# Patient Record
Sex: FEMALE | Race: WHITE | NOT HISPANIC OR LATINO | Employment: OTHER | URBAN - METROPOLITAN AREA
[De-identification: names, ages, dates, MRNs, and addresses within clinical notes are randomized per-mention and may not be internally consistent; named-entity substitution may affect disease eponyms.]

---

## 2017-01-03 ENCOUNTER — HOSPITAL ENCOUNTER (EMERGENCY)
Facility: HOSPITAL | Age: 77
Discharge: HOME | End: 2017-01-03
Attending: EMERGENCY MEDICINE | Admitting: EMERGENCY MEDICINE

## 2017-01-03 ENCOUNTER — APPOINTMENT (OUTPATIENT)
Dept: GENERAL RADIOLOGY | Facility: HOSPITAL | Age: 77
End: 2017-01-03

## 2017-01-03 VITALS
WEIGHT: 185 LBS | SYSTOLIC BLOOD PRESSURE: 174 MMHG | HEIGHT: 63 IN | BODY MASS INDEX: 32.78 KG/M2 | TEMPERATURE: 98.3 F | RESPIRATION RATE: 16 BRPM | HEART RATE: 70 BPM | DIASTOLIC BLOOD PRESSURE: 89 MMHG | OXYGEN SATURATION: 98 %

## 2017-01-03 DIAGNOSIS — N28.9 RENAL INSUFFICIENCY: ICD-10-CM

## 2017-01-03 DIAGNOSIS — J06.9 UPPER RESPIRATORY TRACT INFECTION, UNSPECIFIED TYPE: Primary | ICD-10-CM

## 2017-01-03 DIAGNOSIS — J44.1 COPD EXACERBATION (HCC): ICD-10-CM

## 2017-01-03 LAB
ALBUMIN SERPL-MCNC: 4.1 G/DL (ref 3.5–5.2)
ALBUMIN/GLOB SERPL: 1.2 G/DL
ALP SERPL-CCNC: 101 U/L (ref 39–117)
ALT SERPL W P-5'-P-CCNC: 6 U/L (ref 1–33)
ANION GAP SERPL CALCULATED.3IONS-SCNC: 14.3 MMOL/L
AST SERPL-CCNC: 9 U/L (ref 1–32)
BASOPHILS # BLD AUTO: 0.04 10*3/MM3 (ref 0–0.2)
BASOPHILS NFR BLD AUTO: 0.5 % (ref 0–1.5)
BILIRUB SERPL-MCNC: 0.3 MG/DL (ref 0.1–1.2)
BUN BLD-MCNC: 26 MG/DL (ref 8–23)
BUN/CREAT SERPL: 20 (ref 7–25)
CALCIUM SPEC-SCNC: 10.7 MG/DL (ref 8.6–10.5)
CHLORIDE SERPL-SCNC: 101 MMOL/L (ref 98–107)
CO2 SERPL-SCNC: 26.7 MMOL/L (ref 22–29)
CREAT BLD-MCNC: 1.3 MG/DL (ref 0.57–1)
DEPRECATED RDW RBC AUTO: 47.1 FL (ref 37–54)
EOSINOPHIL # BLD AUTO: 0.1 10*3/MM3 (ref 0–0.7)
EOSINOPHIL NFR BLD AUTO: 1.2 % (ref 0.3–6.2)
ERYTHROCYTE [DISTWIDTH] IN BLOOD BY AUTOMATED COUNT: 13.9 % (ref 11.7–13)
GFR SERPL CREATININE-BSD FRML MDRD: 40 ML/MIN/1.73
GLOBULIN UR ELPH-MCNC: 3.4 GM/DL
GLUCOSE BLD-MCNC: 98 MG/DL (ref 65–99)
HCT VFR BLD AUTO: 47 % (ref 35.6–45.5)
HGB BLD-MCNC: 15 G/DL (ref 11.9–15.5)
HOLD SPECIMEN: NORMAL
IMM GRANULOCYTES # BLD: 0 10*3/MM3 (ref 0–0.03)
IMM GRANULOCYTES NFR BLD: 0 % (ref 0–0.5)
LYMPHOCYTES # BLD AUTO: 2.13 10*3/MM3 (ref 0.9–4.8)
LYMPHOCYTES NFR BLD AUTO: 24.7 % (ref 19.6–45.3)
MCH RBC QN AUTO: 29.6 PG (ref 26.9–32)
MCHC RBC AUTO-ENTMCNC: 31.9 G/DL (ref 32.4–36.3)
MCV RBC AUTO: 92.7 FL (ref 80.5–98.2)
MONOCYTES # BLD AUTO: 0.75 10*3/MM3 (ref 0.2–1.2)
MONOCYTES NFR BLD AUTO: 8.7 % (ref 5–12)
NEUTROPHILS # BLD AUTO: 5.6 10*3/MM3 (ref 1.9–8.1)
NEUTROPHILS NFR BLD AUTO: 64.9 % (ref 42.7–76)
NT-PROBNP SERPL-MCNC: 209.7 PG/ML (ref 0–1800)
PLATELET # BLD AUTO: 214 10*3/MM3 (ref 140–500)
PMV BLD AUTO: 10.7 FL (ref 6–12)
POTASSIUM BLD-SCNC: 3.8 MMOL/L (ref 3.5–5.2)
PROT SERPL-MCNC: 7.5 G/DL (ref 6–8.5)
RBC # BLD AUTO: 5.07 10*6/MM3 (ref 3.9–5.2)
SODIUM BLD-SCNC: 142 MMOL/L (ref 136–145)
TROPONIN T SERPL-MCNC: <0.01 NG/ML (ref 0–0.03)
WBC NRBC COR # BLD: 8.62 10*3/MM3 (ref 4.5–10.7)
WHOLE BLOOD HOLD SPECIMEN: NORMAL

## 2017-01-03 PROCEDURE — 87486 CHLMYD PNEUM DNA AMP PROBE: CPT | Performed by: PHYSICIAN ASSISTANT

## 2017-01-03 PROCEDURE — 87798 DETECT AGENT NOS DNA AMP: CPT | Performed by: PHYSICIAN ASSISTANT

## 2017-01-03 PROCEDURE — 93005 ELECTROCARDIOGRAM TRACING: CPT

## 2017-01-03 PROCEDURE — 83880 ASSAY OF NATRIURETIC PEPTIDE: CPT | Performed by: EMERGENCY MEDICINE

## 2017-01-03 PROCEDURE — 99284 EMERGENCY DEPT VISIT MOD MDM: CPT

## 2017-01-03 PROCEDURE — 80053 COMPREHEN METABOLIC PANEL: CPT | Performed by: EMERGENCY MEDICINE

## 2017-01-03 PROCEDURE — 84484 ASSAY OF TROPONIN QUANT: CPT | Performed by: EMERGENCY MEDICINE

## 2017-01-03 PROCEDURE — 85025 COMPLETE CBC W/AUTO DIFF WBC: CPT | Performed by: EMERGENCY MEDICINE

## 2017-01-03 PROCEDURE — 87581 M.PNEUMON DNA AMP PROBE: CPT | Performed by: PHYSICIAN ASSISTANT

## 2017-01-03 PROCEDURE — 71020 HC CHEST PA AND LATERAL: CPT

## 2017-01-03 PROCEDURE — 87633 RESP VIRUS 12-25 TARGETS: CPT | Performed by: PHYSICIAN ASSISTANT

## 2017-01-03 PROCEDURE — 93010 ELECTROCARDIOGRAM REPORT: CPT | Performed by: INTERNAL MEDICINE

## 2017-01-03 RX ORDER — LEVOFLOXACIN 500 MG/1
750 TABLET, FILM COATED ORAL ONCE
Qty: 7 TABLET | Refills: 0 | Status: SHIPPED | OUTPATIENT
Start: 2017-01-03 | End: 2017-01-03

## 2017-01-03 RX ORDER — SODIUM CHLORIDE 0.9 % (FLUSH) 0.9 %
10 SYRINGE (ML) INJECTION AS NEEDED
Status: DISCONTINUED | OUTPATIENT
Start: 2017-01-03 | End: 2017-01-04 | Stop reason: HOSPADM

## 2017-01-03 RX ORDER — IPRATROPIUM BROMIDE AND ALBUTEROL SULFATE 2.5; .5 MG/3ML; MG/3ML
3 SOLUTION RESPIRATORY (INHALATION) ONCE
Status: DISCONTINUED | OUTPATIENT
Start: 2017-01-03 | End: 2017-01-03

## 2017-01-03 RX ORDER — METHYLPREDNISOLONE 4 MG/1
TABLET ORAL
Qty: 21 TABLET | Refills: 0 | Status: SHIPPED | OUTPATIENT
Start: 2017-01-03 | End: 2017-04-11 | Stop reason: HOSPADM

## 2017-01-03 RX ORDER — LEVOFLOXACIN 500 MG/1
500 TABLET, FILM COATED ORAL DAILY
COMMUNITY
End: 2017-04-11 | Stop reason: HOSPADM

## 2017-01-04 LAB

## 2017-01-04 NOTE — ED PROVIDER NOTES
I supervised care provided by the midlevel provider.    We have discussed this patient's history, physical exam, and treatment plan.   I have reviewed the note and personally saw and examined the patient and agree with the plan of care.  See attached attending note.    Patient complains of cough and shortness of breath for the past several days.  She also complains of intermittent headache, dizziness, and joint pain.  Denies fever or chest pain.    On exam:  Heart is regular  Lungs are clear with slightly diminished breath sounds in the bases.  Abdomen is obese, nontender  No pedal edema.  No Tenderness.      Chest x-ray was unremarkable.  Creatinine is mildly elevated.  EKG           EKG time: 1859  Rhythm/Rate: Sinus rhythm rate of 71 with a PVC  P waves and TX: Normal P waves, normal JOSE DAVID  QRS, axis: Normal axis, normal QRS   ST and T waves: Non-specific changes     Interpreted Contemporaneously by me, independently viewed  Not changed compared to prior EKG done 12/14/12    Plan is to call Dr. Cadet.                          ;     Óscar Olvio MD  01/03/17 4659

## 2017-01-04 NOTE — ED PROVIDER NOTES
EMERGENCY DEPARTMENT ENCOUNTER    CHIEF COMPLAINT  Chief Complaint: Cough  History given by: Patient  History limited by:   Room Number: 02/02  PMD: Óscar Cadet MD      HPI:  Pt is a 76 y.o. female who presents complaining of cough that onset 4 days ago. She states the cough is productive with thick, green sputum Pt also reports some dyspnea and dizziness since that time along with headache, arthralgias.  She has a hx of COPD, Asthma. Pt spoke with her PCP's nurse today and states she was referred to the ED    Duration:  4 days ago  Onset: gradual  Timing: constant  Quality: productive cough w/ thick green sputum  Intensity/Severity: moderate  Progression: worsening  Associated Symptoms: headache, arthralgias.   Aggravating Factors: deep breathing  Alleviating Factors: none  Previous Episodes: hx of COPD, asthma  Treatment before arrival: seen by PCP.    PAST MEDICAL HISTORY  Active Ambulatory Problems     Diagnosis Date Noted   • Joint pain 07/25/2016   • Urinary retention self-caths ([place]) 07/25/2016   • Sinusitis 07/25/2016   • Conjunctivitis 07/25/2016   • Arm pain 07/25/2016   • Cervical disc displacement 07/25/2016   • Cervical pain (Servando=PM) 07/25/2016   • DDD (degenerative disc disease), cervical 07/25/2016   • Hyperlipidemia 07/25/2016   • Preventative health care 09/08/2016   • Medication management 09/08/2016   • Proctocele 06/29/2015   • Urge incontinence of urine 06/29/2015   • Chronic tension-type headache, intractable 11/11/2016   • Obesity (BMI 30.0-34.9) 11/11/2016   • H/o Lyme disease 11/11/2016   • Chronic kidney disease, stage 2, mildly decreased GFR 11/11/2016   • Hardening of the arteries of the brain 11/11/2016   • Allergic contact dermatitis 11/11/2016   • Chronic fatigue 11/11/2016   • Vertigo 11/11/2016   • Vitamin D deficiency 11/11/2016   • Moderate persistent asthmatic bronchitis without complication 11/11/2016   • Anterior cervical adenopathy due to infection 11/11/2016   •  Pleurisy 11/11/2016   • Knee pain, bilateral 11/11/2016   • Elevated PTH level 11/11/2016   • 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) 11/11/2016   • Gastroesophageal reflux disease with esophagitis 11/11/2016   • Psoriasis (Darryl Ochoa) 11/11/2016   • Postmenopausal 11/11/2016   • Recurrent UTI 11/11/2016   • CVA (cerebrovascular accident) subacute 11/11/2016     Resolved Ambulatory Problems     Diagnosis Date Noted   • No Resolved Ambulatory Problems     Past Medical History   Diagnosis Date   • Asthma    • Cancer    • COPD (chronic obstructive pulmonary disease)    • Emphysema lung    • Generalized headaches    • Hayfever    • Hypertension        PAST SURGICAL HISTORY  Past Surgical History   Procedure Laterality Date   • Hernia repair  01/01/1971   • Total abdominal hysterectomy  01/01/1973   • Sinus surgery  01/01/1984   • Knee surgery Left 01/01/1998   • Breast lumpectomy Left 04/01/2003   • Parathyroidectomy  05/08/2006   • Cataract extraction  2010   • Carpal tunnel release  2011       FAMILY HISTORY  Family History   Problem Relation Age of Onset   • Cancer Brother        SOCIAL HISTORY  Social History     Social History   • Marital status:      Spouse name: N/A   • Number of children: N/A   • Years of education: N/A     Occupational History   • Not on file.     Social History Main Topics   • Smoking status: Never Smoker   • Smokeless tobacco: Never Used   • Alcohol use Yes      Comment: rarely   • Drug use: Defer   • Sexual activity: Defer     Other Topics Concern   • Not on file     Social History Narrative       ALLERGIES  Sulfa antibiotics; Hydralazine; Anastrozole; Grass; Morphine and related; Norvasc  [amlodipine besylate]; Penicillins; Tree extract; and Zolpidem    REVIEW OF SYSTEMS  Review of Systems   Constitutional: Negative for fever.   HENT: Negative for sore throat.    Eyes: Negative.    Respiratory: Positive for cough and shortness of breath.    Cardiovascular:  Negative for chest pain.   Gastrointestinal: Negative for abdominal pain, diarrhea and vomiting.   Genitourinary: Negative for dysuria.   Musculoskeletal: Positive for arthralgias. Negative for neck pain.   Skin: Negative for rash.   Allergic/Immunologic: Negative.    Neurological: Positive for headaches. Negative for weakness and numbness.   Hematological: Negative.    Psychiatric/Behavioral: Negative.    All other systems reviewed and are negative.      PHYSICAL EXAM  ED Triage Vitals   Temp Heart Rate Resp BP SpO2   01/03/17 1646 01/03/17 1645 01/03/17 1645 01/03/17 1715 01/03/17 1645   97.5 °F (36.4 °C) 96 22 184/102 100 %      Temp src Heart Rate Source Patient Position BP Location FiO2 (%)   01/03/17 1646 -- 01/03/17 1715 01/03/17 1715 --   Tympanic  Sitting Right arm        Physical Exam   Constitutional: She is oriented to person, place, and time and well-developed, well-nourished, and in no distress. No distress.   HENT:   Head: Normocephalic and atraumatic.   Eyes: EOM are normal. Pupils are equal, round, and reactive to light.   Neck: Normal range of motion. Neck supple.   Cardiovascular: Normal rate, regular rhythm and normal heart sounds.    Pulmonary/Chest: Effort normal and breath sounds normal. No respiratory distress.   Abdominal: Soft. There is no tenderness. There is no rebound and no guarding.   Musculoskeletal: Normal range of motion. She exhibits no edema.   Neurological: She is alert and oriented to person, place, and time. She has normal sensation and normal strength.   Skin: Skin is warm and dry. No rash noted.   Psychiatric: Mood and affect normal.   Nursing note and vitals reviewed.      LAB RESULTS  Lab Results (last 24 hours)     Procedure Component Value Units Date/Time    CBC & Differential [[id number]] Collected:  01/03/17 1758    Specimen:  Blood Updated:  01/03/17 1822    Narrative:       The following orders were created for panel order CBC & Differential.  Procedure                                Abnormality         Status                     ---------                               -----------         ------                     CBC Auto Differential[[id number]]         Abnormal            Final result                 Please view results for these tests on the individual orders.    Comprehensive Metabolic Panel [[id number]]  (Abnormal) Collected:  01/03/17 1758    Specimen:  Blood Updated:  01/03/17 1841     Glucose 98 mg/dL      BUN 26 (H) mg/dL      Creatinine 1.30 (H) mg/dL      Sodium 142 mmol/L      Potassium 3.8 mmol/L      Chloride 101 mmol/L      CO2 26.7 mmol/L      Calcium 10.7 (H) mg/dL      Total Protein 7.5 g/dL      Albumin 4.10 g/dL      ALT (SGPT) 6 U/L      AST (SGOT) 9 U/L      Alkaline Phosphatase 101 U/L      Total Bilirubin 0.3 mg/dL      eGFR Non African Amer 40 (L) mL/min/1.73      Globulin 3.4 gm/dL      A/G Ratio 1.2 g/dL      BUN/Creatinine Ratio 20.0      Anion Gap 14.3 mmol/L     Narrative:       The MDRD GFR formula is only valid for adults with stable renal function between ages 18 and 70.    BNP [[id number]]  (Normal) Collected:  01/03/17 1758    Specimen:  Blood Updated:  01/03/17 1856     proBNP 209.7 pg/mL     Narrative:       Among patients with dyspnea, NT-proBNP is highly sensitive for the detection of acute congestive heart failure. In addition NT-proBNP of <300 pg/ml effectively rules out acute congestive heart failure with 99% negative predictive value.    Troponin [[id number]]  (Normal) Collected:  01/03/17 1758    Specimen:  Blood Updated:  01/03/17 1856     Troponin T <0.010 ng/mL     Narrative:       Troponin T Reference Ranges:  Less than 0.03 ng/mL:    Negative for AMI  0.03 to 0.09 ng/mL:      Indeterminant for AMI  Greater than 0.09 ng/mL: Positive for AMI    CBC Auto Differential [[id number]]  (Abnormal) Collected:  01/03/17 1758    Specimen:  Blood Updated:  01/03/17 1822     WBC 8.62 10*3/mm3      RBC 5.07 10*6/mm3      Hemoglobin 15.0 g/dL      Hematocrit  47.0 (H) %      MCV 92.7 fL      MCH 29.6 pg      MCHC 31.9 (L) g/dL      RDW 13.9 (H) %      RDW-SD 47.1 fl      MPV 10.7 fL      Platelets 214 10*3/mm3      Neutrophil % 64.9 %      Lymphocyte % 24.7 %      Monocyte % 8.7 %      Eosinophil % 1.2 %      Basophil % 0.5 %      Immature Grans % 0.0 %      Neutrophils, Absolute 5.60 10*3/mm3      Lymphocytes, Absolute 2.13 10*3/mm3      Monocytes, Absolute 0.75 10*3/mm3      Eosinophils, Absolute 0.10 10*3/mm3      Basophils, Absolute 0.04 10*3/mm3      Immature Grans, Absolute 0.00 10*3/mm3     Respiratory Panel, PCR [[id number]]  (Normal) Collected:  01/03/17 2127    Specimen:  Swab from [facility] Updated:  01/04/17 0013     ADENOVIRUS, PCR Not Detected      Coronavirus 229E Not Detected      Coronavirus HKU1 Not Detected      Coronavirus NL63 Not Detected      Coronavirus OC43 Not Detected      Human Metapneumovirus Not Detected      Human Rhinovirus/Enterovirus Not Detected      Influenza B PCR Not Detected      Parainfluenza Virus 1 Not Detected      Parainfluenza Virus 2 Not Detected      Parainfluenza Virus 3 Not Detected      Parainfluenza Virus 4 Not Detected      Bordetella pertussis pcr Not Detected      Influenza 2009 H1N1 by PCR Not Detected      Chlamydophila pneumoniae PCR Not Detected      Mycoplasma pneumo by PCR Not Detected      Influenza A PCR Not Detected      Influenza A H3 Not Detected      Influenza A H1 Not Detected      RSV, PCR Not Detected           I ordered the above labs and reviewed the results    RADIOLOGY  XR Chest 2 View   Preliminary Result       No active disease in the chest.               I ordered the above noted radiological studies. Interpreted by radiologist. Reviewed by me in PACS.       PROCEDURES  Procedures  See MD note for EKG interpretation    PROGRESS AND CONSULTS  ED Course   8:55 PM  Ordered blood work, EKG, CXR. Ordered Duo-neb  11:04 PM  Discussed pt with Dr. Lawson. Dr. Lawson wants to start pt on prednisone and  feels she is safe for discharge and to follow up office. Will give IVF  11:05 PM  Discussed pt with Dr. Salinas. Dr. Olivo has seen and evaluated pt and agrees with tx plan.  11:15 PM  Rechecked with pt. Informed pt of discussion with Dr. Lawson and plan to discharge with Prednisone and Levaquin and instructed to follow up in the next few days. Pt understands and agrees with plan. All concerns addressed.       MEDICAL DECISION MAKING      MDM  Number of Diagnoses or Management Options  COPD exacerbation:   Renal insufficiency:   Upper respiratory tract infection, unspecified type:      Amount and/or Complexity of Data Reviewed  Clinical lab tests: ordered and reviewed (Blood work)  Tests in the radiology section of CPT®: reviewed and ordered (CXR)  Tests in the medicine section of CPT®: ordered and reviewed (EKG)  Discuss the patient with other providers: yes (Dr. Lawson)  Independent visualization of images, tracings, or specimens: yes           DIAGNOSIS  Final diagnoses:   Upper respiratory tract infection, unspecified type   COPD exacerbation   Renal insufficiency       DISPOSITION  DISCHARGE    Patient discharged in stable condition.    Reviewed implications of results, diagnosis, meds, responsibility to follow up, warning signs and symptoms of possible worsening, potential complications and reasons to return to ER.    Patient/Family voiced understanding of above instructions.    Discussed plan for discharge, as there is no emergent indication for admission.  Pt/family is agreeable and understands need for follow up and repeat testing.  Pt is aware that discharge does not mean that nothing is wrong but it indicates no emergency is present that requires admission and they must continue care with follow-up as given below or physician of their choice.     FOLLOW-UP  Óscar Cadet MD  [address] [place] [address]  [phone]    Call in 1 day  For Primary Physician follow-up         Medication  List      New Prescriptions          MethylPREDNISolone 4 MG tablet   Commonly known as:  MEDROL (CRISTIAN)   Take as directed on package instructions.         ASK your doctor about these medications          levoFLOXacin 500 MG tablet   Commonly known as:  LEVAQUIN   Take 1.5 tablets by mouth 1 (One) Time for 1 dose.   Ask about: Should I take this medication?               Latest Documented Vital Signs:  As of 5:15 AM  BP- 174/89 HR- 70 Temp- 98.3 °F (36.8 °C) O2 sat- 98%    --  Documentation assistance provided by ema Tinajero for Nicola Luke PA-C.  Information recorded by the ema was done at my direction and has been verified and validated by me.       Fransisco Tinajero  01/03/17 2200       Fransisco Tinajero  01/03/17 2311       Fransisco Tinajero  01/03/17 0806       GASPER Ramirze III  01/04/17 0596

## 2017-01-06 ENCOUNTER — TELEPHONE (OUTPATIENT)
Dept: SOCIAL WORK | Facility: HOSPITAL | Age: 77
End: 2017-01-06

## 2017-01-06 NOTE — TELEPHONE ENCOUNTER
ED follow-up phone call. States she is still feeling about the same, is drinking plenty of fluids and taking antibx as directed. Has not yet gotten -script for steroids filled; states she plans to do so as soon as possible. Has upcoming priscila't w/ PCP. Reminded re RTER as necessary.

## 2017-01-09 RX ORDER — BUTALBITAL, ACETAMINOPHEN AND CAFFEINE 50; 325; 40 MG/1; MG/1; MG/1
2 TABLET ORAL EVERY 4 HOURS PRN
Qty: 240 TABLET | Refills: 0 | OUTPATIENT
Start: 2017-01-09 | End: 2017-03-27 | Stop reason: SDUPTHER

## 2017-01-11 ENCOUNTER — OFFICE VISIT (OUTPATIENT)
Dept: CARDIOLOGY | Facility: CLINIC | Age: 77
End: 2017-01-11

## 2017-01-11 VITALS
HEIGHT: 63 IN | HEART RATE: 74 BPM | WEIGHT: 185 LBS | DIASTOLIC BLOOD PRESSURE: 78 MMHG | BODY MASS INDEX: 32.78 KG/M2 | SYSTOLIC BLOOD PRESSURE: 142 MMHG

## 2017-01-11 DIAGNOSIS — N18.2 CHRONIC KIDNEY DISEASE, STAGE 2, MILDLY DECREASED GFR: ICD-10-CM

## 2017-01-11 DIAGNOSIS — N39.0 RECURRENT UTI: ICD-10-CM

## 2017-01-11 DIAGNOSIS — R79.89 ELEVATED PTHRP LEVEL: ICD-10-CM

## 2017-01-11 DIAGNOSIS — R33.9 URINARY RETENTION: ICD-10-CM

## 2017-01-11 DIAGNOSIS — K21.00 GASTROESOPHAGEAL REFLUX DISEASE WITH ESOPHAGITIS: ICD-10-CM

## 2017-01-11 DIAGNOSIS — R09.1 PLEURISY: ICD-10-CM

## 2017-01-11 DIAGNOSIS — R07.2 PRECORDIAL PAIN: ICD-10-CM

## 2017-01-11 DIAGNOSIS — J45.40 MODERATE PERSISTENT ASTHMATIC BRONCHITIS WITHOUT COMPLICATION: ICD-10-CM

## 2017-01-11 DIAGNOSIS — J01.00 ACUTE MAXILLARY SINUSITIS, RECURRENCE NOT SPECIFIED: ICD-10-CM

## 2017-01-11 DIAGNOSIS — E78.49 OTHER HYPERLIPIDEMIA: ICD-10-CM

## 2017-01-11 DIAGNOSIS — E66.9 OBESITY (BMI 30.0-34.9): ICD-10-CM

## 2017-01-11 DIAGNOSIS — R42 VERTIGO: ICD-10-CM

## 2017-01-11 DIAGNOSIS — R53.82 CHRONIC FATIGUE: ICD-10-CM

## 2017-01-11 DIAGNOSIS — I63.9 CEREBROVASCULAR ACCIDENT (CVA), UNSPECIFIED MECHANISM (HCC): Primary | ICD-10-CM

## 2017-01-11 PROCEDURE — 99204 OFFICE O/P NEW MOD 45 MIN: CPT | Performed by: INTERNAL MEDICINE

## 2017-01-11 NOTE — PROGRESS NOTES
[facility]  Cardiology Office Visit Note        Subjective:     Encounter Date:2017      Patient ID: Sasha Barrett   Age: 76 y.o.  Sex: female  :  1940  MRN: [id number]             Date of Office Visit: 2017  Encounter Provider: Umesh Greenberg MD  Place of Service: [facility]     .    Chief Complaint:  History of Present Illness    The following portions of the patient's history were reviewed and updated as appropriate: allergies, current medications, past family history, past medical history, past social history, past surgical history and problem list.    Review of Systems   Constitution: Negative for weight gain.   HENT: Positive for congestion. Negative for headaches, hearing loss and sore throat.    Eyes: Negative for blurred vision.   Cardiovascular: Positive for dyspnea on exertion. Negative for chest pain, claudication, cyanosis, irregular heartbeat, leg swelling, near-syncope, orthopnea, palpitations, paroxysmal nocturnal dyspnea and syncope.   Respiratory: Positive for shortness of breath. Negative for cough and snoring.    Endocrine: Negative for cold intolerance.   Hematologic/Lymphatic: Negative for adenopathy. Does not bruise/bleed easily.   Skin: Negative for rash.   Musculoskeletal: Positive for joint pain, joint swelling and myalgias. Negative for back pain.   Gastrointestinal: Negative for abdominal pain, change in bowel habit, constipation and diarrhea.   Genitourinary: Negative for dysuria, frequency, hematuria and hesitancy.   Neurological: Positive for dizziness and vertigo. Negative for disturbances in coordination, excessive daytime sleepiness, focal weakness and loss of balance.   Psychiatric/Behavioral: Negative for memory loss. The patient is not nervous/anxious.    Allergic/Immunologic: Negative for hives.   All other systems reviewed and are negative.      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  The main complaint she has R hypertension, difficult to control as well as dyspnea on exertion.  She states that her problems began around June 2016 when she first noted intermittent swelling of her legs up to her thighs.  No change in weight however.  She also noted dizziness with standing, with one fall.  She blames a lot of her symptoms on taking hydralazine from March through November 2016, a medicine that was prescribed by her physician in [place].  When she returned to [place], Dr. Bond, her nephrologist, took her off the hydralazine.  She still complains of aches in her joints and muscles however.    She was sensitive for rheumatoid arthritis was told the test is borderline positive.    Cardiac review systems: No PND.  She does awaken from sinus drainage sometimes.  No change in weight.  No palpitations or she gets dizzy with standing.  She's had one fall onto a couch.  No syncope.    Cardiac risk factors: Positive for hypertension and hyperlipidemia.  No diabetes.  No smoking but she did have secondhand smoke.  No family history of early CAD.    She did have a stress test over 15 years ago.    She recently had a MRI and MRA of her brain to evaluate new-onset headache.    She states that the doctor in [place] took her off expensive diuretic and put her on the hydralazine instead.          Past Medical History   Diagnosis Date   • Asthma    • Cancer    • COPD (chronic obstructive pulmonary disease)    • Emphysema lung    • Generalized headaches    • Hayfever    • Hypertension        Past Surgical History   Procedure Laterality Date   • Hernia repair  01/01/1971   • Total abdominal hysterectomy  01/01/1973   • Sinus surgery  01/01/1984   • Knee surgery Left 01/01/1998   • Breast lumpectomy Left 04/01/2003   • Parathyroidectomy  05/08/2006   • Cataract extraction   2010   • Carpal tunnel release  2011       Social History     Social History   • Marital status:      Spouse name: N/A   • Number of children: N/A   • Years of education: N/A     Occupational History   • Not on file.     Social History Main Topics   • Smoking status: Never Smoker   • Smokeless tobacco: Never Used   • Alcohol use Yes      Comment: rarely   • Drug use: Defer   • Sexual activity: Defer     Other Topics Concern   • Not on file     Social History Narrative       Family History   Problem Relation Age of Onset   • Cancer Brother            Scheduled Meds:  Current Outpatient Prescriptions on File Prior to Visit   Medication Sig Dispense Refill   • albuterol (PROAIR HFA) 108 (90 BASE) MCG/ACT inhaler ProAir  (90 Base) MCG/ACT Inhalation Aerosol Solution; Patient Sig: ProAir  (90 Base) MCG/ACT Inhalation Aerosol Solution ; 8; 0; 18-Jun-2014; Active     • aspirin 81 MG tablet Take  by mouth.     • budesonide (PULMICORT FLEXHALER) 180 MCG/ACT inhaler Pulmicort Flexhaler 180 MCG/ACT Inhalation Aerosol Powder Breath Activated; Patient Sig: Pulmicort Flexhaler 180 MCG/ACT Inhalation Aerosol Powder Breath Activated ; 0; 26-Jun-2014; Active     • butalbital-acetaminophen-caffeine (FIORICET, ESGIC) -40 MG per tablet Take 2 tablets by mouth Every 4 (Four) Hours As Needed for headaches. 240 tablet 0   • Cholecalciferol (VITAMIN D) 1000 UNITS tablet Take  by mouth.     • CloNIDine (CATAPRES) 0.1 MG tablet Take 1 tablet by mouth 2 (Two) Times a Day. 270 tablet 0   • diphenhydrAMINE (BENADRYL) 50 MG tablet Take 1 tablet by mouth every 4 (four) hours as needed for itching or allergies. 180 tablet 0   • HYDROcodone-acetaminophen (NORCO) 5-325 MG per tablet Take 1 tablet by mouth Every 6 (Six) Hours As Needed for moderate pain (4-6) or severe pain (7-10). 120 tablet 0   • hydrOXYzine (VISTARIL) 50 MG capsule Take 1 capsule by mouth every 6 (six) hours as needed for itching. 360 capsule 1   •  "levoFLOXacin (LEVAQUIN) 500 MG tablet Take 500 mg by mouth Daily.     • LORazepam (ATIVAN) 0.5 MG tablet Take 1 tablet by mouth Every 8 (Eight) Hours As Needed for anxiety. Take 1 tab 1 hoour before MRI and repeat x 1 at time of test 60 tablet 0   • meclizine (ANTIVERT) 25 MG tablet Take 1 tablet by mouth 3 (Three) Times a Day As Needed for dizziness. 30 tablet 0   • MethylPREDNISolone (MEDROL, CRISTIAN,) 4 MG tablet Take as directed on package instructions. 21 tablet 0   • montelukast (SINGULAIR) 10 MG tablet Take 1 tablet by mouth every night. 90 tablet 1   • NIFEdipine XL (PROCARDIA XL) 60 MG 24 hr tablet Take 1 tablet by mouth daily. No substitutes please   Needs NDC # [id number] 180 tablet 1   • omeprazole (PriLOSEC) 40 MG capsule Take 1 capsule by mouth daily. 90 capsule 1   • ondansetron ODT (ZOFRAN-ODT) 8 MG disintegrating tablet Take 1 tablet by mouth every 6 (six) hours as needed for nausea or vomiting. 360 tablet 1   • pentazocine-naloxone (TALWIN NX) 50-0.5 MG per tablet Take 2 tablets by mouth Every 4 (Four) Hours As Needed for mild pain (1-3) for up to 90 days. 1080 tablet 1   • polyethylene glycol (MIRALAX) powder Take one capful w/liquid daily 765 g 1   • promethazine-codeine (PHENERGAN with CODEINE) 6.25-10 MG/5ML syrup Take 5 mL by mouth 4 (Four) Times a Day As Needed for cough. 360 mL 0   • tiZANidine (ZANAFLEX) 4 MG tablet Take 1 tablet by mouth at night as needed for muscle spasms. 90 tablet 0   • tobramycin-dexamethasone (TOBRADEX) 0.3-0.1 % ophthalmic suspension Administer 1 drop to both eyes every 4 (four) hours while awake. 10 mL 0     No current facility-administered medications on file prior to visit.        Visit Vitals   • /78   • Pulse 74   • Ht 63\" (160 cm)   • Wt 185 lb (83.9 kg)   • BMI 32.77 kg/m2       Objective:     Physical Exam   Constitutional: She is oriented to person, place, and time. She appears well-developed and well-nourished. No distress.   HENT:   Head: Normocephalic " and atraumatic.   Right Ear: External ear normal.   Left Ear: External ear normal.   Mouth/Throat: Oropharynx is clear and moist. No oropharyngeal exudate.   Eyes: Conjunctivae and EOM are normal. Pupils are equal, round, and reactive to light. No scleral icterus.   Neck: Normal range of motion. Neck supple. No JVD present. No tracheal deviation present. No thyromegaly present.   Cardiovascular: Normal rate, regular rhythm, S1 normal, S2 normal, normal heart sounds and intact distal pulses.  PMI is not displaced.  Exam reveals no gallop, no distant heart sounds, no friction rub and no decreased pulses.    No murmur heard.  Pulmonary/Chest: Effort normal and breath sounds normal. No accessory muscle usage. No respiratory distress. She has no wheezes. She has no rales. She exhibits no tenderness.   Abdominal: Soft. Bowel sounds are normal. She exhibits no distension and no mass. There is no tenderness. There is no rebound and no guarding.   Musculoskeletal: Normal range of motion. She exhibits no edema, tenderness or deformity.   Lymphadenopathy:     She has no cervical adenopathy.   Neurological: She is alert and oriented to person, place, and time. She has normal reflexes. No cranial nerve deficit. Coordination normal.   Skin: Skin is dry. No rash noted. She is not diaphoretic. No erythema. No pallor.   Psychiatric: She has a normal mood and affect.             Lab Review:               Lab Review:         Lab Review     No results found for: CHOL  Lab Results   Component Value Date    HDL 62 11/11/2016    HDL 56 07/25/2016     Lab Results   Component Value Date     (H) 11/11/2016     (H) 07/25/2016     Lab Results   Component Value Date    TRIG 175 (H) 11/11/2016    TRIG 212 (H) 07/25/2016     No components found for: CHOLHDL  Lab Results   Component Value Date    GLUCOSE 98 01/03/2017    BUN 26 (H) 01/03/2017    CREATININE 1.30 (H) 01/03/2017    EGFRIFNONA 40 (L) 01/03/2017    EGFRIFAFRI 64  07/25/2016    BCR 20.0 01/03/2017    CO2 26.7 01/03/2017    CALCIUM 10.7 (H) 01/03/2017    PROTENTOTREF 6.9 07/25/2016    ALBUMIN 4.10 01/03/2017    LABIL2 1.2 01/03/2017    AST 9 01/03/2017    ALT 6 01/03/2017     Lab Results   Component Value Date    GLUCOSE 98 01/03/2017    CALCIUM 10.7 (H) 01/03/2017     01/03/2017    K 3.8 01/03/2017    CO2 26.7 01/03/2017     01/03/2017    BUN 26 (H) 01/03/2017    CREATININE 1.30 (H) 01/03/2017    EGFRIFAFRI 64 07/25/2016    EGFRIFNONA 40 (L) 01/03/2017    BCR 20.0 01/03/2017    ANIONGAP 14.3 01/03/2017     Lab Results   Component Value Date    WBC 8.62 01/03/2017    HGB 15.0 01/03/2017    HCT 47.0 (H) 01/03/2017    MCV 92.7 01/03/2017     01/03/2017     No results found for: DDIMER  Lab Results   Component Value Date    TSH 2.080 11/11/2016    O4IXSJP 6.9 11/11/2016     No results found for: CKTOTAL  No results found for: DIGOXIN  Lab Results   Component Value Date    TROPONINT <0.010 01/03/2017     No results found for: INR, PROTIME  Estimated Creatinine Clearance: 36.6 mL/min (by C-G formula based on Cr of 1.3).    Assessment:          Diagnosis Plan   1. Cerebrovascular accident (CVA), unspecified mechanism     2. Moderate persistent asthmatic bronchitis without complication     3. Acute maxillary sinusitis, recurrence not specified     4. Pleurisy  DIAN    CBC & Differential    CK-MB    CK    D-dimer, Quantitative    Magnesium   5. Gastroesophageal reflux disease with esophagitis     6. Obesity (BMI 30.0-34.9)     7. Chronic kidney disease, stage 2, mildly decreased GFR  DIAN    CBC & Differential    CK-MB    CK    D-dimer, Quantitative    Magnesium   8. Recurrent UTI     9. Urinary retention self-caths (Don)     10. Chronic fatigue  DIAN    CBC & Differential    CK-MB    CK    D-dimer, Quantitative    Magnesium   11. Other hyperlipidemia     12. Elevated PTH level     13. Vertigo     14. Precordial pain  Stress Test With Myocardial Perfusion One Day           Assessment and Plan:    Sasha was seen today for cerebrovascular accident.    Diagnoses and all orders for this visit:    Cerebrovascular accident (CVA), unspecified mechanism  -     Cancel: ECG 12 Lead    Moderate persistent asthmatic bronchitis without complication    Acute maxillary sinusitis, recurrence not specified    Pleurisy  -     DIAN  -     CBC & Differential  -     CK-MB  -     CK  -     D-dimer, Quantitative  -     Magnesium    Gastroesophageal reflux disease with esophagitis    Obesity (BMI 30.0-34.9)    Chronic kidney disease, stage 2, mildly decreased GFR  -     DIAN  -     CBC & Differential  -     CK-MB  -     CK  -     D-dimer, Quantitative  -     Magnesium    Recurrent UTI    Urinary retention self-caths (Don)    Chronic fatigue  -     DIAN  -     CBC & Differential  -     CK-MB  -     CK  -     D-dimer, Quantitative  -     Magnesium    Other hyperlipidemia    Elevated PTH level    Vertigo    Precordial pain  -     Stress Test With Myocardial Perfusion One Day      The patient is complaining of shortness of breath with exertion.  She is not in heart failure on exam.  She does have edema of her legs but is difficult to tell how much is water, to his tissue.  She apparently had a venous duplex which was okay.      Echocardiogram performed December 5, 2006 and read by Dr. Patiño showed LVEF normal with moderate LVH, diastolic dysfunction.  No mention of pulmonary hypertension or right ventricular systolic dysfunction.  I will review the echo myself to see if she needs a sleep study.  The right ventricle is enlarged or hypokinetic, she may have significant sleep apnea.    ProBNP was normal at 209 on January 3, 2017.    She did have elevation or creatinine of 1.3 on January 3, up from baseline of 0.9 she sees a nephrologist.    There are function tests and TSH were normal in November 2016.      I will also get an DIAN and other lab work to evaluate her clinical situation, particularly as  she was on hydralazine.  She can have these labs drawn when she sees her nephrologist soon.  I will schedule that appointment.    At this time I will not have her undergo a stress test.  She gets a tightness in her chest substernally when she has COPD exacerbation or pneumonia.  Her ECG from Dr. Cadet's office shows sinus rhythm with PVC and ST segment flattening generally.  I will want her to undergo a stress test to evaluate her chest pain and shortness of breath with exertion in the setting of an abnormal EKG.    Her blood pressure is fairly normal here.  However, has been elevated in the past.  I will let Dr. Bond handle her blood pressure medications if she does have renal sufficiency.    A total of 25 minutes was spent in the care of this patient, including at least 13 minutes face-to-face with the patient.    I not only counseled the patient today on the significant factors noted in the assessment and plan, but I also recommended that the patient reduce salt and saturated animal fat intake in diet, as well as to perform scheduled exercise on a regular basis.      Plan:                  01/11/2017  3:29 PM  MD Umesh Juarez MD  1/11/2017, 3:29 PM

## 2017-01-11 NOTE — PATIENT INSTRUCTIONS
Patient has been counseled on details discussed in the assessment and plan.  He is also been counseled on reducing salt and saturated animal fat in his diet, as well as to perform regular exercise.

## 2017-01-11 NOTE — MR AVS SNAPSHOT
[facility]  [phone]                    Sasha Barrett   1/11/2017 1:00 PM   Office Visit    Dept Phone:  [phone]   Encounter #:  [id number]    Provider:  Umesh Greenberg Jr., MD   Department:  [facility]                Your Full Care Plan              Your Updated Medication List          This list is accurate as of: 1/11/17  3:40 PM.  Always use your most recent med list.                aspirin 81 MG tablet       butalbital-acetaminophen-caffeine -40 MG per tablet   Commonly known as:  FIORICET, ESGIC   Take 2 tablets by mouth Every 4 (Four) Hours As Needed for headaches.       CloNIDine 0.1 MG tablet   Commonly known as:  CATAPRES   Take 1 tablet by mouth 2 (Two) Times a Day.       diphenhydrAMINE 50 MG tablet   Commonly known as:  BENADRYL   Take 1 tablet by mouth every 4 (four) hours as needed for itching or allergies.       HYDROcodone-acetaminophen 5-325 MG per tablet   Commonly known as:  NORCO   Take 1 tablet by mouth Every 6 (Six) Hours As Needed for moderate pain (4-6) or severe pain (7-10).       hydrOXYzine 50 MG capsule   Commonly known as:  VISTARIL   Take 1 capsule by mouth every 6 (six) hours as needed for itching.       levoFLOXacin 500 MG tablet   Commonly known as:  LEVAQUIN       LORazepam 0.5 MG tablet   Commonly known as:  ATIVAN   Take 1 tablet by mouth Every 8 (Eight) Hours As Needed for anxiety. Take 1 tab 1 hoour before MRI and repeat x 1 at time of test       meclizine 25 MG tablet   Commonly known as:  ANTIVERT   Take 1 tablet by mouth 3 (Three) Times a Day As Needed for dizziness.       MethylPREDNISolone 4 MG tablet   Commonly known as:  MEDROL (CRISTIAN)   Take as directed on package instructions.       montelukast 10 MG tablet   Commonly known as:  SINGULAIR   Take 1 tablet by mouth every night.       NIFEdipine XL 60 MG 24 hr tablet   Commonly known as:  PROCARDIA XL   Take 1 tablet  by mouth daily. No substitutes please  Needs NDC # [id number]       omeprazole 40 MG capsule   Commonly known as:  priLOSEC   Take 1 capsule by mouth daily.       ondansetron ODT 8 MG disintegrating tablet   Commonly known as:  ZOFRAN-ODT   Take 1 tablet by mouth every 6 (six) hours as needed for nausea or vomiting.       pentazocine-naloxone 50-0.5 MG per tablet   Commonly known as:  TALWIN NX   Take 2 tablets by mouth Every 4 (Four) Hours As Needed for mild pain (1-3) for up to 90 days.       polyethylene glycol powder   Commonly known as:  MIRALAX   Take one capful w/liquid daily       PROAIR  (90 BASE) MCG/ACT inhaler   Generic drug:  albuterol       promethazine-codeine 6.25-10 MG/5ML syrup   Commonly known as:  PHENERGAN with CODEINE   Take 5 mL by mouth 4 (Four) Times a Day As Needed for cough.       PULMICORT FLEXHALER 180 MCG/ACT inhaler   Generic drug:  budesonide       tiZANidine 4 MG tablet   Commonly known as:  ZANAFLEX   Take 1 tablet by mouth at night as needed for muscle spasms.       tobramycin-dexamethasone 0.3-0.1 % ophthalmic suspension   Commonly known as:  TOBRADEX   Administer 1 drop to both eyes every 4 (four) hours while awake.       Vitamin D 1000 UNITS tablet               We Performed the Following     DIAN     CBC & Differential     CK-MB     CK     D-dimer, Quantitative     Magnesium     Stress Test With Myocardial Perfusion One Day       You Were Diagnosed With        Codes Comments    Cerebrovascular accident (CVA), unspecified mechanism    -  Primary ICD-10-CM: I63.9  ICD-9-CM: 434.91     Moderate persistent asthmatic bronchitis without complication     ICD-10-CM: J45.40  ICD-9-CM: 493.90     Acute maxillary sinusitis, recurrence not specified     ICD-10-CM: J01.00  ICD-9-CM: 461.0     Pleurisy     ICD-10-CM: R09.1  ICD-9-CM: 511.0     Gastroesophageal reflux disease with esophagitis     ICD-10-CM: K21.0  ICD-9-CM: 530.11     Obesity (BMI 30.0-34.9)     ICD-10-CM:  E66.9  ICD-9-CM: 278.00     Chronic kidney disease, stage 2, mildly decreased GFR     ICD-10-CM: N18.2  ICD-9-CM: 585.2     Recurrent UTI     ICD-10-CM: N39.0  ICD-9-CM: 599.0     Urinary retention     ICD-10-CM: R33.9  ICD-9-CM: 788.20     Chronic fatigue     ICD-10-CM: R53.82  ICD-9-CM: 780.79     Other hyperlipidemia     ICD-10-CM: E78.4  ICD-9-CM: 272.4     Elevated PTHrP level     ICD-10-CM: E21.5  ICD-9-CM: 252.9     Vertigo     ICD-10-CM: R42  ICD-9-CM: 780.4     Precordial pain     ICD-10-CM: R07.2  ICD-9-CM: 786.51       Instructions    Patient has been counseled on details discussed in the assessment and plan.  He is also been counseled on reducing salt and saturated animal fat in his diet, as well as to perform regular exercise.       Patient Instructions History      Upcoming Appointments     Visit Type Date Time Department    NEW PATIENT 1/11/2017  1:00 PM [facility] FOLLOW UP 1/19/2017  3:10 PM [facility] Signup     Our records indicate that you have declined [facility] signup. If you would like to sign up for [employer], please email [email or web address] or call [phone] to obtain an activation code.             Other Info from Your Visit           Your Appointments     Jan 19, 2017  3:10 PM [facility] Follow Up with Óscar Cadet MD   [facility] (--)    [address] [facility] [address] [phone]              Allergies     Morphine  Anaphylaxis    Sulfa Antibiotics  Anaphylaxis, Hives    Or sulfa fillers in meds    Hydralazine Intolerance Myalgia    Patient reports leg cramps, joint pain and increased fatigue    Amlodipine      KIDNEYS SHUT DOWN    Anastrozole      Grass      Morphine And Related      Nifedipine      UNCONTROLLED BP    Nsaids      KIDNEY FAILURE    Norvasc  [Amlodipine Besylate]      Penicillins      Tree Extract      Zolpidem      HALLUCINATIONS     "  Reason for Visit     Cerebrovascular Accident           Vital Signs     Blood Pressure Pulse Height Weight Body Mass Index Smoking Status    142/78 74 63\" (160 cm) 185 lb (83.9 kg) 32.77 kg/m2 Never Smoker      Problems and Diagnoses Noted     Chronic fatigue    Chronic kidney disease, stage 2, mildly decreased GFR    Stroke    Elevated PTHrP level    Inflammation of the esophagus    High cholesterol or triglycerides    Moderate persistent asthmatic bronchitis without complication    Obesity    Inflammation of lining of lung    Recurrent UTI    Sinus infection    Retention of urine    Vertigo    Precordial chest pain            "

## 2017-01-13 RX ORDER — MECLIZINE HYDROCHLORIDE 25 MG/1
TABLET ORAL
Qty: 30 TABLET | Refills: 0 | Status: SHIPPED | OUTPATIENT
Start: 2017-01-13 | End: 2019-03-25

## 2017-01-19 ENCOUNTER — OFFICE VISIT (OUTPATIENT)
Dept: FAMILY MEDICINE CLINIC | Facility: CLINIC | Age: 77
End: 2017-01-19

## 2017-01-19 VITALS
BODY MASS INDEX: 33.13 KG/M2 | TEMPERATURE: 97.5 F | DIASTOLIC BLOOD PRESSURE: 78 MMHG | HEART RATE: 69 BPM | SYSTOLIC BLOOD PRESSURE: 140 MMHG | WEIGHT: 187 LBS | OXYGEN SATURATION: 98 % | HEIGHT: 63 IN

## 2017-01-19 DIAGNOSIS — N18.2 CHRONIC KIDNEY DISEASE, STAGE 2, MILDLY DECREASED GFR: ICD-10-CM

## 2017-01-19 DIAGNOSIS — G44.221 CHRONIC TENSION-TYPE HEADACHE, INTRACTABLE: ICD-10-CM

## 2017-01-19 DIAGNOSIS — E78.49 OTHER HYPERLIPIDEMIA: ICD-10-CM

## 2017-01-19 DIAGNOSIS — I63.9 CEREBROVASCULAR ACCIDENT (CVA), UNSPECIFIED MECHANISM (HCC): ICD-10-CM

## 2017-01-19 DIAGNOSIS — E55.9 VITAMIN D DEFICIENCY: ICD-10-CM

## 2017-01-19 DIAGNOSIS — R06.09 DYSPNEA ON EXERTION: Primary | ICD-10-CM

## 2017-01-19 DIAGNOSIS — R79.89 ELEVATED PTHRP LEVEL: ICD-10-CM

## 2017-01-19 DIAGNOSIS — R53.82 CHRONIC FATIGUE: ICD-10-CM

## 2017-01-19 DIAGNOSIS — J32.4 PANSINUSITIS, UNSPECIFIED CHRONICITY: ICD-10-CM

## 2017-01-19 PROCEDURE — 99214 OFFICE O/P EST MOD 30 MIN: CPT | Performed by: INTERNAL MEDICINE

## 2017-01-19 RX ORDER — PREDNISONE 10 MG/1
10 TABLET ORAL DAILY
Qty: 30 TABLET | Refills: 0 | Status: SHIPPED | OUTPATIENT
Start: 2017-01-19 | End: 2017-03-24 | Stop reason: SDUPTHER

## 2017-01-19 RX ORDER — CLONIDINE HYDROCHLORIDE 0.1 MG/1
0.1 TABLET ORAL 2 TIMES DAILY
Qty: 360 TABLET | Refills: 1 | Status: SHIPPED | OUTPATIENT
Start: 2017-01-19 | End: 2017-04-11 | Stop reason: HOSPADM

## 2017-01-19 RX ORDER — DOXYCYCLINE HYCLATE 100 MG/1
100 TABLET, DELAYED RELEASE ORAL 2 TIMES DAILY
Qty: 20 TABLET | Refills: 0 | Status: SHIPPED | OUTPATIENT
Start: 2017-01-19 | End: 2017-02-01 | Stop reason: SDUPTHER

## 2017-01-19 RX ORDER — NIFEDIPINE 60 MG/1
120 TABLET, EXTENDED RELEASE ORAL DAILY
Qty: 60 TABLET | Refills: 5 | Status: SHIPPED | OUTPATIENT
Start: 2017-01-19 | End: 2017-01-21 | Stop reason: SDUPTHER

## 2017-01-19 NOTE — PROGRESS NOTES
Sasha Barrett is a 77 y.o. female   Chief Complaint   Patient presents with   • Pain     6 mo f/u       MARY LOU Medina: Per House Bill #1 Requirements and [place] Board of Medical Licensure Regulations for prescribing of Schedule II and Schedule III with Hydrocodone, and other controlled medications for which the Board requires [facility] reporting and regulation, the following drug treatment plan was developed and reviewed with the patient on the date of this encounter:              Controlled medication(s) taken: Talwin, Lorazepam, norco, phenrgan with codeine elixir          Medical Indication (including pain relief and/or other physical and psychoosocial functional issue) for treatment:pain, anxiety, severe pain, and persistent cdough          Further Diagnostic tests, consultations, or treatments needed: Ongoing issues            Plans for review of plan, adjustment and waning dose and further [facility] evaluation include: MARY LOU ongoing                  Risk for medication abuse for this patient based on physician review is felt to be extremely low.    Subjective   History of Present Illness     Sasha Barrett is a 77 y.o.female who presents with ongoing issues with pain and discomfort.  SOB at times.  Every joint in arms and  Legs really hurt a lot.  Tried spironolactone without real success.  Measured urine output and it is really good.  Now feels worse again than in past.  Tried to get stress test this am and it was mis scheduled.  Frustrated with the issue overall.  No other acute changes currently.  Wrist are very swollen and hurt severely.          The following portions of the patient's history were reviewed and updated as appropriate: past medical history, surgeries, family history, allergies, current medications, past social history and problem list.    A comprehensive review of 14 systems was peformed  Review of Systems   Constitutional: Positive for fatigue. Negative for chills, fever and unexpected  "weight change.   HENT: Negative for ear pain, hearing loss, sinus pressure, sore throat and tinnitus.    Eyes: Negative.  Negative for pain, discharge and redness.   Respiratory: Negative.  Negative for cough, shortness of breath and wheezing.    Cardiovascular: Negative.  Negative for chest pain, palpitations and leg swelling.   Gastrointestinal: Negative.  Negative for abdominal pain, constipation, diarrhea and nausea.   Endocrine: Negative.  Negative for cold intolerance and heat intolerance.   Genitourinary: Negative.  Negative for difficulty urinating, flank pain and urgency.   Musculoskeletal: Positive for arthralgias and back pain. Negative for joint swelling and myalgias.   Skin: Negative.  Negative for rash and wound.   Allergic/Immunologic: Negative.  Negative for environmental allergies and food allergies.   Neurological: Positive for headaches. Negative for dizziness, seizures and numbness.   Hematological: Negative.  Negative for adenopathy. Does not bruise/bleed easily.   Psychiatric/Behavioral: Negative.  Negative for decreased concentration, dysphoric mood and sleep disturbance. The patient is not nervous/anxious.    All other systems reviewed and are negative.      I have reviewed the patient's medical history in detail and updated the computerized patient record.      Objective   Vitals:    01/19/17 1558   BP: 140/78   BP Location: Right arm   Patient Position: Sitting   Cuff Size: Large Adult   Pulse: 69   Temp: 97.5 °F (36.4 °C)   TempSrc: Oral   SpO2: 98%   Weight: 187 lb (84.8 kg)   Height: 63\" (160 cm)   PainSc:   6   PainLoc: Generalized         Physical Exam   Constitutional: She appears well-developed and well-nourished.   HENT:   Head: Normocephalic and atraumatic.   Right Ear: External ear normal.   Left Ear: External ear normal.   Nose: Nose normal.   Mouth/Throat: Oropharynx is clear and moist.   Eyes: Conjunctivae and EOM are normal. Pupils are equal, round, and reactive to light. "   Neck: Normal range of motion. Neck supple.   Cardiovascular: Normal rate, regular rhythm, normal heart sounds and intact distal pulses.    Pulmonary/Chest: Effort normal and breath sounds normal.   Abdominal: Soft. Bowel sounds are normal.   Musculoskeletal: Normal range of motion.   Neurological: She is alert. She has normal reflexes.   Skin: Skin is warm and dry.   Psychiatric: She has a normal mood and affect. Her behavior is normal. Judgment and thought content normal.       Procedures        Reviewed old notes from [facility]                          Assessment/Plan     Diagnoses and all orders for this visit:    Dyspnea on exertion  Comments:  Stress test planned  Diastyolic Dysfunction on Echo    Cerebrovascular accident (CVA), unspecified mechanism  Comments:  Needs follow up with Neurology  Return as need for further evaluation    Chronic kidney disease, stage 2, mildly decreased GFR  Comments:  Followed with alfred  No other acute changes at present time    Other hyperlipidemia  Comments:  Check labs  Follow up with cardiology asd planned    Vitamin D deficiency  Comments:  Vit D deficiency ongoing  Follow up as planned    Chronic fatigue  Comments:  Resting asd much as possible  Check labs    Chronic tension-type headache, intractable  Comments:  return if symptoms change or worsen    Elevated PTH level  Comments:  recheck levels.         Óscar Cadet MD  1/19/2017  4:00 PM

## 2017-01-19 NOTE — MR AVS SNAPSHOT
Sasha Barrett   1/19/2017 3:10 PM   Office Visit    Dept Phone:  [phone]   Encounter #:  [id number]    Provider:  Óscar Cadet MD   Department:  [facility]                Your Full Care Plan              Today's Medication Changes          These changes are accurate as of: 1/19/17  6:29 PM.  If you have any questions, ask your nurse or doctor.               New Medication(s)Ordered:     doxycycline 100 MG enteric coated tablet   Commonly known as:  DORYX   Take 1 tablet by mouth 2 (Two) Times a Day.       predniSONE 10 MG tablet   Commonly known as:  DELTASONE   Take 1 tablet by mouth Daily. As directed in patient's directions         Medication(s)that have changed:     CloNIDine 0.1 MG tablet   Commonly known as:  CATAPRES   Take 1 tablet by mouth 2 (Two) Times a Day. Schedule bid 1 and then [id number] take extra #1 if systolic BP > 160 and take 2 if systolic Bp>180   What changed:  additional instructions       NIFEdipine XL 60 MG 24 hr tablet   Commonly known as:  NIFEDICAL XL   Take 2 tablets by mouth Daily.   What changed:    - how much to take  - additional instructions            Where to Get Your Medications      These medications were sent to [facility] - [place], [place] - [address] [facility] [phone]  - [phone] [address] [address]     Phone:  [phone]     CloNIDine 0.1 MG tablet    doxycycline 100 MG enteric coated tablet    NIFEdipine XL 60 MG 24 hr tablet    predniSONE 10 MG tablet                  Your Updated Medication List          This list is accurate as of: 1/19/17  6:29 PM.  Always use your most recent med list.                aspirin 81 MG tablet       butalbital-acetaminophen-caffeine -40 MG per tablet   Commonly known as:  FIORICET, ESGIC   Take 2 tablets by mouth Every 4 (Four) Hours As Needed for headaches.       CloNIDine 0.1 MG tablet      Commonly known as:  CATAPRES   Take 1 tablet by mouth 2 (Two) Times a Day. Schedule bid 1 and then [id number] take extra #1 if systolic BP > 160 and take 2 if systolic Bp>180       diphenhydrAMINE 50 MG tablet   Commonly known as:  BENADRYL   Take 1 tablet by mouth every 4 (four) hours as needed for itching or allergies.       doxycycline 100 MG enteric coated tablet   Commonly known as:  DORYX   Take 1 tablet by mouth 2 (Two) Times a Day.       HYDROcodone-acetaminophen 5-325 MG per tablet   Commonly known as:  NORCO   Take 1 tablet by mouth Every 6 (Six) Hours As Needed for moderate pain (4-6) or severe pain (7-10).       hydrOXYzine 50 MG capsule   Commonly known as:  VISTARIL   Take 1 capsule by mouth every 6 (six) hours as needed for itching.       levoFLOXacin 500 MG tablet   Commonly known as:  LEVAQUIN       LORazepam 0.5 MG tablet   Commonly known as:  ATIVAN   Take 1 tablet by mouth Every 8 (Eight) Hours As Needed for anxiety. Take 1 tab 1 hoour before MRI and repeat x 1 at time of test       meclizine 25 MG tablet   Commonly known as:  ANTIVERT   TAKE ONE TABLET BY MOUTH THREE TIMES DAILY AS NEEDED FOR DIZZINESS       MethylPREDNISolone 4 MG tablet   Commonly known as:  MEDROL (CRISTIAN)   Take as directed on package instructions.       montelukast 10 MG tablet   Commonly known as:  SINGULAIR   Take 1 tablet by mouth every night.       NIFEdipine XL 60 MG 24 hr tablet   Commonly known as:  NIFEDICAL XL   Take 2 tablets by mouth Daily.       omeprazole 40 MG capsule   Commonly known as:  priLOSEC   Take 1 capsule by mouth daily.       ondansetron ODT 8 MG disintegrating tablet   Commonly known as:  ZOFRAN-ODT   Take 1 tablet by mouth every 6 (six) hours as needed for nausea or vomiting.       pentazocine-naloxone 50-0.5 MG per tablet   Commonly known as:  TALWIN NX   Take 2 tablets by mouth Every 4 (Four) Hours As Needed for mild pain (1-3) for up to 90 days.       polyethylene glycol powder   Commonly known as:   MIRALAX   Take one capful w/liquid daily       predniSONE 10 MG tablet   Commonly known as:  DELTASONE   Take 1 tablet by mouth Daily. As directed in patient's directions       PROAIR  (90 BASE) MCG/ACT inhaler   Generic drug:  albuterol       promethazine-codeine 6.25-10 MG/5ML syrup   Commonly known as:  PHENERGAN with CODEINE   Take 5 mL by mouth 4 (Four) Times a Day As Needed for cough.       PULMICORT FLEXHALER 180 MCG/ACT inhaler   Generic drug:  budesonide       tiZANidine 4 MG tablet   Commonly known as:  ZANAFLEX   Take 1 tablet by mouth at night as needed for muscle spasms.       tobramycin-dexamethasone 0.3-0.1 % ophthalmic suspension   Commonly known as:  TOBRADEX   Administer 1 drop to both eyes every 4 (four) hours while awake.       Vitamin D 1000 UNITS tablet               You Were Diagnosed With        Codes Comments    Dyspnea on exertion    -  Primary ICD-10-CM: R06.09  ICD-9-CM: 786.09 Stress test planned  Diastyolic Dysfunction on Echo    Cerebrovascular accident (CVA), unspecified mechanism     ICD-10-CM: I63.9  ICD-9-CM: 434.91 Needs follow up with Neurology  Return as need for further evaluation    Chronic kidney disease, stage 2, mildly decreased GFR     ICD-10-CM: N18.2  ICD-9-CM: 585.2 Followed with alfred  No other acute changes at present time    Other hyperlipidemia     ICD-10-CM: E78.4  ICD-9-CM: 272.4 Check labs  Follow up with cardiology asd planned    Vitamin D deficiency     ICD-10-CM: E55.9  ICD-9-CM: 268.9 Vit D deficiency ongoing  Follow up as planned    Chronic fatigue     ICD-10-CM: R53.82  ICD-9-CM: 780.79 Resting asd much as possible  Check labs    Chronic tension-type headache, intractable     ICD-10-CM: G44.221  ICD-9-CM: 339.12 return if symptoms change or worsen    Elevated PTHrP level     ICD-10-CM: E21.5  ICD-9-CM: 252.9 recheck levels.    Pansinusitis, unspecified chronicity     ICD-10-CM: J32.4  ICD-9-CM: 473.8     Cerebrovascular accident (CVA), unspecified  mechanism     ICD-10-CM: I63.9  ICD-9-CM: 434.91 Add baby ASA daily  Refer to neuro  Check MRI and MRA Brain  Echo 2 d with doppler      Instructions    Diagnoses and all orders for this visit:    Dyspnea on exertion  Comments:  Stress test planned  Diastyolic Dysfunction on Echo    Cerebrovascular accident (CVA), unspecified mechanism  Comments:  Needs follow up with Neurology  Return as need for further evaluation    Chronic kidney disease, stage 2, mildly decreased GFR  Comments:  Followed with alfred  No other acute changes at present time    Other hyperlipidemia  Comments:  Check labs  Follow up with cardiology asd planned    Vitamin D deficiency  Comments:  Vit D deficiency ongoing  Follow up as planned    Chronic fatigue  Comments:  Resting asd much as possible  Check labs    Chronic tension-type headache, intractable  Comments:  return if symptoms change or worsen    Elevated PTH level  Comments:  recheck levels.       Patient Instructions History      Upcoming Appointments     Visit Type Date Time Department    HOSPITAL FOLLOW UP 1/19/2017  3:10 PM [facility]     [facility] NM VT 2/3/2017  7:15 AM  [facility]     [facility] NM SCAN VT 2/3/2017  8:30 AM  [facility]     [facility] NM STRESS VT 2/3/2017  9:30 AM  [facility]     [facility] NM SCAN VT 2/3/2017 10:15 AM  [facility]    FOLLOW UP 3/30/2017  2:30 PM [facility]    FOLLOW UP 7/24/2017  2:10 PM [facility]      MyChart Signup     Our records indicate that you have declined [facility] sign[facility]. If you would like to sign up for [facility], please email [email or web address] or call [phone] to obtain an activation code.             Other Info from Your Visit           Your Appointments     Feb 03, 2017  7:15 AM EST   (Arrive by 6:45 AM EST)    [facility] NM INJ with [facility] ADMIN [facility] ([place])    [address] [place] [id number]  "  [phone]       To complete registration            Feb 03, 2017  8:30 AM EST    [facility]    [address] [place] [address]  [address] [place] [id number]   [phone]            Feb 03, 2017  9:30 AM EST    [facility] NM SCAN VT with [facility] STRESS LAB   [facility] ([place])    [address] [place] [address]  [address] [place] [id number]   [phone]            Feb 03, 2017 10:15 AM EST    [facility]    [address] [place] [id number]   [phone]            Mar 30, 2017  2:30 PM EDT   Follow Up with Óscar Cadet MD   [facility] (--)    [address] [facility] [address] [phone]           Arrive 15 minutes prior to appointment.              Allergies     Morphine  Anaphylaxis    Sulfa Antibiotics  Anaphylaxis, Hives    Or sulfa fillers in meds    Hydralazine Intolerance Myalgia    Patient reports leg cramps, joint pain and increased fatigue    Amlodipine      KIDNEYS SHUT DOWN    Anastrozole      Grass      Morphine And Related      Nifedipine      UNCONTROLLED BP    Nsaids      KIDNEY FAILURE    Norvasc  [Amlodipine Besylate]      Penicillins      Tree Extract      Zolpidem      HALLUCINATIONS      Reason for Visit     Pain 6 mo f/u      Vital Signs     Blood Pressure Pulse Temperature Height Weight Oxygen Saturation    140/78 (BP Location: Right arm, Patient Position: Sitting, Cuff Size: Large Adult) 69 97.5 °F (36.4 °C) (Oral) 63\" (160 cm) 187 lb (84.8 kg) 98%    Body Mass Index Smoking Status                33.13 kg/m2 Never Smoker          Problems and Diagnoses Noted     Chronic fatigue    Chronic kidney disease, stage 2, mildly decreased GFR    Chronic tension-type headache, intractable    Stroke    Breathlessness on " exertion    Elevated PTHrP level    High cholesterol or triglycerides    Sinus infection    Vitamin D deficiency

## 2017-01-19 NOTE — PATIENT INSTRUCTIONS
Diagnoses and all orders for this visit:    Dyspnea on exertion  Comments:  Stress test planned  Diastyolic Dysfunction on Echo    Cerebrovascular accident (CVA), unspecified mechanism  Comments:  Needs follow up with Neurology  Return as need for further evaluation    Chronic kidney disease, stage 2, mildly decreased GFR  Comments:  Followed with alfred  No other acute changes at present time    Other hyperlipidemia  Comments:  Check labs  Follow up with cardiology asd planned    Vitamin D deficiency  Comments:  Vit D deficiency ongoing  Follow up as planned    Chronic fatigue  Comments:  Resting asd much as possible  Check labs    Chronic tension-type headache, intractable  Comments:  return if symptoms change or worsen    Elevated PTH level  Comments:  recheck levels.

## 2017-01-21 RX ORDER — NIFEDIPINE 60 MG/1
TABLET, EXTENDED RELEASE ORAL
Qty: 60 TABLET | Refills: 0 | Status: SHIPPED | OUTPATIENT
Start: 2017-01-21 | End: 2017-02-17 | Stop reason: SDUPTHER

## 2017-01-24 ENCOUNTER — LAB (OUTPATIENT)
Dept: LAB | Facility: HOSPITAL | Age: 77
End: 2017-01-24

## 2017-01-24 DIAGNOSIS — R09.1 PLEURISY: Primary | ICD-10-CM

## 2017-01-24 LAB
BASOPHILS # BLD AUTO: 0.04 10*3/MM3 (ref 0–0.2)
BASOPHILS NFR BLD AUTO: 0.3 % (ref 0–1.5)
CK MB SERPL-CCNC: 2.06 NG/ML
CK SERPL-CCNC: 28 U/L (ref 20–180)
D DIMER PPP FEU-MCNC: <0.27 MCGFEU/ML (ref 0–0.49)
DEPRECATED RDW RBC AUTO: 49.4 FL (ref 37–54)
EOSINOPHIL # BLD AUTO: 0.1 10*3/MM3 (ref 0–0.7)
EOSINOPHIL NFR BLD AUTO: 0.7 % (ref 0.3–6.2)
ERYTHROCYTE [DISTWIDTH] IN BLOOD BY AUTOMATED COUNT: 14.5 % (ref 11.7–13)
HCT VFR BLD AUTO: 47.7 % (ref 35.6–45.5)
HGB BLD-MCNC: 15.1 G/DL (ref 11.9–15.5)
IMM GRANULOCYTES # BLD: 0.03 10*3/MM3 (ref 0–0.03)
IMM GRANULOCYTES NFR BLD: 0.2 % (ref 0–0.5)
LYMPHOCYTES # BLD AUTO: 5.2 10*3/MM3 (ref 0.9–4.8)
LYMPHOCYTES NFR BLD AUTO: 36.2 % (ref 19.6–45.3)
MAGNESIUM SERPL-MCNC: 2.3 MG/DL (ref 1.6–2.4)
MCH RBC QN AUTO: 29.4 PG (ref 26.9–32)
MCHC RBC AUTO-ENTMCNC: 31.7 G/DL (ref 32.4–36.3)
MCV RBC AUTO: 93 FL (ref 80.5–98.2)
MONOCYTES # BLD AUTO: 1.35 10*3/MM3 (ref 0.2–1.2)
MONOCYTES NFR BLD AUTO: 9.4 % (ref 5–12)
NEUTROPHILS # BLD AUTO: 7.63 10*3/MM3 (ref 1.9–8.1)
NEUTROPHILS NFR BLD AUTO: 53.2 % (ref 42.7–76)
PLATELET # BLD AUTO: 228 10*3/MM3 (ref 140–500)
PMV BLD AUTO: 10.9 FL (ref 6–12)
PTH-INTACT SERPL-MCNC: 95 PG/ML (ref 15–65)
RBC # BLD AUTO: 5.13 10*6/MM3 (ref 3.9–5.2)
WBC NRBC COR # BLD: 14.35 10*3/MM3 (ref 4.5–10.7)

## 2017-01-24 PROCEDURE — 85379 FIBRIN DEGRADATION QUANT: CPT | Performed by: INTERNAL MEDICINE

## 2017-01-24 PROCEDURE — 86038 ANTINUCLEAR ANTIBODIES: CPT | Performed by: INTERNAL MEDICINE

## 2017-01-24 PROCEDURE — 36415 COLL VENOUS BLD VENIPUNCTURE: CPT | Performed by: INTERNAL MEDICINE

## 2017-01-24 PROCEDURE — 85025 COMPLETE CBC W/AUTO DIFF WBC: CPT | Performed by: INTERNAL MEDICINE

## 2017-01-24 PROCEDURE — 82550 ASSAY OF CK (CPK): CPT | Performed by: INTERNAL MEDICINE

## 2017-01-24 PROCEDURE — 83970 ASSAY OF PARATHORMONE: CPT

## 2017-01-24 PROCEDURE — 83735 ASSAY OF MAGNESIUM: CPT | Performed by: INTERNAL MEDICINE

## 2017-01-24 PROCEDURE — 82553 CREATINE MB FRACTION: CPT | Performed by: INTERNAL MEDICINE

## 2017-01-25 LAB — ANA SER QL: NEGATIVE

## 2017-02-01 ENCOUNTER — TELEPHONE (OUTPATIENT)
Dept: FAMILY MEDICINE CLINIC | Facility: CLINIC | Age: 77
End: 2017-02-01

## 2017-02-01 DIAGNOSIS — J32.4 PANSINUSITIS, UNSPECIFIED CHRONICITY: ICD-10-CM

## 2017-02-01 RX ORDER — DOXYCYCLINE HYCLATE 100 MG/1
100 TABLET, DELAYED RELEASE ORAL 2 TIMES DAILY
Qty: 20 TABLET | Refills: 0 | Status: SHIPPED | OUTPATIENT
Start: 2017-02-01 | End: 2017-04-11 | Stop reason: HOSPADM

## 2017-02-01 NOTE — TELEPHONE ENCOUNTER
----- Message from Barb York sent at 2/1/2017  9:46 AM EST -----  Regarding: RX  Contact: [phone]  LDS: 1/19/17  NEXT APPT: 3/30/17    [facility]    ALLERGIES: MorphineAnaphylaxis, Sulfa AntibioticsAnaphylaxis, Hives, HydralazineMyalgia, Amlodipine, Anastrozole, Grass, Morphine And Related, Nifedipine, Nsaids, Norvasc [Amlodipine Besylate], Penicillins, Tree Extract, Zolpidem    PATIENT NEEDS ANOTHER ROUND OF DOXYCYCLINE FOR HER URI.     NOTE: PATIENT INFORMED TO GIVE REQUEST FOR RX 24 HRS  THANK YOU

## 2017-02-02 DIAGNOSIS — R06.09 DYSPNEA ON EXERTION: Primary | ICD-10-CM

## 2017-02-02 DIAGNOSIS — R07.2 PRECORDIAL PAIN: ICD-10-CM

## 2017-02-02 DIAGNOSIS — R53.82 CHRONIC FATIGUE: ICD-10-CM

## 2017-02-02 PROBLEM — R94.39 ABNORMAL STRESS ECG WITH TREADMILL: Status: ACTIVE | Noted: 2017-02-02

## 2017-02-02 PROBLEM — R94.31 ABNORMAL EKG: Status: ACTIVE | Noted: 2017-02-02

## 2017-02-03 ENCOUNTER — APPOINTMENT (OUTPATIENT)
Dept: CARDIOLOGY | Facility: HOSPITAL | Age: 77
End: 2017-02-03
Attending: INTERNAL MEDICINE

## 2017-02-09 ENCOUNTER — HOSPITAL ENCOUNTER (OUTPATIENT)
Dept: NUCLEAR MEDICINE | Facility: HOSPITAL | Age: 77
Discharge: HOME | End: 2017-02-09
Attending: INTERNAL MEDICINE

## 2017-02-09 PROCEDURE — 25010000002 REGADENOSON 0.4 MG/5ML SOLUTION: Performed by: INTERNAL MEDICINE

## 2017-02-09 PROCEDURE — 93018 CV STRESS TEST I&R ONLY: CPT | Performed by: INTERNAL MEDICINE

## 2017-02-09 PROCEDURE — 78452 HT MUSCLE IMAGE SPECT MULT: CPT

## 2017-02-09 PROCEDURE — 93017 CV STRESS TEST TRACING ONLY: CPT

## 2017-02-09 PROCEDURE — A9500 TC99M SESTAMIBI: HCPCS | Performed by: INTERNAL MEDICINE

## 2017-02-09 PROCEDURE — 78452 HT MUSCLE IMAGE SPECT MULT: CPT | Performed by: INTERNAL MEDICINE

## 2017-02-09 PROCEDURE — 0 TECHNETIUM SESTAMIBI: Performed by: INTERNAL MEDICINE

## 2017-02-09 PROCEDURE — 93016 CV STRESS TEST SUPVJ ONLY: CPT | Performed by: INTERNAL MEDICINE

## 2017-02-09 RX ADMIN — Medication 1 DOSE: at 08:20

## 2017-02-09 RX ADMIN — Medication 1 DOSE: at 11:15

## 2017-02-09 RX ADMIN — REGADENOSON 0.4 MG: 0.08 INJECTION, SOLUTION INTRAVENOUS at 11:15

## 2017-02-10 DIAGNOSIS — J32.4 PANSINUSITIS, UNSPECIFIED CHRONICITY: ICD-10-CM

## 2017-02-10 LAB
BH CV STRESS COMMENTS STAGE 1: NORMAL
BH CV STRESS DOSE REGADENOSON STAGE 1: 0.4
BH CV STRESS DURATION MIN STAGE 1: 0
BH CV STRESS DURATION SEC STAGE 1: 15
BH CV STRESS PROTOCOL 1: NORMAL
BH CV STRESS RECOVERY BP: NORMAL MMHG
BH CV STRESS RECOVERY HR: 83 BPM
BH CV STRESS STAGE 1: 1
LV EF NUC BP: 87 %
MAXIMAL PREDICTED HEART RATE: 141 BPM
PERCENT MAX PREDICTED HR: 63.83 %
STRESS BASELINE BP: NORMAL MMHG
STRESS BASELINE HR: 85 BPM
STRESS PERCENT HR: 75 %
STRESS POST PEAK BP: NORMAL MMHG
STRESS POST PEAK HR: 90 BPM
STRESS TARGET HR: 120 BPM

## 2017-02-10 RX ORDER — NIFEDIPINE 60 MG/1
TABLET, EXTENDED RELEASE ORAL
Qty: 60 TABLET | Refills: 2 | Status: SHIPPED | OUTPATIENT
Start: 2017-02-10 | End: 2017-06-03 | Stop reason: SDUPTHER

## 2017-02-11 ENCOUNTER — TELEPHONE (OUTPATIENT)
Dept: CARDIOLOGY | Facility: CLINIC | Age: 77
End: 2017-02-11

## 2017-02-11 NOTE — TELEPHONE ENCOUNTER
Cardiolite WNL.      Labs fine except mild increase in Creatinine 1.3 and increase in WBC.  F/u PCP

## 2017-02-13 ENCOUNTER — TELEPHONE (OUTPATIENT)
Dept: FAMILY MEDICINE CLINIC | Facility: CLINIC | Age: 77
End: 2017-02-13

## 2017-02-13 RX ORDER — DOXYCYCLINE HYCLATE 100 MG/1
100 TABLET, DELAYED RELEASE ORAL 2 TIMES DAILY
Qty: 20 TABLET | Refills: 0 | Status: SHIPPED | OUTPATIENT
Start: 2017-02-13 | End: 2017-04-11 | Stop reason: HOSPADM

## 2017-02-13 NOTE — TELEPHONE ENCOUNTER
----- Message from Barb York sent at 2/13/2017  6:35 PM EST -----  Regarding: RE: RX  Contact: [phone]  SHE SAID THAT SHE IS NOT ANY BETTER AND WOULD LIKE ANOTHER ROUND OF ANTIBIOTICS.  ----- Message -----     From: Ajit Sherman MA     Sent: 2/10/2017   5:04 PM       To: Barb York  Subject: RE: RX                                           Why does pt want refill for antibiotic  ----- Message -----     From: Barb York     Sent: 2/10/2017  11:47 AM       To: Ajit Sherman MA  Subject: RX                                               LDS: 1/25/17  NEXT APPT: 3/30/17    [facility]    PATIENT NEEDS doxycycline (DORYX) 100 MG enteric coated tablet REFILLED    THANK YOU      Allergic to morphine, sulfa, hydralazine, amlodipine, anastrizole, morphine, nifedipine, nsaids, norvasc, penicillins, zolpidem

## 2017-02-14 NOTE — TELEPHONE ENCOUNTER
Called and informed patient she voiced understanding, and seen the Kidney doctor who is taking care of this.

## 2017-02-16 DIAGNOSIS — J32.4 PANSINUSITIS, UNSPECIFIED CHRONICITY: ICD-10-CM

## 2017-02-16 RX ORDER — DOXYCYCLINE HYCLATE 100 MG/1
TABLET, DELAYED RELEASE ORAL
Qty: 20 TABLET | Refills: 0 | Status: SHIPPED | OUTPATIENT
Start: 2017-02-16 | End: 2017-03-24 | Stop reason: SDUPTHER

## 2017-02-17 RX ORDER — NIFEDIPINE 60 MG/1
60 TABLET, EXTENDED RELEASE ORAL DAILY
Qty: 90 TABLET | Refills: 0 | Status: SHIPPED | OUTPATIENT
Start: 2017-02-17 | End: 2017-04-11 | Stop reason: HOSPADM

## 2017-03-24 DIAGNOSIS — J32.4 PANSINUSITIS, UNSPECIFIED CHRONICITY: ICD-10-CM

## 2017-03-24 DIAGNOSIS — M25.50 ARTHRALGIA: ICD-10-CM

## 2017-03-24 RX ORDER — DOXYCYCLINE HYCLATE 100 MG/1
TABLET, DELAYED RELEASE ORAL
Qty: 20 TABLET | Refills: 0 | Status: SHIPPED | OUTPATIENT
Start: 2017-03-24 | End: 2017-04-11 | Stop reason: HOSPADM

## 2017-03-24 RX ORDER — TIZANIDINE 4 MG/1
TABLET ORAL
Qty: 90 TABLET | Refills: 0 | Status: ON HOLD | OUTPATIENT
Start: 2017-03-24 | End: 2018-08-23

## 2017-03-24 RX ORDER — OMEPRAZOLE 40 MG/1
CAPSULE, DELAYED RELEASE ORAL
Qty: 90 CAPSULE | Refills: 0 | Status: SHIPPED | OUTPATIENT
Start: 2017-03-24 | End: 2020-09-16

## 2017-03-24 RX ORDER — NIFEDIPINE 60 MG/1
TABLET, EXTENDED RELEASE ORAL
Qty: 60 TABLET | Refills: 0 | Status: SHIPPED | OUTPATIENT
Start: 2017-03-24 | End: 2017-04-11 | Stop reason: HOSPADM

## 2017-03-24 RX ORDER — PREDNISONE 10 MG/1
TABLET ORAL
Qty: 30 TABLET | Refills: 0 | Status: SHIPPED | OUTPATIENT
Start: 2017-03-24 | End: 2017-04-11 | Stop reason: HOSPADM

## 2017-03-27 RX ORDER — BUTALBITAL, ACETAMINOPHEN AND CAFFEINE 50; 325; 40 MG/1; MG/1; MG/1
2 TABLET ORAL EVERY 4 HOURS PRN
Qty: 240 TABLET | Refills: 0 | Status: SHIPPED | OUTPATIENT
Start: 2017-03-27

## 2017-03-30 DIAGNOSIS — E55.9 VITAMIN D DEFICIENCY: ICD-10-CM

## 2017-03-30 DIAGNOSIS — Z79.899 ENCOUNTER FOR LONG-TERM (CURRENT) USE OF MEDICATIONS: ICD-10-CM

## 2017-03-30 DIAGNOSIS — N18.2 CHRONIC KIDNEY DISEASE, STAGE 2, MILDLY DECREASED GFR: ICD-10-CM

## 2017-03-30 DIAGNOSIS — Z79.899 MEDICATION MANAGEMENT: ICD-10-CM

## 2017-03-30 DIAGNOSIS — E78.49 OTHER HYPERLIPIDEMIA: Primary | ICD-10-CM

## 2017-03-30 DIAGNOSIS — R53.82 CHRONIC FATIGUE: ICD-10-CM

## 2017-04-06 ENCOUNTER — APPOINTMENT (OUTPATIENT)
Dept: ULTRASOUND IMAGING | Facility: HOSPITAL | Age: 77
End: 2017-04-06
Attending: INTERNAL MEDICINE

## 2017-04-06 ENCOUNTER — APPOINTMENT (OUTPATIENT)
Dept: ULTRASOUND IMAGING | Facility: HOSPITAL | Age: 77
End: 2017-04-06

## 2017-04-06 ENCOUNTER — HOSPITAL ENCOUNTER (OUTPATIENT)
Facility: HOSPITAL | Age: 77
Setting detail: OBSERVATION
Discharge: HOME | End: 2017-04-11
Attending: EMERGENCY MEDICINE | Admitting: INTERNAL MEDICINE

## 2017-04-06 ENCOUNTER — APPOINTMENT (OUTPATIENT)
Dept: GENERAL RADIOLOGY | Facility: HOSPITAL | Age: 77
End: 2017-04-06

## 2017-04-06 DIAGNOSIS — M25.50 ARTHRALGIA, UNSPECIFIED JOINT: Primary | ICD-10-CM

## 2017-04-06 DIAGNOSIS — I10 ESSENTIAL HYPERTENSION: ICD-10-CM

## 2017-04-06 PROBLEM — R23.3 EASY BRUISABILITY: Status: ACTIVE | Noted: 2017-04-06

## 2017-04-06 LAB
ANION GAP SERPL CALCULATED.3IONS-SCNC: 13 MMOL/L
ANION GAP SERPL CALCULATED.3IONS-SCNC: 18.2 MMOL/L
B PERT DNA SPEC QL NAA+PROBE: NOT DETECTED
BACTERIA UR QL AUTO: ABNORMAL /HPF
BASOPHILS # BLD AUTO: 0.03 10*3/MM3 (ref 0–0.2)
BASOPHILS # BLD AUTO: 0.06 10*3/MM3 (ref 0–0.2)
BASOPHILS NFR BLD AUTO: 0.4 % (ref 0–1.5)
BASOPHILS NFR BLD AUTO: 1.1 % (ref 0–1.5)
BILIRUB UR QL STRIP: NEGATIVE
BUN BLD-MCNC: 13 MG/DL (ref 8–23)
BUN BLD-MCNC: 16 MG/DL (ref 8–23)
BUN/CREAT SERPL: 15.3 (ref 7–25)
BUN/CREAT SERPL: 18 (ref 7–25)
C PNEUM DNA NPH QL NAA+NON-PROBE: NOT DETECTED
CALCIUM SPEC-SCNC: 10.2 MG/DL (ref 8.6–10.5)
CALCIUM SPEC-SCNC: 9.5 MG/DL (ref 8.6–10.5)
CHLORIDE SERPL-SCNC: 102 MMOL/L (ref 98–107)
CHLORIDE SERPL-SCNC: 105 MMOL/L (ref 98–107)
CK MB SERPL-CCNC: 1.69 NG/ML
CK SERPL-CCNC: 44 U/L (ref 20–180)
CLARITY UR: ABNORMAL
CO2 SERPL-SCNC: 23.8 MMOL/L (ref 22–29)
CO2 SERPL-SCNC: 27 MMOL/L (ref 22–29)
COLOR UR: YELLOW
CREAT BLD-MCNC: 0.85 MG/DL (ref 0.57–1)
CREAT BLD-MCNC: 0.89 MG/DL (ref 0.57–1)
CREAT UR-MCNC: 41.7 MG/DL
CRP SERPL-MCNC: 0.13 MG/DL (ref 0–0.5)
DEPRECATED RDW RBC AUTO: 46.6 FL (ref 37–54)
DEPRECATED RDW RBC AUTO: 46.7 FL (ref 37–54)
EOSINOPHIL # BLD AUTO: 0.09 10*3/MM3 (ref 0–0.7)
EOSINOPHIL # BLD AUTO: 0.1 10*3/MM3 (ref 0–0.7)
EOSINOPHIL NFR BLD AUTO: 1.3 % (ref 0.3–6.2)
EOSINOPHIL NFR BLD AUTO: 1.8 % (ref 0.3–6.2)
ERYTHROCYTE [DISTWIDTH] IN BLOOD BY AUTOMATED COUNT: 14.1 % (ref 11.7–13)
ERYTHROCYTE [DISTWIDTH] IN BLOOD BY AUTOMATED COUNT: 14.2 % (ref 11.7–13)
ERYTHROCYTE [SEDIMENTATION RATE] IN BLOOD: 5 MM/HR (ref 0–30)
FLUAV H1 2009 PAND RNA NPH QL NAA+PROBE: NOT DETECTED
FLUAV H1 HA GENE NPH QL NAA+PROBE: NOT DETECTED
FLUAV H3 RNA NPH QL NAA+PROBE: NOT DETECTED
FLUAV SUBTYP SPEC NAA+PROBE: NOT DETECTED
FLUBV RNA ISLT QL NAA+PROBE: NOT DETECTED
GFR SERPL CREATININE-BSD FRML MDRD: 61 ML/MIN/1.73
GFR SERPL CREATININE-BSD FRML MDRD: 65 ML/MIN/1.73
GLUCOSE BLD-MCNC: 105 MG/DL (ref 65–99)
GLUCOSE BLD-MCNC: 97 MG/DL (ref 65–99)
GLUCOSE UR STRIP-MCNC: NEGATIVE MG/DL
HADV DNA SPEC NAA+PROBE: NOT DETECTED
HCOV 229E RNA SPEC QL NAA+PROBE: NOT DETECTED
HCOV HKU1 RNA SPEC QL NAA+PROBE: NOT DETECTED
HCOV NL63 RNA SPEC QL NAA+PROBE: NOT DETECTED
HCOV OC43 RNA SPEC QL NAA+PROBE: NOT DETECTED
HCT VFR BLD AUTO: 42.2 % (ref 35.6–45.5)
HCT VFR BLD AUTO: 48.4 % (ref 35.6–45.5)
HGB BLD-MCNC: 13.9 G/DL (ref 11.9–15.5)
HGB BLD-MCNC: 15.9 G/DL (ref 11.9–15.5)
HGB UR QL STRIP.AUTO: ABNORMAL
HMPV RNA NPH QL NAA+NON-PROBE: NOT DETECTED
HPIV1 RNA SPEC QL NAA+PROBE: NOT DETECTED
HPIV2 RNA SPEC QL NAA+PROBE: NOT DETECTED
HPIV3 RNA NPH QL NAA+PROBE: NOT DETECTED
HPIV4 P GENE NPH QL NAA+PROBE: NOT DETECTED
HYALINE CASTS UR QL AUTO: ABNORMAL /LPF
IMM GRANULOCYTES # BLD: 0 10*3/MM3 (ref 0–0.03)
IMM GRANULOCYTES # BLD: 0 10*3/MM3 (ref 0–0.03)
IMM GRANULOCYTES NFR BLD: 0 % (ref 0–0.5)
IMM GRANULOCYTES NFR BLD: 0 % (ref 0–0.5)
KETONES UR QL STRIP: NEGATIVE
LEUKOCYTE ESTERASE UR QL STRIP.AUTO: ABNORMAL
LYMPHOCYTES # BLD AUTO: 1.81 10*3/MM3 (ref 0.9–4.8)
LYMPHOCYTES # BLD AUTO: 2.54 10*3/MM3 (ref 0.9–4.8)
LYMPHOCYTES NFR BLD AUTO: 32.8 % (ref 19.6–45.3)
LYMPHOCYTES NFR BLD AUTO: 36.9 % (ref 19.6–45.3)
M PNEUMO IGG SER IA-ACNC: NOT DETECTED
MCH RBC QN AUTO: 29.7 PG (ref 26.9–32)
MCH RBC QN AUTO: 29.9 PG (ref 26.9–32)
MCHC RBC AUTO-ENTMCNC: 32.9 G/DL (ref 32.4–36.3)
MCHC RBC AUTO-ENTMCNC: 32.9 G/DL (ref 32.4–36.3)
MCV RBC AUTO: 90.3 FL (ref 80.5–98.2)
MCV RBC AUTO: 90.8 FL (ref 80.5–98.2)
MONOCYTES # BLD AUTO: 0.59 10*3/MM3 (ref 0.2–1.2)
MONOCYTES # BLD AUTO: 0.72 10*3/MM3 (ref 0.2–1.2)
MONOCYTES NFR BLD AUTO: 10.4 % (ref 5–12)
MONOCYTES NFR BLD AUTO: 10.7 % (ref 5–12)
NEUTROPHILS # BLD AUTO: 2.96 10*3/MM3 (ref 1.9–8.1)
NEUTROPHILS # BLD AUTO: 3.51 10*3/MM3 (ref 1.9–8.1)
NEUTROPHILS NFR BLD AUTO: 51 % (ref 42.7–76)
NEUTROPHILS NFR BLD AUTO: 53.6 % (ref 42.7–76)
NITRITE UR QL STRIP: POSITIVE
NT-PROBNP SERPL-MCNC: 350.8 PG/ML (ref 0–1800)
PH UR STRIP.AUTO: 7 [PH] (ref 5–8)
PLATELET # BLD AUTO: 187 10*3/MM3 (ref 140–500)
PLATELET # BLD AUTO: 226 10*3/MM3 (ref 140–500)
PMV BLD AUTO: 10.8 FL (ref 6–12)
PMV BLD AUTO: 11.1 FL (ref 6–12)
POTASSIUM BLD-SCNC: 3.3 MMOL/L (ref 3.5–5.2)
POTASSIUM BLD-SCNC: 3.6 MMOL/L (ref 3.5–5.2)
PROT UR QL STRIP: NEGATIVE
PROT UR-MCNC: 18 MG/DL
PROT/CREAT UR: 431.7 MG/G CREA
RBC # BLD AUTO: 4.65 10*6/MM3 (ref 3.9–5.2)
RBC # BLD AUTO: 5.36 10*6/MM3 (ref 3.9–5.2)
RBC # UR: ABNORMAL /HPF
REF LAB TEST METHOD: ABNORMAL
RHINOVIRUS RNA SPEC NAA+PROBE: NOT DETECTED
RSV RNA NPH QL NAA+NON-PROBE: NOT DETECTED
SODIUM BLD-SCNC: 144 MMOL/L (ref 136–145)
SODIUM BLD-SCNC: 145 MMOL/L (ref 136–145)
SP GR UR STRIP: 1.01 (ref 1–1.03)
SQUAMOUS #/AREA URNS HPF: ABNORMAL /HPF
T3FREE SERPL-MCNC: 3.79 PG/ML (ref 2–4.4)
T4 FREE SERPL-MCNC: 1.18 NG/DL (ref 0.93–1.7)
TROPONIN T SERPL-MCNC: <0.01 NG/ML (ref 0–0.03)
UROBILINOGEN UR QL STRIP: ABNORMAL
WBC NRBC COR # BLD: 5.52 10*3/MM3 (ref 4.5–10.7)
WBC NRBC COR # BLD: 6.89 10*3/MM3 (ref 4.5–10.7)
WBC UR QL AUTO: ABNORMAL /HPF

## 2017-04-06 PROCEDURE — 93005 ELECTROCARDIOGRAM TRACING: CPT | Performed by: NURSE PRACTITIONER

## 2017-04-06 PROCEDURE — 85025 COMPLETE CBC W/AUTO DIFF WBC: CPT | Performed by: INTERNAL MEDICINE

## 2017-04-06 PROCEDURE — 73560 X-RAY EXAM OF KNEE 1 OR 2: CPT

## 2017-04-06 PROCEDURE — 94799 UNLISTED PULMONARY SVC/PX: CPT

## 2017-04-06 PROCEDURE — 84481 FREE ASSAY (FT-3): CPT | Performed by: NURSE PRACTITIONER

## 2017-04-06 PROCEDURE — 93010 ELECTROCARDIOGRAM REPORT: CPT | Performed by: INTERNAL MEDICINE

## 2017-04-06 PROCEDURE — 82550 ASSAY OF CK (CPK): CPT | Performed by: NURSE PRACTITIONER

## 2017-04-06 PROCEDURE — 85652 RBC SED RATE AUTOMATED: CPT | Performed by: EMERGENCY MEDICINE

## 2017-04-06 PROCEDURE — 82570 ASSAY OF URINE CREATININE: CPT | Performed by: INTERNAL MEDICINE

## 2017-04-06 PROCEDURE — G0378 HOSPITAL OBSERVATION PER HR: HCPCS

## 2017-04-06 PROCEDURE — 85025 COMPLETE CBC W/AUTO DIFF WBC: CPT | Performed by: EMERGENCY MEDICINE

## 2017-04-06 PROCEDURE — 86431 RHEUMATOID FACTOR QUANT: CPT | Performed by: INTERNAL MEDICINE

## 2017-04-06 PROCEDURE — 84484 ASSAY OF TROPONIN QUANT: CPT | Performed by: NURSE PRACTITIONER

## 2017-04-06 PROCEDURE — 86200 CCP ANTIBODY: CPT | Performed by: INTERNAL MEDICINE

## 2017-04-06 PROCEDURE — 84244 ASSAY OF RENIN: CPT | Performed by: INTERNAL MEDICINE

## 2017-04-06 PROCEDURE — 25010000002 ONDANSETRON PER 1 MG: Performed by: EMERGENCY MEDICINE

## 2017-04-06 PROCEDURE — 81001 URINALYSIS AUTO W/SCOPE: CPT | Performed by: INTERNAL MEDICINE

## 2017-04-06 PROCEDURE — 82553 CREATINE MB FRACTION: CPT | Performed by: NURSE PRACTITIONER

## 2017-04-06 PROCEDURE — 99202 OFFICE O/P NEW SF 15 MIN: CPT | Performed by: ORTHOPAEDIC SURGERY

## 2017-04-06 PROCEDURE — 83880 ASSAY OF NATRIURETIC PEPTIDE: CPT | Performed by: NURSE PRACTITIONER

## 2017-04-06 PROCEDURE — 94640 AIRWAY INHALATION TREATMENT: CPT

## 2017-04-06 PROCEDURE — 87633 RESP VIRUS 12-25 TARGETS: CPT | Performed by: INTERNAL MEDICINE

## 2017-04-06 PROCEDURE — 84156 ASSAY OF PROTEIN URINE: CPT | Performed by: INTERNAL MEDICINE

## 2017-04-06 PROCEDURE — 96375 TX/PRO/DX INJ NEW DRUG ADDON: CPT

## 2017-04-06 PROCEDURE — 84439 ASSAY OF FREE THYROXINE: CPT | Performed by: NURSE PRACTITIONER

## 2017-04-06 PROCEDURE — 80048 BASIC METABOLIC PNL TOTAL CA: CPT | Performed by: EMERGENCY MEDICINE

## 2017-04-06 PROCEDURE — 87798 DETECT AGENT NOS DNA AMP: CPT | Performed by: INTERNAL MEDICINE

## 2017-04-06 PROCEDURE — 86140 C-REACTIVE PROTEIN: CPT | Performed by: INTERNAL MEDICINE

## 2017-04-06 PROCEDURE — 25010000002 MEPERIDINE 25 MG/0.5ML SOLUTION: Performed by: EMERGENCY MEDICINE

## 2017-04-06 PROCEDURE — 76857 US EXAM PELVIC LIMITED: CPT

## 2017-04-06 PROCEDURE — 96374 THER/PROPH/DIAG INJ IV PUSH: CPT

## 2017-04-06 PROCEDURE — 99214 OFFICE O/P EST MOD 30 MIN: CPT | Performed by: INTERNAL MEDICINE

## 2017-04-06 PROCEDURE — 87086 URINE CULTURE/COLONY COUNT: CPT | Performed by: INTERNAL MEDICINE

## 2017-04-06 PROCEDURE — 80048 BASIC METABOLIC PNL TOTAL CA: CPT | Performed by: INTERNAL MEDICINE

## 2017-04-06 PROCEDURE — 96372 THER/PROPH/DIAG INJ SC/IM: CPT

## 2017-04-06 PROCEDURE — 87486 CHLMYD PNEUM DNA AMP PROBE: CPT | Performed by: INTERNAL MEDICINE

## 2017-04-06 PROCEDURE — 99284 EMERGENCY DEPT VISIT MOD MDM: CPT

## 2017-04-06 PROCEDURE — 87581 M.PNEUMON DNA AMP PROBE: CPT | Performed by: INTERNAL MEDICINE

## 2017-04-06 PROCEDURE — 96376 TX/PRO/DX INJ SAME DRUG ADON: CPT

## 2017-04-06 PROCEDURE — 76830 TRANSVAGINAL US NON-OB: CPT

## 2017-04-06 PROCEDURE — 25010000002 ENOXAPARIN PER 10 MG: Performed by: INTERNAL MEDICINE

## 2017-04-06 PROCEDURE — 85652 RBC SED RATE AUTOMATED: CPT | Performed by: INTERNAL MEDICINE

## 2017-04-06 PROCEDURE — 87186 SC STD MICRODIL/AGAR DIL: CPT | Performed by: INTERNAL MEDICINE

## 2017-04-06 RX ORDER — TOBRAMYCIN AND DEXAMETHASONE 3; 1 MG/ML; MG/ML
1 SUSPENSION/ DROPS OPHTHALMIC
Status: DISCONTINUED | OUTPATIENT
Start: 2017-04-06 | End: 2017-04-06

## 2017-04-06 RX ORDER — MELATONIN
1000 DAILY
Status: DISCONTINUED | OUTPATIENT
Start: 2017-04-06 | End: 2017-04-06

## 2017-04-06 RX ORDER — NIFEDIPINE 60 MG/1
60 TABLET, EXTENDED RELEASE ORAL 2 TIMES DAILY
Status: DISCONTINUED | OUTPATIENT
Start: 2017-04-06 | End: 2017-04-11 | Stop reason: HOSPADM

## 2017-04-06 RX ORDER — ENALAPRILAT 2.5 MG/2ML
1.25 INJECTION INTRAVENOUS EVERY 6 HOURS PRN
Status: DISCONTINUED | OUTPATIENT
Start: 2017-04-06 | End: 2017-04-11 | Stop reason: HOSPADM

## 2017-04-06 RX ORDER — NIFEDIPINE 60 MG/1
60 TABLET, EXTENDED RELEASE ORAL
Status: DISCONTINUED | OUTPATIENT
Start: 2017-04-06 | End: 2017-04-06

## 2017-04-06 RX ORDER — CLONIDINE HYDROCHLORIDE 0.2 MG/1
0.2 TABLET ORAL 2 TIMES DAILY
Status: DISCONTINUED | OUTPATIENT
Start: 2017-04-06 | End: 2017-04-07 | Stop reason: DRUGHIGH

## 2017-04-06 RX ORDER — POTASSIUM CHLORIDE 750 MG/1
30 CAPSULE, EXTENDED RELEASE ORAL ONCE
Status: COMPLETED | OUTPATIENT
Start: 2017-04-06 | End: 2017-04-06

## 2017-04-06 RX ORDER — BUDESONIDE 0.5 MG/2ML
0.5 INHALANT ORAL
Status: DISCONTINUED | OUTPATIENT
Start: 2017-04-06 | End: 2017-04-11 | Stop reason: HOSPADM

## 2017-04-06 RX ORDER — LORAZEPAM 0.5 MG/1
0.5 TABLET ORAL EVERY 8 HOURS PRN
Status: DISCONTINUED | OUTPATIENT
Start: 2017-04-06 | End: 2017-04-11 | Stop reason: HOSPADM

## 2017-04-06 RX ORDER — HYDROCODONE BITARTRATE AND ACETAMINOPHEN 5; 325 MG/1; MG/1
1 TABLET ORAL EVERY 4 HOURS PRN
Status: DISCONTINUED | OUTPATIENT
Start: 2017-04-06 | End: 2017-04-06

## 2017-04-06 RX ORDER — NALOXONE HCL 0.4 MG/ML
0.4 VIAL (ML) INJECTION
Status: DISCONTINUED | OUTPATIENT
Start: 2017-04-06 | End: 2017-04-06

## 2017-04-06 RX ORDER — MECLIZINE HYDROCHLORIDE 25 MG/1
25 TABLET ORAL 3 TIMES DAILY PRN
Status: DISCONTINUED | OUTPATIENT
Start: 2017-04-06 | End: 2017-04-06

## 2017-04-06 RX ORDER — ASPIRIN 81 MG/1
81 TABLET ORAL ONCE
Status: COMPLETED | OUTPATIENT
Start: 2017-04-06 | End: 2017-04-06

## 2017-04-06 RX ORDER — CLONIDINE HYDROCHLORIDE 0.2 MG/1
0.2 TABLET ORAL 2 TIMES DAILY
Status: DISCONTINUED | OUTPATIENT
Start: 2017-04-06 | End: 2017-04-06

## 2017-04-06 RX ORDER — HYDROXYZINE PAMOATE 50 MG/1
50 CAPSULE ORAL EVERY 6 HOURS PRN
Status: DISCONTINUED | OUTPATIENT
Start: 2017-04-06 | End: 2017-04-11 | Stop reason: HOSPADM

## 2017-04-06 RX ORDER — DIPHENHYDRAMINE HCL 12.5MG/5ML
12.5 LIQUID (ML) ORAL EVERY 4 HOURS PRN
Status: DISCONTINUED | OUTPATIENT
Start: 2017-04-06 | End: 2017-04-11 | Stop reason: HOSPADM

## 2017-04-06 RX ORDER — TIZANIDINE 2 MG/1
2 TABLET ORAL 2 TIMES DAILY
Status: DISCONTINUED | OUTPATIENT
Start: 2017-04-06 | End: 2017-04-11 | Stop reason: HOSPADM

## 2017-04-06 RX ORDER — PROMETHAZINE HYDROCHLORIDE AND CODEINE PHOSPHATE 6.25; 1 MG/5ML; MG/5ML
5 SYRUP ORAL 4 TIMES DAILY PRN
Status: DISCONTINUED | OUTPATIENT
Start: 2017-04-06 | End: 2017-04-11 | Stop reason: HOSPADM

## 2017-04-06 RX ORDER — ETHACRYNIC ACID 25 MG/1
25 TABLET ORAL 2 TIMES DAILY
Status: DISCONTINUED | OUTPATIENT
Start: 2017-04-06 | End: 2017-04-06

## 2017-04-06 RX ORDER — ALBUTEROL SULFATE 90 UG/1
2 AEROSOL, METERED RESPIRATORY (INHALATION) EVERY 4 HOURS PRN
Status: DISCONTINUED | OUTPATIENT
Start: 2017-04-06 | End: 2017-04-06 | Stop reason: CLARIF

## 2017-04-06 RX ORDER — EPLERENONE 25 MG/1
25 TABLET, FILM COATED ORAL
Status: DISCONTINUED | OUTPATIENT
Start: 2017-04-06 | End: 2017-04-09

## 2017-04-06 RX ORDER — SODIUM CHLORIDE 0.9 % (FLUSH) 0.9 %
10 SYRINGE (ML) INJECTION AS NEEDED
Status: DISCONTINUED | OUTPATIENT
Start: 2017-04-06 | End: 2017-04-11 | Stop reason: HOSPADM

## 2017-04-06 RX ORDER — SODIUM CHLORIDE 0.9 % (FLUSH) 0.9 %
1-10 SYRINGE (ML) INJECTION AS NEEDED
Status: DISCONTINUED | OUTPATIENT
Start: 2017-04-06 | End: 2017-04-11 | Stop reason: HOSPADM

## 2017-04-06 RX ORDER — BUTALBITAL, ACETAMINOPHEN AND CAFFEINE 50; 325; 40 MG/1; MG/1; MG/1
2 TABLET ORAL EVERY 4 HOURS PRN
Status: DISCONTINUED | OUTPATIENT
Start: 2017-04-06 | End: 2017-04-11 | Stop reason: HOSPADM

## 2017-04-06 RX ORDER — NIFEDIPINE 60 MG/1
60 TABLET, EXTENDED RELEASE ORAL 2 TIMES DAILY
Status: DISCONTINUED | OUTPATIENT
Start: 2017-04-06 | End: 2017-04-06

## 2017-04-06 RX ORDER — MONTELUKAST SODIUM 10 MG/1
10 TABLET ORAL NIGHTLY
Status: DISCONTINUED | OUTPATIENT
Start: 2017-04-06 | End: 2017-04-11 | Stop reason: HOSPADM

## 2017-04-06 RX ORDER — ETHACRYNIC ACID 25 MG/1
25 TABLET ORAL 3 TIMES DAILY
COMMUNITY
End: 2022-05-29 | Stop reason: HOSPADM

## 2017-04-06 RX ORDER — PROMETHAZINE HYDROCHLORIDE 25 MG/1
12.5 TABLET ORAL EVERY 6 HOURS PRN
Status: DISCONTINUED | OUTPATIENT
Start: 2017-04-06 | End: 2017-04-11 | Stop reason: HOSPADM

## 2017-04-06 RX ORDER — DOCUSATE SODIUM 100 MG/1
100 CAPSULE, LIQUID FILLED ORAL 2 TIMES DAILY
Status: DISCONTINUED | OUTPATIENT
Start: 2017-04-06 | End: 2017-04-11 | Stop reason: HOSPADM

## 2017-04-06 RX ORDER — ONDANSETRON 8 MG/1
8 TABLET, ORALLY DISINTEGRATING ORAL EVERY 6 HOURS PRN
Status: DISCONTINUED | OUTPATIENT
Start: 2017-04-06 | End: 2017-04-11 | Stop reason: HOSPADM

## 2017-04-06 RX ORDER — ETHACRYNIC ACID 25 MG/1
25 TABLET ORAL 2 TIMES DAILY
Status: DISCONTINUED | OUTPATIENT
Start: 2017-04-06 | End: 2017-04-11 | Stop reason: HOSPADM

## 2017-04-06 RX ORDER — HYDROCODONE BITARTRATE AND ACETAMINOPHEN 5; 325 MG/1; MG/1
1 TABLET ORAL EVERY 6 HOURS PRN
Status: DISCONTINUED | OUTPATIENT
Start: 2017-04-06 | End: 2017-04-11 | Stop reason: HOSPADM

## 2017-04-06 RX ORDER — POLYETHYLENE GLYCOL 3350 17 G/17G
17 POWDER, FOR SOLUTION ORAL DAILY
Status: DISCONTINUED | OUTPATIENT
Start: 2017-04-06 | End: 2017-04-11 | Stop reason: HOSPADM

## 2017-04-06 RX ORDER — PENTAZOCINE HYDROCHLORIDE AND NALOXONE HYDROCHLORIDE 50; .5 MG/1; MG/1
2 TABLET ORAL EVERY 4 HOURS PRN
Status: DISCONTINUED | OUTPATIENT
Start: 2017-04-06 | End: 2017-04-11 | Stop reason: HOSPADM

## 2017-04-06 RX ORDER — ACETAMINOPHEN 325 MG/1
650 TABLET ORAL EVERY 4 HOURS PRN
Status: DISCONTINUED | OUTPATIENT
Start: 2017-04-06 | End: 2017-04-11 | Stop reason: HOSPADM

## 2017-04-06 RX ORDER — MORPHINE SULFATE 2 MG/ML
1 INJECTION, SOLUTION INTRAMUSCULAR; INTRAVENOUS EVERY 4 HOURS PRN
Status: DISCONTINUED | OUTPATIENT
Start: 2017-04-06 | End: 2017-04-06

## 2017-04-06 RX ORDER — ALBUTEROL SULFATE 2.5 MG/3ML
2.5 SOLUTION RESPIRATORY (INHALATION) EVERY 4 HOURS PRN
Status: DISCONTINUED | OUTPATIENT
Start: 2017-04-06 | End: 2017-04-11 | Stop reason: HOSPADM

## 2017-04-06 RX ORDER — SODIUM CHLORIDE 450 MG/100ML
75 INJECTION, SOLUTION INTRAVENOUS CONTINUOUS
Status: DISCONTINUED | OUTPATIENT
Start: 2017-04-06 | End: 2017-04-06

## 2017-04-06 RX ORDER — PANTOPRAZOLE SODIUM 40 MG/1
40 TABLET, DELAYED RELEASE ORAL
Status: DISCONTINUED | OUTPATIENT
Start: 2017-04-07 | End: 2017-04-11 | Stop reason: HOSPADM

## 2017-04-06 RX ORDER — BUTALBITAL, ACETAMINOPHEN AND CAFFEINE 50; 325; 40 MG/1; MG/1; MG/1
1 TABLET ORAL EVERY 4 HOURS PRN
Status: DISCONTINUED | OUTPATIENT
Start: 2017-04-06 | End: 2017-04-06

## 2017-04-06 RX ORDER — ENALAPRILAT 2.5 MG/2ML
1.25 INJECTION INTRAVENOUS ONCE
Status: COMPLETED | OUTPATIENT
Start: 2017-04-06 | End: 2017-04-06

## 2017-04-06 RX ORDER — ONDANSETRON 2 MG/ML
4 INJECTION INTRAMUSCULAR; INTRAVENOUS ONCE
Status: COMPLETED | OUTPATIENT
Start: 2017-04-06 | End: 2017-04-06

## 2017-04-06 RX ADMIN — TIZANIDINE 2 MG: 2 TABLET ORAL at 21:54

## 2017-04-06 RX ADMIN — MEPERIDINE HYDROCHLORIDE 25 MG: 25 INJECTION, SOLUTION INTRAMUSCULAR; INTRAVENOUS; SUBCUTANEOUS at 08:45

## 2017-04-06 RX ADMIN — EPLERENONE 25 MG: 25 TABLET, FILM COATED ORAL at 18:57

## 2017-04-06 RX ADMIN — CLONIDINE HYDROCHLORIDE 0.2 MG: 0.2 TABLET ORAL at 21:46

## 2017-04-06 RX ADMIN — ONDANSETRON 4 MG: 2 INJECTION INTRAMUSCULAR; INTRAVENOUS at 08:44

## 2017-04-06 RX ADMIN — ENALAPRILAT 1.25 MG: 1.25 INJECTION INTRAVENOUS at 19:51

## 2017-04-06 RX ADMIN — ENALAPRILAT 1.25 MG: 1.25 INJECTION INTRAVENOUS at 09:35

## 2017-04-06 RX ADMIN — NIFEDIPINE 60 MG: 60 TABLET, EXTENDED RELEASE ORAL at 21:46

## 2017-04-06 RX ADMIN — BUDESONIDE 0.5 MG: 0.5 INHALANT RESPIRATORY (INHALATION) at 20:26

## 2017-04-06 RX ADMIN — ENOXAPARIN SODIUM 30 MG: 30 INJECTION SUBCUTANEOUS at 16:39

## 2017-04-06 RX ADMIN — ALBUTEROL SULFATE 2.5 MG: 2.5 SOLUTION RESPIRATORY (INHALATION) at 20:26

## 2017-04-06 RX ADMIN — ETHACRYNIC ACID 25 MG: 25 TABLET ORAL at 21:46

## 2017-04-06 RX ADMIN — HYDROCODONE BITARTRATE AND ACETAMINOPHEN 1 TABLET: 5; 325 TABLET ORAL at 18:57

## 2017-04-06 RX ADMIN — POTASSIUM CHLORIDE 30 MEQ: 750 CAPSULE, EXTENDED RELEASE ORAL at 18:57

## 2017-04-06 RX ADMIN — MONTELUKAST 10 MG: 10 TABLET, FILM COATED ORAL at 21:54

## 2017-04-06 RX ADMIN — ASPIRIN 81 MG: 81 TABLET ORAL at 21:54

## 2017-04-06 RX ADMIN — BUTALBITAL, ACETAMINOPHEN, AND CAFFEINE 2 TABLET: 50; 325; 40 TABLET ORAL at 21:55
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
## 2017-04-06 NOTE — CONSULTS
Inpatient Consult to Obstetrics / Gynecology  Consult performed by: DEJA MARTIN  Consult ordered by: ÓSCAR CADET          Patient Care Team:  Óscar Cadet MD as PCP - General  Óscar Cadet MD as PCP - Family Medicine    Chief complaint: swelling and pain in groin, Left side.     Subjective      History of Present Illness-- Pt c/o Pain in left groin/hip and swelling of upper thigh bilaterally. Worsens with standing. Has no vaginal complaints. Pelvic u/s done in er and results pending. No vb, itching or irritation of the skin. Has been ongoing for several days. Has been evaluated in our office previously for pelvic organ prolapse.     Review of Systems   Genitourinary- present-- pelvic pain, swelling   Musculoskeletal- bilateral LE swelling ( upper leg)    Objective      Vital Signs  Temp:  [97.5 °F (36.4 °C)-98.3 °F (36.8 °C)] 98.3 °F (36.8 °C)  Heart Rate:  [68-91] 73  Resp:  [16-18] 16  BP: (167-211)/() 211/98    Physical Exam   - normal exam, no pelvic masses felt, uterus absent,  skin normal without erythema or swelling. Mildly tender to left hip/groin. No obvious inguinal hernia.   Lower extremity- no swelling seen on exam, no redness erythema/tenderness     Results Review:    I reviewed the patient's new clinical results.  GYN U/s report still Pending.         Assessment/Plan     Active Problems:    Malaise and fatigue progressive    Arthralgia    Accelerated hypertension    Acute joint pain    Easy bruisability      Assessment & Plan  I examined the patient and no abnormal findings on exam today. Will review GYN u/s when report available. Pending normal u/s results I think patient can be evaluated on an outpatient basis after discharge for her complaints.   I discussed the patients findings and my recommendations with patient.     GASPER Taylor  04/06/17  4:51 PM      I agree with the above assessment.  We are happy to see the patient as an outpatient in our office.    Charla  MD celeste

## 2017-04-06 NOTE — ED PROVIDER NOTES
EMERGENCY DEPARTMENT ENCOUNTER    CHIEF COMPLAINT  Chief Complaint: Arthralgias  History given by: Pt  History limited by: N/A  Room Number: 16/16  PMD: Óscar Cadet MD      HPI:  Pt is a 77 y.o. female who presents complaining of diffuse arthralgias over the past 8-10 months, which she believes is related to taking Hydralazine from March to November of 2016. She spoke to Dr. Cadet (PCP) this morning, who reportedly instructed the pt to come here. Her pain has been evaluated by Dr. Cadet in the past. She also c/o moderate headache, rib pain, and weight loss of 25 lbs since December. Pt denies recent fever, cough, vomiting, diarrhea, dysuria, and changes in chronic SOA. Pt denies a history of smoking and drinks alcohol several times per year.    Duration: 10 months  Onset: Gradual  Timing: Constant  Location: Diffuse joints  Radiation: None  Quality: Aching  Intensity/Severity: Moderate  Progression: Worsening  Associated Symptoms: Moderate headache, rib pain, and weight loss of 25 lbs over the past 4 months  Aggravating Factors: Nothing  Alleviating Factors: Nothing  Previous Episodes: None specified  Treatment before arrival: Been evaluated by her PCP    PAST MEDICAL HISTORY  Active Ambulatory Problems     Diagnosis Date Noted   • Joint pain 07/25/2016   • Urinary retention self-caths (Ochoa) 07/25/2016   • Sinusitis 07/25/2016   • Conjunctivitis 07/25/2016   • Arm pain 07/25/2016   • Cervical disc displacement 07/25/2016   • Cervical pain (Servando=PM) 07/25/2016   • DDD (degenerative disc disease), cervical 07/25/2016   • Hyperlipidemia 07/25/2016   • Preventative health care 09/08/2016   • Medication management 09/08/2016   • Proctocele 06/29/2015   • Urge incontinence of urine 06/29/2015   • Chronic tension-type headache, intractable 11/11/2016   • Obesity (BMI 30.0-34.9) 11/11/2016   • H/o Lyme disease 11/11/2016   • Chronic kidney disease, stage 2, mildly decreased GFR 11/11/2016   • Hardening of the  arteries of the brain 11/11/2016   • Allergic contact dermatitis 11/11/2016   • Chronic fatigue 11/11/2016   • Vertigo 11/11/2016   • Vitamin D deficiency 11/11/2016   • Moderate persistent asthmatic bronchitis without complication 11/11/2016   • Anterior cervical adenopathy due to infection 11/11/2016   • Pleurisy 11/11/2016   • Knee pain, bilateral 11/11/2016   • Elevated PTH level 11/11/2016   • 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Ochoa) 11/11/2016   • Gastroesophageal reflux disease with esophagitis 11/11/2016   • Psoriasis (Darryl Ochoa) 11/11/2016   • Postmenopausal 11/11/2016   • Recurrent UTI 11/11/2016   • CVA (cerebrovascular accident) subacute 11/11/2016   • Dyspnea on exertion 01/19/2017   • Precordial pain 02/02/2017   • Abnormal EKG 02/02/2017     Resolved Ambulatory Problems     Diagnosis Date Noted   • No Resolved Ambulatory Problems     Past Medical History:   Diagnosis Date   • Asthma    • Cancer    • COPD (chronic obstructive pulmonary disease)    • Emphysema lung    • Generalized headaches    • Hayfever    • Hypertension        PAST SURGICAL HISTORY  Past Surgical History:   Procedure Laterality Date   • BREAST LUMPECTOMY Left 04/01/2003   • CARPAL TUNNEL RELEASE  2011   • CATARACT EXTRACTION  2010   • HERNIA REPAIR  01/01/1971   • KNEE SURGERY Left 01/01/1998   • PARATHYROIDECTOMY  05/08/2006   • SINUS SURGERY  01/01/1984   • TOTAL ABDOMINAL HYSTERECTOMY  01/01/1973       FAMILY HISTORY  Family History   Problem Relation Age of Onset   • Cancer Brother        SOCIAL HISTORY  Social History     Social History   • Marital status:      Spouse name: N/A   • Number of children: N/A   • Years of education: N/A     Occupational History   • Not on file.     Social History Main Topics   • Smoking status: Never Smoker   • Smokeless tobacco: Never Used   • Alcohol use Yes      Comment: rarely   • Drug use: Defer   • Sexual activity: Defer     Other Topics Concern   • Not on file      Social History Narrative       ALLERGIES  Morphine; Sulfa antibiotics; Hydralazine; Amlodipine; Anastrozole; Grass; Morphine and related; Nifedipine; Nsaids; Norvasc  [amlodipine besylate]; Penicillins; Tree extract; and Zolpidem    REVIEW OF SYSTEMS  Review of Systems   Constitutional: Positive for unexpected weight change (loss of 25 lbs since January). Negative for fever.   Respiratory: Positive for shortness of breath (chronic, unchanged). Negative for cough.    Cardiovascular: Positive for chest pain (ribs).   Gastrointestinal: Negative.  Negative for diarrhea and vomiting.   Genitourinary: Negative.  Negative for dysuria.   Musculoskeletal: Positive for arthralgias (diffuse).   Neurological: Positive for headaches (moderate).   All other systems reviewed and are negative.      PHYSICAL EXAM  ED Triage Vitals   Temp Heart Rate Resp BP SpO2   04/06/17 0719 04/06/17 0719 04/06/17 0719 04/06/17 0821 04/06/17 0719   97.5 °F (36.4 °C) 76 18 171/83 98 %      Temp src Heart Rate Source Patient Position BP Location FiO2 (%)   04/06/17 0719 04/06/17 0719 -- -- --   Tympanic Monitor          Physical Exam   Constitutional: She is oriented to person, place, and time. No distress.   HENT:   Head: Normocephalic and atraumatic.   Mouth/Throat: Oropharynx is clear and moist.   Eyes: EOM are normal. Pupils are equal, round, and reactive to light.   Neck: Normal range of motion. Neck supple.   Cardiovascular: Normal rate, regular rhythm and normal heart sounds.    Pulmonary/Chest: Effort normal and breath sounds normal. No respiratory distress.   Abdominal: Soft. There is no tenderness. There is no rebound and no guarding.   Musculoskeletal: She exhibits no edema.   Pt has generalized joint pain in her legs.   Neurological: She is alert and oriented to person, place, and time. She has normal sensation and normal strength.   Skin: Skin is warm and dry. Ecchymosis (moderately large area on the L inner knee that appears  subacute) noted. No rash noted.   Psychiatric: Affect normal.   Nursing note and vitals reviewed.      LAB RESULTS  Lab Results (last 24 hours)     Procedure Component Value Units Date/Time    CBC & Differential [[id number]] Collected:  04/06/17 0827    Specimen:  Blood Updated:  04/06/17 0837    Narrative:       The following orders were created for panel order CBC & Differential.  Procedure                               Abnormality         Status                     ---------                               -----------         ------                     CBC Auto Differential[[id number]]         Abnormal            Final result                 Please view results for these tests on the individual orders.    Basic Metabolic Panel [[id number]]  (Abnormal) Collected:  04/06/17 0827    Specimen:  Blood Updated:  04/06/17 0855     Glucose 105 (H) mg/dL      BUN 16 mg/dL      Creatinine 0.89 mg/dL      Sodium 145 mmol/L      Potassium 3.6 mmol/L      Chloride 105 mmol/L      CO2 27.0 mmol/L      Calcium 9.5 mg/dL      eGFR Non African Amer 61 mL/min/1.73      BUN/Creatinine Ratio 18.0     Anion Gap 13.0 mmol/L     Narrative:       The MDRD GFR formula is only valid for adults with stable renal function between ages 18 and 70.    Sedimentation Rate [[id number]]  (Normal) Collected:  04/06/17 0827    Specimen:  Blood Updated:  04/06/17 0904     Sed Rate 5 mm/hr     CBC Auto Differential [[id number]]  (Abnormal) Collected:  04/06/17 0827    Specimen:  Blood Updated:  04/06/17 0837     WBC 5.52 10*3/mm3      RBC 4.65 10*6/mm3      Hemoglobin 13.9 g/dL      Hematocrit 42.2 %      MCV 90.8 fL      MCH 29.9 pg      MCHC 32.9 g/dL      RDW 14.1 (H) %      RDW-SD 46.6 fl      MPV 10.8 fL      Platelets 187 10*3/mm3      Neutrophil % 53.6 %      Lymphocyte % 32.8 %      Monocyte % 10.7 %      Eosinophil % 1.8 %      Basophil % 1.1 %      Immature Grans % 0.0 %      Neutrophils, Absolute 2.96 10*3/mm3      Lymphocytes, Absolute 1.81 10*3/mm3       Monocytes, Absolute 0.59 10*3/mm3      Eosinophils, Absolute 0.10 10*3/mm3      Basophils, Absolute 0.06 10*3/mm3      Immature Grans, Absolute 0.00 10*3/mm3           I ordered the above labs and reviewed the results    PROCEDURES  Procedures      PROGRESS AND CONSULTS  ED Course   Comment By Time   8:24 AM  Pt with generalized joint pain ongoing since August 2016 (she believes related to taking Hydralazine from March to Nov 2016) presents with worsening pain.  She says Dr Cadet told her to come in.  Exam shows some limited ROM of both legs and moderate large bruise on L inner knee.  There is no single joint is worse than the others. Estevan Vega MD 04/06 0826   8:15 AM:  Vitals: BP: 171/83 HR: 76 Temp: 97.5 °F (36.4 °C) (Tympanic) O2 sat: 98%  D/w pt plan for labs for further evaluation and consult with Dr. Cadet (PCP). Ordered Demerol and Zofran for pain management. Pt understands and agrees with the plan, all questions answered.    9:01 AM:  Discussed pt's case with Dr. Cadet (PCP), who agrees to admit the pt to Med-surg for further care.    9:08 AM:  Rechecked pt. Pt is resting comfortably. Discussed the consult with Dr. Cadet and the plan for admission for further care.    9:11 AM:  Dr. Cadet is here to evaluate the pt. He requests I order Vasotec for the pt, which I will do.    MEDICAL DECISION MAKING  Results were reviewed/discussed with the patient and they were also made aware of online access. Pt also made aware that some labs, such as cultures, will not be resulted during ER visit and follow up with PMD is necessary.     MDM  Number of Diagnoses or Management Options     Amount and/or Complexity of Data Reviewed  Clinical lab tests: reviewed and ordered (Unremarkable labs)  Discuss the patient with other providers: yes (Dr. Cadet (PCP))    Patient Progress  Patient progress: stable         DIAGNOSIS  Final diagnoses:   Arthralgia, unspecified joint   Essential hypertension        DISPOSITION  ADMISSION    Discussed treatment plan and reason for admission with pt/family and admitting physician.  Pt/family voiced understanding of the plan for admission for further testing/treatment as needed.     Latest Documented Vital Signs:  As of 9:12 AM  BP- 171/83 HR- 72 Temp- 97.5 °F (36.4 °C) (Tympanic) O2 sat- 99%    --  Documentation assistance provided by ema Trejo for Dr. Vega.  Information recorded by the scribe was done at my direction and has been verified and validated by me.     Boubacar Trejo  04/06/17 0912       Estevan Vega MD  04/06/17 0914

## 2017-04-06 NOTE — CONSULTS
Referring Provider: Dr. Cadet  Reason for Consultation: Severe HTN    Subjective     Chief complaint   Chief Complaint   Patient presents with   • Leg Pain     Patient states this has been going on since starting on hydralazine, all joints hurt   • Leg Swelling       History of present illness:      78 YO woman with history of severe hypertension followed in our office by Dr. Bond. Last seen in Jan 2017. She had stopped her aldactone because it made her feel poorly.  She was admitted with severe HTN, generalized weakness and pain in her legs.  bp was over 200. Long hx of HTN. Did not tolerate hydralazine in the past and has a true allergy to sulfa in 1968 with hives and resp failure.  Hx of retention and straight cath bid prn.  Under a lot of stress at home with a sick .  No hx of stones. No new LUTS.   Did have chills, no fever.      Past Medical History:   Diagnosis Date   • Asthma    • Cancer    • COPD (chronic obstructive pulmonary disease)    • Emphysema lung    • Generalized headaches    • Hayfever    • Hypertension      Past Surgical History:   Procedure Laterality Date   • BREAST LUMPECTOMY Left 04/01/2003   • CARPAL TUNNEL RELEASE  2011   • CATARACT EXTRACTION  2010   • HERNIA REPAIR  01/01/1971   • KNEE SURGERY Left 01/01/1998   • PARATHYROIDECTOMY  05/08/2006   • SINUS SURGERY  01/01/1984   • TOTAL ABDOMINAL HYSTERECTOMY  01/01/1973     Family History   Problem Relation Age of Onset   • Cancer Brother        Social History   Substance Use Topics   • Smoking status: Never Smoker   • Smokeless tobacco: Never Used   • Alcohol use Yes      Comment: rarely     Prescriptions Prior to Admission   Medication Sig Dispense Refill Last Dose   • ethacrynic acid (EDECRIN) 25 MG tablet Take 25 mg by mouth 2 (Two) Times a Day.      • ipratropium (ATROVENT) 0.02 % nebulizer solution Take 500 mcg by nebulization 4 (Four) Times a Day As Needed for Wheezing or Shortness of Air.      • albuterol (PROAIR HFA) 108  (90 BASE) MCG/ACT inhaler ProAir  (90 Base) MCG/ACT Inhalation Aerosol Solution; Patient Sig: ProAir  (90 Base) MCG/ACT Inhalation Aerosol Solution ; 8; 0; 18-Jun-2014; Active   Taking   • aspirin 81 MG tablet Take 162 mg by mouth Daily.   Taking   • budesonide (PULMICORT FLEXHALER) 180 MCG/ACT inhaler Pulmicort Flexhaler 180 MCG/ACT Inhalation Aerosol Powder Breath Activated; Patient Sig: Pulmicort Flexhaler 180 MCG/ACT Inhalation Aerosol Powder Breath Activated ; 0; 26-Jun-2014; Active   Taking   • butalbital-acetaminophen-caffeine (FIORICET, ESGIC) -40 MG per tablet Take 2 tablets by mouth Every 4 (Four) Hours As Needed for Headache. (Patient taking differently: Take 1 tablet by mouth Every 4 (Four) Hours As Needed for Headache.) 240 tablet 0    • Cholecalciferol (VITAMIN D) 1000 UNITS tablet Take  by mouth.   Taking   • CloNIDine (CATAPRES) 0.1 MG tablet Take 1 tablet by mouth 2 (Two) Times a Day. Schedule bid 1 and then [id number] take extra #1 if systolic BP > 160 and take 2 if systolic Bp>180 (Patient taking differently: Take 0.2 mg by mouth 2 (Two) Times a Day. Schedule bid 1 and then [id number] take extra #1 if systolic BP > 160 and take 2 if systolic Bp>180) 360 tablet 1    • diphenhydrAMINE (BENADRYL) 50 MG tablet Take 1 tablet by mouth every 4 (four) hours as needed for itching or allergies. 180 tablet 0 Taking   • doxycycline (DORYX) 100 MG enteric coated tablet Take 1 tablet by mouth 2 (Two) Times a Day. 20 tablet 0    • doxycycline (DORYX) 100 MG enteric coated tablet Take 1 tablet by mouth 2 (Two) Times a Day. 20 tablet 0    • doxycycline (DORYX) 100 MG enteric coated tablet TAKE ONE TABLET BY MOUTH TWICE DAILY 20 tablet 0    • HYDROcodone-acetaminophen (NORCO) 5-325 MG per tablet Take 1 tablet by mouth Every 6 (Six) Hours As Needed for moderate pain (4-6) or severe pain (7-10). 120 tablet 0 Taking   • hydrOXYzine (VISTARIL) 50 MG capsule Take 1 capsule by mouth every 6 (six) hours as  needed for itching. 360 capsule 1 Taking   • levoFLOXacin (LEVAQUIN) 500 MG tablet Take 500 mg by mouth Daily.   Taking   • LORazepam (ATIVAN) 0.5 MG tablet Take 1 tablet by mouth Every 8 (Eight) Hours As Needed for anxiety. Take 1 tab 1 hoour before MRI and repeat x 1 at time of test 60 tablet 0 Taking   • meclizine (ANTIVERT) 25 MG tablet TAKE ONE TABLET BY MOUTH THREE TIMES DAILY AS NEEDED FOR DIZZINESS 30 tablet 0 Taking   • MethylPREDNISolone (MEDROL, CRISTIAN,) 4 MG tablet Take as directed on package instructions. 21 tablet 0 Taking   • montelukast (SINGULAIR) 10 MG tablet Take 1 tablet by mouth every night. 90 tablet 1 Taking   • NIFEdipine XL (PROCARDIA XL) 60 MG 24 hr tablet TAKE TWO TABLETS BY MOUTH DAILY (Patient taking differently: one oral bid) 60 tablet 2    • NIFEdipine XL (PROCARDIA XL) 60 MG 24 hr tablet Take 1 tablet by mouth Daily. 90 tablet 0    • NIFEdipine XL (PROCARDIA XL) 60 MG 24 hr tablet TAKE TWO TABLETS BY MOUTH DAILY 60 tablet 0    • omeprazole (priLOSEC) 40 MG capsule TAKE ONE CAPSULE BY MOUTH EVERY DAY 90 capsule 0    • ondansetron ODT (ZOFRAN-ODT) 8 MG disintegrating tablet Take 1 tablet by mouth every 6 (six) hours as needed for nausea or vomiting. 360 tablet 1 Taking   • pentazocine-naloxone (TALWIN NX) 50-0.5 MG per tablet Take 2 tablets by mouth Every 4 (Four) Hours As Needed for Mild Pain (1-3) (for up to 90 days). (Patient taking differently: Take 1 tablet by mouth Every 4 (Four) Hours As Needed for Mild Pain (1-3) (for up to 90 days).) 1080 tablet 0    • polyethylene glycol (MIRALAX) powder Take one capful w/liquid daily 765 g 1 Taking   • predniSONE (DELTASONE) 10 MG tablet TAKE ONE TABLET BY MOUTH DAILY AS DIRECTED 30 tablet 0    • promethazine-codeine (PHENERGAN with CODEINE) 6.25-10 MG/5ML syrup Take 5 mL by mouth 4 (Four) Times a Day As Needed for cough. 360 mL 0 Taking   • tiZANidine (ZANAFLEX) 4 MG tablet Take 1 tablet by mouth at night as needed for muscle spasms. (Patient  "taking differently: Take 2 mg by mouth 2 (Two) Times a Day.) 90 tablet 0 Taking   • tiZANidine (ZANAFLEX) 4 MG tablet TAKE ONE TABLET BY MOUTH AT BEDTIME AS NEEDED FOR MUSCLE SPASMS 90 tablet 0    • tobramycin-dexamethasone (TOBRADEX) 0.3-0.1 % ophthalmic suspension Administer 1 drop to both eyes every 4 (four) hours while awake. 10 mL 0 Taking     Allergies:  Morphine; Sulfa antibiotics; Hydralazine; Amlodipine; Anastrozole; Grass; Morphine and related; Nifedipine; Nsaids; Norvasc  [amlodipine besylate]; Penicillins; Pneumococcal vaccines; Tree extract; and Zolpidem    Review of Systems  Review of systems is negative other than what's mentioned in the history of present illness.    Objective     Vital Signs  Temp:  [97.5 °F (36.4 °C)-98.3 °F (36.8 °C)] 98.3 °F (36.8 °C)  Heart Rate:  [68-91] 73  Resp:  [16-18] 16  BP: (167-211)/() 211/98    Flowsheet Rows         First Filed Value    Admission Height  63\" (160 cm) Documented at 04/06/2017 0720    Admission Weight  180 lb (81.6 kg) Documented at 04/06/2017 0720           I/O this shift:  In: -   Out: 850 [Urine:850]       Intake/Output Summary (Last 24 hours) at 04/06/17 1626  Last data filed at 04/06/17 1316   Gross per 24 hour   Intake                0 ml   Output              850 ml   Net             -850 ml       Physical Exam:  General Appearance: alert, oriented x 3, no acute distress, anxious  Skin: warm and dry  HEENT: pupils round and reactive to light, oral mucosa normal,   Neck: supple, minimal JVD, trachea midline  Lungs: CTA, unlabored breathing effort  Heart: RRR, normal S1 and S2,  did not appreciate any murmurs.  Abdomen: soft, non-tender, no palpable bladder, no organomegaly.  Extremities: no edema, cyanosis or clubbing, minimal left thigh bruising.  Neuro: normal speech and mental status, mood and affect were appropriate.    Results Review:  Results for orders placed or performed during the hospital encounter of 04/06/17   Respiratory Panel, " PCR   Result Value Ref Range    ADENOVIRUS, PCR Not Detected Not Detected    Coronavirus 229E Not Detected Not Detected    Coronavirus HKU1 Not Detected Not Detected    Coronavirus NL63 Not Detected Not Detected    Coronavirus OC43 Not Detected Not Detected    Human Metapneumovirus Not Detected Not Detected    Human Rhinovirus/Enterovirus Not Detected Not Detected    Influenza B PCR Not Detected Not Detected    Parainfluenza Virus 1 Not Detected Not Detected    Parainfluenza Virus 2 Not Detected Not Detected    Parainfluenza Virus 3 Not Detected Not Detected    Parainfluenza Virus 4 Not Detected Not Detected    Bordetella pertussis pcr Not Detected Not Detected    Influenza 2009 H1N1 by PCR Not Detected Not Detected    Chlamydophila pneumoniae PCR Not Detected Not Detected    Mycoplasma pneumo by PCR Not Detected Not Detected    Influenza A PCR Not Detected Not Detected    Influenza A H3 Not Detected Not Detected    Influenza A H1 Not Detected Not Detected    RSV, PCR Not Detected Not Detected   Basic Metabolic Panel   Result Value Ref Range    Glucose 105 (H) 65 - 99 mg/dL    BUN 16 8 - 23 mg/dL    Creatinine 0.89 0.57 - 1.00 mg/dL    Sodium 145 136 - 145 mmol/L    Potassium 3.6 3.5 - 5.2 mmol/L    Chloride 105 98 - 107 mmol/L    CO2 27.0 22.0 - 29.0 mmol/L    Calcium 9.5 8.6 - 10.5 mg/dL    eGFR Non African Amer 61 >60 mL/min/1.73    BUN/Creatinine Ratio 18.0 7.0 - 25.0    Anion Gap 13.0 mmol/L   Sedimentation Rate   Result Value Ref Range    Sed Rate 5 0 - 30 mm/hr   CBC Auto Differential   Result Value Ref Range    WBC 5.52 4.50 - 10.70 10*3/mm3    RBC 4.65 3.90 - 5.20 10*6/mm3    Hemoglobin 13.9 11.9 - 15.5 g/dL    Hematocrit 42.2 35.6 - 45.5 %    MCV 90.8 80.5 - 98.2 fL    MCH 29.9 26.9 - 32.0 pg    MCHC 32.9 32.4 - 36.3 g/dL    RDW 14.1 (H) 11.7 - 13.0 %    RDW-SD 46.6 37.0 - 54.0 fl    MPV 10.8 6.0 - 12.0 fL    Platelets 187 140 - 500 10*3/mm3    Neutrophil % 53.6 42.7 - 76.0 %    Lymphocyte % 32.8 19.6 -  45.3 %    Monocyte % 10.7 5.0 - 12.0 %    Eosinophil % 1.8 0.3 - 6.2 %    Basophil % 1.1 0.0 - 1.5 %    Immature Grans % 0.0 0.0 - 0.5 %    Neutrophils, Absolute 2.96 1.90 - 8.10 10*3/mm3    Lymphocytes, Absolute 1.81 0.90 - 4.80 10*3/mm3    Monocytes, Absolute 0.59 0.20 - 1.20 10*3/mm3    Eosinophils, Absolute 0.10 0.00 - 0.70 10*3/mm3    Basophils, Absolute 0.06 0.00 - 0.20 10*3/mm3    Immature Grans, Absolute 0.00 0.00 - 0.03 10*3/mm3   C-reactive Protein   Result Value Ref Range    C-Reactive Protein 0.13 0.00 - 0.50 mg/dL   Basic Metabolic Panel   Result Value Ref Range    Glucose 97 65 - 99 mg/dL    BUN 13 8 - 23 mg/dL    Creatinine 0.85 0.57 - 1.00 mg/dL    Sodium 144 136 - 145 mmol/L    Potassium 3.3 (L) 3.5 - 5.2 mmol/L    Chloride 102 98 - 107 mmol/L    CO2 23.8 22.0 - 29.0 mmol/L    Calcium 10.2 8.6 - 10.5 mg/dL    eGFR Non African Amer 65 >60 mL/min/1.73    BUN/Creatinine Ratio 15.3 7.0 - 25.0    Anion Gap 18.2 mmol/L   CBC Auto Differential   Result Value Ref Range    WBC 6.89 4.50 - 10.70 10*3/mm3    RBC 5.36 (H) 3.90 - 5.20 10*6/mm3    Hemoglobin 15.9 (H) 11.9 - 15.5 g/dL    Hematocrit 48.4 (H) 35.6 - 45.5 %    MCV 90.3 80.5 - 98.2 fL    MCH 29.7 26.9 - 32.0 pg    MCHC 32.9 32.4 - 36.3 g/dL    RDW 14.2 (H) 11.7 - 13.0 %    RDW-SD 46.7 37.0 - 54.0 fl    MPV 11.1 6.0 - 12.0 fL    Platelets 226 140 - 500 10*3/mm3    Neutrophil % 51.0 42.7 - 76.0 %    Lymphocyte % 36.9 19.6 - 45.3 %    Monocyte % 10.4 5.0 - 12.0 %    Eosinophil % 1.3 0.3 - 6.2 %    Basophil % 0.4 0.0 - 1.5 %    Immature Grans % 0.0 0.0 - 0.5 %    Neutrophils, Absolute 3.51 1.90 - 8.10 10*3/mm3    Lymphocytes, Absolute 2.54 0.90 - 4.80 10*3/mm3    Monocytes, Absolute 0.72 0.20 - 1.20 10*3/mm3    Eosinophils, Absolute 0.09 0.00 - 0.70 10*3/mm3    Basophils, Absolute 0.03 0.00 - 0.20 10*3/mm3    Immature Grans, Absolute 0.00 0.00 - 0.03 10*3/mm3     Imaging Results (last 72 hours)     Procedure Component Value Units Date/Time    US Pelvis  Limited [[id number]] Updated:  04/06/17 1401    US Non-ob Transvaginal [[id number]] Updated:  04/06/17 1401    XR Knee 1 or 2 View Bilateral [[id number]] Collected:  04/06/17 1541     Updated:  04/06/17 1556    Narrative:       RIGHT AND LEFT KNEE X-RAYS     HISTORY: Knee pain and bruising.     2 images of each knee are provided. These are correlated with right knee  postoperative x-ray 04/09/2012.     FINDINGS: There is right total knee arthroplasty hardware. No joint  effusion is present. The bones appear normal. No hardware loosening is  evident.     2 images of the left knee are provided. There is no joint effusion.  Total knee arthroplasty hardware is positioned as expected with no  evidence of hardware loosening. The subcutaneous fat over the anterior  aspect of the left knee appears thicker than that on the right, and  there is some subcutaneous edema anteriorly on the left. No soft tissue  gas is present.       Impression:       There is bilateral total knee arthroplasty hardware. No  joint effusion or other evidence of hardware failure is present. The  subcutaneous fat over the anterior aspect of the left knee is thicker  than that on the right, of uncertain etiology.     This report was finalized on 4/6/2017 3:53 PM by Dr. Rocael Anne MD.                 CloNIDine 0.2 mg Oral BID   docusate sodium 100 mg Oral BID   enoxaparin 30 mg Subcutaneous Daily   eplerenone 25 mg Oral Q24H   ethacrynic acid 25 mg Oral BID   montelukast 10 mg Oral Nightly   NIFEdipine XL 60 mg Oral BID   polyethylene glycol 17 g Oral Daily   potassium chloride 30 mEq Oral Once       Pharmacy Consult        Assessment/Plan     Active Problems:    Malaise and fatigue progressive    Arthralgia    Accelerated hypertension    Acute joint pain    Easy bruisability    A/P:  - Severe hypertension with mild hyperkalemia, long-standing with multiple medication intolerances.  At this time we'll continue with low-sodium diet.  We'll check  aldosterone to renin activity.  I'll start her on eplerenone 25 mg by mouth daily.  I agree with clonidine bid. Target systolic blood pressure for her probably around 160 for the short-term.  She did not tolerate Aldactone in the past though she couldn't remember what side effects she had.  Her volume status seems to be adequate.  Continue Edecrin.  We'll stop IV fluids.  - Hyperkalemia, will replace and recheck in a.m.  We'll check magnesium as well.  - Generalized weakness, leg pain, etiology unknown to me.  - Urinary retention, patient does straight catheter twice a day at home but lately she has been busy with her 's sickness and has not been doing that.  We'll submit a urinalysis and culture if needed.  Patient did have chills but no recorded fever.  - Possible diastolic heart failure, per cardiology.  - Anxiety  The above was discussed with Mrs. Barrett in details.    Thank you very much for consultation.  Will follow.    Soco Holloway MD  04/06/17  4:26 PM

## 2017-04-06 NOTE — PROGRESS NOTES
Discharge Planning Assessment  [facility]     Patient Name: Sasha Barrett  MRN: [id number]  Today's Date: 4/6/2017    Admit Date: 4/6/2017          Discharge Needs Assessment       04/06/17 1632    Living Environment    Lives With spouse    Living Arrangements house    Provides Primary Care For spouse    Able to Return to Prior Living Arrangements other (see comments)    Discharge Needs Assessment    Concerns To Be Addressed discharge planning concerns;decision making concerns    Anticipated Changes Related to Illness none    Equipment Currently Used at Home none            Discharge Plan       04/06/17 1633    Case Management/Social Work Plan    Plan Home with     Patient/Family In Agreement With Plan unable to assess    Additional Comments Spoke to patient at bedside  Explained CCP role and verified information.  Pt is independent with ADL's.  She lives with her  in a single story house.  She states she cannot go to a [facility] as her  has COPD and is very sick and she cannot leave him.  She has had  Home Health in the past. She does not recall the name of agency but would like to have them again.  She will follow up with family for name of agency.   She has no history of  a rehab stay.  Plan is home with  at this time . CCP following        Discharge Placement     No information found                Demographic Summary     None            Functional Status       04/06/17 1631    Functional Status Current    Ambulation 2-->assistive person    Transferring 2-->assistive person    Toileting 2-->assistive person    Bathing 2-->assistive person    Dressing 0-->independent    Eating 0-->independent    Communication 0-->understands/communicates without difficulty    Swallowing (if score 2 or more for any item, consult Rehab Services) 0-->swallows foods/liquids without difficulty    Change in Functional Status Since Onset of Current Illness/Injury no    Functional Status Prior    Ambulation 0-->independent     Transferring 0-->independent    Toileting 0-->independent    Bathing 0-->independent    Dressing 0-->independent            Psychosocial     None            Abuse/Neglect     None            Legal     None            Substance Abuse     None            Patient Forms     None          Geena Cervantes, RN

## 2017-04-06 NOTE — PROGRESS NOTES
Pharmacy to review literature for hydralazine  leg cramps, joint pain, and increased fatigue    Review of drugs causing arthralgia’s in [employer].. Found several hundred medication, but none for  Hydralazine was not listed  Hydralazine can cause lupus which can manifest as arthritis-like pain.  Rheumatology consulted.. they would have more expertise in this area.          I don’t see where she has taken hydralazine for some time.            I also spoke with patient about her meds.  She says she can only take the ‘brands’  That she is on.  A pharmacist years ago in [place] told her that the other brands may have  Sulfur fillers and could harm her.   Sulfur is not in fillers,  lactose is.   We can however,  Let her use her own brands to accommodate her concerns.  Marco Mensah, Pharm.D., [facility], [facility]PS

## 2017-04-06 NOTE — CONSULTS
Not  Orthopedic Consult      Patient: Sasha Barrett    Date of Admission: 4/6/2017  7:21 AM    YOB: 1940    Medical Record Number: [id number]    Attending Physician: Óscar Cadet MD    Consulting Physician: Óscar Cadet MD      Chief Complaints: Hurts all over      History of Present Illness: 77 y.o. female admitted to [facility] to services of Óscar Cadet MD with she complains of multiple joint aches which is been going on about 8 months, since she started taking hydralazine by her account.  Dr. Childress performed total knee replacements in 94 and 2004 and she's had no problems from those.  He last saw him about 18 months ago.  No fever or chills some discomfort when walking just general joint aches and the bit of weight loss over time.  No specific trauma.  I was asked to see her by Dr. Cadet.      Allergies:   Allergies   Allergen Reactions   • Morphine Anaphylaxis   • Sulfa Antibiotics Anaphylaxis and Hives     Or sulfa fillers in meds   • Hydralazine Myalgia     Patient reports leg cramps, joint pain and increased fatigue   • Amlodipine      KIDNEYS SHUT DOWN   • Anastrozole    • Grass    • Morphine And Related    • Nifedipine      UNCONTROLLED BP   • Nsaids      KIDNEY FAILURE   • Norvasc  [Amlodipine Besylate]    • Penicillins    • Pneumococcal Vaccines Nausea Only   • Tree Extract    • Zolpidem      HALLUCINATIONS       Medications:   Home Medications:  No current facility-administered medications on file prior to encounter.      Current Outpatient Prescriptions on File Prior to Encounter   Medication Sig   • albuterol (PROAIR HFA) 108 (90 BASE) MCG/ACT inhaler ProAir  (90 Base) MCG/ACT Inhalation Aerosol Solution; Patient Sig: ProAir  (90 Base) MCG/ACT Inhalation Aerosol Solution ; 8; 0; 18-Jun-2014; Active   • aspirin 81 MG tablet Take 162 mg by mouth Daily.   • budesonide (PULMICORT FLEXHALER) 180 MCG/ACT inhaler Pulmicort Flexhaler 180 MCG/ACT Inhalation  Aerosol Powder Breath Activated; Patient Sig: Pulmicort Flexhaler 180 MCG/ACT Inhalation Aerosol Powder Breath Activated ; 0; 26-Jun-2014; Active   • butalbital-acetaminophen-caffeine (FIORICET, ESGIC) -40 MG per tablet Take 2 tablets by mouth Every 4 (Four) Hours As Needed for Headache. (Patient taking differently: Take 1 tablet by mouth Every 4 (Four) Hours As Needed for Headache.)   • Cholecalciferol (VITAMIN D) 1000 UNITS tablet Take  by mouth.   • CloNIDine (CATAPRES) 0.1 MG tablet Take 1 tablet by mouth 2 (Two) Times a Day. Schedule bid 1 and then [id number] take extra #1 if systolic BP > 160 and take 2 if systolic Bp>180 (Patient taking differently: Take 0.2 mg by mouth 2 (Two) Times a Day. Schedule bid 1 and then [id number] take extra #1 if systolic BP > 160 and take 2 if systolic Bp>180)   • diphenhydrAMINE (BENADRYL) 50 MG tablet Take 1 tablet by mouth every 4 (four) hours as needed for itching or allergies.   • doxycycline (DORYX) 100 MG enteric coated tablet Take 1 tablet by mouth 2 (Two) Times a Day.   • doxycycline (DORYX) 100 MG enteric coated tablet Take 1 tablet by mouth 2 (Two) Times a Day.   • doxycycline (DORYX) 100 MG enteric coated tablet TAKE ONE TABLET BY MOUTH TWICE DAILY   • HYDROcodone-acetaminophen (NORCO) 5-325 MG per tablet Take 1 tablet by mouth Every 6 (Six) Hours As Needed for moderate pain (4-6) or severe pain (7-10).   • hydrOXYzine (VISTARIL) 50 MG capsule Take 1 capsule by mouth every 6 (six) hours as needed for itching.   • levoFLOXacin (LEVAQUIN) 500 MG tablet Take 500 mg by mouth Daily.   • LORazepam (ATIVAN) 0.5 MG tablet Take 1 tablet by mouth Every 8 (Eight) Hours As Needed for anxiety. Take 1 tab 1 hoour before MRI and repeat x 1 at time of test   • meclizine (ANTIVERT) 25 MG tablet TAKE ONE TABLET BY MOUTH THREE TIMES DAILY AS NEEDED FOR DIZZINESS   • MethylPREDNISolone (MEDROL, CRISTIAN,) 4 MG tablet Take as directed on package instructions.   • montelukast (SINGULAIR) 10 MG  tablet Take 1 tablet by mouth every night.   • NIFEdipine XL (PROCARDIA XL) 60 MG 24 hr tablet TAKE TWO TABLETS BY MOUTH DAILY (Patient taking differently: one oral bid)   • NIFEdipine XL (PROCARDIA XL) 60 MG 24 hr tablet Take 1 tablet by mouth Daily.   • NIFEdipine XL (PROCARDIA XL) 60 MG 24 hr tablet TAKE TWO TABLETS BY MOUTH DAILY   • omeprazole (priLOSEC) 40 MG capsule TAKE ONE CAPSULE BY MOUTH EVERY DAY   • ondansetron ODT (ZOFRAN-ODT) 8 MG disintegrating tablet Take 1 tablet by mouth every 6 (six) hours as needed for nausea or vomiting.   • pentazocine-naloxone (TALWIN NX) 50-0.5 MG per tablet Take 2 tablets by mouth Every 4 (Four) Hours As Needed for Mild Pain (1-3) (for up to 90 days). (Patient taking differently: Take 1 tablet by mouth Every 4 (Four) Hours As Needed for Mild Pain (1-3) (for up to 90 days).)   • polyethylene glycol (MIRALAX) powder Take one capful w/liquid daily   • predniSONE (DELTASONE) 10 MG tablet TAKE ONE TABLET BY MOUTH DAILY AS DIRECTED   • promethazine-codeine (PHENERGAN with CODEINE) 6.25-10 MG/5ML syrup Take 5 mL by mouth 4 (Four) Times a Day As Needed for cough.   • tiZANidine (ZANAFLEX) 4 MG tablet Take 1 tablet by mouth at night as needed for muscle spasms. (Patient taking differently: Take 2 mg by mouth 2 (Two) Times a Day.)   • tiZANidine (ZANAFLEX) 4 MG tablet TAKE ONE TABLET BY MOUTH AT BEDTIME AS NEEDED FOR MUSCLE SPASMS   • tobramycin-dexamethasone (TOBRADEX) 0.3-0.1 % ophthalmic suspension Administer 1 drop to both eyes every 4 (four) hours while awake.     Current Medications:  Scheduled Meds:  CloNIDine 0.2 mg Oral BID   docusate sodium 100 mg Oral BID   enoxaparin 30 mg Subcutaneous Daily   eplerenone 25 mg Oral Q24H   ethacrynic acid 25 mg Oral BID   montelukast 10 mg Oral Nightly   NIFEdipine XL 60 mg Oral BID   polyethylene glycol 17 g Oral Daily   potassium chloride 30 mEq Oral Once     Continuous Infusions:  Pharmacy Consult      PRN Meds:.•  acetaminophen  •   albuterol  •  enalaprilat  •  HYDROcodone-acetaminophen  •  HYDROcodone-acetaminophen  •  hydrOXYzine  •  ipratropium  •  ondansetron ODT  •  pentazocine-naloxone  •  Pharmacy Consult  •  promethazine  •  promethazine-codeine  •  sodium chloride  •  Insert peripheral IV **AND** sodium chloride    Past Medical History:   Diagnosis Date   • Asthma    • Cancer    • COPD (chronic obstructive pulmonary disease)    • Emphysema lung    • Generalized headaches    • Hayfever    • Hypertension      Past Surgical History:   Procedure Laterality Date   • BREAST LUMPECTOMY Left 04/01/2003   • CARPAL TUNNEL RELEASE  2011   • CATARACT EXTRACTION  2010   • HERNIA REPAIR  01/01/1971   • KNEE SURGERY Left 01/01/1998   • PARATHYROIDECTOMY  05/08/2006   • SINUS SURGERY  01/01/1984   • TOTAL ABDOMINAL HYSTERECTOMY  01/01/1973     Social History     Occupational History   • Not on file.     Social History Main Topics   • Smoking status: Never Smoker   • Smokeless tobacco: Never Used   • Alcohol use Yes      Comment: rarely   • Drug use: Defer   • Sexual activity: Defer    Social History     Social History Narrative     Family History   Problem Relation Age of Onset   • Cancer Brother          Review of Systems:     Constitutional:  Denies fever, shaking or chills   Eyes:  Denies change in visual acuity   HEENT:  Denies nasal congestion or sore throat   Respiratory:  Denies cough or shortness of breath   Cardiovascular:  Denies chest pain or edema  Endocrine: Denies tremors, palpitations, intolerance of heat or cold, polyuria, polydipsia.  GI:  Denies abdominal pain, nausea, vomiting, bloody stools or diarrhea  :  Denies frequency, urgency, incontinence, retention, or nocturia.  Musculoskeletal:  Denies numbness tingling or loss of motor function except as above  Integument:  Denies rash, lesion or ulceration   Neurologic:  Denies headache or focal weakness, deficits  Heme:  Denies epistaxis, spontaneous or excessive bleeding,  epistaxis, hematuria, melena, fatigue, enlarged or tender lymph nodes.      All other pertinent positives and negatives as noted above in HPI.    Physical Exam: 77 y.o. female    General:  Awake, alert. No acute distress.      Head/Neck:  Normocephalic, atraumatic.  Conjunctiva and sclera clear.  Hearing adequate for the exam.  Neck is supple with normal ROM.    Psych:  Affect and demeanor appropriate.    CV:  Regular rate and rhythm.  Hemodynamically stable.    Lungs:  Good chest expansion, breathing unlabored.    Abdomen:  Soft.  Non-tender, non-distended.    Extremities:       She has bilateral midline knee incisions which are well-healed.  Minimal if any effusion in the knee.  On stable range of motion.  There is slight ecchymosis along the medial aspect of the left knee but it's not tender looks like it's a few days old.:  Skin appears benign without obvious lacerations, ulcerations or lesions.  NO gross deformity of malalignment noted.  Compartments soft without evidence for DVT or compartment syndrome.  No atrophy.  NO palpable masses or adenopathy..   No obvious instability although exam is limited due to discomfort.  Strength well-preserved distally.  Sensation to light touch grossly intact distally.  Good skin turgor, brisk cap refill and good pulses distally.    All other extremities atraumatic without gross abnormality.       Diagnostic Tests:    Admission on 04/06/2017   Component Date Value Ref Range Status   • Glucose 04/06/2017 105* 65 - 99 mg/dL Final   • BUN 04/06/2017 16  8 - 23 mg/dL Final   • Creatinine 04/06/2017 0.89  0.57 - 1.00 mg/dL Final   • Sodium 04/06/2017 145  136 - 145 mmol/L Final   • Potassium 04/06/2017 3.6  3.5 - 5.2 mmol/L Final   • Chloride 04/06/2017 105  98 - 107 mmol/L Final   • CO2 04/06/2017 27.0  22.0 - 29.0 mmol/L Final   • Calcium 04/06/2017 9.5  8.6 - 10.5 mg/dL Final   • eGFR Non African Amer 04/06/2017 61  >60 mL/min/1.73 Final   • BUN/Creatinine Ratio 04/06/2017 18.0   7.0 - 25.0 Final   • Anion Gap 04/06/2017 13.0  mmol/L Final   • Sed Rate 04/06/2017 5  0 - 30 mm/hr Final   • WBC 04/06/2017 5.52  4.50 - 10.70 10*3/mm3 Final   • RBC 04/06/2017 4.65  3.90 - 5.20 10*6/mm3 Final   • Hemoglobin 04/06/2017 13.9  11.9 - 15.5 g/dL Final   • Hematocrit 04/06/2017 42.2  35.6 - 45.5 % Final   • MCV 04/06/2017 90.8  80.5 - 98.2 fL Final   • MCH 04/06/2017 29.9  26.9 - 32.0 pg Final   • MCHC 04/06/2017 32.9  32.4 - 36.3 g/dL Final   • RDW 04/06/2017 14.1* 11.7 - 13.0 % Final   • RDW-SD 04/06/2017 46.6  37.0 - 54.0 fl Final   • MPV 04/06/2017 10.8  6.0 - 12.0 fL Final   • Platelets 04/06/2017 187  140 - 500 10*3/mm3 Final   • Neutrophil % 04/06/2017 53.6  42.7 - 76.0 % Final   • Lymphocyte % 04/06/2017 32.8  19.6 - 45.3 % Final   • Monocyte % 04/06/2017 10.7  5.0 - 12.0 % Final   • Eosinophil % 04/06/2017 1.8  0.3 - 6.2 % Final   • Basophil % 04/06/2017 1.1  0.0 - 1.5 % Final   • Immature Grans % 04/06/2017 0.0  0.0 - 0.5 % Final   • Neutrophils, Absolute 04/06/2017 2.96  1.90 - 8.10 10*3/mm3 Final   • Lymphocytes, Absolute 04/06/2017 1.81  0.90 - 4.80 10*3/mm3 Final   • Monocytes, Absolute 04/06/2017 0.59  0.20 - 1.20 10*3/mm3 Final   • Eosinophils, Absolute 04/06/2017 0.10  0.00 - 0.70 10*3/mm3 Final   • Basophils, Absolute 04/06/2017 0.06  0.00 - 0.20 10*3/mm3 Final   • Immature Grans, Absolute 04/06/2017 0.00  0.00 - 0.03 10*3/mm3 Final   • C-Reactive Protein 04/06/2017 0.13  0.00 - 0.50 mg/dL Final   • ADENOVIRUS, PCR 04/06/2017 Not Detected  Not Detected Final   • Coronavirus 229E 04/06/2017 Not Detected  Not Detected Final   • Coronavirus HKU1 04/06/2017 Not Detected  Not Detected Final   • Coronavirus NL63 04/06/2017 Not Detected  Not Detected Final   • Coronavirus OC43 04/06/2017 Not Detected  Not Detected Final   • Human Metapneumovirus 04/06/2017 Not Detected  Not Detected Final   • Human Rhinovirus/Enterovirus 04/06/2017 Not Detected  Not Detected Final   • Influenza B PCR  04/06/2017 Not Detected  Not Detected Final   • Parainfluenza Virus 1 04/06/2017 Not Detected  Not Detected Final   • Parainfluenza Virus 2 04/06/2017 Not Detected  Not Detected Final   • Parainfluenza Virus 3 04/06/2017 Not Detected  Not Detected Final   • Parainfluenza Virus 4 04/06/2017 Not Detected  Not Detected Final   • Bordetella pertussis pcr 04/06/2017 Not Detected  Not Detected Final   • Influenza 2009 H1N1 by PCR 04/06/2017 Not Detected  Not Detected Final   • Chlamydophila pneumoniae PCR 04/06/2017 Not Detected  Not Detected Final   • Mycoplasma pneumo by PCR 04/06/2017 Not Detected  Not Detected Final   • Influenza A PCR 04/06/2017 Not Detected  Not Detected Final   • Influenza A H3 04/06/2017 Not Detected  Not Detected Final   • Influenza A H1 04/06/2017 Not Detected  Not Detected Final   • RSV, PCR 04/06/2017 Not Detected  Not Detected Final   • Glucose 04/06/2017 97  65 - 99 mg/dL Final   • BUN 04/06/2017 13  8 - 23 mg/dL Final   • Creatinine 04/06/2017 0.85  0.57 - 1.00 mg/dL Final   • Sodium 04/06/2017 144  136 - 145 mmol/L Final   • Potassium 04/06/2017 3.3* 3.5 - 5.2 mmol/L Final   • Chloride 04/06/2017 102  98 - 107 mmol/L Final   • CO2 04/06/2017 23.8  22.0 - 29.0 mmol/L Final   • Calcium 04/06/2017 10.2  8.6 - 10.5 mg/dL Final   • eGFR Non African Amer 04/06/2017 65  >60 mL/min/1.73 Final   • BUN/Creatinine Ratio 04/06/2017 15.3  7.0 - 25.0 Final   • Anion Gap 04/06/2017 18.2  mmol/L Final   • WBC 04/06/2017 6.89  4.50 - 10.70 10*3/mm3 Final   • RBC 04/06/2017 5.36* 3.90 - 5.20 10*6/mm3 Final   • Hemoglobin 04/06/2017 15.9* 11.9 - 15.5 g/dL Final   • Hematocrit 04/06/2017 48.4* 35.6 - 45.5 % Final   • MCV 04/06/2017 90.3  80.5 - 98.2 fL Final   • MCH 04/06/2017 29.7  26.9 - 32.0 pg Final   • MCHC 04/06/2017 32.9  32.4 - 36.3 g/dL Final   • RDW 04/06/2017 14.2* 11.7 - 13.0 % Final   • RDW-SD 04/06/2017 46.7  37.0 - 54.0 fl Final   • MPV 04/06/2017 11.1  6.0 - 12.0 fL Final   • Platelets  04/06/2017 226  140 - 500 10*3/mm3 Final   • Neutrophil % 04/06/2017 51.0  42.7 - 76.0 % Final   • Lymphocyte % 04/06/2017 36.9  19.6 - 45.3 % Final   • Monocyte % 04/06/2017 10.4  5.0 - 12.0 % Final   • Eosinophil % 04/06/2017 1.3  0.3 - 6.2 % Final   • Basophil % 04/06/2017 0.4  0.0 - 1.5 % Final   • Immature Grans % 04/06/2017 0.0  0.0 - 0.5 % Final   • Neutrophils, Absolute 04/06/2017 3.51  1.90 - 8.10 10*3/mm3 Final   • Lymphocytes, Absolute 04/06/2017 2.54  0.90 - 4.80 10*3/mm3 Final   • Monocytes, Absolute 04/06/2017 0.72  0.20 - 1.20 10*3/mm3 Final   • Eosinophils, Absolute 04/06/2017 0.09  0.00 - 0.70 10*3/mm3 Final   • Basophils, Absolute 04/06/2017 0.03  0.00 - 0.20 10*3/mm3 Final   • Immature Grans, Absolute 04/06/2017 0.00  0.00 - 0.03 10*3/mm3 Final     Lab Results (last 24 hours)     Procedure Component Value Units Date/Time    CBC & Differential [[id number]] Collected:  04/06/17 0827    Specimen:  Blood Updated:  04/06/17 0837    Narrative:       The following orders were created for panel order CBC & Differential.  Procedure                               Abnormality         Status                     ---------                               -----------         ------                     CBC Auto Differential[[id number]]         Abnormal            Final result                 Please view results for these tests on the individual orders.    CBC Auto Differential [[id number]]  (Abnormal) Collected:  04/06/17 0827    Specimen:  Blood Updated:  04/06/17 0837     WBC 5.52 10*3/mm3      RBC 4.65 10*6/mm3      Hemoglobin 13.9 g/dL      Hematocrit 42.2 %      MCV 90.8 fL      MCH 29.9 pg      MCHC 32.9 g/dL      RDW 14.1 (H) %      RDW-SD 46.6 fl      MPV 10.8 fL      Platelets 187 10*3/mm3      Neutrophil % 53.6 %      Lymphocyte % 32.8 %      Monocyte % 10.7 %      Eosinophil % 1.8 %      Basophil % 1.1 %      Immature Grans % 0.0 %      Neutrophils, Absolute 2.96 10*3/mm3      Lymphocytes, Absolute 1.81  10*3/mm3      Monocytes, Absolute 0.59 10*3/mm3      Eosinophils, Absolute 0.10 10*3/mm3      Basophils, Absolute 0.06 10*3/mm3      Immature Grans, Absolute 0.00 10*3/mm3     Basic Metabolic Panel [[id number]]  (Abnormal) Collected:  04/06/17 0827    Specimen:  Blood Updated:  04/06/17 0855     Glucose 105 (H) mg/dL      BUN 16 mg/dL      Creatinine 0.89 mg/dL      Sodium 145 mmol/L      Potassium 3.6 mmol/L      Chloride 105 mmol/L      CO2 27.0 mmol/L      Calcium 9.5 mg/dL      eGFR Non African Amer 61 mL/min/1.73      BUN/Creatinine Ratio 18.0     Anion Gap 13.0 mmol/L     Narrative:       The MDRD GFR formula is only valid for adults with stable renal function between ages 18 and 70.    Sedimentation Rate [[id number]]  (Normal) Collected:  04/06/17 0827    Specimen:  Blood Updated:  04/06/17 0904     Sed Rate 5 mm/hr     Rheumatoid Arthritis Expanded Panel [[id number]] Collected:  04/06/17 1130    Specimen:  Blood Updated:  04/06/17 1146    C-reactive Protein [[id number]]  (Normal) Collected:  04/06/17 1130    Specimen:  Blood Updated:  04/06/17 1222     C-Reactive Protein 0.13 mg/dL     Basic Metabolic Panel [[id number]]  (Abnormal) Collected:  04/06/17 1130    Specimen:  Blood Updated:  04/06/17 1222     Glucose 97 mg/dL      BUN 13 mg/dL      Creatinine 0.85 mg/dL      Sodium 144 mmol/L      Potassium 3.3 (L) mmol/L      Chloride 102 mmol/L      CO2 23.8 mmol/L      Calcium 10.2 mg/dL      eGFR Non African Amer 65 mL/min/1.73      BUN/Creatinine Ratio 15.3     Anion Gap 18.2 mmol/L     Narrative:       The MDRD GFR formula is only valid for adults with stable renal function between ages 18 and 70.    CBC & Differential [[id number]] Collected:  04/06/17 1213    Specimen:  Blood Updated:  04/06/17 1240    Narrative:       The following orders were created for panel order CBC & Differential.  Procedure                               Abnormality         Status                     ---------                                -----------         ------                     CBC Auto Differential[[id number]]         Abnormal            Final result                 Please view results for these tests on the individual orders.    CBC Auto Differential [[id number]]  (Abnormal) Collected:  04/06/17 1213    Specimen:  Blood Updated:  04/06/17 1240     WBC 6.89 10*3/mm3      RBC 5.36 (H) 10*6/mm3      Hemoglobin 15.9 (H) g/dL      Hematocrit 48.4 (H) %      MCV 90.3 fL      MCH 29.7 pg      MCHC 32.9 g/dL      RDW 14.2 (H) %      RDW-SD 46.7 fl      MPV 11.1 fL      Platelets 226 10*3/mm3      Neutrophil % 51.0 %      Lymphocyte % 36.9 %      Monocyte % 10.4 %      Eosinophil % 1.3 %      Basophil % 0.4 %      Immature Grans % 0.0 %      Neutrophils, Absolute 3.51 10*3/mm3      Lymphocytes, Absolute 2.54 10*3/mm3      Monocytes, Absolute 0.72 10*3/mm3      Eosinophils, Absolute 0.09 10*3/mm3      Basophils, Absolute 0.03 10*3/mm3      Immature Grans, Absolute 0.00 10*3/mm3     Respiratory Panel, PCR [[id number]]  (Normal) Collected:  04/06/17 1119    Specimen:  Swab from Nares Updated:  04/06/17 1409     ADENOVIRUS, PCR Not Detected     Coronavirus 229E Not Detected     Coronavirus HKU1 Not Detected     Coronavirus NL63 Not Detected     Coronavirus OC43 Not Detected     Human Metapneumovirus Not Detected     Human Rhinovirus/Enterovirus Not Detected     Influenza B PCR Not Detected     Parainfluenza Virus 1 Not Detected     Parainfluenza Virus 2 Not Detected     Parainfluenza Virus 3 Not Detected     Parainfluenza Virus 4 Not Detected     Bordetella pertussis pcr Not Detected     Influenza 2009 H1N1 by PCR Not Detected     Chlamydophila pneumoniae PCR Not Detected     Mycoplasma pneumo by PCR Not Detected     Influenza A PCR Not Detected     Influenza A H3 Not Detected     Influenza A H1 Not Detected     RSV, PCR Not Detected    BNP [[id number]] Collected:  04/06/17 1130    Specimen:  Blood Updated:  04/06/17 1631    CK Total & CKMB [[id number]] Collected:   04/06/17 1130    Specimen:  Blood Updated:  04/06/17 1631    Troponin [[id number]] Collected:  04/06/17 1130    Specimen:  Blood Updated:  04/06/17 1631    T3, Free [[id number]] Collected:  04/06/17 1130    Specimen:  Blood Updated:  04/06/17 1631    T4, Free [[id number]] Collected:  04/06/17 1130    Specimen:  Blood Updated:  04/06/17 1631          Imaging: X-rays of the knees show bilateral total knee replacements which appear well placed and show minimal signs of wear in the polyethylene components.  No evidence of loosening.  Radiology report is pending    Assessment: Multiple arthralgias, the knees appear no worse than the other joints.    Plan:  I agree with rheumatological consultation.  Workup should include the stability of infection, but that seems unlikely.  Perhaps it is medication related.  I've asked her to follow-up with Dr. Childress in the near future for knee evaluation please call us if the her condition changes during this hospitalization.    Date: 4/6/2017    Nicolas Fam MD    CC: Óscar Cadet MD

## 2017-04-07 ENCOUNTER — APPOINTMENT (OUTPATIENT)
Dept: MRI IMAGING | Facility: HOSPITAL | Age: 77
End: 2017-04-07

## 2017-04-07 LAB
ANION GAP SERPL CALCULATED.3IONS-SCNC: 14 MMOL/L
BASOPHILS # BLD AUTO: 0.04 10*3/MM3 (ref 0–0.2)
BASOPHILS NFR BLD AUTO: 0.6 % (ref 0–1.5)
BUN BLD-MCNC: 16 MG/DL (ref 8–23)
BUN/CREAT SERPL: 15.8 (ref 7–25)
CALCIUM SPEC-SCNC: 9.4 MG/DL (ref 8.6–10.5)
CHLORIDE SERPL-SCNC: 104 MMOL/L (ref 98–107)
CK SERPL-CCNC: 38 U/L (ref 20–180)
CO2 SERPL-SCNC: 27 MMOL/L (ref 22–29)
CREAT BLD-MCNC: 1.01 MG/DL (ref 0.57–1)
DEPRECATED RDW RBC AUTO: 47.8 FL (ref 37–54)
EOSINOPHIL # BLD AUTO: 0.15 10*3/MM3 (ref 0–0.7)
EOSINOPHIL NFR BLD AUTO: 2.4 % (ref 0.3–6.2)
ERYTHROCYTE [DISTWIDTH] IN BLOOD BY AUTOMATED COUNT: 14.3 % (ref 11.7–13)
GFR SERPL CREATININE-BSD FRML MDRD: 53 ML/MIN/1.73
GLUCOSE BLD-MCNC: 106 MG/DL (ref 65–99)
HCT VFR BLD AUTO: 43 % (ref 35.6–45.5)
HGB BLD-MCNC: 13.8 G/DL (ref 11.9–15.5)
IMM GRANULOCYTES # BLD: 0 10*3/MM3 (ref 0–0.03)
IMM GRANULOCYTES NFR BLD: 0 % (ref 0–0.5)
LYMPHOCYTES # BLD AUTO: 2.35 10*3/MM3 (ref 0.9–4.8)
LYMPHOCYTES NFR BLD AUTO: 37.7 % (ref 19.6–45.3)
MAGNESIUM SERPL-MCNC: 2.3 MG/DL (ref 1.6–2.4)
MCH RBC QN AUTO: 29.7 PG (ref 26.9–32)
MCHC RBC AUTO-ENTMCNC: 32.1 G/DL (ref 32.4–36.3)
MCV RBC AUTO: 92.5 FL (ref 80.5–98.2)
MONOCYTES # BLD AUTO: 0.73 10*3/MM3 (ref 0.2–1.2)
MONOCYTES NFR BLD AUTO: 11.7 % (ref 5–12)
NEUTROPHILS # BLD AUTO: 2.97 10*3/MM3 (ref 1.9–8.1)
NEUTROPHILS NFR BLD AUTO: 47.6 % (ref 42.7–76)
PHOSPHATE SERPL-MCNC: 3.6 MG/DL (ref 2.5–4.5)
PLATELET # BLD AUTO: 179 10*3/MM3 (ref 140–500)
PMV BLD AUTO: 11.4 FL (ref 6–12)
POTASSIUM BLD-SCNC: 3.8 MMOL/L (ref 3.5–5.2)
PTH-INTACT SERPL-MCNC: 89.1 PG/ML (ref 15–65)
RBC # BLD AUTO: 4.65 10*6/MM3 (ref 3.9–5.2)
SODIUM BLD-SCNC: 145 MMOL/L (ref 136–145)
URATE SERPL-MCNC: 5.3 MG/DL (ref 2.4–5.7)
VIT B12 BLD-MCNC: 263 PG/ML (ref 211–946)
WBC NRBC COR # BLD: 6.24 10*3/MM3 (ref 4.5–10.7)

## 2017-04-07 PROCEDURE — 94799 UNLISTED PULMONARY SVC/PX: CPT

## 2017-04-07 PROCEDURE — 85025 COMPLETE CBC W/AUTO DIFF WBC: CPT | Performed by: INTERNAL MEDICINE

## 2017-04-07 PROCEDURE — 80048 BASIC METABOLIC PNL TOTAL CA: CPT | Performed by: INTERNAL MEDICINE

## 2017-04-07 PROCEDURE — 84100 ASSAY OF PHOSPHORUS: CPT | Performed by: INTERNAL MEDICINE

## 2017-04-07 PROCEDURE — 84550 ASSAY OF BLOOD/URIC ACID: CPT | Performed by: INTERNAL MEDICINE

## 2017-04-07 PROCEDURE — 96365 THER/PROPH/DIAG IV INF INIT: CPT

## 2017-04-07 PROCEDURE — G0378 HOSPITAL OBSERVATION PER HR: HCPCS

## 2017-04-07 PROCEDURE — 70553 MRI BRAIN STEM W/O & W/DYE: CPT

## 2017-04-07 PROCEDURE — A9577 INJ MULTIHANCE: HCPCS | Performed by: INTERNAL MEDICINE

## 2017-04-07 PROCEDURE — 96372 THER/PROPH/DIAG INJ SC/IM: CPT

## 2017-04-07 PROCEDURE — 25010000002 ENOXAPARIN PER 10 MG: Performed by: INTERNAL MEDICINE

## 2017-04-07 PROCEDURE — 25010000003 CEFTRIAXONE PER 250 MG: Performed by: INTERNAL MEDICINE

## 2017-04-07 PROCEDURE — 83970 ASSAY OF PARATHORMONE: CPT | Performed by: INTERNAL MEDICINE

## 2017-04-07 PROCEDURE — 93005 ELECTROCARDIOGRAM TRACING: CPT | Performed by: NURSE PRACTITIONER

## 2017-04-07 PROCEDURE — 99204 OFFICE O/P NEW MOD 45 MIN: CPT | Performed by: PSYCHIATRY & NEUROLOGY

## 2017-04-07 PROCEDURE — 82607 VITAMIN B-12: CPT | Performed by: PSYCHIATRY & NEUROLOGY

## 2017-04-07 PROCEDURE — 0 GADOBENATE DIMEGLUMINE 529 MG/ML SOLUTION: Performed by: INTERNAL MEDICINE

## 2017-04-07 PROCEDURE — 82550 ASSAY OF CK (CPK): CPT | Performed by: INTERNAL MEDICINE

## 2017-04-07 PROCEDURE — 83735 ASSAY OF MAGNESIUM: CPT | Performed by: INTERNAL MEDICINE

## 2017-04-07 PROCEDURE — 99213 OFFICE O/P EST LOW 20 MIN: CPT | Performed by: INTERNAL MEDICINE

## 2017-04-07 PROCEDURE — 82525 ASSAY OF COPPER: CPT | Performed by: PSYCHIATRY & NEUROLOGY

## 2017-04-07 PROCEDURE — 93010 ELECTROCARDIOGRAM REPORT: CPT | Performed by: INTERNAL MEDICINE

## 2017-04-07 PROCEDURE — 99219 PR INITIAL OBSERVATION CARE/DAY 50 MINUTES: CPT | Performed by: INTERNAL MEDICINE

## 2017-04-07 PROCEDURE — 86235 NUCLEAR ANTIGEN ANTIBODY: CPT | Performed by: INTERNAL MEDICINE

## 2017-04-07 RX ORDER — CLONIDINE HYDROCHLORIDE 0.1 MG/1
0.2 TABLET ORAL EVERY 4 HOURS PRN
Status: DISCONTINUED | OUTPATIENT
Start: 2017-04-07 | End: 2017-04-11 | Stop reason: HOSPADM

## 2017-04-07 RX ORDER — CLONIDINE HYDROCHLORIDE 0.1 MG/1
0.2 TABLET ORAL EVERY 12 HOURS SCHEDULED
Status: DISCONTINUED | OUTPATIENT
Start: 2017-04-07 | End: 2017-04-07

## 2017-04-07 RX ORDER — CLONIDINE HYDROCHLORIDE 0.1 MG/1
0.2 TABLET ORAL EVERY 12 HOURS SCHEDULED
Status: DISCONTINUED | OUTPATIENT
Start: 2017-04-07 | End: 2017-04-10

## 2017-04-07 RX ORDER — CLONIDINE HYDROCHLORIDE 0.1 MG/1
0.1 TABLET ORAL EVERY 4 HOURS PRN
Status: DISCONTINUED | OUTPATIENT
Start: 2017-04-07 | End: 2017-04-07 | Stop reason: DRUGHIGH

## 2017-04-07 RX ORDER — CLONIDINE HYDROCHLORIDE 0.1 MG/1
0.1 TABLET ORAL EVERY 4 HOURS PRN
Status: DISCONTINUED | OUTPATIENT
Start: 2017-04-07 | End: 2017-04-11 | Stop reason: HOSPADM

## 2017-04-07 RX ORDER — CLONIDINE HYDROCHLORIDE 0.2 MG/1
0.2 TABLET ORAL EVERY 4 HOURS PRN
Status: DISCONTINUED | OUTPATIENT
Start: 2017-04-07 | End: 2017-04-07 | Stop reason: DRUGHIGH

## 2017-04-07 RX ADMIN — NIFEDIPINE 60 MG: 60 TABLET, EXTENDED RELEASE ORAL at 22:09

## 2017-04-07 RX ADMIN — LORAZEPAM 0.5 MG: 0.5 TABLET ORAL at 18:50

## 2017-04-07 RX ADMIN — BUTALBITAL, ACETAMINOPHEN, AND CAFFEINE 1 TABLET: 50; 325; 40 TABLET ORAL at 14:07

## 2017-04-07 RX ADMIN — CLONIDINE HYDROCHLORIDE 0.2 MG: 0.1 TABLET ORAL at 22:09

## 2017-04-07 RX ADMIN — PANTOPRAZOLE SODIUM 40 MG: 40 TABLET, DELAYED RELEASE ORAL at 06:34

## 2017-04-07 RX ADMIN — BUDESONIDE 0.5 MG: 0.5 INHALANT RESPIRATORY (INHALATION) at 06:49

## 2017-04-07 RX ADMIN — MONTELUKAST 10 MG: 10 TABLET, FILM COATED ORAL at 22:08

## 2017-04-07 RX ADMIN — BUTALBITAL, ACETAMINOPHEN, AND CAFFEINE 2 TABLET: 50; 325; 40 TABLET ORAL at 04:54

## 2017-04-07 RX ADMIN — EPLERENONE 25 MG: 25 TABLET, FILM COATED ORAL at 09:43

## 2017-04-07 RX ADMIN — DOCUSATE SODIUM 100 MG: 100 CAPSULE, LIQUID FILLED ORAL at 18:50

## 2017-04-07 RX ADMIN — CLONIDINE HYDROCHLORIDE 0.2 MG: 0.1 TABLET ORAL at 09:42

## 2017-04-07 RX ADMIN — DOCUSATE SODIUM 100 MG: 100 CAPSULE, LIQUID FILLED ORAL at 09:43

## 2017-04-07 RX ADMIN — GADOBENATE DIMEGLUMINE 15 ML: 529 INJECTION, SOLUTION INTRAVENOUS at 19:58

## 2017-04-07 RX ADMIN — BUTALBITAL, ACETAMINOPHEN, AND CAFFEINE 2 TABLET: 50; 325; 40 TABLET ORAL at 19:15

## 2017-04-07 RX ADMIN — HYDROCODONE BITARTRATE AND ACETAMINOPHEN 1 TABLET: 5; 325 TABLET ORAL at 22:16

## 2017-04-07 RX ADMIN — HYDROCODONE BITARTRATE AND ACETAMINOPHEN 1 TABLET: 5; 325 TABLET ORAL at 14:07

## 2017-04-07 RX ADMIN — TIZANIDINE 2 MG: 2 TABLET ORAL at 18:50

## 2017-04-07 RX ADMIN — HYDROCODONE BITARTRATE AND ACETAMINOPHEN 1 TABLET: 5; 325 TABLET ORAL at 06:34

## 2017-04-07 RX ADMIN — CEFTRIAXONE SODIUM 1 G: 1 INJECTION, SOLUTION INTRAVENOUS at 18:00

## 2017-04-07 RX ADMIN — ETHACRYNIC ACID 25 MG: 25 TABLET ORAL at 22:09

## 2017-04-07 RX ADMIN — POLYETHYLENE GLYCOL 3350 17 G: 1 POWDER ORAL at 09:00

## 2017-04-07 RX ADMIN — TIZANIDINE 2 MG: 2 TABLET ORAL at 09:43

## 2017-04-07 RX ADMIN — NIFEDIPINE 60 MG: 60 TABLET, EXTENDED RELEASE ORAL at 09:42

## 2017-04-07 RX ADMIN — ENOXAPARIN SODIUM 30 MG: 30 INJECTION SUBCUTANEOUS at 09:42

## 2017-04-07 RX ADMIN — ETHACRYNIC ACID 25 MG: 25 TABLET ORAL at 09:42

## 2017-04-07 NOTE — H&P
[facility]  FAMILY AND INTERNAL MEDICINE  AUDREY ZAMAN, and COLE      INTERNAL MEDICINE DAILY PROGRESS NOTE  Óscar Cadet M.D.  2017            Patient Identification:  Name: Sasha Barrett  Age: 77 y.o.  Sex: female  :  1940  MRN: [id number]         Primary Care Physician: Óscar Cadet MD  LENGTH OF STAY 0 DAYS    Consults     Date and Time Order Name Status Description    2017 0415 Inpatient Consult to Neurology      2017 0413 Inpatient Consult to Pulmonology      2017 114 Inpatient Consult to Orthopedic Surgery Completed     2017 1141 Inpatient Consult to Obstetrics / Gynecology Completed     2017 1141 Inpatient Consult to Cardiology Completed     2017 1141 Inpatient Consult to Nephrology Completed     2017 1040 Inpatient Consult to Rheumatology      2017 0824 Family Medicine Consult Completed           Chief Complaint: Weakness with difficulty ambulating and arthralgias       Subjective     Interval History: Patient is a 77 y.o.female who presented with weakness and difficulty ambulating along with arthralgias to the emergency room at [facility] on 17.  Patient was seen and initially evaluated by Dr. Estevan Vega.  Patient did call Dr. Cadet with a long laundry list of symptoms that she felt needed emergent evaluation because of weakness and difficulty walking for the last few days.  She also reported an 8-10 month history of progressive arthralgias that she felt were related to a trial of hydralazine which was used between March and 2016 2 attempt treatment of her labile hypertension.  Patient has been in the hospital had extensive workups in the past for other signs and symptoms.  She also had complaints of moderate headache, rib pain, and a 25 pound weight loss since 2016  She denied any fever, vomiting, diarrhea,  dysuria or chronic shortness of breath.      Dr. Vega's  workup in the emergency room was rather unrevealing.  He described her problem as a 10 month history of gradual onset, constant timing, diffuse joint pain, radiating nowhere, aching in quality, moderate in intensity, progression was worsening, associated with moderate headache rib related 25 pound weight loss, aggravated by nothing, alleviated by nothing, no previous episodes for comparison.  The positive is in his review of systems included the 20 pound weight loss since January, chronic shortness of breath, chest and rib pain, the arthralgias, and the headaches.  Physical exam revealed normal vitals and essentially normal exam.  Patient did have some bruising on her knees and some reported swelling in her lower pelvic eye area.  Dr. Vega called to discuss the case with me.  Patient was somewhat labile with her blood pressure at that point and we felt that she probably didn't merit admission for observation at least on an overnight basis to gain further understanding of her condition.    4/6/17.  I personally saw the patient for the first time on this date in the emergency room soon after Dr. Vega's evaluation.  Patient was resting comfortably in bed but in some distress due to need for urination.  Patient did provide long list of signs and symptoms as detailed above and in Dr. Vega's notes.  Patient seems extremely anxious and distressed at the current time.  She does have a rather ill  at home and is concerned about his welfare more than her own.  Patient has major concerns also about her blood pressure lability and feels that the medication she is currently taking are not controlling it adequately.  She would like to consult with her cardiologist and renal doctors in the hospital.  She also does have shortness of breath and congestion.  Reports a productive cough at times.    Review of Systems:    A comprehensive 14 point review of systems was negative except for:  Constitution:  positive for  anorexia, fatigue and weight loss  ENT:  positive for earaches, hoarseness and nasal congestion  Respiratory: positive for  cough, dry, cough, productive, pleuritic pain and shortness of air  Cardiovascular: positive for  lower extremity edema, palpitations and paroxysmal nocturnal dyspnea  Gastrointestinal: postitive for  bloating / distention, constipation and nausea  Breast:  positive for See HPI  Musculoskeletal: positive for  back pain, bone pain, joint pain, joint stiffness, joint swelling, muscle pain and muscle weakness  Neurological: positive for  difficulty walking    Past Medical History:   Diagnosis Date   • Arthritis    • Asthma    • Cancer    • COPD (chronic obstructive pulmonary disease)    • Emphysema lung    • Generalized headaches    • Hayfever    • Hypertension    • Stroke      Past Surgical History:   Procedure Laterality Date   • BREAST LUMPECTOMY Left 04/01/2003   • CARPAL TUNNEL RELEASE  2011   • CATARACT EXTRACTION  2010   • HERNIA REPAIR  01/01/1971   • HERNIA REPAIR      right groin   • JOINT REPLACEMENT     • KNEE SURGERY Left 01/01/1998    bilateral knee replacements   • MASTECTOMY     • PARATHYROIDECTOMY  05/08/2006   • SINUS SURGERY  01/01/1984   • TOTAL ABDOMINAL HYSTERECTOMY  01/01/1973     Allergies   Allergen Reactions   • Morphine Anaphylaxis   • Sulfa Antibiotics Anaphylaxis and Hives     Or sulfa fillers in meds   • Hydralazine Myalgia     Patient reports leg cramps, joint pain and increased fatigue   • Amlodipine      KIDNEYS SHUT DOWN   • Anastrozole    • Grass    • Morphine And Related    • Nifedipine      UNCONTROLLED BP   • Nsaids      KIDNEY FAILURE   • Norvasc  [Amlodipine Besylate]    • Penicillins    • Pneumococcal Vaccines Nausea Only   • Tree Extract    • Zolpidem      HALLUCINATIONS     Family History   Problem Relation Age of Onset   • Cancer Brother      Social History     Social History   • Marital status:      Spouse name: N/A   • Number of children: N/A  "  • Years of education: N/A     Social History Main Topics   • Smoking status: Never Smoker   • Smokeless tobacco: Never Used   • Alcohol use Yes      Comment: rarely - once or twice year (wine or bernabe)   • Drug use: Defer   • Sexual activity: Defer     Other Topics Concern   • None     Social History Narrative       PMH, FH, SH and ROS completed with Admission History and Physical and updated in EPIC system.  This data is unchanged at this time.       Objective     Scheduled Meds:  budesonide 0.5 mg Nebulization BID - RT   CloNIDine 0.2 mg Oral BID   docusate sodium 100 mg Oral BID   enoxaparin 30 mg Subcutaneous Daily   eplerenone 25 mg Oral Q24H   ethacrynic acid 25 mg Oral BID   montelukast 10 mg Oral Nightly   NIFEdipine XL 60 mg Oral BID   pantoprazole 40 mg Oral Q AM   polyethylene glycol 17 g Oral Daily   tiZANidine 2 mg Oral BID     Continuous Infusions:  Pharmacy Consult        Vital signs in last 24 hours:  Temp:  [97.5 °F (36.4 °C)-98.3 °F (36.8 °C)] 97.9 °F (36.6 °C)  Heart Rate:  [] 103  Resp:  [16-18] 16  BP: (102-211)/() 102/72    Intake/Output:    Intake/Output Summary (Last 24 hours) at 04/07/17 0415  Last data filed at 04/06/17 1800   Gross per 24 hour   Intake              480 ml   Output             1150 ml   Net             -670 ml       Exam:  /72  Pulse 103  Temp 97.9 °F (36.6 °C) (Oral)   Resp 16  Ht 63\" (160 cm)  Wt 184 lb (83.5 kg)  SpO2 100%  BMI 32.59 kg/m2               Constitutional:    Alert, cooperative, severe distress and marked anxiety, AAOx3, resting fitfully                          Head:    Normocephalic, without obvious abnormality, atraumatic                           Eyes:    PERRLA, conjunctiva/corneas clear, no icterus, no conjunctival                                         pallor, EOM's intact, both eyes          ENT and Mouth:   Lips, tongue, gums normal; oral mucosa pink and moist                           Neck:   Supple, symmetrical, " trachea midline, no JVD                 Respiratory:   Clear to auscultation bilaterally, respirations unlabored, occasional wheezes are noted.           Cardiovascular:    Regular rate and rhythm, S1 and S2 normal, no murmur,       no  Rub or gallop.  Pulses normal.            Gastrointestinal:   BS present x 4 Soft, non-tender, bowel sounds active, no                                                  masses,  no hepatosplenomegaly                              :    No hernia.  Normal exam for sex.                Musculoskeletal:   Extremities normal, atraumatic, no cyanosis or edema.  No                                               arthropathy.  No deformity.  Gait normal                            Skin:  Skin is warm and dry,  no rashes, swelling or palpable lesions                  Neurologic:   CNII-XII intact, motor strength grossly intact, sensation                                                      grossly intact to light touch, no focal reflexl deficits noted                   Psychiatric:   Alert, oriented x 3, no delusions, psychosis. Some depression,anxiety       Heme/Lymph/Imun:   No bruises, petechiae.  Lymph nodes normal in size/configuration       Data Review:  Lab Results   Component Value Date    CALCIUM 10.2 04/06/2017     Results from last 7 days  Lab Units 04/06/17  1213 04/06/17  1130 04/06/17  0827   SODIUM mmol/L  --  144 145   POTASSIUM mmol/L  --  3.3* 3.6   CHLORIDE mmol/L  --  102 105   TOTAL CO2 mmol/L  --  23.8 27.0   BUN mg/dL  --  13 16   CREATININE mg/dL  --  0.85 0.89   GLUCOSE mg/dL  --  97 105*   CALCIUM mg/dL  --  10.2 9.5   WBC 10*3/mm3 6.89  --  5.52   HEMOGLOBIN g/dL 15.9*  --  13.9   PLATELETS 10*3/mm3 226  --  187     Lab Results   Component Value Date    CKTOTAL 44 04/06/2017    CKMB 1.69 04/06/2017    TROPONINT <0.010 04/06/2017     Estimated Creatinine Clearance: 54.9 mL/min (by C-G formula based on Cr of 0.85).  WEIGHTS:     Wt Readings from Last 1 Encounters:    04/06/17 1109 184 lb (83.5 kg)   04/06/17 0720 180 lb (81.6 kg)         Assessment:  Principal Problem:    Accelerated hypertension  Active Problems:    Sinusitis    Hyperlipidemia    Chronic tension-type headache, intractable    Chronic kidney disease, stage 2, mildly decreased GFR    Malaise and fatigue progressive    Vitamin D deficiency    Moderate persistent asthmatic bronchitis without complication    Acute joint pain    Easy bruisability    Urinary retention self-caths (Ochoa)    Cervical disc displacement    DDD (degenerative disc disease), cervical    Urge incontinence of urine    Vertigo    Knee pain, bilateral    Elevated PTH level    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  Accelerated hypertension.  Patient was given Vasotec in the emergency room IV with good resolution of her blood pressure.  We will consult renal.  Dr. Bond has been following the patient regularly with respect to her blood pressure and his help with the management of this issue in the past.  We would like their input and suggestions as to the appropriate medications to treat the blood pressure issue.  2.  Arthralgias and myalgias since starting treatment with hydralazine many months ago.  Patient has now been off of this medication for the last several months without any real change in her arthralgias.  I did discuss the case with Dr. Franklin and he does yield there would be concerned about the possibility of positive DIAN histone testing.  His group will be happy to follow up the patient in their office  next week after discharge from the hospital.  3.  Bilateral knee pain at sites of previous knee replacements.  Orthopedics has been consulted for their input on the situation and any suggestions that might have for treatment.  4.  Swelling in lower pelvic and pubic areas extending onto the thighs last GYN take a look at the situation and did recommend a pelvic ultrasound to be certain patient has no evidence of ovarian issues.   5.  Nonspecific chest pain brought on by movement from side to side.  Cardiology consulted initially because of the patient's recent ischemic or That included echocardiogram and stress test.  They have recommended that we also consult pulmonary because that seems to be the more probable etiology for the patient's symptomatology  6.  Chronic tension-type headaches which are intractable at times.  Patient does have medications for treatment of this include Fioricet and Talwin.  She is generally very intolerant of any narcotics that contain opiates.  7.  25 pound weight loss in patient with history of breast cancer.  We will asked the hematologist take a look at this situation and make any recommendations they might have on further workup of this issue one might consider a bone scan or further CT scanning to rule out possibility of recurrence of breast cancer.  8.  Severe anxiety.  Will offer the patient counseling and input from the [facility] if she so desires.  We'll make further decisions on treatment based on her willingness to participate in the assessment.  9.  Moderate persisted cough and asthmatic bronchitis with mild flare.  Will ask pulmonary as noted above to evaluate patient and make suggestions on other treatment options  10.  Possible reduction of positive DIAN while on hydralazine.  As requested by rheumatology we will obtain histone antibodies and we will arrange follow-up for the patient in the rheumatology clinic at the time of her discharge next week  11.  Difficulty  walking with mild to moderate ataxia.  We will request neurology evaluation of this situation and defer to them any additional testing that they feel is necessary to workup the situation.    Plan for disposition:Where: home and When:  2-3days      Óscar Cadet MD  4/6/17  9:06 AM

## 2017-04-07 NOTE — PROGRESS NOTES
"   LOS: 0 days    Patient Care Team:  Óscar Cadet MD as PCP - General  Óscar Cadet MD as PCP - Family Medicine    Chief Complaint:    Chief Complaint   Patient presents with   • Leg Pain     Patient states this has been going on since starting on hydralazine, all joints hurt   • Leg Swelling       Subjective  follow-up the severe uncontrolled hypertension    Interval History:   The patient has restless sleep not sleeping well complaining of arthralgias and myalgias, no lower extremity edema and her medication being adjusted blood pressure remains uncontrolled and the patient is very frustrated and emotionally labile.  Denies chest pain or shortness of air, no orthopnea or PND, no nausea or vomiting.  No dysuria or gross hematuria.      Review of Systems:   As noted above    Objective     Vital Signs  Temp:  [97.9 °F (36.6 °C)-98.3 °F (36.8 °C)] 97.9 °F (36.6 °C)  Heart Rate:  [] 86  Resp:  [16-18] 18  BP: (102-211)/() 162/80    Flowsheet Rows         First Filed Value    Admission Height  63\" (160 cm) Documented at 04/06/2017 0720    Admission Weight  180 lb (81.6 kg) Documented at 04/06/2017 0720             I/O last 3 completed shifts:  In: 480 [P.O.:480]  Out: 1850 [Urine:1850]    Intake/Output Summary (Last 24 hours) at 04/07/17 1257  Last data filed at 04/07/17 0454   Gross per 24 hour   Intake              480 ml   Output             1200 ml   Net             -720 ml       Physical Exam:  General Appearance: alert, oriented x 3, no acute distress, anxious, obese  Skin: warm and dry  Neck: supple, minimal JVD, trachea midline  Lungs: CTA, unlabored breathing effort  Heart: RRR, normal S1 and S2,  no rub  Abdomen: soft, non-tender, no palpable bladder, present bowel supple auscultation  Extremities: no edema, cyanosis or clubbing, minimal left thigh bruising.  Neuro: normal speech and mental status, mood and affect were appropriate.      Results Review:      Results from last 7 days  Lab " Units 04/07/17  0449 04/06/17  1130 04/06/17  0827   SODIUM mmol/L 145 144 145   POTASSIUM mmol/L 3.8 3.3* 3.6   CHLORIDE mmol/L 104 102 105   TOTAL CO2 mmol/L 27.0 23.8 27.0   BUN mg/dL 16 13 16   CREATININE mg/dL 1.01* 0.85 0.89   CALCIUM mg/dL 9.4 10.2 9.5   GLUCOSE mg/dL 106* 97 105*       Estimated Creatinine Clearance: 46.2 mL/min (by C-G formula based on Cr of 1.01).      Results from last 7 days  Lab Units 04/07/17  0449   MAGNESIUM mg/dL 2.3   PHOSPHORUS mg/dL 3.6         Results from last 7 days  Lab Units 04/07/17  0449   URIC ACID mg/dL 5.3         Results from last 7 days  Lab Units 04/07/17  0449 04/06/17  1213 04/06/17  0827   WBC 10*3/mm3 6.24 6.89 5.52   HEMOGLOBIN g/dL 13.8 15.9* 13.9   PLATELETS 10*3/mm3 179 226 187               Imaging Results (last 24 hours)     Procedure Component Value Units Date/Time    XR Knee 1 or 2 View Bilateral [[id number]] Collected:  04/06/17 1541     Updated:  04/06/17 1556    Narrative:       RIGHT AND LEFT KNEE X-RAYS     HISTORY: Knee pain and bruising.     2 images of each knee are provided. These are correlated with right knee  postoperative x-ray 04/09/2012.     FINDINGS: There is right total knee arthroplasty hardware. No joint  effusion is present. The bones appear normal. No hardware loosening is  evident.     2 images of the left knee are provided. There is no joint effusion.  Total knee arthroplasty hardware is positioned as expected with no  evidence of hardware loosening. The subcutaneous fat over the anterior  aspect of the left knee appears thicker than that on the right, and  there is some subcutaneous edema anteriorly on the left. No soft tissue  gas is present.       Impression:       There is bilateral total knee arthroplasty hardware. No  joint effusion or other evidence of hardware failure is present. The  subcutaneous fat over the anterior aspect of the left knee is thicker  than that on the right, of uncertain etiology.     This report was  finalized on 2017 3:53 PM by Dr. Rocael Anne MD.       US Pelvis Limited [[id number]] Collected:  17     Updated:  17    Narrative:       LIMITED NON-OB TRANSABDOMINAL TRANSVAGINAL AND LIMITED VASCULAR  ULTRASOUND     HISTORY: 77-year-old female with evidence of swelling in the pelvic  region  6 para 6 last menstrual cycle age 32 (hysterectomy)  evaluate ovaries.     COMPARISON: None available     FINDINGS:  1. Uterus is surgically absent.  2. Ovaries are not imaged despite transvaginal technique.  3. No adnexal mass nor free fluid.     This report was finalized on 2017 7:44 PM by Dr. Christofer Chandra MD.       US Non-ob Transvaginal [[id number]] Collected:  17     Updated:  17    Narrative:       LIMITED NON-OB TRANSABDOMINAL TRANSVAGINAL AND LIMITED VASCULAR  ULTRASOUND     HISTORY: 77-year-old female with evidence of swelling in the pelvic  region  6 para 6 last menstrual cycle age 32 (hysterectomy)  evaluate ovaries.     COMPARISON: None available     FINDINGS:  1. Uterus is surgically absent.  2. Ovaries are not imaged despite transvaginal technique.  3. No adnexal mass nor free fluid.     This report was finalized on 2017 7:44 PM by Dr. Christofer Chandra MD.             budesonide 0.5 mg Nebulization BID - RT   ceftriaxone 1 g Intravenous Q24H   CloNIDine 0.2 mg Oral Q12H   docusate sodium 100 mg Oral BID   enoxaparin 30 mg Subcutaneous Daily   eplerenone 25 mg Oral Q24H   ethacrynic acid 25 mg Oral BID   montelukast 10 mg Oral Nightly   NIFEdipine XL 60 mg Oral BID   pantoprazole 40 mg Oral Q AM   polyethylene glycol 17 g Oral Daily   tiZANidine 2 mg Oral BID       Pharmacy Consult        Medication Review:   Current Facility-Administered Medications   Medication Dose Route Frequency Provider Last Rate Last Dose   • acetaminophen (TYLENOL) tablet 650 mg  650 mg Oral Q4H PRN Óscar Cadet MD       • albuterol (PROVENTIL) nebulizer solution  0.083% 2.5 mg/3mL  2.5 mg Nebulization Q4H PRN Óscar Cadet MD   2.5 mg at 04/06/17 2026   • budesonide (PULMICORT) nebulizer solution 0.5 mg  0.5 mg Nebulization BID - RT Óscar Cadet MD   0.5 mg at 04/07/17 0649   • butalbital-acetaminophen-caffeine (FIORICET, ESGIC) -40 MG per tablet 2 tablet  2 tablet Oral Q4H PRN Richard Lawson MD   2 tablet at 04/07/17 0454   • cefTRIAXone (ROCEPHIN) IVPB 1 g  1 g Intravenous Q24H Óscar Cadet MD       • CloNIDine (CATAPRES) tablet 0.1 mg  0.1 mg Oral Q4H PRN Óscar Cadet MD        Or   • CloNIDine (CATAPRES) tablet 0.2 mg  0.2 mg Oral Q4H PRN Óscar Cadet MD       • CloNIDine (CATAPRES) tablet 0.2 mg  0.2 mg Oral Q12H Óscar Cadet MD   0.2 mg at 04/07/17 0942   • diphenhydrAMINE (BENADRYL) 12.5 MG/5ML elixir 12.5 mg  12.5 mg Oral Q4H PRN Óscar Cadet MD       • docusate sodium (COLACE) capsule 100 mg  100 mg Oral BID Óscar Cadet MD   100 mg at 04/07/17 0943   • enalaprilat (VASOTEC) injection 1.25 mg  1.25 mg Intravenous Q6H PRN Óscar Cadet MD   1.25 mg at 04/06/17 1951   • enoxaparin (LOVENOX) syringe 30 mg  30 mg Subcutaneous Daily Óscar Cadet MD   30 mg at 04/07/17 0942   • eplerenone (INSPRA) tablet 25 mg  25 mg Oral Q24H Soco Holloway MD   25 mg at 04/07/17 0943   • ethacrynic acid (EDECRIN) tablet 25 mg  25 mg Oral BID Óscar Cadet MD   25 mg at 04/07/17 0942   • HYDROcodone-acetaminophen (NORCO) 5-325 MG per tablet 1 tablet  1 tablet Oral Q6H PRN Óscar Cadet MD   1 tablet at 04/07/17 0634   • hydrOXYzine (VISTARIL) capsule 50 mg  50 mg Oral Q6H PRN Óscar Cadet MD       • ipratropium (ATROVENT) nebulizer solution 0.5 mg  500 mcg Nebulization 4x Daily PRN Óscar Cadet MD       • LORazepam (ATIVAN) tablet 0.5 mg  0.5 mg Oral Q8H PRN Óscar Cadet MD       • montelukast (SINGULAIR) tablet 10 mg  10 mg Oral Nightly Óscar Cadet MD   10 mg at 04/06/17 2288   • NIFEdipine XL (PROCARDIA XL) 24  hr tablet 60 mg  60 mg Oral BID Óscar Cadet MD   60 mg at 04/07/17 0942   • ondansetron ODT (ZOFRAN-ODT) disintegrating tablet 8 mg  8 mg Oral Q6H PRN Óscar Cadet MD       • pantoprazole (PROTONIX) EC tablet 40 mg  40 mg Oral Q AM Óscar Cadet MD   40 mg at 04/07/17 0634   • pentazocine-naloxone (TALWIN NX) 50-0.5 MG per tablet 2 tablet  2 tablet Oral Q4H PRN Óscar Cadet MD       • Pharmacy Consult   Does not apply Continuous PRN Óscar Cadet MD       • polyethylene glycol (MIRALAX) powder 17 g  17 g Oral Daily Óscar Cadet MD       • promethazine (PHENERGAN) tablet 12.5 mg  12.5 mg Oral Q6H PRN Óscar Cadet MD       • promethazine-codeine (PHENERGAN with CODEINE) 6.25-10 MG/5ML syrup 5 mL  5 mL Oral 4x Daily PRN Óscar Cadet MD       • sodium chloride 0.9 % flush 1-10 mL  1-10 mL Intravenous PRN Óscar Cadet MD       • sodium chloride 0.9 % flush 10 mL  10 mL Intravenous PRN Estevan Vega MD       • tiZANidine (ZANAFLEX) tablet 2 mg  2 mg Oral BID Óscar Cadet MD   2 mg at 04/07/17 0943       Assessment/Plan   1.  Severe uncontrolled hypertension with the multiple episodes of being out of control and difficult to control and the patient has multiple drug allergies.  2.  Restless sleep we need to rule out the possibility of obstructive sleep apnea which could be the making the blood pressure control unattainable.  3.  Hypokalemia resolved currently on eplerenone, she did not tolerate the Aldactone  4.  Possible diastolic heart  5.  Probable COPD  6.  Diffuse arthralgias, rheumatology evaluation as an outpatient is pending.      Plan:  1.  I discussed the case with Dr. Nicole and I asked him to evaluate for the probable sleep apnea.  2.  We'll check orthostatic blood pressure  3.  Will adjust medication as needed.  4.  Patient is not ready for discharge until we have her the medication regimen adjusted  5.  Surveillance labs      I discussed the patient with Dr. Cadet and  with  the patient          Rj Bond MD  04/07/17  12:57 PM

## 2017-04-07 NOTE — CONSULTS
Pulmonary Consultation     Patient Name: Sasha Barrett  Age/Sex: 77 y.o. female  : 1940  MRN: [id number]    Date of Admission: 2017  Date of Encounter Visit: 17  Encounter Provider: Tamera Nicole MD  Referring Provider: Óscar Cadet MD  Place of Service: [facility]  Patient Care Team:  Óscar Cadet MD as PCP - General  Óscar Cadet MD as PCP - Family Medicine      Subjective:     Consulted for: COPD for evaluation on a possible impact on her current dyspnea and hypertension    Chief Complaint: Accelerated hypertension    History of Present Illness:  Sasha Barrett is a 77 y.o. female that presented to the hospital with uncontrolled and accelerated hypertension with diffuse arthralgias.  She also reported worsening edema as well.  Patient follows with Dr. Cadet and she had a prior evaluation in our office back in  but had no follow-up since.  She does have history of COPD however it is a moderate condition was mild to moderate reduction in the diffusion capacity and she denies any recurrent exacerbation except when she had an acute bronchitis after viral illness she rarely has to use the rescue inhaler except due to seasonal allergies.  She is on Advair that she uses regularly during the allergy season but denies any significant chest tightness or wheezing or any worsening cough beyond the typical allergic rhinitis-type cough.  Patient has no history of smoking herself but she has a passive exposure to secondhand smoke.  She denies any alcohol or illicit substance abuse.  She did have chronic headache she does have weight loss she denies any fever or chills or diarrhea or vomiting or abdominal pain or dysuria or any changes in her chronic dyspnea on exertion.  She denies any chest pain at this point.  She does have some hip pain however that has been going on for several months.  Patient is not aware about any snoring at this point because she's been sleeping in a separate room  for the last several years she does have some daytime fatigue and sleepiness.  She has history of chronic headache and given her overall history she is a high risk for obstructive sleep apnea needs to be evaluated.    Pulmonary Functions Testing Results:      Review of Systems:   Review of Systems   Constitutional: Positive for unexpected weight change (loss of 25 lbs since January). Negative for fever.   Respiratory: Positive for shortness of breath (chronic, unchanged). Negative for cough.   Cardiovascular: Positive for chest pain (ribs).   Gastrointestinal: Negative. Negative for diarrhea and vomiting.   Genitourinary: Negative. Negative for dysuria.   Musculoskeletal: Positive for arthralgias (diffuse).   Neurological: Positive for headaches (moderate).   All other systems reviewed and are negative.  Past Medical History:  Past Medical History:   Diagnosis Date   • Arthritis    • Asthma    • Cancer    • COPD (chronic obstructive pulmonary disease)    • Emphysema lung    • Generalized headaches    • Hayfever    • Hypertension    • Stroke        Past Surgical History:   Procedure Laterality Date   • BREAST LUMPECTOMY Left 04/01/2003   • CARPAL TUNNEL RELEASE  2011   • CATARACT EXTRACTION  2010   • HERNIA REPAIR  01/01/1971   • HERNIA REPAIR      right groin   • JOINT REPLACEMENT     • KNEE SURGERY Left 01/01/1998    bilateral knee replacements   • MASTECTOMY     • PARATHYROIDECTOMY  05/08/2006   • SINUS SURGERY  01/01/1984   • TOTAL ABDOMINAL HYSTERECTOMY  01/01/1973       Home Medications:   Prescriptions Prior to Admission   Medication Sig Dispense Refill Last Dose   • ethacrynic acid (EDECRIN) 25 MG tablet Take 25 mg by mouth 2 (Two) Times a Day.      • ipratropium (ATROVENT) 0.02 % nebulizer solution Take 500 mcg by nebulization 4 (Four) Times a Day As Needed for Wheezing or Shortness of Air.      • albuterol (PROAIR HFA) 108 (90 BASE) MCG/ACT inhaler ProAir  (90 Base) MCG/ACT Inhalation Aerosol  Solution; Patient Sig: ProAir  (90 Base) MCG/ACT Inhalation Aerosol Solution ; 8; 0; 18-Jun-2014; Active   Taking   • aspirin 81 MG tablet Take 162 mg by mouth Daily.   Taking   • budesonide (PULMICORT FLEXHALER) 180 MCG/ACT inhaler Pulmicort Flexhaler 180 MCG/ACT Inhalation Aerosol Powder Breath Activated; Patient Sig: Pulmicort Flexhaler 180 MCG/ACT Inhalation Aerosol Powder Breath Activated ; 0; 26-Jun-2014; Active   Taking   • butalbital-acetaminophen-caffeine (FIORICET, ESGIC) -40 MG per tablet Take 2 tablets by mouth Every 4 (Four) Hours As Needed for Headache. (Patient taking differently: Take 1 tablet by mouth Every 4 (Four) Hours As Needed for Headache.) 240 tablet 0    • Cholecalciferol (VITAMIN D) 1000 UNITS tablet Take  by mouth.   Taking   • CloNIDine (CATAPRES) 0.1 MG tablet Take 1 tablet by mouth 2 (Two) Times a Day. Schedule bid 1 and then [id number] take extra #1 if systolic BP > 160 and take 2 if systolic Bp>180 (Patient taking differently: Take 0.2 mg by mouth 2 (Two) Times a Day. Schedule bid 1 and then [id number] take extra #1 if systolic BP > 160 and take 2 if systolic Bp>180) 360 tablet 1    • diphenhydrAMINE (BENADRYL) 50 MG tablet Take 1 tablet by mouth every 4 (four) hours as needed for itching or allergies. 180 tablet 0 Taking   • doxycycline (DORYX) 100 MG enteric coated tablet Take 1 tablet by mouth 2 (Two) Times a Day. 20 tablet 0    • doxycycline (DORYX) 100 MG enteric coated tablet Take 1 tablet by mouth 2 (Two) Times a Day. 20 tablet 0    • doxycycline (DORYX) 100 MG enteric coated tablet TAKE ONE TABLET BY MOUTH TWICE DAILY 20 tablet 0    • HYDROcodone-acetaminophen (NORCO) 5-325 MG per tablet Take 1 tablet by mouth Every 6 (Six) Hours As Needed for moderate pain (4-6) or severe pain (7-10). 120 tablet 0 Taking   • hydrOXYzine (VISTARIL) 50 MG capsule Take 1 capsule by mouth every 6 (six) hours as needed for itching. 360 capsule 1 Taking   • levoFLOXacin (LEVAQUIN) 500 MG  tablet Take 500 mg by mouth Daily.   Taking   • LORazepam (ATIVAN) 0.5 MG tablet Take 1 tablet by mouth Every 8 (Eight) Hours As Needed for anxiety. Take 1 tab 1 hoour before MRI and repeat x 1 at time of test 60 tablet 0 Taking   • meclizine (ANTIVERT) 25 MG tablet TAKE ONE TABLET BY MOUTH THREE TIMES DAILY AS NEEDED FOR DIZZINESS 30 tablet 0 Taking   • MethylPREDNISolone (MEDROL, CRISTIAN,) 4 MG tablet Take as directed on package instructions. 21 tablet 0 Taking   • montelukast (SINGULAIR) 10 MG tablet Take 1 tablet by mouth every night. 90 tablet 1 Taking   • NIFEdipine XL (PROCARDIA XL) 60 MG 24 hr tablet TAKE TWO TABLETS BY MOUTH DAILY (Patient taking differently: one oral bid) 60 tablet 2    • NIFEdipine XL (PROCARDIA XL) 60 MG 24 hr tablet Take 1 tablet by mouth Daily. 90 tablet 0    • NIFEdipine XL (PROCARDIA XL) 60 MG 24 hr tablet TAKE TWO TABLETS BY MOUTH DAILY 60 tablet 0    • omeprazole (priLOSEC) 40 MG capsule TAKE ONE CAPSULE BY MOUTH EVERY DAY 90 capsule 0    • ondansetron ODT (ZOFRAN-ODT) 8 MG disintegrating tablet Take 1 tablet by mouth every 6 (six) hours as needed for nausea or vomiting. 360 tablet 1 Taking   • pentazocine-naloxone (TALWIN NX) 50-0.5 MG per tablet Take 2 tablets by mouth Every 4 (Four) Hours As Needed for Mild Pain (1-3) (for up to 90 days). (Patient taking differently: Take 1 tablet by mouth Every 4 (Four) Hours As Needed for Mild Pain (1-3) (for up to 90 days).) 1080 tablet 0    • polyethylene glycol (MIRALAX) powder Take one capful w/liquid daily 765 g 1 Taking   • predniSONE (DELTASONE) 10 MG tablet TAKE ONE TABLET BY MOUTH DAILY AS DIRECTED 30 tablet 0    • promethazine-codeine (PHENERGAN with CODEINE) 6.25-10 MG/5ML syrup Take 5 mL by mouth 4 (Four) Times a Day As Needed for cough. 360 mL 0 Taking   • tiZANidine (ZANAFLEX) 4 MG tablet Take 1 tablet by mouth at night as needed for muscle spasms. (Patient taking differently: Take 2 mg by mouth 2 (Two) Times a Day.) 90 tablet 0  Taking   • tiZANidine (ZANAFLEX) 4 MG tablet TAKE ONE TABLET BY MOUTH AT BEDTIME AS NEEDED FOR MUSCLE SPASMS 90 tablet 0    • tobramycin-dexamethasone (TOBRADEX) 0.3-0.1 % ophthalmic suspension Administer 1 drop to both eyes every 4 (four) hours while awake. 10 mL 0 Taking       Inpatient Medications:  Scheduled Meds:  budesonide 0.5 mg Nebulization BID - RT   CloNIDine 0.2 mg Oral Q12H   docusate sodium 100 mg Oral BID   enoxaparin 30 mg Subcutaneous Daily   eplerenone 25 mg Oral Q24H   ethacrynic acid 25 mg Oral BID   montelukast 10 mg Oral Nightly   NIFEdipine XL 60 mg Oral BID   pantoprazole 40 mg Oral Q AM   polyethylene glycol 17 g Oral Daily   tiZANidine 2 mg Oral BID     Continuous Infusions:  Pharmacy Consult      PRN Meds:.•  acetaminophen  •  albuterol  •  butalbital-acetaminophen-caffeine  •  CloNIDine **OR** CloNIDine  •  diphenhydrAMINE  •  enalaprilat  •  HYDROcodone-acetaminophen  •  hydrOXYzine  •  ipratropium  •  LORazepam  •  ondansetron ODT  •  pentazocine-naloxone  •  Pharmacy Consult  •  promethazine  •  promethazine-codeine  •  sodium chloride  •  Insert peripheral IV **AND** sodium chloride    Allergies:  Allergies   Allergen Reactions   • Morphine Anaphylaxis   • Sulfa Antibiotics Anaphylaxis and Hives     Or sulfa fillers in meds   • Hydralazine Myalgia     Patient reports leg cramps, joint pain and increased fatigue   • Amlodipine      KIDNEYS SHUT DOWN   • Anastrozole    • Grass    • Morphine And Related    • Nifedipine      UNCONTROLLED BP   • Nsaids      KIDNEY FAILURE   • Norvasc  [Amlodipine Besylate]    • Penicillins    • Pneumococcal Vaccines Nausea Only   • Tree Extract    • Zolpidem      HALLUCINATIONS       Past Social History:  Social History     Social History   • Marital status:      Spouse name: N/A   • Number of children: N/A   • Years of education: N/A     Social History Main Topics   • Smoking status: Never Smoker   • Smokeless tobacco: Never Used   • Alcohol use  Yes      Comment: rarely - once or twice year (wine or bernabe)   • Drug use: Defer   • Sexual activity: Defer     Other Topics Concern   • None     Social History Narrative       Past Family History:  Family History   Problem Relation Age of Onset   • Cancer Brother            Objective:   Temp:  [97.9 °F (36.6 °C)-98.3 °F (36.8 °C)] 97.9 °F (36.6 °C)  Heart Rate:  [] 83  Resp:  [16-18] 18  BP: (102-211)/() 159/113  SpO2:  [97 %-100 %] 97 %  on    O2 Device: room air     Intake/Output Summary (Last 24 hours) at 04/07/17 0943  Last data filed at 04/07/17 0454   Gross per 24 hour   Intake              480 ml   Output             1850 ml   Net            -1370 ml     Body mass index is 32.59 kg/(m^2).  Last 3 weights    04/06/17  0720 04/06/17  1109   Weight: 180 lb (81.6 kg) 184 lb (83.5 kg)     Weight change:     Physical Exam:   Physical Exam   General:    No acute distress, alert and oriented x4, pleasant                   Head:    Normocephalic, atraumatic.   Eyes:          Conjunctivae and sclerae normal, no icterus, PERRLA   Throat:   No oral lesions, no thrush, oral mucosa moist.Mallampati 3 , patient is edentulous    Neck:   Supple, trachea midline.   Lungs:     Normal chest on inspection, CTAB, no wheezes. No rhonchi. No crackles. Respirations regular, even and unlabored.      Heart:    Regular rhythm and normal rate.  No murmurs, gallops, or rubs noted.   Abdomen:     Soft, non-tender, non-distended, positive bowel sounds.    Extremities:   No clubbing, cyanosis, Trace lower extremity pitting edema.     Pulses:   Pulses palpable and equal bilaterally.    Skin:   No bleeding or rash.   Neuro:   Non-focal.  Moves all extremities well.    Psychiatric:   Normal mood and affect.     Lab Review:     Results from last 7 days  Lab Units 04/07/17  0449 04/06/17  1130 04/06/17  0827   SODIUM mmol/L 145 144 145   POTASSIUM mmol/L 3.8 3.3* 3.6   CHLORIDE mmol/L 104 102 105   TOTAL CO2 mmol/L 27.0 23.8  27.0   BUN mg/dL 16 13 16   CREATININE mg/dL 1.01* 0.85 0.89   GLUCOSE mg/dL 106* 97 105*   CALCIUM mg/dL 9.4 10.2 9.5       Results from last 7 days  Lab Units 04/07/17  0449 04/06/17  1130   CK TOTAL U/L 38 44   TROPONIN T ng/mL  --  <0.010       Results from last 7 days  Lab Units 04/07/17  0449 04/06/17  1213 04/06/17  0827   WBC 10*3/mm3 6.24 6.89 5.52   HEMOGLOBIN g/dL 13.8 15.9* 13.9   HEMATOCRIT % 43.0 48.4* 42.2   PLATELETS 10*3/mm3 179 226 187   MCV fL 92.5 90.3 90.8   MCH pg 29.7 29.7 29.9   MCHC g/dL 32.1* 32.9 32.9   RDW % 14.3* 14.2* 14.1*   RDW-SD fl 47.8 46.7 46.6   MPV fL 11.4 11.1 10.8   NEUTROPHIL % % 47.6 51.0 53.6   LYMPHOCYTE % % 37.7 36.9 32.8   MONOCYTES % % 11.7 10.4 10.7   EOSINOPHIL % % 2.4 1.3 1.8   BASOPHIL % % 0.6 0.4 1.1   IMM GRAN % % 0.0 0.0 0.0   NEUTROS ABS 10*3/mm3 2.97 3.51 2.96   LYMPHS ABS 10*3/mm3 2.35 2.54 1.81   MONOS ABS 10*3/mm3 0.73 0.72 0.59   EOS ABS 10*3/mm3 0.15 0.09 0.10   BASOS ABS 10*3/mm3 0.04 0.03 0.06   IMMATURE GRANS (ABS) 10*3/mm3 0.00 0.00 0.00           Results from last 7 days  Lab Units 04/07/17  0449   MAGNESIUM mg/dL 2.3           Results from last 7 days  Lab Units 04/06/17  1130   PROBNP pg/mL 350.8                   Results from last 7 days  Lab Units 04/06/17  1130 04/06/17  0827   SED RATE mm/hr  --  5   CRP mg/dL 0.13  --            Results from last 7 days  Lab Units 04/06/17  1810   URINECX  >100,000 CFU/mL Gram Negative Bacilli*       Results from last 7 days  Lab Units 04/06/17  1810   NITRITE UA  Positive*   WBC UA /HPF 6-12*   BACTERIA UA /HPF 3+*   SQUAM EPITHEL UA /HPF 0-2   URINECX  >100,000 CFU/mL Gram Negative Bacilli*       Results from last 7 days  Lab Units 04/06/17  1119   ADENOVIRUS DETECTION BY PCR  Not Detected   CORONAVIRUS 229E  Not Detected   CORONAVIRUS HKU1  Not Detected   CORONAVIRUS NL63  Not Detected   CORONAVIRUS OC43  Not Detected   HUMAN METAPNEUMOVIRUS  Not Detected   HUMAN RHINOVIRUS/ENTEROVIRUS  Not Detected    INFLUENZA B PCR  Not Detected   PARAINFLUENZA 1  Not Detected   PARAINFLUENZA VIRUS 2  Not Detected   PARAINFLUENZA VIRUS 3  Not Detected   PARAINFLUENZA VIRUS 4  Not Detected   BORDETELLA PERTUSSIS PCR  Not Detected   INFLUENZA 2009 H1N1 BY PCR  Not Detected   CHLAMYDOPHILA PNEUMONIAE PCR  Not Detected   MYCOPLAMA PNEUMO PCR  Not Detected   INFLUENZA A PCR  Not Detected   INFLUENZA A H3  Not Detected   INFLUENZA A H1  Not Detected   RSV, PCR  Not Detected       Results from last 7 days  Lab Units 04/07/17  0449 04/06/17  1810   CREATININE UR mg/dL  --  41.7   PROTEIN TOTAL URINE mg/dL  --  18.0   URIC ACID mg/dL 5.3  --          Imaging:  Imaging Results (most recent)     Procedure Component Value Units Date/Time    XR Knee 1 or 2 View Bilateral [[id number]] Collected:  04/06/17 1541     Updated:  04/06/17 1556    Narrative:       RIGHT AND LEFT KNEE X-RAYS     HISTORY: Knee pain and bruising.     2 images of each knee are provided. These are correlated with right knee  postoperative x-ray 04/09/2012.     FINDINGS: There is right total knee arthroplasty hardware. No joint  effusion is present. The bones appear normal. No hardware loosening is  evident.     2 images of the left knee are provided. There is no joint effusion.  Total knee arthroplasty hardware is positioned as expected with no  evidence of hardware loosening. The subcutaneous fat over the anterior  aspect of the left knee appears thicker than that on the right, and  there is some subcutaneous edema anteriorly on the left. No soft tissue  gas is present.       Impression:       There is bilateral total knee arthroplasty hardware. No  joint effusion or other evidence of hardware failure is present. The  subcutaneous fat over the anterior aspect of the left knee is thicker  than that on the right, of uncertain etiology.     This report was finalized on 4/6/2017 3:53 PM by Dr. Rocael Anne MD.       US Pelvis Limited [[id number]] Collected:  04/06/17 1943      Updated:  17    Narrative:       LIMITED NON-OB TRANSABDOMINAL TRANSVAGINAL AND LIMITED VASCULAR  ULTRASOUND     HISTORY: 77-year-old female with evidence of swelling in the pelvic  region  6 para 6 last menstrual cycle age 32 (hysterectomy)  evaluate ovaries.     COMPARISON: None available     FINDINGS:  1. Uterus is surgically absent.  2. Ovaries are not imaged despite transvaginal technique.  3. No adnexal mass nor free fluid.     This report was finalized on 2017 7:44 PM by Dr. Christofer Chandra MD.        Non-ob Transvaginal [[id number]] Collected:  17     Updated:  17    Narrative:       LIMITED NON-OB TRANSABDOMINAL TRANSVAGINAL AND LIMITED VASCULAR  ULTRASOUND     HISTORY: 77-year-old female with evidence of swelling in the pelvic  region  6 para 6 last menstrual cycle age 32 (hysterectomy)  evaluate ovaries.     COMPARISON: None available     FINDINGS:  1. Uterus is surgically absent.  2. Ovaries are not imaged despite transvaginal technique.  3. No adnexal mass nor free fluid.     This report was finalized on 2017 7:44 PM by Dr. Christofer Chandra MD.             I personally viewed and interpreted the patient's imaging studies.    Assessment:   1. Moderate obstructive airway disease with emphysema, FEV1 at 72% of predicted and DLCO at 65% of predicted  2. Coronary artery disease  3. Accelerated hypertension  4. Chronic kidney disease  5. Obesity  6. Chronic headache  7. History of cerebrovascular accidents   8. History of infiltrating ductal carcinoma of the breast  9. Allergic rhinitis  10. History of passive tobacco abuse          Plan:   Reassured at this point, patient has COPD but no sign of exacerbation and it's unlikely to be contributing to the current admission to the hospital  Resume home regimen of Advair inhaler, okay to use home medication since she does not want to use the Symbicort that is the equivalent inhaler on [facility]  pharmacy formulary  We will check an overnight oximetry on room air to assess for any possible underlying obstructive sleep apnea that can be contributing to the uncontrolled or accelerated hypertension  Discussed with the patient in detail  We'll follow-up on the oximetry results and will address any respiratory decompensation but I don't see any evidence of that at this point        Thank you for allowing me to participate in the care of Sasha Barrett. Feel free to contact me directly with any further questions or concerns.    Tamera Nicole MD  [facility]   04/07/17  9:43 AM    EMR Dragon/Transcription disclaimer:   Much of this encounter note is an electronic transcription/translation of spoken language to printed text. The electronic translation of spoken language may permit erroneous, or at times, nonsensical words or phrases to be inadvertently transcribed; Although I have reviewed the note for such errors, some may still exist.

## 2017-04-07 NOTE — PROGRESS NOTES
Continued Stay Note  [facility]     Patient Name: Sasha Barrett  MRN: [id number]  Today's Date: 4/7/2017    Admit Date: 4/6/2017          Discharge Plan       04/07/17 0911    Case Management/Social Work Plan    Plan Home    Additional Comments Pt states that she wants to go home and has no needs.  Pt encouraged to have HH for Physical therapy but she states she does not need it.  Pt agrees to talk to Dr Cadet and if he thinks she would benefit from the least restrictive,  Home Health then she will agree to a referral.              Discharge Codes     None            Geena Cevrantes RN

## 2017-04-07 NOTE — CONSULTS
"Neuro  Dictated    D Roberth CONTRERAS       NEUROLOGY CONSULTATION     CONSULTING PHYSICIAN:  Everardo Lepe MD     REQUESTING PHYSICIAN:  Óscar Cadet MD    CHIEF COMPLAINT:  Gait ataxia.    DIAGNOSIS:  Myalgia.    HISTORY OF PRESENT ILLNESS:  This is a 77-year-old woman who has multiple complaints, but the most persistent ones seem to be pain in the legs, ribs, and shoulders, along with an unsteady gait.     The patient believes this all started early in 2016, when she was put on hydralazine. She says she was \"given the wrong medicine.\" She describes headaches, joint pain, and fatigue. Concerning the pain, she actually says it is more in her shins bilaterally and in the lateral hips bilaterally. Also the lateral ribs and the shoulders. There is no significant pain in the calf, the thigh, or the feet. She has no numbness or tingling in any of the limbs, and she cannot say there is any weakness of one limb.     She does say she is unsteady on her feet and she has been using a walker since Christmas. It perhaps started in late November or early December. She did have an MRI of brain on 12/05/2016 that was read as negative. Apparently her hydralazine was stopped in November, but symptoms have not improved.     She also says her blood pressure has been very high and apparently there have been a number of high values during this admission which are being handled by the nephrologists.    She denies any localizing numbness or weakness in any of the limbs or face. There is no double vision, loss of vision. There is no bulbar sphincter dysfunction.     The location of symptoms is both legs, ribs, and shoulders. The severity is moderate. The quality is an aching pain. The duration now is over a year. Associated symptoms of unsteadiness in gait. This is a context of a woman who is quite convinced this is all induced by hydralazine.     PAST SURGICAL HISTORY:   1.  Parathyroidectomy.  2.  Hysterectomy.    PAST MEDICAL " HISTORY:  1.  History of asthma.  2.  COPD.  3.  Hypertension.    ADMISSION MEDICATIONS:   1.  Inhalers.  2.  Aspirin.  3.  Fioricet (averages about 80 per month).  4.  Clonidine.  5.  Benadryl.  6.  Doxycycline.  7.  Synthroid.  8.  Vistaril.  9.  Medrol Dose-Shon.  10.  Meclizine.  11.  Procardia.  12.  Zanaflex.     SOCIAL HISTORY:  Reveals no alcohol or drug abuse.    FAMILY HISTORY:  Is reviewed and is noncontributory to present illness.    REVIEW OF SYSTEMS:  Fourteen system review performed and other than the weakness, gait unsteadiness, and pains noted above, all other systems are negative.    PHYSICAL EXAMINATION:   GENERAL APPEARANCE: Reveals a woman; appears stated age, in no acute distress.  VITAL SIGNS: Her blood pressure 162/80, pulse 86, respirations 18, blood pressure 97/40.  NECK: Carotids are equal, without bruits.  CARDIAC: Reveals regular rate and rhythm, with no significant murmur or gallop.  EXTREMITIES: Are nonedematous. Pulses are intact.   INTEGUMENTARY: There is no rash.  GASTROINTESTINAL: There is no hepatosplenomegaly.   PULMONARY: There are no labored respirations.  LYMPHATICS: There is no lymphadenopathy.   NEUROLOGIC: She is awake and alert. She is oriented x3. Visual fields are full. Discs are flat. Cranial nerves II-XII are intact. There is no weakness of neck flexion or extension, or eye closure. Power is normal in all 4 extremities. Tone is normal; in particular, there is no spasticity in the legs. There is no cogwheel rigidity. Reflexes are 2 and symmetric in the upper extremities. Knee and ankle jerks are absent. Toes are down-going. However, sensation is intact to pin and joint position sense in all 4 extremities. Cerebellar function is intact on finger-to-nose and heel-to-shin bilaterally. I would propose her gait is normal neurologically; it is not wide base. She does look particularly unsteady to me. It appears to be an antalgic gait, where she appears to be in pain as she  bears weight on each leg, and says it is mainly in the knee.     DIAGNOSTIC DATA:  CT scan of the brain was normal in 12/2016; I reviewed the images and agree.    IMPRESSION:  I think it is quite unlikely that the patient has a significant neurologic problem. I would get a computed tomography of the brain since she complains of gait ataxia (though I do not see it), and we have not done an magnetic resonance imaging in 4 months. However if that is negative, I would get a B12 and a copper level, and otherwise I would not pursue it any further neurologically. Also check a creatine kinase, but I doubt she has a myopathy.    Thank you for allowing me to see the patient.

## 2017-04-07 NOTE — PLAN OF CARE
Problem: Hypertensive Disease/Crisis (Arterial) (Adult)  Goal: Signs and Symptoms of Listed Potential Problems Will be Absent or Manageable (Hypertensive Disease/Crisis)  Outcome: Ongoing (interventions implemented as appropriate)    Problem: Pain, Acute (Adult)  Goal: Acceptable Pain Control/Comfort Level  Outcome: Ongoing (interventions implemented as appropriate)    Problem: Fall Risk (Adult)  Goal: Absence of Falls  Outcome: Ongoing (interventions implemented as appropriate)    Problem: Patient Care Overview (Adult)  Goal: Plan of Care Review  Outcome: Ongoing (interventions implemented as appropriate)    04/07/17 1954   Coping/Psychosocial Response Interventions   Plan Of Care Reviewed With patient   Patient Care Overview   Progress improving   Outcome Evaluation   Outcome Summary/Follow up Plan Blood pressure improved today -160's and DBP 70-80's. Medicated with for pain in ribs, joints and headache. Pain rating continues to be 5. Rheumatology has not seen patient today. Monitor pain level and blood pressures.       Goal: Adult Individualization and Mutuality  Outcome: Ongoing (interventions implemented as appropriate)  Goal: Discharge Needs Assessment  Outcome: Ongoing (interventions implemented as appropriate)

## 2017-04-07 NOTE — PLAN OF CARE
"Problem: Hypertensive Disease/Crisis (Arterial) (Adult)  Goal: Signs and Symptoms of Listed Potential Problems Will be Absent or Manageable (Hypertensive Disease/Crisis)  Outcome: Ongoing (interventions implemented as appropriate)    04/07/17 0332   Hypertensive Disease/Crisis (Arterial)   Problems Assessed (Hypertensive Disease/Crisis (Arterial)) all   Problems Present (Hypertensive Disease/Crisis (Arterial)) renal dysfunction;severe hypertension/hypertensive crisis;situational response  (PT C/O HEADACHE)         Problem: Pain, Acute (Adult)  Goal: Acceptable Pain Control/Comfort Level  Outcome: Ongoing (interventions implemented as appropriate)    04/07/17 0332   Pain, Acute (Adult)   Acceptable Pain Control/Comfort Level making progress toward outcome  (MEDICATIONS AS ORDERED AS NEEDED)         Problem: Fall Risk (Adult)  Goal: Absence of Falls  Outcome: Ongoing (interventions implemented as appropriate)    04/07/17 0332   Fall Risk (Adult)   Absence of Falls achieves outcome  (SAFETY MAINTAINED)         Problem: Patient Care Overview (Adult)  Goal: Plan of Care Review  Outcome: Ongoing (interventions implemented as appropriate)    04/06/17 2130 04/07/17 0332   Coping/Psychosocial Response Interventions   Plan Of Care Reviewed With patient --    Patient Care Overview   Progress --  no change   Outcome Evaluation   Outcome Summary/Follow up Plan --  Pt's /105, IV Vasotec given with some improvement in BP. Pt also took her Procardia and Catapres which also helped decrease her BP to her lowest documented BP this shift of 102/72. Orthostatics ordered twice a shift. Last night's orthostatics started with /85 and HR 89 and ended with /90 and . Pt did state she felt a little \"light headed\" and had to sit down after last orthostatic pressure taken. Will continue to closely monitor BP and VS. Pt has been c/o headache and generalized arthralgias, pain medications don't seem to be relieving her pain " much, but states her pain got down to a 4/10. No c/o chest pain. Rheumatology to see today. Continue to monitor       Goal: Adult Individualization and Mutuality  Outcome: Ongoing (interventions implemented as appropriate)

## 2017-04-07 NOTE — PROGRESS NOTES
Pharmacy .... Medication clarification from [facility]    Ms. Barrett said she was on Advair.    [facility] says she was on 500/50   1 puff bid during the  Years of 2014 and 2015   But has not had any since.    Marco Mensah, Pharm D

## 2017-04-07 NOTE — PROGRESS NOTES
Neuro  I doubt there is any significant neurologic disorder here    Plan  MRI brain  B12, CK, Cu    D Roberth CONTRERAS

## 2017-04-07 NOTE — CONSULTS
"[facility]      Patient ID: Sasha Barrett   Age: 77 y.o.  Sex: female  :  1940  MRN: [id number]                LOS: 0 days   Patient Care Team:  Óscar Cadet MD as PCP - General  Óscar Cadet MD as PCP - Family Medicine      Referring MD:  Óscar Cadet MD    .  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 Cardiology consultation asked to evaluate accelerating hypertension and chest pain per Dr. Cadet her main complaint is uncontrolled blood pressure. She chills multiple allergies to blood pressure medicines. Last seen in our office January. At that time she was seen for initial cardiac evaluation. Her main complaint was high blood pressure and difficult to control as well as dyspnea on exertion. Reported her problems began around 2016 when she first noted intermittent swelling of her legs up to her thighs. No change in weight however. Reported she blames a lot of her symptoms on taking hydralazine from March through 2016, a medicine that was prescribed by her physician in [place]. When she returned to [place], Dr. Bond, her nephrologist, took her off the hydralazine. She still complains of aches in her joints \"all over\"  and muscles however.She states a doctor in [place] put her on the hydralazine and cant take the medicine. She gets chest pains. She describes the chest pain is bilateral rib pain \"large joints \"hurts on both sides, pain level can be as high as 10/10, onset 2 months ago. Associated with diaphoresis, worse with position, turning side to side, can wake her up at night. No associated nausea, " "radiation, shortness of breath. She thinks has shortness of breath due to asthma and is under control. No worse with taking deep breath, tenderness, food, no belching burping. Worse with sitting. Improves with heating pad, deep heat, pain medicine. She states received Demerol this morning which helped it, helped her to relax; pain not completely gone. Palpitations. States has swelling of both knees pain in knees. No orthopnea or PND. No change in weight. No palpitations. She gets some lightheadedness with standing thinks due to \"off balance\", generalized achiness and both knees. No syncope. She states is under a lot of stress at home with her sick .      Reported she was sensitive for rheumatoid arthritis was told the test is borderline positive.      Cardiac risk factors: Positive for hypertension and hyperlipidemia. No diabetes. No smoking but she did have secondhand smoke. No family history of early CAD.            Subjective     ROS     Constitution: Positive for chills and weakness. Negative for fever and weight gain.   HENT: Negative for congestion, ear pain, headaches, hearing loss, hoarse voice and sore throat.   Eyes: Negative for blurred vision.   Cardiovascular: Positive for chest pain, dyspnea on exertion and leg swelling. Negative for claudication, cyanosis, irregular heartbeat, near-syncope, orthopnea, palpitations, paroxysmal nocturnal dyspnea and syncope.   Both Rib pain both sides   Respiratory: Negative for cough, shortness of breath and snoring.   Endocrine: Negative for cold intolerance.   Hematologic/Lymphatic: Negative for adenopathy. Does not bruise/bleed easily.   Skin: Negative for rash.   Musculoskeletal: Positive for joint pain. Negative for back pain.   Both knees   Gastrointestinal: Negative for abdominal pain, change in bowel habit, constipation, diarrhea, hematemesis, melena, nausea and vomiting.   Genitourinary: Negative for dysuria, frequency, hematuria and hesitancy. "   Neurological: Negative for disturbances in coordination, excessive daytime sleepiness, dizziness, focal weakness and loss of balance.   Psychiatric/Behavioral: Negative for altered mental status and memory loss. The patient is not nervous/anxious.   Allergic/Immunologic: Negative for hives.       Past Medical History:   Diagnosis Date   • Arthritis    • Asthma    • Cancer    • COPD (chronic obstructive pulmonary disease)    • Emphysema lung    • Generalized headaches    • Hayfever    • Hypertension    • Stroke        Past Surgical History:   Procedure Laterality Date   • BREAST LUMPECTOMY Left 04/01/2003   • CARPAL TUNNEL RELEASE  2011   • CATARACT EXTRACTION  2010   • HERNIA REPAIR  01/01/1971   • HERNIA REPAIR      right groin   • JOINT REPLACEMENT     • KNEE SURGERY Left 01/01/1998    bilateral knee replacements   • MASTECTOMY     • PARATHYROIDECTOMY  05/08/2006   • SINUS SURGERY  01/01/1984   • TOTAL ABDOMINAL HYSTERECTOMY  01/01/1973       Social History     Social History   • Marital status:      Spouse name: N/A   • Number of children: N/A   • Years of education: N/A     Occupational History   • Not on file.     Social History Main Topics   • Smoking status: Never Smoker   • Smokeless tobacco: Never Used   • Alcohol use Yes      Comment: rarely - once or twice year (wine or bernabe)   • Drug use: Defer   • Sexual activity: Defer     Other Topics Concern   • Not on file     Social History Narrative       Family History   Problem Relation Age of Onset   • Cancer Brother        History of Present Illness    Allergies   Allergen Reactions   • Morphine Anaphylaxis   • Sulfa Antibiotics Anaphylaxis and Hives     Or sulfa fillers in meds   • Hydralazine Myalgia     Patient reports leg cramps, joint pain and increased fatigue   • Amlodipine      KIDNEYS SHUT DOWN   • Anastrozole    • Grass    • Morphine And Related    • Nifedipine      UNCONTROLLED BP   • Nsaids      KIDNEY FAILURE   • Norvasc  [Amlodipine  Besylate]    • Penicillins    • Pneumococcal Vaccines Nausea Only   • Tree Extract    • Zolpidem      HALLUCINATIONS            Current Meds:   Current Facility-Administered Medications   Medication Dose Route Frequency Provider Last Rate Last Dose   • acetaminophen (TYLENOL) tablet 650 mg  650 mg Oral Q4H PRN Óscar Cadet MD       • albuterol (PROVENTIL) nebulizer solution 0.083% 2.5 mg/3mL  2.5 mg Nebulization Q4H PRN Óscar Cadet MD   2.5 mg at 04/06/17 2026   • aspirin EC tablet 81 mg  81 mg Oral Once Óscar Cadet MD       • budesonide (PULMICORT) nebulizer solution 0.5 mg  0.5 mg Nebulization BID - RT Óscar Cadet MD   0.5 mg at 04/06/17 2026   • butalbital-acetaminophen-caffeine (FIORICET, ESGIC) -40 MG per tablet 2 tablet  2 tablet Oral Q4H PRN Richard Lawson MD       • CloNIDine (CATAPRES) tablet 0.2 mg  0.2 mg Oral BID Óscar Cadte MD       • diphenhydrAMINE (BENADRYL) 12.5 MG/5ML elixir 12.5 mg  12.5 mg Oral Q4H PRN Óscar Cadet MD       • docusate sodium (COLACE) capsule 100 mg  100 mg Oral BID Óscar Cadet MD       • enalaprilat (VASOTEC) injection 1.25 mg  1.25 mg Intravenous Q6H PRN Óscar Cadet MD   1.25 mg at 04/06/17 1951   • enoxaparin (LOVENOX) syringe 30 mg  30 mg Subcutaneous Daily Óscar Cadet MD   30 mg at 04/06/17 1639   • eplerenone (INSPRA) tablet 25 mg  25 mg Oral Q24H Soco Holloway MD   25 mg at 04/06/17 1857   • ethacrynic acid (EDECRIN) tablet 25 mg  25 mg Oral BID Óscar Cadet MD       • HYDROcodone-acetaminophen (NORCO) 5-325 MG per tablet 1 tablet  1 tablet Oral Q6H PRN sÓcar Cadet MD   1 tablet at 04/06/17 1857   • hydrOXYzine (VISTARIL) capsule 50 mg  50 mg Oral Q6H PRN Óscar Cadet MD       • ipratropium (ATROVENT) nebulizer solution 0.5 mg  500 mcg Nebulization 4x Daily PRN Óscar Cadet MD       • LORazepam (ATIVAN) tablet 0.5 mg  0.5 mg Oral Q8H PRN Óscar Cadet MD       • montelukast (SINGULAIR) tablet 10  "mg  10 mg Oral Nightly Óscar Cadet MD       • NIFEdipine XL (PROCARDIA XL) 24 hr tablet 60 mg  60 mg Oral BID Óscar Cadet MD       • ondansetron ODT (ZOFRAN-ODT) disintegrating tablet 8 mg  8 mg Oral Q6H PRN Óscar Cadet MD       • [START ON 4/7/2017] pantoprazole (PROTONIX) EC tablet 40 mg  40 mg Oral Q AM Óscar Cadet MD       • pentazocine-naloxone (TALWIN NX) 50-0.5 MG per tablet 2 tablet  2 tablet Oral Q4H PRN Óscar Cadet MD       • Pharmacy Consult   Does not apply Continuous PRN Óscar aCdet MD       • polyethylene glycol (MIRALAX) powder 17 g  17 g Oral Daily Óscar Cadet MD       • promethazine (PHENERGAN) tablet 12.5 mg  12.5 mg Oral Q6H PRN Óscar Cadet MD       • promethazine-codeine (PHENERGAN with CODEINE) 6.25-10 MG/5ML syrup 5 mL  5 mL Oral 4x Daily PRN Óscar Cadet MD       • sodium chloride 0.9 % flush 1-10 mL  1-10 mL Intravenous PRN Óscar Cadet MD       • sodium chloride 0.9 % flush 10 mL  10 mL Intravenous PRN Estevan Vega MD       • tiZANidine (ZANAFLEX) tablet 2 mg  2 mg Oral BID Óscar Cadet MD             Medication Review:     aspirin 81 mg Oral Once   budesonide 0.5 mg Nebulization BID - RT   CloNIDine 0.2 mg Oral BID   docusate sodium 100 mg Oral BID   enoxaparin 30 mg Subcutaneous Daily   eplerenone 25 mg Oral Q24H   ethacrynic acid 25 mg Oral BID   montelukast 10 mg Oral Nightly   NIFEdipine XL 60 mg Oral BID   [START ON 4/7/2017] pantoprazole 40 mg Oral Q AM   polyethylene glycol 17 g Oral Daily   tiZANidine 2 mg Oral BID         Objective     Vital Signs  Temp:  [97.5 °F (36.4 °C)-98.3 °F (36.8 °C)] 98.1 °F (36.7 °C)  Heart Rate:  [68-91] 75  Resp:  [16-18] 18  BP: (159-211)/() 159/113    BP (!) 159/113 (BP Location: Right arm, Patient Position: Lying)  Pulse 75  Temp 98.1 °F (36.7 °C) (Oral)   Resp 18  Ht 63\" (160 cm)  Wt 184 lb (83.5 kg)  SpO2 97%  BMI 32.59 kg/m2      Physical Exam    Constitutional: She is oriented to " person, place, and time. She appears well-developed and well-nourished. No distress.   HENT:   Head: Normocephalic and atraumatic.   Right Ear: External ear normal.   Left Ear: External ear normal.   Mouth/Throat: Oropharynx is clear and moist. No oropharyngeal exudate.   Eyes: Conjunctivae and EOM are normal. Pupils are equal, round, and reactive to light. No scleral icterus.   Neck: Normal range of motion. Neck supple. No JVD present. No tracheal deviation present. No thyromegaly present.   Cardiovascular: Normal rate, regular rhythm, S1 normal, S2 normal, normal heart sounds and intact distal pulses. PMI is not displaced. Exam reveals no gallop, no distant heart sounds, no friction rub and no decreased pulses.   No murmur heard.  Pulmonary/Chest: Effort normal and breath sounds normal. No accessory muscle usage. No respiratory distress. She has no wheezes. She has no rales. She exhibits no tenderness.   Abdominal: Soft. Bowel sounds are normal. She exhibits no distension and no mass. There is no tenderness. There is no rebound and no guarding.   Musculoskeletal: Normal range of motion. She exhibits no edema, tenderness or deformity.   Lymphadenopathy:   She has no cervical adenopathy.   Neurological: She is alert and oriented to person, place, and time. She has normal reflexes. No cranial nerve deficit. Coordination normal.   Skin: Skin is dry. No rash noted. She is not diaphoretic. No erythema. No pallor.   Psychiatric: She has a normal mood and affect.       WEIGHTS:     Wt Readings from Last 1 Encounters:   04/06/17 1109 184 lb (83.5 kg)   04/06/17 0720 180 lb (81.6 kg)       Results Review:     I reviewed the patient's new clinical results    LABS:  Lab Results   Component Value Date    CALCIUM 10.2 04/06/2017   @LABRCNTIP(MG:3,NA:3,K:3,CL:3,co2:3,bun:3,  creatinine:3,labglom:3,glucose,calcium:3,WBC:3,HGB:3,PLT:3,ALT:3,AST:3)@  Lab Results   Component Value Date    CKTOTAL 44 04/06/2017    CKMB 1.69  04/06/2017    TROPONINT <0.010 04/06/2017     Estimated Creatinine Clearance: 54.9 mL/min (by C-G formula based on Cr of 0.85).  No results found for: CHOL  Lab Results   Component Value Date    HDL 62 11/11/2016    HDL 56 07/25/2016     Lab Results   Component Value Date     (H) 11/11/2016     (H) 07/25/2016     Lab Results   Component Value Date    TRIG 175 (H) 11/11/2016    TRIG 212 (H) 07/25/2016     No components found for: CHOLHDL  @RESUFAST(GLUCOSE,BUN,CREATININE,EGFRIFNONA,EGFRIFAFRI,BCR,  SODIUM,POTASSIUM,CHLORIDE,CO2,CALCIUM,PROTENTOTREF,ALBUMIN,  GLOBULIN,LABIL2,BILIRUBIN,ALK PHOS,AST,ALT)@  @RESUFAST(GLUCOSE,CALCIUM,NA,K,CO2,CL,BUN,CREATININE,EGFRIFAFRI,  EGFRIFNONA,BCR,ANIONGAP)@  Lab Results   Component Value Date    WBC 6.89 04/06/2017    HGB 15.9 (H) 04/06/2017    HCT 48.4 (H) 04/06/2017    MCV 90.3 04/06/2017     04/06/2017     No results found for: DDIMER  Lab Results   Component Value Date    TSH 2.080 11/11/2016    E3BBIMD 6.9 11/11/2016     Lab Results   Component Value Date    CKTOTAL 44 04/06/2017     No results found for: DIGOXIN  No results found for: INR, PROTIME  Estimated Creatinine Clearance: 54.9 mL/min (by C-G formula based on Cr of 0.85).      Results from last 7 days  Lab Units 04/06/17  1130   CK TOTAL U/L 44   TROPONIN T ng/mL <0.010           Results from last 7 days  Lab Units 04/06/17  1130   SODIUM mmol/L 144   POTASSIUM mmol/L 3.3*   BUN mg/dL 13   CREATININE mg/dL 0.85   CALCIUM mg/dL 10.2     @LABRCNTIPbnp@    Results from last 7 days  Lab Units 04/06/17  1213 04/06/17  0827   WBC 10*3/mm3 6.89 5.52   HEMOGLOBIN g/dL 15.9* 13.9   HEMATOCRIT % 48.4* 42.2   PLATELETS 10*3/mm3 226 187           Lab Results (last 24 hours)     Procedure Component Value Units Date/Time    CBC & Differential [[id number]] Collected:  04/06/17 0827    Specimen:  Blood Updated:  04/06/17 0837    Narrative:       The following orders were created for panel order CBC &  Differential.  Procedure                               Abnormality         Status                     ---------                               -----------         ------                     CBC Auto Differential[[id number]]         Abnormal            Final result                 Please view results for these tests on the individual orders.    CBC Auto Differential [[id number]]  (Abnormal) Collected:  04/06/17 0827    Specimen:  Blood Updated:  04/06/17 0837     WBC 5.52 10*3/mm3      RBC 4.65 10*6/mm3      Hemoglobin 13.9 g/dL      Hematocrit 42.2 %      MCV 90.8 fL      MCH 29.9 pg      MCHC 32.9 g/dL      RDW 14.1 (H) %      RDW-SD 46.6 fl      MPV 10.8 fL      Platelets 187 10*3/mm3      Neutrophil % 53.6 %      Lymphocyte % 32.8 %      Monocyte % 10.7 %      Eosinophil % 1.8 %      Basophil % 1.1 %      Immature Grans % 0.0 %      Neutrophils, Absolute 2.96 10*3/mm3      Lymphocytes, Absolute 1.81 10*3/mm3      Monocytes, Absolute 0.59 10*3/mm3      Eosinophils, Absolute 0.10 10*3/mm3      Basophils, Absolute 0.06 10*3/mm3      Immature Grans, Absolute 0.00 10*3/mm3     Basic Metabolic Panel [[id number]]  (Abnormal) Collected:  04/06/17 0827    Specimen:  Blood Updated:  04/06/17 0855     Glucose 105 (H) mg/dL      BUN 16 mg/dL      Creatinine 0.89 mg/dL      Sodium 145 mmol/L      Potassium 3.6 mmol/L      Chloride 105 mmol/L      CO2 27.0 mmol/L      Calcium 9.5 mg/dL      eGFR Non African Amer 61 mL/min/1.73      BUN/Creatinine Ratio 18.0     Anion Gap 13.0 mmol/L     Narrative:       The MDRD GFR formula is only valid for adults with stable renal function between ages 18 and 70.    Sedimentation Rate [[id number]]  (Normal) Collected:  04/06/17 0827    Specimen:  Blood Updated:  04/06/17 0904     Sed Rate 5 mm/hr     Rheumatoid Arthritis Expanded Panel [[id number]] Collected:  04/06/17 1130    Specimen:  Blood Updated:  04/06/17 1146    C-reactive Protein [[id number]]  (Normal) Collected:  04/06/17 1130    Specimen:   Blood Updated:  04/06/17 1222     C-Reactive Protein 0.13 mg/dL     Basic Metabolic Panel [[id number]]  (Abnormal) Collected:  04/06/17 1130    Specimen:  Blood Updated:  04/06/17 1222     Glucose 97 mg/dL      BUN 13 mg/dL      Creatinine 0.85 mg/dL      Sodium 144 mmol/L      Potassium 3.3 (L) mmol/L      Chloride 102 mmol/L      CO2 23.8 mmol/L      Calcium 10.2 mg/dL      eGFR Non African Amer 65 mL/min/1.73      BUN/Creatinine Ratio 15.3     Anion Gap 18.2 mmol/L     Narrative:       The MDRD GFR formula is only valid for adults with stable renal function between ages 18 and 70.    CBC & Differential [[id number]] Collected:  04/06/17 1213    Specimen:  Blood Updated:  04/06/17 1240    Narrative:       The following orders were created for panel order CBC & Differential.  Procedure                               Abnormality         Status                     ---------                               -----------         ------                     CBC Auto Differential[[id number]]         Abnormal            Final result                 Please view results for these tests on the individual orders.    CBC Auto Differential [[id number]]  (Abnormal) Collected:  04/06/17 1213    Specimen:  Blood Updated:  04/06/17 1240     WBC 6.89 10*3/mm3      RBC 5.36 (H) 10*6/mm3      Hemoglobin 15.9 (H) g/dL      Hematocrit 48.4 (H) %      MCV 90.3 fL      MCH 29.7 pg      MCHC 32.9 g/dL      RDW 14.2 (H) %      RDW-SD 46.7 fl      MPV 11.1 fL      Platelets 226 10*3/mm3      Neutrophil % 51.0 %      Lymphocyte % 36.9 %      Monocyte % 10.4 %      Eosinophil % 1.3 %      Basophil % 0.4 %      Immature Grans % 0.0 %      Neutrophils, Absolute 3.51 10*3/mm3      Lymphocytes, Absolute 2.54 10*3/mm3      Monocytes, Absolute 0.72 10*3/mm3      Eosinophils, Absolute 0.09 10*3/mm3      Basophils, Absolute 0.03 10*3/mm3      Immature Grans, Absolute 0.00 10*3/mm3     Respiratory Panel, PCR [[id number]]  (Normal) Collected:  04/06/17 1119    Specimen:   Swab from Nares Updated:  04/06/17 1409     ADENOVIRUS, PCR Not Detected     Coronavirus 229E Not Detected     Coronavirus HKU1 Not Detected     Coronavirus NL63 Not Detected     Coronavirus OC43 Not Detected     Human Metapneumovirus Not Detected     Human Rhinovirus/Enterovirus Not Detected     Influenza B PCR Not Detected     Parainfluenza Virus 1 Not Detected     Parainfluenza Virus 2 Not Detected     Parainfluenza Virus 3 Not Detected     Parainfluenza Virus 4 Not Detected     Bordetella pertussis pcr Not Detected     Influenza 2009 H1N1 by PCR Not Detected     Chlamydophila pneumoniae PCR Not Detected     Mycoplasma pneumo by PCR Not Detected     Influenza A PCR Not Detected     Influenza A H3 Not Detected     Influenza A H1 Not Detected     RSV, PCR Not Detected    BNP [[id number]]  (Normal) Collected:  04/06/17 1130    Specimen:  Blood Updated:  04/06/17 1651     proBNP 350.8 pg/mL     Narrative:       Among patients with dyspnea, NT-proBNP is highly sensitive for the detection of acute congestive heart failure. In addition NT-proBNP of <300 pg/ml effectively rules out acute congestive heart failure with 99% negative predictive value.    Troponin [[id number]]  (Normal) Collected:  04/06/17 1130    Specimen:  Blood Updated:  04/06/17 1653     Troponin T <0.010 ng/mL     Narrative:       Troponin T Reference Ranges:  Less than 0.03 ng/mL:    Negative for AMI  0.03 to 0.09 ng/mL:      Indeterminant for AMI  Greater than 0.09 ng/mL: Positive for AMI    CK Total & CKMB [[id number]]  (Normal) Collected:  04/06/17 1130    Specimen:  Blood Updated:  04/06/17 1653     CKMB 1.69 ng/mL      Creatine Kinase 44 U/L     T4, Free [[id number]]  (Normal) Collected:  04/06/17 1130    Specimen:  Blood Updated:  04/06/17 1658     Free T4 1.18 ng/dL     Aldosterone / Renin Ratio [[id number]] Collected:  04/06/17 1646    Specimen:  Blood Updated:  04/06/17 1704    Urine Culture [[id number]] Collected:  04/06/17 1810    Specimen:  Urine from  Urine, Clean Catch Updated:  04/06/17 1909    Urinalysis With / Culture If Indicated [[id number]]  (Abnormal) Collected:  04/06/17 1810    Specimen:  Urine from Urine, Clean Catch Updated:  04/06/17 1913     Color, UA Yellow     Appearance, UA Cloudy (A)     pH, UA 7.0     Specific Gravity, UA 1.011     Glucose, UA Negative     Ketones, UA Negative     Bilirubin, UA Negative     Blood, UA Trace (A)     Protein, UA Negative     Leuk Esterase, UA Moderate (2+) (A)     Nitrite, UA Positive (A)     Urobilinogen, UA 1.0 E.U./dL    T3, Free [[id number]]  (Normal) Collected:  04/06/17 1130    Specimen:  Blood Updated:  04/06/17 1923     T3, Free 3.79 pg/mL     Protein / Creatinine Ratio, Urine [[id number]] Collected:  04/06/17 1810    Specimen:  Urine from Urine, Clean Catch Updated:  04/06/17 1927     Protein/Creatinine Ratio, Urine 431.7 mg/G Crea      Creatinine, Urine 41.7 mg/dL      Total Protein, Urine 18.0 mg/dL     Narrative:       Protein / Creatinine Ratio  <0.2   Normal Urinary Protein Excretion  Protein / Creatinine Ratio  >3.5   Nephrotic Range Proteinuria    Urinalysis, Microscopic Only [[id number]]  (Abnormal) Collected:  04/06/17 1810    Specimen:  Urine from Urine, Clean Catch Updated:  04/1940     RBC, UA 0-2 /HPF      WBC, UA 6-12 (A) /HPF      Bacteria, UA 3+ (A) /HPF      Squamous Epithelial Cells, UA 0-2 /HPF      Hyaline Casts, UA None Seen /LPF      Methodology Manual Light Microscopy          Imaging Results (last 72 hours)     Procedure Component Value Units Date/Time    US Non-ob Transvaginal [[id number]] Updated:  04/06/17 1401    XR Knee 1 or 2 View Bilateral [[id number]] Collected:  04/06/17 1541     Updated:  04/06/17 1556    Narrative:       RIGHT AND LEFT KNEE X-RAYS     HISTORY: Knee pain and bruising.     2 images of each knee are provided. These are correlated with right knee  postoperative x-ray 04/09/2012.     FINDINGS: There is right total knee arthroplasty hardware. No joint  effusion is  present. The bones appear normal. No hardware loosening is  evident.     2 images of the left knee are provided. There is no joint effusion.  Total knee arthroplasty hardware is positioned as expected with no  evidence of hardware loosening. The subcutaneous fat over the anterior  aspect of the left knee appears thicker than that on the right, and  there is some subcutaneous edema anteriorly on the left. No soft tissue  gas is present.       Impression:       There is bilateral total knee arthroplasty hardware. No  joint effusion or other evidence of hardware failure is present. The  subcutaneous fat over the anterior aspect of the left knee is thicker  than that on the right, of uncertain etiology.     This report was finalized on 2017 3:53 PM by Dr. Rocael Anne MD.       US Pelvis Limited [[id number]] Collected:  17     Updated:  17    Narrative:       LIMITED NON-OB TRANSABDOMINAL TRANSVAGINAL AND LIMITED VASCULAR  ULTRASOUND     HISTORY: 77-year-old female with evidence of swelling in the pelvic  region  6 para 6 last menstrual cycle age 32 (hysterectomy)  evaluate ovaries.     COMPARISON: None available     FINDINGS:  1. Uterus is surgically absent.  2. Ovaries are not imaged despite transvaginal technique.  3. No adnexal mass nor free fluid.     This report was finalized on 2017 7:44 PM by Dr. Christofer Chandra MD.             Imaging Results (all)     Procedure Component Value Units Date/Time    US Non-ob Transvaginal [[id number]] Updated:  17 1401    XR Knee 1 or 2 View Bilateral [[id number]] Collected:  17 1541     Updated:  17 1556    Narrative:       RIGHT AND LEFT KNEE X-RAYS     HISTORY: Knee pain and bruising.     2 images of each knee are provided. These are correlated with right knee  postoperative x-ray 2012.     FINDINGS: There is right total knee arthroplasty hardware. No joint  effusion is present. The bones appear normal. No hardware  loosening is  evident.     2 images of the left knee are provided. There is no joint effusion.  Total knee arthroplasty hardware is positioned as expected with no  evidence of hardware loosening. The subcutaneous fat over the anterior  aspect of the left knee appears thicker than that on the right, and  there is some subcutaneous edema anteriorly on the left. No soft tissue  gas is present.       Impression:       There is bilateral total knee arthroplasty hardware. No  joint effusion or other evidence of hardware failure is present. The  subcutaneous fat over the anterior aspect of the left knee is thicker  than that on the right, of uncertain etiology.     This report was finalized on 2017 3:53 PM by Dr. Rocael Anne MD.       US Pelvis Limited [[id number]] Collected:  17     Updated:  17    Narrative:       LIMITED NON-OB TRANSABDOMINAL TRANSVAGINAL AND LIMITED VASCULAR  ULTRASOUND     HISTORY: 77-year-old female with evidence of swelling in the pelvic  region  6 para 6 last menstrual cycle age 32 (hysterectomy)  evaluate ovaries.     COMPARISON: None available     FINDINGS:  1. Uterus is surgically absent.  2. Ovaries are not imaged despite transvaginal technique.  3. No adnexal mass nor free fluid.     This report was finalized on 2017 7:44 PM by Dr. Christofer Chandra MD.             ECG/EMG Results (last 24 hours)     Procedure Component Value Units Date/Time    ECG 12 Lead [[id number]] Collected:  17     Updated:  17    Narrative:       RR Interval= 822 ms  IN Interval= 180 ms  QRSD Interval= 84 ms  QT Interval= 404 ms  QTc Interval= 446 ms  Heart Rate= 73 ms  P Axis= 46 deg  QRS Axis= 37 deg  T Wave Axis= 52 deg  I: 40 Axis= 35 deg  T: 40 Axis= 47 deg  ST Axis= 199 deg  SINUS RHYTHM  VENTRICULAR PREMATURE COMPLEX  NO SIGNIFICANT CHANGE FROM PREVIOUS ECG  Electronically Signed by:  Zhou Rubalcava ([facility]) 2017 20:08:27  Date and Time of Study:  2017-04-06 19:27:26              Assessment:         [unfilled]    Active Problems:    Malaise and fatigue progressive    Arthralgia    Accelerated hypertension    Acute joint pain    Easy bruisability      Assessment/Plan     I not only counseled the patient today on the significant factors noted in the assessment and plan, but I also recommended that the patient reduce salt and saturated animal fat intake in diet, as well as to perform scheduled exercise on a regular basis.       Assessment/Plan       -Chest pain: Typical and atypical features. She had a myocardial perfusion imaging indicates a normal myocardial perfusion study with no evidence of ischemia on 2/9.  - Accelerated hypertension: Blood pressure is high as 211 systolic and 125 diastolic Has multiple blood pressure medicine allergies including amlodipine, nifedipine, Norvasc, hydralazine. Added clonidine, Procardia XL per nephrology for BP control management  -LV EF normal by echo 12/5/2016  - +DD  -Mild to mod tricuspid regurgitation     We'll obtain laboratory studies evaluate medical management. Obtain troponin, cardiac enzymes and EKG. Obtain proBNP, free T3, free T4, to evaluate medical management. EKG and in a.m. Lipid profile in a.m.  Ongoing management by Dr. Umesh Greenberg. Bilirubin APRN     I not only counseled the patient today on the significant factors noted in the assessment and plan, but I also recommended that the patient reduce salt and saturated animal fat intake in diet, as well as to perform scheduled exercise on a regular basis.        AC Morales  04/06/17  4:16 PM      I agree with above note. I personally carried out physical exam, reviewed all pertinent clinical data, discussed assessment with APRN, then determined optimal plan.   I have also discussed the assessment and plan with family.     The patient is a 77-year-old white female with history of hypertension, hyperlipidemia, COPD to secondhand smoke and possibly smoke  from a wood burning stove on her first day of life, presents with generalized muscle aches which she blames on hydralazine, which she was on from March through November 2016. Cardiology consultation was requested because of chest pain. The chest pain is atypical for angina as it is worse with turning side to side, and can awaken her from sleep. His complaint is shortness of breath and thinks is more due to her asthma/COPD. Her last stress test was on February 9, 2017 and was normal with ejection fraction greater than 70%. Her EKG shows sinus rhythm with nonspecific ST-T wave changes, not significantly changed from previous ECG. Given the atypical features for his pain, no further workup of ischemia is necessary at this time.     Her pro BNP is 350, normal. Her jugular venous pressure is normal. She's been losing weight and not gaining weight. Therefore, she does not have heart failure. Her last echo in December 2016 showed LVEF normal, moderate LVH, diastolic dysfunction. I don't think that we need to repeat an echo.      Umesh Greenberg M.D.  April 6, 2017      KAYLA Dragon/Transcription disclaimer:   Much of this encounter note is an electronic transcription/translation of spoken language to printed text. The electronic translation of spoken language may permit erroneous, or at times, nonsensical words or phrases to be inadvertently transcribed; Although I have reviewed the note for such errors, some may still exist.

## 2017-04-07 NOTE — PLAN OF CARE
Problem: Hypertensive Disease/Crisis (Arterial) (Adult)  Goal: Signs and Symptoms of Listed Potential Problems Will be Absent or Manageable (Hypertensive Disease/Crisis)  Outcome: Ongoing (interventions implemented as appropriate)    Problem: Pain, Acute (Adult)  Goal: Identify Related Risk Factors and Signs and Symptoms  Outcome: Outcome(s) achieved Date Met:  04/06/17  Goal: Acceptable Pain Control/Comfort Level  Outcome: Ongoing (interventions implemented as appropriate)    Problem: Fall Risk (Adult)  Goal: Identify Related Risk Factors and Signs and Symptoms  Outcome: Outcome(s) achieved Date Met:  04/06/17  Goal: Absence of Falls  Outcome: Ongoing (interventions implemented as appropriate)    Problem: Patient Care Overview (Adult)  Goal: Plan of Care Review    04/06/17 2011   Coping/Psychosocial Response Interventions   Plan Of Care Reviewed With patient   Patient Care Overview   Progress no change   Outcome Evaluation   Outcome Summary/Follow up Plan Continues to have increased blood pressures. Started on Inspira. Lortab given for pain. Monitor blood pressures and pain level.        Goal: Adult Individualization and Mutuality  Outcome: Ongoing (interventions implemented as appropriate)  Goal: Discharge Needs Assessment  Outcome: Ongoing (interventions implemented as appropriate)

## 2017-04-08 LAB
ALBUMIN SERPL-MCNC: 3.7 G/DL (ref 3.5–5.2)
ANION GAP SERPL CALCULATED.3IONS-SCNC: 13.2 MMOL/L
BACTERIA SPEC AEROBE CULT: ABNORMAL
BASOPHILS # BLD AUTO: 0.06 10*3/MM3 (ref 0–0.2)
BASOPHILS NFR BLD AUTO: 1.1 % (ref 0–1.5)
BUN BLD-MCNC: 16 MG/DL (ref 8–23)
BUN/CREAT SERPL: 18.8 (ref 7–25)
CALCIUM SPEC-SCNC: 9.6 MG/DL (ref 8.6–10.5)
CCP IGA+IGG SERPL IA-ACNC: 24 UNITS (ref 0–19)
CHLORIDE SERPL-SCNC: 105 MMOL/L (ref 98–107)
CK SERPL-CCNC: 36 U/L (ref 20–180)
CO2 SERPL-SCNC: 23.8 MMOL/L (ref 22–29)
CREAT BLD-MCNC: 0.85 MG/DL (ref 0.57–1)
CRP SERPL-MCNC: 0.7 MG/L (ref 0–4.9)
DEPRECATED RDW RBC AUTO: 47.2 FL (ref 37–54)
EOSINOPHIL # BLD AUTO: 0.15 10*3/MM3 (ref 0–0.7)
EOSINOPHIL NFR BLD AUTO: 2.7 % (ref 0.3–6.2)
ERYTHROCYTE [DISTWIDTH] IN BLOOD BY AUTOMATED COUNT: 14.2 % (ref 11.7–13)
ERYTHROCYTE [SEDIMENTATION RATE] IN BLOOD BY WESTERGREN METHOD: 12 MM/HR (ref 0–40)
GFR SERPL CREATININE-BSD FRML MDRD: 65 ML/MIN/1.73
GLUCOSE BLD-MCNC: 105 MG/DL (ref 65–99)
HCT VFR BLD AUTO: 42 % (ref 35.6–45.5)
HGB BLD-MCNC: 13.5 G/DL (ref 11.9–15.5)
IMM GRANULOCYTES # BLD: 0 10*3/MM3 (ref 0–0.03)
IMM GRANULOCYTES NFR BLD: 0 % (ref 0–0.5)
LYMPHOCYTES # BLD AUTO: 2.29 10*3/MM3 (ref 0.9–4.8)
LYMPHOCYTES NFR BLD AUTO: 41 % (ref 19.6–45.3)
MAGNESIUM SERPL-MCNC: 2.3 MG/DL (ref 1.6–2.4)
MCH RBC QN AUTO: 29.5 PG (ref 26.9–32)
MCHC RBC AUTO-ENTMCNC: 32.1 G/DL (ref 32.4–36.3)
MCV RBC AUTO: 91.9 FL (ref 80.5–98.2)
MONOCYTES # BLD AUTO: 0.66 10*3/MM3 (ref 0.2–1.2)
MONOCYTES NFR BLD AUTO: 11.8 % (ref 5–12)
NEUTROPHILS # BLD AUTO: 2.42 10*3/MM3 (ref 1.9–8.1)
NEUTROPHILS NFR BLD AUTO: 43.4 % (ref 42.7–76)
PHOSPHATE SERPL-MCNC: 3.2 MG/DL (ref 2.5–4.5)
PLATELET # BLD AUTO: 178 10*3/MM3 (ref 140–500)
PMV BLD AUTO: 10.8 FL (ref 6–12)
POTASSIUM BLD-SCNC: 3.6 MMOL/L (ref 3.5–5.2)
RA LATEX TURBID: <10 IU/ML (ref 0–13.9)
RBC # BLD AUTO: 4.57 10*6/MM3 (ref 3.9–5.2)
SODIUM BLD-SCNC: 142 MMOL/L (ref 136–145)
URATE SERPL-MCNC: 5.1 MG/DL (ref 2.4–5.7)
WBC NRBC COR # BLD: 5.58 10*3/MM3 (ref 4.5–10.7)

## 2017-04-08 PROCEDURE — 94799 UNLISTED PULMONARY SVC/PX: CPT

## 2017-04-08 PROCEDURE — 25010000002 ENOXAPARIN PER 10 MG: Performed by: INTERNAL MEDICINE

## 2017-04-08 PROCEDURE — 96366 THER/PROPH/DIAG IV INF ADDON: CPT

## 2017-04-08 PROCEDURE — 83735 ASSAY OF MAGNESIUM: CPT | Performed by: INTERNAL MEDICINE

## 2017-04-08 PROCEDURE — 85025 COMPLETE CBC W/AUTO DIFF WBC: CPT | Performed by: INTERNAL MEDICINE

## 2017-04-08 PROCEDURE — 84550 ASSAY OF BLOOD/URIC ACID: CPT | Performed by: INTERNAL MEDICINE

## 2017-04-08 PROCEDURE — 80069 RENAL FUNCTION PANEL: CPT | Performed by: INTERNAL MEDICINE

## 2017-04-08 PROCEDURE — G0378 HOSPITAL OBSERVATION PER HR: HCPCS

## 2017-04-08 PROCEDURE — 25010000003 CEFTRIAXONE PER 250 MG: Performed by: INTERNAL MEDICINE

## 2017-04-08 PROCEDURE — 99225 PR SBSQ OBSERVATION CARE/DAY 25 MINUTES: CPT | Performed by: INTERNAL MEDICINE

## 2017-04-08 PROCEDURE — 96372 THER/PROPH/DIAG INJ SC/IM: CPT

## 2017-04-08 PROCEDURE — 82550 ASSAY OF CK (CPK): CPT | Performed by: PSYCHIATRY & NEUROLOGY

## 2017-04-08 PROCEDURE — 94762 N-INVAS EAR/PLS OXIMTRY CONT: CPT

## 2017-04-08 RX ADMIN — CLONIDINE HYDROCHLORIDE 0.2 MG: 0.1 TABLET ORAL at 09:46

## 2017-04-08 RX ADMIN — BUDESONIDE 0.5 MG: 0.5 INHALANT RESPIRATORY (INHALATION) at 20:56

## 2017-04-08 RX ADMIN — ALBUTEROL SULFATE 2.5 MG: 2.5 SOLUTION RESPIRATORY (INHALATION) at 08:19

## 2017-04-08 RX ADMIN — POLYETHYLENE GLYCOL 3350 17 G: 1 POWDER ORAL at 09:48

## 2017-04-08 RX ADMIN — TIZANIDINE 2 MG: 2 TABLET ORAL at 17:47

## 2017-04-08 RX ADMIN — BUTALBITAL, ACETAMINOPHEN, AND CAFFEINE 2 TABLET: 50; 325; 40 TABLET ORAL at 14:41

## 2017-04-08 RX ADMIN — DOCUSATE SODIUM 100 MG: 100 CAPSULE, LIQUID FILLED ORAL at 09:28

## 2017-04-08 RX ADMIN — NIFEDIPINE 60 MG: 60 TABLET, EXTENDED RELEASE ORAL at 09:46

## 2017-04-08 RX ADMIN — NIFEDIPINE 60 MG: 60 TABLET, EXTENDED RELEASE ORAL at 21:13

## 2017-04-08 RX ADMIN — HYDROCODONE BITARTRATE AND ACETAMINOPHEN 1 TABLET: 5; 325 TABLET ORAL at 17:49

## 2017-04-08 RX ADMIN — MONTELUKAST 10 MG: 10 TABLET, FILM COATED ORAL at 21:12

## 2017-04-08 RX ADMIN — CEFTRIAXONE SODIUM 1 G: 1 INJECTION, SOLUTION INTRAVENOUS at 14:43

## 2017-04-08 RX ADMIN — BUDESONIDE 0.5 MG: 0.5 INHALANT RESPIRATORY (INHALATION) at 08:20

## 2017-04-08 RX ADMIN — LORAZEPAM 0.5 MG: 0.5 TABLET ORAL at 06:50

## 2017-04-08 RX ADMIN — ETHACRYNIC ACID 25 MG: 25 TABLET ORAL at 09:48

## 2017-04-08 RX ADMIN — HYDROCODONE BITARTRATE AND ACETAMINOPHEN 1 TABLET: 5; 325 TABLET ORAL at 05:20

## 2017-04-08 RX ADMIN — ENOXAPARIN SODIUM 30 MG: 30 INJECTION SUBCUTANEOUS at 09:39

## 2017-04-08 RX ADMIN — ETHACRYNIC ACID 25 MG: 25 TABLET ORAL at 21:12

## 2017-04-08 RX ADMIN — DOCUSATE SODIUM 100 MG: 100 CAPSULE, LIQUID FILLED ORAL at 17:47

## 2017-04-08 RX ADMIN — TIZANIDINE 2 MG: 2 TABLET ORAL at 09:28

## 2017-04-08 RX ADMIN — EPLERENONE 25 MG: 25 TABLET, FILM COATED ORAL at 09:28

## 2017-04-08 RX ADMIN — PANTOPRAZOLE SODIUM 40 MG: 40 TABLET, DELAYED RELEASE ORAL at 05:20

## 2017-04-08 RX ADMIN — CLONIDINE HYDROCHLORIDE 0.2 MG: 0.1 TABLET ORAL at 21:14

## 2017-04-08 NOTE — PROGRESS NOTES
"   LOS: 0 days    Patient Care Team:  Óscar Cadet MD as PCP - General  Óscar Cadet MD as PCP - Family Medicine    Chief Complaint:    Chief Complaint   Patient presents with   • Leg Pain     Patient states this has been going on since starting on hydralazine, all joints hurt   • Leg Swelling       Subjective  follow-up the severe uncontrolled hypertension    Interval History:   The patient  is feeling somewhat better.  She did not have any desaturation at night based on O2 sats monitoring, blood pressure slowly improving  Denies chest pain or shortness of air, no orthopnea or PND, no nausea or vomiting.  No dysuria or gross hematuria.      Review of Systems:   As noted above    Objective     Vital Signs  Temp:  [97.6 °F (36.4 °C)-98.5 °F (36.9 °C)] 98.5 °F (36.9 °C)  Heart Rate:  [71-78] 71  Resp:  [16-18] 16  BP: (129-160)/() 129/52    Flowsheet Rows         First Filed Value    Admission Height  63\" (160 cm) Documented at 04/06/2017 0720    Admission Weight  180 lb (81.6 kg) Documented at 04/06/2017 0720          I/O this shift:  In: 887 [P.O.:837; IV Piggyback:50]  Out: 600 [Urine:600]  I/O last 3 completed shifts:  In: -   Out: 1800 [Urine:1800]    Intake/Output Summary (Last 24 hours) at 04/08/17 1541  Last data filed at 04/08/17 1443   Gross per 24 hour   Intake              887 ml   Output             1400 ml   Net             -513 ml       Physical Exam:  General Appearance: alert, oriented x 3, no acute distress, anxious, obese  Skin: warm and dry  Neck: supple, minimal JVD, trachea midline  Lungs: CTA, unlabored breathing effort  Heart: RRR, normal S1 and S2,  no rub  Abdomen: soft, non-tender, no palpable bladder, present bowel supple auscultation  Extremities: no edema, cyanosis or clubbing, minimal left thigh bruising.  Neuro: normal speech and mental status, mood and affect were appropriate.      Results Review:      Results from last 7 days  Lab Units 04/08/17  0514 04/07/17  0449 " 04/06/17  1130   SODIUM mmol/L 142 145 144   POTASSIUM mmol/L 3.6 3.8 3.3*   CHLORIDE mmol/L 105 104 102   TOTAL CO2 mmol/L 23.8 27.0 23.8   BUN mg/dL 16 16 13   CREATININE mg/dL 0.85 1.01* 0.85   CALCIUM mg/dL 9.6 9.4 10.2   GLUCOSE mg/dL 105* 106* 97       Estimated Creatinine Clearance: 55 mL/min (by C-G formula based on Cr of 0.85).      Results from last 7 days  Lab Units 04/08/17  0514 04/07/17  0449   MAGNESIUM mg/dL 2.3 2.3   PHOSPHORUS mg/dL 3.2 3.6         Results from last 7 days  Lab Units 04/08/17  0514 04/07/17  0449   URIC ACID mg/dL 5.1 5.3         Results from last 7 days  Lab Units 04/08/17  0514 04/07/17  0449 04/06/17  1213 04/06/17  0827   WBC 10*3/mm3 5.58 6.24 6.89 5.52   HEMOGLOBIN g/dL 13.5 13.8 15.9* 13.9   PLATELETS 10*3/mm3 178 179 226 187             budesonide 0.5 mg Nebulization BID - RT   ceftriaxone 1 g Intravenous Q24H   CloNIDine 0.2 mg Oral Q12H   docusate sodium 100 mg Oral BID   enoxaparin 30 mg Subcutaneous Daily   eplerenone 25 mg Oral Q24H   ethacrynic acid 25 mg Oral BID   montelukast 10 mg Oral Nightly   NIFEdipine XL 60 mg Oral BID   pantoprazole 40 mg Oral Q AM   polyethylene glycol 17 g Oral Daily   tiZANidine 2 mg Oral BID       Pharmacy Consult        Medication Review:   Current Facility-Administered Medications   Medication Dose Route Frequency Provider Last Rate Last Dose   • acetaminophen (TYLENOL) tablet 650 mg  650 mg Oral Q4H PRN Óscar Cadet MD       • albuterol (PROVENTIL) nebulizer solution 0.083% 2.5 mg/3mL  2.5 mg Nebulization Q4H PRN Óscar Cadet MD   2.5 mg at 04/08/17 0819   • budesonide (PULMICORT) nebulizer solution 0.5 mg  0.5 mg Nebulization BID - RT Óscar Cadet MD   0.5 mg at 04/08/17 0820   • butalbital-acetaminophen-caffeine (FIORICET, ESGIC) -40 MG per tablet 2 tablet  2 tablet Oral Q4H PRN Richard Lawson MD   2 tablet at 04/08/17 1441   • cefTRIAXone (ROCEPHIN) IVPB 1 g  1 g Intravenous Q24H Óscar Cadet MD 0 mL/hr at  04/07/17 2210 1 g at 04/08/17 1443   • CloNIDine (CATAPRES) tablet 0.1 mg  0.1 mg Oral Q4H PRN Óscar Cadet MD        Or   • CloNIDine (CATAPRES) tablet 0.2 mg  0.2 mg Oral Q4H PRN Óscar Cadet MD       • CloNIDine (CATAPRES) tablet 0.2 mg  0.2 mg Oral Q12H Óscar Cadet MD   0.2 mg at 04/08/17 0946   • diphenhydrAMINE (BENADRYL) 12.5 MG/5ML elixir 12.5 mg  12.5 mg Oral Q4H PRN Óscar Cadet MD       • docusate sodium (COLACE) capsule 100 mg  100 mg Oral BID Óscar Cadet MD   100 mg at 04/08/17 0928   • enalaprilat (VASOTEC) injection 1.25 mg  1.25 mg Intravenous Q6H PRN Óscar Cadet MD   1.25 mg at 04/06/17 1951   • enoxaparin (LOVENOX) syringe 30 mg  30 mg Subcutaneous Daily Óscar Cadet MD   30 mg at 04/08/17 0939   • eplerenone (INSPRA) tablet 25 mg  25 mg Oral Q24H Aziz Remi Holloway MD   25 mg at 04/08/17 0928   • ethacrynic acid (EDECRIN) tablet 25 mg  25 mg Oral BID Óscar Cadet MD   25 mg at 04/08/17 0948   • HYDROcodone-acetaminophen (NORCO) 5-325 MG per tablet 1 tablet  1 tablet Oral Q6H PRN Óscar Cadet MD   1 tablet at 04/08/17 0520   • hydrOXYzine (VISTARIL) capsule 50 mg  50 mg Oral Q6H PRN Óscar Cadet MD       • ipratropium (ATROVENT) nebulizer solution 0.5 mg  500 mcg Nebulization 4x Daily PRN Óscar Cadet MD       • LORazepam (ATIVAN) tablet 0.5 mg  0.5 mg Oral Q8H PRN Óscar Cadet MD   0.5 mg at 04/08/17 0650   • montelukast (SINGULAIR) tablet 10 mg  10 mg Oral Nightly Óscar Cadet MD   10 mg at 04/07/17 2208   • NIFEdipine XL (PROCARDIA XL) 24 hr tablet 60 mg  60 mg Oral BID Óscar Cadet MD   60 mg at 04/08/17 0946   • ondansetron ODT (ZOFRAN-ODT) disintegrating tablet 8 mg  8 mg Oral Q6H PRN Óscar Cadet MD       • pantoprazole (PROTONIX) EC tablet 40 mg  40 mg Oral Q AM Óscar Cadet MD   40 mg at 04/08/17 0520   • pentazocine-naloxone (TALWIN NX) 50-0.5 MG per tablet 2 tablet  2 tablet Oral Q4H PRN Óscar Cadet MD       •  Pharmacy Consult   Does not apply Continuous PRN Óscar Cadet MD       • polyethylene glycol (MIRALAX) powder 17 g  17 g Oral Daily Óscar Cadet MD   17 g at 04/08/17 0948   • promethazine (PHENERGAN) tablet 12.5 mg  12.5 mg Oral Q6H PRN Óscar Cadet MD       • promethazine-codeine (PHENERGAN with CODEINE) 6.25-10 MG/5ML syrup 5 mL  5 mL Oral 4x Daily PRN Óscar Cadet MD       • sodium chloride 0.9 % flush 1-10 mL  1-10 mL Intravenous PRN Óscar Cadet MD       • sodium chloride 0.9 % flush 10 mL  10 mL Intravenous PRN Estevan Vega MD       • tiZANidine (ZANAFLEX) tablet 2 mg  2 mg Oral BID Óscar Cadet MD   2 mg at 04/08/17 0928       Assessment/Plan   1.  Severe uncontrolled hypertension with the multiple episodes of being out of control and difficult to control and the patient has multiple drug allergies.  The blood pressure is much better today still has some readings over the 150 systolic but overall much better.  2.  Restless sleep we need to rule out the possibility of obstructive sleep apnea which could be the making the blood pressure control unattainable.  The O2 sat monitoring last night revealed no desaturation that makes her sleep apnea questionable  3.  Hypokalemia resolved currently on eplerenone, she did not tolerate the Aldactone, she is now on eplerenone.  Potassium is 3.6 today.  4.  Possible diastolic heart  5.  Probable COPD  6.  Diffuse arthralgias, rheumatology evaluation as an outpatient is pending.      Plan:  1.  To continue the same treatment.  We'll continue to monitor her orthostatic blood pressure  2.  Surveillance labs      I discussed the patient  with  the patient          Rj Bond MD  04/08/17  3:41 PM

## 2017-04-08 NOTE — PROGRESS NOTES
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 weight.  Therefore, she does not have heart failure.  Her last echo in December 2016 showed LVEF normal, moderate LVH, diastolic dysfunction.  I don't think that we need to repeat an echo.      Nephrology is following the patient.  I will defer to nephrology with regard to antihypertensive treatment, particularly if she is so many allergies and intolerances to medication.    I will see her PRN.    Umesh Greenberg M.D.  April 6, 2017      EMR Dragon/Transcription disclaimer:   Much of this encounter note is an electronic transcription/translation of spoken language to printed text. The electronic translation of spoken language may permit erroneous, or at times, nonsensical words or phrases to be inadvertently transcribed; Although I have reviewed the note for such errors, some may still exist.

## 2017-04-08 NOTE — PROGRESS NOTES
[facility]  FAMILY AND INTERNAL MEDICINE  AUDREY ZAMAN, and COLE      INTERNAL MEDICINE DAILY PROGRESS NOTE  Óscar Cadet M.D.  2017            Patient Identification:  Name: Sasha Barrett  Age: 77 y.o.  Sex: female  :  1940  MRN: [id number]         Primary Care Physician: Óscar Cadet MD  LENGTH OF STAY 0 DAYS    Consults     Date and Time Order Name Status Description    2017 0415 Inpatient Consult to Neurology Completed     2017 0413 Inpatient Consult to Pulmonology Completed     2017 114 Inpatient Consult to Orthopedic Surgery Completed     2017 1141 Inpatient Consult to Obstetrics / Gynecology Completed     2017 1141 Inpatient Consult to Cardiology Completed     2017 114 Inpatient Consult to Nephrology Completed     2017 1040 Inpatient Consult to Rheumatology      2017 0824 Family Medicine Consult Completed                       Chief Complaint: Weakness with difficulty ambulating and arthralgias            Subjective      Interval History: Patient is a 77 y.o.female who presented with weakness and difficulty ambulating along with arthralgias to the emergency room at [facility] on 17. Patient was seen and initially evaluated by Dr. Estevan Vega. Patient did call Dr. Cadet with a long laundry list of symptoms that she felt needed emergent evaluation because of weakness and difficulty walking for the last few days. She also reported an 8-10 month history of progressive arthralgias that she felt were related to a trial of hydralazine which was used between March and 2016 2 attempt treatment of her labile hypertension. Patient has been in the hospital had extensive workups in the past for other signs and symptoms. She also had complaints of moderate headache, rib pain, and a 25 pound weight loss since 2016 She denied any fever, vomiting, diarrhea,  dysuria or chronic shortness of  breath.      Dr. Vega's workup in the emergency room was rather unrevealing. He described her problem as a 10 month history of gradual onset, constant timing, diffuse joint pain, radiating nowhere, aching in quality, moderate in intensity, progression was worsening, associated with moderate headache rib related 25 pound weight loss, aggravated by nothing, alleviated by nothing, no previous episodes for comparison. The positive is in his review of systems included the 20 pound weight loss since January, chronic shortness of breath, chest and rib pain, the arthralgias, and the headaches.  Physical exam revealed normal vitals and essentially normal exam. Patient did have some bruising on her knees and some reported swelling in her lower pelvic eye area. Dr. Vega called to discuss the case with me. Patient was somewhat labile with her blood pressure at that point and we felt that she probably didn't merit admission for observation at least on an overnight basis to gain further understanding of her condition.     4/6/17. I personally saw the patient for the first time on this date in the emergency room soon after Dr. Vega's evaluation. Patient was resting comfortably in bed but in some distress due to need for urination. Patient did provide long list of signs and symptoms as detailed above and in Dr. Vega's notes. Patient seems extremely anxious and distressed at the current time. She does have a rather ill  at home and is concerned about his welfare more than her own. Patient has major concerns also about her blood pressure lability and feels that the medication she is currently taking are not controlling it adequately. She would like to consult with her cardiologist and renal doctors in the hospital. She also does have shortness of breath and congestion. Reports a productive cough at times.    4/7/17.  Patient seen again today in her room on 4 N.  She was resting comfortably in bed sleeping on her right  side as I entered the room.  Patient's blood pressure remains quite labile.  And at times, she is demonstrating blood pressures that are systolically greater then 200.  Renal has initiated new medication to try to improve the blood pressure findings.  I did discuss the case with Dr. Bond do plan to see the patient later in the day.  He is continuing with medication adjustments and also has suggested workup for possible sleep apnea.  Cardiology has signed off the case and defer blood pressure management to nephrology.  I found no evidence of cardiac etiology for her discomfort and pain.  Dr. Lepe has evaluated the patient neurologically and feels that there is no strong evidence to support a neurologic etiology for the patient's symptoms.  Pulmonary is seeing the patient doing an overnight oximetry study.  They feel her COPD is relatively stable and recommended continuation of her home meds.  OB/GYN found no major abnormalities on their exam to evaluate swelling and pain in the groin. Orthopedics did not feel that she had any significant mechanical joint issues going on.  As discussed in yesterday's note, rheumatology plan outpatient follow-up with the patient.         Review of Systems:      A comprehensive 14 point review of systems was negative except for: Constitution: positive for anorexia, fatigue and weight loss  ENT: positive for earaches, hoarseness and nasal congestion  Respiratory: positive for cough, dry, cough, productive, pleuritic pain and shortness of air  Cardiovascular: positive for lower extremity edema, palpitations and paroxysmal nocturnal dyspnea  Gastrointestinal: postitive for bloating / distention, constipation and nausea  Breast: positive for See HPI  Musculoskeletal: positive for back pain, bone pain, joint pain, joint stiffness, joint swelling, muscle pain and muscle weakness  Neurological: positive for difficulty walking    Past Medical History:   Diagnosis Date   • Arthritis    •  Asthma    • Cancer    • COPD (chronic obstructive pulmonary disease)    • Emphysema lung    • Generalized headaches    • Hayfever    • Hypertension    • Stroke      Past Surgical History:   Procedure Laterality Date   • BREAST LUMPECTOMY Left 04/01/2003   • CARPAL TUNNEL RELEASE  2011   • CATARACT EXTRACTION  2010   • HERNIA REPAIR  01/01/1971   • HERNIA REPAIR      right groin   • JOINT REPLACEMENT     • KNEE SURGERY Left 01/01/1998    bilateral knee replacements   • MASTECTOMY     • PARATHYROIDECTOMY  05/08/2006   • SINUS SURGERY  01/01/1984   • TOTAL ABDOMINAL HYSTERECTOMY  01/01/1973     Allergies   Allergen Reactions   • Morphine Anaphylaxis   • Sulfa Antibiotics Anaphylaxis and Hives     Or sulfa fillers in meds   • Hydralazine Myalgia     Patient reports leg cramps, joint pain and increased fatigue   • Amlodipine      KIDNEYS SHUT DOWN   • Anastrozole    • Grass    • Morphine And Related    • Nifedipine      UNCONTROLLED BP   • Nsaids      KIDNEY FAILURE   • Norvasc  [Amlodipine Besylate]    • Penicillins    • Pneumococcal Vaccines Nausea Only   • Tree Extract    • Zolpidem      HALLUCINATIONS     Family History   Problem Relation Age of Onset   • Cancer Brother      Social History     Social History   • Marital status:      Spouse name: N/A   • Number of children: N/A   • Years of education: N/A     Social History Main Topics   • Smoking status: Never Smoker   • Smokeless tobacco: Never Used   • Alcohol use Yes      Comment: rarely - once or twice year (wine or bernabe)   • Drug use: Defer   • Sexual activity: Defer     Other Topics Concern   • None     Social History Narrative       PMH, FH, SH and ROS completed with Admission History and Physical and updated in EPIC system.  This data is unchanged at this time.       Objective     Scheduled Meds:  budesonide 0.5 mg Nebulization BID - RT   ceftriaxone 1 g Intravenous Q24H   CloNIDine 0.2 mg Oral Q12H   docusate sodium 100 mg Oral BID   enoxaparin  "30 mg Subcutaneous Daily   eplerenone 25 mg Oral Q24H   ethacrynic acid 25 mg Oral BID   montelukast 10 mg Oral Nightly   NIFEdipine XL 60 mg Oral BID   pantoprazole 40 mg Oral Q AM   polyethylene glycol 17 g Oral Daily   tiZANidine 2 mg Oral BID     Continuous Infusions:  Pharmacy Consult        Vital signs in last 24 hours:  Temp:  [97.6 °F (36.4 °C)-97.9 °F (36.6 °C)] 97.6 °F (36.4 °C)  Heart Rate:  [68-86] 75  Resp:  [18] 18  BP: (148-175)/() 160/82    Intake/Output:    Intake/Output Summary (Last 24 hours) at 04/07/17 2339  Last data filed at 04/07/17 1700   Gross per 24 hour   Intake                0 ml   Output              950 ml   Net             -950 ml       Exam:  /82 (Patient Position: Sitting)  Pulse 75  Temp 97.6 °F (36.4 °C) (Oral)   Resp 18  Ht 63\" (160 cm)  Wt 184 lb (83.5 kg)  SpO2 95%  BMI 32.59 kg/m2               Constitutional:    Alert, cooperative, no distress, AAOx3, resting comfortably                          Head:    Normocephalic, without obvious abnormality, atraumatic                           Eyes:    PERRLA, conjunctiva/corneas clear, no icterus, no conjunctival                                         pallor, EOM's intact, both eyes          ENT and Mouth:   Lips, tongue, gums normal; oral mucosa pink and moist                           Neck:   Supple, symmetrical, trachea midline, no JVD                 Respiratory:   Clear to auscultation bilaterally, respirations unlabored           Cardiovascular:    Regular rate and rhythm, S1 and S2 normal, no murmur,       no  Rub or gallop.  Pulses normal.            Gastrointestinal:   BS present x 4 Soft, non-tender, bowel sounds active, no                                                  masses,  no hepatosplenomegaly                              :    No hernia.  Normal exam for sex.                Musculoskeletal:   Extremities normal, atraumatic, no cyanosis or edema.  No                                              "  arthropathy.  No deformity.  Gait normal                            Skin:  Skin is warm and dry,  no rashes, swelling or palpable lesions                  Neurologic:   CNII-XII intact, motor strength grossly intact, sensation                                                      grossly intact to light touch, no focal reflexl deficits noted                   Psychiatric:   Alert, oriented x 3, no delusions, psychosis,depression,anxiety       Heme/Lymph/Imun:   No bruises, petechiae.  Lymph nodes normal in size/configuration       Data Review:  Lab Results   Component Value Date    CALCIUM 9.4 04/07/2017    PHOS 3.6 04/07/2017     Results from last 7 days  Lab Units 04/07/17  0449 04/06/17  1213 04/06/17  1130 04/06/17  0827   MAGNESIUM mg/dL 2.3  --   --   --    SODIUM mmol/L 145  --  144 145   POTASSIUM mmol/L 3.8  --  3.3* 3.6   CHLORIDE mmol/L 104  --  102 105   TOTAL CO2 mmol/L 27.0  --  23.8 27.0   BUN mg/dL 16  --  13 16   CREATININE mg/dL 1.01*  --  0.85 0.89   GLUCOSE mg/dL 106*  --  97 105*   CALCIUM mg/dL 9.4  --  10.2 9.5   WBC 10*3/mm3 6.24 6.89  --  5.52   HEMOGLOBIN g/dL 13.8 15.9*  --  13.9   PLATELETS 10*3/mm3 179 226  --  187     Lab Results   Component Value Date    CKTOTAL 38 04/07/2017    CKMB 1.69 04/06/2017    TROPONINT <0.010 04/06/2017     Estimated Creatinine Clearance: 46.2 mL/min (by C-G formula based on Cr of 1.01).  WEIGHTS:     Wt Readings from Last 1 Encounters:   04/06/17 1109 184 lb (83.5 kg)   04/06/17 0720 180 lb (81.6 kg)         Assessment:  Principal Problem:    Accelerated hypertension  Active Problems:    Sinusitis    Hyperlipidemia    Chronic tension-type headache, intractable    Chronic kidney disease, stage 2, mildly decreased GFR    Malaise and fatigue progressive    Vitamin D deficiency    Moderate persistent asthmatic bronchitis without complication    Acute joint pain    Easy bruisability    Urinary retention self-caths (Don)    Cervical disc displacement    DDD  (degenerative disc disease), cervical    Urge incontinence of urine    Vertigo    Knee pain, bilateral    Elevated PTH level    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 Accelerated hypertension. Patient was given Vasotec in the emergency room IV with good resolution of her blood pressure. We will consult renal. Dr. Bond has been following the patient regularly with respect to her blood pressure and his help with the management of this issue in the past. We would like their input and suggestions as to the appropriate medications to treat the blood pressure issue.  2.  Arthralgias and myalgias since starting treatment with hydralazine many months ago. Patient has now been off of this medication for the last several months without any real change in her arthralgias. I did discuss the case with Dr. Franklin and he does yield there would be concerned about the possibility of positive DIAN histone testing. His group will be happy to follow up the patient in their office next week after discharge from the hospital.  3. Bilateral knee pain at sites of previous knee replacements. Orthopedics has been consulted for their input on the situation and any suggestions that might have for treatment.  4. Swelling in lower pelvic and pubic areas extending onto the thighs last GYN take a look at the situation and did recommend a pelvic ultrasound to be certain patient has no evidence of ovarian issues.   5. Nonspecific chest pain brought on by movement from side to side. Cardiology consulted initially  because of the patient's recent ischemic or That included echocardiogram and stress test. They have recommended that we also consult pulmonary because that seems to be the more probable etiology for the patient's symptomatology  6. Chronic tension-type headaches which are intractable at times. Patient does have medications for treatment of this include Fioricet and Talwin. She is generally very intolerant of any narcotics that contain opiates.  7. 25 pound weight loss in patient with history of breast cancer. We will asked the hematologist take a look at this situation and make any recommendations they might have on further workup of this issue one might consider a bone scan or further CT scanning to rule out possibility of recurrence of breast cancer.  8. Severe anxiety. Will offer the patient counseling and input from the [facility] if she so desires. We'll make further decisions on treatment based on her willingness to participate in the assessment.  9. Moderate persisted cough and asthmatic bronchitis with mild flare. Will ask pulmonary as noted above to evaluate patient and make suggestions on other treatment options  10. Possible reduction of positive DIAN while on hydralazine. As requested by rheumatology we will obtain histone antibodies and we will arrange follow-up for the patient in the rheumatology clinic at the time of her discharge next week  11. Difficulty walking with mild to moderate ataxia. We will request neurology evaluation of this situation and defer to them any additional testing that they feel is necessary to workup the situation.    Plan for disposition:Where: home and When:  3days      Óscar Cadet MD  4/7/2017  10:30 AM

## 2017-04-08 NOTE — PLAN OF CARE
Problem: Hypertensive Disease/Crisis (Arterial) (Adult)  Goal: Signs and Symptoms of Listed Potential Problems Will be Absent or Manageable (Hypertensive Disease/Crisis)  Outcome: Ongoing (interventions implemented as appropriate)    Problem: Pain, Acute (Adult)  Goal: Acceptable Pain Control/Comfort Level  Outcome: Ongoing (interventions implemented as appropriate)    Problem: Fall Risk (Adult)  Goal: Absence of Falls  Outcome: Ongoing (interventions implemented as appropriate)    Problem: Patient Care Overview (Adult)  Goal: Plan of Care Review  Outcome: Ongoing (interventions implemented as appropriate)    04/08/17 7067   Coping/Psychosocial Response Interventions   Plan Of Care Reviewed With patient   Outcome Evaluation   Outcome Summary/Follow up Plan Pt was medicated for pain, ice packs to neck and lower extremities. Pain is rated at level 6 - 7 in joints. Swelling in joints. Ambulates wtih assistance to bathroom.        Goal: Adult Individualization and Mutuality  Outcome: Ongoing (interventions implemented as appropriate)  Goal: Discharge Needs Assessment  Outcome: Ongoing (interventions implemented as appropriate)

## 2017-04-08 NOTE — PROGRESS NOTES
Neuro    MRI negative    As discussed, no further neuro w/u needed    Will see PRFERNANDO.    FIDEL Lepe MD

## 2017-04-08 NOTE — PROGRESS NOTES
Daily Progress Note.     Sasha Barrett  77 y.o.  female  4/8/2017    Brief problem (summary)  COPD  CKD  No desaturation at night on RA    Today, c/o joint pain    PMS/FH/SH: reviewed and unchanged.  Medications:     budesonide 0.5 mg Nebulization BID - RT   ceftriaxone 1 g Intravenous Q24H   CloNIDine 0.2 mg Oral Q12H   docusate sodium 100 mg Oral BID   enoxaparin 30 mg Subcutaneous Daily   eplerenone 25 mg Oral Q24H   ethacrynic acid 25 mg Oral BID   montelukast 10 mg Oral Nightly   NIFEdipine XL 60 mg Oral BID   pantoprazole 40 mg Oral Q AM   polyethylene glycol 17 g Oral Daily   tiZANidine 2 mg Oral BID       Pharmacy Consult        ROS:  Respiratory ROS: no cough, shortness of breath, or wheezing    Objective:  Temp:  [97.6 °F (36.4 °C)-98.3 °F (36.8 °C)] 97.8 °F (36.6 °C)  I/O last 3 completed shifts:  In: -   Out: 1800 [Urine:1800]  I/O this shift:  In: 600 [P.O.:600]  Out: -     Physical Exam   Constitutional: She is oriented to person, place, and time. She appears well-developed and well-nourished. No distress.   HENT:   Head: Normocephalic and atraumatic.   Eyes: Pupils are equal, round, and reactive to light. No scleral icterus.   Cardiovascular: Normal rate and regular rhythm.    Pulmonary/Chest: Effort normal. No respiratory distress. She has no decreased breath sounds. She has no wheezes.   Abdominal: Soft. Bowel sounds are normal. There is no hepatosplenomegaly.   Neurological: She is alert and oriented to person, place, and time.   Skin: No cyanosis. Nails show no clubbing.   Psychiatric: She has a normal mood and affect. Her behavior is normal.         Results from last 7 days  Lab Units 04/08/17  0514 04/07/17  0449 04/06/17  1213   WBC 10*3/mm3 5.58 6.24 6.89   HEMOGLOBIN g/dL 13.5 13.8 15.9*   HEMATOCRIT % 42.0 43.0 48.4*   PLATELETS 10*3/mm3 178 179 226       Results from last 7 days  Lab Units 04/08/17  0514 04/07/17  0449 04/06/17  1130   SODIUM mmol/L 142 145 144   POTASSIUM mmol/L 3.6 3.8  3.3*   CHLORIDE mmol/L 105 104 102   TOTAL CO2 mmol/L 23.8 27.0 23.8   BUN mg/dL 16 16 13   CREATININE mg/dL 0.85 1.01* 0.85   GLUCOSE mg/dL 105* 106* 97   CALCIUM mg/dL 9.6 9.4 10.2               ASSESSMENT/ PLAN:  · COPD, stable, continue bronchodilators, No desaturation at night on RA  ·       Avis Good MD,[facility]  Pulmonary, Critical Care and Sleep Medicine.  [facility]    4/8/2017    EMR Dragon/Transcription disclaimer:   Much of this encounter note is an electronic transcription/translation of spoken language to printed text. The electronic translation of spoken language may permit erroneous, or at times, nonsensical words or phrases to be inadvertently transcribed; Although I have reviewed the note for such errors, some may still exist.

## 2017-04-08 NOTE — PROGRESS NOTES
[facility]  FAMILY AND INTERNAL MEDICINE  AUDREY ZAMAN, and COLE      INTERNAL MEDICINE DAILY PROGRESS NOTE  Óscar Cadet M.D.  2017            Patient Identification:  Name: Sasha Barrett  Age: 77 y.o.  Sex: female  :  1940  MRN: [id number]         Primary Care Physician: Óscar Cadet MD  LENGTH OF STAY 0 DAYS    Consults     Date and Time Order Name Status Description    2017 041 Inpatient Consult to Neurology Completed     2017 0413 Inpatient Consult to Pulmonology Completed     2017 114 Inpatient Consult to Orthopedic Surgery Completed     2017 114 Inpatient Consult to Obstetrics / Gynecology Completed     2017 1141 Inpatient Consult to Cardiology Completed     2017 114 Inpatient Consult to Nephrology Completed     2017 1040 Inpatient Consult to Rheumatology      2017 0824 Family Medicine Consult Completed                       Chief Complaint: Weakness with difficulty ambulating and arthralgias            Subjective      Interval History: Patient is a 77 y.o.female who presented with weakness and difficulty ambulating along with arthralgias to the emergency room at [facility] on 17. Patient was seen and initially evaluated by Dr. Estevan Vega. Patient did call Dr. Cadet with a long laundry list of symptoms that she felt needed emergent evaluation because of weakness and difficulty walking for the last few days. She also reported an 8-10 month history of progressive arthralgias that she felt were related to a trial of hydralazine which was used between March and 2016 2 attempt treatment of her labile hypertension. Patient has been in the hospital had extensive workups in the past for other signs and symptoms. She also had complaints of moderate headache, rib pain, and a 25 pound weight loss since 2016 She denied any fever, vomiting, diarrhea,  dysuria or chronic shortness of  breath.      Dr. Vega's workup in the emergency room was rather unrevealing. He described her problem as a 10 month history of gradual onset, constant timing, diffuse joint pain, radiating nowhere, aching in quality, moderate in intensity, progression was worsening, associated with moderate headache rib related 25 pound weight loss, aggravated by nothing, alleviated by nothing, no previous episodes for comparison. The positive is in his review of systems included the 20 pound weight loss since January, chronic shortness of breath, chest and rib pain, the arthralgias, and the headaches.  Physical exam revealed normal vitals and essentially normal exam. Patient did have some bruising on her knees and some reported swelling in her lower pelvic eye area. Dr. Vega called to discuss the case with me. Patient was somewhat labile with her blood pressure at that point and we felt that she probably didn't merit admission for observation at least on an overnight basis to gain further understanding of her condition.     4/6/17. I personally saw the patient for the first time on this date in the emergency room soon after Dr. Vega's evaluation. Patient was resting comfortably in bed but in some distress due to need for urination. Patient did provide long list of signs and symptoms as detailed above and in Dr. Vega's notes. Patient seems extremely anxious and distressed at the current time. She does have a rather ill  at home and is concerned about his welfare more than her own. Patient has major concerns also about her blood pressure lability and feels that the medication she is currently taking are not controlling it adequately. She would like to consult with her cardiologist and renal doctors in the hospital. She also does have shortness of breath and congestion. Reports a productive cough at times.    4/7/17.  Patient seen again today in her room on 4 N.  She was resting comfortably in bed sleeping on her right  side as I entered the room.  Patient's blood pressure remains quite labile.  And at times, she is demonstrating blood pressures that are systolically greater then 200.  Renal has initiated new medication to try to improve the blood pressure findings.  I did discuss the case with Dr. Varun gould plan to see the patient later in the day.  He is continuing with medication adjustments and also has suggested workup for possible sleep apnea.  Cardiology has signed off the case and defer blood pressure management to nephrology.  I found no evidence of cardiac etiology for her discomfort and pain.  Dr. Lepe has evaluated the patient neurologically and feels that there is no strong evidence to support a neurologic etiology for the patient's symptoms.  Pulmonary is seeing the patient doing an overnight oximetry study.  They feel her COPD is relatively stable and recommended continuation of her home meds.  OB/GYN found no major abnormalities on their exam to evaluate swelling and pain in the groin. Orthopedics did not feel that she had any significant mechanical joint issues going on.  As discussed in yesterday's note, rheumatology plan outpatient follow-up with the patient.    4/8/17.  Patient remains in observation status.  She was seen in her bed on 4 N. earlier this evening by myself.  Patient has no major new complaints or concerns.  Her urine culture has come back positive for Escherichia coli which is sensitive to this Rocephin she is currently taking.  Patient does want a prescription written for her Talwin NX which is unavailable at [facility] [place] and she will have her family  at the drugstore tomorrow.  Renal continues to monitor blood pressure and adjust her medications.  Other specialists are deferring to them with respect to the blood pressure issues.  We anticipate that she will be able to discharge to home in the next 1-2 days.  I have requested full inpatient status for the patient that at this point [facility] does  not feel she meets criteria for this designation.      Review of Systems:      A comprehensive 14 point review of systems was negative except for: Constitution: positive for anorexia, fatigue and weight loss  ENT: positive for earaches, hoarseness and nasal congestion  Respiratory: positive for cough, dry, cough, productive, pleuritic pain and shortness of air  Cardiovascular: positive for lower extremity edema, palpitations and paroxysmal nocturnal dyspnea  Gastrointestinal: postitive for bloating / distention, constipation and nausea  Breast: positive for See HPI  Musculoskeletal: positive for back pain, bone pain, joint pain, joint stiffness, joint swelling, muscle pain and muscle weakness  Neurological: positive for difficulty walking    Past Medical History:   Diagnosis Date   • Arthritis    • Asthma    • Cancer    • COPD (chronic obstructive pulmonary disease)    • Emphysema lung    • Generalized headaches    • Hayfever    • Hypertension    • Stroke      Past Surgical History:   Procedure Laterality Date   • BREAST LUMPECTOMY Left 04/01/2003   • CARPAL TUNNEL RELEASE  2011   • CATARACT EXTRACTION  2010   • HERNIA REPAIR  01/01/1971   • HERNIA REPAIR      right groin   • JOINT REPLACEMENT     • KNEE SURGERY Left 01/01/1998    bilateral knee replacements   • MASTECTOMY     • PARATHYROIDECTOMY  05/08/2006   • SINUS SURGERY  01/01/1984   • TOTAL ABDOMINAL HYSTERECTOMY  01/01/1973     Allergies   Allergen Reactions   • Morphine Anaphylaxis   • Sulfa Antibiotics Anaphylaxis and Hives     Or sulfa fillers in meds   • Hydralazine Myalgia     Patient reports leg cramps, joint pain and increased fatigue   • Amlodipine      KIDNEYS SHUT DOWN   • Anastrozole    • Grass    • Morphine And Related    • Nifedipine      UNCONTROLLED BP   • Nsaids      KIDNEY FAILURE   • Norvasc  [Amlodipine Besylate]    • Penicillins    • Pneumococcal Vaccines Nausea Only   • Tree Extract    • Zolpidem      HALLUCINATIONS       Family History  "  Problem Relation Age of Onset   • Cancer Brother        Social History     Social History   • Marital status:      Spouse name: N/A   • Number of children: N/A   • Years of education: N/A     Social History Main Topics   • Smoking status: Never Smoker   • Smokeless tobacco: Never Used   • Alcohol use Yes      Comment: rarely - once or twice year (wine or bernabe)   • Drug use: Defer   • Sexual activity: Defer     Other Topics Concern   • None     Social History Narrative       PMH, FH, SH and ROS completed with Admission History and Physical and updated in EPIC system.  This data is unchanged at this time.       Objective     Scheduled Meds:    budesonide 0.5 mg Nebulization BID - RT   ceftriaxone 1 g Intravenous Q24H   CloNIDine 0.2 mg Oral Q12H   docusate sodium 100 mg Oral BID   enoxaparin 30 mg Subcutaneous Daily   eplerenone 25 mg Oral Q24H   ethacrynic acid 25 mg Oral BID   montelukast 10 mg Oral Nightly   NIFEdipine XL 60 mg Oral BID   pantoprazole 40 mg Oral Q AM   polyethylene glycol 17 g Oral Daily   tiZANidine 2 mg Oral BID     Continuous Infusions:    Pharmacy Consult        Vital signs in last 24 hours:  Temp:  [97.6 °F (36.4 °C)-98.5 °F (36.9 °C)] 98.5 °F (36.9 °C)  Heart Rate:  [71-76] 71  Resp:  [16-18] 16  BP: (129-160)/() 129/52    Intake/Output:    Intake/Output Summary (Last 24 hours) at 04/08/17 1943  Last data filed at 04/08/17 1746   Gross per 24 hour   Intake             1124 ml   Output             1500 ml   Net             -376 ml       Exam:  /52 (BP Location: Right arm, Patient Position: Lying)  Pulse 71  Temp 98.5 °F (36.9 °C) (Oral)   Resp 16  Ht 63\" (160 cm)  Wt 184 lb 9.6 oz (83.7 kg)  SpO2 99%  BMI 32.7 kg/m2               Constitutional:    Alert, cooperative, no distress, AAOx3, resting comfortably                          Head:    Normocephalic, without obvious abnormality, atraumatic                           Eyes:    PERRLA, conjunctiva/corneas " clear, no icterus, no conjunctival                                         pallor, EOM's intact, both eyes          ENT and Mouth:   Lips, tongue, gums normal; oral mucosa pink and moist                           Neck:   Supple, symmetrical, trachea midline, no JVD                 Respiratory:   Clear to auscultation bilaterally, respirations unlabored           Cardiovascular:    Regular rate and rhythm, S1 and S2 normal, no murmur,       no  Rub or gallop.  Pulses normal.            Gastrointestinal:   BS present x 4 Soft, non-tender, bowel sounds active, no                                                  masses,  no hepatosplenomegaly                              :    No hernia.  Normal exam for sex.                Musculoskeletal:   Extremities normal, atraumatic, no cyanosis or edema.  No                                               arthropathy.  No deformity.  Gait normal                            Skin:  Skin is warm and dry,  no rashes, swelling or palpable lesions                  Neurologic:   CNII-XII intact, motor strength grossly intact, sensation                                                      grossly intact to light touch, no focal reflexl deficits noted                   Psychiatric:   Alert, oriented x 3, no delusions, psychosis,depression,anxiety       Heme/Lymph/Imun:   No bruises, petechiae.  Lymph nodes normal in size/configuration       Data Review:  Lab Results   Component Value Date    CALCIUM 9.6 04/08/2017    PHOS 3.2 04/08/2017       Results from last 7 days  Lab Units 04/08/17  0514 04/07/17  0449 04/06/17  1213 04/06/17  1130   MAGNESIUM mg/dL 2.3 2.3  --   --    SODIUM mmol/L 142 145  --  144   POTASSIUM mmol/L 3.6 3.8  --  3.3*   CHLORIDE mmol/L 105 104  --  102   TOTAL CO2 mmol/L 23.8 27.0  --  23.8   BUN mg/dL 16 16  --  13   CREATININE mg/dL 0.85 1.01*  --  0.85   GLUCOSE mg/dL 105* 106*  --  97   CALCIUM mg/dL 9.6 9.4  --  10.2   WBC 10*3/mm3 5.58 6.24 6.89  --     HEMOGLOBIN g/dL 13.5 13.8 15.9*  --    PLATELETS 10*3/mm3 178 179 226  --      Lab Results   Component Value Date    CKTOTAL 36 04/08/2017    CKMB 1.69 04/06/2017    TROPONINT <0.010 04/06/2017     Estimated Creatinine Clearance: 55 mL/min (by C-G formula based on Cr of 0.85).  WEIGHTS:     Wt Readings from Last 1 Encounters:   04/08/17 0520 184 lb 9.6 oz (83.7 kg)   04/06/17 1109 184 lb (83.5 kg)   04/06/17 0720 180 lb (81.6 kg)         Assessment:  Principal Problem:    Accelerated hypertension  Active Problems:    Sinusitis    Hyperlipidemia    Chronic tension-type headache, intractable    Chronic kidney disease, stage 2, mildly decreased GFR    Malaise and fatigue progressive    Vitamin D deficiency    Moderate persistent asthmatic bronchitis without complication    Acute joint pain    Easy bruisability    Urinary retention self-caths (Ochoa)    Cervical disc displacement    DDD (degenerative disc disease), cervical    Urge incontinence of urine    Vertigo    Knee pain, bilateral    Elevated PTH level    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 Accelerated hypertension. Patient was given Vasotec in the emergency room IV with good resolution of her blood pressure. We will consult renal. Dr. Bond has been following the patient regularly with respect to her blood pressure and his help with the management of this issue in the past. We would like their input and suggestions as to the appropriate medications to treat the blood pressure issue.  Medications adjusted and blood pressure  doing better.  2.  Arthralgias and myalgias since starting treatment with hydralazine many months ago. Patient has now been off of this medication for the last several months without any real change in her arthralgias. I did discuss the case with Dr. Franklin and he does yield there would be concerned about the possibility of positive DIAN histone testing. His group will be happy to follow up the patient in their office next week after discharge from the hospital.  3. Bilateral knee pain at sites of previous knee replacements. Orthopedics has been consulted for their input on the situation and any suggestions that might have for treatment.  No in-house treatment suggested.  Follow-up on an outpatient basis.  4. Swelling in lower pelvic and pubic areas extending onto the thighs.  Asked GYN to take a look at the situation and did recommend a pelvic ultrasound to be certain patient has no evidence of ovarian issues.  No major abnormalities found in pelvic area by GYN  5. Nonspecific chest pain brought on by movement from side to side. Cardiology consulted initially because of the patient's recent ischemic or That included echocardiogram and stress test. They have recommended that we also consult pulmonary because that seems to be the more probable etiology for the patient's symptomatology  6. Chronic tension-type headaches which are. intractable at times Patient does have medications for treatment of this include Fioricet and Talwin. She is generally very intolerant of any narcotics that contain opiates.  7. 25 pound weight loss in patient with history of breast cancer. We will asked the hematologist take a look at this situation and make any recommendations they might have on further workup of this issue one might consider a bone scan or further CT scanning to rule out possibility of recurrence of breast cancer.  8. Severe anxiety. Will offer the patient counseling and input from the [facility] if she so desires. We'll  make further decisions on treatment based on her willingness to participate in the assessment.  9. Moderate persisted cough and asthmatic bronchitis with mild flare. Will ask pulmonary as noted above to evaluate patient and make suggestions on other treatment options  10. Possible reduction of positive DIAN while on hydralazine. As requested by rheumatology we will obtain histone antibodies and we will arrange follow-up for the patient in the rheumatology clinic at the time of her discharge next week  11. Difficulty walking with mild to moderate ataxia. We will request neurology evaluation of this situation and defer to them any additional testing that they feel is necessary to workup the situation.  Neurology finds no concerning neurologic abnormalities.  12.  UTI due to Escherichia coli.  Treating with IV antibiotic Rocephin at the present time.    Plan for disposition:Home 1-2 days    Óscar Cadet MD  4/8/2017  8:00 PM

## 2017-04-09 ENCOUNTER — APPOINTMENT (OUTPATIENT)
Dept: NUCLEAR MEDICINE | Facility: HOSPITAL | Age: 77
End: 2017-04-09

## 2017-04-09 LAB
ALBUMIN SERPL-MCNC: 3.5 G/DL (ref 3.5–5.2)
ANION GAP SERPL CALCULATED.3IONS-SCNC: 16.2 MMOL/L
BASOPHILS # BLD AUTO: 0.05 10*3/MM3 (ref 0–0.2)
BASOPHILS NFR BLD AUTO: 0.8 % (ref 0–1.5)
BUN BLD-MCNC: 16 MG/DL (ref 8–23)
BUN/CREAT SERPL: 20.3 (ref 7–25)
CALCIUM SPEC-SCNC: 9.3 MG/DL (ref 8.6–10.5)
CHLORIDE SERPL-SCNC: 102 MMOL/L (ref 98–107)
CO2 SERPL-SCNC: 23.8 MMOL/L (ref 22–29)
CREAT BLD-MCNC: 0.79 MG/DL (ref 0.57–1)
DEPRECATED RDW RBC AUTO: 47.3 FL (ref 37–54)
EOSINOPHIL # BLD AUTO: 0.17 10*3/MM3 (ref 0–0.7)
EOSINOPHIL NFR BLD AUTO: 2.7 % (ref 0.3–6.2)
ERYTHROCYTE [DISTWIDTH] IN BLOOD BY AUTOMATED COUNT: 14.1 % (ref 11.7–13)
GFR SERPL CREATININE-BSD FRML MDRD: 70 ML/MIN/1.73
GLUCOSE BLD-MCNC: 96 MG/DL (ref 65–99)
HCT VFR BLD AUTO: 43.3 % (ref 35.6–45.5)
HGB BLD-MCNC: 14 G/DL (ref 11.9–15.5)
IMM GRANULOCYTES # BLD: 0 10*3/MM3 (ref 0–0.03)
IMM GRANULOCYTES NFR BLD: 0 % (ref 0–0.5)
LYMPHOCYTES # BLD AUTO: 2.28 10*3/MM3 (ref 0.9–4.8)
LYMPHOCYTES NFR BLD AUTO: 36.5 % (ref 19.6–45.3)
MAGNESIUM SERPL-MCNC: 2.2 MG/DL (ref 1.6–2.4)
MCH RBC QN AUTO: 30 PG (ref 26.9–32)
MCHC RBC AUTO-ENTMCNC: 32.3 G/DL (ref 32.4–36.3)
MCV RBC AUTO: 92.9 FL (ref 80.5–98.2)
MONOCYTES # BLD AUTO: 0.73 10*3/MM3 (ref 0.2–1.2)
MONOCYTES NFR BLD AUTO: 11.7 % (ref 5–12)
NEUTROPHILS # BLD AUTO: 3.01 10*3/MM3 (ref 1.9–8.1)
NEUTROPHILS NFR BLD AUTO: 48.3 % (ref 42.7–76)
PHOSPHATE SERPL-MCNC: 3.6 MG/DL (ref 2.5–4.5)
PLATELET # BLD AUTO: 186 10*3/MM3 (ref 140–500)
PMV BLD AUTO: 11 FL (ref 6–12)
POTASSIUM BLD-SCNC: 3.5 MMOL/L (ref 3.5–5.2)
RBC # BLD AUTO: 4.66 10*6/MM3 (ref 3.9–5.2)
SODIUM BLD-SCNC: 142 MMOL/L (ref 136–145)
URATE SERPL-MCNC: 5 MG/DL (ref 2.4–5.7)
WBC NRBC COR # BLD: 6.24 10*3/MM3 (ref 4.5–10.7)

## 2017-04-09 PROCEDURE — 80069 RENAL FUNCTION PANEL: CPT | Performed by: INTERNAL MEDICINE

## 2017-04-09 PROCEDURE — 25010000003 CEFTRIAXONE PER 250 MG: Performed by: INTERNAL MEDICINE

## 2017-04-09 PROCEDURE — 78306 BONE IMAGING WHOLE BODY: CPT

## 2017-04-09 PROCEDURE — A9503 TC99M MEDRONATE: HCPCS | Performed by: INTERNAL MEDICINE

## 2017-04-09 PROCEDURE — 84550 ASSAY OF BLOOD/URIC ACID: CPT | Performed by: INTERNAL MEDICINE

## 2017-04-09 PROCEDURE — 0 TECHNETIUM MEDRONATE KIT: Performed by: INTERNAL MEDICINE

## 2017-04-09 PROCEDURE — 85025 COMPLETE CBC W/AUTO DIFF WBC: CPT | Performed by: INTERNAL MEDICINE

## 2017-04-09 PROCEDURE — 83735 ASSAY OF MAGNESIUM: CPT | Performed by: INTERNAL MEDICINE

## 2017-04-09 PROCEDURE — 86300 IMMUNOASSAY TUMOR CA 15-3: CPT | Performed by: INTERNAL MEDICINE

## 2017-04-09 PROCEDURE — G0378 HOSPITAL OBSERVATION PER HR: HCPCS

## 2017-04-09 PROCEDURE — 99225 PR SBSQ OBSERVATION CARE/DAY 25 MINUTES: CPT | Performed by: INTERNAL MEDICINE

## 2017-04-09 PROCEDURE — 96372 THER/PROPH/DIAG INJ SC/IM: CPT

## 2017-04-09 PROCEDURE — 99205 OFFICE O/P NEW HI 60 MIN: CPT | Performed by: INTERNAL MEDICINE

## 2017-04-09 PROCEDURE — 25010000002 ENOXAPARIN PER 10 MG: Performed by: INTERNAL MEDICINE

## 2017-04-09 PROCEDURE — 94799 UNLISTED PULMONARY SVC/PX: CPT

## 2017-04-09 RX ORDER — EPLERENONE 25 MG/1
50 TABLET, FILM COATED ORAL EVERY 12 HOURS SCHEDULED
Status: DISCONTINUED | OUTPATIENT
Start: 2017-04-09 | End: 2017-04-11 | Stop reason: HOSPADM

## 2017-04-09 RX ORDER — TC 99M MEDRONATE 20 MG/10ML
23 INJECTION, POWDER, LYOPHILIZED, FOR SOLUTION INTRAVENOUS
Status: COMPLETED | OUTPATIENT
Start: 2017-04-09 | End: 2017-04-09

## 2017-04-09 RX ADMIN — NIFEDIPINE 60 MG: 60 TABLET, EXTENDED RELEASE ORAL at 21:40

## 2017-04-09 RX ADMIN — HYDROCODONE BITARTRATE AND ACETAMINOPHEN 1 TABLET: 5; 325 TABLET ORAL at 00:43

## 2017-04-09 RX ADMIN — BUTALBITAL, ACETAMINOPHEN, AND CAFFEINE 2 TABLET: 50; 325; 40 TABLET ORAL at 10:59

## 2017-04-09 RX ADMIN — DOCUSATE SODIUM 100 MG: 100 CAPSULE, LIQUID FILLED ORAL at 08:25

## 2017-04-09 RX ADMIN — TIZANIDINE 2 MG: 2 TABLET ORAL at 08:26

## 2017-04-09 RX ADMIN — TIZANIDINE 2 MG: 2 TABLET ORAL at 17:49

## 2017-04-09 RX ADMIN — Medication 23 MILLICURIE: at 11:20

## 2017-04-09 RX ADMIN — NIFEDIPINE 60 MG: 60 TABLET, EXTENDED RELEASE ORAL at 08:39

## 2017-04-09 RX ADMIN — DOCUSATE SODIUM 100 MG: 100 CAPSULE, LIQUID FILLED ORAL at 17:49

## 2017-04-09 RX ADMIN — ETHACRYNIC ACID 25 MG: 25 TABLET ORAL at 21:38

## 2017-04-09 RX ADMIN — PENTAZOCINE HYDROCHLORIDE AND NALOXONE HYDROCHLORIDE 2 TABLET: 50; .5 TABLET ORAL at 16:22

## 2017-04-09 RX ADMIN — EPLERENONE 50 MG: 25 TABLET, FILM COATED ORAL at 21:35

## 2017-04-09 RX ADMIN — MONTELUKAST 10 MG: 10 TABLET, FILM COATED ORAL at 21:35

## 2017-04-09 RX ADMIN — ENOXAPARIN SODIUM 30 MG: 30 INJECTION SUBCUTANEOUS at 08:26

## 2017-04-09 RX ADMIN — BUDESONIDE 0.5 MG: 0.5 INHALANT RESPIRATORY (INHALATION) at 21:00

## 2017-04-09 RX ADMIN — POLYETHYLENE GLYCOL 3350 17 G: 1 POWDER ORAL at 08:26

## 2017-04-09 RX ADMIN — CLONIDINE HYDROCHLORIDE 0.2 MG: 0.1 TABLET ORAL at 21:38

## 2017-04-09 RX ADMIN — ETHACRYNIC ACID 25 MG: 25 TABLET ORAL at 08:38

## 2017-04-09 RX ADMIN — HYDROCODONE BITARTRATE AND ACETAMINOPHEN 1 TABLET: 5; 325 TABLET ORAL at 08:26

## 2017-04-09 RX ADMIN — ALBUTEROL SULFATE 2.5 MG: 2.5 SOLUTION RESPIRATORY (INHALATION) at 09:16

## 2017-04-09 RX ADMIN — EPLERENONE 25 MG: 25 TABLET, FILM COATED ORAL at 08:26

## 2017-04-09 RX ADMIN — PANTOPRAZOLE SODIUM 40 MG: 40 TABLET, DELAYED RELEASE ORAL at 06:06

## 2017-04-09 RX ADMIN — CEFTRIAXONE SODIUM 1 G: 1 INJECTION, SOLUTION INTRAVENOUS at 15:03

## 2017-04-09 RX ADMIN — BUDESONIDE 0.5 MG: 0.5 INHALANT RESPIRATORY (INHALATION) at 09:16

## 2017-04-09 RX ADMIN — CLONIDINE HYDROCHLORIDE 0.2 MG: 0.1 TABLET ORAL at 08:27

## 2017-04-09 NOTE — CONSULTS
Subjective     REASON FOR CONSULTATION:  This patient has history of remote breast cancer on the left, status post mastectomy and subsequent prophylactic right mastectomy.  She received Arimidex for 4 months and this was discontinued a long time ago because of side effects including joint pain.  The question has been raised in this patient admitted with pain in the rib cage, pain in the knees and large joints, if this is a representation of metastatic breast cancer.  I doubt that is the case, but never-the-less will proceed with a CA 15-3 and a bone scan for completeness of her workup.                                   REQUESTING PHYSICIAN:  DR GALVAN    RECORDS OBTAINED:  Records of the patients history including those obtained from the referring provider were reviewed and summarized in detail.    HISTORY OF PRESENT ILLNESS:  The patient is a 77 y.o. year old female who is here for an opinion about the above issue.    History of Present Illness  This patient is a 77-year-old very vivacious who remains extremely active who was admitted to the hospital with a urinary tract infection and accelerated hypertension. The question has been raised by Dr. Davalos in regard to the patient complaining of multiple symptoms including pain in both knees, swelling in the lower extremities as well as pain in the rib cage bilaterally, especially on the right side, and this could be representation of breast cancer.  It happened to be that the patient had a lumpectomy, subsequently a left mastectomy and also prophylactic right mastectomy in the past. She never required any chemotherapy and neither radiation treatment.  She took Arimidex for 4 months with terrible side effects including joint and bone pain and the medicine was discontinued.  This happened a long time ago.  In any event, the question has been raised due to the manifestation that the patient has now with the pain in the rib cage that has been an ongoing phenomenon for the  last 3 or 4 months associated with tenderness upon palpation and discomfort throughout the joints, especially the knees, could be representation of recurrent malignancy.  The patient also complains of minimal pain in her lumbar spine, nothing new, this has been an ongoing phenomenon for more than 20 years. She also has frequent headaches. She denies any changes in her eyes and no nasal alterations besides chronic obstruction of the nasal passages associated with allergies.  She denies any alteration in her oral mucosals.  Her appetite has declined some and she has lost 20-pounds during the last 3 or 4 months.  She has not had any difficulty swallowing. She has a chronic cough.  She has no shortness of breath or palpitations. She has no difficulty swallowing. Her bowel activity always requires some help from MiraLAX.  No passage of blood in the stools. Urination is ongoing with a recent urinary tract infection that is being treated with no hematuria and no vaginal discharge.  She denies any alterations in the skin.  She has had chronic headaches, nothing new, and she has had multiple MRIs and CT scans of the brain, being negative.  She has not had any other alterations in her skeleton to speak of besides osteoarthritis and bilateral knee replacement.  In the past the patient has had a normal vitamin D level and normal sedimentation rate, normal alkaline phosphatase, normal calcium level, but she has had an elevated PTH to 95 in 01/2017.  Again, the calcium level has remained normal.          Past Medical History:   Diagnosis Date   • Arthritis    • Asthma    • Cancer    • COPD (chronic obstructive pulmonary disease)    • Emphysema lung    • Generalized headaches    • Hayfever    • Hypertension    • Stroke         Past Surgical History:   Procedure Laterality Date   • BREAST LUMPECTOMY Left 04/01/2003   • CARPAL TUNNEL RELEASE  2011   • CATARACT EXTRACTION  2010   • HERNIA REPAIR  01/01/1971   • HERNIA REPAIR       right groin   • JOINT REPLACEMENT     • KNEE SURGERY Left 01/01/1998    bilateral knee replacements   • MASTECTOMY     • PARATHYROIDECTOMY  05/08/2006   • SINUS SURGERY  01/01/1984   • TOTAL ABDOMINAL HYSTERECTOMY  01/01/1973        No current facility-administered medications on file prior to encounter.      Current Outpatient Prescriptions on File Prior to Encounter   Medication Sig Dispense Refill   • albuterol (PROAIR HFA) 108 (90 BASE) MCG/ACT inhaler ProAir  (90 Base) MCG/ACT Inhalation Aerosol Solution; Patient Sig: ProAir  (90 Base) MCG/ACT Inhalation Aerosol Solution ; 8; 0; 18-Jun-2014; Active     • aspirin 81 MG tablet Take 162 mg by mouth Daily.     • budesonide (PULMICORT FLEXHALER) 180 MCG/ACT inhaler Pulmicort Flexhaler 180 MCG/ACT Inhalation Aerosol Powder Breath Activated; Patient Sig: Pulmicort Flexhaler 180 MCG/ACT Inhalation Aerosol Powder Breath Activated ; 0; 26-Jun-2014; Active     • butalbital-acetaminophen-caffeine (FIORICET, ESGIC) -40 MG per tablet Take 2 tablets by mouth Every 4 (Four) Hours As Needed for Headache. (Patient taking differently: Take 1 tablet by mouth Every 4 (Four) Hours As Needed for Headache.) 240 tablet 0   • Cholecalciferol (VITAMIN D) 1000 UNITS tablet Take  by mouth.     • CloNIDine (CATAPRES) 0.1 MG tablet Take 1 tablet by mouth 2 (Two) Times a Day. Schedule bid 1 and then [id number] take extra #1 if systolic BP > 160 and take 2 if systolic Bp>180 (Patient taking differently: Take 0.2 mg by mouth 2 (Two) Times a Day. Schedule bid 1 and then [id number] take extra #1 if systolic BP > 160 and take 2 if systolic Bp>180) 360 tablet 1   • diphenhydrAMINE (BENADRYL) 50 MG tablet Take 1 tablet by mouth every 4 (four) hours as needed for itching or allergies. 180 tablet 0   • doxycycline (DORYX) 100 MG enteric coated tablet Take 1 tablet by mouth 2 (Two) Times a Day. 20 tablet 0   • doxycycline (DORYX) 100 MG enteric coated tablet Take 1 tablet by mouth 2 (Two)  Times a Day. 20 tablet 0   • doxycycline (DORYX) 100 MG enteric coated tablet TAKE ONE TABLET BY MOUTH TWICE DAILY 20 tablet 0   • HYDROcodone-acetaminophen (NORCO) 5-325 MG per tablet Take 1 tablet by mouth Every 6 (Six) Hours As Needed for moderate pain (4-6) or severe pain (7-10). 120 tablet 0   • hydrOXYzine (VISTARIL) 50 MG capsule Take 1 capsule by mouth every 6 (six) hours as needed for itching. 360 capsule 1   • levoFLOXacin (LEVAQUIN) 500 MG tablet Take 500 mg by mouth Daily.     • LORazepam (ATIVAN) 0.5 MG tablet Take 1 tablet by mouth Every 8 (Eight) Hours As Needed for anxiety. Take 1 tab 1 hoour before MRI and repeat x 1 at time of test 60 tablet 0   • meclizine (ANTIVERT) 25 MG tablet TAKE ONE TABLET BY MOUTH THREE TIMES DAILY AS NEEDED FOR DIZZINESS 30 tablet 0   • MethylPREDNISolone (MEDROL, CRISTIAN,) 4 MG tablet Take as directed on package instructions. 21 tablet 0   • montelukast (SINGULAIR) 10 MG tablet Take 1 tablet by mouth every night. 90 tablet 1   • NIFEdipine XL (PROCARDIA XL) 60 MG 24 hr tablet TAKE TWO TABLETS BY MOUTH DAILY (Patient taking differently: one oral bid) 60 tablet 2   • NIFEdipine XL (PROCARDIA XL) 60 MG 24 hr tablet Take 1 tablet by mouth Daily. 90 tablet 0   • NIFEdipine XL (PROCARDIA XL) 60 MG 24 hr tablet TAKE TWO TABLETS BY MOUTH DAILY 60 tablet 0   • omeprazole (priLOSEC) 40 MG capsule TAKE ONE CAPSULE BY MOUTH EVERY DAY 90 capsule 0   • ondansetron ODT (ZOFRAN-ODT) 8 MG disintegrating tablet Take 1 tablet by mouth every 6 (six) hours as needed for nausea or vomiting. 360 tablet 1   • pentazocine-naloxone (TALWIN NX) 50-0.5 MG per tablet Take 2 tablets by mouth Every 4 (Four) Hours As Needed for Mild Pain (1-3) (for up to 90 days). (Patient taking differently: Take 1 tablet by mouth Every 4 (Four) Hours As Needed for Mild Pain (1-3) (for up to 90 days).) 1080 tablet 0   • polyethylene glycol (MIRALAX) powder Take one capful w/liquid daily 765 g 1   • predniSONE (DELTASONE)  10 MG tablet TAKE ONE TABLET BY MOUTH DAILY AS DIRECTED 30 tablet 0   • promethazine-codeine (PHENERGAN with CODEINE) 6.25-10 MG/5ML syrup Take 5 mL by mouth 4 (Four) Times a Day As Needed for cough. 360 mL 0   • tiZANidine (ZANAFLEX) 4 MG tablet Take 1 tablet by mouth at night as needed for muscle spasms. (Patient taking differently: Take 2 mg by mouth 2 (Two) Times a Day.) 90 tablet 0   • tiZANidine (ZANAFLEX) 4 MG tablet TAKE ONE TABLET BY MOUTH AT BEDTIME AS NEEDED FOR MUSCLE SPASMS 90 tablet 0   • tobramycin-dexamethasone (TOBRADEX) 0.3-0.1 % ophthalmic suspension Administer 1 drop to both eyes every 4 (four) hours while awake. 10 mL 0        ALLERGIES:    Allergies   Allergen Reactions   • Morphine Anaphylaxis   • Sulfa Antibiotics Anaphylaxis and Hives     Or sulfa fillers in meds   • Hydralazine Myalgia     Patient reports leg cramps, joint pain and increased fatigue   • Amlodipine      KIDNEYS SHUT DOWN   • Anastrozole    • Grass    • Morphine And Related    • Nifedipine      UNCONTROLLED BP   • Nsaids      KIDNEY FAILURE   • Norvasc  [Amlodipine Besylate]    • Penicillins    • Pneumococcal Vaccines Nausea Only   • Tree Extract    • Zolpidem      HALLUCINATIONS        Social History     Social History   • Marital status:      Spouse name: N/A   • Number of children: N/A   • Years of education: N/A     Social History Main Topics   • Smoking status: Never Smoker   • Smokeless tobacco: Never Used   • Alcohol use Yes      Comment: rarely - once or twice year (wine or bernabe)   • Drug use: Defer   • Sexual activity: Defer     Other Topics Concern   • None     Social History Narrative        Family History   Problem Relation Age of Onset   • Cancer Brother         Review of Systems   General: no fever, chills, SOME fatigue,  20 LB LOSS weight changes, NO lack of appetite.  Eyes: no epiphora, xerophthalmia,conjunctivitis, pain, glaucoma, blurred vision, blindness, secretion, photophobia, proptosis,  diplopia.  Ears: no otorrhea, tinnitus, otorrhagia, deafness, pain, vertigo.  Nose: no rhinorrhea, epistaxis, alteration in perception of odors, sinuses pressure.  Mouth: no alteration in gums or teeth,  ulcers, no difficulty with mastication or deglut ion, no odynophagia.  Neck: no masses or pain, no thyroid alterations, no pain in muscles or arteries, no carotid odynia, no crepitation.  Respiratory: no cough, sputum production, dyspnea, trepopnea, pleuritic pain, hemoptysis.  Heart: no syncope, irregularity, palpitations, angina, orthopnea, paroxysmal nocturnal dyspnea.  Vascular Venous: no tenderness,edema, palpable cords, postphlebitic syndrome, skin changes or ulcerations.  Vascular Arterial: no distal ischemia, claudication, gangrene, neuropathic ischemic pain, skin ulcers, paleness or cyanosis.  GI: no dysphagia, odynophagia, no regurgitation, no heartburn,no indigestion,no nausea,no vomiting,no hematemesis ,no melena,no jaundice,no distention, no obstipation,no enterorrhagia,no proctalgia,no anal  lesions, nochanges in bowel habits.  : no frequency, hesitancy, hematuria, discharge, pain.  Musculoskeletal: STATED JOINT AND BONE PAIN , joint pain,LE edema,SOME functional limitation,NO fasciculations, mass.  Neurologic: no headache, seizures, alterations on Craneal nerves, no motor or senssory deficit, normal coordination, no alteration in memory, orientation, calculation,writting, verbal or written language.  Skin: no rashes, pruritus or localized lesions.  Psychiatric: no anxiety, depression, agitation, delusions, proper insight.    Objective     Vitals:    04/09/17 0728 04/09/17 0737 04/09/17 0827 04/09/17 0916   BP: (!) 176/111 Comment: notified the nurse (!) 176/111    BP Location: Right arm      Patient Position: Sitting Sitting     Pulse: 82   82   Resp: 18   18   Temp: 98.9 °F (37.2 °C)      TempSrc: Oral      SpO2: 98%   97%   Weight:       Height:         No flowsheet data found.    Physical Exam     GENERAL:  Well-developed, well-nourished  Patient  in no acute distress.   SKIN:  Warm, dry without rashes, purpura or petechiae.  HEENT:  Pupils were equal and reactive to light and accomodation, conjunctivas non injected, no pterigion, normal extraocular movements, normal visual acuity.   Mouth mucosa was moist, no exudates in oropharynx, normal gum line, normal roof of the mouth and pillars, normal papillations of the tongue.Ear canals were normal, as well tympanic membranes, normal hearing acuity.No pain in mastoid area or erythema.  NECK:  Supple with good range of motion; no thyromegaly or masses, no JVD or bruits, no cervical adenopathies.No carotid arteries pain, no carotid abnormal pulsation or arterial dance.  LYMPHATICS:  No cervical, supraclavicular, axillary, epitrochlear or inguinal adenopathy.  CHEST:  Normal excursion of both kevin thoraces, normal voice fremitus, no subcutaneous emphysema, normal axillas, no rashes or acanthosis nigricans. Lungs clear to percussion and auscultation, normal breath sounds bilaterally, no wheezing, crackles or ronchi, no stridor, no rubs.  CARDIAC AND VASCULAR: PMI not displaced,no thrills, normal rate and regular rhythm, without murmurs, rubs or S3 or S4 right or left sided gallops. Normal femoral, popliteal, pedis, brachial and carotid pulses.  ABDOMEN:  Soft, nontender with no organomegaly or masses, no ascites, no collateral circulation,no distention,no Everton sign, no abdominal pain, no inguinal hernias,no umbilical hernias, no abdominal bruits. Normal bowel sounds.  GENITAL: Not  Performed.  EXTREMITIES  AND SPINE:  No clubbing, cyanosis or edema, no deformities or pain .No kyphosis, scoliosis, deformities or pain in spine, ribs or pelvic bone.  NEUROLOGICAL:  Patient was awake, alert, oriented to time, person and place,normal gait and coordination, negative Romberg; cranial nerves were normal, motor strength in upper and lower extremities was 5/5 proximally and  distally, reflexes were symmetric, toes were down going, normal sensory modalities to touch, pinprick, temperature discrimination, and vibratory sensation, normal propioception, no meningeal signs with supple neck.        RECENT LABS:  Hematology WBC   Date Value Ref Range Status   04/09/2017 6.24 4.50 - 10.70 10*3/mm3 Final     RBC   Date Value Ref Range Status   04/09/2017 4.66 3.90 - 5.20 10*6/mm3 Final     Hemoglobin   Date Value Ref Range Status   04/09/2017 14.0 11.9 - 15.5 g/dL Final     Hematocrit   Date Value Ref Range Status   04/09/2017 43.3 35.6 - 45.5 % Final     Platelets   Date Value Ref Range Status   04/09/2017 186 140 - 500 10*3/mm3 Final          Assessment/Plan   In summary, this patient has history of bilateral mastectomy for very early breast cancer. She never required any chemotherapy and neither took any radiation treatment and took 4 months of Arimidex that she discontinued because of severe joint pain.  She has now bilateral mastectomies, prophylactic on the right side, and the physical examination in the chest wall, besides having minimal discomfort in the chest wall, has no masses, induration or tenderness.  There is no axillary adenopathy, supraclavicular or infraclavicular adenopathies.  She also had minimal kyphosis in the lumbar spine.  The patient has a normal abdominal examination.  She has osteoarthritis and surgical scars in both knees. She has no obvious edema in her lower extremities. The patient is fully awake, alert, oriented in time with a normal neurological examination.  Obviously with a normal CBC, normal sedimentation rate and normal chemistry profile the question is raised in regard to what is the nature of her symptomatology.  She has been taking hydralazine and maybe it could be that she has a lupus related to medication. A special test dedicated to this is pending.    I doubt very much that her previous history of breast cancer is the reason why she has symptomatology  at this time.  Never-the-less, the patient has been advised to proceed with a CA 15-3 and also a bone scan.  This will be reviewed and acted upon depending on the findings.  In the meantime we do not justify any other medication and neither have I prescribed any medicines for her at this time.  We will followup with you.

## 2017-04-09 NOTE — PROGRESS NOTES
"   LOS: 0 days    Patient Care Team:  Óscar Cadet MD as PCP - General  Óscar Cadet MD as PCP - Family Medicine    Chief Complaint:    Chief Complaint   Patient presents with   • Leg Pain     Patient states this has been going on since starting on hydralazine, all joints hurt   • Leg Swelling       Subjective  follow-up the severe uncontrolled hypertension    Interval History:   The patient is feeling somewhat better. She did not have any desaturation at night based on O2 sats monitoring, blood pressure slowly improving  Denies chest pain or shortness of air, no orthopnea or PND, no nausea or vomiting. No dysuria or gross hematuria.    Review of Systems:   As noted above    Objective     Vital Signs  Temp:  [97.9 °F (36.6 °C)-98.9 °F (37.2 °C)] 97.9 °F (36.6 °C)  Heart Rate:  [76-82] 76  Resp:  [18-20] 18  BP: (148-176)/() 166/80    Flowsheet Rows         First Filed Value    Admission Height  63\" (160 cm) Documented at 04/06/2017 0720    Admission Weight  180 lb (81.6 kg) Documented at 04/06/2017 0720          I/O this shift:  In: 300 [P.O.:300]  Out: 300 [Urine:300]  I/O last 3 completed shifts:  In: 1364 [P.O.:1314; IV Piggyback:50]  Out: 2300 [Urine:2300]    Intake/Output Summary (Last 24 hours) at 04/09/17 1400  Last data filed at 04/09/17 1331   Gross per 24 hour   Intake              827 ml   Output             1400 ml   Net             -573 ml       Physical Exam:  General Appearance: alert, oriented x 3, no acute distress, anxious, obese  Skin: warm and dry  Neck: supple, minimal JVD, trachea midline  Lungs: CTA, unlabored breathing effort  Heart: RRR, normal S1 and S2, no rub  Abdomen: soft, non-tender, no palpable bladder, present bowel supple auscultation  Extremities: no edema, cyanosis or clubbing, minimal left thigh bruising.  Neuro: normal speech and mental status, mood and affect were appropriate.      Results Review:      Results from last 7 days  Lab Units 04/09/17  0326 " 04/08/17  0514 04/07/17  0449   SODIUM mmol/L 142 142 145   POTASSIUM mmol/L 3.5 3.6 3.8   CHLORIDE mmol/L 102 105 104   TOTAL CO2 mmol/L 23.8 23.8 27.0   BUN mg/dL 16 16 16   CREATININE mg/dL 0.79 0.85 1.01*   CALCIUM mg/dL 9.3 9.6 9.4   GLUCOSE mg/dL 96 105* 106*       Estimated Creatinine Clearance: 58.4 mL/min (by C-G formula based on Cr of 0.79).      Results from last 7 days  Lab Units 04/09/17 0326 04/08/17 0514 04/07/17  0449   MAGNESIUM mg/dL 2.2 2.3 2.3   PHOSPHORUS mg/dL 3.6 3.2 3.6         Results from last 7 days  Lab Units 04/09/17 0326 04/08/17  0514 04/07/17  0449   URIC ACID mg/dL 5.0 5.1 5.3         Results from last 7 days  Lab Units 04/09/17  0326 04/08/17  0514 04/07/17  0449 04/06/17  1213 04/06/17  0827   WBC 10*3/mm3 6.24 5.58 6.24 6.89 5.52   HEMOGLOBIN g/dL 14.0 13.5 13.8 15.9* 13.9   PLATELETS 10*3/mm3 186 178 179 226 187               Imaging Results (last 24 hours)     ** No results found for the last 24 hours. **          budesonide 0.5 mg Nebulization BID - RT   ceftriaxone 1 g Intravenous Q24H   CloNIDine 0.2 mg Oral Q12H   docusate sodium 100 mg Oral BID   enoxaparin 30 mg Subcutaneous Daily   eplerenone 25 mg Oral Q24H   ethacrynic acid 25 mg Oral BID   montelukast 10 mg Oral Nightly   NIFEdipine XL 60 mg Oral BID   pantoprazole 40 mg Oral Q AM   polyethylene glycol 17 g Oral Daily   tiZANidine 2 mg Oral BID       Pharmacy Consult        Medication Review:   Current Facility-Administered Medications   Medication Dose Route Frequency Provider Last Rate Last Dose   • acetaminophen (TYLENOL) tablet 650 mg  650 mg Oral Q4H PRN Óscar Cadet MD       • albuterol (PROVENTIL) nebulizer solution 0.083% 2.5 mg/3mL  2.5 mg Nebulization Q4H PRN Óscar Cadet MD   2.5 mg at 04/09/17 0916   • budesonide (PULMICORT) nebulizer solution 0.5 mg  0.5 mg Nebulization BID - RT Óscar Cadet MD   0.5 mg at 04/09/17 0916   • butalbital-acetaminophen-caffeine (FIORICET, ESGIC) -40 MG per  tablet 2 tablet  2 tablet Oral Q4H PRN Richard Lawson MD   2 tablet at 04/09/17 1059   • cefTRIAXone (ROCEPHIN) IVPB 1 g  1 g Intravenous Q24H Óscar Cadet MD   Stopped at 04/08/17 2114   • CloNIDine (CATAPRES) tablet 0.1 mg  0.1 mg Oral Q4H PRN Óscar Cadet MD        Or   • CloNIDine (CATAPRES) tablet 0.2 mg  0.2 mg Oral Q4H PRN Óscar Cadet MD       • CloNIDine (CATAPRES) tablet 0.2 mg  0.2 mg Oral Q12H Óscar Cadet MD   0.2 mg at 04/09/17 0827   • diphenhydrAMINE (BENADRYL) 12.5 MG/5ML elixir 12.5 mg  12.5 mg Oral Q4H PRN Óscar Cadet MD       • docusate sodium (COLACE) capsule 100 mg  100 mg Oral BID Óscar Cadet MD   100 mg at 04/09/17 0825   • enalaprilat (VASOTEC) injection 1.25 mg  1.25 mg Intravenous Q6H PRN Óscar Cadet MD   1.25 mg at 04/06/17 1951   • enoxaparin (LOVENOX) syringe 30 mg  30 mg Subcutaneous Daily Óscar Cadet MD   30 mg at 04/09/17 0826   • eplerenone (INSPRA) tablet 25 mg  25 mg Oral Q24H Soco Holloway MD   25 mg at 04/09/17 0826   • ethacrynic acid (EDECRIN) tablet 25 mg  25 mg Oral BID Óscar Cadet MD   25 mg at 04/09/17 0838   • HYDROcodone-acetaminophen (NORCO) 5-325 MG per tablet 1 tablet  1 tablet Oral Q6H PRN Óscar Cadet MD   1 tablet at 04/09/17 0826   • hydrOXYzine (VISTARIL) capsule 50 mg  50 mg Oral Q6H PRN Óscar Cadet MD       • ipratropium (ATROVENT) nebulizer solution 0.5 mg  500 mcg Nebulization 4x Daily PRN Óscar Cadet MD       • LORazepam (ATIVAN) tablet 0.5 mg  0.5 mg Oral Q8H PRN Óscar Cadet MD   0.5 mg at 04/08/17 0650   • montelukast (SINGULAIR) tablet 10 mg  10 mg Oral Nightly Óscar Cadet MD   10 mg at 04/08/17 2112   • NIFEdipine XL (PROCARDIA XL) 24 hr tablet 60 mg  60 mg Oral BID Óscar Cadet MD   60 mg at 04/09/17 0839   • ondansetron ODT (ZOFRAN-ODT) disintegrating tablet 8 mg  8 mg Oral Q6H PRN Óscar Cadet MD       • pantoprazole (PROTONIX) EC tablet 40 mg  40 mg Oral Q AM Óscar OKEEFE  MD Helio   40 mg at 04/09/17 0606   • pentazocine-naloxone (TALWIN NX) 50-0.5 MG per tablet 2 tablet  2 tablet Oral Q4H PRN Óscar Cadet MD       • Pharmacy Consult   Does not apply Continuous PRN Óscar Cadet MD       • polyethylene glycol (MIRALAX) powder 17 g  17 g Oral Daily Óscar Cadet MD   17 g at 04/09/17 0826   • promethazine (PHENERGAN) tablet 12.5 mg  12.5 mg Oral Q6H PRN Óscar Cadet MD       • promethazine-codeine (PHENERGAN with CODEINE) 6.25-10 MG/5ML syrup 5 mL  5 mL Oral 4x Daily PRN Óscar Cadet MD       • sodium chloride 0.9 % flush 1-10 mL  1-10 mL Intravenous PRN Óscar Cadet MD       • sodium chloride 0.9 % flush 10 mL  10 mL Intravenous PRN Estevan Vega MD       • tiZANidine (ZANAFLEX) tablet 2 mg  2 mg Oral BID Óscar Cadet MD   2 mg at 04/09/17 0826       Assessment/Plan   1. Severe uncontrolled hypertension with the multiple episodes of being out of control and difficult to control and the patient has multiple drug allergies.  The blood pressure is not well controlled and was in the 170/111 earlier today.  2. Restless sleep we need to rule out the possibility of obstructive sleep apnea which could be the making the blood pressure control unattainable. The O2 sat monitoring last night revealed no desaturation that makes her sleep apnea questionable  3. Hypokalemia resolved currently on eplerenone, she did not tolerate the Aldactone, she is now on eplerenone.  Potassium is 3.5 today.  4. Possible diastolic heart  5. Probable COPD  6. Diffuse arthralgias, rheumatology evaluation as an outpatient is pending.    Plan:  1.  We will increase eplerenone to 50 mg twice a day  2.  Surveillance labs            Rj Bond MD  04/09/17  2:00 PM

## 2017-04-09 NOTE — PLAN OF CARE
"Problem: Pain, Acute (Adult)  Goal: Acceptable Pain Control/Comfort Level  Outcome: Ongoing (interventions implemented as appropriate)    Problem: Fall Risk (Adult)  Goal: Absence of Falls  Outcome: Ongoing (interventions implemented as appropriate)    Problem: Patient Care Overview (Adult)  Goal: Plan of Care Review  Outcome: Ongoing (interventions implemented as appropriate)    04/09/17 0529   Coping/Psychosocial Response Interventions   Plan Of Care Reviewed With patient   Patient Care Overview   Progress improving   Outcome Evaluation   Outcome Summary/Follow up Plan Pt c/o pain to \"large joints\", ambulating with standby assistance to bathroom. Ice packs applied to [facility] for pain, pharmacy does not have [facility], script on chart per Dr. Cadet            "

## 2017-04-09 NOTE — PLAN OF CARE
Problem: Hypertensive Disease/Crisis (Arterial) (Adult)  Goal: Signs and Symptoms of Listed Potential Problems Will be Absent or Manageable (Hypertensive Disease/Crisis)  Outcome: Ongoing (interventions implemented as appropriate)    Problem: Pain, Acute (Adult)  Goal: Acceptable Pain Control/Comfort Level  Outcome: Ongoing (interventions implemented as appropriate)    Problem: Fall Risk (Adult)  Goal: Absence of Falls  Outcome: Ongoing (interventions implemented as appropriate)    Problem: Patient Care Overview (Adult)  Goal: Plan of Care Review  Outcome: Ongoing (interventions implemented as appropriate)  Goal: Adult Individualization and Mutuality  Outcome: Ongoing (interventions implemented as appropriate)  Goal: Discharge Needs Assessment  Outcome: Ongoing (interventions implemented as appropriate)

## 2017-04-09 NOTE — PROGRESS NOTES
Daily Progress Note.     Sasha Barrett  77 y.o.  female  4/9/2017    Brief problem (summary)  COPD  CKD  No desaturation at night on RA    Today, c/o joint pain. No new c/o today     PMS/FH/SH: reviewed and unchanged.  Medications:     budesonide 0.5 mg Nebulization BID - RT   ceftriaxone 1 g Intravenous Q24H   CloNIDine 0.2 mg Oral Q12H   docusate sodium 100 mg Oral BID   enoxaparin 30 mg Subcutaneous Daily   eplerenone 25 mg Oral Q24H   ethacrynic acid 25 mg Oral BID   montelukast 10 mg Oral Nightly   NIFEdipine XL 60 mg Oral BID   pantoprazole 40 mg Oral Q AM   polyethylene glycol 17 g Oral Daily   tiZANidine 2 mg Oral BID       Pharmacy Consult        ROS:  Respiratory ROS: no cough, shortness of breath, or wheezing    Objective:  Temp:  [97.9 °F (36.6 °C)-98.9 °F (37.2 °C)] 97.9 °F (36.6 °C)  I/O last 3 completed shifts:  In: 1364 [P.O.:1314; IV Piggyback:50]  Out: 2300 [Urine:2300]  I/O this shift:  In: 300 [P.O.:300]  Out: 300 [Urine:300]    Physical Exam   Constitutional: She is oriented to person, place, and time. She appears well-developed and well-nourished. No distress.   HENT:   Head: Normocephalic and atraumatic.   Eyes: Pupils are equal, round, and reactive to light. No scleral icterus.   Cardiovascular: Normal rate and regular rhythm.    Pulmonary/Chest: Effort normal. No respiratory distress. She has no decreased breath sounds. She has no wheezes.   Abdominal: Soft. Bowel sounds are normal. There is no hepatosplenomegaly.   Neurological: She is alert and oriented to person, place, and time.   Skin: No cyanosis. Nails show no clubbing.   Psychiatric: She has a normal mood and affect. Her behavior is normal.         Results from last 7 days  Lab Units 04/09/17  0326 04/08/17  0514 04/07/17  0449   WBC 10*3/mm3 6.24 5.58 6.24   HEMOGLOBIN g/dL 14.0 13.5 13.8   HEMATOCRIT % 43.3 42.0 43.0   PLATELETS 10*3/mm3 186 178 179       Results from last 7 days  Lab Units 04/09/17  0326 04/08/17  0514  04/07/17  0449   SODIUM mmol/L 142 142 145   POTASSIUM mmol/L 3.5 3.6 3.8   CHLORIDE mmol/L 102 105 104   TOTAL CO2 mmol/L 23.8 23.8 27.0   BUN mg/dL 16 16 16   CREATININE mg/dL 0.79 0.85 1.01*   GLUCOSE mg/dL 96 105* 106*   CALCIUM mg/dL 9.3 9.6 9.4               ASSESSMENT/ PLAN:  · COPD, stable, continue bronchodilators, No desaturation at night on RA  · HTN      Avis Good MD,[facility]  Pulmonary, Critical Care and Sleep Medicine.  [facility]    4/9/2017    EMR Dragon/Transcription disclaimer:   Much of this encounter note is an electronic transcription/translation of spoken language to printed text. The electronic translation of spoken language may permit erroneous, or at times, nonsensical words or phrases to be inadvertently transcribed; Although I have reviewed the note for such errors, some may still exist.

## 2017-04-10 ENCOUNTER — APPOINTMENT (OUTPATIENT)
Dept: GENERAL RADIOLOGY | Facility: HOSPITAL | Age: 77
End: 2017-04-10
Attending: INTERNAL MEDICINE

## 2017-04-10 LAB
ALBUMIN SERPL-MCNC: 3.7 G/DL (ref 3.5–5.2)
ANION GAP SERPL CALCULATED.3IONS-SCNC: 14 MMOL/L
BASOPHILS # BLD AUTO: 0.04 10*3/MM3 (ref 0–0.2)
BASOPHILS NFR BLD AUTO: 0.7 % (ref 0–1.5)
BUN BLD-MCNC: 18 MG/DL (ref 8–23)
BUN/CREAT SERPL: 22.5 (ref 7–25)
CALCIUM SPEC-SCNC: 9.6 MG/DL (ref 8.6–10.5)
CHLORIDE SERPL-SCNC: 103 MMOL/L (ref 98–107)
CO2 SERPL-SCNC: 25 MMOL/L (ref 22–29)
CREAT BLD-MCNC: 0.8 MG/DL (ref 0.57–1)
DEPRECATED RDW RBC AUTO: 47.4 FL (ref 37–54)
EOSINOPHIL # BLD AUTO: 0.16 10*3/MM3 (ref 0–0.7)
EOSINOPHIL NFR BLD AUTO: 2.7 % (ref 0.3–6.2)
ERYTHROCYTE [DISTWIDTH] IN BLOOD BY AUTOMATED COUNT: 14 % (ref 11.7–13)
GFR SERPL CREATININE-BSD FRML MDRD: 69 ML/MIN/1.73
GLUCOSE BLD-MCNC: 82 MG/DL (ref 65–99)
HCT VFR BLD AUTO: 43.5 % (ref 35.6–45.5)
HGB BLD-MCNC: 13.9 G/DL (ref 11.9–15.5)
IMM GRANULOCYTES # BLD: 0 10*3/MM3 (ref 0–0.03)
IMM GRANULOCYTES NFR BLD: 0 % (ref 0–0.5)
LYMPHOCYTES # BLD AUTO: 2.01 10*3/MM3 (ref 0.9–4.8)
LYMPHOCYTES NFR BLD AUTO: 33.4 % (ref 19.6–45.3)
MAGNESIUM SERPL-MCNC: 2.6 MG/DL (ref 1.6–2.4)
MCH RBC QN AUTO: 29.4 PG (ref 26.9–32)
MCHC RBC AUTO-ENTMCNC: 32 G/DL (ref 32.4–36.3)
MCV RBC AUTO: 92.2 FL (ref 80.5–98.2)
MONOCYTES # BLD AUTO: 0.72 10*3/MM3 (ref 0.2–1.2)
MONOCYTES NFR BLD AUTO: 12 % (ref 5–12)
NEUTROPHILS # BLD AUTO: 3.09 10*3/MM3 (ref 1.9–8.1)
NEUTROPHILS NFR BLD AUTO: 51.2 % (ref 42.7–76)
PHOSPHATE SERPL-MCNC: 3.6 MG/DL (ref 2.5–4.5)
PLATELET # BLD AUTO: 181 10*3/MM3 (ref 140–500)
PMV BLD AUTO: 11.1 FL (ref 6–12)
POTASSIUM BLD-SCNC: 3.5 MMOL/L (ref 3.5–5.2)
RBC # BLD AUTO: 4.72 10*6/MM3 (ref 3.9–5.2)
SODIUM BLD-SCNC: 142 MMOL/L (ref 136–145)
URATE SERPL-MCNC: 5 MG/DL (ref 2.4–5.7)
WBC NRBC COR # BLD: 6.02 10*3/MM3 (ref 4.5–10.7)

## 2017-04-10 PROCEDURE — 73030 X-RAY EXAM OF SHOULDER: CPT

## 2017-04-10 PROCEDURE — 73610 X-RAY EXAM OF ANKLE: CPT

## 2017-04-10 PROCEDURE — 96372 THER/PROPH/DIAG INJ SC/IM: CPT

## 2017-04-10 PROCEDURE — 94799 UNLISTED PULMONARY SVC/PX: CPT

## 2017-04-10 PROCEDURE — 25010000002 ENOXAPARIN PER 10 MG: Performed by: INTERNAL MEDICINE

## 2017-04-10 PROCEDURE — 25010000003 CEFTRIAXONE PER 250 MG: Performed by: INTERNAL MEDICINE

## 2017-04-10 PROCEDURE — 83735 ASSAY OF MAGNESIUM: CPT | Performed by: INTERNAL MEDICINE

## 2017-04-10 PROCEDURE — G0378 HOSPITAL OBSERVATION PER HR: HCPCS

## 2017-04-10 PROCEDURE — 99224 PR SBSQ OBSERVATION CARE/DAY 15 MINUTES: CPT | Performed by: INTERNAL MEDICINE

## 2017-04-10 PROCEDURE — 84550 ASSAY OF BLOOD/URIC ACID: CPT | Performed by: INTERNAL MEDICINE

## 2017-04-10 PROCEDURE — 99214 OFFICE O/P EST MOD 30 MIN: CPT | Performed by: INTERNAL MEDICINE

## 2017-04-10 PROCEDURE — 73000 X-RAY EXAM OF COLLAR BONE: CPT

## 2017-04-10 PROCEDURE — 85025 COMPLETE CBC W/AUTO DIFF WBC: CPT | Performed by: INTERNAL MEDICINE

## 2017-04-10 PROCEDURE — 80069 RENAL FUNCTION PANEL: CPT | Performed by: INTERNAL MEDICINE

## 2017-04-10 RX ORDER — CLONIDINE HYDROCHLORIDE 0.2 MG/1
0.2 TABLET ORAL EVERY 8 HOURS SCHEDULED
Status: DISCONTINUED | OUTPATIENT
Start: 2017-04-10 | End: 2017-04-11 | Stop reason: HOSPADM

## 2017-04-10 RX ORDER — CHOLECALCIFEROL (VITAMIN D3) 125 MCG
1000 CAPSULE ORAL DAILY
Status: DISCONTINUED | OUTPATIENT
Start: 2017-04-10 | End: 2017-04-11 | Stop reason: HOSPADM

## 2017-04-10 RX ADMIN — ETHACRYNIC ACID 25 MG: 25 TABLET ORAL at 22:34

## 2017-04-10 RX ADMIN — NIFEDIPINE 60 MG: 60 TABLET, EXTENDED RELEASE ORAL at 22:34

## 2017-04-10 RX ADMIN — PENTAZOCINE HYDROCHLORIDE AND NALOXONE HYDROCHLORIDE 2 TABLET: 50; .5 TABLET ORAL at 09:50

## 2017-04-10 RX ADMIN — TIZANIDINE 2 MG: 2 TABLET ORAL at 17:50

## 2017-04-10 RX ADMIN — CEFTRIAXONE SODIUM 1 G: 1 INJECTION, SOLUTION INTRAVENOUS at 15:48

## 2017-04-10 RX ADMIN — EPLERENONE 50 MG: 25 TABLET, FILM COATED ORAL at 09:37

## 2017-04-10 RX ADMIN — Medication 1000 MCG: at 15:48

## 2017-04-10 RX ADMIN — PENTAZOCINE HYDROCHLORIDE AND NALOXONE HYDROCHLORIDE 2 TABLET: 50; .5 TABLET ORAL at 01:22

## 2017-04-10 RX ADMIN — CLONIDINE HYDROCHLORIDE 0.2 MG: 0.2 TABLET ORAL at 15:49

## 2017-04-10 RX ADMIN — PENTAZOCINE HYDROCHLORIDE AND NALOXONE HYDROCHLORIDE 2 TABLET: 50; .5 TABLET ORAL at 20:19

## 2017-04-10 RX ADMIN — CLONIDINE HYDROCHLORIDE 0.2 MG: 0.1 TABLET ORAL at 11:49

## 2017-04-10 RX ADMIN — PENTAZOCINE HYDROCHLORIDE AND NALOXONE HYDROCHLORIDE 2 TABLET: 50; .5 TABLET ORAL at 15:49

## 2017-04-10 RX ADMIN — ETHACRYNIC ACID 25 MG: 25 TABLET ORAL at 09:36

## 2017-04-10 RX ADMIN — PENTAZOCINE HYDROCHLORIDE AND NALOXONE HYDROCHLORIDE 2 TABLET: 50; .5 TABLET ORAL at 06:00

## 2017-04-10 RX ADMIN — EPLERENONE 50 MG: 25 TABLET, FILM COATED ORAL at 20:18

## 2017-04-10 RX ADMIN — PANTOPRAZOLE SODIUM 40 MG: 40 TABLET, DELAYED RELEASE ORAL at 06:00

## 2017-04-10 RX ADMIN — CLONIDINE HYDROCHLORIDE 0.2 MG: 0.1 TABLET ORAL at 09:36

## 2017-04-10 RX ADMIN — ENOXAPARIN SODIUM 30 MG: 30 INJECTION SUBCUTANEOUS at 09:50

## 2017-04-10 RX ADMIN — MONTELUKAST 10 MG: 10 TABLET, FILM COATED ORAL at 20:18

## 2017-04-10 RX ADMIN — NIFEDIPINE 60 MG: 60 TABLET, EXTENDED RELEASE ORAL at 09:36

## 2017-04-10 RX ADMIN — BUDESONIDE 0.5 MG: 0.5 INHALANT RESPIRATORY (INHALATION) at 19:38

## 2017-04-10 RX ADMIN — CLONIDINE HYDROCHLORIDE 0.2 MG: 0.2 TABLET ORAL at 22:33

## 2017-04-10 RX ADMIN — TIZANIDINE 2 MG: 2 TABLET ORAL at 09:36

## 2017-04-10 RX ADMIN — BUTALBITAL, ACETAMINOPHEN, AND CAFFEINE 2 TABLET: 50; 325; 40 TABLET ORAL at 15:48

## 2017-04-10 RX ADMIN — BUDESONIDE 0.5 MG: 0.5 INHALANT RESPIRATORY (INHALATION) at 09:26

## 2017-04-10 NOTE — PLAN OF CARE
Problem: Hypertensive Disease/Crisis (Arterial) (Adult)  Goal: Signs and Symptoms of Listed Potential Problems Will be Absent or Manageable (Hypertensive Disease/Crisis)  Outcome: Ongoing (interventions implemented as appropriate)    Problem: Pain, Acute (Adult)  Goal: Acceptable Pain Control/Comfort Level  Outcome: Ongoing (interventions implemented as appropriate)    Problem: Fall Risk (Adult)  Goal: Absence of Falls  Outcome: Ongoing (interventions implemented as appropriate)    Problem: Patient Care Overview (Adult)  Goal: Plan of Care Review  Outcome: Ongoing (interventions implemented as appropriate)    04/10/17 4678   Coping/Psychosocial Response Interventions   Plan Of Care Reviewed With patient   Patient Care Overview   Progress no change   Outcome Evaluation   Outcome Summary/Follow up Plan Pt blood pressure remains high at times, meds adjusted, continue to monitor. C/o of head ache, controled with paim meds.

## 2017-04-10 NOTE — PROGRESS NOTES
"  PROGRESS NOTE   LOS: 0 days   Patient Care Team:  Óscar Cadet MD as PCP - General  Óscar Cadet MD as PCP - Family Medicine    Chief Complaint: shortness of breath and COPD, rib pain    Interval History: Noted on for 617 with complaints of shortness of breath and was found to have elevated blood pressure.  She has had a cough with clear secretions and no sign of acute exacerbation.  She has had bilateral rib pain, shoulder and ankle pain.  She has a history of breast cancer.  She had an whole-body nuclear med scan and was found to have degenerative changes.  She is to see rheumatology for workup for possible rheumatoid arthritis.  She had an overnight oximetry on 4/7/17 that showed no desaturations on room air, which likely indicates no sign of obstructive sleep apnea that would contribute to her hypertension.    REVIEW OF SYSTEMS:   CARDIOVASCULAR: No chest pain, chest pressure. No palpitations or edema.  Bilateral rib pain, shoulder pain, general body aches  RESPIRATORY: No shortness of breath, cough with thin clear secretions  GASTROINTESTINAL: No anorexia, nausea, vomiting or diarrhea. No abdominal pain or blood.   HEMATOLOGIC: No bleeding or bruising.       Vital Signs  Temp:  [98 °F (36.7 °C)-98.3 °F (36.8 °C)] 98.2 °F (36.8 °C)  Heart Rate:  [65-83] 70  Resp:  [16-20] 20  BP: (138-192)/(73-94) 138/82  SpO2:  [92 %-95 %] 95 %  on    O2 Device: room air    Intake/Output Summary (Last 24 hours) at 04/10/17 1740  Last data filed at 04/10/17 1730   Gross per 24 hour   Intake              610 ml   Output                1 ml   Net              609 ml     Flowsheet Rows         First Filed Value    Admission Height  63\" (160 cm) Documented at 04/06/2017 0720    Admission Weight  180 lb (81.6 kg) Documented at 04/06/2017 0720        Last 3 weights    04/08/17  0520 04/09/17  0342 04/10/17  0645   Weight: 184 lb 9.6 oz (83.7 kg) 184 lb 8 oz (83.7 kg) 182 lb 8 oz (82.8 kg)       Physical Exam:  GEN:  No acute " distress, alert, cooperative, well developed   EYES:   Sclera clear. No icterus. PERRL. Normal EOM  ENT:   External ears/nose normal, no oral lesions, no thrush, mucous membranes moist  NECK:  Supple, midline trachea, no JVD  LUNGS: Normal chest on inspection, CTAB, no wheezes.  Prolonged expiratory phase No rhonchi. No crackles. Respirations regular, even and unlabored.   CV:  Regular rhythm and rate. Normal S1/S2. No murmurs, gallops, or rubs noted.  ABD:  Soft, non-tender and non-distended. Normal bowel sounds. No guarding  EXT:  Moves all extremities well. No cyanosis. No redness. No edema.   Skin: dry, intact, no bleeding    Results Review:        Results from last 7 days  Lab Units 04/10/17  0400 04/09/17  0326 04/08/17  0514 04/07/17  0449 04/06/17  1130 04/06/17  0827   SODIUM mmol/L 142 142 142 145 144 145   POTASSIUM mmol/L 3.5 3.5 3.6 3.8 3.3* 3.6   CHLORIDE mmol/L 103 102 105 104 102 105   TOTAL CO2 mmol/L 25.0 23.8 23.8 27.0 23.8 27.0   BUN mg/dL 18 16 16 16 13 16   CREATININE mg/dL 0.80 0.79 0.85 1.01* 0.85 0.89   CALCIUM mg/dL 9.6 9.3 9.6 9.4 10.2 9.5       Results from last 7 days  Lab Units 04/08/17  0514 04/07/17  0449 04/06/17  1130   CK TOTAL U/L 36 38 44   TROPONIN T ng/mL  --   --  <0.010       Results from last 7 days  Lab Units 04/10/17  0401 04/09/17  0326 04/08/17  0514 04/07/17  0449 04/06/17  1213 04/06/17  0827   WBC 10*3/mm3 6.02 6.24 5.58 6.24 6.89 5.52   HEMOGLOBIN g/dL 13.9 14.0 13.5 13.8 15.9* 13.9   HEMATOCRIT % 43.5 43.3 42.0 43.0 48.4* 42.2   PLATELETS 10*3/mm3 181 186 178 179 226 187   NEUTROPHIL % % 51.2 48.3 43.4 47.6 51.0 53.6   LYMPHOCYTE % % 33.4 36.5 41.0 37.7 36.9 32.8   MONOCYTES % % 12.0 11.7 11.8 11.7 10.4 10.7   EOSINOPHIL % % 2.7 2.7 2.7 2.4 1.3 1.8   BASOPHIL % % 0.7 0.8 1.1 0.6 0.4 1.1   IMM GRAN % % 0.0 0.0 0.0 0.0 0.0 0.0           Results from last 7 days  Lab Units 04/10/17  0400 04/09/17  0326 04/08/17  0514 04/07/17  0449   MAGNESIUM mg/dL 2.6* 2.2 2.3 2.3                  No results found for: POCGLU        Results from last 7 days  Lab Units 04/06/17  1130   PROBNP pg/mL 350.8       Results from last 7 days  Lab Units 04/06/17  1810   URINECX  >100,000 CFU/mL Escherichia coli*       Results from last 7 days  Lab Units 04/06/17  1810   NITRITE UA  Positive*   WBC UA /HPF 6-12*   BACTERIA UA /HPF 3+*   SQUAM EPITHEL UA /HPF 0-2   URINECX  >100,000 CFU/mL Escherichia coli*       Results from last 7 days  Lab Units 04/06/17  1119   ADENOVIRUS DETECTION BY PCR  Not Detected   CORONAVIRUS 229E  Not Detected   CORONAVIRUS HKU1  Not Detected   CORONAVIRUS NL63  Not Detected   CORONAVIRUS OC43  Not Detected   HUMAN METAPNEUMOVIRUS  Not Detected   HUMAN RHINOVIRUS/ENTEROVIRUS  Not Detected   INFLUENZA B PCR  Not Detected   PARAINFLUENZA 1  Not Detected   PARAINFLUENZA VIRUS 2  Not Detected   PARAINFLUENZA VIRUS 3  Not Detected   PARAINFLUENZA VIRUS 4  Not Detected   BORDETELLA PERTUSSIS PCR  Not Detected   INFLUENZA 2009 H1N1 BY PCR  Not Detected   CHLAMYDOPHILA PNEUMONIAE PCR  Not Detected   MYCOPLAMA PNEUMO PCR  Not Detected   INFLUENZA A PCR  Not Detected   INFLUENZA A H3  Not Detected   INFLUENZA A H1  Not Detected   RSV, PCR  Not Detected       Results from last 7 days  Lab Units 04/10/17  0400  04/06/17  1810   CREATININE UR mg/dL  --   --  41.7   PROTEIN TOTAL URINE mg/dL  --   --  18.0   URIC ACID mg/dL 5.0  < >  --    < > = values in this interval not displayed.    Imaging Results (last 24 hours)     Procedure Component Value Units Date/Time    NM Bone Scan Whole Body [[id number]] Collected:  04/09/17 1716     Updated:  04/10/17 0713    Narrative:       EXAMINATION: NUCLEAR MEDICINE WHOLE-BODY BONE SCAN     HISTORY: 77-year-old female with a history of breast cancer and joint  and bone pain.     FINDINGS: 23 mCi of technetium MDP was administered intravenously and  whole body anterior and posterior sonographic images were obtained in  conjunction with coned-down  sonographic images of the bilateral ankles  and feet, thoracic spine, skull and arms.     The images demonstrate abnormal focal increased uptake within the left  ankle, left sternoclavicular joint and left shoulder.       Impression:       Increased uptake in the region of the left sternoclavicular  joint, left ankle and left shoulder. This is likely degenerative in  nature. However, I recommend that plane film correlation of the left  sternoclavicular joint, left ankle and left shoulder be performed.     This report was finalized on 4/10/2017 7:10 AM by Dr. Nicolas Keith MD.       XR Clavicle Bilateral [[id number]] Collected:  04/10/17 1516     Updated:  04/10/17 1523    Narrative:       XR CLAVICLE BILATERAL-, XR SHOULDER 2+ VW LEFT-     INDICATIONS: Breast cancer, abnormal bone scan     TECHNIQUE: 2 VIEWS OF EACH CLAVICLE, 2 VIEWS OF THE LEFT SHOULDER     COMPARISON: Right shoulder x-ray from 06/04/2012, chest x-ray from  01/03/2017     FINDINGS:      Moderate hypertrophic degenerative changes are seen in the bilateral  acromioclavicular joints, and degenerative spurring is present in both  humeral heads. No acute fracture or destructive osseous lesion is  identified.       Impression:          Degenerative changes. No acute fracture or destructive osseous lesion is  identified in either clavicle or in the left shoulder. Follow up/further  evaluation can be obtained is indications persist     This report was finalized on 4/10/2017 3:20 PM by Dr. Umesh Harrison MD.       XR Shoulder 2+ View Left [[id number]] Collected:  04/10/17 1516     Updated:  04/10/17 1523    Narrative:       XR CLAVICLE BILATERAL-, XR SHOULDER 2+ VW LEFT-     INDICATIONS: Breast cancer, abnormal bone scan     TECHNIQUE: 2 VIEWS OF EACH CLAVICLE, 2 VIEWS OF THE LEFT SHOULDER     COMPARISON: Right shoulder x-ray from 06/04/2012, chest x-ray from  01/03/2017     FINDINGS:      Moderate hypertrophic degenerative changes are seen in the  bilateral  acromioclavicular joints, and degenerative spurring is present in both  humeral heads. No acute fracture or destructive osseous lesion is  identified.       Impression:          Degenerative changes. No acute fracture or destructive osseous lesion is  identified in either clavicle or in the left shoulder. Follow up/further  evaluation can be obtained is indications persist     This report was finalized on 4/10/2017 3:20 PM by Dr. Umesh Harrison MD.       XR Ankle 3+ View Left [[id number]] Collected:  04/10/17 1521     Updated:  04/10/17 1526    Narrative:       XR ANKLE 3+ VW LEFT-     INDICATIONS: Breast cancer, abnormal bone scan     TECHNIQUE: 3 VIEWS OF THE LEFT ANKLE     COMPARISON: Correlated with bone scan from 04/09/2017     FINDINGS:      No acute fracture or erosion is identified. The mortise appears  preserved. Degenerative spurring is seen at the dorsal aspect of the  foot, the plantar calcaneus, at the distal tibia.       Impression:          Degenerative changes. No acute fracture or destructive osseous lesion is  identified. Follow up/further evaluation can be obtained as indications  persist.     This report was finalized on 4/10/2017 3:23 PM by Dr. Umesh Harrison MD.           Medication Review:     budesonide 0.5 mg Nebulization BID - RT   ceftriaxone 1 g Intravenous Q24H   CloNIDine 0.2 mg Oral Q8H   docusate sodium 100 mg Oral BID   enoxaparin 30 mg Subcutaneous Daily   eplerenone 50 mg Oral Q12H   ethacrynic acid 25 mg Oral BID   montelukast 10 mg Oral Nightly   NIFEdipine XL 60 mg Oral BID   pantoprazole 40 mg Oral Q AM   polyethylene glycol 17 g Oral Daily   tiZANidine 2 mg Oral BID   vitamin B-12 1,000 mcg Oral Daily            ASSESSMENT:   1. Moderate obstructive airway disease with emphysema, FEV1 at 72% of predicted and DLCO at 65% of predicted  2. Coronary artery disease  3. Accelerated hypertension  4. Chronic kidney disease  5. Obesity  6. Chronic  headache  7. History of cerebrovascular accidents   8. History of infiltrating ductal carcinoma of the breast  9. Allergic rhinitis  10. History of passive tobacco abuse    PLAN:  Stable COPD with no acute exacerbation- continue nebulizers as ordered  No desaturation at night on room air to suggest possible sleep apnea and therefore not likely to be a cause of hypertension.  Since she is stable at this time we will sign off.  Please call with any concerns or questions    Disposition: Follow-up with [facility] as scheduled or in 6-8 weeks    Tamera Nicole MD  04/10/17  5:40 PM        EMR Dragon/Transcription disclaimer:   Much of this encounter note is an electronic transcription/translation of spoken language to printed text. The electronic translation of spoken language may permit erroneous, or at times, nonsensical words or phrases to be inadvertently transcribed; Although I have reviewed the note for such errors, some may still exist.

## 2017-04-10 NOTE — PROGRESS NOTES
[facility]  FAMILY AND INTERNAL MEDICINE  AUDREY ZAMAN, and COLE      INTERNAL MEDICINE DAILY PROGRESS NOTE  Óscar Cadet M.D.  2017            Patient Identification:  Name: Sasha Barrett  Age: 77 y.o.  Sex: female  :  1940  MRN: [id number]         Primary Care Physician: Óscar Cadet MD  LENGTH OF STAY 0 DAYS    Consults     Date and Time Order Name Status Description    2017 0511 Inpatient Consult to Hematology & Oncology Completed     2017 0415 Inpatient Consult to Neurology Completed     2017 0413 Inpatient Consult to Pulmonology Completed     2017 1141 Inpatient Consult to Orthopedic Surgery Completed     2017 1141 Inpatient Consult to Obstetrics / Gynecology Completed     2017 1141 Inpatient Consult to Cardiology Completed     2017 1141 Inpatient Consult to Nephrology Completed     2017 1040 Inpatient Consult to Rheumatology      2017 0824 Family Medicine Consult Completed                       Chief Complaint: Weakness with difficulty ambulating and arthralgias            Subjective      Interval History: Patient is a 77 y.o.female who presented with weakness and difficulty ambulating along with arthralgias to the emergency room at [facility] on 17. Patient was seen and initially evaluated by Dr. Estevan Vega. Patient did call Dr. Cadet with a long laundry list of symptoms that she felt needed emergent evaluation because of weakness and difficulty walking for the last few days. She also reported an 8-10 month history of progressive arthralgias that she felt were related to a trial of hydralazine which was used between March and 2016 2 attempt treatment of her labile hypertension. Patient has been in the hospital had extensive workups in the past for other signs and symptoms. She also had complaints of moderate headache, rib pain, and a 25 pound weight loss since 2016 She  denied any fever, vomiting, diarrhea,  dysuria or chronic shortness of breath.      Dr. Vega's workup in the emergency room was rather unrevealing. He described her problem as a 10 month history of gradual onset, constant timing, diffuse joint pain, radiating nowhere, aching in quality, moderate in intensity, progression was worsening, associated with moderate headache rib related 25 pound weight loss, aggravated by nothing, alleviated by nothing, no previous episodes for comparison. The positive is in his review of systems included the 20 pound weight loss since January, chronic shortness of breath, chest and rib pain, the arthralgias, and the headaches.  Physical exam revealed normal vitals and essentially normal exam. Patient did have some bruising on her knees and some reported swelling in her lower pelvic eye area. Dr. Vega called to discuss the case with me. Patient was somewhat labile with her blood pressure at that point and we felt that she probably didn't merit admission for observation at least on an overnight basis to gain further understanding of her condition.     4/6/17. I personally saw the patient for the first time on this date in the emergency room soon after Dr. Vega's evaluation. Patient was resting comfortably in bed but in some distress due to need for urination. Patient did provide long list of signs and symptoms as detailed above and in Dr. Vega's notes. Patient seems extremely anxious and distressed at the current time. She does have a rather ill  at home and is concerned about his welfare more than her own. Patient has major concerns also about her blood pressure lability and feels that the medication she is currently taking are not controlling it adequately. She would like to consult with her cardiologist and renal doctors in the hospital. She also does have shortness of breath and congestion. Reports a productive cough at times.    4/7/17.  Patient seen again today in her  room on 4 N.  She was resting comfortably in bed sleeping on her right side as I entered the room.  Patient's blood pressure remains quite labile.  And at times, she is demonstrating blood pressures that are systolically greater then 200.  Renal has initiated new medication to try to improve the blood pressure findings.  I did discuss the case with Dr. Bond do plan to see the patient later in the day.  He is continuing with medication adjustments and also has suggested workup for possible sleep apnea.  Cardiology has signed off the case and defer blood pressure management to nephrology.  I found no evidence of cardiac etiology for her discomfort and pain.  Dr. Lepe has evaluated the patient neurologically and feels that there is no strong evidence to support a neurologic etiology for the patient's symptoms.  Pulmonary is seeing the patient doing an overnight oximetry study.  They feel her COPD is relatively stable and recommended continuation of her home meds.  OB/GYN found no major abnormalities on their exam to evaluate swelling and pain in the groin. Orthopedics did not feel that she had any significant mechanical joint issues going on.  As discussed in yesterday's note, rheumatology plan outpatient follow-up with the patient.    4/8/17.  Patient remains in observation status.  She was seen in her bed on 4 N. earlier this evening by myself.  Patient has no major new complaints or concerns.  Her urine culture has come back positive for Escherichia coli which is sensitive to this Rocephin she is currently taking.  Patient does want a prescription written for her Talwin NX which is unavailable at [facility] and she will have her family  at the drugstore tomorrow.  Renal continues to monitor blood pressure and adjust her medications.  Other specialists are deferring to them with respect to the blood pressure issues.  We anticipate that she will be able to discharge to home in the next 1-2 days.  I have  requested full inpatient status for the patient that at this point [facility] does not feel she meets criteria for this designation.    4/9/17.  Patient is nearing the end of her workup.  She has been evaluated now by neurology because of an apparent.  We are awaiting the final word from neurology with regard to their findings on MRI patient remains relatively stable.  Her godmother is sitting with her today said that the mother who is taking a short break.  Hoping to arrange for new 's license later this week.  May need to also  and revise other materials for testing done previously.    Review of Systems:      A comprehensive 14 point review of systems was negative except for: Constitution: positive for anorexia, fatigue and weight loss  ENT: positive for earaches, hoarseness and nasal congestion  Respiratory: positive for cough, dry, cough, productive, pleuritic pain and shortness of air  Cardiovascular: positive for lower extremity edema, palpitations and paroxysmal nocturnal dyspnea  Gastrointestinal: postitive for bloating / distention, constipation and nausea  Breast: positive for See HPI  Musculoskeletal: positive for back pain, bone pain, joint pain, joint stiffness, joint swelling, muscle pain and muscle weakness  Neurological: positive for difficulty walking    Past Medical History:   Diagnosis Date   • Arthritis    • Asthma    • Cancer    • COPD (chronic obstructive pulmonary disease)    • Emphysema lung    • Generalized headaches    • Hayfever    • Hypertension    • Stroke      Past Surgical History:   Procedure Laterality Date   • BREAST LUMPECTOMY Left 04/01/2003   • CARPAL TUNNEL RELEASE  2011   • CATARACT EXTRACTION  2010   • HERNIA REPAIR  01/01/1971   • HERNIA REPAIR      right groin   • JOINT REPLACEMENT     • KNEE SURGERY Left 01/01/1998    bilateral knee replacements   • MASTECTOMY     • PARATHYROIDECTOMY  05/08/2006   • SINUS SURGERY  01/01/1984   • TOTAL ABDOMINAL HYSTERECTOMY   "01/01/1973     Allergies   Allergen Reactions   • Morphine Anaphylaxis   • Sulfa Antibiotics Anaphylaxis and Hives     Or sulfa fillers in meds   • Hydralazine Myalgia     Patient reports leg cramps, joint pain and increased fatigue   • Amlodipine      KIDNEYS SHUT DOWN   • Anastrozole    • Grass    • Morphine And Related    • Nifedipine      UNCONTROLLED BP   • Nsaids      KIDNEY FAILURE   • Norvasc  [Amlodipine Besylate]    • Penicillins    • Pneumococcal Vaccines Nausea Only   • Tree Extract    • Zolpidem      HALLUCINATIONS   h   Family History   Problem Relation Age of Onset   • Cancer Brother    Admission History and Physical and updated in EPIC system.  This data is unchanged at this time.       Objective     Scheduled Meds:    budesonide 0.5 mg Nebulization BID - RT   ceftriaxone 1 g Intravenous Q24H   CloNIDine 0.2 mg Oral Q12H   docusate sodium 100 mg Oral BID   enoxaparin 30 mg Subcutaneous Daily   eplerenone 50 mg Oral Q12H   ethacrynic acid 25 mg Oral BID   montelukast 10 mg Oral Nightly   NIFEdipine XL 60 mg Oral BID   pantoprazole 40 mg Oral Q AM   polyethylene glycol 17 g Oral Daily   tiZANidine 2 mg Oral BID     Continuous Infusions:    Pharmacy Consult        Vital signs in last 24 hours:  Temp:  [97.9 °F (36.6 °C)-98.9 °F (37.2 °C)] 98.3 °F (36.8 °C)  Heart Rate:  [73-82] 73  Resp:  [16-20] 16  BP: (148-176)/() 166/94    Intake/Output:    Intake/Output Summary (Last 24 hours) at 04/09/17 2314  Last data filed at 04/09/17 1718   Gross per 24 hour   Intake              587 ml   Output              600 ml   Net              -13 ml       Exam:  /94  Pulse 73  Temp 98.3 °F (36.8 °C)  Resp 16  Ht 63\" (160 cm)  Wt 184 lb 8 oz (83.7 kg)  SpO2 93%  BMI 32.68 kg/m2               Constitutional:    Alert, cooperative, no distress, AAOx3, resting comfortably                          Head:    Normocephalic, without obvious abnormality, atraumatic                           Eyes:    ANDREA " conjunctiva/corneas clear, no icterus, no conjunctival                                         pallor, EOM's intact, both eyes          ENT and Mouth:   Lips, tongue, gums normal; oral mucosa pink and moist                           Neck:   Supple, symmetrical, trachea midline, no JVD                 Respiratory:   Clear to auscultation bilaterally, respirations unlabored           Cardiovascular:    Regular rate and rhythm, S1 and S2 normal, no murmur,       no  Rub or gallop.  Pulses normal.            Gastrointestinal:   BS present x 4 Soft, non-tender, bowel sounds active, no                                                  masses,  no hepatosplenomegaly                              :    No hernia.  Normal exam for sex.                Musculoskeletal:   Extremities normal, atraumatic, no cyanosis or edema.  No                                               arthropathy.  No deformity.  Gait normal                            Skin:  Skin is warm and dry,  no rashes, swelling or palpable lesions                  Neurologic:   CNII-XII intact, motor strength grossly intact, sensation                                                      grossly intact to light touch, no focal reflexl deficits noted                   Psychiatric:   Alert, oriented x 3, no delusions, psychosis,depression,anxiety       Heme/Lymph/Imun:   No bruises, petechiae.  Lymph nodes normal in size/configuration       Data Review:  Lab Results   Component Value Date    CALCIUM 9.3 04/09/2017    PHOS 3.6 04/09/2017       Results from last 7 days  Lab Units 04/09/17  0326 04/08/17  0514 04/07/17  0449   MAGNESIUM mg/dL 2.2 2.3 2.3   SODIUM mmol/L 142 142 145   POTASSIUM mmol/L 3.5 3.6 3.8   CHLORIDE mmol/L 102 105 104   TOTAL CO2 mmol/L 23.8 23.8 27.0   BUN mg/dL 16 16 16   CREATININE mg/dL 0.79 0.85 1.01*   GLUCOSE mg/dL 96 105* 106*   CALCIUM mg/dL 9.3 9.6 9.4   WBC 10*3/mm3 6.24 5.58 6.24   HEMOGLOBIN g/dL 14.0 13.5 13.8   PLATELETS 10*3/mm3 186  178 179     Lab Results   Component Value Date    CKTOTAL 36 04/08/2017    CKMB 1.69 04/06/2017    TROPONINT <0.010 04/06/2017     Estimated Creatinine Clearance: 58.4 mL/min (by C-G formula based on Cr of 0.79).  WEIGHTS:     Wt Readings from Last 1 Encounters:   04/09/17 0342 184 lb 8 oz (83.7 kg)   04/08/17 0520 184 lb 9.6 oz (83.7 kg)   04/06/17 1109 184 lb (83.5 kg)   04/06/17 0720 180 lb (81.6 kg)         Assessment:  Principal Problem:    Accelerated hypertension  Active Problems:    Sinusitis    Hyperlipidemia    Chronic tension-type headache, intractable    Chronic kidney disease, stage 2, mildly decreased GFR    Malaise and fatigue progressive    Vitamin D deficiency    Moderate persistent asthmatic bronchitis without complication    Acute joint pain    Easy bruisability    Urinary retention self-caths (Ochoa)    Cervical disc displacement    DDD (degenerative disc disease), cervical    Urge incontinence of urine    Vertigo    Knee pain, bilateral    Elevated PTH level    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 Accelerated hypertension. Patient was given Vasotec in the emergency room IV with good resolution of her blood pressure. We will consult renal. Dr. Bond has been following the patient regularly with respect to her blood pressure and his help with the management of this issue in the past. We would like their input and suggestions as to the appropriate medications to treat the blood pressure issue.  Medications adjusted and blood pressure doing better.  2.  Arthralgias  and myalgias since starting treatment with hydralazine many months ago. Patient has now been off of this medication for the last several months without any real change in her arthralgias. I did discuss the case with Dr. Franklin and he does yield there would be concerned about the possibility of positive DIAN histone testing. His group will be happy to follow up the patient in their office next week after discharge from the hospital.  3. Bilateral knee pain at sites of previous knee replacements. Orthopedics has been consulted for their input on the situation and any suggestions that might have for treatment.  No in-house treatment suggested.  Follow-up on an outpatient basis.  4. Swelling in lower pelvic and pubic areas extending onto the thighs.  Asked GYN to take a look at the situation and did recommend a pelvic ultrasound to be certain patient has no evidence of ovarian issues.  No major abnormalities found in pelvic area by GYN  5. Nonspecific chest pain brought on by movement from side to side. Cardiology consulted initially because of the patient's recent ischemic or That included echocardiogram and stress test. They have recommended that we also consult pulmonary because that seems to be the more probable etiology for the patient's symptomatology  6. Chronic tension-type headaches which are. intractable at times Patient does have medications for treatment of this include Fioricet and Talwin. She is generally very intolerant of any narcotics that contain opiates.  7. 25 pound weight loss in patient with history of breast cancer. We will asked the hematologist take a look at this situation and make any recommendations they might have on further workup of this issue one might consider a bone scan or further CT scanning to rule out possibility of recurrence of breast cancer.  8. Severe anxiety. Will offer the patient counseling and input from the [facility] if she so desires. We'll make further decisions on  treatment based on her willingness to participate in the assessment.  9. Moderate persisted cough and asthmatic bronchitis with mild flare. Will ask pulmonary as noted above to evaluate patient and make suggestions on other treatment options  10. Possible reduction of positive DIAN while on hydralazine. As requested by rheumatology we will obtain histone antibodies and we will arrange follow-up for the patient in the rheumatology clinic at the time of her discharge next week  11. Difficulty walking with mild to moderate ataxia. We will request neurology evaluation of this situation and defer to them any additional testing that they feel is necessary to workup the situation.  Neurology finds no concerning neurologic abnormalities.  12.  UTI due to Escherichia coli.  Treating with IV antibiotic Rocephin at the present time.  We will transition to oral at time of discharge    Plan for disposition:Home 1-2 days    Óscar Cadet MD  4/9/2017   5:00 PM

## 2017-04-10 NOTE — PROGRESS NOTES
REASON FOR CONSULTATION:  Generalized joint achiness.  She was found having urinary tract infection and currently on IV antibiotics.  Patient denies fever sweating chills.  She continues to have  generalized joint achiness.  Had a bone scan yesterday see the following images sections.  CA 15-3 is still pending.     This patient has history of remote breast cancer on the left, status post mastectomy and subsequent prophylactic right mastectomy. She received Arimidex for 4 months and this was discontinued a long time ago because of side effects including joint pain. The question has been raised in this patient admitted with pain in the rib cage, pain in the knees and large joints, if this is a representation of metastatic breast cancer.        HISTORY OF PRESENT ILLNESS:   This patient is a 77-year-old very vivacious who remains extremely active who was admitted to the hospital with a urinary tract infection and accelerated hypertension. The question has been raised by Dr. Davalos in regard to the patient complaining of multiple symptoms including pain in both knees, swelling in the lower extremities as well as pain in the rib cage bilaterally, especially on the right side, and this could be representation of breast cancer. It happened to be that the patient had a lumpectomy, subsequently a left mastectomy and also prophylactic right mastectomy in the past. She never required any chemotherapy and neither radiation treatment. She took Arimidex for 4 months with terrible side effects including joint and bone pain and the medicine was discontinued. This happened a long time ago. In any event, the question has been raised due to the manifestation that the patient has now with the pain in the rib cage that has been an ongoing phenomenon for the last 3 or 4 months associated with tenderness upon palpation and discomfort throughout the joints, especially the knees, could be representation of recurrent malignancy. The patient  also complains of minimal pain in her lumbar spine, nothing new, this has been an ongoing phenomenon for more than 20 years. She also has frequent headaches. She denies any changes in her eyes and no nasal alterations besides chronic obstruction of the nasal passages associated with allergies. She denies any alteration in her oral mucosals. Her appetite has declined some and she has lost 20-pounds during the last 3 or 4 months. She has not had any difficulty swallowing. She has a chronic cough. She has no shortness of breath or palpitations. She has no difficulty swallowing. Her bowel activity always requires some help from MiraLAX. No passage of blood in the stools. Urination is ongoing with a recent urinary tract infection that is being treated with no hematuria and no vaginal discharge. She denies any alterations in the skin. She has had chronic headaches, nothing new, and she has had multiple MRIs and CT scans of the brain, being negative. She has not had any other alterations in her skeleton to speak of besides osteoarthritis and bilateral knee replacement. In the past the patient has had a normal vitamin D level and normal sedimentation rate, normal alkaline phosphatase, normal calcium level, but she has had an elevated PTH to 95 in 01/2017. Again, the calcium level has remained normal.             Medical History         Past Medical History:   Diagnosis Date   • Arthritis     • Asthma     • Cancer     • COPD (chronic obstructive pulmonary disease)     • Emphysema lung     • Generalized headaches     • Hayfever     • Hypertension     • Stroke               Surgical History          Past Surgical History:   Procedure Laterality Date   • BREAST LUMPECTOMY Left 04/01/2003   • CARPAL TUNNEL RELEASE   2011   • CATARACT EXTRACTION   2010   • HERNIA REPAIR   01/01/1971   • HERNIA REPAIR         right groin   • JOINT REPLACEMENT       • KNEE SURGERY Left 01/01/1998     bilateral knee replacements   • MASTECTOMY        • PARATHYROIDECTOMY   05/08/2006   • SINUS SURGERY   01/01/1984   • TOTAL ABDOMINAL HYSTERECTOMY   01/01/1973          MEDICINE: Reviewed and his see EMR      ALLERGIES:         Allergies   Allergen Reactions   • Morphine Anaphylaxis   • Sulfa Antibiotics Anaphylaxis and Hives       Or sulfa fillers in meds   • Hydralazine Myalgia       Patient reports leg cramps, joint pain and increased fatigue   • Amlodipine         KIDNEYS SHUT DOWN   • Anastrozole     • Grass     • Morphine And Related     • Nifedipine         UNCONTROLLED BP   • Nsaids         KIDNEY FAILURE   • Norvasc [Amlodipine Besylate]     • Penicillins     • Pneumococcal Vaccines Nausea Only   • Tree Extract     • Zolpidem         HALLUCINATIONS          Social History    Social History            Social History   • Marital status:        Spouse name: N/A   • Number of children: N/A   • Years of education: N/A              Social History Main Topics    • Smoking status: Never Smoker    • Smokeless tobacco: Never Used    • Alcohol use Yes         Comment: rarely - once or twice year (wine or bernabe)    • Drug use: Defer    • Sexual activity: Defer                         Family History   Problem Relation Age of Onset   • Cancer Brother           Review of Systems   General: no fever, chills, SOME fatigue, 20 LB LOSS weight changes, NO lack of appetite.  Eyes: no epiphora, xerophthalmia,conjunctivitis, pain, glaucoma, blurred vision, blindness, secretion, photophobia, proptosis, diplopia.  Ears: no otorrhea, tinnitus, otorrhagia, deafness, pain, vertigo.  Nose: no rhinorrhea, epistaxis, alteration in perception of odors, sinuses pressure.  Mouth: no alteration in gums or teeth, ulcers, no difficulty with mastication or deglut ion, no odynophagia.  Neck: no masses or pain, no thyroid alterations, no pain in muscles or arteries, no carotid odynia, no crepitation.  Respiratory: no cough, sputum production, dyspnea, trepopnea, pleuritic pain,  hemoptysis.  Heart: no syncope, irregularity, palpitations, angina, orthopnea, paroxysmal nocturnal dyspnea.  Vascular Venous: no tenderness,edema, palpable cords, postphlebitic syndrome, skin changes or ulcerations.  Vascular Arterial: no distal ischemia, claudication, gangrene, neuropathic ischemic pain, skin ulcers, paleness or cyanosis.  GI: no dysphagia, odynophagia, no regurgitation, no heartburn,no indigestion,no nausea,no vomiting,no hematemesis ,no melena,no jaundice,no distention, no obstipation,no enterorrhagia,no proctalgia,no anal lesions, nochanges in bowel habits.  : no frequency, hesitancy, hematuria, discharge, pain.  Musculoskeletal: STATED JOINT AND BONE PAIN , joint pain,LE edema,SOME functional limitation,NO fasciculations, mass.  Neurologic: no headache, seizures, alterations on Craneal nerves, no motor or senssory deficit, normal coordination, no alteration in memory, orientation, calculation,writting, verbal or written language.  Skin: no rashes, pruritus or localized lesions.  Psychiatric: no anxiety, depression, agitation, delusions, proper insight.        Objective       Vitals:    04/10/17 0746 04/10/17 0926 04/10/17 0936 04/10/17 1108   BP: 151/73  (!) 192/88 178/82   BP Location: Right arm  Right arm Right arm   Patient Position: Lying   Lying   Pulse: 83 78 79 68   Resp: 20 20     Temp: 98.2 °F (36.8 °C)      TempSrc: Oral      SpO2: 95%      Weight:       Height:          Physical Exam   GENERAL: Well-developed, well-nourished patient  in no acute distress.   SKIN: Warm, dry without rashes, purpura or petechiae.  HEENT: Pupils were equal, conjunctivas non injected.    NECK: Supple with good range of motion; no thyromegaly or masses, no JVD or bruits, no  cervical adenopathies.  Mild achiness at the left sternoclavicular joint.   LYMPHATICS: No cervical, supraclavicular adenopathy.  CHEST:  Lungs clear to percussion and auscultation, normal breath sounds bilaterally, no wheezing,  crackles or ronchi, no stridor, no rubs.  CARDIAC AND VASCULAR: PMI not displaced,no thrills, normal rate and regular rhythm, without murmurs, rubs or S3 or S4 right or left sided gallops. Normal femoral, popliteal, pedis, brachial and carotid pulses.  ABDOMEN: Soft, nontender with no organomegaly or masses, no ascites, no distention, no abdominal pain, no inguinal hernias, no umbilical hernias, no abdominal bruits. Normal bowel sounds.    EXTREMITIES AND SPINE: No clubbing, cyanosis or edema, no deformities or pain.  NEUROLOGICAL: Patient was awake, alert, oriented to time, person and place           RECENT LABS:    Results from last 7 days  Lab Units 04/10/17  0401 04/09/17  0326 04/08/17  0514   WBC 10*3/mm3 6.02 6.24 5.58   HEMOGLOBIN g/dL 13.9 14.0 13.5   HEMATOCRIT % 43.5 43.3 42.0   PLATELETS 10*3/mm3 181 186 178       Results from last 7 days  Lab Units 04/10/17  0400 04/09/17  0326 04/08/17  0514   SODIUM mmol/L 142 142 142   POTASSIUM mmol/L 3.5 3.5 3.6   CHLORIDE mmol/L 103 102 105   TOTAL CO2 mmol/L 25.0 23.8 23.8   BUN mg/dL 18 16 16   CREATININE mg/dL 0.80 0.79 0.85   CALCIUM mg/dL 9.6 9.3 9.6   GLUCOSE mg/dL 82 96 105*      CA 15-3 is still pending       Lab Results   Component Value Date    [id number] 263 04/07/2017       Images studies   EXAMINATION: NUCLEAR MEDICINE WHOLE-BODY BONE SCAN 04/09/17       HISTORY: 77-year-old female with a history of breast cancer and joint  and bone pain.      FINDINGS: 23 mCi of technetium MDP was administered intravenously and  whole body anterior and posterior sonographic images were obtained in  conjunction with coned-down sonographic images of the bilateral ankles  and feet, thoracic spine, skull and arms.      The images demonstrate abnormal focal increased uptake within the left  ankle, left sternoclavicular joint and left shoulder.      IMPRESSION:  Increased uptake in the region of the left sternoclavicular  joint, left ankle and left shoulder. This is likely  degenerative in  nature. However, I recommend that plane film correlation of the left  sternoclavicular joint, left ankle and left shoulder be performed.      This report was finalized on 4/10/2017 7:10 AM by Dr. Nicolas Keith MD.        Assessment/Plan   1.  Generalized joint achiness.  In summary, this patient has history of bilateral mastectomy for very early breast cancer. She never required any chemotherapy and neither took any radiation treatment and took 4 months of Arimidex that she discontinued because of severe joint pain. She had bilateral mastectomies, prophylactic on the right side, and the physical examination in the chest wall, besides having minimal discomfort in the chest wall, has no masses, induration or tenderness. There is no axillary adenopathy, supraclavicular or infraclavicular adenopathies. She also had minimal kyphosis in the lumbar spine. The patient has a normal abdominal examination. She has osteoarthritis and surgical scars in both knees. She has no obvious edema in her lower extremities.     Patient had a bone scan on April 9, 2017.  According to radiologist, there is no definite signs for metastatic bone disease, however the radiologist recommended plain x-ray examination for the left sternoclavicular joint, left shoulder and left ankle.  Physical examination does show some achiness at the left sternoclavicular joint.  I explained to the patient and will order to exams as recommended.     is pending.       2.  Low normal vitamin B12 level.  Discussed with patient, I recommend to start oral B-12 supplementation, due to its lack of side effects, very cheap to use and a potentially beneficial for improving energy level and maintain normal hematopoiesis.  I will start her on oral vitamin B12 at 1000 MCG daily.  She can buy over-the-counter product as outpatient.       MONICA ROBLEDO M.D., Ph.D.    04/10/2017

## 2017-04-10 NOTE — PLAN OF CARE
Problem: Patient Care Overview (Adult)  Goal: Plan of Care Review  Outcome: Ongoing (interventions implemented as appropriate)    04/10/17 0202   Coping/Psychosocial Response Interventions   Plan Of Care Reviewed With patient   Outcome Evaluation   Outcome Summary/Follow up Plan no c/o pain or discomfort. vss. bp slightly elevated according to pt's stated norm. rechecked manual BP's and charted. up to br with assist, no c/o's.        Goal: Adult Individualization and Mutuality  Outcome: Ongoing (interventions implemented as appropriate)  Goal: Discharge Needs Assessment  Outcome: Ongoing (interventions implemented as appropriate)

## 2017-04-10 NOTE — PROGRESS NOTES
[facility]  FAMILY AND INTERNAL MEDICINE  AUDREY ZAMAN, and [facility]      INTERNAL MEDICINE DAILY PROGRESS NOTE  Óscar Cadet M.D.  4/10/2017            Patient Identification:  Name: Sasha Barrett  Age: 77 y.o.  Sex: female  :  1940  MRN: [id number]         Primary Care Physician: Óscar Cadet MD  LENGTH OF STAY 0 DAYS    Consults     Date and Time Order Name Status Description    2017 0511 Inpatient Consult to Hematology & Oncology Completed     2017 0415 Inpatient Consult to Neurology Completed     2017 0413 Inpatient Consult to Pulmonology Completed     2017 1141 Inpatient Consult to Orthopedic Surgery Completed     2017 1141 Inpatient Consult to Obstetrics / Gynecology Completed     2017 1141 Inpatient Consult to Cardiology Completed     2017 1141 Inpatient Consult to Nephrology Completed     2017 1040 Inpatient Consult to Rheumatology      2017 0824 Family Medicine Consult Completed                       Chief Complaint: Weakness with difficulty ambulating and arthralgias            Subjective      Interval History: Patient is a 77 y.o.female who presented with weakness and difficulty ambulating along with arthralgias to the emergency room at [facility] on 17. Patient was seen and initially evaluated by Dr. Estevan Vega. Patient did call Dr. Cadet with a long laundry list of symptoms that she felt needed emergent evaluation because of weakness and difficulty walking for the last few days. She also reported an 8-10 month history of progressive arthralgias that she felt were related to a trial of hydralazine which was used between March and 2016 2 attempt treatment of her labile hypertension. Patient has been in the hospital had extensive workups in the past for other signs and symptoms. She also had complaints of moderate headache, rib pain, and a 25 pound weight loss since 2016 She  denied any fever, vomiting, diarrhea,  dysuria or chronic shortness of breath.      Dr. Vega's workup in the emergency room was rather unrevealing. He described her problem as a 10 month history of gradual onset, constant timing, diffuse joint pain, radiating nowhere, aching in quality, moderate in intensity, progression was worsening, associated with moderate headache rib related 25 pound weight loss, aggravated by nothing, alleviated by nothing, no previous episodes for comparison. The positive is in his review of systems included the 20 pound weight loss since January, chronic shortness of breath, chest and rib pain, the arthralgias, and the headaches.  Physical exam revealed normal vitals and essentially normal exam. Patient did have some bruising on her knees and some reported swelling in her lower pelvic eye area. Dr. Vega called to discuss the case with me. Patient was somewhat labile with her blood pressure at that point and we felt that she probably didn't merit admission for observation at least on an overnight basis to gain further understanding of her condition.     4/6/17. I personally saw the patient for the first time on this date in the emergency room soon after Dr. eVga's evaluation. Patient was resting comfortably in bed but in some distress due to need for urination. Patient did provide long list of signs and symptoms as detailed above and in Dr. Vega's notes. Patient seems extremely anxious and distressed at the current time. She does have a rather ill  at home and is concerned about his welfare more than her own. Patient has major concerns also about her blood pressure lability and feels that the medication she is currently taking are not controlling it adequately. She would like to consult with her cardiologist and renal doctors in the hospital. She also does have shortness of breath and congestion. Reports a productive cough at times.    4/7/17.  Patient seen again today in her  room on 4 N.  She was resting comfortably in bed sleeping on her right side as I entered the room.  Patient's blood pressure remains quite labile.  And at times, she is demonstrating blood pressures that are systolically greater then 200.  Renal has initiated new medication to try to improve the blood pressure findings.  I did discuss the case with Dr. Bond do plan to see the patient later in the day.  He is continuing with medication adjustments and also has suggested workup for possible sleep apnea.  Cardiology has signed off the case and defer blood pressure management to nephrology.  I found no evidence of cardiac etiology for her discomfort and pain.  Dr. Lepe has evaluated the patient neurologically and feels that there is no strong evidence to support a neurologic etiology for the patient's symptoms.  Pulmonary is seeing the patient doing an overnight oximetry study.  They feel her COPD is relatively stable and recommended continuation of her home meds.  OB/GYN found no major abnormalities on their exam to evaluate swelling and pain in the groin. Orthopedics did not feel that she had any significant mechanical joint issues going on.  As discussed in yesterday's note, rheumatology plan outpatient follow-up with the patient.    4/8/17.  Patient remains in observation status.  She was seen in her bed on 4 N. earlier this evening by myself.  Patient has no major new complaints or concerns.  Her urine culture has come back positive for Escherichia coli which is sensitive to this Rocephin she is currently taking.  Patient does want a prescription written for her Talwin NX which is unavailable at [facility] and she will have her family  at the drugstore tomorrow.  Renal continues to monitor blood pressure and adjust her medications.  Other specialists are deferring to them with respect to the blood pressure issues.  We anticipate that she will be able to discharge to home in the next 1-2 days.  I have  requested full inpatient status for the patient that at this point [facility] does not feel she meets criteria for this designation.    4/9/17.  Patient is nearing the end of her workup.  She has been evaluated now by neurology because of an apparent.  We are awaiting the final word from neurology with regard to their findings on MRI patient remains relatively stable.  Her godmother is sitting with her today said that the mother who is taking a short break.  Hoping to arrange for new 's license later this week.  May need to also  and revise other materials for testing done previously.    4/10/17.  Patient continues to complain of severe headaches which seem to be associated with fluctuations in her blood pressure.  Dr. Adler continuing to follow and adjust her blood pressure medications.  It does appear that she ihas more accurate blood pressures with a manual cuff them with the automatic cuffs.  We will anticipate discharge to home tomorrow with follow-up on an outpatient basis to the rheumatology clinic.  Otherwise patient seems very stable today.  Review of Systems:      A comprehensive 14 point review of systems was negative except for: Constitution: positive for anorexia, fatigue and weight loss  ENT: positive for earaches, hoarseness and nasal congestion  Respiratory: positive for cough, dry, cough, productive, pleuritic pain and shortness of air  Cardiovascular: positive for lower extremity edema, palpitations and paroxysmal nocturnal dyspnea  Gastrointestinal: postitive for bloating / distention, constipation and nausea  Breast: positive for See HPI  Musculoskeletal: positive for back pain, bone pain, joint pain, joint stiffness, joint swelling, muscle pain and muscle weakness  Neurological: positive for difficulty walking    Past Medical History:   Diagnosis Date   • Arthritis    • Asthma    • Cancer    • COPD (chronic obstructive pulmonary disease)    • Emphysema lung    • Generalized headaches    •  Hayfever    • Hypertension    • Stroke      Past Surgical History:   Procedure Laterality Date   • BREAST LUMPECTOMY Left 04/01/2003   • CARPAL TUNNEL RELEASE  2011   • CATARACT EXTRACTION  2010   • HERNIA REPAIR  01/01/1971   • HERNIA REPAIR      right groin   • JOINT REPLACEMENT     • KNEE SURGERY Left 01/01/1998    bilateral knee replacements   • MASTECTOMY     • PARATHYROIDECTOMY  05/08/2006   • SINUS SURGERY  01/01/1984   • TOTAL ABDOMINAL HYSTERECTOMY  01/01/1973     Allergies   Allergen Reactions   • Morphine Anaphylaxis   • Sulfa Antibiotics Anaphylaxis and Hives     Or sulfa fillers in meds   • Hydralazine Myalgia     Patient reports leg cramps, joint pain and increased fatigue   • Amlodipine      KIDNEYS SHUT DOWN   • Anastrozole    • Grass    • Morphine And Related    • Nifedipine      UNCONTROLLED BP   • Nsaids      KIDNEY FAILURE   • Norvasc  [Amlodipine Besylate]    • Penicillins      Tolerated ceftriaxone during 04/2017 admission   • Pneumococcal Vaccines Nausea Only   • Tree Extract    • Zolpidem      HALLUCINATIONS   h   Family History   Problem Relation Age of Onset   • Cancer Brother    Admission History and Physical and updated in EPIC system.  This data is unchanged at this time.       Objective     Scheduled Meds:    budesonide 0.5 mg Nebulization BID - RT   ceftriaxone 1 g Intravenous Q24H   CloNIDine 0.2 mg Oral Q12H   docusate sodium 100 mg Oral BID   enoxaparin 30 mg Subcutaneous Daily   eplerenone 50 mg Oral Q12H   ethacrynic acid 25 mg Oral BID   montelukast 10 mg Oral Nightly   NIFEdipine XL 60 mg Oral BID   pantoprazole 40 mg Oral Q AM   polyethylene glycol 17 g Oral Daily   tiZANidine 2 mg Oral BID     Continuous Infusions:       Vital signs in last 24 hours:  Temp:  [97.9 °F (36.6 °C)-98.3 °F (36.8 °C)] 98.2 °F (36.8 °C)  Heart Rate:  [73-83] 79  Resp:  [16-20] 20  BP: (151-192)/(73-94) 192/88    Intake/Output:    Intake/Output Summary (Last 24 hours) at 04/10/17 1054  Last data filed  "at 04/10/17 0905   Gross per 24 hour   Intake              827 ml   Output              301 ml   Net              526 ml       Exam:  BP (!) 192/88 (BP Location: Right arm)  Pulse 79  Temp 98.2 °F (36.8 °C) (Oral)   Resp 20  Ht 63\" (160 cm)  Wt 182 lb 8 oz (82.8 kg)  SpO2 95%  BMI 32.33 kg/m2               Constitutional:    Alert, cooperative, no distress, AAOx3, resting comfortably                          Head:    Normocephalic, without obvious abnormality, atraumatic                           Eyes:    PERRLA, conjunctiva/corneas clear, no icterus, no conjunctival                                         pallor, EOM's intact, both eyes          ENT and Mouth:   Lips, tongue, gums normal; oral mucosa pink and moist                           Neck:   Supple, symmetrical, trachea midline, no JVD                 Respiratory:   Clear to auscultation bilaterally, respirations unlabored           Cardiovascular:    Regular rate and rhythm, S1 and S2 normal, no murmur,       no  Rub or gallop.  Pulses normal.            Gastrointestinal:   BS present x 4 Soft, non-tender, bowel sounds active, no                                                  masses,  no hepatosplenomegaly                              :    No hernia.  Normal exam for sex.                Musculoskeletal:   Extremities normal, atraumatic, no cyanosis or edema.  No                                               arthropathy.  No deformity.  Gait normal                            Skin:  Skin is warm and dry,  no rashes, swelling or palpable lesions                  Neurologic:   CNII-XII intact, motor strength grossly intact, sensation                                                      grossly intact to light touch, no focal reflexl deficits noted                   Psychiatric:   Alert, oriented x 3, no delusions, psychosis,depression,anxiety       Heme/Lymph/Imun:   No bruises, petechiae.  Lymph nodes normal in size/configuration       Data " Review:  Lab Results   Component Value Date    CALCIUM 9.6 04/10/2017    PHOS 3.6 04/10/2017       Results from last 7 days  Lab Units 04/10/17  0401 04/10/17  0400 04/09/17  0326 04/08/17  0514   MAGNESIUM mg/dL  --  2.6* 2.2 2.3   SODIUM mmol/L  --  142 142 142   POTASSIUM mmol/L  --  3.5 3.5 3.6   CHLORIDE mmol/L  --  103 102 105   TOTAL CO2 mmol/L  --  25.0 23.8 23.8   BUN mg/dL  --  18 16 16   CREATININE mg/dL  --  0.80 0.79 0.85   GLUCOSE mg/dL  --  82 96 105*   CALCIUM mg/dL  --  9.6 9.3 9.6   WBC 10*3/mm3 6.02  --  6.24 5.58   HEMOGLOBIN g/dL 13.9  --  14.0 13.5   PLATELETS 10*3/mm3 181  --  186 178     Lab Results   Component Value Date    CKTOTAL 36 04/08/2017    CKMB 1.69 04/06/2017    TROPONINT <0.010 04/06/2017     Estimated Creatinine Clearance: 58.2 mL/min (by C-G formula based on Cr of 0.8).  WEIGHTS:     Wt Readings from Last 1 Encounters:   04/10/17 0645 182 lb 8 oz (82.8 kg)   04/09/17 0342 184 lb 8 oz (83.7 kg)   04/08/17 0520 184 lb 9.6 oz (83.7 kg)   04/06/17 1109 184 lb (83.5 kg)   04/06/17 0720 180 lb (81.6 kg)         Assessment:  Principal Problem:    Accelerated hypertension  Active Problems:    Sinusitis    Hyperlipidemia    Chronic tension-type headache, intractable    Chronic kidney disease, stage 2, mildly decreased GFR    Malaise and fatigue progressive    Vitamin D deficiency    Moderate persistent asthmatic bronchitis without complication    Acute joint pain    Easy bruisability    Urinary retention self-caths (Don)    Cervical disc displacement    DDD (degenerative disc disease), cervical    Urge incontinence of urine    Vertigo    Knee pain, bilateral    Elevated PTH level    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 Accelerated hypertension. Patient was given Vasotec in the emergency room IV with good resolution of her blood pressure. We will consult renal. Dr. Bond has been following the patient regularly with respect to her blood pressure and his help with the management of this issue in the past. We would like their input and suggestions as to the appropriate medications to treat the blood pressure issue.  Medications adjusted and blood pressure doing better.  2.  Arthralgias and myalgias since starting treatment with hydralazine many months ago. Patient has now been off of this medication for the last several months without any real change in her arthralgias. I did discuss the case with Dr. Franklin and he does yield there would be concerned about the possibility of positive DIAN histone testing. His group will be happy to follow up the patient in their office next week after discharge from the hospital.  3. Bilateral knee pain at sites of previous knee replacements. Orthopedics has been consulted for their input on the situation and any suggestions that might have for treatment.  No in-house treatment suggested.  Follow-up on an outpatient basis.  4. Swelling in lower pelvic and pubic areas extending onto the thighs.  Asked GYN to take a look at the situation and did recommend a pelvic ultrasound to be certain patient has no evidence of ovarian issues.  No major abnormalities found in pelvic area by GYN  5. Nonspecific chest pain brought on by movement from side to side. Cardiology consulted initially because of the patient's recent ischemic or That included echocardiogram and stress test. They have recommended that we also consult pulmonary because that seems to be the more probable etiology for the patient's symptomatology  6. Chronic tension-type  headaches which are. intractable at times Patient does have medications for treatment of this include Fioricet and Talwin. She is generally very intolerant of any narcotics that contain opiates.  7. 25 pound weight loss in patient with history of breast cancer. We will asked the hematologist take a look at this situation and make any recommendations they might have on further workup of this issue one might consider a bone scan or further CT scanning to rule out possibility of recurrence of breast cancer.  8. Severe anxiety. Will offer the patient counseling and input from the [facility] if she so desires. We'll make further decisions on treatment based on her willingness to participate in the assessment.  9. Moderate persisted cough and asthmatic bronchitis with mild flare. Will ask pulmonary as noted above to evaluate patient and make suggestions on other treatment options  10. Possible reduction of positive DIAN while on hydralazine. As requested by rheumatology we will obtain histone antibodies and we will arrange follow-up for the patient in the rheumatology clinic at the time of her discharge next week  11. Difficulty walking with mild to moderate ataxia. We will request neurology evaluation of this situation and defer to them any additional testing that they feel is necessary to workup the situation.  Neurology finds no concerning neurologic abnormalities.  12.  UTI due to Escherichia coli.  Treating with IV antibiotic Rocephin at the present time.  We will transition to oral at time of discharge    Plan for disposition:Home 1-2 days    Óscar Cadet MD  4/10/2017   10:54 AM

## 2017-04-10 NOTE — PROGRESS NOTES
"   LOS: 0 days    Patient Care Team:  Óscar Cadet MD as PCP - General  Óscar Cadet MD as PCP - Family Medicine    Chief Complaint:    Chief Complaint   Patient presents with   • Leg Pain     Patient states this has been going on since starting on hydralazine, all joints hurt   • Leg Swelling       Subjective  follow-up the severe uncontrolled hypertension    Interval History:   The patient is feeling somewhat better today she still have generalized body ache.  She had a bone scan yesterday.  Denies any chest pain or shortness of air, no orthopnea or PND, no nausea or vomiting.  No dysuria or gross hematuria.  And her blood pressure appears to be better controlled with the changes of her medication which were made yesterday  Review of Systems:   As noted above    Objective     Vital Signs  Temp:  [97.9 °F (36.6 °C)-98.3 °F (36.8 °C)] 98.2 °F (36.8 °C)  Heart Rate:  [73-83] 83  Resp:  [16-20] 20  BP: (151-166)/(73-94) 151/73    Flowsheet Rows         First Filed Value    Admission Height  63\" (160 cm) Documented at 04/06/2017 0720    Admission Weight  180 lb (81.6 kg) Documented at 04/06/2017 0720          I/O this shift:  In: 240 [P.O.:240]  Out: 1 [Stool:1]  I/O last 3 completed shifts:  In: 827 [P.O.:777; IV Piggyback:50]  Out: 1100 [Urine:1100]    Intake/Output Summary (Last 24 hours) at 04/10/17 0918  Last data filed at 04/10/17 0905   Gross per 24 hour   Intake              827 ml   Output              301 ml   Net              526 ml       Physical Exam:  General Appearance: alert, oriented x 3, no acute distress, anxious, obese  Skin: warm and dry  Neck: supple, minimal JVD, trachea midline  Lungs: CTA, unlabored breathing effort  Heart: RRR, normal S1 and S2, no rub  Abdomen: soft, non-tender, no palpable bladder, present bowel supple auscultation  Extremities: no edema, cyanosis or clubbing, minimal left thigh bruising.  Neuro: normal speech and mental status, mood and affect were " appropriate.      Results Review:      Results from last 7 days  Lab Units 04/10/17  0400 04/09/17  0326 04/08/17  0514   SODIUM mmol/L 142 142 142   POTASSIUM mmol/L 3.5 3.5 3.6   CHLORIDE mmol/L 103 102 105   TOTAL CO2 mmol/L 25.0 23.8 23.8   BUN mg/dL 18 16 16   CREATININE mg/dL 0.80 0.79 0.85   CALCIUM mg/dL 9.6 9.3 9.6   GLUCOSE mg/dL 82 96 105*       Estimated Creatinine Clearance: 58.2 mL/min (by C-G formula based on Cr of 0.8).      Results from last 7 days  Lab Units 04/10/17  0400 04/09/17  0326 04/08/17  0514   MAGNESIUM mg/dL 2.6* 2.2 2.3   PHOSPHORUS mg/dL 3.6 3.6 3.2         Results from last 7 days  Lab Units 04/10/17  0400 04/09/17  0326 04/08/17  0514 04/07/17  0449   URIC ACID mg/dL 5.0 5.0 5.1 5.3         Results from last 7 days  Lab Units 04/10/17  0401 04/09/17  0326 04/08/17  0514 04/07/17  0449 04/06/17  1213   WBC 10*3/mm3 6.02 6.24 5.58 6.24 6.89   HEMOGLOBIN g/dL 13.9 14.0 13.5 13.8 15.9*   PLATELETS 10*3/mm3 181 186 178 179 226               Imaging Results (last 24 hours)     ** No results found for the last 24 hours. **          budesonide 0.5 mg Nebulization BID - RT   ceftriaxone 1 g Intravenous Q24H   CloNIDine 0.2 mg Oral Q12H   docusate sodium 100 mg Oral BID   enoxaparin 30 mg Subcutaneous Daily   eplerenone 50 mg Oral Q12H   ethacrynic acid 25 mg Oral BID   montelukast 10 mg Oral Nightly   NIFEdipine XL 60 mg Oral BID   pantoprazole 40 mg Oral Q AM   polyethylene glycol 17 g Oral Daily   tiZANidine 2 mg Oral BID          Medication Review:   Current Facility-Administered Medications   Medication Dose Route Frequency Provider Last Rate Last Dose   • acetaminophen (TYLENOL) tablet 650 mg  650 mg Oral Q4H PRN Óscar Cadet MD       • albuterol (PROVENTIL) nebulizer solution 0.083% 2.5 mg/3mL  2.5 mg Nebulization Q4H PRN Óscar Cadet MD   2.5 mg at 04/09/17 0916   • budesonide (PULMICORT) nebulizer solution 0.5 mg  0.5 mg Nebulization BID - RT Óscar Cadet MD   0.5 mg at  04/09/17 2100   • butalbital-acetaminophen-caffeine (FIORICET, ESGIC) -40 MG per tablet 2 tablet  2 tablet Oral Q4H PRN Richard Lawson MD   2 tablet at 04/09/17 1059   • cefTRIAXone (ROCEPHIN) IVPB 1 g  1 g Intravenous Q24H Óscar Cadet MD 0 mL/hr at 04/08/17 2114 1 g at 04/09/17 1503   • CloNIDine (CATAPRES) tablet 0.1 mg  0.1 mg Oral Q4H PRN Óscar Cadet MD        Or   • CloNIDine (CATAPRES) tablet 0.2 mg  0.2 mg Oral Q4H PRN Óscar Cadet MD       • CloNIDine (CATAPRES) tablet 0.2 mg  0.2 mg Oral Q12H Óscar Cadet MD   0.2 mg at 04/09/17 2138   • diphenhydrAMINE (BENADRYL) 12.5 MG/5ML elixir 12.5 mg  12.5 mg Oral Q4H PRN Óscar Cadet MD       • docusate sodium (COLACE) capsule 100 mg  100 mg Oral BID Óscar Cadet MD   100 mg at 04/09/17 1749   • enalaprilat (VASOTEC) injection 1.25 mg  1.25 mg Intravenous Q6H PRN Óscar Cadet MD   1.25 mg at 04/06/17 1951   • enoxaparin (LOVENOX) syringe 30 mg  30 mg Subcutaneous Daily Óscar Cadet MD   30 mg at 04/09/17 0826   • eplerenone (INSPRA) tablet 50 mg  50 mg Oral Q12H Rj Bond MD   50 mg at 04/09/17 2135   • ethacrynic acid (EDECRIN) tablet 25 mg  25 mg Oral BID Óscar Cadet MD   25 mg at 04/09/17 2138   • HYDROcodone-acetaminophen (NORCO) 5-325 MG per tablet 1 tablet  1 tablet Oral Q6H PRN Óscar Cadet MD   1 tablet at 04/09/17 0826   • hydrOXYzine (VISTARIL) capsule 50 mg  50 mg Oral Q6H PRN Óscar Cadet MD       • ipratropium (ATROVENT) nebulizer solution 0.5 mg  500 mcg Nebulization 4x Daily PRN Óscar Cadet MD       • LORazepam (ATIVAN) tablet 0.5 mg  0.5 mg Oral Q8H PRN Óscar Cadet MD   0.5 mg at 04/08/17 0650   • montelukast (SINGULAIR) tablet 10 mg  10 mg Oral Nightly Óscar Cadet MD   10 mg at 04/09/17 2135   • NIFEdipine XL (PROCARDIA XL) 24 hr tablet 60 mg  60 mg Oral BID Óscar Cadet MD   60 mg at 04/09/17 2140   • ondansetron ODT (ZOFRAN-ODT) disintegrating tablet 8 mg  8 mg  Oral Q6H PRN Óscar Cadet MD       • pantoprazole (PROTONIX) EC tablet 40 mg  40 mg Oral Q AM Óscar Cadet MD   40 mg at 04/10/17 0600   • pentazocine-naloxone (TALWIN NX) 50-0.5 MG per tablet 2 tablet  2 tablet Oral Q4H PRN Óscar Cadet MD   2 tablet at 04/10/17 0600   • polyethylene glycol (MIRALAX) powder 17 g  17 g Oral Daily Óscar Cadet MD   17 g at 04/09/17 0826   • promethazine (PHENERGAN) tablet 12.5 mg  12.5 mg Oral Q6H PRN Óscar Cadet MD       • promethazine-codeine (PHENERGAN with CODEINE) 6.25-10 MG/5ML syrup 5 mL  5 mL Oral 4x Daily PRN Óscar Cadet MD       • sodium chloride 0.9 % flush 1-10 mL  1-10 mL Intravenous PRN Óscar Cadet MD       • sodium chloride 0.9 % flush 10 mL  10 mL Intravenous PRN Estevan Vega MD       • tiZANidine (ZANAFLEX) tablet 2 mg  2 mg Oral BID Óscar Cadet MD   2 mg at 04/09/17 1749       Assessment/Plan   1. Severe uncontrolled hypertension with the multiple episodes of being out of control and difficult to control and the patient has multiple drug allergies.  The blood pressure is much better today still has some readings over the 150 systolic but overall much better.  2. Restless sleep we need to rule out the possibility of obstructive sleep apnea which could be the making the blood pressure control unattainable. The O2 sat monitoring last night revealed no desaturation that makes her sleep apnea questionable  3. Hypokalemia resolved currently on eplerenone, she did not tolerate the Aldactone, she is now on eplerenone, the dose was increased yesterday.  Potassium is 3.5 today.  4. Possible diastolic heart  5. Probable COPD  6. Diffuse arthralgias, rheumatology evaluation as an outpatient is pending.    Plan:  1.  We'll continue the same treatment  2.  Surveillance labs            Rj Bond MD  04/10/17  9:18 AM

## 2017-04-11 VITALS
OXYGEN SATURATION: 95 % | HEART RATE: 70 BPM | BODY MASS INDEX: 32.78 KG/M2 | TEMPERATURE: 97.4 F | RESPIRATION RATE: 20 BRPM | HEIGHT: 63 IN | SYSTOLIC BLOOD PRESSURE: 136 MMHG | DIASTOLIC BLOOD PRESSURE: 101 MMHG | WEIGHT: 185 LBS

## 2017-04-11 LAB
ALBUMIN SERPL-MCNC: 3.6 G/DL (ref 3.5–5.2)
ANION GAP SERPL CALCULATED.3IONS-SCNC: 13.6 MMOL/L
BASOPHILS # BLD AUTO: 0.06 10*3/MM3 (ref 0–0.2)
BASOPHILS NFR BLD AUTO: 1 % (ref 0–1.5)
BUN BLD-MCNC: 23 MG/DL (ref 8–23)
BUN/CREAT SERPL: 25.3 (ref 7–25)
CALCIUM SPEC-SCNC: 9.6 MG/DL (ref 8.6–10.5)
CANCER AG15-3 SERPL-ACNC: 25.6 U/ML (ref 0–25)
CHLORIDE SERPL-SCNC: 103 MMOL/L (ref 98–107)
CO2 SERPL-SCNC: 25.4 MMOL/L (ref 22–29)
COPPER SERPL-MCNC: 108 UG/DL (ref 72–166)
CREAT BLD-MCNC: 0.91 MG/DL (ref 0.57–1)
DEPRECATED RDW RBC AUTO: 47.4 FL (ref 37–54)
EOSINOPHIL # BLD AUTO: 0.13 10*3/MM3 (ref 0–0.7)
EOSINOPHIL NFR BLD AUTO: 2.1 % (ref 0.3–6.2)
ERYTHROCYTE [DISTWIDTH] IN BLOOD BY AUTOMATED COUNT: 14 % (ref 11.7–13)
GFR SERPL CREATININE-BSD FRML MDRD: 60 ML/MIN/1.73
GLUCOSE BLD-MCNC: 114 MG/DL (ref 65–99)
HCT VFR BLD AUTO: 42.5 % (ref 35.6–45.5)
HGB BLD-MCNC: 13.5 G/DL (ref 11.9–15.5)
HISTONE IGG SER IA-ACNC: 6.8 UNITS (ref 0–0.9)
IMM GRANULOCYTES # BLD: 0 10*3/MM3 (ref 0–0.03)
IMM GRANULOCYTES NFR BLD: 0 % (ref 0–0.5)
LYMPHOCYTES # BLD AUTO: 1.91 10*3/MM3 (ref 0.9–4.8)
LYMPHOCYTES NFR BLD AUTO: 30.7 % (ref 19.6–45.3)
MAGNESIUM SERPL-MCNC: 2.6 MG/DL (ref 1.6–2.4)
MCH RBC QN AUTO: 29.6 PG (ref 26.9–32)
MCHC RBC AUTO-ENTMCNC: 31.8 G/DL (ref 32.4–36.3)
MCV RBC AUTO: 93.2 FL (ref 80.5–98.2)
MONOCYTES # BLD AUTO: 0.66 10*3/MM3 (ref 0.2–1.2)
MONOCYTES NFR BLD AUTO: 10.6 % (ref 5–12)
NEUTROPHILS # BLD AUTO: 3.46 10*3/MM3 (ref 1.9–8.1)
NEUTROPHILS NFR BLD AUTO: 55.6 % (ref 42.7–76)
PHOSPHATE SERPL-MCNC: 3.7 MG/DL (ref 2.5–4.5)
PLATELET # BLD AUTO: 167 10*3/MM3 (ref 140–500)
PMV BLD AUTO: 11 FL (ref 6–12)
POTASSIUM BLD-SCNC: 3.7 MMOL/L (ref 3.5–5.2)
RBC # BLD AUTO: 4.56 10*6/MM3 (ref 3.9–5.2)
SODIUM BLD-SCNC: 142 MMOL/L (ref 136–145)
URATE SERPL-MCNC: 5.6 MG/DL (ref 2.4–5.7)
WBC NRBC COR # BLD: 6.22 10*3/MM3 (ref 4.5–10.7)

## 2017-04-11 PROCEDURE — 99213 OFFICE O/P EST LOW 20 MIN: CPT | Performed by: INTERNAL MEDICINE

## 2017-04-11 PROCEDURE — 25010000002 ENOXAPARIN PER 10 MG: Performed by: INTERNAL MEDICINE

## 2017-04-11 PROCEDURE — 25010000003 CEFTRIAXONE PER 250 MG: Performed by: INTERNAL MEDICINE

## 2017-04-11 PROCEDURE — G0378 HOSPITAL OBSERVATION PER HR: HCPCS

## 2017-04-11 PROCEDURE — 84550 ASSAY OF BLOOD/URIC ACID: CPT | Performed by: INTERNAL MEDICINE

## 2017-04-11 PROCEDURE — 96372 THER/PROPH/DIAG INJ SC/IM: CPT

## 2017-04-11 PROCEDURE — 94799 UNLISTED PULMONARY SVC/PX: CPT

## 2017-04-11 PROCEDURE — 99214 OFFICE O/P EST MOD 30 MIN: CPT | Performed by: PSYCHIATRY & NEUROLOGY

## 2017-04-11 PROCEDURE — 80069 RENAL FUNCTION PANEL: CPT | Performed by: INTERNAL MEDICINE

## 2017-04-11 PROCEDURE — 99217 PR OBSERVATION CARE DISCHARGE MANAGEMENT: CPT | Performed by: INTERNAL MEDICINE

## 2017-04-11 PROCEDURE — 85025 COMPLETE CBC W/AUTO DIFF WBC: CPT | Performed by: INTERNAL MEDICINE

## 2017-04-11 PROCEDURE — 83735 ASSAY OF MAGNESIUM: CPT | Performed by: INTERNAL MEDICINE

## 2017-04-11 RX ORDER — EPLERENONE 50 MG/1
50 TABLET, FILM COATED ORAL EVERY 12 HOURS SCHEDULED
Qty: 180 TABLET | Refills: 3 | Status: SHIPPED | OUTPATIENT
Start: 2017-04-11

## 2017-04-11 RX ORDER — HYDROCODONE BITARTRATE AND ACETAMINOPHEN 5; 325 MG/1; MG/1
1 TABLET ORAL EVERY 6 HOURS PRN
Qty: 60 TABLET | Refills: 0 | Status: SHIPPED | OUTPATIENT
Start: 2017-04-11 | End: 2017-05-03 | Stop reason: SDUPTHER

## 2017-04-11 RX ORDER — CLONIDINE HYDROCHLORIDE 0.2 MG/1
0.2 TABLET ORAL EVERY 8 HOURS SCHEDULED
Qty: 270 TABLET | Refills: 3 | Status: SHIPPED | OUTPATIENT
Start: 2017-04-11 | End: 2019-06-19

## 2017-04-11 RX ORDER — CEFDINIR 300 MG/1
300 CAPSULE ORAL 2 TIMES DAILY
Qty: 10 CAPSULE | Refills: 0 | OUTPATIENT
Start: 2017-04-11 | End: 2017-12-29 | Stop reason: HOSPADM

## 2017-04-11 RX ORDER — CLONIDINE HYDROCHLORIDE 0.2 MG/1
0.2 TABLET ORAL EVERY 4 HOURS PRN
Qty: 270 TABLET | Refills: 3 | Status: ON HOLD | OUTPATIENT
Start: 2017-04-11 | End: 2018-08-23

## 2017-04-11 RX ADMIN — ETHACRYNIC ACID 25 MG: 25 TABLET ORAL at 11:50

## 2017-04-11 RX ADMIN — NIFEDIPINE 60 MG: 60 TABLET, EXTENDED RELEASE ORAL at 11:55

## 2017-04-11 RX ADMIN — PENTAZOCINE HYDROCHLORIDE AND NALOXONE HYDROCHLORIDE 2 TABLET: 50; .5 TABLET ORAL at 06:39

## 2017-04-11 RX ADMIN — PANTOPRAZOLE SODIUM 40 MG: 40 TABLET, DELAYED RELEASE ORAL at 06:38

## 2017-04-11 RX ADMIN — BUTALBITAL, ACETAMINOPHEN, AND CAFFEINE 2 TABLET: 50; 325; 40 TABLET ORAL at 11:33

## 2017-04-11 RX ADMIN — DOCUSATE SODIUM 100 MG: 100 CAPSULE, LIQUID FILLED ORAL at 18:26

## 2017-04-11 RX ADMIN — CEFTRIAXONE SODIUM 1 G: 1 INJECTION, SOLUTION INTRAVENOUS at 14:00

## 2017-04-11 RX ADMIN — CLONIDINE HYDROCHLORIDE 0.2 MG: 0.2 TABLET ORAL at 16:46

## 2017-04-11 RX ADMIN — TIZANIDINE 2 MG: 2 TABLET ORAL at 18:26

## 2017-04-11 RX ADMIN — BUDESONIDE 0.5 MG: 0.5 INHALANT RESPIRATORY (INHALATION) at 08:59

## 2017-04-11 RX ADMIN — PENTAZOCINE HYDROCHLORIDE AND NALOXONE HYDROCHLORIDE 2 TABLET: 50; .5 TABLET ORAL at 16:44

## 2017-04-11 RX ADMIN — EPLERENONE 50 MG: 25 TABLET, FILM COATED ORAL at 11:52

## 2017-04-11 RX ADMIN — TIZANIDINE 2 MG: 2 TABLET ORAL at 11:53

## 2017-04-11 RX ADMIN — ALBUTEROL SULFATE 2.5 MG: 2.5 SOLUTION RESPIRATORY (INHALATION) at 08:59

## 2017-04-11 RX ADMIN — Medication 1000 MCG: at 11:53

## 2017-04-11 RX ADMIN — CLONIDINE HYDROCHLORIDE 0.2 MG: 0.2 TABLET ORAL at 06:39

## 2017-04-11 RX ADMIN — PENTAZOCINE HYDROCHLORIDE AND NALOXONE HYDROCHLORIDE 2 TABLET: 50; .5 TABLET ORAL at 01:54

## 2017-04-11 RX ADMIN — ENOXAPARIN SODIUM 30 MG: 30 INJECTION SUBCUTANEOUS at 11:59

## 2017-04-11 NOTE — PROGRESS NOTES
"   LOS: 0 days    Patient Care Team:  Óscar Cadet MD as PCP - General  Óscar Cadet MD as PCP - Family Medicine    Chief Complaint:    Chief Complaint   Patient presents with   • Leg Pain     Patient states this has been going on since starting on hydralazine, all joints hurt   • Leg Swelling       Subjective   F/u HTN     Interval History:     Patient Complaints: none  Patient Denies:  Dyspnea   History taken from: patient    Review of Systems:    BP readings reviewed     Objective     Vital Signs  Temp:  [97.9 °F (36.6 °C)-98.7 °F (37.1 °C)] 97.9 °F (36.6 °C)  Heart Rate:  [57-70] 69  Resp:  [18-20] 18  BP: (138-182)/() 158/80    Flowsheet Rows         First Filed Value    Admission Height  63\" (160 cm) Documented at 04/06/2017 0720    Admission Weight  180 lb (81.6 kg) Documented at 04/06/2017 0720          I/O this shift:  In: 320 [P.O.:320]  Out: -   I/O last 3 completed shifts:  In: 610 [P.O.:560; IV Piggyback:50]  Out: 1 [Stool:1]    Intake/Output Summary (Last 24 hours) at 04/11/17 0943  Last data filed at 04/11/17 0905   Gross per 24 hour   Intake              690 ml   Output                0 ml   Net              690 ml       Physical Exam:  gen nad, alert  Neck supple, no jvd  Lungs CTA bilat  CV RRR  abd soft nt/nd  vasc no pedal edema      Results Review:      Results from last 7 days  Lab Units 04/11/17  0503 04/10/17  0400 04/09/17  0326   SODIUM mmol/L 142 142 142   POTASSIUM mmol/L 3.7 3.5 3.5   CHLORIDE mmol/L 103 103 102   TOTAL CO2 mmol/L 25.4 25.0 23.8   BUN mg/dL 23 18 16   CREATININE mg/dL 0.91 0.80 0.79   CALCIUM mg/dL 9.6 9.6 9.3   GLUCOSE mg/dL 114* 82 96       Estimated Creatinine Clearance: 51.4 mL/min (by C-G formula based on Cr of 0.91).      Results from last 7 days  Lab Units 04/11/17  0503 04/10/17  0400 04/09/17  0326   MAGNESIUM mg/dL 2.6* 2.6* 2.2   PHOSPHORUS mg/dL 3.7 3.6 3.6         Results from last 7 days  Lab Units 04/11/17  0503 04/10/17  0400 04/09/17  0326 " 04/08/17  0514 04/07/17  0449   URIC ACID mg/dL 5.6 5.0 5.0 5.1 5.3         Results from last 7 days  Lab Units 04/11/17  0503 04/10/17  0401 04/09/17  0326 04/08/17  0514 04/07/17  0449   WBC 10*3/mm3 6.22 6.02 6.24 5.58 6.24   HEMOGLOBIN g/dL 13.5 13.9 14.0 13.5 13.8   PLATELETS 10*3/mm3 167 181 186 178 179               Imaging Results (last 24 hours)     Procedure Component Value Units Date/Time    XR Clavicle Bilateral [[id number]] Collected:  04/10/17 1516     Updated:  04/10/17 1523    Narrative:       XR CLAVICLE BILATERAL-, XR SHOULDER 2+ VW LEFT-     INDICATIONS: Breast cancer, abnormal bone scan     TECHNIQUE: 2 VIEWS OF EACH CLAVICLE, 2 VIEWS OF THE LEFT SHOULDER     COMPARISON: Right shoulder x-ray from 06/04/2012, chest x-ray from  01/03/2017     FINDINGS:      Moderate hypertrophic degenerative changes are seen in the bilateral  acromioclavicular joints, and degenerative spurring is present in both  humeral heads. No acute fracture or destructive osseous lesion is  identified.       Impression:          Degenerative changes. No acute fracture or destructive osseous lesion is  identified in either clavicle or in the left shoulder. Follow up/further  evaluation can be obtained is indications persist     This report was finalized on 4/10/2017 3:20 PM by Dr. Umesh Harrison MD.       XR Shoulder 2+ View Left [[id number]] Collected:  04/10/17 1516     Updated:  04/10/17 1523    Narrative:       XR CLAVICLE BILATERAL-, XR SHOULDER 2+ VW LEFT-     INDICATIONS: Breast cancer, abnormal bone scan     TECHNIQUE: 2 VIEWS OF EACH CLAVICLE, 2 VIEWS OF THE LEFT SHOULDER     COMPARISON: Right shoulder x-ray from 06/04/2012, chest x-ray from  01/03/2017     FINDINGS:      Moderate hypertrophic degenerative changes are seen in the bilateral  acromioclavicular joints, and degenerative spurring is present in both  humeral heads. No acute fracture or destructive osseous lesion is  identified.       Impression:           Degenerative changes. No acute fracture or destructive osseous lesion is  identified in either clavicle or in the left shoulder. Follow up/further  evaluation can be obtained is indications persist     This report was finalized on 4/10/2017 3:20 PM by Dr. Umesh Harrison MD.       XR Ankle 3+ View Left [[id number]] Collected:  04/10/17 1521     Updated:  04/10/17 1526    Narrative:       XR ANKLE 3+ VW LEFT-     INDICATIONS: Breast cancer, abnormal bone scan     TECHNIQUE: 3 VIEWS OF THE LEFT ANKLE     COMPARISON: Correlated with bone scan from 04/09/2017     FINDINGS:      No acute fracture or erosion is identified. The mortise appears  preserved. Degenerative spurring is seen at the dorsal aspect of the  foot, the plantar calcaneus, at the distal tibia.       Impression:          Degenerative changes. No acute fracture or destructive osseous lesion is  identified. Follow up/further evaluation can be obtained as indications  persist.     This report was finalized on 4/10/2017 3:23 PM by Dr. Umesh Harrison MD.             budesonide 0.5 mg Nebulization BID - RT   ceftriaxone 1 g Intravenous Q24H   CloNIDine 0.2 mg Oral Q8H   docusate sodium 100 mg Oral BID   enoxaparin 30 mg Subcutaneous Daily   eplerenone 50 mg Oral Q12H   ethacrynic acid 25 mg Oral BID   montelukast 10 mg Oral Nightly   NIFEdipine XL 60 mg Oral BID   pantoprazole 40 mg Oral Q AM   polyethylene glycol 17 g Oral Daily   tiZANidine 2 mg Oral BID   vitamin B-12 1,000 mcg Oral Daily          Medication Review:   Current Facility-Administered Medications   Medication Dose Route Frequency Provider Last Rate Last Dose   • acetaminophen (TYLENOL) tablet 650 mg  650 mg Oral Q4H PRN Óscar Cadet MD       • albuterol (PROVENTIL) nebulizer solution 0.083% 2.5 mg/3mL  2.5 mg Nebulization Q4H PRN Óscar Cadet MD   2.5 mg at 04/11/17 0859   • budesonide (PULMICORT) nebulizer solution 0.5 mg  0.5 mg Nebulization BID - RT Óscar Cadet MD   0.5  mg at 04/11/17 0859   • butalbital-acetaminophen-caffeine (FIORICET, ESGIC) -40 MG per tablet 2 tablet  2 tablet Oral Q4H PRN Richard Lawson MD   2 tablet at 04/10/17 1548   • cefTRIAXone (ROCEPHIN) IVPB 1 g  1 g Intravenous Q24H Óscar Cadet MD 0 mL/hr at 04/08/17 2114 1 g at 04/10/17 1548   • CloNIDine (CATAPRES) tablet 0.1 mg  0.1 mg Oral Q4H PRN Óscar Cadet MD        Or   • CloNIDine (CATAPRES) tablet 0.2 mg  0.2 mg Oral Q4H PRN Óscar Cadet MD   0.2 mg at 04/10/17 1149   • CloNIDine (CATAPRES) tablet 0.2 mg  0.2 mg Oral Q8H Gerry Yoo MD   0.2 mg at 04/11/17 0639   • diphenhydrAMINE (BENADRYL) 12.5 MG/5ML elixir 12.5 mg  12.5 mg Oral Q4H PRN Óscar Cadet MD       • docusate sodium (COLACE) capsule 100 mg  100 mg Oral BID Óscar Cadet MD   100 mg at 04/09/17 1749   • enalaprilat (VASOTEC) injection 1.25 mg  1.25 mg Intravenous Q6H PRN Óscar Cadet MD   1.25 mg at 04/06/17 1951   • enoxaparin (LOVENOX) syringe 30 mg  30 mg Subcutaneous Daily Óscar Cadet MD   30 mg at 04/10/17 0950   • eplerenone (INSPRA) tablet 50 mg  50 mg Oral Q12H Rj Bond MD   50 mg at 04/10/17 2018   • ethacrynic acid (EDECRIN) tablet 25 mg  25 mg Oral BID Óscar Cadet MD   25 mg at 04/10/17 2234   • HYDROcodone-acetaminophen (NORCO) 5-325 MG per tablet 1 tablet  1 tablet Oral Q6H PRN Óscar Cadet MD   1 tablet at 04/09/17 0826   • hydrOXYzine (VISTARIL) capsule 50 mg  50 mg Oral Q6H PRN Óscar Cadet MD       • ipratropium (ATROVENT) nebulizer solution 0.5 mg  500 mcg Nebulization 4x Daily PRN Óscar Cadet MD       • LORazepam (ATIVAN) tablet 0.5 mg  0.5 mg Oral Q8H PRN Óscar Cadet MD   0.5 mg at 04/08/17 0650   • montelukast (SINGULAIR) tablet 10 mg  10 mg Oral Nightly Óscar Cadet MD   10 mg at 04/10/17 2018   • NIFEdipine XL (PROCARDIA XL) 24 hr tablet 60 mg  60 mg Oral BID Óscar Cadet MD   60 mg at 04/10/17 2234   • ondansetron ODT (ZOFRAN-ODT)  disintegrating tablet 8 mg  8 mg Oral Q6H PRN Óscar Cadet MD       • pantoprazole (PROTONIX) EC tablet 40 mg  40 mg Oral Q AM Óscar Cadet MD   40 mg at 04/11/17 0638   • pentazocine-naloxone (TALWIN NX) 50-0.5 MG per tablet 2 tablet  2 tablet Oral Q4H PRN Óscar Cadet MD   2 tablet at 04/11/17 0639   • polyethylene glycol (MIRALAX) powder 17 g  17 g Oral Daily Óscar Cadet MD   17 g at 04/09/17 0826   • promethazine (PHENERGAN) tablet 12.5 mg  12.5 mg Oral Q6H PRN Óscar Cadet MD       • promethazine-codeine (PHENERGAN with CODEINE) 6.25-10 MG/5ML syrup 5 mL  5 mL Oral 4x Daily PRN Óscar Cadet MD       • sodium chloride 0.9 % flush 1-10 mL  1-10 mL Intravenous PRN Óscar Cadet MD       • sodium chloride 0.9 % flush 10 mL  10 mL Intravenous PRN Estevan Vega MD       • tiZANidine (ZANAFLEX) tablet 2 mg  2 mg Oral BID Óscar Cadet MD   2 mg at 04/10/17 1750   • vitamin B-12 (CYANOCOBALAMIN) tablet 1,000 mcg  1,000 mcg Oral Daily Raymon Rodriguez MD PhD   1,000 mcg at 04/10/17 1548       Assessment/Plan   1. Labile hypertension - course complicated by multiple drug allergies (and/or intolerances).   -- couple spikes to 180 yest but 158 systolic this AM  -- on nifedipine 60 BID, clonidine 0.2 TID, ethacrynic acid, eplerenone (latter inc'd 2d ago).  -- while ACEi not listed as drug allergy, Dr Bond recalls pt had some issue with this drug class  2. H/o Breast Ca - ONC eval noted  3. Hypokalemia - resolved, currently on eplerenone, she did not tolerate the Aldactone, K stable 3.7  4. COPD, stable  5. Diffuse arthralgias, rheumatology evaluation as an outpatient is pending    Plan  - cont current antihypertensive regimen (will not initiate ACEi right now)  - ok with discharge from nephrology standpoint  - follow-up with Dr Bond 2-4 weeks     Principal Problem:    Accelerated hypertension  Active Problems:    Joint pain    Urinary retention self-caths (Don)    Sinusitis     Conjunctivitis    Cervical disc displacement    Cervical pain (Servando=PM)    DDD (degenerative disc disease), cervical    Hyperlipidemia    Urge incontinence of urine    Chronic tension-type headache, intractable    Obesity (BMI 30.0-34.9)    Chronic kidney disease, stage 2, mildly decreased GFR    Hardening of the arteries of the brain    Allergic contact dermatitis    Malaise and fatigue progressive    Vertigo    Vitamin D deficiency    Moderate persistent asthmatic bronchitis without complication    Anterior cervical adenopathy due to infection    Knee pain, bilateral    Elevated PTH level    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

## 2017-04-11 NOTE — PROGRESS NOTES
"  Patient Identification:  NAME:  Sasha Barrett  Age:  77 y.o.   Sex:  female   :  1940   MRN:  [id number]       Chief complaint: Confusion headache    History of present illness:  This patient is doing okay but is very cranky today \"should I hit you now or later?\"  She says to me.  She is not got any real focal complaints as best I can tell and states \"I just want to know what it is\" I did perform an independent eyeball review the MRI scan of the brain and it is normal for her age      Past medical history:  Past Medical History:   Diagnosis Date   • Arthritis    • Asthma    • Cancer    • COPD (chronic obstructive pulmonary disease)    • Emphysema lung    • Generalized headaches    • Hayfever    • Hypertension    • Stroke        Allergies:  Morphine; Sulfa antibiotics; Hydralazine; Amlodipine; Anastrozole; Grass; Nsaids; Penicillins; Tree extract; and Zolpidem    Home medications:  Prescriptions Prior to Admission   Medication Sig Dispense Refill Last Dose   • ethacrynic acid (EDECRIN) 25 MG tablet Take 25 mg by mouth 2 (Two) Times a Day.      • ipratropium (ATROVENT) 0.02 % nebulizer solution Take 500 mcg by nebulization 4 (Four) Times a Day As Needed for Wheezing or Shortness of Air.      • albuterol (PROAIR HFA) 108 (90 BASE) MCG/ACT inhaler ProAir  (90 Base) MCG/ACT Inhalation Aerosol Solution; Patient Sig: ProAir  (90 Base) MCG/ACT Inhalation Aerosol Solution ; 8; 0; -2014; Active   Taking   • aspirin 81 MG tablet Take 162 mg by mouth Daily.   Taking   • budesonide (PULMICORT FLEXHALER) 180 MCG/ACT inhaler Pulmicort Flexhaler 180 MCG/ACT Inhalation Aerosol Powder Breath Activated; Patient Sig: Pulmicort Flexhaler 180 MCG/ACT Inhalation Aerosol Powder Breath Activated ; 0; -2014; Active   Taking   • butalbital-acetaminophen-caffeine (FIORICET, ESGIC) -40 MG per tablet Take 2 tablets by mouth Every 4 (Four) Hours As Needed for Headache. (Patient taking differently: Take 1 " tablet by mouth Every 4 (Four) Hours As Needed for Headache.) 240 tablet 0    • Cholecalciferol (VITAMIN D) 1000 UNITS tablet Take  by mouth.   Taking   • CloNIDine (CATAPRES) 0.1 MG tablet Take 1 tablet by mouth 2 (Two) Times a Day. Schedule bid 1 and then [id number] take extra #1 if systolic BP > 160 and take 2 if systolic Bp>180 (Patient taking differently: Take 0.2 mg by mouth 2 (Two) Times a Day. Schedule bid 1 and then [id number] take extra #1 if systolic BP > 160 and take 2 if systolic Bp>180) 360 tablet 1    • diphenhydrAMINE (BENADRYL) 50 MG tablet Take 1 tablet by mouth every 4 (four) hours as needed for itching or allergies. 180 tablet 0 Taking   • doxycycline (DORYX) 100 MG enteric coated tablet Take 1 tablet by mouth 2 (Two) Times a Day. 20 tablet 0    • doxycycline (DORYX) 100 MG enteric coated tablet Take 1 tablet by mouth 2 (Two) Times a Day. 20 tablet 0    • doxycycline (DORYX) 100 MG enteric coated tablet TAKE ONE TABLET BY MOUTH TWICE DAILY 20 tablet 0    • HYDROcodone-acetaminophen (NORCO) 5-325 MG per tablet Take 1 tablet by mouth Every 6 (Six) Hours As Needed for moderate pain (4-6) or severe pain (7-10). 120 tablet 0 Taking   • hydrOXYzine (VISTARIL) 50 MG capsule Take 1 capsule by mouth every 6 (six) hours as needed for itching. 360 capsule 1 Taking   • levoFLOXacin (LEVAQUIN) 500 MG tablet Take 500 mg by mouth Daily.   Taking   • LORazepam (ATIVAN) 0.5 MG tablet Take 1 tablet by mouth Every 8 (Eight) Hours As Needed for anxiety. Take 1 tab 1 hoour before MRI and repeat x 1 at time of test 60 tablet 0 Taking   • meclizine (ANTIVERT) 25 MG tablet TAKE ONE TABLET BY MOUTH THREE TIMES DAILY AS NEEDED FOR DIZZINESS 30 tablet 0 Taking   • MethylPREDNISolone (MEDROL, CRISTIAN,) 4 MG tablet Take as directed on package instructions. 21 tablet 0 Taking   • montelukast (SINGULAIR) 10 MG tablet Take 1 tablet by mouth every night. 90 tablet 1 Taking   • NIFEdipine XL (PROCARDIA XL) 60 MG 24 hr tablet TAKE TWO  TABLETS BY MOUTH DAILY (Patient taking differently: one oral bid) 60 tablet 2    • NIFEdipine XL (PROCARDIA XL) 60 MG 24 hr tablet Take 1 tablet by mouth Daily. 90 tablet 0    • NIFEdipine XL (PROCARDIA XL) 60 MG 24 hr tablet TAKE TWO TABLETS BY MOUTH DAILY 60 tablet 0    • omeprazole (priLOSEC) 40 MG capsule TAKE ONE CAPSULE BY MOUTH EVERY DAY 90 capsule 0    • ondansetron ODT (ZOFRAN-ODT) 8 MG disintegrating tablet Take 1 tablet by mouth every 6 (six) hours as needed for nausea or vomiting. 360 tablet 1 Taking   • pentazocine-naloxone (TALWIN NX) 50-0.5 MG per tablet Take 2 tablets by mouth Every 4 (Four) Hours As Needed for Mild Pain (1-3) (for up to 90 days). (Patient taking differently: Take 1 tablet by mouth Every 4 (Four) Hours As Needed for Mild Pain (1-3) (for up to 90 days).) 1080 tablet 0    • polyethylene glycol (MIRALAX) powder Take one capful w/liquid daily 765 g 1 Taking   • predniSONE (DELTASONE) 10 MG tablet TAKE ONE TABLET BY MOUTH DAILY AS DIRECTED 30 tablet 0    • promethazine-codeine (PHENERGAN with CODEINE) 6.25-10 MG/5ML syrup Take 5 mL by mouth 4 (Four) Times a Day As Needed for cough. 360 mL 0 Taking   • tiZANidine (ZANAFLEX) 4 MG tablet Take 1 tablet by mouth at night as needed for muscle spasms. (Patient taking differently: Take 2 mg by mouth 2 (Two) Times a Day.) 90 tablet 0 Taking   • tiZANidine (ZANAFLEX) 4 MG tablet TAKE ONE TABLET BY MOUTH AT BEDTIME AS NEEDED FOR MUSCLE SPASMS 90 tablet 0    • tobramycin-dexamethasone (TOBRADEX) 0.3-0.1 % ophthalmic suspension Administer 1 drop to both eyes every 4 (four) hours while awake. 10 mL 0 Taking        Hospital medications:    budesonide 0.5 mg Nebulization BID - RT   ceftriaxone 1 g Intravenous Q24H   CloNIDine 0.2 mg Oral Q8H   docusate sodium 100 mg Oral BID   enoxaparin 30 mg Subcutaneous Daily   eplerenone 50 mg Oral Q12H   ethacrynic acid 25 mg Oral BID   montelukast 10 mg Oral Nightly   NIFEdipine XL 60 mg Oral BID   pantoprazole 40  "mg Oral Q AM   polyethylene glycol 17 g Oral Daily   tiZANidine 2 mg Oral BID   vitamin B-12 1,000 mcg Oral Daily        •  acetaminophen  •  albuterol  •  butalbital-acetaminophen-caffeine  •  CloNIDine **OR** CloNIDine  •  diphenhydrAMINE  •  enalaprilat  •  HYDROcodone-acetaminophen  •  hydrOXYzine  •  ipratropium  •  LORazepam  •  ondansetron ODT  •  pentazocine-naloxone  •  promethazine  •  promethazine-codeine  •  sodium chloride  •  Insert peripheral IV **AND** sodium chloride      Objective:  Vitals Ranges:   Temp:  [97.4 °F (36.3 °C)-98.7 °F (37.1 °C)] 97.4 °F (36.3 °C)  Heart Rate:  [57-70] 70  Resp:  [18-20] 20  BP: (136-182)/() 136/101      Physical Exam:  Awake alert cranky \"should I hit you now or later?\"  Normal cranial nerves II through VII good motor ×4 extremities distal atrophy no rigidity or bradykinesia reflexes trace throughout symmetrical toes downgoing bilaterally    Results review:   I reviewed the patient's new clinical results.    Data review:  Lab Results (last 24 hours)     Procedure Component Value Units Date/Time    CBC & Differential [[id number]] Collected:  04/11/17 0503    Specimen:  Blood Updated:  04/11/17 0552    Narrative:       The following orders were created for panel order CBC & Differential.  Procedure                               Abnormality         Status                     ---------                               -----------         ------                     CBC Auto Differential[[id number]]         Abnormal            Final result                 Please view results for these tests on the individual orders.    CBC Auto Differential [[id number]]  (Abnormal) Collected:  04/11/17 0503    Specimen:  Blood Updated:  04/11/17 0552     WBC 6.22 10*3/mm3      RBC 4.56 10*6/mm3      Hemoglobin 13.5 g/dL      Hematocrit 42.5 %      MCV 93.2 fL      MCH 29.6 pg      MCHC 31.8 (L) g/dL      RDW 14.0 (H) %      RDW-SD 47.4 fl      MPV 11.0 fL      Platelets 167 10*3/mm3      " Neutrophil % 55.6 %      Lymphocyte % 30.7 %      Monocyte % 10.6 %      Eosinophil % 2.1 %      Basophil % 1.0 %      Immature Grans % 0.0 %      Neutrophils, Absolute 3.46 10*3/mm3      Lymphocytes, Absolute 1.91 10*3/mm3      Monocytes, Absolute 0.66 10*3/mm3      Eosinophils, Absolute 0.13 10*3/mm3      Basophils, Absolute 0.06 10*3/mm3      Immature Grans, Absolute 0.00 10*3/mm3     Copper, Serum [[id number]] Collected:  04/07/17 1637    Specimen:  Blood Updated:  04/11/17 0613     Copper 108 ug/dL                                       Detection Limit = 5       Narrative:       Performed at:  [address]  [id number]  : Óscar Mcclellan MD, Phone:  [phone]    Cancer Antigen 15-3 [[id number]]  (Abnormal) Collected:  04/09/17 1037    Specimen:  Blood Updated:  04/11/17 0613     CA 15-3 25.6 (H) U/mL       Roche ECLIA  methodology       Narrative:       Performed at:  [facility]  [id number]  : Jesus Pryor PhD, Phone:  [phone]    Renal Function Panel [[id number]]  (Abnormal) Collected:  04/11/17 0503    Specimen:  Blood Updated:  04/11/17 0617     Glucose 114 (H) mg/dL      BUN 23 mg/dL      Creatinine 0.91 mg/dL      Sodium 142 mmol/L      Potassium 3.7 mmol/L      Chloride 103 mmol/L      CO2 25.4 mmol/L      Calcium 9.6 mg/dL      Albumin 3.60 g/dL      Phosphorus 3.7 mg/dL      Anion Gap 13.6 mmol/L      BUN/Creatinine Ratio 25.3 (H)     eGFR Non African Amer 60 (L) mL/min/1.73     Narrative:       The MDRD GFR formula is only valid for adults with stable renal function between ages 18 and 70.    Uric Acid [[id number]]  (Normal) Collected:  04/11/17 0503    Specimen:  Blood Updated:  04/11/17 0617     Uric Acid 5.6 mg/dL     Magnesium [[id number]]  (Abnormal) Collected:  04/11/17 0503    Specimen:  Blood Updated:  04/11/17 0617     Magnesium 2.6 (H) mg/dL            Imaging:  Imaging Results (last 24  hours)     Procedure Component Value Units Date/Time    XR Clavicle Bilateral [[id number]] Collected:  04/10/17 1516     Updated:  04/10/17 1523    Narrative:       XR CLAVICLE BILATERAL-, XR SHOULDER 2+ VW LEFT-     INDICATIONS: Breast cancer, abnormal bone scan     TECHNIQUE: 2 VIEWS OF EACH CLAVICLE, 2 VIEWS OF THE LEFT SHOULDER     COMPARISON: Right shoulder x-ray from 06/04/2012, chest x-ray from  01/03/2017     FINDINGS:      Moderate hypertrophic degenerative changes are seen in the bilateral  acromioclavicular joints, and degenerative spurring is present in both  humeral heads. No acute fracture or destructive osseous lesion is  identified.       Impression:          Degenerative changes. No acute fracture or destructive osseous lesion is  identified in either clavicle or in the left shoulder. Follow up/further  evaluation can be obtained is indications persist     This report was finalized on 4/10/2017 3:20 PM by Dr. Umesh Harrison MD.       XR Shoulder 2+ View Left [[id number]] Collected:  04/10/17 1516     Updated:  04/10/17 1523    Narrative:       XR CLAVICLE BILATERAL-, XR SHOULDER 2+ VW LEFT-     INDICATIONS: Breast cancer, abnormal bone scan     TECHNIQUE: 2 VIEWS OF EACH CLAVICLE, 2 VIEWS OF THE LEFT SHOULDER     COMPARISON: Right shoulder x-ray from 06/04/2012, chest x-ray from  01/03/2017     FINDINGS:      Moderate hypertrophic degenerative changes are seen in the bilateral  acromioclavicular joints, and degenerative spurring is present in both  humeral heads. No acute fracture or destructive osseous lesion is  identified.       Impression:          Degenerative changes. No acute fracture or destructive osseous lesion is  identified in either clavicle or in the left shoulder. Follow up/further  evaluation can be obtained is indications persist     This report was finalized on 4/10/2017 3:20 PM by Dr. Umesh Harrison MD.       XR Ankle 3+ View Left [[id number]] Collected:  04/10/17 1521      Updated:  04/10/17 1526    Narrative:       XR ANKLE 3+ VW LEFT-     INDICATIONS: Breast cancer, abnormal bone scan     TECHNIQUE: 3 VIEWS OF THE LEFT ANKLE     COMPARISON: Correlated with bone scan from 04/09/2017     FINDINGS:      No acute fracture or erosion is identified. The mortise appears  preserved. Degenerative spurring is seen at the dorsal aspect of the  foot, the plantar calcaneus, at the distal tibia.       Impression:          Degenerative changes. No acute fracture or destructive osseous lesion is  identified. Follow up/further evaluation can be obtained as indications  persist.     This report was finalized on 4/10/2017 3:23 PM by Dr. Umesh Harrison MD.                Assessment and Plan:     Principal Problem:    Accelerated hypertension  Active Problems:    Joint pain    Urinary retention self-caths (Ochoa)    Sinusitis    Conjunctivitis    Cervical disc displacement    Cervical pain (Servando=PM)    DDD (degenerative disc disease), cervical    Hyperlipidemia    Urge incontinence of urine    Chronic tension-type headache, intractable    Obesity (BMI 30.0-34.9)    Chronic kidney disease, stage 2, mildly decreased GFR    Hardening of the arteries of the brain    Allergic contact dermatitis    Malaise and fatigue progressive    Vertigo    Vitamin D deficiency    Moderate persistent asthmatic bronchitis without complication    Anterior cervical adenopathy due to infection    Knee pain, bilateral    Elevated PTH level    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  She is pretty cranky today and wants to go home and at this point I would note that I did perform an independent eyeball review the MRI scan and there is no  acute stroke.  She denies any significant problems at this time once to go home and I will go ahead and sign off in follow-up when necessary reconsult thank you    Rocael Alvarez MD  04/11/17  1:51 PM

## 2017-04-11 NOTE — PLAN OF CARE
Problem: Patient Care Overview (Adult)  Goal: Plan of Care Review  Outcome: Ongoing (interventions implemented as appropriate)    04/11/17 0419   Coping/Psychosocial Response Interventions   Plan Of Care Reviewed With patient   Outcome Evaluation   Outcome Summary/Follow up Plan bp better last evening. vss, up to br with assist. pt tearful at times,states she is tired of hurting and being sick. medicated with pain meds as requested. slept off and on during shift.

## 2017-04-11 NOTE — PLAN OF CARE
Problem: Hypertensive Disease/Crisis (Arterial) (Adult)  Goal: Signs and Symptoms of Listed Potential Problems Will be Absent or Manageable (Hypertensive Disease/Crisis)  Outcome: Outcome(s) achieved Date Met:  04/11/17  Pt B/P as noted - medication adjusted during stay-pt informed    Problem: Pain, Acute (Adult)  Goal: Acceptable Pain Control/Comfort Level  Outcome: Ongoing (interventions implemented as appropriate)  Pt medicated with pain meds as noted with adequate relief    Problem: Fall Risk (Adult)  Goal: Absence of Falls  Outcome: Outcome(s) achieved Date Met:  04/11/17  No falls this shift-pt being discharged home    Problem: Patient Care Overview (Adult)  Goal: Adult Individualization and Mutuality  Outcome: Outcome(s) achieved Date Met:  04/11/17  Pt being discharged today after being seen by Dr. [id number] instructions to patient with f/u visits

## 2017-04-11 NOTE — PROGRESS NOTES
Continued Stay Note  [facility]     Patient Name: Sasha Barrett  MRN: [id number]  Today's Date: 4/11/2017    Admit Date: 4/6/2017          Discharge Plan       04/11/17 1617    Case Management/Social Work Plan    Plan Home    Additional Comments Declines   Pt states she is going home has plenty of help from her 6 sons.  No other needs              Discharge Codes     None        Expected Discharge Date and Time     Expected Discharge Date Expected Discharge Time    Apr 11, 2017             Geena Cervantes RN

## 2017-04-11 NOTE — PROGRESS NOTES
REASON FOR CONSULTATION: Generalized joint achiness.  She was found having urinary tract infection and currently on IV antibiotics. Patient denies fever sweating chills. She continues to have generalized joint achiness. Had a bone scan yesterday see the following images sections.  CA 15-3 is still pending.      This patient has history of remote breast cancer on the left, status post mastectomy and subsequent prophylactic right mastectomy. She received Arimidex for 4 months and this was discontinued a long time ago because of side effects including joint pain. The question has been raised in this patient admitted with pain in the rib cage, pain in the knees and large joints, if this is a representation of metastatic breast cancer.          HISTORY OF PRESENT ILLNESS:   This patient is a 77-year-old very vivacious who remains extremely active who was admitted to the hospital with a urinary tract infection and accelerated hypertension. The question has been raised by Dr. Davalos in regard to the patient complaining of multiple symptoms including pain in both knees, swelling in the lower extremities as well as pain in the rib cage bilaterally, especially on the right side, and this could be representation of breast cancer. It happened to be that the patient had a lumpectomy, subsequently a left mastectomy and also prophylactic right mastectomy in the past. She never required any chemotherapy and neither radiation treatment. She took Arimidex for 4 months with terrible side effects including joint and bone pain and the medicine was discontinued. This happened a long time ago. In any event, the question has been raised due to the manifestation that the patient has now with the pain in the rib cage that has been an ongoing phenomenon for the last 3 or 4 months associated with tenderness upon palpation and discomfort throughout the joints, especially the knees, could be representation of recurrent malignancy. The patient  also complains of minimal pain in her lumbar spine, nothing new, this has been an ongoing phenomenon for more than 20 years. She also has frequent headaches. She denies any changes in her eyes and no nasal alterations besides chronic obstruction of the nasal passages associated with allergies. She denies any alteration in her oral mucosals. Her appetite has declined some and she has lost 20-pounds during the last 3 or 4 months. She has not had any difficulty swallowing. She has a chronic cough. She has no shortness of breath or palpitations. She has no difficulty swallowing. Her bowel activity always requires some help from MiraLAX. No passage of blood in the stools. Urination is ongoing with a recent urinary tract infection that is being treated with no hematuria and no vaginal discharge. She denies any alterations in the skin. She has had chronic headaches, nothing new, and she has had multiple MRIs and CT scans of the brain, being negative. She has not had any other alterations in her skeleton to speak of besides osteoarthritis and bilateral knee replacement. In the past the patient has had a normal vitamin D level and normal sedimentation rate, normal alkaline phosphatase, normal calcium level, but she has had an elevated PTH to 95 in 01/2017. Again, the calcium level has remained normal.                      Medical History             Past Medical History:   Diagnosis Date   • Arthritis      • Asthma      • Cancer      • COPD (chronic obstructive pulmonary disease)      • Emphysema lung      • Generalized headaches      • Hayfever      • Hypertension      • Stroke                         Surgical History               Past Surgical History:   Procedure Laterality Date   • BREAST LUMPECTOMY Left 04/01/2003   • CARPAL TUNNEL RELEASE    2011   • CATARACT EXTRACTION    2010   • HERNIA REPAIR    01/01/1971   • HERNIA REPAIR            right groin   • JOINT REPLACEMENT         • KNEE SURGERY Left 01/01/1998       bilateral knee replacements   • MASTECTOMY         • PARATHYROIDECTOMY    05/08/2006   • SINUS SURGERY    01/01/1984   • TOTAL ABDOMINAL HYSTERECTOMY    01/01/1973            MEDICINE: Reviewed and his see EMR       ALLERGIES:             Allergies   Allergen Reactions   • Morphine Anaphylaxis   • Sulfa Antibiotics Anaphylaxis and Hives         Or sulfa fillers in meds   • Hydralazine Myalgia         Patient reports leg cramps, joint pain and increased fatigue   • Amlodipine            KIDNEYS SHUT DOWN   • Anastrozole      • Grass      • Morphine And Related      • Nifedipine            UNCONTROLLED BP   • Nsaids            KIDNEY FAILURE   • Norvasc [Amlodipine Besylate]      • Penicillins      • Pneumococcal Vaccines Nausea Only   • Tree Extract      • Zolpidem            HALLUCINATIONS                  Social History     Social History                 Social History   • Marital status:          Spouse name: N/A   • Number of children: N/A   • Years of education: N/A                     Social History Main Topics     • Smoking status: Never Smoker     • Smokeless tobacco: Never Used     • Alcohol use Yes            Comment: rarely - once or twice year (wine or bernabe)     • Drug use: Defer     • Sexual activity: Defer                                  Family History   Problem Relation Age of Onset   • Cancer Brother              Review of Systems   General: no fever, chills, SOME fatigue, 20 LB LOSS weight changes, NO lack of appetite.  Eyes: no epiphora, xerophthalmia,conjunctivitis, pain, glaucoma, blurred vision, blindness, secretion, photophobia, proptosis, diplopia.  Ears: no otorrhea, tinnitus, otorrhagia, deafness, pain, vertigo.  Nose: no rhinorrhea, epistaxis, alteration in perception of odors, sinuses pressure.  Mouth: no alteration in gums or teeth, ulcers, no difficulty with mastication or deglut ion, no odynophagia.  Neck: no masses or pain, no thyroid alterations, no pain in muscles or  arteries, no carotid odynia, no crepitation.  Respiratory: no cough, sputum production, dyspnea, trepopnea, pleuritic pain, hemoptysis.  Heart: no syncope, irregularity, palpitations, angina, orthopnea, paroxysmal nocturnal dyspnea.  Vascular Venous: no tenderness,edema, palpable cords, postphlebitic syndrome, skin changes or ulcerations.  Vascular Arterial: no distal ischemia, claudication, gangrene, neuropathic ischemic pain, skin ulcers, paleness or cyanosis.  GI: no dysphagia, odynophagia, no regurgitation, no heartburn,no indigestion,no nausea,no vomiting,no hematemesis ,no melena,no jaundice,no distention, no obstipation,no enterorrhagia,no proctalgia,no anal lesions, nochanges in bowel habits.  : no frequency, hesitancy, hematuria, discharge, pain.  Musculoskeletal: STATED JOINT AND BONE PAIN , joint pain,LE edema,SOME functional limitation,NO fasciculations, mass.  Neurologic: no headache, seizures, alterations on Craneal nerves, no motor or senssory deficit, normal coordination, no alteration in memory, orientation, calculation,writting, verbal or written language.  Skin: no rashes, pruritus or localized lesions.  Psychiatric: no anxiety, depression, agitation, delusions, proper insight.          Objective       Vitals:    04/11/17 0752 04/11/17 0800 04/11/17 0859 04/11/17 1251   BP:  158/80  (!) 136/101   BP Location:  Right arm  Right arm   Patient Position:  Sitting  Lying   Pulse:   69 70   Resp: 20 20 18 20   Temp: 97.9 °F (36.6 °C)   97.4 °F (36.3 °C)   TempSrc: Oral Oral  Oral   SpO2: 98% 98% 99% 95%   Weight:       Height:            Physical Exam   GENERAL: Well-developed, well-nourished patient in no acute distress.   SKIN: Warm, dry without rashes, purpura or petechiae.  HEENT: Pupils were equal, conjunctivas non injected.   CHEST:  normal breath sounds bilaterally, no wheezing, crackles or ronchi.  CARDIAC AND VASCULAR:  normal rate and regular rhythm, without murmurs, rubs or S3 or S4 right  or left sided gallops.  ABDOMEN: Soft,   EXTREMITIES AND SPINE: No clubbing, cyanosis or edema  NEUROLOGICAL: Patient was awake, alert, oriented to time, person and place              RECENT LABS:    Results from last 7 days  Lab Units 04/11/17  0503 04/10/17  0401 04/09/17  0326   WBC 10*3/mm3 6.22 6.02 6.24   HEMOGLOBIN g/dL 13.5 13.9 14.0   HEMATOCRIT % 42.5 43.5 43.3   PLATELETS 10*3/mm3 167 181 186       Results from last 7 days  Lab Units 04/11/17  0503 04/10/17  0400 04/09/17  0326   SODIUM mmol/L 142 142 142   POTASSIUM mmol/L 3.7 3.5 3.5   CHLORIDE mmol/L 103 103 102   TOTAL CO2 mmol/L 25.4 25.0 23.8   BUN mg/dL 23 18 16   CREATININE mg/dL 0.91 0.80 0.79   CALCIUM mg/dL 9.6 9.6 9.3   GLUCOSE mg/dL 114* 82 96         CA 15-3 is 25.6 U/ml       Copper 108      Images studies   XR CLAVICLE BILATERAL-, XR SHOULDER 2+ VW LEFT- 04/11/2017       INDICATIONS: Breast cancer, abnormal bone scan      TECHNIQUE: 2 VIEWS OF EACH CLAVICLE, 2 VIEWS OF THE LEFT SHOULDER      COMPARISON: Right shoulder x-ray from 06/04/2012, chest x-ray from  01/03/2017      FINDINGS:      Moderate hypertrophic degenerative changes are seen in the bilateral  acromioclavicular joints, and degenerative spurring is present in both  humeral heads. No acute fracture or destructive osseous lesion is  identified.      IMPRESSION:      Degenerative changes. No acute fracture or destructive osseous lesion is  identified in either clavicle or in the left shoulder. Follow up/further  evaluation can be obtained is indications persist       XR ANKLE 3+ VW LEFT- 04/10/2017       INDICATIONS: Breast cancer, abnormal bone scan      TECHNIQUE: 3 VIEWS OF THE LEFT ANKLE      COMPARISON: Correlated with bone scan from 04/09/2017      FINDINGS:      No acute fracture or erosion is identified. The mortise appears  preserved. Degenerative spurring is seen at the dorsal aspect of the  foot, the plantar calcaneus, at the distal  tibia.      IMPRESSION:      Degenerative changes. No acute fracture or destructive osseous lesion is  identified. Follow up/further evaluation can be obtained as indications  persist.     Assessment/Plan   1. Generalized joint achiness. In summary, this patient has history of bilateral mastectomy for very early breast cancer. She never required any chemotherapy and neither took any radiation treatment and took 4 months of Arimidex that she discontinued because of severe joint pain. She had bilateral mastectomies, prophylactic on the right side, and the physical examination in the chest wall, besides having minimal discomfort in the chest wall, has no masses, induration or tenderness. There is no axillary adenopathy, supraclavicular or infraclavicular adenopathies. She also had minimal kyphosis in the lumbar spine. The patient has a normal abdominal examination. She has osteoarthritis and surgical scars in both knees. She has no obvious edema in her lower extremities.      Patient had a bone scan on April 9, 2017. According to radiologist, there is no definite signs for metastatic bone disease, however the radiologist recommended plain x-ray examination for the left sternoclavicular joint, left shoulder and left ankle.  Physical examination does show some achiness at the left sternoclavicular joint.       is unremarkable.  X-ray examination for the clavicle bone, shoulders and left ankle showed that degenerative changes, no evidence of malignancy.      2.  Low normal vitamin B12 level.  Discussed with patient, I started her on oral B-12 supplementation, due to its lack of side effects, very cheap to use and a potentially beneficial for improving energy level and maintain normal hematopoiesis.  She can buy over-the-counter product as outpatient.         I discussed that with the patient today, I will sign off on her case.  There is no need of for this patient follow-up with us as outpatient.  Please call if  needed.    Thank you Dr. Cadet for letting us visiting care of this patient!     MONICA ROBLEDO M.D., Ph.D.

## 2017-04-12 NOTE — PROGRESS NOTES
Continued Stay Note  [facility]     Patient Name: Sasha Barrett  MRN: [id number]  Today's Date: 4/12/2017    Admit Date: 4/6/2017          Discharge Plan       04/12/17 1418    Case Management/Social Work Plan    Plan Home    Final Note    Final Note Home Declines rehab placement and Home Health              Discharge Codes     None        Expected Discharge Date and Time     Expected Discharge Date Expected Discharge Time    Apr 11, 2017             Geena Cervantes RN

## 2017-04-15 LAB
ALDOST SERPL-MCNC: 3.2 NG/DL (ref 0–30)
ALDOST/RENIN PLAS-RTO: 2 {RATIO} (ref 0–30)
RENIN PLAS-CCNC: 1.62 NG/ML/HR (ref 0.17–5.38)

## 2017-05-03 ENCOUNTER — OFFICE VISIT (OUTPATIENT)
Dept: FAMILY MEDICINE CLINIC | Facility: CLINIC | Age: 77
End: 2017-05-03

## 2017-05-03 VITALS
OXYGEN SATURATION: 99 % | SYSTOLIC BLOOD PRESSURE: 138 MMHG | TEMPERATURE: 99.1 F | HEART RATE: 69 BPM | HEIGHT: 63 IN | BODY MASS INDEX: 32.6 KG/M2 | WEIGHT: 184 LBS | DIASTOLIC BLOOD PRESSURE: 82 MMHG

## 2017-05-03 DIAGNOSIS — Z79.899 ENCOUNTER FOR LONG-TERM (CURRENT) USE OF MEDICATIONS: ICD-10-CM

## 2017-05-03 DIAGNOSIS — R53.83 MALAISE AND FATIGUE: ICD-10-CM

## 2017-05-03 DIAGNOSIS — R53.81 MALAISE AND FATIGUE: ICD-10-CM

## 2017-05-03 DIAGNOSIS — E78.49 OTHER HYPERLIPIDEMIA: Primary | ICD-10-CM

## 2017-05-03 DIAGNOSIS — Z00.00 PREVENTATIVE HEALTH CARE: ICD-10-CM

## 2017-05-03 DIAGNOSIS — E55.9 VITAMIN D DEFICIENCY: ICD-10-CM

## 2017-05-03 DIAGNOSIS — N18.2 CHRONIC KIDNEY DISEASE, STAGE 2, MILDLY DECREASED GFR: ICD-10-CM

## 2017-05-03 DIAGNOSIS — Z79.899 MEDICATION MANAGEMENT: ICD-10-CM

## 2017-05-03 DIAGNOSIS — M25.50 ACUTE JOINT PAIN: ICD-10-CM

## 2017-05-03 DIAGNOSIS — I10 ACCELERATED HYPERTENSION: ICD-10-CM

## 2017-05-03 LAB
POC AMPHETAMINES: NEGATIVE
POC BARBITURATES: POSITIVE
POC BENZODIAZEPHINES: NEGATIVE
POC COCAINE: NEGATIVE
POC METHADONE: NEGATIVE
POC METHAMPHETAMINE SCREEN URINE: NEGATIVE
POC OPIATES: POSITIVE
POC OXYCODONE: NEGATIVE
POC PHENCYCLIDINE: NEGATIVE
POC PROPOXYPHENE: NEGATIVE
POC THC: NEGATIVE
POC TRICYCLIC ANTIDEPRESSANTS: NEGATIVE

## 2017-05-03 PROCEDURE — 99214 OFFICE O/P EST MOD 30 MIN: CPT | Performed by: INTERNAL MEDICINE

## 2017-05-03 PROCEDURE — 80307 DRUG TEST PRSMV CHEM ANLYZR: CPT | Performed by: INTERNAL MEDICINE

## 2017-05-03 RX ORDER — HYDROCODONE BITARTRATE AND ACETAMINOPHEN 5; 325 MG/1; MG/1
1 TABLET ORAL EVERY 6 HOURS PRN
Qty: 60 TABLET | Refills: 0 | Status: ON HOLD | OUTPATIENT
Start: 2017-05-03 | End: 2017-12-29

## 2017-05-04 LAB
ALBUMIN SERPL-MCNC: 4.4 G/DL (ref 3.5–5.2)
ALBUMIN/GLOB SERPL: 1.6 G/DL
ALP SERPL-CCNC: 103 U/L (ref 39–117)
ALT SERPL-CCNC: 8 U/L (ref 1–33)
AST SERPL-CCNC: 10 U/L (ref 1–32)
BASOPHILS # BLD AUTO: 0.06 10*3/MM3 (ref 0–0.2)
BASOPHILS NFR BLD AUTO: 1 % (ref 0–1.5)
BILIRUB SERPL-MCNC: 0.3 MG/DL (ref 0.1–1.2)
BUN SERPL-MCNC: 23 MG/DL (ref 8–23)
BUN/CREAT SERPL: 22.3 (ref 7–25)
CALCIUM SERPL-MCNC: 10.1 MG/DL (ref 8.6–10.5)
CHLORIDE SERPL-SCNC: 103 MMOL/L (ref 98–107)
CHOLEST SERPL-MCNC: 224 MG/DL (ref 0–200)
CO2 SERPL-SCNC: 27.6 MMOL/L (ref 22–29)
CREAT SERPL-MCNC: 1.03 MG/DL (ref 0.57–1)
EOSINOPHIL # BLD AUTO: 0.08 10*3/MM3 (ref 0–0.7)
EOSINOPHIL NFR BLD AUTO: 1.3 % (ref 0.3–6.2)
ERYTHROCYTE [DISTWIDTH] IN BLOOD BY AUTOMATED COUNT: 13.9 % (ref 11.7–13)
FT4I SERPL CALC-MCNC: 1.7 (ref 1.2–4.9)
GLOBULIN SER CALC-MCNC: 2.7 GM/DL
GLUCOSE SERPL-MCNC: 103 MG/DL (ref 65–99)
HCT VFR BLD AUTO: 44.2 % (ref 35.6–45.5)
HDLC SERPL-MCNC: 59 MG/DL (ref 40–60)
HGB BLD-MCNC: 14 G/DL (ref 11.9–15.5)
IMM GRANULOCYTES # BLD: 0 10*3/MM3 (ref 0–0.03)
IMM GRANULOCYTES NFR BLD: 0 % (ref 0–0.5)
LDLC SERPL CALC-MCNC: 125 MG/DL (ref 0–100)
LDLC/HDLC SERPL: 2.11 {RATIO}
LYMPHOCYTES # BLD AUTO: 1.84 10*3/MM3 (ref 0.9–4.8)
LYMPHOCYTES NFR BLD AUTO: 30.4 % (ref 19.6–45.3)
MCH RBC QN AUTO: 29.4 PG (ref 26.9–32)
MCHC RBC AUTO-ENTMCNC: 31.7 G/DL (ref 32.4–36.3)
MCV RBC AUTO: 92.9 FL (ref 80.5–98.2)
MONOCYTES # BLD AUTO: 0.57 10*3/MM3 (ref 0.2–1.2)
MONOCYTES NFR BLD AUTO: 9.4 % (ref 5–12)
NEUTROPHILS # BLD AUTO: 3.51 10*3/MM3 (ref 1.9–8.1)
NEUTROPHILS NFR BLD AUTO: 57.9 % (ref 42.7–76)
PLATELET # BLD AUTO: 199 10*3/MM3 (ref 140–500)
POTASSIUM SERPL-SCNC: 4.6 MMOL/L (ref 3.5–5.2)
PROT SERPL-MCNC: 7.1 G/DL (ref 6–8.5)
RBC # BLD AUTO: 4.76 10*6/MM3 (ref 3.9–5.2)
SODIUM SERPL-SCNC: 144 MMOL/L (ref 136–145)
T3RU NFR SERPL: 29 % (ref 24–39)
T4 SERPL-MCNC: 5.7 UG/DL (ref 4.5–12)
TRIGL SERPL-MCNC: 202 MG/DL (ref 0–150)
TSH SERPL DL<=0.005 MIU/L-ACNC: 1.91 UIU/ML (ref 0.45–4.5)
VLDLC SERPL CALC-MCNC: 40.4 MG/DL (ref 5–40)
WBC # BLD AUTO: 6.06 10*3/MM3 (ref 4.5–10.7)

## 2017-06-03 RX ORDER — NIFEDIPINE 60 MG/1
TABLET, EXTENDED RELEASE ORAL
Qty: 180 TABLET | Refills: 0 | Status: SHIPPED | OUTPATIENT
Start: 2017-06-03 | End: 2022-05-29 | Stop reason: HOSPADM

## 2017-06-03 RX ORDER — NIFEDIPINE 60 MG/1
TABLET, EXTENDED RELEASE ORAL
Qty: 60 TABLET | Refills: 0 | Status: SHIPPED | OUTPATIENT
Start: 2017-06-03 | End: 2017-06-03 | Stop reason: SDUPTHER

## 2017-06-22 ENCOUNTER — TELEPHONE (OUTPATIENT)
Dept: FAMILY MEDICINE CLINIC | Facility: CLINIC | Age: 77
End: 2017-06-22

## 2017-06-22 RX ORDER — AZITHROMYCIN 250 MG/1
TABLET, FILM COATED ORAL
Qty: 6 TABLET | Refills: 0 | Status: SHIPPED | OUTPATIENT
Start: 2017-06-22 | End: 2017-12-29 | Stop reason: HOSPADM

## 2017-06-22 NOTE — TELEPHONE ENCOUNTER
----- Message from Janay Saul sent at 6/22/2017  1:01 PM EDT -----  Ph [phone]    [facility]   Lsd: 5/3/17  Allergic to ambien, Penicillin, hydralazine, anastrozole, amlodipine, NSAIDS    Pt is wanting an antibiotic for coughing up stuff, head stuffy

## 2017-07-28 RX ORDER — LEVOFLOXACIN 500 MG/1
500 TABLET, FILM COATED ORAL DAILY
Qty: 10 TABLET | Refills: 0 | Status: ON HOLD | OUTPATIENT
Start: 2017-07-28 | End: 2017-12-29

## 2017-10-25 ENCOUNTER — TRANSCRIBE ORDERS (OUTPATIENT)
Dept: FAMILY MEDICINE CLINIC | Facility: CLINIC | Age: 77
End: 2017-10-25

## 2017-10-25 DIAGNOSIS — R10.9 STOMACH ACHE: ICD-10-CM

## 2017-10-25 DIAGNOSIS — J32.9 CHRONIC SINUSITIS, UNSPECIFIED LOCATION: ICD-10-CM

## 2017-10-25 DIAGNOSIS — M25.552 LEFT HIP PAIN: ICD-10-CM

## 2017-10-25 DIAGNOSIS — M19.90 OSTEOARTHRITIS, UNSPECIFIED OSTEOARTHRITIS TYPE, UNSPECIFIED SITE: Primary | ICD-10-CM

## 2017-10-25 DIAGNOSIS — R10.30 LOWER ABDOMINAL PAIN: ICD-10-CM

## 2017-10-25 DIAGNOSIS — C50.919 MALIGNANT NEOPLASM OF FEMALE BREAST, UNSPECIFIED ESTROGEN RECEPTOR STATUS, UNSPECIFIED LATERALITY, UNSPECIFIED SITE OF BREAST (HCC): ICD-10-CM

## 2017-10-25 DIAGNOSIS — J44.9 OBSTRUCTIVE CHRONIC BRONCHITIS WITHOUT EXACERBATION (HCC): ICD-10-CM

## 2017-10-25 DIAGNOSIS — R05.9 COUGH: ICD-10-CM

## 2017-10-26 ENCOUNTER — HOSPITAL ENCOUNTER (OUTPATIENT)
Dept: CT IMAGING | Facility: HOSPITAL | Age: 77
Discharge: HOME | End: 2017-10-26
Attending: INTERNAL MEDICINE | Admitting: INTERNAL MEDICINE

## 2017-10-26 DIAGNOSIS — J44.9 OBSTRUCTIVE CHRONIC BRONCHITIS WITHOUT EXACERBATION (HCC): ICD-10-CM

## 2017-10-26 DIAGNOSIS — C50.919 MALIGNANT NEOPLASM OF FEMALE BREAST, UNSPECIFIED ESTROGEN RECEPTOR STATUS, UNSPECIFIED LATERALITY, UNSPECIFIED SITE OF BREAST (HCC): ICD-10-CM

## 2017-10-26 DIAGNOSIS — M25.552 LEFT HIP PAIN: ICD-10-CM

## 2017-10-26 DIAGNOSIS — R05.9 COUGH: ICD-10-CM

## 2017-10-26 DIAGNOSIS — R10.9 STOMACH ACHE: ICD-10-CM

## 2017-10-26 DIAGNOSIS — J32.9 CHRONIC SINUSITIS, UNSPECIFIED LOCATION: ICD-10-CM

## 2017-10-26 DIAGNOSIS — R10.30 LOWER ABDOMINAL PAIN: ICD-10-CM

## 2017-10-26 PROCEDURE — 74176 CT ABD & PELVIS W/O CONTRAST: CPT

## 2017-10-26 PROCEDURE — 73700 CT LOWER EXTREMITY W/O DYE: CPT

## 2017-10-26 PROCEDURE — 0 DIATRIZOATE MEGLUMINE & SODIUM PER 1 ML: Performed by: INTERNAL MEDICINE

## 2017-10-26 PROCEDURE — 71250 CT THORAX DX C-: CPT

## 2017-10-26 PROCEDURE — 70486 CT MAXILLOFACIAL W/O DYE: CPT

## 2017-10-26 RX ADMIN — DIATRIZOATE MEGLUMINE AND DIATRIZOATE SODIUM 30 ML: 660; 100 LIQUID ORAL; RECTAL at 17:45

## 2017-11-17 ENCOUNTER — APPOINTMENT (OUTPATIENT)
Dept: CT IMAGING | Facility: HOSPITAL | Age: 77
End: 2017-11-17
Attending: INTERNAL MEDICINE

## 2017-12-26 ENCOUNTER — HOSPITAL ENCOUNTER (EMERGENCY)
Facility: HOSPITAL | Age: 77
End: 2017-12-26

## 2017-12-26 ENCOUNTER — APPOINTMENT (OUTPATIENT)
Dept: GENERAL RADIOLOGY | Facility: HOSPITAL | Age: 77
End: 2017-12-26

## 2017-12-26 ENCOUNTER — APPOINTMENT (OUTPATIENT)
Dept: CT IMAGING | Facility: HOSPITAL | Age: 77
End: 2017-12-26

## 2017-12-26 ENCOUNTER — HOSPITAL ENCOUNTER (OUTPATIENT)
Facility: HOSPITAL | Age: 77
Setting detail: OBSERVATION
Discharge: HOME | End: 2017-12-29
Attending: INTERNAL MEDICINE | Admitting: INTERNAL MEDICINE

## 2017-12-26 ENCOUNTER — APPOINTMENT (OUTPATIENT)
Dept: GENERAL RADIOLOGY | Facility: HOSPITAL | Age: 77
End: 2017-12-26
Attending: INTERNAL MEDICINE

## 2017-12-26 VITALS
TEMPERATURE: 100.4 F | HEIGHT: 63 IN | WEIGHT: 180 LBS | OXYGEN SATURATION: 96 % | RESPIRATION RATE: 20 BRPM | BODY MASS INDEX: 31.89 KG/M2 | SYSTOLIC BLOOD PRESSURE: 157 MMHG | DIASTOLIC BLOOD PRESSURE: 105 MMHG | HEART RATE: 92 BPM

## 2017-12-26 DIAGNOSIS — N18.30 STAGE 3 CHRONIC KIDNEY DISEASE (HCC): ICD-10-CM

## 2017-12-26 DIAGNOSIS — R26.2 DIFFICULTY WALKING: Primary | ICD-10-CM

## 2017-12-26 DIAGNOSIS — J44.1 ACUTE EXACERBATION OF COPD WITH ASTHMA (HCC): ICD-10-CM

## 2017-12-26 DIAGNOSIS — R05.8 COUGH PRODUCTIVE OF PURULENT SPUTUM: ICD-10-CM

## 2017-12-26 DIAGNOSIS — J45.901 ACUTE EXACERBATION OF COPD WITH ASTHMA (HCC): ICD-10-CM

## 2017-12-26 DIAGNOSIS — I10 ACCELERATED HYPERTENSION: ICD-10-CM

## 2017-12-26 DIAGNOSIS — J10.1 INFLUENZA A: ICD-10-CM

## 2017-12-26 PROBLEM — M25.512 ACUTE PAIN OF LEFT SHOULDER DUE TO TRAUMA: Status: ACTIVE | Noted: 2017-12-26

## 2017-12-26 PROBLEM — G89.11 ACUTE PAIN OF LEFT SHOULDER DUE TO TRAUMA: Status: ACTIVE | Noted: 2017-12-26

## 2017-12-26 PROBLEM — R07.89 ACUTE CHEST WALL PAIN: Status: ACTIVE | Noted: 2017-12-26

## 2017-12-26 PROBLEM — Z74.09 DECREASED FUNCTIONAL MOBILITY AND ENDURANCE: Status: ACTIVE | Noted: 2017-12-26

## 2017-12-26 PROBLEM — J18.9 COMMUNITY ACQUIRED PNEUMONIA: Status: ACTIVE | Noted: 2017-12-26

## 2017-12-26 LAB
ALBUMIN SERPL-MCNC: 3.5 G/DL (ref 3.5–5.2)
ALBUMIN/GLOB SERPL: 1 G/DL
ALP SERPL-CCNC: 70 U/L (ref 39–117)
ALT SERPL W P-5'-P-CCNC: 17 U/L (ref 1–33)
AMYLASE SERPL-CCNC: 64 U/L (ref 28–100)
ANION GAP SERPL CALCULATED.3IONS-SCNC: 14.3 MMOL/L
ARTERIAL PATENCY WRIST A: POSITIVE
AST SERPL-CCNC: 19 U/L (ref 1–32)
ATMOSPHERIC PRESS: 758.9 MMHG
B PERT DNA SPEC QL NAA+PROBE: NOT DETECTED
BACTERIA UR QL AUTO: ABNORMAL /HPF
BASE EXCESS BLDA CALC-SCNC: 0 MMOL/L (ref 0–2)
BDY SITE: ABNORMAL
BILIRUB SERPL-MCNC: 0.2 MG/DL (ref 0.1–1.2)
BILIRUB UR QL STRIP: NEGATIVE
BUN BLD-MCNC: 23 MG/DL (ref 8–23)
BUN/CREAT SERPL: 19.7 (ref 7–25)
C PNEUM DNA NPH QL NAA+NON-PROBE: NOT DETECTED
CALCIUM SPEC-SCNC: 9.1 MG/DL (ref 8.6–10.5)
CHLORIDE SERPL-SCNC: 102 MMOL/L (ref 98–107)
CHOLEST SERPL-MCNC: 182 MG/DL (ref 0–200)
CLARITY UR: ABNORMAL
CO2 SERPL-SCNC: 25.7 MMOL/L (ref 22–29)
COLOR UR: YELLOW
CREAT BLD-MCNC: 1.17 MG/DL (ref 0.57–1)
DEPRECATED RDW RBC AUTO: 52.5 FL (ref 37–54)
ERYTHROCYTE [DISTWIDTH] IN BLOOD BY AUTOMATED COUNT: 15.8 % (ref 11.7–13)
FLUAV H1 2009 PAND RNA NPH QL NAA+PROBE: NOT DETECTED
FLUAV H1 HA GENE NPH QL NAA+PROBE: NOT DETECTED
FLUAV H3 RNA NPH QL NAA+PROBE: DETECTED
FLUAV SUBTYP SPEC NAA+PROBE: NOT DETECTED
FLUBV RNA ISLT QL NAA+PROBE: NOT DETECTED
GFR SERPL CREATININE-BSD FRML MDRD: 45 ML/MIN/1.73
GLOBULIN UR ELPH-MCNC: 3.5 GM/DL
GLUCOSE BLD-MCNC: 97 MG/DL (ref 65–99)
GLUCOSE UR STRIP-MCNC: NEGATIVE MG/DL
HADV DNA SPEC NAA+PROBE: NOT DETECTED
HCO3 BLDA-SCNC: 22.9 MMOL/L (ref 22–28)
HCOV 229E RNA SPEC QL NAA+PROBE: NOT DETECTED
HCOV HKU1 RNA SPEC QL NAA+PROBE: NOT DETECTED
HCOV NL63 RNA SPEC QL NAA+PROBE: NOT DETECTED
HCOV OC43 RNA SPEC QL NAA+PROBE: NOT DETECTED
HCT VFR BLD AUTO: 45.3 % (ref 35.6–45.5)
HDLC SERPL-MCNC: 46 MG/DL (ref 40–60)
HGB BLD-MCNC: 14.2 G/DL (ref 11.9–15.5)
HGB UR QL STRIP.AUTO: NEGATIVE
HMPV RNA NPH QL NAA+NON-PROBE: NOT DETECTED
HOROWITZ INDEX BLD+IHG-RTO: 21 %
HPIV1 RNA SPEC QL NAA+PROBE: NOT DETECTED
HPIV2 RNA SPEC QL NAA+PROBE: NOT DETECTED
HPIV3 RNA NPH QL NAA+PROBE: NOT DETECTED
HPIV4 P GENE NPH QL NAA+PROBE: NOT DETECTED
HYALINE CASTS UR QL AUTO: ABNORMAL /LPF
KETONES UR QL STRIP: NEGATIVE
LDLC SERPL CALC-MCNC: 99 MG/DL (ref 0–100)
LDLC/HDLC SERPL: 2.16 {RATIO}
LEUKOCYTE ESTERASE UR QL STRIP.AUTO: NEGATIVE
M PNEUMO IGG SER IA-ACNC: NOT DETECTED
MAGNESIUM SERPL-MCNC: 2.2 MG/DL (ref 1.6–2.4)
MCH RBC QN AUTO: 28.7 PG (ref 26.9–32)
MCHC RBC AUTO-ENTMCNC: 31.3 G/DL (ref 32.4–36.3)
MCV RBC AUTO: 91.5 FL (ref 80.5–98.2)
MODALITY: ABNORMAL
NITRITE UR QL STRIP: POSITIVE
NT-PROBNP SERPL-MCNC: 137.3 PG/ML (ref 0–1800)
O2 A-A PPRESDIFF RESPIRATORY: 0.6 MMHG
PCO2 BLDA: 32.3 MM HG (ref 35–45)
PH BLDA: 7.46 PH UNITS (ref 7.35–7.45)
PH UR STRIP.AUTO: 6.5 [PH] (ref 5–8)
PHOSPHATE SERPL-MCNC: 3.2 MG/DL (ref 2.5–4.5)
PLATELET # BLD AUTO: 161 10*3/MM3 (ref 140–500)
PMV BLD AUTO: 10.8 FL (ref 6–12)
PO2 BLDA: 70.5 MM HG (ref 80–100)
POTASSIUM BLD-SCNC: 3.7 MMOL/L (ref 3.5–5.2)
PROCALCITONIN SERPL-MCNC: 0.11 NG/ML (ref 0.1–0.25)
PROT SERPL-MCNC: 7 G/DL (ref 6–8.5)
PROT UR QL STRIP: ABNORMAL
RBC # BLD AUTO: 4.95 10*6/MM3 (ref 3.9–5.2)
RBC # UR: ABNORMAL /HPF
REF LAB TEST METHOD: ABNORMAL
RHINOVIRUS RNA SPEC NAA+PROBE: NOT DETECTED
RSV RNA NPH QL NAA+NON-PROBE: NOT DETECTED
SAO2 % BLDCOA: 95 % (ref 92–99)
SET MECH RESP RATE: 20
SODIUM BLD-SCNC: 142 MMOL/L (ref 136–145)
SP GR UR STRIP: 1.01 (ref 1–1.03)
SQUAMOUS #/AREA URNS HPF: ABNORMAL /HPF
TRIGL SERPL-MCNC: 184 MG/DL (ref 0–150)
TROPONIN T SERPL-MCNC: <0.01 NG/ML (ref 0–0.03)
TSH SERPL DL<=0.05 MIU/L-ACNC: 0.64 MIU/ML (ref 0.27–4.2)
UROBILINOGEN UR QL STRIP: ABNORMAL
VLDLC SERPL-MCNC: 36.8 MG/DL (ref 5–40)
WBC NRBC COR # BLD: 5.08 10*3/MM3 (ref 4.5–10.7)
WBC UR QL AUTO: ABNORMAL /HPF

## 2017-12-26 PROCEDURE — 87486 CHLMYD PNEUM DNA AMP PROBE: CPT | Performed by: INTERNAL MEDICINE

## 2017-12-26 PROCEDURE — G0378 HOSPITAL OBSERVATION PER HR: HCPCS

## 2017-12-26 PROCEDURE — 25010000002 LEVOFLOXACIN PER 250 MG: Performed by: INTERNAL MEDICINE

## 2017-12-26 PROCEDURE — 80053 COMPREHEN METABOLIC PANEL: CPT | Performed by: INTERNAL MEDICINE

## 2017-12-26 PROCEDURE — 36600 WITHDRAWAL OF ARTERIAL BLOOD: CPT

## 2017-12-26 PROCEDURE — 82150 ASSAY OF AMYLASE: CPT | Performed by: INTERNAL MEDICINE

## 2017-12-26 PROCEDURE — 87633 RESP VIRUS 12-25 TARGETS: CPT | Performed by: INTERNAL MEDICINE

## 2017-12-26 PROCEDURE — 80061 LIPID PANEL: CPT | Performed by: INTERNAL MEDICINE

## 2017-12-26 PROCEDURE — 96372 THER/PROPH/DIAG INJ SC/IM: CPT

## 2017-12-26 PROCEDURE — 93005 ELECTROCARDIOGRAM TRACING: CPT | Performed by: INTERNAL MEDICINE

## 2017-12-26 PROCEDURE — 85027 COMPLETE CBC AUTOMATED: CPT | Performed by: INTERNAL MEDICINE

## 2017-12-26 PROCEDURE — 25010000002 ENOXAPARIN PER 10 MG: Performed by: INTERNAL MEDICINE

## 2017-12-26 PROCEDURE — 87798 DETECT AGENT NOS DNA AMP: CPT | Performed by: INTERNAL MEDICINE

## 2017-12-26 PROCEDURE — 71250 CT THORAX DX C-: CPT

## 2017-12-26 PROCEDURE — 87581 M.PNEUMON DNA AMP PROBE: CPT | Performed by: INTERNAL MEDICINE

## 2017-12-26 PROCEDURE — 84484 ASSAY OF TROPONIN QUANT: CPT | Performed by: INTERNAL MEDICINE

## 2017-12-26 PROCEDURE — 71020 HC CHEST PA AND LATERAL: CPT

## 2017-12-26 PROCEDURE — 93010 ELECTROCARDIOGRAM REPORT: CPT | Performed by: INTERNAL MEDICINE

## 2017-12-26 PROCEDURE — 94640 AIRWAY INHALATION TREATMENT: CPT

## 2017-12-26 PROCEDURE — 84145 PROCALCITONIN (PCT): CPT | Performed by: INTERNAL MEDICINE

## 2017-12-26 PROCEDURE — 84100 ASSAY OF PHOSPHORUS: CPT | Performed by: INTERNAL MEDICINE

## 2017-12-26 PROCEDURE — 83735 ASSAY OF MAGNESIUM: CPT | Performed by: INTERNAL MEDICINE

## 2017-12-26 PROCEDURE — 83880 ASSAY OF NATRIURETIC PEPTIDE: CPT | Performed by: INTERNAL MEDICINE

## 2017-12-26 PROCEDURE — 87040 BLOOD CULTURE FOR BACTERIA: CPT | Performed by: INTERNAL MEDICINE

## 2017-12-26 PROCEDURE — 82803 BLOOD GASES ANY COMBINATION: CPT

## 2017-12-26 PROCEDURE — 81001 URINALYSIS AUTO W/SCOPE: CPT | Performed by: INTERNAL MEDICINE

## 2017-12-26 PROCEDURE — 84443 ASSAY THYROID STIM HORMONE: CPT | Performed by: INTERNAL MEDICINE

## 2017-12-26 RX ORDER — SODIUM CHLORIDE 0.9 % (FLUSH) 0.9 %
1-10 SYRINGE (ML) INJECTION AS NEEDED
Status: DISCONTINUED | OUTPATIENT
Start: 2017-12-26 | End: 2017-12-29 | Stop reason: HOSPADM

## 2017-12-26 RX ORDER — MONTELUKAST SODIUM 10 MG/1
10 TABLET ORAL NIGHTLY
Status: DISCONTINUED | OUTPATIENT
Start: 2017-12-26 | End: 2017-12-28

## 2017-12-26 RX ORDER — EPLERENONE 50 MG/1
50 TABLET, FILM COATED ORAL EVERY 12 HOURS SCHEDULED
Status: DISCONTINUED | OUTPATIENT
Start: 2017-12-26 | End: 2017-12-26

## 2017-12-26 RX ORDER — CLONIDINE HYDROCHLORIDE 0.1 MG/1
0.2 TABLET ORAL EVERY 4 HOURS PRN
Status: DISCONTINUED | OUTPATIENT
Start: 2017-12-26 | End: 2017-12-29 | Stop reason: HOSPADM

## 2017-12-26 RX ORDER — MELATONIN
1000 DAILY
Status: DISCONTINUED | OUTPATIENT
Start: 2017-12-26 | End: 2017-12-29 | Stop reason: HOSPADM

## 2017-12-26 RX ORDER — PROMETHAZINE HYDROCHLORIDE 12.5 MG/1
12.5 TABLET ORAL EVERY 6 HOURS PRN
Status: DISCONTINUED | OUTPATIENT
Start: 2017-12-26 | End: 2017-12-29 | Stop reason: HOSPADM

## 2017-12-26 RX ORDER — NIFEDIPINE 60 MG/1
60 TABLET, EXTENDED RELEASE ORAL
Status: DISCONTINUED | OUTPATIENT
Start: 2017-12-27 | End: 2017-12-28

## 2017-12-26 RX ORDER — ONDANSETRON 4 MG/1
8 TABLET, ORALLY DISINTEGRATING ORAL EVERY 6 HOURS PRN
Status: DISCONTINUED | OUTPATIENT
Start: 2017-12-26 | End: 2017-12-29 | Stop reason: HOSPADM

## 2017-12-26 RX ORDER — POLYETHYLENE GLYCOL 3350 17 G/17G
17 POWDER, FOR SOLUTION ORAL DAILY
Status: DISCONTINUED | OUTPATIENT
Start: 2017-12-26 | End: 2017-12-29 | Stop reason: HOSPADM

## 2017-12-26 RX ORDER — TIZANIDINE 4 MG/1
2 TABLET ORAL 2 TIMES DAILY
Status: DISCONTINUED | OUTPATIENT
Start: 2017-12-26 | End: 2017-12-29 | Stop reason: HOSPADM

## 2017-12-26 RX ORDER — ETHACRYNIC ACID 25 MG/1
25 TABLET ORAL 3 TIMES DAILY
Status: DISCONTINUED | OUTPATIENT
Start: 2017-12-26 | End: 2017-12-26

## 2017-12-26 RX ORDER — LEVOFLOXACIN 5 MG/ML
500 INJECTION, SOLUTION INTRAVENOUS EVERY 24 HOURS
Status: DISCONTINUED | OUTPATIENT
Start: 2017-12-26 | End: 2017-12-28 | Stop reason: CLARIF

## 2017-12-26 RX ORDER — ASPIRIN 81 MG/1
81 TABLET, CHEWABLE ORAL DAILY
Status: DISCONTINUED | OUTPATIENT
Start: 2017-12-26 | End: 2017-12-29 | Stop reason: HOSPADM

## 2017-12-26 RX ORDER — BUTALBITAL, ACETAMINOPHEN AND CAFFEINE 50; 325; 40 MG/1; MG/1; MG/1
1 TABLET ORAL EVERY 4 HOURS PRN
Status: DISCONTINUED | OUTPATIENT
Start: 2017-12-26 | End: 2017-12-26

## 2017-12-26 RX ORDER — ALBUTEROL SULFATE 90 UG/1
1 AEROSOL, METERED RESPIRATORY (INHALATION) EVERY 4 HOURS PRN
Status: DISCONTINUED | OUTPATIENT
Start: 2017-12-26 | End: 2017-12-26

## 2017-12-26 RX ORDER — HYDROCODONE BITARTRATE AND ACETAMINOPHEN 5; 325 MG/1; MG/1
1 TABLET ORAL EVERY 6 HOURS PRN
Status: DISCONTINUED | OUTPATIENT
Start: 2017-12-26 | End: 2017-12-29 | Stop reason: HOSPADM

## 2017-12-26 RX ORDER — EPLERENONE 25 MG/1
50 TABLET, FILM COATED ORAL EVERY 12 HOURS SCHEDULED
Status: DISCONTINUED | OUTPATIENT
Start: 2017-12-26 | End: 2017-12-26

## 2017-12-26 RX ORDER — MECLIZINE HYDROCHLORIDE 25 MG/1
25 TABLET ORAL 3 TIMES DAILY PRN
Status: DISCONTINUED | OUTPATIENT
Start: 2017-12-26 | End: 2017-12-29 | Stop reason: HOSPADM

## 2017-12-26 RX ORDER — ETHACRYNIC ACID 25 MG/1
25 TABLET ORAL 3 TIMES DAILY
Status: DISCONTINUED | OUTPATIENT
Start: 2017-12-26 | End: 2017-12-29 | Stop reason: HOSPADM

## 2017-12-26 RX ORDER — IPRATROPIUM BROMIDE AND ALBUTEROL SULFATE 2.5; .5 MG/3ML; MG/3ML
3 SOLUTION RESPIRATORY (INHALATION)
Status: DISCONTINUED | OUTPATIENT
Start: 2017-12-26 | End: 2017-12-29 | Stop reason: HOSPADM

## 2017-12-26 RX ORDER — OSELTAMIVIR PHOSPHATE 75 MG/1
75 CAPSULE ORAL EVERY 12 HOURS SCHEDULED
Status: DISCONTINUED | OUTPATIENT
Start: 2017-12-26 | End: 2017-12-27

## 2017-12-26 RX ORDER — CLONIDINE HYDROCHLORIDE 0.2 MG/1
0.2 TABLET ORAL EVERY 8 HOURS SCHEDULED
Status: DISCONTINUED | OUTPATIENT
Start: 2017-12-26 | End: 2017-12-27

## 2017-12-26 RX ORDER — FAMOTIDINE 40 MG/1
40 TABLET, FILM COATED ORAL DAILY
Status: DISCONTINUED | OUTPATIENT
Start: 2017-12-26 | End: 2017-12-27

## 2017-12-26 RX ORDER — TIZANIDINE 4 MG/1
4 TABLET ORAL NIGHTLY
Status: DISCONTINUED | OUTPATIENT
Start: 2017-12-26 | End: 2017-12-29 | Stop reason: HOSPADM

## 2017-12-26 RX ORDER — BUTALBITAL, ACETAMINOPHEN AND CAFFEINE 50; 325; 40 MG/1; MG/1; MG/1
1 TABLET ORAL EVERY 4 HOURS PRN
Status: DISCONTINUED | OUTPATIENT
Start: 2017-12-26 | End: 2017-12-29 | Stop reason: HOSPADM

## 2017-12-26 RX ORDER — HYDROMORPHONE HYDROCHLORIDE 1 MG/ML
0.5 INJECTION, SOLUTION INTRAMUSCULAR; INTRAVENOUS; SUBCUTANEOUS EVERY 4 HOURS PRN
Status: DISCONTINUED | OUTPATIENT
Start: 2017-12-26 | End: 2017-12-29 | Stop reason: HOSPADM

## 2017-12-26 RX ORDER — IPRATROPIUM BROMIDE AND ALBUTEROL SULFATE 2.5; .5 MG/3ML; MG/3ML
3 SOLUTION RESPIRATORY (INHALATION) EVERY 4 HOURS PRN
Status: DISCONTINUED | OUTPATIENT
Start: 2017-12-26 | End: 2017-12-29 | Stop reason: HOSPADM

## 2017-12-26 RX ORDER — DIPHENHYDRAMINE HCL 25 MG
50 CAPSULE ORAL EVERY 4 HOURS PRN
Status: DISCONTINUED | OUTPATIENT
Start: 2017-12-26 | End: 2017-12-29 | Stop reason: HOSPADM

## 2017-12-26 RX ORDER — HYDROMORPHONE HYDROCHLORIDE 1 MG/ML
0.5 INJECTION, SOLUTION INTRAMUSCULAR; INTRAVENOUS; SUBCUTANEOUS
Status: DISCONTINUED | OUTPATIENT
Start: 2017-12-26 | End: 2017-12-26

## 2017-12-26 RX ORDER — DEXTROSE, SODIUM CHLORIDE, AND POTASSIUM CHLORIDE 5; .45; .15 G/100ML; G/100ML; G/100ML
75 INJECTION INTRAVENOUS CONTINUOUS
Status: DISCONTINUED | OUTPATIENT
Start: 2017-12-26 | End: 2017-12-28

## 2017-12-26 RX ORDER — GUAIFENESIN/DEXTROMETHORPHAN 100-10MG/5
5 SYRUP ORAL EVERY 4 HOURS PRN
Status: DISCONTINUED | OUTPATIENT
Start: 2017-12-26 | End: 2017-12-29 | Stop reason: HOSPADM

## 2017-12-26 RX ORDER — NIFEDIPINE 60 MG/1
60 TABLET, EXTENDED RELEASE ORAL
Status: DISCONTINUED | OUTPATIENT
Start: 2017-12-26 | End: 2017-12-26

## 2017-12-26 RX ORDER — SODIUM CHLORIDE 0.9 % (FLUSH) 0.9 %
10 SYRINGE (ML) INJECTION AS NEEDED
Status: DISCONTINUED | OUTPATIENT
Start: 2017-12-26 | End: 2017-12-26

## 2017-12-26 RX ORDER — BUDESONIDE 0.5 MG/2ML
0.5 INHALANT ORAL
Status: DISCONTINUED | OUTPATIENT
Start: 2017-12-26 | End: 2017-12-29 | Stop reason: HOSPADM

## 2017-12-26 RX ORDER — PANTOPRAZOLE SODIUM 40 MG/1
40 TABLET, DELAYED RELEASE ORAL EVERY MORNING
Status: DISCONTINUED | OUTPATIENT
Start: 2017-12-27 | End: 2017-12-29 | Stop reason: HOSPADM

## 2017-12-26 RX ORDER — CLONIDINE HYDROCHLORIDE 0.1 MG/1
0.2 TABLET ORAL EVERY 8 HOURS SCHEDULED
Status: DISCONTINUED | OUTPATIENT
Start: 2017-12-26 | End: 2017-12-26

## 2017-12-26 RX ORDER — EPLERENONE 25 MG/1
50 TABLET, FILM COATED ORAL EVERY 12 HOURS SCHEDULED
Status: DISCONTINUED | OUTPATIENT
Start: 2017-12-26 | End: 2017-12-29 | Stop reason: HOSPADM

## 2017-12-26 RX ORDER — CHOLECALCIFEROL (VITAMIN D3) 125 MCG
1000 CAPSULE ORAL DAILY
Status: DISCONTINUED | OUTPATIENT
Start: 2017-12-26 | End: 2017-12-29 | Stop reason: HOSPADM

## 2017-12-26 RX ORDER — LORAZEPAM 0.5 MG/1
0.5 TABLET ORAL EVERY 8 HOURS PRN
Status: DISCONTINUED | OUTPATIENT
Start: 2017-12-26 | End: 2017-12-27

## 2017-12-26 RX ORDER — PROMETHAZINE HYDROCHLORIDE AND CODEINE PHOSPHATE 6.25; 1 MG/5ML; MG/5ML
5 SYRUP ORAL 4 TIMES DAILY PRN
Status: DISCONTINUED | OUTPATIENT
Start: 2017-12-26 | End: 2017-12-27

## 2017-12-26 RX ORDER — ACETAMINOPHEN 325 MG/1
650 TABLET ORAL EVERY 4 HOURS PRN
Status: DISCONTINUED | OUTPATIENT
Start: 2017-12-26 | End: 2017-12-29 | Stop reason: HOSPADM

## 2017-12-26 RX ORDER — DOCUSATE SODIUM 100 MG/1
100 CAPSULE, LIQUID FILLED ORAL 2 TIMES DAILY
Status: DISCONTINUED | OUTPATIENT
Start: 2017-12-26 | End: 2017-12-29 | Stop reason: HOSPADM

## 2017-12-26 RX ORDER — BENZONATATE 100 MG/1
200 CAPSULE ORAL 3 TIMES DAILY PRN
Status: DISCONTINUED | OUTPATIENT
Start: 2017-12-26 | End: 2017-12-29 | Stop reason: HOSPADM

## 2017-12-26 RX ADMIN — NYSTATIN 500000 UNITS: 100000 SUSPENSION ORAL at 23:12

## 2017-12-26 RX ADMIN — BUDESONIDE 0.5 MG: 0.5 INHALANT RESPIRATORY (INHALATION) at 20:57

## 2017-12-26 RX ADMIN — LEVOFLOXACIN 500 MG: 5 INJECTION, SOLUTION INTRAVENOUS at 18:23

## 2017-12-26 RX ADMIN — ENOXAPARIN SODIUM 40 MG: 40 INJECTION SUBCUTANEOUS at 18:23

## 2017-12-26 RX ADMIN — IPRATROPIUM BROMIDE AND ALBUTEROL SULFATE 3 ML: .5; 3 SOLUTION RESPIRATORY (INHALATION) at 20:58

## 2017-12-26 RX ADMIN — TIZANIDINE 4 MG: 4 TABLET ORAL at 23:10

## 2017-12-26 RX ADMIN — MONTELUKAST 10 MG: 10 TABLET, FILM COATED ORAL at 23:10

## 2017-12-26 RX ADMIN — BENZONATATE 200 MG: 100 CAPSULE ORAL at 23:07

## 2017-12-26 RX ADMIN — POTASSIUM CHLORIDE, DEXTROSE MONOHYDRATE AND SODIUM CHLORIDE 75 ML/HR: 150; 5; 450 INJECTION, SOLUTION INTRAVENOUS at 17:42

## 2017-12-26 RX ADMIN — HYDROCODONE BITARTRATE AND ACETAMINOPHEN 1 TABLET: 5; 325 TABLET ORAL at 19:15

## 2017-12-27 ENCOUNTER — APPOINTMENT (OUTPATIENT)
Dept: GENERAL RADIOLOGY | Facility: HOSPITAL | Age: 77
End: 2017-12-27
Attending: INTERNAL MEDICINE

## 2017-12-27 ENCOUNTER — APPOINTMENT (OUTPATIENT)
Dept: CT IMAGING | Facility: HOSPITAL | Age: 77
End: 2017-12-27

## 2017-12-27 LAB
25(OH)D3 SERPL-MCNC: 23.9 NG/ML (ref 30–100)
ANION GAP SERPL CALCULATED.3IONS-SCNC: 11.3 MMOL/L
BASOPHILS # BLD AUTO: 0.02 10*3/MM3 (ref 0–0.2)
BASOPHILS NFR BLD AUTO: 0.6 % (ref 0–1.5)
BUN BLD-MCNC: 22 MG/DL (ref 8–23)
BUN/CREAT SERPL: 20.2 (ref 7–25)
CALCIUM SPEC-SCNC: 8.3 MG/DL (ref 8.6–10.5)
CHLORIDE SERPL-SCNC: 102 MMOL/L (ref 98–107)
CO2 SERPL-SCNC: 25.7 MMOL/L (ref 22–29)
CREAT BLD-MCNC: 1.09 MG/DL (ref 0.57–1)
DEPRECATED RDW RBC AUTO: 52.1 FL (ref 37–54)
EOSINOPHIL # BLD AUTO: 0.03 10*3/MM3 (ref 0–0.7)
EOSINOPHIL NFR BLD AUTO: 0.8 % (ref 0.3–6.2)
ERYTHROCYTE [DISTWIDTH] IN BLOOD BY AUTOMATED COUNT: 15.7 % (ref 11.7–13)
GFR SERPL CREATININE-BSD FRML MDRD: 48 ML/MIN/1.73
GLUCOSE BLD-MCNC: 105 MG/DL (ref 65–99)
HCT VFR BLD AUTO: 42.7 % (ref 35.6–45.5)
HGB BLD-MCNC: 13.1 G/DL (ref 11.9–15.5)
IMM GRANULOCYTES # BLD: 0 10*3/MM3 (ref 0–0.03)
IMM GRANULOCYTES NFR BLD: 0 % (ref 0–0.5)
LYMPHOCYTES # BLD AUTO: 1.54 10*3/MM3 (ref 0.9–4.8)
LYMPHOCYTES NFR BLD AUTO: 43.3 % (ref 19.6–45.3)
MCH RBC QN AUTO: 28.1 PG (ref 26.9–32)
MCHC RBC AUTO-ENTMCNC: 30.7 G/DL (ref 32.4–36.3)
MCV RBC AUTO: 91.4 FL (ref 80.5–98.2)
MONOCYTES # BLD AUTO: 0.43 10*3/MM3 (ref 0.2–1.2)
MONOCYTES NFR BLD AUTO: 12.1 % (ref 5–12)
NEUTROPHILS # BLD AUTO: 1.54 10*3/MM3 (ref 1.9–8.1)
NEUTROPHILS NFR BLD AUTO: 43.2 % (ref 42.7–76)
PLATELET # BLD AUTO: 138 10*3/MM3 (ref 140–500)
PMV BLD AUTO: 10.3 FL (ref 6–12)
POTASSIUM BLD-SCNC: 3.7 MMOL/L (ref 3.5–5.2)
RBC # BLD AUTO: 4.67 10*6/MM3 (ref 3.9–5.2)
SODIUM BLD-SCNC: 139 MMOL/L (ref 136–145)
WBC NRBC COR # BLD: 3.56 10*3/MM3 (ref 4.5–10.7)

## 2017-12-27 PROCEDURE — G8988 SELF CARE GOAL STATUS: HCPCS | Performed by: OCCUPATIONAL THERAPIST

## 2017-12-27 PROCEDURE — 94799 UNLISTED PULMONARY SVC/PX: CPT

## 2017-12-27 PROCEDURE — 72170 X-RAY EXAM OF PELVIS: CPT

## 2017-12-27 PROCEDURE — G8997 SWALLOW GOAL STATUS: HCPCS

## 2017-12-27 PROCEDURE — G8996 SWALLOW CURRENT STATUS: HCPCS

## 2017-12-27 PROCEDURE — 63710000001 PROMETHAZINE PER 12.5 MG: Performed by: INTERNAL MEDICINE

## 2017-12-27 PROCEDURE — 73030 X-RAY EXAM OF SHOULDER: CPT

## 2017-12-27 PROCEDURE — G0378 HOSPITAL OBSERVATION PER HR: HCPCS

## 2017-12-27 PROCEDURE — 97110 THERAPEUTIC EXERCISES: CPT

## 2017-12-27 PROCEDURE — 70450 CT HEAD/BRAIN W/O DYE: CPT

## 2017-12-27 PROCEDURE — 92610 EVALUATE SWALLOWING FUNCTION: CPT

## 2017-12-27 PROCEDURE — 25010000002 ENOXAPARIN PER 10 MG: Performed by: INTERNAL MEDICINE

## 2017-12-27 PROCEDURE — G8979 MOBILITY GOAL STATUS: HCPCS

## 2017-12-27 PROCEDURE — 85025 COMPLETE CBC W/AUTO DIFF WBC: CPT | Performed by: INTERNAL MEDICINE

## 2017-12-27 PROCEDURE — 97166 OT EVAL MOD COMPLEX 45 MIN: CPT | Performed by: OCCUPATIONAL THERAPIST

## 2017-12-27 PROCEDURE — G8987 SELF CARE CURRENT STATUS: HCPCS | Performed by: OCCUPATIONAL THERAPIST

## 2017-12-27 PROCEDURE — 97162 PT EVAL MOD COMPLEX 30 MIN: CPT

## 2017-12-27 PROCEDURE — 80048 BASIC METABOLIC PNL TOTAL CA: CPT | Performed by: INTERNAL MEDICINE

## 2017-12-27 PROCEDURE — 25010000002 LEVOFLOXACIN PER 250 MG: Performed by: INTERNAL MEDICINE

## 2017-12-27 PROCEDURE — 82306 VITAMIN D 25 HYDROXY: CPT | Performed by: INTERNAL MEDICINE

## 2017-12-27 PROCEDURE — 94762 N-INVAS EAR/PLS OXIMTRY CONT: CPT

## 2017-12-27 PROCEDURE — G8998 SWALLOW D/C STATUS: HCPCS

## 2017-12-27 PROCEDURE — G8978 MOBILITY CURRENT STATUS: HCPCS

## 2017-12-27 PROCEDURE — 96372 THER/PROPH/DIAG INJ SC/IM: CPT

## 2017-12-27 PROCEDURE — 73000 X-RAY EXAM OF COLLAR BONE: CPT

## 2017-12-27 RX ORDER — OSELTAMIVIR PHOSPHATE 30 MG/1
30 CAPSULE ORAL EVERY 12 HOURS SCHEDULED
Status: DISCONTINUED | OUTPATIENT
Start: 2017-12-27 | End: 2017-12-27

## 2017-12-27 RX ORDER — OSELTAMIVIR PHOSPHATE 30 MG/1
30 CAPSULE ORAL EVERY 12 HOURS SCHEDULED
Status: DISCONTINUED | OUTPATIENT
Start: 2017-12-27 | End: 2017-12-29 | Stop reason: HOSPADM

## 2017-12-27 RX ORDER — CLONIDINE HYDROCHLORIDE 0.1 MG/1
0.1 TABLET ORAL EVERY 8 HOURS SCHEDULED
Status: DISCONTINUED | OUTPATIENT
Start: 2017-12-27 | End: 2017-12-28

## 2017-12-27 RX ORDER — GUAIFENESIN 600 MG/1
600 TABLET, EXTENDED RELEASE ORAL EVERY 12 HOURS SCHEDULED
Status: DISCONTINUED | OUTPATIENT
Start: 2017-12-27 | End: 2017-12-29 | Stop reason: HOSPADM

## 2017-12-27 RX ADMIN — GUAIFENESIN 600 MG: 600 TABLET, EXTENDED RELEASE ORAL at 15:55

## 2017-12-27 RX ADMIN — BUDESONIDE 0.5 MG: 0.5 INHALANT RESPIRATORY (INHALATION) at 09:01

## 2017-12-27 RX ADMIN — GUAIFENESIN AND DEXTROMETHORPHAN 5 ML: 100; 10 SYRUP ORAL at 20:44

## 2017-12-27 RX ADMIN — ENOXAPARIN SODIUM 40 MG: 40 INJECTION SUBCUTANEOUS at 18:15

## 2017-12-27 RX ADMIN — TIZANIDINE 4 MG: 4 TABLET ORAL at 21:24

## 2017-12-27 RX ADMIN — FAMOTIDINE 40 MG: 40 TABLET, FILM COATED ORAL at 08:54

## 2017-12-27 RX ADMIN — LORAZEPAM 0.5 MG: 0.5 TABLET ORAL at 01:09

## 2017-12-27 RX ADMIN — MONTELUKAST 10 MG: 10 TABLET, FILM COATED ORAL at 21:24

## 2017-12-27 RX ADMIN — IPRATROPIUM BROMIDE AND ALBUTEROL SULFATE 3 ML: .5; 3 SOLUTION RESPIRATORY (INHALATION) at 09:01

## 2017-12-27 RX ADMIN — ETHACRYNIC ACID 25 MG: 25 TABLET ORAL at 21:25

## 2017-12-27 RX ADMIN — OSELTAMIVIR PHOSPHATE 30 MG: 30 CAPSULE ORAL at 15:55

## 2017-12-27 RX ADMIN — ETHACRYNIC ACID 25 MG: 25 TABLET ORAL at 00:10

## 2017-12-27 RX ADMIN — ETHACRYNIC ACID 25 MG: 25 TABLET ORAL at 08:54

## 2017-12-27 RX ADMIN — IPRATROPIUM BROMIDE AND ALBUTEROL SULFATE 3 ML: .5; 3 SOLUTION RESPIRATORY (INHALATION) at 22:32

## 2017-12-27 RX ADMIN — BUDESONIDE 0.5 MG: 0.5 INHALANT RESPIRATORY (INHALATION) at 22:32

## 2017-12-27 RX ADMIN — HYDROCODONE BITARTRATE AND ACETAMINOPHEN 1 TABLET: 5; 325 TABLET ORAL at 20:23

## 2017-12-27 RX ADMIN — PROMETHAZINE HYDROCHLORIDE 12.5 MG: 12.5 TABLET ORAL at 20:23

## 2017-12-27 RX ADMIN — PROMETHAZINE HYDROCHLORIDE AND CODEINE PHOSPHATE 5 ML: 6.25; 1 SOLUTION ORAL at 12:42

## 2017-12-27 RX ADMIN — IPRATROPIUM BROMIDE AND ALBUTEROL SULFATE 3 ML: .5; 3 SOLUTION RESPIRATORY (INHALATION) at 11:53

## 2017-12-27 RX ADMIN — BUTALBITAL, ACETAMINOPHEN AND CAFFEINE 2 TABLET: 50; 325; 40 TABLET ORAL at 05:13

## 2017-12-27 RX ADMIN — LEVOFLOXACIN 500 MG: 5 INJECTION, SOLUTION INTRAVENOUS at 20:30

## 2017-12-27 RX ADMIN — NYSTATIN 500000 UNITS: 100000 SUSPENSION ORAL at 08:53

## 2017-12-27 RX ADMIN — ETHACRYNIC ACID 25 MG: 25 TABLET ORAL at 15:42

## 2017-12-27 RX ADMIN — POTASSIUM CHLORIDE, DEXTROSE MONOHYDRATE AND SODIUM CHLORIDE 75 ML/HR: 150; 5; 450 INJECTION, SOLUTION INTRAVENOUS at 23:57

## 2017-12-27 RX ADMIN — NYSTATIN 500000 UNITS: 100000 SUSPENSION ORAL at 12:38

## 2017-12-27 RX ADMIN — PANTOPRAZOLE SODIUM 40 MG: 40 TABLET, DELAYED RELEASE ORAL at 06:47

## 2017-12-27 RX ADMIN — NIFEDIPINE 60 MG: 60 TABLET, EXTENDED RELEASE ORAL at 08:54

## 2017-12-27 RX ADMIN — NYSTATIN 500000 UNITS: 100000 SUSPENSION ORAL at 18:14

## 2017-12-27 RX ADMIN — VITAMIN D, TAB 1000IU (100/BT) 1000 UNITS: 25 TAB at 08:57

## 2017-12-27 RX ADMIN — NYSTATIN 500000 UNITS: 100000 SUSPENSION ORAL at 21:24

## 2017-12-27 RX ADMIN — HYDROCODONE BITARTRATE AND ACETAMINOPHEN 1 TABLET: 5; 325 TABLET ORAL at 11:15

## 2017-12-27 RX ADMIN — POTASSIUM CHLORIDE, DEXTROSE MONOHYDRATE AND SODIUM CHLORIDE 75 ML/HR: 150; 5; 450 INJECTION, SOLUTION INTRAVENOUS at 08:51

## 2017-12-27 RX ADMIN — CYANOCOBALAMIN TAB 500 MCG 1000 MCG: 500 TAB at 08:56

## 2017-12-27 RX ADMIN — ASPIRIN 81 MG: 81 TABLET, CHEWABLE ORAL at 08:57

## 2017-12-28 LAB
ANION GAP SERPL CALCULATED.3IONS-SCNC: 14.4 MMOL/L
BASOPHILS # BLD AUTO: 0.01 10*3/MM3 (ref 0–0.2)
BASOPHILS NFR BLD AUTO: 0.2 % (ref 0–1.5)
BUN BLD-MCNC: 13 MG/DL (ref 8–23)
BUN/CREAT SERPL: 15.7 (ref 7–25)
CALCIUM SPEC-SCNC: 8.4 MG/DL (ref 8.6–10.5)
CHLORIDE SERPL-SCNC: 102 MMOL/L (ref 98–107)
CO2 SERPL-SCNC: 23.6 MMOL/L (ref 22–29)
CREAT BLD-MCNC: 0.83 MG/DL (ref 0.57–1)
DEPRECATED RDW RBC AUTO: 50.8 FL (ref 37–54)
EOSINOPHIL # BLD AUTO: 0.04 10*3/MM3 (ref 0–0.7)
EOSINOPHIL NFR BLD AUTO: 1 % (ref 0.3–6.2)
ERYTHROCYTE [DISTWIDTH] IN BLOOD BY AUTOMATED COUNT: 15.5 % (ref 11.7–13)
GFR SERPL CREATININE-BSD FRML MDRD: 66 ML/MIN/1.73
GLUCOSE BLD-MCNC: 93 MG/DL (ref 65–99)
HCT VFR BLD AUTO: 44.7 % (ref 35.6–45.5)
HGB BLD-MCNC: 14.1 G/DL (ref 11.9–15.5)
IMM GRANULOCYTES # BLD: 0.02 10*3/MM3 (ref 0–0.03)
IMM GRANULOCYTES NFR BLD: 0.5 % (ref 0–0.5)
LYMPHOCYTES # BLD AUTO: 1.73 10*3/MM3 (ref 0.9–4.8)
LYMPHOCYTES NFR BLD AUTO: 42.4 % (ref 19.6–45.3)
MCH RBC QN AUTO: 28.5 PG (ref 26.9–32)
MCHC RBC AUTO-ENTMCNC: 31.5 G/DL (ref 32.4–36.3)
MCV RBC AUTO: 90.3 FL (ref 80.5–98.2)
MONOCYTES # BLD AUTO: 0.43 10*3/MM3 (ref 0.2–1.2)
MONOCYTES NFR BLD AUTO: 10.5 % (ref 5–12)
NEUTROPHILS # BLD AUTO: 1.85 10*3/MM3 (ref 1.9–8.1)
NEUTROPHILS NFR BLD AUTO: 45.4 % (ref 42.7–76)
PLATELET # BLD AUTO: 133 10*3/MM3 (ref 140–500)
PMV BLD AUTO: 10.1 FL (ref 6–12)
POTASSIUM BLD-SCNC: 3 MMOL/L (ref 3.5–5.2)
RBC # BLD AUTO: 4.95 10*6/MM3 (ref 3.9–5.2)
SODIUM BLD-SCNC: 140 MMOL/L (ref 136–145)
WBC NRBC COR # BLD: 4.08 10*3/MM3 (ref 4.5–10.7)

## 2017-12-28 PROCEDURE — 85025 COMPLETE CBC W/AUTO DIFF WBC: CPT | Performed by: INTERNAL MEDICINE

## 2017-12-28 PROCEDURE — G0378 HOSPITAL OBSERVATION PER HR: HCPCS

## 2017-12-28 PROCEDURE — 94799 UNLISTED PULMONARY SVC/PX: CPT

## 2017-12-28 PROCEDURE — 25010000002 HYDROMORPHONE PER 4 MG: Performed by: INTERNAL MEDICINE

## 2017-12-28 PROCEDURE — 96376 TX/PRO/DX INJ SAME DRUG ADON: CPT

## 2017-12-28 PROCEDURE — 96372 THER/PROPH/DIAG INJ SC/IM: CPT

## 2017-12-28 PROCEDURE — 96374 THER/PROPH/DIAG INJ IV PUSH: CPT

## 2017-12-28 PROCEDURE — 25010000002 ENOXAPARIN PER 10 MG: Performed by: INTERNAL MEDICINE

## 2017-12-28 PROCEDURE — 80048 BASIC METABOLIC PNL TOTAL CA: CPT | Performed by: INTERNAL MEDICINE

## 2017-12-28 PROCEDURE — 63710000001 DIPHENHYDRAMINE PER 50 MG: Performed by: INTERNAL MEDICINE

## 2017-12-28 RX ORDER — POTASSIUM CHLORIDE 750 MG/1
60 CAPSULE, EXTENDED RELEASE ORAL DAILY
Status: DISCONTINUED | OUTPATIENT
Start: 2017-12-28 | End: 2017-12-28

## 2017-12-28 RX ORDER — NIFEDIPINE 60 MG/1
60 TABLET, EXTENDED RELEASE ORAL 2 TIMES DAILY
Status: DISCONTINUED | OUTPATIENT
Start: 2017-12-28 | End: 2017-12-29 | Stop reason: HOSPADM

## 2017-12-28 RX ORDER — LEVOFLOXACIN 500 MG/1
500 TABLET, FILM COATED ORAL EVERY 24 HOURS
Status: DISCONTINUED | OUTPATIENT
Start: 2017-12-28 | End: 2017-12-29 | Stop reason: HOSPADM

## 2017-12-28 RX ORDER — CLONIDINE HYDROCHLORIDE 0.1 MG/1
0.2 TABLET ORAL EVERY 8 HOURS SCHEDULED
Status: DISCONTINUED | OUTPATIENT
Start: 2017-12-28 | End: 2017-12-29 | Stop reason: HOSPADM

## 2017-12-28 RX ORDER — POTASSIUM CHLORIDE 750 MG/1
60 CAPSULE, EXTENDED RELEASE ORAL ONCE
Status: COMPLETED | OUTPATIENT
Start: 2017-12-28 | End: 2017-12-28

## 2017-12-28 RX ADMIN — CLONIDINE HYDROCHLORIDE 0.2 MG: 0.1 TABLET ORAL at 06:18

## 2017-12-28 RX ADMIN — IPRATROPIUM BROMIDE AND ALBUTEROL SULFATE 3 ML: .5; 3 SOLUTION RESPIRATORY (INHALATION) at 21:08

## 2017-12-28 RX ADMIN — HYDROCODONE BITARTRATE AND ACETAMINOPHEN 1 TABLET: 5; 325 TABLET ORAL at 01:35

## 2017-12-28 RX ADMIN — PANTOPRAZOLE SODIUM 40 MG: 40 TABLET, DELAYED RELEASE ORAL at 06:17

## 2017-12-28 RX ADMIN — GUAIFENESIN 600 MG: 600 TABLET, EXTENDED RELEASE ORAL at 01:36

## 2017-12-28 RX ADMIN — EPLERENONE 50 MG: 25 TABLET, FILM COATED ORAL at 22:25

## 2017-12-28 RX ADMIN — NYSTATIN 500000 UNITS: 100000 SUSPENSION ORAL at 17:50

## 2017-12-28 RX ADMIN — CLONIDINE HYDROCHLORIDE 0.2 MG: 0.1 TABLET ORAL at 22:24

## 2017-12-28 RX ADMIN — DIPHENHYDRAMINE HYDROCHLORIDE 50 MG: 25 CAPSULE ORAL at 22:23

## 2017-12-28 RX ADMIN — POTASSIUM CHLORIDE 60 MEQ: 750 CAPSULE, EXTENDED RELEASE ORAL at 10:34

## 2017-12-28 RX ADMIN — HYDROMORPHONE HYDROCHLORIDE 0.5 MG: 1 INJECTION, SOLUTION INTRAMUSCULAR; INTRAVENOUS; SUBCUTANEOUS at 05:40

## 2017-12-28 RX ADMIN — NYSTATIN 500000 UNITS: 100000 SUSPENSION ORAL at 09:10

## 2017-12-28 RX ADMIN — GUAIFENESIN 600 MG: 600 TABLET, EXTENDED RELEASE ORAL at 09:09

## 2017-12-28 RX ADMIN — ONDANSETRON 8 MG: 4 TABLET, ORALLY DISINTEGRATING ORAL at 20:26

## 2017-12-28 RX ADMIN — BUDESONIDE 0.5 MG: 0.5 INHALANT RESPIRATORY (INHALATION) at 21:08

## 2017-12-28 RX ADMIN — LEVOFLOXACIN 500 MG: 500 TABLET, FILM COATED ORAL at 20:09

## 2017-12-28 RX ADMIN — OSELTAMIVIR PHOSPHATE 30 MG: 30 CAPSULE ORAL at 09:11

## 2017-12-28 RX ADMIN — BUTALBITAL, ACETAMINOPHEN AND CAFFEINE 1 TABLET: 50; 325; 40 TABLET ORAL at 20:16

## 2017-12-28 RX ADMIN — EPLERENONE 50 MG: 25 TABLET, FILM COATED ORAL at 09:13

## 2017-12-28 RX ADMIN — CLONIDINE HYDROCHLORIDE 0.2 MG: 0.1 TABLET ORAL at 15:05

## 2017-12-28 RX ADMIN — NIFEDIPINE 60 MG: 60 TABLET, EXTENDED RELEASE ORAL at 09:12

## 2017-12-28 RX ADMIN — VITAMIN D, TAB 1000IU (100/BT) 1000 UNITS: 25 TAB at 09:09

## 2017-12-28 RX ADMIN — GUAIFENESIN 600 MG: 600 TABLET, EXTENDED RELEASE ORAL at 20:10

## 2017-12-28 RX ADMIN — IPRATROPIUM BROMIDE AND ALBUTEROL SULFATE 3 ML: .5; 3 SOLUTION RESPIRATORY (INHALATION) at 14:57

## 2017-12-28 RX ADMIN — ETHACRYNIC ACID 25 MG: 25 TABLET ORAL at 17:50

## 2017-12-28 RX ADMIN — CYANOCOBALAMIN TAB 500 MCG 1000 MCG: 500 TAB at 09:09

## 2017-12-28 RX ADMIN — ONDANSETRON 8 MG: 4 TABLET, ORALLY DISINTEGRATING ORAL at 13:13

## 2017-12-28 RX ADMIN — ETHACRYNIC ACID 25 MG: 25 TABLET ORAL at 22:25

## 2017-12-28 RX ADMIN — ETHACRYNIC ACID 25 MG: 25 TABLET ORAL at 09:14

## 2017-12-28 RX ADMIN — ENOXAPARIN SODIUM 40 MG: 40 INJECTION SUBCUTANEOUS at 20:09

## 2017-12-28 RX ADMIN — NIFEDIPINE 60 MG: 60 TABLET, EXTENDED RELEASE ORAL at 20:13

## 2017-12-28 RX ADMIN — NYSTATIN 500000 UNITS: 100000 SUSPENSION ORAL at 20:14

## 2017-12-28 RX ADMIN — HYDROMORPHONE HYDROCHLORIDE 0.5 MG: 1 INJECTION, SOLUTION INTRAMUSCULAR; INTRAVENOUS; SUBCUTANEOUS at 10:35

## 2017-12-28 RX ADMIN — ASPIRIN 81 MG: 81 TABLET, CHEWABLE ORAL at 09:09

## 2017-12-28 RX ADMIN — BUDESONIDE 0.5 MG: 0.5 INHALANT RESPIRATORY (INHALATION) at 08:20

## 2017-12-28 RX ADMIN — IPRATROPIUM BROMIDE AND ALBUTEROL SULFATE 3 ML: .5; 3 SOLUTION RESPIRATORY (INHALATION) at 11:17

## 2017-12-28 RX ADMIN — HYDROCODONE BITARTRATE AND ACETAMINOPHEN 1 TABLET: 5; 325 TABLET ORAL at 20:28

## 2017-12-28 RX ADMIN — IPRATROPIUM BROMIDE AND ALBUTEROL SULFATE 3 ML: .5; 3 SOLUTION RESPIRATORY (INHALATION) at 08:20

## 2017-12-28 RX ADMIN — OSELTAMIVIR PHOSPHATE 30 MG: 30 CAPSULE ORAL at 20:14

## 2017-12-29 VITALS
HEIGHT: 63 IN | SYSTOLIC BLOOD PRESSURE: 138 MMHG | HEART RATE: 76 BPM | BODY MASS INDEX: 31.7 KG/M2 | RESPIRATION RATE: 20 BRPM | DIASTOLIC BLOOD PRESSURE: 86 MMHG | TEMPERATURE: 97.5 F | WEIGHT: 178.9 LBS | OXYGEN SATURATION: 93 %

## 2017-12-29 LAB
ANION GAP SERPL CALCULATED.3IONS-SCNC: 13.1 MMOL/L
BASOPHILS # BLD AUTO: 0.01 10*3/MM3 (ref 0–0.2)
BASOPHILS NFR BLD AUTO: 0.2 % (ref 0–1.5)
BUN BLD-MCNC: 14 MG/DL (ref 8–23)
BUN/CREAT SERPL: 17.1 (ref 7–25)
CALCIUM SPEC-SCNC: 8.9 MG/DL (ref 8.6–10.5)
CHLORIDE SERPL-SCNC: 103 MMOL/L (ref 98–107)
CO2 SERPL-SCNC: 22.9 MMOL/L (ref 22–29)
CREAT BLD-MCNC: 0.82 MG/DL (ref 0.57–1)
DEPRECATED RDW RBC AUTO: 51.6 FL (ref 37–54)
EOSINOPHIL # BLD AUTO: 0.03 10*3/MM3 (ref 0–0.7)
EOSINOPHIL NFR BLD AUTO: 0.5 % (ref 0.3–6.2)
ERYTHROCYTE [DISTWIDTH] IN BLOOD BY AUTOMATED COUNT: 15.5 % (ref 11.7–13)
GFR SERPL CREATININE-BSD FRML MDRD: 67 ML/MIN/1.73
GLUCOSE BLD-MCNC: 96 MG/DL (ref 65–99)
HCT VFR BLD AUTO: 42.9 % (ref 35.6–45.5)
HGB BLD-MCNC: 13.6 G/DL (ref 11.9–15.5)
IMM GRANULOCYTES # BLD: 0.02 10*3/MM3 (ref 0–0.03)
IMM GRANULOCYTES NFR BLD: 0.3 % (ref 0–0.5)
LYMPHOCYTES # BLD AUTO: 1.55 10*3/MM3 (ref 0.9–4.8)
LYMPHOCYTES NFR BLD AUTO: 23.6 % (ref 19.6–45.3)
MCH RBC QN AUTO: 28.9 PG (ref 26.9–32)
MCHC RBC AUTO-ENTMCNC: 31.7 G/DL (ref 32.4–36.3)
MCV RBC AUTO: 91.1 FL (ref 80.5–98.2)
MONOCYTES # BLD AUTO: 0.63 10*3/MM3 (ref 0.2–1.2)
MONOCYTES NFR BLD AUTO: 9.6 % (ref 5–12)
NEUTROPHILS # BLD AUTO: 4.33 10*3/MM3 (ref 1.9–8.1)
NEUTROPHILS NFR BLD AUTO: 65.8 % (ref 42.7–76)
PLATELET # BLD AUTO: 129 10*3/MM3 (ref 140–500)
PMV BLD AUTO: 10.7 FL (ref 6–12)
POTASSIUM BLD-SCNC: 3.6 MMOL/L (ref 3.5–5.2)
RBC # BLD AUTO: 4.71 10*6/MM3 (ref 3.9–5.2)
SODIUM BLD-SCNC: 139 MMOL/L (ref 136–145)
WBC NRBC COR # BLD: 6.57 10*3/MM3 (ref 4.5–10.7)

## 2017-12-29 PROCEDURE — 97112 NEUROMUSCULAR REEDUCATION: CPT

## 2017-12-29 PROCEDURE — G0378 HOSPITAL OBSERVATION PER HR: HCPCS

## 2017-12-29 PROCEDURE — 97110 THERAPEUTIC EXERCISES: CPT

## 2017-12-29 PROCEDURE — 85025 COMPLETE CBC W/AUTO DIFF WBC: CPT | Performed by: INTERNAL MEDICINE

## 2017-12-29 PROCEDURE — 97535 SELF CARE MNGMENT TRAINING: CPT

## 2017-12-29 PROCEDURE — 94799 UNLISTED PULMONARY SVC/PX: CPT

## 2017-12-29 PROCEDURE — 80048 BASIC METABOLIC PNL TOTAL CA: CPT | Performed by: INTERNAL MEDICINE

## 2017-12-29 RX ORDER — LEVOFLOXACIN 500 MG/1
500 TABLET, FILM COATED ORAL DAILY
Qty: 7 TABLET | Refills: 0 | Status: SHIPPED | OUTPATIENT
Start: 2017-12-29 | End: 2018-01-05

## 2017-12-29 RX ORDER — HYDROCODONE BITARTRATE AND ACETAMINOPHEN 5; 325 MG/1; MG/1
2 TABLET ORAL EVERY 6 HOURS PRN
Qty: 30 TABLET | Refills: 0 | Status: ON HOLD | OUTPATIENT
Start: 2017-12-29 | End: 2018-08-23

## 2017-12-29 RX ORDER — OSELTAMIVIR PHOSPHATE 30 MG/1
30 CAPSULE ORAL EVERY 12 HOURS SCHEDULED
Qty: 6 CAPSULE | Refills: 0 | Status: SHIPPED | OUTPATIENT
Start: 2017-12-29 | End: 2018-01-01

## 2017-12-29 RX ORDER — BENZONATATE 200 MG/1
200 CAPSULE ORAL 3 TIMES DAILY PRN
Qty: 40 CAPSULE | Refills: 1 | Status: SHIPPED | OUTPATIENT
Start: 2017-12-29 | End: 2017-12-29 | Stop reason: HOSPADM

## 2017-12-29 RX ORDER — GUAIFENESIN 600 MG/1
600 TABLET, EXTENDED RELEASE ORAL EVERY 12 HOURS SCHEDULED
Qty: 60 TABLET | Refills: 5 | Status: SHIPPED | OUTPATIENT
Start: 2017-12-29 | End: 2018-11-21

## 2017-12-29 RX ADMIN — BUDESONIDE 0.5 MG: 0.5 INHALANT RESPIRATORY (INHALATION) at 07:51

## 2017-12-29 RX ADMIN — HYDROCODONE BITARTRATE AND ACETAMINOPHEN 1 TABLET: 5; 325 TABLET ORAL at 09:37

## 2017-12-29 RX ADMIN — OSELTAMIVIR PHOSPHATE 30 MG: 30 CAPSULE ORAL at 08:40

## 2017-12-29 RX ADMIN — NYSTATIN 500000 UNITS: 100000 SUSPENSION ORAL at 08:40

## 2017-12-29 RX ADMIN — NIFEDIPINE 60 MG: 60 TABLET, EXTENDED RELEASE ORAL at 08:41

## 2017-12-29 RX ADMIN — PANTOPRAZOLE SODIUM 40 MG: 40 TABLET, DELAYED RELEASE ORAL at 06:15

## 2017-12-29 RX ADMIN — TIZANIDINE 2 MG: 4 TABLET ORAL at 08:40

## 2017-12-29 RX ADMIN — IPRATROPIUM BROMIDE AND ALBUTEROL SULFATE 3 ML: .5; 3 SOLUTION RESPIRATORY (INHALATION) at 07:50

## 2017-12-29 RX ADMIN — EPLERENONE 50 MG: 25 TABLET, FILM COATED ORAL at 08:43

## 2017-12-29 RX ADMIN — CLONIDINE HYDROCHLORIDE 0.2 MG: 0.1 TABLET ORAL at 06:15

## 2017-12-29 RX ADMIN — HYDROCODONE BITARTRATE AND ACETAMINOPHEN 1 TABLET: 5; 325 TABLET ORAL at 02:47

## 2017-12-29 RX ADMIN — ETHACRYNIC ACID 25 MG: 25 TABLET ORAL at 08:40

## 2017-12-31 LAB
BACTERIA SPEC AEROBE CULT: NORMAL
BACTERIA SPEC AEROBE CULT: NORMAL

## 2018-04-05 ENCOUNTER — HOSPITAL ENCOUNTER (OUTPATIENT)
Dept: GENERAL RADIOLOGY | Facility: HOSPITAL | Age: 78
Discharge: HOME | End: 2018-04-05
Attending: INTERNAL MEDICINE | Admitting: INTERNAL MEDICINE

## 2018-04-05 DIAGNOSIS — R07.9 CHEST PAIN, UNSPECIFIED TYPE: ICD-10-CM

## 2018-04-05 PROCEDURE — 71046 X-RAY EXAM CHEST 2 VIEWS: CPT

## 2018-05-18 ENCOUNTER — TRANSCRIBE ORDERS (OUTPATIENT)
Dept: ADMINISTRATIVE | Facility: HOSPITAL | Age: 78
End: 2018-05-18

## 2018-05-18 DIAGNOSIS — M48.061 SPINAL STENOSIS OF LUMBAR REGION, UNSPECIFIED WHETHER NEUROGENIC CLAUDICATION PRESENT: Primary | ICD-10-CM

## 2018-05-18 DIAGNOSIS — R10.2 PELVIC PAIN: ICD-10-CM

## 2018-05-21 ENCOUNTER — HOSPITAL ENCOUNTER (OUTPATIENT)
Dept: CT IMAGING | Facility: HOSPITAL | Age: 78
Discharge: HOME | End: 2018-05-21
Attending: INTERNAL MEDICINE | Admitting: INTERNAL MEDICINE

## 2018-05-21 DIAGNOSIS — R10.2 PELVIC PAIN: ICD-10-CM

## 2018-05-21 DIAGNOSIS — M48.061 SPINAL STENOSIS OF LUMBAR REGION, UNSPECIFIED WHETHER NEUROGENIC CLAUDICATION PRESENT: ICD-10-CM

## 2018-05-21 PROCEDURE — 72131 CT LUMBAR SPINE W/O DYE: CPT

## 2018-05-21 PROCEDURE — 72192 CT PELVIS W/O DYE: CPT

## 2018-08-22 ENCOUNTER — APPOINTMENT (OUTPATIENT)
Dept: CT IMAGING | Facility: HOSPITAL | Age: 78
End: 2018-08-22

## 2018-08-22 ENCOUNTER — HOSPITAL ENCOUNTER (INPATIENT)
Facility: HOSPITAL | Age: 78
LOS: 5 days | Discharge: HOME | End: 2018-08-27
Attending: EMERGENCY MEDICINE | Admitting: INTERNAL MEDICINE

## 2018-08-22 DIAGNOSIS — E55.9 VITAMIN D DEFICIENCY: ICD-10-CM

## 2018-08-22 DIAGNOSIS — M25.512 PAIN OF BOTH SHOULDER JOINTS: ICD-10-CM

## 2018-08-22 DIAGNOSIS — R59.0 ANTERIOR CERVICAL ADENOPATHY: ICD-10-CM

## 2018-08-22 DIAGNOSIS — R51.9 ACUTE INTRACTABLE HEADACHE, UNSPECIFIED HEADACHE TYPE: ICD-10-CM

## 2018-08-22 DIAGNOSIS — R79.89 ELEVATED PTHRP LEVEL: ICD-10-CM

## 2018-08-22 DIAGNOSIS — M50.20 CERVICAL DISC DISPLACEMENT: ICD-10-CM

## 2018-08-22 DIAGNOSIS — N18.30 STAGE 3 CHRONIC KIDNEY DISEASE (HCC): ICD-10-CM

## 2018-08-22 DIAGNOSIS — R53.83 MALAISE AND FATIGUE: ICD-10-CM

## 2018-08-22 DIAGNOSIS — I67.2 INTRACRANIAL ATHEROSCLEROSIS: ICD-10-CM

## 2018-08-22 DIAGNOSIS — J45.40 MODERATE PERSISTENT ASTHMATIC BRONCHITIS WITHOUT COMPLICATION: ICD-10-CM

## 2018-08-22 DIAGNOSIS — I10 UNCONTROLLED HYPERTENSION: Primary | ICD-10-CM

## 2018-08-22 DIAGNOSIS — K21.00 GASTROESOPHAGEAL REFLUX DISEASE WITH ESOPHAGITIS: ICD-10-CM

## 2018-08-22 DIAGNOSIS — L40.9 PSORIASIS: ICD-10-CM

## 2018-08-22 DIAGNOSIS — N39.0 ACUTE UTI: ICD-10-CM

## 2018-08-22 DIAGNOSIS — M79.601 PAIN OF RIGHT UPPER EXTREMITY: ICD-10-CM

## 2018-08-22 DIAGNOSIS — I10 ACCELERATED HYPERTENSION: ICD-10-CM
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
## 2018-08-22 DIAGNOSIS — G44.221 CHRONIC TENSION-TYPE HEADACHE, INTRACTABLE: ICD-10-CM

## 2018-08-22 DIAGNOSIS — M25.511 PAIN OF BOTH SHOULDER JOINTS: ICD-10-CM

## 2018-08-22 DIAGNOSIS — R53.81 MALAISE AND FATIGUE: ICD-10-CM

## 2018-08-22 LAB
ANION GAP SERPL CALCULATED.3IONS-SCNC: 8.9 MMOL/L
BACTERIA UR QL AUTO: ABNORMAL /HPF
BASOPHILS # BLD AUTO: 0.03 10*3/MM3 (ref 0–0.2)
BASOPHILS NFR BLD AUTO: 0.3 % (ref 0–1.5)
BILIRUB UR QL STRIP: NEGATIVE
BUN BLD-MCNC: 18 MG/DL (ref 8–23)
BUN/CREAT SERPL: 16.4 (ref 7–25)
CALCIUM SPEC-SCNC: 9.7 MG/DL (ref 8.6–10.5)
CHLORIDE SERPL-SCNC: 106 MMOL/L (ref 98–107)
CLARITY UR: CLEAR
CO2 SERPL-SCNC: 25.1 MMOL/L (ref 22–29)
COLOR UR: YELLOW
CREAT BLD-MCNC: 1.1 MG/DL (ref 0.57–1)
DEPRECATED RDW RBC AUTO: 50.4 FL (ref 37–54)
EOSINOPHIL # BLD AUTO: 0 10*3/MM3 (ref 0–0.7)
EOSINOPHIL NFR BLD AUTO: 0 % (ref 0.3–6.2)
ERYTHROCYTE [DISTWIDTH] IN BLOOD BY AUTOMATED COUNT: 15 % (ref 11.7–13)
GFR SERPL CREATININE-BSD FRML MDRD: 48 ML/MIN/1.73
GLUCOSE BLD-MCNC: 114 MG/DL (ref 65–99)
GLUCOSE UR STRIP-MCNC: NEGATIVE MG/DL
HCT VFR BLD AUTO: 44.8 % (ref 35.6–45.5)
HGB BLD-MCNC: 13.8 G/DL (ref 11.9–15.5)
HGB UR QL STRIP.AUTO: NEGATIVE
HOLD SPECIMEN: NORMAL
HOLD SPECIMEN: NORMAL
HYALINE CASTS UR QL AUTO: ABNORMAL /LPF
IMM GRANULOCYTES # BLD: 0.02 10*3/MM3 (ref 0–0.03)
IMM GRANULOCYTES NFR BLD: 0.2 % (ref 0–0.5)
KETONES UR QL STRIP: NEGATIVE
LEUKOCYTE ESTERASE UR QL STRIP.AUTO: ABNORMAL
LYMPHOCYTES # BLD AUTO: 1.09 10*3/MM3 (ref 0.9–4.8)
LYMPHOCYTES NFR BLD AUTO: 9.6 % (ref 19.6–45.3)
MCH RBC QN AUTO: 28.3 PG (ref 26.9–32)
MCHC RBC AUTO-ENTMCNC: 30.8 G/DL (ref 32.4–36.3)
MCV RBC AUTO: 92 FL (ref 80.5–98.2)
MONOCYTES # BLD AUTO: 0.55 10*3/MM3 (ref 0.2–1.2)
MONOCYTES NFR BLD AUTO: 4.9 % (ref 5–12)
NEUTROPHILS # BLD AUTO: 9.63 10*3/MM3 (ref 1.9–8.1)
NEUTROPHILS NFR BLD AUTO: 85 % (ref 42.7–76)
NITRITE UR QL STRIP: POSITIVE
PH UR STRIP.AUTO: 7 [PH] (ref 5–8)
PLATELET # BLD AUTO: 191 10*3/MM3 (ref 140–500)
PMV BLD AUTO: 11 FL (ref 6–12)
POTASSIUM BLD-SCNC: 4.2 MMOL/L (ref 3.5–5.2)
PROT UR QL STRIP: NEGATIVE
RBC # BLD AUTO: 4.87 10*6/MM3 (ref 3.9–5.2)
RBC # UR: ABNORMAL /HPF
REF LAB TEST METHOD: ABNORMAL
SODIUM BLD-SCNC: 140 MMOL/L (ref 136–145)
SP GR UR STRIP: 1.01 (ref 1–1.03)
SQUAMOUS #/AREA URNS HPF: ABNORMAL /HPF
UROBILINOGEN UR QL STRIP: ABNORMAL
WBC NRBC COR # BLD: 11.32 10*3/MM3 (ref 4.5–10.7)
WBC UR QL AUTO: ABNORMAL /HPF
WHOLE BLOOD HOLD SPECIMEN: NORMAL
WHOLE BLOOD HOLD SPECIMEN: NORMAL

## 2018-08-22 PROCEDURE — 25010000002 METOCLOPRAMIDE PER 10 MG: Performed by: NURSE PRACTITIONER

## 2018-08-22 PROCEDURE — 85025 COMPLETE CBC W/AUTO DIFF WBC: CPT | Performed by: NURSE PRACTITIONER

## 2018-08-22 PROCEDURE — G0378 HOSPITAL OBSERVATION PER HR: HCPCS

## 2018-08-22 PROCEDURE — 99284 EMERGENCY DEPT VISIT MOD MDM: CPT

## 2018-08-22 PROCEDURE — 25010000002 CEFTRIAXONE PER 250 MG: Performed by: EMERGENCY MEDICINE

## 2018-08-22 PROCEDURE — 80048 BASIC METABOLIC PNL TOTAL CA: CPT | Performed by: NURSE PRACTITIONER

## 2018-08-22 PROCEDURE — 25010000002 HYDROMORPHONE PER 4 MG: Performed by: EMERGENCY MEDICINE

## 2018-08-22 PROCEDURE — 25010000002 ONDANSETRON PER 1 MG: Performed by: NURSE PRACTITIONER

## 2018-08-22 PROCEDURE — 25010000002 HYDROMORPHONE PER 4 MG: Performed by: INTERNAL MEDICINE

## 2018-08-22 PROCEDURE — 25010000002 DIPHENHYDRAMINE PER 50 MG: Performed by: NURSE PRACTITIONER

## 2018-08-22 PROCEDURE — 25010000002 PROMETHAZINE PER 50 MG: Performed by: INTERNAL MEDICINE

## 2018-08-22 PROCEDURE — 81001 URINALYSIS AUTO W/SCOPE: CPT | Performed by: NURSE PRACTITIONER

## 2018-08-22 PROCEDURE — 70450 CT HEAD/BRAIN W/O DYE: CPT

## 2018-08-22 RX ORDER — ONDANSETRON 2 MG/ML
4 INJECTION INTRAMUSCULAR; INTRAVENOUS ONCE
Status: COMPLETED | OUTPATIENT
Start: 2018-08-22 | End: 2018-08-22

## 2018-08-22 RX ORDER — CEFTRIAXONE SODIUM 1 G/50ML
1 INJECTION, SOLUTION INTRAVENOUS ONCE
Status: COMPLETED | OUTPATIENT
Start: 2018-08-22 | End: 2018-08-22

## 2018-08-22 RX ORDER — CLONIDINE HYDROCHLORIDE 0.1 MG/1
0.2 TABLET ORAL ONCE
Status: COMPLETED | OUTPATIENT
Start: 2018-08-22 | End: 2018-08-22

## 2018-08-22 RX ORDER — PROMETHAZINE HYDROCHLORIDE 25 MG/ML
12.5 INJECTION, SOLUTION INTRAMUSCULAR; INTRAVENOUS
Status: DISCONTINUED | OUTPATIENT
Start: 2018-08-22 | End: 2018-08-27 | Stop reason: HOSPADM

## 2018-08-22 RX ORDER — METOCLOPRAMIDE HYDROCHLORIDE 5 MG/ML
10 INJECTION INTRAMUSCULAR; INTRAVENOUS ONCE
Status: COMPLETED | OUTPATIENT
Start: 2018-08-22 | End: 2018-08-22

## 2018-08-22 RX ORDER — DIPHENHYDRAMINE HYDROCHLORIDE 50 MG/ML
25 INJECTION INTRAMUSCULAR; INTRAVENOUS ONCE
Status: COMPLETED | OUTPATIENT
Start: 2018-08-22 | End: 2018-08-22

## 2018-08-22 RX ORDER — SODIUM CHLORIDE 0.9 % (FLUSH) 0.9 %
10 SYRINGE (ML) INJECTION AS NEEDED
Status: DISCONTINUED | OUTPATIENT
Start: 2018-08-22 | End: 2018-08-27 | Stop reason: HOSPADM

## 2018-08-22 RX ORDER — LABETALOL HYDROCHLORIDE 5 MG/ML
10 INJECTION, SOLUTION INTRAVENOUS ONCE
Status: COMPLETED | OUTPATIENT
Start: 2018-08-22 | End: 2018-08-22

## 2018-08-22 RX ADMIN — LABETALOL HYDROCHLORIDE 10 MG: 5 INJECTION, SOLUTION INTRAVENOUS at 18:28

## 2018-08-22 RX ADMIN — PROMETHAZINE HYDROCHLORIDE 12.5 MG: 25 INJECTION, SOLUTION INTRAMUSCULAR; INTRAVENOUS at 22:25

## 2018-08-22 RX ADMIN — ONDANSETRON 4 MG: 2 INJECTION, SOLUTION INTRAMUSCULAR; INTRAVENOUS at 17:19

## 2018-08-22 RX ADMIN — DIPHENHYDRAMINE HYDROCHLORIDE 25 MG: 50 INJECTION, SOLUTION INTRAMUSCULAR; INTRAVENOUS at 17:19

## 2018-08-22 RX ADMIN — HYDROMORPHONE HYDROCHLORIDE 0.5 MG: 1 INJECTION, SOLUTION INTRAMUSCULAR; INTRAVENOUS; SUBCUTANEOUS at 22:25

## 2018-08-22 RX ADMIN — METOCLOPRAMIDE 10 MG: 5 INJECTION, SOLUTION INTRAMUSCULAR; INTRAVENOUS at 17:19

## 2018-08-22 RX ADMIN — SODIUM CHLORIDE 1000 ML: 9 INJECTION, SOLUTION INTRAVENOUS at 17:19

## 2018-08-22 RX ADMIN — CEFTRIAXONE SODIUM 1 G: 1 INJECTION, SOLUTION INTRAVENOUS at 19:59

## 2018-08-22 RX ADMIN — HYDROMORPHONE HYDROCHLORIDE 0.5 MG: 1 INJECTION, SOLUTION INTRAMUSCULAR; INTRAVENOUS; SUBCUTANEOUS at 18:28

## 2018-08-22 RX ADMIN — CLONIDINE HYDROCHLORIDE 0.2 MG: 0.1 TABLET ORAL at 19:31

## 2018-08-22 NOTE — ED PROVIDER NOTES
EMERGENCY DEPARTMENT ENCOUNTER    CHIEF COMPLAINT  Chief Complaint: HA  History given by: patient  History limited by: n/a  Room Number: 606/1  PMD: Óscar Cadet MD      HPI:  Pt is a 78 y.o. female who presents complaining of a posterior HA for the past 6 days.  Pt reports the HA began at the back of her head and is gradually moving to the front.  Pt reports similar episodes of HAs dx as migraines, but she has not had one recently.  Pt reports exacerbation with bright light and loud noises.  Pt also c/o chills and congestion with clear drainage.  Pt reports use of Fioricet without relief.    Duration:  6 days  Onset: gradual  Timing: constant  Location: neuro  Radiation: back of head toward front  Quality: headache  Intensity/Severity: moderate  Progression: unchanged  Associated Symptoms: chills and congestion with clear drainage, photophobia  Aggravating Factors: light and noises  Alleviating Factors: none stated  Previous Episodes: Pt reports similar episodes of HAs dx as migraines, but she has not had one recently.  Treatment before arrival: Pt reports use of Fioricet without relief.    PAST MEDICAL HISTORY  Active Ambulatory Problems     Diagnosis Date Noted   • Joint pain 07/25/2016   • Urinary retention self-caths (Ochoa) 07/25/2016   • Sinusitis 07/25/2016   • Conjunctivitis 07/25/2016   • Arm pain 07/25/2016   • Cervical disc displacement 07/25/2016   • Cervical pain (Servando=PM) 07/25/2016   • DDD (degenerative disc disease), cervical 07/25/2016   • Hyperlipidemia 07/25/2016   • Preventative health care 09/08/2016   • Medication management 09/08/2016   • Proctocele 06/29/2015   • Urge incontinence of urine 06/29/2015   • Chronic tension-type headache, intractable 11/11/2016   • Obesity (BMI 30.0-34.9) 11/11/2016   • H/o Lyme disease 11/11/2016   • Stage 3 chronic kidney disease 11/11/2016   • Hardening of the arteries of the brain 11/11/2016   • Allergic contact dermatitis 11/11/2016   • Malaise and  fatigue progressive 11/11/2016   • Vertigo 11/11/2016   • Vitamin D deficiency 11/11/2016   • Moderate persistent asthmatic bronchitis without complication 11/11/2016   • Anterior cervical adenopathy due to infection 11/11/2016   • Pleurisy 11/11/2016   • Knee pain, bilateral 11/11/2016   • Elevated PTH level 11/11/2016   • 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Ochoa) 11/11/2016   • Gastroesophageal reflux disease with esophagitis 11/11/2016   • Psoriasis (Darryl Ochoa) 11/11/2016   • Postmenopausal 11/11/2016   • Recurrent UTI 11/11/2016   • CVA (cerebrovascular accident) subacute 11/11/2016   • Dyspnea on exertion 01/19/2017   • Precordial pain 02/02/2017   • Abnormal EKG 02/02/2017   • Arthralgia 04/06/2017   • Accelerated hypertension 04/06/2017   • Acute joint pain 04/06/2017   • Easy bruisability 04/06/2017   • Cough productive of purulent sputum 12/26/2017   • Influenza A Subtype H3 12/26/2017   • Acute exacerbation of 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HCC) 12/26/2017   • Acute chest wall pain 12/26/2017   • Acute pain of left shoulder due to trauma 12/26/2017   • Decreased functional mobility and endurance since fall 12/20/17 12/26/2017     Resolved Ambulatory Problems     Diagnosis Date Noted   • No Resolved Ambulatory Problems     Past Medical History:   Diagnosis Date   • Arthritis    • Asthma    • Cancer (CMS/[facility])    • COPD (chronic obstructive pulmonary disease) (CMS/[facility])    • Emphysema lung (CMS/[facility])    • Generalized headaches    • Hayfever    • Hypertension    • Stroke (CMS/HCC)        PAST SURGICAL HISTORY  Past Surgical History:   Procedure Laterality Date   • BREAST LUMPECTOMY Left 04/01/2003   • CARPAL TUNNEL RELEASE  2011   • CATARACT EXTRACTION  2010   • HERNIA REPAIR  01/01/1971   • HERNIA REPAIR      right groin   • JOINT REPLACEMENT     • KNEE SURGERY Left 01/01/1998    bilateral knee replacements   • MASTECTOMY     • PARATHYROIDECTOMY  05/08/2006   • SINUS SURGERY  01/01/1984   • TOTAL  ABDOMINAL HYSTERECTOMY  01/01/1973       FAMILY HISTORY  Family History   Problem Relation Age of Onset   • Cancer Brother        SOCIAL HISTORY  Social History     Social History   • Marital status:      Spouse name: N/A   • Number of children: N/A   • Years of education: N/A     Occupational History   • Not on file.     Social History Main Topics   • Smoking status: Never Smoker   • Smokeless tobacco: Never Used   • Alcohol use Yes      Comment: rarely - once or twice year (wine or bernabe)   • Drug use: Unknown   • Sexual activity: Defer     Other Topics Concern   • Not on file     Social History Narrative   • No narrative on file       ALLERGIES  Morphine; Sulfa antibiotics; Hydralazine; Amlodipine; Anastrozole; Grass; Nsaids; Penicillins; Tree extract; and Zolpidem    REVIEW OF SYSTEMS  Review of Systems   Constitutional: Positive for chills. Negative for fever.   HENT: Positive for congestion (nasal with drainage). Negative for sore throat.    Eyes: Positive for photophobia.   Respiratory: Negative for cough and shortness of breath.    Cardiovascular: Negative for chest pain.   Gastrointestinal: Negative for abdominal pain, diarrhea and vomiting.   Genitourinary: Negative for dysuria.   Musculoskeletal: Negative for neck pain.   Skin: Negative for rash.   Allergic/Immunologic: Negative.    Neurological: Positive for headaches (x6 days). Negative for weakness and numbness.   Hematological: Negative.    Psychiatric/Behavioral: Negative.    All other systems reviewed and are negative.      PHYSICAL EXAM  ED Triage Vitals   Temp Heart Rate Resp BP SpO2   08/22/18 1600 08/22/18 1600 08/22/18 1600 08/22/18 1614 08/22/18 1600   97.9 °F (36.6 °C) 70 18 (!) 181/79 96 %      Temp src Heart Rate Source Patient Position BP Location FiO2 (%)   08/22/18 1600 -- -- -- --   Tympanic           Physical Exam   Constitutional: She is oriented to person, place, and time. No distress.   HENT:   Head: Normocephalic and  atraumatic.   Nose: Right sinus exhibits frontal sinus tenderness. Left sinus exhibits frontal sinus tenderness.   Eyes: Pupils are equal, round, and reactive to light. EOM are normal.   Neck: Normal range of motion. Neck supple.   Cardiovascular: Normal rate, regular rhythm and normal heart sounds.    Pulmonary/Chest: Effort normal and breath sounds normal. No respiratory distress.   Abdominal: Soft. There is no tenderness. There is no rebound and no guarding.   Musculoskeletal: Normal range of motion. She exhibits no edema.   Neurological: She is alert and oriented to person, place, and time. She has normal sensation and normal strength.   Skin: Skin is warm and dry. No rash noted.   Psychiatric: Mood and affect normal.   Nursing note and vitals reviewed.      LAB RESULTS  Lab Results (last 24 hours)     Procedure Component Value Units Date/Time    CBC & Differential [[id number]] Collected:  08/22/18 1616    Specimen:  Blood Updated:  08/22/18 1709    Narrative:       The following orders were created for panel order CBC & Differential.  Procedure                               Abnormality         Status                     ---------                               -----------         ------                     CBC Auto Differential[[id number]]        Abnormal            Final result                 Please view results for these tests on the individual orders.    Basic Metabolic Panel [[id number]]  (Abnormal) Collected:  08/22/18 1616    Specimen:  Blood Updated:  08/22/18 1716     Glucose 114 (H) mg/dL      BUN 18 mg/dL      Creatinine 1.10 (H) mg/dL      Sodium 140 mmol/L      Potassium 4.2 mmol/L      Chloride 106 mmol/L      CO2 25.1 mmol/L      Calcium 9.7 mg/dL      eGFR Non African Amer 48 (L) mL/min/1.73      BUN/Creatinine Ratio 16.4     Anion Gap 8.9 mmol/L     Narrative:       The MDRD GFR formula is only valid for adults with stable renal function between ages 18 and 70.    CBC Auto Differential [[id number]]   (Abnormal) Collected:  08/22/18 1616    Specimen:  Blood Updated:  08/22/18 1709     WBC 11.32 (H) 10*3/mm3      RBC 4.87 10*6/mm3      Hemoglobin 13.8 g/dL      Hematocrit 44.8 %      MCV 92.0 fL      MCH 28.3 pg      MCHC 30.8 (L) g/dL      RDW 15.0 (H) %      RDW-SD 50.4 fl      MPV 11.0 fL      Platelets 191 10*3/mm3      Neutrophil % 85.0 (H) %      Lymphocyte % 9.6 (L) %      Monocyte % 4.9 (L) %      Eosinophil % 0.0 (L) %      Basophil % 0.3 %      Immature Grans % 0.2 %      Neutrophils, Absolute 9.63 (H) 10*3/mm3      Lymphocytes, Absolute 1.09 10*3/mm3      Monocytes, Absolute 0.55 10*3/mm3      Eosinophils, Absolute 0.00 10*3/mm3      Basophils, Absolute 0.03 10*3/mm3      Immature Grans, Absolute 0.02 10*3/mm3     Urinalysis With Microscopic If Indicated (No Culture) - Urine, Catheter [[id number]]  (Abnormal) Collected:  08/22/18 1738    Specimen:  Urine from Urine, Catheter Updated:  08/22/18 1836     Color, UA Yellow     Appearance, UA Clear     pH, UA 7.0     Specific Gravity, UA 1.015     Glucose, UA Negative     Ketones, UA Negative     Bilirubin, UA Negative     Blood, UA Negative     Protein, UA Negative     Leuk Esterase, UA Trace (A)     Nitrite, UA Positive (A)     Urobilinogen, UA 0.2 E.U./dL    Urinalysis, Microscopic Only - Urine, Clean Catch [[id number]]  (Abnormal) Collected:  08/22/18 1738    Specimen:  Urine from Urine, Catheter Updated:  08/22/18 1836     RBC, UA 0-2 /HPF      WBC, UA 3-5 (A) /HPF      Bacteria, UA 4+ (A) /HPF      Squamous Epithelial Cells, UA 0-2 /HPF      Hyaline Casts, UA 0-2 /LPF      Methodology Automated Microscopy          I ordered the above labs and reviewed the results    RADIOLOGY  CT Head Without Contrast   Preliminary Result   No acute process identified.               Radiation dose reduction techniques were utilized, including automated   exposure control and exposure modulation based on body size.                   I ordered the above noted radiological  studies. Interpreted by radiologist. Reviewed by me in PACS.       PROCEDURES  Procedures      PROGRESS AND CONSULTS  ED Course as of Aug 22 2234   Wed Aug 22, 2018   1636 HA worse than normal, also with neck pain    [EP]      ED Course User Index  [EP] Crystal Tejada, APRN     1817  Ordered dilaudid for pt's HA, she has tolerated this in the past.  Ordered labetelol for pt's elevated BP.     1821  Pt able to ambulate to restroom without difficulty.    1914  BP  /141  Rechecked pt, who is in NAD but still c/o HA. BP remains elevated. Pt should have had her afternoon dose of clonidine but has not had it.  Plan to consult PCP and order clonidine dose. Pt understands and agrees with the plan, all questions answered.    1915  Ordered clonidine for pt's elevated BP and placed call to family medicine.     1923  Rechecked pt, who is resting comfortably.  Discussed with pt her urinalysis results indicating UTI.  Pt reports urgency and frequency and states she finished cipro for UTI 10 days ago. Discussed plan for admission for further evaluation and treatment of UTI and HTN. Pt understands and agrees with the plan, all questions answered.    1933  Discussed pt's case with Dr. Cadet (family med) who agrees to admit the pt to tele and requests I consult nephrology.      1945  Discussed pt's case with Dr. Guillen (nephrology) who agrees to consult the pt. Ordered rocephin for UTI.     MEDICAL DECISION MAKING  Results were reviewed/discussed with the patient and they were also made aware of online access. Pt also made aware that some labs, such as cultures, will not be resulted during ER visit and follow up with PMD is necessary.     MDM  Number of Diagnoses or Management Options  Acute intractable headache, unspecified headache type:   Acute UTI:   Uncontrolled hypertension:   Diagnosis management comments: Patient had a normal neuro exam.  Head CT was negative.  Patient was found to have a UTI.  Patient remained quite  hypertensive while in the ER.  She was given IV fluids, IV labetalol, IV Zofran, IV Reglan, IV Benadryl, IV Dilaudid, IV Rocephin, and clonidine.  She continued to complain of headache.  She was afebrile.  She did not have any neck stiffness.  Case was discussed with Dr. Cadet and he agreed to admit the patient.  Case was also discussed with Dr. Guillen of nephrology.       Amount and/or Complexity of Data Reviewed  Clinical lab tests: reviewed and ordered (Urinalysis: WBC 3-5, Bacteria 4+.  CMP: Creatinine 1.10.)  Tests in the radiology section of CPT®: ordered and reviewed (CT Head shows NAD.)  Decide to obtain previous medical records or to obtain history from someone other than the patient: yes  Review and summarize past medical records: yes (Pt last admitted 12/17 for influenza and asthmatic bronchitis.)  Discuss the patient with other providers: yes (Dr. Cadet ([facility] med).  Dr. Guillen (nephrology))  Independent visualization of images, tracings, or specimens: yes           DIAGNOSIS  Final diagnoses:   Uncontrolled hypertension   Acute intractable headache, unspecified headache type   Acute UTI       DISPOSITION  ADMISSION    Discussed treatment plan and reason for admission with pt/family and admitting physician.  Pt/family voiced understanding of the plan for admission for further testing/treatment as needed.       Latest Documented Vital Signs:  As of 10:34 PM  BP- 172/85 HR- 70 Temp- 97.7 °F (36.5 °C) (Oral) O2 sat- 96%    --  Documentation assistance provided by ema oCrdon for Dr. Olivo.  Information recorded by the ema was done at my direction and has been verified and validated by me.           Ruchi Cordon  08/22/18 1954       Óscar Olivo MD  08/22/18 [id number]

## 2018-08-22 NOTE — ED NOTES
HA to back of head x 3 days, states no recent head injury but did hit back of head 1 month ago, not on blood thinners.      Danielle Luna RN  08/22/18 [id number]

## 2018-08-22 NOTE — ED TRIAGE NOTES
Patient reports a headache X 3 days. She complains of nausea and photosensitivity. States she has been taking Fioricet at home with no relief.

## 2018-08-23 ENCOUNTER — APPOINTMENT (OUTPATIENT)
Dept: MRI IMAGING | Facility: HOSPITAL | Age: 78
End: 2018-08-23
Attending: INTERNAL MEDICINE

## 2018-08-23 ENCOUNTER — APPOINTMENT (OUTPATIENT)
Dept: CARDIOLOGY | Facility: HOSPITAL | Age: 78
End: 2018-08-23
Attending: INTERNAL MEDICINE

## 2018-08-23 ENCOUNTER — APPOINTMENT (OUTPATIENT)
Dept: GENERAL RADIOLOGY | Facility: HOSPITAL | Age: 78
End: 2018-08-23
Attending: INTERNAL MEDICINE

## 2018-08-23 ENCOUNTER — APPOINTMENT (OUTPATIENT)
Dept: CT IMAGING | Facility: HOSPITAL | Age: 78
End: 2018-08-23
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
## 2018-08-23 LAB
AMPHET+METHAMPHET UR QL: NEGATIVE
ANION GAP SERPL CALCULATED.3IONS-SCNC: 11.4 MMOL/L
ANION GAP SERPL CALCULATED.3IONS-SCNC: 13.2 MMOL/L
BARBITURATES UR QL SCN: POSITIVE
BASOPHILS # BLD AUTO: 0.03 10*3/MM3 (ref 0–0.2)
BASOPHILS # BLD AUTO: 0.04 10*3/MM3 (ref 0–0.2)
BASOPHILS NFR BLD AUTO: 0.2 % (ref 0–1.5)
BASOPHILS NFR BLD AUTO: 0.4 % (ref 0–1.5)
BENZODIAZ UR QL SCN: NEGATIVE
BH CV ECHO MEAS - ACS: 1.6 CM
BH CV ECHO MEAS - AO MAX PG: 10 MMHG
BH CV ECHO MEAS - AO MEAN PG (FULL): 3 MMHG
BH CV ECHO MEAS - AO MEAN PG: 6 MMHG
BH CV ECHO MEAS - AO ROOT AREA (BSA CORRECTED): 1.5
BH CV ECHO MEAS - AO ROOT AREA: 6.2 CM^2
BH CV ECHO MEAS - AO ROOT DIAM: 2.8 CM
BH CV ECHO MEAS - AO V2 MAX: 160 CM/SEC
BH CV ECHO MEAS - AO V2 MEAN: 113 CM/SEC
BH CV ECHO MEAS - AO V2 VTI: 34.1 CM
BH CV ECHO MEAS - ASC AORTA: 2.6 CM
BH CV ECHO MEAS - AVA(I,A): 2.7 CM^2
BH CV ECHO MEAS - AVA(I,D): 2.7 CM^2
BH CV ECHO MEAS - BSA(HAYCOCK): 2 M^2
BH CV ECHO MEAS - BSA: 1.9 M^2
BH CV ECHO MEAS - BZI_BMI: 34.9 KILOGRAMS/M^2
BH CV ECHO MEAS - BZI_METRIC_HEIGHT: 157.5 CM
BH CV ECHO MEAS - BZI_METRIC_WEIGHT: 86.6 KG
BH CV ECHO MEAS - EDV(CUBED): 68.9 ML
BH CV ECHO MEAS - EDV(MOD-SP2): 72 ML
BH CV ECHO MEAS - EDV(MOD-SP4): 73 ML
BH CV ECHO MEAS - EDV(TEICH): 74.2 ML
BH CV ECHO MEAS - EF(CUBED): 68.1 %
BH CV ECHO MEAS - EF(MOD-BP): 78 %
BH CV ECHO MEAS - EF(MOD-SP2): 77.8 %
BH CV ECHO MEAS - EF(MOD-SP4): 78.1 %
BH CV ECHO MEAS - EF(TEICH): 60.2 %
BH CV ECHO MEAS - ESV(CUBED): 22 ML
BH CV ECHO MEAS - ESV(MOD-SP2): 16 ML
BH CV ECHO MEAS - ESV(MOD-SP4): 16 ML
BH CV ECHO MEAS - ESV(TEICH): 29.6 ML
BH CV ECHO MEAS - FS: 31.7 %
BH CV ECHO MEAS - IVS/LVPW: 1
BH CV ECHO MEAS - IVSD: 1 CM
BH CV ECHO MEAS - LAT PEAK E' VEL: 8 CM/SEC
BH CV ECHO MEAS - LV DIASTOLIC VOL/BSA (35-75): 38.9 ML/M^2
BH CV ECHO MEAS - LV MASS(C)D: 132.1 GRAMS
BH CV ECHO MEAS - LV MASS(C)DI: 70.5 GRAMS/M^2
BH CV ECHO MEAS - LV MEAN PG: 3 MMHG
BH CV ECHO MEAS - LV SYSTOLIC VOL/BSA (12-30): 8.5 ML/M^2
BH CV ECHO MEAS - LV V1 MAX: 130 CM/SEC
BH CV ECHO MEAS - LV V1 MEAN: 84.7 CM/SEC
BH CV ECHO MEAS - LV V1 VTI: 29.2 CM
BH CV ECHO MEAS - LVIDD: 4.1 CM
BH CV ECHO MEAS - LVIDS: 2.8 CM
BH CV ECHO MEAS - LVLD AP2: 6.7 CM
BH CV ECHO MEAS - LVLD AP4: 7.6 CM
BH CV ECHO MEAS - LVLS AP2: 5.1 CM
BH CV ECHO MEAS - LVLS AP4: 5.9 CM
BH CV ECHO MEAS - LVOT AREA (M): 3.1 CM^2
BH CV ECHO MEAS - LVOT AREA: 3.1 CM^2
BH CV ECHO MEAS - LVOT DIAM: 2 CM
BH CV ECHO MEAS - LVPWD: 1 CM
BH CV ECHO MEAS - MED PEAK E' VEL: 6 CM/SEC
BH CV ECHO MEAS - MR MAX PG: 77 MMHG
BH CV ECHO MEAS - MR MAX VEL: 430 CM/SEC
BH CV ECHO MEAS - MV A DUR: 0.17 SEC
BH CV ECHO MEAS - MV A MAX VEL: 88.4 CM/SEC
BH CV ECHO MEAS - MV DEC SLOPE: 270 CM/SEC^2
BH CV ECHO MEAS - MV DEC TIME: 0.19 SEC
BH CV ECHO MEAS - MV E MAX VEL: 69.6 CM/SEC
BH CV ECHO MEAS - MV E/A: 0.79
BH CV ECHO MEAS - MV MEAN PG: 2 MMHG
BH CV ECHO MEAS - MV P1/2T MAX VEL: 79.1 CM/SEC
BH CV ECHO MEAS - MV P1/2T: 85.8 MSEC
BH CV ECHO MEAS - MV V2 MEAN: 63.9 CM/SEC
BH CV ECHO MEAS - MV V2 VTI: 24.7 CM
BH CV ECHO MEAS - MVA P1/2T LCG: 2.8 CM^2
BH CV ECHO MEAS - MVA(P1/2T): 2.6 CM^2
BH CV ECHO MEAS - MVA(VTI): 3.7 CM^2
BH CV ECHO MEAS - PA ACC SLOPE: 45.9 CM/SEC^2
BH CV ECHO MEAS - PA ACC TIME: 0.15 SEC
BH CV ECHO MEAS - PA MAX PG (FULL): 0.44 MMHG
BH CV ECHO MEAS - PA MAX PG: 3.1 MMHG
BH CV ECHO MEAS - PA PR(ACCEL): 11.1 MMHG
BH CV ECHO MEAS - PA V2 MAX: 87.7 CM/SEC
BH CV ECHO MEAS - PI END-D VEL: 122 CM/SEC
BH CV ECHO MEAS - PULM A REVS DUR: 0.12 SEC
BH CV ECHO MEAS - PULM A REVS VEL: 39.5 CM/SEC
BH CV ECHO MEAS - PULM DIAS VEL: 42.9 CM/SEC
BH CV ECHO MEAS - PULM S/D: 1.6
BH CV ECHO MEAS - PULM SYS VEL: 69.1 CM/SEC
BH CV ECHO MEAS - PVA(V,A): 3.2 CM^2
BH CV ECHO MEAS - PVA(V,D): 3.2 CM^2
BH CV ECHO MEAS - QP/QS: 0.72
BH CV ECHO MEAS - RAP SYSTOLE: 3 MMHG
BH CV ECHO MEAS - RV MAX PG: 2.6 MMHG
BH CV ECHO MEAS - RV MEAN PG: 2 MMHG
BH CV ECHO MEAS - RV V1 MAX: 81.2 CM/SEC
BH CV ECHO MEAS - RV V1 MEAN: 57.4 CM/SEC
BH CV ECHO MEAS - RV V1 VTI: 19 CM
BH CV ECHO MEAS - RVOT AREA: 3.5 CM^2
BH CV ECHO MEAS - RVOT DIAM: 2.1 CM
BH CV ECHO MEAS - RVSP: 26.4 MMHG
BH CV ECHO MEAS - SI(AO): 112 ML/M^2
BH CV ECHO MEAS - SI(CUBED): 25.1 ML/M^2
BH CV ECHO MEAS - SI(LVOT): 48.9 ML/M^2
BH CV ECHO MEAS - SI(MOD-SP2): 29.9 ML/M^2
BH CV ECHO MEAS - SI(MOD-SP4): 30.4 ML/M^2
BH CV ECHO MEAS - SI(TEICH): 23.8 ML/M^2
BH CV ECHO MEAS - SV(AO): 210 ML
BH CV ECHO MEAS - SV(CUBED): 47 ML
BH CV ECHO MEAS - SV(LVOT): 91.7 ML
BH CV ECHO MEAS - SV(MOD-SP2): 56 ML
BH CV ECHO MEAS - SV(MOD-SP4): 57 ML
BH CV ECHO MEAS - SV(RVOT): 65.8 ML
BH CV ECHO MEAS - SV(TEICH): 44.7 ML
BH CV ECHO MEAS - TAPSE (>1.6): 2.1 CM2
BH CV ECHO MEAS - TR MAX VEL: 242 CM/SEC
BH CV ECHO MEASUREMENTS AVERAGE E/E' RATIO: 9.94
BH CV VAS BP RIGHT ARM: NORMAL MMHG
BH CV XLRA - RV BASE: 2.9 CM
BH CV XLRA - TDI S': 18 CM/SEC
BUN BLD-MCNC: 14 MG/DL (ref 8–23)
BUN BLD-MCNC: 14 MG/DL (ref 8–23)
BUN/CREAT SERPL: 13.5 (ref 7–25)
BUN/CREAT SERPL: 13.9 (ref 7–25)
CALCIUM SPEC-SCNC: 8.5 MG/DL (ref 8.6–10.5)
CALCIUM SPEC-SCNC: 9.2 MG/DL (ref 8.6–10.5)
CANNABINOIDS SERPL QL: NEGATIVE
CHLORIDE SERPL-SCNC: 106 MMOL/L (ref 98–107)
CHLORIDE SERPL-SCNC: 107 MMOL/L (ref 98–107)
CO2 SERPL-SCNC: 23.6 MMOL/L (ref 22–29)
CO2 SERPL-SCNC: 23.8 MMOL/L (ref 22–29)
COCAINE UR QL: NEGATIVE
CREAT BLD-MCNC: 1.01 MG/DL (ref 0.57–1)
CREAT BLD-MCNC: 1.04 MG/DL (ref 0.57–1)
DEPRECATED RDW RBC AUTO: 50.9 FL (ref 37–54)
DEPRECATED RDW RBC AUTO: 51.2 FL (ref 37–54)
EOSINOPHIL # BLD AUTO: 0.02 10*3/MM3 (ref 0–0.7)
EOSINOPHIL # BLD AUTO: 0.04 10*3/MM3 (ref 0–0.7)
EOSINOPHIL NFR BLD AUTO: 0.2 % (ref 0.3–6.2)
EOSINOPHIL NFR BLD AUTO: 0.4 % (ref 0.3–6.2)
ERYTHROCYTE [DISTWIDTH] IN BLOOD BY AUTOMATED COUNT: 15.3 % (ref 11.7–13)
ERYTHROCYTE [DISTWIDTH] IN BLOOD BY AUTOMATED COUNT: 15.3 % (ref 11.7–13)
GFR SERPL CREATININE-BSD FRML MDRD: 51 ML/MIN/1.73
GFR SERPL CREATININE-BSD FRML MDRD: 53 ML/MIN/1.73
GLUCOSE BLD-MCNC: 106 MG/DL (ref 65–99)
GLUCOSE BLD-MCNC: 124 MG/DL (ref 65–99)
HCT VFR BLD AUTO: 39.9 % (ref 35.6–45.5)
HCT VFR BLD AUTO: 43.2 % (ref 35.6–45.5)
HGB BLD-MCNC: 12.2 G/DL (ref 11.9–15.5)
HGB BLD-MCNC: 13.8 G/DL (ref 11.9–15.5)
IMM GRANULOCYTES # BLD: 0.02 10*3/MM3 (ref 0–0.03)
IMM GRANULOCYTES # BLD: 0.04 10*3/MM3 (ref 0–0.03)
IMM GRANULOCYTES NFR BLD: 0.2 % (ref 0–0.5)
IMM GRANULOCYTES NFR BLD: 0.3 % (ref 0–0.5)
LEFT ATRIUM VOLUME INDEX: 23 ML/M2
LYMPHOCYTES # BLD AUTO: 1.92 10*3/MM3 (ref 0.9–4.8)
LYMPHOCYTES # BLD AUTO: 2.27 10*3/MM3 (ref 0.9–4.8)
LYMPHOCYTES NFR BLD AUTO: 15.3 % (ref 19.6–45.3)
LYMPHOCYTES NFR BLD AUTO: 21.2 % (ref 19.6–45.3)
MAGNESIUM SERPL-MCNC: 2.1 MG/DL (ref 1.6–2.4)
MAXIMAL PREDICTED HEART RATE: 140 BPM
MCH RBC QN AUTO: 27.8 PG (ref 26.9–32)
MCH RBC QN AUTO: 28.8 PG (ref 26.9–32)
MCHC RBC AUTO-ENTMCNC: 30.6 G/DL (ref 32.4–36.3)
MCHC RBC AUTO-ENTMCNC: 31.9 G/DL (ref 32.4–36.3)
MCV RBC AUTO: 90.2 FL (ref 80.5–98.2)
MCV RBC AUTO: 90.9 FL (ref 80.5–98.2)
METHADONE UR QL SCN: NEGATIVE
MONOCYTES # BLD AUTO: 1.1 10*3/MM3 (ref 0.2–1.2)
MONOCYTES # BLD AUTO: 1.1 10*3/MM3 (ref 0.2–1.2)
MONOCYTES NFR BLD AUTO: 10.3 % (ref 5–12)
MONOCYTES NFR BLD AUTO: 8.8 % (ref 5–12)
NEUTROPHILS # BLD AUTO: 7.22 10*3/MM3 (ref 1.9–8.1)
NEUTROPHILS # BLD AUTO: 9.45 10*3/MM3 (ref 1.9–8.1)
NEUTROPHILS NFR BLD AUTO: 67.5 % (ref 42.7–76)
NEUTROPHILS NFR BLD AUTO: 75.5 % (ref 42.7–76)
NT-PROBNP SERPL-MCNC: 571.9 PG/ML (ref 0–1800)
OPIATES UR QL: NEGATIVE
OXYCODONE UR QL SCN: NEGATIVE
PLATELET # BLD AUTO: 165 10*3/MM3 (ref 140–500)
PLATELET # BLD AUTO: 177 10*3/MM3 (ref 140–500)
PMV BLD AUTO: 11.2 FL (ref 6–12)
PMV BLD AUTO: 11.2 FL (ref 6–12)
POTASSIUM BLD-SCNC: 3.6 MMOL/L (ref 3.5–5.2)
POTASSIUM BLD-SCNC: 3.6 MMOL/L (ref 3.5–5.2)
PTH-INTACT SERPL-MCNC: 88.3 PG/ML (ref 15–65)
RBC # BLD AUTO: 4.39 10*6/MM3 (ref 3.9–5.2)
RBC # BLD AUTO: 4.79 10*6/MM3 (ref 3.9–5.2)
SODIUM BLD-SCNC: 141 MMOL/L (ref 136–145)
SODIUM BLD-SCNC: 144 MMOL/L (ref 136–145)
STRESS TARGET HR: 119 BPM
TROPONIN T SERPL-MCNC: <0.01 NG/ML (ref 0–0.03)
URATE SERPL-MCNC: 6.2 MG/DL (ref 2.4–5.7)
WBC NRBC COR # BLD: 10.69 10*3/MM3 (ref 4.5–10.7)
WBC NRBC COR # BLD: 12.52 10*3/MM3 (ref 4.5–10.7)

## 2018-08-23 PROCEDURE — 80307 DRUG TEST PRSMV CHEM ANLYZR: CPT | Performed by: INTERNAL MEDICINE

## 2018-08-23 PROCEDURE — 86225 DNA ANTIBODY NATIVE: CPT | Performed by: INTERNAL MEDICINE

## 2018-08-23 PROCEDURE — 86235 NUCLEAR ANTIGEN ANTIBODY: CPT | Performed by: INTERNAL MEDICINE

## 2018-08-23 PROCEDURE — 93306 TTE W/DOPPLER COMPLETE: CPT | Performed by: INTERNAL MEDICINE

## 2018-08-23 PROCEDURE — 63710000001 PREDNISONE PER 5 MG: Performed by: INTERNAL MEDICINE

## 2018-08-23 PROCEDURE — 85025 COMPLETE CBC W/AUTO DIFF WBC: CPT | Performed by: INTERNAL MEDICINE

## 2018-08-23 PROCEDURE — G0378 HOSPITAL OBSERVATION PER HR: HCPCS

## 2018-08-23 PROCEDURE — 25010000002 MAGNESIUM SULFATE IN D5W 1G/100ML (PREMIX) 1-5 GM/100ML-% SOLUTION: Performed by: PSYCHIATRY & NEUROLOGY

## 2018-08-23 PROCEDURE — 93005 ELECTROCARDIOGRAM TRACING: CPT | Performed by: NURSE PRACTITIONER

## 2018-08-23 PROCEDURE — 25010000002 METOCLOPRAMIDE PER 10 MG: Performed by: PSYCHIATRY & NEUROLOGY

## 2018-08-23 PROCEDURE — 99213 OFFICE O/P EST LOW 20 MIN: CPT | Performed by: INTERNAL MEDICINE

## 2018-08-23 PROCEDURE — 93306 TTE W/DOPPLER COMPLETE: CPT

## 2018-08-23 PROCEDURE — 84484 ASSAY OF TROPONIN QUANT: CPT | Performed by: INTERNAL MEDICINE

## 2018-08-23 PROCEDURE — 83735 ASSAY OF MAGNESIUM: CPT | Performed by: INTERNAL MEDICINE

## 2018-08-23 PROCEDURE — 80048 BASIC METABOLIC PNL TOTAL CA: CPT | Performed by: INTERNAL MEDICINE

## 2018-08-23 PROCEDURE — 84484 ASSAY OF TROPONIN QUANT: CPT | Performed by: NURSE PRACTITIONER

## 2018-08-23 PROCEDURE — 70544 MR ANGIOGRAPHY HEAD W/O DYE: CPT

## 2018-08-23 PROCEDURE — 70551 MRI BRAIN STEM W/O DYE: CPT

## 2018-08-23 PROCEDURE — 99205 OFFICE O/P NEW HI 60 MIN: CPT | Performed by: PSYCHIATRY & NEUROLOGY

## 2018-08-23 PROCEDURE — 25010000002 CEFTRIAXONE PER 250 MG: Performed by: INTERNAL MEDICINE

## 2018-08-23 PROCEDURE — 25010000002 ENOXAPARIN PER 10 MG: Performed by: INTERNAL MEDICINE

## 2018-08-23 PROCEDURE — 25010000002 HYDROMORPHONE PER 4 MG: Performed by: INTERNAL MEDICINE

## 2018-08-23 PROCEDURE — 84550 ASSAY OF BLOOD/URIC ACID: CPT | Performed by: INTERNAL MEDICINE

## 2018-08-23 PROCEDURE — 83970 ASSAY OF PARATHORMONE: CPT | Performed by: INTERNAL MEDICINE

## 2018-08-23 PROCEDURE — 25010000002 HYDRALAZINE PER 20 MG

## 2018-08-23 PROCEDURE — 25010000002 IOPAMIDOL 61 % SOLUTION: Performed by: INTERNAL MEDICINE

## 2018-08-23 PROCEDURE — 94760 N-INVAS EAR/PLS OXIMETRY 1: CPT

## 2018-08-23 PROCEDURE — 71046 X-RAY EXAM CHEST 2 VIEWS: CPT

## 2018-08-23 PROCEDURE — 25010000002 DIPHENHYDRAMINE PER 50 MG: Performed by: INTERNAL MEDICINE

## 2018-08-23 PROCEDURE — 93010 ELECTROCARDIOGRAM REPORT: CPT | Performed by: INTERNAL MEDICINE

## 2018-08-23 PROCEDURE — 71260 CT THORAX DX C+: CPT

## 2018-08-23 PROCEDURE — 83880 ASSAY OF NATRIURETIC PEPTIDE: CPT | Performed by: INTERNAL MEDICINE

## 2018-08-23 PROCEDURE — 74177 CT ABD & PELVIS W/CONTRAST: CPT

## 2018-08-23 RX ORDER — ACETAMINOPHEN 325 MG/1
650 TABLET ORAL EVERY 4 HOURS PRN
Status: DISCONTINUED | OUTPATIENT
Start: 2018-08-22 | End: 2018-08-27 | Stop reason: HOSPADM

## 2018-08-23 RX ORDER — DIPHENHYDRAMINE HCL 12.5MG/5ML
25 LIQUID (ML) ORAL EVERY 4 HOURS PRN
Status: DISCONTINUED | OUTPATIENT
Start: 2018-08-23 | End: 2018-08-27 | Stop reason: HOSPADM

## 2018-08-23 RX ORDER — NIFEDIPINE 60 MG/1
60 TABLET, EXTENDED RELEASE ORAL
Status: DISCONTINUED | OUTPATIENT
Start: 2018-08-24 | End: 2018-08-25

## 2018-08-23 RX ORDER — EPLERENONE 25 MG/1
50 TABLET, FILM COATED ORAL EVERY 12 HOURS SCHEDULED
Status: DISCONTINUED | OUTPATIENT
Start: 2018-08-23 | End: 2018-08-27 | Stop reason: HOSPADM

## 2018-08-23 RX ORDER — LOSARTAN POTASSIUM 50 MG/1
50 TABLET ORAL ONCE
Status: COMPLETED | OUTPATIENT
Start: 2018-08-23 | End: 2018-08-23

## 2018-08-23 RX ORDER — NIFEDIPINE 60 MG/1
60 TABLET, EXTENDED RELEASE ORAL 2 TIMES DAILY
Status: DISCONTINUED | OUTPATIENT
Start: 2018-08-23 | End: 2018-08-23

## 2018-08-23 RX ORDER — GUAIFENESIN 600 MG/1
600 TABLET, EXTENDED RELEASE ORAL EVERY 12 HOURS SCHEDULED
Status: DISCONTINUED | OUTPATIENT
Start: 2018-08-23 | End: 2018-08-27 | Stop reason: HOSPADM

## 2018-08-23 RX ORDER — BUTALBITAL, ACETAMINOPHEN AND CAFFEINE 50; 325; 40 MG/1; MG/1; MG/1
1 TABLET ORAL EVERY 4 HOURS PRN
Status: DISCONTINUED | OUTPATIENT
Start: 2018-08-22 | End: 2018-08-25

## 2018-08-23 RX ORDER — CLONIDINE HYDROCHLORIDE 0.1 MG/1
0.2 TABLET ORAL EVERY 8 HOURS SCHEDULED
Status: DISCONTINUED | OUTPATIENT
Start: 2018-08-23 | End: 2018-08-23

## 2018-08-23 RX ORDER — POLYETHYLENE GLYCOL 3350 17 G/17G
17 POWDER, FOR SOLUTION ORAL DAILY PRN
Status: DISCONTINUED | OUTPATIENT
Start: 2018-08-23 | End: 2018-08-27 | Stop reason: HOSPADM

## 2018-08-23 RX ORDER — LABETALOL 100 MG/1
100 TABLET, FILM COATED ORAL EVERY 12 HOURS SCHEDULED
Status: DISCONTINUED | OUTPATIENT
Start: 2018-08-23 | End: 2018-08-23

## 2018-08-23 RX ORDER — FLUTICASONE PROPIONATE 50 MCG
1 SPRAY, SUSPENSION (ML) NASAL 2 TIMES DAILY
Status: DISCONTINUED | OUTPATIENT
Start: 2018-08-23 | End: 2018-08-27 | Stop reason: HOSPADM

## 2018-08-23 RX ORDER — CLONIDINE HYDROCHLORIDE 0.1 MG/1
0.1 TABLET ORAL EVERY 8 HOURS SCHEDULED
Status: DISCONTINUED | OUTPATIENT
Start: 2018-08-23 | End: 2018-08-25

## 2018-08-23 RX ORDER — CEFTRIAXONE SODIUM 1 G/50ML
1 INJECTION, SOLUTION INTRAVENOUS EVERY 24 HOURS
Status: DISCONTINUED | OUTPATIENT
Start: 2018-08-23 | End: 2018-08-26

## 2018-08-23 RX ORDER — DOCUSATE SODIUM 100 MG/1
100 CAPSULE, LIQUID FILLED ORAL DAILY
Status: DISCONTINUED | OUTPATIENT
Start: 2018-08-23 | End: 2018-08-27 | Stop reason: HOSPADM

## 2018-08-23 RX ORDER — ALBUTEROL SULFATE 2.5 MG/3ML
2.5 SOLUTION RESPIRATORY (INHALATION) EVERY 4 HOURS PRN
Status: DISCONTINUED | OUTPATIENT
Start: 2018-08-22 | End: 2018-08-27 | Stop reason: HOSPADM

## 2018-08-23 RX ORDER — EPINEPHRINE 1 MG/ML
0.3 INJECTION, SOLUTION, CONCENTRATE INTRAVENOUS 2 TIMES DAILY PRN
Status: DISCONTINUED | OUTPATIENT
Start: 2018-08-23 | End: 2018-08-27 | Stop reason: HOSPADM

## 2018-08-23 RX ORDER — HYDRALAZINE HYDROCHLORIDE 20 MG/ML
20 INJECTION INTRAMUSCULAR; INTRAVENOUS EVERY 6 HOURS PRN
Status: DISCONTINUED | OUTPATIENT
Start: 2018-08-23 | End: 2018-08-24

## 2018-08-23 RX ORDER — DIPHENHYDRAMINE HYDROCHLORIDE 50 MG/ML
25 INJECTION INTRAMUSCULAR; INTRAVENOUS EVERY 8 HOURS
Status: DISPENSED | OUTPATIENT
Start: 2018-08-23 | End: 2018-08-24

## 2018-08-23 RX ORDER — DEXTROSE, SODIUM CHLORIDE, AND POTASSIUM CHLORIDE 5; .45; .15 G/100ML; G/100ML; G/100ML
50 INJECTION INTRAVENOUS CONTINUOUS
Status: DISCONTINUED | OUTPATIENT
Start: 2018-08-23 | End: 2018-08-25

## 2018-08-23 RX ORDER — SODIUM CHLORIDE 0.9 % (FLUSH) 0.9 %
1-10 SYRINGE (ML) INJECTION AS NEEDED
Status: DISCONTINUED | OUTPATIENT
Start: 2018-08-22 | End: 2018-08-27 | Stop reason: HOSPADM

## 2018-08-23 RX ORDER — PROMETHAZINE HYDROCHLORIDE AND CODEINE PHOSPHATE 6.25; 1 MG/5ML; MG/5ML
5 SYRUP ORAL 4 TIMES DAILY PRN
Status: DISCONTINUED | OUTPATIENT
Start: 2018-08-23 | End: 2018-08-27 | Stop reason: HOSPADM

## 2018-08-23 RX ORDER — MAGNESIUM SULFATE 1 G/100ML
1 INJECTION INTRAVENOUS EVERY 8 HOURS
Status: DISPENSED | OUTPATIENT
Start: 2018-08-23 | End: 2018-08-24

## 2018-08-23 RX ORDER — NITROGLYCERIN 0.4 MG/1
0.4 TABLET SUBLINGUAL
Status: DISCONTINUED | OUTPATIENT
Start: 2018-08-23 | End: 2018-08-27 | Stop reason: HOSPADM

## 2018-08-23 RX ORDER — ONDANSETRON 4 MG/1
8 TABLET, ORALLY DISINTEGRATING ORAL EVERY 6 HOURS PRN
Status: DISCONTINUED | OUTPATIENT
Start: 2018-08-23 | End: 2018-08-27 | Stop reason: HOSPADM

## 2018-08-23 RX ORDER — MONTELUKAST SODIUM 10 MG/1
10 TABLET ORAL NIGHTLY
Status: DISCONTINUED | OUTPATIENT
Start: 2018-08-23 | End: 2018-08-27 | Stop reason: HOSPADM

## 2018-08-23 RX ORDER — MECLIZINE HYDROCHLORIDE 25 MG/1
25 TABLET ORAL 3 TIMES DAILY PRN
Status: DISCONTINUED | OUTPATIENT
Start: 2018-08-23 | End: 2018-08-27 | Stop reason: HOSPADM

## 2018-08-23 RX ORDER — ETHACRYNIC ACID 25 MG/1
25 TABLET ORAL 3 TIMES DAILY
Status: DISCONTINUED | OUTPATIENT
Start: 2018-08-23 | End: 2018-08-23

## 2018-08-23 RX ORDER — LABETALOL 200 MG/1
200 TABLET, FILM COATED ORAL EVERY 12 HOURS SCHEDULED
Status: DISCONTINUED | OUTPATIENT
Start: 2018-08-23 | End: 2018-08-27 | Stop reason: HOSPADM

## 2018-08-23 RX ORDER — HYDRALAZINE HYDROCHLORIDE 20 MG/ML
INJECTION INTRAMUSCULAR; INTRAVENOUS
Status: COMPLETED
Start: 2018-08-23 | End: 2018-08-23

## 2018-08-23 RX ORDER — DIPHENHYDRAMINE HYDROCHLORIDE 50 MG/ML
25 INJECTION INTRAMUSCULAR; INTRAVENOUS EVERY 6 HOURS PRN
Status: DISCONTINUED | OUTPATIENT
Start: 2018-08-23 | End: 2018-08-27 | Stop reason: HOSPADM

## 2018-08-23 RX ORDER — CHOLECALCIFEROL (VITAMIN D3) 125 MCG
1000 CAPSULE ORAL DAILY
Status: DISCONTINUED | OUTPATIENT
Start: 2018-08-23 | End: 2018-08-27 | Stop reason: HOSPADM

## 2018-08-23 RX ORDER — ASPIRIN 81 MG/1
81 TABLET ORAL DAILY
Status: DISCONTINUED | OUTPATIENT
Start: 2018-08-23 | End: 2018-08-27 | Stop reason: HOSPADM

## 2018-08-23 RX ORDER — PREDNISONE 10 MG/1
10 TABLET ORAL EVERY OTHER DAY
Status: DISCONTINUED | OUTPATIENT
Start: 2018-08-23 | End: 2018-08-27 | Stop reason: HOSPADM

## 2018-08-23 RX ORDER — MELATONIN
1000 DAILY
Status: DISCONTINUED | OUTPATIENT
Start: 2018-08-23 | End: 2018-08-27 | Stop reason: HOSPADM

## 2018-08-23 RX ORDER — LORAZEPAM 0.5 MG/1
0.5 TABLET ORAL EVERY 8 HOURS PRN
Status: DISCONTINUED | OUTPATIENT
Start: 2018-08-23 | End: 2018-08-27 | Stop reason: HOSPADM

## 2018-08-23 RX ORDER — PANTOPRAZOLE SODIUM 40 MG/1
40 TABLET, DELAYED RELEASE ORAL
Status: DISCONTINUED | OUTPATIENT
Start: 2018-08-23 | End: 2018-08-27 | Stop reason: HOSPADM

## 2018-08-23 RX ORDER — FLUTICASONE PROPIONATE 50 MCG
2 SPRAY, SUSPENSION (ML) NASAL 2 TIMES DAILY
Status: DISCONTINUED | OUTPATIENT
Start: 2018-08-23 | End: 2018-08-23

## 2018-08-23 RX ORDER — METOCLOPRAMIDE HYDROCHLORIDE 5 MG/ML
10 INJECTION INTRAMUSCULAR; INTRAVENOUS EVERY 8 HOURS
Status: DISPENSED | OUTPATIENT
Start: 2018-08-23 | End: 2018-08-24

## 2018-08-23 RX ADMIN — NIFEDIPINE 60 MG: 60 TABLET, EXTENDED RELEASE ORAL at 12:13

## 2018-08-23 RX ADMIN — GUAIFENESIN 600 MG: 600 TABLET, EXTENDED RELEASE ORAL at 12:12

## 2018-08-23 RX ADMIN — PREDNISONE 10 MG: 10 TABLET ORAL at 12:12

## 2018-08-23 RX ADMIN — EPLERENONE 50 MG: 25 TABLET, FILM COATED ORAL at 20:48

## 2018-08-23 RX ADMIN — CLONIDINE HYDROCHLORIDE 0.2 MG: 0.1 TABLET ORAL at 05:34

## 2018-08-23 RX ADMIN — ASPIRIN 81 MG: 81 TABLET, FILM COATED ORAL at 12:12

## 2018-08-23 RX ADMIN — GUAIFENESIN 600 MG: 600 TABLET, EXTENDED RELEASE ORAL at 20:48

## 2018-08-23 RX ADMIN — DIPHENHYDRAMINE HYDROCHLORIDE 25 MG: 50 INJECTION, SOLUTION INTRAMUSCULAR; INTRAVENOUS at 18:12

## 2018-08-23 RX ADMIN — POTASSIUM CHLORIDE, DEXTROSE MONOHYDRATE AND SODIUM CHLORIDE 50 ML/HR: 150; 5; 450 INJECTION, SOLUTION INTRAVENOUS at 01:09

## 2018-08-23 RX ADMIN — FLUTICASONE PROPIONATE 1 SPRAY: 50 SPRAY, METERED NASAL at 20:48

## 2018-08-23 RX ADMIN — MONTELUKAST SODIUM 10 MG: 10 TABLET, FILM COATED ORAL at 20:48

## 2018-08-23 RX ADMIN — LOSARTAN POTASSIUM: 50 TABLET, FILM COATED ORAL at 18:12

## 2018-08-23 RX ADMIN — PANTOPRAZOLE SODIUM 40 MG: 40 TABLET, DELAYED RELEASE ORAL at 05:34

## 2018-08-23 RX ADMIN — LORAZEPAM 0.5 MG: 0.5 TABLET ORAL at 14:15

## 2018-08-23 RX ADMIN — LABETALOL HCL 100 MG: 100 TABLET, FILM COATED ORAL at 12:12

## 2018-08-23 RX ADMIN — BUTALBITAL, ACETAMINOPHEN, AND CAFFEINE 1 TABLET: 50; 325; 40 TABLET ORAL at 14:15

## 2018-08-23 RX ADMIN — ONDANSETRON 8 MG: 4 TABLET, ORALLY DISINTEGRATING ORAL at 14:37

## 2018-08-23 RX ADMIN — Medication 1000 MCG: at 12:12

## 2018-08-23 RX ADMIN — ENOXAPARIN SODIUM 40 MG: 40 INJECTION SUBCUTANEOUS at 12:11

## 2018-08-23 RX ADMIN — IOPAMIDOL 85 ML: 612 INJECTION, SOLUTION INTRAVENOUS at 09:23

## 2018-08-23 RX ADMIN — CLONIDINE HYDROCHLORIDE 0.1 MG: 0.1 TABLET ORAL at 14:15

## 2018-08-23 RX ADMIN — DOCUSATE SODIUM 100 MG: 100 CAPSULE, LIQUID FILLED ORAL at 12:11

## 2018-08-23 RX ADMIN — CLONIDINE HYDROCHLORIDE 0.1 MG: 0.1 TABLET ORAL at 23:21

## 2018-08-23 RX ADMIN — FLUTICASONE PROPIONATE 1 SPRAY: 50 SPRAY, METERED NASAL at 12:12

## 2018-08-23 RX ADMIN — HYDRALAZINE HYDROCHLORIDE 20 MG: 20 INJECTION INTRAMUSCULAR; INTRAVENOUS at 13:14

## 2018-08-23 RX ADMIN — VALPROATE SODIUM 500 MG: 100 INJECTION, SOLUTION INTRAVENOUS at 18:11

## 2018-08-23 RX ADMIN — METOCLOPRAMIDE 10 MG: 5 INJECTION, SOLUTION INTRAMUSCULAR; INTRAVENOUS at 18:12

## 2018-08-23 RX ADMIN — MAGNESIUM SULFATE HEPTAHYDRATE 1 G: 1 INJECTION, SOLUTION INTRAVENOUS at 19:34

## 2018-08-23 RX ADMIN — CEFTRIAXONE SODIUM 1 G: 1 INJECTION, SOLUTION INTRAVENOUS at 23:20

## 2018-08-23 RX ADMIN — LOSARTAN POTASSIUM 50 MG: 50 TABLET, FILM COATED ORAL at 20:48

## 2018-08-23 RX ADMIN — EPLERENONE 50 MG: 25 TABLET, FILM COATED ORAL at 12:11

## 2018-08-23 RX ADMIN — HYDROMORPHONE HYDROCHLORIDE 0.5 MG: 1 INJECTION, SOLUTION INTRAMUSCULAR; INTRAVENOUS; SUBCUTANEOUS at 04:08

## 2018-08-23 RX ADMIN — LABETALOL HCL 200 MG: 200 TABLET, FILM COATED ORAL at 20:48

## 2018-08-23 RX ADMIN — VITAMIN D, TAB 1000IU (100/BT) 1000 UNITS: 25 TAB at 12:12

## 2018-08-23 NOTE — SIGNIFICANT NOTE
08/23/18 1615   Rehab Time/Intention   Evaluation Not Performed other (see comments)  (pt back in room after MRI, blanket and washcloth over face, pt arousable but falling right back asleep. will check status tomorrow )   Rehab Treatment   Discipline physical therapist   Recommendation   PT - Next Appointment 08/24/18

## 2018-08-23 NOTE — SIGNIFICANT NOTE
08/23/18 1424   Rehab Time/Intention   Evaluation Not Performed other (see comments)  (Hold today per Jennifer ABREU. Pt leaving for MRI soon. Will follow up tomorrow)   Rehab Treatment   Discipline physical therapist

## 2018-08-23 NOTE — PROGRESS NOTES
"Pharmacy Consult - Lovenox    Sasha Barrett has a consult for pharmacy to dose enoxaparin for prophylaxis of VTE per Dr. Cadet's request.       Relevant clinical data and objective history reviewed:  78 y.o. female 157.5 cm (62\") 87.4 kg (192 lb 10.9 oz)      Past Medical History:   Diagnosis Date   • Arthritis    • Asthma    • Cancer (CMS/HCC)    • COPD (chronic obstructive pulmonary disease) (CMS/HCC)    • Emphysema lung (CMS/HCC)    • Generalized headaches    • Hayfever    • Hypertension    • Stroke (CMS/HCC)      is allergic to morphine; sulfa antibiotics; hydralazine; amlodipine; anastrozole; grass; nsaids; penicillins; tree extract; and zolpidem.    Lab Results   Component Value Date    WBC 11.32 (H) 08/22/2018    HGB 13.8 08/22/2018    HCT 44.8 08/22/2018    MCV 92.0 08/22/2018     08/22/2018     Lab Results   Component Value Date    GLUCOSE 114 (H) 08/22/2018    CALCIUM 9.7 08/22/2018     08/22/2018    K 4.2 08/22/2018    CO2 25.1 08/22/2018     08/22/2018    BUN 18 08/22/2018    CREATININE 1.10 (H) 08/22/2018    EGFRIFAFRI 63 05/03/2017    EGFRIFNONA 48 (L) 08/22/2018    BCR 16.4 08/22/2018    ANIONGAP 8.9 08/22/2018       Estimated Creatinine Clearance: 41.9 mL/min (A) (by C-G formula based on SCr of 1.1 mg/dL (H)).    Assessment/Plan    Will start patient on Lovenox 40mg subcutaneous every 24 hours. Pharmacy will continue to follow.     Marielle Steinberg, [facility]    "

## 2018-08-23 NOTE — CONSULTS
Neurology Consult Note    Consult Date: 8/23/2018    Referring MD: Óscar Cadet MD    Reason for Consult I have been asked to see the patient in neurological consultation to render advice and opinion regarding intractable headache    Sasha Barrett is a 78 y.o. female with past medical history of migraine, hypertension, COPD who presented yesterday for intractable headache.  She reports onset of pulsating occipital headache about 4 days ago with associated nausea and photophobia.  Her headache is been persistent throughout that time and progressively worsening.  On arrival to the hospital she was found to be severely hypertensive well over 200 systolic.  Since admission she has gotten treatment for hypertension but he remains elevated.  Her headache is been persistent and occasionally radiates to the front of her head over the left eye.  She has been on Fioricet at home per her headache with no relief.  She's never been on a preventative medication for migraine     Past Medical/Surgical Hx:  Past Medical History:   Diagnosis Date   • Arthritis    • Asthma    • Cancer (CMS/HCC)    • COPD (chronic obstructive pulmonary disease) (CMS/HCC)    • Emphysema lung (CMS/HCC)    • Generalized headaches    • Hayfever    • Hypertension    • Stroke (CMS/HCC)      Past Surgical History:   Procedure Laterality Date   • BREAST LUMPECTOMY Left 04/01/2003   • CARPAL TUNNEL RELEASE  2011   • CATARACT EXTRACTION  2010   • HERNIA REPAIR  01/01/1971   • HERNIA REPAIR      right groin   • JOINT REPLACEMENT     • KNEE SURGERY Left 01/01/1998    bilateral knee replacements   • MASTECTOMY     • PARATHYROIDECTOMY  05/08/2006   • SINUS SURGERY  01/01/1984   • TOTAL ABDOMINAL HYSTERECTOMY  01/01/1973       Medications On Admission  Prescriptions Prior to Admission   Medication Sig Dispense Refill Last Dose   • albuterol (PROAIR HFA) 108 (90 Base) MCG/ACT inhaler ProAir  (90 Base) MCG/ACT Inhalation Aerosol Solution; Patient Sig: ProAir   (90 Base) MCG/ACT Inhalation Aerosol Solution ; 8; 0; 18-Jun-2014; Active   8/22/2018 at Unknown time   • aspirin 81 MG tablet Take 81 mg by mouth Daily.   8/21/2018 at Unknown time   • butalbital-acetaminophen-caffeine (FIORICET, ESGIC) -40 MG per tablet Take 2 tablets by mouth Every 4 (Four) Hours As Needed for Headache. (Patient taking differently: Take 1 tablet by mouth Every 4 (Four) Hours As Needed for Headache.) 240 tablet 0 8/22/2018 at Unknown time   • CloNIDine (CATAPRES) 0.2 MG tablet Take 1 tablet by mouth Every 8 (Eight) Hours. 270 tablet 3 8/22/2018 at Unknown time   • diphenhydrAMINE (BENADRYL) 50 MG tablet Take 1 tablet by mouth every 4 (four) hours as needed for itching or allergies. 180 tablet 0 8/22/2018 at Unknown time   • eplerenone (INSPRA) 50 MG tablet Take 1 tablet by mouth Every 12 (Twelve) Hours. 180 tablet 3 8/22/2018 at Unknown time   • ethacrynic acid (EDECRIN) 25 MG tablet Take 25 mg by mouth 3 (Three) Times a Day.   8/22/2018 at Unknown time   • montelukast (SINGULAIR) 10 MG tablet Take 1 tablet by mouth every night. 90 tablet 1 8/21/2018 at Unknown time   • NIFEdipine XL (PROCARDIA XL) 60 MG 24 hr tablet TAKE ONE TABLET TWICE DAILY 180 tablet 0 8/22/2018 at Unknown time   • omeprazole (priLOSEC) 40 MG capsule TAKE ONE CAPSULE BY MOUTH EVERY DAY (Patient taking differently: TAKE ONE CAPSULE BY [facility] daily PRN) 90 capsule 0 Taking   • ondansetron ODT (ZOFRAN-ODT) 8 MG disintegrating tablet Take 1 tablet by mouth every 6 (six) hours as needed for nausea or vomiting. 360 tablet 1 Taking   • pentazocine-naloxone (TALWIN NX) 50-0.5 MG per tablet Take 2 tablets by mouth Every 4 (Four) Hours As Needed for Mild Pain (1-3) (for up to 90 days). (Patient taking differently: Take 1 tablet by mouth Every 4 (Four) Hours As Needed for Mild Pain (1-3) (for up to 90 days).) 1080 tablet 0 8/22/2018 at Unknown time   • polyethylene glycol (MIRALAX) powder Take one capful w/liquid daily 765 g  1 8/22/2018 at Unknown time   • promethazine-codeine (PHENERGAN with CODEINE) 6.25-10 MG/5ML syrup Take 5 mL by mouth 4 (Four) Times a Day As Needed for cough. 360 mL 0 Taking   • Cholecalciferol (VITAMIN D) 1000 UNITS tablet Take  by mouth.   Taking   • guaiFENesin (MUCINEX) 600 MG 12 hr tablet Take 1 tablet by mouth Every 12 (Twelve) Hours. 60 tablet 5    • LORazepam (ATIVAN) 0.5 MG tablet Take 1 tablet by mouth Every 8 (Eight) Hours As Needed for anxiety. Take 1 tab 1 hoour before MRI and repeat x 1 at time of test 60 tablet 0 Taking   • meclizine (ANTIVERT) 25 MG tablet TAKE ONE TABLET BY MOUTH THREE TIMES DAILY AS NEEDED FOR DIZZINESS 30 tablet 0 Taking   • nystatin (MYCOSTATIN) 681093 UNIT/ML suspension Take 5 mL by mouth 4 (Four) Times a Day. 60 mL 3    • vitamin B-12 (VITAMIN B-12) 1000 MCG tablet Take 1 tablet by mouth Daily. 90 tablet 3 Taking       Allergies:  Allergies   Allergen Reactions   • Morphine Anaphylaxis   • Sulfa Antibiotics Anaphylaxis and Hives     Or sulfa fillers in meds   • Hydralazine Myalgia     Patient reports leg cramps, joint pain and increased fatigue   • Amlodipine      KIDNEYS SHUT DOWN   • Anastrozole    • Grass    • Nsaids      KIDNEY FAILURE   • Penicillins      Tolerated ceftriaxone during 04/2017 admission   • Tree Extract    • Zolpidem      HALLUCINATIONS       Social Hx:  Social History     Social History   • Marital status:      Spouse name: N/A   • Number of children: N/A   • Years of education: N/A     Occupational History   • Not on file.     Social History Main Topics   • Smoking status: Never Smoker   • Smokeless tobacco: Never Used   • Alcohol use Yes      Comment: rarely - once or twice year (wine or bernabe)   • Drug use: Unknown   • Sexual activity: Defer     Other Topics Concern   • Not on file     Social History Narrative   • No narrative on file       Family Hx:  Family History   Problem Relation Age of Onset   • Cancer Brother        Review of  "Systems  Constitutional: [No fevers, chills]  Eye: [+ recent visual problems, no eye discharge]  HEENT: [No ear pain, nasal congestion]  Respiratory: [No shortness of breath, cough]  Cardiovascular: [No Chest pain, palpitations]  Gastrointestinal: [No nausea, vomiting]  Genitourinary: [No hematuria, incontinence]  Hema/Lymph: [no nosebleeds, history of anticoagulation]  Endocrine: [Negative for excessive urination, heat or cold intolerance]  Musculoskeletal: [No back pain, neck pain]  Integumentary: [No rash, pruritus]  Neurologic: [No weakness, numbness]  Psychiatric: [No anxiety, depression]    Exam    /80 (BP Location: Right arm, Patient Position: Lying)   Pulse 76   Temp 98.9 °F (37.2 °C) (Oral)   Resp 18   Ht 157.5 cm (62\")   Wt 86.7 kg (191 lb 2.2 oz)   SpO2 93%   BMI 34.96 kg/m²   gen: NAD, vitals reviewed  Eyes: fundus sharp with no papilledema or retinal hemorrhages  HEENT: no nuchal rigidity  CVS: RRR, S1, S2  MS: oriented x3, recent/remote memory intact, normal attention/concentration, language intact, no neglect, normal fund of knowledge  CN: visual acuity grossly normal, visual fields full, PERRL, EOMI, facial sensation equal, no facial droop, hearing symmetric, no dysarthria, shoulder shrug equal, tongue midline  Motor: 5/5 throughout upper and lower extremities, normal tone  Sensation: intact to vibration and temperature throughout  Reflexes: 2+ throughout upper and lower extremities, downgoing plantars  Coordination: no dysmetria with finger to nose bilaterally  Gait: no ataxia    DATA:    Lab Results   Component Value Date    GLUCOSE 106 (H) 08/23/2018    CALCIUM 8.5 (L) 08/23/2018     08/23/2018    K 3.6 08/23/2018    CO2 23.6 08/23/2018     08/23/2018    BUN 14 08/23/2018    CREATININE 1.01 (H) 08/23/2018    EGFRIFAFRI 63 05/03/2017    EGFRIFNONA 53 (L) 08/23/2018    BCR 13.9 08/23/2018    ANIONGAP 11.4 08/23/2018     Lab Results   Component Value Date    WBC 10.69 " 08/23/2018    HGB 12.2 08/23/2018    HCT 39.9 08/23/2018    MCV 90.9 08/23/2018     08/23/2018     Lab Results   Component Value Date    CHOL 182 12/26/2017     Lab Results   Component Value Date    HDL 46 12/26/2017    HDL 59 05/03/2017    HDL 62 11/11/2016     Lab Results   Component Value Date    LDL 99 12/26/2017     (H) 05/03/2017     (H) 11/11/2016     Lab Results   Component Value Date    TRIG 184 (H) 12/26/2017    TRIG 202 (H) 05/03/2017    TRIG 175 (H) 11/11/2016     No results found for: HGBA1C  No results found for: INR, PROTIME    CBC within normal limits, BMP consistent with chronic kidney disease    Imaging review: CT head personally reviewed which shows some hypodensities in the posterior subcortical white matter suspicious for edema potentially related to PRES given her severe hypertension.  Otherwise CT looks normal with mild generalized atrophy and no large stroke or ICH.  Radiology report reviewed, study reported as normal    Impression:  1) migraine without aura with status migrainosus  2) hypertensive emergency    Comment: 78-year-old woman with recurrent migraine without aura presents with status migrainosus for 4 days in the setting of severe hypertension on admission.  She has multiple red flags for headache including occipital location, change in from the usual character of her headache, severe intensity, possible abnormality on CT head.  Neurologic exam is normal including funduscopic exam.  Agree with getting MRI brain and will add MRA of the head.  We'll try to treat her symptoms with migraine cocktail, Depakote, magnesium, Reglan, Benadryl given IV every 8H for 3 doses.  Ultimately I don't think her headache will get better until her hypertension comes down and this is being worked on actively.    PLAN:  1.  MRI brain without, MRA head without to exclude secondary causes of headache  2.  Migraine cocktails every 8 with Reglan, Benadryl, Depakote, magnesium  3.   Treatment for hypertensive emergency per primary team    Will follow

## 2018-08-23 NOTE — H&P
[facility]  INTERNAL MEDICINE  ÓSCAR CADET MD  [address] [place] [address]  Phone [phone] Fax [phone]  E-mail:  [email or web address]      INTERNAL MEDICINE HISTORY AND PHYSICAL EXAM  Óscar Cadet M.D.  2018            Patient Identification:  Name: Sasha Barrett  Age: 78 y.o.  Sex: female  :  1940  MRN: [id number]         Primary Care Physician: Óscar Cadet MD  LENGTH OF STAY 0 DAYS    Consults     Date and Time Order Name Status Description    2018 0105 Inpatient Cardiology Consult      2018 0050 Inpatient Urology Consult      2018 0020 Inpatient Consult to Neurology      20180 Inpatient Consult to Nephrology      2018 193 Nephrology (on -call MD unless specified) Completed     2018 191 Family Medicine Consult Completed           Chief Complaint:  Accelerated hypertension with intractable headache    History of Present Illness:  Subjective     Interval History: Patient is a 78 y.o.female who presented with intractable posterior headache to [facility], [place], operated by [facility] emergency room on 2018 5:20 PM.  Patient reports that the headache is actually been present for more than 6 days and in conversation with me indicates that it really started 2018 when she fell from a standing position and struck the back side of her head.  Her headache at that time was unrelenting for at least 2 days and since that time she has continued to have intermittent headaches she denies any weakness, blurred vision, facial asymmetry, difficulty walking, dizziness, or other signs or symptoms of stroke.  Patient came to the emergency primarily because her blood pressure was extremely elevated at home in the range of 220/140.  Patient has had similar episodes of hypertension in the past which are severe headaches and she was unable to get any improvement in her pain using multiple medications at her disposal at home.   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  Patient has had a long history of complications with blood pressure and has followed with Dr. Bond in renal for this problem over the last few years.  In addition, patient has problems with a neurogenic bladder and actually does self catheterizations twice daily.  In the emergency room she was found to have evidence of a UTI despite recent treatment on an outpatient basis with Cipro for this condition.    Patient was seen in the emergency room by Dr. Óscar Olivo.  On his exam and history of present illness she was complaining of posterior headache of 6 days duration.  She stated that the headache began at the back of her head and gradually moved to the front.  She has a diagnosis in the past of migraine headaches and has been treated apparently with Demerol in the past for this problem.  Patient has exacerbation of her headache with bright lights and loud noises.  She also complained of chills, congestion, and clear sinus drainage.  The Fioricet that the patient uses for her headaches has provided no relief.   The pollen that she has used for her pain also provides no relief.  Patient does indicate that she takes 1 pollen every 4 hours but review of Olvin and office notes shows that she has been requesting to call 1 every 4 hours on a regular basis for the last few months.  The ER history of present illness indicates that her headache is of 6 days duration, gradual in onset, constant in timing, neurologically located, radiates to the front of head from back, quality is headache intensity is moderate, progression is unchanged, associated symptoms of the chills and congestion with clear drainage and photophobia, aggravating factors or Leiden noises, alleviating factors none, previous episodes of similar episodes of headache diagnosed as migraines in the past, treatment before arrival was Fioricet without relief.  Review of systems was positive for chills, congestion, photophobia, headache of 6 days duration.  Physical exam showed frontal sinus tenderness on both left and right sides.  Labs in the ER showed glucose 114, creatinine 1.10, white blood cell count 11.32, hemoglobin normal at 13.8, UA with white blood cells 3-5 and bacteria 4+, CT of head without contrast showed no acute disease.  In the emergency room, patient was given Dilaudid for her headache and clonidine for her blood pressure.  Her urinary tract infection was treated with Rocephin.  I was called as her primary care physician and at recommendation of the emergency room staff patient was admitted to a monitored bed with diagnoses of acute intractable headache, acute UTI, uncontrolled hypertension, and ongoing severe pain.    8/23/2018.  I personally saw the patient for the first time during this hospitalization on this date.  Patient was resting comfortably in bed at the time of my visit and picking at her breakfast.  Patient indicated that she was having ongoing severe headache pain.  Her blood pressure at the time my visit showed 170/90 on the monitor.   Patient does relate a history as discussed above.  She does blink her headaches back to a fall on July 13 to set thousand 13 when she struck her head.  Patient has had ongoing problems with headaches.  I did review patient's medication usage and her Olvin report with the pharmacy.  It does seem that she is using excessive amounts of tall 1 without relief of her pain.  She also seems to be using excessive amounts of Fiorinal which could be leading to rebound headaches.  In addition, patient has Phenergan With Codeine which would be rendered ineffective by her Talwin usage.  Patient is in the midst of workup is outlined in the orders.  She is in the midst of testing and was taken down for testing at the end of my exam.      Review of Systems:    A comprehensive 14 point review of systems was negative except for:  Constitution:  positive for chills, fatigue, fevers and malaise  ENT:  positive for ear drainage, ear ringing, hearing loss and snoring  Respiratory: positive for  cough, dry, pleuritic pain and shortness of air  Cardiovascular: positive for  chest pressure / pain, at rest and irregular pulse  Gastrointestinal: postitive for  bloating / distention and heartburn  Musculoskeletal: positive for  back pain, joint pain, joint stiffness, joint swelling, muscle pain, muscle weakness and neck pain  Behavioral/Psych: positive for  anxiety  Allergies / Immunologic: positive for  rash    Past Medical History:   Diagnosis Date   • Arthritis    • Asthma    • Cancer (CMS/[facility])    • COPD (chronic obstructive pulmonary disease) (CMS/[facility])    • Emphysema lung (CMS/[facility])    • Generalized headaches    • Hayfever    • Hypertension    • Stroke (CMS/[facility])      Past Surgical History:   Procedure Laterality Date   • BREAST LUMPECTOMY Left 04/01/2003   • CARPAL TUNNEL RELEASE  2011   • CATARACT EXTRACTION  2010   • HERNIA REPAIR  01/01/1971   • HERNIA REPAIR      right groin   • JOINT REPLACEMENT     • KNEE SURGERY Left 01/01/1998     bilateral knee replacements   • MASTECTOMY     • PARATHYROIDECTOMY  05/08/2006   • SINUS SURGERY  01/01/1984   • TOTAL ABDOMINAL HYSTERECTOMY  01/01/1973     Allergies   Allergen Reactions   • Morphine Anaphylaxis   • Sulfa Antibiotics Anaphylaxis and Hives     Or sulfa fillers in meds   • Hydralazine Myalgia     Patient reports leg cramps, joint pain and increased fatigue   • Amlodipine      KIDNEYS SHUT DOWN   • Anastrozole    • Grass    • Nsaids      KIDNEY FAILURE   • Penicillins      Tolerated ceftriaxone during 04/2017 admission   • Tree Extract    • Zolpidem      HALLUCINATIONS     Family History   Problem Relation Age of Onset   • Cancer Brother      Social History     Social History   • Marital status:      Social History Main Topics   • Smoking status: Never Smoker   • Smokeless tobacco: Never Used   • Alcohol use Yes      Comment: rarely - once or twice year (wine or bernabe)   • Drug use: Unknown   • Sexual activity: Defer     Other Topics Concern   • Not on file       PMH, FH, SH and ROS completed with Admission History and Physical and updated in EPIC system.        Objective     Scheduled Meds:  aspirin 81 mg Oral Daily   ceftriaxone 1 g Intravenous Q24H   cholecalciferol 1,000 Units Oral Daily   CloNIDine 0.2 mg Oral Q8H   docusate sodium 100 mg Oral Daily   enoxaparin 40 mg Subcutaneous Q24H   eplerenone 50 mg Oral Q12H   ethacrynic acid 25 mg Oral TID   fluticasone 2 spray Each Nare BID   guaiFENesin 600 mg Oral Q12H   montelukast 10 mg Oral Nightly   NIFEdipine XL 60 mg Oral BID   pantoprazole 40 mg Oral Q AM   predniSONE 10 mg Oral Every Other Day   cyanocobalamin 1,000 mcg Oral Daily     Continuous Infusions:  dextrose 5 % and sodium chloride 0.45 % with KCl 20 mEq/L 50 mL/hr   Pharmacy Consult    Pharmacy to Dose enoxaparin (LOVENOX)        Vital signs in last 24 hours:  Temp:  [97.7 °F (36.5 °C)-98 °F (36.7 °C)] 98 °F (36.7 °C)  Heart Rate:  [70-73] 73  Resp:  [18] 18  BP:  "(140-219)/() 149/68    Intake/Output:    Intake/Output Summary (Last 24 hours) at 08/23/18 0106  Last data filed at 08/22/18 2229   Gross per 24 hour   Intake              290 ml   Output              500 ml   Net             -210 ml       Exam:  /68 (BP Location: Left arm, Patient Position: Lying)   Pulse 73   Temp 98 °F (36.7 °C) (Oral)   Resp 18   Ht 157.5 cm (62\")   Wt 87.4 kg (192 lb 10.9 oz)   SpO2 97%   BMI 35.24 kg/m²     Constitutional: Alert, cooperative, no distress, AAOx3, resting comfortably   Head: Normocephalic, without obvious abnormality, atraumatic   Eyes: PERRLA, conjunctiva/corneas clear, no icterus, no conjunctival pallor, EOM's intact, both eyes, no neuro changes   ENT and Mouth: Lips, tongue, gums normal; oral mucosa pink and dry   Neck: Supple, symmetrical, trachea midline, no JVD   Respiratory: Clear to auscultation bilaterally, respirations unlabored   Cardiovascular: Regular rate and rhythm, S1 and S2 normal, no murmur, No rub or gallop. Pulses normal.   Gastrointestinal: BS present x4. Soft, non-tender, bowels sounds active, no masses no hepatosplenomegaly.   : No hernia. Normal exam for sex.   Neurologic: CN-XII intact, motor strength grossly intact, sensation grossly intact to light touch, no focal reflex deficits noted.   Psychiatric: Alert, oriented X3, no delusions, psychoses, depression or anxiety   Heme/Lymph/Imun: No bruises, petechiae. Lymph nodes normal in size / configuration.   Musculoskeletal Bilateral shoulder pain, neck pain, lower lumbar pain.  Pain unrelieved current medication regimen.  Ambulating in room without.    Data Review:  Lab Results   Component Value Date    CALCIUM 9.7 08/22/2018    PHOS 3.2 12/26/2017     Results from last 7 days  Lab Units 08/23/18  0048 08/22/18  1616   SODIUM mmol/L  --  140   POTASSIUM mmol/L  --  4.2   CHLORIDE mmol/L  --  106   CO2 mmol/L  --  25.1   BUN mg/dL  --  18   CREATININE mg/dL  --  1.10*   GLUCOSE mg/dL  " --  114*   CALCIUM mg/dL  --  9.7   WBC 10*3/mm3 12.52* 11.32*   HEMOGLOBIN g/dL 13.8 13.8   PLATELETS 10*3/mm3 165 191     Lab Results   Component Value Date    CKTOTAL 36 04/08/2017    CKMB 1.69 04/06/2017    TROPONINT <0.010 12/26/2017     Estimated Creatinine Clearance: 41.9 mL/min (A) (by C-G formula based on SCr of 1.1 mg/dL (H)).  WEIGHTS:     Wt Readings from Last 1 Encounters:   08/22/18 2054 87.4 kg (192 lb 10.9 oz)   08/22/18 1614 83.9 kg (185 lb)         Assessment:  Principal Problem:    Uncontrolled hypertension  Active Problems:    Chronic tension-type headache, intractable    Stage 3 chronic kidney disease    Malaise and fatigue progressive    Moderate persistent asthmatic bronchitis without complication    Acute joint pain    Acute exacerbation of 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[facility])    Acute pain of left shoulder due to trauma    Urinary retention self-caths (Don)    Sinusitis    Cervical disc displacement    DDD (degenerative disc disease), cervical    Hyperlipidemia    Proctocele    Urge incontinence of urine    Vertigo    Vitamin D deficiency    Knee pain, bilateral    Elevated PTH level    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)    Gastroesophageal reflux disease with esophagitis    Recurrent UTI    CVA (cerebrovascular accident) subacute    Dyspnea on exertion    Easy bruisability    Decreased functional mobility and endurance since fall 12/20/17    Joint pain    Conjunctivitis    Arm pain    Cervical pain (Servando=PM)    Obesity (BMI 30.0-34.9)    H/o Lyme disease    Hardening of the arteries of the brain    Allergic contact dermatitis    Pleurisy    Psoriasis (Darryl Ochoa)    Postmenopausal      Attending Physician Assessment and Plan:    1.  Uncontrolled hypertension.  Patient is now back on her regular pain medications.  Patient has received clonidine in the ER for blood pressure control and efforts are being made to control her pain may be the cause and part of her  hypertension.  Renal and cardiology are consulted.  Urology who follows the patient for in and out catheters and a neurogenic bladder has also been asked to see her.  Blood pressure still up but not in as much of a threatening zone as it was at the time of her admission to the emergency room yesterday evening.  2.  Intractable headache pain with history of migraines and chronic angina-type headaches.  A combination of Talwin and Fioricet seems ineffective for control of her symptoms at the present time.  Patient will have adjustment of her medication regime with help from neurology.  Will attempt to replace Fioricet if possible and to wean Talwin somewhat.  No neurologic changes at the present time.  Nursing is continuing to follow her on a regular basis for this.  3.  Recurrent urinary tract infections with acute infection after failed outpatient therapy on Cipro in a patient with history of neurogenic bladder who requires urinary catheterization by in and out catheter twice daily at home.  Patient is regularly followed by urology for this problem and we will consult them for their input on these issues.  4.  Chronic kidney disease stage III in past.  Blood pressure uncontrolled and followed on outpatient basis by Dr. Bond.  We will ask renal for their input on treatment at this point.  Patient is very sensitive to multiple medications that have been attempted in the past.  5.  Fall from standing with head trauma suggestive of head contusion.  MRI has been ordered of brain for further input and evaluation.  Patient is concerned about possibility of an aneurysm in, leading in her brain, or stroke.  Patient has no other physical symptoms of stroke at the present time.  6.  Acute orthopedic pain with bilateral shoulder discomfort, cervical pain, lumbar pain.  Patient unrelieved despite large doses of Talwin for pain control.  Patient has requested only one Talwin every 4 hours for now.  She is reporting a pain scale  of 10 out of 10.  We will continue to use to allotted if needed for today to treat severe headaches.  Patient needs weaning of the Talwin which we will initiate at the time of her discharge.  7.  COPD with asthma acute mild exacerbation.  We will monitor patient nebs treatments have been ordered.  Regular inhalers will be used as needed.  8.  Malaise and fatigue.  These are ongoing problems for the patient.  She is clinically stable otherwise at the present time.  Some degree of vertigo is noted with movements.  9.  History of vitamin D deficiency.  We will check vitamin D levels at the present time and treat as necessary.  10.  Acute on chronic sinusitis.  This should be addressed by the Rocephin that she is receiving for the UTI at the present time.  Will monitor and adjust medications based on results from MRI which should also look into of sinus infection.    Plan for disposition:Where: home and home health and When:  1-2days      Óscar Cadet MD  8/23/2018  9:30 AM

## 2018-08-23 NOTE — SIGNIFICANT NOTE
08/23/18 1432   Rehab Time/Intention   Evaluation Not Performed other (see comments)  (leaving for MRI soon. Follow up tomorrow for OT 1432)   Rehab Treatment   Discipline occupational therapist

## 2018-08-23 NOTE — PLAN OF CARE
Problem: Pain, Acute (Adult)  Goal: Acceptable Pain Control/Comfort Level  Outcome: Ongoing (interventions implemented as appropriate)   08/23/18 0015   Pain, Acute (Adult)   Acceptable Pain Control/Comfort Level making progress toward outcome

## 2018-08-23 NOTE — CONSULTS
Patient Care Team:  Óscar Cadet MD as PCP - General (Internal Medicine)    Chief complaint: Headache, uncontrolled hypertension  Subjective     History of Present Illness   Ms. Barrett is an 78 year old white female with past medical history significant for urinary retention requiring self I/O catheterizations, hypertension, and chronic headaches who presented to the ER with c/o intractable headache.  She is currently followed by Dr. Bond in clinic. She was found to be signficantly hypertensive on arrival.  Ct of the head without contrast revealed no acute intracranial abnormaility.  She was given clonidine with slight improvement in BP.  Serum chemistries revealed a serum creatinine of 1.1mg/dl which is up slightly from previous baseline of 0.8mg/dl.  We are asked to evaluate and treat the patients uncontrolled hypertension.    Review of Systems     Past Medical History:   Diagnosis Date   • Arthritis    • Asthma    • Cancer (CMS/HCC)    • COPD (chronic obstructive pulmonary disease) (CMS/HCC)    • Emphysema lung (CMS/HCC)    • Generalized headaches    • Hayfever    • Hypertension    • Stroke (CMS/HCC)    ,   Past Surgical History:   Procedure Laterality Date   • BREAST LUMPECTOMY Left 04/01/2003   • CARPAL TUNNEL RELEASE  2011   • CATARACT EXTRACTION  2010   • HERNIA REPAIR  01/01/1971   • HERNIA REPAIR      right groin   • JOINT REPLACEMENT     • KNEE SURGERY Left 01/01/1998    bilateral knee replacements   • MASTECTOMY     • PARATHYROIDECTOMY  05/08/2006   • SINUS SURGERY  01/01/1984   • TOTAL ABDOMINAL HYSTERECTOMY  01/01/1973   ,   Family History   Problem Relation Age of Onset   • Cancer Brother    ,   Social History   Substance Use Topics   • Smoking status: Never Smoker   • Smokeless tobacco: Never Used   • Alcohol use Yes      Comment: rarely - once or twice year (wine or bernabe)   , Scheduled Meds:    aspirin 81 mg Oral Daily   ceftriaxone 1 g Intravenous Q24H   cholecalciferol 1,000 Units Oral  Daily   CloNIDine 0.2 mg Oral Q8H   docusate sodium 100 mg Oral Daily   enoxaparin 40 mg Subcutaneous Q24H   eplerenone 50 mg Oral Q12H   ethacrynic acid 25 mg Oral TID   fluticasone 1 spray Each Nare BID   guaiFENesin 600 mg Oral Q12H   iopamidol 100 mL Intravenous Once in imaging   montelukast 10 mg Oral Nightly   NIFEdipine XL 60 mg Oral BID   pantoprazole 40 mg Oral Q AM   predniSONE 10 mg Oral Every Other Day   cyanocobalamin 1,000 mcg Oral Daily    and Allergies:  Morphine; Sulfa antibiotics; Hydralazine; Amlodipine; Anastrozole; Grass; Nsaids; Penicillins; Tree extract; and Zolpidem    Objective     Vital Signs  Temp:  [97.7 °F (36.5 °C)-98.9 °F (37.2 °C)] 98.9 °F (37.2 °C)  Heart Rate:  [70-92] 76  Resp:  [18] 18  BP: (140-219)/() 170/80    I/O this shift:  In: -   Out: 550 [Urine:550]  I/O last 3 completed shifts:  In: 1289 [P.O.:240; I.V.:999; IV Piggyback:50]  Out: 1100 [Urine:1100]    Physical Exam  GEN: nad, aaox4, well nourished elderly female  HEENT: ncat, perrl, eomi, op clear mmdry  NECK: supple, no jvd, no carotid bruits  CHEST: cta b, no wheezes or rhonchi  CVA: RRR s1, s2 no m/r/g  ABD: soft, nontender, nondistended, +bs  EXT: no c/c/e 2+pp  SKIN: warm, dry, intact  NEURO: grossly nonfocal  PSYCH: appropriate mood and affect                Results Review:    I reviewed the patient's new clinical results.    WBC WBC   Date Value Ref Range Status   08/23/2018 10.69 4.50 - 10.70 10*3/mm3 Final   08/23/2018 12.52 (H) 4.50 - 10.70 10*3/mm3 Final   08/22/2018 11.32 (H) 4.50 - 10.70 10*3/mm3 Final      HGB Hemoglobin   Date Value Ref Range Status   08/23/2018 12.2 11.9 - 15.5 g/dL Final   08/23/2018 13.8 11.9 - 15.5 g/dL Final   08/22/2018 13.8 11.9 - 15.5 g/dL Final      HCT Hematocrit   Date Value Ref Range Status   08/23/2018 39.9 35.6 - 45.5 % Final   08/23/2018 43.2 35.6 - 45.5 % Final   08/22/2018 44.8 35.6 - 45.5 % Final      Platlets No results found for: LABPLAT   MCV MCV   Date Value Ref  Range Status   08/23/2018 90.9 80.5 - 98.2 fL Final   08/23/2018 90.2 80.5 - 98.2 fL Final   08/22/2018 92.0 80.5 - 98.2 fL Final          Sodium Sodium   Date Value Ref Range Status   08/23/2018 141 136 - 145 mmol/L Final   08/23/2018 144 136 - 145 mmol/L Final   08/22/2018 140 136 - 145 mmol/L Final      Potassium Potassium   Date Value Ref Range Status   08/23/2018 3.6 3.5 - 5.2 mmol/L Final   08/23/2018 3.6 3.5 - 5.2 mmol/L Final   08/22/2018 4.2 3.5 - 5.2 mmol/L Final      Chloride Chloride   Date Value Ref Range Status   08/23/2018 106 98 - 107 mmol/L Final   08/23/2018 107 98 - 107 mmol/L Final   08/22/2018 106 98 - 107 mmol/L Final      CO2 CO2   Date Value Ref Range Status   08/23/2018 23.6 22.0 - 29.0 mmol/L Final   08/23/2018 23.8 22.0 - 29.0 mmol/L Final   08/22/2018 25.1 22.0 - 29.0 mmol/L Final      BUN BUN   Date Value Ref Range Status   08/23/2018 14 8 - 23 mg/dL Final   08/23/2018 14 8 - 23 mg/dL Final   08/22/2018 18 8 - 23 mg/dL Final      Creatinine Creatinine   Date Value Ref Range Status   08/23/2018 1.01 (H) 0.57 - 1.00 mg/dL Final   08/23/2018 1.04 (H) 0.57 - 1.00 mg/dL Final   08/22/2018 1.10 (H) 0.57 - 1.00 mg/dL Final      Calcium Calcium   Date Value Ref Range Status   08/23/2018 8.5 (L) 8.6 - 10.5 mg/dL Final   08/23/2018 9.2 8.6 - 10.5 mg/dL Final   08/22/2018 9.7 8.6 - 10.5 mg/dL Final      PO4 No results found for: CAPO4   Albumin No results found for: ALBUMIN   Magnesium Magnesium   Date Value Ref Range Status   08/23/2018 2.1 1.6 - 2.4 mg/dL Final      Uric Acid Uric Acid   Date Value Ref Range Status   08/23/2018 6.2 (H) 2.4 - 5.7 mg/dL Final            Assessment/Plan     Principal Problem:    Uncontrolled hypertension  Active Problems:    Joint pain    Urinary retention self-caths (Don)    Sinusitis    Conjunctivitis    Arm pain    Cervical disc displacement    Cervical pain (Servando=PM)    DDD (degenerative disc disease), cervical    Hyperlipidemia    Proctocele    Urge  incontinence of urine    Chronic tension-type headache, intractable    Obesity (BMI 30.0-34.9)    H/o Lyme disease    Stage 3 chronic kidney disease    Hardening of the arteries of the brain    Allergic contact dermatitis    Malaise and fatigue progressive    Vertigo    Vitamin D deficiency    Moderate persistent asthmatic bronchitis without complication    Pleurisy    Knee pain, bilateral    Elevated PTH level    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)    Gastroesophageal reflux disease with esophagitis    Psoriasis (Darryl Ochoa)    Postmenopausal    Recurrent UTI    CVA (cerebrovascular accident) subacute    Dyspnea on exertion    Acute joint pain    Easy bruisability    Acute exacerbation of 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[facility])    Acute pain of left shoulder due to trauma    Decreased functional mobility and endurance since fall 12/20/17    Fall from standing with head traumna      Assessment & Plan     · Uncontrolled hypertension--bp improved currently with resumption of home meds procardia 60mg,clonidine 0.2mg po q8 hrs as well as eplerenone 50mg bid and losartan 50mg/hctz 12.5mg po bid.  Will give extra dose of losartan 50mg po x1 now and monitor.  Longstanding issue.  Ethacrynic acid on hold.  · ERMIAS on CKD3--cr improved.  Holding loop diuretic.Can likely resume in am.  · Urinary retention/urinary incontinence-per urology. Noncompliant with I/o cath.  · Headaches--CT negative.  · Asthma/copd.      I discussed the patients findings and my recommendations with patient     Thank you for allowing us to participate in Mrs. Chao care.  We will follow along with you.  Please call with any questions.    David Call MD  08/23/18  9:22 AM    Time: More than 50% of time spent in counseling and coordination of care:  Total face-to-face/floor time 55 min.  Time spent in counseling 20 min. Counseling included the following topics: diagnosis and treatment plan

## 2018-08-23 NOTE — PROGRESS NOTES
Discharge Planning Assessment  [facility]     Patient Name: Sasha Barrett  MRN: [id number]  Today's Date: 8/23/2018    Admit Date: 8/22/2018          Discharge Needs Assessment     Row Name 08/23/18 1403       Living Environment    Lives With spouse    Current Living Arrangements home/apartment/condo    Primary Care Provided by self    Provides Primary Care For no one    Family Caregiver if Needed spouse;child(sita), adult    Family Caregiver Names  Pete [phone], and one son lives ~3 miles from her house.      Quality of Family Relationships helpful;involved    Able to Return to Prior Arrangements yes       Resource/Environmental Concerns    Resource/Environmental Concerns none       Transition Planning    Patient/Family Anticipates Transition to home with family    Patient/Family Anticipated Services at Transition none    Transportation Anticipated family or friend will provide       Discharge Needs Assessment    Concerns to be Addressed denies needs/concerns at this time    Equipment Currently Used at Home cane, straight;commode;grab bar;walker, standard;nebulizer    Anticipated Changes Related to Illness none            Discharge Plan     Row Name 08/23/18 4150       Plan    Plan Return home with     Patient/Family in Agreement with Plan yes    Plan Comments Spoke with patient at bedside.  Patient lives with  pete [phone].  She has a cane and walker and BSC that she rarely uses.  There are grab bars in the BR and she has a nebulizer.  She has never used HH or been to SNF.  Patient verbalizes understanding of observation status, observation written on white board.  Patient plans to return home at DC and does not anticipate any DC needs.  CCP will follow as needed.  BHumeniuk RN        Destination     No service coordination in this encounter.      Durable Medical Equipment     No service coordination in this encounter.      Dialysis/Infusion     No service coordination in this  encounter.      Home Medical Care     No service coordination in this encounter.      Social Care     No service coordination in this encounter.                Demographic Summary     Row Name 08/23/18 1403       General Information    Admission Type observation    Arrived From home    Referral Source admission list    Reason for Consult discharge planning    Preferred Language English     Used During This Interaction no            Functional Status     Row Name 08/23/18 1403       Functional Status    Usual Activity Tolerance moderate    Current Activity Tolerance moderate       Functional Status, IADL    Medications independent    Meal Preparation independent    Housekeeping independent    Laundry independent    Shopping independent       Mental Status    General Appearance WDL WDL       Mental Status Summary    Recent Changes in Mental Status/Cognitive Functioning no changes            Becky S. Humeniuk, RN

## 2018-08-23 NOTE — CONSULTS
FIRST UROLOGY CONSULT      Patient Identification:  NAME:  Sasha Barrett  Age:  78 y.o.   Sex:  female   :  1940   MRN:  [id number]       Chief complaint: recurretn UTI    History of present illness:  79 yo CF last seen in  with recurrent UTIs and NGB. Not cathing herself as dierected and only if she feels full. Multiple bouts of cystitis. No hematuria. Leaks when full.      Past medical history:  Past Medical History:   Diagnosis Date   • Arthritis    • Asthma    • Cancer (CMS/[facility])    • COPD (chronic obstructive pulmonary disease) (CMS/[facility])    • Emphysema lung (CMS/[facility])    • Generalized headaches    • Hayfever    • Hypertension    • Stroke (CMS/[facility])        Past surgical history:  Past Surgical History:   Procedure Laterality Date   • BREAST LUMPECTOMY Left 2003   • CARPAL TUNNEL RELEASE     • CATARACT EXTRACTION     • HERNIA REPAIR  1971   • HERNIA REPAIR      right groin   • JOINT REPLACEMENT     • KNEE SURGERY Left 1998    bilateral knee replacements   • MASTECTOMY     • PARATHYROIDECTOMY  2006   • SINUS SURGERY  1984   • TOTAL ABDOMINAL HYSTERECTOMY  1973       Allergies:  Morphine; Sulfa antibiotics; Hydralazine; Amlodipine; Anastrozole; Grass; Nsaids; Penicillins; Tree extract; and Zolpidem    Home medications:  Prescriptions Prior to Admission   Medication Sig Dispense Refill Last Dose   • albuterol (PROAIR HFA) 108 (90 Base) MCG/ACT inhaler ProAir  (90 Base) MCG/ACT Inhalation Aerosol Solution; Patient Sig: ProAir  (90 Base) MCG/ACT Inhalation Aerosol Solution ; 8; 0; ; Active   2018 at Unknown time   • aspirin 81 MG tablet Take 81 mg by mouth Daily.   2018 at Unknown time   • butalbital-acetaminophen-caffeine (FIORICET, ESGIC) -40 MG per tablet Take 2 tablets by mouth Every 4 (Four) Hours As Needed for Headache. (Patient taking differently: Take 1 tablet by mouth Every 4 (Four) Hours As Needed for  Headache.) 240 tablet 0 8/22/2018 at Unknown time   • CloNIDine (CATAPRES) 0.2 MG tablet Take 1 tablet by mouth Every 8 (Eight) Hours. 270 tablet 3 8/22/2018 at Unknown time   • diphenhydrAMINE (BENADRYL) 50 MG tablet Take 1 tablet by mouth every 4 (four) hours as needed for itching or allergies. 180 tablet 0 8/22/2018 at Unknown time   • eplerenone (INSPRA) 50 MG tablet Take 1 tablet by mouth Every 12 (Twelve) Hours. 180 tablet 3 8/22/2018 at Unknown time   • ethacrynic acid (EDECRIN) 25 MG tablet Take 25 mg by mouth 3 (Three) Times a Day.   8/22/2018 at Unknown time   • montelukast (SINGULAIR) 10 MG tablet Take 1 tablet by mouth every night. 90 tablet 1 8/21/2018 at Unknown time   • NIFEdipine XL (PROCARDIA XL) 60 MG 24 hr tablet TAKE ONE TABLET TWICE DAILY 180 tablet 0 8/22/2018 at Unknown time   • omeprazole (priLOSEC) 40 MG capsule TAKE ONE CAPSULE BY MOUTH EVERY DAY (Patient taking differently: TAKE ONE CAPSULE BY [facility] daily PRN) 90 capsule 0 Taking   • ondansetron ODT (ZOFRAN-ODT) 8 MG disintegrating tablet Take 1 tablet by mouth every 6 (six) hours as needed for nausea or vomiting. 360 tablet 1 Taking   • pentazocine-naloxone (TALWIN NX) 50-0.5 MG per tablet Take 2 tablets by mouth Every 4 (Four) Hours As Needed for Mild Pain (1-3) (for up to 90 days). (Patient taking differently: Take 1 tablet by mouth Every 4 (Four) Hours As Needed for Mild Pain (1-3) (for up to 90 days).) 1080 tablet 0 8/22/2018 at Unknown time   • polyethylene glycol (MIRALAX) powder Take one capful w/liquid daily 765 g 1 8/22/2018 at Unknown time   • promethazine-codeine (PHENERGAN with CODEINE) 6.25-10 MG/5ML syrup Take 5 mL by mouth 4 (Four) Times a Day As Needed for cough. 360 mL 0 Taking   • Cholecalciferol (VITAMIN D) 1000 UNITS tablet Take  by mouth.   Taking   • guaiFENesin (MUCINEX) 600 MG 12 hr tablet Take 1 tablet by mouth Every 12 (Twelve) Hours. 60 tablet 5    • LORazepam (ATIVAN) 0.5 MG tablet Take 1 tablet by mouth Every 8  (Eight) Hours As Needed for anxiety. Take 1 tab 1 hoour before MRI and repeat x 1 at time of test 60 tablet 0 Taking   • meclizine (ANTIVERT) 25 MG tablet TAKE ONE TABLET BY MOUTH THREE TIMES DAILY AS NEEDED FOR DIZZINESS 30 tablet 0 Taking   • nystatin (MYCOSTATIN) 742311 UNIT/ML suspension Take 5 mL by mouth 4 (Four) Times a Day. 60 mL 3    • vitamin B-12 (VITAMIN B-12) 1000 MCG tablet Take 1 tablet by mouth Daily. 90 tablet 3 Taking       Hospital medications:    aspirin 81 mg Oral Daily   ceftriaxone 1 g Intravenous Q24H   cholecalciferol 1,000 Units Oral Daily   CloNIDine 0.1 mg Oral Q8H   diphenhydrAMINE 25 mg Intravenous Q8H   docusate sodium 100 mg Oral Daily   enoxaparin 40 mg Subcutaneous Q24H   eplerenone 50 mg Oral Q12H   ethacrynic acid 25 mg Oral TID   fluticasone 1 spray Each Nare BID   guaiFENesin 600 mg Oral Q12H   labetalol 100 mg Oral Q12H   magnesium sulfate 1 g Intravenous Q8H   metoclopramide 10 mg Intravenous Q8H   montelukast 10 mg Oral Nightly   NIFEdipine XL 60 mg Oral BID   pantoprazole 40 mg Oral Q AM   predniSONE 10 mg Oral Every Other Day   valproate sodium 500 mg Intravenous Q8H   cyanocobalamin 1,000 mcg Oral Daily       dextrose 5 % and sodium chloride 0.45 % with KCl 20 mEq/L 50 mL/hr Last Rate: 50 mL/hr (08/23/18 1214)   Pharmacy to Dose enoxaparin (LOVENOX)       •  acetaminophen  •  albuterol  •  butalbital-acetaminophen-caffeine  •  diphenhydrAMINE  •  diphenhydrAMINE  •  EPINEPHrine PF  •  hydrALAZINE  •  HYDROmorphone  •  LORazepam  •  meclizine  •  nitroglycerin  •  ondansetron ODT  •  pentazocine-naloxone  •  Pharmacy to Dose enoxaparin (LOVENOX)  •  polyethylene glycol  •  promethazine  •  promethazine-codeine  •  sodium chloride  •  Insert peripheral IV **AND** sodium chloride    Family history:  Family History   Problem Relation Age of Onset   • Cancer Brother        Social history:  Social History   Substance Use Topics   • Smoking status: Never Smoker   • Smokeless  tobacco: Never Used   • Alcohol use Yes      Comment: rarely - once or twice year (wine or bernabe)       Review of systems:    Negative 12-system ROS except for the following:  As above      Objective:  TMax 24 hours:   Temp (24hrs), Av.1 °F (36.7 °C), Min:97.7 °F (36.5 °C), Max:98.9 °F (37.2 °C)      Vitals Ranges:   Temp:  [97.7 °F (36.5 °C)-98.9 °F (37.2 °C)] 98.9 °F (37.2 °C)  Heart Rate:  [70-92] 82  Resp:  [18] 18  BP: (140-219)/() 161/73    Intake/Output Last 3 shifts:  I/O last 3 completed shifts:  In: 1289 [P.O.:240; I.V.:999; IV Piggyback:50]  Out: 1100 [Urine:1100]     Physical Exam:       General Appearance:    Alert, cooperative, in no acute distress   Head:    Normocephalic, without obvious abnormality, atraumatic   Eyes:          PERRL, conjunctivae and corneas clear   Ears:    Normal external inspection   Throat:   No oral lesions, oral mucosa moist   Neck:   Supple, no LAD, trachea midline   Back:     No CVA tenderness   Lungs:     Respirations unlabored, symmetric excursion    Heart:    RRR, intact peripheral pulses   Abdomen:     Soft, NDNT, no masses, no guarding   :    def   Extremities:   No edema, no deformity   Skin:   No bleeding, bruising or rashes   Neuro/Psych:   Orientation intact, mood/affect pleasant, no focal findings       Results review:   I reviewed the patient's new clinical results.    Data review:  Lab Results (last 24 hours)     Procedure Component Value Units Date/Time    Troponin [[id number]]  (Normal) Collected:  18 1157    Specimen:  Blood Updated:  18 1306     Troponin T <0.010 ng/mL     Narrative:       Troponin T Reference Ranges:  Less than 0.03 ng/mL:    Negative for AMI  0.03 to 0.09 ng/mL:      Indeterminant for AMI  Greater than 0.09 ng/mL: Positive for AMI    PTH, Intact [[id number]]  (Abnormal) Collected:  18 0546    Specimen:  Blood Updated:  18 0958     PTH, Intact 88.3 (H) pg/mL     Troponin [[id number]]  (Normal) Collected:   08/23/18 0546    Specimen:  Blood Updated:  08/23/18 0730     Troponin T <0.010 ng/mL     Narrative:       Troponin T Reference Ranges:  Less than 0.03 ng/mL:    Negative for AMI  0.03 to 0.09 ng/mL:      Indeterminant for AMI  Greater than 0.09 ng/mL: Positive for AMI    BNP [[id number]]  (Normal) Collected:  08/23/18 0546    Specimen:  Blood Updated:  08/23/18 0730     proBNP 571.9 pg/mL     Narrative:       Among patients with dyspnea, NT-proBNP is highly sensitive for the detection of acute congestive heart failure. In addition NT-proBNP of <300 pg/ml effectively rules out acute congestive heart failure with 99% negative predictive value.    Basic Metabolic Panel [[id number]]  (Abnormal) Collected:  08/23/18 0546    Specimen:  Blood Updated:  08/23/18 0724     Glucose 106 (H) mg/dL      BUN 14 mg/dL      Creatinine 1.01 (H) mg/dL      Sodium 141 mmol/L      Potassium 3.6 mmol/L      Chloride 106 mmol/L      CO2 23.6 mmol/L      Calcium 8.5 (L) mg/dL      eGFR Non African Amer 53 (L) mL/min/1.73      BUN/Creatinine Ratio 13.9     Anion Gap 11.4 mmol/L     Narrative:       The MDRD GFR formula is only valid for adults with stable renal function between ages 18 and 70.    Uric Acid [[id number]]  (Abnormal) Collected:  08/23/18 0546    Specimen:  Blood Updated:  08/23/18 0724     Uric Acid 6.2 (H) mg/dL     Magnesium [[id number]]  (Normal) Collected:  08/23/18 0546    Specimen:  Blood Updated:  08/23/18 0724     Magnesium 2.1 mg/dL     CBC & Differential [[id number]] Collected:  08/23/18 0547    Specimen:  Blood Updated:  08/23/18 0646    Narrative:       The following orders were created for panel order CBC & Differential.  Procedure                               Abnormality         Status                     ---------                               -----------         ------                     CBC Auto Differential[[id number]]        Abnormal            Final result                 Please view results for these tests on  the individual orders.    CBC Auto Differential [[id number]]  (Abnormal) Collected:  08/23/18 0547    Specimen:  Blood Updated:  08/23/18 0646     WBC 10.69 10*3/mm3      RBC 4.39 10*6/mm3      Hemoglobin 12.2 g/dL      Hematocrit 39.9 %      MCV 90.9 fL      MCH 27.8 pg      MCHC 30.6 (L) g/dL      RDW 15.3 (H) %      RDW-SD 51.2 fl      MPV 11.2 fL      Platelets 177 10*3/mm3      Neutrophil % 67.5 %      Lymphocyte % 21.2 %      Monocyte % 10.3 %      Eosinophil % 0.4 %      Basophil % 0.4 %      Immature Grans % 0.2 %      Neutrophils, Absolute 7.22 10*3/mm3      Lymphocytes, Absolute 2.27 10*3/mm3      Monocytes, Absolute 1.10 10*3/mm3      Eosinophils, Absolute 0.04 10*3/mm3      Basophils, Absolute 0.04 10*3/mm3      Immature Grans, Absolute 0.02 10*3/mm3     DIAN Comprehensive Panel [[id number]] Collected:  08/23/18 0546    Specimen:  Blood Updated:  08/23/18 0632    Basic Metabolic Panel [[id number]]  (Abnormal) Collected:  08/23/18 0048    Specimen:  Blood Updated:  08/23/18 0126     Glucose 124 (H) mg/dL      BUN 14 mg/dL      Creatinine 1.04 (H) mg/dL      Sodium 144 mmol/L      Potassium 3.6 mmol/L      Chloride 107 mmol/L      CO2 23.8 mmol/L      Calcium 9.2 mg/dL      eGFR Non African Amer 51 (L) mL/min/1.73      BUN/Creatinine Ratio 13.5     Anion Gap 13.2 mmol/L     Narrative:       The MDRD GFR formula is only valid for adults with stable renal function between ages 18 and 70.    CBC & Differential [[id number]] Collected:  08/23/18 0048    Specimen:  Blood Updated:  08/23/18 0104    Narrative:       The following orders were created for panel order CBC & Differential.  Procedure                               Abnormality         Status                     ---------                               -----------         ------                     CBC Auto Differential[[id number]]        Abnormal            Final result                 Please view results for these tests on the individual orders.    CBC Auto  Differential [[id number]]  (Abnormal) Collected:  08/23/18 0048    Specimen:  Blood Updated:  08/23/18 0104     WBC 12.52 (H) 10*3/mm3      RBC 4.79 10*6/mm3      Hemoglobin 13.8 g/dL      Hematocrit 43.2 %      MCV 90.2 fL      MCH 28.8 pg      MCHC 31.9 (L) g/dL      RDW 15.3 (H) %      RDW-SD 50.9 fl      MPV 11.2 fL      Platelets 165 10*3/mm3      Neutrophil % 75.5 %      Lymphocyte % 15.3 (L) %      Monocyte % 8.8 %      Eosinophil % 0.2 (L) %      Basophil % 0.2 %      Immature Grans % 0.3 %      Neutrophils, Absolute 9.45 (H) 10*3/mm3      Lymphocytes, Absolute 1.92 10*3/mm3      Monocytes, Absolute 1.10 10*3/mm3      Eosinophils, Absolute 0.02 10*3/mm3      Basophils, Absolute 0.03 10*3/mm3      Immature Grans, Absolute 0.04 (H) 10*3/mm3     Urinalysis With Microscopic If Indicated (No Culture) - Urine, Catheter [[id number]]  (Abnormal) Collected:  08/22/18 1738    Specimen:  Urine from Urine, Catheter Updated:  08/22/18 1836     Color, UA Yellow     Appearance, UA Clear     pH, UA 7.0     Specific Gravity, UA 1.015     Glucose, UA Negative     Ketones, UA Negative     Bilirubin, UA Negative     Blood, UA Negative     Protein, UA Negative     Leuk Esterase, UA Trace (A)     Nitrite, UA Positive (A)     Urobilinogen, UA 0.2 E.U./dL    Urinalysis, Microscopic Only - Urine, Clean Catch [[id number]]  (Abnormal) Collected:  08/22/18 1738    Specimen:  Urine from Urine, Catheter Updated:  08/22/18 1836     RBC, UA 0-2 /HPF      WBC, UA 3-5 (A) /HPF      Bacteria, UA 4+ (A) /HPF      Squamous Epithelial Cells, UA 0-2 /HPF      Hyaline Casts, UA 0-2 /LPF      Methodology Automated Microscopy    [place] Draw [[id number]] Collected:  08/22/18 1616    Specimen:  Blood Updated:  08/22/18 1730    Narrative:       The following orders were created for panel order [place] Draw.  Procedure                               Abnormality         Status                     ---------                               -----------          ------                     Light Blue Top[[id number]]                                   Final result               Green Top (Gel)[[id number]]                                  Final result               Lavender Top[[id number]]                                     Final result               Gold Top - SST[[id number]]                                   Final result                 Please view results for these tests on the individual orders.    Light Blue Top [[id number]] Collected:  08/22/18 1616    Specimen:  Blood Updated:  08/22/18 1730     Extra Tube hold for add-on     Comment: Auto resulted       Green Top (Gel) [[id number]] Collected:  08/22/18 1616    Specimen:  Blood Updated:  08/22/18 1730     Extra Tube Hold for add-ons.     Comment: Auto resulted.       Lavender Top [[id number]] Collected:  08/22/18 1616    Specimen:  Blood Updated:  08/22/18 1730     Extra Tube hold for add-on     Comment: Auto resulted       Gold Top - SST [[id number]] Collected:  08/22/18 1616    Specimen:  Blood Updated:  08/22/18 1730     Extra Tube Hold for add-ons.     Comment: Auto resulted.       Basic Metabolic Panel [[id number]]  (Abnormal) Collected:  08/22/18 1616    Specimen:  Blood Updated:  08/22/18 1716     Glucose 114 (H) mg/dL      BUN 18 mg/dL      Creatinine 1.10 (H) mg/dL      Sodium 140 mmol/L      Potassium 4.2 mmol/L      Chloride 106 mmol/L      CO2 25.1 mmol/L      Calcium 9.7 mg/dL      eGFR Non African Amer 48 (L) mL/min/1.73      BUN/Creatinine Ratio 16.4     Anion Gap 8.9 mmol/L     Narrative:       The MDRD GFR formula is only valid for adults with stable renal function between ages 18 and 70.    CBC & Differential [[id number]] Collected:  08/22/18 1616    Specimen:  Blood Updated:  08/22/18 1709    Narrative:       The following orders were created for panel order CBC & Differential.  Procedure                               Abnormality         Status                     ---------                                -----------         ------                     CBC Auto Differential[[id number]]        Abnormal            Final result                 Please view results for these tests on the individual orders.    CBC Auto Differential [[id number]]  (Abnormal) Collected:  08/22/18 1616    Specimen:  Blood Updated:  08/22/18 1709     WBC 11.32 (H) 10*3/mm3      RBC 4.87 10*6/mm3      Hemoglobin 13.8 g/dL      Hematocrit 44.8 %      MCV 92.0 fL      MCH 28.3 pg      MCHC 30.8 (L) g/dL      RDW 15.0 (H) %      RDW-SD 50.4 fl      MPV 11.0 fL      Platelets 191 10*3/mm3      Neutrophil % 85.0 (H) %      Lymphocyte % 9.6 (L) %      Monocyte % 4.9 (L) %      Eosinophil % 0.0 (L) %      Basophil % 0.3 %      Immature Grans % 0.2 %      Neutrophils, Absolute 9.63 (H) 10*3/mm3      Lymphocytes, Absolute 1.09 10*3/mm3      Monocytes, Absolute 0.55 10*3/mm3      Eosinophils, Absolute 0.00 10*3/mm3      Basophils, Absolute 0.03 10*3/mm3      Immature Grans, Absolute 0.02 10*3/mm3            Imaging:  Imaging Results (last 24 hours)     Procedure Component Value Units Date/Time    CT Chest With Contrast [[id number]] Collected:  08/23/18 1025     Updated:  08/23/18 1026    Narrative:       CT CHEST, ABDOMEN AND PELVIS WITH CONTRAST     HISTORY: Persistent cough, nausea. Accelerated hypertension.     TECHNIQUE: Axial CT images of the chest abdomen and pelvis were obtained  following administration of intravenous and oral contrast. Coronal and  sagittal reconstructions were then obtained.     COMPARISON: CT chest from 12/26/2017, CT abdomen and pelvis from  10/26/2017     FINDINGS:     CT CHEST: The visualized thyroid gland is normal. The heart and the  great vessels are normal. No pleural or pericardial effusion is seen. No  pathological mediastinal lymphadenopathy. The central airways are patent  without endobronchial lesion. Linear scarring is seen within the right  lower lobe. No new suspicious pulmonary nodules or focal airspace  disease is  present. Bilateral axillary lymph node dissection has been  performed.     CT ABDOMEN AND PELVIS: Liver demonstrates normal attenuation. Small  focal hepatic dome lesion is too small to accurately characterize  however favored to represent a simple cyst. The gallbladder, spleen and  the pancreas is normal. Bilateral adrenal glands are normal. Both  kidneys are normal in size and attenuation. The urinary bladder is  partially distended and normal. The uterus is not identified and is  likely surgically absent. Colonic diverticulosis is present. The  appendix is normal. No ascites is seen. No pathological retroperitoneal  lymphadenopathy. L4-5 degenerative disc disease with vacuum disc  phenomena.       Impression:       1. No acute abnormality within the chest, abdomen or pelvis.  2. Colonic diverticulosis.     Radiation dose reduction techniques were utilized, including automated  exposure control and exposure modulation based on body size.          CT Abdomen Pelvis With Contrast [[id number]] Collected:  08/23/18 1025     Updated:  08/23/18 1026    Narrative:       CT CHEST, ABDOMEN AND PELVIS WITH CONTRAST     HISTORY: Persistent cough, nausea. Accelerated hypertension.     TECHNIQUE: Axial CT images of the chest abdomen and pelvis were obtained  following administration of intravenous and oral contrast. Coronal and  sagittal reconstructions were then obtained.     COMPARISON: CT chest from 12/26/2017, CT abdomen and pelvis from  10/26/2017     FINDINGS:     CT CHEST: The visualized thyroid gland is normal. The heart and the  great vessels are normal. No pleural or pericardial effusion is seen. No  pathological mediastinal lymphadenopathy. The central airways are patent  without endobronchial lesion. Linear scarring is seen within the right  lower lobe. No new suspicious pulmonary nodules or focal airspace  disease is present. Bilateral axillary lymph node dissection has been  performed.     CT ABDOMEN AND PELVIS:  Liver demonstrates normal attenuation. Small  focal hepatic dome lesion is too small to accurately characterize  however favored to represent a simple cyst. The gallbladder, spleen and  the pancreas is normal. Bilateral adrenal glands are normal. Both  kidneys are normal in size and attenuation. The urinary bladder is  partially distended and normal. The uterus is not identified and is  likely surgically absent. Colonic diverticulosis is present. The  appendix is normal. No ascites is seen. No pathological retroperitoneal  lymphadenopathy. L4-5 degenerative disc disease with vacuum disc  phenomena.       Impression:       1. No acute abnormality within the chest, abdomen or pelvis.  2. Colonic diverticulosis.     Radiation dose reduction techniques were utilized, including automated  exposure control and exposure modulation based on body size.          XR Chest PA & Lateral [[id number]] Collected:  08/23/18 1018     Updated:  08/23/18 1022    Narrative:       XR CHEST PA AND LATERAL-     Clinical:High BP take meds  COPD  Left breast mastectomy     Persistent cough     COMPARISON 4/5/2018     FINDINGS: Heart size within normal limits. No edema, effusion or active  airspace disease has developed. Mediastinum and mala are stable.     CONCLUSION: No active cardiovascular or pulmonary process identified.     This report was finalized on 8/23/2018 10:19 AM by Dr. Wojciech Bolanos M.D.       CT Head Without Contrast [[id number]] Collected:  08/22/18 1705     Updated:  08/22/18 1705    Narrative:       CT OF THE BRAIN WITHOUT CONTRAST 08/22/2018     HISTORY: Headache.     Axial images were obtained through the brain without intravenous  contrast. There is mild diffuse atrophy. Mild decreased attenuation of  the periventricular white matter is seen bilaterally consistent with  mild small vessel white matter ischemic disease.     There is no evidence of acute infarction, hemorrhage, midline shift or  mass effect.     No bony  abnormalities are seen.       Impression:       No acute process identified.           Radiation dose reduction techniques were utilized, including automated  exposure control and exposure modulation based on body size.                   Assessment:     Principal Problem:    Uncontrolled hypertension  Active Problems:    Joint pain    Urinary retention self-caths (Don)    Sinusitis    Conjunctivitis    Arm pain    Cervical disc displacement    Cervical pain (Servando=PM)    DDD (degenerative disc disease), cervical    Hyperlipidemia    Proctocele    Urge incontinence of urine    Chronic tension-type headache, intractable    Obesity (BMI 30.0-34.9)    H/o Lyme disease    Stage 3 chronic kidney disease    Hardening of the arteries of the brain    Allergic contact dermatitis    Malaise and fatigue progressive    Vertigo    Vitamin D deficiency    Moderate persistent asthmatic bronchitis without complication    Pleurisy    Knee pain, bilateral    Elevated PTH level    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 Treat only symptomatic UTI.    Zhou Ochoa MD  08/23/18  1:43 PM

## 2018-08-24 ENCOUNTER — APPOINTMENT (OUTPATIENT)
Dept: CT IMAGING | Facility: HOSPITAL | Age: 78
End: 2018-08-24

## 2018-08-24 LAB
ANION GAP SERPL CALCULATED.3IONS-SCNC: 11.8 MMOL/L
BASOPHILS # BLD AUTO: 0.04 10*3/MM3 (ref 0–0.2)
BASOPHILS NFR BLD AUTO: 0.4 % (ref 0–1.5)
BILIRUB UR QL STRIP: NEGATIVE
BUN BLD-MCNC: 11 MG/DL (ref 8–23)
BUN/CREAT SERPL: 11.6 (ref 7–25)
CALCIUM SPEC-SCNC: 9.2 MG/DL (ref 8.6–10.5)
CENTROMERE B AB SER-ACNC: <0.2 AI (ref 0–0.9)
CHLORIDE SERPL-SCNC: 105 MMOL/L (ref 98–107)
CHOLEST SERPL-MCNC: 170 MG/DL (ref 0–200)
CHROMATIN AB SERPL-ACNC: <0.2 AI (ref 0–0.9)
CLARITY UR: CLEAR
CO2 SERPL-SCNC: 24.2 MMOL/L (ref 22–29)
COLOR UR: YELLOW
CREAT BLD-MCNC: 0.95 MG/DL (ref 0.57–1)
DEPRECATED RDW RBC AUTO: 51.5 FL (ref 37–54)
DSDNA AB SER-ACNC: <1 IU/ML (ref 0–9)
ENA JO1 AB SER-ACNC: <0.2 AI (ref 0–0.9)
ENA RNP AB SER-ACNC: <0.2 AI (ref 0–0.9)
ENA SCL70 AB SER-ACNC: <0.2 AI (ref 0–0.9)
ENA SM AB SER-ACNC: <0.2 AI (ref 0–0.9)
ENA SS-A AB SER-ACNC: <0.2 AI (ref 0–0.9)
ENA SS-B AB SER-ACNC: <0.2 AI (ref 0–0.9)
EOSINOPHIL # BLD AUTO: 0.1 10*3/MM3 (ref 0–0.7)
EOSINOPHIL NFR BLD AUTO: 1 % (ref 0.3–6.2)
ERYTHROCYTE [DISTWIDTH] IN BLOOD BY AUTOMATED COUNT: 15.3 % (ref 11.7–13)
GFR SERPL CREATININE-BSD FRML MDRD: 57 ML/MIN/1.73
GLUCOSE BLD-MCNC: 97 MG/DL (ref 65–99)
GLUCOSE UR STRIP-MCNC: NEGATIVE MG/DL
HCT VFR BLD AUTO: 42.8 % (ref 35.6–45.5)
HDLC SERPL-MCNC: 55 MG/DL (ref 40–60)
HGB BLD-MCNC: 13.3 G/DL (ref 11.9–15.5)
HGB UR QL STRIP.AUTO: NEGATIVE
IMM GRANULOCYTES # BLD: 0.02 10*3/MM3 (ref 0–0.03)
IMM GRANULOCYTES NFR BLD: 0.2 % (ref 0–0.5)
KETONES UR QL STRIP: NEGATIVE
LDLC SERPL CALC-MCNC: 92 MG/DL (ref 0–100)
LDLC/HDLC SERPL: 1.67 {RATIO}
LEUKOCYTE ESTERASE UR QL STRIP.AUTO: NEGATIVE
LYMPHOCYTES # BLD AUTO: 2.77 10*3/MM3 (ref 0.9–4.8)
LYMPHOCYTES NFR BLD AUTO: 27.6 % (ref 19.6–45.3)
Lab: NORMAL
MAGNESIUM SERPL-MCNC: 2.5 MG/DL (ref 1.6–2.4)
MCH RBC QN AUTO: 28.3 PG (ref 26.9–32)
MCHC RBC AUTO-ENTMCNC: 31.1 G/DL (ref 32.4–36.3)
MCV RBC AUTO: 91.1 FL (ref 80.5–98.2)
MONOCYTES # BLD AUTO: 1.09 10*3/MM3 (ref 0.2–1.2)
MONOCYTES NFR BLD AUTO: 10.9 % (ref 5–12)
NEUTROPHILS # BLD AUTO: 6.02 10*3/MM3 (ref 1.9–8.1)
NEUTROPHILS NFR BLD AUTO: 59.9 % (ref 42.7–76)
NITRITE UR QL STRIP: NEGATIVE
PH UR STRIP.AUTO: 7.5 [PH] (ref 5–8)
PLATELET # BLD AUTO: 172 10*3/MM3 (ref 140–500)
PMV BLD AUTO: 11.1 FL (ref 6–12)
POTASSIUM BLD-SCNC: 3.4 MMOL/L (ref 3.5–5.2)
PROT UR QL STRIP: NEGATIVE
RBC # BLD AUTO: 4.7 10*6/MM3 (ref 3.9–5.2)
SODIUM BLD-SCNC: 141 MMOL/L (ref 136–145)
SP GR UR STRIP: >=1.03 (ref 1–1.03)
TRIGL SERPL-MCNC: 117 MG/DL (ref 0–150)
UROBILINOGEN UR QL STRIP: NORMAL
VLDLC SERPL-MCNC: 23.4 MG/DL (ref 5–40)
WBC NRBC COR # BLD: 10.04 10*3/MM3 (ref 4.5–10.7)

## 2018-08-24 PROCEDURE — 25010000002 MAGNESIUM SULFATE IN D5W 1G/100ML (PREMIX) 1-5 GM/100ML-% SOLUTION: Performed by: PSYCHIATRY & NEUROLOGY

## 2018-08-24 PROCEDURE — 85025 COMPLETE CBC W/AUTO DIFF WBC: CPT | Performed by: INTERNAL MEDICINE

## 2018-08-24 PROCEDURE — 70496 CT ANGIOGRAPHY HEAD: CPT

## 2018-08-24 PROCEDURE — 25010000002 METOCLOPRAMIDE PER 10 MG: Performed by: PSYCHIATRY & NEUROLOGY

## 2018-08-24 PROCEDURE — 97162 PT EVAL MOD COMPLEX 30 MIN: CPT | Performed by: PHYSICAL THERAPIST

## 2018-08-24 PROCEDURE — 25010000002 IOPAMIDOL 61 % SOLUTION: Performed by: INTERNAL MEDICINE

## 2018-08-24 PROCEDURE — 25010000002 HYDROMORPHONE PER 4 MG: Performed by: INTERNAL MEDICINE

## 2018-08-24 PROCEDURE — 25010000002 ENOXAPARIN PER 10 MG: Performed by: INTERNAL MEDICINE

## 2018-08-24 PROCEDURE — 93005 ELECTROCARDIOGRAM TRACING: CPT | Performed by: INTERNAL MEDICINE

## 2018-08-24 PROCEDURE — 99231 SBSQ HOSP IP/OBS SF/LOW 25: CPT | Performed by: INTERNAL MEDICINE

## 2018-08-24 PROCEDURE — 83735 ASSAY OF MAGNESIUM: CPT | Performed by: PSYCHIATRY & NEUROLOGY

## 2018-08-24 PROCEDURE — 80061 LIPID PANEL: CPT | Performed by: INTERNAL MEDICINE

## 2018-08-24 PROCEDURE — 97165 OT EVAL LOW COMPLEX 30 MIN: CPT

## 2018-08-24 PROCEDURE — 25010000002 CEFTRIAXONE PER 250 MG: Performed by: INTERNAL MEDICINE

## 2018-08-24 PROCEDURE — 80048 BASIC METABOLIC PNL TOTAL CA: CPT | Performed by: INTERNAL MEDICINE

## 2018-08-24 PROCEDURE — 97110 THERAPEUTIC EXERCISES: CPT | Performed by: PHYSICAL THERAPIST

## 2018-08-24 PROCEDURE — 97535 SELF CARE MNGMENT TRAINING: CPT

## 2018-08-24 PROCEDURE — 25010000002 DIPHENHYDRAMINE PER 50 MG: Performed by: PSYCHIATRY & NEUROLOGY

## 2018-08-24 PROCEDURE — 81003 URINALYSIS AUTO W/O SCOPE: CPT | Performed by: INTERNAL MEDICINE

## 2018-08-24 PROCEDURE — 70498 CT ANGIOGRAPHY NECK: CPT

## 2018-08-24 PROCEDURE — 99233 SBSQ HOSP IP/OBS HIGH 50: CPT | Performed by: NURSE PRACTITIONER

## 2018-08-24 PROCEDURE — 93010 ELECTROCARDIOGRAM REPORT: CPT | Performed by: INTERNAL MEDICINE

## 2018-08-24 PROCEDURE — G8978 MOBILITY CURRENT STATUS: HCPCS | Performed by: PHYSICAL THERAPIST

## 2018-08-24 PROCEDURE — G8979 MOBILITY GOAL STATUS: HCPCS | Performed by: PHYSICAL THERAPIST

## 2018-08-24 RX ORDER — CLOPIDOGREL BISULFATE 75 MG/1
75 TABLET ORAL DAILY
Status: DISCONTINUED | OUTPATIENT
Start: 2018-08-24 | End: 2018-08-27 | Stop reason: HOSPADM

## 2018-08-24 RX ORDER — ETHACRYNIC ACID 25 MG/1
50 TABLET ORAL DAILY
Status: DISCONTINUED | OUTPATIENT
Start: 2018-08-24 | End: 2018-08-24

## 2018-08-24 RX ORDER — POTASSIUM CHLORIDE 750 MG/1
20 CAPSULE, EXTENDED RELEASE ORAL ONCE
Status: COMPLETED | OUTPATIENT
Start: 2018-08-24 | End: 2018-08-24

## 2018-08-24 RX ORDER — ETHACRYNIC ACID 25 MG/1
25 TABLET ORAL 3 TIMES DAILY
Status: DISCONTINUED | OUTPATIENT
Start: 2018-08-24 | End: 2018-08-27 | Stop reason: HOSPADM

## 2018-08-24 RX ADMIN — Medication 1000 MCG: at 08:47

## 2018-08-24 RX ADMIN — LABETALOL HCL 200 MG: 200 TABLET, FILM COATED ORAL at 08:46

## 2018-08-24 RX ADMIN — FLUTICASONE PROPIONATE 1 SPRAY: 50 SPRAY, METERED NASAL at 20:55

## 2018-08-24 RX ADMIN — MONTELUKAST SODIUM 10 MG: 10 TABLET, FILM COATED ORAL at 20:55

## 2018-08-24 RX ADMIN — METOCLOPRAMIDE 10 MG: 5 INJECTION, SOLUTION INTRAMUSCULAR; INTRAVENOUS at 04:50

## 2018-08-24 RX ADMIN — DOCUSATE SODIUM 100 MG: 100 CAPSULE, LIQUID FILLED ORAL at 08:57

## 2018-08-24 RX ADMIN — GUAIFENESIN 600 MG: 600 TABLET, EXTENDED RELEASE ORAL at 08:47

## 2018-08-24 RX ADMIN — CLONIDINE HYDROCHLORIDE 0.1 MG: 0.1 TABLET ORAL at 15:11

## 2018-08-24 RX ADMIN — POTASSIUM CHLORIDE, DEXTROSE MONOHYDRATE AND SODIUM CHLORIDE 50 ML/HR: 150; 5; 450 INJECTION, SOLUTION INTRAVENOUS at 14:45

## 2018-08-24 RX ADMIN — HYDROMORPHONE HYDROCHLORIDE 0.5 MG: 1 INJECTION, SOLUTION INTRAMUSCULAR; INTRAVENOUS; SUBCUTANEOUS at 21:15

## 2018-08-24 RX ADMIN — VALPROATE SODIUM 500 MG: 100 INJECTION, SOLUTION INTRAVENOUS at 04:50

## 2018-08-24 RX ADMIN — MAGNESIUM SULFATE HEPTAHYDRATE 1 G: 1 INJECTION, SOLUTION INTRAVENOUS at 05:39

## 2018-08-24 RX ADMIN — ETHACRYNIC ACID 25 MG: 25 TABLET ORAL at 18:34

## 2018-08-24 RX ADMIN — CEFTRIAXONE SODIUM 1 G: 1 INJECTION, SOLUTION INTRAVENOUS at 20:55

## 2018-08-24 RX ADMIN — GUAIFENESIN 600 MG: 600 TABLET, EXTENDED RELEASE ORAL at 20:55

## 2018-08-24 RX ADMIN — ACETAMINOPHEN 650 MG: 325 TABLET, FILM COATED ORAL at 01:21

## 2018-08-24 RX ADMIN — EPLERENONE 50 MG: 25 TABLET, FILM COATED ORAL at 20:55

## 2018-08-24 RX ADMIN — NIFEDIPINE 60 MG: 60 TABLET, EXTENDED RELEASE ORAL at 09:00

## 2018-08-24 RX ADMIN — HYDROMORPHONE HYDROCHLORIDE 0.5 MG: 1 INJECTION, SOLUTION INTRAMUSCULAR; INTRAVENOUS; SUBCUTANEOUS at 14:47

## 2018-08-24 RX ADMIN — ASPIRIN 81 MG: 81 TABLET, FILM COATED ORAL at 08:47

## 2018-08-24 RX ADMIN — LOSARTAN POTASSIUM: 50 TABLET, FILM COATED ORAL at 08:45

## 2018-08-24 RX ADMIN — PANTOPRAZOLE SODIUM 40 MG: 40 TABLET, DELAYED RELEASE ORAL at 05:39

## 2018-08-24 RX ADMIN — POTASSIUM CHLORIDE 20 MEQ: 750 CAPSULE, EXTENDED RELEASE ORAL at 12:01

## 2018-08-24 RX ADMIN — DIPHENHYDRAMINE HYDROCHLORIDE 25 MG: 50 INJECTION, SOLUTION INTRAMUSCULAR; INTRAVENOUS at 04:49

## 2018-08-24 RX ADMIN — ENOXAPARIN SODIUM 40 MG: 40 INJECTION SUBCUTANEOUS at 08:57

## 2018-08-24 RX ADMIN — CLOPIDOGREL 75 MG: 75 TABLET, FILM COATED ORAL at 18:33

## 2018-08-24 RX ADMIN — CLONIDINE HYDROCHLORIDE 0.1 MG: 0.1 TABLET ORAL at 21:01

## 2018-08-24 RX ADMIN — CLONIDINE HYDROCHLORIDE 0.1 MG: 0.1 TABLET ORAL at 05:39

## 2018-08-24 RX ADMIN — EPLERENONE 50 MG: 25 TABLET, FILM COATED ORAL at 08:47

## 2018-08-24 RX ADMIN — LABETALOL HCL 200 MG: 200 TABLET, FILM COATED ORAL at 20:55

## 2018-08-24 RX ADMIN — VITAMIN D, TAB 1000IU (100/BT) 1000 UNITS: 25 TAB at 08:48

## 2018-08-24 RX ADMIN — ETHACRYNIC ACID 25 MG: 25 TABLET ORAL at 21:01

## 2018-08-24 RX ADMIN — IOPAMIDOL 85 ML: 612 INJECTION, SOLUTION INTRAVENOUS at 13:08

## 2018-08-24 NOTE — THERAPY EVALUATION
Acute Care - Occupational Therapy Initial Evaluation  [facility]     Patient Name: Sasha Barrett  : 1940  MRN: [id number]  Today's Date: 2018             Admit Date: 2018       ICD-10-CM ICD-9-CM   1. Uncontrolled hypertension I10 401.9   2. Acute intractable headache, unspecified headache type R51 784.0   3. Acute UTI N39.0 599.0     Patient Active Problem List   Diagnosis   • Joint pain   • Urinary retention self-caths (Don)   • Sinusitis   • Conjunctivitis   • Arm pain   • Cervical disc displacement   • Cervical pain (Servando=PM)   • DDD (degenerative disc disease), cervical   • Hyperlipidemia   • Preventative health care   • Medication management   • Proctocele   • Urge incontinence of urine   • Chronic tension-type headache, intractable   • Obesity (BMI 30.0-34.9)   • H/o Lyme disease   • Stage 3 chronic kidney disease   • Hardening of the arteries of the brain   • Allergic contact dermatitis   • Malaise and fatigue progressive   • Vertigo   • Vitamin D deficiency   • Moderate persistent asthmatic bronchitis without complication   • Anterior cervical adenopathy due to infection   • Pleurisy   • Knee pain, bilateral   • Elevated PTH level   • 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)   • Gastroesophageal reflux disease with esophagitis   • Psoriasis (Darryl Ochoa)   • Postmenopausal   • Recurrent UTI   • CVA (cerebrovascular accident) subacute   • Dyspnea on exertion   • Precordial pain   • Abnormal EKG   • Arthralgia   • Accelerated hypertension   • Acute joint pain   • Easy bruisability   • Cough productive of purulent sputum   • Influenza A Subtype H3   • Acute exacerbation of 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[facility])   • Acute chest wall pain   • Acute pain of left shoulder due to trauma   • Decreased functional mobility and endurance since fall 15   • Uncontrolled hypertension   • Fall from standing with head traumna     Past Medical History:   Diagnosis Date   • Arthritis     • Asthma    • Cancer (CMS/HCC)    • COPD (chronic obstructive pulmonary disease) (CMS/[facility])    • Emphysema lung (CMS/[facility])    • Generalized headaches    • Hayfever    • Hypertension    • Stroke (CMS/[facility])      Past Surgical History:   Procedure Laterality Date   • BREAST LUMPECTOMY Left 04/01/2003   • CARPAL TUNNEL RELEASE  2011   • CATARACT EXTRACTION  2010   • HERNIA REPAIR  01/01/1971   • HERNIA REPAIR      right groin   • JOINT REPLACEMENT     • KNEE SURGERY Left 01/01/1998    bilateral knee replacements   • MASTECTOMY     • PARATHYROIDECTOMY  05/08/2006   • SINUS SURGERY  01/01/1984   • TOTAL ABDOMINAL HYSTERECTOMY  01/01/1973          OT ASSESSMENT FLOWSHEET (last 72 hours)      Occupational Therapy Evaluation     Row Name 08/24/18 5028                   OT Evaluation Time/Intention    Subjective Information complains of;weakness;fatigue;pain  -LE        Document Type evaluation;therapy note (daily note)  -LE        Patient Effort good  -LE           General Information    Patient Profile Reviewed? yes  -LE        General Observations of Patient supine in bed.   -LE        Prior Level of Function --   pt reports independent prior.  has AD but doesn't use  -LE        Equipment Currently Used at Home shower chair;grab bar;walker, rolling;commode, 3-in-1  -LE        Pertinent History of Current Functional Problem from home with headache  -LE        Existing Precautions/Restrictions fall  -LE           Relationship/Environment    Lives With spouse  -LE           Bed Mobility Assessment/Treatment    Bed Mobility Assessment/Treatment supine-sit;sit-supine  -LE        Supine-Sit [place] (Bed Mobility) supervision  -LE        Sit-Supine [place] (Bed Mobility) supervision  -LE        Assistive Device (Bed Mobility) bed rails;head of bed elevated  -LE           Functional Mobility    Functional Mobility- Ind. Level contact guard assist  -LE        Functional Mobility-Distance (Feet) 5  -LE        Functional  Mobility- Safety Issues balance decreased during turns;sequencing ability decreased  -LE        Functional Mobility- Comment reaches out for sink.  encouragement to walk to sink as pt declines to walk to BR  -LE           Transfer Assessment/Treatment    Transfer Assessment/Treatment toilet transfer  -LE           Sit-Stand Transfer    Sit-Stand [place] (Transfers) contact guard  -LE           Stand-Sit Transfer    Stand-Sit [place] (Transfers) contact guard  -LE           Toilet Transfer    Type (Toilet Transfer) stand pivot/stand step  -LE        [place] Level (Toilet Transfer) contact guard  -LE        Assistive Device (Toilet Transfer) commode, 3-in-1  -LE           ADL Assessment/Intervention    BADL Assessment/Intervention lower body dressing;toileting;grooming;feeding  -LE           Lower Body Dressing Assessment/Training    Lower Body Dressing [place] Level don;doff;socks;supervision  -LE        Lower Body Dressing Position edge of bed sitting  -LE           Grooming Assessment/Training    [place] Level (Grooming) contact guard assist;wash face, hands  -LE        Grooming Position supported standing;sink side  -LE        Comment (Grooming) --   leans onto sink to wash hands  -LE           Self-Feeding Assessment/Training    [place] Level (Feeding) --   denies assist needed to self feed  -LE           Toileting Assessment/Training    [place] Level (Toileting) adjust/manage clothing;perform perineal hygiene;supervision  -LE        Assistive Devices (Toileting) commode, 3-in-1  -LE        Toileting Position unsupported sitting;unsupported standing  -LE           General ROM    GENERAL ROM COMMENTS 2/3 R shld, 7/8 other joints of B UE.  reports arthritis L shld and h/o injections R shld.   -LE           MMT (Manual Muscle Testing)    Additional Documentation --   grossly 4-/5 except 3+/5 L shld  -LE           Static Sitting Balance    Level of [place] (Unsupported Sitting,  "Static Balance) independent  -LE        Time Able to Maintain Position (Unsupported Sitting, Static Balance) 4 to 5 minutes  -LE           Static Standing Balance    Level of [place] (Supported Standing, Static Balance) supervision;contact guard assist   SBA static, CGA dynamic.    -LE           Positioning and Restraints    Pre-Treatment Position in bed  -LE        In Bed fowlers;call light within reach;encouraged to call for assist  -LE           Pain Scale: Numbers Pre/Post-Treatment    Pain Scale: Numbers, Pretreatment 6/10  -LE        Pain Location --   neck and head. repostion, lights off in room per pt request  -LE           Clinical Impression (OT)    OT Diagnosis need for assist with personal care  -LE        Patient/Family Goals Statement (OT Eval) return home  -LE        Criteria for Skilled Therapeutic Interventions Met (OT Eval) yes;treatment indicated  -LE        Rehab Potential (OT Eval) good, to achieve stated therapy goals  -LE        Therapy Frequency (OT Eval) 5 times/wk  -LE        Care Plan Review (OT) evaluation/treatment results reviewed;care plan/treatment goals reviewed  -LE        Anticipated Equipment Needs at Discharge (OT) --   may benefit from walker used at home  -LE        Anticipated Discharge Disposition (OT) home with assist   pt states she \"will not go to a nursing home\"  -LE           Vital Signs    O2 Delivery Pre Treatment room air  -LE        O2 Delivery Intra Treatment room air  -LE        O2 Delivery Post Treatment room air  -LE        Pre Patient Position Supine  -LE        Intra Patient Position Standing  -LE        Post Patient Position Supine  -LE        Activity Duration --   generalized fatigue  -LE           Planned OT Interventions    Planned Therapy Interventions (OT Eval) activity tolerance training;adaptive equipment training;BADL retraining;occupation/activity based interventions;transfer/mobility retraining  -LE           OT Goals    Transfer Goal Selection " (OT) transfer, OT goal 1  -LE        Bathing Goal Selection (OT) bathing, OT goal 1  -LE        Dressing Goal Selection (OT) dressing, OT goal 1  -LE           Transfer Goal 1 (OT)    Activity/Assistive Device (Transfer Goal 1, OT) sit-to-stand/stand-to-sit;bed-to-chair/chair-to-bed;toilet;commode, 3-in-1;walker, rolling  -LE        [place] Level/Cues Needed (Transfer Goal 1, OT) supervision required  -LE        Time Frame (Transfer Goal 1, OT) 1 week  -LE        Progress/Outcome (Transfer Goal 1, OT) goal ongoing  -LE           Bathing Goal 1 (OT)    Activity/Assistive Device (Bathing Goal 1, OT) upper body bathing;lower body bathing   at sink level  -LE        [place] Level/Cues Needed (Bathing Goal 1, OT) supervision required  -LE        Time Frame (Bathing Goal 1, OT) 1 week  -LE        Progress/Outcomes (Bathing Goal 1, OT) goal ongoing  -LE           Dressing Goal 1 (OT)    Activity/Assistive Device (Dressing Goal 1, OT) lower body dressing;upper body dressing  -LE        [place]/Cues Needed (Dressing Goal 1, OT) supervision required  -LE        Time Frame (Dressing Goal 1, OT) 1 week  -LE        Progress/Outcome (Dressing Goal 1, OT) goal ongoing  -LE          User Key  (r) = Recorded By, (t) = Taken By, (c) = Cosigned By    Initials Name Effective Dates    Joy Brar, OTR 06/08/18 -            Occupational Therapy Education     Title: PT OT SLP Therapies (Done)     Topic: Occupational Therapy (Done)     Point: ADL training (Done)     Description: Instruct learner(s) on proper safety adaptation and remediation techniques during self care or transfers.   Instruct in proper use of assistive devices.   Learning Progress Summary     Learner Status Readiness Method Response Comment Documented by    Patient Done Acceptance E,TB,D DU,VU Ed pt on fall risk, benefits of activity.  Encourage pt to walk to BR but pt declines and prefers to use BSC.  Discuss home safety as pt has mild unsteadiness. Pt  "states  and family could assist.  08/24/18 1255          Point: Precautions (Done)     Description: Instruct learner(s) on prescribed precautions during self-care and functional transfers.   Learning Progress Summary     Learner Status Readiness Method Response Comment Documented by    Patient Done Acceptance E,TB,D RUDI,ARIANE Ed pt on fall risk, benefits of activity.  Encourage pt to walk to BR but pt declines and prefers to use BSC.  Discuss home safety as pt has mild unsteadiness. Pt states  and family could assist.  08/24/18 1255                      User Key     Initials Effective Dates Name Provider Type Discipline     06/08/18 -  Joy Kumar, OTR Occupational Therapist OT                  OT Recommendation and Plan  Outcome Summary/Treatment Plan (OT)  Anticipated Equipment Needs at Discharge (OT):  (may benefit from walker used at home)  Anticipated Discharge Disposition (OT): home with assist (pt states she \"will not go to a nursing home\")  Planned Therapy Interventions (OT Eval): activity tolerance training, adaptive equipment training, BADL retraining, occupation/activity based interventions, transfer/mobility retraining  Therapy Frequency (OT Eval): 5 times/wk  Plan of Care Review  Plan of Care Reviewed With: patient  Plan of Care Reviewed With: patient  Outcome Summary: Pt admit from home with headache.  Pt presents with generalized weakness and fatigue.  Pt prefers to use the BSC (assist of 1) and with encouragement walks to sink to wash hands.  Ed pt on benefits of activity and increasing independence with ADL tasks.  Pt may benefit from skilled OT to increase safety and function for return home           Outcome Measures     Row Name 08/24/18 1253 08/24/18 1200 08/24/18 0950       How much help from another person do you currently need...    Turning from your back to your side while in flat bed without using bedrails?  --  -- 4  -KH    Moving from lying on back to sitting on the side " of a flat bed without bedrails?  --  -- 3  -KH    Moving to and from a bed to a chair (including a wheelchair)?  --  -- 3  -KH    Standing up from a chair using your arms (e.g., wheelchair, bedside chair)?  --  -- 3  -KH    Climbing 3-5 steps with a railing?  --  -- 3  -KH    To walk in hospital room?  --  -- 3  -KH    AM-PAC 6 Clicks Score  --  -- 19  -KH       How much help from another is currently needed...    Putting on and taking off regular lower body clothing? 3  -LE  --  --    Bathing (including washing, rinsing, and drying) 3  -LE  --  --    Toileting (which includes using toilet bed pan or urinal) 3  -LE  --  --    Putting on and taking off regular upper body clothing 3  -LE  --  --    Taking care of personal grooming (such as brushing teeth) 3  -LE  --  --    Eating meals 3  -LE  --  --    Score 18  -LE  --  --       Functional Assessment    Outcome Measure Options  -- AM-PAC 6 Clicks Daily Activity (OT)  -LE AM-PAC 6 Clicks Basic Mobility (PT)  -      User Key  (r) = Recorded By, (t) = Taken By, (c) = Cosigned By    Initials Name Provider Type    Kristin Peña, PT Physical Therapist    Joy Brar OTR Occupational Therapist          Time Calculation:   OT Start Time: 1019  OT Stop Time: 1034  OT Time Calculation (min): 15 min  Therapy Suggested Charges     Code   Minutes Charges    None           Therapy Charges for Today     Code Description Service Date Service Provider Modifiers Qty    [id number]  OT EVAL LOW COMPLEXITY 2 8/24/2018 Joy Kumar OTR GO 1    [id number]  OT SELF CARE/MGMT/TRAIN EA 15 MIN 8/24/2018 Joy Kumar OTR GO 1               GUSTAVO Solo  8/24/2018

## 2018-08-24 NOTE — PLAN OF CARE
Problem: Pain, Acute (Adult)  Goal: Acceptable Pain Control/Comfort Level  Outcome: Ongoing (interventions implemented as appropriate)   08/24/18 0243   Pain, Acute (Adult)   Acceptable Pain Control/Comfort Level making progress toward outcome

## 2018-08-24 NOTE — THERAPY EVALUATION
Acute Care - Physical Therapy Initial Evaluation  [facility]     Patient Name: Sasha Barrett  : 1940  MRN: [id number]  Today's Date: 2018                Admit Date: 2018    Visit Dx:     ICD-10-CM ICD-9-CM   1. Uncontrolled hypertension I10 401.9   2. Acute intractable headache, unspecified headache type R51 784.0   3. Acute UTI N39.0 599.0     Patient Active Problem List   Diagnosis   • Joint pain   • Urinary retention self-caths (Don)   • Sinusitis   • Conjunctivitis   • Arm pain   • Cervical disc displacement   • Cervical pain (Servando=PM)   • DDD (degenerative disc disease), cervical   • Hyperlipidemia   • Preventative health care   • Medication management   • Proctocele   • Urge incontinence of urine   • Chronic tension-type headache, intractable   • Obesity (BMI 30.0-34.9)   • H/o Lyme disease   • Stage 3 chronic kidney disease   • Hardening of the arteries of the brain   • Allergic contact dermatitis   • Malaise and fatigue progressive   • Vertigo   • Vitamin D deficiency   • Moderate persistent asthmatic bronchitis without complication   • Anterior cervical adenopathy due to infection   • Pleurisy   • Knee pain, bilateral   • Elevated PTH level   • 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)   • Gastroesophageal reflux disease with esophagitis   • Psoriasis (Darryl Ochoa)   • Postmenopausal   • Recurrent UTI   • CVA (cerebrovascular accident) subacute   • Dyspnea on exertion   • Precordial pain   • Abnormal EKG   • Arthralgia   • Accelerated hypertension   • Acute joint pain   • Easy bruisability   • Cough productive of purulent sputum   • Influenza A Subtype H3   • Acute exacerbation of 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[facility])   • Acute chest wall pain   • Acute pain of left shoulder due to trauma   • Decreased functional mobility and endurance since fall 17   • Uncontrolled hypertension   • Fall from standing with head traumna     Past Medical History:   Diagnosis Date   •  Arthritis    • Asthma    • Cancer (CMS/[facility])    • COPD (chronic obstructive pulmonary disease) (CMS/[facility])    • Emphysema lung (CMS/[facility])    • Generalized headaches    • Hayfever    • Hypertension    • Stroke (CMS/[facility])      Past Surgical History:   Procedure Laterality Date   • BREAST LUMPECTOMY Left 04/01/2003   • CARPAL TUNNEL RELEASE  2011   • CATARACT EXTRACTION  2010   • HERNIA REPAIR  01/01/1971   • HERNIA REPAIR      right groin   • JOINT REPLACEMENT     • KNEE SURGERY Left 01/01/1998    bilateral knee replacements   • MASTECTOMY     • PARATHYROIDECTOMY  05/08/2006   • SINUS SURGERY  01/01/1984   • TOTAL ABDOMINAL HYSTERECTOMY  01/01/1973        PT ASSESSMENT (last 12 hours)      Physical Therapy Evaluation     Row Name 08/24/18 0938          PT Evaluation Time/Intention    Subjective Information complains of;pain;weakness  -     Document Type evaluation  -     Mode of Treatment physical therapy  -     Patient Effort good  -     Row Name 08/24/18 0938          General Information    Patient Observations alert;cooperative;agree to therapy  -     General Observations of Patient in bed, no distress  -     Prior Level of Function independent:  -     Equipment Currently Used at Home none  -     Pertinent History of Current Functional Problem admitted from home with migraine and uncontrolled HTN  -     Existing Precautions/Restrictions fall  -     Row Name 08/24/18 0938          Relationship/Environment    Lives With spouse  -     Row Name 08/24/18 0938          Resource/Environmental Concerns    Current Living Arrangements home/apartment/condo  -     Row Name 08/24/18 0938          Home Main Entrance    Number of Stairs, Main Entrance two  -     Row Name 08/24/18 0938          Cognitive Assessment/Interventions    Additional Documentation Cognitive Assessment/Intervention (Group)  -     Row Name 08/24/18 0938          Cognitive Assessment/Intervention- PT/OT    Orientation Status  (Cognition) oriented x 4  -KH     Follows Commands (Cognition) WNL  -     Personal Safety Interventions fall prevention program maintained;gait belt;nonskid shoes/slippers when out of bed  -     Row Name 08/24/18 0938          Bed Mobility Assessment/Treatment    Bed Mobility Assessment/Treatment bed mobility (all) activities  -     [place] Level (Bed Mobility) contact guard assist  -     Assistive Device (Bed Mobility) bed rails;head of bed elevated  -     Row Name 08/24/18 0938          Transfer Assessment/Treatment    Transfer Assessment/Treatment sit-stand transfer;stand-sit transfer  -     Sit-Stand [place] (Transfers) contact guard  -     Stand-Sit [place] (Transfers) contact guard  -     Row Name 08/24/18 0938          Gait/Stairs Assessment/Training    [place] Level (Gait) contact guard;minimum assist (75% patient effort)  -     Distance in Feet (Gait) 90  -     Pattern (Gait) step-through  -     Deviations/Abnormal Patterns (Gait) antalgic;tim decreased;gait speed decreased;stride length decreased  -     Bilateral Gait Deviations forward flexed posture;heel strike decreased  -     Comment (Gait/Stairs) slightly unsteady when turning with Min A required. Distance limited by head and neck pain  -     Row Name 08/24/18 0938          General ROM    GENERAL ROM COMMENTS WFL in BLE  -[facility] Name 08/24/18 0938          General Assessment (Manual Muscle Testing)    Comment, General Manual Muscle Testing (MMT) Assessment WFL  -[facility] Name 08/24/18 0938          Motor Assessment/Intervention    Additional Documentation Balance (Group);Therapeutic Exercise (Group);Therapeutic Exercise Interventions (Group)  -[facility] Name 08/24/18 0938          Therapeutic Exercise    Comment (Therapeutic Exercise) BLE AP, ALQ, seated marching x 10 reps  -[facility] Name 08/24/18 0938          Balance    Balance static sitting balance;static standing balance  -[facility]  Name 08/24/18 0938          Static Sitting Balance    Level of [place] (Unsupported Sitting, Static Balance) independent  -     Sitting Position (Unsupported Sitting, Static Balance) sitting on edge of bed  -     Time Able to Maintain Position (Unsupported Sitting, Static Balance) more than 5 minutes  -[facility] Name 08/24/18 0938          Static Standing Balance    Level of [place] (Supported Standing, Static Balance) contact guard assist  -[facility] Name 08/24/18 0938          Pain Assessment    Additional Documentation Pain Scale: Numbers Pre/Post-Treatment (Group)  -[facility] Name 08/24/18 0938          Pain Scale: Numbers Pre/Post-Treatment    Pain Scale: Numbers, Pretreatment 5/10  -     Pain Scale: Numbers, Post-Treatment 7/10  -     Pain Location neck  -[facility] Name 08/24/18 0938          Plan of Care Review    Plan of Care Reviewed With patient  -[facility] Name 08/24/18 0938          Physical Therapy Clinical Impression    Patient/Family Goals Statement (PT Clinical Impression) return home to [facility]     Criteria for Skilled Interventions Met (PT Clinical Impression) treatment indicated  -     Impairments Found (describe specific impairments) gait, locomotion, and balance;ROM  -     Rehab Potential (PT Clinical Summary) good, to achieve stated therapy goals  -[facility] Name 08/24/18 0938          Physical Therapy Goals    Bed Mobility Goal Selection (PT) bed mobility, PT goal 1  -     Transfer Goal Selection (PT) transfer, PT goal 1  -KH     Gait Training Goal Selection (PT) gait training, PT goal 1  -KH     Stairs Goal Selection (PT) stairs, PT goal 1  -     Additional Documentation Stairs Goal Selection (PT) (Row)  -[facility] Name 08/24/18 0938          Bed Mobility Goal 1 (PT)    Activity/Assistive Device (Bed Mobility Goal 1, PT) bed mobility activities, all  -     [place] Level/Cues Needed (Bed Mobility Goal 1, PT) independent  -     Time Frame (Bed  Mobility Goal 1, PT) 1 week  -     Row Name 08/24/18 0938          Transfer Goal 1 (PT)    Activity/Assistive Device (Transfer Goal 1, PT) transfers, all  -KH     [place] Level/Cues Needed (Transfer Goal 1, PT) independent  -KH     Time Frame (Transfer Goal 1, PT) 1 week  -     Row Name 08/24/18 0938          Gait Training Goal 1 (PT)    Activity/Assistive Device (Gait Training Goal 1, PT) gait (walking locomotion)  -KH     [place] Level (Gait Training Goal 1, PT) supervision required  -KH     Distance (Gait Goal 1, PT) 100 ft  -KH     Time Frame (Gait Training Goal 1, PT) 1 week  -     Row Name 08/24/18 0938          Stairs Goal 1 (PT)    Activity/Assistive Device (Stairs Goal 1, PT) stairs, all skills  -KH     [place] Level/Cues Needed (Stairs Goal 1, PT) contact guard assist  -KH     Number of Stairs (Stairs Goal 1, PT) 2  -KH     Time Frame (Stairs Goal 1, PT) 1 week  -[facility] Name 08/24/18 0938          Patient Education Goal (PT)    Activity (Patient Education Goal, PT) HEP  -KH     [place]/Cues/Accuracy (Memory Goal 2, PT) demonstrates adequately  -KH     Time Frame (Patient Education Goal, PT) 1 week  -     Row Name 08/24/18 0938          Positioning and Restraints    Pre-Treatment Position in bed  -     Post Treatment Position bed  -KH     In Bed side lying left;call light within reach;encouraged to call for assist;notified nsg  -KH     Row Name 08/24/18 0938          Living Environment    Home Accessibility stairs to enter home  -       User Key  (r) = Recorded By, (t) = Taken By, (c) = Cosigned By    Initials Name Provider Type    Kristin Peña, PT Physical Therapist          Physical Therapy Education     Title: PT OT SLP Therapies (Done)     Topic: Physical Therapy (Done)     Point: Mobility training (Done)    Learning Progress Summary     Learner Status Readiness Method Response Comment Documented by    Patient Done Acceptance LUIS THAKKAR,TEETEE   08/24/18 1191           Point: Home exercise program (Done)    Learning Progress Summary     Learner Status Readiness Method Response Comment Documented by    Patient Done Acceptance E TEETEE THAKKAR   08/24/18 0957          Point: Body mechanics (Done)    Learning Progress Summary     Learner Status Readiness Method Response Comment Documented by    Patient Done Acceptance E TEETEE THAKKAR   08/24/18 0957          Point: Precautions (Done)    Learning Progress Summary     Learner Status Readiness Method Response Comment Documented by    Patient Done Acceptance E ARIANENR   08/24/18 0957                      User Key     Initials Effective Dates Name Provider Type Discipline     06/08/18 -  Kristin Leone, PT Physical Therapist PT                PT Recommendation and Plan  Anticipated Discharge Disposition (PT): home  Planned Therapy Interventions (PT Eval): balance training, bed mobility training, gait training, home exercise program, patient/family education, strengthening, transfer training  Therapy Frequency (PT Clinical Impression): daily  Outcome Summary/Treatment Plan (PT)  Anticipated Discharge Disposition (PT): home  Plan of Care Reviewed With: patient  Outcome Summary: Pt admitted from home with migraine and HTN. She presents with guard movement of head, neck and shoulders, general deconditioning, impaired dynamic balance and unsteady gait. Pt would benefit from PT to address these impairments.          Outcome Measures     Row Name 08/24/18 0950             How much help from another person do you currently need...    Turning from your back to your side while in flat bed without using bedrails? 4  -KH      Moving from lying on back to sitting on the side of a flat bed without bedrails? 3  -KH      Moving to and from a bed to a chair (including a wheelchair)? 3  -KH      Standing up from a chair using your arms (e.g., wheelchair, bedside chair)? 3  -KH      Climbing 3-5 steps with a railing? 3  -KH      To walk in hospital  room? 3  -KH      AM-PAC 6 Clicks Score 19  -KH         Functional Assessment    Outcome Measure Options AM-PAC 6 Clicks Basic Mobility (PT)  -        User Key  (r) = Recorded By, (t) = Taken By, (c) = Cosigned By    Initials Name Provider Type    Kristin Peña, PT Physical Therapist           Time Calculation:         PT Charges     Row Name 08/24/18 0937             Time Calculation    Start Time 0915  -      Stop Time 0936  -      Time Calculation (min) 21 min  -      PT Received On 08/24/18  -      PT - Next Appointment 08/25/18  -      PT Goal Re-Cert Due Date 08/31/18  -         Time Calculation- PT    Total Timed Code Minutes- PT 8 minute(s)  -        User Key  (r) = Recorded By, (t) = Taken By, (c) = Cosigned By    Initials Name Provider Type    Kristin Peña PT Physical Therapist        Therapy Suggested Charges     Code   Minutes Charges    None           Therapy Charges for Today     Code Description Service Date Service Provider Modifiers Qty    33059482804 HC PT EVAL MOD COMPLEXITY 2 8/24/2018 Kristin Leone, PT GP 1    31142625075 HC PT THER PROC EA 15 MIN 8/24/2018 Kristin Leone, PT GP 1    80304046513 HC PT MOBILITY CURRENT 8/24/2018 Kristin Leone, PT GP, CJ 1    03612471437 HC PT MOBILITY PROJECTED 8/24/2018 Kristin Leone, PT GP, CI 1          PT G-Codes  Outcome Measure Options: AM-PAC 6 Clicks Basic Mobility (PT)  Score: 19  Functional Limitation: Mobility: Walking and moving around  Mobility: Walking and Moving Around Current Status (): At least 20 percent but less than 40 percent impaired, limited or restricted  Mobility: Walking and Moving Around Goal Status (): At least 1 percent but less than 20 percent impaired, limited or restricted      Kristin Leone, PT  8/24/2018

## 2018-08-24 NOTE — PLAN OF CARE
Problem: Patient Care Overview  Goal: Plan of Care Review   08/24/18 2253   OTHER   Outcome Summary Pt admitted from home with migraine and HTN. She presents with guard movement of head, neck and shoulders, general deconditioning, impaired dynamic balance and unsteady gait. Pt would benefit from PT to address these impairments.

## 2018-08-24 NOTE — PLAN OF CARE
Problem: Patient Care Overview  Goal: Plan of Care Review  Outcome: Outcome(s) achieved Date Met: 08/24/18 08/24/18 9602   OTHER   Outcome Summary Patient continue to have intermitten elevated Bp throughout the shift. Cards and Neph are adjusting medications. CT of head and neck completed today, plavix started. Urine specimen collected. Diet advanced to reg today. Continue to monitor VS.    Coping/Psychosocial   Plan of Care Reviewed With patient   Plan of Care Review   Progress no change       Problem: Pain, Acute (Adult)  Goal: Acceptable Pain Control/Comfort Level  Outcome: Ongoing (interventions implemented as appropriate)      Problem: Fall Risk (Adult)  Goal: Absence of Fall  Outcome: Ongoing (interventions implemented as appropriate)

## 2018-08-24 NOTE — PROGRESS NOTES
" LOS: 0 days   Patient Care Team:  Óscar Cadet MD as PCP - General (Internal Medicine)    Chief Complaint: Headache    Subjective     History of Present Illness  Still w/ c/o headache, yet improved.  BP improved this am.  Denies soa, chest pain, n/v. Denies LE edema.  Subjective    History taken from: patient chart    Objective     Vital Sign Min/Max for last 24 hours  Temp  Min: 98 °F (36.7 °C)  Max: 98.9 °F (37.2 °C)   BP  Min: 149/62  Max: 176/77   Pulse  Min: 63  Max: 102   Resp  Min: 18  Max: 20   SpO2  Min: 93 %  Max: 98 %   No Data Recorded   Weight  Min: 86.2 kg (190 lb)  Max: 86.2 kg (190 lb)     Flowsheet Rows      First Filed Value   Admission Height  157.5 cm (62\") Documented at 08/22/2018 1614   Admission Weight  83.9 kg (185 lb) Documented at 08/22/2018 1614          No intake/output data recorded.  I/O last 3 completed shifts:  In: 1929 [P.O.:780; I.V.:999; IV Piggyback:150]  Out: 2750 [Urine:2750]    Objective   Physical Exam  GEN: nad, aaox4, well nourished elderly female  HEENT: ncat, perrl, eomi, op clear mmdry  NECK: supple, no jvd, no carotid bruits  CHEST: cta b, no wheezes or rhonchi  CVA: RRR s1, s2 no m/r/g  ABD: soft, nontender, nondistended, +bs  EXT: no c/c/e 2+pp  SKIN: warm, dry, intact  NEURO: grossly nonfocal  PSYCH: appropriate mood and affect      Results Review:     I reviewed the patient's new clinical results.       WBC WBC   Date Value Ref Range Status   08/24/2018 10.04 4.50 - 10.70 10*3/mm3 Final   08/23/2018 10.69 4.50 - 10.70 10*3/mm3 Final   08/23/2018 12.52 (H) 4.50 - 10.70 10*3/mm3 Final   08/22/2018 11.32 (H) 4.50 - 10.70 10*3/mm3 Final      HGB Hemoglobin   Date Value Ref Range Status   08/24/2018 13.3 11.9 - 15.5 g/dL Final   08/23/2018 12.2 11.9 - 15.5 g/dL Final   08/23/2018 13.8 11.9 - 15.5 g/dL Final   08/22/2018 13.8 11.9 - 15.5 g/dL Final      HCT Hematocrit   Date Value Ref Range Status   08/24/2018 42.8 35.6 - 45.5 % Final   08/23/2018 39.9 35.6 - 45.5 % " Final   08/23/2018 43.2 35.6 - 45.5 % Final   08/22/2018 44.8 35.6 - 45.5 % Final      Platlets No results found for: LABPLAT   MCV MCV   Date Value Ref Range Status   08/24/2018 91.1 80.5 - 98.2 fL Final   08/23/2018 90.9 80.5 - 98.2 fL Final   08/23/2018 90.2 80.5 - 98.2 fL Final   08/22/2018 92.0 80.5 - 98.2 fL Final          Sodium Sodium   Date Value Ref Range Status   08/24/2018 141 136 - 145 mmol/L Final   08/23/2018 141 136 - 145 mmol/L Final   08/23/2018 144 136 - 145 mmol/L Final   08/22/2018 140 136 - 145 mmol/L Final      Potassium Potassium   Date Value Ref Range Status   08/24/2018 3.4 (L) 3.5 - 5.2 mmol/L Final   08/23/2018 3.6 3.5 - 5.2 mmol/L Final   08/23/2018 3.6 3.5 - 5.2 mmol/L Final   08/22/2018 4.2 3.5 - 5.2 mmol/L Final      Chloride Chloride   Date Value Ref Range Status   08/24/2018 105 98 - 107 mmol/L Final   08/23/2018 106 98 - 107 mmol/L Final   08/23/2018 107 98 - 107 mmol/L Final   08/22/2018 106 98 - 107 mmol/L Final      CO2 CO2   Date Value Ref Range Status   08/24/2018 24.2 22.0 - 29.0 mmol/L Final   08/23/2018 23.6 22.0 - 29.0 mmol/L Final   08/23/2018 23.8 22.0 - 29.0 mmol/L Final   08/22/2018 25.1 22.0 - 29.0 mmol/L Final      BUN BUN   Date Value Ref Range Status   08/24/2018 11 8 - 23 mg/dL Final   08/23/2018 14 8 - 23 mg/dL Final   08/23/2018 14 8 - 23 mg/dL Final   08/22/2018 18 8 - 23 mg/dL Final      Creatinine Creatinine   Date Value Ref Range Status   08/24/2018 0.95 0.57 - 1.00 mg/dL Final   08/23/2018 1.01 (H) 0.57 - 1.00 mg/dL Final   08/23/2018 1.04 (H) 0.57 - 1.00 mg/dL Final   08/22/2018 1.10 (H) 0.57 - 1.00 mg/dL Final      Calcium Calcium   Date Value Ref Range Status   08/24/2018 9.2 8.6 - 10.5 mg/dL Final   08/23/2018 8.5 (L) 8.6 - 10.5 mg/dL Final   08/23/2018 9.2 8.6 - 10.5 mg/dL Final   08/22/2018 9.7 8.6 - 10.5 mg/dL Final      PO4 No results found for: CAPO4   Albumin No results found for: ALBUMIN   Magnesium Magnesium   Date Value Ref Range Status    08/24/2018 2.5 (H) 1.6 - 2.4 mg/dL Final   08/23/2018 2.1 1.6 - 2.4 mg/dL Final      Uric Acid Uric Acid   Date Value Ref Range Status   08/23/2018 6.2 (H) 2.4 - 5.7 mg/dL Final        Medication Review: yes    Assessment/Plan     Principal Problem:    Uncontrolled hypertension  Active Problems:    Joint pain    Urinary retention self-caths (Don)    Sinusitis    Conjunctivitis    Arm pain    Cervical disc displacement    Cervical pain (Servando=PM)    DDD (degenerative disc disease), cervical    Hyperlipidemia    Proctocele    Urge incontinence of urine    Chronic tension-type headache, intractable    Obesity (BMI 30.0-34.9)    H/o Lyme disease    Stage 3 chronic kidney disease    Hardening of the arteries of the brain    Allergic contact dermatitis    Malaise and fatigue progressive    Vertigo    Vitamin D deficiency    Moderate persistent asthmatic bronchitis without complication    Pleurisy    Knee pain, bilateral    Elevated PTH level    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)    Gastroesophageal reflux disease with esophagitis    Psoriasis (Darryl Ochoa)    Postmenopausal    Recurrent UTI    CVA (cerebrovascular accident) subacute    Dyspnea on exertion    Acute joint pain    Easy bruisability    Acute exacerbation of 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[facility])    Acute pain of left shoulder due to trauma    Decreased functional mobility and endurance since fall 12/20/17    Fall from standing with head traumna      Assessment & Plan   · Uncontrolled hypertension--bp improved on procardia 60mg,clonidine 0.2mg po q8 hrs as well as eplerenone 50mg bid and losartan 50mg/hctz 12.5mg po bid. Tolerated extra dose of losartan overnight with improved bp this am. Resume ethacrynic acid.  · ERMIAS on CKD3--cr stable. Resume ethacrynic acid  · Urinary retention/urinary incontinence-per urology. Continue I/O cath  · Headaches--CT negative.  · Asthma/copd.      Plan  · Increase hyzaar to 100mg/12.5mg po daily.  · Resume  ethacrynic acid  · Bmp in am         David Call MD  08/24/18  9:09 AM

## 2018-08-24 NOTE — PROGRESS NOTES
"DOS: 2018  NAME: Sasha Barrett   : 1940  PCP: Óscar Cadet MD  Chief Complaint   Patient presents with   • Headache     Neurology    Subjective: Pt sleeping in a dark room. States she's only had a little improvement in her h/a. Currently h/a is 6 out of 10 with ongoing photophobia and mild nausea. She's had all 3 migraine cocktails.    Objective:  Vital signs: /62 (BP Location: Right arm, Patient Position: Lying)   Pulse 63   Temp 98 °F (36.7 °C) (Oral)   Resp 18   Ht 157.5 cm (62\")   Wt 86.2 kg (190 lb)   SpO2 95%   BMI 34.75 kg/m²       General appearance: Well developed, well nourished, alert and cooperative.   HEENT: Normocephalic.   Neck and spine: Normal range of motion. Normal alignment. No mass or tenderness.    Cardiac: Regular rate and rhythm.    Peripheral Vasculature: Extremities warm and dry.  Chest Exam: Clear to auscultation bilaterally, no wheezes, no rhonchi.  Extremities: Normal, no edema.   Skin: No rashes or birthmarks.     Higher integrative function: Oriented to time, place, person, intact recent and remote memory, attention span, concentration and language. Spontaneous speech, fund of vocabulary are normal.   CN II: Normal visual fields.   CN III IV VI: Extraocular movements are full. Pupils are equal, round, and reactive to light.  CN V: Normal facial sensation.  CN VII: Facial movements are symmetric, no weakness.   CN VIII: Auditory acuity is normal.   CN IX & X: Symmetric palatal movement.   CN XI: Sternocleidomastoid and trapezius are normal. No weakness.   CN XII: The tongue is midline.  Motor: Normal muscle strength, bulk, and tone in upper and lower extremities. No fasciculations, rigidity, spasticity or abnormal movements.   Sensation: Normal light touch.  Station and gait: Not assessed.  Muscle stretch reflexes: Reflexes are normal and symmetric in the upper and lower extremities. Plantar reflexes are flexor bilaterally.   Coordination: Finger to nose " test showed no dysmetria. Heel to shin normal.     Meds and labs reviewed  Scheduled Meds:  aspirin 81 mg Oral Daily   ceftriaxone 1 g Intravenous Q24H   cholecalciferol 1,000 Units Oral Daily   CloNIDine 0.1 mg Oral Q8H   diphenhydrAMINE 25 mg Intravenous Q8H   docusate sodium 100 mg Oral Daily   enoxaparin 40 mg Subcutaneous Q24H   eplerenone 50 mg Oral Q12H   fluticasone 1 spray Each Nare BID   guaiFENesin 600 mg Oral Q12H   labetalol 200 mg Oral Q12H   losartan-HCTZ (HYZAAR) 50-12.5 combo dose  Oral Daily   magnesium sulfate 1 g Intravenous Q8H   metoclopramide 10 mg Intravenous Q8H   montelukast 10 mg Oral Nightly   NIFEdipine XL 60 mg Oral Q24H   pantoprazole 40 mg Oral Q AM   predniSONE 10 mg Oral Every Other Day   valproate sodium 500 mg Intravenous Q8H   cyanocobalamin 1,000 mcg Oral Daily     Continuous Infusions:  dextrose 5 % and sodium chloride 0.45 % with KCl 20 mEq/L 50 mL/hr Last Rate: 50 mL/hr (08/24/18 0627)   Pharmacy to Dose enoxaparin (LOVENOX)       PRN Meds:.•  acetaminophen  •  albuterol  •  butalbital-acetaminophen-caffeine  •  diphenhydrAMINE  •  diphenhydrAMINE  •  EPINEPHrine PF  •  hydrALAZINE  •  HYDROmorphone  •  LORazepam  •  meclizine  •  nitroglycerin  •  ondansetron ODT  •  pentazocine-naloxone  •  Pharmacy to Dose enoxaparin (LOVENOX)  •  polyethylene glycol  •  promethazine  •  promethazine-codeine  •  sodium chloride  •  Insert peripheral IV **AND** sodium chloride    Laboratory results:  Lab Results   Component Value Date    GLUCOSE 97 08/24/2018    CALCIUM 9.2 08/24/2018     08/24/2018    K 3.4 (L) 08/24/2018    CO2 24.2 08/24/2018     08/24/2018    BUN 11 08/24/2018    CREATININE 0.95 08/24/2018    EGFRIFAFRI 63 05/03/2017    EGFRIFNONA 57 (L) 08/24/2018    BCR 11.6 08/24/2018    ANIONGAP 11.8 08/24/2018     Lab Results   Component Value Date    WBC 10.04 08/24/2018    HGB 13.3 08/24/2018    HCT 42.8 08/24/2018    MCV 91.1 08/24/2018     08/24/2018     Lab  Results   Component Value Date    CHOL 170 08/24/2018    CHOL 182 12/26/2017     Lab Results   Component Value Date    HDL 55 08/24/2018    HDL 46 12/26/2017    HDL 59 05/03/2017     Lab Results   Component Value Date    LDL 92 08/24/2018    LDL 99 12/26/2017     (H) 05/03/2017     Lab Results   Component Value Date    TRIG 117 08/24/2018    TRIG 184 (H) 12/26/2017    TRIG 202 (H) 05/03/2017     No results found for: HGBA1C    Imaging:  Ct Head Without Contrast    Result Date: 8/22/2018  No acute process identified.    Radiation dose reduction techniques were utilized, including automated exposure control and exposure modulation based on body size.       Ct Chest With Contrast    Result Date: 8/23/2018  1. No acute abnormality within the chest, abdomen or pelvis. 2. Colonic diverticulosis.  Radiation dose reduction techniques were utilized, including automated exposure control and exposure modulation based on body size.       Mri Angiogram Head Without Contrast    Result Date: 8/23/2018  1.  Nonvisualization of the left posterior cerebral artery. This was small in caliber and arose from the posterior cerebral artery on brain MRA on 12/05/2016 which is not visualized on today's exam. Further evaluation with CTA of the head is recommended as this is a more sensitive to delineate arterial anatomy. 2.  Apparent outpouching from the undersurface of the terminal left internal carotid artery which may represent artifact from adjacent vasculature versus small aneurysm. This can also be further evaluated with the above-mentioned CTA of the head. 3.  Old left occipital infarct, extensive chronic ischemic white matter changes and moderate cerebral atrophy.  Findings were discussed with Francisco Javier Khan by telephone at 5:10 PM on 08/23/2018.  This report was finalized on 8/23/2018 5:48 PM by Dr. Bennie Chacon M.D.      Mri Brain Without Contrast    Result Date: 8/23/2018  1.  Nonvisualization of the left posterior cerebral  artery. This was small in caliber and arose from the posterior cerebral artery on brain MRA on 12/05/2016 which is not visualized on today's exam. Further evaluation with CTA of the head is recommended as this is a more sensitive to delineate arterial anatomy. 2.  Apparent outpouching from the undersurface of the terminal left internal carotid artery which may represent artifact from adjacent vasculature versus small aneurysm. This can also be further evaluated with the above-mentioned CTA of the head. 3.  Old left occipital infarct, extensive chronic ischemic white matter changes and moderate cerebral atrophy.  Findings were discussed with Francisco Javier Khan by telephone at 5:10 PM on 08/23/2018.  This report was finalized on 8/23/2018 5:48 PM by Dr. Bennie Chacon M.D.      Ct Abdomen Pelvis With Contrast    Result Date: 8/23/2018  1. No acute abnormality within the chest, abdomen or pelvis. 2. Colonic diverticulosis.  Radiation dose reduction techniques were utilized, including automated exposure control and exposure modulation based on body size.         Impression:  Ms Barrett is an 77 yo female with PMH HTN, COPD, CKD III, urinary retention requiring self catheterization,  left breast cancer s/p resection, and migraine admitted with intractable h/a, SBP > 200s, and UTI. H/a started 4 days PTA and is described as pulsating in the back of her head with occasional radiation to the front of her head over her left eye. She'd been taking fiorcet Q 4-6h at home without relief.    Only mild improvement today after 3 migraine cocktails  MRI brain was negative for acute findings.  MRA was not a good study, CTA h/n recommended, pending.    Plan:  - Discussed with Dr Khan. No further migraine cocktails at this time. Will give pt medication vacation and she how she does with ongoing BP management and time.   - Will f/u CTA h/n- if significant ICAD will consider adding plavix to asa.  - Will follow.     Addendum at 1627: CTA  shows ICAD, Official read pending. Adding plavix, continue x 3 months.

## 2018-08-24 NOTE — PROGRESS NOTES
[facility] Progress Note    On CIC- we will continue intermittent cath for now and resume this regimen at home q AM and HS.

## 2018-08-24 NOTE — PLAN OF CARE
Problem: Patient Care Overview  Goal: Plan of Care Review  Outcome: Ongoing (interventions implemented as appropriate)   08/24/18 0959   OTHER   Outcome Summary Pt admit from home with headache. Pt presents with generalized weakness and fatigue. Pt prefers to use the BSC (assist of 1) and with encouragement walks to sink to wash hands. Ed pt on benefits of activity and increasing independence with ADL tasks. Pt may benefit from skilled OT to increase safety and function for return home    Coping/Psychosocial   Plan of Care Reviewed With patient

## 2018-08-24 NOTE — PROGRESS NOTES
"[facility]  Cardiology Progress Note    Patient Identification:  Name: Sasha Barrett  Age: 78 y.o.  Sex: female  :  1940  MRN: [id number]                 Follow Up / Chief Complaint: Uncontrolled hypertension    Interval History:   78-year-old white female with no known history of CAD but long-standing history of hypertension who presents with an intractable headache and UTI.  Defer decision regarding renal artery angiogram to her primay Nephrologist, Dr Meléndez. In the meantime, adjustments made to BP meds     Subjective:  Denies chest pain, tightness, palpitations or dizziness.  Denies shortness of breath.  Reports mild nausea with headache \"about the same\"    Objective:  SR 60's-80's  BP 140s-160s/70s  Afebrile  Stat >94% on room air    CE (-) x4  Edecrin resumed per renal  MRI of the brain was negative.  CT a head and neck results pending    Past Medical History:  Past Medical History:   Diagnosis Date   • Arthritis    • Asthma    • Cancer (CMS/HCC)    • COPD (chronic obstructive pulmonary disease) (CMS/HCC)    • Emphysema lung (CMS/HCC)    • Generalized headaches    • Hayfever    • Hypertension    • Stroke (CMS/HCC)      Past Surgical History:  Past Surgical History:   Procedure Laterality Date   • BREAST LUMPECTOMY Left 2003   • CARPAL TUNNEL RELEASE     • CATARACT EXTRACTION     • HERNIA REPAIR  1971   • HERNIA REPAIR      right groin   • JOINT REPLACEMENT     • KNEE SURGERY Left 1998    bilateral knee replacements   • MASTECTOMY     • PARATHYROIDECTOMY  2006   • SINUS SURGERY  1984   • TOTAL ABDOMINAL HYSTERECTOMY  1973        Social History:   Social History   Substance Use Topics   • Smoking status: Never Smoker   • Smokeless tobacco: Never Used   • Alcohol use Yes      Comment: rarely - once or twice year (wine or bernabe)      Family History:  Family History   Problem Relation Age of Onset   • Cancer Brother     "       Allergies:  Allergies   Allergen Reactions   • Morphine Anaphylaxis   • Sulfa Antibiotics Anaphylaxis and Hives     Or sulfa fillers in meds   • Hydralazine Myalgia     Patient reports leg cramps, joint pain and increased fatigue   • Amlodipine      KIDNEYS SHUT DOWN   • Anastrozole    • Grass    • Nsaids      KIDNEY FAILURE   • Penicillins      Tolerated ceftriaxone during 04/2017 admission   • Tree Extract    • Zolpidem      HALLUCINATIONS     Scheduled Meds:    aspirin 81 mg Daily   ceftriaxone 1 g Q24H   cholecalciferol 1,000 Units Daily   CloNIDine 0.1 mg Q8H   docusate sodium 100 mg Daily   enoxaparin 40 mg Q24H   eplerenone 50 mg Q12H   ethacrynic acid 25 mg TID   fluticasone 1 spray BID   guaiFENesin 600 mg Q12H   labetalol 200 mg Q12H   losartan-HCTZ (HYZAAR) 100-12.5 combo dose  Daily   montelukast 10 mg Nightly   NIFEdipine XL 60 mg Q24H   pantoprazole 40 mg Q AM   predniSONE 10 mg Every Other Day   cyanocobalamin 1,000 mcg Daily           INTAKE AND OUTPUT:    Intake/Output Summary (Last 24 hours) at 08/24/18 1349  Last data filed at 08/24/18 0539   Gross per 24 hour   Intake              520 ml   Output             1100 ml   Net             -580 ml       Review of Systems:   GI: Intermittent nausea, no abdominal pain  Cardiac:  No chest pain or tightness  Pulmonary: No shortness of breath or cough    Constitutional:  Temp:  [98 °F (36.7 °C)-98.9 °F (37.2 °C)] 98 °F (36.7 °C)  Heart Rate:  [] 72  Resp:  [18-20] 18  BP: (149-176)/(60-82) 151/82    Physical Exam:  General:  Appears in no acute distress  Eyes: PERTL,  HEENT:  No JVD. Thyroid not visibly enlarged. No mucosal pallor or cyanosis  Respiratory: Respirations regular and unlabored at rest. BBS with good air entry in all fields. No crackles, rubs or wheezes auscultated  Cardiovascular: S1S2 Regular rate and rhythm. No murmur, rub or gallop. No carotid bruits. DP/PT pulses     . No pretibial pitting edema  Gastrointestinal: Abdomen soft,  flat, non tender. Bowel sounds present. No hepatosplenomegaly. No ascites  Musculoskeletal: OLSEN x4. No abnormal movements  Extremities: No digital clubbing or cyanosis  Skin: Color pink. Skin warm and dry to touch. No rashes    Neuro: AAO x3 CN II-XII grossly intact  Psych: Mood and affect normal, pleasant and cooperative          Cardiographics  Telemetry:       ECG 8-24-18:       Echocardiogram:   · Calculated EF = 78%.  · Left ventricular systolic function is normal.  · Left ventricular diastolic dysfunction (grade I) consistent with impaired relaxation.  · Mild tricuspid valve regurgitation is present.  · Estimated right ventricular systolic pressure from tricuspid regurgitation is normal (<35 mmHg).    Lab Review     Results from last 7 days  Lab Units 08/23/18  1824 08/23/18  1157 08/23/18  0546   TROPONIN T ng/mL <0.010 <0.010 <0.010       Results from last 7 days  Lab Units 08/24/18  0532   MAGNESIUM mg/dL 2.5*       Results from last 7 days  Lab Units 08/24/18  0532   SODIUM mmol/L 141   POTASSIUM mmol/L 3.4*   BUN mg/dL 11   CREATININE mg/dL 0.95   CALCIUM mg/dL 9.2       Results from last 7 days  Lab Units 08/24/18  0532 08/23/18  0547 08/23/18  0048   WBC 10*3/mm3 10.04 10.69 12.52*   HEMOGLOBIN g/dL 13.3 12.2 13.8   HEMATOCRIT % 42.8 39.9 43.2   PLATELETS 10*3/mm3 172 177 165             Assessment:  - uncontrolled HTN  - EF >75%, mild TR  - intractable headache  - UTI  - CKD 3     - COPD / RAD  - OA / DJD  - h/o CVA  - h/o Breast Ca - bilateral masectomies      Plan:  - uncontrolled HTN -  improved control on current doses of Catapres, Labetalol, Procardia, losartan and Edecrin    - EF >75% - gr I morrell dysfx, mild TR    No angina.  MI ruled out.  Defer decision regarding proceeding with renal artery arteriogram to nephrology.  Favor outpatient stress testing unless she were to develop angina      Labs/tests ordered: Medication adjustment      )8/24/2018  Jeffrey Argueta MD      EMR  "Dragon/Transcription:   \"Dictated utilizing Dragon dictation\".     "

## 2018-08-24 NOTE — PROGRESS NOTES
[facility]  INTERNAL MEDICINE  ÓSCAR CADET MD  [address] [place] [address]  Phone [phone] Fax [phone]  E-mail:  [email or web address]      INTERNAL MEDICINE DAILY PROGRESS NOTE  Óscar Cadet M.D.  2018            Patient Identification:  Name: Sasha Barrett  Age: 78 y.o.  Sex: female  :  1940  MRN: [id number]         Primary Care Physician: Óscar Cadet MD  LENGTH OF STAY 0 DAYS    Consults     Date and Time Order Name Status Description    2018 0105 Inpatient Cardiology Consult Completed     2018 0050 Inpatient Urology Consult Completed     2018 0020 Inpatient Consult to Neurology Completed     20180 Inpatient Consult to Nephrology      2018 193 Nephrology (on -call MD unless specified) Completed     2018 191 Family Medicine Consult Completed                  Chief Complaint:  Accelerated hypertension with intractable headache     History of Present Illness:     Subjective         Interval History: Patient is a 78 y.o.female who presented with intractable posterior headache to [facility] emergency room on 2018 5:20 PM.  Patient reports that the headache is actually been present for more than 6 days and in conversation with me indicates that it really started 2018 when she fell from a standing position and struck the back side of her head.  Her headache at that time was unrelenting for at least 2 days and since that time she has continued to have intermittent headaches she denies any weakness, blurred vision, facial asymmetry, difficulty walking, dizziness, or other signs or symptoms of stroke.  Patient came to the emergency primarily because her blood pressure was extremely elevated at home in the range of 220/140.  Patient has had similar episodes of hypertension in the past which are severe headaches and she was unable to get any improvement in her pain using multiple  medications at her disposal at home.  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  Patient has had a long history of complications with blood pressure and has followed with Dr. Bond in renal for this problem over the last few years.  In addition, patient has problems with a neurogenic bladder and actually does self catheterizations twice daily.  In the emergency room she was found to have evidence of a UTI despite recent treatment on an outpatient basis with Cipro for this condition.     Patient was seen in the emergency room by Dr. Óscar Olivo.  On his exam and history of present illness she was complaining of posterior headache of 6 days duration.  She stated that the headache began at the back of her head and gradually moved to the front.  She has a diagnosis in the past of migraine headaches and has been treated apparently with Demerol in the past for this problem.  Patient has exacerbation of her headache with bright lights and loud noises.  She also complained of chills, congestion, and clear sinus drainage.  The Fioricet that the patient uses for  her headaches has provided no relief.  The pollen that she has used for her pain also provides no relief.  Patient does indicate that she takes 1 pollen every 4 hours but review of Olvin and office notes shows that she has been requesting to call 1 every 4 hours on a regular basis for the last few months.  The ER history of present illness indicates that her headache is of 6 days duration, gradual in onset, constant in timing, neurologically located, radiates to the front of head from back, quality is headache intensity is moderate, progression is unchanged, associated symptoms of the chills and congestion with clear drainage and photophobia, aggravating factors or Leiden noises, alleviating factors none, previous episodes of similar episodes of headache diagnosed as migraines in the past, treatment before arrival was Fioricet without relief.  Review of systems was positive for chills, congestion, photophobia, headache of 6 days duration.  Physical exam showed frontal sinus tenderness on both left and right sides.  Labs in the ER showed glucose 114, creatinine 1.10, white blood cell count 11.32, hemoglobin normal at 13.8, UA with white blood cells 3-5 and bacteria 4+, CT of head without contrast showed no acute disease.  In the emergency room, patient was given Dilaudid for her headache and clonidine for her blood pressure.  Her urinary tract infection was treated with Rocephin.  I was called as her primary care physician and at recommendation of the emergency room staff patient was admitted to a monitored bed with diagnoses of acute intractable headache, acute UTI, uncontrolled hypertension, and ongoing severe pain.     8/23/2018.  I personally saw the patient for the first time during this hospitalization on this date.  Patient was resting comfortably in bed at the time of my visit and picking at her breakfast.  Patient indicated that she was having ongoing severe headache pain.  Her blood pressure at the time my  visit showed 170/90 on the monitor.  Patient does relate a history as discussed above.  She does blink her headaches back to a fall on July 13 to set thousand 13 when she struck her head.  Patient has had ongoing problems with headaches.  I did review patient's medication usage and her Olvin report with the pharmacy.  It does seem that she is using excessive amounts of tall 1 without relief of her pain.  She also seems to be using excessive amounts of Fiorinal which could be leading to rebound headaches.  In addition, patient has Phenergan With Codeine which would be rendered ineffective by her Talwin usage.  Patient is in the midst of workup is outlined in the orders.  She is in the midst of testing and was taken down for testing at the end of my exam.    8/24/2018.  Patient seen again today in her bed on 6 S. L attending was visiting patient at the same time and we discussed the case together and with her.  Patient quite concerned about use of hydralazine by neurology and we have discontinued that medication because of previous sensitivities the patient had including a positive DIAN when on that medication.  Patient reports headaches may be slightly improved but still persist.  Neurology has discontinued headache medicine.  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  Plavix has been added by neurology to aspirin therapy for the patient.  There is no evidence of bleeding and brain or other abnormalities due to trauma.  Patient could conceivably have a posttraumatic headache due to minor contusion of the brain.  Fioricet does not seem to be working well for the patient's headache pain.  Patient has required Dilaudid.  Hospital does not have following available and patient is unwilling to share her home supply.  Patient converted to full inpatient admission.  We will continue to follow the patient.    Review of Systems:               A comprehensive 14 point review of systems was negative  except for:  Constitution:  positive for chills, fatigue, fevers and malaise  ENT:  positive for ear drainage, ear ringing, hearing loss and snoring  Respiratory: positive for  cough, dry, pleuritic pain and shortness of air  Cardiovascular: positive for  chest pressure / pain, at rest and irregular pulse  Gastrointestinal: postitive for  bloating / distention and heartburn  Musculoskeletal: positive for  back pain, joint pain, joint stiffness, joint swelling, muscle pain, muscle weakness and neck pain  Behavioral/Psych: positive for  anxiety  Allergies / Immunologic: positive for  rash    Past Medical History:   Diagnosis Date   • Arthritis    • Asthma    • Cancer (CMS/[facility])    • COPD (chronic obstructive pulmonary disease) (CMS/[facility])    • Emphysema lung (CMS/[facility])    • Generalized headaches    • Hayfever    • Hypertension    • Stroke (CMS/[facility])      Past Surgical History:   Procedure Laterality Date   • BREAST LUMPECTOMY Left 04/01/2003   • CARPAL TUNNEL RELEASE  2011   • CATARACT EXTRACTION  2010   • HERNIA REPAIR  01/01/1971   • HERNIA REPAIR      right groin   • JOINT REPLACEMENT     • KNEE SURGERY Left 01/01/1998    bilateral knee replacements   • MASTECTOMY     • PARATHYROIDECTOMY  05/08/2006   • SINUS SURGERY  01/01/1984   • TOTAL ABDOMINAL HYSTERECTOMY  01/01/1973     Allergies   Allergen Reactions   • Morphine Anaphylaxis   • Sulfa Antibiotics Anaphylaxis and Hives     Or sulfa fillers in meds   • Hydralazine Myalgia     Patient reports leg cramps, joint pain and increased fatigue   • Amlodipine      KIDNEYS SHUT DOWN   • Anastrozole    • Grass    • Nsaids      KIDNEY FAILURE   • Penicillins      Tolerated ceftriaxone during 04/2017 admission   • Tree Extract    • Zolpidem      HALLUCINATIONS     Family History   Problem Relation Age of Onset   • Cancer Brother      Social History     Social History   • Marital status:      Spouse name: Joey     Social History Main Topics   • Smoking status: Never  "Smoker   • Smokeless tobacco: Never Used   • Alcohol use Yes      Comment: rarely - once or twice year (wine or bernabe)   • Drug use: Unknown   • Sexual activity: Yes     Partners: Male     Other Topics Concern   • Not on file       PMH, FH, SH and ROS completed with Admission History and Physical and updated in EPIC system.        Objective     Scheduled Meds:  aspirin 81 mg Oral Daily   ceftriaxone 1 g Intravenous Q24H   cholecalciferol 1,000 Units Oral Daily   CloNIDine 0.1 mg Oral Q8H   diphenhydrAMINE 25 mg Intravenous Q8H   docusate sodium 100 mg Oral Daily   enoxaparin 40 mg Subcutaneous Q24H   eplerenone 50 mg Oral Q12H   fluticasone 1 spray Each Nare BID   guaiFENesin 600 mg Oral Q12H   labetalol 200 mg Oral Q12H   losartan-HCTZ (HYZAAR) 100-12.5 combo dose  Oral Daily   magnesium sulfate 1 g Intravenous Q8H   metoclopramide 10 mg Intravenous Q8H   montelukast 10 mg Oral Nightly   NIFEdipine XL 60 mg Oral Q24H   pantoprazole 40 mg Oral Q AM   potassium chloride 20 mEq Oral Once   predniSONE 10 mg Oral Every Other Day   valproate sodium 500 mg Intravenous Q8H   cyanocobalamin 1,000 mcg Oral Daily     Continuous Infusions:  dextrose 5 % and sodium chloride 0.45 % with KCl 20 mEq/L 50 mL/hr Last Rate: 50 mL/hr (08/24/18 0627)   Pharmacy to Dose enoxaparin (LOVENOX)         Vital signs in last 24 hours:  Temp:  [98 °F (36.7 °C)-98.9 °F (37.2 °C)] 98 °F (36.7 °C)  Heart Rate:  [] 72  Resp:  [18-20] 18  BP: (149-176)/(60-81) 149/62    Intake/Output:    Intake/Output Summary (Last 24 hours) at 08/24/18 1019  Last data filed at 08/24/18 0539   Gross per 24 hour   Intake              520 ml   Output             1100 ml   Net             -580 ml       Exam:  /62   Pulse 72   Temp 98 °F (36.7 °C) (Oral)   Resp 18   Ht 157.5 cm (62\")   Wt 86.2 kg (190 lb)   SpO2 95%   BMI 34.75 kg/m²     Constitutional:  Alert, cooperative, moderate distress, AAOx3, resting comfortably   Head:      Normocephalic, " without obvious abnormality, atraumatic, ongoing cephalgia with headache   Eyes:     PERRLA, conjunctiva/corneas clear, no icterus, no conjunctival                                     pallor, EOM's intact, both eyes      ENT and Mouth: Lips, tongue, gums normal; oral mucosa pink and moist   Neck:     Supple, symmetrical, trachea midline, no JVD  Respiratory:     Clear to auscultation bilaterally, respirations unlabored  Cardiovascular:  Regular rate and rhythm, S1 and S2 normal, no murmur,      no  Rub or gallop.  Pulses normal.    Gastrointestinal:   BS present x 4 Soft, non-tender, bowel sounds active,      no masses, no hepatosplenomegaly                                                     :       No hernia.  Normal exam for sex.         Musculoskeletal: Extremities normal, atraumatic, no cyanosis or edema     No arthropathy.  No deformity.  Gait normal                                                 Skin:   Skin is warm and dry,  no rashes, swelling or palpable lesions   Neurologic:  CN -XII intact, motor strength grossly intact, sensation grossly intact to light touch, no focal reflex deficits noted, no real deficits   Psychiatric:     Alert,oriented X3, no delusions, psychoses, depression or anxiety    Heme/Lymph/Imun:   No bruises, petechiae.  Lymph nodes normal in size/configuration       Data Review:  Lab Results   Component Value Date    CALCIUM 9.2 08/24/2018    PHOS 3.2 12/26/2017     Results from last 7 days  Lab Units 08/24/18  0532 08/23/18  0547 08/23/18  0546 08/23/18  0048   MAGNESIUM mg/dL 2.5*  --  2.1  --    SODIUM mmol/L 141  --  141 144   POTASSIUM mmol/L 3.4*  --  3.6 3.6   CHLORIDE mmol/L 105  --  106 107   CO2 mmol/L 24.2  --  23.6 23.8   BUN mg/dL 11  --  14 14   CREATININE mg/dL 0.95  --  1.01* 1.04*   GLUCOSE mg/dL 97  --  106* 124*   CALCIUM mg/dL 9.2  --  8.5* 9.2   WBC 10*3/mm3 10.04 10.69  --  12.52*   HEMOGLOBIN g/dL 13.3 12.2  --  13.8   PLATELETS 10*3/mm3 172 177  --  165      Lab Results   Component Value Date    CKTOTAL 36 04/08/2017    CKMB 1.69 04/06/2017    TROPONINT <0.010 08/23/2018     Estimated Creatinine Clearance: 48.1 mL/min (by C-G formula based on SCr of 0.95 mg/dL).  WEIGHTS:     Wt Readings from Last 1 Encounters:   08/24/18 0445 86.2 kg (190 lb)   08/23/18 0643 86.7 kg (191 lb 2.2 oz)   08/22/18 2054 87.4 kg (192 lb 10.9 oz)   08/22/18 1614 83.9 kg (185 lb)         Assessment:  Principal Problem:    Uncontrolled hypertension  Active Problems:    Chronic tension-type headache, intractable    Stage 3 chronic kidney disease    Malaise and fatigue progressive    Moderate persistent asthmatic bronchitis without complication    Acute joint pain    Acute exacerbation of 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[facility])    Acute pain of left shoulder due to trauma    Fall from standing with head traumna    Urinary retention self-caths (Don)    Sinusitis    Cervical disc displacement    DDD (degenerative disc disease), cervical    Hyperlipidemia    Proctocele    Urge incontinence of urine    Vertigo    Vitamin D deficiency    Knee pain, bilateral    Elevated PTH level    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)    Gastroesophageal reflux disease with esophagitis    Recurrent UTI    CVA (cerebrovascular accident) subacute    Dyspnea on exertion    Easy bruisability    Decreased functional mobility and endurance since fall 12/20/17    Joint pain    Conjunctivitis    Arm pain    Cervical pain (Servando=PM)    Obesity (BMI 30.0-34.9)    H/o Lyme disease    Hardening of the arteries of the brain    Allergic contact dermatitis    Pleurisy    Psoriasis (Darryl Ochoa)    Postmenopausal      Attending Physician Assessment and Plan:    1.  Uncontrolled hypertension.  Patient is now back on her regular pain and blood pressure medications.  Patient has received clonidine in the ER for blood pressure control and efforts are being made to control her pain may be the cause and part of her  hypertension.  Renal and cardiology are consulted.  Urology who follows the patient for in and out catheters and a neurogenic bladder has also been asked to see her.  Blood pressure still up but not in as much of a threatening zone as it was at the time of her admission to the emergency room yesterday evening.  2.  Intractable headache pain with history of migraines and chronic tension-type headaches.  A combination of Talwin and Fioricet seems ineffective for control of her symptoms at the present time.  Patient will have adjustment of her medication regime with help from neurology.  Will attempt to replace Fioricet if possible and to wean Talwin somewhat.  No neurologic changes at the present time.  Nursing is continuing to follow her on a regular basis for this.  3.  Recurrent urinary tract infections with acute infection after failed outpatient therapy on Cipro in a patient with history of neurogenic bladder who requires urinary catheterization by in and out catheter twice daily at home.  Patient is regularly followed by urology for this problem and we will consult them for their input on these issues. Dr. Ochoa saw and resumed I and O caths.  4.  Chronic kidney disease stage III in past.  Blood pressure uncontrolled and followed on outpatient basis by Dr. Bond.  We will ask renal for their input on treatment at this point.  Patient is very sensitive to multiple medications that have been attempted in the past.  5.  Fall from standing with head trauma suggestive of head contusion.  MRI has been ordered of brain for further input and evaluation.  Patient is concerned about possibility of an aneurysm in, leading in her brain, or stroke.  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  Plavix added to ASA by neurology.  6.  Acute orthopedic pain with bilateral shoulder discomfort, cervical pain, lumbar pain.  Patient unrelieved  despite large doses of Talwin for pain control.  Patient has requested only one Talwin every 4 hours for now.  She is reporting a pain scale of 10 out of 10.  We will continue to use to allotted if needed for today to treat severe headaches.  Patient needs weaning of the Talwin which we will initiate at the time of her discharge.  7.  COPD with asthma acute mild exacerbation.  We will monitor patient nebs treatments have been ordered.  Regular inhalers will be used as needed.  8.  Malaise and fatigue.  These are ongoing problems for the patient.  She is clinically stable otherwise at the present time.  Some degree of vertigo is noted with movements.  9.  History of vitamin D deficiency.  We will check vitamin D levels at the present time and treat as necessary.  10.  Acute on chronic sinusitis.  This should be addressed by the Rocephin that she is receiving for the UTI at the present time.  Will monitor and adjust medications based on results from MRI which should also look into of sinus infection.      Plan for disposition:Where: home and When:  medically stable in 2-3 days      Óscar Cadet MD  8/24/2018  10:19 AM

## 2018-08-24 NOTE — CONSULTS
Adult Nutrition  Assessment/PES    Patient Name:  Sasha Barrett  YOB: 1940  MRN: [id number]  Admit Date:  8/22/2018    Assessment Date:  8/24/2018    Comments:  Nutrition consult for food preferences. States feeling better. Nausea resolved. Wants regular diet. Only drinks sprite.  No food allergies. Will follow if needed.          Reason for Assessment     Row Name 08/24/18 1111          Reason for Assessment    Reason For Assessment physician consult     Diagnosis cardiac disease;neurologic conditions   HTN, Headaches             Nutrition/Diet History     Row Name 08/24/18 1126          Nutrition/Diet History    Typical Food/Fluid Intake only drinks sprite             Anthropometrics     Row Name 08/24/18 1112 08/24/18 0445       Anthropometrics    Weight  -- 86.2 kg (190 lb)       Admit Weight    Admit Weight 86.2 kg (190 lb 0.6 oz)  --       Body Mass Index (BMI)    BMI Assessment BMI 30-34.9: obesity grade I  --            Labs/Tests/Procedures/Meds     Row Name 08/24/18 1112          Labs/Procedures/Meds    Lab Results Reviewed reviewed, pertinent     Lab Results Comments K, mg        Diagnostic Tests/Procedures    Diagnostic Test/Procedure Reviewed reviewed, pertinent        Medications    Pertinent Medications Reviewed reviewed, pertinent     Pertinent Medications Comments ABx, VitD, colace, reglan, steroids, ppi, B12, IVF             Physical Findings     Row Name 08/24/18 1113          Physical Findings    Overall Physical Appearance obese     Skin other (see comments)   intact               Nutrition Prescription Ordered     Row Name 08/24/18 1114          Nutrition Prescription PO    Current PO Diet Full Liquid             Evaluation of Received Nutrient/Fluid Intake     Row Name 08/24/18 1114          PO Evaluation    Number of Meals 2     % PO Intake 0-25%       Problem/Interventions:        Problem 1     Row Name 08/24/18 1114          Nutrition Diagnoses Problem 1    Problem 1  Inadequate Intake/Infusion     Inadequate Intake Type Oral     Macronutrient Kcal;Protein     Signs/Symptoms (evidenced by) Report/Observation     Reported GI Symptoms GI distress               Intervention Goal     Row Name 08/24/18 1119          Intervention Goal    General Maintain nutrition;Reduce/improve symptoms;Meet nutritional needs for age/condition;Disease management/therapy     PO Tolerate PO;Increase intake;Advance diet;PO intake (%)     PO Intake % 75 %     Weight No significant weight loss             Nutrition Intervention     Row Name 08/24/18 1111          Nutrition Intervention    RD/Tech Action Care plan reviewd;Follow Tx progress;Encourage intake;Interview for preference;Advise available snack;Advise alternate selection               Education/Evaluation     Row Name 08/24/18 111          Education    Education Will Instruct as appropriate        Monitor/Evaluation    Monitor Per protocol;I&O;Symptoms;PO intake;Pertinent labs;Skin status;Weight         Electronically signed by:  Nina Rivera RD  08/24/18 11:27 AM

## 2018-08-25 ENCOUNTER — APPOINTMENT (OUTPATIENT)
Dept: GENERAL RADIOLOGY | Facility: HOSPITAL | Age: 78
End: 2018-08-25

## 2018-08-25 PROBLEM — I66.22: Status: ACTIVE | Noted: 2018-08-25

## 2018-08-25 LAB
ANION GAP SERPL CALCULATED.3IONS-SCNC: 12.8 MMOL/L
BASOPHILS # BLD AUTO: 0.02 10*3/MM3 (ref 0–0.2)
BASOPHILS NFR BLD AUTO: 0.2 % (ref 0–1.5)
BUN BLD-MCNC: 14 MG/DL (ref 8–23)
BUN/CREAT SERPL: 13.3 (ref 7–25)
CALCIUM SPEC-SCNC: 9.1 MG/DL (ref 8.6–10.5)
CHLORIDE SERPL-SCNC: 106 MMOL/L (ref 98–107)
CO2 SERPL-SCNC: 21.2 MMOL/L (ref 22–29)
CREAT BLD-MCNC: 1.05 MG/DL (ref 0.57–1)
DEPRECATED RDW RBC AUTO: 53.1 FL (ref 37–54)
EOSINOPHIL # BLD AUTO: 0.1 10*3/MM3 (ref 0–0.7)
EOSINOPHIL NFR BLD AUTO: 1 % (ref 0.3–6.2)
ERYTHROCYTE [DISTWIDTH] IN BLOOD BY AUTOMATED COUNT: 15.9 % (ref 11.7–13)
GFR SERPL CREATININE-BSD FRML MDRD: 50 ML/MIN/1.73
GLUCOSE BLD-MCNC: 101 MG/DL (ref 65–99)
HCT VFR BLD AUTO: 42.6 % (ref 35.6–45.5)
HGB BLD-MCNC: 13.5 G/DL (ref 11.9–15.5)
IMM GRANULOCYTES # BLD: 0.03 10*3/MM3 (ref 0–0.03)
IMM GRANULOCYTES NFR BLD: 0.3 % (ref 0–0.5)
LYMPHOCYTES # BLD AUTO: 2.19 10*3/MM3 (ref 0.9–4.8)
LYMPHOCYTES NFR BLD AUTO: 21.6 % (ref 19.6–45.3)
MCH RBC QN AUTO: 28.8 PG (ref 26.9–32)
MCHC RBC AUTO-ENTMCNC: 31.7 G/DL (ref 32.4–36.3)
MCV RBC AUTO: 91 FL (ref 80.5–98.2)
MONOCYTES # BLD AUTO: 1.21 10*3/MM3 (ref 0.2–1.2)
MONOCYTES NFR BLD AUTO: 12 % (ref 5–12)
NEUTROPHILS # BLD AUTO: 6.6 10*3/MM3 (ref 1.9–8.1)
NEUTROPHILS NFR BLD AUTO: 65.2 % (ref 42.7–76)
PLATELET # BLD AUTO: 190 10*3/MM3 (ref 140–500)
PMV BLD AUTO: 11.2 FL (ref 6–12)
POTASSIUM BLD-SCNC: 3.8 MMOL/L (ref 3.5–5.2)
RBC # BLD AUTO: 4.68 10*6/MM3 (ref 3.9–5.2)
SODIUM BLD-SCNC: 140 MMOL/L (ref 136–145)
WBC NRBC COR # BLD: 10.12 10*3/MM3 (ref 4.5–10.7)

## 2018-08-25 PROCEDURE — 73030 X-RAY EXAM OF SHOULDER: CPT

## 2018-08-25 PROCEDURE — 63710000001 PREDNISONE PER 5 MG: Performed by: INTERNAL MEDICINE

## 2018-08-25 PROCEDURE — 25010000002 HYDROMORPHONE PER 4 MG: Performed by: INTERNAL MEDICINE

## 2018-08-25 PROCEDURE — 80048 BASIC METABOLIC PNL TOTAL CA: CPT | Performed by: INTERNAL MEDICINE

## 2018-08-25 PROCEDURE — 85025 COMPLETE CBC W/AUTO DIFF WBC: CPT | Performed by: INTERNAL MEDICINE

## 2018-08-25 PROCEDURE — 99231 SBSQ HOSP IP/OBS SF/LOW 25: CPT | Performed by: NURSE PRACTITIONER

## 2018-08-25 PROCEDURE — 97110 THERAPEUTIC EXERCISES: CPT

## 2018-08-25 PROCEDURE — 72052 X-RAY EXAM NECK SPINE 6/>VWS: CPT

## 2018-08-25 PROCEDURE — 25010000002 CEFTRIAXONE PER 250 MG: Performed by: INTERNAL MEDICINE

## 2018-08-25 PROCEDURE — 25010000002 ENOXAPARIN PER 10 MG: Performed by: INTERNAL MEDICINE

## 2018-08-25 RX ORDER — BUTALBITAL, ACETAMINOPHEN AND CAFFEINE 50; 325; 40 MG/1; MG/1; MG/1
1 TABLET ORAL EVERY 6 HOURS PRN
Status: DISCONTINUED | OUTPATIENT
Start: 2018-08-25 | End: 2018-08-27 | Stop reason: HOSPADM

## 2018-08-25 RX ORDER — CLONIDINE HYDROCHLORIDE 0.1 MG/1
0.2 TABLET ORAL EVERY 8 HOURS SCHEDULED
Status: DISCONTINUED | OUTPATIENT
Start: 2018-08-25 | End: 2018-08-27 | Stop reason: HOSPADM

## 2018-08-25 RX ORDER — NIFEDIPINE 30 MG/1
30 TABLET, EXTENDED RELEASE ORAL ONCE
Status: COMPLETED | OUTPATIENT
Start: 2018-08-25 | End: 2018-08-25

## 2018-08-25 RX ADMIN — GUAIFENESIN 600 MG: 600 TABLET, EXTENDED RELEASE ORAL at 20:04

## 2018-08-25 RX ADMIN — GUAIFENESIN 600 MG: 600 TABLET, EXTENDED RELEASE ORAL at 08:28

## 2018-08-25 RX ADMIN — MONTELUKAST SODIUM 10 MG: 10 TABLET, FILM COATED ORAL at 20:04

## 2018-08-25 RX ADMIN — ENOXAPARIN SODIUM 40 MG: 40 INJECTION SUBCUTANEOUS at 08:27

## 2018-08-25 RX ADMIN — CLONIDINE HYDROCHLORIDE 0.2 MG: 0.1 TABLET ORAL at 16:50

## 2018-08-25 RX ADMIN — Medication 1000 MCG: at 08:28

## 2018-08-25 RX ADMIN — HYDROMORPHONE HYDROCHLORIDE 0.5 MG: 1 INJECTION, SOLUTION INTRAMUSCULAR; INTRAVENOUS; SUBCUTANEOUS at 06:00

## 2018-08-25 RX ADMIN — ETHACRYNIC ACID 25 MG: 25 TABLET ORAL at 08:29

## 2018-08-25 RX ADMIN — FLUTICASONE PROPIONATE 1 SPRAY: 50 SPRAY, METERED NASAL at 20:06

## 2018-08-25 RX ADMIN — LOSARTAN POTASSIUM: 50 TABLET, FILM COATED ORAL at 08:28

## 2018-08-25 RX ADMIN — CEFTRIAXONE SODIUM 1 G: 1 INJECTION, SOLUTION INTRAVENOUS at 19:58

## 2018-08-25 RX ADMIN — EPLERENONE 50 MG: 25 TABLET, FILM COATED ORAL at 20:04

## 2018-08-25 RX ADMIN — HYDROMORPHONE HYDROCHLORIDE 0.5 MG: 1 INJECTION, SOLUTION INTRAMUSCULAR; INTRAVENOUS; SUBCUTANEOUS at 12:09

## 2018-08-25 RX ADMIN — ETHACRYNIC ACID 25 MG: 25 TABLET ORAL at 20:06

## 2018-08-25 RX ADMIN — LABETALOL HCL 200 MG: 200 TABLET, FILM COATED ORAL at 20:04

## 2018-08-25 RX ADMIN — LABETALOL HCL 200 MG: 200 TABLET, FILM COATED ORAL at 08:28

## 2018-08-25 RX ADMIN — EPLERENONE 50 MG: 25 TABLET, FILM COATED ORAL at 08:28

## 2018-08-25 RX ADMIN — CLONIDINE HYDROCHLORIDE 0.2 MG: 0.1 TABLET ORAL at 23:31

## 2018-08-25 RX ADMIN — PANTOPRAZOLE SODIUM 40 MG: 40 TABLET, DELAYED RELEASE ORAL at 06:00

## 2018-08-25 RX ADMIN — HYDROMORPHONE HYDROCHLORIDE 0.5 MG: 1 INJECTION, SOLUTION INTRAMUSCULAR; INTRAVENOUS; SUBCUTANEOUS at 23:37

## 2018-08-25 RX ADMIN — CLOPIDOGREL 75 MG: 75 TABLET, FILM COATED ORAL at 08:28

## 2018-08-25 RX ADMIN — NIFEDIPINE 30 MG: 30 TABLET, FILM COATED, EXTENDED RELEASE ORAL at 18:44

## 2018-08-25 RX ADMIN — HYDROMORPHONE HYDROCHLORIDE 0.5 MG: 1 INJECTION, SOLUTION INTRAMUSCULAR; INTRAVENOUS; SUBCUTANEOUS at 16:50

## 2018-08-25 RX ADMIN — CLONIDINE HYDROCHLORIDE 0.1 MG: 0.1 TABLET ORAL at 06:00

## 2018-08-25 RX ADMIN — VITAMIN D, TAB 1000IU (100/BT) 1000 UNITS: 25 TAB at 08:27

## 2018-08-25 RX ADMIN — BUTALBITAL, ACETAMINOPHEN, AND CAFFEINE 1 TABLET: 50; 325; 40 TABLET ORAL at 23:37

## 2018-08-25 RX ADMIN — ASPIRIN 81 MG: 81 TABLET, FILM COATED ORAL at 08:27

## 2018-08-25 RX ADMIN — ETHACRYNIC ACID 25 MG: 25 TABLET ORAL at 16:50

## 2018-08-25 RX ADMIN — PREDNISONE 10 MG: 10 TABLET ORAL at 08:28

## 2018-08-25 RX ADMIN — NIFEDIPINE 60 MG: 60 TABLET, EXTENDED RELEASE ORAL at 08:29

## 2018-08-25 RX ADMIN — FLUTICASONE PROPIONATE 1 SPRAY: 50 SPRAY, METERED NASAL at 08:27

## 2018-08-25 NOTE — THERAPY TREATMENT NOTE
Acute Care - Physical Therapy Treatment Note  [facility]     Patient Name: Sasha Barrett  : 1940  MRN: [id number]  Today's Date: 2018             Admit Date: 2018    Visit Dx:    ICD-10-CM ICD-9-CM   1. Uncontrolled hypertension I10 401.9   2. Acute intractable headache, unspecified headache type R51 784.0   3. Acute UTI N39.0 599.0     Patient Active Problem List   Diagnosis   • Joint pain   • Urinary retention self-caths (Don)   • Sinusitis   • Conjunctivitis   • Arm pain   • Cervical disc displacement   • Cervical pain (Servando=PM)   • DDD (degenerative disc disease), cervical   • Hyperlipidemia   • Preventative health care   • Medication management   • Proctocele   • Urge incontinence of urine   • Chronic tension-type headache, intractable   • Obesity (BMI 30.0-34.9)   • H/o Lyme disease   • Stage 3 chronic kidney disease   • Hardening of the arteries of the brain   • Allergic contact dermatitis   • Malaise and fatigue progressive   • Vertigo   • Vitamin D deficiency   • Moderate persistent asthmatic bronchitis without complication   • Anterior cervical adenopathy due to infection   • Pleurisy   • Knee pain, bilateral   • Elevated PTH level   • 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)   • Gastroesophageal reflux disease with esophagitis   • Psoriasis (Darryl Ochoa)   • Postmenopausal   • Recurrent UTI   • CVA (cerebrovascular accident) subacute   • Dyspnea on exertion   • Precordial pain   • Abnormal EKG   • Arthralgia   • Accelerated hypertension   • Acute joint pain   • Easy bruisability   • Cough productive of purulent sputum   • Influenza A Subtype H3   • Acute exacerbation of 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[facility])   • Acute chest wall pain   • Acute pain of left shoulder due to trauma   • Decreased functional mobility and endurance since fall 15   • Uncontrolled hypertension   • Fall from standing with head traumna   • 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 Agreeable to PT.   -CS      Therapy Frequency (PT Clinical Impression) daily  -CS      Patient Effort good  -CS      Existing Precautions/Restrictions fall  -CS      Equipment Issued to Patient walker, front wheeled;gait belt  -CS      Recorded by [CS] Mario Waddell, PT 08/25/18 1234      Row Name 08/25/18 1231             Cognitive Assessment/Intervention- PT/OT    Orientation Status (Cognition) oriented x 4  -CS      Follows Commands (Cognition) WNL  -CS      Personal Safety Interventions gait belt;nonskid shoes/slippers when out of bed;fall prevention program maintained;supervised activity  -CS      Recorded by [CS] Mario Waddell, PT 08/25/18 1234      Row Name 08/25/18 1231             Safety Issues, Functional Mobility    Safety Issues Affecting Function (Mobility) safety precautions follow-through/compliance  -CS      Recorded by [CS] Mario Waddell, PT 08/25/18 1234      Row Name 08/25/18 1231             Bed Mobility Assessment/Treatment    Bed Mobility Assessment/Treatment supine-sit;sit-supine  -CS      [place] Level (Bed Mobility) supervision  -CS      Supine-Sit [place] (Bed Mobility) supervision  -CS      Sit-Supine [place] (Bed Mobility) supervision  -CS      Assistive Device (Bed Mobility) bed rails;head of bed elevated  -CS      Recorded by [CS] Mario Waddell, PT 08/25/18 1234      Row Name 08/25/18 1231             Transfer Assessment/Treatment    Transfer Assessment/Treatment sit-stand transfer;stand-sit transfer  -CS      Recorded by [CS] Mario Waddell, PT 08/25/18 1234      Row  Name 08/25/18 1231             Sit-Stand Transfer    Sit-Stand [place] (Transfers) contact guard  -CS      Assistive Device (Sit-Stand Transfers) walker, front-wheeled  -CS      Recorded by [CS] Mario Waddell, PT 08/25/18 1234      Row Name 08/25/18 1231             Stand-Sit Transfer    Stand-Sit [place] (Transfers) contact guard  -CS      Assistive Device (Stand-Sit Transfers) walker, front-wheeled  -CS      Recorded by [CS] Mario Waddell, PT 08/25/18 1234      Row Name 08/25/18 1231             Gait/Stairs Assessment/Training    [place] Level (Gait) contact guard;minimum assist (75% patient effort)  -CS      Assistive Device (Gait) walker, front-wheeled  -CS      Distance in Feet (Gait) 160  -CS      Pattern (Gait) step-through  -CS      Deviations/Abnormal Patterns (Gait) antalgic;tim decreased;gait speed decreased;stride length decreased  -CS      Recorded by [CS] Mario Waddell, PT 08/25/18 1234      Row Name 08/25/18 1231             Positioning and Restraints    Pre-Treatment Position in bed  -CS      Post Treatment Position bed  -CS      In Bed supine;call light within reach;encouraged to call for assist;with family/caregiver  -CS      Recorded by [CS] Mario Waddell, PT 08/25/18 1234      Row Name 08/25/18 1231             Pain Assessment    Additional Documentation Pain Scale: Numbers Pre/Post-Treatment (Group)  -CS      Recorded by [CS] Mario Waddell, PT 08/25/18 1234      Row Name 08/25/18 1231             Pain Scale: Numbers Pre/Post-Treatment    Pain Scale: Numbers, Pretreatment 6/10  -CS      Pain Location shoulder  -CS      Recorded by [CS] Mario Waddell, PT 08/25/18 1234      Row Name 08/25/18 1231             Coping    Observed Emotional State calm;cooperative  -CS      Verbalized Emotional State acceptance  -CS      Recorded by [CS] Mario Waddell, PT 08/25/18 1234      Row Name 08/25/18 1231             Plan of Care Review    Plan of Care Reviewed With  patient  -CS      Recorded by [CS] Mario Waddell, PT 08/25/18 1234      Row Name 08/25/18 1231             Outcome Summary/Treatment Plan (PT)    Anticipated Discharge Disposition (PT) home  -CS      Recorded by [CS] Mario Waddell, PT 08/25/18 1234        User Key  (r) = Recorded By, (t) = Taken By, (c) = Cosigned By    Initials Name Effective Dates Discipline    CS Mario Waddell, PT 05/14/18 -  PT                     Physical Therapy Education     Title: PT OT SLP Therapies (Done)     Topic: Physical Therapy (Done)     Point: Mobility training (Done)    Learning Progress Summary     Learner Status Readiness Method Response Comment Documented by    Patient Done Acceptance E,TB VU   08/25/18 1234     Done Acceptance E VU,NR   08/24/18 0957          Point: Home exercise program (Done)    Learning Progress Summary     Learner Status Readiness Method Response Comment Documented by    Patient Done Acceptance E,TB VU   08/25/18 1234     Done Acceptance E VU,NR   08/24/18 0957          Point: Body mechanics (Done)    Learning Progress Summary     Learner Status Readiness Method Response Comment Documented by    Patient Done Acceptance E,TB VU   08/25/18 1234     Done Acceptance E VU,NR   08/24/18 0957          Point: Precautions (Done)    Learning Progress Summary     Learner Status Readiness Method Response Comment Documented by    Patient Done Acceptance E,TB VU   08/25/18 1234     Done Acceptance E VU,NR   08/24/18 0957                      User Key     Initials Effective Dates Name Provider Type Discipline     06/08/18 -  Kristin Leone, PT Physical Therapist PT     05/14/18 -  Mario Waddell, PT Physical Therapist PT                    PT Recommendation and Plan  Anticipated Discharge Disposition (PT): home  Therapy Frequency (PT Clinical Impression): daily  Outcome Summary/Treatment Plan (PT)  Anticipated Discharge Disposition (PT): home  Plan of Care Reviewed With:  patient  Outcome Summary: Improved walking distance and functional mobility this visit. Able to walk 160' CGA-Elen. Has complaints of R shoulder/neck pain during and after treatment.           Outcome Measures     Row Name 08/25/18 1200 08/24/18 1253 08/24/18 1200       How much help from another person do you currently need...    Turning from your back to your side while in flat bed without using bedrails? 4  -CS  --  --    Moving from lying on back to sitting on the side of a flat bed without bedrails? 4  -CS  --  --    Moving to and from a bed to a chair (including a wheelchair)? 3  -CS  --  --    Standing up from a chair using your arms (e.g., wheelchair, bedside chair)? 3  -CS  --  --    Climbing 3-5 steps with a railing? 3  -CS  --  --    To walk in hospital room? 3  -CS  --  --    AM-PAC 6 Clicks Score 20  -CS  --  --       How much help from another is currently needed...    Putting on and taking off regular lower body clothing?  -- 3  -LE  --    Bathing (including washing, rinsing, and drying)  -- 3  -LE  --    Toileting (which includes using toilet bed pan or urinal)  -- 3  -LE  --    Putting on and taking off regular upper body clothing  -- 3  -LE  --    Taking care of personal grooming (such as brushing teeth)  -- 3  -LE  --    Eating meals  -- 3  -LE  --    Score  -- 18  -LE  --       Functional Assessment    Outcome Measure Options AM-PAC 6 Clicks Basic Mobility (PT)  -CS  -- AM-PAC 6 Clicks Daily Activity (OT)  -LE    Row Name 08/24/18 0950             How much help from another person do you currently need...    Turning from your back to your side while in flat bed without using bedrails? 4  -KH      Moving from lying on back to sitting on the side of a flat bed without bedrails? 3  -KH      Moving to and from a bed to a chair (including a wheelchair)? 3  -KH      Standing up from a chair using your arms (e.g., wheelchair, bedside chair)? 3  -KH      Climbing 3-5 steps with a railing? 3  -KH      To  walk in hospital room? 3  -KH      AM-PAC 6 Clicks Score 19  -KH         Functional Assessment    Outcome Measure Options AM-PAC 6 Clicks Basic Mobility (PT)  -        User Key  (r) = Recorded By, (t) = Taken By, (c) = Cosigned By    Initials Name Provider Type    Kristin Peña, PT Physical Therapist    Joy Brar, OTR Occupational Therapist    Mario Webb, PT Physical Therapist           Time Calculation:         PT Charges     Row Name 08/25/18 1236             Time Calculation    Start Time 1150  -CS      Stop Time 1202  -CS      Time Calculation (min) 12 min  -CS      PT Received On 08/25/18  -CS      PT - Next Appointment 08/26/18  -CS        User Key  (r) = Recorded By, (t) = Taken By, (c) = Cosigned By    Initials Name Provider Type    Mario Webb, PT Physical Therapist        Therapy Suggested Charges     Code   Minutes Charges    None           Therapy Charges for Today     Code Description Service Date Service Provider Modifiers Qty    23575322388 HC PT THER PROC EA 15 MIN 8/25/2018 Mario Waddell, PT GP 1          PT G-Codes  Outcome Measure Options: AM-PAC 6 Clicks Basic Mobility (PT)  Score: 19  Functional Limitation: Mobility: Walking and moving around  Mobility: Walking and Moving Around Current Status (): At least 20 percent but less than 40 percent impaired, limited or restricted  Mobility: Walking and Moving Around Goal Status (): At least 1 percent but less than 20 percent impaired, limited or restricted    Mario Waddell PT  8/25/2018

## 2018-08-25 NOTE — PLAN OF CARE
Problem: Patient Care Overview  Goal: Plan of Care Review  Outcome: Ongoing (interventions implemented as appropriate)   08/25/18 8455   OTHER   Outcome Summary Improved walking distance and functional mobility this visit. Able to walk 160' CGA-Elen. Has complaints of R shoulder/neck pain during and after treatment.    Coping/Psychosocial   Plan of Care Reviewed With patient

## 2018-08-25 NOTE — PLAN OF CARE
Problem: Patient Care Overview  Goal: Plan of Care Review  Outcome: Ongoing (interventions implemented as appropriate)   08/24/18 3615   OTHER   Outcome Summary improved bp. tolerating advanced diet. continue medication for pain management.   Coping/Psychosocial   Plan of Care Reviewed With patient   Plan of Care Review   Progress improving     Goal: Individualization and Mutuality  Outcome: Ongoing (interventions implemented as appropriate)    Goal: Discharge Needs Assessment  Outcome: Ongoing (interventions implemented as appropriate)    Goal: Interprofessional Rounds/Family Conf  Outcome: Ongoing (interventions implemented as appropriate)      Problem: Pain, Acute (Adult)  Goal: Identify Related Risk Factors and Signs and Symptoms  Outcome: Ongoing (interventions implemented as appropriate)    Goal: Acceptable Pain Control/Comfort Level  Outcome: Ongoing (interventions implemented as appropriate)      Problem: Fall Risk (Adult)  Goal: Identify Related Risk Factors and Signs and Symptoms  Outcome: Ongoing (interventions implemented as appropriate)    Goal: Absence of Fall  Outcome: Ongoing (interventions implemented as appropriate)

## 2018-08-25 NOTE — PROGRESS NOTES
" LOS: 3 days   Patient Care Team:  Óscar Cadet MD as PCP - General (Internal Medicine)    Chief Complaint: Headache    Subjective     History of Present Illness  Pt c/o some neck pain, but otherwise doing ok.  Subjective    History taken from: patient chart    Objective     Vital Sign Min/Max for last 24 hours  Temp  Min: 97.6 °F (36.4 °C)  Max: 98.3 °F (36.8 °C)   BP  Min: 162/63  Max: 189/97   Pulse  Min: 65  Max: 84   Resp  Min: 18  Max: 18   SpO2  Min: 93 %  Max: 95 %   No Data Recorded   Weight  Min: 89 kg (196 lb 3.2 oz)  Max: 89 kg (196 lb 3.2 oz)     Flowsheet Rows      First Filed Value   Admission Height  157.5 cm (62\") Documented at 08/22/2018 1614   Admission Weight  83.9 kg (185 lb) Documented at 08/22/2018 1614          No intake/output data recorded.  I/O last 3 completed shifts:  In: 340 [P.O.:240; IV Piggyback:100]  Out: 1800 [Urine:1800]    Objective   Physical Exam  GEN: nad, aaox4, well nourished elderly female  HEENT: ncat, perrl, eomi, op clear mmdry  NECK: supple, no jvd, no carotid bruits  CHEST: cta b, no wheezes or rhonchi  CVA: RRR s1, s2 no m/r/g  ABD: soft, nontender, nondistended, +bs  EXT: no c/c/e 2+pp  SKIN: warm, dry, intact  NEURO: grossly nonfocal  PSYCH: appropriate mood and affect      Results Review:     I reviewed the patient's new clinical results.       WBC WBC   Date Value Ref Range Status   08/25/2018 10.12 4.50 - 10.70 10*3/mm3 Final   08/24/2018 10.04 4.50 - 10.70 10*3/mm3 Final   08/23/2018 10.69 4.50 - 10.70 10*3/mm3 Final   08/23/2018 12.52 (H) 4.50 - 10.70 10*3/mm3 Final   08/22/2018 11.32 (H) 4.50 - 10.70 10*3/mm3 Final      HGB Hemoglobin   Date Value Ref Range Status   08/25/2018 13.5 11.9 - 15.5 g/dL Final   08/24/2018 13.3 11.9 - 15.5 g/dL Final   08/23/2018 12.2 11.9 - 15.5 g/dL Final   08/23/2018 13.8 11.9 - 15.5 g/dL Final   08/22/2018 13.8 11.9 - 15.5 g/dL Final      HCT Hematocrit   Date Value Ref Range Status   08/25/2018 42.6 35.6 - 45.5 % Final "   08/24/2018 42.8 35.6 - 45.5 % Final   08/23/2018 39.9 35.6 - 45.5 % Final   08/23/2018 43.2 35.6 - 45.5 % Final   08/22/2018 44.8 35.6 - 45.5 % Final      Platlets No results found for: LABPLAT   MCV MCV   Date Value Ref Range Status   08/25/2018 91.0 80.5 - 98.2 fL Final   08/24/2018 91.1 80.5 - 98.2 fL Final   08/23/2018 90.9 80.5 - 98.2 fL Final   08/23/2018 90.2 80.5 - 98.2 fL Final   08/22/2018 92.0 80.5 - 98.2 fL Final          Sodium Sodium   Date Value Ref Range Status   08/25/2018 140 136 - 145 mmol/L Final   08/24/2018 141 136 - 145 mmol/L Final   08/23/2018 141 136 - 145 mmol/L Final   08/23/2018 144 136 - 145 mmol/L Final   08/22/2018 140 136 - 145 mmol/L Final      Potassium Potassium   Date Value Ref Range Status   08/25/2018 3.8 3.5 - 5.2 mmol/L Final   08/24/2018 3.4 (L) 3.5 - 5.2 mmol/L Final   08/23/2018 3.6 3.5 - 5.2 mmol/L Final   08/23/2018 3.6 3.5 - 5.2 mmol/L Final   08/22/2018 4.2 3.5 - 5.2 mmol/L Final      Chloride Chloride   Date Value Ref Range Status   08/25/2018 106 98 - 107 mmol/L Final   08/24/2018 105 98 - 107 mmol/L Final   08/23/2018 106 98 - 107 mmol/L Final   08/23/2018 107 98 - 107 mmol/L Final   08/22/2018 106 98 - 107 mmol/L Final      CO2 CO2   Date Value Ref Range Status   08/25/2018 21.2 (L) 22.0 - 29.0 mmol/L Final   08/24/2018 24.2 22.0 - 29.0 mmol/L Final   08/23/2018 23.6 22.0 - 29.0 mmol/L Final   08/23/2018 23.8 22.0 - 29.0 mmol/L Final   08/22/2018 25.1 22.0 - 29.0 mmol/L Final      BUN BUN   Date Value Ref Range Status   08/25/2018 14 8 - 23 mg/dL Final   08/24/2018 11 8 - 23 mg/dL Final   08/23/2018 14 8 - 23 mg/dL Final   08/23/2018 14 8 - 23 mg/dL Final   08/22/2018 18 8 - 23 mg/dL Final      Creatinine Creatinine   Date Value Ref Range Status   08/25/2018 1.05 (H) 0.57 - 1.00 mg/dL Final   08/24/2018 0.95 0.57 - 1.00 mg/dL Final   08/23/2018 1.01 (H) 0.57 - 1.00 mg/dL Final   08/23/2018 1.04 (H) 0.57 - 1.00 mg/dL Final   08/22/2018 1.10 (H) 0.57 - 1.00 mg/dL  Final      Calcium Calcium   Date Value Ref Range Status   08/25/2018 9.1 8.6 - 10.5 mg/dL Final   08/24/2018 9.2 8.6 - 10.5 mg/dL Final   08/23/2018 8.5 (L) 8.6 - 10.5 mg/dL Final   08/23/2018 9.2 8.6 - 10.5 mg/dL Final   08/22/2018 9.7 8.6 - 10.5 mg/dL Final      PO4 No results found for: CAPO4   Albumin No results found for: ALBUMIN   Magnesium Magnesium   Date Value Ref Range Status   08/24/2018 2.5 (H) 1.6 - 2.4 mg/dL Final   08/23/2018 2.1 1.6 - 2.4 mg/dL Final      Uric Acid Uric Acid   Date Value Ref Range Status   08/23/2018 6.2 (H) 2.4 - 5.7 mg/dL Final        Medication Review: yes    Assessment/Plan     Principal Problem:    Uncontrolled hypertension  Active Problems:    Joint pain    Urinary retention self-caths (Don)    Sinusitis    Conjunctivitis    Arm pain    Cervical disc displacement    Cervical pain (Servando=PM)    DDD (degenerative disc disease), cervical    Hyperlipidemia    Proctocele    Urge incontinence of urine    Chronic tension-type headache, intractable    Obesity (BMI 30.0-34.9)    H/o Lyme disease    Stage 3 chronic kidney disease    Hardening of the arteries of the brain    Allergic contact dermatitis    Malaise and fatigue progressive    Vertigo    Vitamin D deficiency    Moderate persistent asthmatic bronchitis without complication    Pleurisy    Knee pain, bilateral    Elevated PTH level    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  Still elevated on occasion. Will increase clonidine to 0.2mg tid.   · ERMIAS on CKD3--cr stable. Resumed ethacrynic acid  · Urinary retention/urinary incontinence-per urology. Continue I/O cath  · Headaches--CT negative.  · Asthma/copd.         Negro Rios MD  08/25/18  9:44 AM

## 2018-08-25 NOTE — PROGRESS NOTES
[facility]  INTERNAL MEDICINE  ÓSCAR CADET MD  [address] [place] [address]  Phone [phone] Fax [phone]  E-mail:  [email or web address]      INTERNAL MEDICINE DAILY PROGRESS NOTE  Óscar Cadet M.D.  2018            Patient Identification:  Name: Sasha Barrett  Age: 78 y.o.  Sex: female  :  1940  MRN: [id number]         Primary Care Physician: Óscar Cadet MD  LENGTH OF STAY 3 DAYS    Consults     Date and Time Order Name Status Description    2018 0105 Inpatient Cardiology Consult Completed     2018 0050 Inpatient Urology Consult Completed     2018 0020 Inpatient Consult to Neurology Completed     2018 Inpatient Consult to Nephrology      2018 Nephrology (on -call MD unless specified) Completed     2018 191 Family Medicine Consult Completed                  Chief Complaint:  Accelerated hypertension with intractable headache     History of Present Illness:     Subjective         Interval History: Patient is a 78 y.o.female who presented with intractable posterior headache to [facility] emergency room on 2018 5:20 PM.  Patient reports that the headache is actually been present for more than 6 days and in conversation with me indicates that it really started 2018 when she fell from a standing position and struck the back side of her head.  Her headache at that time was unrelenting for at least 2 days and since that time she has continued to have intermittent headaches she denies any weakness, blurred vision, facial asymmetry, difficulty walking, dizziness, or other signs or symptoms of stroke.  Patient came to the emergency primarily because her blood pressure was extremely elevated at home in the range of 220/140.  Patient has had similar episodes of hypertension in the past which are severe headaches and she was unable to get any improvement in her pain using multiple  medications at her disposal at home.  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  Patient has had a long history of complications with blood pressure and has followed with Dr. Bond in renal for this problem over the last few years.  In addition, patient has problems with a neurogenic bladder and actually does self catheterizations twice daily.  In the emergency room she was found to have evidence of a UTI despite recent treatment on an outpatient basis with Cipro for this condition.     Patient was seen in the emergency room by Dr. Óscar Olivo.  On his exam and history of present illness she was complaining of posterior headache of 6 days duration.  She stated that the headache began at the back of her head and gradually moved to the front.  She has a diagnosis in the past of migraine headaches and has been treated apparently with Demerol in the past for this problem.  Patient has exacerbation of her headache with bright lights and loud noises.  She also complained of chills, congestion, and clear sinus drainage.  The Fioricet that the patient uses for  her headaches has provided no relief.  The pollen that she has used for her pain also provides no relief.  Patient does indicate that she takes 1 pollen every 4 hours but review of Olvin and office notes shows that she has been requesting to call 1 every 4 hours on a regular basis for the last few months.  The ER history of present illness indicates that her headache is of 6 days duration, gradual in onset, constant in timing, neurologically located, radiates to the front of head from back, quality is headache intensity is moderate, progression is unchanged, associated symptoms of the chills and congestion with clear drainage and photophobia, aggravating factors or Leiden noises, alleviating factors none, previous episodes of similar episodes of headache diagnosed as migraines in the past, treatment before arrival was Fioricet without relief.  Review of systems was positive for chills, congestion, photophobia, headache of 6 days duration.  Physical exam showed frontal sinus tenderness on both left and right sides.  Labs in the ER showed glucose 114, creatinine 1.10, white blood cell count 11.32, hemoglobin normal at 13.8, UA with white blood cells 3-5 and bacteria 4+, CT of head without contrast showed no acute disease.  In the emergency room, patient was given Dilaudid for her headache and clonidine for her blood pressure.  Her urinary tract infection was treated with Rocephin.  I was called as her primary care physician and at recommendation of the emergency room staff patient was admitted to a monitored bed with diagnoses of acute intractable headache, acute UTI, uncontrolled hypertension, and ongoing severe pain.     8/23/2018.  I personally saw the patient for the first time during this hospitalization on this date.  Patient was resting comfortably in bed at the time of my visit and picking at her breakfast.  Patient indicated that she was having ongoing severe headache pain.  Her blood pressure at the time my  visit showed 170/90 on the monitor.  Patient does relate a history as discussed above.  She does blink her headaches back to a fall on July 13 to set thousand 13 when she struck her head.  Patient has had ongoing problems with headaches.  I did review patient's medication usage and her Olvin report with the pharmacy.  It does seem that she is using excessive amounts of tall 1 without relief of her pain.  She also seems to be using excessive amounts of Fiorinal which could be leading to rebound headaches.  In addition, patient has Phenergan With Codeine which would be rendered ineffective by her Talwin usage.  Patient is in the midst of workup is outlined in the orders.  She is in the midst of testing and was taken down for testing at the end of my exam.    8/24/2018.  Patient seen again today in her bed on 6 S. L attending was visiting patient at the same time and we discussed the case together and with her.  Patient quite concerned about use of hydralazine by neurology and we have discontinued that medication because of previous sensitivities the patient had including a positive DIAN when on that medication.  Patient reports headaches may be slightly improved but still persist.  Neurology has discontinued headache medicine.  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  Plavix has been added by neurology to aspirin therapy for the patient.  There is no evidence of bleeding and brain or other abnormalities due to trauma.  Patient could conceivably have a posttraumatic headache due to minor contusion of the brain.  Fioricet does not seem to be working well for the patient's headache pain.  Patient has required Dilaudid.  Hospital does not have following available and patient is unwilling to share her home supply.  Patient converted to full inpatient admission.  We will continue to follow the patient.    8/25/2018.  Patient resting quietly in bed on 6 S. with lights out in the room.  Patient  does complain of continuing headache symptomatology.  Blood pressure has still been quite labile today and was at 160/106 when I visited the patient.  Neurology was in the room at the same time.  We did discuss headache prevention and together reviewed with the patient the importance of avoiding recurrent doses of the Fioricet for her headaches.  Patient states that she tries to use the medicine no more than twice per week.  We also discussed the importance of decreasing the diagnosis of her Talwin NX which could lead to rebound headaches when the narcotic wears off.  Renal has seen the patient and increase the dose of her clonidine back to 3 times daily.  Patient also reports severe pain with her right shoulder and neck.  We have ordered x-rays and will have her see orthopedics for a possible steroid injection if this pain persists.    Review of Systems:               A comprehensive 14 point review of systems was negative except for:  Constitution:  positive for chills, fatigue, fevers and malaise  ENT:  positive for ear drainage, ear ringing, hearing loss and snoring  Respiratory: positive for  cough, dry, pleuritic pain and shortness of air  Cardiovascular: positive for  chest pressure / pain, at rest and irregular pulse  Gastrointestinal: postitive for  bloating / distention and heartburn  Musculoskeletal: positive for  back pain, joint pain, joint stiffness, joint swelling, muscle pain, muscle weakness and neck pain  Behavioral/Psych: positive for  anxiety  Allergies / Immunologic: positive for  rash          Past Medical History:   Diagnosis Date   • Arthritis    • Asthma    • Cancer (CMS/HCC)    • COPD (chronic obstructive pulmonary disease) (CMS/HCC)    • Emphysema lung (CMS/HCC)    • Generalized headaches    • Hayfever    • Hypertension    • Stroke (CMS/HCC)      Past Surgical History:   Procedure Laterality Date   • BREAST LUMPECTOMY Left 04/01/2003   • CARPAL TUNNEL RELEASE  2011   • CATARACT EXTRACTION   2010   • HERNIA REPAIR  01/01/1971   • HERNIA REPAIR      right groin   • JOINT REPLACEMENT     • KNEE SURGERY Left 01/01/1998    bilateral knee replacements   • MASTECTOMY     • PARATHYROIDECTOMY  05/08/2006   • SINUS SURGERY  01/01/1984   • TOTAL ABDOMINAL HYSTERECTOMY  01/01/1973     Allergies   Allergen Reactions   • Morphine Anaphylaxis   • Sulfa Antibiotics Anaphylaxis and Hives     Or sulfa fillers in meds   • Hydralazine Myalgia     Patient reports leg cramps, joint pain and increased fatigue   • Amlodipine      KIDNEYS SHUT DOWN   • Anastrozole    • Grass    • Nsaids      KIDNEY FAILURE   • Penicillins      Tolerated ceftriaxone during 04/2017 admission   • Tree Extract    • Zolpidem      HALLUCINATIONS       Family History   Problem Relation Age of Onset   • Cancer Brother        Social History     Social History   • Marital status:      Spouse name: Joey     Social History Main Topics   • Smoking status: Never Smoker   • Smokeless tobacco: Never Used   • Alcohol use Yes      Comment: rarely - once or twice year (wine or bernabe)   • Drug use: Unknown   • Sexual activity: Yes     Partners: Male     Other Topics Concern   • Not on file       PMH, FH, SH and ROS completed with Admission History and Physical and updated in EPIC system.        Objective     Scheduled Meds:    aspirin 81 mg Oral Daily   ceftriaxone 1 g Intravenous Q24H   cholecalciferol 1,000 Units Oral Daily   CloNIDine 0.2 mg Oral Q8H   clopidogrel 75 mg Oral Daily   docusate sodium 100 mg Oral Daily   enoxaparin 40 mg Subcutaneous Q24H   eplerenone 50 mg Oral Q12H   ethacrynic acid 25 mg Oral TID   fluticasone 1 spray Each Nare BID   guaiFENesin 600 mg Oral Q12H   labetalol 200 mg Oral Q12H   losartan-HCTZ (HYZAAR) 100-12.5 combo dose  Oral Daily   montelukast 10 mg Oral Nightly   NIFEdipine XL 60 mg Oral Q24H   pantoprazole 40 mg Oral Q AM   predniSONE 10 mg Oral Every Other Day   cyanocobalamin 1,000 mcg Oral Daily  "    Continuous Infusions:    dextrose 5 % and sodium chloride 0.45 % with KCl 20 mEq/L 50 mL/hr Last Rate: 50 mL/hr (08/24/18 1445)   Pharmacy to Dose enoxaparin (LOVENOX)         Vital signs in last 24 hours:  Temp:  [97.6 °F (36.4 °C)-98.9 °F (37.2 °C)] 98.9 °F (37.2 °C)  Heart Rate:  [65-84] 75  Resp:  [18-20] 20  BP: (160-189)/() 160/102    Intake/Output:    Intake/Output Summary (Last 24 hours) at 08/25/18 1403  Last data filed at 08/24/18 2200   Gross per 24 hour   Intake              120 ml   Output              700 ml   Net             -580 ml       Exam:  BP (!) 160/102 (BP Location: Right arm, Patient Position: Lying)   Pulse 75   Temp 98.9 °F (37.2 °C) (Oral)   Resp 20   Ht 157.5 cm (62\")   Wt 89 kg (196 lb 3.2 oz)   SpO2 95%   BMI 35.89 kg/m²     Constitutional:  Alert, cooperative, moderate distress, AAOx3, resting comfortably,C/O severe headache.  BP slightly up   Head:      Normocephalic, without obvious abnormality, atraumatic, ongoing cephalgia with headache   Eyes:     PERRLA, conjunctiva/corneas clear, no icterus, no conjunctival                                     pallor, EOM's intact, both eyes      ENT and Mouth: Lips, tongue, gums normal; oral mucosa pink and moist   Neck:     Supple, symmetrical, trachea midline, no JVD  Respiratory:     Clear to auscultation bilaterally, respirations unlabored  Cardiovascular:  Regular rate and rhythm, S1 and S2 normal, no murmur,      no  Rub or gallop.  Pulses normal.    Gastrointestinal:   BS present x 4 Soft, non-tender, bowel sounds active,      no masses, no hepatosplenomegaly                                                     :       No hernia.  Normal exam for sex.         Musculoskeletal: Extremities normal, atraumatic, no cyanosis or edema     No arthropathy.  No deformity.  Gait normal                                                 Skin:   Skin is warm and dry,  no rashes, swelling or palpable lesions   Neurologic:  CN -XII " intact, motor strength grossly intact, sensation grossly intact to light touch, no focal reflex deficits noted, no real deficits, no changes with headache   Psychiatric:     Alert,oriented X3, no delusions, psychoses, depression.  Appears quite anxous.    Heme/Lymph/Imun:   No bruises, petechiae.  Lymph nodes normal in size/configuration       Data Review:  Lab Results   Component Value Date    CALCIUM 9.1 08/25/2018    PHOS 3.2 12/26/2017       Results from last 7 days  Lab Units 08/25/18  0628 08/24/18  0532 08/23/18  0547 08/23/18  0546   MAGNESIUM mg/dL  --  2.5*  --  2.1   SODIUM mmol/L 140 141  --  141   POTASSIUM mmol/L 3.8 3.4*  --  3.6   CHLORIDE mmol/L 106 105  --  106   CO2 mmol/L 21.2* 24.2  --  23.6   BUN mg/dL 14 11  --  14   CREATININE mg/dL 1.05* 0.95  --  1.01*   GLUCOSE mg/dL 101* 97  --  106*   CALCIUM mg/dL 9.1 9.2  --  8.5*   WBC 10*3/mm3 10.12 10.04 10.69  --    HEMOGLOBIN g/dL 13.5 13.3 12.2  --    PLATELETS 10*3/mm3 190 172 177  --      Lab Results   Component Value Date    CKTOTAL 36 04/08/2017    CKMB 1.69 04/06/2017    TROPONINT <0.010 08/23/2018     Estimated Creatinine Clearance: 44.3 mL/min (A) (by C-G formula based on SCr of 1.05 mg/dL (H)).  WEIGHTS:     Wt Readings from Last 1 Encounters:   08/25/18 0559 89 kg (196 lb 3.2 oz)   08/24/18 0445 86.2 kg (190 lb)   08/23/18 0643 86.7 kg (191 lb 2.2 oz)   08/22/18 2054 87.4 kg (192 lb 10.9 oz)   08/22/18 1614 83.9 kg (185 lb)         Assessment:  Principal Problem:    Uncontrolled hypertension  Active Problems:    Chronic tension-type headache, intractable    Stage 3 chronic kidney disease    Malaise and fatigue progressive    Moderate persistent asthmatic bronchitis without complication    Acute joint pain    Acute exacerbation of 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HCC)    Acute pain of left shoulder due to trauma    Fall from standing with head traumna    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    Urinary retention  self-caths (Don)    Sinusitis    Cervical disc displacement    DDD (degenerative disc disease), cervical    Hyperlipidemia    Proctocele    Urge incontinence of urine    Vertigo    Vitamin D deficiency    Knee pain, bilateral    Elevated PTH level    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)    Gastroesophageal reflux disease with esophagitis    Recurrent UTI    CVA (cerebrovascular accident) subacute    Dyspnea on exertion    Easy bruisability    Decreased functional mobility and endurance since fall 12/20/17    Joint pain    Conjunctivitis    Arm pain    Cervical pain (Servando=PM)    Obesity (BMI 30.0-34.9)    H/o Lyme disease    Hardening of the arteries of the brain    Allergic contact dermatitis    Pleurisy    Psoriasis (Darryl Ochoa)    Postmenopausal      Attending Physician Assessment and Plan:    1.  Uncontrolled hypertension.  Patient is now back on her regular pain and blood pressure medications.  Patient has received clonidine in the ER for blood pressure control and efforts are being made to control her pain may be the cause and part of her hypertension.  Renal and cardiology are consulted.  Urology who follows the patient for in and out catheters and a neurogenic bladder has also been asked to see her.  Blood pressure still up but not in as much of a threatening zone as it was at the time of her admission to the emergency room yesterday evening.Clonidine increased today  2.  Intractable headache pain with history of migraines and chronic tension-type headaches.  A combination of Talwin and Fioricet seems ineffective for control of her symptoms at the present time.  Patient will have adjustment of her medication regime with help from neurology.  Will attempt to replace Fioricet if possible and to wean Talwin somewhat.  No neurologic changes at the present time.  Nursing is continuing to follow her on a regular basis for this. Neurology continues drug holiday.  Wants plavix to avoid  increased stroke risk  ]3.  Recurrent urinary tract infections with acute infection after failed outpatient therapy on Cipro in a patient with history of neurogenic bladder who requires urinary catheterization by in and out catheter twice daily at home.  Patient is regularly followed by urology for this problem and we will consult them for their input on these issues. Dr. Ochoa saw and resumed I and O caths. Rocephin DC'kevyn  4.  Chronic kidney disease stage III in past.  Blood pressure uncontrolled and followed on outpatient basis by Dr. Bond.  We will ask renal for their input on treatment at this point.  Patient is very sensitive to multiple medications that have been attempted in the past.  5.  Fall from standing with head trauma suggestive of head contusion.  MRI has been ordered of brain for further input and evaluation.  Patient is concerned about possibility of an aneurysm in, leading in her brain, or stroke.  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  Plavix added to ASA by neurology.  6.  Acute orthopedic pain with bilateral shoulder discomfort, cervical pain, lumbar pain.  Patient unrelieved despite large doses of Talwin for pain control.  Patient has requested only one Talwin every 4 hours for now.  She is reporting a pain scale of 10 out of 10.  We will continue to use to allotted if needed for today to treat severe headaches.  Patient needs weaning of the Talwin which we will initiate at the time of her discharge.Ortho consult requested  7.  COPD with asthma acute mild exacerbation.  We will monitor patient nebs treatments have been ordered.  Regular inhalers will be used as needed.  8.  Malaise and fatigue.  These are ongoing problems for the patient.  She is clinically stable otherwise at the present time.  Some degree of vertigo is noted with movements.  9.  History of vitamin D deficiency.  We will check  vitamin D levels at the present time and treat as necessary.  10.  Acute on chronic sinusitis.  This should be addressed by the Rocephin that she is receiving for the UTI at the present time.  Will monitor and adjust medications based on results from MRI which should also look into of sinus infection.      Plan for disposition:Where: home and When:  medically stable in 2-3 days      Óscar Cadet MD  8/25/2018  2:03 PM

## 2018-08-25 NOTE — PROGRESS NOTES
LOS: 3 days   Patient Care Team:  Óscar Cadet MD as PCP - General (Internal Medicine)    Chief Complaint:    Chief Complaint   Patient presents with   • Headache       Subjective     Interval History:     Patient Complaints: Her H/A has improved but still present. at home she take fioricet about 3 x a month. she also takes a pain pill, Talwin  Patient Denies: new or change in H/A  History taken from: patient chart    Objective     Vital Signs  Temp:  [97.6 °F (36.4 °C)-98.9 °F (37.2 °C)] 98.9 °F (37.2 °C)  Heart Rate:  [65-84] 75  Resp:  [18-20] 20  BP: (160-189)/() 160/102      Physical Examination:  HEENT: Normocephalic, atraumatic   COR: RRR  Resp: Even and unlabored      Neurological:   MS: AO. Language normal. No neglect. Higher integrative function normal  CN: II-XII normal      Results Review:     I reviewed the patient's new clinical results.          Results from last 7 days  Lab Units 08/25/18 0628 08/24/18  0532 08/23/18  0547   WBC 10*3/mm3 10.12 10.04 10.69   HEMOGLOBIN g/dL 13.5 13.3 12.2   HEMATOCRIT % 42.6 42.8 39.9   PLATELETS 10*3/mm3 190 172 177       Lab Results   Component Value Date    CHOL 170 08/24/2018    CHOL 182 12/26/2017     Lab Results   Component Value Date    HDL 55 08/24/2018    HDL 46 12/26/2017    HDL 59 05/03/2017     Lab Results   Component Value Date    LDL 92 08/24/2018    LDL 99 12/26/2017     (H) 05/03/2017     Lab Results   Component Value Date    TRIG 117 08/24/2018    TRIG 184 (H) 12/26/2017    TRIG 202 (H) 05/03/2017         Results from last 7 days  Lab Units 08/25/18 0628 08/24/18  0532 08/23/18  0546   SODIUM mmol/L 140 141 141   POTASSIUM mmol/L 3.8 3.4* 3.6   CHLORIDE mmol/L 106 105 106   CO2 mmol/L 21.2* 24.2 23.6   BUN mg/dL 14 11 14   CREATININE mg/dL 1.05* 0.95 1.01*   CALCIUM mg/dL 9.1 9.2 8.5*   GLUCOSE mg/dL 101* 97 106*       Medication Review: reviewed, changes made    Assessment/Plan  Ms. Barrett is an 79 yo female with HTN, COPD, CKD  II, urinary retention requiring self catheterization,  left breast cancer s/p resection, and migraine admitted with intractable h/a, SBP > 200s, and UTI. H/A started 4 days PTA and is described as pulsating in the back of her head with occasional radiation to the front of her head over her left eye. She'd been taking fiorcet Q 4-6h at home without relief however, typically she takes it 3 times a month. She has been given 3 migraine cocktails with no relief. Imaging has been negative for acute findings to explain H/A. Her BP has improved as has her H/A. We will give it another 24 hours, we believe her H/A is all HTN related. However, she may need a preventative migraine medication and avoid narcotics and Fioricet.     Imaging did reveal severe intracranial atherosclerosis which places her at increased risk for stroke, no aneurysm. Dr. Khan recommends DAPT with ASA and Plavix for stroke prevention. She also needs aggressive risk factor control.       Plan:     BP management  Consider daily preventative migraine medication  Wean down on narcotics and avoid use of fioricet  DAPT, ASA and Plavix for diffuse ICAD      Principal Problem:    Uncontrolled hypertension  Active Problems:    Joint pain    Urinary retention self-caths (Don)    Sinusitis    Conjunctivitis    Arm pain    Cervical disc displacement    Cervical pain ([place]=PM)    DDD (degenerative disc disease), cervical    Hyperlipidemia    Proctocele    Urge incontinence of urine    Chronic tension-type headache, intractable    Obesity (BMI 30.0-34.9)    H/o Lyme disease    Stage 3 chronic kidney disease    Hardening of the arteries of the brain    Allergic contact dermatitis    Malaise and fatigue progressive    Vertigo    Vitamin D deficiency    Moderate persistent asthmatic bronchitis without complication    Pleurisy    Knee pain, bilateral    Elevated PTH level    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 Khan and Dr. Helio Robledo, APRN  08/25/18  1:53 PM

## 2018-08-25 NOTE — PROGRESS NOTES
[facility]  Cardiology Progress Note    Patient Identification:  Name: Sasha Barrett  Age: 78 y.o.  Sex: female  :  1940  MRN: [id number]                 Follow Up / Chief Complaint: Uncontrolled hypertension    Interval History:   78-year-old white female with no known history of CAD but long-standing history of hypertension who presents with an intractable headache and UTI.  Defer decision regarding renal artery angiogram to her primay Nephrologist, Dr Meléndez. Ethacrynic acid added yesterday by Nephro.  Was asked by Dr. Cadet to have cardiology input on BP management.  Her Systolic BP a little better but diastolics remain high up to 100's.       Subjective:  Denies chest pain, tightness, palpitations or dizziness.  Denies shortness of breath.  REports HA better but still some dull pain with some photophobia    Objective:    Temp:  [98 °F (36.7 °C)-98.9 °F (37.2 °C)] 98.9 °F (37.2 °C)  Heart Rate:  [75-84] 75  Resp:  [18-20] 20  BP: (160-184)/() 160/102    CE (-) x4    MRI of the brain was negative.  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 Otherwise unremarkable CT angiography imaging of the neck and  head. No aneurysms were identified.       Past Medical History:  Past Medical History:   Diagnosis Date   • Arthritis    • Asthma    • Cancer (CMS/HCC)    • COPD (chronic obstructive pulmonary disease) (CMS/HCC)    • Emphysema lung (CMS/HCC)    • Generalized headaches    • Hayfever    • Hypertension    • Stroke (CMS/HCC)      Past Surgical History:  Past Surgical History:   Procedure Laterality Date   • BREAST LUMPECTOMY Left 2003   • CARPAL TUNNEL RELEASE     • CATARACT EXTRACTION     • HERNIA REPAIR  1971   • HERNIA REPAIR      right groin   • JOINT REPLACEMENT     • KNEE SURGERY Left 1998    bilateral knee replacements   • MASTECTOMY     • PARATHYROIDECTOMY  2006   • SINUS SURGERY  1984   • TOTAL  ABDOMINAL HYSTERECTOMY  01/01/1973        Social History:   Social History   Substance Use Topics   • Smoking status: Never Smoker   • Smokeless tobacco: Never Used   • Alcohol use Yes      Comment: rarely - once or twice year (wine or bernabe)      Family History:  Family History   Problem Relation Age of Onset   • Cancer Brother           Allergies:  Allergies   Allergen Reactions   • Morphine Anaphylaxis   • Sulfa Antibiotics Anaphylaxis and Hives     Or sulfa fillers in meds   • Hydralazine Myalgia     Patient reports leg cramps, joint pain and increased fatigue   • Amlodipine      KIDNEYS SHUT DOWN   • Anastrozole    • Grass    • Nsaids      KIDNEY FAILURE   • Penicillins      Tolerated ceftriaxone during 04/2017 admission   • Tree Extract    • Zolpidem      HALLUCINATIONS     Scheduled Meds:    aspirin 81 mg Daily   ceftriaxone 1 g Q24H   cholecalciferol 1,000 Units Daily   CloNIDine 0.2 mg Q8H   clopidogrel 75 mg Daily   docusate sodium 100 mg Daily   enoxaparin 40 mg Q24H   eplerenone 50 mg Q12H   ethacrynic acid 25 mg TID   fluticasone 1 spray BID   guaiFENesin 600 mg Q12H   labetalol 200 mg Q12H   losartan-HCTZ (HYZAAR) 100-12.5 combo dose  Daily   montelukast 10 mg Nightly   NIFEdipine XL 60 mg Q24H   pantoprazole 40 mg Q AM   predniSONE 10 mg Every Other Day   cyanocobalamin 1,000 mcg Daily           INTAKE AND OUTPUT:    Intake/Output Summary (Last 24 hours) at 08/25/18 1517  Last data filed at 08/24/18 2200   Gross per 24 hour   Intake              120 ml   Output              400 ml   Net             -280 ml       Review of Systems:   GI: Intermittent nausea, no abdominal pain  Cardiac:  No chest pain or tightness  Pulmonary: No shortness of breath or cough    Constitutional:  Temp:  [98 °F (36.7 °C)-98.9 °F (37.2 °C)] 98.9 °F (37.2 °C)  Heart Rate:  [75-84] 75  Resp:  [18-20] 20  BP: (160-184)/() 160/102    Physical Exam:  General:  Appears in no acute distress  Eyes: PERTL,  HEENT:  No JVD.  Thyroid not visibly enlarged. No mucosal pallor or cyanosis  Respiratory: Respirations regular and unlabored at rest. BBS with good air entry in all fields. No crackles, rubs or wheezes auscultated  Cardiovascular: S1S2 Regular rate and rhythm. No murmur, rub or gallop. No carotid bruits. DP/PT pulses 2+ . No pretibial pitting edema  Gastrointestinal: Abdomen soft, flat, non tender. Bowel sounds present. No hepatosplenomegaly. No ascites  Musculoskeletal: OLSEN x4. No abnormal movements  Extremities: No digital clubbing or cyanosis  Skin: Color pink. Skin warm and dry to touch. No rashes    Neuro: AAO x3 CN II-XII grossly intact  Psych: Mood and affect normal, pleasant and cooperative      Cardiographics  Telemetry:      ECG 8-24-18:       Echocardiogram:   · Calculated EF = 78%.  · Left ventricular systolic function is normal.  · Left ventricular diastolic dysfunction (grade I) consistent with impaired relaxation.  · Mild tricuspid valve regurgitation is present.  · Estimated right ventricular systolic pressure from tricuspid regurgitation is normal (<35 mmHg).    Lab Review     Results from last 7 days  Lab Units 08/23/18  1824 08/23/18  1157 08/23/18  0546   TROPONIN T ng/mL <0.010 <0.010 <0.010       Results from last 7 days  Lab Units 08/24/18  0532   MAGNESIUM mg/dL 2.5*       Results from last 7 days  Lab Units 08/25/18  0628   SODIUM mmol/L 140   POTASSIUM mmol/L 3.8   BUN mg/dL 14   CREATININE mg/dL 1.05*   CALCIUM mg/dL 9.1       Results from last 7 days  Lab Units 08/25/18  0628 08/24/18  0532 08/23/18  0547   WBC 10*3/mm3 10.12 10.04 10.69   HEMOGLOBIN g/dL 13.5 13.3 12.2   HEMATOCRIT % 42.6 42.8 39.9   PLATELETS 10*3/mm3 190 172 177           Study Result     CT ANGIOGRAM CAROTIDS-, CT ANGIOGRAM HEAD W CONTRAST-     CLINICAL HISTORY: Follow-up nonvisualized left posterior cerebral artery  on MRA. Evaluate for possible tiny left internal carotid artery aneurysm  on MRA..      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 Otherwise unremarkable CT angiography imaging of the neck and  head. No aneurysms were identified.       Assessment:  - uncontrolled HTN  - EF >75%, mild TR  - intractable headache  - UTI  - CKD 3 - nephrology following.    - COPD / RAD  - OA / DJD  - h/o CVA  - h/o Breast Ca - bilateral masectomies      Plan:  - uncontrolled HTN -  improved control on current doses of Catapres, Labetalol, Procardia, losartan and Edecrin.  Systolic BP's better however diastolic's elevated up over 100 at times.  Is now on higher dosing of catapres  started today. Was on nifedipine 60 mg BID at home.   Will increase nifedipine to 90 mg from 60 to help with diastolic BP's.   Will give extra dose of 30 mg this evening and see how she does.      - EF >75% - gr I morrell dysfx, mild TR    No angina.  MI ruled out.  Defer decision regarding proceeding with renal artery arteriogram to nephrology.  REcommend outpatient stress testing unless she were to develop angina.      Labs/tests ordered: Medication adjustment.       )8/25/2018  AC Navas/Transcription:   \"Dictated utilizing Dragon dictation\".     "

## 2018-08-26 LAB
ANION GAP SERPL CALCULATED.3IONS-SCNC: 13 MMOL/L
BASOPHILS # BLD AUTO: 0.02 10*3/MM3 (ref 0–0.2)
BASOPHILS NFR BLD AUTO: 0.2 % (ref 0–1.5)
BUN BLD-MCNC: 22 MG/DL (ref 8–23)
BUN/CREAT SERPL: 18.3 (ref 7–25)
CALCIUM SPEC-SCNC: 9.2 MG/DL (ref 8.6–10.5)
CHLORIDE SERPL-SCNC: 103 MMOL/L (ref 98–107)
CO2 SERPL-SCNC: 24 MMOL/L (ref 22–29)
CREAT BLD-MCNC: 1.2 MG/DL (ref 0.57–1)
DEPRECATED RDW RBC AUTO: 52.7 FL (ref 37–54)
EOSINOPHIL # BLD AUTO: 0.12 10*3/MM3 (ref 0–0.7)
EOSINOPHIL NFR BLD AUTO: 1.5 % (ref 0.3–6.2)
ERYTHROCYTE [DISTWIDTH] IN BLOOD BY AUTOMATED COUNT: 15.9 % (ref 11.7–13)
GFR SERPL CREATININE-BSD FRML MDRD: 43 ML/MIN/1.73
GLUCOSE BLD-MCNC: 105 MG/DL (ref 65–99)
HCT VFR BLD AUTO: 40.4 % (ref 35.6–45.5)
HGB BLD-MCNC: 12.9 G/DL (ref 11.9–15.5)
IMM GRANULOCYTES # BLD: 0.01 10*3/MM3 (ref 0–0.03)
IMM GRANULOCYTES NFR BLD: 0.1 % (ref 0–0.5)
LYMPHOCYTES # BLD AUTO: 2.25 10*3/MM3 (ref 0.9–4.8)
LYMPHOCYTES NFR BLD AUTO: 27.5 % (ref 19.6–45.3)
MCH RBC QN AUTO: 28.9 PG (ref 26.9–32)
MCHC RBC AUTO-ENTMCNC: 31.9 G/DL (ref 32.4–36.3)
MCV RBC AUTO: 90.6 FL (ref 80.5–98.2)
MONOCYTES # BLD AUTO: 1.01 10*3/MM3 (ref 0.2–1.2)
MONOCYTES NFR BLD AUTO: 12.3 % (ref 5–12)
NEUTROPHILS # BLD AUTO: 4.79 10*3/MM3 (ref 1.9–8.1)
NEUTROPHILS NFR BLD AUTO: 58.5 % (ref 42.7–76)
PLATELET # BLD AUTO: 191 10*3/MM3 (ref 140–500)
PMV BLD AUTO: 10.9 FL (ref 6–12)
POTASSIUM BLD-SCNC: 3.8 MMOL/L (ref 3.5–5.2)
RBC # BLD AUTO: 4.46 10*6/MM3 (ref 3.9–5.2)
SODIUM BLD-SCNC: 140 MMOL/L (ref 136–145)
WBC NRBC COR # BLD: 8.19 10*3/MM3 (ref 4.5–10.7)

## 2018-08-26 PROCEDURE — 80048 BASIC METABOLIC PNL TOTAL CA: CPT | Performed by: INTERNAL MEDICINE

## 2018-08-26 PROCEDURE — 99231 SBSQ HOSP IP/OBS SF/LOW 25: CPT | Performed by: NURSE PRACTITIONER

## 2018-08-26 PROCEDURE — 97110 THERAPEUTIC EXERCISES: CPT

## 2018-08-26 PROCEDURE — 25010000002 ENOXAPARIN PER 10 MG: Performed by: INTERNAL MEDICINE

## 2018-08-26 PROCEDURE — 85025 COMPLETE CBC W/AUTO DIFF WBC: CPT | Performed by: INTERNAL MEDICINE

## 2018-08-26 PROCEDURE — 25010000002 HYDROMORPHONE PER 4 MG: Performed by: INTERNAL MEDICINE

## 2018-08-26 RX ORDER — METHYLPREDNISOLONE ACETATE 80 MG/ML
80 INJECTION, SUSPENSION INTRA-ARTICULAR; INTRALESIONAL; INTRAMUSCULAR; SOFT TISSUE ONCE
Status: DISCONTINUED | OUTPATIENT
Start: 2018-08-26 | End: 2018-08-27

## 2018-08-26 RX ORDER — LIDOCAINE HYDROCHLORIDE 10 MG/ML
2 INJECTION, SOLUTION EPIDURAL; INFILTRATION; INTRACAUDAL; PERINEURAL ONCE
Status: DISCONTINUED | OUTPATIENT
Start: 2018-08-26 | End: 2018-08-27

## 2018-08-26 RX ORDER — BUPIVACAINE HYDROCHLORIDE 5 MG/ML
2 INJECTION, SOLUTION EPIDURAL; INTRACAUDAL ONCE
Status: DISCONTINUED | OUTPATIENT
Start: 2018-08-26 | End: 2018-08-27

## 2018-08-26 RX ADMIN — ETHACRYNIC ACID 25 MG: 25 TABLET ORAL at 08:47

## 2018-08-26 RX ADMIN — FLUTICASONE PROPIONATE 1 SPRAY: 50 SPRAY, METERED NASAL at 08:46

## 2018-08-26 RX ADMIN — CLONIDINE HYDROCHLORIDE 0.2 MG: 0.1 TABLET ORAL at 05:13

## 2018-08-26 RX ADMIN — Medication 1000 MCG: at 08:42

## 2018-08-26 RX ADMIN — ASPIRIN 81 MG: 81 TABLET, FILM COATED ORAL at 08:42

## 2018-08-26 RX ADMIN — GUAIFENESIN 600 MG: 600 TABLET, EXTENDED RELEASE ORAL at 08:42

## 2018-08-26 RX ADMIN — CLONIDINE HYDROCHLORIDE 0.2 MG: 0.1 TABLET ORAL at 13:36

## 2018-08-26 RX ADMIN — HYDROMORPHONE HYDROCHLORIDE 0.5 MG: 1 INJECTION, SOLUTION INTRAMUSCULAR; INTRAVENOUS; SUBCUTANEOUS at 05:13

## 2018-08-26 RX ADMIN — HYDROMORPHONE HYDROCHLORIDE 0.5 MG: 1 INJECTION, SOLUTION INTRAMUSCULAR; INTRAVENOUS; SUBCUTANEOUS at 23:40

## 2018-08-26 RX ADMIN — CLONIDINE HYDROCHLORIDE 0.2 MG: 0.1 TABLET ORAL at 20:52

## 2018-08-26 RX ADMIN — FLUTICASONE PROPIONATE 1 SPRAY: 50 SPRAY, METERED NASAL at 20:51

## 2018-08-26 RX ADMIN — LABETALOL HCL 200 MG: 200 TABLET, FILM COATED ORAL at 20:50

## 2018-08-26 RX ADMIN — HYDROMORPHONE HYDROCHLORIDE 0.5 MG: 1 INJECTION, SOLUTION INTRAMUSCULAR; INTRAVENOUS; SUBCUTANEOUS at 14:36

## 2018-08-26 RX ADMIN — HYDROMORPHONE HYDROCHLORIDE 0.5 MG: 1 INJECTION, SOLUTION INTRAMUSCULAR; INTRAVENOUS; SUBCUTANEOUS at 18:45

## 2018-08-26 RX ADMIN — CLOPIDOGREL 75 MG: 75 TABLET, FILM COATED ORAL at 08:41

## 2018-08-26 RX ADMIN — EPLERENONE 50 MG: 25 TABLET, FILM COATED ORAL at 08:42

## 2018-08-26 RX ADMIN — EPLERENONE 50 MG: 25 TABLET, FILM COATED ORAL at 20:50

## 2018-08-26 RX ADMIN — ETHACRYNIC ACID 25 MG: 25 TABLET ORAL at 18:32

## 2018-08-26 RX ADMIN — GUAIFENESIN 600 MG: 600 TABLET, EXTENDED RELEASE ORAL at 20:50

## 2018-08-26 RX ADMIN — BUTALBITAL, ACETAMINOPHEN, AND CAFFEINE 1 TABLET: 50; 325; 40 TABLET ORAL at 05:11

## 2018-08-26 RX ADMIN — ENOXAPARIN SODIUM 40 MG: 40 INJECTION SUBCUTANEOUS at 08:42

## 2018-08-26 RX ADMIN — MONTELUKAST SODIUM 10 MG: 10 TABLET, FILM COATED ORAL at 20:51

## 2018-08-26 RX ADMIN — VITAMIN D, TAB 1000IU (100/BT) 1000 UNITS: 25 TAB at 08:42

## 2018-08-26 RX ADMIN — LABETALOL HCL 200 MG: 200 TABLET, FILM COATED ORAL at 08:42

## 2018-08-26 RX ADMIN — LOSARTAN POTASSIUM: 50 TABLET, FILM COATED ORAL at 08:42

## 2018-08-26 RX ADMIN — PANTOPRAZOLE SODIUM 40 MG: 40 TABLET, DELAYED RELEASE ORAL at 05:15

## 2018-08-26 RX ADMIN — NIFEDIPINE 90 MG: 60 TABLET, FILM COATED, EXTENDED RELEASE ORAL at 08:41

## 2018-08-26 NOTE — THERAPY TREATMENT NOTE
Acute Care - Physical Therapy Treatment Note  [facility]     Patient Name: Sasha Barrett  : 1940  MRN: [id number]  Today's Date: 2018             Admit Date: 2018    Visit Dx:    ICD-10-CM ICD-9-CM   1. Uncontrolled hypertension I10 401.9   2. Acute intractable headache, unspecified headache type R51 784.0   3. Acute UTI N39.0 599.0     Patient Active Problem List   Diagnosis   • Joint pain   • Urinary retention self-caths (Don)   • Sinusitis   • Conjunctivitis   • Arm pain   • Cervical disc displacement   • Cervical pain ([place]=PM)   • DDD (degenerative disc disease), cervical   • Hyperlipidemia   • Preventative health care   • Medication management   • Proctocele   • Urge incontinence of urine   • Chronic tension-type headache, intractable   • Obesity (BMI 30.0-34.9)   • H/o Lyme disease   • Stage 3 chronic kidney disease   • Severe Intracranial atherosclerosis (Neuro suggests Plavix and ASA ()   • Allergic contact dermatitis   • Malaise and fatigue progressive   • Vertigo   • Vitamin D deficiency   • Moderate persistent asthmatic bronchitis without complication   • Anterior cervical adenopathy due to infection   • Pleurisy   • Knee pain, bilateral   • Elevated PTH level   • 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)   • Gastroesophageal reflux disease with esophagitis   • Psoriasis (Darryl Ochoa)   • Postmenopausal   • Recurrent UTI   • CVA (cerebrovascular accident) subacute   • Dyspnea on exertion   • Precordial pain   • Abnormal EKG   • Arthralgia   • Accelerated hypertension   • Acute joint pain   • Easy bruisability   • Cough productive of purulent sputum   • Influenza A Subtype H3   • Acute exacerbation of 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[facility])   • Acute chest wall pain   • Acute pain of left shoulder due to trauma   • Decreased functional mobility and endurance since fall 17   • Uncontrolled hypertension   • Fall from standing with head traumna   • Thrombosis verses  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   -CS      Therapy Frequency (PT Clinical Impression) daily  -CS      Patient Effort good  -CS      Existing Precautions/Restrictions fall  -CS      Equipment Issued to Patient walker, front wheeled;gait belt  -CS      Recorded by [CS] Mario Waddell, PT 08/26/18 1517      Row Name 08/26/18 1514             Cognitive Assessment/Intervention- PT/OT    Orientation Status (Cognition) oriented x 4  -CS      Follows Commands (Cognition) WNL  -CS      Recorded by [CS] Mario Waddell, PT 08/26/18 1517      Row Name 08/26/18 1514             Bed Mobility Assessment/Treatment    Bed Mobility Assessment/Treatment supine-sit;sit-supine  -CS      [place] Level (Bed Mobility) supervision  -CS      Supine-Sit [place] (Bed Mobility) supervision  -CS      Sit-Supine [place] (Bed Mobility) supervision  -CS      Recorded by [CS] Mario Waddell, PT 08/26/18 1517      Row Name 08/26/18 1514             Transfer Assessment/Treatment    Transfer Assessment/Treatment sit-stand transfer;stand-sit transfer  -CS      Recorded by [CS] Mario Waddell, PT 08/26/18 1517      Row Name 08/26/18 1514             Sit-Stand Transfer    Sit-Stand [place] (Transfers) contact guard  -CS      Assistive Device (Sit-Stand Transfers) walker, front-wheeled  -CS      Recorded by [CS] Mario Waddell, PT 08/26/18 1517      Row Name 08/26/18 1514             Stand-Sit Transfer    Stand-Sit [place] (Transfers) contact guard  -CS      Assistive Device (Stand-Sit Transfers) walker, front-wheeled  -CS       Recorded by [CS] Mario Waddell, PT 08/26/18 1517      Row Name 08/26/18 1514             Gait/Stairs Assessment/Training    [place] Level (Gait) contact guard;minimum assist (75% patient effort)  -CS      Assistive Device (Gait) walker, front-wheeled  -CS      Distance in Feet (Gait) 240  -CS      Pattern (Gait) step-through  -CS      Deviations/Abnormal Patterns (Gait) antalgic;tim decreased;gait speed decreased;stride length decreased  -CS      Bilateral Gait Deviations forward flexed posture;heel strike decreased  -CS      Recorded by [DANIEL] Mario Waddell, PT 08/26/18 1517      Row Name 08/26/18 1514             Positioning and Restraints    Pre-Treatment Position in bed  -CS      Post Treatment Position bed  -CS      In Bed supine;call light within reach;encouraged to call for assist  -CS      Recorded by [DANIEL] Mario Waddell, PT 08/26/18 1517      Row Name 08/26/18 1514             Pain Assessment    Additional Documentation Pain Scale: FACES Pre/Post-Treatment (Group)  -CS      Recorded by [DANIEL] Mario Waddell, PT 08/26/18 1517      Row Name 08/26/18 1514             Pain Scale: FACES Pre/Post-Treatment    Pain: FACES Scale, Pretreatment 4-->hurts little more  -CS      Pain: FACES Scale, Post-Treatment 4-->hurts little more  -CS      Pre/Post Treatment Pain Comment complains of shoulder pain  -CS      Recorded by [CS] Mario Waddell, PT 08/26/18 1517      Row Name 08/26/18 1514             Coping    Observed Emotional State calm;cooperative  -CS      Verbalized Emotional State acceptance  -CS      Recorded by [DANIEL] Mario Waddell, PT 08/26/18 1517      Row Name 08/26/18 1514             Plan of Care Review    Plan of Care Reviewed With patient  -CS      Recorded by [DANIEL] Mario Waddell, PT 08/26/18 1517      Row Name 08/26/18 1514             Outcome Summary/Treatment Plan (PT)    Anticipated Discharge Disposition (PT) home  -CS      Recorded by [DANIEL] Mario Waddell, PT 08/26/18 1517         User Key  (r) = Recorded By, (t) = Taken By, (c) = Cosigned By    Initials Name Effective Dates Discipline     Mario Waddell, PT 05/14/18 -  PT                     Physical Therapy Education     Title: PT OT SLP Therapies (Done)     Topic: Physical Therapy (Done)     Point: Mobility training (Done)    Learning Progress Summary     Learner Status Readiness Method Response Comment Documented by    Patient Done Acceptance E,TB VU   08/26/18 1517     Done Acceptance E,TB VU   08/25/18 1234     Done Acceptance E VU,NR   08/24/18 0957          Point: Home exercise program (Done)    Learning Progress Summary     Learner Status Readiness Method Response Comment Documented by    Patient Done Acceptance E,TB VU   08/26/18 1517     Done Acceptance E,TB VU   08/25/18 1234     Done Acceptance E VU,NR   08/24/18 0957          Point: Body mechanics (Done)    Learning Progress Summary     Learner Status Readiness Method Response Comment Documented by    Patient Done Acceptance E,TB VU   08/26/18 1517     Done Acceptance E,TB VU   08/25/18 1234     Done Acceptance E VU,NR   08/24/18 0957          Point: Precautions (Done)    Learning Progress Summary     Learner Status Readiness Method Response Comment Documented by    Patient Done Acceptance E,TB VU   08/26/18 1517     Done Acceptance E,TB VU   08/25/18 1234     Done Acceptance E VU,NR   08/24/18 0957                      User Key     Initials Effective Dates Name Provider Type Discipline     06/08/18 -  Kristin Leone, PT Physical Therapist PT     05/14/18 -  Mario Waddell, PT Physical Therapist PT                    PT Recommendation and Plan  Anticipated Discharge Disposition (PT): home  Therapy Frequency (PT Clinical Impression): daily  Outcome Summary/Treatment Plan (PT)  Anticipated Discharge Disposition (PT): home  Plan of Care Reviewed With: patient  Outcome Summary: Tolerated treatment well, other than complaints of  shoulder pain. Walked further this visit. Progressing well.           Outcome Measures     Row Name 08/26/18 1500 08/25/18 1200 08/24/18 1253       How much help from another person do you currently need...    Turning from your back to your side while in flat bed without using bedrails? 4  -CS 4  -CS  --    Moving from lying on back to sitting on the side of a flat bed without bedrails? 4  -CS 4  -CS  --    Moving to and from a bed to a chair (including a wheelchair)? 3  -CS 3  -CS  --    Standing up from a chair using your arms (e.g., wheelchair, bedside chair)? 3  -CS 3  -CS  --    Climbing 3-5 steps with a railing? 3  -CS 3  -CS  --    To walk in hospital room? 3  -CS 3  -CS  --    AM-PAC 6 Clicks Score 20  -CS 20  -CS  --       How much help from another is currently needed...    Putting on and taking off regular lower body clothing?  --  -- 3  -LE    Bathing (including washing, rinsing, and drying)  --  -- 3  -LE    Toileting (which includes using toilet bed pan or urinal)  --  -- 3  -LE    Putting on and taking off regular upper body clothing  --  -- 3  -LE    Taking care of personal grooming (such as brushing teeth)  --  -- 3  -LE    Eating meals  --  -- 3  -LE    Score  --  -- 18  -LE       Functional Assessment    Outcome Measure Options AM-PAC 6 Clicks Basic Mobility (PT)  -CS AM-PAC 6 Clicks Basic Mobility (PT)  -CS  --    Row Name 08/24/18 1200 08/24/18 0950          How much help from another person do you currently need...    Turning from your back to your side while in flat bed without using bedrails?  -- 4  -KH     Moving from lying on back to sitting on the side of a flat bed without bedrails?  -- 3  -KH     Moving to and from a bed to a chair (including a wheelchair)?  -- 3  -KH     Standing up from a chair using your arms (e.g., wheelchair, bedside chair)?  -- 3  -KH     Climbing 3-5 steps with a railing?  -- 3  -KH     To walk in hospital room?  -- 3  -KH     AM-PAC 6 Clicks Score  -- 19  -KH         Functional Assessment    Outcome Measure Options AM-PAC 6 Clicks Daily Activity (OT)  -CAMILO AM-PAC 6 Clicks Basic Mobility (PT)  -       User Key  (r) = Recorded By, (t) = Taken By, (c) = Cosigned By    Initials Name Provider Type    Kristin Peña, PT Physical Therapist    Joy Brar, OTR Occupational Therapist    Mario Webb, FRANSICO Physical Therapist           Time Calculation:         PT Charges     Row Name 08/26/18 1519             Time Calculation    Start Time 1500  -CS      Stop Time 1515  -CS      Time Calculation (min) 15 min  -CS      PT Received On 08/26/18  -CS      PT - Next Appointment 08/27/18  -CS        User Key  (r) = Recorded By, (t) = Taken By, (c) = Cosigned By    Initials Name Provider Type    Mario Webb, PT Physical Therapist        Therapy Suggested Charges     Code   Minutes Charges    None           Therapy Charges for Today     Code Description Service Date Service Provider Modifiers Qty    71732410019 HC PT THER PROC EA 15 MIN 8/25/2018 Mario Waddell, PT GP 1    24008235352 HC PT THER PROC EA 15 MIN 8/26/2018 Mario Waddell, PT GP 1          PT G-Codes  Outcome Measure Options: AM-PAC 6 Clicks Basic Mobility (PT)  Score: 19  Functional Limitation: Mobility: Walking and moving around  Mobility: Walking and Moving Around Current Status (): At least 20 percent but less than 40 percent impaired, limited or restricted  Mobility: Walking and Moving Around Goal Status (): At least 1 percent but less than 20 percent impaired, limited or restricted    Mario Waddell PT  8/26/2018

## 2018-08-26 NOTE — PROGRESS NOTES
" LOS: 4 days   Patient Care Team:  Óscar Cadet MD as PCP - General (Internal Medicine)    Chief Complaint: Headache    Subjective     History of Present Illness  Pt c/o some neck pain, but otherwise doing ok.  Subjective    History taken from: patient chart    Objective     Vital Sign Min/Max for last 24 hours  Temp  Min: 98 °F (36.7 °C)  Max: 98.6 °F (37 °C)   BP  Min: 125/88  Max: 160/79   Pulse  Min: 55  Max: 69   Resp  Min: 18  Max: 18   SpO2  Min: 98 %  Max: 99 %   No Data Recorded   Weight  Min: 90.4 kg (199 lb 4.8 oz)  Max: 90.4 kg (199 lb 4.8 oz)     Flowsheet Rows      First Filed Value   Admission Height  157.5 cm (62\") Documented at 08/22/2018 1614   Admission Weight  83.9 kg (185 lb) Documented at 08/22/2018 1614          No intake/output data recorded.  I/O last 3 completed shifts:  In: 290 [P.O.:240; IV Piggyback:50]  Out: 950 [Urine:950]    Objective   Physical Exam  GEN: nad, aaox4, well nourished elderly female  HEENT: ncat, perrl, eomi, op clear mmdry  NECK: supple, no jvd, no carotid bruits  CHEST: cta b, no wheezes or rhonchi  CVA: RRR s1, s2 no m/r/g  ABD: soft, nontender, nondistended, +bs  EXT: no c/c/e 2+pp  SKIN: warm, dry, intact  NEURO: grossly nonfocal  PSYCH: appropriate mood and affect      Results Review:     I reviewed the patient's new clinical results.       WBC WBC   Date Value Ref Range Status   08/26/2018 8.19 4.50 - 10.70 10*3/mm3 Final   08/25/2018 10.12 4.50 - 10.70 10*3/mm3 Final   08/24/2018 10.04 4.50 - 10.70 10*3/mm3 Final      HGB Hemoglobin   Date Value Ref Range Status   08/26/2018 12.9 11.9 - 15.5 g/dL Final   08/25/2018 13.5 11.9 - 15.5 g/dL Final   08/24/2018 13.3 11.9 - 15.5 g/dL Final      HCT Hematocrit   Date Value Ref Range Status   08/26/2018 40.4 35.6 - 45.5 % Final   08/25/2018 42.6 35.6 - 45.5 % Final   08/24/2018 42.8 35.6 - 45.5 % Final      Platlets No results found for: LABPLAT   MCV MCV   Date Value Ref Range Status   08/26/2018 90.6 80.5 - 98.2 " fL Final   08/25/2018 91.0 80.5 - 98.2 fL Final   08/24/2018 91.1 80.5 - 98.2 fL Final          Sodium Sodium   Date Value Ref Range Status   08/26/2018 140 136 - 145 mmol/L Final   08/25/2018 140 136 - 145 mmol/L Final   08/24/2018 141 136 - 145 mmol/L Final      Potassium Potassium   Date Value Ref Range Status   08/26/2018 3.8 3.5 - 5.2 mmol/L Final   08/25/2018 3.8 3.5 - 5.2 mmol/L Final   08/24/2018 3.4 (L) 3.5 - 5.2 mmol/L Final      Chloride Chloride   Date Value Ref Range Status   08/26/2018 103 98 - 107 mmol/L Final   08/25/2018 106 98 - 107 mmol/L Final   08/24/2018 105 98 - 107 mmol/L Final      CO2 CO2   Date Value Ref Range Status   08/26/2018 24.0 22.0 - 29.0 mmol/L Final   08/25/2018 21.2 (L) 22.0 - 29.0 mmol/L Final   08/24/2018 24.2 22.0 - 29.0 mmol/L Final      BUN BUN   Date Value Ref Range Status   08/26/2018 22 8 - 23 mg/dL Final   08/25/2018 14 8 - 23 mg/dL Final   08/24/2018 11 8 - 23 mg/dL Final      Creatinine Creatinine   Date Value Ref Range Status   08/26/2018 1.20 (H) 0.57 - 1.00 mg/dL Final   08/25/2018 1.05 (H) 0.57 - 1.00 mg/dL Final   08/24/2018 0.95 0.57 - 1.00 mg/dL Final      Calcium Calcium   Date Value Ref Range Status   08/26/2018 9.2 8.6 - 10.5 mg/dL Final   08/25/2018 9.1 8.6 - 10.5 mg/dL Final   08/24/2018 9.2 8.6 - 10.5 mg/dL Final      PO4 No results found for: CAPO4   Albumin No results found for: ALBUMIN   Magnesium Magnesium   Date Value Ref Range Status   08/24/2018 2.5 (H) 1.6 - 2.4 mg/dL Final      Uric Acid No results found for: URICACID     Medication Review: yes    Assessment/Plan     Principal Problem:    Uncontrolled hypertension  Active Problems:    Joint pain    Urinary retention self-caths (Don)    Sinusitis    Conjunctivitis    Arm pain    Cervical disc displacement    Cervical pain (Servando=PM)    DDD (degenerative disc disease), cervical    Hyperlipidemia    Proctocele    Urge incontinence of urine    Chronic tension-type headache, intractable    Obesity  (BMI 30.0-34.9)    H/o Lyme disease    Stage 3 chronic kidney disease    Severe Intracranial atherosclerosis (Neuro suggests Plavix and ASA (8/18)    Allergic contact dermatitis    Malaise and fatigue progressive    Vertigo    Vitamin D deficiency    Moderate persistent asthmatic bronchitis without complication    Pleurisy    Knee pain, bilateral    Elevated PTH level    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  Continue same for now.  · ERMIAS on CKD3--cr stable.  · Urinary retention/urinary incontinence-per urology. Continue I/O cath  · Headaches--CT negative.  · Asthma/copd.         Negro Rios MD  08/26/18  9:17 AM

## 2018-08-26 NOTE — PLAN OF CARE
Problem: Patient Care Overview  Goal: Plan of Care Review  Outcome: Ongoing (interventions implemented as appropriate)   08/26/18 1518   OTHER   Outcome Summary Tolerated treatment well, other than complaints of shoulder pain. Walked further this visit. Progressing well.    Coping/Psychosocial   Plan of Care Reviewed With patient

## 2018-08-26 NOTE — PLAN OF CARE
Problem: Patient Care Overview  Goal: Plan of Care Review  Outcome: Ongoing (interventions implemented as appropriate)   08/26/18 0631   OTHER   Outcome Summary Pt having complaints of H/A and right shoulder/neck pain this shift. Ortho consult called to Dr. Martin's answering service this am. IV dilaudid and Fioricet given for pain. Assist x 1 to bathroom.   Plan of Care Review   Progress no change

## 2018-08-26 NOTE — PROGRESS NOTES
[facility]  Cardiology Progress Note    Patient Identification:  Name: Sasha Barrett  Age: 78 y.o.  Sex: female  :  1940  MRN: [id number]                 Follow Up / Chief Complaint: Uncontrolled hypertension    Interval History:   78-year-old white female with no known history of CAD but long-standing history of hypertension who presents with an intractable headache and UTI.  Defer decision regarding renal artery angiogram to her primay Nephrologist, Dr Meléndez. Ethacrynic acid added yesterday by Nephro.  Was asked by Dr. Cadet to have cardiology input on BP management.  Her Systolic BP a little better but diastolics remain high up to 100's.       Subjective:  Denies chest pain, tightness, palpitations or dizziness.  Main issues is right shoulder pain.   REports HA better.   .   Objective:    Temp:  [98 °F (36.7 °C)-98.6 °F (37 °C)] 98.6 °F (37 °C)  Heart Rate:  [55-69] 55  Resp:  [18] 18  BP: (125-160)/(61-97) 125/88      CE (-) x4    MRI of the brain was negative.  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 Otherwise unremarkable CT angiography imaging of the neck and  head. No aneurysms were identified.       Past Medical History:  Past Medical History:   Diagnosis Date   • Arthritis    • Asthma    • Cancer (CMS/HCC)    • COPD (chronic obstructive pulmonary disease) (CMS/HCC)    • Emphysema lung (CMS/HCC)    • Generalized headaches    • Hayfever    • Hypertension    • Stroke (CMS/HCC)      Past Surgical History:  Past Surgical History:   Procedure Laterality Date   • BREAST LUMPECTOMY Left 2003   • CARPAL TUNNEL RELEASE     • CATARACT EXTRACTION     • HERNIA REPAIR  1971   • HERNIA REPAIR      right groin   • JOINT REPLACEMENT     • KNEE SURGERY Left 1998    bilateral knee replacements   • MASTECTOMY     • PARATHYROIDECTOMY  2006   • SINUS SURGERY  1984   • TOTAL ABDOMINAL HYSTERECTOMY  1973         Social History:   Social History   Substance Use Topics   • Smoking status: Never Smoker   • Smokeless tobacco: Never Used   • Alcohol use Yes      Comment: rarely - once or twice year (wine or bernabe)      Family History:  Family History   Problem Relation Age of Onset   • Cancer Brother           Allergies:  Allergies   Allergen Reactions   • Morphine Anaphylaxis   • Sulfa Antibiotics Anaphylaxis and Hives     Or sulfa fillers in meds   • Hydralazine Myalgia     Patient reports leg cramps, joint pain and increased fatigue   • Amlodipine      KIDNEYS SHUT DOWN   • Anastrozole    • Grass    • Nsaids      KIDNEY FAILURE   • Penicillins      Tolerated ceftriaxone during 04/2017 admission   • Tree Extract    • Zolpidem      HALLUCINATIONS     Scheduled Meds:    aspirin 81 mg Daily   cholecalciferol 1,000 Units Daily   CloNIDine 0.2 mg Q8H   clopidogrel 75 mg Daily   docusate sodium 100 mg Daily   enoxaparin 40 mg Q24H   eplerenone 50 mg Q12H   ethacrynic acid 25 mg TID   fluticasone 1 spray BID   guaiFENesin 600 mg Q12H   labetalol 200 mg Q12H   losartan-HCTZ (HYZAAR) 100-12.5 combo dose  Daily   montelukast 10 mg Nightly   NIFEdipine XL 90 mg Q24H   pantoprazole 40 mg Q AM   predniSONE 10 mg Every Other Day   cyanocobalamin 1,000 mcg Daily           INTAKE AND OUTPUT:    Intake/Output Summary (Last 24 hours) at 08/26/18 1706  Last data filed at 08/26/18 0600   Gross per 24 hour   Intake              290 ml   Output              550 ml   Net             -260 ml       Review of Systems:   GI: no nausea or abdominal pain  Cardiac:  No chest pain or tightness  Pulmonary: No shortness of breath or cough    Constitutional:  Temp:  [98 °F (36.7 °C)-98.6 °F (37 °C)] 98.6 °F (37 °C)  Heart Rate:  [55-69] 55  Resp:  [18] 18  BP: (125-160)/(61-97) 125/88    Physical Exam:  General:  Appears in no acute distress  Eyes: PERTL,  HEENT:  No JVD. Thyroid not visibly enlarged. No mucosal pallor or cyanosis  Respiratory:  Respirations regular and unlabored at rest. BBS with good air entry in all fields. No crackles, rubs or wheezes auscultated  Cardiovascular: S1S2 Regular rate and rhythm. No murmur, rub or gallop. No carotid bruits. DP/PT pulses 2+ . No pretibial pitting edema  Gastrointestinal: Abdomen soft, flat, non tender. Bowel sounds present. No hepatosplenomegaly. No ascites  Musculoskeletal: OLSEN x4. Right shoulder pain with movement.   Extremities: No digital clubbing or cyanosis  Skin: Color pink. Skin warm and dry to touch. No rashes    Neuro: AAO x3 CN II-XII grossly intact  Psych: Mood and affect normal, pleasant and cooperative      Cardiographics  Telemetry:      ECG 8-24-18:       Echocardiogram:   · Calculated EF = 78%.  · Left ventricular systolic function is normal.  · Left ventricular diastolic dysfunction (grade I) consistent with impaired relaxation.  · Mild tricuspid valve regurgitation is present.  · Estimated right ventricular systolic pressure from tricuspid regurgitation is normal (<35 mmHg).    Lab Review     Results from last 7 days  Lab Units 08/23/18  1824 08/23/18  1157 08/23/18  0546   TROPONIN T ng/mL <0.010 <0.010 <0.010       Results from last 7 days  Lab Units 08/24/18  0532   MAGNESIUM mg/dL 2.5*       Results from last 7 days  Lab Units 08/26/18  0554   SODIUM mmol/L 140   POTASSIUM mmol/L 3.8   BUN mg/dL 22   CREATININE mg/dL 1.20*   CALCIUM mg/dL 9.2       Results from last 7 days  Lab Units 08/26/18  0554 08/25/18  0628 08/24/18  0532   WBC 10*3/mm3 8.19 10.12 10.04   HEMOGLOBIN g/dL 12.9 13.5 13.3   HEMATOCRIT % 40.4 42.6 42.8   PLATELETS 10*3/mm3 191 190 172           Study Result     CT ANGIOGRAM CAROTIDS-, CT ANGIOGRAM HEAD W CONTRAST-     CLINICAL HISTORY: Follow-up nonvisualized left posterior cerebral artery  on MRA. Evaluate for possible tiny left internal carotid artery aneurysm  on MRA..      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  "Otherwise unremarkable CT angiography imaging of the neck and  head. No aneurysms were identified.       Assessment:  - uncontrolled HTN  - EF >75%, mild TR  - intractable headache  - UTI  - CKD 3 - nephrology following.    - COPD / RAD  - OA / DJD  - h/o CVA  - h/o Breast Ca - bilateral masectomies      Plan:  - uncontrolled HTN -  improved control on current doses of Catapres, Labetalol, Procardia, losartan and Edecrin.  Nifedipine up titrated to 90 mg on 8/25.     - EF >75% - gr I morrell dysfx, mild TR    No angina.  MI ruled out.  Defer decision regarding proceeding with renal artery arteriogram to nephrology.  REcommend outpatient stress testing unless she were to develop angina.      Noted possible DC tomorrow.  Please have her f/u with Dr. Argueta in 2-4 weeks to set up outpt stress testing.          )8/26/2018  AC Navas/Transcription:   \"Dictated utilizing Dragon dictation\".     "

## 2018-08-26 NOTE — PROGRESS NOTES
LOS: 4 days   Patient Care Team:  Óscar Cadet MD as PCP - General (Internal Medicine)    Chief Complaint:    Chief Complaint   Patient presents with   • Headache       Subjective     Interval History:     Patient Complaints: her H/A was bad lastnight and she was nauseated, her BP was up and she had a lot of shoulder pain  Patient Denies: shoulder pain, slight H/A  History taken from: patient chart    Objective     Vital Signs  Temp:  [98 °F (36.7 °C)-98.6 °F (37 °C)] 98.6 °F (37 °C)  Heart Rate:  [55-69] 55  Resp:  [18] 18  BP: (125-160)/(61-97) 125/88      Physical Examination: patient was examined   HEENT: Normocephalic, atraumatic   COR: RRR  Resp: Even and unlabored      Neurological:   MS: AO. Language normal. No neglect. Higher integrative function normal  CN: II-XII normal      Results Review:     I reviewed the patient's new clinical results.          Results from last 7 days  Lab Units 08/26/18  0554 08/25/18  0628 08/24/18  0532   WBC 10*3/mm3 8.19 10.12 10.04   HEMOGLOBIN g/dL 12.9 13.5 13.3   HEMATOCRIT % 40.4 42.6 42.8   PLATELETS 10*3/mm3 191 190 172       Lab Results   Component Value Date    CHOL 170 08/24/2018    CHOL 182 12/26/2017     Lab Results   Component Value Date    HDL 55 08/24/2018    HDL 46 12/26/2017    HDL 59 05/03/2017     Lab Results   Component Value Date    LDL 92 08/24/2018    LDL 99 12/26/2017     (H) 05/03/2017     Lab Results   Component Value Date    TRIG 117 08/24/2018    TRIG 184 (H) 12/26/2017    TRIG 202 (H) 05/03/2017         Results from last 7 days  Lab Units 08/26/18  0554 08/25/18  0628 08/24/18  0532   SODIUM mmol/L 140 140 141   POTASSIUM mmol/L 3.8 3.8 3.4*   CHLORIDE mmol/L 103 106 105   CO2 mmol/L 24.0 21.2* 24.2   BUN mg/dL 22 14 11   CREATININE mg/dL 1.20* 1.05* 0.95   CALCIUM mg/dL 9.2 9.1 9.2   GLUCOSE mg/dL 105* 101* 97       Medication Review: reviewed, no changes made    Assessment/Plan  Ms. Barrett is an 77 yo female with HTN, COPD, CKD II,  urinary retention requiring self catheterization,  left breast cancer s/p resection, and migraine admitted with intractable h/a, SBP > 200s, and UTI. H/A started 4 days PTA and is described as pulsating in the back of her head with occasional radiation to the front of her head over her left eye. She'd been taking fiorcet Q 4-6h at home without relief however, typically she takes it 3 times a month. She has been given 3 migraine cocktails with no relief. Imaging has been negative for acute findings to explain H/A. Her BP has improved as has her H/A and continues to improve with improved BP. I will not change any medications or start a daily migraine medication at this point. However, she may need a preventative migraine medication and avoid narcotics and Fioricet.     Imaging did reveal severe intracranial atherosclerosis which places her at increased risk for stroke, no aneurysm. Dr. Khan recommends DAPT with ASA and Plavix for stroke prevention. She also needs aggressive risk factor control. We will see PRN. She can FU out patient in 1-2 months with Joy Brown or Rene Jacinto       Plan:     BP management  Consider daily preventative migraine medication  Wean down on narcotics and avoid use of fioricet  DAPT, ASA and Plavix for diffuse ICAD  FU in 1-2 months out patient for H/A      Principal Problem:    Uncontrolled hypertension  Active Problems:    Joint pain    Urinary retention self-caths (Don)    Sinusitis    Conjunctivitis    Arm pain    Cervical disc displacement    Cervical pain (Servando=PM)    DDD (degenerative disc disease), cervical    Hyperlipidemia    Proctocele    Urge incontinence of urine    Chronic tension-type headache, intractable    Obesity (BMI 30.0-34.9)    H/o Lyme disease    Stage 3 chronic kidney disease    Severe Intracranial atherosclerosis (Neuro suggests Plavix and ASA (8/18)    Allergic contact dermatitis    Malaise and fatigue progressive    Vertigo    Vitamin D  deficiency    Moderate persistent asthmatic bronchitis without complication    Pleurisy    Knee pain, bilateral    Elevated PTH level    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 Khan and Dr. Helio Robledo, APRN  08/26/18  1:20 PM

## 2018-08-27 VITALS
HEART RATE: 70 BPM | WEIGHT: 199.74 LBS | DIASTOLIC BLOOD PRESSURE: 61 MMHG | HEIGHT: 62 IN | RESPIRATION RATE: 18 BRPM | OXYGEN SATURATION: 96 % | TEMPERATURE: 98.3 F | SYSTOLIC BLOOD PRESSURE: 139 MMHG | BODY MASS INDEX: 36.76 KG/M2

## 2018-08-27 DIAGNOSIS — I10 HYPERTENSION, ESSENTIAL: Primary | ICD-10-CM

## 2018-08-27 DIAGNOSIS — R06.02 SHORTNESS OF BREATH: ICD-10-CM

## 2018-08-27 LAB
ANION GAP SERPL CALCULATED.3IONS-SCNC: 10.4 MMOL/L
BASOPHILS # BLD AUTO: 0.02 10*3/MM3 (ref 0–0.2)
BASOPHILS NFR BLD AUTO: 0.2 % (ref 0–1.5)
BUN BLD-MCNC: 22 MG/DL (ref 8–23)
BUN/CREAT SERPL: 23.9 (ref 7–25)
CALCIUM SPEC-SCNC: 9.4 MG/DL (ref 8.6–10.5)
CHLORIDE SERPL-SCNC: 101 MMOL/L (ref 98–107)
CO2 SERPL-SCNC: 26.6 MMOL/L (ref 22–29)
CREAT BLD-MCNC: 0.92 MG/DL (ref 0.57–1)
DEPRECATED RDW RBC AUTO: 52.4 FL (ref 37–54)
EOSINOPHIL # BLD AUTO: 0.07 10*3/MM3 (ref 0–0.7)
EOSINOPHIL NFR BLD AUTO: 0.8 % (ref 0.3–6.2)
ERYTHROCYTE [DISTWIDTH] IN BLOOD BY AUTOMATED COUNT: 15.6 % (ref 11.7–13)
GFR SERPL CREATININE-BSD FRML MDRD: 59 ML/MIN/1.73
GLUCOSE BLD-MCNC: 105 MG/DL (ref 65–99)
HCT VFR BLD AUTO: 42.9 % (ref 35.6–45.5)
HGB BLD-MCNC: 13.1 G/DL (ref 11.9–15.5)
IMM GRANULOCYTES # BLD: 0.02 10*3/MM3 (ref 0–0.03)
IMM GRANULOCYTES NFR BLD: 0.2 % (ref 0–0.5)
LYMPHOCYTES # BLD AUTO: 1.89 10*3/MM3 (ref 0.9–4.8)
LYMPHOCYTES NFR BLD AUTO: 22.7 % (ref 19.6–45.3)
MCH RBC QN AUTO: 28.2 PG (ref 26.9–32)
MCHC RBC AUTO-ENTMCNC: 30.5 G/DL (ref 32.4–36.3)
MCV RBC AUTO: 92.3 FL (ref 80.5–98.2)
MONOCYTES # BLD AUTO: 1.1 10*3/MM3 (ref 0.2–1.2)
MONOCYTES NFR BLD AUTO: 13.2 % (ref 5–12)
NEUTROPHILS # BLD AUTO: 5.21 10*3/MM3 (ref 1.9–8.1)
NEUTROPHILS NFR BLD AUTO: 62.9 % (ref 42.7–76)
PLATELET # BLD AUTO: 187 10*3/MM3 (ref 140–500)
PMV BLD AUTO: 10.8 FL (ref 6–12)
POTASSIUM BLD-SCNC: 3.3 MMOL/L (ref 3.5–5.2)
RBC # BLD AUTO: 4.65 10*6/MM3 (ref 3.9–5.2)
SODIUM BLD-SCNC: 138 MMOL/L (ref 136–145)
WBC NRBC COR # BLD: 8.31 10*3/MM3 (ref 4.5–10.7)

## 2018-08-27 PROCEDURE — 80048 BASIC METABOLIC PNL TOTAL CA: CPT | Performed by: INTERNAL MEDICINE

## 2018-08-27 PROCEDURE — 99221 1ST HOSP IP/OBS SF/LOW 40: CPT | Performed by: NURSE PRACTITIONER

## 2018-08-27 PROCEDURE — 85025 COMPLETE CBC W/AUTO DIFF WBC: CPT | Performed by: INTERNAL MEDICINE

## 2018-08-27 PROCEDURE — 99232 SBSQ HOSP IP/OBS MODERATE 35: CPT | Performed by: INTERNAL MEDICINE

## 2018-08-27 PROCEDURE — 25010000002 ENOXAPARIN PER 10 MG: Performed by: INTERNAL MEDICINE

## 2018-08-27 PROCEDURE — 63710000001 PREDNISONE PER 5 MG: Performed by: INTERNAL MEDICINE

## 2018-08-27 PROCEDURE — 25010000002 HYDROMORPHONE PER 4 MG: Performed by: INTERNAL MEDICINE

## 2018-08-27 RX ORDER — POTASSIUM CHLORIDE 20 MEQ/1
20 TABLET, EXTENDED RELEASE ORAL DAILY
Qty: 90 TABLET | Refills: 1 | Status: SHIPPED | OUTPATIENT
Start: 2018-08-27 | End: 2018-11-21

## 2018-08-27 RX ORDER — CLOPIDOGREL BISULFATE 75 MG/1
75 TABLET ORAL DAILY
Qty: 90 TABLET | Refills: 1 | Status: SHIPPED | OUTPATIENT
Start: 2018-08-28 | End: 2018-11-21

## 2018-08-27 RX ORDER — POTASSIUM CHLORIDE 750 MG/1
20 CAPSULE, EXTENDED RELEASE ORAL ONCE
Status: COMPLETED | OUTPATIENT
Start: 2018-08-27 | End: 2018-08-27

## 2018-08-27 RX ORDER — PREDNISONE 10 MG/1
10 TABLET ORAL EVERY OTHER DAY
Qty: 15 TABLET
Start: 2018-08-29 | End: 2021-04-23

## 2018-08-27 RX ORDER — LOSARTAN POTASSIUM AND HYDROCHLOROTHIAZIDE 12.5; 1 MG/1; MG/1
1 TABLET ORAL DAILY
Qty: 90 TABLET | Refills: 1 | Status: SHIPPED | OUTPATIENT
Start: 2018-08-27 | End: 2018-11-21

## 2018-08-27 RX ORDER — LABETALOL 200 MG/1
200 TABLET, FILM COATED ORAL EVERY 12 HOURS SCHEDULED
Qty: 90 TABLET | Refills: 1 | Status: SHIPPED | OUTPATIENT
Start: 2018-08-27 | End: 2018-11-21

## 2018-08-27 RX ORDER — FLUTICASONE PROPIONATE 50 MCG
1 SPRAY, SUSPENSION (ML) NASAL 2 TIMES DAILY
Start: 2018-08-27

## 2018-08-27 RX ADMIN — ETHACRYNIC ACID 25 MG: 25 TABLET ORAL at 08:18

## 2018-08-27 RX ADMIN — LOSARTAN POTASSIUM: 50 TABLET, FILM COATED ORAL at 08:16

## 2018-08-27 RX ADMIN — POTASSIUM CHLORIDE 20 MEQ: 750 CAPSULE, EXTENDED RELEASE ORAL at 14:18

## 2018-08-27 RX ADMIN — FLUTICASONE PROPIONATE 1 SPRAY: 50 SPRAY, METERED NASAL at 08:17

## 2018-08-27 RX ADMIN — CLONIDINE HYDROCHLORIDE 0.2 MG: 0.1 TABLET ORAL at 06:10

## 2018-08-27 RX ADMIN — NIFEDIPINE 90 MG: 60 TABLET, FILM COATED, EXTENDED RELEASE ORAL at 08:17

## 2018-08-27 RX ADMIN — PANTOPRAZOLE SODIUM 40 MG: 40 TABLET, DELAYED RELEASE ORAL at 06:10

## 2018-08-27 RX ADMIN — ENOXAPARIN SODIUM 40 MG: 40 INJECTION SUBCUTANEOUS at 08:15

## 2018-08-27 RX ADMIN — VITAMIN D, TAB 1000IU (100/BT) 1000 UNITS: 25 TAB at 08:17

## 2018-08-27 RX ADMIN — PREDNISONE 10 MG: 10 TABLET ORAL at 08:17

## 2018-08-27 RX ADMIN — CLONIDINE HYDROCHLORIDE 0.2 MG: 0.1 TABLET ORAL at 14:18

## 2018-08-27 RX ADMIN — ETHACRYNIC ACID 25 MG: 25 TABLET ORAL at 16:00

## 2018-08-27 RX ADMIN — LABETALOL HCL 200 MG: 200 TABLET, FILM COATED ORAL at 08:17

## 2018-08-27 RX ADMIN — HYDROMORPHONE HYDROCHLORIDE 0.5 MG: 1 INJECTION, SOLUTION INTRAMUSCULAR; INTRAVENOUS; SUBCUTANEOUS at 11:54

## 2018-08-27 RX ADMIN — GUAIFENESIN 600 MG: 600 TABLET, EXTENDED RELEASE ORAL at 08:17

## 2018-08-27 RX ADMIN — EPLERENONE 50 MG: 25 TABLET, FILM COATED ORAL at 08:16

## 2018-08-27 RX ADMIN — Medication 1000 MCG: at 08:17

## 2018-08-27 RX ADMIN — ASPIRIN 81 MG: 81 TABLET, FILM COATED ORAL at 08:16

## 2018-08-27 RX ADMIN — HYDROMORPHONE HYDROCHLORIDE 0.5 MG: 1 INJECTION, SOLUTION INTRAMUSCULAR; INTRAVENOUS; SUBCUTANEOUS at 06:10

## 2018-08-27 RX ADMIN — CLOPIDOGREL 75 MG: 75 TABLET, FILM COATED ORAL at 08:17

## 2018-08-27 RX ADMIN — DOCUSATE SODIUM 100 MG: 100 CAPSULE, LIQUID FILLED ORAL at 08:17

## 2018-08-27 RX ADMIN — ONDANSETRON 8 MG: 4 TABLET, ORALLY DISINTEGRATING ORAL at 10:48

## 2018-08-27 RX ADMIN — HYDROMORPHONE HYDROCHLORIDE 0.5 MG: 1 INJECTION, SOLUTION INTRAMUSCULAR; INTRAVENOUS; SUBCUTANEOUS at 18:07

## 2018-08-27 RX ADMIN — HYDROMORPHONE HYDROCHLORIDE 0.5 MG: 1 INJECTION, SOLUTION INTRAMUSCULAR; INTRAVENOUS; SUBCUTANEOUS at 02:52

## 2018-08-27 NOTE — PROGRESS NOTES
[facility]  Cardiology Progress Note    Patient Identification:  Name: Sasha Barrett  Age: 78 y.o.  Sex: female  :  1940  MRN: [id number]                 Follow Up / Chief Complaint: Uncontrolled hypertension    Interval History:   78-year-old white female with no known history of CAD but long-standing history of hypertension who presents with an intractable headache and UTI.  Defer decision regarding renal artery angiogram to her primay Nephrologist, Dr Meléndez. In the meantime, adjustments made to BP meds     Subjective:    No shortness of breath    Objective:  SR 60's-70's, no ectopy  BP 140s-160s/ 59-83  Afebrile  Stat >93% on room air      Past Medical History:  Past Medical History:   Diagnosis Date   • Arthritis    • Asthma    • Cancer (CMS/HCC)    • COPD (chronic obstructive pulmonary disease) (CMS/HCC)    • Emphysema lung (CMS/HCC)    • Generalized headaches    • Hayfever    • Hypertension    • Stroke (CMS/HCC)      Past Surgical History:  Past Surgical History:   Procedure Laterality Date   • BREAST LUMPECTOMY Left 2003   • CARPAL TUNNEL RELEASE     • CATARACT EXTRACTION     • HERNIA REPAIR  1971   • HERNIA REPAIR      right groin   • JOINT REPLACEMENT     • KNEE SURGERY Left 1998    bilateral knee replacements   • MASTECTOMY     • PARATHYROIDECTOMY  2006   • SINUS SURGERY  1984   • TOTAL ABDOMINAL HYSTERECTOMY  1973        Social History:   Social History   Substance Use Topics   • Smoking status: Never Smoker   • Smokeless tobacco: Never Used   • Alcohol use Yes      Comment: rarely - once or twice year (wine or bernabe)      Family History:  Family History   Problem Relation Age of Onset   • Cancer Brother           Allergies:  Allergies   Allergen Reactions   • Morphine Anaphylaxis   • Sulfa Antibiotics Anaphylaxis and Hives     Or sulfa fillers in meds   • Hydralazine Myalgia     Patient reports leg cramps, joint pain and increased  fatigue   • Amlodipine      KIDNEYS SHUT DOWN   • Anastrozole    • Grass    • Nsaids      KIDNEY FAILURE   • Penicillins      Tolerated ceftriaxone during 04/2017 admission   • Tree Extract    • Zolpidem      HALLUCINATIONS     Scheduled Meds:    aspirin 81 mg Daily   bupivacaine (PF) 2 mL Once   cholecalciferol 1,000 Units Daily   CloNIDine 0.2 mg Q8H   clopidogrel 75 mg Daily   docusate sodium 100 mg Daily   enoxaparin 40 mg Q24H   eplerenone 50 mg Q12H   ethacrynic acid 25 mg TID   fluticasone 1 spray BID   guaiFENesin 600 mg Q12H   labetalol 200 mg Q12H   lidocaine PF 1% 2 mL Once   losartan-HCTZ (HYZAAR) 100-12.5 combo dose  Daily   methylPREDNISolone acetate 80 mg Once   montelukast 10 mg Nightly   NIFEdipine XL 90 mg Q24H   pantoprazole 40 mg Q AM   predniSONE 10 mg Every Other Day   cyanocobalamin 1,000 mcg Daily           INTAKE AND OUTPUT:    Intake/Output Summary (Last 24 hours) at 08/27/18 1126  Last data filed at 08/27/18 0301   Gross per 24 hour   Intake                0 ml   Output             1100 ml   Net            -1100 ml       Review of Systems:   GI:  no abdominal pain  Cardiac:  No chest pain or tightness  Pulmonary: No shortness of breath or cough    Constitutional:  Temp:  [97.2 °F (36.2 °C)-98.5 °F (36.9 °C)] 97.2 °F (36.2 °C)  Heart Rate:  [64-77] 64  Resp:  [16-18] 18  BP: (140-167)/(53-83) 146/59    Physical Exam:  General:  Appears in no acute distress  Eyes: PERTL,  HEENT:  No JVD. Thyroid not visibly enlarged. No mucosal pallor or cyanosis  Respiratory: Respirations regular and unlabored at rest. BBS with good air entry in all fields. No crackles, rubs or wheezes auscultated  Cardiovascular: S1S2 Regular rate and rhythm. No murmur, rub or gallop. No carotid bruits. DP/PT pulses     . No pretibial pitting edema  Gastrointestinal: Abdomen soft,  non tender. Bowel sounds present.  Musculoskeletal: OLSEN x4. No abnormal movements  Extremities: No digital clubbing or cyanosis  Skin: Color  "pink. Skin warm and dry to touch.    Neuro: AAO x3 CN II-XII grossly intact  Psych: Mood and affect normal, pleasant and cooperative          Cardiographics  Telemetry: SR 60's, no ectopy    Echocardiogram:   · Calculated EF = 78%.  · Left ventricular systolic function is normal.  · Left ventricular diastolic dysfunction (grade I) consistent with impaired relaxation.  · Mild tricuspid valve regurgitation is present.  · Estimated right ventricular systolic pressure from tricuspid regurgitation is normal (<35 mmHg).    Lab Review     Results from last 7 days  Lab Units 08/23/18  1824 08/23/18  1157 08/23/18  0546   TROPONIN T ng/mL <0.010 <0.010 <0.010       Results from last 7 days  Lab Units 08/27/18  0607   SODIUM mmol/L 138   POTASSIUM mmol/L 3.3*   BUN mg/dL 22   CREATININE mg/dL 0.92   CALCIUM mg/dL 9.4       Results from last 7 days  Lab Units 08/27/18  0607 08/26/18  0554 08/25/18  0628   WBC 10*3/mm3 8.31 8.19 10.12   HEMOGLOBIN g/dL 13.1 12.9 13.5   HEMATOCRIT % 42.9 40.4 42.6   PLATELETS 10*3/mm3 187 191 190             Assessment:  - uncontrolled HTN  - EF >75%, mild TR  - intractable headache  - UTI  - CKD 3     - COPD / RAD  - OA / DJD  - h/o CVA  - h/o Breast Ca - bilateral masectomies      Plan:  - uncontrolled HTN -  improved control on current doses of Catapres, Labetalol, Procardia, losartan and Edecrin    - EF >75% - gr I morrell dysfx, mild TR    No angina.  MI ruled out. Anticipating discharge soon. Needs follow up appt with Dr Argueta 2-3 weeks for outpatient stress testing      Labs/tests ordered: outpt stress test, follow up appt      )8/27/2018  Jeffrey Argueta MD      EMR Dragon/Transcription:   \"Dictated utilizing Dragon dictation\".     "

## 2018-08-27 NOTE — CONSULTS
Inpatient Initial Visit      Patient: Sasha Barrett    Date of Admission: 8/22/2018  4:02 PM    YOB: 1940    Medical Record Number: [id number]    Attending Physician: Óscar Cadet MD  Consulting Physician: AC Chamberlain      Chief Complaints: Acute UTI [N39.0]  Uncontrolled hypertension [I10]  Acute intractable headache, unspecified headache type [R51]  Uncontrolled hypertension [I10]      History of Present Illness: 78 y.o. female admitted to [facility] with Acute UTI [N39.0]  Uncontrolled hypertension [I10]  Acute intractable headache, unspecified headache type [R51]  Uncontrolled hypertension [I10]. I was requested to advise on her chronic right shoulder pain. She has been a patient of [facility] and has had cortisone inj in the past that have helped with this pain. Pain is flared with hospital admission in the right neck and shoulder, with aching and muscle spasms not relieved by heat and ice, and Dr. Cadet requested consultation for pain.       Allergies:   Allergies   Allergen Reactions   • Morphine Anaphylaxis   • Sulfa Antibiotics Anaphylaxis and Hives     Or sulfa fillers in meds   • Hydralazine Myalgia     Patient reports leg cramps, joint pain and increased fatigue   • Amlodipine      KIDNEYS SHUT DOWN   • Anastrozole    • Grass    • Nsaids      KIDNEY FAILURE   • Penicillins      Tolerated ceftriaxone during 04/2017 admission   • Tree Extract    • Zolpidem      HALLUCINATIONS       Medications:   Home Medications:  Prescriptions Prior to Admission   Medication Sig Dispense Refill Last Dose   • albuterol (PROAIR HFA) 108 (90 Base) MCG/ACT inhaler ProAir  (90 Base) MCG/ACT Inhalation Aerosol Solution; Patient Sig: ProAir  (90 Base) MCG/ACT Inhalation Aerosol Solution ; 8; 0; 18-Jun-2014; Active   8/22/2018 at Unknown time   • aspirin 81 MG tablet Take 81 mg by mouth Daily.   8/21/2018 at Unknown time   • butalbital-acetaminophen-caffeine (FIORICET,  ESGIC) -40 MG per tablet Take 2 tablets by mouth Every 4 (Four) Hours As Needed for Headache. (Patient taking differently: Take 1 tablet by mouth Every 4 (Four) Hours As Needed for Headache.) 240 tablet 0 8/22/2018 at Unknown time   • CloNIDine (CATAPRES) 0.2 MG tablet Take 1 tablet by mouth Every 8 (Eight) Hours. 270 tablet 3 8/22/2018 at Unknown time   • diphenhydrAMINE (BENADRYL) 50 MG tablet Take 1 tablet by mouth every 4 (four) hours as needed for itching or allergies. 180 tablet 0 8/22/2018 at Unknown time   • eplerenone (INSPRA) 50 MG tablet Take 1 tablet by mouth Every 12 (Twelve) Hours. 180 tablet 3 8/22/2018 at Unknown time   • ethacrynic acid (EDECRIN) 25 MG tablet Take 25 mg by mouth 3 (Three) Times a Day.   8/22/2018 at Unknown time   • montelukast (SINGULAIR) 10 MG tablet Take 1 tablet by mouth every night. 90 tablet 1 8/21/2018 at Unknown time   • NIFEdipine XL (PROCARDIA XL) 60 MG 24 hr tablet TAKE ONE TABLET TWICE DAILY 180 tablet 0 8/22/2018 at Unknown time   • omeprazole (priLOSEC) 40 MG capsule TAKE ONE CAPSULE BY MOUTH EVERY DAY (Patient taking differently: TAKE ONE CAPSULE BY [facility] daily PRN) 90 capsule 0 Taking   • ondansetron ODT (ZOFRAN-ODT) 8 MG disintegrating tablet Take 1 tablet by mouth every 6 (six) hours as needed for nausea or vomiting. 360 tablet 1 Taking   • pentazocine-naloxone (TALWIN NX) 50-0.5 MG per tablet Take 2 tablets by mouth Every 4 (Four) Hours As Needed for Mild Pain (1-3) (for up to 90 days). (Patient taking differently: Take 1 tablet by mouth Every 4 (Four) Hours As Needed for Mild Pain (1-3) (for up to 90 days).) 1080 tablet 0 8/22/2018 at Unknown time   • polyethylene glycol (MIRALAX) powder Take one capful w/liquid daily 765 g 1 8/22/2018 at Unknown time   • promethazine-codeine (PHENERGAN with CODEINE) 6.25-10 MG/5ML syrup Take 5 mL by mouth 4 (Four) Times a Day As Needed for cough. 360 mL 0 Taking   • Cholecalciferol (VITAMIN D) 1000 UNITS tablet Take  by  mouth.   Taking   • guaiFENesin (MUCINEX) 600 MG 12 hr tablet Take 1 tablet by mouth Every 12 (Twelve) Hours. 60 tablet 5    • LORazepam (ATIVAN) 0.5 MG tablet Take 1 tablet by mouth Every 8 (Eight) Hours As Needed for anxiety. Take 1 tab 1 hoour before MRI and repeat x 1 at time of test 60 tablet 0 Taking   • meclizine (ANTIVERT) 25 MG tablet TAKE ONE TABLET BY MOUTH THREE TIMES DAILY AS NEEDED FOR DIZZINESS 30 tablet 0 Taking   • nystatin (MYCOSTATIN) 721994 UNIT/ML suspension Take 5 mL by mouth 4 (Four) Times a Day. 60 mL 3    • vitamin B-12 (VITAMIN B-12) 1000 MCG tablet Take 1 tablet by mouth Daily. 90 tablet 3 Taking       Current Medications:  Scheduled Meds:  aspirin 81 mg Oral Daily   bupivacaine (PF) 2 mL Injection Once   cholecalciferol 1,000 Units Oral Daily   CloNIDine 0.2 mg Oral Q8H   clopidogrel 75 mg Oral Daily   docusate sodium 100 mg Oral Daily   enoxaparin 40 mg Subcutaneous Q24H   eplerenone 50 mg Oral Q12H   ethacrynic acid 25 mg Oral TID   fluticasone 1 spray Each Nare BID   guaiFENesin 600 mg Oral Q12H   labetalol 200 mg Oral Q12H   lidocaine PF 1% 2 mL Injection Once   losartan-HCTZ (HYZAAR) 100-12.5 combo dose  Oral Daily   methylPREDNISolone acetate 80 mg Intra-articular Once   montelukast 10 mg Oral Nightly   NIFEdipine XL 90 mg Oral Q24H   pantoprazole 40 mg Oral Q AM   predniSONE 10 mg Oral Every Other Day   cyanocobalamin 1,000 mcg Oral Daily     Continuous Infusions:  Pharmacy to Dose enoxaparin (LOVENOX)      PRN Meds:.•  acetaminophen  •  albuterol  •  butalbital-acetaminophen-caffeine  •  diphenhydrAMINE  •  diphenhydrAMINE  •  EPINEPHrine PF  •  HYDROmorphone  •  LORazepam  •  meclizine  •  nitroglycerin  •  ondansetron ODT  •  pentazocine-naloxone  •  Pharmacy to Dose enoxaparin (LOVENOX)  •  polyethylene glycol  •  promethazine  •  promethazine-codeine  •  sodium chloride  •  Insert peripheral IV **AND** sodium chloride    I have reviewed the patient's medical history in detail  "and updated the computerized patient record.  Review and summarization of old records include:    Past Medical History:   Diagnosis Date   • Arthritis    • Asthma    • Cancer (CMS/HCC)    • COPD (chronic obstructive pulmonary disease) (CMS/HCC)    • Emphysema lung (CMS/HCC)    • Generalized headaches    • Hayfever    • Hypertension    • Stroke (CMS/HCC)         Past Surgical History:   Procedure Laterality Date   • BREAST LUMPECTOMY Left 04/01/2003   • CARPAL TUNNEL RELEASE  2011   • CATARACT EXTRACTION  2010   • HERNIA REPAIR  01/01/1971   • HERNIA REPAIR      right groin   • JOINT REPLACEMENT     • KNEE SURGERY Left 01/01/1998    bilateral knee replacements   • MASTECTOMY     • PARATHYROIDECTOMY  05/08/2006   • SINUS SURGERY  01/01/1984   • TOTAL ABDOMINAL HYSTERECTOMY  01/01/1973        Social History     Occupational History   • Not on file.     Social History Main Topics   • Smoking status: Never Smoker   • Smokeless tobacco: Never Used   • Alcohol use Yes      Comment: rarely - once or twice year (wine or bernabe)   • Drug use: Unknown   • Sexual activity: Yes     Partners: Male    Social History     Social History Narrative   • No narrative on file        Family History   Problem Relation Age of Onset   • Cancer Brother          ROS: 14 point review of systems was performed and was negative except for documented findings in HPI and today's note.     Physical Exam:  78 y.o. y.o. female.     Wt Readings from Last 3 Encounters:   08/27/18 90.6 kg (199 lb 11.8 oz)   12/29/17 81.1 kg (178 lb 14.4 oz)   12/26/17 81.6 kg (180 lb)     Ht Readings from Last 3 Encounters:   08/22/18 157.5 cm (62\")   12/26/17 160 cm (63\")   12/26/17 160 cm (63\")     Body mass index is 36.53 kg/m².  Facility age limit for growth percentiles is 20 years.  .   Vitals:    08/27/18 0012 08/27/18 0610 08/27/18 0623 08/27/18 0740   BP: 140/53 158/72  146/59   BP Location: Right arm Right arm  Right arm   Patient Position: Lying Lying  Lying "   Pulse: 69 71  64   Resp: 16   18   Temp: 98.2 °F (36.8 °C)   97.2 °F (36.2 °C)   TempSrc: Oral   Oral   SpO2: 96%   94%   Weight:   90.6 kg (199 lb 11.8 oz)    Height:           Vital signs reviewed.   General: Alert, cooperative, in no acute distress   Eyes: conjunctiva clear  ENT: external ears and nose atraumatic  CV: no peripheral edema  Resp: normal respiratory effort  Skin: no rashes or wounds; normal turgor  Psych: mood and affect appropriate  Lymph: no nodes appreciated  Neuro: gross sensation intact  Vascular:  Palpable peripheral pulse in noted extremity  Musculoskeletal Extremities: Right shoulder and neck stiffness, difficult with rom and crepitation, swelling over ac joint with ac joint and bursal tenderness and generailzed joint tenderness increased in the glenohumeral area, no redness or warmth, distal pulse and n/v intact.           Diagnostic Tests:     Admission on 08/22/2018   No results displayed because visit has over 200 results.          Imaging Results (last 72 hours)     Procedure Component Value Units Date/Time    XR Spine Cervical Complete With Flex Ext [[id number]] Collected:  08/25/18 2044     Updated:  08/26/18 1324    Narrative:       XR SPINE CERVICAL COMPLETE WITH FLEX EXT-, XR SHOULDER 2+ VIEW RIGHT-     Clinical: Patient states right side neck pain for 3 days. No known  injury.     Cervical spine correlation 06/04/2012.     CERVICAL SPINE FINDINGS: The odontoid is preserved with satisfactory  C1-2 alignment. No instability nor subluxation with flexion/extension.  Prevertebral soft tissues are normal. There is facet hypertrophy. There  is multilevel spur formation. Disc space narrowing demonstrated at C4-5  and C6-7. Neuroforamina are patent bilaterally.     CONCLUSION: Degenerative change as described above. No acute osseous  abnormality. No instability or subluxation with flexion/extension.     RIGHT SHOULDER FINDINGS: There is substantial acromioclavicular joint  degeneration  and bone hypertrophy. There is mild to moderate  glenohumeral joint degeneration. No dislocation or fracture. The  adjacent ribs and overlying soft tissues are satisfactory in appearance.     CONCLUSION: Degenerative change as described above.     This report was finalized on 8/26/2018 1:21 PM by Dr. Wojciech Bolanos M.D.       XR Shoulder 2+ View Right [[id number]] Collected:  08/25/18 2044     Updated:  08/26/18 1324    Narrative:       XR SPINE CERVICAL COMPLETE WITH FLEX EXT-, XR SHOULDER 2+ VIEW RIGHT-     Clinical: Patient states right side neck pain for 3 days. No known  injury.     Cervical spine correlation 06/04/2012.     CERVICAL SPINE FINDINGS: The odontoid is preserved with satisfactory  C1-2 alignment. No instability nor subluxation with flexion/extension.  Prevertebral soft tissues are normal. There is facet hypertrophy. There  is multilevel spur formation. Disc space narrowing demonstrated at C4-5  and C6-7. Neuroforamina are patent bilaterally.     CONCLUSION: Degenerative change as described above. No acute osseous  abnormality. No instability or subluxation with flexion/extension.     RIGHT SHOULDER FINDINGS: There is substantial acromioclavicular joint  degeneration and bone hypertrophy. There is mild to moderate  glenohumeral joint degeneration. No dislocation or fracture. The  adjacent ribs and overlying soft tissues are satisfactory in appearance.     CONCLUSION: Degenerative change as described above.     This report was finalized on 8/26/2018 1:21 PM by Dr. Wojciech Bolanos M.D.       CT Angiogram Carotids [[id number]] Collected:  08/24/18 1843     Updated:  08/24/18 1859    Narrative:       CT ANGIOGRAM CAROTIDS-, CT ANGIOGRAM HEAD W CONTRAST-     CLINICAL HISTORY: Follow-up nonvisualized left posterior cerebral artery  on MRA. Evaluate for possible tiny left internal carotid artery aneurysm  on MRA..     TECHNIQUE: Transverse 3 mm thick images were acquired from the base of  the skull to the  vertex without IV contrast. Subsequently, spiral CT  images were obtained through the neck and head during rapid IV injection  of contrast and were reconstructed in 1 mm thick slices. Multiple  coronal and sagittal and 3-D reconstructions were obtained.     Radiation dose reduction techniques were utilized, including automated  exposure control and exposure modulation based on body size.     COMPARISON: MRA of the head dated 8/20/2018.     FINDINGS: The precontrast images demonstrate no evidence of recent or  old infarct or hemorrhage. There is patchy ill-defined diminished  attenuation in the white matter of both cerebral hemispheres that is  compatible with sequela of moderate small vessel chronic ischemic  change.     There is normal branching of the great vessels from the aortic arch that  appear widely patent without NASCET significant stenosis. No stenoses  are identified in either common or external or internal carotid artery  in the neck. The left vertebral artery is dominant. Both vertebral  arteries are widely patent throughout the course in the neck and head.  As shown on the MRA, the left posterior cerebral artery is absent. It  may be chronically occluded or congenitally absent. This is likely  congenital since postcontrast images of the brain parenchyma show no  enhancing lesions. No left PCA territory infarct is identified. No  intracranial stenoses are identified. Minimal calcified atherosclerotic  plaque within the intracranial segments of both internal carotid  arteries results in no luminal narrowing. There are no aneurysms.       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 Otherwise unremarkable CT angiography imaging of the neck and  head. No aneurysms were identified.     This report was finalized on 8/24/2018 6:56 PM by Dr. Estevan Oliver M.D.       CT Angiogram Head With Contrast [[id number]] Collected:  08/24/18 1843     Updated:  08/24/18 3532     Narrative:       CT ANGIOGRAM CAROTIDS-, CT ANGIOGRAM HEAD W CONTRAST-     CLINICAL HISTORY: Follow-up nonvisualized left posterior cerebral artery  on MRA. Evaluate for possible tiny left internal carotid artery aneurysm  on MRA..     TECHNIQUE: Transverse 3 mm thick images were acquired from the base of  the skull to the vertex without IV contrast. Subsequently, spiral CT  images were obtained through the neck and head during rapid IV injection  of contrast and were reconstructed in 1 mm thick slices. Multiple  coronal and sagittal and 3-D reconstructions were obtained.     Radiation dose reduction techniques were utilized, including automated  exposure control and exposure modulation based on body size.     COMPARISON: MRA of the head dated 8/20/2018.     FINDINGS: The precontrast images demonstrate no evidence of recent or  old infarct or hemorrhage. There is patchy ill-defined diminished  attenuation in the white matter of both cerebral hemispheres that is  compatible with sequela of moderate small vessel chronic ischemic  change.     There is normal branching of the great vessels from the aortic arch that  appear widely patent without NASCET significant stenosis. No stenoses  are identified in either common or external or internal carotid artery  in the neck. The left vertebral artery is dominant. Both vertebral  arteries are widely patent throughout the course in the neck and head.  As shown on the MRA, the left posterior cerebral artery is absent. It  may be chronically occluded or congenitally absent. This is likely  congenital since postcontrast images of the brain parenchyma show no  enhancing lesions. No left PCA territory infarct is identified. No  intracranial stenoses are identified. Minimal calcified atherosclerotic  plaque within the intracranial segments of both internal carotid  arteries results in no luminal narrowing. There are no aneurysms.       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 Otherwise unremarkable CT angiography imaging of the neck and  head. No aneurysms were identified.     This report was finalized on 8/24/2018 6:56 PM by Dr. Estevan Oliver M.D.       CT Head Without Contrast [[id number]] Collected:  08/22/18 1705     Updated:  08/24/18 1406    Narrative:       CT OF THE BRAIN WITHOUT CONTRAST 08/22/2018     HISTORY: Headache.     Axial images were obtained through the brain without intravenous  contrast. There is mild diffuse atrophy. Mild decreased attenuation of  the periventricular white matter is seen bilaterally consistent with  mild small vessel white matter ischemic disease.     There is no evidence of acute infarction, hemorrhage, midline shift or  mass effect.     No bony abnormalities are seen.       Impression:       No acute process identified.           Radiation dose reduction techniques were utilized, including automated  exposure control and exposure modulation based on body size.     This report was finalized on 8/24/2018 2:02 PM by Dr. Denzel Andujar M.D.       CT Chest With Contrast [[id number]] Collected:  08/23/18 1025     Updated:  08/24/18 1225    Narrative:       CT CHEST, ABDOMEN AND PELVIS WITH CONTRAST     HISTORY: Persistent cough, nausea. Accelerated hypertension.     TECHNIQUE: Axial CT images of the chest abdomen and pelvis were obtained  following administration of intravenous and oral contrast. Coronal and  sagittal reconstructions were then obtained.     COMPARISON: CT chest from 12/26/2017, CT abdomen and pelvis from  10/26/2017     FINDINGS:     CT CHEST: The visualized thyroid gland is normal. The heart and the  great vessels are normal. No pleural or pericardial effusion is seen. No  pathological mediastinal lymphadenopathy. The central airways are patent  without endobronchial lesion. Linear scarring is seen within the right  lower lobe. No new suspicious pulmonary nodules or focal airspace  disease is  present. Bilateral axillary lymph node dissection has been  performed.     CT ABDOMEN AND PELVIS: Liver demonstrates normal attenuation. Small  focal hepatic dome lesion is too small to accurately characterize  however favored to represent a simple cyst. The gallbladder, spleen and  the pancreas is normal. Bilateral adrenal glands are normal. Both  kidneys are normal in size and attenuation. The urinary bladder is  partially distended and normal. The uterus is not identified and is  likely surgically absent. Colonic diverticulosis is present. The  appendix is normal. No ascites is seen. No pathological retroperitoneal  lymphadenopathy. L4-5 degenerative disc disease with vacuum disc  phenomena.       Impression:       1. No acute abnormality within the chest, abdomen or pelvis.  2. Colonic diverticulosis.     Radiation dose reduction techniques were utilized, including automated  exposure control and exposure modulation based on body size.     This report was finalized on 8/24/2018 12:22 PM by Dr. Ester Schmidt M.D.       CT Abdomen Pelvis With Contrast [[id number]] Collected:  08/23/18 1025     Updated:  08/24/18 1225    Narrative:       CT CHEST, ABDOMEN AND PELVIS WITH CONTRAST     HISTORY: Persistent cough, nausea. Accelerated hypertension.     TECHNIQUE: Axial CT images of the chest abdomen and pelvis were obtained  following administration of intravenous and oral contrast. Coronal and  sagittal reconstructions were then obtained.     COMPARISON: CT chest from 12/26/2017, CT abdomen and pelvis from  10/26/2017     FINDINGS:     CT CHEST: The visualized thyroid gland is normal. The heart and the  great vessels are normal. No pleural or pericardial effusion is seen. No  pathological mediastinal lymphadenopathy. The central airways are patent  without endobronchial lesion. Linear scarring is seen within the right  lower lobe. No new suspicious pulmonary nodules or focal airspace  disease is present. Bilateral  axillary lymph node dissection has been  performed.     CT ABDOMEN AND PELVIS: Liver demonstrates normal attenuation. Small  focal hepatic dome lesion is too small to accurately characterize  however favored to represent a simple cyst. The gallbladder, spleen and  the pancreas is normal. Bilateral adrenal glands are normal. Both  kidneys are normal in size and attenuation. The urinary bladder is  partially distended and normal. The uterus is not identified and is  likely surgically absent. Colonic diverticulosis is present. The  appendix is normal. No ascites is seen. No pathological retroperitoneal  lymphadenopathy. L4-5 degenerative disc disease with vacuum disc  phenomena.       Impression:       1. No acute abnormality within the chest, abdomen or pelvis.  2. Colonic diverticulosis.     Radiation dose reduction techniques were utilized, including automated  exposure control and exposure modulation based on body size.     This report was finalized on 8/24/2018 12:22 PM by Dr. Ester Schmidt M.D.             Personally viewed ortho images and report     Assessment:  Patient Active Problem List   Diagnosis   • Joint pain   • Urinary retention self-caths ([place])   • Sinusitis   • Conjunctivitis   • Arm pain   • Cervical disc displacement   • Cervical pain (Servando=PM)   • DDD (degenerative disc disease), cervical   • Hyperlipidemia   • Preventative health care   • Medication management   • Proctocele   • Urge incontinence of urine   • Chronic tension-type headache, intractable   • Obesity (BMI 30.0-34.9)   • H/o Lyme disease   • Stage 3 chronic kidney disease   • Severe Intracranial atherosclerosis (Neuro suggests Plavix and ASA (8/18)   • Allergic contact dermatitis   • Malaise and fatigue progressive   • Vertigo   • Vitamin D deficiency   • Moderate persistent asthmatic bronchitis without complication   • Anterior cervical adenopathy due to infection   • Pleurisy   • Knee pain, bilateral   • Elevated PTH level  "  • 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)   • Gastroesophageal reflux disease with esophagitis   • Psoriasis ([place]choa)   • Postmenopausal   • Recurrent UTI   • CVA (cerebrovascular accident) subacute   • Dyspnea on exertion   • Precordial pain   • Abnormal EKG   • Arthralgia   • Accelerated hypertension   • Acute joint pain   • Easy bruisability   • Cough productive of purulent sputum   • Influenza A Subtype H3   • Acute exacerbation of 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HCC)   • Acute chest wall pain   • Acute pain of left shoulder due to trauma   • Decreased functional mobility and endurance since fall 12/20/17   • Uncontrolled hypertension   • Fall from standing with head traumna   • 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       Shoulder Injection Procedure Note    Right shoulder glenohumeral joint injection was discussed with the patient in detail, including indication, risks, benefits, and alternatives. Verbal consent was given for the procedure.  Injection site was identified by physical examination and cleaned with Betadine and alcohol swabs. Sterile technique was used.  A 25-gauge, 1.5\" needle was directed to the joint from a(n) anterior approach. Injectate was passed into the acromioclavicular joint without difficulty. The needle was removed and a simple bandage was applied. The procedure was tolerated well without difficulty.    Injection mixture:  1% lidocaine without epinephrine: 2 mL  0.5% bupivacaine without epinephrine: 2 mL  Depo Medrol: 1ml        Plan:  Xr of right shoulder  2 views done for pain complaints, show significant arthritis in the ac and glenohumeral joints. There is probable cervical spine correlation of pain as well and should f/u as needed with neuro spine on this. The patient voiced understanding of the risks, benefits, and alternative forms of treatment that were discussed and the patient consents to proceed with cortisone inj of the " right shoulder. With f/u outpatient with her usual ortho provider in [facility].      Please send a copy of this report to referring physician.     Date: 8/27/2018    Charisse Lei, APRN

## 2018-08-27 NOTE — SIGNIFICANT NOTE
08/27/18 1326   Rehab Treatment   Discipline physical therapist   Reason Treatment Not Performed patient/family declined treatment  (Attempted to see twice. 1st attempt, pt requesting pain shot. 2nd attempt pt refused stating she has been up to the bathroom multiple times and doesn't feel like getting up.)

## 2018-08-27 NOTE — CONSULTS
Patient is a 78 y.o. female presents with chief complaint of chronic, intermitent, moderate, severe neck and right shoulder pain.  Onset of symptoms was gradual starting several years ago.  Symptoms are associated/aggravated by activity, exercise or lifting. Symptoms improve with relaxation, pain medication and injection.  She was admitted to [facility] on August 22 for evaluation and treatment of hypertension and headaches.  She suffered a fall on July 13 in which she fell backwards and hit the back of her head.  She has a long-standing history of migraines and cervical pain and is being followed by a pain management doctor at [facility].  Apparently, she has had multiple cervical injections and what appears to be trigger point injections with unsatisfactory results.  She has also undergone occipital injections for her headaches which made him worse.  Most of her pain is in her right trapezius area and is exacerbated by movement of her right shoulder.  Earlier today she underwent a steroid injection to her right shoulder.  She currently rates her pain as a 7/10 and describes the pain as aching in nature.    The following portions of the patients history were reviewed and updated as appropriate: current medications, allergies, past medical history, past surgical history, past family history, past social history and problem list                Objective     Past Medical History:   Past Medical History:   Diagnosis Date   • Arthritis    • Asthma    • Cancer (CMS/HCC)    • COPD (chronic obstructive pulmonary disease) (CMS/HCC)    • Emphysema lung (CMS/HCC)    • Generalized headaches    • Hayfever    • Hypertension    • Stroke (CMS/HCC)      Past Surgical History:   Past Surgical History:   Procedure Laterality Date   • BREAST LUMPECTOMY Left 04/01/2003   • CARPAL TUNNEL RELEASE  2011   • CATARACT EXTRACTION  2010   • HERNIA REPAIR  01/01/1971   • HERNIA REPAIR      right groin   • JOINT  "REPLACEMENT     • KNEE SURGERY Left 01/01/1998    bilateral knee replacements   • MASTECTOMY     • PARATHYROIDECTOMY  05/08/2006   • SINUS SURGERY  01/01/1984   • TOTAL ABDOMINAL HYSTERECTOMY  01/01/1973     Family History:   Family History   Problem Relation Age of Onset   • Cancer Brother      Social History:   Social History   Substance Use Topics   • Smoking status: Never Smoker   • Smokeless tobacco: Never Used   • Alcohol use Yes      Comment: rarely - once or twice year (wine or bernabe)       Vital Signs Range for the last 24 hours  Temperature: Temp:  [36.2 °C (97.2 °F)-36.9 °C (98.5 °F)] 36.8 °C (98.3 °F)   Temp Source: Temp src: Oral   BP: BP: (140-168)/(53-83) 168/72   Pulse: Heart Rate:  [64-77] 68   Respirations: Resp:  [16-18] 18   SPO2: SpO2:  [94 %-100 %] 100 %   O2 Amount (l/min):     O2 Devices Device (Oxygen Therapy): room air   Weight: Weight:  [90.6 kg (199 lb 11.8 oz)] 90.6 kg (199 lb 11.8 oz)     Flowsheet Rows      First Filed Value   Admission Height  157.5 cm (62\") Documented at 08/22/2018 1614   Admission Weight  83.9 kg (185 lb) Documented at 08/22/2018 1614          --------------------------------------------------------------------------------    Current Facility-Administered Medications   Medication Dose Route Frequency Provider Last Rate Last Dose   • acetaminophen (TYLENOL) tablet 650 mg  650 mg Oral Q4H PRN Óscar Cadet MD   650 mg at 08/24/18 0121   • albuterol (PROVENTIL) nebulizer solution 0.083% 2.5 mg/3mL  2.5 mg Nebulization Q4H PRN Óscar Cadet MD       • aspirin EC tablet 81 mg  81 mg Oral Daily Óscar Cadet MD   81 mg at 08/27/18 0816   • butalbital-acetaminophen-caffeine (FIORICET, ESGIC) -40 MG per tablet 1 tablet  1 tablet Oral Q6H PRN Christina Robledo APRN   1 tablet at 08/26/18 0511   • cholecalciferol (VITAMIN D3) tablet 1,000 Units  1,000 Units Oral Daily Óscar Cadet MD   1,000 Units at 08/27/18 0817   • CloNIDine (CATAPRES) tablet 0.2 mg "  0.2 mg Oral Q8H Negro Rios MD   0.2 mg at 08/27/18 1418   • clopidogrel (PLAVIX) tablet 75 mg  75 mg Oral Daily Casandra Kilgore APRN   75 mg at 08/27/18 0817   • diphenhydrAMINE (BENADRYL) 12.5 MG/5ML elixir 25 mg  25 mg Oral Q4H PRN Óscar Cadet MD       • diphenhydrAMINE (BENADRYL) injection 25 mg  25 mg Intravenous Q6H PRN Óscar Cadet MD   25 mg at 08/23/18 1812   • docusate sodium (COLACE) capsule 100 mg  100 mg Oral Daily Óscar Cadet MD   100 mg at 08/27/18 0817   • enoxaparin (LOVENOX) syringe 40 mg  40 mg Subcutaneous Q24H Óscar Cadet MD   40 mg at 08/27/18 0815   • EPINEPHrine PF (ADRENALIN) injection 0.3 mg  0.3 mg Intramuscular BID PRN Óscar Cadet MD       • eplerenone (INSPRA) tablet 50 mg  50 mg Oral Q12H Óscar Cadet MD   50 mg at 08/27/18 0816   • ethacrynic acid (EDECRIN) tablet 25 mg  25 mg Oral TID David Call MD   25 mg at 08/27/18 0818   • fluticasone (FLONASE) 50 MCG/ACT nasal spray 1 spray  1 spray Each Nare BID Óscar Cadet MD   1 spray at 08/27/18 0817   • guaiFENesin (MUCINEX) 12 hr tablet 600 mg  600 mg Oral Q12H Óscar Cadet MD   600 mg at 08/27/18 0817   • HYDROmorphone (DILAUDID) injection 0.5 mg  0.5 mg Intravenous Q3H PRN Óscar Cadet MD   0.5 mg at 08/27/18 1154   • labetalol (NORMODYNE) tablet 200 mg  200 mg Oral Q12H Cleo Padron APRN   200 mg at 08/27/18 0817   • LORazepam (ATIVAN) tablet 0.5 mg  0.5 mg Oral Q8H PRN Óscar Cadet MD   0.5 mg at 08/23/18 1415   • losartan (COZAAR) 100 mg, hydrochlorothiazide (HYDRODIURIL) 12.5 mg for HYZAAR 100-12.5   Oral Daily David Call MD       • meclizine (ANTIVERT) tablet 25 mg  25 mg Oral TID PRN Óscar Cadet MD       • montelukast (SINGULAIR) tablet 10 mg  10 mg Oral Nightly Óscar Cadet MD   10 mg at 08/26/18 2051   • NIFEdipine XL (PROCARDIA XL) 24 hr tablet 90 mg  90 mg Oral Q24H Jackie Montiel APRN   90 mg at 08/27/18 0817   •  nitroglycerin (NITROSTAT) SL tablet 0.4 mg  0.4 mg Sublingual Q5 Min PRN Óscar Cadet MD       • ondansetron ODT (ZOFRAN-ODT) disintegrating tablet 8 mg  8 mg Oral Q6H PRN Óscar Cadet MD   8 mg at 08/27/18 1048   • pantoprazole (PROTONIX) EC tablet 40 mg  40 mg Oral Q AM Óscar Cadet MD   40 mg at 08/27/18 0610   • pentazocine-naloxone (TALWIN NX) 50-0.5 MG per tablet 1 tablet  1 tablet Oral Q4H PRN Óscar Cadet MD   1 tablet at 08/25/18 0830   • Pharmacy to Dose enoxaparin (LOVENOX)   Does not apply Continuous PRN Óscar Cadet MD       • polyethylene glycol (MIRALAX) powder 17 g  17 g Oral Daily PRN Óscar Cadet MD       • predniSONE (DELTASONE) tablet 10 mg  10 mg Oral Every Other Day Óscar Cadet MD   10 mg at 08/27/18 0817   • promethazine (PHENERGAN) injection 12.5 mg  12.5 mg Intravenous Q3H PRN Óscar Cadet MD   12.5 mg at 08/22/18 2225   • promethazine-codeine (PHENERGAN with CODEINE) 6.25-10 MG/5ML syrup 5 mL  5 mL Oral 4x Daily PRN Óscar Cadet MD       • sodium chloride 0.9 % flush 1-10 mL  1-10 mL Intravenous PRN Óscar Cadet MD       • sodium chloride 0.9 % flush 10 mL  10 mL Intravenous PRN Crystal Tejada APRN       • vitamin B-12 (CYANOCOBALAMIN) tablet 1,000 mcg  1,000 mcg Oral Daily Óscar Cadet MD   1,000 mcg at 08/27/18 0817       --------------------------------------------------------------------------------  Assessment/Plan      Anesthesia Evaluation     Patient summary reviewed and Nursing notes reviewed   NPO Solid Status: > 8 hours  NPO Liquid Status: > 2 hours           Airway   Mallampati: II  TM distance: >3 FB  Neck ROM: full  Dental - normal exam     Pulmonary - normal exam    breath sounds clear to auscultation  (+) COPD, asthma,   Cardiovascular - normal exam    Rhythm: regular  Rate: normal    (+) hypertension, COELLO, PVD, hyperlipidemia,   (-) angina, orthopnea, PND      Neuro/Psych  (+) CVA, headaches, dizziness/light  headedness (Vertigo),     (-) left straight leg raise test, right straight leg raise test  GI/Hepatic/Renal/Endo    (+) obesity,  GERD,  renal disease (Stage 3 chronic kidney disease) CRI,     Musculoskeletal     (+) chronic pain, neck pain,   Abdominal  - normal exam    Abdomen: soft.  Bowel sounds: normal.   Substance History - negative use     OB/GYN negative ob/gyn ROS         Other   (+) arthritis   history of cancer (Malignant neoplasm of left breast )        Phys Exam Other:   NECK:  Supple without adenopathy, JVD or bruits.    EXTREMITIES:  There is no clubbing, cyanosis or edema.  Radial pulses are 1+ and equal bilaterally.  Examination of the cervical spine shows no marked tenderness or discoloration.    SKIN:  Warm and dry.    NEUROLOGICAL EXAM:  Alert and oriented ×3.  Extraocular muscles intact.  Strength is normal and symmetrical in the upper extremities with respect to biceps, triceps,  strength and shoulder shrug.           Diagnosis and Plan    Treatment Plan  ASA 3     Anesthetic plan and risks discussed with patient.          Diagnosis:  Degeneration of cervical intervertebral disc vs shoulder impingement    I believe that the pain in her shoulder area is originating from the shoulder joint itself is exacerbated by movement of the shoulder.  She does have some degenerative disc disease in her cervical spine and is [facility].  Since she just recently received a steroid injection to her right shoulder, we should give this some time to take effect, and hopefully it will relieve relieve her symptoms.  If her right shoulder pain persists, she might be a candidate for cervical epidural steroid injections versus trigger point injections to her right trapezius muscle.  Of course, she would need to be off of her anticoagulation for 7 days prior to the injection.

## 2018-08-27 NOTE — PROGRESS NOTES
[facility]  INTERNAL MEDICINE  ÓSCAR CADET MD  [address] [place] [address]  Phone [phone] Fax [phone]  E-mail:  [email or web address]      INTERNAL MEDICINE DAILY PROGRESS NOTE  Óscar Cadet M.D.  2018            Patient Identification:  Name: Sasha Barrett  Age: 78 y.o.  Sex: female  :  1940  MRN: [id number]         Primary Care Physician: Óscar Cadet MD  LENGTH OF STAY 4 DAYS    Consults     Date and Time Order Name Status Description    2018 0127 Inpatient Orthopedic Surgery Consult      2018 0105 Inpatient Cardiology Consult Completed     2018 0050 Inpatient Urology Consult Completed     2018 0020 Inpatient Consult to Neurology Completed     2018 0020 Inpatient Consult to Nephrology      2018 193 Nephrology (on -call MD unless specified) Completed     2018 191 Family Medicine Consult Completed                  Chief Complaint:  Accelerated hypertension with intractable headache     History of Present Illness:     Subjective         Interval History: Patient is a 78 y.o.female who presented with intractable posterior headache to [facility] emergency room on 2018 5:20 PM.  Patient reports that the headache is actually been present for more than 6 days and in conversation with me indicates that it really started 2018 when she fell from a standing position and struck the back side of her head.  Her headache at that time was unrelenting for at least 2 days and since that time she has continued to have intermittent headaches she denies any weakness, blurred vision, facial asymmetry, difficulty walking, dizziness, or other signs or symptoms of stroke.  Patient came to the emergency primarily because her blood pressure was extremely elevated at home in the range of 220/140.  Patient has had similar episodes of hypertension in the past which are severe headaches and she was unable  to get any improvement in her pain using multiple medications at her disposal at home.  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  Patient has had a long history of complications with blood pressure and has followed with Dr. Bond in renal for this problem over the last few years.  In addition, patient has problems with a neurogenic bladder and actually does self catheterizations twice daily.  In the emergency room she was found to have evidence of a UTI despite recent treatment on an outpatient basis with Cipro for this condition.     Patient was seen in the emergency room by Dr. Óscar Olivo.  On his exam and history of present illness she was complaining of posterior headache of 6 days duration.  She stated that the headache began at the back of her head and gradually moved to the front.  She has a diagnosis in the past of migraine headaches and has been treated apparently with Demerol in the past for this problem.  Patient has exacerbation of her headache with bright lights and loud noises.  She also complained of chills, congestion, and clear sinus  drainage.  The Fioricet that the patient uses for her headaches has provided no relief.  The pollen that she has used for her pain also provides no relief.  Patient does indicate that she takes 1 pollen every 4 hours but review of Olvin and office notes shows that she has been requesting to call 1 every 4 hours on a regular basis for the last few months.  The ER history of present illness indicates that her headache is of 6 days duration, gradual in onset, constant in timing, neurologically located, radiates to the front of head from back, quality is headache intensity is moderate, progression is unchanged, associated symptoms of the chills and congestion with clear drainage and photophobia, aggravating factors or Leiden noises, alleviating factors none, previous episodes of similar episodes of headache diagnosed as migraines in the past, treatment before arrival was Fioricet without relief.  Review of systems was positive for chills, congestion, photophobia, headache of 6 days duration.  Physical exam showed frontal sinus tenderness on both left and right sides.  Labs in the ER showed glucose 114, creatinine 1.10, white blood cell count 11.32, hemoglobin normal at 13.8, UA with white blood cells 3-5 and bacteria 4+, CT of head without contrast showed no acute disease.  In the emergency room, patient was given Dilaudid for her headache and clonidine for her blood pressure.  Her urinary tract infection was treated with Rocephin.  I was called as her primary care physician and at recommendation of the emergency room staff patient was admitted to a monitored bed with diagnoses of acute intractable headache, acute UTI, uncontrolled hypertension, and ongoing severe pain.     8/23/2018.  I personally saw the patient for the first time during this hospitalization on this date.  Patient was resting comfortably in bed at the time of my visit and picking at her breakfast.  Patient indicated that she was having ongoing severe  headache pain.  Her blood pressure at the time my visit showed 170/90 on the monitor.  Patient does relate a history as discussed above.  She does blink her headaches back to a fall on July 13 to set thousand 13 when she struck her head.  Patient has had ongoing problems with headaches.  I did review patient's medication usage and her Olvin report with the pharmacy.  It does seem that she is using excessive amounts of tall 1 without relief of her pain.  She also seems to be using excessive amounts of Fiorinal which could be leading to rebound headaches.  In addition, patient has Phenergan With Codeine which would be rendered ineffective by her Talwin usage.  Patient is in the midst of workup is outlined in the orders.  She is in the midst of testing and was taken down for testing at the end of my exam.    8/24/2018.  Patient seen again today in her bed on 6 S. L attending was visiting patient at the same time and we discussed the case together and with her.  Patient quite concerned about use of hydralazine by neurology and we have discontinued that medication because of previous sensitivities the patient had including a positive DIAN when on that medication.  Patient reports headaches may be slightly improved but still persist.  Neurology has discontinued headache medicine.  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  Plavix has been added by neurology to aspirin therapy for the patient.  There is no evidence of bleeding and brain or other abnormalities due to trauma.  Patient could conceivably have a posttraumatic headache due to minor contusion of the brain.  Fioricet does not seem to be working well for the patient's headache pain.  Patient has required Dilaudid.  Hospital does not have following available and patient is unwilling to share her home supply.  Patient converted to full inpatient admission.  We will continue to follow the patient.    8/25/2018.  Patient resting quietly in bed  on 6 S. with lights out in the room.  Patient does complain of continuing headache symptomatology.  Blood pressure has still been quite labile today and was at 160/106 when I visited the patient.  Neurology was in the room at the same time.  We did discuss headache prevention and together reviewed with the patient the importance of avoiding recurrent doses of the Fioricet for her headaches.  Patient states that she tries to use the medicine no more than twice per week.  We also discussed the importance of decreasing the diagnosis of her Talwin NX which could lead to rebound headaches when the narcotic wears off.  Renal has seen the patient and increase the dose of her clonidine back to 3 times daily.  Patient also reports severe pain with her right shoulder and neck.  We have ordered x-rays and will have her see orthopedics for a possible steroid injection if this pain persists.    8/26/2018.  Patient is resting comfortably in bed at the time of my visit to her room on 6 S. today.  She does have a heating pad on her right shoulder but states that the pain is almost intolerable.  Orthopedics has not seen the patient at this point and she is anxious to talk to them about her shoulder.  PT did work with the patient today.  Blood pressure seems better controlled.  Cardiology had little new to add.  Neurology also had very little new to add.  Renal did not change her blood pressure medications today.  Patient is agreeable to the idea of going home tomorrow if her symptoms continue to improve.    Review of Systems:               A comprehensive 14 point review of systems was negative except for:  Constitution:  positive for chills, fatigue, fevers and malaise  ENT:  positive for ear drainage, ear ringing, hearing loss and snoring  Respiratory: positive for  cough, dry, pleuritic pain and shortness of air  Cardiovascular: positive for  chest pressure / pain, at rest and irregular pulse  Gastrointestinal: postitive for   bloating / distention and heartburn  Musculoskeletal: positive for  back pain, joint pain, joint stiffness, joint swelling, muscle pain, muscle weakness and neck pain  Behavioral/Psych: positive for  anxiety  Allergies / Immunologic: positive for  rash          Past Medical History:   Diagnosis Date   • Arthritis    • Asthma    • Cancer (CMS/[facility])    • COPD (chronic obstructive pulmonary disease) (CMS/[facility])    • Emphysema lung (CMS/[facility])    • Generalized headaches    • Hayfever    • Hypertension    • Stroke (CMS/[facility])      Past Surgical History:   Procedure Laterality Date   • BREAST LUMPECTOMY Left 04/01/2003   • CARPAL TUNNEL RELEASE  2011   • CATARACT EXTRACTION  2010   • HERNIA REPAIR  01/01/1971   • HERNIA REPAIR      right groin   • JOINT REPLACEMENT     • KNEE SURGERY Left 01/01/1998    bilateral knee replacements   • MASTECTOMY     • PARATHYROIDECTOMY  05/08/2006   • SINUS SURGERY  01/01/1984   • TOTAL ABDOMINAL HYSTERECTOMY  01/01/1973     Allergies   Allergen Reactions   • Morphine Anaphylaxis   • Sulfa Antibiotics Anaphylaxis and Hives     Or sulfa fillers in meds   • Hydralazine Myalgia     Patient reports leg cramps, joint pain and increased fatigue   • Amlodipine      KIDNEYS SHUT DOWN   • Anastrozole    • Grass    • Nsaids      KIDNEY FAILURE   • Penicillins      Tolerated ceftriaxone during 04/2017 admission   • Tree Extract    • Zolpidem      HALLUCINATIONS       Family History   Problem Relation Age of Onset   • Cancer Brother        Social History     Social History   • Marital status:      Spouse name: Joey     Social History Main Topics   • Smoking status: Never Smoker   • Smokeless tobacco: Never Used   • Alcohol use Yes      Comment: rarely - once or twice year (wine or bernabe)   • Drug use: Unknown   • Sexual activity: Yes     Partners: Male     Other Topics Concern   • Not on file       PMH, FH, SH and ROS completed with Admission History and Physical and updated in EPIC system.     "    Objective     Scheduled Meds:    aspirin 81 mg Oral Daily   bupivacaine (PF) 2 mL Injection Once   cholecalciferol 1,000 Units Oral Daily   CloNIDine 0.2 mg Oral Q8H   clopidogrel 75 mg Oral Daily   docusate sodium 100 mg Oral Daily   enoxaparin 40 mg Subcutaneous Q24H   eplerenone 50 mg Oral Q12H   ethacrynic acid 25 mg Oral TID   fluticasone 1 spray Each Nare BID   guaiFENesin 600 mg Oral Q12H   labetalol 200 mg Oral Q12H   lidocaine PF 1% 2 mL Injection Once   losartan-HCTZ (HYZAAR) 100-12.5 combo dose  Oral Daily   methylPREDNISolone acetate 80 mg Intra-articular Once   montelukast 10 mg Oral Nightly   NIFEdipine XL 90 mg Oral Q24H   pantoprazole 40 mg Oral Q AM   predniSONE 10 mg Oral Every Other Day   cyanocobalamin 1,000 mcg Oral Daily     Continuous Infusions:    Pharmacy to Dose enoxaparin (LOVENOX)        Vital signs in last 24 hours:  Temp:  [98.2 °F (36.8 °C)-98.6 °F (37 °C)] 98.2 °F (36.8 °C)  Heart Rate:  [55-77] 69  Resp:  [16-18] 16  BP: (125-167)/(53-97) 140/53    Intake/Output:    Intake/Output Summary (Last 24 hours) at 08/27/18 0116  Last data filed at 08/26/18 2347   Gross per 24 hour   Intake              240 ml   Output             1400 ml   Net            -1160 ml       Exam:  /53 (BP Location: Right arm, Patient Position: Lying)   Pulse 69   Temp 98.2 °F (36.8 °C) (Oral)   Resp 16   Ht 157.5 cm (62\")   Wt 90.4 kg (199 lb 4.8 oz)   SpO2 96%   BMI 36.45 kg/m²     Constitutional:  Alert, cooperative, moderate distress, AAOx3, resting comfortably,C/O severe headache.  BP slightly up   Head:      Normocephalic, without obvious abnormality, atraumatic, ongoing cephalgia with headache   Eyes:     PERRLA, conjunctiva/corneas clear, no icterus, no conjunctival                                     pallor, EOM's intact, both eyes      ENT and Mouth: Lips, tongue, gums normal; oral mucosa pink and moist   Neck:     Supple, symmetrical, trachea midline, no JVD  Respiratory:     Clear to " auscultation bilaterally, respirations unlabored  Cardiovascular:  Regular rate and rhythm, S1 and S2 normal, no murmur,      no  Rub or gallop.  Pulses normal.    Gastrointestinal:   BS present x 4 Soft, non-tender, bowel sounds active,      no masses, no hepatosplenomegaly                                                     :       No hernia.  Normal exam for sex.         Musculoskeletal: Extremities normal, atraumatic, no cyanosis or edema   Shoulder pin on right with decreased mobility     No arthropathy.  No deformity.  Gait normal                                                 Skin:   Skin is warm and dry,  no rashes, swelling or palpable lesions   Neurologic:  CN -XII intact, motor strength grossly intact, sensation grossly intact to light touch, no focal reflex deficits noted, no real deficits, no changes with headache   Psychiatric:     Alert,oriented X3, no delusions, psychoses, depression.  Appears quite anxous.    Heme/Lymph/Imun:   No bruises, petechiae.  Lymph nodes normal in size/configuration       Data Review:  Lab Results   Component Value Date    CALCIUM 9.2 08/26/2018    PHOS 3.2 12/26/2017       Results from last 7 days  Lab Units 08/26/18  0554 08/25/18  0628 08/24/18  0532  08/23/18  0546   MAGNESIUM mg/dL  --   --  2.5*  --  2.1   SODIUM mmol/L 140 140 141  --  141   POTASSIUM mmol/L 3.8 3.8 3.4*  --  3.6   CHLORIDE mmol/L 103 106 105  --  106   CO2 mmol/L 24.0 21.2* 24.2  --  23.6   BUN mg/dL 22 14 11  --  14   CREATININE mg/dL 1.20* 1.05* 0.95  --  1.01*   GLUCOSE mg/dL 105* 101* 97  --  106*   CALCIUM mg/dL 9.2 9.1 9.2  --  8.5*   WBC 10*3/mm3 8.19 10.12 10.04  < >  --    HEMOGLOBIN g/dL 12.9 13.5 13.3  < >  --    PLATELETS 10*3/mm3 191 190 172  < >  --    < > = values in this interval not displayed.  Lab Results   Component Value Date    CKTOTAL 36 04/08/2017    CKMB 1.69 04/06/2017    TROPONINT <0.010 08/23/2018     Estimated Creatinine Clearance: 39.1 mL/min (A) (by C-G formula based  on SCr of 1.2 mg/dL (H)).  WEIGHTS:     Wt Readings from Last 1 Encounters:   08/26/18 0600 90.4 kg (199 lb 4.8 oz)   08/25/18 0559 89 kg (196 lb 3.2 oz)   08/24/18 0445 86.2 kg (190 lb)   08/23/18 0643 86.7 kg (191 lb 2.2 oz)   08/22/18 2054 87.4 kg (192 lb 10.9 oz)   08/22/18 1614 83.9 kg (185 lb)         Assessment:  Principal Problem:    Uncontrolled hypertension  Active Problems:    Chronic tension-type headache, intractable    Stage 3 chronic kidney disease    Malaise and fatigue progressive    Moderate persistent asthmatic bronchitis without complication    Acute joint pain    Acute exacerbation of 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[facility])    Acute pain of left shoulder due to trauma    Fall from standing with head traumna    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    Urinary retention self-caths (Don)    Sinusitis    Cervical disc displacement    DDD (degenerative disc disease), cervical    Hyperlipidemia    Proctocele    Urge incontinence of urine    Severe Intracranial atherosclerosis (Neuro suggests Plavix and ASA (8/18)    Vertigo    Vitamin D deficiency    Knee pain, bilateral    Elevated PTH level    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)    Gastroesophageal reflux disease with esophagitis    Recurrent UTI    CVA (cerebrovascular accident) subacute    Dyspnea on exertion    Easy bruisability    Decreased functional mobility and endurance since fall 12/20/17    Joint pain    Conjunctivitis    Arm pain    Cervical pain (Servando=PM)    Obesity (BMI 30.0-34.9)    H/o Lyme disease    Allergic contact dermatitis    Pleurisy    Psoriasis (Darryl Ochoa)    Postmenopausal      Attending Physician Assessment and Plan:    1.  Uncontrolled hypertension.  Patient is now back on her regular pain and blood pressure medications.  Patient has received clonidine in the ER for blood pressure control and efforts are being made to control her pain may be the cause and part of her  hypertension.  Renal and cardiology are consulted.  Urology who follows the patient for in and out catheters and a neurogenic bladder has also been asked to see her.  Blood pressure still up but not in as much of a threatening zone as it was at the time of her admission to the emergency room yesterday evening.Clonidine increased today. Now much better controlled  2.  Intractable headache pain with history of migraines and chronic tension-type headaches.  A combination of Talwin and Fioricet seems ineffective for control of her symptoms at the present time.  Patient will have adjustment of her medication regime with help from neurology.  Will attempt to replace Fioricet if possible and to wean Talwin somewhat.  No neurologic changes at the present time.  Nursing is continuing to follow her on a regular basis for this. Neurology continues drug holiday.  Wants plavix to avoid increased stroke risk.  Headache nearly resolvede.  3.  Recurrent urinary tract infections with acute infection after failed outpatient therapy on Cipro in a patient with history of neurogenic bladder who requires urinary catheterization by in and out catheter twice daily at home.  Patient is regularly followed by urology for this problem and we will consult them for their input on these issues. Dr. Ochoa saw and resumed I and O caths. Rocephin DC'kevyn  4.  Chronic kidney disease stage III in past.  Blood pressure uncontrolled and followed on outpatient basis by Dr. Bond.  We will ask renal for their input on treatment at this point.  Patient is very sensitive to multiple medications that have been attempted in the past.  5.  Fall from standing with head trauma suggestive of head contusion.  MRI has been ordered of brain for further input and evaluation.  Patient is concerned about possibility of an aneurysm in, leading in her brain, or stroke.  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  Plavix added to ASA by neurology.  6.  Acute orthopedic pain with bilateral shoulder discomfort, cervical pain, lumbar pain.  Patient unrelieved despite large doses of Talwin for pain control.  Patient has requested only one Talwin every 4 hours for now.  She is reporting a pain scale of 10 out of 10.  We will continue to use to allotted if needed for today to treat severe headaches.  Patient needs weaning of the Talwin which we will initiate at the time of her discharge.Ortho consult requested  7.  COPD with asthma acute mild exacerbation.  We will monitor patient nebs treatments have been ordered.  Regular inhalers will be used as needed.  8.  Malaise and fatigue.  These are ongoing problems for the patient.  She is clinically stable otherwise at the present time.  Some degree of vertigo is noted with movements.  9.  History of vitamin D deficiency.  We will check vitamin D levels at the present time and treat as necessary.  10.  Acute on chronic sinusitis.  This should be addressed by the Rocephin that she is receiving for the UTI at the present time.  Will monitor and adjust medications based on results from MRI which should also look into of sinus infection.      Plan for disposition:Where: home and When:  medically stable in 2-3 days      Óscar Cadet MD  8/26/2018  12:45 PM

## 2018-08-27 NOTE — PLAN OF CARE
Problem: Patient Care Overview  Goal: Plan of Care Review  Outcome: Ongoing (interventions implemented as appropriate)   08/27/18 0627   OTHER   Outcome Summary complaints of persistant right shoulder pain, no other complaints, up with one person assist, tolerating diet, will continue to monitor   Coping/Psychosocial   Plan of Care Reviewed With patient   Plan of Care Review   Progress improving     Goal: Individualization and Mutuality  Outcome: Ongoing (interventions implemented as appropriate)    Goal: Discharge Needs Assessment  Outcome: Ongoing (interventions implemented as appropriate)      Problem: Pain, Acute (Adult)  Goal: Identify Related Risk Factors and Signs and Symptoms  Outcome: Outcome(s) achieved Date Met: 08/27/18    Goal: Acceptable Pain Control/Comfort Level  Outcome: Ongoing (interventions implemented as appropriate)      Problem: Fall Risk (Adult)  Goal: Identify Related Risk Factors and Signs and Symptoms  Outcome: Outcome(s) achieved Date Met: 08/27/18    Goal: Absence of Fall  Outcome: Ongoing (interventions implemented as appropriate)

## 2018-08-27 NOTE — PROGRESS NOTES
Continued Stay Note  [facility]     Patient Name: Sasha Barrett  MRN: [id number]  Today's Date: 8/27/2018    Admit Date: 8/22/2018          Discharge Plan     Row Name 08/27/18 1154       Plan    Plan home with     Patient/Family in Agreement with Plan yes    Plan Comments Per Dr Cadet, anticipate dc later today or in AM.  Spoke with patient in room and she denies any needs.  At dc, she would like paper scripts for any new medications.  Jodi Doyle RN              Discharge Codes    No documentation.           Jodi Doyle RN

## 2018-08-27 NOTE — PROGRESS NOTES
"   LOS: 5 days    Patient Care Team:  Óscar Cadet MD as PCP - General (Internal Medicine)    Chief Complaint:    Chief Complaint   Patient presents with   • Headache     Follow UP ERMIAS CKD3  Subjective     Interval History:   C/o neck pain but no soa or n/v    Objective     Vital Signs  Temp:  [98.2 °F (36.8 °C)-98.6 °F (37 °C)] 98.2 °F (36.8 °C)  Heart Rate:  [55-77] 71  Resp:  [16-18] 16  BP: (125-167)/(53-88) 158/72    Flowsheet Rows      First Filed Value   Admission Height  157.5 cm (62\") Documented at 08/22/2018 1614   Admission Weight  83.9 kg (185 lb) Documented at 08/22/2018 1614          No intake/output data recorded.  I/O last 3 completed shifts:  In: 290 [P.O.:240; IV Piggyback:50]  Out: 1650 [Urine:1650]    Intake/Output Summary (Last 24 hours) at 08/27/18 0710  Last data filed at 08/27/18 0301   Gross per 24 hour   Intake                0 ml   Output             1100 ml   Net            -1100 ml       Physical Exam:  gen frail WF lying flat, uncomfortable, no acute distress  Neck supple no jvd  Lungs CTA Bilat no rales  CV RRR   abd soft NT/ND +BS  vasc no pedal edema 2+ radial pulses      Results Review:      Results from last 7 days  Lab Units 08/26/18  0554 08/25/18  0628 08/24/18  0532   SODIUM mmol/L 140 140 141   POTASSIUM mmol/L 3.8 3.8 3.4*   CHLORIDE mmol/L 103 106 105   CO2 mmol/L 24.0 21.2* 24.2   BUN mg/dL 22 14 11   CREATININE mg/dL 1.20* 1.05* 0.95   CALCIUM mg/dL 9.2 9.1 9.2   GLUCOSE mg/dL 105* 101* 97       Estimated Creatinine Clearance: 39.1 mL/min (A) (by C-G formula based on SCr of 1.2 mg/dL (H)).      Results from last 7 days  Lab Units 08/24/18  0532 08/23/18  0546   MAGNESIUM mg/dL 2.5* 2.1         Results from last 7 days  Lab Units 08/23/18  0546   URIC ACID mg/dL 6.2*         Results from last 7 days  Lab Units 08/27/18  0607 08/26/18  0554 08/25/18  0628 08/24/18  0532 08/23/18  0547   WBC 10*3/mm3 8.31 8.19 10.12 10.04 10.69   HEMOGLOBIN g/dL 13.1 12.9 13.5 13.3 12.2 "   PLATELETS 10*3/mm3 187 191 190 172 177               Imaging Results (last 24 hours)     Procedure Component Value Units Date/Time    XR Spine Cervical Complete With Flex Ext [[id number]] Collected:  08/25/18 2044     Updated:  08/26/18 1324    Narrative:       XR SPINE CERVICAL COMPLETE WITH FLEX EXT-, XR SHOULDER 2+ VIEW RIGHT-     Clinical: Patient states right side neck pain for 3 days. No known  injury.     Cervical spine correlation 06/04/2012.     CERVICAL SPINE FINDINGS: The odontoid is preserved with satisfactory  C1-2 alignment. No instability nor subluxation with flexion/extension.  Prevertebral soft tissues are normal. There is facet hypertrophy. There  is multilevel spur formation. Disc space narrowing demonstrated at C4-5  and C6-7. Neuroforamina are patent bilaterally.     CONCLUSION: Degenerative change as described above. No acute osseous  abnormality. No instability or subluxation with flexion/extension.     RIGHT SHOULDER FINDINGS: There is substantial acromioclavicular joint  degeneration and bone hypertrophy. There is mild to moderate  glenohumeral joint degeneration. No dislocation or fracture. The  adjacent ribs and overlying soft tissues are satisfactory in appearance.     CONCLUSION: Degenerative change as described above.     This report was finalized on 8/26/2018 1:21 PM by Dr. Wojciech Bolanos M.D.       XR Shoulder 2+ View Right [[id number]] Collected:  08/25/18 2044     Updated:  08/26/18 1324    Narrative:       XR SPINE CERVICAL COMPLETE WITH FLEX EXT-, XR SHOULDER 2+ VIEW RIGHT-     Clinical: Patient states right side neck pain for 3 days. No known  injury.     Cervical spine correlation 06/04/2012.     CERVICAL SPINE FINDINGS: The odontoid is preserved with satisfactory  C1-2 alignment. No instability nor subluxation with flexion/extension.  Prevertebral soft tissues are normal. There is facet hypertrophy. There  is multilevel spur formation. Disc space narrowing demonstrated at  C4-5  and C6-7. Neuroforamina are patent bilaterally.     CONCLUSION: Degenerative change as described above. No acute osseous  abnormality. No instability or subluxation with flexion/extension.     RIGHT SHOULDER FINDINGS: There is substantial acromioclavicular joint  degeneration and bone hypertrophy. There is mild to moderate  glenohumeral joint degeneration. No dislocation or fracture. The  adjacent ribs and overlying soft tissues are satisfactory in appearance.     CONCLUSION: Degenerative change as described above.     This report was finalized on 8/26/2018 1:21 PM by Dr. Wojciech Bolanos M.D.             aspirin 81 mg Oral Daily   bupivacaine (PF) 2 mL Injection Once   cholecalciferol 1,000 Units Oral Daily   CloNIDine 0.2 mg Oral Q8H   clopidogrel 75 mg Oral Daily   docusate sodium 100 mg Oral Daily   enoxaparin 40 mg Subcutaneous Q24H   eplerenone 50 mg Oral Q12H   ethacrynic acid 25 mg Oral TID   fluticasone 1 spray Each Nare BID   guaiFENesin 600 mg Oral Q12H   labetalol 200 mg Oral Q12H   lidocaine PF 1% 2 mL Injection Once   losartan-HCTZ (HYZAAR) 100-12.5 combo dose  Oral Daily   methylPREDNISolone acetate 80 mg Intra-articular Once   montelukast 10 mg Oral Nightly   NIFEdipine XL 90 mg Oral Q24H   pantoprazole 40 mg Oral Q AM   predniSONE 10 mg Oral Every Other Day   cyanocobalamin 1,000 mcg Oral Daily       Pharmacy to Dose enoxaparin (LOVENOX)        Medication Review:   Current Facility-Administered Medications   Medication Dose Route Frequency Provider Last Rate Last Dose   • acetaminophen (TYLENOL) tablet 650 mg  650 mg Oral Q4H PRN Óscar Cadet MD   650 mg at 08/24/18 0121   • albuterol (PROVENTIL) nebulizer solution 0.083% 2.5 mg/3mL  2.5 mg Nebulization Q4H PRN Óscar Cadet MD       • aspirin EC tablet 81 mg  81 mg Oral Daily Óscar Cadet MD   81 mg at 08/26/18 0842   • bupivacaine (PF) (MARCAINE) 0.5 % injection 2 mL  2 mL Injection Once Charisse Lei APRN   Stopped at  08/26/18 2015   • butalbital-acetaminophen-caffeine (FIORICET, ESGIC) -40 MG per tablet 1 tablet  1 tablet Oral Q6H PRN Christina Robledo APRN   1 tablet at 08/26/18 0511   • cholecalciferol (VITAMIN D3) tablet 1,000 Units  1,000 Units Oral Daily Óscar Cadet MD   1,000 Units at 08/26/18 0842   • CloNIDine (CATAPRES) tablet 0.2 mg  0.2 mg Oral Q8H Negro Rios MD   0.2 mg at 08/27/18 0610   • clopidogrel (PLAVIX) tablet 75 mg  75 mg Oral Daily Casandra Kilgore APRN   75 mg at 08/26/18 0841   • diphenhydrAMINE (BENADRYL) 12.5 MG/5ML elixir 25 mg  25 mg Oral Q4H PRN Óscar Cadet MD       • diphenhydrAMINE (BENADRYL) injection 25 mg  25 mg Intravenous Q6H PRN Óscar Cadet MD   25 mg at 08/23/18 1812   • docusate sodium (COLACE) capsule 100 mg  100 mg Oral Daily Óscar Cadet MD   100 mg at 08/24/18 0857   • enoxaparin (LOVENOX) syringe 40 mg  40 mg Subcutaneous Q24H Óscar Cadet MD   40 mg at 08/26/18 0842   • EPINEPHrine PF (ADRENALIN) injection 0.3 mg  0.3 mg Intramuscular BID PRN Óscar Cadet MD       • eplerenone (INSPRA) tablet 50 mg  50 mg Oral Q12H Óscar Cadet MD   50 mg at 08/26/18 2050   • ethacrynic acid (EDECRIN) tablet 25 mg  25 mg Oral TID David Call MD   25 mg at 08/26/18 1832   • fluticasone (FLONASE) 50 MCG/ACT nasal spray 1 spray  1 spray Each Nare BID Óscar Cadet MD   1 spray at 08/26/18 2051   • guaiFENesin (MUCINEX) 12 hr tablet 600 mg  600 mg Oral Q12H Óscar Cadet MD   600 mg at 08/26/18 2050   • HYDROmorphone (DILAUDID) injection 0.5 mg  0.5 mg Intravenous Q3H PRN Óscar Cadet MD   0.5 mg at 08/27/18 0610   • labetalol (NORMODYNE) tablet 200 mg  200 mg Oral Q12H Cleo Padron APRN   200 mg at 08/26/18 2050   • lidocaine PF 1% (XYLOCAINE) injection 2 mL  2 mL Injection Once Charisse Lei APRN   Stopped at 08/26/18 2015   • LORazepam (ATIVAN) tablet 0.5 mg  0.5 mg Oral Q8H PRN Óscar Cadet MD   0.5 mg at 08/23/18  1415   • losartan (COZAAR) 100 mg, hydrochlorothiazide (HYDRODIURIL) 12.5 mg for HYZAAR 100-12.5   Oral Daily David Call MD       • meclizine (ANTIVERT) tablet 25 mg  25 mg Oral TID PRN Óscar Cadet MD       • methylPREDNISolone acetate (DEPO-medrol) injection 80 mg  80 mg Intra-articular Once Charisse Lei APRN   Stopped at 08/26/18 2015   • montelukast (SINGULAIR) tablet 10 mg  10 mg Oral Nightly Óscar Cadet MD   10 mg at 08/26/18 2051   • NIFEdipine XL (PROCARDIA XL) 24 hr tablet 90 mg  90 mg Oral Q24H Jackie Montiel APRN   90 mg at 08/26/18 0841   • nitroglycerin (NITROSTAT) SL tablet 0.4 mg  0.4 mg Sublingual Q5 Min PRN Óscar Cadet MD       • ondansetron ODT (ZOFRAN-ODT) disintegrating tablet 8 mg  8 mg Oral Q6H PRN Óscar Cadet MD   8 mg at 08/23/18 1437   • pantoprazole (PROTONIX) EC tablet 40 mg  40 mg Oral Q AM Óscar Cadet MD   40 mg at 08/27/18 0610   • pentazocine-naloxone (TALWIN NX) 50-0.5 MG per tablet 1 tablet  1 tablet Oral Q4H PRN Óscar Cadet MD   1 tablet at 08/25/18 0830   • Pharmacy to Dose enoxaparin (LOVENOX)   Does not apply Continuous PRN Óscar Cadet MD       • polyethylene glycol (MIRALAX) powder 17 g  17 g Oral Daily PRN Óscar Cadet MD       • predniSONE (DELTASONE) tablet 10 mg  10 mg Oral Every Other Day Óscar Cadet MD   10 mg at 08/25/18 0828   • promethazine (PHENERGAN) injection 12.5 mg  12.5 mg Intravenous Q3H PRN Óscar Cadet MD   12.5 mg at 08/22/18 2225   • promethazine-codeine (PHENERGAN with CODEINE) 6.25-10 MG/5ML syrup 5 mL  5 mL Oral 4x Daily PRN Óscar Cadet MD       • sodium chloride 0.9 % flush 1-10 mL  1-10 mL Intravenous PRN Óscar Cadet MD       • sodium chloride 0.9 % flush 10 mL  10 mL Intravenous PRN Crystal Tejada APRN       • vitamin B-12 (CYANOCOBALAMIN) tablet 1,000 mcg  1,000 mcg Oral Daily Óscar Cadet MD   1,000 mcg at 08/26/18 0842       Assessment/Plan    ERMIAS -  SCr up slightly 1.2 yest, labs pending today; renal fcn has been stable  CKD stage 3 - BL Cr low 1's  HTN - BP labile but adequate on ext regimen (labetalol, nifedipine 90, clonidine, ARB/HCTZ)  Urinary retention/urinary incontinence - intermittent cath regimen per UROL    Plan  - follow-up labs -- back now, K 3.3, will replace.  Cr stable 0.9  - cont current antihypertensive regimen  - ortho eval pending  - note plan for discharge soon  - can follow-up with Dr Bond in 2-4 weeks    Principal Problem:    Uncontrolled hypertension  Active Problems:    Joint pain    Urinary retention self-caths (Don)    Sinusitis    Conjunctivitis    Arm pain    Cervical disc displacement    Cervical pain (Servando=PM)    DDD (degenerative disc disease), cervical    Hyperlipidemia    Proctocele    Urge incontinence of urine    Chronic tension-type headache, intractable    Obesity (BMI 30.0-34.9)    H/o Lyme disease    Stage 3 chronic kidney disease    Severe Intracranial atherosclerosis (Neuro suggests Plavix and ASA (8/18)    Allergic contact dermatitis    Malaise and fatigue progressive    Vertigo    Vitamin D deficiency    Moderate persistent asthmatic bronchitis without complication    Pleurisy    Knee pain, bilateral    Elevated PTH level    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

## 2018-08-27 NOTE — PLAN OF CARE
Problem: Patient Care Overview  Goal: Plan of Care Review  Outcome: Ongoing (interventions implemented as appropriate)   08/27/18 4033   OTHER   Outcome Summary pt c/o right shoulder pain, ortho did bedside shoulder injection, continue to monitor   Coping/Psychosocial   Plan of Care Reviewed With patient   Plan of Care Review   Progress no change       Problem: Pain, Acute (Adult)  Goal: Acceptable Pain Control/Comfort Level  Outcome: Ongoing (interventions implemented as appropriate)      Problem: Fall Risk (Adult)  Goal: Absence of Fall  Outcome: Ongoing (interventions implemented as appropriate)

## 2018-08-28 ENCOUNTER — READMISSION MANAGEMENT (OUTPATIENT)
Dept: CALL CENTER | Facility: HOSPITAL | Age: 78
End: 2018-08-28

## 2018-08-28 NOTE — OUTREACH NOTE
Prep Survey      Responses   Facility patient discharged from?  [place]   Is patient eligible?  Yes   Discharge diagnosis  Uncontrolled hypertension controlled with medication regulation/ headaches   Does the patient have one of the following disease processes/diagnoses(primary or secondary)?  Other   Does the patient have Home health ordered?  No   Is there a DME ordered?  No   Prep survey completed?  Yes          Laura Mendez RN

## 2018-08-28 NOTE — DISCHARGE SUMMARY
[facility]  INTERNAL MEDICINE  ALEKSANDAR GALVAN MD  [address] [place] [address]  Phone [phone] Fax [phone]  E-mail:  [email or web address]    [facility]   DISCHARGE SUMMARY  ALEKSANDAR GALVAN MD      Date of Discharge:  8/27/2018    Discharge Diagnosis:         Uncontrolled hypertension controlled with medication regulation    Chronic tension-type headache, intractable improved by pain control and decreasing Fioricet    Stage 3 chronic kidney disease    Malaise and fatigue progressive    Moderate persistent asthmatic bronchitis without complication    Acute joint pain    Acute exacerbation of 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HCC)    Acute pain of left shoulder due to trauma    Fall from standing with head traumna    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    Urinary retention self-caths (Don)    Sinusitis    Cervical disc displacement    DDD (degenerative disc disease), cervical    Hyperlipidemia    Proctocele    Urge incontinence of urine    Severe Intracranial atherosclerosis (Neuro suggests Plavix and ASA (8/18)    Vertigo    Vitamin D deficiency    Knee pain, bilateral    Elevated PTH level    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  Urology reviewed importance of self caths with patient  2.  Troponin and BNP all within normal limits  3.  Blood sugar ranging from   4.  Potassium ranging from 3.3-3.8  5.  PTH slightly up at 88.3  6.  Uric acid  elevated at 6.2  7.  Lipid profile within normal limits  8.  Magnesium ranging from 2.1-2.5  9.  WBC ranging from 10.69 on admission to 8.31 at time of discharge  10.  DIAN negative  11.  UA initially showing positive nitrates and trace leukocyte esterase but culture negative.  12.  Urine drug screen positive for barbiturates  13.  EKG unremarkable sinus rhythm rate 62  14.  Echocardiogram showing ejection fraction 78%, grade 1 diastolic dysfunction, minor valvular heart disease           Procedures  Imaging Results (all)     Procedure Component Value Units Date/Time    XR Spine Cervical Complete With Flex Ext [[id number]] Collected:  08/25/18 2044     Updated:  08/26/18 1324    Narrative:       XR SPINE CERVICAL COMPLETE WITH FLEX EXT-, XR SHOULDER 2+ VIEW RIGHT-     Clinical: Patient states right side neck pain for 3 days. No known  injury.     Cervical spine correlation 06/04/2012.     CERVICAL SPINE FINDINGS: The odontoid is preserved with satisfactory  C1-2 alignment. No instability nor subluxation with flexion/extension.  Prevertebral soft tissues are normal. There is facet hypertrophy. There  is multilevel spur formation. Disc space narrowing demonstrated at C4-5  and C6-7. Neuroforamina are patent bilaterally.     CONCLUSION: Degenerative change as described above. No acute osseous  abnormality. No instability or subluxation with flexion/extension.     RIGHT SHOULDER FINDINGS: There is substantial acromioclavicular joint  degeneration and bone hypertrophy. There is mild to moderate  glenohumeral joint degeneration. No dislocation or fracture. The  adjacent ribs and overlying soft tissues are satisfactory in appearance.     CONCLUSION: Degenerative change as described above.     This report was finalized on 8/26/2018 1:21 PM by Dr. Wojciech Bolanos M.D.       XR Shoulder 2+ View Right [[id number]] Collected:  08/25/18 2044     Updated:  08/26/18 1324    Narrative:       XR SPINE CERVICAL COMPLETE WITH FLEX EXT-,  XR SHOULDER 2+ VIEW RIGHT-     Clinical: Patient states right side neck pain for 3 days. No known  injury.     Cervical spine correlation 06/04/2012.     CERVICAL SPINE FINDINGS: The odontoid is preserved with satisfactory  C1-2 alignment. No instability nor subluxation with flexion/extension.  Prevertebral soft tissues are normal. There is facet hypertrophy. There  is multilevel spur formation. Disc space narrowing demonstrated at C4-5  and C6-7. Neuroforamina are patent bilaterally.     CONCLUSION: Degenerative change as described above. No acute osseous  abnormality. No instability or subluxation with flexion/extension.     RIGHT SHOULDER FINDINGS: There is substantial acromioclavicular joint  degeneration and bone hypertrophy. There is mild to moderate  glenohumeral joint degeneration. No dislocation or fracture. The  adjacent ribs and overlying soft tissues are satisfactory in appearance.     CONCLUSION: Degenerative change as described above.     This report was finalized on 8/26/2018 1:21 PM by Dr. Wojciech Bolanos M.D.       CT Angiogram Carotids [[id number]] Collected:  08/24/18 1843     Updated:  08/24/18 1859    Narrative:       CT ANGIOGRAM CAROTIDS-, CT ANGIOGRAM HEAD W CONTRAST-     CLINICAL HISTORY: Follow-up nonvisualized left posterior cerebral artery  on MRA. Evaluate for possible tiny left internal carotid artery aneurysm  on MRA..     TECHNIQUE: Transverse 3 mm thick images were acquired from the base of  the skull to the vertex without IV contrast. Subsequently, spiral CT  images were obtained through the neck and head during rapid IV injection  of contrast and were reconstructed in 1 mm thick slices. Multiple  coronal and sagittal and 3-D reconstructions were obtained.     Radiation dose reduction techniques were utilized, including automated  exposure control and exposure modulation based on body size.     COMPARISON: MRA of the head dated 8/20/2018.     FINDINGS: The precontrast images  demonstrate no evidence of recent or  old infarct or hemorrhage. There is patchy ill-defined diminished  attenuation in the white matter of both cerebral hemispheres that is  compatible with sequela of moderate small vessel chronic ischemic  change.     There is normal branching of the great vessels from the aortic arch that  appear widely patent without NASCET significant stenosis. No stenoses  are identified in either common or external or internal carotid artery  in the neck. The left vertebral artery is dominant. Both vertebral  arteries are widely patent throughout the course in the neck and head.  As shown on the MRA, the left posterior cerebral artery is absent. It  may be chronically occluded or congenitally absent. This is likely  congenital since postcontrast images of the brain parenchyma show no  enhancing lesions. No left PCA territory infarct is identified. No  intracranial stenoses are identified. Minimal calcified atherosclerotic  plaque within the intracranial segments of both internal carotid  arteries results in no luminal narrowing. There are no aneurysms.       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 Otherwise unremarkable CT angiography imaging of the neck and  head. No aneurysms were identified.     This report was finalized on 8/24/2018 6:56 PM by Dr. Estevan Oliver M.D.       CT Angiogram Head With Contrast [[id number]] Collected:  08/24/18 1843     Updated:  08/24/18 1859    Narrative:       CT ANGIOGRAM CAROTIDS-, CT ANGIOGRAM HEAD W CONTRAST-     CLINICAL HISTORY: Follow-up nonvisualized left posterior cerebral artery  on MRA. Evaluate for possible tiny left internal carotid artery aneurysm  on MRA..     TECHNIQUE: Transverse 3 mm thick images were acquired from the base of  the skull to the vertex without IV contrast. Subsequently, spiral CT  images were obtained through the neck and head during rapid IV injection  of contrast and were  reconstructed in 1 mm thick slices. Multiple  coronal and sagittal and 3-D reconstructions were obtained.     Radiation dose reduction techniques were utilized, including automated  exposure control and exposure modulation based on body size.     COMPARISON: MRA of the head dated 8/20/2018.     FINDINGS: The precontrast images demonstrate no evidence of recent or  old infarct or hemorrhage. There is patchy ill-defined diminished  attenuation in the white matter of both cerebral hemispheres that is  compatible with sequela of moderate small vessel chronic ischemic  change.     There is normal branching of the great vessels from the aortic arch that  appear widely patent without NASCET significant stenosis. No stenoses  are identified in either common or external or internal carotid artery  in the neck. The left vertebral artery is dominant. Both vertebral  arteries are widely patent throughout the course in the neck and head.  As shown on the MRA, the left posterior cerebral artery is absent. It  may be chronically occluded or congenitally absent. This is likely  congenital since postcontrast images of the brain parenchyma show no  enhancing lesions. No left PCA territory infarct is identified. No  intracranial stenoses are identified. Minimal calcified atherosclerotic  plaque within the intracranial segments of both internal carotid  arteries results in no luminal narrowing. There are no aneurysms.       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 Otherwise unremarkable CT angiography imaging of the neck and  head. No aneurysms were identified.     This report was finalized on 8/24/2018 6:56 PM by Dr. Estevan Oliver M.D.       CT Head Without Contrast [[id number]] Collected:  08/22/18 1705     Updated:  08/24/18 1406    Narrative:       CT OF THE BRAIN WITHOUT CONTRAST 08/22/2018     HISTORY: Headache.     Axial images were obtained through the brain without  intravenous  contrast. There is mild diffuse atrophy. Mild decreased attenuation of  the periventricular white matter is seen bilaterally consistent with  mild small vessel white matter ischemic disease.     There is no evidence of acute infarction, hemorrhage, midline shift or  mass effect.     No bony abnormalities are seen.       Impression:       No acute process identified.           Radiation dose reduction techniques were utilized, including automated  exposure control and exposure modulation based on body size.     This report was finalized on 8/24/2018 2:02 PM by Dr. Denzel Andujar M.D.       CT Chest With Contrast [[id number]] Collected:  08/23/18 1025     Updated:  08/24/18 1225    Narrative:       CT CHEST, ABDOMEN AND PELVIS WITH CONTRAST     HISTORY: Persistent cough, nausea. Accelerated hypertension.     TECHNIQUE: Axial CT images of the chest abdomen and pelvis were obtained  following administration of intravenous and oral contrast. Coronal and  sagittal reconstructions were then obtained.     COMPARISON: CT chest from 12/26/2017, CT abdomen and pelvis from  10/26/2017     FINDINGS:     CT CHEST: The visualized thyroid gland is normal. The heart and the  great vessels are normal. No pleural or pericardial effusion is seen. No  pathological mediastinal lymphadenopathy. The central airways are patent  without endobronchial lesion. Linear scarring is seen within the right  lower lobe. No new suspicious pulmonary nodules or focal airspace  disease is present. Bilateral axillary lymph node dissection has been  performed.     CT ABDOMEN AND PELVIS: Liver demonstrates normal attenuation. Small  focal hepatic dome lesion is too small to accurately characterize  however favored to represent a simple cyst. The gallbladder, spleen and  the pancreas is normal. Bilateral adrenal glands are normal. Both  kidneys are normal in size and attenuation. The urinary bladder is  partially distended and normal. The  uterus is not identified and is  likely surgically absent. Colonic diverticulosis is present. The  appendix is normal. No ascites is seen. No pathological retroperitoneal  lymphadenopathy. L4-5 degenerative disc disease with vacuum disc  phenomena.       Impression:       1. No acute abnormality within the chest, abdomen or pelvis.  2. Colonic diverticulosis.     Radiation dose reduction techniques were utilized, including automated  exposure control and exposure modulation based on body size.     This report was finalized on 8/24/2018 12:22 PM by Dr. Ester Schmidt M.D.       CT Abdomen Pelvis With Contrast [[id number]] Collected:  08/23/18 1025     Updated:  08/24/18 1225    Narrative:       CT CHEST, ABDOMEN AND PELVIS WITH CONTRAST     HISTORY: Persistent cough, nausea. Accelerated hypertension.     TECHNIQUE: Axial CT images of the chest abdomen and pelvis were obtained  following administration of intravenous and oral contrast. Coronal and  sagittal reconstructions were then obtained.     COMPARISON: CT chest from 12/26/2017, CT abdomen and pelvis from  10/26/2017     FINDINGS:     CT CHEST: The visualized thyroid gland is normal. The heart and the  great vessels are normal. No pleural or pericardial effusion is seen. No  pathological mediastinal lymphadenopathy. The central airways are patent  without endobronchial lesion. Linear scarring is seen within the right  lower lobe. No new suspicious pulmonary nodules or focal airspace  disease is present. Bilateral axillary lymph node dissection has been  performed.     CT ABDOMEN AND PELVIS: Liver demonstrates normal attenuation. Small  focal hepatic dome lesion is too small to accurately characterize  however favored to represent a simple cyst. The gallbladder, spleen and  the pancreas is normal. Bilateral adrenal glands are normal. Both  kidneys are normal in size and attenuation. The urinary bladder is  partially distended and normal. The uterus is not  identified and is  likely surgically absent. Colonic diverticulosis is present. The  appendix is normal. No ascites is seen. No pathological retroperitoneal  lymphadenopathy. L4-5 degenerative disc disease with vacuum disc  phenomena.       Impression:       1. No acute abnormality within the chest, abdomen or pelvis.  2. Colonic diverticulosis.     Radiation dose reduction techniques were utilized, including automated  exposure control and exposure modulation based on body size.     This report was finalized on 8/24/2018 12:22 PM by Dr. Ester Schmidt M.D.       MRI Brain Without Contrast [[id number]] Collected:  08/23/18 1727     Updated:  08/23/18 1751    Narrative:       MRI BRAIN, MRA HEAD     HISTORY: Worst headache of life.      COMPARISON: Brain MRI 04/07/2017.      TECHNIQUE: MRI was performed of the brain without intravenous  administration of gadolinium contrast. 3-D time-of-flight MR angiography  was performed of the head with axial as well as 3-D MIP images.      FINDINGS: Evaluation is suboptimal due to patient motion artifact.     MRI BRAIN:   There is no restricted diffusion; T2 shine through within the right  frontal lobe is present. There is encephalomalacia within the left  occipital lobe, as before. T2 hyperintense signal within the  periventricular and subcortical white matter represents extensive  chronic ischemic small vessel degenerative changes. Moderate cerebral  atrophy is present. There is no extra-axial collection. There is no  finding of parenchymal hemorrhage. There is no hydrocephalus. Major  vascular flow voids are unremarkable. Midline structures are  unremarkable.      MR ANGIOGRAPHY NECK:      The origins of the great vessels are widely patent.  The common and  internal carotid arteries are without appreciable stenosis. The  vertebral arteries are without appreciable stenosis.     MR ANGIOGRAPHY HEAD:   Short segment irregularity of the M1 segment of the right middle  cerebral  artery is likely artifactual as there is normal filling of the  artery just distal to this point.     The left posterior cerebral artery is not well visualized. On prior MRA  head from 12/05/2016, the left posterior cerebral artery was fed by the  posterior communicating artery. This is not visualized on today's exam.     Apparent outpouching from the undersurface of the terminal left internal  carotid artery measuring 0.2 to 0.3 cm which is only seen on the thick  section reformatted images.  No findings of acute intracranial  abnormality.         Impression:       1.  Nonvisualization of the left posterior cerebral artery. This was  small in caliber and arose from the posterior cerebral artery on brain  MRA on 12/05/2016 which is not visualized on today's exam. Further  evaluation with CTA of the head is recommended as this is a more  sensitive to delineate arterial anatomy.  2.  Apparent outpouching from the undersurface of the terminal left  internal carotid artery which may represent artifact from adjacent  vasculature versus small aneurysm. This can also be further evaluated  with the above-mentioned CTA of the head.  3.  Old left occipital infarct, extensive chronic ischemic white matter  changes and moderate cerebral atrophy.     Findings were discussed with Francisco Javier Khan by telephone at 5:10 PM on  08/23/2018.     This report was finalized on 8/23/2018 5:48 PM by Dr. Bennie Chacon M.D.       MRI Angiogram Head Without Contrast [[id number]] Collected:  08/23/18 1727     Updated:  08/23/18 1751    Narrative:       MRI BRAIN, MRA HEAD     HISTORY: Worst headache of life.      COMPARISON: Brain MRI 04/07/2017.      TECHNIQUE: MRI was performed of the brain without intravenous  administration of gadolinium contrast. 3-D time-of-flight MR angiography  was performed of the head with axial as well as 3-D MIP images.      FINDINGS: Evaluation is suboptimal due to patient motion artifact.     MRI BRAIN:   There is no  restricted diffusion; T2 shine through within the right  frontal lobe is present. There is encephalomalacia within the left  occipital lobe, as before. T2 hyperintense signal within the  periventricular and subcortical white matter represents extensive  chronic ischemic small vessel degenerative changes. Moderate cerebral  atrophy is present. There is no extra-axial collection. There is no  finding of parenchymal hemorrhage. There is no hydrocephalus. Major  vascular flow voids are unremarkable. Midline structures are  unremarkable.      MR ANGIOGRAPHY NECK:      The origins of the great vessels are widely patent.  The common and  internal carotid arteries are without appreciable stenosis. The  vertebral arteries are without appreciable stenosis.     MR ANGIOGRAPHY HEAD:   Short segment irregularity of the M1 segment of the right middle  cerebral artery is likely artifactual as there is normal filling of the  artery just distal to this point.     The left posterior cerebral artery is not well visualized. On prior MRA  head from 12/05/2016, the left posterior cerebral artery was fed by the  posterior communicating artery. This is not visualized on today's exam.     Apparent outpouching from the undersurface of the terminal left internal  carotid artery measuring 0.2 to 0.3 cm which is only seen on the thick  section reformatted images.  No findings of acute intracranial  abnormality.         Impression:       1.  Nonvisualization of the left posterior cerebral artery. This was  small in caliber and arose from the posterior cerebral artery on brain  MRA on 12/05/2016 which is not visualized on today's exam. Further  evaluation with CTA of the head is recommended as this is a more  sensitive to delineate arterial anatomy.  2.  Apparent outpouching from the undersurface of the terminal left  internal carotid artery which may represent artifact from adjacent  vasculature versus small aneurysm. This can also be further  evaluated  with the above-mentioned CTA of the head.  3.  Old left occipital infarct, extensive chronic ischemic white matter  changes and moderate cerebral atrophy.     Findings were discussed with Francisco Javier Khan by telephone at 5:10 PM on  08/23/2018.     This report was finalized on 8/23/2018 5:48 PM by Dr. Bennie Chacon M.D.       XR Chest PA & Lateral [[id number]] Collected:  08/23/18 1018     Updated:  08/23/18 1022    Narrative:       XR CHEST PA AND LATERAL-     Clinical:High BP take meds  COPD  Left breast mastectomy     Persistent cough     COMPARISON 4/5/2018     FINDINGS: Heart size within normal limits. No edema, effusion or active  airspace disease has developed. Mediastinum and mala are stable.     CONCLUSION: No active cardiovascular or pulmonary process identified.     This report was finalized on 8/23/2018 10:19 AM by Dr. Wojciech Bolanos M.D.               Treatment Team at Hospital  Treatment Team:   Consulting Physician: Rj Bond MD  Consulting Physician: Zhou Ochoa MD  Consulting Physician: Jeffrey Argueta MD  Consulting Physician: David Call MD  Consulting Physician: Haider Martin MD  Consulting Physician: Scott Bassett MD  Consulting Physician: Wicho Hallman MD  Admitting Provider: Óscar Cadet MD      Presenting Problem/History of Present Illness  Chief Complaint   Patient presents with   • Headache       .  Chief Complaint:  Accelerated hypertension with intractable headache     History of Present Illness:     Subjective         Interval History: Patient is a 78 y.o.female who presented with intractable posterior headache to [facility] emergency room on 8/22/2018 5:20 PM.  Patient reports that the headache is actually been present for more than 6 days and in conversation with me indicates that it really started July 13, 2018 when she fell from a standing position and struck the back side of her head.  Her headache at that time  was unrelenting for at least 2 days and since that time she has continued to have intermittent headaches she denies any weakness, blurred vision, facial asymmetry, difficulty walking, dizziness, or other signs or symptoms of stroke.  Patient came to the emergency primarily because her blood pressure was extremely elevated at home in the range of 220/140.  Patient has had similar episodes of hypertension in the past which are severe headaches and she was unable to get any improvement in her pain using multiple medications at her disposal at home.  Patient has a long, and very involved, medical history including most significantly: chronic kidney disease stage III, moderately persistent asthmatic bronchitis without complication, malaise and fatigue, acute on chronic left shoulder pain, diffuse joint discomfort felt to be secondary to osteoarthritis by rheumatology, COPD, vitamin D deficiency, vertigo, urinary retention requiring in and out catheterizations by patient twice daily, urge incontinence, recurrent UTI, practice still, malignant neoplasm of left breast infiltrating ductal now resected and resolved, hyperlipidemia, gastroesophageal reflux disease with esophagitis, elevated PTH, easy bruisability, dyspnea on exertion, decreased functional mobility and endurance, cervical arthritis, review of subacute CVA, psoriasis, postmenopausal, hardening of the arteries, history of Lyme's disease, conjunctivitis, cervical pain, bilateral shoulder pain.  Patient has had a long history of complications with blood pressure and has followed with Dr. Bond in renal for this problem over the last few years.  In addition, patient has problems with a neurogenic bladder and actually does self catheterizations twice daily.  In the emergency room she was found to have evidence of a UTI despite recent treatment on an outpatient basis with Cipro for this condition.     Patient was seen in the emergency room by Dr. Óscar Olivo.  On  his exam and history of present illness she was complaining of posterior headache of 6 days duration.  She stated that the headache began at the back of her head and gradually moved to the front.  She has a diagnosis in the past of migraine headaches and has been treated apparently with Demerol in the past for this problem.  Patient has exacerbation of her headache with bright lights and loud noises.  She also complained of chills, congestion, and clear sinus drainage.  The Fioricet that the patient uses for her headaches has provided no relief.  The pollen that she has used for her pain also provides no relief.  Patient does indicate that she takes 1 pollen every 4 hours but review of Olvin and office notes shows that she has been requesting to call 1 every 4 hours on a regular basis for the last few months.  The ER history of present illness indicates that her headache is of 6 days duration, gradual in onset, constant in timing, neurologically located, radiates to the front of head from back, quality is headache intensity is moderate, progression is unchanged, associated symptoms of the chills and congestion with clear drainage and photophobia, aggravating factors or [place] noises, alleviating factors none, previous episodes of similar episodes of headache diagnosed as migraines in the past, treatment before arrival was Fioricet without relief.  Review of systems was positive for chills, congestion, photophobia, headache of 6 days duration.  Physical exam showed frontal sinus tenderness on both left and right sides.  Labs in the ER showed glucose 114, creatinine 1.10, white blood cell count 11.32, hemoglobin normal at 13.8, UA with white blood cells 3-5 and bacteria 4+, CT of head without contrast showed no acute disease.  In the emergency room, patient was given Dilaudid for her headache and clonidine for her blood pressure.  Her urinary tract infection was treated with Rocephin.  I was called as her primary  care physician and at recommendation of the emergency room staff patient was admitted to a monitored bed with diagnoses of acute intractable headache, acute UTI, uncontrolled hypertension, and ongoing severe pain.     8/23/2018.  I personally saw the patient for the first time during this hospitalization on this date.  Patient was resting comfortably in bed at the time of my visit and picking at her breakfast.  Patient indicated that she was having ongoing severe headache pain.  Her blood pressure at the time my visit showed 170/90 on the monitor.  Patient does relate a history as discussed above.  She does blink her headaches back to a fall on July 13 to set thousand 13 when she struck her head.  Patient has had ongoing problems with headaches.  I did review patient's medication usage and her Olvin report with the pharmacy.  It does seem that she is using excessive amounts of tall 1 without relief of her pain.  She also seems to be using excessive amounts of Fiorinal which could be leading to rebound headaches.  In addition, patient has Phenergan With Codeine which would be rendered ineffective by her Talwin usage.  Patient is in the midst of workup is outlined in the orders.  She is in the midst of testing and was taken down for testing at the end of my exam.     8/24/2018.  Patient seen again today in her bed on 6 S. L attending was visiting patient at the same time and we discussed the case together and with her.  Patient quite concerned about use of hydralazine by neurology and we have discontinued that medication because of previous sensitivities the patient had including a positive DIAN when on that medication.  Patient reports headaches may be slightly improved but still persist.  Neurology has discontinued headache medicine.  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  Plavix has been added by neurology to aspirin therapy for the patient.  There is no evidence of bleeding and brain  or other abnormalities due to trauma.  Patient could conceivably have a posttraumatic headache due to minor contusion of the brain.  Fioricet does not seem to be working well for the patient's headache pain.  Patient has required Dilaudid.  Hospital does not have following available and patient is unwilling to share her home supply.  Patient converted to full inpatient admission.  We will continue to follow the patient.     8/25/2018.  Patient resting quietly in bed on 6 S. with lights out in the room.  Patient does complain of continuing headache symptomatology.  Blood pressure has still been quite labile today and was at 160/106 when I visited the patient.  Neurology was in the room at the same time.  We did discuss headache prevention and together reviewed with the patient the importance of avoiding recurrent doses of the Fioricet for her headaches.  Patient states that she tries to use the medicine no more than twice per week.  We also discussed the importance of decreasing the diagnosis of her Talwin NX which could lead to rebound headaches when the narcotic wears off.  Renal has seen the patient and increase the dose of her clonidine back to 3 times daily.  Patient also reports severe pain with her right shoulder and neck.  We have ordered x-rays and will have her see orthopedics for a possible steroid injection if this pain persists.     8/26/2018.  Patient is resting comfortably in bed at the time of my visit to her room on 6 S. today.  She does have a heating pad on her right shoulder but states that the pain is almost intolerable.  Orthopedics has not seen the patient at this point and she is anxious to talk to them about her shoulder.  PT did work with the patient today.  Blood pressure seems better controlled.  Cardiology had little new to add.  Neurology also had very little new to add.  Renal did not change her blood pressure medications today.  Patient is agreeable to the idea of going home tomorrow  if her symptoms continue to improve.    8/27/2018.  Patient resting quietly in bed at the time of my visit today.  She is starting to feel better and feels that the shot she received from orthopedics earlier in the day did help her shoulder pain.  Headaches have resolved somewhat now the blood pressure is under control.  Blood pressure did remain in the normal range pretty much throughout the entire night that she has an occasional elevation.  Patient feels a lot less stressed.   is very anxious for her to come home.  She is very anxious to join him at home.  At this point it appears she is maximized benefit from the hospitalization and is safe for discharge to home.   will pick her up later in the evening.         Review of Systems:               A comprehensive 14 point review of systems was negative except for:  Constitution:  positive for chills, fatigue, fevers and malaise  ENT:  positive for ear drainage, ear ringing, hearing loss and snoring  Respiratory: positive for  cough, dry, pleuritic pain and shortness of air  Cardiovascular: positive for  chest pressure / pain, at rest and irregular pulse  Gastrointestinal: postitive for  bloating / distention and heartburn  Musculoskeletal: positive for  back pain, joint pain, joint stiffness, joint swelling, muscle pain, muscle weakness and neck pain  Behavioral/Psych: positive for  anxiety  Allergies / Immunologic: positive for  rash       Vital Signs  Temp:  [97.2 °F (36.2 °C)-98.3 °F (36.8 °C)] 98.3 °F (36.8 °C)  Heart Rate:  [64-71] 70  Resp:  [16-18] 18  BP: (139-168)/(53-72) 139/61    Physical Exam at Discharge  Constitutional:             Alert, cooperative, moderate distress, AAOx3, resting comfortably,C/O severe headache.  BP slightly up   Head:                          Normocephalic, without obvious abnormality, atraumatic, ongoing cephalgia with headache   Eyes:                          PERRLA, conjunctiva/corneas clear, no icterus, no  conjunctival                                     pallor, EOM's intact, both eyes      ENT and Mouth:         Lips, tongue, gums normal; oral mucosa pink and moist   Neck:                          Supple, symmetrical, trachea midline, no JVD  Respiratory:                 Clear to auscultation bilaterally, respirations unlabored  Cardiovascular:           Regular rate and rhythm, S1 and S2 normal, no murmur,                                       no  Rub or gallop.  Pulses normal.    Gastrointestinal:          BS present x 4 Soft, non-tender, bowel sounds active,                                       no masses, no hepatosplenomegaly                                                      :                             No hernia.  Normal exam for sex.         Musculoskeletal:        Extremities normal, atraumatic, no cyanosis or edema                                 Shoulder pin on right with decreased mobility                                      No arthropathy.  No deformity.  Gait normal                                                 Skin:                           Skin is warm and dry,  no rashes, swelling or palpable lesions   Neurologic:                 CN -XII intact, motor strength grossly intact, sensation grossly intact to light touch, no focal reflex deficits noted, no real deficits, no changes with headache   Psychiatric:                 Alert,oriented X3, no delusions, psychoses, depression.  Appears quite anxous.    Heme/Lymph/Imun:   No bruises, petechiae.  Lymph nodes normal in size/configuration           Pertinent Test Results:      Results from last 7 days  Lab Units 08/27/18  0607 08/26/18  0554 08/25/18  0628   SODIUM mmol/L 138 140 140   POTASSIUM mmol/L 3.3* 3.8 3.8   CHLORIDE mmol/L 101 103 106   CO2 mmol/L 26.6 24.0 21.2*   BUN mg/dL 22 22 14   CREATININE mg/dL 0.92 1.20* 1.05*   GLUCOSE mg/dL 105* 105* 101*   CALCIUM mg/dL 9.4 9.2 9.1         Results from last 7 days  Lab Units 08/27/18  0607  08/26/18  0554 08/25/18  0628   WBC 10*3/mm3 8.31 8.19 10.12   HEMOGLOBIN g/dL 13.1 12.9 13.5   HEMATOCRIT % 42.9 40.4 42.6   PLATELETS 10*3/mm3 187 191 190       No results found for: LIPASE                Attending Physician Final Assessment and Plan    1.  Uncontrolled hypertension.  Patient is now back on her regular pain and blood pressure medications.  Patient has received clonidine in the ER for blood pressure control and efforts are being made to control her pain may be the cause and part of her hypertension.  Renal and cardiology are consulted.  Urology who follows the patient for in and out catheters and a neurogenic bladder has also been asked to see her.  Blood pressure still up but not in as much of a threatening zone as it was at the time of her admission to the emergency room yesterday evening.Clonidine increased today. Now much better controlled  2.  Intractable headache pain with history of migraines and chronic tension-type headaches.  A combination of Talwin and Fioricet seems ineffective for control of her symptoms at the present time.  Patient will have adjustment of her medication regime with help from neurology.  Will attempt to replace Fioricet if possible and to wean Talwin somewhat.  No neurologic changes at the present time.  Nursing is continuing to follow her on a regular basis for this. Neurology continues drug holiday.  Wants plavix to avoid increased stroke risk.  Headache nearly resolvede.  3.  Recurrent urinary tract infections with acute infection after failed outpatient therapy on Cipro in a patient with history of neurogenic bladder who requires urinary catheterization by in and out catheter twice daily at home.  Patient is regularly followed by urology for this problem and we will consult them for their input on these issues. Dr. Ochoa saw and resumed I and O caths. Rocephin DC'd  4.  Chronic kidney disease stage III in past.  Blood pressure uncontrolled and followed on  outpatient basis by Dr. Bond.  We will ask renal for their input on treatment at this point.  Patient is very sensitive to multiple medications that have been attempted in the past.  5.  Fall from standing with head trauma suggestive of head contusion.  MRI has been ordered of brain for further input and evaluation.  Patient is concerned about possibility of an aneurysm in, leading in her brain, or stroke.  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  Plavix added to ASA by neurology.  6.  Acute orthopedic pain with bilateral shoulder discomfort, cervical pain, lumbar pain.  Patient unrelieved despite large doses of Talwin for pain control.  Patient has requested only one Talwin every 4 hours for now.  She is reporting a pain scale of 10 out of 10.  We will continue to use to allotted if needed for today to treat severe headaches.  Patient needs weaning of the Talwin which we will initiate at the time of her discharge.Ortho consult requested  7.  COPD with asthma acute mild exacerbation.  We will monitor patient nebs treatments have been ordered.  Regular inhalers will be used as needed.  8.  Malaise and fatigue.  These are ongoing problems for the patient.  She is clinically stable otherwise at the present time.  Some degree of vertigo is noted with movements.  9.  History of vitamin D deficiency.  We will check vitamin D levels at the present time and treat as necessary.  10.  Acute on chronic sinusitis.  This should be addressed by the Rocephin that she is receiving for the UTI at the present time.  Will monitor and adjust medications based on results from MRI which should also look into     Condition on Discharge:  Stable    Discharge Disposition  Home or Self Care    Transport Plan   to transport    Hospital Treatments discontinued at time of Discharge  IV, Telemetry, Miller Catheter, Deep Lines and PICC  LInes    Discharge Medications     Discharge Medications      New Medications      Instructions Start Date   clopidogrel 75 MG tablet  Commonly known as:  PLAVIX   75 mg, Oral, Daily      fluticasone 50 MCG/ACT nasal spray  Commonly known as:  FLONASE   1 spray, Each Nare, 2 Times Daily      labetalol 200 MG tablet  Commonly known as:  NORMODYNE   200 mg, Oral, Every 12 Hours Scheduled      losartan-hydrochlorothiazide 100-12.5 MG per tablet  Commonly known as:  HYZAAR   1 tablet, Oral, Daily      potassium chloride 20 MEQ CR tablet  Commonly known as:  K-DUR,KLOR-CON   20 mEq, Oral, Daily      predniSONE 10 MG tablet  Commonly known as:  DELTASONE   10 mg, Oral, Every Other Day         Changes to Medications      Instructions Start Date   butalbital-acetaminophen-caffeine -40 MG per tablet  Commonly known as:  FIORICET, ESGIC  What changed:  how much to take   2 tablets, Oral, Every 4 Hours PRN      omeprazole 40 MG capsule  Commonly known as:  priLOSEC  What changed:  See the new instructions.   TAKE ONE CAPSULE BY MOUTH EVERY DAY         Continue These Medications      Instructions Start Date   aspirin 81 MG tablet   81 mg, Oral, Daily      CloNIDine 0.2 MG tablet  Commonly known as:  CATAPRES   0.2 mg, Oral, Every 8 Hours Scheduled      cyanocobalamin 1000 MCG tablet  Commonly known as:  VITAMIN B-12   1,000 mcg, Oral, Daily      diphenhydrAMINE 50 MG tablet  Commonly known as:  BENADRYL   50 mg, Oral, Every 4 Hours PRN      eplerenone 50 MG tablet  Commonly known as:  INSPRA   50 mg, Oral, Every 12 Hours Scheduled      ethacrynic acid 25 MG tablet  Commonly known as:  EDECRIN   25 mg, Oral, 3 Times Daily      guaiFENesin 600 MG 12 hr tablet  Commonly known as:  MUCINEX   600 mg, Oral, Every 12 Hours Scheduled      LORazepam 0.5 MG tablet  Commonly known as:  ATIVAN   0.5 mg, Oral, Every 8 Hours PRN, Take 1 tab 1 hoour before MRI and repeat x 1 at time of test      meclizine 25 MG tablet  Commonly known  as:  ANTIVERT   TAKE ONE TABLET BY MOUTH THREE TIMES DAILY AS NEEDED FOR DIZZINESS      montelukast 10 MG tablet  Commonly known as:  SINGULAIR   10 mg, Oral, Nightly      NIFEdipine XL 60 MG 24 hr tablet  Commonly known as:  PROCARDIA XL   TAKE ONE TABLET TWICE DAILY      nystatin 596009 UNIT/ML suspension  Commonly known as:  MYCOSTATIN   5 mL, Oral, 4 Times Daily      ondansetron ODT 8 MG disintegrating tablet  Commonly known as:  ZOFRAN-ODT   8 mg, Oral, Every 6 Hours PRN      pentazocine-naloxone 50-0.5 MG per tablet  Commonly known as:  TALWIN NX   1 tablet, Oral, Every 4 Hours PRN      polyethylene glycol powder  Commonly known as:  MIRALAX   Take one capful w/liquid daily       PROAIR  (90 Base) MCG/ACT inhaler  Generic drug:  albuterol   ProAir  (90 Base) MCG/ACT Inhalation Aerosol Solution; Patient Sig: ProAir  (90 Base) MCG/ACT Inhalation Aerosol Solution ; 8; 0; 18-Jun-2014; Active      promethazine-codeine 6.25-10 MG/5ML syrup  Commonly known as:  PHENERGAN with CODEINE   5 mL, Oral, 4 Times Daily PRN      Vitamin D 1000 units tablet   Oral             Home Medication List  Prior to Admission medications    Medication Sig Start Date End Date Taking? Authorizing Provider   albuterol (PROAIR HFA) 108 (90 Base) MCG/ACT inhaler ProAir  (90 Base) MCG/ACT Inhalation Aerosol Solution; Patient Sig: ProAir  (90 Base) MCG/ACT Inhalation Aerosol Solution ; 8; 0; 18-Jun-2014; Active 6/18/14  Yes ProviderVadim MD   aspirin 81 MG tablet Take 81 mg by mouth Daily. 6/26/14  Yes ProviderVadim MD   butalbital-acetaminophen-caffeine (FIORICET, ESGIC) -40 MG per tablet Take 2 tablets by mouth Every 4 (Four) Hours As Needed for Headache.  Patient taking differently: Take 1 tablet by mouth Every 4 (Four) Hours As Needed for Headache. 3/27/17  Yes Óscar Cadet MD   CloNIDine (CATAPRES) 0.2 MG tablet Take 1 tablet by mouth Every 8 (Eight) Hours. 4/11/17  Yes Helio  Óscar OKEEFE MD   diphenhydrAMINE (BENADRYL) 50 MG tablet Take 1 tablet by mouth every 4 (four) hours as needed for itching or allergies. 7/25/16  Yes Óscar Cadet MD   eplerenone (INSPRA) 50 MG tablet Take 1 tablet by mouth Every 12 (Twelve) Hours. 4/11/17  Yes Óscar Cadet MD   ethacrynic acid (EDECRIN) 25 MG tablet Take 25 mg by mouth 3 (Three) Times a Day.   Yes Vadim Best MD   montelukast (SINGULAIR) 10 MG tablet Take 1 tablet by mouth every night. 9/8/16  Yes Óscar Cadet MD   NIFEdipine XL (PROCARDIA XL) 60 MG 24 hr tablet TAKE ONE TABLET TWICE DAILY 6/3/17  Yes Óscar Cadet MD   omeprazole (priLOSEC) 40 MG capsule TAKE ONE CAPSULE BY MOUTH EVERY DAY  Patient taking differently: TAKE ONE CAPSULE BY [facility] daily PRN 3/24/17  Yes Óscar Cadet MD   ondansetron ODT (ZOFRAN-ODT) 8 MG disintegrating tablet Take 1 tablet by mouth every 6 (six) hours as needed for nausea or vomiting. 9/8/16  Yes Óscar Cadet MD   polyethylene glycol (MIRALAX) powder Take one capful w/liquid daily 9/8/16  Yes Óscar Cadet MD   promethazine-codeine (PHENERGAN with CODEINE) 6.25-10 MG/5ML syrup Take 5 mL by mouth 4 (Four) Times a Day As Needed for cough. 12/9/16  Yes Óscar Cadet MD   pentazocine-naloxone (TALWIN NX) 50-0.5 MG per tablet Take 2 tablets by mouth Every 4 (Four) Hours As Needed for Mild Pain (1-3) (for up to 90 days).  Patient taking differently: Take 1 tablet by mouth Every 4 (Four) Hours As Needed for Mild Pain (1-3) (for up to 90 days). 3/27/17 8/27/18 Yes Óscar Cadet MD   Cholecalciferol (VITAMIN D) 1000 UNITS tablet Take  by mouth. 6/26/14   Vadim Best MD   clopidogrel (PLAVIX) 75 MG tablet Take 1 tablet by mouth Daily. 8/28/18   Óscar Cadet MD   fluticasone (FLONASE) 50 MCG/ACT nasal spray 1 spray by Each Nare route 2 (Two) Times a Day. 8/27/18   Óscar Cadet MD   guaiFENesin (MUCINEX) 600 MG 12 hr tablet Take 1 tablet by mouth Every 12  (Twelve) Hours. 12/29/17   Óscar Cadet MD   labetalol (NORMODYNE) 200 MG tablet Take 1 tablet by mouth Every 12 (Twelve) Hours. 8/27/18   Óscar Cadet MD   LORazepam (ATIVAN) 0.5 MG tablet Take 1 tablet by mouth Every 8 (Eight) Hours As Needed for anxiety. Take 1 tab 1 hoour before MRI and repeat x 1 at time of test 11/11/16   Óscar Cadet MD   losartan-hydrochlorothiazide (HYZAAR) 100-12.5 MG per tablet Take 1 tablet by mouth Daily. 8/27/18   Óscar Cadet MD   meclizine (ANTIVERT) 25 MG tablet TAKE ONE TABLET BY MOUTH THREE TIMES DAILY AS NEEDED FOR DIZZINESS 1/13/17   Óscar Cadet MD   nystatin (MYCOSTATIN) 355271 UNIT/ML suspension Take 5 mL by mouth 4 (Four) Times a Day. 12/29/17   Óscar Cadet MD   pentazocine-naloxone (TALWIN NX) 50-0.5 MG per tablet Take 1 tablet by mouth Every 4 (Four) Hours As Needed for Mild Pain  (for up to 90 days). 8/27/18   Óscar Cadet MD   potassium chloride (K-DUR,KLOR-CON) 20 MEQ CR tablet Take 1 tablet by mouth Daily. 8/27/18   Óscar Cadet MD   predniSONE (DELTASONE) 10 MG tablet Take 1 tablet by mouth Every Other Day. 8/29/18   Óscar Cadet MD   vitamin B-12 (VITAMIN B-12) 1000 MCG tablet Take 1 tablet by mouth Daily. 4/11/17   Óscar Cadet MD       Discharge Diet   Diet Orders (active)     None          Activity at Discharge  Activity Instructions     Activity as Tolerated       Other Instructions (Specify)       Wear sling on right arm when out of the house          Follow-up Appointments  Future Appointments  Date Time Provider Department Center   9/6/2018 7:45 AM [facility] ADMIN [facility]   9/6/2018 8:45 AM [facility] AJAY   9/6/2018 10:00 AM [facility] NUC STRESS LAB [facility]   9/6/2018 11:15 AM [facility]   9/12/2018 2:45 PM Jeffrey Argueta MD [facility] CD K[facility] None     Additional Instructions for the Follow-ups that You Need to Schedule     Discharge Follow-up with PCP    As  directed      Currently Documented PCP:  Óscar Cadet MD  PCP Phone Number:  [phone]    Follow Up Details:  Dr. Cadet=Primary Care 7-10 days         Discharge Follow-up with Specialty: Dr. Childress=Orthopedics; 1 Month    As directed      Specialty:  Dr. Childress=Orthopedics    Follow Up:  1 Month    Follow Up Details:  Shoulder pain         Discharge Follow-up with Specified Provider: Dr Argueta at the [facility] Sept 12 at 2:45    As directed      To:  Dr Argueta at the [facility] Sept 12 at 2:45    Follow Up Details:  She has been scheduled for stress testing on Sept 6 at 7:45 at the [facility] office. Our office will contact her with pretrest instructions         Discharge Follow-up with Specified Provider: Dr. Bond= Renal; 1 Month    As directed      To:  Dr. Bond= Renal    Follow Up:  1 Month    Follow Up Details:  Additional input on BP         Discharge Follow-up with Specified Provider: cardiology=Dr. Argueta for stress test, his office will call    As directed      To:  cardiology=Dr. Argueta for stress test, his office will call                   Test Results Pending at Discharge      Óscar Cadet MD    Time: Discharge 75  min

## 2018-08-30 ENCOUNTER — READMISSION MANAGEMENT (OUTPATIENT)
Dept: CALL CENTER | Facility: HOSPITAL | Age: 78
End: 2018-08-30

## 2018-08-30 NOTE — OUTREACH NOTE
Medical Week 1 Survey      Responses   Fac[facility] from?  [facility]   Does the patient have one of the following disease processes/diagnoses(primary or secondary)?  Other   Is there a successful TCM telephone encounter documented?  No   Week 1 attempt successful?  Yes   Call start time  1638   Call end time  1648   Meds reviewed with patient/caregiver?  Yes   Is the patient having any side effects they believe may be caused by any medication additions or changes?  No   Does the patient have all medications ordered at discharge?  Yes   Is the patient taking all medications as directed (includes completed medication regime)?  Yes   Medication comments  patient has spoken with her pharmacy about medication, coming off a dry addication   Comments regarding appointments  to  have a appointment with Dr. Interiano and will be seeing Dr. Cadet next week   Does the patient have a primary care provider?   Yes   Does the patient have an appointment with their PCP within 7 days of discharge?  Yes   Has the patient kept scheduled appointments due by today?  N/A   Has home health visited the patient within 72 hours of discharge?  N/A   Did the patient receive a copy of their discharge instructions?  Yes   Nursing interventions  Educated on MyChart   What is the patient's perception of their health status since discharge?  Improving   Is the patient/caregiver able to teach back signs and symptoms related to disease process for when to call PCP?  Yes   Is the patient/caregiver able to teach back signs and symptoms related to disease process for when to call 911?  Yes   Is the patient/caregiver able to teach back the hierarchy of who to call/visit for symptoms/problems? PCP, Specialist, Home health nurse, Urgent Care, ED, 911  Yes   Additional teach back comments  patient is well informed about her care   Week 1 call completed?  Yes   Wrap up additional comments  felling better          Jaimie Laird RN

## 2018-09-10 ENCOUNTER — READMISSION MANAGEMENT (OUTPATIENT)
Dept: CALL CENTER | Facility: HOSPITAL | Age: 78
End: 2018-09-10

## 2018-09-10 NOTE — OUTREACH NOTE
Medical Week 2 Survey      Responses   Facility patient discharged from?  [place]   Does the patient have one of the following disease processes/diagnoses(primary or secondary)?  Other   Week 2 attempt successful?  Yes   Call start time  1508   Discharge diagnosis  Uncontrolled hypertension controlled with medication regulation/ headaches   Rescheduled  Rescheduled-pt requested   Call end time  1503          Lena Cueto RN

## 2018-09-12 ENCOUNTER — READMISSION MANAGEMENT (OUTPATIENT)
Dept: CALL CENTER | Facility: HOSPITAL | Age: 78
End: 2018-09-12

## 2018-09-12 NOTE — OUTREACH NOTE
"Medical Week 2 Survey      Responses   Facility patient discharged from?  [facility]   Does the patient have one of the following disease processes/diagnoses(primary or secondary)?  Other   Week 2 attempt successful?  Yes   Call start time  1556   Discharge diagnosis  Uncontrolled hypertension controlled with medication regulation/ headaches   Call end time  1617   Meds reviewed with patient/caregiver?  Yes   Is the patient having any side effects they believe may be caused by any medication additions or changes?  No   Does the patient have all medications ordered at discharge?  Yes   Is the patient taking all medications as directed (includes completed medication regime)?  Yes   Medication comments  pt reports Dr. Cadet two medicines: lorsartan-hctz and labetalol. Pt states \"I have gone over all of my medicines with Dr. Cadet and my Pharmacists.\"    Comments regarding appointments  Pt reports she has seen Dr. Cadet, but cancelled her appt with Dr. Argueta and will reschedule \"when she regains her strength.\"    Does the patient have a primary care provider?   Yes   Does the patient have an appointment with their PCP within 7 days of discharge?  Yes   Has the patient kept scheduled appointments due by today?  Yes   Has home health visited the patient within 72 hours of discharge?  N/A   Psychosocial issues?  No   Did the patient receive a copy of their discharge instructions?  Yes   Nursing interventions  Reviewed instructions with patient   What is the patient's perception of their health status since discharge?  Improving   Is the patient/caregiver able to teach back signs and symptoms related to disease process for when to call PCP?  Yes   Is the patient/caregiver able to teach back signs and symptoms related to disease process for when to call 911?  Yes   Is the patient/caregiver able to teach back the hierarchy of who to call/visit for symptoms/problems? PCP, Specialist, Home health nurse, Urgent Care, ED, " 911  Yes   Week 2 Call Completed?  Yes          Renan Melendrez, RN

## 2018-09-22 ENCOUNTER — READMISSION MANAGEMENT (OUTPATIENT)
Dept: CALL CENTER | Facility: HOSPITAL | Age: 78
End: 2018-09-22

## 2018-09-22 NOTE — OUTREACH NOTE
"Medical Week 3 Survey      Responses   Facility patient discharged from?  [facility] [place]   Does the patient have one of the following disease processes/diagnoses(primary or secondary)?  Other   Week 3 attempt successful?  Yes   Call start time  1212   Call end time  1240   Discharge diagnosis  Uncontrolled hypertension controlled with medication regulation/ headaches   Meds reviewed with patient/caregiver?  Yes   Is the patient having any side effects they believe may be caused by any medication additions or changes?  No   Is the patient taking all medications as directed (includes completed medication regime)?  Yes   Medication comments  Dr Cadet has adjusted medications, restarted and stopped some medications  Dr Cadet knows her and she has gone to the same pharmacy for 50 years. She is very sensitive to medications.  All medications gone over with Dr aCdet and her pharmacist.   Does the patient have a primary care provider?   Yes   Does the patient have an appointment with their PCP within 7 days of discharge?  Yes   Has the patient kept scheduled appointments due by today?  Yes   Psychosocial issues?  No   What is the patient's perception of their health status since discharge?  Improving   Additional teach back comments  Just not back to [place]\" yet. Planning a cruise in December.   Week 3 Call Completed?  Yes          Letha Alvarez RN  "

## 2018-10-01 ENCOUNTER — READMISSION MANAGEMENT (OUTPATIENT)
Dept: CALL CENTER | Facility: HOSPITAL | Age: 78
End: 2018-10-01

## 2018-10-01 NOTE — OUTREACH NOTE
Medical Week 4 Survey      Responses   Facility patient discharged from?  [place]   Does the patient have one of the following disease processes/diagnoses(primary or secondary)?  Other   Week 4 attempt successful?  Yes   Call start time  1605   Call end time  1607   Meds reviewed with patient/caregiver?  Yes   Is the patient taking all medications as directed (includes completed medication regime)?  Yes   Has the patient kept scheduled appointments due by today?  Yes   Nursing Interventions  Educated on importance of keeping appointment   Is the patient still receiving Home Health Services?  No   What is the patient's perception of their health status since discharge?  Improving   Week 4 Call Completed?  Yes   Would the patient like one additional call?  No   Graduated  Yes   Did the patient feel the follow up calls were helpful during their recovery period?  Yes   Was the number of calls appropriate?  Yes          Catherine Nunez RN

## 2018-10-09 ENCOUNTER — TELEPHONE (OUTPATIENT)
Dept: NEUROLOGY | Facility: CLINIC | Age: 78
End: 2018-10-09

## 2018-10-09 NOTE — TELEPHONE ENCOUNTER
----- Message from AC Phipps sent at 10/8/2018 12:40 AM EDT -----  Yes I can see.   ----- Message -----  From: Charla Mitchell MA  Sent: 10/5/2018  11:28 AM  To: AC Phipps Dr. sent a referral on pt. Christina saw pt in hospital in August and wrote f/u in 1-2 months for H/A with Joy or Rene. Do you want to see pt? 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 Khan, weak legs and difficulty walking.

## 2018-10-26 ENCOUNTER — OFFICE VISIT (OUTPATIENT)
Dept: ORTHOPEDIC SURGERY | Facility: CLINIC | Age: 78
End: 2018-10-26

## 2018-10-26 ENCOUNTER — LAB (OUTPATIENT)
Dept: LAB | Facility: HOSPITAL | Age: 78
End: 2018-10-26
Attending: INTERNAL MEDICINE

## 2018-10-26 ENCOUNTER — TRANSCRIBE ORDERS (OUTPATIENT)
Dept: LAB | Facility: HOSPITAL | Age: 78
End: 2018-10-26

## 2018-10-26 VITALS — HEIGHT: 63 IN | TEMPERATURE: 98.6 F | BODY MASS INDEX: 34.98 KG/M2 | WEIGHT: 197.4 LBS

## 2018-10-26 DIAGNOSIS — N18.30 CHRONIC KIDNEY DISEASE, STAGE III (MODERATE) (HCC): Primary | ICD-10-CM

## 2018-10-26 DIAGNOSIS — N18.30 CHRONIC KIDNEY DISEASE, STAGE III (MODERATE) (HCC): ICD-10-CM

## 2018-10-26 DIAGNOSIS — M19.019 ARTHRITIS OF SHOULDER: Primary | ICD-10-CM

## 2018-10-26 LAB
ALBUMIN SERPL-MCNC: 4.5 G/DL (ref 3.5–5.2)
ANION GAP SERPL CALCULATED.3IONS-SCNC: 13.3 MMOL/L
BUN BLD-MCNC: 28 MG/DL (ref 8–23)
BUN/CREAT SERPL: 24.8 (ref 7–25)
CALCIUM SPEC-SCNC: 9.7 MG/DL (ref 8.6–10.5)
CHLORIDE SERPL-SCNC: 100 MMOL/L (ref 98–107)
CO2 SERPL-SCNC: 26.7 MMOL/L (ref 22–29)
CREAT BLD-MCNC: 1.13 MG/DL (ref 0.57–1)
GFR SERPL CREATININE-BSD FRML MDRD: 46 ML/MIN/1.73
GLUCOSE BLD-MCNC: 138 MG/DL (ref 65–99)
PHOSPHATE SERPL-MCNC: 2.7 MG/DL (ref 2.5–4.5)
POTASSIUM BLD-SCNC: 3.6 MMOL/L (ref 3.5–5.2)
SODIUM BLD-SCNC: 140 MMOL/L (ref 136–145)
URATE SERPL-MCNC: 7.4 MG/DL (ref 2.4–5.7)

## 2018-10-26 PROCEDURE — 80069 RENAL FUNCTION PANEL: CPT

## 2018-10-26 PROCEDURE — 99204 OFFICE O/P NEW MOD 45 MIN: CPT | Performed by: ORTHOPAEDIC SURGERY

## 2018-10-26 PROCEDURE — 84550 ASSAY OF BLOOD/URIC ACID: CPT

## 2018-10-26 PROCEDURE — 20610 DRAIN/INJ JOINT/BURSA W/O US: CPT | Performed by: ORTHOPAEDIC SURGERY

## 2018-10-26 PROCEDURE — 36415 COLL VENOUS BLD VENIPUNCTURE: CPT

## 2018-10-26 RX ORDER — FUROSEMIDE 40 MG/1
40 TABLET ORAL DAILY
Refills: 3 | COMMUNITY
Start: 2018-10-03 | End: 2019-06-19

## 2018-10-26 RX ADMIN — LIDOCAINE HYDROCHLORIDE 2 ML: 20 INJECTION, SOLUTION EPIDURAL; INFILTRATION; INTRACAUDAL; PERINEURAL at 14:43

## 2018-10-26 RX ADMIN — METHYLPREDNISOLONE ACETATE 80 MG: 80 INJECTION, SUSPENSION INTRA-ARTICULAR; INTRALESIONAL; INTRAMUSCULAR; SOFT TISSUE at 14:43

## 2018-10-26 NOTE — PROGRESS NOTES
Patient: Sasha Barrett    YOB: 1940    Medical Record Number: [id number]    Chief Complaints:  Right shoulder pain    History of Present Illness:     78 y.o. female patient who presents with a long history of progressively worsening pain and dysfunction affecting the right shoulder.  She reports symptoms dating back many years.  At one point, she cannot recall when, she had an injection which seemed to help.  She rates her pain as moderate to severe rigid she describes it as constant and aching.  She has noticed some swelling and redness associated with the pain.  Pain is worse with sitting and walking.      Allergies:     Allergies   Allergen Reactions   • Morphine Anaphylaxis   • Sulfa Antibiotics Anaphylaxis and Hives     Or sulfa fillers in meds   • Hydralazine Myalgia     Patient reports leg cramps, joint pain and increased fatigue   • Amlodipine      KIDNEYS SHUT DOWN   • Anastrozole    • Grass    • Nsaids      KIDNEY FAILURE   • Penicillins      Tolerated ceftriaxone during 04/2017 admission   • Tree Extract    • Zolpidem      HALLUCINATIONS     Home Medications:    Current Outpatient Prescriptions:   •  aspirin 81 MG tablet, Take 81 mg by mouth Daily., Disp: , Rfl:   •  butalbital-acetaminophen-caffeine (FIORICET, ESGIC) -40 MG per tablet, Take 2 tablets by mouth Every 4 (Four) Hours As Needed for Headache. (Patient taking differently: Take 1 tablet by mouth Every 4 (Four) Hours As Needed for Headache.), Disp: 240 tablet, Rfl: 0  •  Cholecalciferol (VITAMIN D) 1000 UNITS tablet, Take  by mouth., Disp: , Rfl:   •  CloNIDine (CATAPRES) 0.2 MG tablet, Take 1 tablet by mouth Every 8 (Eight) Hours., Disp: 270 tablet, Rfl: 3  •  diphenhydrAMINE (BENADRYL) 50 MG tablet, Take 1 tablet by mouth every 4 (four) hours as needed for itching or allergies., Disp: 180 tablet, Rfl: 0  •  eplerenone (INSPRA) 50 MG tablet, Take 1 tablet by mouth Every 12 (Twelve) Hours., Disp: 180 tablet, Rfl: 3  •   ethacrynic acid (EDECRIN) 25 MG tablet, Take 25 mg by mouth 3 (Three) Times a Day., Disp: , Rfl:   •  fluticasone (FLONASE) 50 MCG/ACT nasal spray, 1 spray by Each Nare route 2 (Two) Times a Day., Disp: , Rfl:   •  furosemide (LASIX) 40 MG tablet, Take 40 mg by mouth Daily., Disp: , Rfl: 3  •  meclizine (ANTIVERT) 25 MG tablet, TAKE ONE TABLET BY MOUTH THREE TIMES DAILY AS NEEDED FOR DIZZINESS, Disp: 30 tablet, Rfl: 0  •  montelukast (SINGULAIR) 10 MG tablet, Take 1 tablet by mouth every night., Disp: 90 tablet, Rfl: 1  •  NIFEdipine XL (PROCARDIA XL) 60 MG 24 hr tablet, TAKE ONE TABLET TWICE DAILY, Disp: 180 tablet, Rfl: 0  •  nystatin (MYCOSTATIN) 201043 UNIT/ML suspension, Take 5 mL by mouth 4 (Four) Times a Day., Disp: 60 mL, Rfl: 3  •  omeprazole (priLOSEC) 40 MG capsule, TAKE ONE CAPSULE BY MOUTH EVERY DAY (Patient taking differently: TAKE ONE CAPSULE BY [facility] daily PRN), Disp: 90 capsule, Rfl: 0  •  ondansetron ODT (ZOFRAN-ODT) 8 MG disintegrating tablet, Take 1 tablet by mouth every 6 (six) hours as needed for nausea or vomiting., Disp: 360 tablet, Rfl: 1  •  pentazocine-naloxone (TALWIN NX) 50-0.5 MG per tablet, Take 1 tablet by mouth Every 4 (Four) Hours As Needed for Mild Pain  (for up to 90 days)., Disp: , Rfl:   •  polyethylene glycol (MIRALAX) powder, Take one capful w/liquid daily, Disp: 765 g, Rfl: 1  •  potassium chloride (K-DUR,KLOR-CON) 20 MEQ CR tablet, Take 1 tablet by mouth Daily., Disp: 90 tablet, Rfl: 1  •  predniSONE (DELTASONE) 10 MG tablet, Take 1 tablet by mouth Every Other Day., Disp: 15 tablet, Rfl:   •  promethazine-codeine (PHENERGAN with CODEINE) 6.25-10 MG/5ML syrup, Take 5 mL by mouth 4 (Four) Times a Day As Needed for cough., Disp: 360 mL, Rfl: 0  •  vitamin B-12 (VITAMIN B-12) 1000 MCG tablet, Take 1 tablet by mouth Daily., Disp: 90 tablet, Rfl: 3  •  albuterol (PROAIR HFA) 108 (90 Base) MCG/ACT inhaler, ProAir  (90 Base) MCG/ACT Inhalation Aerosol Solution; Patient Sig:  ProAir  (90 Base) MCG/ACT Inhalation Aerosol Solution ; 8; 0; 18-Jun-2014; Active, Disp: , Rfl:   •  clopidogrel (PLAVIX) 75 MG tablet, Take 1 tablet by mouth Daily., Disp: 90 tablet, Rfl: 1  •  guaiFENesin (MUCINEX) 600 MG 12 hr tablet, Take 1 tablet by mouth Every 12 (Twelve) Hours., Disp: 60 tablet, Rfl: 5  •  labetalol (NORMODYNE) 200 MG tablet, Take 1 tablet by mouth Every 12 (Twelve) Hours., Disp: 90 tablet, Rfl: 1  •  LORazepam (ATIVAN) 0.5 MG tablet, Take 1 tablet by mouth Every 8 (Eight) Hours As Needed for anxiety. Take 1 tab 1 hoour before MRI and repeat x 1 at time of test, Disp: 60 tablet, Rfl: 0  •  losartan-hydrochlorothiazide (HYZAAR) 100-12.5 MG per tablet, Take 1 tablet by mouth Daily., Disp: 90 tablet, Rfl: 1    Past Medical History:   Diagnosis Date   • Arthritis    • Asthma    • Cancer (CMS/HCC)    • COPD (chronic obstructive pulmonary disease) (CMS/HCC)    • Emphysema lung (CMS/HCC)    • Generalized headaches    • Hayfever    • Hypertension    • Stroke (CMS/HCC)        Past Surgical History:   Procedure Laterality Date   • BREAST LUMPECTOMY Left 04/01/2003   • CARPAL TUNNEL RELEASE  2011   • CATARACT EXTRACTION  2010   • HERNIA REPAIR  01/01/1971   • HERNIA REPAIR      right groin   • JOINT REPLACEMENT     • KNEE SURGERY Left 01/01/1998    bilateral knee replacements   • MASTECTOMY     • PARATHYROIDECTOMY  05/08/2006   • SINUS SURGERY  01/01/1984   • TOTAL ABDOMINAL HYSTERECTOMY  01/01/1973       Social History     Occupational History   • Not on file.     Social History Main Topics   • Smoking status: Never Smoker   • Smokeless tobacco: Never Used   • Alcohol use Yes      Comment: rarely - once or twice year (wine or bernabe)   • Drug use: Unknown   • Sexual activity: Yes     Partners: Male      Social History     Social History Narrative   • No narrative on file       Family History   Problem Relation Age of Onset   • Cancer Brother        Review of Systems:      Constitutional:  "Denies fever, shaking or chills   Eyes: Denies change in visual acuity   HEENT: Denies nasal congestion or sore throat   Respiratory: Denies cough or shortness of breath   Cardiovascular: Denies chest pain or edema  Endocrine: Denies tremors, palpitations, intolerance of heat or cold, polyuria, polydipsia.  GI: Denies abdominal pain, nausea, vomiting, bloody stools or diarrhea  : Denies frequency, urgency, incontinence, retention, or nocturia.  Musculoskeletal: Denies numbness, tingling or loss of motor function   Integument: Denies rash, lesion or ulceration   Neurologic: Denies headache or focal weakness, deficits  Heme: Denies spontaneous or excessive bleeding, epistaxis, hematuria, melena, fatigue, enlarged or tender lymph nodes.      All other pertinent positives and negatives as noted above in HPI.    Physical Exam: 78 y.o. female  Vitals:    10/26/18 1345   Temp: 98.6 °F (37 °C)   TempSrc: Temporal Artery    Weight: 89.5 kg (197 lb 6.4 oz)   Height: 158.8 cm (62.5\")     General:  Patient is awake and alert.  Appears in no acute distress or discomfort.    Psych:  Affect and demeanor are appropriate.    Eyes:  Conjunctiva and sclera appear grossly normal.  Eyes track well and EOM seem to be intact.    Ears:  No gross abnormalities.  Hearing adequate for the exam.    Cardiovascular:  Regular rate and rhythm.    Lungs:  Good chest expansion.  Breathing unlabored.    Spine:  Neck appears grossly normal.  No palpable masses or adenopathy.  Good motion.  Spurling's maneuver is negative for any shoulder or arm symptoms.    Extremities:  Skin is benign.  No obvious gross abnormalities about right shoulder.  No palpable masses or adenopathy.  Moderate tenderness noted over anterior glenohumeral joint and rotator interval.  Motion is to 155° of forward elevation, 60° external rotation, full horizontal abduction, T12 internal rotation.  No instability.  Rotator cuff strength seems to be well preserved but the exam is " limited due to patient discomfort with resisted active motion.  Good motor and sensory function in lower arm and hand.  Palpable pulses.         Radiology:  Outside AP and rotated AP views of the right shoulder are  reviewed to evaluate the patient's complaint.  No comparison films are immediately available.  The x-rays show moderate glenohumeral osteoarthritis with joint space narrowing, osteophyte formation, and subchondral sclerosis.  The acromiohumeral interval measures normal.    Assessment/Plan:  Right shoulder osteoarthritis    We discussed treatment options in detail including conservative treatment versus surgical options.  Regarding conservative treatment, we discussed appropriate activity modifications, anti-inflammatories, injections, and physical therapy.  We also discussed the option of an arthroplasty and all that would entail.  I have recommended that we start with a conservative approach and the patient agrees.    The patient has acknowledged understanding of the information and elected for an injection.  The risks, benefits and alternatives were discussed.  She consented.  She will follow-up as needed.    Be Pichardo MD    10/26/2018    CC to Óscar Cadet MD     Large Joint Arthrocentesis  Date/Time: 10/26/2018 2:43 PM  Consent given by: patient  Site marked: site marked  Timeout: Immediately prior to procedure a time out was called to verify the correct patient, procedure, equipment, support staff and site/side marked as required   Supporting Documentation  Indications: pain   Procedure Details  Location: shoulder - R glenohumeral  Preparation: Patient was prepped and draped in the usual sterile fashion  Needle gauge: 21 G.  Approach: anterior  Medications administered: 80 mg methylPREDNISolone acetate 80 MG/ML; 2 mL lidocaine PF 2% 2 %  Patient tolerance: patient tolerated the procedure well with no immediate complications

## 2018-10-29 RX ORDER — LIDOCAINE HYDROCHLORIDE 20 MG/ML
2 INJECTION, SOLUTION EPIDURAL; INFILTRATION; INTRACAUDAL; PERINEURAL
Status: COMPLETED | OUTPATIENT
Start: 2018-10-26 | End: 2018-10-26

## 2018-10-29 RX ORDER — METHYLPREDNISOLONE ACETATE 80 MG/ML
80 INJECTION, SUSPENSION INTRA-ARTICULAR; INTRALESIONAL; INTRAMUSCULAR; SOFT TISSUE
Status: COMPLETED | OUTPATIENT
Start: 2018-10-26 | End: 2018-10-26

## 2018-11-21 ENCOUNTER — OFFICE VISIT (OUTPATIENT)
Dept: NEUROLOGY | Facility: CLINIC | Age: 78
End: 2018-11-21

## 2018-11-21 VITALS
WEIGHT: 195 LBS | HEART RATE: 65 BPM | OXYGEN SATURATION: 63 % | SYSTOLIC BLOOD PRESSURE: 160 MMHG | HEIGHT: 62 IN | BODY MASS INDEX: 35.88 KG/M2 | DIASTOLIC BLOOD PRESSURE: 85 MMHG

## 2018-11-21 DIAGNOSIS — I10 UNCONTROLLED HYPERTENSION: ICD-10-CM

## 2018-11-21 DIAGNOSIS — I63.89 CEREBROVASCULAR ACCIDENT (CVA) DUE TO OTHER MECHANISM (HCC): Primary | ICD-10-CM

## 2018-11-21 DIAGNOSIS — E78.5 HYPERLIPIDEMIA LDL GOAL <70: ICD-10-CM

## 2018-11-21 DIAGNOSIS — I67.2 CEREBRAL ATHEROSCLEROSIS: ICD-10-CM

## 2018-11-21 DIAGNOSIS — E66.9 OBESITY (BMI 30.0-34.9): ICD-10-CM

## 2018-11-21 PROBLEM — G43.009 MIGRAINE WITHOUT AURA AND WITHOUT STATUS MIGRAINOSUS, NOT INTRACTABLE: Status: ACTIVE | Noted: 2018-11-21

## 2018-11-21 PROCEDURE — 99214 OFFICE O/P EST MOD 30 MIN: CPT | Performed by: NURSE PRACTITIONER

## 2018-11-21 NOTE — PROGRESS NOTES
"DOS: 2018  NAME: Sasha Barrett   : 1940  PCP: Óscar Cadet MD    Chief Complaint   Patient presents with   • Headache      SUBJECTIVE  Neurological Problem:  78 y.o.RHW female with a HTN, COPD, CKD III, urinary retention requiring self catheterization, left breast cancer s/p resection, arthritis (felt to be secondary to osteoarthritis by rheumatology) and migraine who presents today for hospital f/u. She is unaccompanied.  The patient and problem are new to me. I have reviewed previous records. Patient provides history.     Interval History:   Ms. Barrett initially presented to [facility] in 2018 with h/a, BP > 200s, and UTI. She describes her H/A started 4 days PTA and is described as pulsating in the back of her head with occasional radiation to the front of her head over her left eye. She'd been taking fiorcet Q 4-6h at home for years with some relief however, typically she takes it 3 times a month. She was treated in the hospital with migraine cocktails x 3. Her imaging has been negative for acute findings to explain H/A but did reveal severe intracranial atherosclerosis which places her at increased risk for stroke, no aneurysm.  She was discharged on ASA and Plavix.     She presents today and states that she has reviewed her medications with her PCP, Dr. Cadet and she is no longer taking several medications including Plavix.  She continues on ASA 81 mg x 2 tablets.  She is not currently on a statin medication, reporting intolerable side effects.  She states her blood pressure has been difficult to control, it is elevated today.     MIGRAINES:   She reports that her migraines started \"years and years\" ago that were associated with severe nausea and vomiting. Over the years the symptoms of nausea and vomiting have improved; however she continues with headaches 1-2 times per week. She usually treats headaches by lying down, using ice packs and fiorcet. She has been using fioricet for over \"10 " "years\" for headache treatment. They usually start in the back of her head and progress to the front. She denies any aura prior to headaches except for feeling of nausea at times and \"build up of pressure\".  Lights and sounds make headache worse.  She currently reports a mild headache, pain 4 out of 10.  She has a history of renal failure and is followed Dr Meléndez he shouldn't reports being unable to take \"many medications \".  She denies using any other abortive medications other than Fioricet.  She denies a history of smoking. Rare alcohol use. She is planning to go on a cruise in early December several weeks.       Review of Systems:Review of Systems   Constitutional: Positive for fatigue. Negative for activity change, appetite change, chills, diaphoresis, fever and unexpected weight change.   HENT: Positive for sinus pressure. Negative for congestion, dental problem, drooling, ear discharge, ear pain, facial swelling, hearing loss, mouth sores, nosebleeds, postnasal drip, rhinorrhea, sinus pain, sneezing, sore throat, tinnitus, trouble swallowing and voice change.    Eyes: Negative.    Respiratory: Negative.    Cardiovascular: Positive for leg swelling. Negative for chest pain and palpitations.   Gastrointestinal: Positive for nausea. Negative for abdominal distention, abdominal pain, anal bleeding, blood in stool, constipation, diarrhea, rectal pain and vomiting.   Endocrine: Negative.    Genitourinary: Negative.    Musculoskeletal: Positive for arthralgias. Negative for back pain, gait problem, joint swelling, myalgias, neck pain and neck stiffness.   Skin: Negative.    Allergic/Immunologic: Negative.    Neurological: Positive for dizziness and headaches. Negative for tremors, seizures, syncope, facial asymmetry, speech difficulty, weakness, light-headedness and numbness.   Hematological: Negative.    Psychiatric/Behavioral: Negative.        Current Medications:   Current Outpatient Medications:   •  albuterol " (PROAIR HFA) 108 (90 Base) MCG/ACT inhaler, ProAir  (90 Base) MCG/ACT Inhalation Aerosol Solution; Patient Sig: ProAir  (90 Base) MCG/ACT Inhalation Aerosol Solution ; 8; 0; 18-Jun-2014; Active, Disp: , Rfl:   •  aspirin 81 MG tablet, Take 81 mg by mouth Daily., Disp: , Rfl:   •  budesonide (PULMICORT) 180 MCG/ACT inhaler, Inhale 1 puff 2 (Two) Times a Day., Disp: , Rfl:   •  butalbital-acetaminophen-caffeine (FIORICET, ESGIC) -40 MG per tablet, Take 2 tablets by mouth Every 4 (Four) Hours As Needed for Headache. (Patient taking differently: Take 1 tablet by mouth Every 4 (Four) Hours As Needed for Headache.), Disp: 240 tablet, Rfl: 0  •  Cholecalciferol (VITAMIN D) 1000 UNITS tablet, Take  by mouth., Disp: , Rfl:   •  CloNIDine (CATAPRES) 0.2 MG tablet, Take 1 tablet by mouth Every 8 (Eight) Hours., Disp: 270 tablet, Rfl: 3  •  diphenhydrAMINE (BENADRYL) 50 MG tablet, Take 1 tablet by mouth every 4 (four) hours as needed for itching or allergies., Disp: 180 tablet, Rfl: 0  •  eplerenone (INSPRA) 50 MG tablet, Take 1 tablet by mouth Every 12 (Twelve) Hours., Disp: 180 tablet, Rfl: 3  •  ethacrynic acid (EDECRIN) 25 MG tablet, Take 25 mg by mouth 3 (Three) Times a Day., Disp: , Rfl:   •  fluticasone (FLONASE) 50 MCG/ACT nasal spray, 1 spray by Each Nare route 2 (Two) Times a Day., Disp: , Rfl:   •  furosemide (LASIX) 40 MG tablet, Take 40 mg by mouth Daily., Disp: , Rfl: 3  •  LORazepam (ATIVAN) 0.5 MG tablet, Take 1 tablet by mouth Every 8 (Eight) Hours As Needed for anxiety. Take 1 tab 1 hoour before MRI and repeat x 1 at time of test, Disp: 60 tablet, Rfl: 0  •  meclizine (ANTIVERT) 25 MG tablet, TAKE ONE TABLET BY MOUTH THREE TIMES DAILY AS NEEDED FOR DIZZINESS, Disp: 30 tablet, Rfl: 0  •  montelukast (SINGULAIR) 10 MG tablet, Take 1 tablet by mouth every night., Disp: 90 tablet, Rfl: 1  •  NIFEdipine XL (PROCARDIA XL) 60 MG 24 hr tablet, TAKE ONE TABLET TWICE DAILY, Disp: 180 tablet, Rfl:  0  •  omeprazole (priLOSEC) 40 MG capsule, TAKE ONE CAPSULE BY MOUTH EVERY DAY (Patient taking differently: TAKE ONE CAPSULE BY [facility] daily PRN), Disp: 90 capsule, Rfl: 0  •  ondansetron ODT (ZOFRAN-ODT) 8 MG disintegrating tablet, Take 1 tablet by mouth every 6 (six) hours as needed for nausea or vomiting., Disp: 360 tablet, Rfl: 1  •  pentazocine-naloxone (TALWIN NX) 50-0.5 MG per tablet, Take 1 tablet by mouth Every 4 (Four) Hours As Needed for Mild Pain  (for up to 90 days)., Disp: , Rfl:   •  polyethylene glycol (MIRALAX) powder, Take one capful w/liquid daily, Disp: 765 g, Rfl: 1  •  predniSONE (DELTASONE) 10 MG tablet, Take 1 tablet by mouth Every Other Day., Disp: 15 tablet, Rfl:   •  promethazine-codeine (PHENERGAN with CODEINE) 6.25-10 MG/5ML syrup, Take 5 mL by mouth 4 (Four) Times a Day As Needed for cough., Disp: 360 mL, Rfl: 0  •  vitamin B-12 (VITAMIN B-12) 1000 MCG tablet, Take 1 tablet by mouth Daily., Disp: 90 tablet, Rfl: 3    The following portions of the patient's history were reviewed and updated as appropriate: allergies, current medications, past family history, past medical history, past social history, past surgical history and problem list.    OBJECTIVE  Diagnostics:  CT head 8/22/18: No acute findings.  MRI brain 8/23/18: DWI negative. Encephalomalacia within the left occipital lobe. Extensive  chronic ischemic small vessel degenerative changes. Moderate cerebral atrophy is present.  MRA head 8/23/18: IMPRESSION:  1.  Nonvisualization of the left posterior cerebral artery. This was  small in caliber and arose from the posterior cerebral artery on brain  MRA on 12/05/2016 which is not visualized on today's exam. Further  evaluation with CTA of the head is recommended as this is a more  sensitive to delineate arterial anatomy.  2.  Apparent outpouching from the undersurface of the terminal left  internal carotid artery which may represent artifact from adjacent  vasculature versus small  aneurysm. This can also be further evaluated  with the above-mentioned CTA of the head.  3.  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 Otherwise unremarkable CT angiography imaging of the neck and  head. No aneurysms were identified    XR C-spine 8/25/18: The odontoid is preserved with satisfactory  C1-2 alignment. No instability nor subluxation with flexion/extension.  Prevertebral soft tissues are normal. There is facet hypertrophy. There  is multilevel spur formation. Disc space narrowing demonstrated at C4-5  and C6-7. Neuroforamina are patent bilaterally    TTE 8/23/18: Interpretation Summary   · Calculated EF = 78%.  · Left ventricular systolic function is normal.  · Left ventricular diastolic dysfunction (grade I) consistent with impaired relaxation.  · Mild tricuspid valve regurgitation is present.  · Estimated right ventricular systolic pressure from tricuspid regurgitation is normal (<35 mmHg).     EKG 8/24/18: SR    Laboratory Results:         Lab Results   Component Value Date    WBC 8.31 08/27/2018    HGB 13.1 08/27/2018    HCT 42.9 08/27/2018    MCV 92.3 08/27/2018     08/27/2018     Lab Results   Component Value Date    GLUCOSE 138 (H) 10/26/2018    BUN 28 (H) 10/26/2018    CREATININE 1.13 (H) 10/26/2018    EGFRIFNONA 46 (L) 10/26/2018    EGFRIFAFRI 63 05/03/2017    BCR 24.8 10/26/2018    K 3.6 10/26/2018    CO2 26.7 10/26/2018    CALCIUM 9.7 10/26/2018    PROTENTOTREF 7.1 05/03/2017    ALBUMIN 4.50 10/26/2018    LABIL2 1.6 05/03/2017    AST 19 12/26/2017    ALT 17 12/26/2017     No results found for: HGBA1C  Lab Results   Component Value Date    CHOL 170 08/24/2018    CHOL 182 12/26/2017     Lab Results   Component Value Date    HDL 55 08/24/2018    HDL 46 12/26/2017    HDL 59 05/03/2017     Lab Results   Component Value Date    LDL 92  08/24/2018    LDL 99 12/26/2017     (H) 05/03/2017     Lab Results   Component Value Date    TRIG 117 08/24/2018    TRIG 184 (H) 12/26/2017    TRIG 202 (H) 05/03/2017     No results found for: RPR  Lab Results   Component Value Date    TSH 0.642 12/26/2017     Lab Results   Component Value Date    [id number] 263 04/07/2017       Physical Examination: NIHSS: 0 mRS:   General Appearance:   Well developed, obese well groomed, alert, and cooperative.  HEENT: Normocephalic.    Neck and Spine: Normal range of motion.  Normal alignment. No mass or tenderness.  Cardiac: Regular rate and rhythm.   Peripheral Vasculature: Radial pulses are equal and symmetric. No signs of distal embolization.  Extremities:    Moderate BLE edema. Some decreased ROM of knees bilaterally.   Skin:    No rashes or birth marks.    Neurological examination:  Higher Integrative  Function: Oriented to time, place and person. Normal registration, recall (3/3), attention span and concentration. Normal language including comprehension, spontaneous speech, repetition, reading, writing, naming and vocabulary. No neglect with normal visual-spatial function and construction. Normal fund of knowledge and higher integrative function.  CN II: Pupils are equal, round, and reactive to light. Normal visual acuity and visual fields.    CN III IV VI: Extraocular movements are full without nystagmus. Mild left ptosis.  CN V: Normal facial sensation and strength of muscles of mastication.  CN VII: Facial movements are symmetric. No weakness.  CN VIII:   Auditory acuity is normal.  CN IX & X:   Symmetric palatal movement.  CN XI: Sternocleidomastoid and trapezius are normal.  No weakness.  CN XII:   The tongue is midline.  No atrophy or fasciculations.  Motor: Normal muscle strength, bulk and tone in upper and lower extremities.  No fasciculations, rigidity, spasticity, or abnormal movements.  Sensation: Normal to light touch, pinprick, vibration, temperature,  and proprioception in arms and legs. Normal graphesthesia and no extinction on DSS.  Station and Gait: Antalgic gait with a broad base, mildly unsteady.   Coordination: Finger to nose test shows no dysmetria. Difficulty with heel to shin due to decreased knee ROM.     Impression:  Ms. Barrett is doing fair in her admission in August 2018 with intractable headache.  Her imaging was negative for any acute findings however her MRI did show extensive small vessel imaging changes as well as encephalomalacia and left occipital lobe consistent with an old infarct.  She was discharged on DAPT for stroke prevention and currently continues on ASA 81 mg ×2 tablets daily.  She is currently not on statin therapy due to intolerance, would recommend at least a low-dose.  In regards to her headaches.  They have improved although she continues with headaches at least twice weekly in which she treats with Fioricet.  We discussed options for a preventative medication regimen and the importance of decreasing the use of NSAIDs, Fioricet due to rebound headaches.  Due to her age, complicated renal issues, she is not a good candidate for amitriptyline or Topamax.  We discussed trial of Aimovig for prevention as well as OTC Migralieff.  Patient interested in trying this after her vacation.  F/U pending Aimovig authorization.     Plan:     1. Headaches  Trial of Aimovig  OTC Migralief  Minimize NSAIDs, Fioricet due to rebound headaches    2. Stroke prevention  Continue ASA  Recommend low dose statin if patient can tolerate  Monitor BP regularly and record  Ideal targets for stroke prevention would be Blood pressure < 130/80; B12 > 500 TSH in normal range and LDL < 70; HbA1c < 6.5 and smoking cessation if applicable.  Call 911 for stroke symptoms      Sasha was seen today for headache.    Diagnoses and all orders for this visit:    Cerebrovascular accident (CVA) due to other mechanism    Cerebral atherosclerosis    Hyperlipidemia LDL goal  <70    Uncontrolled hypertension    Obesity (BMI 30.0-34.9)        Coding

## 2018-11-30 ENCOUNTER — TELEPHONE (OUTPATIENT)
Dept: NEUROLOGY | Facility: CLINIC | Age: 78
End: 2018-11-30

## 2018-11-30 NOTE — TELEPHONE ENCOUNTER
I have sent the Rx to the patients pharmacy. I will complete a PA to see if it is covered by insurance

## 2018-11-30 NOTE — TELEPHONE ENCOUNTER
----- Message from AC Phipps sent at 11/25/2018  9:27 PM EST -----  I want to try and start this patient on Aimovig. Is there anything I need to do prior to ordering?

## 2019-01-03 ENCOUNTER — HOSPITAL ENCOUNTER (OUTPATIENT)
Dept: CT IMAGING | Facility: HOSPITAL | Age: 79
Discharge: HOME | End: 2019-01-03
Attending: INTERNAL MEDICINE | Admitting: INTERNAL MEDICINE

## 2019-01-03 ENCOUNTER — TRANSCRIBE ORDERS (OUTPATIENT)
Dept: LAB | Facility: HOSPITAL | Age: 79
End: 2019-01-03

## 2019-01-03 ENCOUNTER — HOSPITAL ENCOUNTER (OUTPATIENT)
Dept: GENERAL RADIOLOGY | Facility: HOSPITAL | Age: 79
Discharge: HOME | End: 2019-01-03
Attending: INTERNAL MEDICINE

## 2019-01-03 ENCOUNTER — TRANSCRIBE ORDERS (OUTPATIENT)
Dept: ADMINISTRATIVE | Facility: HOSPITAL | Age: 79
End: 2019-01-03

## 2019-01-03 ENCOUNTER — LAB (OUTPATIENT)
Dept: LAB | Facility: HOSPITAL | Age: 79
End: 2019-01-03
Attending: INTERNAL MEDICINE

## 2019-01-03 DIAGNOSIS — R10.9 STOMACH ACHE: Primary | ICD-10-CM

## 2019-01-03 DIAGNOSIS — R14.0 BLOATING: ICD-10-CM

## 2019-01-03 DIAGNOSIS — R10.9 STOMACH ACHE: ICD-10-CM

## 2019-01-03 DIAGNOSIS — R52 PAIN: ICD-10-CM

## 2019-01-03 DIAGNOSIS — R10.32 LT INGUINAL PAIN: ICD-10-CM

## 2019-01-03 DIAGNOSIS — R11.2 NAUSEA AND VOMITING, INTRACTABILITY OF VOMITING NOT SPECIFIED, UNSPECIFIED VOMITING TYPE: ICD-10-CM

## 2019-01-03 DIAGNOSIS — R19.09 ABDOMINAL MASS OF OTHER SITE: ICD-10-CM

## 2019-01-03 DIAGNOSIS — R05.3 PERSISTENT COUGH: ICD-10-CM

## 2019-01-03 DIAGNOSIS — R14.0 BLOATING: Primary | ICD-10-CM

## 2019-01-03 LAB
ALBUMIN SERPL-MCNC: 4.2 G/DL (ref 3.5–5.2)
ALBUMIN/GLOB SERPL: 1.2 G/DL
ALP SERPL-CCNC: 85 U/L (ref 39–117)
ALT SERPL W P-5'-P-CCNC: 9 U/L (ref 1–33)
AMYLASE SERPL-CCNC: 61 U/L (ref 28–100)
ANION GAP SERPL CALCULATED.3IONS-SCNC: 16.7 MMOL/L
AST SERPL-CCNC: 10 U/L (ref 1–32)
B PARAPERT DNA SPEC QL NAA+PROBE: NOT DETECTED
B PERT DNA SPEC QL NAA+PROBE: NOT DETECTED
BASOPHILS # BLD AUTO: 0.01 10*3/MM3 (ref 0–0.2)
BASOPHILS NFR BLD AUTO: 0.1 % (ref 0–1.5)
BILIRUB SERPL-MCNC: 0.3 MG/DL (ref 0.1–1.2)
BUN BLD-MCNC: 25 MG/DL (ref 8–23)
BUN/CREAT SERPL: 19.2 (ref 7–25)
C PNEUM DNA NPH QL NAA+NON-PROBE: NOT DETECTED
CALCIUM SPEC-SCNC: 9.9 MG/DL (ref 8.6–10.5)
CHLORIDE SERPL-SCNC: 101 MMOL/L (ref 98–107)
CO2 SERPL-SCNC: 21.3 MMOL/L (ref 22–29)
CREAT BLD-MCNC: 1.3 MG/DL (ref 0.57–1)
CREAT BLDA-MCNC: 1.3 MG/DL (ref 0.6–1.3)
DEPRECATED RDW RBC AUTO: 46.8 FL (ref 37–54)
EOSINOPHIL # BLD AUTO: 0.01 10*3/MM3 (ref 0–0.7)
EOSINOPHIL NFR BLD AUTO: 0.1 % (ref 0.3–6.2)
ERYTHROCYTE [DISTWIDTH] IN BLOOD BY AUTOMATED COUNT: 14.2 % (ref 11.7–13)
ERYTHROCYTE [SEDIMENTATION RATE] IN BLOOD: 4 MM/HR (ref 0–30)
FLUAV H1 2009 PAND RNA NPH QL NAA+PROBE: NOT DETECTED
FLUAV H1 HA GENE NPH QL NAA+PROBE: NOT DETECTED
FLUAV H3 RNA NPH QL NAA+PROBE: NOT DETECTED
FLUAV SUBTYP SPEC NAA+PROBE: NOT DETECTED
FLUBV RNA ISLT QL NAA+PROBE: NOT DETECTED
GFR SERPL CREATININE-BSD FRML MDRD: 39 ML/MIN/1.73
GLOBULIN UR ELPH-MCNC: 3.6 GM/DL
GLUCOSE BLD-MCNC: 139 MG/DL (ref 65–99)
HADV DNA SPEC NAA+PROBE: NOT DETECTED
HCOV 229E RNA SPEC QL NAA+PROBE: NOT DETECTED
HCOV HKU1 RNA SPEC QL NAA+PROBE: NOT DETECTED
HCOV NL63 RNA SPEC QL NAA+PROBE: NOT DETECTED
HCOV OC43 RNA SPEC QL NAA+PROBE: NOT DETECTED
HCT VFR BLD AUTO: 44.9 % (ref 35.6–45.5)
HGB BLD-MCNC: 15 G/DL (ref 11.9–15.5)
HMPV RNA NPH QL NAA+NON-PROBE: NOT DETECTED
HPIV1 RNA SPEC QL NAA+PROBE: NOT DETECTED
HPIV2 RNA SPEC QL NAA+PROBE: NOT DETECTED
HPIV3 RNA NPH QL NAA+PROBE: NOT DETECTED
HPIV4 P GENE NPH QL NAA+PROBE: NOT DETECTED
IMM GRANULOCYTES # BLD AUTO: 0.04 10*3/MM3 (ref 0–0.03)
IMM GRANULOCYTES NFR BLD AUTO: 0.3 % (ref 0–0.5)
LIPASE SERPL-CCNC: 37 U/L (ref 13–60)
LYMPHOCYTES # BLD AUTO: 1.06 10*3/MM3 (ref 0.9–4.8)
LYMPHOCYTES NFR BLD AUTO: 8.9 % (ref 19.6–45.3)
M PNEUMO IGG SER IA-ACNC: NOT DETECTED
MCH RBC QN AUTO: 30.1 PG (ref 26.9–32)
MCHC RBC AUTO-ENTMCNC: 33.4 G/DL (ref 32.4–36.3)
MCV RBC AUTO: 90.2 FL (ref 80.5–98.2)
MONOCYTES # BLD AUTO: 0.33 10*3/MM3 (ref 0.2–1.2)
MONOCYTES NFR BLD AUTO: 2.8 % (ref 5–12)
NEUTROPHILS # BLD AUTO: 10.49 10*3/MM3 (ref 1.9–8.1)
NEUTROPHILS NFR BLD AUTO: 88.1 % (ref 42.7–76)
PLATELET # BLD AUTO: 268 10*3/MM3 (ref 140–500)
PMV BLD AUTO: 10.8 FL (ref 6–12)
POTASSIUM BLD-SCNC: 4.5 MMOL/L (ref 3.5–5.2)
PROT SERPL-MCNC: 7.8 G/DL (ref 6–8.5)
RBC # BLD AUTO: 4.98 10*6/MM3 (ref 3.9–5.2)
RHINOVIRUS RNA SPEC NAA+PROBE: NOT DETECTED
RSV RNA NPH QL NAA+NON-PROBE: DETECTED
SODIUM BLD-SCNC: 139 MMOL/L (ref 136–145)
WBC NRBC COR # BLD: 11.9 10*3/MM3 (ref 4.5–10.7)

## 2019-01-03 PROCEDURE — 83690 ASSAY OF LIPASE: CPT

## 2019-01-03 PROCEDURE — 73610 X-RAY EXAM OF ANKLE: CPT

## 2019-01-03 PROCEDURE — 87581 M.PNEUMON DNA AMP PROBE: CPT

## 2019-01-03 PROCEDURE — 74177 CT ABD & PELVIS W/CONTRAST: CPT

## 2019-01-03 PROCEDURE — 87798 DETECT AGENT NOS DNA AMP: CPT

## 2019-01-03 PROCEDURE — 87633 RESP VIRUS 12-25 TARGETS: CPT

## 2019-01-03 PROCEDURE — 36415 COLL VENOUS BLD VENIPUNCTURE: CPT

## 2019-01-03 PROCEDURE — 85025 COMPLETE CBC W/AUTO DIFF WBC: CPT

## 2019-01-03 PROCEDURE — 87486 CHLMYD PNEUM DNA AMP PROBE: CPT

## 2019-01-03 PROCEDURE — 71250 CT THORAX DX C-: CPT

## 2019-01-03 PROCEDURE — 25010000002 IOPAMIDOL 61 % SOLUTION: Performed by: INTERNAL MEDICINE

## 2019-01-03 PROCEDURE — 73502 X-RAY EXAM HIP UNI 2-3 VIEWS: CPT

## 2019-01-03 PROCEDURE — 80053 COMPREHEN METABOLIC PANEL: CPT

## 2019-01-03 PROCEDURE — 82565 ASSAY OF CREATININE: CPT

## 2019-01-03 PROCEDURE — 85652 RBC SED RATE AUTOMATED: CPT

## 2019-01-03 PROCEDURE — 82150 ASSAY OF AMYLASE: CPT

## 2019-01-03 RX ADMIN — IOPAMIDOL 85 ML: 612 INJECTION, SOLUTION INTRAVENOUS at 17:45

## 2019-01-03 NOTE — NURSING NOTE
Results of CT chest and CT abd/pelvis given to Dr. Cadet, as read per Dr. Harrison's report and pt is free to go home. Pt informed, s/l dc'd with cath tip intact and home, via assistance of [employer], with .

## 2019-01-04 ENCOUNTER — TRANSCRIBE ORDERS (OUTPATIENT)
Dept: ADMINISTRATIVE | Facility: HOSPITAL | Age: 79
End: 2019-01-04

## 2019-01-04 DIAGNOSIS — R18.8 OTHER ASCITES: Primary | ICD-10-CM

## 2019-01-09 ENCOUNTER — HOSPITAL ENCOUNTER (OUTPATIENT)
Dept: ULTRASOUND IMAGING | Facility: HOSPITAL | Age: 79
Discharge: HOME | End: 2019-01-09
Attending: INTERNAL MEDICINE | Admitting: INTERNAL MEDICINE

## 2019-01-09 DIAGNOSIS — R18.8 OTHER ASCITES: ICD-10-CM

## 2019-01-09 PROCEDURE — 76882 US LMTD JT/FCL EVL NVASC XTR: CPT

## 2019-03-06 ENCOUNTER — TRANSCRIBE ORDERS (OUTPATIENT)
Dept: ADMINISTRATIVE | Facility: HOSPITAL | Age: 79
End: 2019-03-06

## 2019-03-06 DIAGNOSIS — E04.1 THYROID NODULE: Primary | ICD-10-CM

## 2019-03-12 ENCOUNTER — HOSPITAL ENCOUNTER (OUTPATIENT)
Dept: ULTRASOUND IMAGING | Facility: HOSPITAL | Age: 79
Discharge: HOME | End: 2019-03-12
Admitting: INTERNAL MEDICINE

## 2019-03-12 DIAGNOSIS — E04.1 THYROID NODULE: ICD-10-CM

## 2019-03-12 PROCEDURE — 76536 US EXAM OF HEAD AND NECK: CPT

## 2019-03-24 PROBLEM — E04.1 THYROID NODULE: Status: ACTIVE | Noted: 2019-03-24

## 2019-03-24 NOTE — PROGRESS NOTES
SURGERY  Sasha Barrett   1940 03/25/19    Chief Complaint:  Thyroid nodule    HPI    Patient is a 79 y.o. female referred by Dr Cadet with a thyroid nodule, first noted as right thyroid nodularity seen on CT for persistent cough, done 1/3/19.  Follow up thyroid US showed no nodules 1 cm or greater, the largest being 0.9 mm on the right.  She has no symptoms of dysphagia, except when she has allergy difficulties, but the size of her remaining thyroid nodules would not impart her with symptoms.      She has a history of left breast cancer having a mastectomy by Dr Canseco in 2003.  She had a parathyroid excision and partial thyroidectomy by Dr Benny Farley.      She brings up a multitude of symptoms while she is here, most notably the fact that her legs just are not working right.  She says she is having some left lower quadrant pain.  For this reason I did evaluate her CT scan which shows a large fecal burden that could be causing some left lower quadrant pain, but nothing like a hernia or mass.  There appears to be sub Q injection changes.  After I bring that up, she says it actually is in the upper leg and groin.  At any rate I did not see anything that could be accounting for her pain.    Much of our visit is taken up with her describing to me why she has so many pains, in particular the fact that she was given hydralazine which she should not have been on, and has resulted in a multitude of medical problems as a result of that mistake.  She also has multiple allergies.    Complicating decision making for or against surgery are her chronic prednisone use for COPD, history of stroke on ASA, anxiety on ativan, chronic pain on talwin, and most notably her prior thyroid surgery which would increase her risk of complications.  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 Yazmin Canseco   • CARPAL TUNNEL RELEASE Right 05/25/2011    Dr. Caleb Bueno   • CARPAL TUNNEL RELEASE Left 04/26/2011    Dr. Caleb Bueno   • CATARACT EXTRACTION Left 11/29/2010    Dr. Eusebio Downs   • CATARACT EXTRACTION Right 11/17/2010    Dr. Eusebio Downs   • COLONOSCOPY N/A 12/06/2002    Sigmoid diverticulosis-Dr. Brandon Batista   • COLONOSCOPY N/A 12/12/2013    Tortuous colon, diverticulosis in the sigmoid colon and descending colon, stool in the rectum, sigmoid colon, descending colon, splenic flexure, transverse colon and hepatic flexure-Dr. Irma Brambila   • ENDOSCOPY N/A 09/13/2007    Normal-Dr. Pavel Quarles   • ENDOSCOPY AND COLONOSCOPY N/A 09/19/2005    1 cm hiatal hernia, mild Schatzki's ring, sigmoid diverticulosis-Dr. Yoel Farley   • GANGLION CYST EXCISION Left 12/18/2012    Release of A1 pulley flexor sheath, left fourth finger, excision of ganglion cyst 0.5 cm diameter, A2 pulley flexor sheath, left fourth finger-Dr. Caleb Bueno   • HEEL SPUR EXCISION Left 1968   • INGUINAL HERNIA REPAIR Right 1940    at 5 months old   • MASTECTOMY Right 08/05/2008    Right breast mastectomy and excision of left chest wall lesion-Dr. Yoel Farley   • MASTECTOMY RADICAL Left 04/29/2003    Left modified radical mastectomy-Dr. Yazmin Canseco   • PARATHYROIDECTOMY Right 05/04/2004    Excision of parathyroid adenoma (right superior)-Dr. Yoel Farley   • REPLACEMENT TOTAL KNEE Right 04/09/2012    Dr. Lamonte Childress   • SINUS SURGERY  1984   • THYROIDECTOMY, PARTIAL Left 05/04/2004      Yoel Farley   • TOTAL ABDOMINAL HYSTERECTOMY Bilateral 1973    Dr. Mahan   • UPPER GASTROINTESTINAL ENDOSCOPY N/A 02/18/2009    LA Grade A reflux esophagitis, normal stomach, normal 2nd part of the duodenum-Dr. Yoel Farley     Family History   Problem Relation Age of Onset   • Brain cancer Brother      Social History     Socioeconomic History   • Marital status:      Spouse name: Joey   • Number of children: Not on file   • Years of education: Not on file   • Highest education level: Not on file   Tobacco Use   • Smoking status: Never Smoker   • Smokeless tobacco: Never Used   Substance and Sexual Activity   • Alcohol use: Yes     Comment: rarely - once or twice year (wine or bernabe)   • Drug use: Defer   • Sexual activity: Yes     Partners: Male         Current Outpatient Medications:   •  acetaminophen (TYLENOL) 650 MG 8 hr tablet, Take 650 mg by mouth Every 6 (Six) Hours As Needed for Mild Pain ., Disp: , Rfl:   •  albuterol (PROAIR HFA) 108 (90 Base) MCG/ACT inhaler, ProAir  (90 Base) MCG/ACT Inhalation Aerosol Solution; Patient Sig: ProAir  (90 Base) MCG/ACT Inhalation Aerosol Solution ; 8; 0; 18-Jun-2014; Active, Disp: , Rfl:   •  aspirin 81 MG tablet, Take 162 mg by mouth Daily., Disp: , Rfl:   •  budesonide (PULMICORT) 180 MCG/ACT inhaler, Inhale 1 puff 2 (Two) Times a Day., Disp: , Rfl:   •  butalbital-acetaminophen-caffeine (FIORICET, ESGIC) -40 MG per tablet, Take 2 tablets by mouth Every 4 (Four) Hours As Needed for Headache., Disp: 240 tablet, Rfl: 0  •  cetirizine (zyrTEC) 10 MG tablet, Take 10 mg by mouth Daily., Disp: , Rfl:   •  Cholecalciferol (VITAMIN D) 1000 UNITS tablet, Take 1,000 Units by mouth Daily., Disp: , Rfl:   •  ciprofloxacin-dexamethasone (CIPRODEX) 0.3-0.1 % otic suspension, Administer 4 drops into both ears 2 (Two) Times a Day., Disp: , Rfl:   •  CloNIDine (CATAPRES) 0.2 MG tablet, Take 1 tablet by mouth Every 8 (Eight) Hours., Disp: 270  tablet, Rfl: 3  •  diphenhydrAMINE (BENADRYL) 50 MG tablet, Take 1 tablet by mouth every 4 (four) hours as needed for itching or allergies., Disp: 180 tablet, Rfl: 0  •  eplerenone (INSPRA) 50 MG tablet, Take 1 tablet by mouth Every 12 (Twelve) Hours., Disp: 180 tablet, Rfl: 3  •  ethacrynic acid (EDECRIN) 25 MG tablet, Take 25 mg by mouth 3 (Three) Times a Day., Disp: , Rfl:   •  fluticasone (FLONASE) 50 MCG/ACT nasal spray, 1 spray by Each Nare route 2 (Two) Times a Day., Disp: , Rfl:   •  furosemide (LASIX) 40 MG tablet, Take 40 mg by mouth Daily., Disp: , Rfl: 3  •  ipratropium (ATROVENT) 0.02 % nebulizer solution, Take 500 mcg by nebulization 4 (Four) Times a Day., Disp: , Rfl:   •  LORazepam (ATIVAN) 0.5 MG tablet, Take 1 tablet by mouth Every 8 (Eight) Hours As Needed for anxiety. Take 1 tab 1 hoour before MRI and repeat x 1 at time of test, Disp: 60 tablet, Rfl: 0  •  montelukast (SINGULAIR) 10 MG tablet, Take 1 tablet by mouth every night., Disp: 90 tablet, Rfl: 1  •  NIFEdipine XL (PROCARDIA XL) 60 MG 24 hr tablet, TAKE ONE TABLET TWICE DAILY, Disp: 180 tablet, Rfl: 0  •  NON FORMULARY, Inhale 2 puffs 2 (Two) Times a Day. Proptline Inhaler, Disp: , Rfl:   •  omeprazole (priLOSEC) 40 MG capsule, TAKE ONE CAPSULE BY MOUTH EVERY DAY (Patient taking differently: TAKE ONE CAPSULE BY MOUTH DAILY PRN), Disp: 90 capsule, Rfl: 0  •  ondansetron ODT (ZOFRAN-ODT) 8 MG disintegrating tablet, Take 1 tablet by mouth every 6 (six) hours as needed for nausea or vomiting., Disp: 360 tablet, Rfl: 1  •  pentazocine-naloxone (TALWIN NX) 50-0.5 MG per tablet, Take 1 tablet by mouth Every 4 (Four) Hours As Needed for Mild Pain  (for up to 90 days)., Disp: , Rfl:   •  polyethylene glycol (MIRALAX) powder, Take one capful w/liquid daily (Patient taking differently: Take 17 g by mouth 4 (Four) Times a Week. Take one capful w/liquid daily ), Disp: 765 g, Rfl: 1  •  potassium chloride (K-DUR) 10 MEQ CR tablet, Take 10 mEq by mouth  "Daily., Disp: , Rfl:   •  predniSONE (DELTASONE) 10 MG tablet, Take 1 tablet by mouth Every Other Day., Disp: 15 tablet, Rfl:   •  promethazine-codeine (PHENERGAN with CODEINE) 6.25-10 MG/5ML syrup, Take 5 mL by mouth 4 (Four) Times a Day As Needed for cough., Disp: 360 mL, Rfl: 0  •  ciprofloxacin (CIPRO) 500 MG tablet, Take 500 mg by mouth 2 (Two) Times a Day., Disp: , Rfl:   •  vitamin B-12 (VITAMIN B-12) 1000 MCG tablet, Take 1 tablet by mouth Daily., Disp: 90 tablet, Rfl: 3    Allergies   Allergen Reactions   • Bee Venom Shortness Of Breath and Rash   • Morphine Anaphylaxis and Nausea And Vomiting   • Sulfa Antibiotics Anaphylaxis and Hives     Or sulfa fillers in meds  Broke out in internal and external hives     • Tree Extract Shortness Of Breath and Rash   • Hydralazine Myalgia     Patient reports leg cramps, joint pain and increased fatigue   • Ambien [Zolpidem] Hallucinations   • Anastrozole    • Norvasc [Amlodipine] Other (See Comments)     KIDNEYS SHUT DOWN   • Nsaids      KIDNEY FAILURE   • Penicillins Unknown (See Comments)     Severe reaction as a child  Tolerated ceftriaxone during 04/2017 admission   • Grass Rash     Review of Systems  Positive in all systems including positives for activity change, chills, fatigue, ear pain, COPD, sinus pressure, trouble swallowing with allergy difficulties, leg pain especially both groin pain at night, watery eyes, itching, allergy cough, leg swelling, always constipated, sometimes reflux, cancer left breast removed, have had kidney failure under care of Dr. Fuller, painful urination, flank and groin pain, sometimes urinary frequency, joint pain, back pain, joint swelling, muscle pain, neck pain, headaches, weakness, bruising bleed easily, sleep disturbance.  Vitals:    03/25/19 1313   BP: 164/88   BP Location: Right arm   Patient Position: Sitting   Pulse: 80   SpO2: 98%   Weight: 87.5 kg (193 lb)   Height: 160 cm (63\")       PHYSICAL EXAM:    /88 (BP " "Location: Right arm, Patient Position: Sitting)   Pulse 80   Ht 160 cm (63\")   Wt 87.5 kg (193 lb)   SpO2 98%   BMI 34.19 kg/m²   Body mass index is 34.19 kg/m².    Constitutional: well developed, appears chronically ill and unwell   Eyes: sclera nonicteric, conjunctiva injected   ENMT: Hearing diminished, trachea midline, thyroid barely palpable, with nothing worrisome  CVS: RRR, no murmur  Respiratory: CTA, abnormal respiratory effort with almost a hiccuping type erratic inspiratory pattern  Gastrointestinal: no hepatosplenomegaly, abdomen rounded, obese, nontender, abdominal hernia not detected  Genitourinary: inguinal hernia not detected  Musculoskeletal: gait slowed, muscle mass diminished  Skin: warm and dry, no rashes visible  Neurological: awake and alert, seems to have reasonable capacity for understanding for medical decision making  Psychiatric: appears to have reasonable judgement, but does frequently come back to how her medical care was mismanaged and caused difficulties, and initially seems like she does not want surgery, but when I mentioned that I do not think she could get through surgery she tells me that she is tougher than she looks  Lymphatics: no cervical adenopathy    Radiographic Findings: CT with what appears to be a tiny amount of fluid on the left lower quadrant, but perhaps simply adnexal findings, subcutaneous injection changes under the skin, large fecal burden.  Ultrasound of the thyroid with nothing significant    IMPRESSION/PLAN:  · Thyroid nodules, insignificant, with no need to follow-up in my opinion based on the city of her symptoms and certainly does not need surgery.  · Described leg dysfunction.  I defer that to Dr. Cadet  · Left lower quadrant pain, likely fecal burden  · Renal insufficiency, with self cath as needed, followed by Dr. De La Rosa notes are    Nithya Muniz MD  03/25/19  4:56 PM    Greater than 30 minutes spent with over half in counseling  "

## 2019-03-25 ENCOUNTER — OFFICE VISIT (OUTPATIENT)
Dept: SURGERY | Facility: CLINIC | Age: 79
End: 2019-03-25

## 2019-03-25 VITALS
DIASTOLIC BLOOD PRESSURE: 88 MMHG | BODY MASS INDEX: 34.2 KG/M2 | HEART RATE: 80 BPM | HEIGHT: 63 IN | WEIGHT: 193 LBS | SYSTOLIC BLOOD PRESSURE: 164 MMHG | OXYGEN SATURATION: 98 %

## 2019-03-25 DIAGNOSIS — E04.1 THYROID NODULE: Primary | ICD-10-CM

## 2019-03-25 PROCEDURE — 99203 OFFICE O/P NEW LOW 30 MIN: CPT | Performed by: SURGERY

## 2019-03-25 RX ORDER — POTASSIUM CHLORIDE 750 MG/1
10 TABLET, FILM COATED, EXTENDED RELEASE ORAL DAILY
Status: ON HOLD | COMMUNITY
End: 2022-07-13

## 2019-03-25 RX ORDER — SENNOSIDES 8.6 MG
650 CAPSULE ORAL EVERY 6 HOURS PRN
COMMUNITY
End: 2020-09-23 | Stop reason: HOSPADM

## 2019-03-25 RX ORDER — CETIRIZINE HYDROCHLORIDE 10 MG/1
10 TABLET ORAL DAILY
COMMUNITY

## 2019-03-25 RX ORDER — CIPROFLOXACIN 500 MG/1
500 TABLET, FILM COATED ORAL 2 TIMES DAILY
COMMUNITY
End: 2019-11-01 | Stop reason: HOSPADM

## 2019-03-25 RX ORDER — CIPROFLOXACIN AND DEXAMETHASONE 3; 1 MG/ML; MG/ML
4 SUSPENSION/ DROPS AURICULAR (OTIC) 2 TIMES DAILY
COMMUNITY
End: 2022-10-03 | Stop reason: HOSPADM

## 2019-04-25 ENCOUNTER — HOSPITAL ENCOUNTER (OUTPATIENT)
Dept: GENERAL RADIOLOGY | Facility: HOSPITAL | Age: 79
Discharge: HOME | End: 2019-04-25
Admitting: INTERNAL MEDICINE

## 2019-04-25 DIAGNOSIS — M25.511 RIGHT SHOULDER PAIN, UNSPECIFIED CHRONICITY: ICD-10-CM

## 2019-04-25 PROCEDURE — 73030 X-RAY EXAM OF SHOULDER: CPT

## 2019-05-10 ENCOUNTER — TELEPHONE (OUTPATIENT)
Dept: NEUROSURGERY | Facility: CLINIC | Age: 79
End: 2019-05-10

## 2019-05-10 ENCOUNTER — OFFICE VISIT (OUTPATIENT)
Dept: ORTHOPEDIC SURGERY | Facility: CLINIC | Age: 79
End: 2019-05-10

## 2019-05-10 VITALS — WEIGHT: 193 LBS | HEIGHT: 63 IN | BODY MASS INDEX: 34.2 KG/M2 | TEMPERATURE: 97.9 F

## 2019-05-10 DIAGNOSIS — IMO0002 BURSITIS/TENDONITIS, SHOULDER: Primary | ICD-10-CM

## 2019-05-10 PROCEDURE — 99212 OFFICE O/P EST SF 10 MIN: CPT | Performed by: ORTHOPAEDIC SURGERY

## 2019-05-10 PROCEDURE — 20610 DRAIN/INJ JOINT/BURSA W/O US: CPT | Performed by: ORTHOPAEDIC SURGERY

## 2019-05-10 RX ORDER — CEFUROXIME AXETIL 250 MG/1
250 TABLET ORAL 2 TIMES DAILY
Refills: 0 | COMMUNITY
Start: 2019-04-27 | End: 2020-01-22

## 2019-05-10 RX ORDER — HYDROCODONE BITARTRATE AND ACETAMINOPHEN 5; 325 MG/1; MG/1
TABLET ORAL
Refills: 0 | Status: ON HOLD | COMMUNITY
Start: 2019-04-26 | End: 2019-10-29

## 2019-05-10 RX ORDER — CLONIDINE HYDROCHLORIDE 0.3 MG/1
0.3 TABLET ORAL 3 TIMES DAILY
Refills: 5 | COMMUNITY
Start: 2019-04-22 | End: 2019-11-01 | Stop reason: HOSPADM

## 2019-05-10 RX ADMIN — METHYLPREDNISOLONE ACETATE 80 MG: 80 INJECTION, SUSPENSION INTRA-ARTICULAR; INTRALESIONAL; INTRAMUSCULAR; SOFT TISSUE at 17:28

## 2019-05-10 NOTE — TELEPHONE ENCOUNTER
I called and attempted to reach patient regarding their new patient packet that was mailed out on 04/25/19. We have not received their packet and if we do not receive it by 05/28/19 by 5 pm, we will have to cancel their appointment. We ask that all new patient packets be back within 3 business days of their scheduled appointment. I was unable to LVM.

## 2019-05-11 NOTE — PROGRESS NOTES
"Chief complaint: Worsening right shoulder pain    Ms. Barrett comes in for follow-up of her right shoulder.  I last saw her in October and we did an injection.  He did help tremendously.  She tells me that the shot wore off after about 4 months.  Says the shoulder is now \"giving me fits\".  Pain is severe and constant.  She reports the pain seems to be somewhat different than that which she was experiencing previously.    The right shoulder is examined.  Skin is benign.  No palpable swelling or masses.  Mild anterior tenderness.  Motion is functional but not quite full.  Shoulder seems to be very irritable on exam and she has markedly positive impingement maneuvers.  She has some weakness with abduction and elevation, limited by discomfort.    Outside x-rays from April are reviewed including AP and orthogonal views of the shoulder.  She has mild glenohumeral osteoarthritis.  I went back and looked at her previous films.  Where as I characterize her arthritis is more moderate previously, I would now say that is probably better to characterize this as mild to moderate.    Assessment: Right shoulder osteoarthritis with suspected rotator cuff tendinitis    Plan: I think her pain now is more cuff mediated.  I suggested we try a subacromial injection.  The risks, benefits and alternatives were discussed.  Consented.  She will follow-up as needed.    Large Joint Arthrocentesis: R subacromial bursa  Date/Time: 5/10/2019 5:28 PM  Consent given by: patient  Site marked: site marked  Timeout: Immediately prior to procedure a time out was called to verify the correct patient, procedure, equipment, support staff and site/side marked as required   Supporting Documentation  Indications: pain and joint swelling   Procedure Details  Location: shoulder - R subacromial bursa  Preparation: Patient was prepped and draped in the usual sterile fashion  Needle gauge: 21 G.  Approach: posterior  Medications administered: 80 mg " methylPREDNISolone acetate 80 MG/ML; 2 mL lidocaine (cardiac)  Patient tolerance: patient tolerated the procedure well with no immediate complications

## 2019-05-15 RX ORDER — METHYLPREDNISOLONE ACETATE 80 MG/ML
80 INJECTION, SUSPENSION INTRA-ARTICULAR; INTRALESIONAL; INTRAMUSCULAR; SOFT TISSUE
Status: COMPLETED | OUTPATIENT
Start: 2019-05-10 | End: 2019-05-10

## 2019-05-24 NOTE — TELEPHONE ENCOUNTER
I called patient and she stated that she will drop her packet off on 05/28/19. She understands that if we do not receive it, we will have to reschedule her appointment.

## 2019-05-29 NOTE — PROGRESS NOTES
Subjective   Patient ID: Sasha Barrett is a 79 y.o. female is being seen for consultation today at the request of Óscar Cadet MD for back pain that radiates into bilateral hips and down the left lateral thigh. The bilateral hip pain is most severe. Patient complains of tingling in her bilateral feet.  The pain causes problems with her gait.    Ms. Barrett is an 79-year-old female being seen today for neurosurgical consultation at the request of her primary care provider Dr. Óscar Cadet.  Notes from today's visit will be forwarded upon completion.  Ms. Barrett has a history of multiple medical problems.  She reports a 2-year history of worsening pain in the right and left lumbar regions.  There is bilateral hip and groin pain and some radiation of pain down the left lateral thigh.  She complains more of pain in the groin and hips.  She is unable to lie on either side in bed.  She notices severe pain when she gets up from a seated or supine position.  She has a history of prior bilateral knee replacements.  She has never had an orthopedic evaluation for the hip pain.  The patient denies any bowel or bladder incontinence issues.  No history of weakness or falls.        Back Pain   This is a recurrent problem. The current episode started more than 1 year ago. The problem occurs intermittently. The problem has been gradually worsening since onset. The pain is present in the lumbar spine and gluteal. The quality of the pain is described as aching and cramping. The pain radiates to the left thigh. The pain is severe. The pain is the same all the time. The symptoms are aggravated by position, sitting, standing and lying down. Associated symptoms include headaches, leg pain, numbness, pelvic pain, tingling and weakness. Pertinent negatives include no bladder incontinence, bowel incontinence or chest pain. Risk factors include history of cancer. She has tried ice and heat for the symptoms. The treatment provided mild  relief.       The following portions of the patient's history were reviewed and updated as appropriate: allergies, current medications, past family history, past medical history, past social history, past surgical history and problem list.    Review of Systems   Constitutional: Positive for activity change, chills and fatigue.   Eyes: Positive for redness and itching.   Respiratory: Negative for chest tightness and shortness of breath.    Cardiovascular: Positive for leg swelling. Negative for chest pain.   Gastrointestinal: Negative for bowel incontinence.   Genitourinary: Positive for frequency and pelvic pain. Negative for bladder incontinence.   Musculoskeletal: Positive for arthralgias, back pain, gait problem, joint swelling and neck pain.   Allergic/Immunologic: Positive for environmental allergies.   Neurological: Positive for dizziness, tingling, weakness, numbness and headaches.   All other systems reviewed and are negative.      Objective   Physical Exam   Constitutional: She is oriented to person, place, and time. She appears well-developed and well-nourished. She is cooperative. No distress.   HENT:   Head: Normocephalic and atraumatic.   Eyes: Conjunctivae are normal. Right eye exhibits no discharge. Left eye exhibits no discharge.   Neck: Normal range of motion. Neck supple.   Cardiovascular: Normal rate.   Pulmonary/Chest: Effort normal. No respiratory distress.   Abdominal: Soft. She exhibits no distension. There is no tenderness.   Musculoskeletal: Normal range of motion. She exhibits tenderness. She exhibits no edema (with palpation of the hip joints bilaterally).   Neurological: She is alert and oriented to person, place, and time. She has normal strength. She displays normal reflexes. No sensory deficit. She exhibits normal muscle tone. Coordination normal. GCS eye subscore is 4. GCS verbal subscore is 5. GCS motor subscore is 6.   No motor or sensory deficits. DTR's normal. Negative Kent's;  negative clonus. SLR negative bilaterally.  Positive Gerry's bilaterally.      Skin: Skin is warm and dry. No rash noted. She is not diaphoretic.   Psychiatric: She has a normal mood and affect. Thought content normal.   Vitals reviewed.    Neurologic Exam     Mental Status   Oriented to person, place, and time.     Motor Exam     Strength   Strength 5/5 throughout.       Assessment/Plan   Independent Review of Radiographic Studies:      I reviewed MRI images of the lumbar spine performed without contrast dated April 17, 2019 today in the office.  The MRI showed multilevel degenerative changes with associated multilevel disc desiccation.  There also facet changes which are quite severe at L4-5 and L5-S1.  Severe spinal stenosis noted at L4-5 as well as severe neuroforaminal narrowing bilaterally.    I also reviewed the left hip MRI performed without contrast dated April 17, 2019.  It showed moderate to severe tendinopathy bilaterally with extensive inflammation consistent with severe tendinosis and trochanteric bursitis.    I have reviewed cervical spine x-rays dated August 25, 2018.  The x-rays show disc space narrowing and multilevel degenerative changes secondary to osteophyte complexes.  There is also associated facet hypertrophy.  The disc space narrowing seems to be most pronounced at C4-5 and C6-7.        Medical Decision Making:      Ms. Barrett was seen in the office today for the above complaints.  She has lumbar spinal stenosis but her most significant pain seems to be located in the hip joints.  The patient states that she is not interested in any type of surgery.  She has already been referred for an evaluation with Dr. Ruiz for pain management.  I think that is appropriate.  Lumbar epidural injections are an option for this patient but I have recommended an evaluation with orthopedics first to see if injections may be of any benefit.    If the above conservative measures fail, I encouraged Ms. Barrett  to give us a call.  We will see her as needed from here.    Sasha was seen today for back pain.    Diagnoses and all orders for this visit:    Spinal stenosis, lumbar region, with neurogenic claudication  -     Ambulatory Referral to Orthopedic Surgery    Trochanteric bursitis of both hips  -     Ambulatory Referral to Orthopedic Surgery    Pain of both hip joints  -     Ambulatory Referral to Orthopedic Surgery      Return if symptoms worsen or fail to improve.

## 2019-05-31 ENCOUNTER — OFFICE VISIT (OUTPATIENT)
Dept: NEUROSURGERY | Facility: CLINIC | Age: 79
End: 2019-05-31

## 2019-05-31 VITALS
BODY MASS INDEX: 34.2 KG/M2 | SYSTOLIC BLOOD PRESSURE: 138 MMHG | HEIGHT: 63 IN | HEART RATE: 70 BPM | DIASTOLIC BLOOD PRESSURE: 75 MMHG | WEIGHT: 193 LBS

## 2019-05-31 DIAGNOSIS — M70.61 TROCHANTERIC BURSITIS OF BOTH HIPS: ICD-10-CM

## 2019-05-31 DIAGNOSIS — M25.551 PAIN OF BOTH HIP JOINTS: ICD-10-CM

## 2019-05-31 DIAGNOSIS — M70.62 TROCHANTERIC BURSITIS OF BOTH HIPS: ICD-10-CM

## 2019-05-31 DIAGNOSIS — M25.552 PAIN OF BOTH HIP JOINTS: ICD-10-CM

## 2019-05-31 DIAGNOSIS — M48.062 SPINAL STENOSIS, LUMBAR REGION, WITH NEUROGENIC CLAUDICATION: Primary | ICD-10-CM

## 2019-05-31 PROCEDURE — 99203 OFFICE O/P NEW LOW 30 MIN: CPT | Performed by: NURSE PRACTITIONER

## 2019-06-14 ENCOUNTER — OFFICE VISIT (OUTPATIENT)
Dept: ORTHOPEDIC SURGERY | Facility: CLINIC | Age: 79
End: 2019-06-14

## 2019-06-14 VITALS — BODY MASS INDEX: 34.02 KG/M2 | WEIGHT: 192 LBS | HEIGHT: 63 IN

## 2019-06-14 DIAGNOSIS — M48.07 SPINAL STENOSIS OF LUMBOSACRAL REGION: ICD-10-CM

## 2019-06-14 DIAGNOSIS — M70.61 TROCHANTERIC BURSITIS, RIGHT HIP: ICD-10-CM

## 2019-06-14 DIAGNOSIS — M70.62 TROCHANTERIC BURSITIS, LEFT HIP: ICD-10-CM

## 2019-06-14 DIAGNOSIS — M25.552 HIP PAIN, LEFT: Primary | ICD-10-CM

## 2019-06-14 PROCEDURE — 99214 OFFICE O/P EST MOD 30 MIN: CPT | Performed by: ORTHOPAEDIC SURGERY

## 2019-06-14 PROCEDURE — 20610 DRAIN/INJ JOINT/BURSA W/O US: CPT | Performed by: ORTHOPAEDIC SURGERY

## 2019-06-14 PROCEDURE — 73502 X-RAY EXAM HIP UNI 2-3 VIEWS: CPT | Performed by: ORTHOPAEDIC SURGERY

## 2019-06-14 RX ORDER — METHYLPREDNISOLONE ACETATE 80 MG/ML
80 INJECTION, SUSPENSION INTRA-ARTICULAR; INTRALESIONAL; INTRAMUSCULAR; SOFT TISSUE
Status: COMPLETED | OUTPATIENT
Start: 2019-06-14 | End: 2019-06-14

## 2019-06-14 RX ADMIN — METHYLPREDNISOLONE ACETATE 80 MG: 80 INJECTION, SUSPENSION INTRA-ARTICULAR; INTRALESIONAL; INTRAMUSCULAR; SOFT TISSUE at 11:33

## 2019-06-14 RX ADMIN — METHYLPREDNISOLONE ACETATE 80 MG: 80 INJECTION, SUSPENSION INTRA-ARTICULAR; INTRALESIONAL; INTRAMUSCULAR; SOFT TISSUE at 11:32

## 2019-06-14 NOTE — PROGRESS NOTES
Patient Name: Sasha Barertt   YOB: 1940  Referring Primary Care Physician: Óscar Cadet MD  BMI: Body mass index is 34.01 kg/m².    Chief Complaint:    Chief Complaint   Patient presents with   • Left Hip - Establish Care, Pain        HPI:     Sasha Barrett is a 79 y.o. female who presents today for evaluation of   Chief Complaint   Patient presents with   • Left Hip - Establish Care, Pain   .  She was seen today with multiple pain complaints.  She is having pain bilaterally into her hips in the lateral area and some into the groin area refers down her legs when she walks very far.  She talks about her back pain and starts asking questions about what can be done about this.  I reviewed her chart medical records and she is seeing Dr. Villar and his associates and is scheduled to see Dr. Ruiz for pain.  I focused on the hip exam she has not had any trauma it does bother her some in her sleep and it radiates down the lateral aspect of her leg and is achy.  She has not tried therapy but she did have a shot a few years ago that was helpful.    This problem is new to this examiner.     Subjective   Medications:   Home Medications:  Current Outpatient Medications on File Prior to Visit   Medication Sig   • acetaminophen (TYLENOL) 650 MG 8 hr tablet Take 650 mg by mouth Every 6 (Six) Hours As Needed for Mild Pain .   • albuterol (PROAIR HFA) 108 (90 Base) MCG/ACT inhaler ProAir  (90 Base) MCG/ACT Inhalation Aerosol Solution; Patient Sig: ProAir  (90 Base) MCG/ACT Inhalation Aerosol Solution ; 8; 0; 18-Jun-2014; Active   • aspirin 81 MG tablet Take 162 mg by mouth Daily.   • budesonide (PULMICORT) 180 MCG/ACT inhaler Inhale 1 puff 2 (Two) Times a Day.   • butalbital-acetaminophen-caffeine (FIORICET, ESGIC) -40 MG per tablet Take 2 tablets by mouth Every 4 (Four) Hours As Needed for Headache.   • cefuroxime (CEFTIN) 250 MG tablet Take 250 mg by mouth 2 (Two) Times a Day.   •  cetirizine (zyrTEC) 10 MG tablet Take 10 mg by mouth Daily.   • Cholecalciferol (VITAMIN D) 1000 UNITS tablet Take 1,000 Units by mouth Daily.   • ciprofloxacin (CIPRO) 500 MG tablet Take 500 mg by mouth 2 (Two) Times a Day.   • ciprofloxacin-dexamethasone (CIPRODEX) 0.3-0.1 % otic suspension Administer 4 drops into both ears 2 (Two) Times a Day.   • CloNIDine (CATAPRES) 0.2 MG tablet Take 1 tablet by mouth Every 8 (Eight) Hours.   • CloNIDine (CATAPRES) 0.3 MG tablet Take 0.3 mg by mouth 3 (Three) Times a Day.   • diphenhydrAMINE (BENADRYL) 50 MG tablet Take 1 tablet by mouth every 4 (four) hours as needed for itching or allergies.   • eplerenone (INSPRA) 50 MG tablet Take 1 tablet by mouth Every 12 (Twelve) Hours.   • ethacrynic acid (EDECRIN) 25 MG tablet Take 25 mg by mouth 3 (Three) Times a Day.   • fluticasone (FLONASE) 50 MCG/ACT nasal spray 1 spray by Each Nare route 2 (Two) Times a Day.   • furosemide (LASIX) 40 MG tablet Take 40 mg by mouth Daily.   • HYDROcodone-acetaminophen (NORCO) 5-325 MG per tablet TAKE TWO TABLETS BY MOUTH EVERY 6 HOURS AS NEEDED FOR ACUTE BACK AND SHOULDER PAIN   • ipratropium (ATROVENT) 0.02 % nebulizer solution Take 500 mcg by nebulization 4 (Four) Times a Day.   • LORazepam (ATIVAN) 0.5 MG tablet Take 1 tablet by mouth Every 8 (Eight) Hours As Needed for anxiety. Take 1 tab 1 hoour before MRI and repeat x 1 at time of test   • montelukast (SINGULAIR) 10 MG tablet Take 1 tablet by mouth every night.   • NIFEdipine XL (PROCARDIA XL) 60 MG 24 hr tablet TAKE ONE TABLET TWICE DAILY   • NON FORMULARY Inhale 2 puffs 2 (Two) Times a Day. Proptline Inhaler   • omeprazole (priLOSEC) 40 MG capsule TAKE ONE CAPSULE BY MOUTH EVERY DAY (Patient taking differently: TAKE ONE CAPSULE BY MOUTH DAILY PRN)   • ondansetron ODT (ZOFRAN-ODT) 8 MG disintegrating tablet Take 1 tablet by mouth every 6 (six) hours as needed for nausea or vomiting.   • pentazocine-naloxone (TALWIN NX) 50-0.5 MG per tablet  Take 1 tablet by mouth Every 4 (Four) Hours As Needed for Mild Pain  (for up to 90 days).   • polyethylene glycol (MIRALAX) powder Take one capful w/liquid daily (Patient taking differently: Take 17 g by mouth 4 (Four) Times a Week. Take one capful w/liquid daily )   • potassium chloride (K-DUR) 10 MEQ CR tablet Take 10 mEq by mouth Daily.   • predniSONE (DELTASONE) 10 MG tablet Take 1 tablet by mouth Every Other Day.   • promethazine-codeine (PHENERGAN with CODEINE) 6.25-10 MG/5ML syrup Take 5 mL by mouth 4 (Four) Times a Day As Needed for cough.   • vitamin B-12 (VITAMIN B-12) 1000 MCG tablet Take 1 tablet by mouth Daily.     No current facility-administered medications on file prior to visit.      Current Medications:  Scheduled Meds:  Continuous Infusions:  No current facility-administered medications for this visit.   PRN Meds:.    I have reviewed the patient's medical history in detail and updated the computerized patient record.  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 Yazmin Canseco   • CARPAL TUNNEL RELEASE Right 05/25/2011    Dr. Caleb Bueno   • CARPAL TUNNEL RELEASE Left 04/26/2011    Dr. Caleb Bueno   • CATARACT EXTRACTION Left 11/29/2010    Dr. Eusebio Downs   • CATARACT EXTRACTION Right 11/17/2010    Dr. Eusebio Downs   • COLONOSCOPY N/A 12/06/2002    Sigmoid diverticulosis-Dr. Brandon Batista   •  COLONOSCOPY N/A 12/12/2013    Tortuous colon, diverticulosis in the sigmoid colon and descending colon, stool in the rectum, sigmoid colon, descending colon, splenic flexure, transverse colon and hepatic flexure-Dr. Irma Brambila   • ENDOSCOPY N/A 09/13/2007    Normal-Dr. Pavel Quarles   • ENDOSCOPY AND COLONOSCOPY N/A 09/19/2005    1 cm hiatal hernia, mild Schatzki's ring, sigmoid diverticulosis-Dr. Yoel Farley   • GANGLION CYST EXCISION Left 12/18/2012    Release of A1 pulley flexor sheath, left fourth finger, excision of ganglion cyst 0.5 cm diameter, A2 pulley flexor sheath, left fourth finger-Dr. Caleb Bueno   • HEEL SPUR EXCISION Left 1968   • INGUINAL HERNIA REPAIR Right 1940    at 5 months old   • MASTECTOMY Right 08/05/2008    Right breast mastectomy and excision of left chest wall lesion-Dr. Yoel Farley   • MASTECTOMY RADICAL Left 04/29/2003    Left modified radical mastectomy-Dr. Yazmin Canseco   • PARATHYROIDECTOMY Right 05/04/2004    Excision of parathyroid adenoma (right superior)-Dr. Yoel Farley   • REPLACEMENT TOTAL KNEE Right 04/09/2012    Dr. Lamonte Childress   • SINUS SURGERY  1984   • THYROIDECTOMY, PARTIAL Left 05/04/2004    Dr. Yoel Farley   • TOTAL ABDOMINAL HYSTERECTOMY Bilateral 1973    Dr. Mahan   • UPPER GASTROINTESTINAL ENDOSCOPY N/A 02/18/2009    LA Grade A reflux esophagitis, normal stomach, normal 2nd part of the duodenum-Dr. Yoel Farley        Social History     Occupational History   • Occupation: RETIRED   Tobacco Use   • Smoking status: Never Smoker   • Smokeless tobacco: Never Used   Substance and Sexual Activity   • Alcohol use: Yes     Comment: rarely - once or twice year (wine or bernabe)   • Drug use: Defer   • Sexual activity: Yes     Partners: Male      Social History     Social History Narrative    LIVES WITH         Family History   Problem Relation Age of Onset   • Brain cancer Brother    • Alcohol abuse Mother    • No Known  "Problems Father    • No Known Problems Sister        ROS: 14 point review of systems was performed and all other systems were reviewed and are negative except for documented findings in HPI and today's encounter.     Allergies:   Allergies   Allergen Reactions   • Bee Venom Shortness Of Breath and Rash   • Morphine Anaphylaxis and Nausea And Vomiting   • Sulfa Antibiotics Anaphylaxis and Hives     Or sulfa fillers in meds  Broke out in internal and external hives     • Tree Extract Shortness Of Breath and Rash   • Hydralazine Myalgia     Patient reports leg cramps, joint pain and increased fatigue   • Ambien [Zolpidem] Hallucinations   • Anastrozole    • Norvasc [Amlodipine] Other (See Comments)     KIDNEYS SHUT DOWN   • Nsaids      KIDNEY FAILURE   • Penicillins Unknown (See Comments)     Severe reaction as a child  Tolerated ceftriaxone during 04/2017 admission   • Grass Rash     Constitutional:  Denies fever, shaking or chills   Eyes:  Denies change in visual acuity   HENT:  Denies nasal congestion or sore throat   Respiratory:  Denies cough or shortness of breath   Cardiovascular:  Denies chest pain or severe LE edema   GI:  Denies abdominal pain, nausea, vomiting, bloody stools or diarrhea   Musculoskeletal:  Numbness, tingling, pain, or loss of motor function only as noted above in history of present illness.  : Denies painful urination or hematuria  Integument:  Denies rash, lesion or ulceration   Neurologic:  Denies headache or focal weakness  Endocrine:  Denies lymphadenopathy  Psych:  Denies confusion or change in mental status   Hem:  Denies active bleeding    OBJECTIVE:  Physical Exam:   Ht 160 cm (63\")   Wt 87.1 kg (192 lb)   BMI 34.01 kg/m²     General Appearance:    Alert, cooperative, in no acute distress                  Eyes: conjunctiva clear  ENT: external ears and nose atraumatic  CV: no peripheral edema  Resp: normal respiratory effort  Skin: no rashes or wounds; normal turgor  Psych: mood " and affect appropriate  Lymph: no nodes appreciated  Neuro: gross sensation intact  Vascular:  Palpable peripheral pulse in noted extremity  Musculoskeletal Extremities: Exam today shows pleasant lady she gets up and limps slightly walks with a forward flexed posture she has ample soft tissue around her midsection but she is tender over trochanteric bursa she is diffusely tender at the lower back she has decreased internal rotation of the hips but not a lot of increase in her pain with that her Stinchfield maneuver.    Radiology:   Trays reviewed include AP of the pelvis lateral left hip compared to old x-rays show at least moderate arthritis more increased in the inferior portion of the joint.  She has a change on both sides.  She also has fairly extensive degenerative change of the lower lumbosacral area she has an MRI of her lumbar spine from open MRI that shows multilevel spondylosis with L4-5 [place] disc height loss disc bulge facet arthropathy and ligamentum flavum thickening with severe spinal stenosis and severe bilateral neuroforaminal foraminal compromise.  She also has an MRI of her left hip that shows tendinosis and findings to cause bursitis type symptoms    Assessment:     ICD-10-CM ICD-9-CM   1. Hip pain, left M25.552 719.45   2. Trochanteric bursitis, left hip M70.62 726.5   3. Spinal stenosis of lumbosacral region M48.07 724.02   4. Trochanteric bursitis, right hip M70.61 726.5        Large Joint Arthrocentesis: L greater trochanteric bursa  Date/Time: 6/14/2019 11:32 AM  Consent given by: patient  Site marked: site marked  Timeout: Immediately prior to procedure a time out was called to verify the correct patient, procedure, equipment, support staff and site/side marked as required   Supporting Documentation  Indications: pain and joint swelling   Procedure Details  Location: hip - L greater trochanteric bursa  Preparation: Patient was prepped and draped in the usual sterile fashion  Needle size:  "22 G  Approach: anterolateral  Medications administered: 80 mg methylPREDNISolone acetate 80 MG/ML; 4 mL lidocaine (cardiac)  Patient tolerance: patient tolerated the procedure well with no immediate complications    Large Joint Arthrocentesis: R greater trochanteric bursa  Date/Time: 6/14/2019 11:33 AM  Consent given by: patient  Site marked: site marked  Timeout: Immediately prior to procedure a time out was called to verify the correct patient, procedure, equipment, support staff and site/side marked as required   Supporting Documentation  Indications: pain and joint swelling   Procedure Details  Location: hip - R greater trochanteric bursa  Preparation: Patient was prepped and draped in the usual sterile fashion  Needle size: 22 G  Approach: anterolateral  Medications administered: 80 mg methylPREDNISolone acetate 80 MG/ML; 4 mL lidocaine (cardiac)  Patient tolerance: patient tolerated the procedure well with no immediate complications             Plan: Biomechanics of pertinent body area discussed.  Risks, benefits, alternatives, comparisons, and complications of accepted medicines, injections, recommendations, surgical procedures, and therapies explained and education provided in laymen's terms. Natural history and expected course of this patient's diagnosis discussed along with evaluation of therapies. Questions answered. When appropriate I also discussed proper use of cane, walker, trekking poles.   BMI:  The concept of BMI body mass index and its importance and implications discussed.  BMI suggested to be < 40 or as low as possible. Lifestyle measures for weight loss and how this affects orthopedic condition.  Cortisone Injection. See procedure note.I believe that she has spinal stenosis is precipitating the \"hip bursitis\" she could be a candidate for possibility physical therapy if she would go.  I did inject her trochanteric bursa's.  She is seeing Dr. Ruiz for pain management.  Assuming he may try " some kind of injectable procedures etc. he could certainly do any kind of bursal injections going forward in the future.  Does not appear to be anything surgical at least as far as the hips ago at this point.      6/14/2019    Much of this encounter note is an electronic transcription/translation of spoken language to printed text. The electronic translation of spoken language may permit erroneous, or at times, nonsensical words or phrases to be inadvertently transcribed; Although I have reviewed the note for such errors, some may still exist

## 2019-06-19 ENCOUNTER — OFFICE VISIT (OUTPATIENT)
Dept: PAIN MEDICINE | Facility: CLINIC | Age: 79
End: 2019-06-19

## 2019-06-19 VITALS
SYSTOLIC BLOOD PRESSURE: 130 MMHG | WEIGHT: 195.6 LBS | BODY MASS INDEX: 34.66 KG/M2 | HEIGHT: 63 IN | RESPIRATION RATE: 18 BRPM | TEMPERATURE: 97.8 F | DIASTOLIC BLOOD PRESSURE: 70 MMHG | OXYGEN SATURATION: 97 % | HEART RATE: 106 BPM

## 2019-06-19 DIAGNOSIS — M48.061 SPINAL STENOSIS OF LUMBAR REGION WITH RADICULOPATHY: ICD-10-CM

## 2019-06-19 DIAGNOSIS — M54.16 SPINAL STENOSIS OF LUMBAR REGION WITH RADICULOPATHY: ICD-10-CM

## 2019-06-19 DIAGNOSIS — G89.4 CHRONIC PAIN SYNDROME: Primary | ICD-10-CM

## 2019-06-19 LAB
POC AMPHETAMINES: NEGATIVE
POC BARBITURATES: POSITIVE
POC BENZODIAZEPHINES: NEGATIVE
POC COCAINE: NEGATIVE
POC METHADONE: NEGATIVE
POC METHAMPHETAMINE SCREEN URINE: NEGATIVE
POC OPIATES: NEGATIVE
POC OXYCODONE: NEGATIVE
POC PHENCYCLIDINE: NEGATIVE
POC PROPOXYPHENE: NEGATIVE
POC THC: NEGATIVE
POC TRICYCLIC ANTIDEPRESSANTS: NEGATIVE

## 2019-06-19 PROCEDURE — 99204 OFFICE O/P NEW MOD 45 MIN: CPT | Performed by: ANESTHESIOLOGY

## 2019-06-19 PROCEDURE — 80305 DRUG TEST PRSMV DIR OPT OBS: CPT | Performed by: ANESTHESIOLOGY

## 2019-06-19 NOTE — PROGRESS NOTES
CHIEF COMPLAINT  Ms. Barrett states that her back pain started 5 years ago and her left shoulder pain started about 30 years ago at work. She has seen orthopedic surgeon Dr. Bassett for her back and hip pain and had bilateral hip bursa injections done by him on 6/14/19. She has seen orthopedic surgeon Dr. Pichardo for her left shoulder pain and had a shoulder injection on 10/26/18. She has seen neurosurgery recently for her back pain and was told to try back injections to help her back pain.    Subjective   Sasha Barrett is a 79 y.o. female.   She presents to the office for evaluation of back pain. She was referred here by Dr. Cadet and also [facility].     Primary complaint is the back pain.  She notes that recent shoulder injection was significantly beneficial and she denies left shoulder pain right now.    Back Pain   This is a chronic problem. The current episode started more than 1 year ago. The problem occurs constantly. The problem has been gradually worsening since onset. The pain is present in the lumbar spine. The quality of the pain is described as aching, burning and shooting. The pain radiates to the right thigh and left thigh. The pain is severe. The symptoms are aggravated by bending, standing and twisting. Associated symptoms include weakness (bilateral legs). Pertinent negatives include no chest pain or numbness. Risk factors include obesity and menopause. She has tried analgesics, bed rest, heat, home exercises and ice (She is a less than ideal candidate for muscle relaxants and NSAIDs) for the symptoms. The treatment provided mild relief.        PEG Assessment   What number best describes your pain on average in the past week?7  What number best describes how, during the past week, pain has interfered with your enjoyment of life?7  What number best describes how, during the past week, pain has interfered with your general activity?  7    --  The aforementioned information the Chief Complaint  section and above subjective data including any HPI data has been personally reviewed and affirmed.  --          Current Outpatient Medications:   •  acetaminophen (TYLENOL) 650 MG 8 hr tablet, Take 650 mg by mouth Every 6 (Six) Hours As Needed for Mild Pain ., Disp: , Rfl:   •  albuterol (PROAIR HFA) 108 (90 Base) MCG/ACT inhaler, ProAir  (90 Base) MCG/ACT Inhalation Aerosol Solution; Patient Sig: ProAir  (90 Base) MCG/ACT Inhalation Aerosol Solution ; 8; 0; 18-Jun-2014; Active, Disp: , Rfl:   •  aspirin 81 MG tablet, Take 162 mg by mouth Daily., Disp: , Rfl:   •  budesonide (PULMICORT) 180 MCG/ACT inhaler, Inhale 1 puff 2 (Two) Times a Day., Disp: , Rfl:   •  butalbital-acetaminophen-caffeine (FIORICET, ESGIC) -40 MG per tablet, Take 2 tablets by mouth Every 4 (Four) Hours As Needed for Headache., Disp: 240 tablet, Rfl: 0  •  cefuroxime (CEFTIN) 250 MG tablet, Take 250 mg by mouth 2 (Two) Times a Day., Disp: , Rfl: 0  •  cetirizine (zyrTEC) 10 MG tablet, Take 10 mg by mouth Daily., Disp: , Rfl:   •  Cholecalciferol (VITAMIN D) 1000 UNITS tablet, Take 1,000 Units by mouth Daily., Disp: , Rfl:   •  ciprofloxacin (CIPRO) 500 MG tablet, Take 500 mg by mouth 2 (Two) Times a Day., Disp: , Rfl:   •  ciprofloxacin-dexamethasone (CIPRODEX) 0.3-0.1 % otic suspension, Administer 4 drops into both ears 2 (Two) Times a Day., Disp: , Rfl:   •  CloNIDine (CATAPRES) 0.3 MG tablet, Take 0.3 mg by mouth 3 (Three) Times a Day., Disp: , Rfl: 5  •  diphenhydrAMINE (BENADRYL) 50 MG tablet, Take 1 tablet by mouth every 4 (four) hours as needed for itching or allergies., Disp: 180 tablet, Rfl: 0  •  eplerenone (INSPRA) 50 MG tablet, Take 1 tablet by mouth Every 12 (Twelve) Hours., Disp: 180 tablet, Rfl: 3  •  ethacrynic acid (EDECRIN) 25 MG tablet, Take 25 mg by mouth 3 (Three) Times a Day., Disp: , Rfl:   •  fluticasone (FLONASE) 50 MCG/ACT nasal spray, 1 spray by Each Nare route 2 (Two) Times a Day., Disp: , Rfl:    •  HYDROcodone-acetaminophen (NORCO) 5-325 MG per tablet, TAKE TWO TABLETS BY MOUTH EVERY 6 HOURS AS NEEDED FOR ACUTE BACK AND SHOULDER PAIN, Disp: , Rfl: 0  •  ipratropium (ATROVENT) 0.02 % nebulizer solution, Take 500 mcg by nebulization 4 (Four) Times a Day., Disp: , Rfl:   •  montelukast (SINGULAIR) 10 MG tablet, Take 1 tablet by mouth every night., Disp: 90 tablet, Rfl: 1  •  NIFEdipine XL (PROCARDIA XL) 60 MG 24 hr tablet, TAKE ONE TABLET TWICE DAILY, Disp: 180 tablet, Rfl: 0  •  NON FORMULARY, Inhale 2 puffs 2 (Two) Times a Day. Proptline Inhaler, Disp: , Rfl:   •  omeprazole (priLOSEC) 40 MG capsule, TAKE ONE CAPSULE BY MOUTH EVERY DAY (Patient taking differently: TAKE ONE CAPSULE BY MOUTH DAILY PRN), Disp: 90 capsule, Rfl: 0  •  ondansetron ODT (ZOFRAN-ODT) 8 MG disintegrating tablet, Take 1 tablet by mouth every 6 (six) hours as needed for nausea or vomiting., Disp: 360 tablet, Rfl: 1  •  pentazocine-naloxone (TALWIN NX) 50-0.5 MG per tablet, Take 1 tablet by mouth Every 4 (Four) Hours As Needed for Mild Pain  (for up to 90 days)., Disp: , Rfl:   •  polyethylene glycol (MIRALAX) powder, Take one capful w/liquid daily (Patient taking differently: Take 17 g by mouth 4 (Four) Times a Week. Take one capful w/liquid daily ), Disp: 765 g, Rfl: 1  •  potassium chloride (K-DUR) 10 MEQ CR tablet, Take 10 mEq by mouth Daily., Disp: , Rfl:   •  predniSONE (DELTASONE) 10 MG tablet, Take 1 tablet by mouth Every Other Day., Disp: 15 tablet, Rfl:   •  promethazine-codeine (PHENERGAN with CODEINE) 6.25-10 MG/5ML syrup, Take 5 mL by mouth 4 (Four) Times a Day As Needed for cough., Disp: 360 mL, Rfl: 0  •  vitamin B-12 (VITAMIN B-12) 1000 MCG tablet, Take 1 tablet by mouth Daily., Disp: 90 tablet, Rfl: 3    The following portions of the patient's history were reviewed and updated as appropriate: allergies, current medications, past family history, past medical history, past social history, past surgical history and  problem list.    -------    The following portions of the patient's history were reviewed and updated as appropriate: allergies, current medications, past family history, past medical history, past social history, past surgical history and problem list.    Allergies   Allergen Reactions   • Bee Venom Shortness Of Breath and Rash   • Morphine Anaphylaxis and Nausea And Vomiting   • Sulfa Antibiotics Anaphylaxis and Hives     Or sulfa fillers in meds  Broke out in internal and external hives     • Tree Extract Shortness Of Breath and Rash   • Hydralazine Myalgia     Patient reports leg cramps, joint pain and increased fatigue   • Ambien [Zolpidem] Hallucinations   • Anastrozole    • Norvasc [Amlodipine] Other (See Comments)     KIDNEYS SHUT DOWN   • Nsaids      KIDNEY FAILURE   • Penicillins Unknown (See Comments)     Severe reaction as a child  Tolerated ceftriaxone during 04/2017 admission   • Grass Rash       Current Outpatient Medications on File Prior to Visit   Medication Sig Dispense Refill   • acetaminophen (TYLENOL) 650 MG 8 hr tablet Take 650 mg by mouth Every 6 (Six) Hours As Needed for Mild Pain .     • albuterol (PROAIR HFA) 108 (90 Base) MCG/ACT inhaler ProAir  (90 Base) MCG/ACT Inhalation Aerosol Solution; Patient Sig: ProAir  (90 Base) MCG/ACT Inhalation Aerosol Solution ; 8; 0; 18-Jun-2014; Active     • aspirin 81 MG tablet Take 162 mg by mouth Daily.     • budesonide (PULMICORT) 180 MCG/ACT inhaler Inhale 1 puff 2 (Two) Times a Day.     • butalbital-acetaminophen-caffeine (FIORICET, ESGIC) -40 MG per tablet Take 2 tablets by mouth Every 4 (Four) Hours As Needed for Headache. 240 tablet 0   • cefuroxime (CEFTIN) 250 MG tablet Take 250 mg by mouth 2 (Two) Times a Day.  0   • cetirizine (zyrTEC) 10 MG tablet Take 10 mg by mouth Daily.     • Cholecalciferol (VITAMIN D) 1000 UNITS tablet Take 1,000 Units by mouth Daily.     • ciprofloxacin (CIPRO) 500 MG tablet Take 500 mg by mouth 2  (Two) Times a Day.     • ciprofloxacin-dexamethasone (CIPRODEX) 0.3-0.1 % otic suspension Administer 4 drops into both ears 2 (Two) Times a Day.     • CloNIDine (CATAPRES) 0.3 MG tablet Take 0.3 mg by mouth 3 (Three) Times a Day.  5   • diphenhydrAMINE (BENADRYL) 50 MG tablet Take 1 tablet by mouth every 4 (four) hours as needed for itching or allergies. 180 tablet 0   • eplerenone (INSPRA) 50 MG tablet Take 1 tablet by mouth Every 12 (Twelve) Hours. 180 tablet 3   • ethacrynic acid (EDECRIN) 25 MG tablet Take 25 mg by mouth 3 (Three) Times a Day.     • fluticasone (FLONASE) 50 MCG/ACT nasal spray 1 spray by Each Nare route 2 (Two) Times a Day.     • HYDROcodone-acetaminophen (NORCO) 5-325 MG per tablet TAKE TWO TABLETS BY MOUTH EVERY 6 HOURS AS NEEDED FOR ACUTE BACK AND SHOULDER PAIN  0   • ipratropium (ATROVENT) 0.02 % nebulizer solution Take 500 mcg by nebulization 4 (Four) Times a Day.     • montelukast (SINGULAIR) 10 MG tablet Take 1 tablet by mouth every night. 90 tablet 1   • NIFEdipine XL (PROCARDIA XL) 60 MG 24 hr tablet TAKE ONE TABLET TWICE DAILY 180 tablet 0   • NON FORMULARY Inhale 2 puffs 2 (Two) Times a Day. Proptline Inhaler     • omeprazole (priLOSEC) 40 MG capsule TAKE ONE CAPSULE BY MOUTH EVERY DAY (Patient taking differently: TAKE ONE CAPSULE BY MOUTH DAILY PRN) 90 capsule 0   • ondansetron ODT (ZOFRAN-ODT) 8 MG disintegrating tablet Take 1 tablet by mouth every 6 (six) hours as needed for nausea or vomiting. 360 tablet 1   • pentazocine-naloxone (TALWIN NX) 50-0.5 MG per tablet Take 1 tablet by mouth Every 4 (Four) Hours As Needed for Mild Pain  (for up to 90 days).     • polyethylene glycol (MIRALAX) powder Take one capful w/liquid daily (Patient taking differently: Take 17 g by mouth 4 (Four) Times a Week. Take one capful w/liquid daily ) 765 g 1   • potassium chloride (K-DUR) 10 MEQ CR tablet Take 10 mEq by mouth Daily.     • predniSONE (DELTASONE) 10 MG tablet Take 1 tablet by mouth Every  Other Day. 15 tablet    • promethazine-codeine (PHENERGAN with CODEINE) 6.25-10 MG/5ML syrup Take 5 mL by mouth 4 (Four) Times a Day As Needed for cough. 360 mL 0   • vitamin B-12 (VITAMIN B-12) 1000 MCG tablet Take 1 tablet by mouth Daily. 90 tablet 3   • [DISCONTINUED] CloNIDine (CATAPRES) 0.2 MG tablet Take 1 tablet by mouth Every 8 (Eight) Hours. 270 tablet 3   • [DISCONTINUED] furosemide (LASIX) 40 MG tablet Take 40 mg by mouth Daily.  3   • [DISCONTINUED] LORazepam (ATIVAN) 0.5 MG tablet Take 1 tablet by mouth Every 8 (Eight) Hours As Needed for anxiety. Take 1 tab 1 hoour before MRI and repeat x 1 at time of test 60 tablet 0     No current facility-administered medications on file prior to visit.        Patient Active Problem List   Diagnosis   • Joint pain   • Urinary retention self-caths (Don)   • Sinusitis   • Conjunctivitis   • Arm pain   • Cervical disc displacement   • Cervical pain (Servando=PM)   • DDD (degenerative disc disease), cervical   • Hyperlipidemia LDL goal <70   • Preventative health care   • Medication management   • Proctocele   • Urge incontinence of urine   • Chronic tension-type headache, intractable   • Obesity (BMI 30.0-34.9)   • H/o Lyme disease   • Stage 3 chronic kidney disease (CMS/HCC)   • Cerebral atherosclerosis   • Allergic contact dermatitis   • Malaise and fatigue progressive   • Vertigo   • Vitamin D deficiency   • Moderate persistent asthmatic bronchitis without complication   • Anterior cervical adenopathy due to infection   • Pleurisy   • Knee pain, bilateral   • Elevated PTH level   • 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)   • Gastroesophageal reflux disease with esophagitis   • Psoriasis (Darryl Ochoa)   • Postmenopausal   • Recurrent UTI   • CVA (cerebral vascular accident) (CMS/HCC)   • Dyspnea on exertion   • Precordial pain   • Abnormal EKG   • Arthralgia   • Accelerated hypertension   • Acute joint pain   • Easy bruisability   • Cough  productive of purulent sputum   • Influenza A Subtype H3   • Acute exacerbation of 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HCC)   • Acute chest wall pain   • Acute pain of left shoulder due to trauma   • Decreased functional mobility and endurance since fall 12/20/17   • Uncontrolled hypertension   • Fall from standing with head traumna   • 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   • Migraine without aura and without status migrainosus, not intractable   • Thyroid nodule       Past Medical History:   Diagnosis Date   • Arthritis    • Asthma    • Breast cancer (CMS/HCC) 2003    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 Yazmin Canseco   • CARPAL TUNNEL RELEASE Right 05/25/2011    Dr. Caleb Bueno   • CARPAL TUNNEL RELEASE Left 04/26/2011    Dr. Caleb Bueno   • CATARACT EXTRACTION Left 11/29/2010    Dr. Eusebio Downs   • CATARACT EXTRACTION Right 11/17/2010    Dr. Eusebio Downs   • COLONOSCOPY N/A 12/06/2002    Sigmoid diverticulosis-Dr. Brandon Batista   • COLONOSCOPY N/A 12/12/2013    Tortuous colon, diverticulosis in the sigmoid colon and descending colon, stool in the rectum, sigmoid colon, descending colon, splenic flexure, transverse colon and hepatic flexure-Dr. Irma Brambila   • ENDOSCOPY N/A 09/13/2007    Normal-Dr. Pavel Quarles   • ENDOSCOPY AND COLONOSCOPY N/A 09/19/2005    1 cm hiatal hernia, mild Schatzki's ring, sigmoid diverticulosis-Dr. Yoel Farley   • GANGLION CYST EXCISION Left 12/18/2012    Release of A1 pulley flexor sheath, left  fourth finger, excision of ganglion cyst 0.5 cm diameter, A2 pulley flexor sheath, left fourth finger-Dr. Caleb Bueno   • HEEL SPUR EXCISION Left 1968   • INGUINAL HERNIA REPAIR Right 1938    at 5 months old   • MASTECTOMY Right 08/05/2008    Right breast mastectomy and excision of left chest wall lesion-Dr. Yoel Farley   • MASTECTOMY RADICAL Left 04/29/2003    Left modified radical mastectomy-Dr. Yazmin Canseco   • PARATHYROIDECTOMY Right 05/04/2004    Excision of parathyroid adenoma (right superior)-Dr. Yoel Farley   • REPLACEMENT TOTAL KNEE Right 04/09/2012    Dr. Lamonte Childress   • SINUS SURGERY  1984   • THYROIDECTOMY, PARTIAL Left 05/04/2004    Dr. Yoel Farley   • TOTAL ABDOMINAL HYSTERECTOMY Bilateral 1973    Dr. Mahan   • UPPER GASTROINTESTINAL ENDOSCOPY N/A 02/18/2009    LA Grade A reflux esophagitis, normal stomach, normal 2nd part of the duodenum-Dr. Yoel Farley       Family History   Problem Relation Age of Onset   • Brain cancer Brother    • Alcohol abuse Mother    • No Known Problems Father    • No Known Problems Sister        Social History     Socioeconomic History   • Marital status:      Spouse name: Joey   • Number of children: 6   • Years of education: Not on file   • Highest education level: Some college, no degree   Occupational History   • Occupation: RETIRED   Social Needs   • Financial resource strain: Patient refused   • Food insecurity:     Worry: Patient refused     Inability: Patient refused   • Transportation needs:     Medical: Patient refused     Non-medical: Patient refused   Tobacco Use   • Smoking status: Never Smoker   • Smokeless tobacco: Never Used   Substance and Sexual Activity   • Alcohol use: No     Frequency: Never   • Drug use: No   • Sexual activity: Yes     Partners: Male   Social History Narrative    LIVES WITH        -------      REVIEW OF PERTINENT MEDICAL DATA    Point-of-care preliminary urine drug screen is positive for  "barbiturates, otherwise negative.  It is of normal temperature and color and I examined the specimen and the immunoassay.    I reviewed the recent office visit note from Joy vieiraor nurse practitioner at [facility] neurosurgery from May 31, 2019.  There is multilevel disc desiccation and also severe facet changes at L4-5 and L5-S1 and severe spinal stenosis at L4-5.  Incidentally she also has severe tendinopathy the at the hip and besides a severe tendinosis also trochanteric bursitis.  Lumbar epidural injections were recommended option.    There was an office note from 6/14/2019 from [facility] orthopedic surgeon Dr. Scott Bassett.   He did greater trochanteric bursa injections on both hips at that visit.            Images reviewed on CD.... This axial view is at the L45:          Review of Systems   Constitutional: Positive for fatigue.   HENT: Negative for congestion.    Eyes: Negative for visual disturbance.   Respiratory: Negative for cough, shortness of breath and wheezing.    Cardiovascular: Positive for leg swelling. Negative for chest pain and palpitations.   Gastrointestinal: Negative for constipation and diarrhea.   Genitourinary: Negative for difficulty urinating.   Musculoskeletal: Positive for arthralgias (bilateral hips and left shoulder) and back pain.   Neurological: Positive for weakness (bilateral legs). Negative for numbness.   Psychiatric/Behavioral: Positive for sleep disturbance. Negative for suicidal ideas. The patient is nervous/anxious.          Vitals:    06/19/19 1352   BP: 130/70   Pulse: 106   Resp: 18   Temp: 97.8 °F (36.6 °C)   SpO2: 97%   Weight: 88.7 kg (195 lb 9.6 oz)   Height: 160 cm (63\")   PainSc:   6   PainLoc: Back         Objective   Physical Exam   Constitutional: She is oriented to person, place, and time. Vital signs are normal. She appears well-developed and well-nourished. She does not have a sickly appearance.   HENT:   Head: Normocephalic and atraumatic.   Right Ear: Hearing " and external ear normal.   Left Ear: Hearing and external ear normal.   Nose: Nose normal.   Mouth/Throat: Uvula is midline and oropharynx is clear and moist.   Eyes: Conjunctivae and EOM are normal. Pupils are equal, round, and reactive to light.   Neck: Neck supple.   Cardiovascular: Normal rate, regular rhythm and normal heart sounds.   Pulmonary/Chest: Effort normal and breath sounds normal.   Abdominal: Soft. Normal appearance and bowel sounds are normal. There is no hepatosplenomegaly. There is no tenderness. There is no CVA tenderness.   Musculoskeletal:        Left shoulder: She exhibits no tenderness.        Lumbar back: She exhibits decreased range of motion, tenderness and deformity.   Lymphadenopathy:     She has no cervical adenopathy.   Neurological: She is alert and oriented to person, place, and time. She displays atrophy. She displays no tremor. No cranial nerve deficit or sensory deficit. Gait abnormal. Coordination normal.   Reflex Scores:       Patellar reflexes are 0 on the right side and 0 on the left side.       Achilles reflexes are 0 on the right side and 0 on the left side.  SLR pos bilat   Skin: Skin is warm and dry.   Psychiatric: She has a normal mood and affect. Her behavior is normal. Judgment and thought content normal.   Nursing note and vitals reviewed.      Assessment/Plan   Sasha was seen today for back pain and shoulder pain.    Diagnoses and all orders for this visit:    Chronic pain syndrome    Spinal stenosis of lumbar region with radiculopathy  -     Case Request        --- Follow-up for procedure....  ЕЛЕНА @ L45, x2, 2-3 wks apart  --- then follow up about 2 wks after that.  --- if more options are needed and if she is not considering surgical decompression, then she could potentially consider a minimally decompressive lumbar decompression (mild) instead      Reviewed the procedure at length with the patient.  Included in the review was expectations, complications, risk and  benefits.The procedure was described in detail and the risks, benefits and alternatives were discussed with the patient (including but not limited to: bleeding, infection, nerve damage, worsening of pain, inability to perform injection, paralysis, seizures, and death) who agreed to proceed.  Discussed the potential for sedation if warranted/wanted.  The procedure will plan to be performed at [facility] with fluoroscopic guidance(unless ultrasound is indicated). Questions were answered and in a way the patient could understand.  Patient verbalized understanding and wishes to proceed.  This intervention will be ordered.  Discussed with patient that all procedures are part of a multimodal plan of care and include either formal PT or a home exercise program.                 EMR Dragon/Transcription disclaimer:   Much of this encounter note is an electronic transcription/translation of spoken language to printed text. The electronic translation of spoken language may permit erroneous, or at times, nonsensical words or phrases to be inadvertently transcribed; Although I have reviewed the note for such errors, some may still exist.

## 2019-06-24 ENCOUNTER — RESULTS ENCOUNTER (OUTPATIENT)
Dept: PAIN MEDICINE | Facility: CLINIC | Age: 79
End: 2019-06-24

## 2019-06-24 DIAGNOSIS — M48.061 SPINAL STENOSIS OF LUMBAR REGION WITH RADICULOPATHY: ICD-10-CM

## 2019-06-24 DIAGNOSIS — G89.4 CHRONIC PAIN SYNDROME: ICD-10-CM

## 2019-06-24 DIAGNOSIS — M54.16 SPINAL STENOSIS OF LUMBAR REGION WITH RADICULOPATHY: ICD-10-CM

## 2019-08-01 ENCOUNTER — DOCUMENTATION (OUTPATIENT)
Dept: PAIN MEDICINE | Facility: CLINIC | Age: 79
End: 2019-08-01

## 2019-08-01 ENCOUNTER — OUTSIDE FACILITY SERVICE (OUTPATIENT)
Dept: PAIN MEDICINE | Facility: CLINIC | Age: 79
End: 2019-08-01

## 2019-08-01 PROCEDURE — 62323 NJX INTERLAMINAR LMBR/SAC: CPT | Performed by: ANESTHESIOLOGY

## 2019-08-01 NOTE — PROGRESS NOTES
Lumbar Epidural Steroid Injection  [facility]    PREOPERATIVE DIAGNOSIS:   Lumbar Spinal Stenosis unspecified regarding Neurogenic Claudication and Lumbar Radiculopathy  POSTOPERATIVE DIAGNOSIS:  Same as preop diagnosis    PROCEDURE:   Lumbar Epidural Steroid Injection, Therapeutic Translaminar Injection, with epidurogram, at  L4/L5 level    PRE-PROCEDURE DISCUSSION WITH PATIENT:    Risks and complications were discussed with the patient prior to starting the procedure and informed consent was obtained.  We discussed various topics including but not limited to bleeding, infection, injury, paralysis, nerve injury, dural puncture, coma, death, worsening of clinical picture, lack of pain relief, and postprocedural soreness.    SURGEON:  Hebert Ruiz MD    REASON FOR PROCEDURE:    Diagnostic injection at this level is needed, Degenerative changes are noted in the area. and Radiating pattern of pain is likely consistent with degenerative changes in the area.    SEDATION:  Versed 2mg & Fentanyl 50 mcg IV  ANESTHETIC:  Marcaine 0.25%  STEROID:   Methylprednisolone (DEPO MEDROL) 80mg/ml    DESCRIPTON OF PROCEDURE:    After obtaining informed consent, I.V. was started in the preop area.   The patient was taken to the operating room and placed in the prone position.  EKG, blood pressure, and pulse oximeter were monitored throughout, and sedation was provided as needed by the RN under my guidance. All pressure points were well padded.  The lumbar spine area was prepped with Chloraprep and draped in a sterile fashion.  Under fluoroscopic guidance, the above mentioned interlaminar space was identified. Skin and subcutaneous tissues were anesthetized with 1% lidocaine in the middle of the space. A Tuohy needle was introduced through the skin and advanced to this interlaminar space and into the epidural space under fluoroscopic guidance and verified with loss-of-resistance technique to air.  After  confirming the position of the needle with the fluoroscope with all the views, and after aspiration was confirmed negative for blood and CSF, 1.5 mL of Omnipaque was injected.  After seeing appropriate epidurogram with lateral and PA views, a total of 3 cc solution was injected, consisting of 2cc of local anesthetic as above, with normal saline and injectable steroid as above.     ESTIMATED BLOOD LOSS:  <5 mL  SPECIMENS:  None    COMPLICATIONS:     No complications were noted., There was no indication of vascular uptake on live injection of contrast dye., There was no indication of intrathecal uptake on live injection of contrast dye., There was not any evidence of dural puncture.   and The patient did not have any signs of postprocedure numbness nor weakness.    TOLERANCE & DISCHARGE CONDITION:    The patient tolerated the procedure well.  The patient was transported to the recovery area without difficulties.  The patient was discharged to home under the care of family in stable and satisfactory condition.    PLAN OF CARE:  1. The patient was given our standard instruction sheet.  2. The patient will Repeat injection 2-4 wks  3. The patient will resume all medications as per the medication reconciliation sheet.

## 2019-08-07 ENCOUNTER — TELEPHONE (OUTPATIENT)
Dept: PAIN MEDICINE | Facility: CLINIC | Age: 79
End: 2019-08-07

## 2019-08-07 NOTE — TELEPHONE ENCOUNTER
Patient called and stated that she is having groin pain. She is going away for a week on Friday and she has to drive for 3.5 hours to her destination. She wanted to know if she can have anything to help her with the pain. She stated that she has been given hydrocodone in the past. Please advise.

## 2019-08-08 NOTE — TELEPHONE ENCOUNTER
That has been prescribed by her [facility] PCP MD, she should call him.  We did not evaluate groin pain as a spine problem; I do not think the groin pain is coming from the spine so we should not treat it.

## 2019-08-08 NOTE — TELEPHONE ENCOUNTER
She asked if you were going to change the procedure to accommodate the groin pain. I informed that we would be unable to treat a pain that his been evaluated previously. She scheduled a F/U so she can discuss this pain with you. She stated that she has been telling her doctor about this pain for a while and no one has done anything for it.

## 2019-08-09 NOTE — TELEPHONE ENCOUNTER
Her 4-2019 MRI does not show anything that would correlate with groin pain.  I do not think the groin pain is coming from the spine.  What we could do is work her up to consider some blocks to nerves to the groin.  Alternative if that does not help would be to possibly consider a referral to GYN.

## 2019-08-20 ENCOUNTER — TELEPHONE (OUTPATIENT)
Dept: PAIN MEDICINE | Facility: CLINIC | Age: 79
End: 2019-08-20

## 2019-08-20 NOTE — TELEPHONE ENCOUNTER
Dr. Óscar Cadet's office called and requested patient's first office visit note. This has been faxed over.  Dr. Cadet would like you to call him as well.  [phone].

## 2019-08-22 ENCOUNTER — TELEPHONE (OUTPATIENT)
Dept: PAIN MEDICINE | Facility: CLINIC | Age: 79
End: 2019-08-22

## 2019-08-22 NOTE — TELEPHONE ENCOUNTER
Melissa, From Dr Cadet's office called and stated that Dr. Cadet would like you to call him in regards to the patient. Please call Dr. Cadet at [phone].

## 2019-08-23 ENCOUNTER — OFFICE VISIT (OUTPATIENT)
Dept: PAIN MEDICINE | Facility: CLINIC | Age: 79
End: 2019-08-23

## 2019-08-23 ENCOUNTER — CLINICAL SUPPORT (OUTPATIENT)
Dept: ORTHOPEDIC SURGERY | Facility: CLINIC | Age: 79
End: 2019-08-23

## 2019-08-23 VITALS
WEIGHT: 199 LBS | TEMPERATURE: 97.3 F | OXYGEN SATURATION: 99 % | SYSTOLIC BLOOD PRESSURE: 162 MMHG | DIASTOLIC BLOOD PRESSURE: 84 MMHG | RESPIRATION RATE: 18 BRPM | HEART RATE: 66 BPM | BODY MASS INDEX: 35.26 KG/M2 | HEIGHT: 63 IN

## 2019-08-23 VITALS — WEIGHT: 193 LBS | HEIGHT: 63 IN | BODY MASS INDEX: 34.2 KG/M2 | TEMPERATURE: 97.6 F

## 2019-08-23 DIAGNOSIS — G89.29 OTHER CHRONIC PAIN: Primary | ICD-10-CM

## 2019-08-23 DIAGNOSIS — M48.061 SPINAL STENOSIS OF LUMBAR REGION WITH RADICULOPATHY: ICD-10-CM

## 2019-08-23 DIAGNOSIS — M19.019 ARTHRITIS OF SHOULDER: Primary | ICD-10-CM

## 2019-08-23 DIAGNOSIS — M16.12 OSTEOARTHRITIS OF LEFT HIP, UNSPECIFIED OSTEOARTHRITIS TYPE: ICD-10-CM

## 2019-08-23 DIAGNOSIS — M54.16 SPINAL STENOSIS OF LUMBAR REGION WITH RADICULOPATHY: ICD-10-CM

## 2019-08-23 PROCEDURE — 99214 OFFICE O/P EST MOD 30 MIN: CPT | Performed by: NURSE PRACTITIONER

## 2019-08-23 PROCEDURE — 20610 DRAIN/INJ JOINT/BURSA W/O US: CPT | Performed by: NURSE PRACTITIONER

## 2019-08-23 RX ORDER — METHYLPREDNISOLONE ACETATE 80 MG/ML
80 INJECTION, SUSPENSION INTRA-ARTICULAR; INTRALESIONAL; INTRAMUSCULAR; SOFT TISSUE
Status: COMPLETED | OUTPATIENT
Start: 2019-08-23 | End: 2019-08-23

## 2019-08-23 RX ADMIN — METHYLPREDNISOLONE ACETATE 80 MG: 80 INJECTION, SUSPENSION INTRA-ARTICULAR; INTRALESIONAL; INTRAMUSCULAR; SOFT TISSUE at 13:08

## 2019-08-23 NOTE — PROGRESS NOTES
CHIEF COMPLAINT  Ms. Barrett states that over the last year she has had groin pain on the left side that has progressively gotten worse.      Subjective   Sasha Barrett is a 79 y.o. female  who presents to the office for follow-up.She has a history of back pain, not here today for back.    C/o left sided groin which has been going on for the past year.  Has become progressively worse over that time.  She is referred back here by her primary care provider to consider nerve block which has been discussed with Dr. Ruiz as well.  Upon further discussion with the patient she reports that she had an episode of this several years ago and responded to a hip injection.  Pain today is very similar.  The pain is worse with activity and keeps her awake at night, has trouble with stairs.      Bilateral GT bursa injections with Dr. Bassett 6/19/19    ЕЛЕНА 8/1/19 -- has noticed some moderate improvement, scheduled to repeat next week.      Back Pain   This is a chronic problem. The current episode started more than 1 year ago. The problem occurs constantly. The problem has been waxing and waning since onset. The pain is present in the lumbar spine. The quality of the pain is described as aching, burning and shooting. The pain radiates to the right thigh and left thigh. The pain is at a severity of 6/10. The pain is severe. The symptoms are aggravated by bending, standing and twisting. Associated symptoms include weakness. Pertinent negatives include no chest pain or numbness. Risk factors include obesity and menopause. She has tried analgesics, bed rest, heat, home exercises and ice (She is a less than ideal candidate for muscle relaxants and NSAIDs) for the symptoms. The treatment provided mild relief.      PEG Assessment   What number best describes your pain on average in the past week?8  What number best describes how, during the past week, pain has interfered with your enjoyment of life?10  What number best describes how, during  "the past week, pain has interfered with your general activity?  10    The following portions of the patient's history were reviewed and updated as appropriate: allergies, current medications, past family history, past medical history, past social history, past surgical history and problem list.    Review of Systems   Constitutional: Positive for fatigue.   HENT: Negative for congestion.    Eyes: Negative for visual disturbance.   Respiratory: Positive for shortness of breath. Negative for cough and wheezing.    Cardiovascular: Positive for leg swelling. Negative for chest pain and palpitations.   Gastrointestinal: Positive for constipation. Negative for diarrhea.   Genitourinary: Negative for difficulty urinating.        Groin pain left side   Musculoskeletal: Positive for back pain.   Neurological: Positive for weakness. Negative for numbness.   Psychiatric/Behavioral: Positive for sleep disturbance. Negative for suicidal ideas. The patient is nervous/anxious.        Vitals:    08/23/19 1515   BP: 162/84   Pulse: 66   Resp: 18   Temp: 97.3 °F (36.3 °C)   SpO2: 99%   Weight: 90.3 kg (199 lb)   Height: 160 cm (63\")   PainSc:   6   PainLoc: Groin         Objective   Physical Exam   Constitutional: She is oriented to person, place, and time. She appears well-developed and well-nourished. No distress.   HENT:   Head: Atraumatic.   Eyes: Conjunctivae are normal.   Cardiovascular: Normal rate.   Pulmonary/Chest: Effort normal. No respiratory distress.   Musculoskeletal:        Left hip: She exhibits decreased range of motion and tenderness.        Lumbar back: She exhibits tenderness and pain.   Neurological: She is alert and oriented to person, place, and time. Gait abnormal.   Skin: Skin is warm and dry.   Psychiatric: She has a normal mood and affect. Her behavior is normal.   Nursing note and vitals reviewed.    Assessment/Plan   Sasha was seen today for groin pain.    Diagnoses and all orders for this visit:    Other " chronic pain    Osteoarthritis of left hip, unspecified osteoarthritis type  -     Case Request    Spinal stenosis of lumbar region with radiculopathy      --- Add on left intra-articular hip injection (ideally anterior approach) to LESI next week  --- Follow-up after procedure          MARY LOU REPORT    MARY LOU report has been reviewed and scanned into the patient's chart.    As the clinician, I personally reviewed the MARY LOU from 8/23/19 while the patient was in the office today.    EMR Dragon/Transcription disclaimer:   Much of this encounter note is an electronic transcription/translation of spoken language to printed text. The electronic translation of spoken language may permit erroneous, or at times, nonsensical words or phrases to be inadvertently transcribed; Although I have reviewed the note for such errors, some may still exist.      INITIAL VISIT WITH [facility]

## 2019-08-23 NOTE — PROGRESS NOTES
Ms. Barrett comes in today for follow-up.  Injections have worked well in the past.  The patient would like to get a repeat injection today.  The risks, benefits and alternatives were discussed and the patient consented.  Going forward, the patient will follow-up as needed.    AC Iyer    08/23/2019      Large Joint Arthrocentesis: R subacromial bursa  Date/Time: 8/23/2019 1:08 PM  Consent given by: patient  Site marked: site marked  Timeout: Immediately prior to procedure a time out was called to verify the correct patient, procedure, equipment, support staff and site/side marked as required   Supporting Documentation  Indications: pain   Procedure Details  Location: shoulder - R subacromial bursa  Preparation: Patient was prepped and draped in the usual sterile fashion  Needle gauge: 21 G.  Approach: posterior  Medications administered: 2 mL lidocaine (cardiac); 80 mg methylPREDNISolone acetate 80 MG/ML  Patient tolerance: patient tolerated the procedure well with no immediate complications

## 2019-08-27 ENCOUNTER — OUTSIDE FACILITY SERVICE (OUTPATIENT)
Dept: PAIN MEDICINE | Facility: CLINIC | Age: 79
End: 2019-08-27

## 2019-08-27 ENCOUNTER — DOCUMENTATION (OUTPATIENT)
Dept: PAIN MEDICINE | Facility: CLINIC | Age: 79
End: 2019-08-27

## 2019-08-27 PROCEDURE — 62323 NJX INTERLAMINAR LMBR/SAC: CPT | Performed by: ANESTHESIOLOGY

## 2019-08-27 PROCEDURE — 20610 DRAIN/INJ JOINT/BURSA W/O US: CPT | Performed by: ANESTHESIOLOGY

## 2019-08-27 NOTE — PROGRESS NOTES
TWO INDEPENDENT PROCEDURES:  LESI and Hip inj    -------      Lumbar Epidural Steroid Injection  [facility]    PREOPERATIVE DIAGNOSIS:   Lumbar Spinal Stenosis WITH Neurogenic Claudication  POSTOPERATIVE DIAGNOSIS:  Same as preop diagnosis    PROCEDURE:   Lumbar Epidural Steroid Injection, Therapeutic Translaminar Injection, with epidurogram, at  L4/L5 level    PRE-PROCEDURE DISCUSSION WITH PATIENT:    Risks and complications were discussed with the patient prior to starting the procedure and informed consent was obtained.  We discussed various topics including but not limited to bleeding, infection, injury, paralysis, nerve injury, dural puncture, coma, death, worsening of clinical picture, lack of pain relief, and postprocedural soreness.    SURGEON:  Hebert Ruiz MD    REASON FOR PROCEDURE:    Stenotic area is noted, and is likely contributing to this chronic &/or recurrent pain.     SEDATION:  Versed 2mg & Fentanyl 100 mcg IV  ANESTHETIC:  Marcaine 0.25%  STEROID:   Methylprednisolone (DEPO MEDROL) 80mg/ml    DESCRIPTON OF PROCEDURE:    After obtaining informed consent, I.V. was started in the preop area.   The patient was taken to the operating room and placed in the prone position.  EKG, blood pressure, and pulse oximeter were monitored throughout, and sedation was provided as needed by the RN under my guidance. All pressure points were well padded.  The lumbar spine area was prepped with Chloraprep and draped in a sterile fashion.  Under fluoroscopic guidance, the above mentioned interlaminar space was identified. Skin and subcutaneous tissues were anesthetized with 1% lidocaine in the middle of the space. A Tuohy needle was introduced through the skin and advanced to this interlaminar space and into the epidural space under fluoroscopic guidance and verified with loss-of-resistance technique to air.  After confirming the position of the needle with the fluoroscope with all the views,  and after aspiration was confirmed negative for blood and CSF, 1.5 mL of Omnipaque was injected.  After seeing appropriate epidurogram with lateral and PA views, a total of 3 cc solution was injected, consisting of 2cc of local anesthetic as above, with normal saline and injectable steroid as above.     ESTIMATED BLOOD LOSS:  <5 mL  SPECIMENS:  None    COMPLICATIONS:     No complications were noted., There was no indication of vascular uptake on live injection of contrast dye., There was no indication of intrathecal uptake on live injection of contrast dye., There was not any evidence of dural puncture.   and The patient did not have any signs of postprocedure numbness nor weakness.    TOLERANCE & DISCHARGE CONDITION:    The patient tolerated the procedure well.  The patient was transported to the recovery area without difficulties.  The patient was discharged to home under the care of family in stable and satisfactory condition.    PLAN OF CARE:  1. The patient was given our standard instruction sheet.  2. The patient will Return to clinic 2 wks  3. The patient will resume all medications as per the medication reconciliation sheet.        ----------------------------    Left Hip injection - lateral approach  [facility]        PREOPERATIVE DIAGNOSIS:     OA Left hip    POSTOPERATIVE DIAGNOSIS:   same    PROCEDURE:  20610 - Left Intra-articular Hip Joint Injection, with fluoroscopic guidance & hip arthrogram - Lateral Approach    PRE-PROCEDURE DISCUSSION WITH PATIENT:    Risks and complications were discussed with the patient prior to starting the procedure (including but not limited to infection, bleeding, injury, paralysis, and death) and informed consent was obtained.      SURGEON:  Hebert Ruiz MD    REASON FOR PROCEDURE:    Significant left hip area pain    SEDATION:  As above  ANESTHETIC AGENT:  Marcaine 0.5%  STEROID AGENT:   40mg depomedrol    DESCRIPTON OF PROCEDURE:  After  obtaining informed consent, IV access was obtained in the preoperative area.  The patient was transported to the operative suite and placed in lateral decubitus position with the affected hip up.  The hip and knee were flexed.  EKG, blood pressure, and pulse oximetry were monitored.  Any and all sedation given was administered by the RN under my direct supervision and guidance.   The hip area was prepped in a wide diameter with Chloraprep and draped in a sterile fashion.     A point just cephalad of the greater trochanter was identified and the point was anesthetized with subcutaneous 1% Lidocaine.  A  22-gauge spinal needle was inserted perpendicular to the skin and, under fluoroscopic guidance and strict aseptic technique, directed over the greater trochanter toward the head of the femur and to a point near the medial neck of the femur and just lateral to the head of the femur.  Aspiration was confirmed negative.  After confirming the position of the needle with fluoroscopy, 1 mL of Omnipaque was injected and after seeing appropriate spread into the joint a total of 4mL of injectate including the above listed local anesthetic and steroid was injected into the joint.  Vital signs remained stable.  The onset of analgesia was noted.    ESTIMATED BLOOD LOSS:  minimal  SPECIMENS:  None    COMPLICATIONS:  none    TOLERANCE & DISCHARGE CONDITION:    The patient tolerated the procedure well.  The patient was transported to the recovery area without difficulties.  The patient was discharged to home under the care of a chaperone and in satisfactory condition.    PLAN OF CARE:  4. The patient was given our standard instruction sheet.  5. The patient will follow up in 2 wks, as above.... If not improving then may consider an ilioinguinal / iliohypogastric nerve block.   6. The patient is asked to keep a pain log sheet hourly for 8 hours today.  7. The patient will resume all medications as per the medication reconciliation  sheet.

## 2019-09-13 ENCOUNTER — OFFICE VISIT (OUTPATIENT)
Dept: PAIN MEDICINE | Facility: CLINIC | Age: 79
End: 2019-09-13

## 2019-09-13 VITALS
WEIGHT: 198.4 LBS | BODY MASS INDEX: 35.15 KG/M2 | RESPIRATION RATE: 20 BRPM | HEART RATE: 83 BPM | OXYGEN SATURATION: 98 % | TEMPERATURE: 97.5 F | DIASTOLIC BLOOD PRESSURE: 95 MMHG | SYSTOLIC BLOOD PRESSURE: 186 MMHG | HEIGHT: 63 IN

## 2019-09-13 DIAGNOSIS — M48.061 SPINAL STENOSIS OF LUMBAR REGION WITH RADICULOPATHY: ICD-10-CM

## 2019-09-13 DIAGNOSIS — G89.29 OTHER CHRONIC PAIN: Primary | ICD-10-CM

## 2019-09-13 DIAGNOSIS — M54.16 SPINAL STENOSIS OF LUMBAR REGION WITH RADICULOPATHY: ICD-10-CM

## 2019-09-13 DIAGNOSIS — M16.12 OSTEOARTHRITIS OF LEFT HIP, UNSPECIFIED OSTEOARTHRITIS TYPE: ICD-10-CM

## 2019-09-13 PROCEDURE — 99213 OFFICE O/P EST LOW 20 MIN: CPT | Performed by: NURSE PRACTITIONER

## 2019-09-13 RX ORDER — HYDROXYZINE HYDROCHLORIDE 25 MG/1
TABLET, FILM COATED ORAL
COMMUNITY
Start: 2019-09-12 | End: 2020-01-22

## 2019-09-13 NOTE — PROGRESS NOTES
CHIEF COMPLAINT  F/u back pain and left groin pain. Pt sts left groin pain and back pain has improved. Pt had TWO INDEPENDENT PROCEDURES:  LESI and Hip inj on 8/27/19. Pt sts receiving 80% pain relief     Subjective   Sasha Barrett is a 79 y.o. female  who presents to the office for follow-up.She has a history of back and hip pain.    L4/5 LESI and left intra-articular hip injection 8/27/19 -- says significant relief (80%) for 2 weeks, beginning to return but not nearly as intense. Groin/hip pain still much improved.      Bilateral GT bursa injections with Dr. Bassett 6/19/19 - did not help      LESI 8/1/19 -- helped    Back Pain   This is a chronic problem. The current episode started more than 1 year ago. The problem occurs constantly. The problem has been waxing and waning since onset. The pain is present in the lumbar spine. The quality of the pain is described as aching, burning and shooting. The pain radiates to the right thigh and left thigh. The pain is at a severity of 6/10. The pain is severe. The symptoms are aggravated by bending, standing and twisting. Associated symptoms include headaches (chronic), numbness (bilat legs) and weakness (bilat legs). Pertinent negatives include no chest pain or fever. Risk factors include obesity and menopause. She has tried analgesics, bed rest, heat, home exercises and ice (She is a less than ideal candidate for muscle relaxants and NSAIDs) for the symptoms. The treatment provided mild relief.     PEG Assessment   What number best describes your pain on average in the past week?7  What number best describes how, during the past week, pain has interfered with your enjoyment of life?7  What number best describes how, during the past week, pain has interfered with your general activity?  7    The following portions of the patient's history were reviewed and updated as appropriate: allergies, current medications, past family history, past medical history, past social history,  "past surgical history and problem list.    Review of Systems   Constitutional: Positive for activity change (inc) and fatigue (freq). Negative for fever.   HENT: Negative for congestion.    Eyes: Negative for visual disturbance.   Respiratory: Positive for shortness of breath (hx asthma). Negative for cough and wheezing.    Cardiovascular: Positive for leg swelling (bilat). Negative for chest pain and palpitations.   Gastrointestinal: Positive for constipation (chronic). Negative for diarrhea.   Genitourinary: Negative for difficulty urinating.        Groin pain left side   Musculoskeletal: Positive for arthralgias (left groin), back pain, gait problem (cane) and myalgias. Negative for joint swelling.   Neurological: Positive for weakness (bilat legs), numbness (bilat legs) and headaches (chronic). Negative for dizziness, seizures and light-headedness.   Psychiatric/Behavioral: Positive for agitation (occ) and sleep disturbance. Negative for self-injury and suicidal ideas. The patient is nervous/anxious.        Vitals:    09/13/19 1008   BP: (!) 186/95  Comment: pt sts hasn't had bp meds today   Pulse: 83   Resp: 20   Temp: 97.5 °F (36.4 °C)   SpO2: 98%   Weight: 90 kg (198 lb 6.4 oz)   Height: 160 cm (63\")   PainSc:   6   PainLoc: Groin  Comment: left     Objective   Physical Exam   Constitutional: She is oriented to person, place, and time. She appears well-developed and well-nourished. No distress.   HENT:   Head: Atraumatic.   Eyes: Conjunctivae are normal.   Cardiovascular: Normal rate.   Pulmonary/Chest: Effort normal. No respiratory distress.   Musculoskeletal:        Left hip: She exhibits decreased range of motion and tenderness.        Lumbar back: She exhibits tenderness and pain.   Neurological: She is alert and oriented to person, place, and time. Gait abnormal.   Skin: Skin is warm and dry.   Psychiatric: She has a normal mood and affect. Her behavior is normal.   Nursing note and vitals " reviewed.      Assessment/Plan   Sasha was seen today for back pain and groin pain.    Diagnoses and all orders for this visit:    Other chronic pain    Spinal stenosis of lumbar region with radiculopathy  -     Case Request  -     Ambulatory Referral to Physical Therapy Evaluate and treat    Osteoarthritis of left hip, unspecified osteoarthritis type  -     Ambulatory Referral to Physical Therapy Evaluate and treat      --- L4/5 LESI - repeat with goal of maximizing therapeutic benefit  --- PT to eval/treat   --- Follow-up after procedure          MARY LOU REPORT    MARY LOU report has been reviewed and scanned into the patient's chart.    As the clinician, I personally reviewed the MARY LOU from 9/13/19 while the patient was in the office today.    EMR Dragon/Transcription disclaimer:   Much of this encounter note is an electronic transcription/translation of spoken language to printed text. The electronic translation of spoken language may permit erroneous, or at times, nonsensical words or phrases to be inadvertently transcribed; Although I have reviewed the note for such errors, some may still exist.

## 2019-10-22 ENCOUNTER — TRANSCRIBE ORDERS (OUTPATIENT)
Dept: ADMINISTRATIVE | Facility: HOSPITAL | Age: 79
End: 2019-10-22

## 2019-10-22 ENCOUNTER — LAB (OUTPATIENT)
Dept: LAB | Facility: HOSPITAL | Age: 79
End: 2019-10-22

## 2019-10-22 ENCOUNTER — HOSPITAL ENCOUNTER (OUTPATIENT)
Dept: GENERAL RADIOLOGY | Facility: HOSPITAL | Age: 79
Discharge: HOME | End: 2019-10-22
Admitting: INTERNAL MEDICINE

## 2019-10-22 DIAGNOSIS — N18.2 CHRONIC KIDNEY DISEASE, STAGE II (MILD): ICD-10-CM

## 2019-10-22 DIAGNOSIS — R05.9 COUGH: ICD-10-CM

## 2019-10-22 DIAGNOSIS — E78.5 HYPERLIPIDEMIA, UNSPECIFIED HYPERLIPIDEMIA TYPE: ICD-10-CM

## 2019-10-22 DIAGNOSIS — Z79.899 ENCOUNTER FOR LONG-TERM (CURRENT) USE OF OTHER MEDICATIONS: ICD-10-CM

## 2019-10-22 DIAGNOSIS — Z79.899 ENCOUNTER FOR LONG-TERM (CURRENT) USE OF OTHER MEDICATIONS: Primary | ICD-10-CM

## 2019-10-22 LAB
ALBUMIN SERPL-MCNC: 4.2 G/DL (ref 3.5–5.2)
ALBUMIN/GLOB SERPL: 1.4 G/DL
ALP SERPL-CCNC: 107 U/L (ref 39–117)
ALT SERPL W P-5'-P-CCNC: 11 U/L (ref 1–33)
ANION GAP SERPL CALCULATED.3IONS-SCNC: 13.7 MMOL/L (ref 5–15)
AST SERPL-CCNC: 11 U/L (ref 1–32)
BILIRUB SERPL-MCNC: 0.4 MG/DL (ref 0.2–1.2)
BUN BLD-MCNC: 32 MG/DL (ref 8–23)
BUN/CREAT SERPL: 32.7 (ref 7–25)
CALCIUM SPEC-SCNC: 9.4 MG/DL (ref 8.6–10.5)
CHLORIDE SERPL-SCNC: 101 MMOL/L (ref 98–107)
CHOLEST SERPL-MCNC: 199 MG/DL (ref 0–200)
CO2 SERPL-SCNC: 24.3 MMOL/L (ref 22–29)
CREAT BLD-MCNC: 0.98 MG/DL (ref 0.57–1)
DEPRECATED RDW RBC AUTO: 43.1 FL (ref 37–54)
ERYTHROCYTE [DISTWIDTH] IN BLOOD BY AUTOMATED COUNT: 13.7 % (ref 12.3–15.4)
GFR SERPL CREATININE-BSD FRML MDRD: 54 ML/MIN/1.73
GLOBULIN UR ELPH-MCNC: 3 GM/DL
GLUCOSE BLD-MCNC: 96 MG/DL (ref 65–99)
HCT VFR BLD AUTO: 43.5 % (ref 34–46.6)
HDLC SERPL-MCNC: 67 MG/DL (ref 40–60)
HGB BLD-MCNC: 14.7 G/DL (ref 12–15.9)
LDLC SERPL CALC-MCNC: 97 MG/DL (ref 0–100)
LDLC/HDLC SERPL: 1.44 {RATIO}
MAGNESIUM SERPL-MCNC: 2.2 MG/DL (ref 1.6–2.4)
MCH RBC QN AUTO: 29.4 PG (ref 26.6–33)
MCHC RBC AUTO-ENTMCNC: 33.8 G/DL (ref 31.5–35.7)
MCV RBC AUTO: 87 FL (ref 79–97)
PHOSPHATE SERPL-MCNC: 2.5 MG/DL (ref 2.5–4.5)
PLATELET # BLD AUTO: 219 10*3/MM3 (ref 140–450)
PMV BLD AUTO: 10.1 FL (ref 6–12)
POTASSIUM BLD-SCNC: 3.3 MMOL/L (ref 3.5–5.2)
PROT SERPL-MCNC: 7.2 G/DL (ref 6–8.5)
RBC # BLD AUTO: 5 10*6/MM3 (ref 3.77–5.28)
SODIUM BLD-SCNC: 139 MMOL/L (ref 136–145)
T-UPTAKE NFR SERPL: 0.9 TBI (ref 0.8–1.3)
T4 SERPL-MCNC: 7.99 MCG/DL (ref 4.5–11.7)
TRIGL SERPL-MCNC: 177 MG/DL (ref 0–150)
TSH SERPL DL<=0.05 MIU/L-ACNC: 1.85 UIU/ML (ref 0.27–4.2)
VLDLC SERPL-MCNC: 35.4 MG/DL (ref 5–40)
WBC NRBC COR # BLD: 13.03 10*3/MM3 (ref 3.4–10.8)

## 2019-10-22 PROCEDURE — 84479 ASSAY OF THYROID (T3 OR T4): CPT

## 2019-10-22 PROCEDURE — 85027 COMPLETE CBC AUTOMATED: CPT

## 2019-10-22 PROCEDURE — 83735 ASSAY OF MAGNESIUM: CPT

## 2019-10-22 PROCEDURE — 36415 COLL VENOUS BLD VENIPUNCTURE: CPT

## 2019-10-22 PROCEDURE — 84443 ASSAY THYROID STIM HORMONE: CPT

## 2019-10-22 PROCEDURE — 71046 X-RAY EXAM CHEST 2 VIEWS: CPT

## 2019-10-22 PROCEDURE — 80061 LIPID PANEL: CPT

## 2019-10-22 PROCEDURE — 84100 ASSAY OF PHOSPHORUS: CPT

## 2019-10-22 PROCEDURE — 84436 ASSAY OF TOTAL THYROXINE: CPT

## 2019-10-22 PROCEDURE — 80053 COMPREHEN METABOLIC PANEL: CPT

## 2019-10-29 ENCOUNTER — HOSPITAL ENCOUNTER (OUTPATIENT)
Dept: GENERAL RADIOLOGY | Facility: HOSPITAL | Age: 79
Discharge: HOME | End: 2019-10-29

## 2019-10-29 ENCOUNTER — HOSPITAL ENCOUNTER (INPATIENT)
Facility: HOSPITAL | Age: 79
LOS: 3 days | Discharge: HOME | End: 2019-11-01
Attending: INTERNAL MEDICINE | Admitting: INTERNAL MEDICINE

## 2019-10-29 ENCOUNTER — HOSPITAL ENCOUNTER (OUTPATIENT)
Dept: CARDIOLOGY | Facility: HOSPITAL | Age: 79
Discharge: HOME | End: 2019-10-29

## 2019-10-29 ENCOUNTER — TRANSCRIBE ORDERS (OUTPATIENT)
Dept: ADMINISTRATIVE | Facility: HOSPITAL | Age: 79
End: 2019-10-29

## 2019-10-29 DIAGNOSIS — M79.661 RIGHT CALF PAIN: ICD-10-CM

## 2019-10-29 DIAGNOSIS — R60.9 EDEMA, UNSPECIFIED TYPE: ICD-10-CM

## 2019-10-29 DIAGNOSIS — R60.9 SWELLING: ICD-10-CM

## 2019-10-29 DIAGNOSIS — R26.2 DIFFICULTY WALKING: Primary | ICD-10-CM

## 2019-10-29 DIAGNOSIS — R60.9 SWELLING: Primary | ICD-10-CM

## 2019-10-29 PROBLEM — I82.401 ACUTE DEEP VEIN THROMBOSIS (DVT) OF RIGHT LOWER EXTREMITY (HCC): Status: ACTIVE | Noted: 2019-10-29

## 2019-10-29 PROBLEM — J44.89 COPD WITH ASTHMA: Status: ACTIVE | Noted: 2017-12-26

## 2019-10-29 PROBLEM — G89.11 ACUTE PAIN OF LEFT SHOULDER DUE TO TRAUMA: Status: RESOLVED | Noted: 2017-12-26 | Resolved: 2019-10-29

## 2019-10-29 PROBLEM — M25.512 ACUTE PAIN OF LEFT SHOULDER DUE TO TRAUMA: Status: RESOLVED | Noted: 2017-12-26 | Resolved: 2019-10-29
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
## 2019-10-29 PROBLEM — J44.9 COPD WITH ASTHMA (HCC): Status: ACTIVE | Noted: 2017-12-26

## 2019-10-29 PROBLEM — I82.409 ACUTE DEEP VEIN THROMBOSIS: Status: ACTIVE | Noted: 2019-10-29

## 2019-10-29 LAB
ALBUMIN SERPL-MCNC: 3.9 G/DL (ref 3.5–5.2)
ALBUMIN/GLOB SERPL: 1.1 G/DL
ALP SERPL-CCNC: 109 U/L (ref 39–117)
ALT SERPL W P-5'-P-CCNC: 12 U/L (ref 1–33)
ANION GAP SERPL CALCULATED.3IONS-SCNC: 13.4 MMOL/L (ref 5–15)
AST SERPL-CCNC: 14 U/L (ref 1–32)
BASOPHILS # BLD AUTO: 0.06 10*3/MM3 (ref 0–0.2)
BASOPHILS # BLD AUTO: 0.07 10*3/MM3 (ref 0–0.2)
BASOPHILS NFR BLD AUTO: 0.6 % (ref 0–1.5)
BASOPHILS NFR BLD AUTO: 0.8 % (ref 0–1.5)
BH CV LOW VAS RIGHT COMMON FEMORAL SPONT: 1
BH CV LOW VAS RIGHT DISTAL FEMORAL SPONT: 1
BH CV LOW VAS RIGHT GASTRONEMIUS VESSEL: 1
BH CV LOW VAS RIGHT MID FEMORAL SPONT: 1
BH CV LOW VAS RIGHT PERONEAL VESSEL: 1
BH CV LOW VAS RIGHT POPLITEAL SPONT: 1
BH CV LOW VAS RIGHT POSTERIOR TIBIAL VESSEL: 1
BH CV LOW VAS RIGHT PROFUNDA FEMORAL SPONT: 1
BH CV LOW VAS RIGHT PROXIMAL FEMORAL SPONT: 1
BH CV LOWER VASCULAR LEFT COMMON FEMORAL AUGMENT: NORMAL
BH CV LOWER VASCULAR LEFT COMMON FEMORAL COMPETENT: NORMAL
BH CV LOWER VASCULAR LEFT COMMON FEMORAL COMPRESS: NORMAL
BH CV LOWER VASCULAR LEFT COMMON FEMORAL PHASIC: NORMAL
BH CV LOWER VASCULAR LEFT COMMON FEMORAL SPONT: NORMAL
BH CV LOWER VASCULAR RIGHT COMMON FEMORAL COMPRESS: NORMAL
BH CV LOWER VASCULAR RIGHT COMMON FEMORAL PHASIC: NORMAL
BH CV LOWER VASCULAR RIGHT COMMON FEMORAL SPONT: NORMAL
BH CV LOWER VASCULAR RIGHT COMMON FEMORAL THROMBUS: NORMAL
BH CV LOWER VASCULAR RIGHT DISTAL FEMORAL COMPRESS: NORMAL
BH CV LOWER VASCULAR RIGHT DISTAL FEMORAL PHASIC: NORMAL
BH CV LOWER VASCULAR RIGHT DISTAL FEMORAL SPONT: NORMAL
BH CV LOWER VASCULAR RIGHT DISTAL FEMORAL THROMBUS: NORMAL
BH CV LOWER VASCULAR RIGHT GASTRONEMIUS COMPRESS: NORMAL
BH CV LOWER VASCULAR RIGHT GASTRONEMIUS THROMBUS: NORMAL
BH CV LOWER VASCULAR RIGHT GREATER SAPH AK COMPRESS: NORMAL
BH CV LOWER VASCULAR RIGHT GREATER SAPH BK COMPRESS: NORMAL
BH CV LOWER VASCULAR RIGHT MID FEMORAL COMPRESS: NORMAL
BH CV LOWER VASCULAR RIGHT MID FEMORAL PHASIC: NORMAL
BH CV LOWER VASCULAR RIGHT MID FEMORAL SPONT: NORMAL
BH CV LOWER VASCULAR RIGHT MID FEMORAL THROMBUS: NORMAL
BH CV LOWER VASCULAR RIGHT PERONEAL COMPRESS: NORMAL
BH CV LOWER VASCULAR RIGHT PERONEAL THROMBUS: NORMAL
BH CV LOWER VASCULAR RIGHT POPLITEAL COMPRESS: NORMAL
BH CV LOWER VASCULAR RIGHT POPLITEAL PHASIC: NORMAL
BH CV LOWER VASCULAR RIGHT POPLITEAL SPONT: NORMAL
BH CV LOWER VASCULAR RIGHT POPLITEAL THROMBUS: NORMAL
BH CV LOWER VASCULAR RIGHT POSTERIOR TIBIAL COMPRESS: NORMAL
BH CV LOWER VASCULAR RIGHT POSTERIOR TIBIAL THROMBUS: NORMAL
BH CV LOWER VASCULAR RIGHT PROFUNDA FEMORAL COMPRESS: NORMAL
BH CV LOWER VASCULAR RIGHT PROFUNDA FEMORAL THROMBUS: NORMAL
BH CV LOWER VASCULAR RIGHT PROXIMAL FEMORAL COMPRESS: NORMAL
BH CV LOWER VASCULAR RIGHT PROXIMAL FEMORAL PHASIC: NORMAL
BH CV LOWER VASCULAR RIGHT PROXIMAL FEMORAL SPONT: NORMAL
BH CV LOWER VASCULAR RIGHT PROXIMAL FEMORAL THROMBUS: NORMAL
BH CV LOWER VASCULAR RIGHT SAPHENOFEMORAL JUNCTION COMPRESS: NORMAL
BILIRUB SERPL-MCNC: 0.3 MG/DL (ref 0.2–1.2)
BUN BLD-MCNC: 20 MG/DL (ref 8–23)
BUN/CREAT SERPL: 20.8 (ref 7–25)
CALCIUM SPEC-SCNC: 9.1 MG/DL (ref 8.6–10.5)
CHLORIDE SERPL-SCNC: 101 MMOL/L (ref 98–107)
CHOLEST SERPL-MCNC: 194 MG/DL (ref 0–200)
CO2 SERPL-SCNC: 26.6 MMOL/L (ref 22–29)
CREAT BLD-MCNC: 0.96 MG/DL (ref 0.57–1)
D DIMER PPP FEU-MCNC: 4.58 MCGFEU/ML (ref 0–0.49)
DEPRECATED RDW RBC AUTO: 46.1 FL (ref 37–54)
DEPRECATED RDW RBC AUTO: 46.9 FL (ref 37–54)
EOSINOPHIL # BLD AUTO: 0.13 10*3/MM3 (ref 0–0.4)
EOSINOPHIL # BLD AUTO: 0.14 10*3/MM3 (ref 0–0.4)
EOSINOPHIL NFR BLD AUTO: 1.3 % (ref 0.3–6.2)
EOSINOPHIL NFR BLD AUTO: 1.5 % (ref 0.3–6.2)
ERYTHROCYTE [DISTWIDTH] IN BLOOD BY AUTOMATED COUNT: 13.8 % (ref 12.3–15.4)
ERYTHROCYTE [DISTWIDTH] IN BLOOD BY AUTOMATED COUNT: 13.9 % (ref 12.3–15.4)
ERYTHROCYTE [SEDIMENTATION RATE] IN BLOOD: 18 MM/HR (ref 0–30)
GFR SERPL CREATININE-BSD FRML MDRD: 56 ML/MIN/1.73
GLOBULIN UR ELPH-MCNC: 3.6 GM/DL
GLUCOSE BLD-MCNC: 105 MG/DL (ref 65–99)
HCT VFR BLD AUTO: 45.1 % (ref 34–46.6)
HCT VFR BLD AUTO: 46.2 % (ref 34–46.6)
HDLC SERPL-MCNC: 55 MG/DL (ref 40–60)
HGB BLD-MCNC: 14.5 G/DL (ref 12–15.9)
HGB BLD-MCNC: 14.6 G/DL (ref 12–15.9)
IMM GRANULOCYTES # BLD AUTO: 0.02 10*3/MM3 (ref 0–0.05)
IMM GRANULOCYTES # BLD AUTO: 0.03 10*3/MM3 (ref 0–0.05)
IMM GRANULOCYTES NFR BLD AUTO: 0.2 % (ref 0–0.5)
IMM GRANULOCYTES NFR BLD AUTO: 0.3 % (ref 0–0.5)
LDLC SERPL CALC-MCNC: 113 MG/DL (ref 0–100)
LDLC/HDLC SERPL: 2.05 {RATIO}
LYMPHOCYTES # BLD AUTO: 2.14 10*3/MM3 (ref 0.7–3.1)
LYMPHOCYTES # BLD AUTO: 2.35 10*3/MM3 (ref 0.7–3.1)
LYMPHOCYTES NFR BLD AUTO: 23.4 % (ref 19.6–45.3)
LYMPHOCYTES NFR BLD AUTO: 23.6 % (ref 19.6–45.3)
MAGNESIUM SERPL-MCNC: 2.1 MG/DL (ref 1.6–2.4)
MCH RBC QN AUTO: 29 PG (ref 26.6–33)
MCH RBC QN AUTO: 29.2 PG (ref 26.6–33)
MCHC RBC AUTO-ENTMCNC: 31.6 G/DL (ref 31.5–35.7)
MCHC RBC AUTO-ENTMCNC: 32.2 G/DL (ref 31.5–35.7)
MCV RBC AUTO: 90.7 FL (ref 79–97)
MCV RBC AUTO: 91.7 FL (ref 79–97)
MONOCYTES # BLD AUTO: 0.83 10*3/MM3 (ref 0.1–0.9)
MONOCYTES # BLD AUTO: 0.9 10*3/MM3 (ref 0.1–0.9)
MONOCYTES NFR BLD AUTO: 9 % (ref 5–12)
MONOCYTES NFR BLD AUTO: 9.1 % (ref 5–12)
NEUTROPHILS # BLD AUTO: 5.87 10*3/MM3 (ref 1.7–7)
NEUTROPHILS # BLD AUTO: 6.58 10*3/MM3 (ref 1.7–7)
NEUTROPHILS NFR BLD AUTO: 64.7 % (ref 42.7–76)
NEUTROPHILS NFR BLD AUTO: 65.5 % (ref 42.7–76)
NRBC BLD AUTO-RTO: 0 /100 WBC (ref 0–0.2)
NRBC BLD AUTO-RTO: 0 /100 WBC (ref 0–0.2)
NT-PROBNP SERPL-MCNC: 364.1 PG/ML (ref 5–1800)
PHOSPHATE SERPL-MCNC: 2.7 MG/DL (ref 2.5–4.5)
PLATELET # BLD AUTO: 156 10*3/MM3 (ref 140–450)
PLATELET # BLD AUTO: 159 10*3/MM3 (ref 140–450)
PMV BLD AUTO: 10.4 FL (ref 6–12)
PMV BLD AUTO: 10.5 FL (ref 6–12)
POTASSIUM BLD-SCNC: 2.9 MMOL/L (ref 3.5–5.2)
PROT SERPL-MCNC: 7.5 G/DL (ref 6–8.5)
RBC # BLD AUTO: 4.97 10*6/MM3 (ref 3.77–5.28)
RBC # BLD AUTO: 5.04 10*6/MM3 (ref 3.77–5.28)
SODIUM BLD-SCNC: 141 MMOL/L (ref 136–145)
TRIGL SERPL-MCNC: 130 MG/DL (ref 0–150)
TSH SERPL DL<=0.05 MIU/L-ACNC: 1.06 UIU/ML (ref 0.27–4.2)
VLDLC SERPL-MCNC: 26 MG/DL (ref 5–40)
WBC NRBC COR # BLD: 10.04 10*3/MM3 (ref 3.4–10.8)
WBC NRBC COR # BLD: 9.08 10*3/MM3 (ref 3.4–10.8)

## 2019-10-29 PROCEDURE — 93971 EXTREMITY STUDY: CPT

## 2019-10-29 PROCEDURE — 84443 ASSAY THYROID STIM HORMONE: CPT | Performed by: INTERNAL MEDICINE

## 2019-10-29 PROCEDURE — 25010000002 ENOXAPARIN PER 10 MG: Performed by: INTERNAL MEDICINE

## 2019-10-29 PROCEDURE — 94640 AIRWAY INHALATION TREATMENT: CPT

## 2019-10-29 PROCEDURE — 73590 X-RAY EXAM OF LOWER LEG: CPT

## 2019-10-29 PROCEDURE — 83880 ASSAY OF NATRIURETIC PEPTIDE: CPT | Performed by: INTERNAL MEDICINE

## 2019-10-29 PROCEDURE — 85025 COMPLETE CBC W/AUTO DIFF WBC: CPT | Performed by: INTERNAL MEDICINE

## 2019-10-29 PROCEDURE — 85379 FIBRIN DEGRADATION QUANT: CPT | Performed by: INTERNAL MEDICINE

## 2019-10-29 PROCEDURE — 93010 ELECTROCARDIOGRAM REPORT: CPT | Performed by: INTERNAL MEDICINE

## 2019-10-29 PROCEDURE — 93005 ELECTROCARDIOGRAM TRACING: CPT | Performed by: INTERNAL MEDICINE

## 2019-10-29 PROCEDURE — 83735 ASSAY OF MAGNESIUM: CPT | Performed by: INTERNAL MEDICINE

## 2019-10-29 PROCEDURE — 84100 ASSAY OF PHOSPHORUS: CPT | Performed by: INTERNAL MEDICINE

## 2019-10-29 PROCEDURE — 80061 LIPID PANEL: CPT | Performed by: INTERNAL MEDICINE

## 2019-10-29 PROCEDURE — 85652 RBC SED RATE AUTOMATED: CPT | Performed by: INTERNAL MEDICINE

## 2019-10-29 PROCEDURE — 80053 COMPREHEN METABOLIC PANEL: CPT | Performed by: INTERNAL MEDICINE

## 2019-10-29 PROCEDURE — 73610 X-RAY EXAM OF ANKLE: CPT

## 2019-10-29 RX ORDER — GUAIFENESIN 600 MG/1
600 TABLET, EXTENDED RELEASE ORAL EVERY 12 HOURS SCHEDULED
Status: DISCONTINUED | OUTPATIENT
Start: 2019-10-29 | End: 2019-11-01 | Stop reason: HOSPADM

## 2019-10-29 RX ORDER — CETIRIZINE HYDROCHLORIDE 10 MG/1
10 TABLET ORAL DAILY
Status: DISCONTINUED | OUTPATIENT
Start: 2019-10-30 | End: 2019-11-01 | Stop reason: HOSPADM

## 2019-10-29 RX ORDER — MELATONIN
1000 DAILY
Status: DISCONTINUED | OUTPATIENT
Start: 2019-10-30 | End: 2019-11-01 | Stop reason: HOSPADM

## 2019-10-29 RX ORDER — POLYETHYLENE GLYCOL 3350 17 G/17G
17 POWDER, FOR SOLUTION ORAL
Status: DISCONTINUED | OUTPATIENT
Start: 2019-10-31 | End: 2019-11-01 | Stop reason: HOSPADM

## 2019-10-29 RX ORDER — FLUTICASONE PROPIONATE 50 MCG
1 SPRAY, SUSPENSION (ML) NASAL 2 TIMES DAILY
Status: DISCONTINUED | OUTPATIENT
Start: 2019-10-29 | End: 2019-11-01 | Stop reason: HOSPADM

## 2019-10-29 RX ORDER — PANTOPRAZOLE SODIUM 40 MG/1
40 TABLET, DELAYED RELEASE ORAL EVERY MORNING
Status: DISCONTINUED | OUTPATIENT
Start: 2019-10-30 | End: 2019-11-01 | Stop reason: HOSPADM

## 2019-10-29 RX ORDER — BUTALBITAL, ACETAMINOPHEN AND CAFFEINE 50; 325; 40 MG/1; MG/1; MG/1
2 TABLET ORAL EVERY 4 HOURS PRN
Status: DISCONTINUED | OUTPATIENT
Start: 2019-10-29 | End: 2019-11-01 | Stop reason: HOSPADM

## 2019-10-29 RX ORDER — SODIUM CHLORIDE 0.9 % (FLUSH) 0.9 %
10 SYRINGE (ML) INJECTION AS NEEDED
Status: DISCONTINUED | OUTPATIENT
Start: 2019-10-29 | End: 2019-11-01 | Stop reason: HOSPADM

## 2019-10-29 RX ORDER — CLONIDINE HYDROCHLORIDE 0.1 MG/1
0.3 TABLET ORAL 3 TIMES DAILY
Status: DISCONTINUED | OUTPATIENT
Start: 2019-10-29 | End: 2019-10-31

## 2019-10-29 RX ORDER — SODIUM CHLORIDE 0.9 % (FLUSH) 0.9 %
10 SYRINGE (ML) INJECTION EVERY 12 HOURS SCHEDULED
Status: DISCONTINUED | OUTPATIENT
Start: 2019-10-29 | End: 2019-11-01 | Stop reason: HOSPADM

## 2019-10-29 RX ORDER — FUROSEMIDE 40 MG/1
40 TABLET ORAL ONCE
Status: COMPLETED | OUTPATIENT
Start: 2019-10-29 | End: 2019-10-30

## 2019-10-29 RX ORDER — CLONIDINE HYDROCHLORIDE 0.1 MG/1
0.3 TABLET ORAL ONCE
Status: COMPLETED | OUTPATIENT
Start: 2019-10-29 | End: 2019-10-29

## 2019-10-29 RX ORDER — NIFEDIPINE 60 MG/1
60 TABLET, EXTENDED RELEASE ORAL 2 TIMES DAILY
Status: DISCONTINUED | OUTPATIENT
Start: 2019-10-29 | End: 2019-11-01 | Stop reason: HOSPADM

## 2019-10-29 RX ORDER — ACETAMINOPHEN 325 MG/1
650 TABLET ORAL EVERY 6 HOURS PRN
Status: DISCONTINUED | OUTPATIENT
Start: 2019-10-29 | End: 2019-11-01 | Stop reason: HOSPADM

## 2019-10-29 RX ORDER — CALCIUM CARBONATE 200(500)MG
2 TABLET,CHEWABLE ORAL 3 TIMES DAILY PRN
Status: DISCONTINUED | OUTPATIENT
Start: 2019-10-29 | End: 2019-11-01 | Stop reason: HOSPADM

## 2019-10-29 RX ORDER — FLUTICASONE PROPIONATE 50 MCG
2 SPRAY, SUSPENSION (ML) NASAL DAILY
Status: DISCONTINUED | OUTPATIENT
Start: 2019-10-30 | End: 2019-10-29

## 2019-10-29 RX ORDER — POTASSIUM CHLORIDE 750 MG/1
10 CAPSULE, EXTENDED RELEASE ORAL 2 TIMES DAILY WITH MEALS
Status: DISCONTINUED | OUTPATIENT
Start: 2019-10-29 | End: 2019-10-30

## 2019-10-29 RX ORDER — ETHACRYNIC ACID 25 MG/1
25 TABLET ORAL 3 TIMES DAILY
Status: DISCONTINUED | OUTPATIENT
Start: 2019-10-29 | End: 2019-10-30

## 2019-10-29 RX ORDER — HYDROXYZINE HYDROCHLORIDE 25 MG/1
25 TABLET, FILM COATED ORAL EVERY 6 HOURS PRN
Status: DISCONTINUED | OUTPATIENT
Start: 2019-10-29 | End: 2019-11-01 | Stop reason: HOSPADM

## 2019-10-29 RX ORDER — MONTELUKAST SODIUM 10 MG/1
10 TABLET ORAL NIGHTLY
Status: DISCONTINUED | OUTPATIENT
Start: 2019-10-29 | End: 2019-11-01 | Stop reason: HOSPADM

## 2019-10-29 RX ORDER — FUROSEMIDE 40 MG/1
40 TABLET ORAL DAILY
Status: DISCONTINUED | OUTPATIENT
Start: 2019-10-30 | End: 2019-10-30

## 2019-10-29 RX ORDER — NITROGLYCERIN 0.4 MG/1
0.4 TABLET SUBLINGUAL
Status: DISCONTINUED | OUTPATIENT
Start: 2019-10-29 | End: 2019-11-01 | Stop reason: HOSPADM

## 2019-10-29 RX ORDER — DOCUSATE SODIUM 100 MG/1
100 CAPSULE, LIQUID FILLED ORAL 2 TIMES DAILY PRN
Status: DISCONTINUED | OUTPATIENT
Start: 2019-10-29 | End: 2019-11-01 | Stop reason: HOSPADM

## 2019-10-29 RX ORDER — EPLERENONE 25 MG/1
50 TABLET, FILM COATED ORAL EVERY 12 HOURS SCHEDULED
Status: DISCONTINUED | OUTPATIENT
Start: 2019-10-29 | End: 2019-10-30

## 2019-10-29 RX ORDER — LORAZEPAM 0.5 MG/1
0.5 TABLET ORAL 3 TIMES DAILY PRN
Status: DISCONTINUED | OUTPATIENT
Start: 2019-10-29 | End: 2019-11-01 | Stop reason: HOSPADM

## 2019-10-29 RX ORDER — CHOLECALCIFEROL (VITAMIN D3) 125 MCG
1000 CAPSULE ORAL DAILY
Status: DISCONTINUED | OUTPATIENT
Start: 2019-10-30 | End: 2019-11-01 | Stop reason: HOSPADM

## 2019-10-29 RX ORDER — ONDANSETRON 4 MG/1
8 TABLET, ORALLY DISINTEGRATING ORAL EVERY 6 HOURS PRN
Status: DISCONTINUED | OUTPATIENT
Start: 2019-10-29 | End: 2019-11-01 | Stop reason: HOSPADM

## 2019-10-29 RX ORDER — PROMETHAZINE HYDROCHLORIDE AND CODEINE PHOSPHATE 6.25; 1 MG/5ML; MG/5ML
15 SYRUP ORAL 4 TIMES DAILY PRN
Status: DISCONTINUED | OUTPATIENT
Start: 2019-10-29 | End: 2019-11-01 | Stop reason: HOSPADM

## 2019-10-29 RX ORDER — ALBUTEROL SULFATE 2.5 MG/3ML
2.5 SOLUTION RESPIRATORY (INHALATION) EVERY 6 HOURS PRN
Status: DISCONTINUED | OUTPATIENT
Start: 2019-10-29 | End: 2019-11-01 | Stop reason: HOSPADM

## 2019-10-29 RX ORDER — DIPHENHYDRAMINE HCL 25 MG
50 CAPSULE ORAL EVERY 6 HOURS PRN
Status: DISCONTINUED | OUTPATIENT
Start: 2019-10-29 | End: 2019-11-01 | Stop reason: HOSPADM

## 2019-10-29 RX ADMIN — ENOXAPARIN SODIUM 90 MG: 100 INJECTION SUBCUTANEOUS at 22:59

## 2019-10-29 RX ADMIN — NIFEDIPINE 60 MG: 60 TABLET, FILM COATED, EXTENDED RELEASE ORAL at 23:52

## 2019-10-29 RX ADMIN — GUAIFENESIN 600 MG: 600 TABLET, EXTENDED RELEASE ORAL at 23:53

## 2019-10-29 RX ADMIN — IPRATROPIUM BROMIDE 0.5 MG: 0.5 SOLUTION RESPIRATORY (INHALATION) at 23:34

## 2019-10-29 RX ADMIN — CLONIDINE HYDROCHLORIDE 0.3 MG: 0.1 TABLET ORAL at 23:51

## 2019-10-29 RX ADMIN — POTASSIUM CHLORIDE 10 MEQ: 750 CAPSULE, EXTENDED RELEASE ORAL at 23:52

## 2019-10-29 RX ADMIN — EPLERENONE 50 MG: 25 TABLET ORAL at 23:55

## 2019-10-29 RX ADMIN — MONTELUKAST SODIUM 10 MG: 10 TABLET, FILM COATED ORAL at 23:53

## 2019-10-29 RX ADMIN — ETHACRYNIC ACID 25 MG: 25 TABLET ORAL at 23:54

## 2019-10-29 RX ADMIN — FLUTICASONE PROPIONATE 1 SPRAY: 50 SPRAY, METERED NASAL at 22:59

## 2019-10-29 NOTE — PROGRESS NOTES
No preliminary finding: positive finding throughout the right lower extremity    Dr diallo notified of preliminary finding at 1825/ stated patient to be direct admitted/ patient walked to admitting office/ patient left in good condition

## 2019-10-30 ENCOUNTER — TELEPHONE (OUTPATIENT)
Dept: PAIN MEDICINE | Facility: CLINIC | Age: 79
End: 2019-10-30

## 2019-10-30 ENCOUNTER — APPOINTMENT (OUTPATIENT)
Dept: CT IMAGING | Facility: HOSPITAL | Age: 79
End: 2019-10-30

## 2019-10-30 LAB
25(OH)D3 SERPL-MCNC: 26.1 NG/ML (ref 30–100)
AMPHET+METHAMPHET UR QL: NEGATIVE
ANION GAP SERPL CALCULATED.3IONS-SCNC: 12.5 MMOL/L (ref 5–15)
BARBITURATES UR QL SCN: POSITIVE
BASOPHILS # BLD AUTO: 0.07 10*3/MM3 (ref 0–0.2)
BASOPHILS NFR BLD AUTO: 0.8 % (ref 0–1.5)
BENZODIAZ UR QL SCN: NEGATIVE
BILIRUB UR QL STRIP: NEGATIVE
BUN BLD-MCNC: 18 MG/DL (ref 8–23)
BUN/CREAT SERPL: 17.8 (ref 7–25)
CALCIUM SPEC-SCNC: 9.2 MG/DL (ref 8.6–10.5)
CANNABINOIDS SERPL QL: NEGATIVE
CHLORIDE SERPL-SCNC: 95 MMOL/L (ref 98–107)
CK SERPL-CCNC: 49 U/L (ref 20–180)
CLARITY UR: CLEAR
CO2 SERPL-SCNC: 28.5 MMOL/L (ref 22–29)
COCAINE UR QL: NEGATIVE
COLOR UR: YELLOW
CREAT BLD-MCNC: 1.01 MG/DL (ref 0.57–1)
D-LACTATE SERPL-SCNC: 1.7 MMOL/L (ref 0.5–2)
DEPRECATED RDW RBC AUTO: 43 FL (ref 37–54)
EOSINOPHIL # BLD AUTO: 0.19 10*3/MM3 (ref 0–0.4)
EOSINOPHIL NFR BLD AUTO: 2.2 % (ref 0.3–6.2)
ERYTHROCYTE [DISTWIDTH] IN BLOOD BY AUTOMATED COUNT: 13.1 % (ref 12.3–15.4)
GFR SERPL CREATININE-BSD FRML MDRD: 53 ML/MIN/1.73
GLUCOSE BLD-MCNC: 99 MG/DL (ref 65–99)
GLUCOSE UR STRIP-MCNC: NEGATIVE MG/DL
HCT VFR BLD AUTO: 41.5 % (ref 34–46.6)
HGB BLD-MCNC: 13.7 G/DL (ref 12–15.9)
HGB UR QL STRIP.AUTO: NEGATIVE
IMM GRANULOCYTES # BLD AUTO: 0.04 10*3/MM3 (ref 0–0.05)
IMM GRANULOCYTES NFR BLD AUTO: 0.5 % (ref 0–0.5)
KETONES UR QL STRIP: NEGATIVE
LEUKOCYTE ESTERASE UR QL STRIP.AUTO: NEGATIVE
LYMPHOCYTES # BLD AUTO: 2.05 10*3/MM3 (ref 0.7–3.1)
LYMPHOCYTES NFR BLD AUTO: 24 % (ref 19.6–45.3)
MCH RBC QN AUTO: 29.5 PG (ref 26.6–33)
MCHC RBC AUTO-ENTMCNC: 33 G/DL (ref 31.5–35.7)
MCV RBC AUTO: 89.4 FL (ref 79–97)
METHADONE UR QL SCN: NEGATIVE
MONOCYTES # BLD AUTO: 0.85 10*3/MM3 (ref 0.1–0.9)
MONOCYTES NFR BLD AUTO: 10 % (ref 5–12)
NEUTROPHILS # BLD AUTO: 5.34 10*3/MM3 (ref 1.7–7)
NEUTROPHILS NFR BLD AUTO: 62.5 % (ref 42.7–76)
NITRITE UR QL STRIP: NEGATIVE
NRBC BLD AUTO-RTO: 0 /100 WBC (ref 0–0.2)
OPIATES UR QL: NEGATIVE
OXYCODONE UR QL SCN: NEGATIVE
PH UR STRIP.AUTO: 7.5 [PH] (ref 5–8)
PLATELET # BLD AUTO: 170 10*3/MM3 (ref 140–450)
PMV BLD AUTO: 11.1 FL (ref 6–12)
POTASSIUM BLD-SCNC: 3.1 MMOL/L (ref 3.5–5.2)
PROT UR QL STRIP: NEGATIVE
RBC # BLD AUTO: 4.64 10*6/MM3 (ref 3.77–5.28)
SODIUM BLD-SCNC: 136 MMOL/L (ref 136–145)
SP GR UR STRIP: 1.01 (ref 1–1.03)
TROPONIN T SERPL-MCNC: <0.01 NG/ML (ref 0–0.03)
UROBILINOGEN UR QL STRIP: NORMAL
WBC NRBC COR # BLD: 8.54 10*3/MM3 (ref 3.4–10.8)

## 2019-10-30 PROCEDURE — 85025 COMPLETE CBC W/AUTO DIFF WBC: CPT | Performed by: INTERNAL MEDICINE

## 2019-10-30 PROCEDURE — 94799 UNLISTED PULMONARY SVC/PX: CPT

## 2019-10-30 PROCEDURE — 80307 DRUG TEST PRSMV CHEM ANLYZR: CPT | Performed by: INTERNAL MEDICINE

## 2019-10-30 PROCEDURE — 83605 ASSAY OF LACTIC ACID: CPT | Performed by: INTERNAL MEDICINE

## 2019-10-30 PROCEDURE — 80048 BASIC METABOLIC PNL TOTAL CA: CPT | Performed by: INTERNAL MEDICINE

## 2019-10-30 PROCEDURE — 25010000002 ENOXAPARIN PER 10 MG: Performed by: INTERNAL MEDICINE

## 2019-10-30 PROCEDURE — 25010000002 IOPAMIDOL 61 % SOLUTION: Performed by: INTERNAL MEDICINE

## 2019-10-30 PROCEDURE — 82550 ASSAY OF CK (CPK): CPT | Performed by: INTERNAL MEDICINE

## 2019-10-30 PROCEDURE — 84484 ASSAY OF TROPONIN QUANT: CPT | Performed by: INTERNAL MEDICINE

## 2019-10-30 PROCEDURE — 71275 CT ANGIOGRAPHY CHEST: CPT

## 2019-10-30 PROCEDURE — 0 DIATRIZOATE MEGLUMINE & SODIUM PER 1 ML: Performed by: INTERNAL MEDICINE

## 2019-10-30 PROCEDURE — 82306 VITAMIN D 25 HYDROXY: CPT | Performed by: INTERNAL MEDICINE

## 2019-10-30 PROCEDURE — 81003 URINALYSIS AUTO W/O SCOPE: CPT | Performed by: INTERNAL MEDICINE

## 2019-10-30 PROCEDURE — 74177 CT ABD & PELVIS W/CONTRAST: CPT

## 2019-10-30 RX ORDER — METOPROLOL SUCCINATE 25 MG/1
25 TABLET, EXTENDED RELEASE ORAL
Status: DISCONTINUED | OUTPATIENT
Start: 2019-10-30 | End: 2019-10-31

## 2019-10-30 RX ORDER — ETHACRYNIC ACID 25 MG/1
25 TABLET ORAL 3 TIMES DAILY
Status: DISCONTINUED | OUTPATIENT
Start: 2019-10-31 | End: 2019-11-01 | Stop reason: HOSPADM

## 2019-10-30 RX ORDER — POTASSIUM CHLORIDE 750 MG/1
20 CAPSULE, EXTENDED RELEASE ORAL 2 TIMES DAILY WITH MEALS
Status: DISCONTINUED | OUTPATIENT
Start: 2019-10-30 | End: 2019-10-31

## 2019-10-30 RX ORDER — POTASSIUM CHLORIDE 1.5 G/1.77G
40 POWDER, FOR SOLUTION ORAL AS NEEDED
Status: DISCONTINUED | OUTPATIENT
Start: 2019-10-30 | End: 2019-11-01 | Stop reason: HOSPADM

## 2019-10-30 RX ORDER — POTASSIUM CHLORIDE 7.45 MG/ML
10 INJECTION INTRAVENOUS
Status: DISCONTINUED | OUTPATIENT
Start: 2019-10-30 | End: 2019-11-01 | Stop reason: HOSPADM

## 2019-10-30 RX ORDER — EPLERENONE 25 MG/1
50 TABLET, FILM COATED ORAL EVERY 12 HOURS SCHEDULED
Status: DISCONTINUED | OUTPATIENT
Start: 2019-10-31 | End: 2019-11-01 | Stop reason: HOSPADM

## 2019-10-30 RX ORDER — POTASSIUM CHLORIDE 750 MG/1
40 CAPSULE, EXTENDED RELEASE ORAL AS NEEDED
Status: DISCONTINUED | OUTPATIENT
Start: 2019-10-30 | End: 2019-11-01 | Stop reason: HOSPADM

## 2019-10-30 RX ADMIN — APIXABAN 10 MG: 5 TABLET, FILM COATED ORAL at 21:33

## 2019-10-30 RX ADMIN — METOPROLOL SUCCINATE 25 MG: 25 TABLET, FILM COATED, EXTENDED RELEASE ORAL at 09:52

## 2019-10-30 RX ADMIN — FLUTICASONE PROPIONATE 1 SPRAY: 50 SPRAY, METERED NASAL at 09:36

## 2019-10-30 RX ADMIN — IPRATROPIUM BROMIDE 0.5 MG: 0.5 SOLUTION RESPIRATORY (INHALATION) at 07:23

## 2019-10-30 RX ADMIN — SODIUM CHLORIDE, PRESERVATIVE FREE 10 ML: 5 INJECTION INTRAVENOUS at 09:36

## 2019-10-30 RX ADMIN — POTASSIUM CHLORIDE 40 MEQ: 750 CAPSULE, EXTENDED RELEASE ORAL at 04:18

## 2019-10-30 RX ADMIN — IOPAMIDOL 95 ML: 612 INJECTION, SOLUTION INTRAVENOUS at 11:15

## 2019-10-30 RX ADMIN — BUTALBITAL, ACETAMINOPHEN, AND CAFFEINE 2 TABLET: 50; 325; 40 TABLET ORAL at 09:35

## 2019-10-30 RX ADMIN — FUROSEMIDE 40 MG: 40 TABLET ORAL at 00:31

## 2019-10-30 RX ADMIN — GUAIFENESIN 600 MG: 600 TABLET, EXTENDED RELEASE ORAL at 21:37

## 2019-10-30 RX ADMIN — Medication 1000 MCG: at 09:34

## 2019-10-30 RX ADMIN — SODIUM CHLORIDE, PRESERVATIVE FREE 10 ML: 5 INJECTION INTRAVENOUS at 21:38

## 2019-10-30 RX ADMIN — CLONIDINE HYDROCHLORIDE 0.3 MG: 0.1 TABLET ORAL at 21:36

## 2019-10-30 RX ADMIN — CLONIDINE HYDROCHLORIDE 0.3 MG: 0.1 TABLET ORAL at 16:01

## 2019-10-30 RX ADMIN — IPRATROPIUM BROMIDE 0.5 MG: 0.5 SOLUTION RESPIRATORY (INHALATION) at 14:40

## 2019-10-30 RX ADMIN — GUAIFENESIN 600 MG: 600 TABLET, EXTENDED RELEASE ORAL at 09:36

## 2019-10-30 RX ADMIN — MONTELUKAST SODIUM 10 MG: 10 TABLET, FILM COATED ORAL at 21:37

## 2019-10-30 RX ADMIN — FLUTICASONE PROPIONATE 1 SPRAY: 50 SPRAY, METERED NASAL at 21:37

## 2019-10-30 RX ADMIN — POTASSIUM CHLORIDE 40 MEQ: 750 CAPSULE, EXTENDED RELEASE ORAL at 09:53

## 2019-10-30 RX ADMIN — NIFEDIPINE 60 MG: 60 TABLET, FILM COATED, EXTENDED RELEASE ORAL at 09:36

## 2019-10-30 RX ADMIN — CLONIDINE HYDROCHLORIDE 0.3 MG: 0.1 TABLET ORAL at 09:34

## 2019-10-30 RX ADMIN — DIATRIZOATE MEGLUMINE AND DIATRIZOATE SODIUM 30 ML: 600; 100 SOLUTION ORAL; RECTAL at 08:05

## 2019-10-30 RX ADMIN — ENOXAPARIN SODIUM 90 MG: 100 INJECTION SUBCUTANEOUS at 09:37

## 2019-10-30 RX ADMIN — NIFEDIPINE 60 MG: 60 TABLET, FILM COATED, EXTENDED RELEASE ORAL at 21:33

## 2019-10-30 RX ADMIN — POTASSIUM CHLORIDE 20 MEQ: 750 CAPSULE, EXTENDED RELEASE ORAL at 18:37

## 2019-10-30 RX ADMIN — CETIRIZINE HYDROCHLORIDE 10 MG: 10 TABLET, FILM COATED ORAL at 09:35

## 2019-10-30 RX ADMIN — PANTOPRAZOLE SODIUM 40 MG: 40 TABLET, DELAYED RELEASE ORAL at 09:35

## 2019-10-30 RX ADMIN — IPRATROPIUM BROMIDE 0.5 MG: 0.5 SOLUTION RESPIRATORY (INHALATION) at 19:12

## 2019-10-30 RX ADMIN — POTASSIUM CHLORIDE 40 MEQ: 750 CAPSULE, EXTENDED RELEASE ORAL at 13:27

## 2019-10-30 RX ADMIN — SODIUM CHLORIDE, PRESERVATIVE FREE 10 ML: 5 INJECTION INTRAVENOUS at 00:32

## 2019-10-30 RX ADMIN — VITAMIN D, TAB 1000IU (100/BT) 1000 UNITS: 25 TAB at 09:35

## 2019-10-30 NOTE — TELEPHONE ENCOUNTER
Ms. Barrett called today and states that she had to cancel her appt on 10/31/19 because she is currently in the hospital with blood clots in her right leg. She states it will be a while before she will be able to reschedule her appt.

## 2019-10-31 ENCOUNTER — APPOINTMENT (OUTPATIENT)
Dept: CARDIOLOGY | Facility: HOSPITAL | Age: 79
End: 2019-10-31

## 2019-10-31 PROBLEM — I26.99 ACUTE PULMONARY EMBOLISM: Status: ACTIVE | Noted: 2019-10-31

## 2019-10-31 LAB
ANION GAP SERPL CALCULATED.3IONS-SCNC: 9.8 MMOL/L (ref 5–15)
AORTIC DIMENSIONLESS INDEX: 0.8 (DI)
BASOPHILS # BLD AUTO: 0.05 10*3/MM3 (ref 0–0.2)
BASOPHILS NFR BLD AUTO: 0.9 % (ref 0–1.5)
BH CV ECHO MEAS - ACS: 2.1 CM
BH CV ECHO MEAS - AO MAX PG: 9.9 MMHG
BH CV ECHO MEAS - AO MEAN PG (FULL): 3 MMHG
BH CV ECHO MEAS - AO MEAN PG: 6 MMHG
BH CV ECHO MEAS - AO ROOT AREA (BSA CORRECTED): 1.4
BH CV ECHO MEAS - AO ROOT AREA: 5.7 CM^2
BH CV ECHO MEAS - AO ROOT DIAM: 2.7 CM
BH CV ECHO MEAS - AO V2 MAX: 157 CM/SEC
BH CV ECHO MEAS - AO V2 MEAN: 113 CM/SEC
BH CV ECHO MEAS - AO V2 VTI: 34.9 CM
BH CV ECHO MEAS - AVA(I,A): 2.4 CM^2
BH CV ECHO MEAS - AVA(I,D): 2.4 CM^2
BH CV ECHO MEAS - BSA(HAYCOCK): 2 M^2
BH CV ECHO MEAS - BSA: 1.9 M^2
BH CV ECHO MEAS - BZI_BMI: 34.5 KILOGRAMS/M^2
BH CV ECHO MEAS - BZI_METRIC_HEIGHT: 160 CM
BH CV ECHO MEAS - BZI_METRIC_WEIGHT: 88.5 KG
BH CV ECHO MEAS - CI(CUBED): 1.7 L/MIN/M^2
BH CV ECHO MEAS - CI(MOD-SP2): 1.9 L/MIN/M^2
BH CV ECHO MEAS - CI(MOD-SP4): 1.9 L/MIN/M^2
BH CV ECHO MEAS - CI(TEICH): 1.6 L/MIN/M^2
BH CV ECHO MEAS - CO(CUBED): 3.3 L/MIN
BH CV ECHO MEAS - CO(MOD-SP2): 3.7 L/MIN
BH CV ECHO MEAS - CO(MOD-SP4): 3.7 L/MIN
BH CV ECHO MEAS - CO(TEICH): 3.1 L/MIN
BH CV ECHO MEAS - EDV(CUBED): 74.1 ML
BH CV ECHO MEAS - EDV(MOD-SP2): 82 ML
BH CV ECHO MEAS - EDV(MOD-SP4): 84 ML
BH CV ECHO MEAS - EDV(TEICH): 78.6 ML
BH CV ECHO MEAS - EF(CUBED): 73.4 %
BH CV ECHO MEAS - EF(MOD-BP): 75 %
BH CV ECHO MEAS - EF(MOD-SP2): 74.4 %
BH CV ECHO MEAS - EF(MOD-SP4): 73.8 %
BH CV ECHO MEAS - EF(TEICH): 65.6 %
BH CV ECHO MEAS - ESV(CUBED): 19.7 ML
BH CV ECHO MEAS - ESV(MOD-SP2): 21 ML
BH CV ECHO MEAS - ESV(MOD-SP4): 22 ML
BH CV ECHO MEAS - ESV(TEICH): 27 ML
BH CV ECHO MEAS - FS: 35.7 %
BH CV ECHO MEAS - IVS/LVPW: 1
BH CV ECHO MEAS - IVSD: 1 CM
BH CV ECHO MEAS - LAT PEAK E' VEL: 6.3 CM/SEC
BH CV ECHO MEAS - LV DIASTOLIC VOL/BSA (35-75): 43.9 ML/M^2
BH CV ECHO MEAS - LV MASS(C)D: 137.2 GRAMS
BH CV ECHO MEAS - LV MASS(C)DI: 71.7 GRAMS/M^2
BH CV ECHO MEAS - LV MAX PG: 6.5 MMHG
BH CV ECHO MEAS - LV MEAN PG: 3 MMHG
BH CV ECHO MEAS - LV SYSTOLIC VOL/BSA (12-30): 11.5 ML/M^2
BH CV ECHO MEAS - LV V1 MAX: 127.8 CM/SEC
BH CV ECHO MEAS - LV V1 MEAN: 85.3 CM/SEC
BH CV ECHO MEAS - LV V1 VTI: 27.2 CM
BH CV ECHO MEAS - LVIDD: 4.2 CM
BH CV ECHO MEAS - LVIDS: 2.7 CM
BH CV ECHO MEAS - LVLD AP2: 6.9 CM
BH CV ECHO MEAS - LVLD AP4: 7.2 CM
BH CV ECHO MEAS - LVLS AP2: 5.3 CM
BH CV ECHO MEAS - LVLS AP4: 5.4 CM
BH CV ECHO MEAS - LVOT AREA (M): 3.1 CM^2
BH CV ECHO MEAS - LVOT AREA: 3.1 CM^2
BH CV ECHO MEAS - LVOT DIAM: 2 CM
BH CV ECHO MEAS - LVPWD: 1 CM
BH CV ECHO MEAS - MED PEAK E' VEL: 5.7 CM/SEC
BH CV ECHO MEAS - MM HR: 60 BPM
BH CV ECHO MEAS - MM R-R INT: 1 SEC
BH CV ECHO MEAS - MV A DUR: 0.16 SEC
BH CV ECHO MEAS - MV A MAX VEL: 94.3 CM/SEC
BH CV ECHO MEAS - MV DEC SLOPE: 316 CM/SEC^2
BH CV ECHO MEAS - MV DEC TIME: 254 SEC
BH CV ECHO MEAS - MV E MAX VEL: 56.8 CM/SEC
BH CV ECHO MEAS - MV E/A: 0.6
BH CV ECHO MEAS - MV MEAN PG: 2 MMHG
BH CV ECHO MEAS - MV P1/2T MAX VEL: 102 CM/SEC
BH CV ECHO MEAS - MV P1/2T: 94.5 MSEC
BH CV ECHO MEAS - MV V2 MEAN: 58.4 CM/SEC
BH CV ECHO MEAS - MV V2 VTI: 30 CM
BH CV ECHO MEAS - MVA P1/2T LCG: 2.2 CM^2
BH CV ECHO MEAS - MVA(P1/2T): 2.3 CM^2
BH CV ECHO MEAS - MVA(VTI): 2.8 CM^2
BH CV ECHO MEAS - PA ACC SLOPE: 3084 CM/SEC^2
BH CV ECHO MEAS - PA ACC TIME: 0.04 SEC
BH CV ECHO MEAS - PA MAX PG: 5.3 MMHG
BH CV ECHO MEAS - PA PR(ACCEL): 63.3 MMHG
BH CV ECHO MEAS - PA V2 MAX: 115 CM/SEC
BH CV ECHO MEAS - PULM A REVS DUR: 0.13 SEC
BH CV ECHO MEAS - PULM A REVS VEL: 41 CM/SEC
BH CV ECHO MEAS - PULM DIAS VEL: 32.1 CM/SEC
BH CV ECHO MEAS - PULM S/D: 1.7
BH CV ECHO MEAS - PULM SYS VEL: 55.1 CM/SEC
BH CV ECHO MEAS - RAP SYSTOLE: 3 MMHG
BH CV ECHO MEAS - RV MEAN PG: 1 MMHG
BH CV ECHO MEAS - RV V1 MEAN: 55.9 CM/SEC
BH CV ECHO MEAS - RV V1 VTI: 17 CM
BH CV ECHO MEAS - RVSP: 29 MMHG
BH CV ECHO MEAS - SI(AO): 104.4 ML/M^2
BH CV ECHO MEAS - SI(CUBED): 28.4 ML/M^2
BH CV ECHO MEAS - SI(LVOT): 44.7 ML/M^2
BH CV ECHO MEAS - SI(MOD-SP2): 31.9 ML/M^2
BH CV ECHO MEAS - SI(MOD-SP4): 32.4 ML/M^2
BH CV ECHO MEAS - SI(TEICH): 27 ML/M^2
BH CV ECHO MEAS - SV(AO): 199.8 ML
BH CV ECHO MEAS - SV(CUBED): 54.4 ML
BH CV ECHO MEAS - SV(LVOT): 85.5 ML
BH CV ECHO MEAS - SV(MOD-SP2): 61 ML
BH CV ECHO MEAS - SV(MOD-SP4): 62 ML
BH CV ECHO MEAS - SV(TEICH): 51.6 ML
BH CV ECHO MEAS - TAPSE (>1.6): 2.5 CM2
BH CV ECHO MEAS - TR MAX PG: 26
BH CV ECHO MEAS - TR MAX VEL: 257 CM/SEC
BH CV ECHO MEASUREMENTS AVERAGE E/E' RATIO: 9.47
BH CV XLRA - RV BASE: 3.1 CM
BH CV XLRA - TDI S': 11.8 CM/SEC
BUN BLD-MCNC: 18 MG/DL (ref 8–23)
BUN/CREAT SERPL: 19.4 (ref 7–25)
CALCIUM SPEC-SCNC: 9.2 MG/DL (ref 8.6–10.5)
CHLORIDE SERPL-SCNC: 107 MMOL/L (ref 98–107)
CO2 SERPL-SCNC: 24.2 MMOL/L (ref 22–29)
CREAT BLD-MCNC: 0.93 MG/DL (ref 0.57–1)
DEPRECATED RDW RBC AUTO: 43.5 FL (ref 37–54)
EOSINOPHIL # BLD AUTO: 0.16 10*3/MM3 (ref 0–0.4)
EOSINOPHIL NFR BLD AUTO: 2.7 % (ref 0.3–6.2)
ERYTHROCYTE [DISTWIDTH] IN BLOOD BY AUTOMATED COUNT: 13.2 % (ref 12.3–15.4)
GFR SERPL CREATININE-BSD FRML MDRD: 58 ML/MIN/1.73
GLUCOSE BLD-MCNC: 107 MG/DL (ref 65–99)
HCT VFR BLD AUTO: 37.9 % (ref 34–46.6)
HGB BLD-MCNC: 12.3 G/DL (ref 12–15.9)
IMM GRANULOCYTES # BLD AUTO: 0.02 10*3/MM3 (ref 0–0.05)
IMM GRANULOCYTES NFR BLD AUTO: 0.3 % (ref 0–0.5)
LEFT ATRIUM VOLUME INDEX: 23.6 ML/M2
LV EF 2D ECHO EST: 75 %
LYMPHOCYTES # BLD AUTO: 1.46 10*3/MM3 (ref 0.7–3.1)
LYMPHOCYTES NFR BLD AUTO: 25 % (ref 19.6–45.3)
MAGNESIUM SERPL-MCNC: 2.5 MG/DL (ref 1.6–2.4)
MAXIMAL PREDICTED HEART RATE: 139 BPM
MCH RBC QN AUTO: 29.3 PG (ref 26.6–33)
MCHC RBC AUTO-ENTMCNC: 32.5 G/DL (ref 31.5–35.7)
MCV RBC AUTO: 90.2 FL (ref 79–97)
MONOCYTES # BLD AUTO: 0.61 10*3/MM3 (ref 0.1–0.9)
MONOCYTES NFR BLD AUTO: 10.5 % (ref 5–12)
NEUTROPHILS # BLD AUTO: 3.53 10*3/MM3 (ref 1.7–7)
NEUTROPHILS NFR BLD AUTO: 60.6 % (ref 42.7–76)
NRBC BLD AUTO-RTO: 0 /100 WBC (ref 0–0.2)
PLATELET # BLD AUTO: 148 10*3/MM3 (ref 140–450)
PMV BLD AUTO: 11 FL (ref 6–12)
POTASSIUM BLD-SCNC: 4.7 MMOL/L (ref 3.5–5.2)
RBC # BLD AUTO: 4.2 10*6/MM3 (ref 3.77–5.28)
SODIUM BLD-SCNC: 141 MMOL/L (ref 136–145)
STRESS TARGET HR: 118 BPM
WBC NRBC COR # BLD: 5.83 10*3/MM3 (ref 3.4–10.8)

## 2019-10-31 PROCEDURE — 93306 TTE W/DOPPLER COMPLETE: CPT | Performed by: INTERNAL MEDICINE

## 2019-10-31 PROCEDURE — 94799 UNLISTED PULMONARY SVC/PX: CPT

## 2019-10-31 PROCEDURE — 97535 SELF CARE MNGMENT TRAINING: CPT

## 2019-10-31 PROCEDURE — 83735 ASSAY OF MAGNESIUM: CPT | Performed by: INTERNAL MEDICINE

## 2019-10-31 PROCEDURE — 80048 BASIC METABOLIC PNL TOTAL CA: CPT | Performed by: INTERNAL MEDICINE

## 2019-10-31 PROCEDURE — 85025 COMPLETE CBC W/AUTO DIFF WBC: CPT | Performed by: INTERNAL MEDICINE

## 2019-10-31 PROCEDURE — 93306 TTE W/DOPPLER COMPLETE: CPT

## 2019-10-31 PROCEDURE — 97166 OT EVAL MOD COMPLEX 45 MIN: CPT

## 2019-10-31 RX ORDER — PROMETHAZINE HYDROCHLORIDE AND CODEINE PHOSPHATE 6.25; 1 MG/5ML; MG/5ML
5 SYRUP ORAL EVERY 4 HOURS PRN
Status: DISCONTINUED | OUTPATIENT
Start: 2019-10-31 | End: 2019-11-01 | Stop reason: HOSPADM

## 2019-10-31 RX ORDER — CLONIDINE HYDROCHLORIDE 0.1 MG/1
0.2 TABLET ORAL 3 TIMES DAILY
Status: DISCONTINUED | OUTPATIENT
Start: 2019-10-31 | End: 2019-11-01 | Stop reason: HOSPADM

## 2019-10-31 RX ORDER — POTASSIUM CHLORIDE 750 MG/1
10 CAPSULE, EXTENDED RELEASE ORAL 2 TIMES DAILY WITH MEALS
Status: DISCONTINUED | OUTPATIENT
Start: 2019-11-01 | End: 2019-11-01 | Stop reason: HOSPADM

## 2019-10-31 RX ADMIN — ACETAMINOPHEN 650 MG: 325 TABLET, FILM COATED ORAL at 14:28

## 2019-10-31 RX ADMIN — EPLERENONE 50 MG: 25 TABLET ORAL at 10:37

## 2019-10-31 RX ADMIN — NIFEDIPINE 60 MG: 60 TABLET, FILM COATED, EXTENDED RELEASE ORAL at 22:07

## 2019-10-31 RX ADMIN — CLONIDINE HYDROCHLORIDE 0.2 MG: 0.1 TABLET ORAL at 10:38

## 2019-10-31 RX ADMIN — PROMETHAZINE HYDROCHLORIDE AND CODEINE PHOSPHATE 15 ML: 6.25; 1 SOLUTION ORAL at 11:19

## 2019-10-31 RX ADMIN — VITAMIN D, TAB 1000IU (100/BT) 1000 UNITS: 25 TAB at 10:36

## 2019-10-31 RX ADMIN — ETHACRYNIC ACID 25 MG: 25 TABLET ORAL at 10:42

## 2019-10-31 RX ADMIN — CETIRIZINE HYDROCHLORIDE 10 MG: 10 TABLET, FILM COATED ORAL at 10:38

## 2019-10-31 RX ADMIN — IPRATROPIUM BROMIDE 0.5 MG: 0.5 SOLUTION RESPIRATORY (INHALATION) at 07:17

## 2019-10-31 RX ADMIN — GUAIFENESIN 600 MG: 600 TABLET, EXTENDED RELEASE ORAL at 22:03

## 2019-10-31 RX ADMIN — ACETAMINOPHEN 650 MG: 325 TABLET, FILM COATED ORAL at 22:38

## 2019-10-31 RX ADMIN — GUAIFENESIN 600 MG: 600 TABLET, EXTENDED RELEASE ORAL at 10:36

## 2019-10-31 RX ADMIN — MONTELUKAST SODIUM 10 MG: 10 TABLET, FILM COATED ORAL at 21:56

## 2019-10-31 RX ADMIN — Medication 1000 MCG: at 10:36

## 2019-10-31 RX ADMIN — IPRATROPIUM BROMIDE 0.5 MG: 0.5 SOLUTION RESPIRATORY (INHALATION) at 19:18

## 2019-10-31 RX ADMIN — APIXABAN 10 MG: 5 TABLET, FILM COATED ORAL at 21:58

## 2019-10-31 RX ADMIN — CLONIDINE HYDROCHLORIDE 0.2 MG: 0.1 TABLET ORAL at 18:37

## 2019-10-31 RX ADMIN — PANTOPRAZOLE SODIUM 40 MG: 40 TABLET, DELAYED RELEASE ORAL at 06:45

## 2019-10-31 RX ADMIN — SODIUM CHLORIDE, PRESERVATIVE FREE 10 ML: 5 INJECTION INTRAVENOUS at 10:45

## 2019-10-31 RX ADMIN — FLUTICASONE PROPIONATE 1 SPRAY: 50 SPRAY, METERED NASAL at 22:07

## 2019-10-31 RX ADMIN — FLUTICASONE PROPIONATE 1 SPRAY: 50 SPRAY, METERED NASAL at 10:41

## 2019-10-31 RX ADMIN — EPLERENONE 50 MG: 25 TABLET ORAL at 21:58

## 2019-10-31 RX ADMIN — ETHACRYNIC ACID 25 MG: 25 TABLET ORAL at 22:04

## 2019-10-31 RX ADMIN — IPRATROPIUM BROMIDE 0.5 MG: 0.5 SOLUTION RESPIRATORY (INHALATION) at 15:02

## 2019-10-31 RX ADMIN — POLYETHYLENE GLYCOL 3350 17 G: 17 POWDER, FOR SOLUTION ORAL at 11:22

## 2019-10-31 RX ADMIN — ETHACRYNIC ACID 25 MG: 25 TABLET ORAL at 18:37

## 2019-10-31 RX ADMIN — IPRATROPIUM BROMIDE 0.5 MG: 0.5 SOLUTION RESPIRATORY (INHALATION) at 11:41

## 2019-10-31 RX ADMIN — NIFEDIPINE 60 MG: 60 TABLET, FILM COATED, EXTENDED RELEASE ORAL at 10:44

## 2019-10-31 RX ADMIN — CLONIDINE HYDROCHLORIDE 0.2 MG: 0.1 TABLET ORAL at 22:02

## 2019-10-31 RX ADMIN — APIXABAN 10 MG: 5 TABLET, FILM COATED ORAL at 10:36

## 2019-10-31 RX ADMIN — SODIUM CHLORIDE, PRESERVATIVE FREE 10 ML: 5 INJECTION INTRAVENOUS at 22:02

## 2019-11-01 VITALS
OXYGEN SATURATION: 99 % | WEIGHT: 196.3 LBS | SYSTOLIC BLOOD PRESSURE: 168 MMHG | HEART RATE: 77 BPM | RESPIRATION RATE: 18 BRPM | BODY MASS INDEX: 34.78 KG/M2 | TEMPERATURE: 98 F | DIASTOLIC BLOOD PRESSURE: 82 MMHG | HEIGHT: 63 IN

## 2019-11-01 PROBLEM — I26.93 SINGLE SUBSEGMENTAL PULMONARY EMBOLISM WITHOUT ACUTE COR PULMONALE (HCC): Status: ACTIVE | Noted: 2019-10-31

## 2019-11-01 LAB
ANION GAP SERPL CALCULATED.3IONS-SCNC: 11.9 MMOL/L (ref 5–15)
BASOPHILS # BLD AUTO: 0.04 10*3/MM3 (ref 0–0.2)
BASOPHILS NFR BLD AUTO: 0.7 % (ref 0–1.5)
BUN BLD-MCNC: 17 MG/DL (ref 8–23)
BUN/CREAT SERPL: 19.3 (ref 7–25)
CALCIUM SPEC-SCNC: 9.1 MG/DL (ref 8.6–10.5)
CHLORIDE SERPL-SCNC: 104 MMOL/L (ref 98–107)
CO2 SERPL-SCNC: 23.1 MMOL/L (ref 22–29)
CREAT BLD-MCNC: 0.88 MG/DL (ref 0.57–1)
DEPRECATED RDW RBC AUTO: 42.7 FL (ref 37–54)
EOSINOPHIL # BLD AUTO: 0.2 10*3/MM3 (ref 0–0.4)
EOSINOPHIL NFR BLD AUTO: 3.4 % (ref 0.3–6.2)
ERYTHROCYTE [DISTWIDTH] IN BLOOD BY AUTOMATED COUNT: 13.1 % (ref 12.3–15.4)
GFR SERPL CREATININE-BSD FRML MDRD: 62 ML/MIN/1.73
GLUCOSE BLD-MCNC: 123 MG/DL (ref 65–99)
HCT VFR BLD AUTO: 36.9 % (ref 34–46.6)
HGB BLD-MCNC: 12.2 G/DL (ref 12–15.9)
IMM GRANULOCYTES # BLD AUTO: 0.03 10*3/MM3 (ref 0–0.05)
IMM GRANULOCYTES NFR BLD AUTO: 0.5 % (ref 0–0.5)
LYMPHOCYTES # BLD AUTO: 1.66 10*3/MM3 (ref 0.7–3.1)
LYMPHOCYTES NFR BLD AUTO: 28.6 % (ref 19.6–45.3)
MCH RBC QN AUTO: 29.8 PG (ref 26.6–33)
MCHC RBC AUTO-ENTMCNC: 33.1 G/DL (ref 31.5–35.7)
MCV RBC AUTO: 90 FL (ref 79–97)
MONOCYTES # BLD AUTO: 0.64 10*3/MM3 (ref 0.1–0.9)
MONOCYTES NFR BLD AUTO: 11 % (ref 5–12)
NEUTROPHILS # BLD AUTO: 3.24 10*3/MM3 (ref 1.7–7)
NEUTROPHILS NFR BLD AUTO: 55.8 % (ref 42.7–76)
NRBC BLD AUTO-RTO: 0 /100 WBC (ref 0–0.2)
PLATELET # BLD AUTO: 164 10*3/MM3 (ref 140–450)
PMV BLD AUTO: 10.7 FL (ref 6–12)
POTASSIUM BLD-SCNC: 4.1 MMOL/L (ref 3.5–5.2)
RBC # BLD AUTO: 4.1 10*6/MM3 (ref 3.77–5.28)
SODIUM BLD-SCNC: 139 MMOL/L (ref 136–145)
WBC NRBC COR # BLD: 5.81 10*3/MM3 (ref 3.4–10.8)

## 2019-11-01 PROCEDURE — 94799 UNLISTED PULMONARY SVC/PX: CPT

## 2019-11-01 PROCEDURE — 97161 PT EVAL LOW COMPLEX 20 MIN: CPT

## 2019-11-01 PROCEDURE — 97535 SELF CARE MNGMENT TRAINING: CPT | Performed by: OCCUPATIONAL THERAPIST

## 2019-11-01 PROCEDURE — 97110 THERAPEUTIC EXERCISES: CPT

## 2019-11-01 PROCEDURE — 80048 BASIC METABOLIC PNL TOTAL CA: CPT | Performed by: INTERNAL MEDICINE

## 2019-11-01 PROCEDURE — 85025 COMPLETE CBC W/AUTO DIFF WBC: CPT | Performed by: INTERNAL MEDICINE

## 2019-11-01 RX ADMIN — NIFEDIPINE 60 MG: 60 TABLET, FILM COATED, EXTENDED RELEASE ORAL at 09:11

## 2019-11-01 RX ADMIN — PROMETHAZINE HYDROCHLORIDE AND CODEINE PHOSPHATE 15 ML: 6.25; 1 SOLUTION ORAL at 02:22

## 2019-11-01 RX ADMIN — ETHACRYNIC ACID 25 MG: 25 TABLET ORAL at 09:11

## 2019-11-01 RX ADMIN — CLONIDINE HYDROCHLORIDE 0.2 MG: 0.1 TABLET ORAL at 09:10

## 2019-11-01 RX ADMIN — SODIUM CHLORIDE, PRESERVATIVE FREE 10 ML: 5 INJECTION INTRAVENOUS at 09:12

## 2019-11-01 RX ADMIN — PANTOPRAZOLE SODIUM 40 MG: 40 TABLET, DELAYED RELEASE ORAL at 06:29

## 2019-11-01 RX ADMIN — CETIRIZINE HYDROCHLORIDE 10 MG: 10 TABLET, FILM COATED ORAL at 09:11

## 2019-11-01 RX ADMIN — VITAMIN D, TAB 1000IU (100/BT) 1000 UNITS: 25 TAB at 09:11

## 2019-11-01 RX ADMIN — EPLERENONE 50 MG: 25 TABLET ORAL at 09:10

## 2019-11-01 RX ADMIN — GUAIFENESIN 600 MG: 600 TABLET, EXTENDED RELEASE ORAL at 09:10

## 2019-11-01 RX ADMIN — Medication 1000 MCG: at 09:10

## 2019-11-01 RX ADMIN — APIXABAN 10 MG: 5 TABLET, FILM COATED ORAL at 09:10

## 2019-11-01 RX ADMIN — FLUTICASONE PROPIONATE 1 SPRAY: 50 SPRAY, METERED NASAL at 09:12

## 2019-11-01 RX ADMIN — ACETAMINOPHEN 650 MG: 325 TABLET, FILM COATED ORAL at 04:23

## 2019-11-01 RX ADMIN — POTASSIUM CHLORIDE 10 MEQ: 750 CAPSULE, EXTENDED RELEASE ORAL at 18:32

## 2019-11-01 RX ADMIN — POTASSIUM CHLORIDE 10 MEQ: 750 CAPSULE, EXTENDED RELEASE ORAL at 09:11

## 2019-11-01 RX ADMIN — IPRATROPIUM BROMIDE 0.5 MG: 0.5 SOLUTION RESPIRATORY (INHALATION) at 16:32

## 2019-11-01 NOTE — DISCHARGE SUMMARY
Date of Discharge:  11/1/2019    Discharge Diagnosis:   #1 Acute Right Lower Extremity DVT-currently on Eliquis twice daily  2.  Pulmonary embolus-currently she is not symptomatic and should be adequately treated with Eliquis.  3.  COPD/asthma-chronic and stable on multiple medications.  She is followed regularly by pulmonary who feels she is ready for discharge  4.  Cough/bronchitis-she is complaining of cough and sputum today.  Will resume her Ceftin which she is taken in the past.  5.  HTN-well-controlled on current medications  6.  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  Lumbar spinal stenosis/neurogenic claudication-chronic and stable  8.  Hypokalemia-treated with protocol and stable  9.  Vitamin D deficiency-treated with oral replacements          DETAILS OF HOSPITAL STAY     Presenting Problem/History of Present Illness  Acute deep vein thrombosis (CMS/[facility]) [I82.409]    Acute deep vein thrombosis (DVT) of right lower extremity (CMS/[facility])    Acute deep vein thrombosis (CMS/[facility])    Single subsegmental pulmonary embolism without acute cor pulmonale    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[facility])    Joint pain    Urinary retention self-caths (Don)    Conjunctivitis    Arm pain    Cervical disc displacement    Cervical pain (Servando=PM)    DDD (degenerative disc disease), cervical    Hyperlipidemia LDL goal <70    Proctocele    Urge incontinence of urine    Chronic tension-type headache, intractable    Obesity (BMI 30.0-34.9)    H/o Lyme disease    Stage 3 chronic kidney disease (CMS/[facility])    Cerebral atherosclerosis    Allergic contact dermatitis    Malaise and fatigue progressive    Vertigo    Vitamin D deficiency    Moderate persistent asthmatic bronchitis without complication    Pleurisy    Knee pain, bilateral    Elevated PTH level    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)    Gastroesophageal reflux disease with esophagitis    Psoriasis (Darryl Ochoa)     Postmenopausal    Recurrent UTI    CVA (cerebral vascular accident) (CMS/HCC)    Dyspnea on exertion    Acute joint pain    Easy bruisability    Decreased functional mobility and endurance since fall 12/20/17    Uncontrolled hypertension    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 Yazmin Canseco   • CARPAL TUNNEL RELEASE Right 05/25/2011    Dr. Caleb Bueno   • CARPAL TUNNEL RELEASE Left 04/26/2011    Dr. Caleb Bueno   • CATARACT EXTRACTION Left 11/29/2010    Dr. Eusebio Downs   • CATARACT EXTRACTION Right 11/17/2010    Dr. Eusebio Downs   • COLONOSCOPY N/A 12/06/2002    Sigmoid diverticulosis-Dr. Brandon Batista   • COLONOSCOPY N/A 12/12/2013    Tortuous colon, diverticulosis in the sigmoid colon and descending colon, stool in the rectum, sigmoid colon, descending colon, splenic flexure, transverse colon and hepatic flexure-Dr. Irma Brambila   • ENDOSCOPY N/A 09/13/2007    Normal-Dr. Pavel Quarles   • ENDOSCOPY AND COLONOSCOPY N/A 09/19/2005    1 cm hiatal hernia, mild Schatzki's ring, sigmoid diverticulosis-Dr. Yoel Farley   • GANGLION CYST EXCISION Left 12/18/2012    Release of A1 pulley flexor sheath, left fourth finger, excision of ganglion cyst 0.5 cm diameter, A2 pulley flexor sheath, left fourth finger-Dr. Caleb Bueno   • HEEL SPUR EXCISION Left 1968   • INGUINAL HERNIA  REPAIR Right 1938    at 5 months old   • MASTECTOMY Right 08/05/2008    Right breast mastectomy and excision of left chest wall lesion-Dr. Yoel Farley   • MASTECTOMY RADICAL Left 04/29/2003    Left modified radical mastectomy-Dr. Yazmin Canseco   • PARATHYROIDECTOMY Right 05/04/2004    Excision of parathyroid adenoma (right superior)-Dr. Yoel Farley   • REPLACEMENT TOTAL KNEE Right 04/09/2012    Dr. Lamonte Childress   • SINUS SURGERY  1984   • THYROIDECTOMY, PARTIAL Left 05/04/2004    Dr. Yoel Farley   • TOTAL ABDOMINAL HYSTERECTOMY Bilateral 1973    Dr. Mahan   • UPPER GASTROINTESTINAL ENDOSCOPY N/A 02/18/2009    LA Grade A reflux esophagitis, normal stomach, normal 2nd part of the duodenum-Dr. Yoel Farley       Allergies  Bee venom; Morphine; Sulfa antibiotics; Tree extract; Hydralazine; Ambien [zolpidem]; Anastrozole; Norvasc [amlodipine]; Nsaids; Penicillins; and Grass    Discharge Medications     Discharge Medications      New Medications      Instructions Start Date   apixaban 5 MG tablet tablet  Commonly known as:  ELIQUIS   10 mg, Oral, Every 12 Hours Scheduled      apixaban 5 MG tablet tablet  Commonly known as:  ELIQUIS   5 mg, Oral, Every 12 Hours Scheduled   Start Date:  11/6/2019        Changes to Medications      Instructions Start Date   omeprazole 40 MG capsule  Commonly known as:  priLOSEC  What changed:    · how much to take  · how to take this  · when to take this   TAKE ONE CAPSULE BY MOUTH EVERY DAY      polyethylene glycol powder  Commonly known as:  MIRALAX  What changed:    · how much to take  · how to take this  · when to take this  · additional instructions   Take one capful w/liquid daily          Continue These Medications      Instructions Start Date   acetaminophen 650 MG 8 hr tablet  Commonly known as:  TYLENOL   650 mg, Oral, Every 6 Hours PRN      budesonide 180 MCG/ACT inhaler  Commonly known as:  PULMICORT   1 puff, Inhalation, 2 Times Daily - RT       butalbital-acetaminophen-caffeine -40 MG per tablet  Commonly known as:  FIORICET, ESGIC   2 tablets, Oral, Every 4 Hours PRN      cefuroxime 250 MG tablet  Commonly known as:  CEFTIN   250 mg, Oral, 2 Times Daily      cetirizine 10 MG tablet  Commonly known as:  zyrTEC   10 mg, Oral, Daily      ciprofloxacin-dexamethasone 0.3-0.1 % otic suspension  Commonly known as:  CIPRODEX   4 drops, Both Ears, 2 Times Daily      cyanocobalamin 1000 MCG tablet  Commonly known as:  VITAMIN B-12   1,000 mcg, Oral, Daily      diphenhydrAMINE 50 MG tablet  Commonly known as:  BENADRYL   50 mg, Oral, Every 4 Hours PRN      eplerenone 50 MG tablet  Commonly known as:  INSPRA   50 mg, Oral, Every 12 Hours Scheduled      ethacrynic acid 25 MG tablet  Commonly known as:  EDECRIN   25 mg, Oral, 3 Times Daily      fluticasone 50 MCG/ACT nasal spray  Commonly known as:  FLONASE   1 spray, Each Nare, 2 Times Daily      hydrOXYzine 25 MG tablet  Commonly known as:  ATARAX   No dose, route, or frequency recorded.      ipratropium 0.02 % nebulizer solution  Commonly known as:  ATROVENT   500 mcg, Nebulization, 4 Times Daily - RT      montelukast 10 MG tablet  Commonly known as:  SINGULAIR   10 mg, Oral, Nightly      NIFEdipine XL 60 MG 24 hr tablet  Commonly known as:  PROCARDIA XL   TAKE ONE TABLET TWICE DAILY      NON FORMULARY   2 puffs, Inhalation, 2 Times Daily, Proptline Inhaler       ondansetron ODT 8 MG disintegrating tablet  Commonly known as:  ZOFRAN-ODT   8 mg, Oral, Every 6 Hours PRN      pentazocine-naloxone 50-0.5 MG per tablet  Commonly known as:  TALWIN NX   1 tablet, Oral, Every 4 Hours PRN      potassium chloride 10 MEQ CR tablet  Commonly known as:  K-DUR   10 mEq, Oral, 2 Times Daily      predniSONE 10 MG tablet  Commonly known as:  DELTASONE   10 mg, Oral, Every Other Day      PROAIR  (90 Base) MCG/ACT inhaler  Generic drug:  albuterol sulfate HFA   ProAir  (90 Base) MCG/ACT Inhalation Aerosol  Solution; Patient Sig: ProAir  (90 Base) MCG/ACT Inhalation Aerosol Solution ; 8; 0; 18-Jun-2014; Active      promethazine-codeine 6.25-10 MG/5ML syrup  Commonly known as:  PHENERGAN with CODEINE   5 mL, Oral, 4 Times Daily PRN      Vitamin D 1000 units tablet   1,000 Units, Oral, Daily         Stop These Medications    aspirin 81 MG tablet     ciprofloxacin 500 MG tablet  Commonly known as:  CIPRO     cloNIDine 0.3 MG tablet  Commonly known as:  [facility]  Patient is a 79 y.o. female presented with complaints of pain and swelling in her right lower extremity over the last week prior to admission.  She had pain and swelling up to the knee and also no history of any particular trauma or injury.  Venous Doppler revealed evidence for an extensive right lower extremity DVT extending from the common femoral down to the gastrocnemius muscle.  She was seen in consultation by vascular surgery who felt no intervention was indicated.  The patient was promptly admitted and started on Lovenox.  She quickly stabilized and was transferred over to [facility] twice daily.  Should be on 10 mg twice daily for 7 days and then 5 mg twice daily as maintenance.  She was also found to have a pulmonary embolus when a CT scan was done but fortunately she was not having any particular pulmonary symptoms.  She did not have any chest pain, bloody sputum, or hypoxia.  She does have chronic pulmonary disease but that remained relatively stable.  She is also seen in consultation by pulmonary and nephrology.  Overall she did well with no significant problems or complications.  She seemed to be tolerating her medications well.  She was anxious to be discharged and once everyone felt she was stable those arrangements were made.    Throughout her hospital stay her renal function remained stable and she did have some medication modifications by nephrology but overall her function is stable.  She was also seen by pulmonary  who thought she was doing well.  They were concerned that she might have sleep apnea but she refused to have a sleep study done.  When I discussed it with her she indicated she had no interest in ever wearing a CPAP mask and using the machine.  She did note some cough and sputum production.  Because of her underlying lung disease she was restarted on Ceftin which she is taken in the past and seems to have tolerated well.    At this point she is ready for discharge and seems to understand the nature of both the blood clot and the pulmonary embolus.  She understands the need for the use of Eliquis for several months.  She has had it explained to her what to do if she develops any other respiratory symptoms or any other signs of complications related to the DVT and PE.  The majority of her medications have been continued as they were at home.  She will follow-up with pulmonary and nephrology as they arranged.  She will see Dr. Cadet in the office the next 10 to 14 days.    Procedures Performed         Consults:   Consults     Date and Time Order Name Status Description    10/30/2019 1049 Inpatient Pulmonology Consult Completed     10/29/2019 2108 Inpatient Nephrology Consult Completed     10/29/2019 2108 Inpatient Vascular Surgery Consult Completed           Pertinent Test Results:     Blood sugar 123, BUN 17, creatinine 0.88, sodium 139, potassium 4.1  WBC 5.8, hemoglobin 12.2, hematocrit 36.9, platelets 164  Magnesium 2.5  Vitamin D 26.1  Urine drug screen-positive barbiturates  Cholesterol 194, triglycerides 130, HDL 55,  [place] TSH 1.06    CT chest-pulmonary embolus present in the distal aspect of the right intralobular pulmonary artery with extension to the multiple right basilar segmental pulmonary arteries  CT abdomen and pelvis-deep venous thrombosis within the right external iliac, common femoral and superficial femoral veins no other acute change  Venous Doppler-acute right lower extremity deep vein  "thrombosis in the external iliac, common femoral, profunda femoral, proximal femoral, mid femoral, distal femoral, popliteal, posterior tibial, peroneal and gastrocnemius/soleal    Condition on Discharge:    At this point the patient is awake, alert, and doing well.  She has no swelling or edema in her right lower extremity.  She has no erythema or other acute changes noted.  She still has some tenderness in her calf and thigh muscles but it is improving slowly.  From a pulmonary standpoint her O2 saturations are well-maintained but she is complaining of a cough with some discolored sputum.  She has no fevers or chills, no nausea or vomiting, no otherwise eating and drinking and doing well.  He seems to be tolerating her medications without any difficulty.  She has been cleared by both pulmonary and nephrology to be discharged and she is anxious to be discharged home soon as possible.  She will follow-up with pulmonary nephrology as they arrange and Dr. Cadet in the next  10 to 14 days.  She is been instructed to call if she has any questions or other problems.      Vital Signs  Temp:  [97.8 °F (36.6 °C)-98.5 °F (36.9 °C)] 98.1 °F (36.7 °C)  Heart Rate:  [65-84] 69  Resp:  [16-20] 20  BP: (139-167)/(51-87) 167/76    Flowsheet Rows      First Filed Value   Admission Height  160 cm (63\") Documented at 10/29/2019 2049   Admission Weight  89.9 kg (198 lb 3 oz) Documented at 10/29/2019 2049              Discharge Disposition  Home or Self Care        Discharge Diet:   Diet Instructions     Advance diet as tolerated            Activity at Discharge:     Follow-up Appointments  Future Appointments   Date Time Provider Department Center   12/6/2019 12:50 PM Lupis Aguilar, APRN [id number] None     Additional Instructions for the Follow-ups that You Need to Schedule     Discharge Follow-up with PCP   As directed       Currently Documented PCP:    Óscar Cadet MD    PCP Phone Number:    [phone]     Follow Up " Details:  See Dr. Cadet in the next 10 to 14 days.  Call with any questions               Test Results Pending at Discharge       Nicolas Ugarte MD  11/01/19  12:55 PM

## 2019-11-01 NOTE — PLAN OF CARE
Problem: Patient Care Overview  Goal: Plan of Care Review  Outcome: Ongoing (interventions implemented as appropriate)   10/31/19 2023   Coping/Psychosocial   Plan of Care Reviewed With patient   Plan of Care Review   Progress improving   OTHER   Outcome Summary VSS. Encourage pt to get up in chair, ambulate in room, and elevate right leg. IV site changed d/t redness. Echo obtained today. Clonidine decreased and metoprolol dc'd. Eliquis started yesterday. Pt refusing overnight oximetry study. Continue to monitor.        Problem: Fall Risk (Adult)  Goal: Absence of Fall  Outcome: Ongoing (interventions implemented as appropriate)      Problem: Pain, Chronic (Adult)  Goal: Acceptable Pain/Comfort Level and Functional Ability  Outcome: Ongoing (interventions implemented as appropriate)      Problem: VTE, DVT and PE (Adult)  Goal: Signs and Symptoms of Listed Potential Problems Will be Absent, Minimized or Managed (VTE, DVT and PE)  Outcome: Ongoing (interventions implemented as appropriate)

## 2019-11-01 NOTE — PLAN OF CARE
Problem: Patient Care Overview  Goal: Plan of Care Review  Outcome: Ongoing (interventions implemented as appropriate)   11/01/19 0334   Coping/Psychosocial   Plan of Care Reviewed With patient   Plan of Care Review   Progress improving   OTHER   Outcome Summary Pt's RLE remained elevated. Weight up 1lb from yesterday. VSS, safety maintained, will continue to monitor.

## 2019-11-01 NOTE — PROGRESS NOTES
Continued Stay Note  [facility]     Patient Name: Sasha Barrett  MRN: [id number]  Today's Date: 11/1/2019    Admit Date: 10/29/2019    Discharge Plan     Row Name 11/01/19 1344       Plan    Plan  home with     Patient/Family in Agreement with Plan  yes    Plan Comments  coupon for 30 days of Eliquis given to patient.  plan is still home with  and  to transport.        Discharge Codes    No documentation.       Expected Discharge Date and Time     Expected Discharge Date Expected Discharge Time    Nov 1, 2019             Shannon Epley, RN

## 2019-11-01 NOTE — PROGRESS NOTES
"   LOS: 3 days    Patient Care Team:  Óscar Cadet MD as PCP - General (Internal Medicine)    Chief Complaint:  No chief complaint on file.    Follow-up CKD  Subjective     Interval History:   Patient is having recurrent productive cough, no shortness of air, no orthopnea PND, no nausea or vomiting, she has pain of right lower extremity.  She does self-catheterization twice daily, no dysuria or gross hematuria.      Objective     Vital Signs  Temp:  [97.8 °F (36.6 °C)-98.5 °F (36.9 °C)] 98.1 °F (36.7 °C)  Heart Rate:  [65-84] 69  Resp:  [16-20] 20  BP: (139-167)/(51-87) 167/76    Flowsheet Rows      First Filed Value   Admission Height  160 cm (63\") Documented at 10/29/2019 2049   Admission Weight  89.9 kg (198 lb 3 oz) Documented at 10/29/2019 2049          I/O this shift:  In: 480 [P.O.:480]  Out: -   I/O last 3 completed shifts:  In: 1260 [P.O.:1260]  Out: 1750 [Urine:1750]    Intake/Output Summary (Last 24 hours) at 11/1/2019 1148  Last data filed at 11/1/2019 0800  Gross per 24 hour   Intake 1740 ml   Output 1750 ml   Net -10 ml       Physical Exam:  General Appearance: alert, oriented x 3, no acute distress, obese, chronically  Skin: warm and dry  HEENT: Nonicteric, oral mucosa normal,   Neck: supple, no JVD, trachea midline  Lungs: CTA, unlabored breathing effort  Heart: RRR, normal S1 and S2, no S3, no rub  Abdomen: soft, non-tender,  present bowel sounds to auscultation  : no palpable bladder,  Extremities: no edema, cyanosis or clubbing  Neuro: normal speech and mental status       Results Review:    Results from last 7 days   Lab Units 11/01/19  0444 10/31/19  0431 10/30/19  0512 10/29/19  2115   SODIUM mmol/L 139 141 136 141   POTASSIUM mmol/L 4.1 4.7 3.1* 2.9*   CHLORIDE mmol/L 104 107 95* 101   CO2 mmol/L 23.1 24.2 28.5 26.6   BUN mg/dL 17 18 18 20   CREATININE mg/dL 0.88 0.93 1.01* 0.96   CALCIUM mg/dL 9.1 9.2 9.2 9.1   BILIRUBIN mg/dL  --   --   --  0.3   ALK PHOS U/L  --   --   --  109   ALT " (SGPT) U/L  --   --   --  12   AST (SGOT) U/L  --   --   --  14   GLUCOSE mg/dL 123* 107* 99 105*       Estimated Creatinine Clearance: 53 mL/min (by C-G formula based on SCr of 0.88 mg/dL).    Results from last 7 days   Lab Units 10/31/19  0431 10/29/19  2115   MAGNESIUM mg/dL 2.5* 2.1   PHOSPHORUS mg/dL  --  2.7             Results from last 7 days   Lab Units 11/01/19  0444 10/31/19  0431 10/30/19  0512 10/29/19  2115   WBC 10*3/mm3 5.81 5.83 8.54 10.04  9.08   HEMOGLOBIN g/dL 12.2 12.3 13.7 14.5  14.6   PLATELETS 10*3/mm3 164 148 170 159  156               Imaging Results (Last 24 Hours)     ** No results found for the last 24 hours. **          apixaban 10 mg Oral Q12H   cetirizine 10 mg Oral Daily   cholecalciferol 1,000 Units Oral Daily   cloNIDine 0.2 mg Oral TID   eplerenone 50 mg Oral Q12H   ethacrynic acid 25 mg Oral TID   fluticasone 1 spray Each Nare BID   guaiFENesin 600 mg Oral Q12H   ipratropium 500 mcg Nebulization 4x Daily - RT   montelukast 10 mg Oral Nightly   NIFEdipine XL 60 mg Oral BID   pantoprazole 40 mg Oral QAM   polyethylene glycol 17 g Oral Once per day on Sun Tue Thu Sat   potassium chloride 10 mEq Oral BID With Meals   sodium chloride 10 mL Intravenous Q12H   cyanocobalamin 1,000 mcg Oral Daily          Medication Review:   Current Facility-Administered Medications   Medication Dose Route Frequency Provider Last Rate Last Dose   • acetaminophen (TYLENOL) tablet 650 mg  650 mg Oral Q6H PRN Óscar Cadet MD   650 mg at 11/01/19 0423   • albuterol (PROVENTIL) nebulizer solution 0.083% 2.5 mg/3mL  2.5 mg Nebulization Q6H PRN Óscar Cadet MD       • apixaban (ELIQUIS) tablet 10 mg  10 mg Oral Q12H Óscar Cadet MD   10 mg at 11/01/19 0910   • butalbital-acetaminophen-caffeine (FIORICET, ESGIC) -40 MG per tablet 2 tablet  2 tablet Oral Q4H PRN Óscar Cadet MD   2 tablet at 10/1938   • calcium carbonate (TUMS) chewable tablet 500 mg (200 mg elemental)  2  tablet Oral TID PRN Óscar Cadet MD       • cetirizine (zyrTEC) tablet 10 mg  10 mg Oral Daily Óscar Cadet MD   10 mg at 11/01/19 0911   • cholecalciferol (VITAMIN D3) tablet 1,000 Units  1,000 Units Oral Daily Óscar Cadte MD   1,000 Units at 11/01/19 0911   • cloNIDine (CATAPRES) tablet 0.2 mg  0.2 mg Oral TID Wicho Hallman MD   0.2 mg at 11/01/19 0910   • diphenhydrAMINE (BENADRYL) capsule 50 mg  50 mg Oral Q6H PRN Óscar Cadet MD       • docusate sodium (COLACE) capsule 100 mg  100 mg Oral BID PRN Óscar Cadet MD       • EPINEPHrine (ANAPHYLAXIS) 1 mg/ml injection kit  0.3 mg Intramuscular PRN Óscar Cadet MD       • eplerenone (INSPRA) tablet 50 mg  50 mg Oral Q12H Wicho Hallman MD   50 mg at 11/01/19 0910   • ethacrynic acid (EDECRIN) tablet 25 mg  25 mg Oral TID Wicho Hallman MD   25 mg at 11/01/19 0911   • fluticasone (FLONASE) 50 MCG/ACT nasal spray 1 spray  1 spray Each Nare BID Óscar Cadet MD   1 spray at 11/01/19 0912   • guaiFENesin (MUCINEX) 12 hr tablet 600 mg  600 mg Oral Q12H Óscar Cadet MD   600 mg at 11/01/19 0910   • hydrOXYzine (ATARAX) tablet 25 mg  25 mg Oral Q6H PRN Óscar Cadet MD       • ipratropium (ATROVENT) nebulizer solution 0.5 mg  500 mcg Nebulization 4x Daily - RT Óscar Cadet MD   0.5 mg at 10/31/19 1918   • LORazepam (ATIVAN) tablet 0.5 mg  0.5 mg Oral TID PRN Óscar Cadet MD       • montelukast (SINGULAIR) tablet 10 mg  10 mg Oral Nightly Óscar Cadet MD   10 mg at 10/31/19 2156   • NIFEdipine XL (PROCARDIA XL) 24 hr tablet 60 mg  60 mg Oral BID Óscar Cadet MD   60 mg at 11/01/19 0911   • nitroglycerin (NITROSTAT) SL tablet 0.4 mg  0.4 mg Sublingual Q5 Min PRN Óscar Cadet MD       • ondansetron ODT (ZOFRAN-ODT) disintegrating tablet 8 mg  8 mg Oral Q6H PRN Óscar Cadet MD       • pantoprazole (PROTONIX) EC tablet 40 mg  40 mg Oral QAM Óscar Cadet MD   40 mg at  11/01/19 0629   • pentazocine-naloxone (TALWIN NX) 50-0.5 MG per tablet 1 tablet  1 tablet Oral Q4H PRN Óscar Cadet MD   1 tablet at 11/01/19 1017   • polyethylene glycol (MIRALAX) powder 17 g  17 g Oral Once per day on Sun Tue Thu Sat Óscar Cadet MD   17 g at 10/31/19 1122   • potassium chloride (MICRO-K) CR capsule 40 mEq  40 mEq Oral PRN Óscar Cadet MD   40 mEq at 10/30/19 1327    Or   • potassium chloride (KLOR-CON) packet 40 mEq  40 mEq Oral PRN Óscar Cadet MD        Or   • potassium chloride 10 mEq in 100 mL IVPB  10 mEq Intravenous Q1H PRN Óscar Cadet MD       • potassium chloride (MICRO-K) CR capsule 10 mEq  10 mEq Oral BID With Meals Wicho Hallman MD   10 mEq at 11/01/19 0911   • promethazine-codeine (PHENERGAN with CODEINE) 6.25-10 MG/5ML syrup 15 mL  15 mL Oral 4x Daily PRN Óscar Cadet MD   15 mL at 11/01/19 0222   • promethazine-codeine (PHENERGAN with CODEINE) 6.25-10 MG/5ML syrup 5 mL  5 mL Oral Q4H PRN Nicolas Ugarte MD       • sodium chloride 0.9 % flush 10 mL  10 mL Intravenous Q12H Óscar Cadet MD   10 mL at 11/01/19 0912   • sodium chloride 0.9 % flush 10 mL  10 mL Intravenous PRN Óscar Cadet MD       • vitamin B-12 (CYANOCOBALAMIN) tablet 1,000 mcg  1,000 mcg Oral Daily Óscra Cadet MD   1,000 mcg at 11/01/19 0910       Assessment/Plan    1.  Chronic kidney disease stage II associate with hypertensive nephrosclerosis, very stable, creatinine 0.88, electrolytes within normal range.  2.  Long-standing history of hypertension at present it is with acceptable control.  3.  DVT of right lower extremity and PE currently being anticoagulated  4.  Diastolic dysfunction, currently stable  5.  Hypokalemia, resolved  6.  Neurogenic bladder doing self-catheterization.    Plan  1.  Continue the same treatment  2.  2 g sodium diet  3.  There is nothing else to add at this time to the current treatment patient could be discharged from the renal  standpoint and I will see her in my office at her prescheduled appointment.           Acute deep vein thrombosis (DVT) of right lower extremity (CMS/HCC)    Joint pain    Urinary retention self-caths (Don)    Conjunctivitis    Arm pain    Cervical disc displacement    Cervical pain (Servando=PM)    DDD (degenerative disc disease), cervical    Hyperlipidemia LDL goal <70    Proctocele    Urge incontinence of urine    Chronic tension-type headache, intractable    Obesity (BMI 30.0-34.9)    H/o Lyme disease    Stage 3 chronic kidney disease (CMS/HCC)    Cerebral atherosclerosis    Allergic contact dermatitis    Malaise and fatigue progressive    Vertigo    Vitamin D deficiency    Moderate persistent asthmatic bronchitis without complication    Pleurisy    Knee pain, bilateral    Elevated PTH level    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

## 2019-11-01 NOTE — NURSING NOTE
@1356 Dr. Ugarte was called; AVS states Discharge order reconciliation is not complete for this encounter.   He states he cannot address it now but will look at it later.  Pharmacist Gama was called and he is unable to determine at this time what the issue is.  Dr. Ugarte was told that nursing can print out AVS after this is corrected/updated by the doctor.      @1350: Pt states her ride home cannot pick her up until around 1700 or 1800.      @1600 pt signed d/c paperwork and was given AVS       Problem: Falls - Risk of  Goal: *Absence of Falls  Document Karly Fall Risk and appropriate interventions in the flowsheet. Patient remained safe from harm and falls during this shift. Non skid socks in place, bed in low position, and bed alarm on. PT/OT following patient, daughter at bedside. Call bell and belongings within reach. Will continue to monitor.         Fall Risk Interventions:  Mobility Interventions: Assess mobility with egress test, Bed/chair exit alarm, OT consult for ADLs, PT Consult for mobility concerns, PT Consult for assist device competence, Strengthening exercises (ROM-active/passive)     Mentation Interventions: Adequate sleep, hydration, pain control, Evaluate medications/consider consulting pharmacy, Family/sitter at bedside, More frequent rounding     Medication Interventions: Evaluate medications/consider consulting pharmacy, Patient to call before getting OOB, Bed/chair exit alarm     Elimination Interventions: Call light in reach, Patient to call for help with toileting needs, Toileting schedule/hourly rounds     History of Falls Interventions: Bed/chair exit alarm, Consult care management for discharge planning, Evaluate medications/consider consulting pharmacy

## 2019-11-01 NOTE — THERAPY EVALUATION
Acute Care - Physical Therapy Initial Evaluation  [facility]     Patient Name: Sasha Barrett  : 1940  MRN: [id number]  Today's Date: 2019   Onset of Illness/Injury or Date of Surgery: 19            Admit Date: 10/29/2019    Visit Dx:     ICD-10-CM ICD-9-CM   1. Difficulty walking R26.2 719.7     Patient Active Problem List   Diagnosis   • Joint pain   • Urinary retention self-caths (Don)   • Conjunctivitis   • Arm pain   • Cervical disc displacement   • Cervical pain (Servando=PM)   • DDD (degenerative disc disease), cervical   • Hyperlipidemia LDL goal <70   • Preventative health care   • Medication management   • Proctocele   • Urge incontinence of urine   • Chronic tension-type headache, intractable   • Obesity (BMI 30.0-34.9)   • H/o Lyme disease   • Stage 3 chronic kidney disease (CMS/[facility])   • Cerebral atherosclerosis   • Allergic contact dermatitis   • Malaise and fatigue progressive   • Vertigo   • Vitamin D deficiency   • Moderate persistent asthmatic bronchitis without complication   • Anterior cervical adenopathy due to infection   • Pleurisy   • Knee pain, bilateral   • Elevated PTH level   • 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)   • Gastroesophageal reflux disease with esophagitis   • Psoriasis (Darryl Ochoa)   • Postmenopausal   • Recurrent UTI   • CVA (cerebral vascular accident) (CMS/[facility])   • Dyspnea on exertion   • Precordial pain   • Abnormal EKG   • Arthralgia   • Accelerated hypertension   • Acute joint pain   • Easy bruisability   • Cough productive of purulent sputum   • Influenza A Subtype H3   • 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[facility])   • Acute chest wall pain   • Decreased functional mobility and endurance since fall 15   • Uncontrolled hypertension   • 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   • Migraine without aura and without status migrainosus, not intractable   • Thyroid nodule   • Osteoarthritis of left hip   •  Spinal stenosis of lumbar region with radiculopathy   • Acute deep vein thrombosis (DVT) of right lower extremity (CMS/HCC)   • Acute deep vein thrombosis (CMS/HCC)   • Acute pulmonary embolism (CMS/HCC) Right Lower Lobe     Past Medical History:   Diagnosis Date   • Arthritis    • Asthma    • Breast cancer (CMS/HCC) 2003    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, grade 2   • COPD (chronic obstructive pulmonary disease) (CMS/HCC)    • 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 Yazmin Canseco   • CARPAL TUNNEL RELEASE Right 05/25/2011    Dr. Caleb Bueno   • CARPAL TUNNEL RELEASE Left 04/26/2011    Dr. Caleb Bueno   • CATARACT EXTRACTION Left 11/29/2010    Dr. Eusebio Downs   • CATARACT EXTRACTION Right 11/17/2010    Dr. Eusebio Downs   • COLONOSCOPY N/A 12/06/2002    Sigmoid diverticulosis-Dr. Brandon Batista   • COLONOSCOPY N/A 12/12/2013    Tortuous colon, diverticulosis in the sigmoid colon and descending colon, stool in the rectum, sigmoid colon, descending colon, splenic flexure, transverse colon and hepatic flexure-Dr. Irma Brambila   • ENDOSCOPY N/A 09/13/2007    Normal-Dr. Pavel Quarles   • ENDOSCOPY AND COLONOSCOPY N/A 09/19/2005    1 cm hiatal hernia, mild Schatzki's ring, sigmoid diverticulosis-Dr. Yoel Farley   • GANGLION CYST EXCISION Left 12/18/2012    Release of A1 pulley flexor sheath, left fourth finger, excision of ganglion cyst 0.5 cm diameter, A2 pulley flexor sheath, left fourth finger-Dr. Caleb Bueno   • HEEL SPUR EXCISION Left 1968   • INGUINAL HERNIA REPAIR Right 1940    at 5 months old   • MASTECTOMY Right 08/05/2008    Right breast  mastectomy and excision of left chest wall lesion-Dr. Yoel Farley   • MASTECTOMY RADICAL Left 04/29/2003    Left modified radical mastectomy-Dr. Yazmin Canseco   • PARATHYROIDECTOMY Right 05/04/2004    Excision of parathyroid adenoma (right superior)-Dr. Yoel Farley   • REPLACEMENT TOTAL KNEE Right 04/09/2012    Dr. Lamonte Childress   • SINUS SURGERY  1984   • THYROIDECTOMY, PARTIAL Left 05/04/2004    Dr. Yoel Farley   • TOTAL ABDOMINAL HYSTERECTOMY Bilateral 1973    Dr. Mahan   • UPPER GASTROINTESTINAL ENDOSCOPY N/A 02/18/2009    LA Grade A reflux esophagitis, normal stomach, normal 2nd part of the duodenum-Dr. Yoel Farley        PT ASSESSMENT (last 12 hours)      Physical Therapy Evaluation     Row Name 11/01/19 0857          PT Evaluation Time/Intention    Subjective Information  complains of;pain  -EF     Document Type  evaluation  -EF     Mode of Treatment  physical therapy  -EF     Patient Effort  good  -EF     Symptoms Noted During/After Treatment  none  -EF     Row Name 11/01/19 0857          General Information    Patient Profile Reviewed?  yes  -EF     Onset of Illness/Injury or Date of Surgery  11/29/19  -EF     Patient Observations  alert;cooperative;agree to therapy  -EF     General Observations of Patient  supine in bed  -EF     Prior Level of Function  independent:  -EF     Equipment Currently Used at Home  walker, rolling;cane, straight  -EF     Pertinent History of Current Functional Problem  Pt adm with R LE pain, swelling. Diag with extensive acute R LE DVT.  -EF     Existing Precautions/Restrictions  fall keep R LE elevated  -EF     Row Name 11/01/19 5157          Cognitive Assessment/Intervention- PT/OT    Orientation Status (Cognition)  oriented x 3  -EF     Follows Commands (Cognition)  follows one step commands needs redirecting at times  -EF     Safety Deficit (Cognitive)  mild deficit;insight into deficits/self awareness  -EF     Row Name 11/01/19 0872          Bed Mobility  Assessment/Treatment    Supine-Sit Anton (Bed Mobility)  conditional independence  -EF     Sit-Supine Anton (Bed Mobility)  conditional independence  -EF     Assistive Device (Bed Mobility)  bed rails  -EF     Row Name 11/01/19 0857          Transfer Assessment/Treatment    Sit-Stand Anton (Transfers)  supervision;contact guard  -EF     Stand-Sit Anton (Transfers)  supervision;contact guard  -EF     Row Name 11/01/19 0857          Sit-Stand Transfer    Assistive Device (Sit-Stand Transfers)  walker, front-wheeled  -EF     Row Name 11/01/19 0857          Stand-Sit Transfer    Assistive Device (Stand-Sit Transfers)  walker, front-wheeled  -EF     Row Name 11/01/19 0857          Gait/Stairs Assessment/Training    [place] Level (Gait)  contact guard  -EF     Assistive Device (Gait)  walker, front-wheeled  -EF     Distance in Feet (Gait)  80  -EF     Deviations/Abnormal Patterns (Gait)  tim decreased;stride length decreased  -EF     Bilateral Gait Deviations  forward flexed posture;weight shift ability decreased  -EF     Row Name 11/01/19 0857          General ROM    GENERAL ROM COMMENTS  grossly WFL for age  -EF     Row Name 11/01/19 0857          MMT (Manual Muscle Testing)    General MMT Comments  >3/5 B LEs  -EF     Row Name 11/01/19 0857          Motor Assessment/Intervention    Additional Documentation  Therapeutic Exercise (Group);Therapeutic Exercise Interventions (Group)  -EF     Row Name 11/01/19 0857          Therapeutic Exercise    Comment (Therapeutic Exercise)  sitting AP, LAQ x 5 reps each  -EF     Row Name 11/01/19 0857          Static Sitting Balance    Level of Anton (Unsupported Sitting, Static Balance)  independent  -EF     Sitting Position (Unsupported Sitting, Static Balance)  sitting on edge of bed  -EF     Row Name 11/01/19 0857          Static Standing Balance    Level of Anton (Supported Standing, Static Balance)  supervision  -EF     Assistive  Device Utilized (Supported Standing, Static Balance)  walker, rolling  -EF     Row Name 11/01/19 0857          Pain Assessment    Additional Documentation  Pain Scale: Numbers Pre/Post-Treatment (Group)  -EF     Row Name 11/01/19 0857          Pain Scale: Numbers Pre/Post-Treatment    Pain Scale: Numbers, Pretreatment  5/10  -EF     Pain Scale: Numbers, Post-Treatment  5/10  -EF     Pain Location - Side  Right  -EF     Pain Location - Orientation  lower  -EF     Pain Location  extremity  -EF     Pain Intervention(s)  Repositioned;Ambulation/increased activity  -EF     Row Name 11/01/19 0857          Physical Therapy Clinical Impression    Criteria for Skilled Interventions Met (PT Clinical Impression)  yes;treatment indicated  -EF     Rehab Potential (PT Clinical Summary)  good, to achieve stated therapy goals  -EF     Row Name 11/01/19 0857          Physical Therapy Goals    Transfer Goal Selection (PT)  transfer, PT goal 1  -EF     Gait Training Goal Selection (PT)  gait training, PT goal 1  -EF     Stairs Goal Selection (PT)  stairs, PT goal 1  -EF     Additional Documentation  Stairs Goal Selection (PT) (Row)  -EF     Row Name 11/01/19 0857          Transfer Goal 1 (PT)    Activity/Assistive Device (Transfer Goal 1, PT)  transfers, all;walker, rolling  -EF     [place] Level/Cues Needed (Transfer Goal 1, PT)  conditional independence  -EF     Time Frame (Transfer Goal 1, PT)  1 week  -EF     Row Name 11/01/19 0857          Gait Training Goal 1 (PT)    Activity/Assistive Device (Gait Training Goal 1, PT)  gait (walking locomotion);assistive device use;walker, rolling  -EF     [place] Level (Gait Training Goal 1, PT)  conditional independence  -EF     Distance (Gait Goal 1, PT)  160  -EF     Time Frame (Gait Training Goal 1, PT)  1 week  -EF     Row Name 11/01/19 0857          Stairs Goal 1 (PT)    Activity/Assistive Device (Stairs Goal 1, PT)  stairs, all skills  -EF     [place] Level/Cues Needed  (Stairs Goal 1, PT)  supervision required;contact guard assist  -EF     Number of Stairs (Stairs Goal 1, PT)  2  -EF     Time Frame (Stairs Goal 1, PT)  1 week  -EF     Row Name 11/01/19 0857          Positioning and Restraints    Pre-Treatment Position  in bed  -EF     Post Treatment Position  bed  -EF     In Bed  supine;call light within reach;encouraged to call for assist;exit alarm on;RLE elevated  -EF       User Key  (r) = Recorded By, (t) = Taken By, (c) = Cosigned By    Initials Name Provider Type    Zahra Javier, PT Physical Therapist        Physical Therapy Education     Title: PT OT SLP Therapies (Not Started)     Topic: Physical Therapy (Done)     Point: Mobility training (Done)     Learning Progress Summary           Patient Acceptance, E, VU,NR by EF at 11/1/2019  9:04 AM                   Point: Home exercise program (Done)     Learning Progress Summary           Patient Acceptance, E, VU,NR by EF at 11/1/2019  9:04 AM                   Point: Body mechanics (Done)     Learning Progress Summary           Patient Acceptance, E, VU,NR by EF at 11/1/2019  9:04 AM    Acceptance, E, VU by TS at 11/1/2019  3:33 AM                   Point: Precautions (Done)     Learning Progress Summary           Patient Acceptance, E, VU,NR by EF at 11/1/2019  9:04 AM                               User Key     Initials Effective Dates Name Provider Type Discipline    EF 06/08/18 -  Zahra Vargas PT Physical Therapist PT    TS 07/17/19 -  Vita Middleton RN Registered Nurse Nurse              PT Recommendation and Plan  Anticipated Discharge Disposition (PT): home  Therapy Frequency (PT Clinical Impression): daily  Outcome Summary/Treatment Plan (PT)  Anticipated Discharge Disposition (PT): home  Plan of Care Reviewed With: patient  Outcome Summary: 80 yo female adm with R LE pain, swelling. Diag with extensive R LE DVT. She presents with slightly decreased txfs, gait, balance, endurance. She will  benefit from skilled PT to promote improved function for return home.  Outcome Measures     Row Name 11/01/19 0900 10/31/19 1200          How much help from another person do you currently need...    Turning from your back to your side while in flat bed without using bedrails?  4  -EF  --     Moving from lying on back to sitting on the side of a flat bed without bedrails?  4  -EF  --     Moving to and from a bed to a chair (including a wheelchair)?  3  -EF  --     Standing up from a chair using your arms (e.g., wheelchair, bedside chair)?  3  -EF  --     Climbing 3-5 steps with a railing?  3  -EF  --     To walk in hospital room?  3  -EF  --     AM-PAC 6 Clicks Score (PT)  20  -EF  --        How much help from another is currently needed...    Putting on and taking off regular lower body clothing?  --  3  -RD     Bathing (including washing, rinsing, and drying)  --  3  -RD     Toileting (which includes using toilet bed pan or urinal)  --  3  -RD     Putting on and taking off regular upper body clothing  --  3  -RD     Taking care of personal grooming (such as brushing teeth)  --  3  -RD     Eating meals  --  4  -RD     AM-PAC 6 Clicks Score (OT)  --  19  -RD        Functional Assessment    Outcome Measure Options  AM-PAC 6 Clicks Basic Mobility (PT)  -EF  AM-PAC 6 Clicks Daily Activity (OT)  -RD       User Key  (r) = Recorded By, (t) = Taken By, (c) = Cosigned By    Initials Name Provider Type    Zahra Javier, PT Physical Therapist    Ana Bullock, OT Occupational Therapist         Time Calculation:   PT Charges     Row Name 11/01/19 0911             Time Calculation    Start Time  0839  -EF      Stop Time  0855  -EF      Time Calculation (min)  16 min  -EF      PT Received On  11/01/19  -EF      PT - Next Appointment  11/02/19  -EF      PT Goal Re-Cert Due Date  11/08/19  -EF        User Key  (r) = Recorded By, (t) = Taken By, (c) = Cosigned By    Initials Name Provider Type    MUNA Vargas  Zahra HUFFMAN, PT Physical Therapist        Therapy Charges for Today     Code Description Service Date Service Provider Modifiers Qty    81134399564 HC PT EVAL LOW COMPLEXITY 2 11/1/2019 Zahra Vargas, PT GP 1    23961882251 HC PT THER PROC EA 15 MIN 11/1/2019 Zahra Vargas, PT GP 1          PT G-Codes  Outcome Measure Options: AM-PAC 6 Clicks Basic Mobility (PT)  AM-PAC 6 Clicks Score (PT): 20  AM-PAC 6 Clicks Score (OT): 19      Zahra Vargas, PT  11/1/2019

## 2019-11-01 NOTE — PLAN OF CARE
Problem: Patient Care Overview  Goal: Plan of Care Review   11/01/19 0956   Coping/Psychosocial   Plan of Care Reviewed With patient   Plan of Care Review   Progress improving   OTHER   Outcome Summary Pt at SBA level with ADLs but she did have 1 LOB posteriorly standing at sink.

## 2019-11-01 NOTE — PLAN OF CARE
Problem: Patient Care Overview  Goal: Plan of Care Review   11/01/19 0908   Coping/Psychosocial   Plan of Care Reviewed With patient   OTHER   Outcome Summary 80 yo female adm with R LE pain, swelling. Diag with extensive R LE DVT. She presents with slightly decreased txfs, gait, balance, endurance. She will benefit from skilled PT to promote improved function for return home.

## 2019-11-01 NOTE — THERAPY TREATMENT NOTE
Acute Care - Occupational Therapy Treatment Note  [facility]     Patient Name: Sasha Barrett  : 1940  MRN: [id number]  Today's Date: 2019  Onset of Illness/Injury or Date of Surgery: 19          Admit Date: 10/29/2019       ICD-10-CM ICD-9-CM   1. Difficulty walking R26.2 719.7     Patient Active Problem List   Diagnosis   • Joint pain   • Urinary retention self-caths (Don)   • Conjunctivitis   • Arm pain   • Cervical disc displacement   • Cervical pain (Servando=PM)   • DDD (degenerative disc disease), cervical   • Hyperlipidemia LDL goal <70   • Preventative health care   • Medication management   • Proctocele   • Urge incontinence of urine   • Chronic tension-type headache, intractable   • Obesity (BMI 30.0-34.9)   • H/o Lyme disease   • Stage 3 chronic kidney disease (CMS/[facility])   • Cerebral atherosclerosis   • Allergic contact dermatitis   • Malaise and fatigue progressive   • Vertigo   • Vitamin D deficiency   • Moderate persistent asthmatic bronchitis without complication   • Anterior cervical adenopathy due to infection   • Pleurisy   • Knee pain, bilateral   • Elevated PTH level   • 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)   • Gastroesophageal reflux disease with esophagitis   • Psoriasis (Darryl Ochoa)   • Postmenopausal   • Recurrent UTI   • CVA (cerebral vascular accident) (CMS/[facility])   • Dyspnea on exertion   • Precordial pain   • Abnormal EKG   • Arthralgia   • Accelerated hypertension   • Acute joint pain   • Easy bruisability   • Cough productive of purulent sputum   • Influenza A Subtype H3   • 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[facility])   • Acute chest wall pain   • Decreased functional mobility and endurance since fall 15   • Uncontrolled hypertension   • 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   • Migraine without aura and without status migrainosus, not intractable   • Thyroid nodule   • Osteoarthritis of left hip   • Spinal stenosis of  lumbar region with radiculopathy   • Acute deep vein thrombosis (DVT) of right lower extremity (CMS/HCC)   • Acute deep vein thrombosis (CMS/HCC)   • Acute pulmonary embolism (CMS/HCC) Right Lower Lobe     Past Medical History:   Diagnosis Date   • Arthritis    • Asthma    • Breast cancer (CMS/HCC) 2003    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, grade 2   • COPD (chronic obstructive pulmonary disease) (CMS/HCC)    • 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 Yazmin Canseco   • CARPAL TUNNEL RELEASE Right 05/25/2011    Dr. Caleb Bueno   • CARPAL TUNNEL RELEASE Left 04/26/2011    Dr. Caleb Bueno   • CATARACT EXTRACTION Left 11/29/2010    Dr. Eusebio Downs   • CATARACT EXTRACTION Right 11/17/2010    Dr. Eusebio Downs   • COLONOSCOPY N/A 12/06/2002    Sigmoid diverticulosis-Dr. Brandon Batista   • COLONOSCOPY N/A 12/12/2013    Tortuous colon, diverticulosis in the sigmoid colon and descending colon, stool in the rectum, sigmoid colon, descending colon, splenic flexure, transverse colon and hepatic flexure-Dr. Irma Brambila   • ENDOSCOPY N/A 09/13/2007    Normal-Dr. Pavel Quarles   • ENDOSCOPY AND COLONOSCOPY N/A 09/19/2005    1 cm hiatal hernia, mild Schatzki's ring, sigmoid diverticulosis-Dr. Yoel Farley   • GANGLION CYST EXCISION Left 12/18/2012    Release of A1 pulley flexor sheath, left fourth finger, excision of ganglion cyst 0.5 cm diameter, A2 pulley flexor sheath, left fourth finger-Dr. Caleb Bueno   • HEEL SPUR EXCISION Left 1968   • INGUINAL HERNIA REPAIR Right 1940    at 5 months old   • MASTECTOMY Right 08/05/2008    Right breast mastectomy and excision of  left chest wall lesion-Dr. Yoel Farley   • MASTECTOMY RADICAL Left 04/29/2003    Left modified radical mastectomy-Dr. Yazmin Canseco   • PARATHYROIDECTOMY Right 05/04/2004    Excision of parathyroid adenoma (right superior)-Dr. Yoel Farley   • REPLACEMENT TOTAL KNEE Right 04/09/2012    Dr. Lamonte Childress   • SINUS SURGERY  1984   • THYROIDECTOMY, PARTIAL Left 05/04/2004    Dr. Yoel Farley   • TOTAL ABDOMINAL HYSTERECTOMY Bilateral 1973    Dr. Mahan   • UPPER GASTROINTESTINAL ENDOSCOPY N/A 02/18/2009    LA Grade A reflux esophagitis, normal stomach, normal 2nd part of the duodenum-Dr. Yoel Farley       Therapy Treatment    Rehabilitation Treatment Summary     Row Name 11/01/19 0954             Treatment Time/Intention    Discipline  occupational therapist  -SG      Document Type  therapy note (daily note)  -SG      Subjective Information  no complaints  -SG      Mode of Treatment  individual therapy;occupational therapy  -SG      Patient/Family Observations  Pt supine in bed  -SG      Patient Effort  good  -SG      Existing Precautions/Restrictions  fall  -SG      Recorded by [SG] Smitha Cantrell OTR 11/01/19 0956      Row Name 11/01/19 0954             Cognitive Assessment/Intervention- PT/OT    Orientation Status (Cognition)  oriented x 3  -SG      Follows Commands (Cognition)  follows one step commands  -SG      Safety Deficit (Cognitive)  mild deficit Distractable  -SG      Recorded by [SG] Smitha Cantrell OTR 11/01/19 0956      Row Name 11/01/19 0954             Bed Mobility Assessment/Treatment    Supine-Sit [place] (Bed Mobility)  conditional independence  -SG      Sit-Supine [place] (Bed Mobility)  conditional independence  -SG      Recorded by [SG] Smitha Cantrell OTR 11/01/19 0956      Row Name 11/01/19 0954             Functional Mobility    Functional Mobility- Ind. Level  contact guard assist  -SG      Functional Mobility- Device  rolling walker  -SG      Functional  Mobility- Comment  Has 1 LOB posteriorly  -SG      Recorded by [SG] Smitha Cantrell, OTR 11/01/19 0956      Row Name 11/01/19 0954             Transfer Assessment/Treatment    Transfer Assessment/Treatment  sit-stand transfer;stand-sit transfer  -SG      Recorded by [SG] Smitha Cantrell OTR 11/01/19 0956      Row Name 11/01/19 0954             Sit-Stand Transfer    Sit-Stand [place] (Transfers)  supervision;contact guard  -SG      Assistive Device (Sit-Stand Transfers)  walker, front-wheeled  -SG      Recorded by [SG] Smitha Cantrell OTR 11/01/19 0956      Row Name 11/01/19 0954             Stand-Sit Transfer    Stand-Sit [place] (Transfers)  supervision;contact guard  -SG      Assistive Device (Stand-Sit Transfers)  walker, front-wheeled  -SG      Recorded by [SG] Smitha Cantrell OTR 11/01/19 0956      Row Name 11/01/19 0954             ADL Assessment/Intervention    BADL Assessment/Intervention  bathing;lower body dressing;grooming  -SG      Recorded by [SG] Smitha Cantrell OTR 11/01/19 0956      Row Name 11/01/19 0954             Bathing Assessment/Intervention    Bathing [place] Level  upper body;set up;lower body;standby assist  -SG      Bathing Position  sink side;supported sitting;supported standing  -SG      Recorded by [SG] Smitha Cantrell, OTR 11/01/19 0956      Row Name 11/01/19 0954             Upper Body Dressing Assessment/Training    Upper Body Dressing [place] Level  doff;don;pajama/robe  -SG      Upper Body Dressing Position  supported sitting  -SG      Recorded by [SG] Smitha Cantrell OTR 11/01/19 0956      Row Name 11/01/19 0954             Lower Body Dressing Assessment/Training    Lower Body Dressing [place] Level  doff;don;undergarment;supervision  -SG      Lower Body Dressing Position  supported sitting;supported standing  -SG      Recorded by [SG] Smitha Cantrell OTR 11/01/19 0956      Row Name 11/01/19 0954             Grooming Assessment/Training     [place] Level (Grooming)  grooming skills;hair care, combing/brushing;oral care regimen;wash face, hands;set up;verbal cues  -SG      Grooming Position  sink side;supported sitting  -SG      Recorded by [SG] Smitha Cantrell OTR 11/01/19 0956      Row Name 11/01/19 0954             Positioning and Restraints    Pre-Treatment Position  in bed  -SG      Post Treatment Position  bed  -SG      In Bed  supine;call light within reach;encouraged to call for assist;exit alarm on  -SG      Recorded by [SG] Smitha Cantrell, OTR 11/01/19 0956        User Key  (r) = Recorded By, (t) = Taken By, (c) = Cosigned By    Initials Name Effective Dates Discipline    SG Smitha Cantrell OTR 12/26/18 -  OT           Rehab Goal Summary     Row Name 11/01/19 0857             Physical Therapy Goals    Transfer Goal Selection (PT)  transfer, PT goal 1  -EF      Gait Training Goal Selection (PT)  gait training, PT goal 1  -EF      Stairs Goal Selection (PT)  stairs, PT goal 1  -EF         Transfer Goal 1 (PT)    Activity/Assistive Device (Transfer Goal 1, PT)  transfers, all;walker, rolling  -EF      [place] Level/Cues Needed (Transfer Goal 1, PT)  conditional independence  -EF      Time Frame (Transfer Goal 1, PT)  1 week  -EF         Gait Training Goal 1 (PT)    Activity/Assistive Device (Gait Training Goal 1, PT)  gait (walking locomotion);assistive device use;walker, rolling  -EF      [place] Level (Gait Training Goal 1, PT)  conditional independence  -EF      Distance (Gait Goal 1, PT)  160  -EF      Time Frame (Gait Training Goal 1, PT)  1 week  -EF         Stairs Goal 1 (PT)    Activity/Assistive Device (Stairs Goal 1, PT)  stairs, all skills  -EF      [place] Level/Cues Needed (Stairs Goal 1, PT)  supervision required;contact guard assist  -EF      Number of Stairs (Stairs Goal 1, PT)  2  -EF      Time Frame (Stairs Goal 1, PT)  1 week  -EF        User Key  (r) = Recorded By, (t) = Taken By, (c) = Cosigned By     Initials Name Provider Type Discipline    Zahra Javier PT Physical Therapist PT        Occupational Therapy Education     Title: PT OT SLP Therapies (Not Started)     Topic: Occupational Therapy (In Progress)     Point: ADL training (Done)     Description: Instruct learner(s) on proper safety adaptation and remediation techniques during self care or transfers.   Instruct in proper use of assistive devices.    Learning Progress Summary           Patient Acceptance, E, VU by RD at 10/31/2019 12:10 PM    Comment:  OT educ on OT role in therapeutic process and pt's POC.                   Point: Body mechanics (Done)     Description: Instruct learner(s) on proper positioning and spine alignment during self-care, functional mobility activities and/or exercises.    Learning Progress Summary           Patient Acceptance, E, VU by TS at 11/1/2019  3:33 AM                               User Key     Initials Effective Dates Name Provider Type Discipline    LARS 10/14/19 -  Ana Inman OT Occupational Therapist OT    TS 07/17/19 -  Vita Middleton RN Registered Nurse Nurse                OT Recommendation and Plan     Plan of Care Review  Plan of Care Reviewed With: patient  Plan of Care Reviewed With: patient  Outcome Summary: Pt at SBA level with ADLs but she did have 1 LOB posteriorly standing at sink.  Outcome Measures     Row Name 11/01/19 0957 11/01/19 0900 10/31/19 1200       How much help from another person do you currently need...    Turning from your back to your side while in flat bed without using bedrails?  --  4  -EF  --    Moving from lying on back to sitting on the side of a flat bed without bedrails?  --  4  -EF  --    Moving to and from a bed to a chair (including a wheelchair)?  --  3  -EF  --    Standing up from a chair using your arms (e.g., wheelchair, bedside chair)?  --  3  -EF  --    Climbing 3-5 steps with a railing?  --  3  -EF  --    To walk in hospital room?  --  3  -EF  --     AM-PAC 6 Clicks Score (PT)  --  20  -EF  --       How much help from another is currently needed...    Putting on and taking off regular lower body clothing?  3  -SG  --  3  -RD    Bathing (including washing, rinsing, and drying)  3  -SG  --  3  -RD    Toileting (which includes using toilet bed pan or urinal)  3  -SG  --  3  -RD    Putting on and taking off regular upper body clothing  3  -SG  --  3  -RD    Taking care of personal grooming (such as brushing teeth)  3  -SG  --  3  -RD    Eating meals  4  -SG  --  4  -RD    AM-PAC 6 Clicks Score (OT)  19  -SG  --  19  -RD       Functional Assessment    Outcome Measure Options  AM-PAC 6 Clicks Daily Activity (OT)  -SG  AM-PAC 6 Clicks Basic Mobility (PT)  -EF  AM-PAC 6 Clicks Daily Activity (OT)  -RD      User Key  (r) = Recorded By, (t) = Taken By, (c) = Cosigned By    Initials Name Provider Type    EF Zahra Vargas, PT Physical Therapist    Smitha Brush OTR Occupational Therapist    RD Ana Inman, OT Occupational Therapist           Time Calculation:   Time Calculation- OT     Row Name 11/01/19 0957             Time Calculation- OT    OT Start Time  0925  -      OT Stop Time  0948  -      OT Time Calculation (min)  23 min  -      Total Timed Code Minutes- OT  23 minute(s)  -      OT Received On  11/01/19  -        User Key  (r) = Recorded By, (t) = Taken By, (c) = Cosigned By    Initials Name Provider Type     Smitha Cantrell OTR Occupational Therapist        Therapy Charges for Today     Code Description Service Date Service Provider Modifiers Qty    [id number] HC OT SELF CARE/MGMT/TRAIN EA 15 MIN 11/1/2019 Smitha Cantrell OTR GO 2               GUSTAVO Deleon  11/1/2019

## 2019-11-01 NOTE — PROGRESS NOTES
Reviewed the chart.  Patient is doing well.  She has refused doing the overnight oximetry.  Recommend Eliquis for outpatient treatment of DVT/PE.    Okay to discharge home.  Will sign off for now.  Please call back if needed

## 2019-11-02 ENCOUNTER — READMISSION MANAGEMENT (OUTPATIENT)
Dept: CALL CENTER | Facility: HOSPITAL | Age: 79
End: 2019-11-02

## 2019-11-04 NOTE — PROGRESS NOTES
Case Management Discharge Note    Final Note: Patient DC'd home with .            Transportation Services  Private: Car    Final Discharge Disposition Code: 01 - home or self-care

## 2019-11-05 ENCOUNTER — READMISSION MANAGEMENT (OUTPATIENT)
Dept: CALL CENTER | Facility: HOSPITAL | Age: 79
End: 2019-11-05

## 2019-11-05 NOTE — OUTREACH NOTE
Medical Week 1 Survey      Responses   Facility patient discharged from?  [place]   Does the patient have one of the following disease processes/diagnoses(primary or secondary)?  Other   Is there a successful TCM telephone encounter documented?  No   Week 1 attempt successful?  Yes   Call start time  0929   Call end time  0935   Discharge diagnosis  Acute Right Lower Extremity DVT,  Pulmonary embolus   Is patient permission given to speak with other caregiver?  No   Meds reviewed with patient/caregiver?  Yes   Is the patient having any side effects they believe may be caused by any medication additions or changes?  No   Does the patient have all medications ordered at discharge?  Yes   Is the patient taking all medications as directed (includes completed medication regime)?  Yes   Medication comments  She is not happy that she cannot take the clonidine. She took it any way when her BP went up. She was not happy to see her regular Md.    Does the patient have a primary care provider?   Yes   Does the patient have an appointment with their PCP within 7 days of discharge?  Yes   Comments regarding PCP  She sees them this weeK.   Has the patient kept scheduled appointments due by today?  Yes   Has home health visited the patient within 72 hours of discharge?  N/A   Psychosocial issues?  No   Did the patient receive a copy of their discharge instructions?  Yes   Nursing interventions  Reviewed instructions with patient   What is the patient's perception of their health status since discharge?  Improving   Is the patient/caregiver able to teach back signs and symptoms related to disease process for when to call PCP?  Yes   Is the patient/caregiver able to teach back signs and symptoms related to disease process for when to call 911?  Yes   Is the patient/caregiver able to teach back the hierarchy of who to call/visit for symptoms/problems? PCP, Specialist, Home health nurse, Urgent Care, ED, 911  Yes   Week 1 call  completed?  Yes   Revoked  No further contact(revokes)-requires comment [She would rather we did not call, states she knows how to care for herself. ]          Jennifer Garcia RN

## 2020-01-22 ENCOUNTER — RESULTS ENCOUNTER (OUTPATIENT)
Dept: PAIN MEDICINE | Facility: CLINIC | Age: 80
End: 2020-01-22

## 2020-01-22 ENCOUNTER — OFFICE VISIT (OUTPATIENT)
Dept: PAIN MEDICINE | Facility: CLINIC | Age: 80
End: 2020-01-22

## 2020-01-22 VITALS
BODY MASS INDEX: 35.26 KG/M2 | RESPIRATION RATE: 16 BRPM | DIASTOLIC BLOOD PRESSURE: 72 MMHG | TEMPERATURE: 98.2 F | OXYGEN SATURATION: 98 % | WEIGHT: 199 LBS | HEIGHT: 63 IN | HEART RATE: 92 BPM | SYSTOLIC BLOOD PRESSURE: 174 MMHG

## 2020-01-22 DIAGNOSIS — M16.12 OSTEOARTHRITIS OF LEFT HIP, UNSPECIFIED OSTEOARTHRITIS TYPE: ICD-10-CM

## 2020-01-22 DIAGNOSIS — M53.3 SACROILIAC JOINT DYSFUNCTION OF LEFT SIDE: ICD-10-CM

## 2020-01-22 DIAGNOSIS — M48.061 SPINAL STENOSIS OF LUMBAR REGION WITH RADICULOPATHY: Primary | ICD-10-CM

## 2020-01-22 DIAGNOSIS — M54.16 SPINAL STENOSIS OF LUMBAR REGION WITH RADICULOPATHY: ICD-10-CM

## 2020-01-22 DIAGNOSIS — M48.061 SPINAL STENOSIS OF LUMBAR REGION WITH RADICULOPATHY: ICD-10-CM

## 2020-01-22 DIAGNOSIS — G89.29 OTHER CHRONIC PAIN: ICD-10-CM

## 2020-01-22 DIAGNOSIS — M54.16 SPINAL STENOSIS OF LUMBAR REGION WITH RADICULOPATHY: Primary | ICD-10-CM

## 2020-01-22 PROCEDURE — 80305 DRUG TEST PRSMV DIR OPT OBS: CPT | Performed by: NURSE PRACTITIONER

## 2020-01-22 PROCEDURE — 99214 OFFICE O/P EST MOD 30 MIN: CPT | Performed by: NURSE PRACTITIONER

## 2020-01-22 RX ORDER — CLONIDINE HYDROCHLORIDE 0.3 MG/1
0.3 TABLET ORAL 2 TIMES DAILY
COMMUNITY
Start: 2019-12-31 | End: 2022-05-29 | Stop reason: HOSPADM

## 2020-01-22 RX ORDER — HYDROCODONE BITARTRATE AND ACETAMINOPHEN 7.5; 325 MG/1; MG/1
TABLET ORAL
COMMUNITY
Start: 2020-01-07 | End: 2020-09-16

## 2020-01-22 RX ORDER — GUAIFENESIN 600 MG/1
TABLET, EXTENDED RELEASE ORAL
COMMUNITY
Start: 2020-01-02 | End: 2020-09-16

## 2020-01-22 RX ORDER — HYDROXYZINE 50 MG/1
50 TABLET, FILM COATED ORAL EVERY 6 HOURS PRN
COMMUNITY
Start: 2019-12-12

## 2020-01-22 NOTE — PROGRESS NOTES
CHIEF COMPLAINT  F/u back, groin, and shoulder pain- patient states htat her pain has worsened since her last visit. She states that the pain in her groin is now radiating down her left leg.     Initial evaluation by AC Morel   Sasha Barrett is a 80 y.o. female  who presents to the office for follow-up.She has a history of back, groin, and shoulder pain.  The patient was recently discharged from [facility] on 11/1/2019 where she was treated for an extensive right lower extremity DVT and pulmonary embolism.  While in the hospital she did not have any chest pain, bloody sputum, or hypoxia.  She was discharged on Eliquis 10 mg twice daily for 7 days and then 5 mg twice daily as maintenance.    Today her pain is an 6/10VAS in severity. She describes her low back pain as intermittent aching pain.  Her pain is worsened with prolonged walking, prolonged standing, lying down; it is improved with sitting, medication (Prescribed by PCP).     Patient complains of right shoulder pain today--Scheduled to see Ortho on Friday of this week.    Procedure list:  8/27/2019-L4/L5 LESI-significant relief  8/27/2019-left intra-articular hip injection-significant relief  8/1/2019-L4/L5 LESI-significant relief    Back Pain   This is a chronic problem. The current episode started more than 1 year ago. The problem occurs constantly. The problem has been waxing and waning since onset. The pain is present in the lumbar spine. The quality of the pain is described as aching, burning and shooting. The pain radiates to the right thigh and left thigh. The pain is at a severity of 6/10. The pain is severe. The symptoms are aggravated by bending, standing and twisting. Associated symptoms include headaches (chronic), numbness (bilat legs) and weakness (bilat legs). Pertinent negatives include no abdominal pain, chest pain or fever. Risk factors include obesity and menopause. She has tried analgesics, bed rest,  heat, home exercises and ice (She is a less than ideal candidate for muscle relaxants and NSAIDs) for the symptoms. The treatment provided mild relief.   Hip Pain    There was no injury mechanism. The pain is present in the left hip. The quality of the pain is described as burning. The pain is at a severity of 6/10. Associated symptoms include numbness (bilat legs). She has tried heat and ice for the symptoms. The treatment provided mild relief.      PEG Assessment   What number best describes your pain on average in the past week?8  What number best describes how, during the past week, pain has interfered with your enjoyment of life?8  What number best describes how, during the past week, pain has interfered with your general activity?  9    The following portions of the patient's history were reviewed and updated as appropriate: allergies, current medications, past family history, past medical history, past social history, past surgical history and problem list.    Review of Systems   Constitutional: Positive for activity change (decreased), chills and fatigue (freq). Negative for fever.   HENT: Negative for congestion.    Eyes: Negative for visual disturbance.   Respiratory: Positive for shortness of breath (hx asthma). Negative for cough, chest tightness and wheezing.    Cardiovascular: Positive for leg swelling (bilat). Negative for chest pain and palpitations.   Gastrointestinal: Positive for constipation (chronic). Negative for abdominal pain and diarrhea.   Genitourinary: Positive for difficulty urinating. Negative for dyspareunia.        Groin pain left side   Musculoskeletal: Positive for arthralgias (left groin), back pain, gait problem (cane) and myalgias. Negative for joint swelling.   Neurological: Positive for weakness (bilat legs), numbness (bilat legs) and headaches (chronic). Negative for dizziness, seizures and light-headedness.   Psychiatric/Behavioral: Positive for agitation (occ) and sleep  "disturbance. Negative for self-injury and suicidal ideas. The patient is nervous/anxious.      Vitals:    01/22/20 1256   BP: 174/72   Pulse: 92   Resp: 16   Temp: 98.2 °F (36.8 °C)   SpO2: 98%   Weight: 90.3 kg (199 lb)   Height: 160 cm (63\")   PainSc:   8   PainLoc: Back     Objective   Physical Exam   Constitutional: She is oriented to person, place, and time. She appears well-developed and well-nourished.   HENT:   Head: Normocephalic and atraumatic.   Eyes: Conjunctivae and EOM are normal.   Neck: Normal range of motion.   Cardiovascular: Normal rate.   Pulmonary/Chest: Effort normal.   Musculoskeletal:        Left hip: She exhibits decreased range of motion and tenderness.        Lumbar back: She exhibits decreased range of motion, tenderness and pain.   POS Left SI Compression  POS Left Thigh Thrust  POS Left Ramandeep   Neurological: She is alert and oriented to person, place, and time. Gait abnormal.   Reflex Scores:       Patellar reflexes are 2+ on the right side and 2+ on the left side.       Achilles reflexes are 2+ on the right side and 2+ on the left side.  Skin: Skin is warm, dry and intact.   Psychiatric: She has a normal mood and affect. Her behavior is normal. Judgment and thought content normal.   Nursing note and vitals reviewed.    Assessment/Plan   Sasha was seen today for back pain.    Diagnoses and all orders for this visit:    Spinal stenosis of lumbar region with radiculopathy  -     Urine Drug Screen Confirmation - Urine, Clean Catch; Future  -     POC Urine Drug Screen, Triage    Osteoarthritis of left hip, unspecified osteoarthritis type  -     Urine Drug Screen Confirmation - Urine, Clean Catch; Future  -     POC Urine Drug Screen, Triage    Sacroiliac joint dysfunction of left side  -     Case Request  -     Urine Drug Screen Confirmation - Urine, Clean Catch; Future  -     POC Urine Drug Screen, Triage    Other chronic pain    --- Left SI joint injection Reviewed the procedure at length " with the patient.  Included in the review was expectations, complications, risk and benefits.The procedure was described in detail and the risks, benefits and alternatives were discussed with the patient (including but not limited to: bleeding, infection, nerve damage, worsening of pain, inability to perform injection, paralysis, seizures, and death) who agreed to proceed.  Discussed the potential for sedation if warranted/wanted.  The procedure will plan to be performed at [facility] with fluoroscopic guidance(unless ultrasound is indicated). Questions were answered and in a way the patient could understand.  Patient verbalized understanding and wishes to proceed.  This intervention will be ordered.  Discussed with patient that all procedures are part of a multimodal plan of care and include either formal PT or a home exercise program.  Patient has no evidence of coagulopathy or current infection.  --- Follow-up after procedure     MARY LOU REPORT  MARY LOU report has been reviewed and scanned into the patient's chart.    As the clinician, I personally reviewed the MARY LOU from 1/22/2020 while the patient was in the office today.    EMR Dragon/Transcription disclaimer:   Much of this encounter note is an electronic transcription/translation of spoken language to printed text. The electronic translation of spoken language may permit erroneous, or at times, nonsensical words or phrases to be inadvertently transcribed; Although I have reviewed the note for such errors, some may still exist.

## 2020-01-24 ENCOUNTER — CLINICAL SUPPORT (OUTPATIENT)
Dept: ORTHOPEDIC SURGERY | Facility: CLINIC | Age: 80
End: 2020-01-24

## 2020-01-24 VITALS — HEIGHT: 63 IN | WEIGHT: 199 LBS | TEMPERATURE: 97.3 F | BODY MASS INDEX: 35.26 KG/M2

## 2020-01-24 DIAGNOSIS — M19.011 PRIMARY OSTEOARTHRITIS OF RIGHT SHOULDER: ICD-10-CM

## 2020-01-24 DIAGNOSIS — M19.012 PRIMARY OSTEOARTHRITIS OF LEFT SHOULDER: Primary | ICD-10-CM

## 2020-01-24 PROCEDURE — 99214 OFFICE O/P EST MOD 30 MIN: CPT | Performed by: NURSE PRACTITIONER

## 2020-01-24 PROCEDURE — 73030 X-RAY EXAM OF SHOULDER: CPT | Performed by: NURSE PRACTITIONER

## 2020-01-24 PROCEDURE — 20610 DRAIN/INJ JOINT/BURSA W/O US: CPT | Performed by: NURSE PRACTITIONER

## 2020-01-24 RX ADMIN — METHYLPREDNISOLONE ACETATE 80 MG: 80 INJECTION, SUSPENSION INTRA-ARTICULAR; INTRALESIONAL; INTRAMUSCULAR; SOFT TISSUE at 16:00

## 2020-01-24 RX ADMIN — METHYLPREDNISOLONE ACETATE 80 MG: 80 INJECTION, SUSPENSION INTRA-ARTICULAR; INTRALESIONAL; INTRAMUSCULAR; SOFT TISSUE at 16:01

## 2020-01-24 NOTE — PROGRESS NOTES
Patient: Sasha Barrett    YOB: 1940    Medical Record Number: [id number]    Chief Complaints:  Left shoulder pain, Follow up right shoulder injection    History of Present Illness:     80 y.o. female patient who presents with a long history of progressively worsening pain and dysfunction affecting the left shoulder.  Reports her pain has been more intense over the last 6 to 8 weeks.  Describes her pain as moderate to severe, constant, and aching.  She has tried Tylenol, ice, heat and pain medication with mild relief.  Denies associated symptoms.  Patient is right-hand dominant.    Follow up right shoulder.   Injections have worked well in the past.  The patient would like to get a repeat injection today.      Allergies:   Allergies   Allergen Reactions   • Bee Venom Shortness Of Breath and Rash   • Morphine Anaphylaxis and Nausea And Vomiting   • Sulfa Antibiotics Anaphylaxis and Hives     Or sulfa fillers in meds  Broke out in internal and external hives     • Tree Extract Shortness Of Breath and Rash   • Hydralazine Myalgia     Patient reports leg cramps, joint pain and increased fatigue   • Ambien [Zolpidem] Hallucinations   • Anastrozole    • Norvasc [Amlodipine] Other (See Comments)     KIDNEYS SHUT DOWN   • Nsaids      KIDNEY FAILURE   • Penicillins Unknown (See Comments)     Severe reaction as a child  Tolerated ceftriaxone during 04/2017 admission   • Grass Rash     Home Medications:    Current Outpatient Medications:   •  acetaminophen (TYLENOL) 650 MG 8 hr tablet, Take 650 mg by mouth Every 6 (Six) Hours As Needed for Mild Pain ., Disp: , Rfl:   •  albuterol (PROAIR HFA) 108 (90 Base) MCG/ACT inhaler, ProAir  (90 Base) MCG/ACT Inhalation Aerosol Solution; Patient Sig: ProAir  (90 Base) MCG/ACT Inhalation Aerosol Solution ; 8; 0; 18-Jun-2014; Active, Disp: , Rfl:   •  budesonide (PULMICORT) 180 MCG/ACT inhaler, Inhale 1 puff 2 (Two) Times a Day., Disp: , Rfl:   •   butalbital-acetaminophen-caffeine (FIORICET, ESGIC) -40 MG per tablet, Take 2 tablets by mouth Every 4 (Four) Hours As Needed for Headache., Disp: 240 tablet, Rfl: 0  •  cetirizine (zyrTEC) 10 MG tablet, Take 10 mg by mouth Daily., Disp: , Rfl:   •  Cholecalciferol (VITAMIN D) 1000 UNITS tablet, Take 1,000 Units by mouth Daily., Disp: , Rfl:   •  ciprofloxacin-dexamethasone (CIPRODEX) 0.3-0.1 % otic suspension, Administer 4 drops into both ears 2 (Two) Times a Day., Disp: , Rfl:   •  cloNIDine (CATAPRES) 0.3 MG tablet, Take 0.3 mg by mouth 3 (Three) Times a Day., Disp: , Rfl:   •  diphenhydrAMINE (BENADRYL) 50 MG tablet, Take 1 tablet by mouth every 4 (four) hours as needed for itching or allergies., Disp: 180 tablet, Rfl: 0  •  eplerenone (INSPRA) 50 MG tablet, Take 1 tablet by mouth Every 12 (Twelve) Hours., Disp: 180 tablet, Rfl: 3  •  ethacrynic acid (EDECRIN) 25 MG tablet, Take 25 mg by mouth 3 (Three) Times a Day., Disp: , Rfl:   •  fluticasone (FLONASE) 50 MCG/ACT nasal spray, 1 spray by Each Nare route 2 (Two) Times a Day., Disp: , Rfl:   •  HYDROcodone-acetaminophen (NORCO) 7.5-325 MG per tablet, TAKE 1 TO 2 TABLETS BY MOUTH EVERY 4 HOURS AS NEEDED for breakthrough pain- use in place of talwin, Disp: , Rfl:   •  hydrOXYzine (ATARAX) 50 MG tablet, TAKE ONE TABLET BY MOUTH EVERY 6 HOURS AS NEEDED FOR SEVERE ITCHING AND ALLERGIES, Disp: , Rfl:   •  ipratropium (ATROVENT) 0.02 % nebulizer solution, Take 500 mcg by nebulization 4 (Four) Times a Day., Disp: , Rfl:   •  montelukast (SINGULAIR) 10 MG tablet, Take 1 tablet by mouth every night., Disp: 90 tablet, Rfl: 1  •  MUCUS RELIEF 600 MG 12 hr tablet, TAKE ONE TABLET BY MOUTH EVERY TWELVE HOURS AS NEEDED, Disp: , Rfl:   •  NIFEdipine XL (PROCARDIA XL) 60 MG 24 hr tablet, TAKE ONE TABLET TWICE DAILY, Disp: 180 tablet, Rfl: 0  •  NON FORMULARY, Inhale 2 puffs 2 (Two) Times a Day. Proptline Inhaler, Disp: , Rfl:   •  omeprazole (priLOSEC) 40 MG capsule, TAKE  ONE CAPSULE BY MOUTH EVERY DAY (Patient taking differently: TAKE ONE CAPSULE BY MOUTH DAILY PRN), Disp: 90 capsule, Rfl: 0  •  ondansetron ODT (ZOFRAN-ODT) 8 MG disintegrating tablet, Take 1 tablet by mouth every 6 (six) hours as needed for nausea or vomiting., Disp: 360 tablet, Rfl: 1  •  polyethylene glycol (MIRALAX) powder, Take one capful w/liquid daily (Patient taking differently: Take 17 g by mouth 4 (Four) Times a Week. Take one capful w/liquid daily ), Disp: 765 g, Rfl: 1  •  potassium chloride (K-DUR) 10 MEQ CR tablet, Take 10 mEq by mouth 2 (Two) Times a Day., Disp: , Rfl:   •  promethazine-codeine (PHENERGAN with CODEINE) 6.25-10 MG/5ML syrup, Take 5 mL by mouth 4 (Four) Times a Day As Needed for cough., Disp: 360 mL, Rfl: 0  •  rivaroxaban (XARELTO) 10 MG tablet, Take 10 mg by mouth Daily., Disp: , Rfl:   •  vitamin B-12 (VITAMIN B-12) 1000 MCG tablet, Take 1 tablet by mouth Daily., Disp: 90 tablet, Rfl: 3  •  pentazocine-naloxone (TALWIN NX) 50-0.5 MG per tablet, Take 1 tablet by mouth Every 4 (Four) Hours As Needed for Mild Pain  (for up to 90 days)., Disp: , Rfl:   •  predniSONE (DELTASONE) 10 MG tablet, Take 1 tablet by mouth Every Other Day., Disp: 15 tablet, Rfl:     Past Medical History:   Diagnosis Date   • Arthritis    • Asthma    • Breast cancer (CMS/HCC) 2003    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 Yazmin Canseco   • CARPAL TUNNEL RELEASE Right 05/25/2011    Dr. Caleb Bueno   • CARPAL TUNNEL RELEASE Left 04/26/2011    Dr. Caleb Bueno   • CATARACT  EXTRACTION Left 11/29/2010    Dr. Eusebio Downs   • CATARACT EXTRACTION Right 11/17/2010    Dr. Eusebio Downs   • COLONOSCOPY N/A 12/06/2002    Sigmoid diverticulosis-Dr. Brandon Batista   • COLONOSCOPY N/A 12/12/2013    Tortuous colon, diverticulosis in the sigmoid colon and descending colon, stool in the rectum, sigmoid colon, descending colon, splenic flexure, transverse colon and hepatic flexure-Dr. Irma Brambila   • ENDOSCOPY N/A 09/13/2007    Normal-Dr. Pavel Quarles   • ENDOSCOPY AND COLONOSCOPY N/A 09/19/2005    1 cm hiatal hernia, mild Schatzki's ring, sigmoid diverticulosis-Dr. Yoel Farley   • GANGLION CYST EXCISION Left 12/18/2012    Release of A1 pulley flexor sheath, left fourth finger, excision of ganglion cyst 0.5 cm diameter, A2 pulley flexor sheath, left fourth finger-Dr. Caleb Bueno   • HEEL SPUR EXCISION Left 1968   • INGUINAL HERNIA REPAIR Right 1940    at 5 months old   • MASTECTOMY Right 08/05/2008    Right breast mastectomy and excision of left chest wall lesion-Dr. Yoel Farley   • MASTECTOMY RADICAL Left 04/29/2003    Left modified radical mastectomy-Dr. Yazmin Canseco   • PARATHYROIDECTOMY Right 05/04/2004    Excision of parathyroid adenoma (right superior)-Dr. Yoel Farley   • REPLACEMENT TOTAL KNEE Right 04/09/2012    Dr. Lamonte Childress   • SINUS SURGERY  1984   • THYROIDECTOMY, PARTIAL Left 05/04/2004    Dr. Yoel Farley   • TOTAL ABDOMINAL HYSTERECTOMY Bilateral 1973    Dr. Mahan   • UPPER GASTROINTESTINAL ENDOSCOPY N/A 02/18/2009    LA Grade A reflux esophagitis, normal stomach, normal 2nd part of the duodenum-Dr. Yoel Farley       Social History     Occupational History   • Occupation: RETIRED   Tobacco Use   • Smoking status: Never Smoker   • Smokeless tobacco: Never Used   Substance and Sexual Activity   • Alcohol use: No     Frequency: Never   • Drug use: No   • Sexual activity: Yes     Partners: Male      Social History     Social History  "Narrative    LIVES WITH        Family History   Problem Relation Age of Onset   • Brain cancer Brother    • Alcohol abuse Mother    • No Known Problems Father    • No Known Problems Sister      Review of Systems:      Constitutional: Denies fever, shaking or chills   Eyes: Denies change in visual acuity   HEENT: Denies nasal congestion or sore throat   Respiratory: Denies cough or shortness of breath   Cardiovascular: Denies chest pain or edema  Endocrine: Denies tremors, palpitations, intolerance of heat or cold, polyuria, polydipsia.  GI: Denies abdominal pain, nausea, vomiting, bloody stools or diarrhea  : Denies frequency, urgency, incontinence, retention, or nocturia.  Musculoskeletal: Denies numbness, tingling or loss of motor function   Integument: Denies rash, lesion or ulceration   Neurologic: Denies headache or focal weakness, deficits  Heme: Denies spontaneous or excessive bleeding, epistaxis, hematuria, melena, fatigue, enlarged or tender lymph nodes.      All other pertinent positives and negatives as noted above in HPI.    Physical Exam: 80 y.o. female  Vitals:    01/24/20 1553   Temp: 97.3 °F (36.3 °C)   TempSrc: Temporal   Weight: 90.3 kg (199 lb)   Height: 160 cm (63\")       General:  Patient is awake and alert.  Appears in no acute distress or discomfort.    Psych:  Affect and demeanor are appropriate.    Eyes:  Conjunctiva and sclera appear grossly normal.  Eyes track well and EOM seem to be intact.    Ears:  No gross abnormalities.  Hearing adequate for the exam.    Cardiovascular:  Regular rate and rhythm.    Lungs:  Good chest expansion.  Breathing unlabored.    Spine:  Neck appears grossly normal.  No palpable masses or adenopathy.  Good motion.  Spurling's maneuver is negative for any shoulder or arm symptoms.    Extremities:  Skin is benign.  No obvious gross abnormalities about left shoulder.  No palpable masses or adenopathy.  Moderate tenderness noted over anterior glenohumeral " joint and rotator interval.  Motion is limited and uncomfortable: 90° forward elevation, 60° external rotation, internal rotation to T10, 95° horizontal abduction no instability.  Rotator cuff strength seems to be well preserved but the exam is limited due to patient discomfort with resisted active motion.  Good motor and sensory function in lower arm and hand.  Palpable pulses.         Radiology:  Dr. Pichardo and I reviewed the x-rays together. AP, scapular Y, and axillary views of the left shoulder are ordered by myself and reviewed to evaluate the patient's complaint.  No comparison films are immediately available.  The x-rays show moderate glenohumeral and acromioclavicular joint osteoarthritis with joint space narrowing, osteophyte formation, and subchondral sclerosis.  There is superior migration of the humeral head with a slightly diminished acromiohumeral interval.  Retained vascular clips noted.    Assessment/Plan:  Bilateral shoulder osteoarthritis    We discussed treatment options in detail including conservative treatment versus surgical options.  Regarding conservative treatment, we discussed appropriate activity modifications, anti-inflammatories, injections, and physical therapy.  We also discussed the option of an arthroplasty and all that would entail.  I have recommended that we start with a conservative approach and the patient agrees.    The patient has acknowledged understanding of the information and elected for bilateral shoulder steroid injection.  The risks and alternatives to injections were discussed.  Patient consented to proceed. The injections were performed as noted.     AC Iyer    01/24/2020    CC to Óscar Cadet MD    Large Joint Arthrocentesis: R glenohumeral  Date/Time: 1/24/2020 4:00 PM  Consent given by: patient  Site marked: site marked  Timeout: Immediately prior to procedure a time out was called to verify the correct patient, procedure, equipment, support  staff and site/side marked as required   Supporting Documentation  Indications: pain   Procedure Details  Location: shoulder - R glenohumeral  Preparation: Patient was prepped and draped in the usual sterile fashion  Needle gauge: 21G.  Approach: anterior  Medications administered: 2 mL lidocaine (cardiac); 80 mg methylPREDNISolone acetate 80 MG/ML  Patient tolerance: patient tolerated the procedure well with no immediate complications    Large Joint Arthrocentesis: L glenohumeral  Date/Time: 1/24/2020 4:01 PM  Consent given by: patient  Site marked: site marked  Timeout: Immediately prior to procedure a time out was called to verify the correct patient, procedure, equipment, support staff and site/side marked as required   Supporting Documentation  Indications: pain   Procedure Details  Location: shoulder - L glenohumeral  Preparation: Patient was prepped and draped in the usual sterile fashion  Needle gauge: 21G.  Approach: anterior  Medications administered: 2 mL lidocaine (cardiac); 80 mg methylPREDNISolone acetate 80 MG/ML  Patient tolerance: patient tolerated the procedure well with no immediate complications

## 2020-01-27 RX ORDER — METHYLPREDNISOLONE ACETATE 80 MG/ML
80 INJECTION, SUSPENSION INTRA-ARTICULAR; INTRALESIONAL; INTRAMUSCULAR; SOFT TISSUE
Status: COMPLETED | OUTPATIENT
Start: 2020-01-24 | End: 2020-01-24

## 2020-02-11 ENCOUNTER — TELEPHONE (OUTPATIENT)
Dept: PAIN MEDICINE | Facility: CLINIC | Age: 80
End: 2020-02-11

## 2020-02-11 NOTE — TELEPHONE ENCOUNTER
Ms. Barrett called today and wanted to know how safe the injection she is getting on 2/13/20 is in regards to getting blood clots after the procedure. She states that she has a history of blood clots and is concerned because she doesn't want to get another one.

## 2020-02-12 NOTE — TELEPHONE ENCOUNTER
It appears her appointment was cancelled because she can not afford her copay, not because of her question about the blood thinner.  I do think we need to reassure her that she does NOT need to stop her blood thinner for this procedure, but she may still be unable to schedule.

## 2020-02-12 NOTE — TELEPHONE ENCOUNTER
I spoke with Ms. Barrett and relayed that she won't have to stop her blood thinner for the procedure. I also gave her Rocael Dolan's number so she can work out a payment plan to get the procedure. She will call back and reschedule once she speaks with Rocael.

## 2020-02-12 NOTE — TELEPHONE ENCOUNTER
1)  this procedure is an SI joint injection.... We do not stop the blood thinners for SI joint injections  2)  her 2-13-20 appointment is cancelled on the list.  Let's get her back on the schedule.

## 2020-02-13 ENCOUNTER — DOCUMENTATION (OUTPATIENT)
Dept: PAIN MEDICINE | Facility: CLINIC | Age: 80
End: 2020-02-13

## 2020-02-13 ENCOUNTER — OUTSIDE FACILITY SERVICE (OUTPATIENT)
Dept: PAIN MEDICINE | Facility: CLINIC | Age: 80
End: 2020-02-13

## 2020-02-13 PROCEDURE — 27096 INJECT SACROILIAC JOINT: CPT | Performed by: ANESTHESIOLOGY

## 2020-02-14 NOTE — PROGRESS NOTES
LEFT Sacroiliac Joint Injection  [facility]    PREOPERATIVE DIAGNOSIS:   Sacroiliac joint dysfunction on the LEFT    POSTOPERATIVE DIAGNOSIS:  Sacroiliac joint dysfunction on the LEFT    PROCEDURE:  Sacroiliac Joint Injection, on the LEFT, with fluoroscopic guidance    PRE-PROCEDURE DISCUSSION WITH PATIENT:    Risks and complications were discussed with the patient prior to starting the procedure and informed consent was obtained.  We discussed various topics including but not limited to bleeding, infection, injury, postprocedural site soreness, painful flareup, worsening of clinical picture, paralysis, coma, and death.     SURGEON:  Hebert Ruiz MD    REASON FOR PROCEDURE:    Patient has pain consistent with SI pathology on history and physical exam. Patient has other lumbrosacral pathology that makes the injection diagnostically necessary. Positive sacroiliac provocation maneuvers noted.      SEDATION:  Patient declined administration of moderate sedation    ANESTHETIC AGENT:  Marcaine 0.5%  STEROID AGENT:  Methylprednisolone (DEPO MEDROL) 40mg/ml    DESCRIPTON OF PROCEDURE:  After obtaining informed consent, IV access  was not obtained in the preoperative area.  The patient was transported to the operative suite and placed in the prone position with a pillow under the pelvic area. EKG, blood pressure, and pulse oximeter were monitored. The lumbosacral area was prepped with Chloraprep and draped in a sterile fashion.     Under fluoroscopic guidance the inferior most portion of the LEFT sacroiliac joint was identified. The overlying skin and subcutaneous tissue was anesthetized with 1% lidocaine. A 22-gauge spinal needle was introduced from the inferior most portion of the joint into the sacroiliac joint under fluoroscopic guidance in the AP dimension with slight oblique rotation to the contralateral side.  Aspiration was negative.  After confirming the position of the needle with  fluoroscopy, 1 mL of Omnipaque was injected and after seeing appropriate spread into the joint a total of 2.5 mL of Marcaine, with the steroid, was injected very slowly.  Vital signs remained stable.  The onset of analgesia was noted.    ESTIMATED BLOOD LOSS:  minimal  SPECIMENS:  None    COMPLICATIONS:  No complications were noted., There was no indication of vascular uptake on live injection of contrast dye. and The patient did not have any signs of postprocedure numbness nor weakness.    TOLERANCE & DISCHARGE CONDITION:    The patient tolerated the procedure well.  The patient was transported to the recovery area without difficulties.  The patient was discharged to home under the care of family in stable and satisfactory condition.    PLAN OF CARE:  1. The patient was given our standard instruction sheet and will resume all medications as per the medication reconciliation sheet.  2. The patient will Return to clinic 3-4 wks.  3. The patient is instructed to keep a pain log hourly for 8 hours after the procedure.

## 2020-03-03 ENCOUNTER — TELEPHONE (OUTPATIENT)
Dept: PAIN MEDICINE | Facility: CLINIC | Age: 80
End: 2020-03-03

## 2020-03-03 NOTE — TELEPHONE ENCOUNTER
Ms. Barrett called and stated that the procedures are not working and she has another medical issue that overrides this issue so she wont be returning.

## 2020-03-04 ENCOUNTER — TELEPHONE (OUTPATIENT)
Dept: ORTHOPEDIC SURGERY | Facility: CLINIC | Age: 80
End: 2020-03-04

## 2020-06-08 ENCOUNTER — OFFICE VISIT (OUTPATIENT)
Dept: ORTHOPEDIC SURGERY | Facility: CLINIC | Age: 80
End: 2020-06-08

## 2020-06-08 VITALS — HEIGHT: 63 IN | BODY MASS INDEX: 34.55 KG/M2 | TEMPERATURE: 98.4 F | WEIGHT: 195 LBS

## 2020-06-08 DIAGNOSIS — M77.11 LATERAL EPICONDYLITIS OF RIGHT ELBOW: ICD-10-CM

## 2020-06-08 DIAGNOSIS — M19.011 PRIMARY OSTEOARTHRITIS OF RIGHT SHOULDER: Primary | ICD-10-CM

## 2020-06-08 DIAGNOSIS — M19.021 PRIMARY OSTEOARTHRITIS OF RIGHT ELBOW: ICD-10-CM

## 2020-06-08 PROCEDURE — 73030 X-RAY EXAM OF SHOULDER: CPT | Performed by: NURSE PRACTITIONER

## 2020-06-08 PROCEDURE — 20610 DRAIN/INJ JOINT/BURSA W/O US: CPT | Performed by: NURSE PRACTITIONER

## 2020-06-08 PROCEDURE — 20605 DRAIN/INJ JOINT/BURSA W/O US: CPT | Performed by: NURSE PRACTITIONER

## 2020-06-08 PROCEDURE — 99213 OFFICE O/P EST LOW 20 MIN: CPT | Performed by: NURSE PRACTITIONER

## 2020-06-08 PROCEDURE — 73070 X-RAY EXAM OF ELBOW: CPT | Performed by: NURSE PRACTITIONER

## 2020-06-08 RX ORDER — CHOLECALCIFEROL (VITAMIN D3) 1250 MCG
50000 CAPSULE ORAL WEEKLY
COMMUNITY
Start: 2020-05-11

## 2020-06-08 RX ORDER — ROSUVASTATIN CALCIUM 5 MG/1
5 TABLET, COATED ORAL EVERY MORNING
Status: ON HOLD | COMMUNITY
Start: 2020-05-11 | End: 2022-07-13

## 2020-06-08 RX ADMIN — METHYLPREDNISOLONE ACETATE 80 MG: 80 INJECTION, SUSPENSION INTRA-ARTICULAR; INTRALESIONAL; INTRAMUSCULAR; SOFT TISSUE at 11:18

## 2020-06-08 RX ADMIN — METHYLPREDNISOLONE ACETATE 80 MG: 80 INJECTION, SUSPENSION INTRA-ARTICULAR; INTRALESIONAL; INTRAMUSCULAR; SOFT TISSUE at 10:42

## 2020-06-08 NOTE — PROGRESS NOTES
Sasha Barrett     : 1940     MRN: [id number]     DATE: 2020    Chief Complaints:  Right elbow pain, follow up right shoulder pain    History of Present Illness:     80 y.o. female patient who presents for evaluation of the right elbow.  The patient reports that the symptoms began 2 weeks ago.  Denies injury or precipitating event.  Pain is described as moderate, intermittent and stabbing.  The pain is associated with certain reaching and lifting movements.  The patient localizes the pain to the lateral aspect of the elbow.  Rest and anti-inflammatories do help somewhat.  Denies numbness or tingling in forearm, hand, or fingers.    She would like to get a right shoulder repeat injection today.  Injections have worked well in the past.      Allergies:   Allergies   Allergen Reactions   • Bee Venom Shortness Of Breath and Rash   • Morphine Anaphylaxis and Nausea And Vomiting   • Sulfa Antibiotics Anaphylaxis and Hives     Or sulfa fillers in meds  Broke out in internal and external hives     • Tree Extract Shortness Of Breath and Rash   • Eliquis [Apixaban] Headache     Headaches, nausea and itching.    • Hydralazine Myalgia     Patient reports leg cramps, joint pain and increased fatigue   • Ambien [Zolpidem] Hallucinations   • Anastrozole    • Norvasc [Amlodipine] Other (See Comments)     KIDNEYS SHUT DOWN   • Nsaids      KIDNEY FAILURE   • Penicillins Unknown (See Comments)     Severe reaction as a child  Tolerated ceftriaxone during 2017 admission   • Grass Rash     Home Medications:    Current Outpatient Medications:   •  acetaminophen (TYLENOL) 650 MG 8 hr tablet, Take 650 mg by mouth Every 6 (Six) Hours As Needed for Mild Pain ., Disp: , Rfl:   •  albuterol (PROAIR HFA) 108 (90 Base) MCG/ACT inhaler, ProAir  (90 Base) MCG/ACT Inhalation Aerosol Solution; Patient Sig: ProAir  (90 Base) MCG/ACT Inhalation Aerosol Solution ; 8; 0; ; Active, Disp: , Rfl:   •  budesonide  (PULMICORT) 180 MCG/ACT inhaler, Inhale 1 puff 2 (Two) Times a Day., Disp: , Rfl:   •  butalbital-acetaminophen-caffeine (FIORICET, ESGIC) -40 MG per tablet, Take 2 tablets by mouth Every 4 (Four) Hours As Needed for Headache., Disp: 240 tablet, Rfl: 0  •  cetirizine (zyrTEC) 10 MG tablet, Take 10 mg by mouth Daily., Disp: , Rfl:   •  Cholecalciferol (VITAMIN D) 1000 UNITS tablet, Take 1,000 Units by mouth Daily., Disp: , Rfl:   •  Cholecalciferol (VITAMIN D3) 1.25 MG (37508 UT) capsule, Take 50,000 Units by mouth 1 (One) Time Per Week., Disp: , Rfl:   •  ciprofloxacin-dexamethasone (CIPRODEX) 0.3-0.1 % otic suspension, Administer 4 drops into both ears 2 (Two) Times a Day., Disp: , Rfl:   •  cloNIDine (CATAPRES) 0.3 MG tablet, Take 0.3 mg by mouth 3 (Three) Times a Day., Disp: , Rfl:   •  diphenhydrAMINE (BENADRYL) 50 MG tablet, Take 1 tablet by mouth every 4 (four) hours as needed for itching or allergies., Disp: 180 tablet, Rfl: 0  •  eplerenone (INSPRA) 50 MG tablet, Take 1 tablet by mouth Every 12 (Twelve) Hours., Disp: 180 tablet, Rfl: 3  •  ethacrynic acid (EDECRIN) 25 MG tablet, Take 25 mg by mouth 3 (Three) Times a Day., Disp: , Rfl:   •  fluticasone (FLONASE) 50 MCG/ACT nasal spray, 1 spray by Each Nare route 2 (Two) Times a Day., Disp: , Rfl:   •  HYDROcodone-acetaminophen (NORCO) 7.5-325 MG per tablet, TAKE 1 TO 2 TABLETS BY MOUTH EVERY 4 HOURS AS NEEDED for breakthrough pain- use in place of talwin, Disp: , Rfl:   •  hydrOXYzine (ATARAX) 50 MG tablet, TAKE ONE TABLET BY MOUTH EVERY 6 HOURS AS NEEDED FOR SEVERE ITCHING AND ALLERGIES, Disp: , Rfl:   •  ipratropium (ATROVENT) 0.02 % nebulizer solution, Take 500 mcg by nebulization 4 (Four) Times a Day., Disp: , Rfl:   •  montelukast (SINGULAIR) 10 MG tablet, Take 1 tablet by mouth every night., Disp: 90 tablet, Rfl: 1  •  MUCUS RELIEF 600 MG 12 hr tablet, TAKE ONE TABLET BY MOUTH EVERY TWELVE HOURS AS NEEDED, Disp: , Rfl:   •  NIFEdipine XL  (PROCARDIA XL) 60 MG 24 hr tablet, TAKE ONE TABLET TWICE DAILY, Disp: 180 tablet, Rfl: 0  •  NON FORMULARY, Inhale 2 puffs 2 (Two) Times a Day. Proptline Inhaler, Disp: , Rfl:   •  omeprazole (priLOSEC) 40 MG capsule, TAKE ONE CAPSULE BY MOUTH EVERY DAY (Patient taking differently: TAKE ONE CAPSULE BY MOUTH DAILY PRN), Disp: 90 capsule, Rfl: 0  •  pentazocine-naloxone (TALWIN NX) 50-0.5 MG per tablet, Take 1 tablet by mouth Every 4 (Four) Hours As Needed for Mild Pain  (for up to 90 days)., Disp: , Rfl:   •  polyethylene glycol (MIRALAX) powder, Take one capful w/liquid daily (Patient taking differently: Take 17 g by mouth 4 (Four) Times a Week. Take one capful w/liquid daily ), Disp: 765 g, Rfl: 1  •  potassium chloride (K-DUR) 10 MEQ CR tablet, Take 10 mEq by mouth 2 (Two) Times a Day., Disp: , Rfl:   •  promethazine-codeine (PHENERGAN with CODEINE) 6.25-10 MG/5ML syrup, Take 5 mL by mouth 4 (Four) Times a Day As Needed for cough., Disp: 360 mL, Rfl: 0  •  rivaroxaban (XARELTO) 10 MG tablet, Take 10 mg by mouth Daily., Disp: , Rfl:   •  rosuvastatin (CRESTOR) 5 MG tablet, Take 5 mg by mouth Every Morning., Disp: , Rfl:   •  vitamin B-12 (VITAMIN B-12) 1000 MCG tablet, Take 1 tablet by mouth Daily., Disp: 90 tablet, Rfl: 3  •  ondansetron ODT (ZOFRAN-ODT) 8 MG disintegrating tablet, Take 1 tablet by mouth every 6 (six) hours as needed for nausea or vomiting., Disp: 360 tablet, Rfl: 1  •  predniSONE (DELTASONE) 10 MG tablet, Take 1 tablet by mouth Every Other Day., Disp: 15 tablet, Rfl:      Past Medical History:   Diagnosis Date   • Arthritis    • Asthma    • Breast cancer (CMS/HCC) 2003    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 Yazmin Canseco   • CARPAL TUNNEL RELEASE Right 05/25/2011    Dr. Caleb Bueno   • CARPAL TUNNEL RELEASE Left 04/26/2011    Dr. Caleb Bueno   • CATARACT EXTRACTION Left 11/29/2010    Dr. Eusebio Downs   • CATARACT EXTRACTION Right 11/17/2010    Dr. Eusebio Downs   • COLONOSCOPY N/A 12/06/2002    Sigmoid diverticulosis-Dr. Brandon Batista   • COLONOSCOPY N/A 12/12/2013    Tortuous colon, diverticulosis in the sigmoid colon and descending colon, stool in the rectum, sigmoid colon, descending colon, splenic flexure, transverse colon and hepatic flexure-Dr. Irma Brambila   • ENDOSCOPY N/A 09/13/2007    Normal-Dr. Pavel Quarles   • ENDOSCOPY AND COLONOSCOPY N/A 09/19/2005    1 cm hiatal hernia, mild Schatzki's ring, sigmoid diverticulosis-Dr. Yoel Farley   • GANGLION CYST EXCISION Left 12/18/2012    Release of A1 pulley flexor sheath, left fourth finger, excision of ganglion cyst 0.5 cm diameter, A2 pulley flexor sheath, left fourth finger-Dr. Caleb Bueno   • HEEL SPUR EXCISION Left 1968   • INGUINAL HERNIA REPAIR Right 1940    at 5 months old   • MASTECTOMY Right 08/05/2008    Right breast mastectomy and excision of left chest wall lesion-Dr. Yoel Farley   • MASTECTOMY RADICAL Left 04/29/2003    Left modified radical mastectomy-Dr. Yazmin Canseco   • PARATHYROIDECTOMY Right 05/04/2004    Excision of parathyroid adenoma (right superior)-Dr. Yoel Farley   • REPLACEMENT TOTAL KNEE Right 04/09/2012    Dr. Lamonte Childress   • SINUS SURGERY  1984   • THYROIDECTOMY, PARTIAL Left 05/04/2004    Dr. Yoel Farley   • TOTAL ABDOMINAL HYSTERECTOMY Bilateral 1973    Dr. Mahan   • UPPER GASTROINTESTINAL ENDOSCOPY N/A 02/18/2009    LA Grade A reflux esophagitis, normal stomach, normal 2nd part of the duodenum-Dr. Yoel Farley     Social History  "    Occupational History   • Occupation: RETIRED   Tobacco Use   • Smoking status: Never Smoker   • Smokeless tobacco: Never Used   Substance and Sexual Activity   • Alcohol use: No     Frequency: Never   • Drug use: No   • Sexual activity: Yes     Partners: Male      Social History     Social History Narrative    LIVES WITH      Family History   Problem Relation Age of Onset   • Brain cancer Brother    • Alcohol abuse Mother    • No Known Problems Father    • No Known Problems Sister      Review of Systems:      Constitutional: Denies fever, shaking or chills   Eyes: Denies change in visual acuity   HEENT: Denies nasal congestion or sore throat   Respiratory: Denies cough or shortness of breath   Cardiovascular: Denies chest pain or edema  Endocrine: Denies tremors, palpitations, intolerance of heat or cold, polyuria, polydipsia.  GI: Denies abdominal pain, nausea, vomiting, bloody stools or diarrhea  : Denies frequency, urgency, incontinence, retention, or nocturia.  Musculoskeletal: Denies numbness, tingling or loss of motor function except as above  Integument: Denies rash, lesion or ulceration   Neurologic: Denies headache or focal weakness, deficits  Heme: Denies spontaneous or excessive bleeding, epistaxis, hematuria, melena, fatigue, enlarged or tender lymph nodes.      All other pertinent positives and negatives as noted above in HPI.    Physical Exam: 80 y.o. female    Vitals:    06/08/20 1052   Temp: 98.4 °F (36.9 °C)   TempSrc: Temporal   Weight: 88.5 kg (195 lb)   Height: 160 cm (63\")     General:  Patient is awake and alert.  Appears in no acute distress or discomfort.    Psych:  Affect and demeanor are appropriate.    Eyes:  Conjunctiva and sclera appear grossly normal.  Eyes track well and EOM seem to be intact.    Ears:  No gross abnormalities.  Hearing adequate for the exam.    Cardiovascular:  Regular rate and rhythm.    Lungs:  Good chest expansion.  Breathing unlabored.    Extremities:  " Right elbow skin appears benign.  No obvious lesions, swellings, masses or adenopathy.  Focal tenderness noted over the lateral epicondyle.  No tenderness over the radial tunnel or medial side.  Full elbow motion.  No instability.  Good strength with elbow flexion, extension, supination, pronation.  Pain at the lateral epicondyle with resisted wrist extension.  Good strength in the hand with , pinch, finger and thumb abduction.  Sensation is intact and subjectively normal.  Palpable radial pulse with good skin turgor and brisk capillary refill.    DIAGNOSTIC STUDIES    Xrays:  Dr. Pichardo and I reviewed the x-rays together.    AP and lateral views of the right elbow are ordered by myself and reviewed to evaluate the patient's complaint.  No comparison films are immediately available.  The x-rays show severe arthritis with joint space narrowing, osteophyte formation, and sclerosis.  There are calcium deposits noted posteriorly.     AP, scapular Y, and axillary views of the right shoulder are ordered by myself and reviewed to evaluate the patient's complaint.  These are compared to previous films.  The x-rays show mild to moderate glenohumeral osteoarthritis with joint space narrowing, osteophyte formation, and subchondral sclerosis.  The acromiohumeral interval measures normal.    ASSESSMENT: 1.  Right elbow lateral epicondylitis  2.  Right elbow osteoarthritis  3.  Right shoulder osteoarthritis    PLAN:  We discussed options in detail, both surgical and non-surgical.  I have recommended that we start with a conservative approach.  We discussed all available conservative treatment options including activity modifications, bracing, anti-inflammatories, physical therapy, and injections.  I explained the likely short-term benefits of cortisone injection and the associated risks.  We also talked about the available evidence on PRP which suggest that this could have up to an 80% success rate at 6 months.      The  risks and alternatives to both the shoulder and elbow injections were thoroughly discussed.  The patient acknowledged understanding of this information.  She has elected to proceed with corticosteroid injections.  The injections were performed as noted.    AC Iyer    06/08/2020    CC to Óscar Cadet MD     Large Joint Arthrocentesis: R glenohumeral  Date/Time: 6/8/2020 10:42 AM  Consent given by: patient  Site marked: site marked  Timeout: Immediately prior to procedure a time out was called to verify the correct patient, procedure, equipment, support staff and site/side marked as required   Supporting Documentation  Indications: pain   Procedure Details  Location: shoulder - R glenohumeral  Preparation: Patient was prepped and draped in the usual sterile fashion  Needle gauge: 21 G.  Approach: anterior  Medications administered: 2 mL lidocaine (cardiac); 80 mg methylPREDNISolone acetate 80 MG/ML  Patient tolerance: patient tolerated the procedure well with no immediate complications    Medium Joint Arthrocentesis: R elbow  Date/Time: 6/8/2020 11:18 AM  Consent given by: patient  Site marked: site marked  Timeout: Immediately prior to procedure a time out was called to verify the correct patient, procedure, equipment, support staff and site/side marked as required   Supporting Documentation  Indications: pain   Procedure Details  Location: elbow - R elbow  Preparation: Patient was prepped and draped in the usual sterile fashion  Needle gauge: 21 G.  Approach: left lateral epicondyle.  Medications administered: 2 mL lidocaine (cardiac); 80 mg methylPREDNISolone acetate 80 MG/ML  Patient tolerance: patient tolerated the procedure well with no immediate complications

## 2020-06-10 RX ORDER — METHYLPREDNISOLONE ACETATE 80 MG/ML
80 INJECTION, SUSPENSION INTRA-ARTICULAR; INTRALESIONAL; INTRAMUSCULAR; SOFT TISSUE
Status: COMPLETED | OUTPATIENT
Start: 2020-06-08 | End: 2020-06-08

## 2020-09-14 ENCOUNTER — TRANSCRIBE ORDERS (OUTPATIENT)
Dept: PREADMISSION TESTING | Facility: HOSPITAL | Age: 80
End: 2020-09-14

## 2020-09-14 DIAGNOSIS — Z01.818 OTHER SPECIFIED PRE-OPERATIVE EXAMINATION: Primary | ICD-10-CM

## 2020-09-16 ENCOUNTER — APPOINTMENT (OUTPATIENT)
Dept: PREADMISSION TESTING | Facility: HOSPITAL | Age: 80
End: 2020-09-16

## 2020-09-16 VITALS
DIASTOLIC BLOOD PRESSURE: 81 MMHG | HEIGHT: 63 IN | SYSTOLIC BLOOD PRESSURE: 132 MMHG | WEIGHT: 196 LBS | BODY MASS INDEX: 34.73 KG/M2 | RESPIRATION RATE: 20 BRPM | TEMPERATURE: 97.6 F | HEART RATE: 57 BPM | OXYGEN SATURATION: 98 %

## 2020-09-16 LAB
ANION GAP SERPL CALCULATED.3IONS-SCNC: 9.4 MMOL/L (ref 5–15)
BUN SERPL-MCNC: 19 MG/DL (ref 8–23)
BUN/CREAT SERPL: 20.7 (ref 7–25)
CALCIUM SPEC-SCNC: 9.7 MG/DL (ref 8.6–10.5)
CHLORIDE SERPL-SCNC: 104 MMOL/L (ref 98–107)
CO2 SERPL-SCNC: 26.6 MMOL/L (ref 22–29)
CREAT SERPL-MCNC: 0.92 MG/DL (ref 0.57–1)
DEPRECATED RDW RBC AUTO: 43 FL (ref 37–54)
ERYTHROCYTE [DISTWIDTH] IN BLOOD BY AUTOMATED COUNT: 13.2 % (ref 12.3–15.4)
GFR SERPL CREATININE-BSD FRML MDRD: 58 ML/MIN/1.73
GLUCOSE SERPL-MCNC: 82 MG/DL (ref 65–99)
HCT VFR BLD AUTO: 39.5 % (ref 34–46.6)
HGB BLD-MCNC: 13.2 G/DL (ref 12–15.9)
MCH RBC QN AUTO: 29.8 PG (ref 26.6–33)
MCHC RBC AUTO-ENTMCNC: 33.4 G/DL (ref 31.5–35.7)
MCV RBC AUTO: 89.2 FL (ref 79–97)
PLATELET # BLD AUTO: 174 10*3/MM3 (ref 140–450)
PMV BLD AUTO: 10.7 FL (ref 6–12)
POTASSIUM SERPL-SCNC: 3.9 MMOL/L (ref 3.5–5.2)
RBC # BLD AUTO: 4.43 10*6/MM3 (ref 3.77–5.28)
SODIUM SERPL-SCNC: 140 MMOL/L (ref 136–145)
WBC # BLD AUTO: 5.78 10*3/MM3 (ref 3.4–10.8)

## 2020-09-16 PROCEDURE — 85027 COMPLETE CBC AUTOMATED: CPT | Performed by: PLASTIC SURGERY

## 2020-09-16 PROCEDURE — 93005 ELECTROCARDIOGRAM TRACING: CPT

## 2020-09-16 PROCEDURE — 36415 COLL VENOUS BLD VENIPUNCTURE: CPT

## 2020-09-16 PROCEDURE — 93010 ELECTROCARDIOGRAM REPORT: CPT | Performed by: INTERNAL MEDICINE

## 2020-09-16 PROCEDURE — 80048 BASIC METABOLIC PNL TOTAL CA: CPT | Performed by: PLASTIC SURGERY

## 2020-09-16 RX ORDER — ALBUTEROL SULFATE 90 UG/1
2 AEROSOL, METERED RESPIRATORY (INHALATION) EVERY 4 HOURS PRN
Status: ON HOLD | COMMUNITY
End: 2022-07-13

## 2020-09-16 NOTE — DISCHARGE INSTRUCTIONS
Take the following medications the morning of surgery:    Inhalers  Ear drops   Tylenol   Clonidine  Procardia   Nasal spray     clonidine    If you are on prescription narcotic pain medication to control your pain you may also take that medication the morning of surgery.    General Instructions:  • Do not eat solid food after midnight the night before surgery.  • You may drink clear liquids day of surgery but must stop at least one hour before your hospital arrival time.  • It is beneficial for you to have a clear drink that contains carbohydrates the day of surgery.  We suggest a 12 to 20 ounce bottle of Gatorade or Powerade for non-diabetic patients or a 12 to 20 ounce bottle of G2 or Powerade Zero for diabetic patients. (Pediatric patients, are not advised to drink a 12 to 20 ounce carbohydrate drink)    Clear liquids are liquids you can see through.  Nothing red in color.     Plain water                               Sports drinks  Sodas                                   Gelatin (Jell-O)  Fruit juices without pulp such as white grape juice and apple juice  Popsicles that contain no fruit or yogurt  Tea or coffee (no cream or milk added)  Gatorade / Powerade  G2 / Powerade Zero    • Infants may have breast milk up to four hours before surgery.  • Infants drinking formula may drink formula up to six hours before surgery.   • Patients who avoid smoking, chewing tobacco and alcohol for 4 weeks prior to surgery have a reduced risk of post-operative complications.  Quit smoking as many days before surgery as you can.  • Do not smoke, use chewing tobacco or drink alcohol the day of surgery.   • If applicable bring your C-PAP/ BI-PAP machine.  • Bring any papers given to you in the doctor’s office.  • Wear clean comfortable clothes.  • Do not wear contact lenses, false eyelashes or make-up.  Bring a case for your glasses.   • Bring crutches or walker if applicable.  • Remove all piercings.  Leave jewelry and any other  valuables at home.  • Hair extensions with metal clips must be removed prior to surgery.  • The Pre-Admission Testing nurse will instruct you to bring medications if unable to obtain an accurate list in Pre-Admission Testing.        If you were given a blood bank ID arm band remember to bring it with you the day of surgery.    Preventing a Surgical Site Infection:  • For 2 to 3 days before surgery, avoid shaving with a razor because the razor can irritate skin and make it easier to develop an infection.    • Any areas of open skin can increase the risk of a post-operative wound infection by allowing bacteria to enter and travel throughout the body.  Notify your surgeon if you have any skin wounds / rashes even if it is not near the expected surgical site.  The area will need assessed to determine if surgery should be delayed until it is healed.  • The night prior to surgery shower using a fresh bar of anti-bacterial soap (such as Dial) and clean washcloth.  Sleep in a clean bed with clean clothing.  Do not allow pets to sleep with you.  • Shower on the morning of surgery using a fresh bar of anti-bacterial soap (such as Dial) and clean washcloth.  Dry with a clean towel and dress in clean clothing.  • Ask your surgeon if you will be receiving antibiotics prior to surgery.  • Make sure you, your family, and all healthcare providers clean their hands with soap and water or an alcohol based hand  before caring for you or your wound.    Day of surgery:9/23/2020   0730  Your arrival time is approximately two hours before your scheduled surgery time.  Upon arrival, a Pre-op nurse and Anesthesiologist will review your health history, obtain vital signs, and answer questions you may have.  The only belongings needed at this time will be a list of your home medications and if applicable your C-PAP/BI-PAP machine.  If you are staying overnight your family can leave the rest of your belongings in the car and bring  them to your room later.  A Pre-op nurse will start an IV and you may receive medication in preparation for surgery, including something to help you relax.  Your family will be able to see you in the Pre-op area.  Two visitors at a time will be allowed in the Pre-op room.  While you are in surgery your family should notify the waiting room  if they leave the waiting room area and provide a contact phone number.    Please be aware that surgery does come with discomfort.  We want to make every effort to control your discomfort so please discuss any uncontrolled symptoms with your nurse.   Your doctor will most likely have prescribed pain medications.      If you are going home after surgery you will receive individualized written care instructions before being discharged.  A responsible adult must drive you to and from the hospital on the day of your surgery and stay with you for 24 hours.    If you are staying overnight following surgery, you will be transported to your hospital room following the recovery period.  [facility] has all private rooms.    If you have any questions please call Pre-Admission Testing at [phone].  Deductibles and co-payments are collected on the day of service. Please be prepared to pay the required co-pay, deductible or deposit on the day of service as defined by your plan.    Patient Education for Self-Quarantine Process    Following your COVID testing, we strongly recommend that you do not leave your home after you have been tested for COVID except to get medical care. This includes not going to work, school or to public areas.  If this is not possible for you to do please limit your activities to only required outings.  Be sure to wear a mask when you are with other people, practice social distancing and wash your hands frequently.      The following items provide additional details to keep you safe.  • Wash your hands with soap and water frequently for  at least 20 seconds.   • Avoid touching your eyes, nose and mouth with unwashed hands.  • Do not share anything - utensils, towels, food from the same bowl.   • Have your own utensils, drinking glass, dishes, towels and bedding.   • Do not have visitors.   • Do use FaceTime to stay in touch with family and friends.  • You should stay in a specific room away from others if possible.   • Stay at least 6 feet away from others in the home if you cannot have a dedicated room to yourself.   • Do not snuggle with your pet. While the CDC says there is no evidence that pets can spread COVID-19 or be infected from humans, it is probably best to avoid “petting, snuggling, being kissed or licked and sharing food (during self-quarantine)”, according to the CDC.   • Sanitize household surfaces daily. Include all high touch areas (door handles, light switches, phones, countertops, etc.)  • Do not share a bathroom with others, if possible.   • Wear a mask around others in your home if you are unable to stay in a separate room or 6 feet apart. If  you are unable to wear a mask, have your family member wear a mask if they must be within 6 feet of you.   Call your surgeon immediately if you experience any of the following symptoms:  • Sore Throat  • Shortness of Breath or difficulty breathing  • Cough  • Chills  • Body soreness or muscle pain  • Headache  • Fever  • New loss of taste or smell  • Do not arrive for your surgery ill.  Your procedure will need to be rescheduled to another time.  You will need to call your physician before the day of surgery to avoid any unnecessary exposure to hospital staff as well as other patients.

## 2020-09-21 ENCOUNTER — LAB (OUTPATIENT)
Dept: LAB | Facility: HOSPITAL | Age: 80
End: 2020-09-21

## 2020-09-21 DIAGNOSIS — Z01.818 OTHER SPECIFIED PRE-OPERATIVE EXAMINATION: ICD-10-CM

## 2020-09-21 PROCEDURE — U0004 COV-19 TEST NON-CDC HGH THRU: HCPCS

## 2020-09-21 PROCEDURE — C9803 HOPD COVID-19 SPEC COLLECT: HCPCS

## 2020-09-22 LAB — SARS-COV-2 RNA RESP QL NAA+PROBE: NOT DETECTED

## 2020-09-22 NOTE — H&P
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  She also has a ganglion cyst in this area and triggering in the left ring finger.  She also has painful osteoarthritis at the basal joint of the left thumb.    Medical history is positive for hypertension, asthma, breast cancer, arthritis and a past history of DVT a year ago.    On examination she is 5 feet 3 inches tall and weighs 195 pounds.  She is alert and oriented and in no distress.  Chest is clear, heart sounds are normal with a dual rhythm and no bruits.  She has a painful swelling on the radial side of the left wrist and tender at the A1 pulley level of the flexor sheath of the ring finger.    Diagnosis is de Quervain's stenosing tenovaginitis, ganglion cyst, trigger finger and osteoarthritis left hand.  Procedure to be performed includes decompression of the left first dorsal wrist compartment, excision of ganglion cyst, release of A1 pulley left ring finger flexor sheath and steroid injection to CMC joint left thumb.

## 2020-09-23 ENCOUNTER — ANESTHESIA (OUTPATIENT)
Dept: PERIOP | Facility: HOSPITAL | Age: 80
End: 2020-09-23

## 2020-09-23 ENCOUNTER — HOSPITAL ENCOUNTER (OUTPATIENT)
Facility: HOSPITAL | Age: 80
Setting detail: HOSPITAL OUTPATIENT SURGERY
Discharge: HOME | End: 2020-09-23
Attending: PLASTIC SURGERY | Admitting: PLASTIC SURGERY

## 2020-09-23 ENCOUNTER — ANESTHESIA EVENT (OUTPATIENT)
Dept: PERIOP | Facility: HOSPITAL | Age: 80
End: 2020-09-23

## 2020-09-23 VITALS
DIASTOLIC BLOOD PRESSURE: 90 MMHG | SYSTOLIC BLOOD PRESSURE: 188 MMHG | HEART RATE: 75 BPM | RESPIRATION RATE: 16 BRPM | TEMPERATURE: 97.4 F | OXYGEN SATURATION: 98 %

## 2020-09-23 DIAGNOSIS — M65.4 DE QUERVAIN'S TENOSYNOVITIS, LEFT: Primary | ICD-10-CM

## 2020-09-23 PROCEDURE — 25010000002 DEXAMETHASONE PER 1 MG: Performed by: NURSE ANESTHETIST, CERTIFIED REGISTERED

## 2020-09-23 PROCEDURE — 25010000002 ONDANSETRON PER 1 MG: Performed by: NURSE ANESTHETIST, CERTIFIED REGISTERED

## 2020-09-23 PROCEDURE — 25010000002 FENTANYL CITRATE (PF) 100 MCG/2ML SOLUTION: Performed by: NURSE ANESTHETIST, CERTIFIED REGISTERED

## 2020-09-23 PROCEDURE — 25010000002 TRIAMCINOLONE PER 10 MG: Performed by: PLASTIC SURGERY

## 2020-09-23 PROCEDURE — 25010000002 FENTANYL CITRATE (PF) 100 MCG/2ML SOLUTION: Performed by: ANESTHESIOLOGY

## 2020-09-23 PROCEDURE — 25010000002 PROPOFOL 10 MG/ML EMULSION: Performed by: NURSE ANESTHETIST, CERTIFIED REGISTERED

## 2020-09-23 RX ORDER — NIFEDIPINE 60 MG/1
60 TABLET, EXTENDED RELEASE ORAL 2 TIMES DAILY
Status: CANCELLED | OUTPATIENT
Start: 2020-09-23

## 2020-09-23 RX ORDER — LIDOCAINE HYDROCHLORIDE 20 MG/ML
INJECTION, SOLUTION INFILTRATION; PERINEURAL AS NEEDED
Status: DISCONTINUED | OUTPATIENT
Start: 2020-09-23 | End: 2020-09-23 | Stop reason: SURG

## 2020-09-23 RX ORDER — BUTALBITAL, ACETAMINOPHEN AND CAFFEINE 50; 325; 40 MG/1; MG/1; MG/1
2 TABLET ORAL EVERY 4 HOURS PRN
Status: CANCELLED | OUTPATIENT
Start: 2020-09-23

## 2020-09-23 RX ORDER — POTASSIUM CHLORIDE 750 MG/1
10 CAPSULE, EXTENDED RELEASE ORAL DAILY
Status: CANCELLED | OUTPATIENT
Start: 2020-09-23

## 2020-09-23 RX ORDER — HYDROCODONE BITARTRATE AND ACETAMINOPHEN 7.5; 325 MG/1; MG/1
1 TABLET ORAL ONCE AS NEEDED
Status: COMPLETED | OUTPATIENT
Start: 2020-09-23 | End: 2020-09-23

## 2020-09-23 RX ORDER — PREDNISONE 10 MG/1
10 TABLET ORAL EVERY OTHER DAY
Status: CANCELLED | OUTPATIENT
Start: 2020-09-23

## 2020-09-23 RX ORDER — CHOLECALCIFEROL (VITAMIN D3) 1250 MCG
50000 CAPSULE ORAL WEEKLY
Status: CANCELLED | OUTPATIENT
Start: 2020-09-23

## 2020-09-23 RX ORDER — ONDANSETRON 2 MG/ML
4 INJECTION INTRAMUSCULAR; INTRAVENOUS ONCE AS NEEDED
Status: DISCONTINUED | OUTPATIENT
Start: 2020-09-23 | End: 2020-09-23 | Stop reason: HOSPADM

## 2020-09-23 RX ORDER — PROMETHAZINE HYDROCHLORIDE AND CODEINE PHOSPHATE 6.25; 1 MG/5ML; MG/5ML
5 SYRUP ORAL 4 TIMES DAILY PRN
Status: CANCELLED | OUTPATIENT
Start: 2020-09-23

## 2020-09-23 RX ORDER — BUPIVACAINE HYDROCHLORIDE 5 MG/ML
INJECTION, SOLUTION EPIDURAL; INTRACAUDAL AS NEEDED
Status: DISCONTINUED | OUTPATIENT
Start: 2020-09-23 | End: 2020-09-23 | Stop reason: HOSPADM

## 2020-09-23 RX ORDER — ETHACRYNIC ACID 25 MG/1
25 TABLET ORAL 3 TIMES DAILY
Status: CANCELLED | OUTPATIENT
Start: 2020-09-23

## 2020-09-23 RX ORDER — TRIAMCINOLONE ACETONIDE 40 MG/ML
INJECTION, SUSPENSION INTRA-ARTICULAR; INTRAMUSCULAR AS NEEDED
Status: DISCONTINUED | OUTPATIENT
Start: 2020-09-23 | End: 2020-09-23 | Stop reason: HOSPADM

## 2020-09-23 RX ORDER — CHOLECALCIFEROL (VITAMIN D3) 125 MCG
1000 CAPSULE ORAL DAILY
Status: CANCELLED | OUTPATIENT
Start: 2020-09-23

## 2020-09-23 RX ORDER — HYDROXYZINE HYDROCHLORIDE 10 MG/1
10 TABLET, FILM COATED ORAL EVERY 6 HOURS PRN
Status: CANCELLED | OUTPATIENT
Start: 2020-09-23

## 2020-09-23 RX ORDER — ALBUTEROL SULFATE 90 UG/1
2 AEROSOL, METERED RESPIRATORY (INHALATION) EVERY 4 HOURS PRN
Status: CANCELLED | OUTPATIENT
Start: 2020-09-23

## 2020-09-23 RX ORDER — ENALAPRILAT 2.5 MG/2ML
0.62 INJECTION INTRAVENOUS ONCE AS NEEDED
Status: DISCONTINUED | OUTPATIENT
Start: 2020-09-23 | End: 2020-09-23 | Stop reason: HOSPADM

## 2020-09-23 RX ORDER — FENTANYL CITRATE 50 UG/ML
50 INJECTION, SOLUTION INTRAMUSCULAR; INTRAVENOUS
Status: DISCONTINUED | OUTPATIENT
Start: 2020-09-23 | End: 2020-09-23 | Stop reason: HOSPADM

## 2020-09-23 RX ORDER — EPLERENONE 25 MG/1
50 TABLET, FILM COATED ORAL EVERY 12 HOURS SCHEDULED
Status: CANCELLED | OUTPATIENT
Start: 2020-09-23

## 2020-09-23 RX ORDER — MONTELUKAST SODIUM 10 MG/1
10 TABLET ORAL NIGHTLY
Status: CANCELLED | OUTPATIENT
Start: 2020-09-23

## 2020-09-23 RX ORDER — ONDANSETRON 8 MG/1
8 TABLET, ORALLY DISINTEGRATING ORAL EVERY 6 HOURS PRN
Status: CANCELLED | OUTPATIENT
Start: 2020-09-23

## 2020-09-23 RX ORDER — LIDOCAINE HYDROCHLORIDE 10 MG/ML
0.5 INJECTION, SOLUTION EPIDURAL; INFILTRATION; INTRACAUDAL; PERINEURAL ONCE AS NEEDED
Status: DISCONTINUED | OUTPATIENT
Start: 2020-09-23 | End: 2020-09-23 | Stop reason: HOSPADM

## 2020-09-23 RX ORDER — PROPOFOL 10 MG/ML
VIAL (ML) INTRAVENOUS AS NEEDED
Status: DISCONTINUED | OUTPATIENT
Start: 2020-09-23 | End: 2020-09-23 | Stop reason: SURG

## 2020-09-23 RX ORDER — MAGNESIUM HYDROXIDE 1200 MG/15ML
LIQUID ORAL AS NEEDED
Status: DISCONTINUED | OUTPATIENT
Start: 2020-09-23 | End: 2020-09-23 | Stop reason: HOSPADM

## 2020-09-23 RX ORDER — DEXAMETHASONE SODIUM PHOSPHATE 10 MG/ML
INJECTION INTRAMUSCULAR; INTRAVENOUS AS NEEDED
Status: DISCONTINUED | OUTPATIENT
Start: 2020-09-23 | End: 2020-09-23 | Stop reason: SURG

## 2020-09-23 RX ORDER — CLONIDINE HYDROCHLORIDE 0.1 MG/1
0.3 TABLET ORAL 3 TIMES DAILY
Status: CANCELLED | OUTPATIENT
Start: 2020-09-23

## 2020-09-23 RX ORDER — DIPHENHYDRAMINE HCL 25 MG
25 CAPSULE ORAL EVERY 6 HOURS PRN
Status: CANCELLED | OUTPATIENT
Start: 2020-09-23

## 2020-09-23 RX ORDER — CETIRIZINE HYDROCHLORIDE 10 MG/1
10 TABLET ORAL DAILY
Status: CANCELLED | OUTPATIENT
Start: 2020-09-23

## 2020-09-23 RX ORDER — WOUND DRESSING ADHESIVE - LIQUID
LIQUID MISCELLANEOUS AS NEEDED
Status: DISCONTINUED | OUTPATIENT
Start: 2020-09-23 | End: 2020-09-23 | Stop reason: HOSPADM

## 2020-09-23 RX ORDER — ROSUVASTATIN CALCIUM 5 MG/1
5 TABLET, COATED ORAL EVERY MORNING
Status: CANCELLED | OUTPATIENT
Start: 2020-09-23

## 2020-09-23 RX ORDER — FENTANYL CITRATE 50 UG/ML
INJECTION, SOLUTION INTRAMUSCULAR; INTRAVENOUS AS NEEDED
Status: DISCONTINUED | OUTPATIENT
Start: 2020-09-23 | End: 2020-09-23 | Stop reason: SURG

## 2020-09-23 RX ORDER — SODIUM CHLORIDE 0.9 % (FLUSH) 0.9 %
3-10 SYRINGE (ML) INJECTION AS NEEDED
Status: DISCONTINUED | OUTPATIENT
Start: 2020-09-23 | End: 2020-09-23 | Stop reason: HOSPADM

## 2020-09-23 RX ORDER — SODIUM CHLORIDE, SODIUM LACTATE, POTASSIUM CHLORIDE, CALCIUM CHLORIDE 600; 310; 30; 20 MG/100ML; MG/100ML; MG/100ML; MG/100ML
9 INJECTION, SOLUTION INTRAVENOUS CONTINUOUS
Status: DISCONTINUED | OUTPATIENT
Start: 2020-09-23 | End: 2020-09-23 | Stop reason: HOSPADM

## 2020-09-23 RX ORDER — POLYETHYLENE GLYCOL 3350 17 G/17G
17 POWDER, FOR SOLUTION ORAL
Status: CANCELLED | OUTPATIENT
Start: 2020-09-24

## 2020-09-23 RX ORDER — FLUTICASONE PROPIONATE 50 MCG
1 SPRAY, SUSPENSION (ML) NASAL 2 TIMES DAILY
Status: CANCELLED | OUTPATIENT
Start: 2020-09-23

## 2020-09-23 RX ORDER — ONDANSETRON 2 MG/ML
INJECTION INTRAMUSCULAR; INTRAVENOUS AS NEEDED
Status: DISCONTINUED | OUTPATIENT
Start: 2020-09-23 | End: 2020-09-23 | Stop reason: SURG

## 2020-09-23 RX ORDER — FAMOTIDINE 10 MG/ML
20 INJECTION, SOLUTION INTRAVENOUS ONCE
Status: COMPLETED | OUTPATIENT
Start: 2020-09-23 | End: 2020-09-23

## 2020-09-23 RX ORDER — SODIUM CHLORIDE 0.9 % (FLUSH) 0.9 %
3 SYRINGE (ML) INJECTION EVERY 12 HOURS SCHEDULED
Status: DISCONTINUED | OUTPATIENT
Start: 2020-09-23 | End: 2020-09-23 | Stop reason: HOSPADM

## 2020-09-23 RX ADMIN — FENTANYL CITRATE 50 MCG: 50 INJECTION INTRAMUSCULAR; INTRAVENOUS at 10:08

## 2020-09-23 RX ADMIN — FAMOTIDINE 20 MG: 10 INJECTION INTRAVENOUS at 08:46

## 2020-09-23 RX ADMIN — HYDROCODONE BITARTRATE AND ACETAMINOPHEN 1 TABLET: 7.5; 325 TABLET ORAL at 11:07

## 2020-09-23 RX ADMIN — FENTANYL CITRATE 50 MCG: 50 INJECTION, SOLUTION INTRAMUSCULAR; INTRAVENOUS at 11:33

## 2020-09-23 RX ADMIN — FENTANYL CITRATE 50 MCG: 50 INJECTION, SOLUTION INTRAMUSCULAR; INTRAVENOUS at 11:08

## 2020-09-23 RX ADMIN — FENTANYL CITRATE 50 MCG: 50 INJECTION, SOLUTION INTRAMUSCULAR; INTRAVENOUS at 11:16

## 2020-09-23 RX ADMIN — SODIUM CHLORIDE, POTASSIUM CHLORIDE, SODIUM LACTATE AND CALCIUM CHLORIDE 9 ML/HR: 600; 310; 30; 20 INJECTION, SOLUTION INTRAVENOUS at 08:43

## 2020-09-23 RX ADMIN — LIDOCAINE HYDROCHLORIDE 60 MG: 20 INJECTION, SOLUTION INFILTRATION; PERINEURAL at 10:08

## 2020-09-23 RX ADMIN — ONDANSETRON HYDROCHLORIDE 4 MG: 2 SOLUTION INTRAMUSCULAR; INTRAVENOUS at 10:27

## 2020-09-23 RX ADMIN — DEXAMETHASONE SODIUM PHOSPHATE 8 MG: 10 INJECTION INTRAMUSCULAR; INTRAVENOUS at 10:13

## 2020-09-23 RX ADMIN — PROPOFOL 150 MG: 10 INJECTION, EMULSION INTRAVENOUS at 10:08

## 2020-09-23 RX ADMIN — FENTANYL CITRATE 50 MCG: 50 INJECTION INTRAMUSCULAR; INTRAVENOUS at 10:10

## 2020-09-23 NOTE — OP NOTE
Surgery Op Note    Indications: Recurrent painful swelling radial wrist.  Ganglion cyst first dorsal wrist compartment.  Trigger finger left ring finger.  Osteoarthritis basal joint left.    Pre-operative Diagnosis:   De Quervain's stenosing tenovaginitis, ganglion cyst, trigger finger, osteoarthritis    Post-operative Diagnosis:     Post-Op Diagnosis Codes:  Same    Surgeon: Caleb Bueno MD     Assistants: None    Anesthesia: General LMA anesthesia    ASA Class: 1    Procedure Details   The patient was seen in the Holding Room. The patient concurred with the proposed plan, giving informed consent.  The site of surgery properly noted/marked. The patient was taken to the  Operating Room , identified  and staff verified the following Procedure(s): Decompression first dorsal wrist compartment left wrist, excision ganglion cyst, release A1 pulley left ring finger and steroid injection basilar joint left thumb.      A Time Out was held and the above information confirmed.    The patient was placed lying supine.  The left arm was exsanguinated and the pneumatic tourniquet inflated to 250 mmHg. The hand was prepped and draped in standard fashion.   A longitudinal incision was made on the radial side of the left wrist.  The cutaneous nerves and vessels were retracted and the sheath of the first dorsal wrist compartment opened to expose the inflamed tenosynovium of the abductor pollicis longus & extensor pollicis brevis tendons in the separate compartments.  The roof of the compartment and the ganglion cyst were excised to decompress the tendons and then the skin repaired with 5-0 Vicryl.  A transverse incision was made in the distal flexion crease over the flexor tendons of the ring finger.  The A1 pulley was excised and the first part of the A2 pulley incised to decompress the flexor tendons.  This was repaired with 5-0 nylon.  Kenalog 40 mg per mill was mixed with half percent plain Marcaine and injected into  the trapeziometacarpal joint of the left thumb.  The hand was dressed with Mastisol and Steri-Strips sterile gauze and a 2 inch Ace bandage. At the end of the operation, all sponge, instrument, and needle counts were correct.    Findings:  Tight first dorsal wrist compartment, ganglion cyst, trigger finger, osteoarthritis    Estimated Blood Loss:  None           Specimens: None           Complications:  None; patient tolerated the procedure well.           Disposition: PACU - hemodynamically stable.           Condition: stable    Attending Attestation: I was present and scrubbed for the entire procedure.    Caleb Bueno MD

## 2020-09-23 NOTE — ANESTHESIA PROCEDURE NOTES
Airway  Urgency: elective    Date/Time: 9/23/2020 10:09 AM  Airway not difficult    General Information and Staff    Patient location during procedure: OR  Anesthesiologist: Paul Dumont MD  CRNA: Jie Herrera CRNA    Indications and Patient Condition  Indications for airway management: airway protection    Preoxygenated: yes  Mask difficulty assessment: 1 - vent by mask    Final Airway Details  Final airway type: supraglottic airway      Successful airway: classic  Size 4    Number of attempts at approach: 1  Assessment: lips, teeth, and gum same as pre-op    Additional Comments  PreO2, IV induction, easy mask, LMA placed w/o difficulty, cuff air placed, secured in placed, EBBSH, +etCO2, atraumatic, teeth and lips as preop.

## 2020-09-23 NOTE — ANESTHESIA POSTPROCEDURE EVALUATION
Patient: Sasha Barrett    Procedure Summary     Date: 09/23/20 Room / Location:  [facility]  /  [facility]    Anesthesia Start: 1001 Anesthesia Stop: 1051    Procedures:       DEQUERVAIN RELEASE LEFT HAND (Left Hand)      EXCISION GANGLION CYST LEFT WRIST (Left Wrist)      RELEASE LEFT FOURTH TRIGGER FINGER (Left Fingers)      KENOLOG INJECTION TO LEFT THUMB (Left ) Diagnosis: (Left de Quervain's, left trigger finger, ganglion cysts, osteoarthritis CMC joint)    Surgeon: Caleb Bueno MD Provider: Paul Dumont MD    Anesthesia Type: general ASA Status: 3          Anesthesia Type: general    Vitals  Vitals Value Taken Time   /89 09/23/20 1130   Temp 36.6 °C (97.8 °F) 09/23/20 1048   Pulse 70 09/23/20 1135   Resp 16 09/23/20 1130   SpO2 100 % 09/23/20 1135   Vitals shown include unvalidated device data.        Post Anesthesia Care and Evaluation    Patient location during evaluation: bedside  Patient participation: complete - patient participated  Level of consciousness: awake and alert  Pain management: adequate  Airway patency: patent  Anesthetic complications: No anesthetic complications  PONV Status: controlled  Cardiovascular status: blood pressure returned to baseline and acceptable  Respiratory status: acceptable  Hydration status: acceptable

## 2020-09-23 NOTE — BRIEF OP NOTE
DEQUERVAIN RELEASE, WRIST GANGLION EXCISION, FINGER TRIGGER RELEASE, INJECTION OF MEDICATION  Progress Note    Sasha Barrett  9/23/2020    Pre-op Diagnosis:   Left de Quervain's, left trigger finger, ganglion cysts, osteoarthritis CMC joint       Post-Op Diagnosis Codes:  Same    Procedure/CPT® Codes:        Procedure(s):  DEQUERVAIN RELEASE LEFT HAND  EXCISION GANGLION CYST LEFT WRIST  RELEASE LEFT FOURTH TRIGGER FINGER  KENOLOG INJECTION TO LEFT THUMB    Surgeon(s):  Caleb Bueno MD    Anesthesia: General    Staff:   Circulator: Rima Daniel RN; Alexa Watson RN  Scrub Person: Crystal Lora         Estimated Blood Loss: none    Urine Voided: * No values recorded between 9/23/2020 10:01 AM and 9/23/2020 10:39 AM *    Specimens:                None          Drains: * No LDAs found *    Findings: Tight sheath first dorsal wrist compartment with frayed EPB tendon.  Ganglion cyst left radial wrist.  Trigger finger left fourth finger.  Osteoarthritis CMC joint left thumb    Complications: None          Caleb Bueno MD     Date: 9/23/2020  Time: 10:39 EDT

## 2020-11-11 ENCOUNTER — OFFICE VISIT (OUTPATIENT)
Dept: CARDIOLOGY | Age: 80
End: 2020-11-11

## 2020-11-11 VITALS
HEIGHT: 63 IN | BODY MASS INDEX: 34.55 KG/M2 | DIASTOLIC BLOOD PRESSURE: 79 MMHG | WEIGHT: 195 LBS | HEART RATE: 79 BPM | SYSTOLIC BLOOD PRESSURE: 137 MMHG

## 2020-11-11 DIAGNOSIS — R00.2 PALPITATIONS: Primary | ICD-10-CM

## 2020-11-11 DIAGNOSIS — R53.83 FATIGUE, UNSPECIFIED TYPE: ICD-10-CM

## 2020-11-11 DIAGNOSIS — R07.2 PRECORDIAL PAIN: ICD-10-CM

## 2020-11-11 DIAGNOSIS — I10 ESSENTIAL HYPERTENSION: ICD-10-CM

## 2020-11-11 PROCEDURE — 93000 ELECTROCARDIOGRAM COMPLETE: CPT | Performed by: INTERNAL MEDICINE

## 2020-11-11 PROCEDURE — 99214 OFFICE O/P EST MOD 30 MIN: CPT | Performed by: INTERNAL MEDICINE

## 2020-11-11 RX ORDER — BISOPROLOL FUMARATE AND HYDROCHLOROTHIAZIDE 5; 6.25 MG/1; MG/1
1 TABLET ORAL DAILY
Qty: 30 TABLET | Refills: 6 | Status: SHIPPED | OUTPATIENT
Start: 2020-11-11 | End: 2020-12-09 | Stop reason: SDUPTHER

## 2020-11-19 ENCOUNTER — HOSPITAL ENCOUNTER (OUTPATIENT)
Dept: CARDIOLOGY | Facility: HOSPITAL | Age: 80
Discharge: HOME | End: 2020-11-19

## 2020-11-19 ENCOUNTER — HOSPITAL ENCOUNTER (OUTPATIENT)
Dept: CARDIOLOGY | Facility: HOSPITAL | Age: 80
Discharge: HOME | End: 2020-11-19
Admitting: INTERNAL MEDICINE

## 2020-11-19 VITALS
OXYGEN SATURATION: 98 % | BODY MASS INDEX: 34.55 KG/M2 | SYSTOLIC BLOOD PRESSURE: 180 MMHG | HEIGHT: 63 IN | RESPIRATION RATE: 18 BRPM | DIASTOLIC BLOOD PRESSURE: 80 MMHG | WEIGHT: 195 LBS | HEART RATE: 56 BPM

## 2020-11-19 VITALS
HEART RATE: 70 BPM | HEIGHT: 63 IN | WEIGHT: 195 LBS | SYSTOLIC BLOOD PRESSURE: 148 MMHG | DIASTOLIC BLOOD PRESSURE: 91 MMHG | BODY MASS INDEX: 34.55 KG/M2

## 2020-11-19 LAB
AORTIC DIMENSIONLESS INDEX: 0.8 (DI)
BH CV ECHO MEAS - AO MAX PG (FULL): 3.3 MMHG
BH CV ECHO MEAS - AO MAX PG: 7.3 MMHG
BH CV ECHO MEAS - AO MEAN PG (FULL): 1.7 MMHG
BH CV ECHO MEAS - AO MEAN PG: 3.8 MMHG
BH CV ECHO MEAS - AO V2 MAX: 134.7 CM/SEC
BH CV ECHO MEAS - AO V2 MEAN: 90.6 CM/SEC
BH CV ECHO MEAS - AO V2 VTI: 28.1 CM
BH CV ECHO MEAS - AVA(I,A): 2 CM^2
BH CV ECHO MEAS - AVA(I,D): 2 CM^2
BH CV ECHO MEAS - AVA(V,A): 1.8 CM^2
BH CV ECHO MEAS - AVA(V,D): 1.8 CM^2
BH CV ECHO MEAS - BSA(HAYCOCK): 2 M^2
BH CV ECHO MEAS - BSA: 1.9 M^2
BH CV ECHO MEAS - BZI_BMI: 34.5 KILOGRAMS/M^2
BH CV ECHO MEAS - BZI_METRIC_HEIGHT: 160 CM
BH CV ECHO MEAS - BZI_METRIC_WEIGHT: 88.5 KG
BH CV ECHO MEAS - EDV(CUBED): 25.8 ML
BH CV ECHO MEAS - EDV(MOD-SP2): 52 ML
BH CV ECHO MEAS - EDV(MOD-SP4): 66 ML
BH CV ECHO MEAS - EDV(TEICH): 33.7 ML
BH CV ECHO MEAS - EF(CUBED): 71.6 %
BH CV ECHO MEAS - EF(MOD-BP): 61.3 %
BH CV ECHO MEAS - EF(MOD-SP2): 57.7 %
BH CV ECHO MEAS - EF(MOD-SP4): 60.6 %
BH CV ECHO MEAS - EF(TEICH): 65 %
BH CV ECHO MEAS - ESV(CUBED): 7.3 ML
BH CV ECHO MEAS - ESV(MOD-SP2): 22 ML
BH CV ECHO MEAS - ESV(MOD-SP4): 26 ML
BH CV ECHO MEAS - ESV(TEICH): 11.8 ML
BH CV ECHO MEAS - FS: 34.3 %
BH CV ECHO MEAS - IVS/LVPW: 0.98
BH CV ECHO MEAS - IVSD: 1.2 CM
BH CV ECHO MEAS - LAT PEAK E' VEL: 6.9 CM/SEC
BH CV ECHO MEAS - LV DIASTOLIC VOL/BSA (35-75): 34.5 ML/M^2
BH CV ECHO MEAS - LV MASS(C)D: 103.6 GRAMS
BH CV ECHO MEAS - LV MASS(C)DI: 54.2 GRAMS/M^2
BH CV ECHO MEAS - LV MAX PG: 3.9 MMHG
BH CV ECHO MEAS - LV MEAN PG: 2.1 MMHG
BH CV ECHO MEAS - LV SYSTOLIC VOL/BSA (12-30): 13.6 ML/M^2
BH CV ECHO MEAS - LV V1 MAX: 99 CM/SEC
BH CV ECHO MEAS - LV V1 MEAN: 67.9 CM/SEC
BH CV ECHO MEAS - LV V1 VTI: 23 CM
BH CV ECHO MEAS - LVIDD: 3 CM
BH CV ECHO MEAS - LVIDS: 1.9 CM
BH CV ECHO MEAS - LVLD AP2: 6.2 CM
BH CV ECHO MEAS - LVLD AP4: 7.1 CM
BH CV ECHO MEAS - LVLS AP2: 5.4 CM
BH CV ECHO MEAS - LVLS AP4: 5.7 CM
BH CV ECHO MEAS - LVOT AREA (M): 2.5 CM^2
BH CV ECHO MEAS - LVOT AREA: 2.5 CM^2
BH CV ECHO MEAS - LVOT DIAM: 1.8 CM
BH CV ECHO MEAS - LVPWD: 1.2 CM
BH CV ECHO MEAS - MED PEAK E' VEL: 6 CM/SEC
BH CV ECHO MEAS - MV A DUR: 0.14 SEC
BH CV ECHO MEAS - MV A MAX VEL: 82.5 CM/SEC
BH CV ECHO MEAS - MV DEC SLOPE: 213 CM/SEC^2
BH CV ECHO MEAS - MV DEC TIME: 268 SEC
BH CV ECHO MEAS - MV E MAX VEL: 50.9 CM/SEC
BH CV ECHO MEAS - MV E/A: 0.62
BH CV ECHO MEAS - MV MAX PG: 3.2 MMHG
BH CV ECHO MEAS - MV MEAN PG: 1 MMHG
BH CV ECHO MEAS - MV P1/2T MAX VEL: 79.3 CM/SEC
BH CV ECHO MEAS - MV P1/2T: 109 MSEC
BH CV ECHO MEAS - MV V2 MAX: 90.1 CM/SEC
BH CV ECHO MEAS - MV V2 MEAN: 45.7 CM/SEC
BH CV ECHO MEAS - MV V2 VTI: 25.3 CM
BH CV ECHO MEAS - MVA P1/2T LCG: 2.8 CM^2
BH CV ECHO MEAS - MVA(P1/2T): 2 CM^2
BH CV ECHO MEAS - MVA(VTI): 2.3 CM^2
BH CV ECHO MEAS - PA ACC TIME: 0.08 SEC
BH CV ECHO MEAS - PA MAX PG (FULL): 1.5 MMHG
BH CV ECHO MEAS - PA MAX PG: 3.4 MMHG
BH CV ECHO MEAS - PA PR(ACCEL): 42.6 MMHG
BH CV ECHO MEAS - PA V2 MAX: 92.3 CM/SEC
BH CV ECHO MEAS - PULM A REVS DUR: 0.12 SEC
BH CV ECHO MEAS - PULM A REVS VEL: 26.7 CM/SEC
BH CV ECHO MEAS - PULM DIAS VEL: 31 CM/SEC
BH CV ECHO MEAS - PULM S/D: 1.3
BH CV ECHO MEAS - PULM SYS VEL: 41.2 CM/SEC
BH CV ECHO MEAS - RAP SYSTOLE: 3 MMHG
BH CV ECHO MEAS - RV MAX PG: 1.9 MMHG
BH CV ECHO MEAS - RV MEAN PG: 1 MMHG
BH CV ECHO MEAS - RV V1 MAX: 68.4 CM/SEC
BH CV ECHO MEAS - RV V1 MEAN: 46.7 CM/SEC
BH CV ECHO MEAS - RV V1 VTI: 16.3 CM
BH CV ECHO MEAS - RVSP: 22 MMHG
BH CV ECHO MEAS - SI(CUBED): 9.7 ML/M^2
BH CV ECHO MEAS - SI(LVOT): 29.7 ML/M^2
BH CV ECHO MEAS - SI(MOD-SP2): 15.7 ML/M^2
BH CV ECHO MEAS - SI(MOD-SP4): 20.9 ML/M^2
BH CV ECHO MEAS - SI(TEICH): 11.5 ML/M^2
BH CV ECHO MEAS - SV(CUBED): 18.5 ML
BH CV ECHO MEAS - SV(LVOT): 56.9 ML
BH CV ECHO MEAS - SV(MOD-SP2): 30 ML
BH CV ECHO MEAS - SV(MOD-SP4): 40 ML
BH CV ECHO MEAS - SV(TEICH): 21.9 ML
BH CV ECHO MEAS - TAPSE (>1.6): 1.8 CM
BH CV ECHO MEAS - TR MAX PG: 19 MMHG
BH CV ECHO MEAS - TR MAX VEL: 219.4 CM/SEC
BH CV ECHO MEASUREMENTS AVERAGE E/E' RATIO: 7.89
BH CV STRESS BP STAGE 1: NORMAL
BH CV STRESS COMMENTS STAGE 1: NORMAL
BH CV STRESS DOSE REGADENOSON STAGE 1: 0.4
BH CV STRESS DURATION MIN STAGE 1: 1
BH CV STRESS DURATION SEC STAGE 1: 0
BH CV STRESS HR STAGE 1: 72
BH CV STRESS O2 STAGE 1: 100
BH CV STRESS PROTOCOL 1: NORMAL
BH CV STRESS RECOVERY BP: NORMAL MMHG
BH CV STRESS RECOVERY HR: 68 BPM
BH CV STRESS RECOVERY O2: 99 %
BH CV STRESS STAGE 1: 1
BH CV XLRA - RV BASE: 3.4 CM
BH CV XLRA - RV MID: 3 CM
BH CV XLRA - TDI S': 10.8 CM/SEC
LEFT ATRIUM VOLUME INDEX: 17.6 ML/M2
LV EF NUC BP: 60 %
MAXIMAL PREDICTED HEART RATE: 138 BPM
MAXIMAL PREDICTED HEART RATE: 138 BPM
PERCENT MAX PREDICTED HR: 52.17 %
STRESS BASELINE BP: NORMAL MMHG
STRESS BASELINE HR: 60 BPM
STRESS O2 SAT REST: 98 %
STRESS PERCENT HR: 61 %
STRESS POST ESTIMATED WORKLOAD: 1 METS
STRESS POST EXERCISE DUR MIN: 1 MIN
STRESS POST EXERCISE DUR SEC: 0 SEC
STRESS POST O2 SAT PEAK: 100 %
STRESS POST PEAK BP: NORMAL MMHG
STRESS POST PEAK HR: 72 BPM
STRESS TARGET HR: 117 BPM
STRESS TARGET HR: 117 BPM

## 2020-11-19 PROCEDURE — 25010000002 REGADENOSON 0.4 MG/5ML SOLUTION: Performed by: INTERNAL MEDICINE

## 2020-11-19 PROCEDURE — 93018 CV STRESS TEST I&R ONLY: CPT | Performed by: INTERNAL MEDICINE

## 2020-11-19 PROCEDURE — 93306 TTE W/DOPPLER COMPLETE: CPT

## 2020-11-19 PROCEDURE — 78452 HT MUSCLE IMAGE SPECT MULT: CPT | Performed by: INTERNAL MEDICINE

## 2020-11-19 PROCEDURE — 0 TECHNETIUM SESTAMIBI: Performed by: INTERNAL MEDICINE

## 2020-11-19 PROCEDURE — 93017 CV STRESS TEST TRACING ONLY: CPT

## 2020-11-19 PROCEDURE — 93306 TTE W/DOPPLER COMPLETE: CPT | Performed by: INTERNAL MEDICINE

## 2020-11-19 PROCEDURE — 25010000002 AMINOPHYLLINE PER 250 MG: Performed by: INTERNAL MEDICINE

## 2020-11-19 PROCEDURE — 93016 CV STRESS TEST SUPVJ ONLY: CPT | Performed by: INTERNAL MEDICINE

## 2020-11-19 PROCEDURE — 78452 HT MUSCLE IMAGE SPECT MULT: CPT

## 2020-11-19 PROCEDURE — A9500 TC99M SESTAMIBI: HCPCS | Performed by: INTERNAL MEDICINE

## 2020-11-19 RX ORDER — AMINOPHYLLINE DIHYDRATE 25 MG/ML
125 INJECTION, SOLUTION INTRAVENOUS ONCE
Status: COMPLETED | OUTPATIENT
Start: 2020-11-19 | End: 2020-11-19

## 2020-11-19 RX ADMIN — AMINOPHYLLINE 125 MG: 25 INJECTION, SOLUTION INTRAVENOUS at 11:29

## 2020-11-19 RX ADMIN — TECHNETIUM TC 99M SESTAMIBI 1 DOSE: 1 INJECTION INTRAVENOUS at 11:27

## 2020-11-19 RX ADMIN — TECHNETIUM TC 99M SESTAMIBI 1 DOSE: 1 INJECTION INTRAVENOUS at 08:05

## 2020-11-19 RX ADMIN — REGADENOSON 0.4 MG: 0.08 INJECTION, SOLUTION INTRAVENOUS at 11:27

## 2020-12-09 RX ORDER — BISOPROLOL FUMARATE AND HYDROCHLOROTHIAZIDE 5; 6.25 MG/1; MG/1
1 TABLET ORAL DAILY
Qty: 90 TABLET | Refills: 1 | Status: SHIPPED | OUTPATIENT
Start: 2020-12-09 | End: 2021-03-10

## 2020-12-09 NOTE — TELEPHONE ENCOUNTER
PT CURRENTLY RECEIVES BISOPROLOL-HYDROCHLOROTHIAZIDE (ZIAC) IN A ONE MONTH SUPPLY BUT INSURANCE PAYS FOR IT IN THREE MONTH INCREMENTS SO NEEDS PRESCRIPTION TRANSFERRED TO 90 DAY SUPPLY PLEASE.

## 2020-12-14 ENCOUNTER — OFFICE VISIT (OUTPATIENT)
Dept: CARDIOLOGY | Facility: CLINIC | Age: 80
End: 2020-12-14

## 2020-12-14 VITALS — HEIGHT: 63 IN | WEIGHT: 190 LBS | BODY MASS INDEX: 33.66 KG/M2

## 2020-12-14 DIAGNOSIS — I10 ESSENTIAL HYPERTENSION: Primary | ICD-10-CM

## 2020-12-14 DIAGNOSIS — R94.31 ABNORMAL EKG: ICD-10-CM

## 2020-12-14 DIAGNOSIS — E78.5 HYPERLIPIDEMIA LDL GOAL <70: ICD-10-CM

## 2020-12-14 PROCEDURE — 99442 PR PHYS/QHP TELEPHONE EVALUATION 11-20 MIN: CPT | Performed by: INTERNAL MEDICINE

## 2020-12-14 NOTE — PROGRESS NOTES
You have chosen to receive care through a telephone visit. Do you consent to use a telephone visit for your medical care today? Yes    RESULTS   Subjective:        Sasha Barrett is a 80 y.o. female who here for follow up    CC  Telephone for the results  HPI  80-year-old female with hyperlipidemia, abnormal EKG, benign essential arterial hypertension on telephone checkup for the results which are normal     Problems Addressed this Visit        Cardiovascular and Mediastinum    Hyperlipidemia LDL goal <70    Abnormal EKG    Essential hypertension - Primary      Diagnoses       Codes Comments    Essential hypertension    -  Primary ICD-10-CM: I10  ICD-9-CM: 401.9     Hyperlipidemia LDL goal <70     ICD-10-CM: E78.5  ICD-9-CM: 272.4     Abnormal EKG     ICD-10-CM: R94.31  ICD-9-CM: 794.31         .    The following portions of the patient's history were reviewed and updated as appropriate: allergies, current medications, past family history, past medical history, past social history, past surgical history and problem list.    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 She has never used smokeless tobacco. She reports current alcohol use. She reports that she does not use drugs.   Family History   Problem Relation Age of Onset   • Brain cancer Brother    • Alcohol abuse  Mother    • No Known Problems Father    • No Known Problems Sister    • Malig Hyperthermia Neg Hx        Review of Systems  Constitutional: No wt loss, fever, fatigue  Gastrointestinal: No nausea, abdominal pain  Behavioral/Psych: No insomnia or anxiety   Cardiovascular no chest pains or tightness in the chest  Objective:       Physical Exam    Telephone    Procedures      Echocardiogram:        Current Outpatient Medications:   •  albuterol (PROAIR HFA) 108 (90 Base) MCG/ACT inhaler, ProAir  (90 Base) MCG/ACT Inhalation Aerosol Solution; Patient Sig: ProAir  (90 Base) MCG/ACT Inhalation Aerosol Solution ; 8; 0; 18-Jun-2014; Active, Disp: , Rfl:   •  albuterol sulfate  (90 Base) MCG/ACT inhaler, Inhale 2 puffs Every 4 (Four) Hours As Needed for Wheezing., Disp: , Rfl:   •  bisoprolol-hydrochlorothiazide (Ziac) 5-6.25 MG per tablet, Take 1 tablet by mouth Daily., Disp: 90 tablet, Rfl: 1  •  budesonide (PULMICORT) 180 MCG/ACT inhaler, Inhale 1 puff 2 (Two) Times a Day., Disp: , Rfl:   •  butalbital-acetaminophen-caffeine (FIORICET, ESGIC) -40 MG per tablet, Take 2 tablets by mouth Every 4 (Four) Hours As Needed for Headache., Disp: 240 tablet, Rfl: 0  •  cetirizine (zyrTEC) 10 MG tablet, Take 10 mg by mouth Daily. Alt with singulair, Disp: , Rfl:   •  Cholecalciferol (VITAMIN D3) 1.25 MG (61580 UT) capsule, Take 50,000 Units by mouth 1 (One) Time Per Week., Disp: , Rfl:   •  ciprofloxacin-dexamethasone (CIPRODEX) 0.3-0.1 % otic suspension, Administer 4 drops into both ears 2 (Two) Times a Day., Disp: , Rfl:   •  cloNIDine (CATAPRES) 0.3 MG tablet, Take 0.3 mg by mouth 3 (Three) Times a Day. Pt states she can take and extra 1 if B/P elevated, Disp: , Rfl:   •  diphenhydrAMINE (BENADRYL) 50 MG tablet, Take 1 tablet by mouth every 4 (four) hours as needed for itching or allergies., Disp: 180 tablet, Rfl: 0  •  eplerenone (INSPRA) 50 MG tablet, Take 1 tablet by mouth Every 12 (Twelve) Hours.,  Disp: 180 tablet, Rfl: 3  •  ethacrynic acid (EDECRIN) 25 MG tablet, Take 25 mg by mouth 3 (Three) Times a Day., Disp: , Rfl:   •  fluticasone (FLONASE) 50 MCG/ACT nasal spray, 1 spray by Each Nare route 2 (Two) Times a Day., Disp: , Rfl:   •  hydrOXYzine (ATARAX) 50 MG tablet, TAKE ONE TABLET BY MOUTH EVERY 6 HOURS AS NEEDED FOR SEVERE ITCHING AND ALLERGIES, Disp: , Rfl:   •  ipratropium (ATROVENT) 0.02 % nebulizer solution, Take 500 mcg by nebulization 4 (Four) Times a Day., Disp: , Rfl:   •  KP KETOTIFEN FUMARATE OP, Apply 2 drops to eye(s) as directed by provider As Needed., Disp: , Rfl:   •  montelukast (SINGULAIR) 10 MG tablet, Take 1 tablet by mouth every night. (Patient taking differently: Take 10 mg by mouth Every Night. Alt with zyrtec), Disp: 90 tablet, Rfl: 1  •  NIFEdipine XL (PROCARDIA XL) 60 MG 24 hr tablet, TAKE ONE TABLET TWICE DAILY, Disp: 180 tablet, Rfl: 0  •  ondansetron ODT (ZOFRAN-ODT) 8 MG disintegrating tablet, Take 1 tablet by mouth every 6 (six) hours as needed for nausea or vomiting., Disp: 360 tablet, Rfl: 1  •  pentazocine-naloxone (TALWIN NX) 50-0.5 MG per tablet, Take 1 tablet by mouth Every 4 (Four) Hours As Needed for Mild Pain  (for up to 90 days)., Disp: , Rfl:   •  polyethylene glycol (MIRALAX) powder, Take one capful w/liquid daily (Patient taking differently: Take 17 g by mouth 4 (Four) Times a Week. Take one capful w/liquid daily ), Disp: 765 g, Rfl: 1  •  potassium chloride (K-DUR) 10 MEQ CR tablet, Take 10 mEq by mouth Daily., Disp: , Rfl:   •  predniSONE (DELTASONE) 10 MG tablet, Take 1 tablet by mouth Every Other Day., Disp: 15 tablet, Rfl:   •  promethazine-codeine (PHENERGAN with CODEINE) 6.25-10 MG/5ML syrup, Take 5 mL by mouth 4 (Four) Times a Day As Needed for cough. (Patient taking differently: Take 15 mL by mouth 4 (Four) Times a Day As Needed for Cough.), Disp: 360 mL, Rfl: 0  •  rivaroxaban (XARELTO) 10 MG tablet, Take 10 mg by mouth Daily., Disp: , Rfl:   •   rosuvastatin (CRESTOR) 5 MG tablet, Take 5 mg by mouth Every Morning., Disp: , Rfl:   •  vitamin B-12 (VITAMIN B-12) 1000 MCG tablet, Take 1 tablet by mouth Daily., Disp: 90 tablet, Rfl: 3   Assessment:        Patient Active Problem List   Diagnosis   • Joint pain   • Urinary retention self-caths (Don)   • Conjunctivitis   • Arm pain   • Cervical disc displacement   • Cervical pain (Servando=PM)   • DDD (degenerative disc disease), cervical   • Hyperlipidemia LDL goal <70   • Preventative health care   • Medication management   • Proctocele   • Urge incontinence of urine   • Chronic tension-type headache, intractable   • Obesity (BMI 30.0-34.9)   • H/o Lyme disease   • Stage 3 chronic kidney disease (CMS/[facility])   • Cerebral atherosclerosis   • Allergic contact dermatitis   • Malaise and fatigue progressive   • Vertigo   • Vitamin D deficiency   • Moderate persistent asthmatic bronchitis without complication   • Anterior cervical adenopathy due to infection   • Pleurisy   • Knee pain, bilateral   • Elevated PTH level   • 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)   • Gastroesophageal reflux disease with esophagitis   • Psoriasis (Darryl Ochoa)   • Postmenopausal   • Recurrent UTI   • CVA (cerebral vascular accident) (CMS/[facility])   • Dyspnea on exertion   • Precordial pain   • Abnormal EKG   • Arthralgia   • Accelerated hypertension   • Acute joint pain   • Easy bruisability   • Cough productive of purulent sputum   • Influenza A Subtype H3   • 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  · Findings consistent with a normal ECG stress test.  · Left ventricular ejection fraction is normal. (Calculated EF = 60%).  · Myocardial perfusion imaging indicates a normal myocardial perfusion study with no evidence of ischemia.  · Impressions are consistent with a low risk study.  · THIN APEX , NORMAL VARIANT     Interpretation Summary    · Estimated right ventricular systolic pressure from tricuspid regurgitation is normal (<35 mmHg).  · Calculated left ventricular EF = 61.3% Estimated left ventricular EF was in agreement with the calculated left ventricular EF.  · Left ventricular diastolic function was normal.  · Calculated left ventricular EF = 61.3%  · There is no evidence of pericardial effusion. .        Interpretation Summary    · An abnormal monitor study.  · NSR WITH FREQUENT PACS, PVCS, NSSVTS                 Plan:            ICD-10-CM ICD-9-CM   1. Essential hypertension  I10 401.9   2. Hyperlipidemia LDL goal <70  E78.5 272.4   3. Abnormal EKG  R94.31 794.31     1. Essential hypertension  Under control    2. Hyperlipidemia LDL goal <70  Continue current treatment    3. Abnormal EKG  Work-up is negative       Specificity and sensitivity of the stress test/ cardiac workup has been explained. Pt has been explained if  Symptoms continue please go to ER, and further w/p will be required.    Also explained this does not rule out coronary artery disease or the future events, continue to emphasize on risk reductions for coronary artery disease    Pt also advised to contact PCP for other causes of symptoms      6MONTHS    This patient has consented to a telehealth visit via telephone. The visit was scheduled as a telephone visit to comply with patient safety  concerns in accordance with CDC recommendations.  All vitals recorded within this visit are reported by the patient.  I spent 12 minutes in total including but not limited to the 12 minutes spent in direct conversation with this patient.     COUNSELING:    Sasha Gilliam was given to patient for the following topics: diagnostic results, risk factor reductions, impressions, risks and benefits of treatment options and importance of treatment compliance .       SMOKING COUNSELING:    [unfilled]    Dictated using Dragon dictation

## 2020-12-18 ENCOUNTER — TRANSCRIBE ORDERS (OUTPATIENT)
Dept: ENDOCRINOLOGY | Age: 80
End: 2020-12-18

## 2020-12-18 DIAGNOSIS — E21.3 HYPERPARATHYROIDISM (HCC): Primary | ICD-10-CM

## 2021-01-15 ENCOUNTER — TELEPHONE (OUTPATIENT)
Dept: ORTHOPEDIC SURGERY | Facility: CLINIC | Age: 81
End: 2021-01-15

## 2021-01-15 ENCOUNTER — OFFICE VISIT (OUTPATIENT)
Dept: ORTHOPEDIC SURGERY | Facility: CLINIC | Age: 81
End: 2021-01-15

## 2021-01-15 VITALS — HEIGHT: 63 IN | WEIGHT: 190 LBS | BODY MASS INDEX: 33.66 KG/M2 | TEMPERATURE: 97.2 F

## 2021-01-15 DIAGNOSIS — M77.11 LATERAL EPICONDYLITIS OF RIGHT ELBOW: ICD-10-CM

## 2021-01-15 DIAGNOSIS — M19.011 PRIMARY OSTEOARTHRITIS OF RIGHT SHOULDER: Primary | ICD-10-CM

## 2021-01-15 PROCEDURE — 20610 DRAIN/INJ JOINT/BURSA W/O US: CPT | Performed by: NURSE PRACTITIONER

## 2021-01-15 PROCEDURE — 20605 DRAIN/INJ JOINT/BURSA W/O US: CPT | Performed by: NURSE PRACTITIONER

## 2021-01-15 RX ORDER — METHYLPREDNISOLONE ACETATE 80 MG/ML
80 INJECTION, SUSPENSION INTRA-ARTICULAR; INTRALESIONAL; INTRAMUSCULAR; SOFT TISSUE
Status: COMPLETED | OUTPATIENT
Start: 2021-01-15 | End: 2021-01-15

## 2021-01-15 RX ORDER — TRAMADOL HYDROCHLORIDE 50 MG/1
50 TABLET ORAL EVERY 8 HOURS PRN
Qty: 24 TABLET | Refills: 0 | Status: ON HOLD | OUTPATIENT
Start: 2021-01-15 | End: 2022-05-25

## 2021-01-15 RX ADMIN — METHYLPREDNISOLONE ACETATE 80 MG: 80 INJECTION, SUSPENSION INTRA-ARTICULAR; INTRALESIONAL; INTRAMUSCULAR; SOFT TISSUE at 16:03

## 2021-01-15 RX ADMIN — METHYLPREDNISOLONE ACETATE 80 MG: 80 INJECTION, SUSPENSION INTRA-ARTICULAR; INTRALESIONAL; INTRAMUSCULAR; SOFT TISSUE at 16:02

## 2021-01-15 NOTE — PROGRESS NOTES
Ms. Barrett comes in today for follow-up.  Injections have worked well in the past.  The patient would like to get repeat injections today.      The risks, benefits and alternatives were discussed and the patient consented.  I agreed to give her a short course of Tramadol until she can follow up with me for a left shoulder injection.  We discussed the risks of this medication.  She understands this is a one time prescription.  Going forward, the patient will follow-up as needed.    AC Iyer    01/15/2021      Medium Joint Arthrocentesis tennis elbow  Date/Time: 1/15/2021 4:02 PM  Consent given by: patient  Site marked: site marked  Timeout: Immediately prior to procedure a time out was called to verify the correct patient, procedure, equipment, support staff and site/side marked as required   Supporting Documentation  Indications: pain   Procedure Details  Location: elbow - Elbow joint: right lateral epicondyle.  Preparation: Patient was prepped and draped in the usual sterile fashion  Needle size: 25 G  Approach: posterolateral  Medications administered: 2 mL lidocaine (cardiac); 80 mg methylPREDNISolone acetate 80 MG/ML  Patient tolerance: patient tolerated the procedure well with no immediate complications    Large Joint Arthrocentesis: R glenohumeral  Date/Time: 1/15/2021 4:03 PM  Consent given by: patient  Site marked: site marked  Timeout: Immediately prior to procedure a time out was called to verify the correct patient, procedure, equipment, support staff and site/side marked as required   Supporting Documentation  Indications: pain   Procedure Details  Location: shoulder - R glenohumeral  Preparation: Patient was prepped and draped in the usual sterile fashion  Needle gauge: 21G.  Approach: anterior  Medications administered: 2 mL lidocaine (cardiac); 80 mg methylPREDNISolone acetate 80 MG/ML  Patient tolerance: patient tolerated the procedure well with no immediate complications

## 2021-02-02 ENCOUNTER — OFFICE VISIT (OUTPATIENT)
Dept: ENDOCRINOLOGY | Age: 81
End: 2021-02-02

## 2021-02-02 VITALS
HEART RATE: 65 BPM | OXYGEN SATURATION: 95 % | SYSTOLIC BLOOD PRESSURE: 120 MMHG | HEIGHT: 63 IN | BODY MASS INDEX: 36.11 KG/M2 | WEIGHT: 203.8 LBS | DIASTOLIC BLOOD PRESSURE: 80 MMHG

## 2021-02-02 DIAGNOSIS — E04.1 THYROID NODULE: Chronic | ICD-10-CM

## 2021-02-02 DIAGNOSIS — N25.81 SECONDARY HYPERPARATHYROIDISM (HCC): Primary | ICD-10-CM

## 2021-02-02 DIAGNOSIS — E55.9 VITAMIN D DEFICIENCY: ICD-10-CM

## 2021-02-02 PROCEDURE — 99205 OFFICE O/P NEW HI 60 MIN: CPT | Performed by: INTERNAL MEDICINE

## 2021-02-02 NOTE — ASSESSMENT & PLAN NOTE
Patient notes hx of a procedure regarding her thyroid  Will check thyroid function tests  5 mm colloid cyst with a solitary 0.9 cm right lobe thyroid nodule was noted on thyroid ultrasound in 2019

## 2021-02-02 NOTE — PROGRESS NOTES
"Chief Complaint  Abnormal Calcium    Subjective          Sasha Barrett presents to [facility] for   81-year-old female with past medical history thyroid nodule and hypercalcemia secondary to hyperparathyroidism in the past who now presents with elevated calcium and elevated PTH.    Hypercalcemia  No documentation of elevated calcium in the [facility] system. Patient notes that she has chronic constipation. She notes that she is thirsy frequently and drinks sprite and orange juice. She states about 16 cups a day she states. Her records also show that she is taking Ziac which also has HCTZ in it.        Hyperparathyroidism    Last document PTH level in the cahrt is from 2018 and it was 88, but her total calcium at the time was 8.5, but no albumin level was documented. Her most recent calcium levels are normal. Last vitamin D level was 29 and was documented in 2019      Thyroid nodule  Patient notes that she had a growth removed from her thyroid in 2009 with Dr. Farley. Thyroid ultrasound shows that she has subcentimeter cyst and a 0.9 cm hypoechoic lesion in the left thyroid.       Objective   Vital Signs:   /80   Pulse 65   Ht 160 cm (63\")   Wt 92.4 kg (203 lb 12.8 oz)   SpO2 95%   BMI 36.10 kg/m²     Physical Exam  Vitals signs reviewed.   Constitutional:       Appearance: Normal appearance. She is normal weight.   Eyes:      Extraocular Movements: Extraocular movements intact.      Conjunctiva/sclera: Conjunctivae normal.      Pupils: Pupils are equal, round, and reactive to light.   Neck:      Thyroid: No thyroid mass, thyromegaly or thyroid tenderness.      Vascular: No carotid bruit.      Comments: Scar noted at the base of the neck  Cardiovascular:      Rate and Rhythm: Normal rate and regular rhythm.   Abdominal:      General: Abdomen is flat. Bowel sounds are normal.      Palpations: Abdomen is soft.   Lymphadenopathy:      Cervical: No cervical adenopathy.   Skin:     " General: Skin is warm and dry.   Neurological:      General: No focal deficit present.      Mental Status: She is alert and oriented to person, place, and time.   Psychiatric:         Mood and Affect: Mood normal.         Behavior: Behavior normal.        Result Review :   The following data was reviewed by: Baldemar Altamirano MD on 02/02/2021:  CMP    CMP 9/16/20   BUN 19   Creatinine 0.92   eGFR Non African Am 58 (A)   Sodium 140   Potassium 3.9   Chloride 104   Calcium 9.7   (A) Abnormal value            CBC w/diff    CBC w/Diff 9/16/20   WBC 5.78   RBC 4.43   Hemoglobin 13.2   Hematocrit 39.5   MCV 89.2   MCH 29.8   MCHC 33.4   RDW 13.2   Platelets 174                   Electrolytes    Electrolytes 9/16/20   Sodium 140   Potassium 3.9   Chloride 104   Calcium 9.7           Renal Profile    Renal Profile 9/16/20   BUN 19   Creatinine 0.92   eGFR Non  Am 58 (A)   (A) Abnormal value                    Covid Tests    Common Labsle 9/21/20   COVID19 Not Detected      Comments are available for some flowsheets but are not being displayed.               Data reviewed: Labs and chart reviewed          Assessment and Plan    Problem List Items Addressed This Visit        Other    Vitamin D deficiency    Current Assessment & Plan     Labs from 2018 are consistent with secondary hyperparathyroidism  Her PCP notes that she has had prior parathyroid surgery in the past  Will check labs and get records from her PCP         Relevant Orders    PTH, Intact    Vitamin D 25 Hydroxy    TSH    Calcium, Ionized    Comprehensive Metabolic Panel    Magnesium    Phosphorus    Protein Electrophoresis, 24 Hr Urine - Urine, Clean Catch    Protein Elec + Interp, Serum    Vitamin D 1,25 Dihydroxy    PTH-related Peptide    T4, Free    Thyroid nodule    Relevant Orders    TSH    T4, Free    Secondary hyperparathyroidism (CMS/HCC) - Primary    Current Assessment & Plan     Labs from 2018 are consistent with secondary  hyperparathyroidism  Her PCP notes that she has had prior parathyroid surgery in the past  Will check labs and get records from her PCP           Relevant Orders    PTH, Intact    Vitamin D 25 Hydroxy    TSH    Calcium, Ionized    Comprehensive Metabolic Panel    Magnesium    Phosphorus    Protein Electrophoresis, 24 Hr Urine - Urine, Clean Catch    Protein Elec + Interp, Serum    Vitamin D 1,25 Dihydroxy    PTH-related Peptide    T4, Free        I spent 60 minutes caring for Sasha on this date of service. This time includes time spent by me in the following activities:preparing for the visit, reviewing tests, obtaining and/or reviewing a separately obtained history, performing a medically appropriate examination and/or evaluation , counseling and educating the patient/family/caregiver, ordering medications, tests, or procedures, referring and communicating with other health care professionals  and documenting information in the medical record  Follow Up   No follow-ups on file.  Patient was given instructions and counseling regarding her condition or for health maintenance advice. Please see specific information pulled into the AVS if appropriate.

## 2021-02-02 NOTE — PROGRESS NOTES
"Chief Complaint  Abnormal Calcium  NEW PATIENT / ABNORMAL CALCIUM  Subjective          Sasha Barrett presents to [facility] for   History of Present Illness    Objective   Vital Signs:   /80   Pulse 65   Ht 160 cm (63\")   Wt 92.4 kg (203 lb 12.8 oz)   SpO2 95%   BMI 36.10 kg/m²     Physical Exam   Result Review :                 Assessment and Plan    Problem List Items Addressed This Visit        Other    Vitamin D deficiency    Current Assessment & Plan     Labs from 2018 are consistent with secondary hyperparathyroidism  Her PCP notes that she has had prior parathyroid surgery in the past  Will check labs and get records from her PCP         Relevant Orders    PTH, Intact (Completed)    Vitamin D 25 Hydroxy (Completed)    TSH (Completed)    Calcium, Ionized (Completed)    Comprehensive Metabolic Panel (Completed)    Magnesium (Completed)    Phosphorus (Completed)    Protein Electrophoresis, 24 Hr Urine - Urine, Clean Catch    Protein Elec + Interp, Serum (Completed)    Vitamin D 1,25 Dihydroxy (Completed)    PTH-related Peptide (Completed)    T4, Free (Completed)    Thyroid nodule    Current Assessment & Plan     Patient notes hx of a procedure regarding her thyroid  Will check thyroid function tests  5 mm colloid cyst with a solitary 0.9 cm right lobe thyroid nodule was noted on thyroid ultrasound in 2019         Relevant Orders    TSH (Completed)    T4, Free (Completed)    Secondary hyperparathyroidism (CMS/HCC) - Primary    Current Assessment & Plan     Labs from 2018 are consistent with secondary hyperparathyroidism  Her PCP notes that she has had prior parathyroid surgery in the past  Will check labs and get records from her PCP           Relevant Orders    PTH, Intact (Completed)    Vitamin D 25 Hydroxy (Completed)    TSH (Completed)    Calcium, Ionized (Completed)    Comprehensive Metabolic Panel (Completed)    Magnesium (Completed)    Phosphorus (Completed)    " Protein Electrophoresis, 24 Hr Urine - Urine, Clean Catch    Protein Elec + Interp, Serum (Completed)    Vitamin D 1,25 Dihydroxy (Completed)    PTH-related Peptide (Completed)    T4, Free (Completed)          Follow Up   Return in about 3 months (around 5/2/2021).  Patient was given instructions and counseling regarding her condition or for health maintenance advice. Please see specific information pulled into the AVS if appropriate.     Labs from outside show a PTH of 254 with elevated calcium. Vitamin D was normal

## 2021-02-02 NOTE — ASSESSMENT & PLAN NOTE
Labs from 2018 are consistent with secondary hyperparathyroidism  Her PCP notes that she has had prior parathyroid surgery in the past  Will check labs and get records from her PCP

## 2021-02-05 ENCOUNTER — CLINICAL SUPPORT (OUTPATIENT)
Dept: ORTHOPEDIC SURGERY | Facility: CLINIC | Age: 81
End: 2021-02-05

## 2021-02-05 VITALS — WEIGHT: 203 LBS | BODY MASS INDEX: 35.97 KG/M2 | TEMPERATURE: 97.9 F | HEIGHT: 63 IN

## 2021-02-05 DIAGNOSIS — M19.012 PRIMARY OSTEOARTHRITIS OF LEFT SHOULDER: Primary | ICD-10-CM

## 2021-02-05 PROCEDURE — 73030 X-RAY EXAM OF SHOULDER: CPT | Performed by: NURSE PRACTITIONER

## 2021-02-05 PROCEDURE — 20610 DRAIN/INJ JOINT/BURSA W/O US: CPT | Performed by: NURSE PRACTITIONER

## 2021-02-05 RX ADMIN — METHYLPREDNISOLONE ACETATE 80 MG: 80 INJECTION, SUSPENSION INTRA-ARTICULAR; INTRALESIONAL; INTRAMUSCULAR; SOFT TISSUE at 16:11

## 2021-02-05 NOTE — PROGRESS NOTES
Mrs. Barrett comes in today for follow-up.  Injections have worked well in the past.  The patient would like to get a repeat injection today.      Imaging:  AP, scapular Y, and axillary views of the left shoulder are ordered by myself and reviewed to evaluate the patient's complaint.  These are compared to previous x-rays.  The x-rays show moderate glenohumeral and acromioclavicular joint osteoarthritis with joint space narrowing, osteophyte formation, and subchondral sclerosis.  There is superior migration of the humeral head with a slightly diminished acromiohumeral interval.  Retained vascular clips noted.  Overall, no significant changes compared to previous films.     We discussed conservative and surgical options for her.  Patient is not interested in a surgical intervention.  The risks, benefits and alternatives were discussed and the patient consented.  Going forward, the patient will follow-up as needed.    AC Iyer    02/05/2021      Large Joint Arthrocentesis: L glenohumeral  Date/Time: 2/5/2021 4:11 PM  Consent given by: patient  Site marked: site marked  Timeout: Immediately prior to procedure a time out was called to verify the correct patient, procedure, equipment, support staff and site/side marked as required   Supporting Documentation  Indications: pain   Procedure Details  Location: shoulder - L glenohumeral  Preparation: Patient was prepped and draped in the usual sterile fashion  Needle gauge: 21 G.  Approach: anterior  Medications administered: 2 mL lidocaine (cardiac); 80 mg methylPREDNISolone acetate 80 MG/ML  Patient tolerance: patient tolerated the procedure well with no immediate complications

## 2021-02-07 RX ORDER — METHYLPREDNISOLONE ACETATE 80 MG/ML
80 INJECTION, SUSPENSION INTRA-ARTICULAR; INTRALESIONAL; INTRAMUSCULAR; SOFT TISSUE
Status: COMPLETED | OUTPATIENT
Start: 2021-02-05 | End: 2021-02-05

## 2021-02-10 ENCOUNTER — TELEPHONE (OUTPATIENT)
Dept: ENDOCRINOLOGY | Age: 81
End: 2021-02-10

## 2021-02-10 LAB
ALBUMIN 24H MFR UR ELPH: 36.6 %
ALPHA1 GLOB 24H MFR UR ELPH: 5.2 %
ALPHA2 GLOB 24H MFR UR ELPH: 17.2 %
B-GLOBULIN 24H MFR UR ELPH: 29.4 %
GAMMA GLOB 24H MFR UR ELPH: 11.6 %
INTERPRETATION: NORMAL
LABORATORY COMMENT REPORT: NORMAL
Lab: NORMAL
M PROTEIN 24H MFR UR ELPH: NORMAL %
PROT 24H UR-MRATE: 84 MG/24HOURS (ref 30–150)
PROT UR-MCNC: 4.2 MG/DL

## 2021-02-10 NOTE — TELEPHONE ENCOUNTER
Dr cadet office is faxing lab over   ----- Message from Pinky Altamirano MD sent at 2/2/2021  8:52 AM EST -----  Regarding: Labs and records  Please see if you can get her labs from December and November form Dr. Óscar Cadet. We have no documentation of hypercalcemia and elevated PTH on our labs.

## 2021-02-14 LAB
1,25(OH)2D SERPL-MCNC: 41.7 PG/ML (ref 19.9–79.3)
25(OH)D3+25(OH)D2 SERPL-MCNC: 47.5 NG/ML (ref 30–100)
ALBUMIN SERPL ELPH-MCNC: 4 G/DL (ref 2.9–4.4)
ALBUMIN SERPL-MCNC: 4.4 G/DL (ref 3.5–5.2)
ALBUMIN/GLOB SERPL: 1.4 {RATIO} (ref 0.7–1.7)
ALBUMIN/GLOB SERPL: 1.8 G/DL
ALP SERPL-CCNC: 80 U/L (ref 39–117)
ALPHA1 GLOB SERPL ELPH-MCNC: 0.2 G/DL (ref 0–0.4)
ALPHA2 GLOB SERPL ELPH-MCNC: 0.8 G/DL (ref 0.4–1)
ALT SERPL-CCNC: 14 U/L (ref 1–33)
AST SERPL-CCNC: 17 U/L (ref 1–32)
B-GLOBULIN SERPL ELPH-MCNC: 1.2 G/DL (ref 0.7–1.3)
BILIRUB SERPL-MCNC: 0.2 MG/DL (ref 0–1.2)
BUN SERPL-MCNC: 23 MG/DL (ref 8–23)
BUN/CREAT SERPL: 22.5 (ref 7–25)
CA-I SERPL ISE-MCNC: 5.5 MG/DL (ref 4.5–5.6)
CALCIUM SERPL-MCNC: 10 MG/DL (ref 8.6–10.5)
CHLORIDE SERPL-SCNC: 103 MMOL/L (ref 98–107)
CO2 SERPL-SCNC: 30 MMOL/L (ref 22–29)
CREAT SERPL-MCNC: 1.02 MG/DL (ref 0.57–1)
GAMMA GLOB SERPL ELPH-MCNC: 0.6 G/DL (ref 0.4–1.8)
GLOBULIN SER CALC-MCNC: 2.4 GM/DL
GLOBULIN SER CALC-MCNC: 2.8 G/DL (ref 2.2–3.9)
GLUCOSE SERPL-MCNC: 104 MG/DL (ref 65–99)
INTERPRETATION: NORMAL
LABORATORY COMMENT REPORT: NORMAL
M PROTEIN SERPL ELPH-MCNC: NORMAL G/DL
MAGNESIUM SERPL-MCNC: 2.1 MG/DL (ref 1.6–2.4)
PHOSPHATE SERPL-MCNC: 2.8 MG/DL (ref 2.5–4.5)
POTASSIUM SERPL-SCNC: 4.2 MMOL/L (ref 3.5–5.2)
PROT PATTERN SERPL ELPH-IMP: NORMAL
PROT SERPL-MCNC: 6.8 G/DL (ref 6–8.5)
PTH RELATED PROT SERPL-SCNC: <2 PMOL/L
PTH-INTACT SERPL-MCNC: 73 PG/ML (ref 15–65)
SODIUM SERPL-SCNC: 140 MMOL/L (ref 136–145)
T4 FREE SERPL-MCNC: 1.06 NG/DL (ref 0.93–1.7)
TSH SERPL DL<=0.005 MIU/L-ACNC: 1.35 UIU/ML (ref 0.27–4.2)
UNABLE TO VOID: NORMAL

## 2021-02-17 DIAGNOSIS — E21.0 PRIMARY HYPERPARATHYROIDISM (HCC): Primary | ICD-10-CM

## 2021-03-01 ENCOUNTER — HOSPITAL ENCOUNTER (OUTPATIENT)
Dept: GENERAL RADIOLOGY | Facility: HOSPITAL | Age: 81
Discharge: HOME | End: 2021-03-01

## 2021-03-01 ENCOUNTER — TRANSCRIBE ORDERS (OUTPATIENT)
Dept: ADMINISTRATIVE | Facility: HOSPITAL | Age: 81
End: 2021-03-01

## 2021-03-01 ENCOUNTER — HOSPITAL ENCOUNTER (OUTPATIENT)
Dept: ULTRASOUND IMAGING | Facility: HOSPITAL | Age: 81
Discharge: HOME | End: 2021-03-01

## 2021-03-01 DIAGNOSIS — R06.02 SHORTNESS OF BREATH: ICD-10-CM

## 2021-03-01 DIAGNOSIS — E21.0 PRIMARY HYPERPARATHYROIDISM (HCC): ICD-10-CM

## 2021-03-01 DIAGNOSIS — R05.9 COUGH: ICD-10-CM

## 2021-03-01 PROCEDURE — 71046 X-RAY EXAM CHEST 2 VIEWS: CPT

## 2021-03-01 PROCEDURE — 76536 US EXAM OF HEAD AND NECK: CPT

## 2021-03-11 RX ORDER — BISOPROLOL FUMARATE AND HYDROCHLOROTHIAZIDE 5; 6.25 MG/1; MG/1
TABLET ORAL
Qty: 90 TABLET | Refills: 1 | Status: SHIPPED | OUTPATIENT
Start: 2021-03-11 | End: 2022-05-29 | Stop reason: HOSPADM

## 2021-03-11 NOTE — PROGRESS NOTES
They was no evidence of a parathyroid adenoma on ultrasound of your neck.    The was a very small nodule in your thyroid that is not medically concerning.    We can repeat your thyroid ultrasound 5 years from now for follow up.

## 2021-03-16 ENCOUNTER — IMMUNIZATION (OUTPATIENT)
Dept: VACCINE CLINIC | Facility: HOSPITAL | Age: 81
End: 2021-03-16

## 2021-03-16 PROCEDURE — 91300 HC SARSCOV02 VAC 30MCG/0.3ML IM: CPT | Performed by: OBSTETRICS & GYNECOLOGY

## 2021-03-16 PROCEDURE — 0001A: CPT | Performed by: OBSTETRICS & GYNECOLOGY

## 2021-04-06 ENCOUNTER — APPOINTMENT (OUTPATIENT)
Dept: VACCINE CLINIC | Facility: HOSPITAL | Age: 81
End: 2021-04-06

## 2021-04-23 ENCOUNTER — OFFICE VISIT (OUTPATIENT)
Dept: ORTHOPEDIC SURGERY | Facility: CLINIC | Age: 81
End: 2021-04-23

## 2021-04-23 VITALS — BODY MASS INDEX: 35.97 KG/M2 | HEIGHT: 63 IN | TEMPERATURE: 98.2 F | WEIGHT: 203 LBS

## 2021-04-23 DIAGNOSIS — M19.011 PRIMARY OSTEOARTHRITIS OF RIGHT SHOULDER: Primary | ICD-10-CM

## 2021-04-23 DIAGNOSIS — M77.11 LATERAL EPICONDYLITIS OF RIGHT ELBOW: ICD-10-CM

## 2021-04-23 PROCEDURE — 20551 NJX 1 TENDON ORIGIN/INSJ: CPT | Performed by: NURSE PRACTITIONER

## 2021-04-23 PROCEDURE — 20610 DRAIN/INJ JOINT/BURSA W/O US: CPT | Performed by: NURSE PRACTITIONER

## 2021-04-23 RX ORDER — CIPROFLOXACIN 500 MG/1
TABLET, FILM COATED ORAL
COMMUNITY
Start: 2021-03-01 | End: 2021-04-23

## 2021-04-23 RX ORDER — METHYLPREDNISOLONE ACETATE 80 MG/ML
80 INJECTION, SUSPENSION INTRA-ARTICULAR; INTRALESIONAL; INTRAMUSCULAR; SOFT TISSUE
Status: COMPLETED | OUTPATIENT
Start: 2021-04-23 | End: 2021-04-23

## 2021-04-23 RX ORDER — EPINEPHRINE 0.3 MG/.3ML
INJECTION SUBCUTANEOUS
COMMUNITY
Start: 2021-03-29

## 2021-04-23 RX ORDER — BENZONATATE 200 MG/1
CAPSULE ORAL EVERY 6 HOURS PRN
COMMUNITY
Start: 2021-03-02 | End: 2021-04-23

## 2021-04-23 RX ORDER — OMEPRAZOLE 40 MG/1
40 CAPSULE, DELAYED RELEASE ORAL DAILY
COMMUNITY
Start: 2021-02-23

## 2021-04-23 RX ORDER — LIDOCAINE HYDROCHLORIDE 20 MG/ML
2 INJECTION, SOLUTION EPIDURAL; INFILTRATION; INTRACAUDAL; PERINEURAL
Status: COMPLETED | OUTPATIENT
Start: 2021-04-23 | End: 2021-04-23

## 2021-04-23 RX ADMIN — METHYLPREDNISOLONE ACETATE 80 MG: 80 INJECTION, SUSPENSION INTRA-ARTICULAR; INTRALESIONAL; INTRAMUSCULAR; SOFT TISSUE at 14:21

## 2021-04-23 RX ADMIN — LIDOCAINE HYDROCHLORIDE 2 ML: 20 INJECTION, SOLUTION EPIDURAL; INFILTRATION; INTRACAUDAL; PERINEURAL at 14:21

## 2021-04-23 NOTE — PROGRESS NOTES
Ms. Barrett comes in today for follow-up.  Injections have worked well in the past.  The patient would like to get repeat injections today.  The risks, benefits and alternatives were discussed and the patient consented.  Going forward, the patient will follow-up as needed.    AC Iyer    04/23/2021      Large Joint Arthrocentesis: R glenohumeral  Date/Time: 4/23/2021 2:21 PM  Consent given by: patient  Site marked: site marked  Timeout: Immediately prior to procedure a time out was called to verify the correct patient, procedure, equipment, support staff and site/side marked as required   Supporting Documentation  Indications: pain and joint swelling   Procedure Details  Location: shoulder - R glenohumeral  Preparation: Patient was prepped and draped in the usual sterile fashion  Needle gauge: 21g.  Approach: anterior  Medications administered: 80 mg methylPREDNISolone acetate 80 MG/ML; 2 mL lidocaine PF 2% 2 %  Patient tolerance: patient tolerated the procedure well with no immediate complications    Medium Joint Arthrocentesis: R elbow  Date/Time: 4/23/2021 2:21 PM  Consent given by: patient  Timeout: Immediately prior to procedure a time out was called to verify the correct patient, procedure, equipment, support staff and site/side marked as required   Supporting Documentation  Indications: pain   Procedure Details  Location: elbow - R elbow (Lateral epicondyle)  Preparation: Patient was prepped and draped in the usual sterile fashion  Needle size: 25 G  Approach: posterior  Medications administered: 80 mg methylPREDNISolone acetate 80 MG/ML; 2 mL lidocaine (cardiac)  Patient tolerance: patient tolerated the procedure well with no immediate complications

## 2021-05-07 ENCOUNTER — APPOINTMENT (OUTPATIENT)
Dept: BONE DENSITY | Facility: HOSPITAL | Age: 81
End: 2021-05-07

## 2021-07-01 ENCOUNTER — OFFICE VISIT (OUTPATIENT)
Dept: CARDIOLOGY | Facility: CLINIC | Age: 81
End: 2021-07-01

## 2021-07-01 VITALS
SYSTOLIC BLOOD PRESSURE: 161 MMHG | HEART RATE: 60 BPM | BODY MASS INDEX: 37.36 KG/M2 | DIASTOLIC BLOOD PRESSURE: 92 MMHG | WEIGHT: 203 LBS | HEIGHT: 62 IN

## 2021-07-01 DIAGNOSIS — R06.09 DYSPNEA ON EXERTION: ICD-10-CM

## 2021-07-01 DIAGNOSIS — R00.2 PALPITATIONS: ICD-10-CM

## 2021-07-01 DIAGNOSIS — R07.2 PRECORDIAL PAIN: ICD-10-CM

## 2021-07-01 DIAGNOSIS — E78.5 HYPERLIPIDEMIA LDL GOAL <70: ICD-10-CM

## 2021-07-01 DIAGNOSIS — R06.02 SHORTNESS OF BREATH: ICD-10-CM

## 2021-07-01 DIAGNOSIS — I10 ESSENTIAL HYPERTENSION: Primary | ICD-10-CM

## 2021-07-01 DIAGNOSIS — R94.31 ABNORMAL EKG: ICD-10-CM

## 2021-07-01 PROCEDURE — 99214 OFFICE O/P EST MOD 30 MIN: CPT | Performed by: INTERNAL MEDICINE

## 2021-07-01 NOTE — PROGRESS NOTES
6 MO FOLLOW UP   Subjective:        Sasha Barrett is a 81 y.o. female who here for follow up    CC  Follow-up shortness of breath precordial chest pains hypertension  HPI  81-year-old female with complaints of shortness of breath hyperlipidemia here for the follow-up with no complaints of chest pains tightness heaviness or the pressure sensation     Problems Addressed this Visit        Cardiac and Vasculature    Hyperlipidemia LDL goal <70    Dyspnea on exertion    Precordial pain    Abnormal EKG    Essential hypertension - Primary    Relevant Orders    Adult Transthoracic Echo Complete W/ Cont if Necessary Per Protocol    Palpitations      Other Visit Diagnoses     Shortness of breath         Relevant Orders    Adult Transthoracic Echo Complete W/ Cont if Necessary Per Protocol      Diagnoses       Codes Comments    Essential hypertension    -  Primary ICD-10-CM: I10  ICD-9-CM: 401.9     Shortness of breath      ICD-10-CM: R06.02  ICD-9-CM: 786.05     Abnormal EKG     ICD-10-CM: R94.31  ICD-9-CM: 794.31     Precordial pain     ICD-10-CM: R07.2  ICD-9-CM: 786.51     Palpitations     ICD-10-CM: R00.2  ICD-9-CM: 785.1     Hyperlipidemia LDL goal <70     ICD-10-CM: E78.5  ICD-9-CM: 272.4     Dyspnea on exertion     ICD-10-CM: R06.00  ICD-9-CM: 786.09         .    The following portions of the patient's history were reviewed and updated as appropriate: allergies, current medications, past family history, past medical history, past social history, past surgical history and problem list.    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 She has never used smokeless tobacco. She reports current alcohol use. She reports that she does not use drugs.   Family History   Problem Relation Age of Onset   • Brain cancer Brother    • Alcohol abuse Mother    • No Known Problems Father    • No Known Problems Sister    • Malig Hyperthermia Neg Hx        Review of Systems  Constitutional: No wt loss, fever, fatigue  Gastrointestinal: No nausea, abdominal pain  Behavioral/Psych: No insomnia or anxiety   Cardiovascular no chest pains or tightness in the chest  Objective:       Physical Exam  /92   Pulse 60   Ht 157.5 cm (62\")   Wt 92.1 kg (203 lb)   BMI 37.13 kg/m²   General appearance: No acute changes   Neck: Trachea midline; NECK, supple, no thyromegaly or lymphadenopathy   Lungs: Normal size and shape, normal breath sounds, equal distribution of air, no rales and rhonchi   CV: S1-S2 regular, no murmurs, no rub, no gallop   Abdomen: Soft, non-tender; no masses , no abnormal abdominal sounds   Extremities: No deformity , normal color , no peripheral edema   Skin: Normal temperature, turgor and texture; no rash, ulcers          Procedures      Echocardiogram:        Current Outpatient Medications:   •  albuterol (PROAIR HFA) 108 (90 Base) MCG/ACT inhaler, ProAir  (90 Base) MCG/ACT Inhalation Aerosol Solution; Patient Sig: ProAir  (90 Base) MCG/ACT Inhalation Aerosol Solution ; 8; 0; 18-Jun-2014; Active, Disp: , Rfl:   •  albuterol sulfate  (90 Base) MCG/ACT inhaler, Inhale 2 puffs Every 4 (Four) Hours As Needed for Wheezing., Disp: , Rfl:   •  bisoprolol-hydrochlorothiazide (ZIAC) 5-6.25 MG per tablet, TAKE ONE TABLET BY MOUTH EVERY DAY, Disp: 90 tablet, Rfl: 1  •  " budesonide (PULMICORT) 180 MCG/ACT inhaler, Inhale 1 puff 2 (Two) Times a Day., Disp: , Rfl:   •  butalbital-acetaminophen-caffeine (FIORICET, ESGIC) -40 MG per tablet, Take 2 tablets by mouth Every 4 (Four) Hours As Needed for Headache., Disp: 240 tablet, Rfl: 0  •  cetirizine (zyrTEC) 10 MG tablet, Take 10 mg by mouth Daily. Alt with singulair, Disp: , Rfl:   •  Cholecalciferol (VITAMIN D3) 1.25 MG (63201 UT) capsule, Take 50,000 Units by mouth 1 (One) Time Per Week., Disp: , Rfl:   •  ciprofloxacin-dexamethasone (CIPRODEX) 0.3-0.1 % otic suspension, Administer 4 drops into both ears 2 (Two) Times a Day., Disp: , Rfl:   •  cloNIDine (CATAPRES) 0.3 MG tablet, Take 0.3 mg by mouth 2 (two) times a day. Pt states she can take and extra 1 if B/P elevated, Disp: , Rfl:   •  diphenhydrAMINE (BENADRYL) 50 MG tablet, Take 1 tablet by mouth every 4 (four) hours as needed for itching or allergies., Disp: 180 tablet, Rfl: 0  •  EPINEPHrine (EPIPEN) 0.3 MG/0.3ML solution auto-injector injection, INJECT THE CONTENTS OF ONE PEN AS NEEDED. MAY REPEAT ONE TIME., Disp: , Rfl:   •  eplerenone (INSPRA) 50 MG tablet, Take 1 tablet by mouth Every 12 (Twelve) Hours., Disp: 180 tablet, Rfl: 3  •  ethacrynic acid (EDECRIN) 25 MG tablet, Take 25 mg by mouth 3 (Three) Times a Day., Disp: , Rfl:   •  fluticasone (FLONASE) 50 MCG/ACT nasal spray, 1 spray by Each Nare route 2 (Two) Times a Day., Disp: , Rfl:   •  hydrOXYzine (ATARAX) 50 MG tablet, TAKE ONE TABLET BY MOUTH EVERY 6 HOURS AS NEEDED FOR SEVERE ITCHING AND ALLERGIES, Disp: , Rfl:   •  ipratropium (ATROVENT) 0.02 % nebulizer solution, Take 500 mcg by nebulization 4 (Four) Times a Day., Disp: , Rfl:   •  KP KETOTIFEN FUMARATE OP, Apply 2 drops to eye(s) as directed by provider As Needed., Disp: , Rfl:   •  montelukast (SINGULAIR) 10 MG tablet, Take 1 tablet by mouth every night. (Patient taking differently: Take 10 mg by mouth Every Night. Alt with zyrtec), Disp: 90 tablet,  Rfl: 1  •  NIFEdipine XL (PROCARDIA XL) 60 MG 24 hr tablet, TAKE ONE TABLET TWICE DAILY, Disp: 180 tablet, Rfl: 0  •  omeprazole (priLOSEC) 40 MG capsule, Take 40 mg by mouth Daily., Disp: , Rfl:   •  ondansetron ODT (ZOFRAN-ODT) 8 MG disintegrating tablet, Take 1 tablet by mouth every 6 (six) hours as needed for nausea or vomiting., Disp: 360 tablet, Rfl: 1  •  pentazocine-naloxone (TALWIN NX) 50-0.5 MG per tablet, Take 1 tablet by mouth Every 4 (Four) Hours As Needed for Mild Pain  (for up to 90 days)., Disp: , Rfl:   •  polyethylene glycol (MIRALAX) powder, Take one capful w/liquid daily (Patient taking differently: Take 17 g by mouth 4 (Four) Times a Week. Take one capful w/liquid daily ), Disp: 765 g, Rfl: 1  •  potassium chloride (K-DUR) 10 MEQ CR tablet, Take 10 mEq by mouth Daily., Disp: , Rfl:   •  promethazine-codeine (PHENERGAN with CODEINE) 6.25-10 MG/5ML syrup, Take 5 mL by mouth 4 (Four) Times a Day As Needed for cough. (Patient taking differently: Take 15 mL by mouth 4 (Four) Times a Day As Needed for Cough.), Disp: 360 mL, Rfl: 0  •  rivaroxaban (XARELTO) 10 MG tablet, Take 10 mg by mouth Daily., Disp: , Rfl:   •  rosuvastatin (CRESTOR) 5 MG tablet, Take 5 mg by mouth Every Morning., Disp: , Rfl:   •  traMADol (ULTRAM) 50 MG tablet, Take 1 tablet by mouth Every 8 (Eight) Hours As Needed for Moderate Pain ., Disp: 24 tablet, Rfl: 0  •  vitamin B-12 (VITAMIN B-12) 1000 MCG tablet, Take 1 tablet by mouth Daily., Disp: 90 tablet, Rfl: 3   Assessment:        Patient Active Problem List   Diagnosis   • Joint pain   • Urinary retention self-caths (Don)   • Conjunctivitis   • Arm pain   • Cervical disc displacement   • Cervical pain (Servando=PM)   • DDD (degenerative disc disease), cervical   • Hyperlipidemia LDL goal <70   • Preventative health care   • Medication management   • Proctocele   • Urge incontinence of urine   • Chronic tension-type headache, intractable   • Obesity (BMI 30.0-34.9)   • H/o  Lyme disease   • Stage 3 chronic kidney disease (CMS/HCC)   • Cerebral atherosclerosis   • Allergic contact dermatitis   • Malaise and fatigue progressive   • Vertigo   • Vitamin D deficiency   • Moderate persistent asthmatic bronchitis without complication   • Anterior cervical adenopathy due to infection   • Pleurisy   • Knee pain, bilateral   • Elevated PTH level   • 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)   • Gastroesophageal reflux disease with esophagitis   • Psoriasis (Darryl Ochoa)   • Postmenopausal   • Recurrent UTI   • CVA (cerebral vascular accident) (CMS/HCC)   • Dyspnea on exertion   • Precordial pain   • Abnormal EKG   • Arthralgia   • Accelerated hypertension   • Acute joint pain   • Easy bruisability   • Cough productive of purulent sputum   • Influenza A Subtype H3   • 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[facility])   • Acute chest wall pain   • Decreased functional mobility and endurance since fall 12/20/17   • Essential hypertension   • 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 Essential hypertension  I10 401.9   2. Shortness of breath   R06.02 786.05   3. Abnormal EKG  R94.31 794.31   4. Precordial pain  R07.2 786.51   5. Palpitations  R00.2 785.1   6. Hyperlipidemia LDL goal <70  E78.5 272.4   7. Dyspnea on exertion  R06.00 786.09     1. Essential hypertension  Blood pressure under control  - Adult Transthoracic Echo Complete W/ Cont if Necessary Per Protocol;  Future    2. Shortness of breath   Considering patient's medical condition as well as the risk factors, patient will require echocardiogram for further evaluation for the LV function, four-chamber evaluation, including the pressures, valvular function and  pericardial disease and pericardial effusion    - Adult Transthoracic Echo Complete W/ Cont if Necessary Per Protocol; Future    3. Abnormal EKG  No chest    4. Precordial pain  No chest pain    5. Palpitations  Under control    6. Hyperlipidemia LDL goal <70  Con current meds    7. Dyspnea on exertion  better       1 YR WITH ECHO  COUNSELING:    Sasha Gilliam was given to patient for the following topics: diagnostic results, risk factor reductions, impressions, risks and benefits of treatment options and importance of treatment compliance .       SMOKING COUNSELING:    [unfilled]    Dictated using Dragon dictation

## 2021-10-04 ENCOUNTER — HOSPITAL ENCOUNTER (EMERGENCY)
Facility: HOSPITAL | Age: 81
Discharge: HOME | End: 2021-10-04
Attending: EMERGENCY MEDICINE | Admitting: EMERGENCY MEDICINE

## 2021-10-04 ENCOUNTER — APPOINTMENT (OUTPATIENT)
Dept: GENERAL RADIOLOGY | Facility: HOSPITAL | Age: 81
End: 2021-10-04

## 2021-10-04 ENCOUNTER — APPOINTMENT (OUTPATIENT)
Dept: CT IMAGING | Facility: HOSPITAL | Age: 81
End: 2021-10-04

## 2021-10-04 VITALS
RESPIRATION RATE: 18 BRPM | HEIGHT: 62 IN | HEART RATE: 59 BPM | BODY MASS INDEX: 38.64 KG/M2 | TEMPERATURE: 98.2 F | SYSTOLIC BLOOD PRESSURE: 116 MMHG | DIASTOLIC BLOOD PRESSURE: 97 MMHG | WEIGHT: 210 LBS | OXYGEN SATURATION: 97 %

## 2021-10-04 DIAGNOSIS — S22.32XA CLOSED FRACTURE OF ONE RIB OF LEFT SIDE, INITIAL ENCOUNTER: Primary | ICD-10-CM

## 2021-10-04 DIAGNOSIS — S00.83XA FOREHEAD CONTUSION, INITIAL ENCOUNTER: ICD-10-CM

## 2021-10-04 DIAGNOSIS — S80.01XA CONTUSION OF RIGHT KNEE, INITIAL ENCOUNTER: ICD-10-CM

## 2021-10-04 PROCEDURE — 70450 CT HEAD/BRAIN W/O DYE: CPT

## 2021-10-04 PROCEDURE — 71101 X-RAY EXAM UNILAT RIBS/CHEST: CPT

## 2021-10-04 PROCEDURE — 72125 CT NECK SPINE W/O DYE: CPT

## 2021-10-04 PROCEDURE — 73560 X-RAY EXAM OF KNEE 1 OR 2: CPT

## 2021-10-04 PROCEDURE — 99283 EMERGENCY DEPT VISIT LOW MDM: CPT

## 2021-10-04 RX ORDER — LIDOCAINE 50 MG/G
1 PATCH TOPICAL
Status: DISCONTINUED | OUTPATIENT
Start: 2021-10-04 | End: 2021-10-04 | Stop reason: HOSPADM

## 2021-10-04 RX ORDER — LIDOCAINE 50 MG/G
1 PATCH TOPICAL EVERY 24 HOURS
Qty: 6 EACH | Refills: 0 | Status: ON HOLD | OUTPATIENT
Start: 2021-10-04 | End: 2022-05-25

## 2021-10-04 RX ORDER — HYDROCODONE BITARTRATE AND ACETAMINOPHEN 5; 325 MG/1; MG/1
1 TABLET ORAL ONCE
Status: COMPLETED | OUTPATIENT
Start: 2021-10-04 | End: 2021-10-04

## 2021-10-04 RX ORDER — ACETAMINOPHEN 500 MG
1000 TABLET ORAL ONCE
Status: COMPLETED | OUTPATIENT
Start: 2021-10-04 | End: 2021-10-04

## 2021-10-04 RX ADMIN — ACETAMINOPHEN 1000 MG: 500 TABLET ORAL at 08:05

## 2021-10-04 RX ADMIN — LIDOCAINE 1 PATCH: 50 PATCH TOPICAL at 08:11

## 2021-10-04 RX ADMIN — HYDROCODONE BITARTRATE AND ACETAMINOPHEN 1 TABLET: 5; 325 TABLET ORAL at 09:39

## 2021-10-04 NOTE — ED PROVIDER NOTES
MD ATTESTATION NOTE    The RAGINI and I have discussed this patient's history, physical exam, and treatment plan.  I have reviewed the documentation and personally had a face to face interaction with the patient. I affirm the documentation and agree with the treatment and plan.  The attached note describes my personal findings.      Sasha Barrett is a 81 y.o. female who presents to the ED c/o a fall.  She reports she slipped in the water on Thursday.  She states she hit her left ribs with a chair leg.  She reports pain with movement and with breathing.  She reports she hit her head.  She is on Eliquis.  She reports she is Covid positive.  She received monoclonal antibody infusion yesterday.  She states she has had 1 shot of vaccine but had a reaction to it and was advised not to get the second.      On exam:  GENERAL: Awake, alert, no acute distress  SKIN: Warm, dry  HENT: Normocephalic, ecchymosis to right forehead  EYES: no scleral icterus  CV: regular rhythm, regular rate  RESPIRATORY: normal effort, lungs clear  ABDOMEN: soft, non-tender, non-distended  MUSCULOSKELETAL: no deformity, chest wall tenderness on the left lateral chest.  Reproduces the pain on palpation.  Ecchymosis to the right knee anteriorly.  No deformity.  NEURO: alert, moves all extremities, follows commands    Labs  No results found for this or any previous visit (from the past 24 hour(s)).    Radiology  No Radiology Exams Resulted Within Past 24 Hours    Medical Decision Making:       Plan CT of the head and cervical spine given her history of trauma and being on Eliquis.  Need to rule out fracture and hemorrhage.  Plan x-ray of the right knee given her trauma there.  Plan x-ray of the left ribs and chest.  Plan lidocaine patch for pain control.    PPE: The patient wore a mask and I wore a CAPR mask throughout the entire patient encounter.  I wore a gown.    The patient has started, but not completed, their COVID-19 vaccination  series.    Diagnosis  Final diagnoses:   None        Mickey Alba MD  10/04/21 1035

## 2021-10-04 NOTE — ED NOTES
Pt to triage from home with c/o fall earlier today, slipped on water. Pt able to get herself back into bed then called 911.  Pt is diagnosed covid positive on 10/3/2021.  Pt states left rib pain, but no difficulty breathing.  Pt wearing mask in triage.  Triage personnel wore appropriate PPE       Rosy Ford RN  10/04/21 0512

## 2021-10-04 NOTE — CASE MANAGEMENT/SOCIAL WORK
Per RN, patient's daughter will transport her home and patient will be staying w/ daughter. PA updated

## 2021-10-04 NOTE — ED PROVIDER NOTES
" EMERGENCY DEPARTMENT ENCOUNTER    Room Number:  02/02  Date seen:  10/4/2021  Time seen: 07:12 EDT  PCP: Óscar Cadet MD  Historian: patient      HPI:  Chief Complaint: left rib pain    A complete HPI/ROS/PMH/PSH/SH/FH are unobtainable due to: none    Context: Sasha Barrett is a 81 y.o. female who presents to the ED for evaluation of left-sided rib pain since falling 4 days ago.  She states it started last Thursday, September 30 when she slipped in water in her kitchen knocked over her chair and the leg of the chair struck her in the left chest.  Its been pretty sore since and today it was so sore she had trouble rolling over or sitting up while in bed and was unable to get up.  Had to call EMS for assistance.  The pain hurts more when she moves bends or twists.  She also states she injured her right knee and struck her head.   She is anticoagulated on Eliquis for history of DVT.  She states she did not lose consciousness but was \"dazed\" for about 30 minutes.  She has chronic intermittent headaches and states she has continued to have headache that is not out of normal for her prior headaches.  She also reports neck pain.  She denies any vision changes nausea or vomiting.  Incidentally she was diagnosed with Covid 19 last week as well, believes this was also on the 30th.  She states she had an antibody infusion yesterday.  Her  is currently admitted in the hospital for hypoxia due to Covid.  She received 1 COVID-19 vaccination but states that she had an adverse reaction and therefore did not receive the second dose.  She denies any shortness of breath, any chest pain other than in the left chest wall from her injury as well as any abdominal pain or urinary symptoms.       PAST MEDICAL HISTORY  Active Ambulatory Problems     Diagnosis Date Noted   • Joint pain 07/25/2016   • Urinary retention self-caths (Don) 07/25/2016   • Conjunctivitis 07/25/2016   • Arm pain 07/25/2016   • Cervical disc displacement " 07/25/2016   • Cervical pain (Servando=PM) 07/25/2016   • DDD (degenerative disc disease), cervical 07/25/2016   • Hyperlipidemia LDL goal <70 07/25/2016   • Preventative health care 09/08/2016   • Medication management 09/08/2016   • Proctocele 06/29/2015   • Urge incontinence of urine 06/29/2015   • Chronic tension-type headache, intractable 11/11/2016   • Obesity (BMI 30.0-34.9) 11/11/2016   • H/o Lyme disease 11/11/2016   • Stage 3 chronic kidney disease (CMS/formerly [facility]) 11/11/2016   • Cerebral atherosclerosis 11/11/2016   • Allergic contact dermatitis 11/11/2016   • Malaise and fatigue progressive 11/11/2016   • Vertigo 11/11/2016   • Vitamin D deficiency 11/11/2016   • Moderate persistent asthmatic bronchitis without complication 11/11/2016   • Anterior cervical adenopathy due to infection 11/11/2016   • Pleurisy 11/11/2016   • Knee pain, bilateral 11/11/2016   • Elevated PTH level 11/11/2016   • 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Ochoa) 11/11/2016   • Gastroesophageal reflux disease with esophagitis 11/11/2016   • Psoriasis (Darryl Ochoa) 11/11/2016   • Postmenopausal 11/11/2016   • Recurrent UTI 11/11/2016   • CVA (cerebral vascular accident) (formerly [facility]) 11/11/2016   • Dyspnea on exertion 01/19/2017   • Precordial pain 02/02/2017   • Abnormal EKG 02/02/2017   • Arthralgia 04/06/2017   • Accelerated hypertension 04/06/2017   • Acute joint pain 04/06/2017   • Easy bruisability 04/06/2017   • Cough productive of purulent sputum 12/26/2017   • Influenza A Subtype H3 12/26/2017   • 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[facility]) 12/26/2017   • Acute chest wall pain 12/26/2017   • Decreased functional mobility and endurance since fall 12/20/17 12/26/2017   • Essential hypertension 08/22/2018   • 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/25/2018   • Migraine without aura and without status migrainosus, not intractable 11/21/2018   • Thyroid nodule 03/24/2019   • Osteoarthritis of left hip 08/23/2019    • Spinal stenosis of lumbar region with radiculopathy 08/23/2019   • Acute deep vein thrombosis (DVT) of right lower extremity (HCC) 10/29/2019   • Acute deep vein thrombosis (HCC) 10/29/2019   • Single subsegmental pulmonary embolism without acute cor pulmonale (HCC) 10/31/2019   • Other chronic pain 01/22/2020   • Palpitations 11/11/2020   • Secondary hyperparathyroidism (HCC) 02/02/2021     Resolved Ambulatory Problems     Diagnosis Date Noted   • Sinusitis 07/25/2016   • Acute pain of left shoulder due to trauma 12/26/2017   • Fall from standing with head traumna 08/23/2018     Past Medical History:   Diagnosis Date   • Arthritis    • Asthma    • Breast cancer (HCC) 2003   • COPD (chronic obstructive pulmonary disease) (HCC)    • Current use of long term anticoagulation    • 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 Yazmin Canseco   • CARPAL TUNNEL RELEASE Right 05/25/2011    Dr. Caleb Bueno   • CARPAL TUNNEL RELEASE Left 04/26/2011    Dr. Caleb Bueno   • CATARACT EXTRACTION Left 11/29/2010    Dr. Eusebio Downs   • CATARACT EXTRACTION Right 11/17/2010    Dr. Eusebio Downs   • COLONOSCOPY N/A 12/06/2002    Sigmoid diverticulosis-Dr. Brandon Batista   • COLONOSCOPY N/A 12/12/2013    Tortuous colon, diverticulosis in the sigmoid colon and descending colon, stool in the rectum, sigmoid colon, descending colon, splenic flexure, transverse colon and hepatic flexure-Dr. Irma Brambila   • DEQUERVAIN RELEASE Left 9/23/2020    Procedure: DEQUERVAIN RELEASE LEFT HAND;   Surgeon: Caleb Bueno MD;  Location: [facility];  Service: Plastics;  Laterality: Left;   • ENDOSCOPY N/A 09/13/2007    Normal-Dr. Pavel Quarles   • ENDOSCOPY AND COLONOSCOPY N/A 09/19/2005    1 cm hiatal hernia, mild Schatzki's ring, sigmoid diverticulosis-Dr. Yoel Farley   • GANGLION CYST EXCISION Left 12/18/2012    Release of A1 pulley flexor sheath, left fourth finger, excision of ganglion cyst 0.5 cm diameter, A2 pulley flexor sheath, left fourth finger-Dr. Caleb Bueno   • HEEL SPUR EXCISION Left 1968   • INGUINAL HERNIA REPAIR Right 1971    at 5 months pregnant    • INJECTION OF MEDICATION Left 9/23/2020    Procedure: KENOLOG INJECTION TO LEFT THUMB;  Surgeon: Caleb Bueno MD;  Location: [facility];  Service: Plastics;  Laterality: Left;   • MASTECTOMY Right 08/05/2008    Right breast mastectomy and excision of left chest wall lesion-Dr. Yoel Farley   • MASTECTOMY RADICAL Left 04/29/2003    Left modified radical mastectomy-Dr. Yazmin Canseco   • PARATHYROIDECTOMY Right 05/04/2004    Excision of parathyroid adenoma (right superior)-Dr. Yoel Farley   • REPLACEMENT TOTAL KNEE Right 04/09/2012    Dr. Lamonte Childress   • SINUS SURGERY  1984   • THYROIDECTOMY, PARTIAL Left 05/04/2004    Dr. Yoel Farley   • TOTAL ABDOMINAL HYSTERECTOMY Bilateral 1973    Dr. Mahan   • TRIGGER FINGER RELEASE Left 9/23/2020    Procedure: RELEASE LEFT FOURTH TRIGGER FINGER;  Surgeon: Caleb Bueno MD;  Location: [facility];  Service: Plastics;  Laterality: Left;   • UPPER GASTROINTESTINAL ENDOSCOPY N/A 02/18/2009    LA Grade A reflux esophagitis, normal stomach, normal 2nd part of the duodenum-Dr. Yoel Farley   • WRIST GANGLION EXCISION Left 9/23/2020    Procedure: EXCISION GANGLION CYST LEFT WRIST;  Surgeon: Caleb Bueno MD;  Location: [facility];  Service: Plastics;  Laterality: Left;         FAMILY HISTORY  Family History   Problem Relation Age of  Onset   • Brain cancer Brother    • Alcohol abuse Mother    • No Known Problems Father    • No Known Problems Sister    • Malig Hyperthermia Neg Hx          SOCIAL HISTORY  Social History     Socioeconomic History   • Marital status:      Spouse name: Joey   • Number of children: 6   • Years of education: Not on file   • Highest education level: Some college, no degree   Tobacco Use   • Smoking status: Never Smoker   • Smokeless tobacco: Never Used   Vaping Use   • Vaping Use: Never used   Substance and Sexual Activity   • Alcohol use: Yes     Comment: 3 drinks yearly   • Drug use: Never   • Sexual activity: Defer     Birth control/protection: Surgical         ALLERGIES  Bee venom, Morphine, Sulfa antibiotics, Tree extract, Eliquis [apixaban], Hydralazine, Ambien [zolpidem], Anastrozole, Norvasc [amlodipine], Nsaids, Penicillins, and Grass        REVIEW OF SYSTEMS  Review of Systems     All systems reviewed and negative except for those discussed in HPI.       PHYSICAL EXAM  ED Triage Vitals [10/04/21 0535]   Temp Heart Rate Resp BP SpO2   98.2 °F (36.8 °C) 73 18 132/64 97 %      Temp src Heart Rate Source Patient Position BP Location FiO2 (%)   Oral Monitor -- -- --         GENERAL: not distressed  HENT: Bluish-gray bruise to the right lateral eyebrow/temporal area.  No other signs of head trauma.  No hemotympanum jim sign raccoon eyes septal hematoma otorrhea or rhinorrhea  EYES: no scleral icterus, PERRL, extraocular movements intact  CV: regular rhythm, regular rate  RESPIRATORY: normal effort CTA B.  No chest wall edema ecchymosis or instability.  She does have tenderness over the mid anterior and lateral ribs on the left.  No sternal tenderness.  No crepitus.  Well-healed bilateral mastectomies.  ABDOMEN: soft, nontender nondistended  MUSCULOSKELETAL: no deformity.  Right knee has anterior ecchymosis without significant tenderness.  No palpable effusion.  She is able to flex and extend.  No C, T,  L-spine tenderness.  NEURO: alert, moves all extremities, follows commands  SKIN: warm, dry    Vital signs and nursing notes reviewed.          LAB RESULTS  No results found for this or any previous visit (from the past 24 hour(s)).    Ordered the above labs and independently reviewed the results.        RADIOLOGY  CT Head Without Contrast   Final Result   No evidence for acute traumatic intracranial pathology.       Incidental note is made of mild-to-moderate changes of chronic small   vessel ischemic phenomena as well as a small chronic infarct within the   medial portion of the left occipital lobe within the left PCA   distribution.           CT CERVICAL SPINE:       TECHNIQUE: CT scan of the cervical spine was obtained with 1 mm axial   bone algorithm and 2 mm axial soft tissue algorithm images. Sagittal and   coronal reconstructed images were obtained.       FINDINGS: There is no evidence for acute fracture or bony malalignment   involving the cervical spine.       At C2-3, there is no significant degree of bony canal or foraminal   stenosis.       At C3-4, there is a disc osteophyte complex eccentric to the right   resulting in a mild degree of canal stenosis. There is moderate right   foraminal narrowing secondary to a combination of uncovertebral joint   hypertrophy and facet arthrosis. Degenerative anterior spondylolisthesis   of C3 on C4 by approximately 3 mm is noted.       At C4-5, there is mild-to-moderate right foraminal narrowing secondary   to a combination of uncovertebral joint hypertrophy and facet arthrosis.       At C5-6, C6-7, and C7-T1, no significant degrees of bony canal or   foraminal narrowing are noted.       IMPRESSION:        No evidence for acute fracture or bony malalignment involving the   cervical spine.       Multilevel degenerative phenomena are incidentally noted and discussed   in detail above.                       Radiation dose reduction techniques were utilized, including  automated   exposure control and exposure modulation based on body size.       This report was finalized on 10/4/2021 8:36 AM by Dr. Iván Lozano M.D.          CT Cervical Spine Without Contrast   Final Result   No evidence for acute traumatic intracranial pathology.       Incidental note is made of mild-to-moderate changes of chronic small   vessel ischemic phenomena as well as a small chronic infarct within the   medial portion of the left occipital lobe within the left PCA   distribution.           CT CERVICAL SPINE:       TECHNIQUE: CT scan of the cervical spine was obtained with 1 mm axial   bone algorithm and 2 mm axial soft tissue algorithm images. Sagittal and   coronal reconstructed images were obtained.       FINDINGS: There is no evidence for acute fracture or bony malalignment   involving the cervical spine.       At C2-3, there is no significant degree of bony canal or foraminal   stenosis.       At C3-4, there is a disc osteophyte complex eccentric to the right   resulting in a mild degree of canal stenosis. There is moderate right   foraminal narrowing secondary to a combination of uncovertebral joint   hypertrophy and facet arthrosis. Degenerative anterior spondylolisthesis   of C3 on C4 by approximately 3 mm is noted.       At C4-5, there is mild-to-moderate right foraminal narrowing secondary   to a combination of uncovertebral joint hypertrophy and facet arthrosis.       At C5-6, C6-7, and C7-T1, no significant degrees of bony canal or   foraminal narrowing are noted.       IMPRESSION:        No evidence for acute fracture or bony malalignment involving the   cervical spine.       Multilevel degenerative phenomena are incidentally noted and discussed   in detail above.                       Radiation dose reduction techniques were utilized, including automated   exposure control and exposure modulation based on body size.       This report was finalized on 10/4/2021 8:36 AM by Dr. Iván Lozano  M.D.          XR Ribs Left With PA Chest   Preliminary Result   1. Left 8th rib fracture.   2. No pneumothorax is seen.       RIGHT KNEE TWO VIEWS       FINDINGS: Distal femoral and proximal tibial components of the right   knee prosthesis are well-seated. No fractures or other acute bony   abnormalities are seen. There is soft tissue swelling of the right knee.       There is a small calcific density anterior to the proximal tibia,   possibly a small phlebolith or other benign calcification.       IMPRESSION:    1. Status post right knee arthroplasty.   2. Soft tissue swelling of the knee.   3. No acute process identified.              XR Knee 1 or 2 View Right   Preliminary Result   1. Left 8th rib fracture.   2. No pneumothorax is seen.       RIGHT KNEE TWO VIEWS       FINDINGS: Distal femoral and proximal tibial components of the right   knee prosthesis are well-seated. No fractures or other acute bony   abnormalities are seen. There is soft tissue swelling of the right knee.       There is a small calcific density anterior to the proximal tibia,   possibly a small phlebolith or other benign calcification.       IMPRESSION:    1. Status post right knee arthroplasty.   2. Soft tissue swelling of the knee.   3. No acute process identified.                  I ordered the above noted radiological studies. Reviewed by me and discussed with radiologist.  See dictation for official radiology interpretation.    PROCEDURES  Procedures        MEDICATIONS GIVEN IN ER  Medications   lidocaine (LIDODERM) 5 % 1 patch (1 patch Transdermal Medication Applied 10/4/21 0811)   HYDROcodone-acetaminophen (NORCO) 5-325 MG per tablet 1 tablet (has no administration in time range)   acetaminophen (TYLENOL) tablet 1,000 mg (1,000 mg Oral Given 10/4/21 0805)             PROGRESS AND CONSULTS        ED Course as of Oct 04 0932   Mon Oct 04, 2021   0908 I discussed the patient with Dr. Cadet, patient's PCP.  He is reviewed the patient's  work-up .  He states she is currently on hydrocodone 5/325 tid for chronic pain through palliative care and recommends any adjustments that need to be made can be made through them at the patient's request once discharged.  Knee knee x-rays as well as head CT and C-spine CT unremarkable.  I will prescribe the patient lidocaine patches.  He would like for the patient to be discharged to follow-up with him in the office.  He states she has several children who also are Covid positive right now and should be able to help her at home by either staying with her or her staying with them.      [KA]   0926 Discussed patient with CCP Whitney who will see the patient to help arrange family to help her out.    [KA]   0926 I will prescribe the patient additional lidocaine patches, incentive spirometer at home and I have counseled her on indications for return.  She is agreeable with the plan to be discharged, states she does have at least one son that she thinks can come stay with her.  CCP will come talk to her and help arrange.    [KA]   0927  I reassessed the patient, she is not hypoxic, vital signs unremarkable, though she does have a rib fracture no emergent findings on her imaging or work-up today and she is stable for discharge.  She is going to try to call her family as well to help arrange a ride and some help at home.    [KA]   0932 My interpretation of the right knee x-rays is no acute fracture.    [KA]      ED Course User Index  [KA] Jodi Calle PA             Patient was placed in face mask in first look. Patient was wearing facemask each time I entered the room and throughout our encounter. I wore protective equipment throughout this patient encounter including a face mask, eye shield, gown and gloves. Hand hygiene was performed before donning protective equipment and after removal when leaving the room.        DIAGNOSIS  Final diagnoses:   Closed fracture of one rib of left side, initial encounter   Forehead  contusion, initial encounter   Contusion of right knee, initial encounter         Follow Up:  Óscar Cadet MD  [address]  [facility] [id number] [phone]      when your quarantine is complete      RX:     Medication List      New Prescriptions    lidocaine 5 %  Commonly known as: LIDODERM  Place 1 patch on the skin as directed by provider Daily. Remove & Discard patch within 12 hours or as directed by MD        Changed    montelukast 10 MG tablet  Commonly known as: Singulair  Take 1 tablet by mouth every night.  What changed: additional instructions     polyethylene glycol 17 GM/SCOOP powder  Commonly known as: MIRALAX  Take one capful w/liquid daily  What changed:   · how much to take  · how to take this  · when to take this     promethazine-codeine 6.25-10 MG/5ML syrup  Commonly known as: PHENERGAN with CODEINE  Take 5 mL by mouth 4 (Four) Times a Day As Needed for cough.  What changed: how much to take           Where to Get Your Medications      These medications were sent to [facility] - [place], [place] - [address] [facility] [phone]  - [phone] [address] [address]    Phone: [phone]   · lidocaine 5 %           Latest Documented Vital Signs:  As of 09:32 EDT  BP- 176/92 HR- 69 Temp- 98.2 °F (36.8 °C) (Oral) O2 sat- 97%       Jodi Calle PA  10/05/21 2008

## 2021-10-04 NOTE — DISCHARGE INSTRUCTIONS
Use the incentive spirometer 10 times per hour while awake  Continue your current pain medications as prescribed.  If there an adequate you can discuss with your palliative care team  Return to the ER for shortness of breath, persistent fever, any concerns.

## 2021-12-20 ENCOUNTER — TELEPHONE (OUTPATIENT)
Dept: ORTHOPEDIC SURGERY | Facility: CLINIC | Age: 81
End: 2021-12-20

## 2021-12-20 NOTE — TELEPHONE ENCOUNTER
Provider: KAREN HUESTON  Caller: LEESA CHAPMAN  Relationship to Patient: SELF  Phone Number: [phone]  Reason for Call: PATIENT CALLED AND WOULD LIKE TO SCHEDULE FOR AN INJECTION PLEASE ADVISE

## 2021-12-27 ENCOUNTER — TRANSCRIBE ORDERS (OUTPATIENT)
Dept: ADMINISTRATIVE | Facility: HOSPITAL | Age: 81
End: 2021-12-27

## 2021-12-27 ENCOUNTER — CLINICAL SUPPORT (OUTPATIENT)
Dept: ORTHOPEDIC SURGERY | Facility: CLINIC | Age: 81
End: 2021-12-27

## 2021-12-27 VITALS — BODY MASS INDEX: 35.88 KG/M2 | HEIGHT: 62 IN | TEMPERATURE: 97.6 F | WEIGHT: 195 LBS

## 2021-12-27 DIAGNOSIS — I82.401 ACUTE EMBOLISM AND THROMBOSIS OF DEEP VEIN OF RIGHT LOWER EXTREMITY (HCC): ICD-10-CM

## 2021-12-27 DIAGNOSIS — M19.011 PRIMARY OSTEOARTHRITIS OF RIGHT SHOULDER: Primary | ICD-10-CM

## 2021-12-27 DIAGNOSIS — Z78.0 POST-MENOPAUSE: Primary | ICD-10-CM

## 2021-12-27 DIAGNOSIS — M79.604 PAIN IN BOTH LOWER EXTREMITIES: ICD-10-CM

## 2021-12-27 DIAGNOSIS — I82.402 ACUTE EMBOLISM AND THROMBOSIS OF DEEP VEIN OF LEFT LOWER EXTREMITY (HCC): ICD-10-CM

## 2021-12-27 DIAGNOSIS — M79.605 PAIN IN BOTH LOWER EXTREMITIES: ICD-10-CM

## 2021-12-27 PROCEDURE — 20610 DRAIN/INJ JOINT/BURSA W/O US: CPT | Performed by: NURSE PRACTITIONER

## 2021-12-27 RX ORDER — TRIAMCINOLONE ACETONIDE 40 MG/ML
80 INJECTION, SUSPENSION INTRA-ARTICULAR; INTRAMUSCULAR
Status: COMPLETED | OUTPATIENT
Start: 2021-12-27 | End: 2021-12-27

## 2021-12-27 RX ADMIN — TRIAMCINOLONE ACETONIDE 80 MG: 40 INJECTION, SUSPENSION INTRA-ARTICULAR; INTRAMUSCULAR at 12:49

## 2021-12-27 NOTE — PROGRESS NOTES
Ms. Barrett comes in today for follow-up.  Injections have worked well in the past.  The patient would like to get a repeat injection today.  The risks, benefits and alternatives were discussed and the patient consented.  Going forward, the patient will follow-up as needed.    AC Iyer    12/27/2021      Large Joint Arthrocentesis: R glenohumeral  Date/Time: 12/27/2021 12:49 PM  Consent given by: patient  Site marked: site marked  Timeout: Immediately prior to procedure a time out was called to verify the correct patient, procedure, equipment, support staff and site/side marked as required   Supporting Documentation  Indications: pain and joint swelling   Procedure Details  Location: shoulder - R glenohumeral  Preparation: Patient was prepped and draped in the usual sterile fashion  Needle gauge: 21g.  Approach: anterior  Medications administered: 2 mL lidocaine (cardiac); 80 mg triamcinolone acetonide 40 MG/ML  Patient tolerance: patient tolerated the procedure well with no immediate complications

## 2022-01-28 ENCOUNTER — OFFICE VISIT (OUTPATIENT)
Dept: ORTHOPEDIC SURGERY | Facility: CLINIC | Age: 82
End: 2022-01-28

## 2022-01-28 VITALS — BODY MASS INDEX: 35.88 KG/M2 | HEIGHT: 62 IN | WEIGHT: 195 LBS | TEMPERATURE: 98.4 F

## 2022-01-28 DIAGNOSIS — M19.019 ARTHRITIS OF SHOULDER: Primary | ICD-10-CM

## 2022-01-28 PROCEDURE — 99213 OFFICE O/P EST LOW 20 MIN: CPT | Performed by: ORTHOPAEDIC SURGERY

## 2022-01-28 NOTE — PROGRESS NOTES
CC:  Worsening right shoulder pain    Ms. Barrett follows up today for her right shoulder.  She reports worsening pain and dysfunction.  She had an injection done about a month ago which did not help significantly.  In the past, the injections have always worked very well for her.  She is frustrated and she comes in today wanting to discuss further options.    Her shoulder skin is benign.  She has a trace bursal effusion.  No erythema or increased warmth.  Her active motion is limited and painful.  Passively, her motion is better but still limited.    Her previous x-rays from October are reviewed.  She has what appears to be moderate to severe glenohumeral osteoarthritis with a slightly diminished acromiohumeral interval.    Assessment: Right shoulder osteoarthritis with associated rotator cuff tear in patient with worsening pain and symptomatology    Plan: I did a lengthy discussion with her and her  about surgical and nonsurgical treatment.  She asked if I would be willing to prescribe her narcotics and I told her that I cannot do that.  I am happy to repeat her injection but we need to wait at least another 2 months to minimize her risk of infection.  I offered to prescribe her an anti-inflammatory or tramadol.  She says that she cannot tolerate anti-inflammatories due to kidney problems and tramadol has not helped her in the past.  The only thing that helps is hydrocodone 10 mg.  She also says that at one point she was on morphine which seemed to help.  I told her that she is welcome to go back to her pain management doctor to see if they consider either those medications are indicated.    Be Pichardo MD

## 2022-04-01 ENCOUNTER — OFFICE VISIT (OUTPATIENT)
Dept: ORTHOPEDIC SURGERY | Facility: CLINIC | Age: 82
End: 2022-04-01

## 2022-04-01 VITALS — HEIGHT: 62 IN | BODY MASS INDEX: 35.88 KG/M2 | WEIGHT: 195 LBS | TEMPERATURE: 98 F

## 2022-04-01 DIAGNOSIS — M19.019 ARTHRITIS OF SHOULDER: Primary | ICD-10-CM

## 2022-04-01 PROCEDURE — 20610 DRAIN/INJ JOINT/BURSA W/O US: CPT | Performed by: ORTHOPAEDIC SURGERY

## 2022-04-01 RX ORDER — HYDROCODONE BITARTRATE AND ACETAMINOPHEN 10; 325 MG/1; MG/1
1-2 TABLET ORAL EVERY 6 HOURS PRN
COMMUNITY
Start: 2022-03-03

## 2022-04-01 RX ORDER — AZITHROMYCIN 250 MG/1
TABLET, FILM COATED ORAL SEE ADMIN INSTRUCTIONS
Status: ON HOLD | COMMUNITY
Start: 2022-03-29 | End: 2022-05-25

## 2022-04-01 RX ORDER — METHYLPREDNISOLONE ACETATE 80 MG/ML
80 INJECTION, SUSPENSION INTRA-ARTICULAR; INTRALESIONAL; INTRAMUSCULAR; SOFT TISSUE
Status: COMPLETED | OUTPATIENT
Start: 2022-04-01 | End: 2022-04-01

## 2022-04-01 RX ADMIN — METHYLPREDNISOLONE ACETATE 80 MG: 80 INJECTION, SUSPENSION INTRA-ARTICULAR; INTRALESIONAL; INTRAMUSCULAR; SOFT TISSUE at 11:52

## 2022-04-01 NOTE — PROGRESS NOTES
Ms. Barrett follows up today for her right shoulder.  No new complaints.  She comes in today for purposes of getting the injection repeated.  Right shoulder skin is benign.  She has a moderate bursal effusion but no increased warmth or erythema.  The risk, benefits and alternatives to the injection were discussed.  This was performed as described below.  She will follow up as needed.    Large Joint Arthrocentesis: R glenohumeral  Date/Time: 4/1/2022 11:52 AM  Consent given by: patient  Site marked: site marked  Timeout: Immediately prior to procedure a time out was called to verify the correct patient, procedure, equipment, support staff and site/side marked as required   Supporting Documentation  Indications: pain and joint swelling   Procedure Details  Location: shoulder - R glenohumeral  Preparation: Patient was prepped and draped in the usual sterile fashion  Needle gauge: 21g.  Approach: anterolateral  Medications administered: 80 mg methylPREDNISolone acetate 80 MG/ML; 2 mL lidocaine (cardiac)  Patient tolerance: patient tolerated the procedure well with no immediate complications

## 2022-04-21 ENCOUNTER — APPOINTMENT (OUTPATIENT)
Dept: CT IMAGING | Facility: HOSPITAL | Age: 82
End: 2022-04-21

## 2022-04-21 ENCOUNTER — APPOINTMENT (OUTPATIENT)
Dept: GENERAL RADIOLOGY | Facility: HOSPITAL | Age: 82
End: 2022-04-21

## 2022-04-21 ENCOUNTER — HOSPITAL ENCOUNTER (EMERGENCY)
Facility: HOSPITAL | Age: 82
Discharge: HOME | End: 2022-04-21
Attending: EMERGENCY MEDICINE | Admitting: EMERGENCY MEDICINE

## 2022-04-21 VITALS
SYSTOLIC BLOOD PRESSURE: 176 MMHG | TEMPERATURE: 97.7 F | RESPIRATION RATE: 20 BRPM | HEART RATE: 63 BPM | OXYGEN SATURATION: 100 % | DIASTOLIC BLOOD PRESSURE: 91 MMHG

## 2022-04-21 DIAGNOSIS — R55 SYNCOPE, UNSPECIFIED SYNCOPE TYPE: Primary | ICD-10-CM

## 2022-04-21 DIAGNOSIS — H11.431 CONJUNCTIVAL INJECTION, RIGHT: ICD-10-CM

## 2022-04-21 DIAGNOSIS — R60.0 BILATERAL LEG EDEMA: ICD-10-CM

## 2022-04-21 LAB
ALBUMIN SERPL-MCNC: 3.9 G/DL (ref 3.5–5.2)
ALBUMIN/GLOB SERPL: 1.5 G/DL
ALP SERPL-CCNC: 80 U/L (ref 39–117)
ALT SERPL W P-5'-P-CCNC: 11 U/L (ref 1–33)
ANION GAP SERPL CALCULATED.3IONS-SCNC: 10.6 MMOL/L (ref 5–15)
AST SERPL-CCNC: 11 U/L (ref 1–32)
BASOPHILS # BLD AUTO: 0.04 10*3/MM3 (ref 0–0.2)
BASOPHILS NFR BLD AUTO: 0.5 % (ref 0–1.5)
BILIRUB SERPL-MCNC: 0.5 MG/DL (ref 0–1.2)
BUN SERPL-MCNC: 22 MG/DL (ref 8–23)
BUN/CREAT SERPL: 23.7 (ref 7–25)
CALCIUM SPEC-SCNC: 9.6 MG/DL (ref 8.6–10.5)
CHLORIDE SERPL-SCNC: 104 MMOL/L (ref 98–107)
CO2 SERPL-SCNC: 29.4 MMOL/L (ref 22–29)
CREAT SERPL-MCNC: 0.93 MG/DL (ref 0.57–1)
CRP SERPL-MCNC: 0.36 MG/DL (ref 0–0.5)
DEPRECATED RDW RBC AUTO: 41.5 FL (ref 37–54)
EGFRCR SERPLBLD CKD-EPI 2021: 60.7 ML/MIN/1.73
EOSINOPHIL # BLD AUTO: 0.05 10*3/MM3 (ref 0–0.4)
EOSINOPHIL NFR BLD AUTO: 0.7 % (ref 0.3–6.2)
ERYTHROCYTE [DISTWIDTH] IN BLOOD BY AUTOMATED COUNT: 12.3 % (ref 12.3–15.4)
ERYTHROCYTE [SEDIMENTATION RATE] IN BLOOD: 10 MM/HR (ref 0–30)
GLOBULIN UR ELPH-MCNC: 2.6 GM/DL
GLUCOSE SERPL-MCNC: 113 MG/DL (ref 65–99)
HCT VFR BLD AUTO: 40.1 % (ref 34–46.6)
HGB BLD-MCNC: 13.3 G/DL (ref 12–15.9)
IMM GRANULOCYTES # BLD AUTO: 0.02 10*3/MM3 (ref 0–0.05)
IMM GRANULOCYTES NFR BLD AUTO: 0.3 % (ref 0–0.5)
LYMPHOCYTES # BLD AUTO: 1.41 10*3/MM3 (ref 0.7–3.1)
LYMPHOCYTES NFR BLD AUTO: 19.3 % (ref 19.6–45.3)
MCH RBC QN AUTO: 30.6 PG (ref 26.6–33)
MCHC RBC AUTO-ENTMCNC: 33.2 G/DL (ref 31.5–35.7)
MCV RBC AUTO: 92.4 FL (ref 79–97)
MONOCYTES # BLD AUTO: 0.74 10*3/MM3 (ref 0.1–0.9)
MONOCYTES NFR BLD AUTO: 10.1 % (ref 5–12)
NEUTROPHILS NFR BLD AUTO: 5.05 10*3/MM3 (ref 1.7–7)
NEUTROPHILS NFR BLD AUTO: 69.1 % (ref 42.7–76)
NRBC BLD AUTO-RTO: 0 /100 WBC (ref 0–0.2)
NT-PROBNP SERPL-MCNC: 293 PG/ML (ref 0–1800)
PLATELET # BLD AUTO: 146 10*3/MM3 (ref 140–450)
PMV BLD AUTO: 10.3 FL (ref 6–12)
POTASSIUM SERPL-SCNC: 3.5 MMOL/L (ref 3.5–5.2)
PROT SERPL-MCNC: 6.5 G/DL (ref 6–8.5)
QT INTERVAL: 445 MS
RBC # BLD AUTO: 4.34 10*6/MM3 (ref 3.77–5.28)
SODIUM SERPL-SCNC: 144 MMOL/L (ref 136–145)
TROPONIN T SERPL-MCNC: <0.01 NG/ML (ref 0–0.03)
WBC NRBC COR # BLD: 7.31 10*3/MM3 (ref 3.4–10.8)

## 2022-04-21 PROCEDURE — 70486 CT MAXILLOFACIAL W/O DYE: CPT

## 2022-04-21 PROCEDURE — 84484 ASSAY OF TROPONIN QUANT: CPT | Performed by: EMERGENCY MEDICINE

## 2022-04-21 PROCEDURE — 71045 X-RAY EXAM CHEST 1 VIEW: CPT

## 2022-04-21 PROCEDURE — 70450 CT HEAD/BRAIN W/O DYE: CPT

## 2022-04-21 PROCEDURE — 80053 COMPREHEN METABOLIC PANEL: CPT | Performed by: EMERGENCY MEDICINE

## 2022-04-21 PROCEDURE — 93010 ELECTROCARDIOGRAM REPORT: CPT | Performed by: INTERNAL MEDICINE

## 2022-04-21 PROCEDURE — 86140 C-REACTIVE PROTEIN: CPT | Performed by: EMERGENCY MEDICINE

## 2022-04-21 PROCEDURE — 93005 ELECTROCARDIOGRAM TRACING: CPT | Performed by: EMERGENCY MEDICINE

## 2022-04-21 PROCEDURE — 99284 EMERGENCY DEPT VISIT MOD MDM: CPT

## 2022-04-21 PROCEDURE — 85652 RBC SED RATE AUTOMATED: CPT | Performed by: EMERGENCY MEDICINE

## 2022-04-21 PROCEDURE — 83880 ASSAY OF NATRIURETIC PEPTIDE: CPT | Performed by: EMERGENCY MEDICINE

## 2022-04-21 PROCEDURE — 85025 COMPLETE CBC W/AUTO DIFF WBC: CPT | Performed by: EMERGENCY MEDICINE

## 2022-04-21 RX ORDER — SODIUM CHLORIDE 0.9 % (FLUSH) 0.9 %
10 SYRINGE (ML) INJECTION AS NEEDED
Status: DISCONTINUED | OUTPATIENT
Start: 2022-04-21 | End: 2022-04-21 | Stop reason: HOSPADM

## 2022-04-21 NOTE — ED PROVIDER NOTES
EMERGENCY DEPARTMENT ENCOUNTER    Room Number:  37/37  Date of encounter:  4/21/2022  PCP: Óscar Cadet MD  Historian: Patient      HPI:  Chief Complaint: Fall  A complete HPI/ROS/PMH/PSH/SH/FH are unobtainable due to: None    Context: Sasha Barrett is a 82 y.o. female who presents to the ED c/o fall.  She states that she got up to go to the bathroom sometime this morning.  The next thing she knew she had fallen to the ground.  She does not know why she fell.  She complains of developing right periocular pain immediately after the fall.  Pain is constant.  It worsens with moving her eye.  She states that she has not had any change in her vision, however.  She states that she also feels dizzy at this time.  She has had no chest pain, shortness of breath, abdominal pain, back pain.  Also notes that she has had leg swelling for the past 3 to 4 days.  She states that she normally has minimal swelling but her swelling has become rather noticeable.      PAST MEDICAL HISTORY  Active Ambulatory Problems     Diagnosis Date Noted   • Joint pain 07/25/2016   • Urinary retention self-caths ([facility]) 07/25/2016   • Conjunctivitis 07/25/2016   • Arm pain 07/25/2016   • Cervical disc displacement 07/25/2016   • Cervical pain (Servando=PM) 07/25/2016   • DDD (degenerative disc disease), cervical 07/25/2016   • Hyperlipidemia LDL goal <70 07/25/2016   • Preventative health care 09/08/2016   • Medication management 09/08/2016   • Proctocele 06/29/2015   • Urge incontinence of urine 06/29/2015   • Chronic tension-type headache, intractable 11/11/2016   • Obesity (BMI 30.0-34.9) 11/11/2016   • H/o Lyme disease 11/11/2016   • Stage 3 chronic kidney disease (CMS/HCC) 11/11/2016   • Cerebral atherosclerosis 11/11/2016   • Allergic contact dermatitis 11/11/2016   • Malaise and fatigue progressive 11/11/2016   • Vertigo 11/11/2016   • Vitamin D deficiency 11/11/2016   • Moderate persistent asthmatic bronchitis without complication  11/11/2016   • Anterior cervical adenopathy due to infection 11/11/2016   • Pleurisy 11/11/2016   • Knee pain, bilateral 11/11/2016   • Elevated PTH level 11/11/2016   • 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[facility]) 11/11/2016   • Gastroesophageal reflux disease with esophagitis 11/11/2016   • Psoriasis (Darryltaz [facility]) 11/11/2016   • Postmenopausal 11/11/2016   • Recurrent UTI 11/11/2016   • CVA (cerebral vascular accident) ([facility]) 11/11/2016   • Dyspnea on exertion 01/19/2017   • Precordial pain 02/02/2017   • Abnormal EKG 02/02/2017   • Arthralgia 04/06/2017   • Accelerated hypertension 04/06/2017   • Acute joint pain 04/06/2017   • Easy bruisability 04/06/2017   • Cough productive of purulent sputum 12/26/2017   • Influenza A Subtype H3 12/26/2017   • 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[facility]) 12/26/2017   • Acute chest wall pain 12/26/2017   • Decreased functional mobility and endurance since fall 12/20/17 12/26/2017   • Essential hypertension 08/22/2018   • 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/25/2018   • Migraine without aura and without status migrainosus, not intractable 11/21/2018   • Thyroid nodule 03/24/2019   • Osteoarthritis of left hip 08/23/2019   • Spinal stenosis of lumbar region with radiculopathy 08/23/2019   • Acute deep vein thrombosis (DVT) of right lower extremity ([facility]) 10/29/2019   • Acute deep vein thrombosis ([facility]) 10/29/2019   • Single subsegmental pulmonary embolism without acute cor pulmonale ([facility]) 10/31/2019   • Other chronic pain 01/22/2020   • Palpitations 11/11/2020   • Secondary hyperparathyroidism ([facility]) 02/02/2021     Resolved Ambulatory Problems     Diagnosis Date Noted   • Sinusitis 07/25/2016   • Acute pain of left shoulder due to trauma 12/26/2017   • Fall from standing with head traumna 08/23/2018     Past Medical History:   Diagnosis Date   • Arthritis    • Asthma    • Breast cancer ([facility]) 2003   • COPD (chronic obstructive pulmonary disease) ([facility])     • Current use of long term anticoagulation    • 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 Yazmin Canseco   • CARPAL TUNNEL RELEASE Right 05/25/2011    Dr. Caleb Bueno   • CARPAL TUNNEL RELEASE Left 04/26/2011    Dr. Caleb Bueno   • CATARACT EXTRACTION Left 11/29/2010    Dr. Eusebio Downs   • CATARACT EXTRACTION Right 11/17/2010    Dr. Eusebio Downs   • COLONOSCOPY N/A 12/06/2002    Sigmoid diverticulosis-Dr. Brandon Batista   • COLONOSCOPY N/A 12/12/2013    Tortuous colon, diverticulosis in the sigmoid colon and descending colon, stool in the rectum, sigmoid colon, descending colon, splenic flexure, transverse colon and hepatic flexure-Dr. Irma Brambila   • DEQUERVAIN RELEASE Left 9/23/2020    Procedure: DEQUERVAIN RELEASE LEFT HAND;  Surgeon: Caleb Bueno MD;  Location: [facility] OR [facility];  Service: Plastics;  Laterality: Left;   • ENDOSCOPY N/A 09/13/2007    Normal-Dr. Pavel Quarles   • ENDOSCOPY AND COLONOSCOPY N/A 09/19/2005    1 cm hiatal hernia, mild Schatzki's ring, sigmoid diverticulosis-Dr. Yoel Farley   • GANGLION CYST EXCISION Left 12/18/2012    Release of A1 pulley flexor sheath, left fourth finger, excision of ganglion cyst 0.5 cm diameter, A2 pulley flexor sheath, left fourth finger-Dr. Caleb Bueno   • HEEL SPUR EXCISION Left 1968   • INGUINAL HERNIA REPAIR Right 1971    at 5 months pregnant    • INJECTION OF MEDICATION Left 9/23/2020    Procedure: KENOLOG INJECTION TO LEFT THUMB;  Surgeon: Caleb Bueno MD;  Location:  [facility];  Service: Plastics;  Laterality: Left;   • MASTECTOMY Right 08/05/2008    Right breast mastectomy and excision of left chest wall lesion-Dr. Yoel Farley   • MASTECTOMY RADICAL Left 04/29/2003    Left modified radical mastectomy-Dr. Yazmin Canseco   • PARATHYROIDECTOMY Right 05/04/2004    Excision of parathyroid adenoma (right superior)-Dr. Yoel Farley   • REPLACEMENT TOTAL KNEE Right 04/09/2012    Dr. Lamonte Childress   • SINUS SURGERY  1984   • THYROIDECTOMY, PARTIAL Left 05/04/2004    Dr. Yoel Farley   • TOTAL ABDOMINAL HYSTERECTOMY Bilateral 1973    Dr. Mahan   • TRIGGER FINGER RELEASE Left 9/23/2020    Procedure: RELEASE LEFT FOURTH TRIGGER FINGER;  Surgeon: Caleb Bueno MD;  Location: [facility];  Service: Plastics;  Laterality: Left;   • UPPER GASTROINTESTINAL ENDOSCOPY N/A 02/18/2009    LA Grade A reflux esophagitis, normal stomach, normal 2nd part of the duodenum-Dr. Yoel Farley   • WRIST GANGLION EXCISION Left 9/23/2020    Procedure: EXCISION GANGLION CYST LEFT WRIST;  Surgeon: Caleb Bueno MD;  Location: [facility];  Service: Plastics;  Laterality: Left;         FAMILY HISTORY  Family History   Problem Relation Age of Onset   • Brain cancer Brother    • Alcohol abuse Mother    • No Known Problems Father    • No Known Problems Sister    • Malig Hyperthermia Neg Hx          SOCIAL HISTORY  Social History     Socioeconomic History   • Marital status:      Spouse name: Joey   • Number of children: 6   • Highest education level: Some college, no degree   Tobacco Use   • Smoking status: Never Smoker   • Smokeless tobacco: Never Used   Vaping Use   • Vaping Use: Never used   Substance and Sexual Activity   • Alcohol use: Yes     Comment: 3 drinks yearly   • Drug use: Never   • Sexual activity: Defer     Birth control/protection: Surgical         ALLERGIES  Bee venom, Morphine, Sulfa antibiotics, Tree extract, Eliquis [apixaban], Hydralazine,  Ambien [zolpidem], Anastrozole, Norvasc [amlodipine], Nsaids, Penicillins, and Grass        REVIEW OF SYSTEMS  Review of Systems     All systems reviewed and negative except for those discussed in HPI.       PHYSICAL EXAM    I have reviewed the triage vital signs and nursing notes.    ED Triage Vitals [04/21/22 1048]   Temp Heart Rate Resp BP SpO2   97.7 °F (36.5 °C) 64 20 (!) 191/80 99 %      Temp src Heart Rate Source Patient Position BP Location FiO2 (%)   Tympanic Monitor -- -- --       Physical Exam  GENERAL: not distressed  HENT: nares patent  EYES: no scleral icterus, right conjunctival injection, visual fields full, PERRL, EOMI, VA 20/40 right eye and 20/30 left eye without correction  CV: regular rhythm, regular rate, 2+ bilateral lower extremity edema  RESPIRATORY: normal effort, clear to auscultation bilaterally  ABDOMEN: soft, nontender  MUSCULOSKELETAL: no deformity  NEURO: alert, moves all extremities, follows commands  SKIN: warm, dry        LAB RESULTS  Recent Results (from the past 24 hour(s))   ECG 12 Lead    Collection Time: 04/21/22 11:32 AM   Result Value Ref Range    QT Interval 445 ms   Comprehensive Metabolic Panel    Collection Time: 04/21/22 12:20 PM    Specimen: Blood   Result Value Ref Range    Glucose 113 (H) 65 - 99 mg/dL    BUN 22 8 - 23 mg/dL    Creatinine 0.93 0.57 - 1.00 mg/dL    Sodium 144 136 - 145 mmol/L    Potassium 3.5 3.5 - 5.2 mmol/L    Chloride 104 98 - 107 mmol/L    CO2 29.4 (H) 22.0 - 29.0 mmol/L    Calcium 9.6 8.6 - 10.5 mg/dL    Total Protein 6.5 6.0 - 8.5 g/dL    Albumin 3.90 3.50 - 5.20 g/dL    ALT (SGPT) 11 1 - 33 U/L    AST (SGOT) 11 1 - 32 U/L    Alkaline Phosphatase 80 39 - 117 U/L    Total Bilirubin 0.5 0.0 - 1.2 mg/dL    Globulin 2.6 gm/dL    A/G Ratio 1.5 g/dL    BUN/Creatinine Ratio 23.7 7.0 - 25.0    Anion Gap 10.6 5.0 - 15.0 mmol/L    eGFR 60.7 >60.0 mL/min/1.73   Sedimentation Rate    Collection Time: 04/21/22 12:20 PM    Specimen: Blood   Result Value Ref  Range    Sed Rate 10 0 - 30 mm/hr   C-reactive Protein    Collection Time: 04/21/22 12:20 PM    Specimen: Blood   Result Value Ref Range    C-Reactive Protein 0.36 0.00 - 0.50 mg/dL   BNP    Collection Time: 04/21/22 12:20 PM    Specimen: Blood   Result Value Ref Range    proBNP 293.0 0.0 - 1,800.0 pg/mL   Troponin    Collection Time: 04/21/22 12:20 PM    Specimen: Blood   Result Value Ref Range    Troponin T <0.010 0.000 - 0.030 ng/mL   CBC Auto Differential    Collection Time: 04/21/22 12:20 PM    Specimen: Blood   Result Value Ref Range    WBC 7.31 3.40 - 10.80 10*3/mm3    RBC 4.34 3.77 - 5.28 10*6/mm3    Hemoglobin 13.3 12.0 - 15.9 g/dL    Hematocrit 40.1 34.0 - 46.6 %    MCV 92.4 79.0 - 97.0 fL    MCH 30.6 26.6 - 33.0 pg    MCHC 33.2 31.5 - 35.7 g/dL    RDW 12.3 12.3 - 15.4 %    RDW-SD 41.5 37.0 - 54.0 fl    MPV 10.3 6.0 - 12.0 fL    Platelets 146 140 - 450 10*3/mm3    Neutrophil % 69.1 42.7 - 76.0 %    Lymphocyte % 19.3 (L) 19.6 - 45.3 %    Monocyte % 10.1 5.0 - 12.0 %    Eosinophil % 0.7 0.3 - 6.2 %    Basophil % 0.5 0.0 - 1.5 %    Immature Grans % 0.3 0.0 - 0.5 %    Neutrophils, Absolute 5.05 1.70 - 7.00 10*3/mm3    Lymphocytes, Absolute 1.41 0.70 - 3.10 10*3/mm3    Monocytes, Absolute 0.74 0.10 - 0.90 10*3/mm3    Eosinophils, Absolute 0.05 0.00 - 0.40 10*3/mm3    Basophils, Absolute 0.04 0.00 - 0.20 10*3/mm3    Immature Grans, Absolute 0.02 0.00 - 0.05 10*3/mm3    nRBC 0.0 0.0 - 0.2 /100 WBC       Ordered the above labs and independently reviewed the results.        RADIOLOGY  CT Head Without Contrast, CT Facial Bones Without Contrast    Result Date: 4/21/2022  CT HEAD WITHOUT CONTRAST, CT FACIAL BONES WITHOUT CONTRAST  HISTORY: Fall. Dizziness. Right-sided headache and eye pain.  TECHNIQUE: Head CT includes axial imaging from the base of skull to the vertex without IV contrast. CT facial bones includes axial imaging through the facial bones without contrast and this data was reconstructed in coronal and  sagittal planes.  COMPARISON: CT head without contrast 10/04/2021, CT angiogram head and carotids 08/24/2018.  FINDINGS: HEAD CT: There are no abnormal areas of increased attenuation intraaxially to suggest hemorrhage. No extra-axial fluid collection is observed. There is no evidence for cerebral edema or mass affect or shift of the midline structures. Within the posteromedial occipital lobe there is 1.5 x 1.5 cm chronic infarction and this is without change. Bone windows demonstrate no calvarial fracture.  FACIAL CT: There is no sinus fluid level and the paranasal sinuses appear clear. The frontal recesses, sphenoethmoidal recesses, osteomeatal complexes are patent. There is mild left-to-right nasal septal deviation. A left carolyne bullosa is present and there is mucosal thickening as well as faint mineralization within the left carolyne bullosa. The right middle turbinate is paradoxical. Atherosclerotic calcifications are present involving the cavernous segments of both internal carotid arteries. There are advanced arthritic changes of the anterior atlantoaxial articulation where there is bony overgrowth and there is mild erosive change within the tip of the odontoid process that appears to be chronic.      1. No evidence for acute intracranial abnormality. 2. No acute abnormality on facial CT. Left carolyne bullosa is filled with mucosal thickening and faint mineralization. Paradoxical right middle turbinate. 3. Left right nasal septal deviation. 4. Chronic arthritic changes of the anterior atlantoaxial articulation. 5. Atherosclerotic disease involving the cavernous segments of both internal carotid arteries.  This report was finalized on 4/21/2022 1:07 PM by Dr. Yoel Jones M.D.      XR Chest 1 View    Result Date: 4/21/2022  XR CHEST 1 VW-  HISTORY: Female who is 82 years-old,  leg swelling  TECHNIQUE: Frontal view of the chest  COMPARISON: 10/04/2021  FINDINGS: Heart size is borderline. Pulmonary vasculature  is unremarkable. No focal pulmonary consolidation, pleural effusion, or pneumothorax. No acute osseous process.      No focal pulmonary consolidation. Borderline heart size. Follow-up as clinical indications persist.  This report was finalized on 4/21/2022 12:51 PM by Dr. Umesh Harrison M.D.        I ordered the above noted radiological studies. Reviewed by me and discussed with radiologist.  See dictation for official radiology interpretation.      PROCEDURES    Procedures      MEDICATIONS GIVEN IN ER    Medications   sodium chloride 0.9 % flush 10 mL (has no administration in time range)         PROGRESS, DATA ANALYSIS, CONSULTS, AND MEDICAL DECISION MAKING    All labs have been independently reviewed by me.  All radiology studies have been reviewed by me and discussed with radiologist dictating the report.   EKG's independently viewed and interpreted by me.  Discussion below represents my analysis of pertinent findings related to patient's condition, differential diagnosis, treatment plan and final disposition.    Patient presents with a fall.  This could be mechanical in nature.  However, I have significant suspicion for this being syncopal in nature which could be due to a cardiac source, PE, intracranial abnormality.  I am also going to evaluate for any complications from the fall specifically of her head given her right periocular pain.     I will also evaluate for causes of her leg swelling.    ED Course as of 04/21/22 1525   Thu Apr 21, 2022   1142 EKG interpreted by myself.  Time 11:32 AM.  Sinus rhythm.  Heart rate 59.  Ventricular bigeminy.  First-degree AV block.  Left atrial abnormality.  Prolonged R wave progression.  LVH. [TD]   1143 On medical chart review, old EKG obtained from 9/16/2020.  Sinus rhythm.  Heart rate 57.  Normal intervals and axis. [TD]   1358 Troponin T: <0.010 [TD]   1358 C-Reactive Protein: 0.36 [TD]   1358 Sed Rate: 10 [TD]   1358 Chest x-ray is acutely negative. [TD]      ED  Course User Index  [TD] Eitan Rivera II, MD Dr. Haney came down to evaluate the patient in person.  We discussed the patient's history.  He is similar the patient and her significant allergies.  We are comfortable with discharge home.  He will see the patient in follow-up.  Patient states that her eyes are feeling much better.  No traumatic injury to her eye despite the eye pain starting after her fall.  Additionally, she has normal ESR and CRP.  I do not think that she has giant cell arteritis causing this.  No intracranial head injury.    PPE: The patient wore a surgical mask throughout the entire patient encounter. I wore an N95.    AS OF 15:25 EDT VITALS:    BP - (!) 191/80  HR - 61  TEMP - 97.7 °F (36.5 °C) (Tympanic)  O2 SATS - 98%        DIAGNOSIS  Final diagnoses:   Syncope, unspecified syncope type   Conjunctival injection, right   Bilateral leg edema         DISPOSITION  DISCHARGE    FOLLOW-UP  Óscar Cadet MD  [address] [place] [address]  [phone]    Schedule an appointment as soon as possible for a visit in 1 day           Medication List      Changed    montelukast 10 MG tablet  Commonly known as: Singulair  Take 1 tablet by mouth every night.  What changed: additional instructions     polyethylene glycol 17 GM/SCOOP powder  Commonly known as: MIRALAX  Take one capful w/liquid daily  What changed:   · how much to take  · how to take this  · when to take this     promethazine-codeine 6.25-10 MG/5ML syrup  Commonly known as: PHENERGAN with CODEINE  Take 5 mL by mouth 4 (Four) Times a Day As Needed for cough.  What changed: how much to take                     Eitan Rivera II, MD  04/21/22 6241

## 2022-04-21 NOTE — ED NOTES
Pt wearing mask. Myself had mask, and eye wear/PPE when present with pt. Hand hygiene performed before and after pt care.

## 2022-04-21 NOTE — ED TRIAGE NOTES
Pt was dizzy and fell this am.  No c/o pain.  She reports right pain and bilat leg edema    Patient was placed in face mask during first look triage.  Patient was wearing a face mask throughout encounter.  I wore personal protective equipment throughout the encounter.  Hand hygiene was performed before and after patient encounter.

## 2022-05-03 ENCOUNTER — HOSPITAL ENCOUNTER (OUTPATIENT)
Dept: CARDIOLOGY | Facility: HOSPITAL | Age: 82
Discharge: HOME | End: 2022-05-03
Admitting: INTERNAL MEDICINE

## 2022-05-03 ENCOUNTER — TRANSCRIBE ORDERS (OUTPATIENT)
Dept: ADMINISTRATIVE | Facility: HOSPITAL | Age: 82
End: 2022-05-03

## 2022-05-03 DIAGNOSIS — M79.604 PAIN IN BOTH LOWER EXTREMITIES: ICD-10-CM

## 2022-05-03 DIAGNOSIS — I82.402 ACUTE EMBOLISM AND THROMBOSIS OF DEEP VEIN OF LEFT LOWER EXTREMITY: ICD-10-CM

## 2022-05-03 DIAGNOSIS — M79.605 PAIN IN BOTH LOWER EXTREMITIES: ICD-10-CM

## 2022-05-03 DIAGNOSIS — I82.401 ACUTE EMBOLISM AND THROMBOSIS OF DEEP VEIN OF RIGHT LOWER EXTREMITY: ICD-10-CM

## 2022-05-03 DIAGNOSIS — M79.89 RIGHT LEG SWELLING: Primary | ICD-10-CM

## 2022-05-03 LAB
BH CV LOWER VASCULAR LEFT COMMON FEMORAL AUGMENT: NORMAL
BH CV LOWER VASCULAR LEFT COMMON FEMORAL COMPETENT: NORMAL
BH CV LOWER VASCULAR LEFT COMMON FEMORAL COMPRESS: NORMAL
BH CV LOWER VASCULAR LEFT COMMON FEMORAL PHASIC: NORMAL
BH CV LOWER VASCULAR LEFT COMMON FEMORAL SPONT: NORMAL
BH CV LOWER VASCULAR RIGHT COMMON FEMORAL AUGMENT: NORMAL
BH CV LOWER VASCULAR RIGHT COMMON FEMORAL COMPETENT: NORMAL
BH CV LOWER VASCULAR RIGHT COMMON FEMORAL COMPRESS: NORMAL
BH CV LOWER VASCULAR RIGHT COMMON FEMORAL PHASIC: NORMAL
BH CV LOWER VASCULAR RIGHT COMMON FEMORAL SPONT: NORMAL
BH CV LOWER VASCULAR RIGHT DISTAL FEMORAL COMPRESS: NORMAL
BH CV LOWER VASCULAR RIGHT GASTRONEMIUS COMPRESS: NORMAL
BH CV LOWER VASCULAR RIGHT GREATER SAPH AK COMPRESS: NORMAL
BH CV LOWER VASCULAR RIGHT GREATER SAPH BK COMPRESS: NORMAL
BH CV LOWER VASCULAR RIGHT LESSER SAPH COMPRESS: NORMAL
BH CV LOWER VASCULAR RIGHT MID FEMORAL AUGMENT: NORMAL
BH CV LOWER VASCULAR RIGHT MID FEMORAL COMPETENT: NORMAL
BH CV LOWER VASCULAR RIGHT MID FEMORAL COMPRESS: NORMAL
BH CV LOWER VASCULAR RIGHT MID FEMORAL PHASIC: NORMAL
BH CV LOWER VASCULAR RIGHT MID FEMORAL SPONT: NORMAL
BH CV LOWER VASCULAR RIGHT PERONEAL COMPRESS: NORMAL
BH CV LOWER VASCULAR RIGHT POPLITEAL AUGMENT: NORMAL
BH CV LOWER VASCULAR RIGHT POPLITEAL COMPETENT: NORMAL
BH CV LOWER VASCULAR RIGHT POPLITEAL COMPRESS: NORMAL
BH CV LOWER VASCULAR RIGHT POPLITEAL PHASIC: NORMAL
BH CV LOWER VASCULAR RIGHT POPLITEAL SPONT: NORMAL
BH CV LOWER VASCULAR RIGHT POSTERIOR TIBIAL COMPRESS: NORMAL
BH CV LOWER VASCULAR RIGHT PROFUNDA FEMORAL COMPRESS: NORMAL
BH CV LOWER VASCULAR RIGHT PROXIMAL FEMORAL COMPRESS: NORMAL
BH CV LOWER VASCULAR RIGHT SAPHENOFEMORAL JUNCTION COMPRESS: NORMAL
MAXIMAL PREDICTED HEART RATE: 136 BPM
STRESS TARGET HR: 116 BPM

## 2022-05-03 PROCEDURE — 93971 EXTREMITY STUDY: CPT

## 2022-05-03 NOTE — PROGRESS NOTES
Today's preliminary report. Right lower extremity venous Doppler is negative for DVT. Report was called to Dr. Cadet who said to give the patient results and send her home.

## 2022-05-25 ENCOUNTER — HOSPITAL ENCOUNTER (OUTPATIENT)
Facility: HOSPITAL | Age: 82
Discharge: HOME HEALTH | End: 2022-05-29
Attending: INTERNAL MEDICINE | Admitting: INTERNAL MEDICINE

## 2022-05-25 DIAGNOSIS — M48.061 SPINAL STENOSIS OF LUMBAR REGION WITH RADICULOPATHY: ICD-10-CM

## 2022-05-25 DIAGNOSIS — R55 SYNCOPE, UNSPECIFIED SYNCOPE TYPE: Primary | ICD-10-CM

## 2022-05-25 DIAGNOSIS — R55 VASOVAGAL SYNCOPE: ICD-10-CM

## 2022-05-25 DIAGNOSIS — R62.7 ADULT FAILURE TO THRIVE SYNDROME: ICD-10-CM

## 2022-05-25 DIAGNOSIS — M54.16 SPINAL STENOSIS OF LUMBAR REGION WITH RADICULOPATHY: ICD-10-CM

## 2022-05-25 PROBLEM — I99.8 BLOOD PRESSURE INSTABILITY: Status: ACTIVE | Noted: 2022-05-25

## 2022-05-25 PROBLEM — R63.4 RECENT UNEXPLAINED WEIGHT LOSS: Status: ACTIVE | Noted: 2022-05-25

## 2022-05-25 PROBLEM — R29.90 POST-COVID-19 SYNDROME MANIFESTING AS CHRONIC NEUROLOGIC SYMPTOMS: Status: ACTIVE | Noted: 2022-05-25

## 2022-05-25 PROBLEM — R51.9 ACUTE INTRACTABLE HEADACHE: Status: ACTIVE | Noted: 2022-05-25

## 2022-05-25 PROBLEM — R27.0 ATAXIA: Status: ACTIVE | Noted: 2022-05-25

## 2022-05-25 PROBLEM — U09.9 POST-COVID-19 SYNDROME MANIFESTING AS CHRONIC NEUROLOGIC SYMPTOMS: Status: ACTIVE | Noted: 2022-05-25

## 2022-05-25 LAB
ALBUMIN SERPL-MCNC: 4.2 G/DL (ref 3.5–5.2)
ALBUMIN/GLOB SERPL: 2.1 G/DL
ALP SERPL-CCNC: 90 U/L (ref 39–117)
ALT SERPL W P-5'-P-CCNC: 9 U/L (ref 1–33)
AMYLASE SERPL-CCNC: 38 U/L (ref 28–100)
ANION GAP SERPL CALCULATED.3IONS-SCNC: 13 MMOL/L (ref 5–15)
AST SERPL-CCNC: 11 U/L (ref 1–32)
BASOPHILS # BLD AUTO: 0.05 10*3/MM3 (ref 0–0.2)
BASOPHILS NFR BLD AUTO: 0.8 % (ref 0–1.5)
BILIRUB SERPL-MCNC: 0.5 MG/DL (ref 0–1.2)
BUN SERPL-MCNC: 31 MG/DL (ref 8–23)
BUN/CREAT SERPL: 28.7 (ref 7–25)
CA-I BLD-MCNC: 5 MG/DL (ref 4.6–5.4)
CA-I SERPL ISE-MCNC: 1.26 MMOL/L (ref 1.15–1.35)
CALCIUM SPEC-SCNC: 9.7 MG/DL (ref 8.6–10.5)
CHLORIDE SERPL-SCNC: 102 MMOL/L (ref 98–107)
CHOLEST SERPL-MCNC: 120 MG/DL (ref 0–200)
CO2 SERPL-SCNC: 28 MMOL/L (ref 22–29)
CREAT SERPL-MCNC: 1.08 MG/DL (ref 0.57–1)
DEPRECATED RDW RBC AUTO: 39.6 FL (ref 37–54)
EGFRCR SERPLBLD CKD-EPI 2021: 50.8 ML/MIN/1.73
EOSINOPHIL # BLD AUTO: 0.08 10*3/MM3 (ref 0–0.4)
EOSINOPHIL NFR BLD AUTO: 1.3 % (ref 0.3–6.2)
ERYTHROCYTE [DISTWIDTH] IN BLOOD BY AUTOMATED COUNT: 12.3 % (ref 12.3–15.4)
GLOBULIN UR ELPH-MCNC: 2 GM/DL
GLUCOSE SERPL-MCNC: 94 MG/DL (ref 65–99)
HBA1C MFR BLD: 6.1 % (ref 4.8–5.6)
HCT VFR BLD AUTO: 38.3 % (ref 34–46.6)
HDLC SERPL-MCNC: 46 MG/DL (ref 40–60)
HGB BLD-MCNC: 12.9 G/DL (ref 12–15.9)
INR PPP: 1.33 (ref 0.9–1.1)
LDLC SERPL CALC-MCNC: 55 MG/DL (ref 0–100)
LDLC/HDLC SERPL: 1.16 {RATIO}
LIPASE SERPL-CCNC: 21 U/L (ref 13–60)
LYMPHOCYTES # BLD AUTO: 2.2 10*3/MM3 (ref 0.7–3.1)
LYMPHOCYTES NFR BLD AUTO: 36.4 % (ref 19.6–45.3)
MAGNESIUM SERPL-MCNC: 1.8 MG/DL (ref 1.6–2.4)
MCH RBC QN AUTO: 30.2 PG (ref 26.6–33)
MCHC RBC AUTO-ENTMCNC: 33.7 G/DL (ref 31.5–35.7)
MCV RBC AUTO: 89.7 FL (ref 79–97)
MONOCYTES # BLD AUTO: 0.5 10*3/MM3 (ref 0.1–0.9)
MONOCYTES NFR BLD AUTO: 8.3 % (ref 5–12)
NEUTROPHILS NFR BLD AUTO: 3.21 10*3/MM3 (ref 1.7–7)
NEUTROPHILS NFR BLD AUTO: 53 % (ref 42.7–76)
NT-PROBNP SERPL-MCNC: 332 PG/ML (ref 0–1800)
PHOSPHATE SERPL-MCNC: 3.2 MG/DL (ref 2.5–4.5)
PLATELET # BLD AUTO: 144 10*3/MM3 (ref 140–450)
PMV BLD AUTO: 10.9 FL (ref 6–12)
POTASSIUM SERPL-SCNC: 3.1 MMOL/L (ref 3.5–5.2)
PROT SERPL-MCNC: 6.2 G/DL (ref 6–8.5)
PROTHROMBIN TIME: 16.3 SECONDS (ref 11.7–14.2)
RBC # BLD AUTO: 4.27 10*6/MM3 (ref 3.77–5.28)
SODIUM SERPL-SCNC: 143 MMOL/L (ref 136–145)
T4 FREE SERPL-MCNC: 1.35 NG/DL (ref 0.93–1.7)
TRIGL SERPL-MCNC: 104 MG/DL (ref 0–150)
TROPONIN T SERPL-MCNC: <0.01 NG/ML (ref 0–0.03)
TSH SERPL DL<=0.05 MIU/L-ACNC: 2.21 UIU/ML (ref 0.27–4.2)
VLDLC SERPL-MCNC: 19 MG/DL (ref 5–40)
WBC NRBC COR # BLD: 6.05 10*3/MM3 (ref 3.4–10.8)

## 2022-05-25 PROCEDURE — G0378 HOSPITAL OBSERVATION PER HR: HCPCS

## 2022-05-25 PROCEDURE — 83690 ASSAY OF LIPASE: CPT | Performed by: INTERNAL MEDICINE

## 2022-05-25 PROCEDURE — 80307 DRUG TEST PRSMV CHEM ANLYZR: CPT | Performed by: INTERNAL MEDICINE

## 2022-05-25 PROCEDURE — 81001 URINALYSIS AUTO W/SCOPE: CPT | Performed by: INTERNAL MEDICINE

## 2022-05-25 PROCEDURE — 83036 HEMOGLOBIN GLYCOSYLATED A1C: CPT | Performed by: INTERNAL MEDICINE

## 2022-05-25 PROCEDURE — 93010 ELECTROCARDIOGRAM REPORT: CPT | Performed by: INTERNAL MEDICINE

## 2022-05-25 PROCEDURE — 80061 LIPID PANEL: CPT | Performed by: INTERNAL MEDICINE

## 2022-05-25 PROCEDURE — 82150 ASSAY OF AMYLASE: CPT | Performed by: INTERNAL MEDICINE

## 2022-05-25 PROCEDURE — 85025 COMPLETE CBC W/AUTO DIFF WBC: CPT | Performed by: INTERNAL MEDICINE

## 2022-05-25 PROCEDURE — 80053 COMPREHEN METABOLIC PANEL: CPT | Performed by: INTERNAL MEDICINE

## 2022-05-25 PROCEDURE — 82330 ASSAY OF CALCIUM: CPT | Performed by: INTERNAL MEDICINE

## 2022-05-25 PROCEDURE — C9803 HOPD COVID-19 SPEC COLLECT: HCPCS

## 2022-05-25 PROCEDURE — 84100 ASSAY OF PHOSPHORUS: CPT | Performed by: INTERNAL MEDICINE

## 2022-05-25 PROCEDURE — 84443 ASSAY THYROID STIM HORMONE: CPT | Performed by: INTERNAL MEDICINE

## 2022-05-25 PROCEDURE — U0003 INFECTIOUS AGENT DETECTION BY NUCLEIC ACID (DNA OR RNA); SEVERE ACUTE RESPIRATORY SYNDROME CORONAVIRUS 2 (SARS-COV-2) (CORONAVIRUS DISEASE [COVID-19]), AMPLIFIED PROBE TECHNIQUE, MAKING USE OF HIGH THROUGHPUT TECHNOLOGIES AS DESCRIBED BY CMS-2020-01-R: HCPCS | Performed by: INTERNAL MEDICINE

## 2022-05-25 PROCEDURE — 96361 HYDRATE IV INFUSION ADD-ON: CPT

## 2022-05-25 PROCEDURE — 51798 US URINE CAPACITY MEASURE: CPT

## 2022-05-25 PROCEDURE — 85610 PROTHROMBIN TIME: CPT | Performed by: INTERNAL MEDICINE

## 2022-05-25 PROCEDURE — 83735 ASSAY OF MAGNESIUM: CPT | Performed by: INTERNAL MEDICINE

## 2022-05-25 PROCEDURE — 83880 ASSAY OF NATRIURETIC PEPTIDE: CPT | Performed by: INTERNAL MEDICINE

## 2022-05-25 PROCEDURE — 84484 ASSAY OF TROPONIN QUANT: CPT | Performed by: INTERNAL MEDICINE

## 2022-05-25 PROCEDURE — 93005 ELECTROCARDIOGRAM TRACING: CPT | Performed by: INTERNAL MEDICINE

## 2022-05-25 PROCEDURE — 84439 ASSAY OF FREE THYROXINE: CPT | Performed by: INTERNAL MEDICINE

## 2022-05-25 RX ORDER — FLUTICASONE PROPIONATE 50 MCG
1 SPRAY, SUSPENSION (ML) NASAL 2 TIMES DAILY
Status: DISCONTINUED | OUTPATIENT
Start: 2022-05-25 | End: 2022-05-29 | Stop reason: HOSPADM

## 2022-05-25 RX ORDER — NALOXONE HCL 0.4 MG/ML
0.4 VIAL (ML) INJECTION
Status: DISCONTINUED | OUTPATIENT
Start: 2022-05-25 | End: 2022-05-25

## 2022-05-25 RX ORDER — CLONIDINE HYDROCHLORIDE 0.1 MG/1
0.3 TABLET ORAL EVERY 12 HOURS SCHEDULED
Status: DISCONTINUED | OUTPATIENT
Start: 2022-05-25 | End: 2022-05-27

## 2022-05-25 RX ORDER — CETIRIZINE HYDROCHLORIDE 10 MG/1
10 TABLET ORAL DAILY
Status: DISCONTINUED | OUTPATIENT
Start: 2022-05-26 | End: 2022-05-29 | Stop reason: HOSPADM

## 2022-05-25 RX ORDER — SODIUM CHLORIDE 450 MG/100ML
75 INJECTION, SOLUTION INTRAVENOUS CONTINUOUS
Status: DISCONTINUED | OUTPATIENT
Start: 2022-05-25 | End: 2022-05-27

## 2022-05-25 RX ORDER — BUDESONIDE 0.5 MG/2ML
0.5 INHALANT ORAL
Status: DISCONTINUED | OUTPATIENT
Start: 2022-05-25 | End: 2022-05-29 | Stop reason: HOSPADM

## 2022-05-25 RX ORDER — SODIUM CHLORIDE 0.9 % (FLUSH) 0.9 %
3 SYRINGE (ML) INJECTION EVERY 12 HOURS SCHEDULED
Status: DISCONTINUED | OUTPATIENT
Start: 2022-05-25 | End: 2022-05-29 | Stop reason: HOSPADM

## 2022-05-25 RX ORDER — ONDANSETRON 8 MG/1
8 TABLET, ORALLY DISINTEGRATING ORAL EVERY 6 HOURS PRN
Status: DISCONTINUED | OUTPATIENT
Start: 2022-05-25 | End: 2022-05-29 | Stop reason: HOSPADM

## 2022-05-25 RX ORDER — ALBUTEROL SULFATE 2.5 MG/3ML
2.5 SOLUTION RESPIRATORY (INHALATION) EVERY 4 HOURS PRN
Status: DISCONTINUED | OUTPATIENT
Start: 2022-05-25 | End: 2022-05-29 | Stop reason: HOSPADM

## 2022-05-25 RX ORDER — CHOLECALCIFEROL (VITAMIN D3) 125 MCG
5 CAPSULE ORAL NIGHTLY PRN
Status: DISCONTINUED | OUTPATIENT
Start: 2022-05-25 | End: 2022-05-29 | Stop reason: HOSPADM

## 2022-05-25 RX ORDER — CHOLECALCIFEROL (VITAMIN D3) 125 MCG
1000 CAPSULE ORAL DAILY
Status: DISCONTINUED | OUTPATIENT
Start: 2022-05-26 | End: 2022-05-29 | Stop reason: HOSPADM

## 2022-05-25 RX ORDER — MULTIPLE VITAMINS W/ MINERALS TAB 9MG-400MCG
1 TAB ORAL DAILY
Status: DISCONTINUED | OUTPATIENT
Start: 2022-05-26 | End: 2022-05-29 | Stop reason: HOSPADM

## 2022-05-25 RX ORDER — POLYETHYLENE GLYCOL 3350 17 G/17G
17 POWDER, FOR SOLUTION ORAL DAILY PRN
Status: DISCONTINUED | OUTPATIENT
Start: 2022-05-25 | End: 2022-05-29 | Stop reason: HOSPADM

## 2022-05-25 RX ORDER — BISACODYL 5 MG/1
5 TABLET, DELAYED RELEASE ORAL DAILY PRN
Status: DISCONTINUED | OUTPATIENT
Start: 2022-05-25 | End: 2022-05-29 | Stop reason: HOSPADM

## 2022-05-25 RX ORDER — NITROGLYCERIN 0.4 MG/1
0.4 TABLET SUBLINGUAL
Status: DISCONTINUED | OUTPATIENT
Start: 2022-05-25 | End: 2022-05-29 | Stop reason: HOSPADM

## 2022-05-25 RX ORDER — NIFEDIPINE 60 MG/1
60 TABLET, EXTENDED RELEASE ORAL 2 TIMES DAILY
Status: DISCONTINUED | OUTPATIENT
Start: 2022-05-25 | End: 2022-05-26

## 2022-05-25 RX ORDER — MORPHINE SULFATE 2 MG/ML
1 INJECTION, SOLUTION INTRAMUSCULAR; INTRAVENOUS EVERY 4 HOURS PRN
Status: DISCONTINUED | OUTPATIENT
Start: 2022-05-25 | End: 2022-05-25

## 2022-05-25 RX ORDER — ONDANSETRON 2 MG/ML
4 INJECTION INTRAMUSCULAR; INTRAVENOUS EVERY 6 HOURS PRN
Status: DISCONTINUED | OUTPATIENT
Start: 2022-05-25 | End: 2022-05-29 | Stop reason: HOSPADM

## 2022-05-25 RX ORDER — BUTALBITAL, ACETAMINOPHEN AND CAFFEINE 50; 325; 40 MG/1; MG/1; MG/1
2 TABLET ORAL EVERY 4 HOURS PRN
Status: DISCONTINUED | OUTPATIENT
Start: 2022-05-25 | End: 2022-05-29 | Stop reason: HOSPADM

## 2022-05-25 RX ORDER — ETHACRYNIC ACID 25 MG/1
25 TABLET ORAL 3 TIMES DAILY
Status: DISCONTINUED | OUTPATIENT
Start: 2022-05-25 | End: 2022-05-26

## 2022-05-25 RX ORDER — PANTOPRAZOLE SODIUM 40 MG/1
40 TABLET, DELAYED RELEASE ORAL EVERY MORNING
Status: DISCONTINUED | OUTPATIENT
Start: 2022-05-26 | End: 2022-05-29 | Stop reason: HOSPADM

## 2022-05-25 RX ORDER — FAMOTIDINE 20 MG/1
40 TABLET, FILM COATED ORAL DAILY
Status: DISCONTINUED | OUTPATIENT
Start: 2022-05-26 | End: 2022-05-29 | Stop reason: HOSPADM

## 2022-05-25 RX ORDER — ROSUVASTATIN CALCIUM 5 MG/1
5 TABLET, COATED ORAL EVERY MORNING
Status: DISCONTINUED | OUTPATIENT
Start: 2022-05-26 | End: 2022-05-29 | Stop reason: HOSPADM

## 2022-05-25 RX ORDER — MELATONIN
1000 DAILY
Status: DISCONTINUED | OUTPATIENT
Start: 2022-05-26 | End: 2022-05-29 | Stop reason: HOSPADM

## 2022-05-25 RX ORDER — CHOLECALCIFEROL (VITAMIN D3) 1250 MCG
50000 CAPSULE ORAL WEEKLY
Status: DISCONTINUED | OUTPATIENT
Start: 2022-05-26 | End: 2022-05-25

## 2022-05-25 RX ORDER — ACETAMINOPHEN 325 MG/1
650 TABLET ORAL EVERY 4 HOURS PRN
Status: DISCONTINUED | OUTPATIENT
Start: 2022-05-25 | End: 2022-05-29 | Stop reason: HOSPADM

## 2022-05-25 RX ORDER — HYDROCODONE BITARTRATE AND ACETAMINOPHEN 10; 325 MG/1; MG/1
1 TABLET ORAL
Status: DISCONTINUED | OUTPATIENT
Start: 2022-05-25 | End: 2022-05-27

## 2022-05-25 RX ORDER — BISACODYL 10 MG
10 SUPPOSITORY, RECTAL RECTAL DAILY PRN
Status: DISCONTINUED | OUTPATIENT
Start: 2022-05-25 | End: 2022-05-29 | Stop reason: HOSPADM

## 2022-05-25 RX ORDER — ACETAMINOPHEN 650 MG/1
650 SUPPOSITORY RECTAL EVERY 4 HOURS PRN
Status: DISCONTINUED | OUTPATIENT
Start: 2022-05-25 | End: 2022-05-29 | Stop reason: HOSPADM

## 2022-05-25 RX ORDER — EPLERENONE 25 MG/1
50 TABLET, FILM COATED ORAL EVERY 12 HOURS SCHEDULED
Status: DISCONTINUED | OUTPATIENT
Start: 2022-05-25 | End: 2022-05-29 | Stop reason: HOSPADM

## 2022-05-25 RX ORDER — MONTELUKAST SODIUM 10 MG/1
10 TABLET ORAL NIGHTLY
Status: DISCONTINUED | OUTPATIENT
Start: 2022-05-25 | End: 2022-05-29 | Stop reason: HOSPADM

## 2022-05-25 RX ORDER — HYDROXYZINE HYDROCHLORIDE 25 MG/1
50 TABLET, FILM COATED ORAL EVERY 6 HOURS PRN
Status: DISCONTINUED | OUTPATIENT
Start: 2022-05-25 | End: 2022-05-29 | Stop reason: HOSPADM

## 2022-05-25 RX ORDER — POTASSIUM CHLORIDE 750 MG/1
10 TABLET, FILM COATED, EXTENDED RELEASE ORAL DAILY
Status: DISCONTINUED | OUTPATIENT
Start: 2022-05-26 | End: 2022-05-26

## 2022-05-25 RX ORDER — AMOXICILLIN 250 MG
2 CAPSULE ORAL 2 TIMES DAILY
Status: DISCONTINUED | OUTPATIENT
Start: 2022-05-25 | End: 2022-05-29 | Stop reason: HOSPADM

## 2022-05-25 RX ORDER — SODIUM CHLORIDE 0.9 % (FLUSH) 0.9 %
3-10 SYRINGE (ML) INJECTION AS NEEDED
Status: DISCONTINUED | OUTPATIENT
Start: 2022-05-25 | End: 2022-05-29 | Stop reason: HOSPADM

## 2022-05-25 RX ORDER — DIPHENHYDRAMINE HCL 25 MG
50 CAPSULE ORAL EVERY 6 HOURS PRN
Refills: 0 | Status: DISCONTINUED | OUTPATIENT
Start: 2022-05-25 | End: 2022-05-29 | Stop reason: HOSPADM

## 2022-05-25 RX ORDER — ACETAMINOPHEN 160 MG/5ML
650 SOLUTION ORAL EVERY 4 HOURS PRN
Status: DISCONTINUED | OUTPATIENT
Start: 2022-05-25 | End: 2022-05-29 | Stop reason: HOSPADM

## 2022-05-25 RX ORDER — ERGOCALCIFEROL 1.25 MG/1
50000 CAPSULE ORAL
Status: DISCONTINUED | OUTPATIENT
Start: 2022-05-26 | End: 2022-05-29 | Stop reason: HOSPADM

## 2022-05-25 RX ADMIN — Medication 3 ML: at 22:45

## 2022-05-25 RX ADMIN — FLUTICASONE PROPIONATE 1 SPRAY: 50 SPRAY, METERED NASAL at 22:45

## 2022-05-25 RX ADMIN — MONTELUKAST SODIUM 10 MG: 10 TABLET, FILM COATED ORAL at 22:42

## 2022-05-25 RX ADMIN — SODIUM CHLORIDE 75 ML/HR: 4.5 INJECTION, SOLUTION INTRAVENOUS at 22:40

## 2022-05-25 RX ADMIN — BUTALBITAL, ACETAMINOPHEN, AND CAFFEINE 2 TABLET: 50; 325; 40 TABLET ORAL at 22:42

## 2022-05-25 RX ADMIN — DOCUSATE SODIUM 50MG AND SENNOSIDES 8.6MG 2 TABLET: 8.6; 5 TABLET, FILM COATED ORAL at 22:42

## 2022-05-25 RX ADMIN — CLONIDINE HYDROCHLORIDE 0.3 MG: 0.1 TABLET ORAL at 22:42

## 2022-05-25 RX ADMIN — ETHACRYNIC ACID 25 MG: 25 TABLET ORAL at 23:21

## 2022-05-26 ENCOUNTER — APPOINTMENT (OUTPATIENT)
Dept: CARDIOLOGY | Facility: HOSPITAL | Age: 82
End: 2022-05-26

## 2022-05-26 ENCOUNTER — APPOINTMENT (OUTPATIENT)
Dept: CT IMAGING | Facility: HOSPITAL | Age: 82
End: 2022-05-26

## 2022-05-26 ENCOUNTER — APPOINTMENT (OUTPATIENT)
Dept: RESPIRATORY THERAPY | Facility: HOSPITAL | Age: 82
End: 2022-05-26

## 2022-05-26 LAB
25(OH)D3 SERPL-MCNC: 34.2 NG/ML (ref 30–100)
AMPHET+METHAMPHET UR QL: NEGATIVE
ANION GAP SERPL CALCULATED.3IONS-SCNC: 12 MMOL/L (ref 5–15)
AORTIC DIMENSIONLESS INDEX: 0.8 (DI)
BACTERIA UR QL AUTO: ABNORMAL /HPF
BARBITURATES UR QL SCN: POSITIVE
BASOPHILS # BLD AUTO: 0.04 10*3/MM3 (ref 0–0.2)
BASOPHILS NFR BLD AUTO: 0.8 % (ref 0–1.5)
BENZODIAZ UR QL SCN: NEGATIVE
BH CV ECHO MEAS - AO MAX PG: 5 MMHG
BH CV ECHO MEAS - AO MEAN PG: 3 MMHG
BH CV ECHO MEAS - AO ROOT DIAM: 3 CM
BH CV ECHO MEAS - AO V2 MAX: 111.3 CM/SEC
BH CV ECHO MEAS - AO V2 VTI: 32 CM
BH CV ECHO MEAS - AVA(I,D): 3.1 CM2
BH CV ECHO MEAS - EDV(CUBED): 67 ML
BH CV ECHO MEAS - EDV(MOD-SP2): 55 ML
BH CV ECHO MEAS - EDV(MOD-SP4): 57 ML
BH CV ECHO MEAS - EF(MOD-BP): 63.3 %
BH CV ECHO MEAS - EF(MOD-SP2): 61.8 %
BH CV ECHO MEAS - EF(MOD-SP4): 64.9 %
BH CV ECHO MEAS - ESV(CUBED): 21.2 ML
BH CV ECHO MEAS - ESV(MOD-SP2): 21 ML
BH CV ECHO MEAS - ESV(MOD-SP4): 20 ML
BH CV ECHO MEAS - FS: 31.9 %
BH CV ECHO MEAS - IVS/LVPW: 1.04 CM
BH CV ECHO MEAS - IVSD: 1.01 CM
BH CV ECHO MEAS - LAT PEAK E' VEL: 10.8 CM/SEC
BH CV ECHO MEAS - LV DIASTOLIC VOL/BSA (35-75): 32.4 CM2
BH CV ECHO MEAS - LV MASS(C)D: 129.1 GRAMS
BH CV ECHO MEAS - LV MAX PG: 3.5 MMHG
BH CV ECHO MEAS - LV MEAN PG: 2.13 MMHG
BH CV ECHO MEAS - LV SYSTOLIC VOL/BSA (12-30): 11.4 CM2
BH CV ECHO MEAS - LV V1 MAX: 93.4 CM/SEC
BH CV ECHO MEAS - LV V1 VTI: 26.1 CM
BH CV ECHO MEAS - LVIDD: 4.1 CM
BH CV ECHO MEAS - LVIDS: 2.8 CM
BH CV ECHO MEAS - LVOT AREA: 3.8 CM2
BH CV ECHO MEAS - LVOT DIAM: 2.2 CM
BH CV ECHO MEAS - LVPWD: 0.98 CM
BH CV ECHO MEAS - MED PEAK E' VEL: 6.3 CM/SEC
BH CV ECHO MEAS - MR MAX PG: 30.7 MMHG
BH CV ECHO MEAS - MR MAX VEL: 276.9 CM/SEC
BH CV ECHO MEAS - MV A MAX VEL: 84.4 CM/SEC
BH CV ECHO MEAS - MV DEC SLOPE: 276.8 CM/SEC2
BH CV ECHO MEAS - MV DEC TIME: 0.27 MSEC
BH CV ECHO MEAS - MV E MAX VEL: 80.1 CM/SEC
BH CV ECHO MEAS - MV E/A: 0.95
BH CV ECHO MEAS - MV MAX PG: 3.3 MMHG
BH CV ECHO MEAS - MV MEAN PG: 1.03 MMHG
BH CV ECHO MEAS - MV P1/2T: 103.4 MSEC
BH CV ECHO MEAS - MV V2 VTI: 31.5 CM
BH CV ECHO MEAS - MVA(P1/2T): 2.13 CM2
BH CV ECHO MEAS - MVA(VTI): 3.1 CM2
BH CV ECHO MEAS - PA V2 MAX: 108.6 CM/SEC
BH CV ECHO MEAS - QP/QS: 0.34
BH CV ECHO MEAS - RAP SYSTOLE: 8 MMHG
BH CV ECHO MEAS - RV MAX PG: 0.84 MMHG
BH CV ECHO MEAS - RV V1 MAX: 45.8 CM/SEC
BH CV ECHO MEAS - RV V1 VTI: 11.7 CM
BH CV ECHO MEAS - RVOT DIAM: 1.92 CM
BH CV ECHO MEAS - RVSP: 32 MMHG
BH CV ECHO MEAS - SI(MOD-SP2): 19.4 ML/M2
BH CV ECHO MEAS - SI(MOD-SP4): 21.1 ML/M2
BH CV ECHO MEAS - SV(LVOT): 99.3 ML
BH CV ECHO MEAS - SV(MOD-SP2): 34 ML
BH CV ECHO MEAS - SV(MOD-SP4): 37 ML
BH CV ECHO MEAS - SV(RVOT): 33.7 ML
BH CV ECHO MEAS - TR MAX PG: 24.4 MMHG
BH CV ECHO MEAS - TR MAX VEL: 247 CM/SEC
BH CV ECHO MEASUREMENTS AVERAGE E/E' RATIO: 9.37
BH CV XLRA - RV BASE: 3.2 CM
BH CV XLRA - TDI S': 10 CM/SEC
BH CV XLRA MEAS LEFT DIST CCA EDV: -18.2 CM/SEC
BH CV XLRA MEAS LEFT DIST CCA PSV: -87.9 CM/SEC
BH CV XLRA MEAS LEFT DIST ICA EDV: -34.6 CM/SEC
BH CV XLRA MEAS LEFT DIST ICA PSV: -120 CM/SEC
BH CV XLRA MEAS LEFT ICA/CCA RATIO: 1.37
BH CV XLRA MEAS LEFT MID ICA EDV: -28.3 CM/SEC
BH CV XLRA MEAS LEFT MID ICA PSV: -88 CM/SEC
BH CV XLRA MEAS LEFT PROX CCA EDV: 21.6 CM/SEC
BH CV XLRA MEAS LEFT PROX CCA PSV: 147 CM/SEC
BH CV XLRA MEAS LEFT PROX ECA EDV: 12.3 CM/SEC
BH CV XLRA MEAS LEFT PROX ECA PSV: 69.8 CM/SEC
BH CV XLRA MEAS LEFT PROX ICA EDV: -15.3 CM/SEC
BH CV XLRA MEAS LEFT PROX ICA PSV: -74.6 CM/SEC
BH CV XLRA MEAS LEFT PROX SCLA PSV: 223 CM/SEC
BH CV XLRA MEAS LEFT VERTEBRAL A EDV: -11 CM/SEC
BH CV XLRA MEAS LEFT VERTEBRAL A PSV: -60.5 CM/SEC
BH CV XLRA MEAS RIGHT DIST CCA EDV: 14.1 CM/SEC
BH CV XLRA MEAS RIGHT DIST CCA PSV: 62.5 CM/SEC
BH CV XLRA MEAS RIGHT DIST ICA EDV: -36.9 CM/SEC
BH CV XLRA MEAS RIGHT DIST ICA PSV: -114 CM/SEC
BH CV XLRA MEAS RIGHT ICA/CCA RATIO: -1.82
BH CV XLRA MEAS RIGHT MID ICA EDV: 27.6 CM/SEC
BH CV XLRA MEAS RIGHT MID ICA PSV: 96.7 CM/SEC
BH CV XLRA MEAS RIGHT PROX CCA EDV: 11.7 CM/SEC
BH CV XLRA MEAS RIGHT PROX CCA PSV: 80.9 CM/SEC
BH CV XLRA MEAS RIGHT PROX ECA EDV: -13.8 CM/SEC
BH CV XLRA MEAS RIGHT PROX ECA PSV: -127 CM/SEC
BH CV XLRA MEAS RIGHT PROX ICA EDV: -18.1 CM/SEC
BH CV XLRA MEAS RIGHT PROX ICA PSV: -79.8 CM/SEC
BH CV XLRA MEAS RIGHT PROX SCLA PSV: 199 CM/SEC
BH CV XLRA MEAS RIGHT VERTEBRAL A EDV: 8.8 CM/SEC
BH CV XLRA MEAS RIGHT VERTEBRAL A PSV: 56.3 CM/SEC
BILIRUB UR QL STRIP: NEGATIVE
BUN SERPL-MCNC: 30 MG/DL (ref 8–23)
BUN/CREAT SERPL: 29.1 (ref 7–25)
CALCIUM SPEC-SCNC: 8.9 MG/DL (ref 8.6–10.5)
CANNABINOIDS SERPL QL: NEGATIVE
CHLORIDE SERPL-SCNC: 103 MMOL/L (ref 98–107)
CLARITY UR: CLEAR
CO2 SERPL-SCNC: 26 MMOL/L (ref 22–29)
COCAINE UR QL: NEGATIVE
COLOR UR: YELLOW
CREAT SERPL-MCNC: 1.03 MG/DL (ref 0.57–1)
DEPRECATED RDW RBC AUTO: 40.7 FL (ref 37–54)
EGFRCR SERPLBLD CKD-EPI 2021: 53.7 ML/MIN/1.73
EOSINOPHIL # BLD AUTO: 0.07 10*3/MM3 (ref 0–0.4)
EOSINOPHIL NFR BLD AUTO: 1.4 % (ref 0.3–6.2)
ERYTHROCYTE [DISTWIDTH] IN BLOOD BY AUTOMATED COUNT: 12.4 % (ref 12.3–15.4)
GLUCOSE SERPL-MCNC: 104 MG/DL (ref 65–99)
GLUCOSE UR STRIP-MCNC: NEGATIVE MG/DL
HCT VFR BLD AUTO: 37.4 % (ref 34–46.6)
HGB BLD-MCNC: 12.2 G/DL (ref 12–15.9)
HGB UR QL STRIP.AUTO: NEGATIVE
HYALINE CASTS UR QL AUTO: ABNORMAL /LPF
IMM GRANULOCYTES # BLD AUTO: 0.01 10*3/MM3 (ref 0–0.05)
IMM GRANULOCYTES NFR BLD AUTO: 0.2 % (ref 0–0.5)
KETONES UR QL STRIP: NEGATIVE
LEFT ATRIUM VOLUME INDEX: 24.7 ML/M2
LEUKOCYTE ESTERASE UR QL STRIP.AUTO: ABNORMAL
LYMPHOCYTES # BLD AUTO: 1.88 10*3/MM3 (ref 0.7–3.1)
LYMPHOCYTES NFR BLD AUTO: 38.5 % (ref 19.6–45.3)
MAXIMAL PREDICTED HEART RATE: 136 BPM
MAXIMAL PREDICTED HEART RATE: 136 BPM
MCH RBC QN AUTO: 29.8 PG (ref 26.6–33)
MCHC RBC AUTO-ENTMCNC: 32.6 G/DL (ref 31.5–35.7)
MCV RBC AUTO: 91.2 FL (ref 79–97)
METHADONE UR QL SCN: NEGATIVE
MONOCYTES # BLD AUTO: 0.51 10*3/MM3 (ref 0.1–0.9)
MONOCYTES NFR BLD AUTO: 10.5 % (ref 5–12)
NEUTROPHILS NFR BLD AUTO: 2.37 10*3/MM3 (ref 1.7–7)
NEUTROPHILS NFR BLD AUTO: 48.6 % (ref 42.7–76)
NITRITE UR QL STRIP: NEGATIVE
NRBC BLD AUTO-RTO: 0 /100 WBC (ref 0–0.2)
OPIATES UR QL: POSITIVE
OXYCODONE UR QL SCN: NEGATIVE
PH UR STRIP.AUTO: 7 [PH] (ref 5–8)
PLATELET # BLD AUTO: 123 10*3/MM3 (ref 140–450)
PMV BLD AUTO: 11.1 FL (ref 6–12)
POTASSIUM SERPL-SCNC: 3.2 MMOL/L (ref 3.5–5.2)
POTASSIUM SERPL-SCNC: 4.7 MMOL/L (ref 3.5–5.2)
PROT UR QL STRIP: NEGATIVE
PTH-INTACT SERPL-MCNC: 75.2 PG/ML (ref 15–65)
QT INTERVAL: 421 MS
RBC # BLD AUTO: 4.1 10*6/MM3 (ref 3.77–5.28)
RBC # UR STRIP: ABNORMAL /HPF
REF LAB TEST METHOD: ABNORMAL
RIGHT ARM BP: NORMAL MMHG
SARS-COV-2 RNA RESP QL NAA+PROBE: NOT DETECTED
SINUS: 3 CM
SODIUM SERPL-SCNC: 141 MMOL/L (ref 136–145)
SP GR UR STRIP: 1.02 (ref 1–1.03)
SQUAMOUS #/AREA URNS HPF: ABNORMAL /HPF
STRESS TARGET HR: 116 BPM
STRESS TARGET HR: 116 BPM
UROBILINOGEN UR QL STRIP: ABNORMAL
WBC # UR STRIP: ABNORMAL /HPF
WBC NRBC COR # BLD: 4.88 10*3/MM3 (ref 3.4–10.8)

## 2022-05-26 PROCEDURE — 80048 BASIC METABOLIC PNL TOTAL CA: CPT | Performed by: INTERNAL MEDICINE

## 2022-05-26 PROCEDURE — G0378 HOSPITAL OBSERVATION PER HR: HCPCS

## 2022-05-26 PROCEDURE — 97535 SELF CARE MNGMENT TRAINING: CPT

## 2022-05-26 PROCEDURE — 85025 COMPLETE CBC W/AUTO DIFF WBC: CPT | Performed by: INTERNAL MEDICINE

## 2022-05-26 PROCEDURE — 51798 US URINE CAPACITY MEASURE: CPT

## 2022-05-26 PROCEDURE — 97530 THERAPEUTIC ACTIVITIES: CPT

## 2022-05-26 PROCEDURE — 97166 OT EVAL MOD COMPLEX 45 MIN: CPT

## 2022-05-26 PROCEDURE — 71250 CT THORAX DX C-: CPT

## 2022-05-26 PROCEDURE — 97162 PT EVAL MOD COMPLEX 30 MIN: CPT

## 2022-05-26 PROCEDURE — 99214 OFFICE O/P EST MOD 30 MIN: CPT | Performed by: INTERNAL MEDICINE

## 2022-05-26 PROCEDURE — 93880 EXTRACRANIAL BILAT STUDY: CPT

## 2022-05-26 PROCEDURE — 83970 ASSAY OF PARATHORMONE: CPT | Performed by: INTERNAL MEDICINE

## 2022-05-26 PROCEDURE — 82306 VITAMIN D 25 HYDROXY: CPT | Performed by: INTERNAL MEDICINE

## 2022-05-26 PROCEDURE — 94799 UNLISTED PULMONARY SVC/PX: CPT

## 2022-05-26 PROCEDURE — 94664 DEMO&/EVAL PT USE INHALER: CPT

## 2022-05-26 PROCEDURE — 99214 OFFICE O/P EST MOD 30 MIN: CPT | Performed by: NURSE PRACTITIONER

## 2022-05-26 PROCEDURE — 74176 CT ABD & PELVIS W/O CONTRAST: CPT

## 2022-05-26 PROCEDURE — 93306 TTE W/DOPPLER COMPLETE: CPT | Performed by: INTERNAL MEDICINE

## 2022-05-26 PROCEDURE — 94010 BREATHING CAPACITY TEST: CPT

## 2022-05-26 PROCEDURE — 93306 TTE W/DOPPLER COMPLETE: CPT

## 2022-05-26 PROCEDURE — 96361 HYDRATE IV INFUSION ADD-ON: CPT

## 2022-05-26 PROCEDURE — 94761 N-INVAS EAR/PLS OXIMETRY MLT: CPT

## 2022-05-26 PROCEDURE — 84132 ASSAY OF SERUM POTASSIUM: CPT | Performed by: INTERNAL MEDICINE

## 2022-05-26 PROCEDURE — 94640 AIRWAY INHALATION TREATMENT: CPT

## 2022-05-26 RX ORDER — NIFEDIPINE 60 MG/1
60 TABLET, EXTENDED RELEASE ORAL
Status: DISCONTINUED | OUTPATIENT
Start: 2022-05-27 | End: 2022-05-27

## 2022-05-26 RX ORDER — POTASSIUM CHLORIDE 750 MG/1
40 TABLET, FILM COATED, EXTENDED RELEASE ORAL
Status: ACTIVE | OUTPATIENT
Start: 2022-05-26 | End: 2022-05-26

## 2022-05-26 RX ORDER — ETHACRYNIC ACID 25 MG/1
25 TABLET ORAL
Status: DISCONTINUED | OUTPATIENT
Start: 2022-05-27 | End: 2022-05-29 | Stop reason: HOSPADM

## 2022-05-26 RX ORDER — POTASSIUM CHLORIDE 1.5 G/1.77G
40 POWDER, FOR SOLUTION ORAL AS NEEDED
Status: DISCONTINUED | OUTPATIENT
Start: 2022-05-26 | End: 2022-05-29 | Stop reason: HOSPADM

## 2022-05-26 RX ORDER — POTASSIUM CHLORIDE 7.45 MG/ML
10 INJECTION INTRAVENOUS
Status: DISCONTINUED | OUTPATIENT
Start: 2022-05-26 | End: 2022-05-29 | Stop reason: HOSPADM

## 2022-05-26 RX ORDER — POTASSIUM CHLORIDE 750 MG/1
40 TABLET, FILM COATED, EXTENDED RELEASE ORAL AS NEEDED
Status: DISCONTINUED | OUTPATIENT
Start: 2022-05-26 | End: 2022-05-29 | Stop reason: HOSPADM

## 2022-05-26 RX ADMIN — MULTIPLE VITAMINS W/ MINERALS TAB 1 TABLET: TAB at 09:55

## 2022-05-26 RX ADMIN — PANTOPRAZOLE SODIUM 40 MG: 40 TABLET, DELAYED RELEASE ORAL at 06:30

## 2022-05-26 RX ADMIN — POTASSIUM CHLORIDE 40 MEQ: 1.5 POWDER, FOR SOLUTION ORAL at 09:55

## 2022-05-26 RX ADMIN — HYDROCODONE BITARTRATE AND ACETAMINOPHEN 1 TABLET: 10; 325 TABLET ORAL at 06:30

## 2022-05-26 RX ADMIN — POTASSIUM CHLORIDE 40 MEQ: 1.5 POWDER, FOR SOLUTION ORAL at 06:30

## 2022-05-26 RX ADMIN — RIVAROXABAN 10 MG: 10 TABLET, FILM COATED ORAL at 09:55

## 2022-05-26 RX ADMIN — EPLERENONE 50 MG: 25 TABLET, FILM COATED ORAL at 09:56

## 2022-05-26 RX ADMIN — EPLERENONE 50 MG: 25 TABLET, FILM COATED ORAL at 22:02

## 2022-05-26 RX ADMIN — CETIRIZINE HYDROCHLORIDE 10 MG: 10 TABLET ORAL at 09:57

## 2022-05-26 RX ADMIN — CLONIDINE HYDROCHLORIDE 0.3 MG: 0.1 TABLET ORAL at 09:57

## 2022-05-26 RX ADMIN — Medication 1000 MCG: at 09:55

## 2022-05-26 RX ADMIN — HYDROCODONE BITARTRATE AND ACETAMINOPHEN 1 TABLET: 10; 325 TABLET ORAL at 00:33

## 2022-05-26 RX ADMIN — POTASSIUM CHLORIDE 40 MEQ: 1.5 POWDER, FOR SOLUTION ORAL at 15:15

## 2022-05-26 RX ADMIN — BISOPROLOL FUMARATE: 5 TABLET, FILM COATED ORAL at 09:55

## 2022-05-26 RX ADMIN — HYDROCODONE BITARTRATE AND ACETAMINOPHEN 1 TABLET: 10; 325 TABLET ORAL at 15:02

## 2022-05-26 RX ADMIN — HYDROCODONE BITARTRATE AND ACETAMINOPHEN 1 TABLET: 10; 325 TABLET ORAL at 09:55

## 2022-05-26 RX ADMIN — ETHACRYNIC ACID 25 MG: 25 TABLET ORAL at 15:15

## 2022-05-26 RX ADMIN — ETHACRYNIC ACID 25 MG: 25 TABLET ORAL at 09:56

## 2022-05-26 RX ADMIN — IPRATROPIUM BROMIDE 0.5 MG: 0.5 SOLUTION RESPIRATORY (INHALATION) at 15:11

## 2022-05-26 RX ADMIN — FLUTICASONE PROPIONATE 1 SPRAY: 50 SPRAY, METERED NASAL at 22:03

## 2022-05-26 RX ADMIN — Medication 1000 UNITS: at 09:57

## 2022-05-26 RX ADMIN — IPRATROPIUM BROMIDE 0.5 MG: 0.5 SOLUTION RESPIRATORY (INHALATION) at 20:12

## 2022-05-26 RX ADMIN — ERGOCALCIFEROL 50000 UNITS: 1.25 CAPSULE ORAL at 09:56

## 2022-05-26 RX ADMIN — POLYETHYLENE GLYCOL 3350 17 G: 17 POWDER, FOR SOLUTION ORAL at 18:51

## 2022-05-26 RX ADMIN — IPRATROPIUM BROMIDE 0.5 MG: 0.5 SOLUTION RESPIRATORY (INHALATION) at 06:45

## 2022-05-26 RX ADMIN — BUDESONIDE 0.5 MG: 0.5 INHALANT ORAL at 20:12

## 2022-05-26 RX ADMIN — FLUTICASONE PROPIONATE 1 SPRAY: 50 SPRAY, METERED NASAL at 10:01

## 2022-05-26 RX ADMIN — MONTELUKAST SODIUM 10 MG: 10 TABLET, FILM COATED ORAL at 22:02

## 2022-05-26 RX ADMIN — DOCUSATE SODIUM 50MG AND SENNOSIDES 8.6MG 2 TABLET: 8.6; 5 TABLET, FILM COATED ORAL at 09:56

## 2022-05-26 RX ADMIN — HYDROCODONE BITARTRATE AND ACETAMINOPHEN 1 TABLET: 10; 325 TABLET ORAL at 22:13

## 2022-05-26 RX ADMIN — Medication 3 ML: at 22:03

## 2022-05-26 RX ADMIN — CLONIDINE HYDROCHLORIDE 0.3 MG: 0.1 TABLET ORAL at 22:03

## 2022-05-26 RX ADMIN — NIFEDIPINE 60 MG: 60 TABLET, FILM COATED, EXTENDED RELEASE ORAL at 09:57

## 2022-05-26 RX ADMIN — BUDESONIDE 0.5 MG: 0.5 INHALANT ORAL at 06:45

## 2022-05-26 RX ADMIN — ROSUVASTATIN CALCIUM 5 MG: 5 TABLET, FILM COATED ORAL at 06:30

## 2022-05-26 RX ADMIN — FAMOTIDINE 40 MG: 20 TABLET ORAL at 09:55

## 2022-05-27 ENCOUNTER — APPOINTMENT (OUTPATIENT)
Dept: NUCLEAR MEDICINE | Facility: HOSPITAL | Age: 82
End: 2022-05-27

## 2022-05-27 LAB
ALBUMIN SERPL-MCNC: 3.5 G/DL (ref 3.5–5.2)
ANION GAP SERPL CALCULATED.3IONS-SCNC: 7.2 MMOL/L (ref 5–15)
BASOPHILS # BLD AUTO: 0.06 10*3/MM3 (ref 0–0.2)
BASOPHILS NFR BLD AUTO: 1.3 % (ref 0–1.5)
BH CV REST NUCLEAR ISOTOPE DOSE: 11.8 MCI
BH CV STRESS COMMENTS STAGE 1: NORMAL
BH CV STRESS DOSE REGADENOSON STAGE 1: 0.4
BH CV STRESS DURATION MIN STAGE 1: 0
BH CV STRESS DURATION SEC STAGE 1: 10
BH CV STRESS NUCLEAR ISOTOPE DOSE: 31.8 MCI
BH CV STRESS PROTOCOL 1: NORMAL
BH CV STRESS RECOVERY BP: NORMAL MMHG
BH CV STRESS RECOVERY HR: 62 BPM
BH CV STRESS STAGE 1: 1
BUN SERPL-MCNC: 28 MG/DL (ref 8–23)
BUN/CREAT SERPL: 28.9 (ref 7–25)
CALCIUM SPEC-SCNC: 9.4 MG/DL (ref 8.6–10.5)
CHLORIDE SERPL-SCNC: 103 MMOL/L (ref 98–107)
CO2 SERPL-SCNC: 24.8 MMOL/L (ref 22–29)
CREAT SERPL-MCNC: 0.97 MG/DL (ref 0.57–1)
DEPRECATED RDW RBC AUTO: 40.6 FL (ref 37–54)
EGFRCR SERPLBLD CKD-EPI 2021: 57.7 ML/MIN/1.73
EOSINOPHIL # BLD AUTO: 0.09 10*3/MM3 (ref 0–0.4)
EOSINOPHIL NFR BLD AUTO: 1.9 % (ref 0.3–6.2)
ERYTHROCYTE [DISTWIDTH] IN BLOOD BY AUTOMATED COUNT: 12.3 % (ref 12.3–15.4)
GLUCOSE SERPL-MCNC: 100 MG/DL (ref 65–99)
HCT VFR BLD AUTO: 35.8 % (ref 34–46.6)
HGB BLD-MCNC: 11.9 G/DL (ref 12–15.9)
IMM GRANULOCYTES # BLD AUTO: 0.01 10*3/MM3 (ref 0–0.05)
IMM GRANULOCYTES NFR BLD AUTO: 0.2 % (ref 0–0.5)
LV EF NUC BP: 60 %
LYMPHOCYTES # BLD AUTO: 2.05 10*3/MM3 (ref 0.7–3.1)
LYMPHOCYTES NFR BLD AUTO: 44.2 % (ref 19.6–45.3)
MAGNESIUM SERPL-MCNC: 1.9 MG/DL (ref 1.6–2.4)
MAXIMAL PREDICTED HEART RATE: 136 BPM
MCH RBC QN AUTO: 29.8 PG (ref 26.6–33)
MCHC RBC AUTO-ENTMCNC: 33.2 G/DL (ref 31.5–35.7)
MCV RBC AUTO: 89.7 FL (ref 79–97)
MONOCYTES # BLD AUTO: 0.45 10*3/MM3 (ref 0.1–0.9)
MONOCYTES NFR BLD AUTO: 9.7 % (ref 5–12)
NEUTROPHILS NFR BLD AUTO: 1.98 10*3/MM3 (ref 1.7–7)
NEUTROPHILS NFR BLD AUTO: 42.7 % (ref 42.7–76)
NRBC BLD AUTO-RTO: 0.2 /100 WBC (ref 0–0.2)
PERCENT MAX PREDICTED HR: 61.03 %
PHOSPHATE SERPL-MCNC: 3.1 MG/DL (ref 2.5–4.5)
PLATELET # BLD AUTO: 120 10*3/MM3 (ref 140–450)
PMV BLD AUTO: 11 FL (ref 6–12)
POTASSIUM SERPL-SCNC: 4.4 MMOL/L (ref 3.5–5.2)
RBC # BLD AUTO: 3.99 10*6/MM3 (ref 3.77–5.28)
SODIUM SERPL-SCNC: 135 MMOL/L (ref 136–145)
STRESS BASELINE BP: NORMAL MMHG
STRESS BASELINE HR: 45 BPM
STRESS PERCENT HR: 72 %
STRESS POST PEAK BP: NORMAL MMHG
STRESS POST PEAK HR: 83 BPM
STRESS TARGET HR: 116 BPM
URATE SERPL-MCNC: 7.3 MG/DL (ref 2.4–5.7)
WBC NRBC COR # BLD: 4.64 10*3/MM3 (ref 3.4–10.8)

## 2022-05-27 PROCEDURE — 25010000002 CEFAZOLIN IN DEXTROSE 2-4 GM/100ML-% SOLUTION: Performed by: INTERNAL MEDICINE

## 2022-05-27 PROCEDURE — 33285 INSJ SUBQ CAR RHYTHM MNTR: CPT | Performed by: INTERNAL MEDICINE

## 2022-05-27 PROCEDURE — 93017 CV STRESS TEST TRACING ONLY: CPT

## 2022-05-27 PROCEDURE — 92610 EVALUATE SWALLOWING FUNCTION: CPT

## 2022-05-27 PROCEDURE — A9270 NON-COVERED ITEM OR SERVICE: HCPCS | Performed by: INTERNAL MEDICINE

## 2022-05-27 PROCEDURE — 0 TECHNETIUM SESTAMIBI: Performed by: INTERNAL MEDICINE

## 2022-05-27 PROCEDURE — 78452 HT MUSCLE IMAGE SPECT MULT: CPT | Performed by: INTERNAL MEDICINE

## 2022-05-27 PROCEDURE — 80069 RENAL FUNCTION PANEL: CPT | Performed by: INTERNAL MEDICINE

## 2022-05-27 PROCEDURE — 25010000002 MIDAZOLAM PER 1 MG: Performed by: INTERNAL MEDICINE

## 2022-05-27 PROCEDURE — 51798 US URINE CAPACITY MEASURE: CPT

## 2022-05-27 PROCEDURE — A9500 TC99M SESTAMIBI: HCPCS | Performed by: INTERNAL MEDICINE

## 2022-05-27 PROCEDURE — G0378 HOSPITAL OBSERVATION PER HR: HCPCS

## 2022-05-27 PROCEDURE — 63710000001 HYDROCODONE-ACETAMINOPHEN 10-325 MG TABLET: Performed by: INTERNAL MEDICINE

## 2022-05-27 PROCEDURE — 96374 THER/PROPH/DIAG INJ IV PUSH: CPT

## 2022-05-27 PROCEDURE — 63710000001 CLONIDINE 0.1 MG TABLET: Performed by: INTERNAL MEDICINE

## 2022-05-27 PROCEDURE — 99024 POSTOP FOLLOW-UP VISIT: CPT | Performed by: NURSE PRACTITIONER

## 2022-05-27 PROCEDURE — 63710000001 EPLERENONE 25 MG TABLET: Performed by: INTERNAL MEDICINE

## 2022-05-27 PROCEDURE — C1764 EVENT RECORDER, CARDIAC: HCPCS | Performed by: INTERNAL MEDICINE

## 2022-05-27 PROCEDURE — 25010000002 REGADENOSON 0.4 MG/5ML SOLUTION: Performed by: INTERNAL MEDICINE

## 2022-05-27 PROCEDURE — 85025 COMPLETE CBC W/AUTO DIFF WBC: CPT | Performed by: INTERNAL MEDICINE

## 2022-05-27 PROCEDURE — 93018 CV STRESS TEST I&R ONLY: CPT | Performed by: INTERNAL MEDICINE

## 2022-05-27 PROCEDURE — 83735 ASSAY OF MAGNESIUM: CPT | Performed by: INTERNAL MEDICINE

## 2022-05-27 PROCEDURE — 94799 UNLISTED PULMONARY SVC/PX: CPT

## 2022-05-27 PROCEDURE — 97530 THERAPEUTIC ACTIVITIES: CPT

## 2022-05-27 PROCEDURE — 25010000002 HYDROMORPHONE PER 4 MG: Performed by: INTERNAL MEDICINE

## 2022-05-27 PROCEDURE — 78452 HT MUSCLE IMAGE SPECT MULT: CPT

## 2022-05-27 PROCEDURE — 84550 ASSAY OF BLOOD/URIC ACID: CPT | Performed by: INTERNAL MEDICINE

## 2022-05-27 DEVICE — ICM LP/RECRD BIOMONITOR3M: Type: IMPLANTABLE DEVICE | Status: FUNCTIONAL

## 2022-05-27 RX ORDER — MIDAZOLAM HYDROCHLORIDE 1 MG/ML
INJECTION INTRAMUSCULAR; INTRAVENOUS AS NEEDED
Status: DISCONTINUED | OUTPATIENT
Start: 2022-05-27 | End: 2022-05-27 | Stop reason: HOSPADM

## 2022-05-27 RX ORDER — LIDOCAINE HYDROCHLORIDE AND EPINEPHRINE 10; 10 MG/ML; UG/ML
INJECTION, SOLUTION INFILTRATION; PERINEURAL AS NEEDED
Status: DISCONTINUED | OUTPATIENT
Start: 2022-05-27 | End: 2022-05-27 | Stop reason: HOSPADM

## 2022-05-27 RX ORDER — HYDROCODONE BITARTRATE AND ACETAMINOPHEN 5; 325 MG/1; MG/1
1 TABLET ORAL
Status: DISCONTINUED | OUTPATIENT
Start: 2022-05-27 | End: 2022-05-29 | Stop reason: HOSPADM

## 2022-05-27 RX ORDER — CLONIDINE HYDROCHLORIDE 0.1 MG/1
0.1 TABLET ORAL EVERY 12 HOURS SCHEDULED
Status: DISCONTINUED | OUTPATIENT
Start: 2022-05-27 | End: 2022-05-29 | Stop reason: HOSPADM

## 2022-05-27 RX ORDER — HYDROCODONE BITARTRATE AND ACETAMINOPHEN 10; 325 MG/1; MG/1
1 TABLET ORAL
Status: DISCONTINUED | OUTPATIENT
Start: 2022-05-27 | End: 2022-05-29 | Stop reason: HOSPADM

## 2022-05-27 RX ORDER — HYDROMORPHONE HYDROCHLORIDE 1 MG/ML
0.5 INJECTION, SOLUTION INTRAMUSCULAR; INTRAVENOUS; SUBCUTANEOUS EVERY 8 HOURS PRN
Status: DISCONTINUED | OUTPATIENT
Start: 2022-05-27 | End: 2022-05-27

## 2022-05-27 RX ORDER — HYDROMORPHONE HYDROCHLORIDE 1 MG/ML
0.5 INJECTION, SOLUTION INTRAMUSCULAR; INTRAVENOUS; SUBCUTANEOUS EVERY 8 HOURS PRN
Status: DISCONTINUED | OUTPATIENT
Start: 2022-05-27 | End: 2022-05-29 | Stop reason: HOSPADM

## 2022-05-27 RX ORDER — CEFAZOLIN SODIUM 2 G/100ML
INJECTION, SOLUTION INTRAVENOUS CONTINUOUS PRN
Status: COMPLETED | OUTPATIENT
Start: 2022-05-27 | End: 2022-05-27

## 2022-05-27 RX ADMIN — TECHNETIUM TC 99M SESTAMIBI 1 DOSE: 1 INJECTION INTRAVENOUS at 06:20

## 2022-05-27 RX ADMIN — Medication 3 ML: at 21:00

## 2022-05-27 RX ADMIN — BUTALBITAL, ACETAMINOPHEN, AND CAFFEINE 2 TABLET: 50; 325; 40 TABLET ORAL at 12:27

## 2022-05-27 RX ADMIN — RIVAROXABAN 10 MG: 10 TABLET, FILM COATED ORAL at 12:23

## 2022-05-27 RX ADMIN — HYDROCODONE BITARTRATE AND ACETAMINOPHEN 1 TABLET: 10; 325 TABLET ORAL at 12:22

## 2022-05-27 RX ADMIN — ROSUVASTATIN CALCIUM 5 MG: 5 TABLET, FILM COATED ORAL at 05:17

## 2022-05-27 RX ADMIN — HYDROCODONE BITARTRATE AND ACETAMINOPHEN 1 TABLET: 10; 325 TABLET ORAL at 17:10

## 2022-05-27 RX ADMIN — MULTIPLE VITAMINS W/ MINERALS TAB 1 TABLET: TAB at 12:23

## 2022-05-27 RX ADMIN — NIFEDIPINE 60 MG: 60 TABLET, FILM COATED, EXTENDED RELEASE ORAL at 15:20

## 2022-05-27 RX ADMIN — SODIUM CHLORIDE 75 ML/HR: 4.5 INJECTION, SOLUTION INTRAVENOUS at 04:31

## 2022-05-27 RX ADMIN — EPLERENONE 50 MG: 25 TABLET, FILM COATED ORAL at 12:23

## 2022-05-27 RX ADMIN — PANTOPRAZOLE SODIUM 40 MG: 40 TABLET, DELAYED RELEASE ORAL at 05:17

## 2022-05-27 RX ADMIN — CLONIDINE HYDROCHLORIDE 0.1 MG: 0.1 TABLET ORAL at 21:57

## 2022-05-27 RX ADMIN — HYDROMORPHONE HYDROCHLORIDE 0.5 MG: 1 INJECTION, SOLUTION INTRAMUSCULAR; INTRAVENOUS; SUBCUTANEOUS at 14:16

## 2022-05-27 RX ADMIN — Medication 1000 MCG: at 12:23

## 2022-05-27 RX ADMIN — CLONIDINE HYDROCHLORIDE 0.3 MG: 0.1 TABLET ORAL at 12:23

## 2022-05-27 RX ADMIN — FAMOTIDINE 40 MG: 20 TABLET ORAL at 12:21

## 2022-05-27 RX ADMIN — FLUTICASONE PROPIONATE 1 SPRAY: 50 SPRAY, METERED NASAL at 12:24

## 2022-05-27 RX ADMIN — EPLERENONE 50 MG: 25 TABLET, FILM COATED ORAL at 21:56

## 2022-05-27 RX ADMIN — REGADENOSON 0.4 MG: 0.08 INJECTION, SOLUTION INTRAVENOUS at 10:15

## 2022-05-27 RX ADMIN — CETIRIZINE HYDROCHLORIDE 10 MG: 10 TABLET ORAL at 12:23

## 2022-05-27 RX ADMIN — HYDROCODONE BITARTRATE AND ACETAMINOPHEN 1 TABLET: 10; 325 TABLET ORAL at 04:31

## 2022-05-27 RX ADMIN — Medication 1000 UNITS: at 12:23

## 2022-05-27 RX ADMIN — BUTALBITAL, ACETAMINOPHEN, AND CAFFEINE 2 TABLET: 50; 325; 40 TABLET ORAL at 00:18

## 2022-05-27 RX ADMIN — IPRATROPIUM BROMIDE 0.5 MG: 0.5 SOLUTION RESPIRATORY (INHALATION) at 15:13

## 2022-05-27 RX ADMIN — TECHNETIUM TC 99M SESTAMIBI 1 DOSE: 1 INJECTION INTRAVENOUS at 10:14

## 2022-05-28 ENCOUNTER — APPOINTMENT (OUTPATIENT)
Dept: CT IMAGING | Facility: HOSPITAL | Age: 82
End: 2022-05-28

## 2022-05-28 LAB
ALBUMIN SERPL-MCNC: 3.6 G/DL (ref 3.5–5.2)
ANION GAP SERPL CALCULATED.3IONS-SCNC: 10 MMOL/L (ref 5–15)
BASOPHILS # BLD AUTO: 0.05 10*3/MM3 (ref 0–0.2)
BASOPHILS NFR BLD AUTO: 0.9 % (ref 0–1.5)
BUN SERPL-MCNC: 27 MG/DL (ref 8–23)
BUN/CREAT SERPL: 24.3 (ref 7–25)
CALCIUM SPEC-SCNC: 9.4 MG/DL (ref 8.6–10.5)
CHLORIDE SERPL-SCNC: 104 MMOL/L (ref 98–107)
CO2 SERPL-SCNC: 25 MMOL/L (ref 22–29)
CREAT SERPL-MCNC: 1.11 MG/DL (ref 0.57–1)
DEPRECATED RDW RBC AUTO: 41.3 FL (ref 37–54)
EGFRCR SERPLBLD CKD-EPI 2021: 49.1 ML/MIN/1.73
EOSINOPHIL # BLD AUTO: 0.11 10*3/MM3 (ref 0–0.4)
EOSINOPHIL NFR BLD AUTO: 2 % (ref 0.3–6.2)
ERYTHROCYTE [DISTWIDTH] IN BLOOD BY AUTOMATED COUNT: 12.4 % (ref 12.3–15.4)
GLUCOSE SERPL-MCNC: 92 MG/DL (ref 65–99)
HCT VFR BLD AUTO: 37.7 % (ref 34–46.6)
HGB BLD-MCNC: 12.3 G/DL (ref 12–15.9)
LYMPHOCYTES # BLD AUTO: 2.04 10*3/MM3 (ref 0.7–3.1)
LYMPHOCYTES NFR BLD AUTO: 37.6 % (ref 19.6–45.3)
MAGNESIUM SERPL-MCNC: 2 MG/DL (ref 1.6–2.4)
MCH RBC QN AUTO: 29.9 PG (ref 26.6–33)
MCHC RBC AUTO-ENTMCNC: 32.6 G/DL (ref 31.5–35.7)
MCV RBC AUTO: 91.7 FL (ref 79–97)
MONOCYTES # BLD AUTO: 0.57 10*3/MM3 (ref 0.1–0.9)
MONOCYTES NFR BLD AUTO: 10.5 % (ref 5–12)
NEUTROPHILS NFR BLD AUTO: 2.65 10*3/MM3 (ref 1.7–7)
NEUTROPHILS NFR BLD AUTO: 48.8 % (ref 42.7–76)
PHOSPHATE SERPL-MCNC: 3.2 MG/DL (ref 2.5–4.5)
PLATELET # BLD AUTO: 119 10*3/MM3 (ref 140–450)
PMV BLD AUTO: 11.2 FL (ref 6–12)
POTASSIUM SERPL-SCNC: 4.3 MMOL/L (ref 3.5–5.2)
RBC # BLD AUTO: 4.11 10*6/MM3 (ref 3.77–5.28)
SODIUM SERPL-SCNC: 139 MMOL/L (ref 136–145)
URATE SERPL-MCNC: 5.6 MG/DL (ref 2.4–5.7)
WBC NRBC COR # BLD: 5.43 10*3/MM3 (ref 3.4–10.8)

## 2022-05-28 PROCEDURE — 63710000001 ACETAMINOPHEN 325 MG TABLET: Performed by: INTERNAL MEDICINE

## 2022-05-28 PROCEDURE — 63710000001 RIVAROXABAN 10 MG TABLET: Performed by: INTERNAL MEDICINE

## 2022-05-28 PROCEDURE — A9270 NON-COVERED ITEM OR SERVICE: HCPCS | Performed by: INTERNAL MEDICINE

## 2022-05-28 PROCEDURE — 85025 COMPLETE CBC W/AUTO DIFF WBC: CPT | Performed by: INTERNAL MEDICINE

## 2022-05-28 PROCEDURE — 97530 THERAPEUTIC ACTIVITIES: CPT

## 2022-05-28 PROCEDURE — 63710000001 HYDROCODONE-ACETAMINOPHEN 10-325 MG TABLET: Performed by: INTERNAL MEDICINE

## 2022-05-28 PROCEDURE — 63710000001 EPLERENONE 25 MG TABLET: Performed by: INTERNAL MEDICINE

## 2022-05-28 PROCEDURE — 63710000001 CETIRIZINE 10 MG TABLET: Performed by: INTERNAL MEDICINE

## 2022-05-28 PROCEDURE — 63710000001 DOXYCYCLINE 100 MG CAPSULE: Performed by: INTERNAL MEDICINE

## 2022-05-28 PROCEDURE — 63710000001 SENNOSIDES-DOCUSATE 8.6-50 MG TABLET: Performed by: INTERNAL MEDICINE

## 2022-05-28 PROCEDURE — 63710000001 ROSUVASTATIN 5 MG TABLET: Performed by: INTERNAL MEDICINE

## 2022-05-28 PROCEDURE — G0378 HOSPITAL OBSERVATION PER HR: HCPCS

## 2022-05-28 PROCEDURE — 63710000001 FAMOTIDINE 20 MG TABLET: Performed by: INTERNAL MEDICINE

## 2022-05-28 PROCEDURE — 63710000001 CHOLECALCIFEROL 25 MCG (1000 UT) TABLET: Performed by: INTERNAL MEDICINE

## 2022-05-28 PROCEDURE — 63710000001 BUTALBITAL-ACETAMINOPHEN-CAFFEINE 50-325-40 MG TABLET: Performed by: INTERNAL MEDICINE

## 2022-05-28 PROCEDURE — 94799 UNLISTED PULMONARY SVC/PX: CPT

## 2022-05-28 PROCEDURE — 63710000001 PANTOPRAZOLE 40 MG TABLET DELAYED-RELEASE: Performed by: INTERNAL MEDICINE

## 2022-05-28 PROCEDURE — 63710000001 VITAMIN B-12 500 MCG TABLET: Performed by: INTERNAL MEDICINE

## 2022-05-28 PROCEDURE — 63710000001 PREDNISONE PER 5 MG: Performed by: INTERNAL MEDICINE

## 2022-05-28 PROCEDURE — 97116 GAIT TRAINING THERAPY: CPT

## 2022-05-28 PROCEDURE — 63710000001: Performed by: INTERNAL MEDICINE

## 2022-05-28 PROCEDURE — 70450 CT HEAD/BRAIN W/O DYE: CPT

## 2022-05-28 PROCEDURE — 63710000001 CLONIDINE 0.1 MG TABLET: Performed by: INTERNAL MEDICINE

## 2022-05-28 PROCEDURE — 63710000001 MONTELUKAST 10 MG TABLET: Performed by: INTERNAL MEDICINE

## 2022-05-28 PROCEDURE — 83735 ASSAY OF MAGNESIUM: CPT | Performed by: INTERNAL MEDICINE

## 2022-05-28 PROCEDURE — 80069 RENAL FUNCTION PANEL: CPT | Performed by: INTERNAL MEDICINE

## 2022-05-28 PROCEDURE — 63710000001 MULTIVITAMIN WITH MINERALS TABLET: Performed by: INTERNAL MEDICINE

## 2022-05-28 PROCEDURE — 84550 ASSAY OF BLOOD/URIC ACID: CPT | Performed by: INTERNAL MEDICINE

## 2022-05-28 RX ORDER — PREDNISONE 10 MG/1
10 TABLET ORAL EVERY OTHER DAY
Status: DISCONTINUED | OUTPATIENT
Start: 2022-05-28 | End: 2022-05-29 | Stop reason: HOSPADM

## 2022-05-28 RX ORDER — DOXYCYCLINE 100 MG/1
100 CAPSULE ORAL EVERY 12 HOURS SCHEDULED
Status: DISCONTINUED | OUTPATIENT
Start: 2022-05-28 | End: 2022-05-29 | Stop reason: HOSPADM

## 2022-05-28 RX ADMIN — ROSUVASTATIN CALCIUM 5 MG: 5 TABLET, FILM COATED ORAL at 05:05

## 2022-05-28 RX ADMIN — CETIRIZINE HYDROCHLORIDE 10 MG: 10 TABLET ORAL at 10:59

## 2022-05-28 RX ADMIN — HYDROCODONE BITARTRATE AND ACETAMINOPHEN 1 TABLET: 10; 325 TABLET ORAL at 00:10

## 2022-05-28 RX ADMIN — EPLERENONE 50 MG: 25 TABLET, FILM COATED ORAL at 10:59

## 2022-05-28 RX ADMIN — IPRATROPIUM BROMIDE 0.5 MG: 0.5 SOLUTION RESPIRATORY (INHALATION) at 15:12

## 2022-05-28 RX ADMIN — EPLERENONE 50 MG: 25 TABLET, FILM COATED ORAL at 21:38

## 2022-05-28 RX ADMIN — ETHACRYNIC ACID 25 MG: 25 TABLET ORAL at 10:58

## 2022-05-28 RX ADMIN — MONTELUKAST SODIUM 10 MG: 10 TABLET, FILM COATED ORAL at 21:38

## 2022-05-28 RX ADMIN — HYDROCODONE BITARTRATE AND ACETAMINOPHEN 1 TABLET: 10; 325 TABLET ORAL at 14:38

## 2022-05-28 RX ADMIN — Medication 1000 MCG: at 10:59

## 2022-05-28 RX ADMIN — MULTIPLE VITAMINS W/ MINERALS TAB 1 TABLET: TAB at 10:59

## 2022-05-28 RX ADMIN — ETHACRYNIC ACID 25 MG: 25 TABLET ORAL at 18:10

## 2022-05-28 RX ADMIN — BUDESONIDE 0.5 MG: 0.5 INHALANT ORAL at 19:42

## 2022-05-28 RX ADMIN — Medication 1000 UNITS: at 10:59

## 2022-05-28 RX ADMIN — BUTALBITAL, ACETAMINOPHEN, AND CAFFEINE 2 TABLET: 50; 325; 40 TABLET ORAL at 05:05

## 2022-05-28 RX ADMIN — DOCUSATE SODIUM 50MG AND SENNOSIDES 8.6MG 2 TABLET: 8.6; 5 TABLET, FILM COATED ORAL at 21:37

## 2022-05-28 RX ADMIN — FLUTICASONE PROPIONATE 1 SPRAY: 50 SPRAY, METERED NASAL at 10:59

## 2022-05-28 RX ADMIN — CLONIDINE HYDROCHLORIDE 0.1 MG: 0.1 TABLET ORAL at 10:59

## 2022-05-28 RX ADMIN — ACETAMINOPHEN 650 MG: 325 TABLET ORAL at 18:15

## 2022-05-28 RX ADMIN — BUTALBITAL, ACETAMINOPHEN, AND CAFFEINE 2 TABLET: 50; 325; 40 TABLET ORAL at 23:48

## 2022-05-28 RX ADMIN — CLONIDINE HYDROCHLORIDE 0.1 MG: 0.1 TABLET ORAL at 21:38

## 2022-05-28 RX ADMIN — IPRATROPIUM BROMIDE 0.5 MG: 0.5 SOLUTION RESPIRATORY (INHALATION) at 07:21

## 2022-05-28 RX ADMIN — BUDESONIDE 0.5 MG: 0.5 INHALANT ORAL at 07:22

## 2022-05-28 RX ADMIN — FAMOTIDINE 40 MG: 20 TABLET ORAL at 11:02

## 2022-05-28 RX ADMIN — HYDROCODONE BITARTRATE AND ACETAMINOPHEN 1 TABLET: 10; 325 TABLET ORAL at 11:02

## 2022-05-28 RX ADMIN — Medication 3 ML: at 11:00

## 2022-05-28 RX ADMIN — DOXYCYCLINE 100 MG: 100 CAPSULE ORAL at 21:38

## 2022-05-28 RX ADMIN — RIVAROXABAN 10 MG: 10 TABLET, FILM COATED ORAL at 10:59

## 2022-05-28 RX ADMIN — FLUTICASONE PROPIONATE 1 SPRAY: 50 SPRAY, METERED NASAL at 21:38

## 2022-05-28 RX ADMIN — PREDNISONE 10 MG: 10 TABLET ORAL at 18:10

## 2022-05-28 RX ADMIN — HYDROCODONE BITARTRATE AND ACETAMINOPHEN 1 TABLET: 10; 325 TABLET ORAL at 21:37

## 2022-05-28 RX ADMIN — IPRATROPIUM BROMIDE 0.5 MG: 0.5 SOLUTION RESPIRATORY (INHALATION) at 11:11

## 2022-05-28 RX ADMIN — PANTOPRAZOLE SODIUM 40 MG: 40 TABLET, DELAYED RELEASE ORAL at 05:05

## 2022-05-28 RX ADMIN — IPRATROPIUM BROMIDE 0.5 MG: 0.5 SOLUTION RESPIRATORY (INHALATION) at 19:42

## 2022-05-29 ENCOUNTER — READMISSION MANAGEMENT (OUTPATIENT)
Dept: CALL CENTER | Facility: HOSPITAL | Age: 82
End: 2022-05-29

## 2022-05-29 VITALS
DIASTOLIC BLOOD PRESSURE: 88 MMHG | OXYGEN SATURATION: 94 % | RESPIRATION RATE: 16 BRPM | TEMPERATURE: 98 F | HEART RATE: 62 BPM | HEIGHT: 62 IN | BODY MASS INDEX: 30.25 KG/M2 | WEIGHT: 164.4 LBS | SYSTOLIC BLOOD PRESSURE: 122 MMHG

## 2022-05-29 LAB
ALBUMIN SERPL-MCNC: 3.3 G/DL (ref 3.5–5.2)
ANION GAP SERPL CALCULATED.3IONS-SCNC: 16 MMOL/L (ref 5–15)
BASOPHILS # BLD AUTO: 0.04 10*3/MM3 (ref 0–0.2)
BASOPHILS NFR BLD AUTO: 0.7 % (ref 0–1.5)
BUN SERPL-MCNC: 25 MG/DL (ref 8–23)
BUN/CREAT SERPL: 24.3 (ref 7–25)
CALCIUM SPEC-SCNC: 9.5 MG/DL (ref 8.6–10.5)
CHLORIDE SERPL-SCNC: 104 MMOL/L (ref 98–107)
CO2 SERPL-SCNC: 16 MMOL/L (ref 22–29)
CREAT SERPL-MCNC: 1.03 MG/DL (ref 0.57–1)
DEPRECATED RDW RBC AUTO: 43.1 FL (ref 37–54)
EGFRCR SERPLBLD CKD-EPI 2021: 53.7 ML/MIN/1.73
EOSINOPHIL # BLD AUTO: 0.03 10*3/MM3 (ref 0–0.4)
EOSINOPHIL NFR BLD AUTO: 0.5 % (ref 0.3–6.2)
ERYTHROCYTE [DISTWIDTH] IN BLOOD BY AUTOMATED COUNT: 12.8 % (ref 12.3–15.4)
GLUCOSE SERPL-MCNC: 128 MG/DL (ref 65–99)
HCT VFR BLD AUTO: 39.6 % (ref 34–46.6)
HGB BLD-MCNC: 13.2 G/DL (ref 12–15.9)
LYMPHOCYTES # BLD AUTO: 1.73 10*3/MM3 (ref 0.7–3.1)
LYMPHOCYTES NFR BLD AUTO: 28.7 % (ref 19.6–45.3)
MAGNESIUM SERPL-MCNC: 3.4 MG/DL (ref 1.6–2.4)
MCH RBC QN AUTO: 30.5 PG (ref 26.6–33)
MCHC RBC AUTO-ENTMCNC: 33.3 G/DL (ref 31.5–35.7)
MCV RBC AUTO: 91.5 FL (ref 79–97)
MONOCYTES # BLD AUTO: 0.44 10*3/MM3 (ref 0.1–0.9)
MONOCYTES NFR BLD AUTO: 7.3 % (ref 5–12)
NEUTROPHILS NFR BLD AUTO: 3.72 10*3/MM3 (ref 1.7–7)
NEUTROPHILS NFR BLD AUTO: 61.8 % (ref 42.7–76)
PHOSPHATE SERPL-MCNC: 3.1 MG/DL (ref 2.5–4.5)
PLATELET # BLD AUTO: 130 10*3/MM3 (ref 140–450)
PMV BLD AUTO: 11.5 FL (ref 6–12)
POTASSIUM SERPL-SCNC: 5 MMOL/L (ref 3.5–5.2)
RBC # BLD AUTO: 4.33 10*6/MM3 (ref 3.77–5.28)
SODIUM SERPL-SCNC: 136 MMOL/L (ref 136–145)
URATE SERPL-MCNC: 6.7 MG/DL (ref 2.4–5.7)
WBC NRBC COR # BLD: 6.02 10*3/MM3 (ref 3.4–10.8)

## 2022-05-29 PROCEDURE — 63710000001: Performed by: INTERNAL MEDICINE

## 2022-05-29 PROCEDURE — 63710000001 MULTIVITAMIN WITH MINERALS TABLET: Performed by: INTERNAL MEDICINE

## 2022-05-29 PROCEDURE — A9270 NON-COVERED ITEM OR SERVICE: HCPCS | Performed by: INTERNAL MEDICINE

## 2022-05-29 PROCEDURE — 63710000001 CETIRIZINE 10 MG TABLET: Performed by: INTERNAL MEDICINE

## 2022-05-29 PROCEDURE — 83735 ASSAY OF MAGNESIUM: CPT | Performed by: INTERNAL MEDICINE

## 2022-05-29 PROCEDURE — 63710000001 NIFEDIPINE XL 30 MG TABLET SUSTAINED-RELEASE 24 HOUR: Performed by: INTERNAL MEDICINE

## 2022-05-29 PROCEDURE — 63710000001 VITAMIN B-12 500 MCG TABLET: Performed by: INTERNAL MEDICINE

## 2022-05-29 PROCEDURE — 63710000001 CLONIDINE 0.1 MG TABLET: Performed by: INTERNAL MEDICINE

## 2022-05-29 PROCEDURE — 63710000001 FAMOTIDINE 20 MG TABLET: Performed by: INTERNAL MEDICINE

## 2022-05-29 PROCEDURE — 63710000001 HYDROCODONE-ACETAMINOPHEN 10-325 MG TABLET: Performed by: INTERNAL MEDICINE

## 2022-05-29 PROCEDURE — 80069 RENAL FUNCTION PANEL: CPT | Performed by: INTERNAL MEDICINE

## 2022-05-29 PROCEDURE — G0378 HOSPITAL OBSERVATION PER HR: HCPCS

## 2022-05-29 PROCEDURE — 63710000001 DOXYCYCLINE 100 MG CAPSULE: Performed by: INTERNAL MEDICINE

## 2022-05-29 PROCEDURE — 63710000001 SENNOSIDES-DOCUSATE 8.6-50 MG TABLET: Performed by: INTERNAL MEDICINE

## 2022-05-29 PROCEDURE — 63710000001 EPLERENONE 25 MG TABLET: Performed by: INTERNAL MEDICINE

## 2022-05-29 PROCEDURE — 94799 UNLISTED PULMONARY SVC/PX: CPT

## 2022-05-29 PROCEDURE — 85025 COMPLETE CBC W/AUTO DIFF WBC: CPT | Performed by: INTERNAL MEDICINE

## 2022-05-29 PROCEDURE — 63710000001 RIVAROXABAN 10 MG TABLET: Performed by: INTERNAL MEDICINE

## 2022-05-29 PROCEDURE — 84550 ASSAY OF BLOOD/URIC ACID: CPT | Performed by: INTERNAL MEDICINE

## 2022-05-29 PROCEDURE — 63710000001 ROSUVASTATIN 5 MG TABLET: Performed by: INTERNAL MEDICINE

## 2022-05-29 PROCEDURE — 63710000001 CHOLECALCIFEROL 25 MCG (1000 UT) TABLET: Performed by: INTERNAL MEDICINE

## 2022-05-29 PROCEDURE — 63710000001 PANTOPRAZOLE 40 MG TABLET DELAYED-RELEASE: Performed by: INTERNAL MEDICINE

## 2022-05-29 RX ORDER — NIFEDIPINE 30 MG/1
30 TABLET, FILM COATED, EXTENDED RELEASE ORAL
Qty: 90 TABLET | Refills: 1 | Status: SHIPPED | OUTPATIENT
Start: 2022-05-30

## 2022-05-29 RX ORDER — DOXYCYCLINE 100 MG/1
100 CAPSULE ORAL EVERY 12 HOURS SCHEDULED
Qty: 12 CAPSULE | Refills: 0
Start: 2022-05-29 | End: 2022-06-04

## 2022-05-29 RX ORDER — NIFEDIPINE 30 MG/1
30 TABLET, EXTENDED RELEASE ORAL
Status: DISCONTINUED | OUTPATIENT
Start: 2022-05-29 | End: 2022-05-29 | Stop reason: HOSPADM

## 2022-05-29 RX ORDER — ETHACRYNIC ACID 25 MG/1
25 TABLET ORAL 2 TIMES DAILY
Qty: 180 TABLET | Refills: 1 | Status: SHIPPED | OUTPATIENT
Start: 2022-05-29 | End: 2022-10-03 | Stop reason: HOSPADM

## 2022-05-29 RX ORDER — MULTIPLE VITAMINS W/ MINERALS TAB 9MG-400MCG
1 TAB ORAL DAILY
Start: 2022-05-30

## 2022-05-29 RX ORDER — ACETAMINOPHEN 325 MG/1
650 TABLET ORAL EVERY 4 HOURS PRN
Start: 2022-05-29

## 2022-05-29 RX ORDER — PREDNISONE 10 MG/1
10 TABLET ORAL EVERY OTHER DAY
Qty: 45 TABLET | Refills: 1 | Status: SHIPPED | OUTPATIENT
Start: 2022-05-30

## 2022-05-29 RX ORDER — CLONIDINE HYDROCHLORIDE 0.1 MG/1
0.1 TABLET ORAL EVERY 12 HOURS SCHEDULED
Qty: 180 TABLET | Refills: 1 | Status: SHIPPED | OUTPATIENT
Start: 2022-05-29 | End: 2022-10-03 | Stop reason: HOSPADM

## 2022-05-29 RX ADMIN — EPLERENONE 50 MG: 25 TABLET, FILM COATED ORAL at 08:37

## 2022-05-29 RX ADMIN — DOXYCYCLINE 100 MG: 100 CAPSULE ORAL at 08:37

## 2022-05-29 RX ADMIN — HYDROCODONE BITARTRATE AND ACETAMINOPHEN 1 TABLET: 10; 325 TABLET ORAL at 10:56

## 2022-05-29 RX ADMIN — IPRATROPIUM BROMIDE 0.5 MG: 0.5 SOLUTION RESPIRATORY (INHALATION) at 11:14

## 2022-05-29 RX ADMIN — RIVAROXABAN 10 MG: 10 TABLET, FILM COATED ORAL at 08:36

## 2022-05-29 RX ADMIN — MULTIPLE VITAMINS W/ MINERALS TAB 1 TABLET: TAB at 08:36

## 2022-05-29 RX ADMIN — BUDESONIDE 0.5 MG: 0.5 INHALANT ORAL at 07:19

## 2022-05-29 RX ADMIN — DOCUSATE SODIUM 50MG AND SENNOSIDES 8.6MG 2 TABLET: 8.6; 5 TABLET, FILM COATED ORAL at 08:35

## 2022-05-29 RX ADMIN — NIFEDIPINE 30 MG: 30 TABLET, FILM COATED, EXTENDED RELEASE ORAL at 15:02

## 2022-05-29 RX ADMIN — CLONIDINE HYDROCHLORIDE 0.1 MG: 0.1 TABLET ORAL at 08:36

## 2022-05-29 RX ADMIN — FAMOTIDINE 40 MG: 20 TABLET ORAL at 08:35

## 2022-05-29 RX ADMIN — Medication 1000 MCG: at 08:39

## 2022-05-29 RX ADMIN — Medication 3 ML: at 08:32

## 2022-05-29 RX ADMIN — IPRATROPIUM BROMIDE 0.5 MG: 0.5 SOLUTION RESPIRATORY (INHALATION) at 07:18

## 2022-05-29 RX ADMIN — FLUTICASONE PROPIONATE 1 SPRAY: 50 SPRAY, METERED NASAL at 08:41

## 2022-05-29 RX ADMIN — ROSUVASTATIN CALCIUM 5 MG: 5 TABLET, FILM COATED ORAL at 07:52

## 2022-05-29 RX ADMIN — Medication 1000 UNITS: at 08:39

## 2022-05-29 RX ADMIN — HYDROCODONE BITARTRATE AND ACETAMINOPHEN 1 TABLET: 10; 325 TABLET ORAL at 07:52

## 2022-05-29 RX ADMIN — HYDROCODONE BITARTRATE AND ACETAMINOPHEN 1 TABLET: 10; 325 TABLET ORAL at 16:41

## 2022-05-29 RX ADMIN — CETIRIZINE HYDROCHLORIDE 10 MG: 10 TABLET ORAL at 08:37

## 2022-05-29 RX ADMIN — IPRATROPIUM BROMIDE 0.5 MG: 0.5 SOLUTION RESPIRATORY (INHALATION) at 14:51

## 2022-05-29 RX ADMIN — PANTOPRAZOLE SODIUM 40 MG: 40 TABLET, DELAYED RELEASE ORAL at 07:52

## 2022-05-29 RX ADMIN — ETHACRYNIC ACID 25 MG: 25 TABLET ORAL at 08:40

## 2022-05-30 ENCOUNTER — HOME HEALTH ADMISSION (OUTPATIENT)
Dept: HOME HEALTH SERVICES | Facility: HOME HEALTHCARE | Age: 82
End: 2022-05-30

## 2022-05-30 NOTE — OUTREACH NOTE
Prep Survey    Flowsheet Row Responses   [facility] patient discharged from? [facility]   Is LACE score < 7 ? No   Emergency Room discharge w/ pulse ox? No   Eligibility Readm Mgmt   Discharge diagnosis  Syncopal episodes with multiple falls, orthostatic hypotension, Adult failure to thrive with significant weight loss    Does the patient have one of the following disease processes/diagnoses(primary or secondary)? Other   Does the patient have Home health ordered? Yes   What is the Home health agency?  [facility]   Is there a DME ordered? No   Prep survey completed? Yes          MG [place] - Registered Nurse

## 2022-05-31 ENCOUNTER — HOME CARE VISIT (OUTPATIENT)
Dept: HOME HEALTH SERVICES | Facility: HOME HEALTHCARE | Age: 82
End: 2022-05-31

## 2022-05-31 NOTE — CASE COMMUNICATION
Patient missed a HHC SOC  visit from [facility] on 5/31/22.     Reason: PT REQUEST HHC COME SEE HER NEXT WEEK AS SHE IS NOT FEELING WELL DUE TO MULTIPLE MEDICATION CHANGES. SHE SAID TO TELL DR GALVAN SHE IS NOT REFUSING, JUST WANTS TO WAIT TIL SHE FEELS A LITTLE BETTER. SN COULD NOT CONVIENCE HER OFBENFIT OF  HHC SOONER.          For your records only.   As per home health protocol, MD must be notified of missed/cancelled visits;  therefore the prescribed frequency was not met.

## 2022-06-02 ENCOUNTER — READMISSION MANAGEMENT (OUTPATIENT)
Dept: CALL CENTER | Facility: HOSPITAL | Age: 82
End: 2022-06-02

## 2022-06-02 NOTE — OUTREACH NOTE
Medical Week 1 Survey    Flowsheet Row Responses   [facility] patient discharged from? [facility]   Does the patient have one of the following disease processes/diagnoses(primary or secondary)? Other   Week 1 attempt successful? Yes   Call start time 0925   Call end time 1017   Discharge diagnosis  Syncopal episodes with multiple falls, orthostatic hypotension, Adult failure to thrive with significant weight loss    Meds reviewed with patient/caregiver? Yes   Is the patient having any side effects they believe may be caused by any medication additions or changes? No   Does the patient have all medications ordered at discharge? Yes   Is the patient taking all medications as directed (includes completed medication regime)? Yes   Medication comments She was not happy she had to some of the changes with her meds, but has them straightened out now.   Does the patient have a primary care provider?  Yes   Does the patient have an appointment with their PCP within 7 days of discharge? Greater than 7 days   Comments regarding PCP Dr Cadet   Has the patient kept scheduled appointments due by today? N/A   What is the Home health agency?  [facility]   Has home health visited the patient within 72 hours of discharge? Yes   Psychosocial issues? No   Did the patient receive a copy of their discharge instructions? Yes   Nursing interventions Reviewed instructions with patient   What is the patient's perception of their health status since discharge? Improving   Is the patient/caregiver able to teach back signs and symptoms related to disease process for when to call PCP? Yes   Is the patient/caregiver able to teach back signs and symptoms related to disease process for when to call 911? Yes   Is the patient/caregiver able to teach back the hierarchy of who to call/visit for symptoms/problems? PCP, Specialist, Home health nurse, Urgent Care, ED, 911 Yes   Week 1 call completed? Yes   Is the patient interested in additional calls from  an ambulatory ?  NOTE:  applies to high risk patients requiring additional follow-up. No   Revoked No further contact(revokes)-requires comment  [Reports she can care for hersef. She has palliative care, and good family support. ]          BUCK VALENCIA - Registered Nurse

## 2022-06-06 ENCOUNTER — HOME CARE VISIT (OUTPATIENT)
Dept: HOME HEALTH SERVICES | Facility: HOME HEALTHCARE | Age: 82
End: 2022-06-06

## 2022-06-06 VITALS
SYSTOLIC BLOOD PRESSURE: 156 MMHG | BODY MASS INDEX: 20.99 KG/M2 | RESPIRATION RATE: 18 BRPM | DIASTOLIC BLOOD PRESSURE: 80 MMHG | HEART RATE: 74 BPM | TEMPERATURE: 99 F | OXYGEN SATURATION: 98 % | WEIGHT: 155 LBS | HEIGHT: 72 IN

## 2022-06-06 VITALS — SYSTOLIC BLOOD PRESSURE: 156 MMHG | TEMPERATURE: 99 F | DIASTOLIC BLOOD PRESSURE: 80 MMHG | HEART RATE: 74 BPM

## 2022-06-06 PROCEDURE — G0151 HHCP-SERV OF PT,EA 15 MIN: HCPCS

## 2022-06-06 PROCEDURE — G0299 HHS/HOSPICE OF RN EA 15 MIN: HCPCS

## 2022-06-06 NOTE — HOME HEALTH
Reason for referral Patient has decreased strength, activity tolerance, difficulty with transfers, abnormal gait due to recent hospitalization     Diagnosis Patient admitted to [facility] 5/24 to 5/29/22 with syncopy, weakness, dizziness, falls, post COVID, weight loss, ataxia    surgical procedure na     PMHx CKD,urinary retention, DDD, bronchitis, left breast CA, UTI, CVA, COPD, HTN,OA     Prior level of function  Patient has slowly had a decline in function with difficulty with transfers, requiring rolling walker to ambulate. Patient independent with ADLs.    Home social environment Patient resides with spouse with 2-3 steps to enter home.     Next MD appointment 6/30/22

## 2022-06-07 NOTE — HOME HEALTH
FOCUS OF CARE: ORHTOSTATIC HYPOTENSION    hx adult failure to thrive, spinal stenosis, has implanted cardiac monior device mid sternum

## 2022-06-08 ENCOUNTER — HOME CARE VISIT (OUTPATIENT)
Dept: HOME HEALTH SERVICES | Facility: HOME HEALTHCARE | Age: 82
End: 2022-06-08

## 2022-06-08 VITALS
HEART RATE: 77 BPM | OXYGEN SATURATION: 98 % | DIASTOLIC BLOOD PRESSURE: 76 MMHG | SYSTOLIC BLOOD PRESSURE: 136 MMHG | RESPIRATION RATE: 18 BRPM | TEMPERATURE: 98 F

## 2022-06-08 PROCEDURE — G0152 HHCP-SERV OF OT,EA 15 MIN: HCPCS

## 2022-06-08 NOTE — HOME HEALTH
Subjective:  Pt. states that she is going to have the loop recorder removed - states that she doesn't want it and didn't ask to have it.  Pt. reports that she is weak and her shoulder hurts.     No report of falls or medication changes.    Reason for referral:  OT home health referral given d/t hospital stay resulting in decreased strength, activity tolerance, difficulty with transfers, abnormal gait, impaired mobility, balance and ADL and IADL performance.    Diagnosis Patient admitted to [facility] 5/24 to 5/29/22 with syncopy, weakness, dizziness, falls, post COVID, weight loss, ataxia     surgical procedure:  Loop recorder implantation    PMH: CKD,urinary retention, DDD, bronchitis, left breast CA, UTI, CVA, COPD, HTN,OA     Prior level of function:  Patient was independent with ADL and simple IADL tasks, she has had a slow decline in overall functional status.     Home social environment: Patient lives with spouse in ranch style home with 2-3 steps to enter home.  She has new walk in shower with built in shower seat, hand held shower, grab bars, comfort height toilet.    Plan for next visit:  OT to address POC - ADL tasks, safety in home, functional transfers and mobility r/t ADL and simple kitchen tasks, further address UE ROM exercises - establish HEP.      MEDICAL NECESSITY FOR ONGOING SKILLED THERAPY: Patient requires skilled occupational therapy for remediation of deficits to improve activities of daily living, home safety, falls prevention, monitor vital signs, including pulse oximetry, hand/ upper extremity function/range of motion/strength, therapeutic exercise, safety in home, and improve endurance and fatigue management with self care, and functional mobility with durable medical equipment as necessary.

## 2022-06-08 NOTE — CASE COMMUNICATION
HUD SOC – HUDDLE START OF CARE    Caregiver Status: lives w/   Availability: 24/7  Ability to meet patient's needs:yes  Willingness:yes    Risk for Hospitalization: mod    Patient's goal(s): stay home    Services required to achieve goals: sn, pt.ot    Potential Issues for goal attainment: HX ADULT FAILURE TO THRIVE    Discipline Assessment Updates:     Problems identified: HAS NEW IMPLANTED HEART MONITOR    Disease processes:  A cuity and severity:  Comorbidities:  Level of independence with management:     Knowledge deficits:  Current conditions:  Medications:  Care procedures:    Functional status and safety:    Mobility: WALKER    Self-care:  NEEDS ASSIST    ADLs:     Any Duplication of Services:    Environmental issues:  physical environment:  set up/compatibility with patients needs  accessibility and safety    Equipment/Adjunct Services:  Devices for care  present in the home:  Devices needed and not present:     Community resources involved/needed:  Dialysis:  Transportation:   Meals on Wheels:    Any additional needs:    Based upon record review and collaboration conference, the recommended frequency for this patient is:     SN----- admitting discipline    PT-----eval and treat    OT----eval and treat    ST-----    HHA---    MSW---1x         Please note that above is a recommendation o nly and that the plan of care should reflect the patient's clinical needs and goals. If in agreement, please complete note. If a different frequency is necessary, please explain in note box and proceed per patients clinical needs.

## 2022-06-09 ENCOUNTER — HOME CARE VISIT (OUTPATIENT)
Dept: HOME HEALTH SERVICES | Facility: HOME HEALTHCARE | Age: 82
End: 2022-06-09

## 2022-06-09 PROCEDURE — G0493 RN CARE EA 15 MIN HH/HOSPICE: HCPCS

## 2022-06-11 ENCOUNTER — HOME CARE VISIT (OUTPATIENT)
Dept: HOME HEALTH SERVICES | Facility: HOME HEALTHCARE | Age: 82
End: 2022-06-11

## 2022-06-11 NOTE — CASE COMMUNICATION
Patient missed a Physical Therapy visit from [facility] on 6-11-22    Reason: Patient declined a saturday visit      For your records only.   As per home health protocol, MD must be notified of missed/cancelled visits; therefore the prescribed frequency was not met.   Lilliana Toure [facility]

## 2022-06-12 VITALS
HEART RATE: 84 BPM | OXYGEN SATURATION: 97 % | TEMPERATURE: 98.9 F | SYSTOLIC BLOOD PRESSURE: 160 MMHG | DIASTOLIC BLOOD PRESSURE: 82 MMHG | RESPIRATION RATE: 18 BRPM

## 2022-06-13 ENCOUNTER — HOME CARE VISIT (OUTPATIENT)
Dept: HOME HEALTH SERVICES | Facility: HOME HEALTHCARE | Age: 82
End: 2022-06-13

## 2022-06-13 PROCEDURE — G0157 HHC PT ASSISTANT EA 15: HCPCS

## 2022-06-13 PROCEDURE — G0300 HHS/HOSPICE OF LPN EA 15 MIN: HCPCS

## 2022-06-13 NOTE — HOME HEALTH
Subjective: The nurse just left    Edema- mod/max in bilateral ankles  Falls- none  Medication changes- none  Wound- has something called Cardiomessenger implanted in sternum   appt 6-15-22 at 11;30    Assessment: Patient very talkative and has to be re-directed to stay on tasks. She has weakness in bilateral LE's and limited on ability with HEP. Patient is manuevering in home with no AD and uses furniture as needed for stability and uses AD out of home or has CGA from spouse/family.She was able to review her seated HEP and transfers with cues.    Plan for next visit/Communication  gait training  review and progress HEP  balance  transfers

## 2022-06-15 ENCOUNTER — HOME CARE VISIT (OUTPATIENT)
Dept: HOME HEALTH SERVICES | Facility: HOME HEALTHCARE | Age: 82
End: 2022-06-15

## 2022-06-15 VITALS
TEMPERATURE: 97.7 F | HEART RATE: 94 BPM | OXYGEN SATURATION: 99 % | RESPIRATION RATE: 18 BRPM | DIASTOLIC BLOOD PRESSURE: 78 MMHG | SYSTOLIC BLOOD PRESSURE: 134 MMHG

## 2022-06-16 ENCOUNTER — TELEPHONE (OUTPATIENT)
Dept: CARDIOLOGY | Facility: CLINIC | Age: 82
End: 2022-06-16

## 2022-06-16 ENCOUNTER — HOME CARE VISIT (OUTPATIENT)
Dept: HOME HEALTH SERVICES | Facility: HOME HEALTHCARE | Age: 82
End: 2022-06-16

## 2022-06-16 VITALS
TEMPERATURE: 97.2 F | DIASTOLIC BLOOD PRESSURE: 86 MMHG | RESPIRATION RATE: 18 BRPM | SYSTOLIC BLOOD PRESSURE: 160 MMHG | OXYGEN SATURATION: 99 % | HEART RATE: 72 BPM

## 2022-06-16 VITALS — HEART RATE: 78 BPM | OXYGEN SATURATION: 98 %

## 2022-06-16 PROCEDURE — G0300 HHS/HOSPICE OF LPN EA 15 MIN: HCPCS

## 2022-06-16 PROCEDURE — G0152 HHCP-SERV OF OT,EA 15 MIN: HCPCS

## 2022-06-16 PROCEDURE — G0157 HHC PT ASSISTANT EA 15: HCPCS

## 2022-06-16 NOTE — HOME HEALTH
Subjective: I have a migraine. The doctor said my numbers are going good yesterday    Edema- mod/max in bilateral ankles, more in right than left  Falls- none   Medication changes- addressed by nursing - update to nursing at end of session  Wound- has something called Cardiomessenger implanted in sternum   Dr. sullivan 6-20-22 at 11:30 am    Assessment: Patient very talkative and has to be re-directed to stay on tasks. She is fixated on her implant and all that she wants to talk about and plans to discuss with doctor on monday. She is determined she did not approve the placement in her sternum. Patient called doctor to follow up with her new medication and pain pills and they are going to figure it out and call her back    Plan for next visit/Communication   gait training   review and progress HEP   balance

## 2022-06-16 NOTE — HOME HEALTH
Subjective:  Pt. continues to be upset with having the loop recorder implant - wants it removed.  She was fixated on this throughout the visit and OT had difficulty gaining her focus for OT POC.   Pt. does have f/u appt. with cardiologist on 6/20/22 to discuss.      No falls or medication changes reported.    Plan for next visit:  OT to further address OT POC with focus on BUE ther. exercise, standing tolerance to assist with ADL and simple IADL tasks.  OT discussed plans for possible discharge next week.    MEDICAL NECESSITY FOR ONGOING SKILLED THERAPY: Patient requires skilled occupational therapy for remediation of deficits to improve activities of daily living, home safety, falls prevention, monitor vital signs, including pulse oximetry, hand/ upper extremity function/range of motion/strength, therapeutic exercise, safety in home, and improve endurance and fatigue management with self care, and functional mobility with durable medical equipment as necessary.

## 2022-06-16 NOTE — TELEPHONE ENCOUNTER
----- Message from Usman Zaragoza sent at 6/9/2022 11:38 AM EDT -----  Regarding: loop removed  Pt wants her loop removed. States that she was not informed of the insertion and that after reading through some information about it, she doesn't want it.    Please advise at your earliest convenience ([email or web address] Whiteside)  [phone]

## 2022-06-20 ENCOUNTER — HOME CARE VISIT (OUTPATIENT)
Dept: HOME HEALTH SERVICES | Facility: HOME HEALTHCARE | Age: 82
End: 2022-06-20

## 2022-06-20 ENCOUNTER — OFFICE VISIT (OUTPATIENT)
Dept: CARDIOLOGY | Facility: CLINIC | Age: 82
End: 2022-06-20

## 2022-06-20 VITALS
HEART RATE: 68 BPM | SYSTOLIC BLOOD PRESSURE: 176 MMHG | DIASTOLIC BLOOD PRESSURE: 69 MMHG | WEIGHT: 165 LBS | BODY MASS INDEX: 30.36 KG/M2 | HEIGHT: 62 IN

## 2022-06-20 VITALS
DIASTOLIC BLOOD PRESSURE: 74 MMHG | TEMPERATURE: 97.6 F | SYSTOLIC BLOOD PRESSURE: 132 MMHG | OXYGEN SATURATION: 98 % | HEART RATE: 73 BPM

## 2022-06-20 VITALS
HEART RATE: 74 BPM | TEMPERATURE: 97.1 F | SYSTOLIC BLOOD PRESSURE: 116 MMHG | DIASTOLIC BLOOD PRESSURE: 68 MMHG | RESPIRATION RATE: 18 BRPM | OXYGEN SATURATION: 97 %

## 2022-06-20 DIAGNOSIS — Z98.890 HISTORY OF LOOP RECORDER: ICD-10-CM

## 2022-06-20 DIAGNOSIS — R00.2 PALPITATIONS: Primary | ICD-10-CM

## 2022-06-20 DIAGNOSIS — R55 SYNCOPE, UNSPECIFIED SYNCOPE TYPE: ICD-10-CM

## 2022-06-20 PROCEDURE — G0157 HHC PT ASSISTANT EA 15: HCPCS

## 2022-06-20 PROCEDURE — 99213 OFFICE O/P EST LOW 20 MIN: CPT | Performed by: INTERNAL MEDICINE

## 2022-06-20 NOTE — HOME HEALTH
Subjective: I went to the doctor, He was not happy with me, I told him I wanted this loop out. They are going to set something up to take it out and let me know.    Edema- mod/max in bilateral ankles, more in right than left   Falls- none   Medication changes- none  Wound- has something called Cardiomessenger implanted in sternum     Assessment: Patient went to Cardiologist today to discuss the placement of the loop in her sternum and why he put it in and that she wants it taken out.She continues to state she did not approve the placement and is adamant it needs to come out.  They are going to call her when they can schedule it to be removed. She is fixated on it and keeps talking about it and has to be redirected to stay on tasks. She continues to not use the AD in the home and uses furniture and walls for support. She was able to complete standing HEP and balance today with side stepping with hand support.    Plan for next visit/Communication   gait training   review and progress HEP   balance

## 2022-06-20 NOTE — PROGRESS NOTES
HAD LOOP IMPLANTED ON 5/27, WANTS IT REMOVED   Subjective:        Sasha Barrett is a 82 y.o. female who here for follow up    CC  WANTS LOOP REMOVAL   HPI  82-year-old female with palpitation syncope at the loop recorder placed unfortunately patient wants that loop recorder to be out     Problems Addressed this Visit        Other    Palpitations - Primary    Relevant Orders    Case Request Cath Lab: Loop recorder removal (Completed)    COVID PRE-OP / PRE-PROCEDURE SCREENING ORDER (NO ISOLATION) - Swab, Nasopharynx    Syncope    History of loop recorder      Diagnoses       Codes Comments    Palpitations    -  Primary ICD-10-CM: R00.2  ICD-9-CM: 785.1     Syncope, unspecified syncope type     ICD-10-CM: R55  ICD-9-CM: 780.2     History of loop recorder     ICD-10-CM: Z98.890  ICD-9-CM: V15.29         .    The following portions of the patient's history were reviewed and updated as appropriate: allergies, current medications, past family history, past medical history, past social history, past surgical history and problem list.    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 She has never used smokeless tobacco. She reports current alcohol use. She reports that she does not use drugs.   Family History   Problem Relation Age of Onset   • Brain cancer Brother    • Alcohol abuse Mother    •  "No Known Problems Father    • No Known Problems Sister    • Malig Hyperthermia Neg Hx        Review of Systems  Constitutional: No wt loss, fever, fatigue  Gastrointestinal: No nausea, abdominal pain  Behavioral/Psych: No insomnia or anxiety   Cardiovascular palpitation  Objective:       Physical Exam  /69   Pulse 68   Ht 157.5 cm (62\")   Wt 74.8 kg (165 lb)   BMI 30.18 kg/m²   General appearance: No acute changes   Neck: Trachea midline; NECK, supple, no thyromegaly or lymphadenopathy   Lungs: Normal size and shape, normal breath sounds, equal distribution of air, no rales and rhonchi   CV: S1-S2 regular, no murmurs, no rub, no gallop   Abdomen: Soft, nontender; no masses , no abnormal abdominal sounds   Extremities: No deformity , normal color , no peripheral edema   Skin: Normal temperature, turgor and texture; no rash, ulcers          Procedures      Echocardiogram:        Current Outpatient Medications:   •  acetaminophen (TYLENOL) 325 MG tablet, Take 2 tablets by mouth Every 4 (Four) Hours As Needed for Mild Pain ., Disp: , Rfl:   •  albuterol (PROAIR HFA) 108 (90 Base) MCG/ACT inhaler, ProAir  (90 Base) MCG/ACT Inhalation Aerosol Solution; Patient Sig: ProAir  (90 Base) MCG/ACT Inhalation Aerosol Solution ; 8; 0; 18-Jun-2014; Active, Disp: , Rfl:   •  albuterol sulfate  (90 Base) MCG/ACT inhaler, Inhale 2 puffs Every 4 (Four) Hours As Needed for Wheezing., Disp: , Rfl:   •  budesonide (PULMICORT) 180 MCG/ACT inhaler, Inhale 1 puff 2 (Two) Times a Day., Disp: , Rfl:   •  butalbital-acetaminophen-caffeine (FIORICET, ESGIC) -40 MG per tablet, Take 2 tablets by mouth Every 4 (Four) Hours As Needed for Headache., Disp: 240 tablet, Rfl: 0  •  cetirizine (zyrTEC) 10 MG tablet, Take 10 mg by mouth Daily. Alt with singulair, Disp: , Rfl:   •  Cholecalciferol (VITAMIN D3) 1.25 MG (90848 UT) capsule, Take 50,000 Units by mouth 1 (One) Time Per Week., Disp: , Rfl:   •  " ciprofloxacin-dexamethasone (CIPRODEX) 0.3-0.1 % otic suspension, Administer 4 drops into both ears 2 (Two) Times a Day., Disp: , Rfl:   •  cloNIDine (CATAPRES) 0.1 MG tablet, Take 1 tablet by mouth Every 12 (Twelve) Hours. (May use 1/2 of her 0.3 tabs until new RX is available), Disp: 180 tablet, Rfl: 1  •  diphenhydrAMINE (BENADRYL) 50 MG tablet, Take 1 tablet by mouth every 4 (four) hours as needed for itching or allergies., Disp: 180 tablet, Rfl: 0  •  EPINEPHrine (EPIPEN) 0.3 MG/0.3ML solution auto-injector injection, INJECT THE CONTENTS OF ONE PEN AS NEEDED. MAY REPEAT ONE TIME., Disp: , Rfl:   •  eplerenone (INSPRA) 50 MG tablet, Take 1 tablet by mouth Every 12 (Twelve) Hours., Disp: 180 tablet, Rfl: 3  •  ethacrynic acid (EDECRIN) 25 MG tablet, Take 1 tablet by mouth 2 (Two) Times a Day., Disp: 180 tablet, Rfl: 1  •  fluticasone (FLONASE) 50 MCG/ACT nasal spray, 1 spray by Each Nare route 2 (Two) Times a Day., Disp: , Rfl:   •  HYDROcodone-acetaminophen (NORCO)  MG per tablet, Take 1-2 tablets by mouth. PAIN, Disp: , Rfl:   •  hydrOXYzine (ATARAX) 50 MG tablet, TAKE ONE TABLET BY MOUTH EVERY 6 HOURS AS NEEDED FOR SEVERE ITCHING AND ALLERGIES, Disp: , Rfl:   •  ipratropium (ATROVENT) 0.02 % nebulizer solution, Take 500 mcg by nebulization 4 (Four) Times a Day., Disp: , Rfl:   •  KP KETOTIFEN FUMARATE OP, Apply 2 drops to eye(s) as directed by provider As Needed., Disp: , Rfl:   •  montelukast (SINGULAIR) 10 MG tablet, Take 1 tablet by mouth every night. (Patient taking differently: Take 10 mg by mouth Every Night. Alt with zyrtec), Disp: 90 tablet, Rfl: 1  •  multivitamin with minerals tablet tablet, Take 1 tablet by mouth Daily., Disp: , Rfl:   •  NIFEdipine XL (ADALAT CC) 30 MG 24 hr tablet, Take 1 tablet by mouth Daily., Disp: 90 tablet, Rfl: 1  •  omeprazole (priLOSEC) 40 MG capsule, Take 40 mg by mouth Daily., Disp: , Rfl:   •  ondansetron ODT (ZOFRAN-ODT) 8 MG disintegrating tablet, Take 1 tablet  by mouth every 6 (six) hours as needed for nausea or vomiting., Disp: 360 tablet, Rfl: 1  •  polyethylene glycol (MIRALAX) 17 g packet, Take 17 g by mouth Daily As Needed. HARD STOOL, Disp: , Rfl:   •  potassium chloride (K-DUR) 10 MEQ CR tablet, Take 10 mEq by mouth Daily., Disp: , Rfl:   •  predniSONE (DELTASONE) 10 MG tablet, Take 1 tablet by mouth Every Other Day., Disp: 45 tablet, Rfl: 1  •  promethazine-codeine (PHENERGAN with CODEINE) 6.25-10 MG/5ML syrup, Take 5 mL by mouth 4 (Four) Times a Day As Needed for cough. (Patient taking differently: Take 15 mL by mouth 4 (Four) Times a Day As Needed for Cough.), Disp: 360 mL, Rfl: 0  •  rivaroxaban (XARELTO) 10 MG tablet, Take 10 mg by mouth Daily., Disp: , Rfl:   •  rosuvastatin (CRESTOR) 5 MG tablet, Take 5 mg by mouth Every Morning., Disp: , Rfl:   •  vitamin B-12 (VITAMIN B-12) 1000 MCG tablet, Take 1 tablet by mouth Daily., Disp: 90 tablet, Rfl: 3   Assessment:        Patient Active Problem List   Diagnosis   • Joint pain   • Urinary retention self-caths (Don)   • Conjunctivitis   • Arm pain   • Cervical disc displacement   • Cervical pain (Servando=PM)   • DDD (degenerative disc disease), cervical   • Hyperlipidemia LDL goal <70   • Preventative health care   • Medication management   • Proctocele   • Urge incontinence of urine   • Chronic tension-type headache, intractable   • Obesity (BMI 30.0-34.9)   • H/o Lyme disease   • Stage 3 chronic kidney disease (HCC)   • Cerebral atherosclerosis   • Allergic contact dermatitis   • Malaise and fatigue progressive   • Dizziness   • Vitamin D deficiency   • Moderate persistent asthmatic bronchitis without complication   • Anterior cervical adenopathy due to infection   • Pleurisy   • Knee pain, bilateral   • Elevated PTH level   • 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)   • Gastroesophageal reflux disease with esophagitis   • Psoriasis (Darryl Ochoa)   • Postmenopausal   • Recurrent UTI   •  H/O CVA (cerebral vascular accident) ([facility])   • Dyspnea on exertion   • Precordial pain   • Abnormal EKG   • Arthralgia   • Accelerated hypertension   • Acute joint pain   • Easy bruisability   • Cough productive of purulent sputum   • Influenza A Subtype H3   • 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[facility])   • Acute chest wall pain   • Decreased functional mobility and endurance since fall 12/20/17   • Essential hypertension   • 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   • Migraine without aura and without status migrainosus, not intractable   • Thyroid nodule   • Osteoarthritis of left hip   • Spinal stenosis of lumbar region with radiculopathy   • Acute deep vein thrombosis (DVT) of right lower extremity ([facility])   • Acute deep vein thrombosis ([facility])   • Single subsegmental pulmonary embolism without acute cor pulmonale ([facility])   • Other chronic pain   • Palpitations   • Secondary hyperparathyroidism ([facility])   • Syncope   • Ataxia   • Adult failure to thrive syndrome   • Recent unexplained weight loss 42# over last 6 months   • Blood pressure instability   • 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 Palpitations  R00.2 785.1   2. Syncope, unspecified syncope type  R55 780.2   3. History of loop recorder  Z98.890 V15.29     1. Palpitations    - Case Request Cath Lab: Loop recorder removal  - COVID PRE-OP / PRE-PROCEDURE SCREENING ORDER (NO ISOLATION) - Swab, Nasopharynx; Future    2. Syncope, unspecified syncope type  No more syncope    3. History of loop recorder  Patient wants a loop recorder to be ordered we will arrange     LOOP RECORDER REMOVED  COUNSELING:    Sasha Gilliam was given to patient for the following topics: diagnostic results, risk factor reductions, impressions, risks and benefits of treatment options and importance of treatment  compliance .       SMOKING COUNSELING:    [unfilled]    Dictated using Dragon dictation

## 2022-06-21 ENCOUNTER — HOME CARE VISIT (OUTPATIENT)
Dept: HOME HEALTH SERVICES | Facility: HOME HEALTHCARE | Age: 82
End: 2022-06-21

## 2022-06-21 PROCEDURE — G0152 HHCP-SERV OF OT,EA 15 MIN: HCPCS

## 2022-06-22 VITALS
OXYGEN SATURATION: 99 % | RESPIRATION RATE: 18 BRPM | DIASTOLIC BLOOD PRESSURE: 72 MMHG | SYSTOLIC BLOOD PRESSURE: 110 MMHG | HEART RATE: 76 BPM | TEMPERATURE: 96.9 F

## 2022-06-22 NOTE — HOME HEALTH
Subjective:  Pt. states that her right shoulder is bothering her - she is sleeping in the bed vs. sofa now and believes she slept on that arm last night.  She also reports that she saw the cardiac surgeon yesterday and they will be calling her this week to schedule removal of loop recorder.  Pt. remains very fixated and upset with having had the procedure in the hospital.    No falls or medication changes reported.    Patient is completing functional transfers, mobility with or without walker - Pt. ambulating without AD this date with no LOB, but OT continues to recommend pt. utilize RW to prevent falls.  Pt. states that she does utilize it at times and if she is feeling more weak.  Pt. is completing self care tasks, simple meals, and assisting with laundry tasks with indep.  Pt's. spouse assist with home management tasks and pt. has good family support.  OT reviewed BUE HEP and pt. is able to complete with indep.  OT services discharged this date with patient in agreement.      PT and Nursing to continue.      DIscharge plan to self with good family support.

## 2022-06-23 ENCOUNTER — APPOINTMENT (OUTPATIENT)
Dept: BONE DENSITY | Facility: HOSPITAL | Age: 82
End: 2022-06-23

## 2022-06-23 ENCOUNTER — HOME CARE VISIT (OUTPATIENT)
Dept: HOME HEALTH SERVICES | Facility: HOME HEALTHCARE | Age: 82
End: 2022-06-23

## 2022-06-23 VITALS
RESPIRATION RATE: 16 BRPM | HEART RATE: 64 BPM | OXYGEN SATURATION: 99 % | TEMPERATURE: 97.8 F | DIASTOLIC BLOOD PRESSURE: 60 MMHG | SYSTOLIC BLOOD PRESSURE: 110 MMHG

## 2022-06-23 PROCEDURE — G0151 HHCP-SERV OF PT,EA 15 MIN: HCPCS

## 2022-06-23 PROCEDURE — G0495 RN CARE TRAIN/EDU IN HH: HCPCS

## 2022-06-23 NOTE — HOME HEALTH
PT discharge visit, patient has met all her goals for therapy.  Nursing to continue to treat patient.

## 2022-06-23 NOTE — HOME HEALTH
FOCUS OF CARE: ORHTOSTATIC HYPOTENSION    pt has implanted cardiac loop recorder - plan to have removed 7/13/22    hx adult failure to thrive, spinal stenosis, has implanted cardiac monior device mid sternum

## 2022-06-25 VITALS
DIASTOLIC BLOOD PRESSURE: 60 MMHG | RESPIRATION RATE: 16 BRPM | TEMPERATURE: 97.8 F | SYSTOLIC BLOOD PRESSURE: 110 MMHG | OXYGEN SATURATION: 99 % | HEART RATE: 56 BPM

## 2022-06-29 PROBLEM — Z98.890 HISTORY OF LOOP RECORDER: Status: ACTIVE | Noted: 2022-06-29

## 2022-06-30 ENCOUNTER — HOME CARE VISIT (OUTPATIENT)
Dept: HOME HEALTH SERVICES | Facility: HOME HEALTHCARE | Age: 82
End: 2022-06-30

## 2022-06-30 PROCEDURE — G0300 HHS/HOSPICE OF LPN EA 15 MIN: HCPCS

## 2022-07-05 VITALS
TEMPERATURE: 97.1 F | OXYGEN SATURATION: 94 % | HEART RATE: 72 BPM | SYSTOLIC BLOOD PRESSURE: 120 MMHG | RESPIRATION RATE: 18 BRPM | DIASTOLIC BLOOD PRESSURE: 70 MMHG

## 2022-07-05 NOTE — HOME HEALTH
CP assessment, medication review, pain management,     next visit CP assessment, pt has a unpcoming appt with the doctor, continue teaching on meds

## 2022-07-06 ENCOUNTER — HOME CARE VISIT (OUTPATIENT)
Dept: HOME HEALTH SERVICES | Facility: HOME HEALTHCARE | Age: 82
End: 2022-07-06

## 2022-07-06 VITALS
DIASTOLIC BLOOD PRESSURE: 80 MMHG | HEART RATE: 55 BPM | WEIGHT: 162 LBS | RESPIRATION RATE: 16 BRPM | OXYGEN SATURATION: 98 % | TEMPERATURE: 98.6 F | BODY MASS INDEX: 29.63 KG/M2 | SYSTOLIC BLOOD PRESSURE: 130 MMHG

## 2022-07-06 PROCEDURE — G0162 HHC RN E&M PLAN SVS, 15 MIN: HCPCS

## 2022-07-06 NOTE — HOME HEALTH
FOCUS OF CARE- F/U CHANGE ORDERS FROM SURGERY 7/13/22    pt has implanted cardiac loop recorder - plan to have removed 7/13/22    VS APPEAR STABLE, PT DENIES S/S ORHTO HYPO SYMPTOMS,    hx adult failure to thrive, spinal stenosis, has implanted cardiac monior device mid sternum

## 2022-07-12 ENCOUNTER — LAB (OUTPATIENT)
Dept: LAB | Facility: HOSPITAL | Age: 82
End: 2022-07-12

## 2022-07-12 DIAGNOSIS — R00.2 PALPITATIONS: ICD-10-CM

## 2022-07-12 LAB — SARS-COV-2 ORF1AB RESP QL NAA+PROBE: NOT DETECTED

## 2022-07-12 PROCEDURE — U0004 COV-19 TEST NON-CDC HGH THRU: HCPCS

## 2022-07-12 PROCEDURE — C9803 HOPD COVID-19 SPEC COLLECT: HCPCS

## 2022-07-13 ENCOUNTER — HOSPITAL ENCOUNTER (OUTPATIENT)
Facility: HOSPITAL | Age: 82
Setting detail: HOSPITAL OUTPATIENT SURGERY
Discharge: HOME | End: 2022-07-13
Attending: INTERNAL MEDICINE | Admitting: INTERNAL MEDICINE

## 2022-07-13 VITALS
DIASTOLIC BLOOD PRESSURE: 79 MMHG | TEMPERATURE: 98.3 F | OXYGEN SATURATION: 100 % | SYSTOLIC BLOOD PRESSURE: 158 MMHG | HEIGHT: 62 IN | RESPIRATION RATE: 18 BRPM | BODY MASS INDEX: 28.52 KG/M2 | WEIGHT: 155 LBS | HEART RATE: 68 BPM

## 2022-07-13 DIAGNOSIS — R00.2 PALPITATIONS: ICD-10-CM

## 2022-07-13 PROCEDURE — 33286 RMVL SUBQ CAR RHYTHM MNTR: CPT | Performed by: INTERNAL MEDICINE

## 2022-07-13 PROCEDURE — 25010000002 FENTANYL CITRATE (PF) 50 MCG/ML SOLUTION: Performed by: INTERNAL MEDICINE

## 2022-07-13 PROCEDURE — 25010000002 MIDAZOLAM PER 1 MG: Performed by: INTERNAL MEDICINE

## 2022-07-13 PROCEDURE — 25010000002 CEFAZOLIN IN DEXTROSE 2-4 GM/100ML-% SOLUTION: Performed by: INTERNAL MEDICINE

## 2022-07-13 RX ORDER — FENTANYL CITRATE 50 UG/ML
INJECTION, SOLUTION INTRAMUSCULAR; INTRAVENOUS AS NEEDED
Status: DISCONTINUED | OUTPATIENT
Start: 2022-07-13 | End: 2022-07-13 | Stop reason: HOSPADM

## 2022-07-13 RX ORDER — CEFAZOLIN SODIUM 2 G/100ML
INJECTION, SOLUTION INTRAVENOUS CONTINUOUS PRN
Status: COMPLETED | OUTPATIENT
Start: 2022-07-13 | End: 2022-07-13

## 2022-07-13 RX ORDER — POTASSIUM ACETATE 100 %
10 POWDER (GRAM) MISCELLANEOUS DAILY
COMMUNITY
End: 2022-10-03 | Stop reason: HOSPADM

## 2022-07-13 RX ORDER — SODIUM CHLORIDE 0.9 % (FLUSH) 0.9 %
10 SYRINGE (ML) INJECTION AS NEEDED
Status: DISCONTINUED | OUTPATIENT
Start: 2022-07-13 | End: 2022-07-13 | Stop reason: HOSPADM

## 2022-07-13 RX ORDER — SODIUM CHLORIDE 0.9 % (FLUSH) 0.9 %
10 SYRINGE (ML) INJECTION EVERY 12 HOURS SCHEDULED
Status: DISCONTINUED | OUTPATIENT
Start: 2022-07-13 | End: 2022-07-13 | Stop reason: HOSPADM

## 2022-07-13 RX ORDER — LIDOCAINE HYDROCHLORIDE AND EPINEPHRINE 10; 10 MG/ML; UG/ML
INJECTION, SOLUTION INFILTRATION; PERINEURAL AS NEEDED
Status: DISCONTINUED | OUTPATIENT
Start: 2022-07-13 | End: 2022-07-13 | Stop reason: HOSPADM

## 2022-07-13 RX ORDER — SODIUM CHLORIDE 9 MG/ML
75 INJECTION, SOLUTION INTRAVENOUS CONTINUOUS
Status: DISCONTINUED | OUTPATIENT
Start: 2022-07-13 | End: 2022-07-13 | Stop reason: HOSPADM

## 2022-07-13 RX ORDER — MIDAZOLAM HYDROCHLORIDE 1 MG/ML
INJECTION INTRAMUSCULAR; INTRAVENOUS AS NEEDED
Status: DISCONTINUED | OUTPATIENT
Start: 2022-07-13 | End: 2022-07-13 | Stop reason: HOSPADM

## 2022-07-13 NOTE — DISCHARGE INSTRUCTIONS
Outpatient Surgery Guidelines, Adult  Outpatient procedures are those for which the person having the procedure is allowed to go home the same day as the procedure. Various procedures are done on an outpatient basis. You should follow some general guidelines if you will be having an outpatient procedure.  AFTER THE  PROCEDURE  After surgery, you will be taken to a recovery area, where your progress will be monitored. If there are no complications, you will be allowed to go home when you are awake, stable, and taking fluids well. You may have numbness around the surgical site. Healing will take some time. You will have tenderness at the surgical site and may have some swelling and bruising. You may also have some nausea.  HOME CARE INSTRUCTIONS  Do not drive for 24 hours, or as directed by your health care provider. Do not drive while taking prescription pain medicines.  Do not drink alcohol for 24 hours.  Do not make important decisions or sign legal documents for 24 hours.  Plan on having a responsible adult stay with your for 24 hours following your procedure.  You may resume a normal diet and activities as directed.  Do not lift anything heavier than 10 pounds (4.5 kg) or play contact sports until your health care provider says it is okay.  Only take over-the-counter or prescription medicines as directed by your health care provider.  Follow up with your health care provider as directed.  If you have sleep apnea, surgery and certain medicines can increase your risk for breathing problems. Follow instructions from your health care provider about wearing your sleep device:  Anytime you are sleeping, including during daytime naps.  While taking prescription pain medicines, sleeping medicines, or medicines that make you drowsy.    SEEK MEDICAL CARE IF:  You have increased bleeding (more than a small spot) from the surgical site.  You have redness, swelling, or increasing pain in the wound.  You see pus coming from  the wound.  You have a fever > 101.  You notice a bad smell coming from the wound or dressing.  You feel lightheaded or faint.  You develop a rash.  You have trouble breathing.  You develop allergies.  MAKE SURE YOU:  Understand these instructions.  Will watch your condition.  Will get help right away if you are not doing well or get worse.

## 2022-07-20 ENCOUNTER — HOME CARE VISIT (OUTPATIENT)
Dept: HOME HEALTH SERVICES | Facility: HOME HEALTHCARE | Age: 82
End: 2022-07-20

## 2022-07-20 PROCEDURE — G0162 HHC RN E&M PLAN SVS, 15 MIN: HCPCS

## 2022-07-21 VITALS
HEART RATE: 84 BPM | RESPIRATION RATE: 16 BRPM | DIASTOLIC BLOOD PRESSURE: 86 MMHG | TEMPERATURE: 99 F | SYSTOLIC BLOOD PRESSURE: 130 MMHG | OXYGEN SATURATION: 99 %

## 2022-07-21 NOTE — HOME HEALTH
D/C NEXT SNV    PT HAS IMPLANTED CARDIAC LOOP RECORDER REMOVED MID STERNAL- INCISION IS WELL APPROXIMATED AND COVERED W/ MULTIPLE STERISTRIPS

## 2022-07-26 ENCOUNTER — OFFICE VISIT (OUTPATIENT)
Dept: CARDIOLOGY | Facility: CLINIC | Age: 82
End: 2022-07-26

## 2022-07-26 VITALS
WEIGHT: 161 LBS | BODY MASS INDEX: 29.63 KG/M2 | DIASTOLIC BLOOD PRESSURE: 81 MMHG | HEART RATE: 71 BPM | HEIGHT: 62 IN | SYSTOLIC BLOOD PRESSURE: 146 MMHG

## 2022-07-26 DIAGNOSIS — R55 SYNCOPE AND COLLAPSE: ICD-10-CM

## 2022-07-26 DIAGNOSIS — I10 ESSENTIAL HYPERTENSION: Primary | ICD-10-CM

## 2022-07-26 DIAGNOSIS — R00.2 PALPITATIONS: ICD-10-CM

## 2022-07-26 DIAGNOSIS — E78.5 HYPERLIPIDEMIA LDL GOAL <70: ICD-10-CM

## 2022-07-26 DIAGNOSIS — Z86.718 HISTORY OF DVT (DEEP VEIN THROMBOSIS): ICD-10-CM

## 2022-07-26 PROCEDURE — 99214 OFFICE O/P EST MOD 30 MIN: CPT

## 2022-07-26 RX ORDER — FUROSEMIDE 40 MG/1
40 TABLET ORAL DAILY PRN
COMMUNITY
Start: 2022-07-11

## 2022-07-26 NOTE — PROGRESS NOTES
Subjective:        Sasha Barrett is a 82 y.o. female who here for follow up    Chief Complaint   Patient presents with   • Follow-up     Hospital loop removal        HPI    Mrs Barrett is an  82-year-old female with hypertension, history of DVT and PE, obesity, hyperlipidemia,GERD, CKD.  She follows up in office today s/p loop recorder removal. Echo 5/26/2022 EF 63%, normal LV function.  Stress test 5/27/2022 myocardial perfusion imaging indicates a normal study with no evidence of ischemia.  Very small inferior wall ischemia cannot be ruled out.  Holter monitor 11/19/2020 normal sinus rhythm with frequent PACs, PVCs, NS SVTs.    The following portions of the patient's history were reviewed and updated as appropriate: allergies, current medications, past family history, past medical history, past social history, past surgical history and problem list.    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 She has never used smokeless tobacco. She reports current alcohol use. She reports that she does not use drugs.     Family History   Problem Relation Age of Onset   • Brain cancer Brother    • Alcohol abuse Mother    • No Known Problems Father    • No Known Problems Sister    • Malig Hyperthermia Neg Hx        ROS     Review of Systems  Constitutional: No wt loss, fever, fatigue  Gastrointestinal: No  nausea, abdominal pain  Behavioral/Psych: No insomnia or anxiety  Cardiovascular no chest pain or shortness of breath      Objective:           Vitals and nursing note reviewed.   Constitutional:       Appearance: Well-developed.   HENT:      Head: Normocephalic.      Right Ear: External ear normal.      Left Ear: External ear normal.   Neck:      Vascular: No JVD.   Pulmonary:      Effort: Pulmonary effort is normal. No respiratory distress.      Breath sounds: Normal breath sounds. No stridor. No rales.   Cardiovascular:      Normal rate. Regular rhythm.      No gallop.      Comments: Loop recorder site steri strips removed, site healed well  Pulses:     Intact distal pulses.   Abdominal:      General: Bowel sounds are normal. There is no distension.      Palpations: Abdomen is soft.      Tenderness: There is no abdominal tenderness. There is no guarding.   Musculoskeletal: Normal range of motion.         General: No tenderness.      Cervical back: Normal range of motion. Skin:     General: Skin is warm.   Neurological:      Mental Status: Alert and oriented to person, place, and time.      Deep Tendon Reflexes: Reflexes are normal and symmetric.   Psychiatric:         Judgment: Judgment normal.         Procedures    Interpretation Summary    · An abnormal monitor study.  · NSR WITH FREQUENT PACS, PVCS, NSSVTS    Interpretation Summary    · Left ventricular ejection fraction is normal. (Calculated EF = 60%).  · Myocardial perfusion imaging indicates a normal myocardial perfusion study with no evidence of ischemia.  · Impressions are consistent with a low risk study.  · Very small inferior wall ischemia can not be ruled out     Interpretation Summary    · Estimated right ventricular systolic pressure from tricuspid regurgitation is normal (<35 mmHg).  · Calculated left ventricular EF = 63.3% Estimated left ventricular EF was in agreement with the calculated left ventricular EF.  · Left ventricular diastolic  function was normal.  · There is no evidence of pericardial effusion. .          Current Outpatient Medications:   •  acetaminophen (TYLENOL) 325 MG tablet, Take 2 tablets by mouth Every 4 (Four) Hours As Needed for Mild Pain ., Disp: , Rfl:   •  albuterol (PROAIR HFA) 108 (90 Base) MCG/ACT inhaler, ProAir  (90 Base) MCG/ACT Inhalation Aerosol Solution; Patient Sig: ProAir  (90 Base) MCG/ACT Inhalation Aerosol Solution ; 8; 0; 18-Jun-2014; Active, Disp: , Rfl:   •  budesonide (PULMICORT) 180 MCG/ACT inhaler, Inhale 1 puff 2 (Two) Times a Day., Disp: , Rfl:   •  butalbital-acetaminophen-caffeine (FIORICET, ESGIC) -40 MG per tablet, Take 2 tablets by mouth Every 4 (Four) Hours As Needed for Headache., Disp: 240 tablet, Rfl: 0  •  cetirizine (zyrTEC) 10 MG tablet, Take 10 mg by mouth Daily. Alt with singulair, Disp: , Rfl:   •  Cholecalciferol (VITAMIN D3) 1.25 MG (14969 UT) capsule, Take 50,000 Units by mouth 1 (One) Time Per Week. TUESDAYS, Disp: , Rfl:   •  ciprofloxacin-dexamethasone (CIPRODEX) 0.3-0.1 % otic suspension, Administer 4 drops into both ears 2 (Two) Times a Day., Disp: , Rfl:   •  cloNIDine (CATAPRES) 0.1 MG tablet, Take 1 tablet by mouth Every 12 (Twelve) Hours. (May use 1/2 of her 0.3 tabs until new RX is available), Disp: 180 tablet, Rfl: 1  •  diphenhydrAMINE (BENADRYL) 50 MG tablet, Take 1 tablet by mouth every 4 (four) hours as needed for itching or allergies., Disp: 180 tablet, Rfl: 0  •  EPINEPHrine (EPIPEN) 0.3 MG/0.3ML solution auto-injector injection, INJECT THE CONTENTS OF ONE PEN AS NEEDED. MAY REPEAT ONE TIME., Disp: , Rfl:   •  eplerenone (INSPRA) 50 MG tablet, Take 1 tablet by mouth Every 12 (Twelve) Hours., Disp: 180 tablet, Rfl: 3  •  ethacrynic acid (EDECRIN) 25 MG tablet, Take 1 tablet by mouth 2 (Two) Times a Day., Disp: 180 tablet, Rfl: 1  •  fluticasone (FLONASE) 50 MCG/ACT nasal spray, 1 spray by Each Nare route 2 (Two) Times a Day., Disp: , Rfl:   •   furosemide (LASIX) 40 MG tablet, TAKE ONE TABLET BY MOUTH DAILY AS NEEDED when swelling/weight gain/can't get rings ON OR off, Disp: , Rfl:   •  HYDROcodone-acetaminophen (NORCO)  MG per tablet, Take 1-2 tablets by mouth. PAIN, Disp: , Rfl:   •  hydrOXYzine (ATARAX) 50 MG tablet, TAKE ONE TABLET BY MOUTH EVERY 6 HOURS AS NEEDED FOR SEVERE ITCHING AND ALLERGIES, Disp: , Rfl:   •  ipratropium (ATROVENT) 0.02 % nebulizer solution, Take 500 mcg by nebulization 4 (Four) Times a Day., Disp: , Rfl:   •  KP KETOTIFEN FUMARATE OP, Apply 2 drops to eye(s) as directed by provider As Needed., Disp: , Rfl:   •  multivitamin with minerals tablet tablet, Take 1 tablet by mouth Daily., Disp: , Rfl:   •  NIFEdipine XL (ADALAT CC) 30 MG 24 hr tablet, Take 1 tablet by mouth Daily. (Patient taking differently: Take 30 mg by mouth 2 (Two) Times a Day. .), Disp: 90 tablet, Rfl: 1  •  omeprazole (priLOSEC) 40 MG capsule, Take 40 mg by mouth Daily., Disp: , Rfl:   •  ondansetron ODT (ZOFRAN-ODT) 8 MG disintegrating tablet, Take 1 tablet by mouth every 6 (six) hours as needed for nausea or vomiting., Disp: 360 tablet, Rfl: 1  •  polyethylene glycol (MIRALAX) 17 g packet, Take 17 g by mouth Daily As Needed. HARD STOOL, Disp: , Rfl:   •  Potassium Acetate powder, 10 mEq Daily., Disp: , Rfl:   •  predniSONE (DELTASONE) 10 MG tablet, Take 1 tablet by mouth Every Other Day., Disp: 45 tablet, Rfl: 1  •  promethazine-codeine (PHENERGAN with CODEINE) 6.25-10 MG/5ML syrup, Take 5 mL by mouth 4 (Four) Times a Day As Needed for cough. (Patient taking differently: Take 15 mL by mouth 4 (Four) Times a Day As Needed for Cough.), Disp: 360 mL, Rfl: 0  •  rivaroxaban (XARELTO) 10 MG tablet, Take 10 mg by mouth Daily., Disp: , Rfl:   •  vitamin B-12 (VITAMIN B-12) 1000 MCG tablet, Take 1 tablet by mouth Daily., Disp: 90 tablet, Rfl: 3     Assessment:        Patient Active Problem List   Diagnosis   • Joint pain   • Urinary retention self-caths (Smith)    • Conjunctivitis   • Arm pain   • Cervical disc displacement   • Cervical pain (Servando=PM)   • DDD (degenerative disc disease), cervical   • Hyperlipidemia LDL goal <70   • Preventative health care   • Medication management   • Proctocele   • Urge incontinence of urine   • Chronic tension-type headache, intractable   • Obesity (BMI 30.0-34.9)   • H/o Lyme disease   • Stage 3 chronic kidney disease ([facility])   • Cerebral atherosclerosis   • Allergic contact dermatitis   • Malaise and fatigue progressive   • Dizziness   • Vitamin D deficiency   • Moderate persistent asthmatic bronchitis without complication   • Anterior cervical adenopathy due to infection   • Pleurisy   • Knee pain, bilateral   • Elevated PTH level   • 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[facility])   • Gastroesophageal reflux disease with esophagitis   • Psoriasis (Darryl [facility])   • Postmenopausal   • Recurrent UTI   • H/O CVA (cerebral vascular accident) ([facility])   • Dyspnea on exertion   • Precordial pain   • Abnormal EKG   • Arthralgia   • Accelerated hypertension   • Acute joint pain   • Easy bruisability   • Cough productive of purulent sputum   • Influenza A Subtype H3   • 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[facility])   • Acute chest wall pain   • Decreased functional mobility and endurance since fall 12/20/17   • Essential hypertension   • 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   • Migraine without aura and without status migrainosus, not intractable   • Thyroid nodule   • Osteoarthritis of left hip   • Spinal stenosis of lumbar region with radiculopathy   • Acute deep vein thrombosis (DVT) of right lower extremity ([facility])   • Acute deep vein thrombosis ([facility])   • Single subsegmental pulmonary embolism without acute cor pulmonale ([facility])   • Other chronic pain   • Palpitations   • Secondary hyperparathyroidism ([facility])   • Syncope   • Ataxia   • Adult failure to thrive syndrome   • Recent unexplained weight loss 42# over last 6  months   • Blood pressure instability   • 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  Hypertension: controlled on current regimen. Continue same.    Importance of controlling hypertension and blood pressure  checkup on the regular basis has been explained. Hypertension as a silent killer has been discussed. Risk reduction of the weight and regular exercises to control the hypertension has been explained.    2.  Hyperlipidemia: Dr Cadet manages her cholesterol and labs. Continue current regimen.     Risk of the hyperlipidemia, importance of the treatment has been explained. Pros and cons of the statins has been explained. Regular blood workup as well as side effects including the liver failure, myelopathy death has been explained.    3.  Syncope: none since hospital admission. Loop removed per patient request.     4.  Palpitations: has them occasionally. Loop removed.     5. History of DVT/PE: she is on xarelto. She states she was told she would need to be on lifelong anticoagulation. No s/s bleeding.                 No diagnosis found.    There are no diagnoses linked to this encounter.    COUNSELING: Jacinto Gilliam was given to patient for the following topics: diagnostic results, risk factor reductions, impressions, risks and benefits of treatment options and importance of treatment compliance .       SMOKING COUNSELING: Denies    Follow-up in 6 months or sooner if needed    Sincerely,   AC Martines  [facility]  07/26/22  11:36 EDT    EMR Dragon/Transcription disclaimer:   Much of this encounter note is an electronic transcription/translation of spoken language to printed text. The electronic translation of spoken language may permit erroneous, or at times, nonsensical words or phrases to be inadvertently transcribed;  Although I have reviewed the note for such errors, some may still exist.

## 2022-07-28 ENCOUNTER — HOME CARE VISIT (OUTPATIENT)
Dept: HOME HEALTH SERVICES | Facility: HOME HEALTHCARE | Age: 82
End: 2022-07-28

## 2022-07-28 PROCEDURE — G0162 HHC RN E&M PLAN SVS, 15 MIN: HCPCS

## 2022-07-28 NOTE — HOME HEALTH
pt is loading her car as SN arrived, [place] PT agreeable to SN DC but won't take time for a full assessment. She then begins telling SN about the records she obtained from cardiologist that list all the sedation/ meds she was given the day she signed the consent for the cardiac loop recorder placement. and shows SN the MAR from her hospitalization which lists her entire med list and date/times the meds were administerd to the pt. SN instruct the documant/summery she obtained from cardiologist office is actually a copy of her hospital dc instructions. pt insisits the list is what medications the cardiologist gave to her. the day she signed the cardiac loop recoder consent

## 2022-08-16 ENCOUNTER — TRANSCRIBE ORDERS (OUTPATIENT)
Dept: ADMINISTRATIVE | Facility: HOSPITAL | Age: 82
End: 2022-08-16

## 2022-08-16 ENCOUNTER — HOSPITAL ENCOUNTER (OUTPATIENT)
Dept: CARDIOLOGY | Facility: HOSPITAL | Age: 82
Discharge: HOME | End: 2022-08-16
Admitting: INTERNAL MEDICINE

## 2022-08-16 DIAGNOSIS — Z86.718 PERSONAL HISTORY OF VENOUS THROMBOSIS AND EMBOLISM: ICD-10-CM

## 2022-08-16 DIAGNOSIS — M79.661 PAIN IN RIGHT LOWER LEG: Primary | ICD-10-CM

## 2022-08-16 DIAGNOSIS — M79.661 PAIN IN RIGHT LOWER LEG: ICD-10-CM

## 2022-08-16 LAB
BH CV LOW VAS RIGHT POPLITEAL SPONT: 1
BH CV LOWER VASCULAR LEFT COMMON FEMORAL AUGMENT: NORMAL
BH CV LOWER VASCULAR LEFT COMMON FEMORAL COMPETENT: NORMAL
BH CV LOWER VASCULAR LEFT COMMON FEMORAL COMPRESS: NORMAL
BH CV LOWER VASCULAR LEFT COMMON FEMORAL PHASIC: NORMAL
BH CV LOWER VASCULAR LEFT COMMON FEMORAL SPONT: NORMAL
BH CV LOWER VASCULAR RIGHT COMMON FEMORAL AUGMENT: NORMAL
BH CV LOWER VASCULAR RIGHT COMMON FEMORAL COMPETENT: NORMAL
BH CV LOWER VASCULAR RIGHT COMMON FEMORAL COMPRESS: NORMAL
BH CV LOWER VASCULAR RIGHT COMMON FEMORAL PHASIC: NORMAL
BH CV LOWER VASCULAR RIGHT COMMON FEMORAL SPONT: NORMAL
BH CV LOWER VASCULAR RIGHT DISTAL FEMORAL COMPRESS: NORMAL
BH CV LOWER VASCULAR RIGHT GASTRONEMIUS COMPRESS: NORMAL
BH CV LOWER VASCULAR RIGHT GREATER SAPH AK COMPRESS: NORMAL
BH CV LOWER VASCULAR RIGHT GREATER SAPH BK COMPRESS: NORMAL
BH CV LOWER VASCULAR RIGHT LESSER SAPH COMPRESS: NORMAL
BH CV LOWER VASCULAR RIGHT MID FEMORAL AUGMENT: NORMAL
BH CV LOWER VASCULAR RIGHT MID FEMORAL COMPETENT: NORMAL
BH CV LOWER VASCULAR RIGHT MID FEMORAL COMPRESS: NORMAL
BH CV LOWER VASCULAR RIGHT MID FEMORAL PHASIC: NORMAL
BH CV LOWER VASCULAR RIGHT MID FEMORAL SPONT: NORMAL
BH CV LOWER VASCULAR RIGHT PERONEAL COMPRESS: NORMAL
BH CV LOWER VASCULAR RIGHT POPLITEAL AUGMENT: NORMAL
BH CV LOWER VASCULAR RIGHT POPLITEAL COMPETENT: NORMAL
BH CV LOWER VASCULAR RIGHT POPLITEAL COMPRESS: NORMAL
BH CV LOWER VASCULAR RIGHT POPLITEAL PHASIC: NORMAL
BH CV LOWER VASCULAR RIGHT POPLITEAL SPONT: NORMAL
BH CV LOWER VASCULAR RIGHT POPLITEAL THROMBUS: NORMAL
BH CV LOWER VASCULAR RIGHT POSTERIOR TIBIAL COMPRESS: NORMAL
BH CV LOWER VASCULAR RIGHT PROFUNDA FEMORAL COMPRESS: NORMAL
BH CV LOWER VASCULAR RIGHT PROXIMAL FEMORAL COMPRESS: NORMAL
BH CV LOWER VASCULAR RIGHT SAPHENOFEMORAL JUNCTION COMPRESS: NORMAL
MAXIMAL PREDICTED HEART RATE: 136 BPM
STRESS TARGET HR: 116 BPM

## 2022-08-16 PROCEDURE — 93971 EXTREMITY STUDY: CPT

## 2022-09-12 NOTE — PROGRESS NOTES
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    Dictated using Dragon dictation     prosody, fluency, and latency  Mood:  \" better. \".  Affect: constricted in range  Associations:  intact  Thought Process:  linear  Thought Content, Perceptions, Hallucinations:  denies SI/HI/AVH  Language:  fluent, no dysarthria, no aphasia noted    Fund of Knowledge:  average  Orientation/Level of Consciousness:  alert and oriented to situation, time, date, place, person  Attention:  Able to sustain attention and focus for interview without redirection  Memory:  no apparent impairments in short term memory and no apparent impairments in long term memory   Insight:  fair  Judgment:  fair    Assessment:    Sheri Felton is seen for a follow up assessment, relatively stable symptoms recommended to continue medications as prescribed.  Follow-up recommended in 4 weeks.       Given the reported motor tics recommended to establish care with neurology.               Medications:        • traZODone (DESYREL) 50 MG tablet Take 1 tablet by mouth nightly.    • clonazePAM (KlonoPIN) 0.5 MG tablet Take 1 tablet by mouth in the morning and 1 tablet in the evening.     • sertraline (ZOLOFT) 100 MG tablet Take 2.5 tablets by mouth daily.      • cloNIDine (CATAPRES) 0.1 MG tablet TAKE 1 TABLET BY MOUTH TWICE DAILY.     Diagnosis:     Obsessive-compulsive disorder   Eating disorder unspecified  Generalized anxiety disorder   Major depressive disorder, recurrent, moderate     History of motor tics.     PTSD        Current Risk:  Risk of Assessment for Harm to Self/Suicide Risk:  not at significant or imminent risk  Risk of Assessment of Harm to Others:  not at significant or imminent risk        Education:   Symptoms discussed and validated, support and psycho-education provided.  Discussed diagnosis, recommended treatment, alternative treatment options, and the right to refuse treatment.  Risks and benefits of psychotropic medications discussed, including but not limited to serotonin syndrome.    Pt was given opportunity to ask  questions, there were no educational barriers to learning and all questions were answered.  Pt verbalized understanding of medication education and training, voiced understanding of treatment plan, acceptance of potential risks and consequences, and has provided informed consent and agreed to take these medications.         TIME SPENT:  Greater than 16 was spent in psychotherapy.      Ashley Saxena MD   9/12/2022     12:34 PM     This information is confidential.  Any disclosure without the patient's consent or Statutory Authorization is prohibited by law.

## 2022-09-20 ENCOUNTER — TRANSCRIBE ORDERS (OUTPATIENT)
Dept: ADMINISTRATIVE | Facility: HOSPITAL | Age: 82
End: 2022-09-20

## 2022-09-20 DIAGNOSIS — Z86.718 PERSONAL HISTORY OF VENOUS THROMBOSIS AND EMBOLISM: ICD-10-CM

## 2022-09-20 DIAGNOSIS — M79.89 SWOLLEN LEG: Primary | ICD-10-CM

## 2022-09-21 ENCOUNTER — HOSPITAL ENCOUNTER (OUTPATIENT)
Dept: CARDIOLOGY | Facility: HOSPITAL | Age: 82
Discharge: HOME | End: 2022-09-21
Admitting: INTERNAL MEDICINE

## 2022-09-21 DIAGNOSIS — M79.89 SWOLLEN LEG: ICD-10-CM

## 2022-09-21 DIAGNOSIS — Z86.718 PERSONAL HISTORY OF VENOUS THROMBOSIS AND EMBOLISM: ICD-10-CM

## 2022-09-21 LAB
BH CV LOW VAS RIGHT POPLITEAL SPONT: 1
BH CV LOWER VASCULAR LEFT COMMON FEMORAL AUGMENT: NORMAL
BH CV LOWER VASCULAR LEFT COMMON FEMORAL COMPETENT: NORMAL
BH CV LOWER VASCULAR LEFT COMMON FEMORAL COMPRESS: NORMAL
BH CV LOWER VASCULAR LEFT COMMON FEMORAL PHASIC: NORMAL
BH CV LOWER VASCULAR LEFT COMMON FEMORAL SPONT: NORMAL
BH CV LOWER VASCULAR RIGHT COMMON FEMORAL AUGMENT: NORMAL
BH CV LOWER VASCULAR RIGHT COMMON FEMORAL COMPETENT: NORMAL
BH CV LOWER VASCULAR RIGHT COMMON FEMORAL COMPRESS: NORMAL
BH CV LOWER VASCULAR RIGHT COMMON FEMORAL PHASIC: NORMAL
BH CV LOWER VASCULAR RIGHT COMMON FEMORAL SPONT: NORMAL
BH CV LOWER VASCULAR RIGHT DISTAL FEMORAL COMPRESS: NORMAL
BH CV LOWER VASCULAR RIGHT GASTRONEMIUS COMPRESS: NORMAL
BH CV LOWER VASCULAR RIGHT GREATER SAPH AK COMPRESS: NORMAL
BH CV LOWER VASCULAR RIGHT GREATER SAPH BK COMPRESS: NORMAL
BH CV LOWER VASCULAR RIGHT LESSER SAPH COMPRESS: NORMAL
BH CV LOWER VASCULAR RIGHT MID FEMORAL AUGMENT: NORMAL
BH CV LOWER VASCULAR RIGHT MID FEMORAL COMPETENT: NORMAL
BH CV LOWER VASCULAR RIGHT MID FEMORAL COMPRESS: NORMAL
BH CV LOWER VASCULAR RIGHT MID FEMORAL PHASIC: NORMAL
BH CV LOWER VASCULAR RIGHT MID FEMORAL SPONT: NORMAL
BH CV LOWER VASCULAR RIGHT PERONEAL COMPRESS: NORMAL
BH CV LOWER VASCULAR RIGHT POPLITEAL AUGMENT: NORMAL
BH CV LOWER VASCULAR RIGHT POPLITEAL COMPETENT: NORMAL
BH CV LOWER VASCULAR RIGHT POPLITEAL COMPRESS: NORMAL
BH CV LOWER VASCULAR RIGHT POPLITEAL PHASIC: NORMAL
BH CV LOWER VASCULAR RIGHT POPLITEAL SPONT: NORMAL
BH CV LOWER VASCULAR RIGHT POSTERIOR TIBIAL COMPRESS: NORMAL
BH CV LOWER VASCULAR RIGHT PROFUNDA FEMORAL COMPRESS: NORMAL
BH CV LOWER VASCULAR RIGHT PROXIMAL FEMORAL COMPRESS: NORMAL
BH CV LOWER VASCULAR RIGHT SAPHENOFEMORAL JUNCTION COMPRESS: NORMAL
MAXIMAL PREDICTED HEART RATE: 136 BPM
STRESS TARGET HR: 116 BPM

## 2022-09-21 PROCEDURE — 93971 EXTREMITY STUDY: CPT

## 2022-09-22 ENCOUNTER — OFFICE VISIT (OUTPATIENT)
Dept: ORTHOPEDIC SURGERY | Facility: CLINIC | Age: 82
End: 2022-09-22

## 2022-09-22 VITALS — BODY MASS INDEX: 32.17 KG/M2 | WEIGHT: 174.8 LBS | HEIGHT: 62 IN | TEMPERATURE: 96.8 F

## 2022-09-22 DIAGNOSIS — M21.40 PES PLANUS, UNSPECIFIED LATERALITY: ICD-10-CM

## 2022-09-22 DIAGNOSIS — R52 PAIN: ICD-10-CM

## 2022-09-22 DIAGNOSIS — M76.829 POSTERIOR TIBIAL TENDON DYSFUNCTION: Primary | ICD-10-CM

## 2022-09-22 PROCEDURE — 99214 OFFICE O/P EST MOD 30 MIN: CPT | Performed by: ORTHOPAEDIC SURGERY

## 2022-09-22 PROCEDURE — 73620 X-RAY EXAM OF FOOT: CPT | Performed by: ORTHOPAEDIC SURGERY

## 2022-09-22 PROCEDURE — 73610 X-RAY EXAM OF ANKLE: CPT | Performed by: ORTHOPAEDIC SURGERY

## 2022-09-22 NOTE — PROGRESS NOTES
New Patient Complaint      Patient: Sasha Barrett  YOB: 1940 82 y.o. female  Medical Record Number: [id number]    Chief Complaints: Ankle turned inward    History of Present Illness: Patient has had a history of what sounds like chronic pes planus with inteurning of her right ankle with external rotation of her right foot.  She reports this been going on for many years and she wanted to be seen for this to see what could be done for this.    She has had chronic swelling in her right leg and has had Doppler in August and September of this year as outlined below.    She also more recently developed an area of some redness over her anterior lower leg that may be some cellulitis but no open wound and said that Dr. Cadet has prescribed an antibiotic cream for that.        HPI    Allergies:   Allergies   Allergen Reactions   • Bee Venom Shortness Of Breath and Rash   • Morphine Anaphylaxis and Nausea And Vomiting   • Sulfa Antibiotics Anaphylaxis and Hives     Or sulfa fillers in meds  Broke out in internal and external hives     • Tree Extract Shortness Of Breath and Rash   • Ambien [Zolpidem] Hallucinations   • Anastrozole Other (See Comments)     Can't remember what the reaction was    • Covid-19 (Mrna) Vaccine Swelling     Facial swelling, blood clots   • Eliquis [Apixaban] Headache     Headaches, nausea and itching.    • Hydralazine Myalgia     Patient reports leg cramps, joint pain and increased fatigue   • Norvasc [Amlodipine] Other (See Comments)     KIDNEYS SHUT DOWN   • Nsaids Other (See Comments)     KIDNEY FAILURE   • Penicillins Unknown (See Comments)     Severe reaction as a child  Tolerated ceftriaxone during 04/2017 admission   • Grass Rash       Medications:   Current Outpatient Medications on File Prior to Visit   Medication Sig   • acetaminophen (TYLENOL) 325 MG tablet Take 2 tablets by mouth Every 4 (Four) Hours As Needed for Mild Pain .   • albuterol (PROAIR HFA) 108 (90 Base) MCG/ACT  inhaler ProAir  (90 Base) MCG/ACT Inhalation Aerosol Solution; Patient Sig: ProAir  (90 Base) MCG/ACT Inhalation Aerosol Solution ; 8; 0; 18-Jun-2014; Active   • budesonide (PULMICORT) 180 MCG/ACT inhaler Inhale 1 puff 2 (Two) Times a Day.   • butalbital-acetaminophen-caffeine (FIORICET, ESGIC) -40 MG per tablet Take 2 tablets by mouth Every 4 (Four) Hours As Needed for Headache.   • cetirizine (zyrTEC) 10 MG tablet Take 10 mg by mouth Daily. Alt with singulair   • Cholecalciferol (VITAMIN D3) 1.25 MG (74409 UT) capsule Take 50,000 Units by mouth 1 (One) Time Per Week. TUESDAYS   • ciprofloxacin-dexamethasone (CIPRODEX) 0.3-0.1 % otic suspension Administer 4 drops into both ears 2 (Two) Times a Day.   • cloNIDine (CATAPRES) 0.1 MG tablet Take 1 tablet by mouth Every 12 (Twelve) Hours. (May use 1/2 of her 0.3 tabs until new RX is available)   • diphenhydrAMINE (BENADRYL) 50 MG tablet Take 1 tablet by mouth every 4 (four) hours as needed for itching or allergies.   • EPINEPHrine (EPIPEN) 0.3 MG/0.3ML solution auto-injector injection INJECT THE CONTENTS OF ONE PEN AS NEEDED. MAY REPEAT ONE TIME.   • eplerenone (INSPRA) 50 MG tablet Take 1 tablet by mouth Every 12 (Twelve) Hours.   • ethacrynic acid (EDECRIN) 25 MG tablet Take 1 tablet by mouth 2 (Two) Times a Day.   • fluticasone (FLONASE) 50 MCG/ACT nasal spray 1 spray by Each Nare route 2 (Two) Times a Day.   • furosemide (LASIX) 40 MG tablet TAKE ONE TABLET BY MOUTH DAILY AS NEEDED when swelling/weight gain/can't get rings ON OR off   • hydrOXYzine (ATARAX) 50 MG tablet TAKE ONE TABLET BY MOUTH EVERY 6 HOURS AS NEEDED FOR SEVERE ITCHING AND ALLERGIES   • multivitamin with minerals tablet tablet Take 1 tablet by mouth Daily.   • NIFEdipine XL (ADALAT CC) 30 MG 24 hr tablet Take 1 tablet by mouth Daily. (Patient taking differently: Take 30 mg by mouth 2 (Two) Times a Day. .)   • predniSONE (DELTASONE) 10 MG tablet Take 1 tablet by mouth Every Other  Day.   • promethazine-codeine (PHENERGAN with CODEINE) 6.25-10 MG/5ML syrup Take 5 mL by mouth 4 (Four) Times a Day As Needed for cough. (Patient taking differently: Take 15 mL by mouth 4 (Four) Times a Day As Needed for Cough.)   • rivaroxaban (XARELTO) 10 MG tablet Take 10 mg by mouth Daily.   • HYDROcodone-acetaminophen (NORCO)  MG per tablet Take 1-2 tablets by mouth. PAIN   • ipratropium (ATROVENT) 0.02 % nebulizer solution Take 500 mcg by nebulization 4 (Four) Times a Day.   • KP KETOTIFEN FUMARATE OP Apply 2 drops to eye(s) as directed by provider As Needed.   • omeprazole (priLOSEC) 40 MG capsule Take 40 mg by mouth Daily.   • ondansetron ODT (ZOFRAN-ODT) 8 MG disintegrating tablet Take 1 tablet by mouth every 6 (six) hours as needed for nausea or vomiting.   • polyethylene glycol (MIRALAX) 17 g packet Take 17 g by mouth Daily As Needed. HARD STOOL   • Potassium Acetate powder 10 mEq Daily.   • vitamin B-12 (VITAMIN B-12) 1000 MCG tablet Take 1 tablet by mouth Daily.     No current facility-administered medications on file prior to visit.       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 Yazmin Canseco   • CARDIAC ELECTROPHYSIOLOGY PROCEDURE N/A 5/27/2022    Procedure:  Loop insertion;  Surgeon: Jeffrey Argueta MD;  Location:  [facility];  Service: Cardiovascular;  Laterality: N/A;  biotronik   • CARDIAC ELECTROPHYSIOLOGY PROCEDURE N/A 7/13/2022    Procedure: Loop recorder removal;  Surgeon: Jeffrey Argueta MD;  Location:  [facility];  Service: Cardiovascular;  Laterality: N/A;   • CARPAL TUNNEL RELEASE Right 05/25/2011    Dr. Caleb Bueno   • CARPAL TUNNEL RELEASE Left 04/26/2011    Dr. Caleb Bueno   • CATARACT EXTRACTION Left 11/29/2010    Dr. Eusebio Downs   • CATARACT EXTRACTION Right 11/17/2010    Dr. Eusebio Downs   • COLONOSCOPY N/A 12/06/2002    Sigmoid diverticulosis-Dr. Brandon Batista   • COLONOSCOPY N/A 12/12/2013    Tortuous colon, diverticulosis in the sigmoid colon and descending colon, stool in the rectum, sigmoid colon, descending colon, splenic flexure, transverse colon and hepatic flexure-Dr. Irma Brambila   • DEQUERVAIN RELEASE Left 9/23/2020    Procedure: DEQUERVAIN RELEASE LEFT HAND;  Surgeon: Caleb Bueno MD;  Location:  [facility];  Service: Plastics;  Laterality: Left;   • ENDOSCOPY N/A 09/13/2007    Normal-Dr. Pavel Quarles   • ENDOSCOPY AND COLONOSCOPY N/A 09/19/2005    1 cm hiatal hernia, mild Schatzki's ring, sigmoid diverticulosis-Dr. Yoel Farley   • GANGLION CYST EXCISION Left 12/18/2012    Release of A1 pulley flexor sheath, left fourth finger, excision of ganglion cyst 0.5 cm diameter, A2 pulley flexor sheath, left fourth finger-Dr. Caleb Bueno   • HEEL SPUR EXCISION Left 1968   • INGUINAL HERNIA REPAIR Right 1971    at 5 months pregnant    • INJECTION OF MEDICATION Left 9/23/2020    Procedure: KENOLOG INJECTION TO LEFT THUMB;  Surgeon: Caleb Bueno MD;  Location:  [facility];  Service: Plastics;  Laterality: Left;   • MASTECTOMY Right 08/05/2008    Right breast mastectomy and excision of left chest wall lesion-Dr. Yoel Farley   •  "MASTECTOMY RADICAL Left 04/29/2003    Left modified radical mastectomy-Dr. Yazmin Canseco   • PARATHYROIDECTOMY Right 05/04/2004    Excision of parathyroid adenoma (right superior)-Dr. Yoel Farley   • REPLACEMENT TOTAL KNEE Right 04/09/2012    Dr. Lamonte Childress   • SINUS SURGERY  1984   • THYROIDECTOMY, PARTIAL Left 05/04/2004    Dr. Yoel Farley   • TOTAL ABDOMINAL HYSTERECTOMY Bilateral 1973    Dr. Mahan   • TRIGGER FINGER RELEASE Left 9/23/2020    Procedure: RELEASE LEFT FOURTH TRIGGER FINGER;  Surgeon: Caleb Bueno MD;  Location: [facility];  Service: Plastics;  Laterality: Left;   • UPPER GASTROINTESTINAL ENDOSCOPY N/A 02/18/2009    LA Grade A reflux esophagitis, normal stomach, normal 2nd part of the duodenum-Dr. Yoel Farley   • WRIST GANGLION EXCISION Left 9/23/2020    Procedure: EXCISION GANGLION CYST LEFT WRIST;  Surgeon: Caleb Bueno MD;  Location: [facility];  Service: Plastics;  Laterality: Left;     Social History     Occupational History   • Occupation: RETIRED   Tobacco Use   • Smoking status: Never Smoker   • Smokeless tobacco: Never Used   Vaping Use   • Vaping Use: Never used   Substance and Sexual Activity   • Alcohol use: Yes     Comment: 3 drinks yearly   • Drug use: Never   • Sexual activity: Defer     Birth control/protection: Surgical      Social History     Social History Narrative    LIVES WITH      Family History   Problem Relation Age of Onset   • Brain cancer Brother    • Alcohol abuse Mother    • No Known Problems Father    • No Known Problems Sister    • Malig Hyperthermia Neg Hx        Review of Systems: 14 point review of systems performed, positive pertinent findings identified in HPI. All remaining systems negative     Review of Systems      Physical Exam:   Vitals:    09/22/22 1038   Temp: 96.8 °F (36 °C)   Weight: 79.3 kg (174 lb 12.8 oz)   Height: 157.5 cm (62\")   PainSc:   8     Physical Exam   Constitutional: pleasant, well " developed   Eyes: sclera non icteric  Hearing : adequate for exam  Cardiovascular: palpable pulses in right foot, right calf/ thigh NT without sign of DVT  Respiratoy: breathing unlabored   Neurological: grossly sensate to LT throughout right LE  Psychiatric: oriented with normal mood and affect.   Lymphatic: No palpable popliteal lymphadenopathy right LE  Skin: intact throughout right leg/foot  Musculoskeletal: There is a large amount of boggy swelling in her right leg around her knee and upper leg but no open wound or sign of cellulitis.  There are some erythema over the area of the anterior lower leg with no open wound does not appear grossly cellulitic.  There is significant adipose tissue about the right hindfoot and on standing she has some pes planus configuration she is passively correctable and has decent inversion strength and was without gross focal tenderness over the dorsum of the right foot.  There is some mild hallux valgus.  Physical Exam  Ortho Exam    Radiology: 3 views of the right ankle including lateral view of the foot and 2 views of the right foot ordered evaluate pain and alignment reviewed and compared with x-rays on the [facility] from 10/29/2019.  There is arthritic change and hallux valgus at the first MTP  joint.  Talus is well-seated within the mortise without tilt.  There is pes planus configuration consistent with chronic posterior tib tendon dysfunction 25% uncovering of the talar head.  There appears to be some lateral abutment    Doppler report reviewed from 8/16/2022 which showed chronic DVT in the popliteal vessel    Also reviewed Doppler report from 9/21/2022 which described only some deep valvular incompetence in the right popliteal.    Reviewed Doppler report from 10/29/2019 which showed acute DVT of the external iliac, common femoral, profundofemoral, proximal femoral, mid femoral, distal femoral, popliteal, posterior tibial, peroneal and gastroc soleal  vessels.    Assessment/Plan: Right chronic pes planus with probable posterior tib insufficiency  2.  Right hallux valgus    Had a long discussion with her today that given her multiple medical problems and the condition of her skin etc. that I would not advise at all for any surgical treatment as it could be potentially fraught with complications and she would have extreme difficulty maintaining nonweightbearing postoperatively and be high risk for catastrophic complication.    We decided to hold off on any further work-up given that we think it would change our treatment protocol.  We talked about bracing and she wants to hold off on any custom bracing which could be somewhat difficult given the morphology of her ankle and skin condition and possible custom orthotics.    What we have decided on is to get her into some physical therapy and fitted her with a PT TD brace encouraged her to watch her skin very carefully which she said she will.    We will see her back in 4 weeks to assess progress and determine treatment course going forward.

## 2022-09-30 ENCOUNTER — HOSPITAL ENCOUNTER (OUTPATIENT)
Facility: HOSPITAL | Age: 82
Setting detail: OBSERVATION
Discharge: HOME HEALTH | End: 2022-10-03
Attending: INTERNAL MEDICINE | Admitting: INTERNAL MEDICINE

## 2022-09-30 ENCOUNTER — APPOINTMENT (OUTPATIENT)
Dept: GENERAL RADIOLOGY | Facility: HOSPITAL | Age: 82
End: 2022-09-30

## 2022-09-30 ENCOUNTER — APPOINTMENT (OUTPATIENT)
Dept: CARDIOLOGY | Facility: HOSPITAL | Age: 82
End: 2022-09-30

## 2022-09-30 ENCOUNTER — APPOINTMENT (OUTPATIENT)
Dept: ULTRASOUND IMAGING | Facility: HOSPITAL | Age: 82
End: 2022-09-30

## 2022-09-30 DIAGNOSIS — N18.30 STAGE 3 CHRONIC KIDNEY DISEASE, UNSPECIFIED WHETHER STAGE 3A OR 3B CKD: ICD-10-CM

## 2022-09-30 DIAGNOSIS — G89.11 ACUTE SHOULDER PAIN DUE TO TRAUMA, RIGHT: ICD-10-CM

## 2022-09-30 DIAGNOSIS — R60.0 BILATERAL LOWER EXTREMITY EDEMA: Primary | ICD-10-CM

## 2022-09-30 DIAGNOSIS — R33.9 URINARY RETENTION: ICD-10-CM

## 2022-09-30 DIAGNOSIS — E78.5 HYPERLIPIDEMIA LDL GOAL <70: ICD-10-CM

## 2022-09-30 DIAGNOSIS — M25.50 ARTHRALGIA, UNSPECIFIED JOINT: ICD-10-CM

## 2022-09-30 DIAGNOSIS — R62.7 ADULT FAILURE TO THRIVE SYNDROME: ICD-10-CM

## 2022-09-30 DIAGNOSIS — E87.70 HYPERVOLEMIA, UNSPECIFIED HYPERVOLEMIA TYPE: ICD-10-CM

## 2022-09-30 DIAGNOSIS — M25.511 ACUTE SHOULDER PAIN DUE TO TRAUMA, RIGHT: ICD-10-CM

## 2022-09-30 DIAGNOSIS — R53.1 WEAKNESS: ICD-10-CM

## 2022-09-30 PROBLEM — R63.5 WEIGHT GAIN WITH EDEMA: Status: ACTIVE | Noted: 2022-09-30

## 2022-09-30 PROBLEM — S89.91XA BLUNT TRAUMA OF RIGHT LOWER LEG: Status: ACTIVE | Noted: 2022-09-30

## 2022-09-30 PROBLEM — R60.9 WEIGHT GAIN WITH EDEMA: Status: ACTIVE | Noted: 2022-09-30

## 2022-09-30 LAB
ANION GAP SERPL CALCULATED.3IONS-SCNC: 9.9 MMOL/L (ref 5–15)
APTT PPP: 23.6 SECONDS (ref 22.7–35.4)
BASOPHILS # BLD AUTO: 0.05 10*3/MM3 (ref 0–0.2)
BASOPHILS NFR BLD AUTO: 0.7 % (ref 0–1.5)
BH CV LOWER VASCULAR LEFT COMMON FEMORAL AUGMENT: NORMAL
BH CV LOWER VASCULAR LEFT COMMON FEMORAL COMPETENT: NORMAL
BH CV LOWER VASCULAR LEFT COMMON FEMORAL COMPRESS: NORMAL
BH CV LOWER VASCULAR LEFT COMMON FEMORAL PHASIC: NORMAL
BH CV LOWER VASCULAR LEFT COMMON FEMORAL SPONT: NORMAL
BH CV LOWER VASCULAR RIGHT COMMON FEMORAL AUGMENT: NORMAL
BH CV LOWER VASCULAR RIGHT COMMON FEMORAL COMPETENT: NORMAL
BH CV LOWER VASCULAR RIGHT COMMON FEMORAL COMPRESS: NORMAL
BH CV LOWER VASCULAR RIGHT COMMON FEMORAL PHASIC: NORMAL
BH CV LOWER VASCULAR RIGHT COMMON FEMORAL SPONT: NORMAL
BH CV LOWER VASCULAR RIGHT DISTAL FEMORAL COMPRESS: NORMAL
BH CV LOWER VASCULAR RIGHT GASTRONEMIUS COMPRESS: NORMAL
BH CV LOWER VASCULAR RIGHT GREATER SAPH AK COMPRESS: NORMAL
BH CV LOWER VASCULAR RIGHT GREATER SAPH BK COMPRESS: NORMAL
BH CV LOWER VASCULAR RIGHT LESSER SAPH COMPRESS: NORMAL
BH CV LOWER VASCULAR RIGHT MID FEMORAL AUGMENT: NORMAL
BH CV LOWER VASCULAR RIGHT MID FEMORAL COMPETENT: NORMAL
BH CV LOWER VASCULAR RIGHT MID FEMORAL COMPRESS: NORMAL
BH CV LOWER VASCULAR RIGHT MID FEMORAL PHASIC: NORMAL
BH CV LOWER VASCULAR RIGHT MID FEMORAL SPONT: NORMAL
BH CV LOWER VASCULAR RIGHT PERONEAL COMPRESS: NORMAL
BH CV LOWER VASCULAR RIGHT POPLITEAL AUGMENT: NORMAL
BH CV LOWER VASCULAR RIGHT POPLITEAL COMPETENT: NORMAL
BH CV LOWER VASCULAR RIGHT POPLITEAL COMPRESS: NORMAL
BH CV LOWER VASCULAR RIGHT POPLITEAL PHASIC: NORMAL
BH CV LOWER VASCULAR RIGHT POPLITEAL SPONT: NORMAL
BH CV LOWER VASCULAR RIGHT POSTERIOR TIBIAL COMPRESS: NORMAL
BH CV LOWER VASCULAR RIGHT PROFUNDA FEMORAL COMPRESS: NORMAL
BH CV LOWER VASCULAR RIGHT PROXIMAL FEMORAL COMPRESS: NORMAL
BH CV LOWER VASCULAR RIGHT SAPHENOFEMORAL JUNCTION COMPRESS: NORMAL
BUN SERPL-MCNC: 14 MG/DL (ref 8–23)
BUN/CREAT SERPL: 22.6 (ref 7–25)
CALCIUM SPEC-SCNC: 9.9 MG/DL (ref 8.6–10.5)
CHLORIDE SERPL-SCNC: 102 MMOL/L (ref 98–107)
CHOLEST SERPL-MCNC: 180 MG/DL (ref 0–200)
CO2 SERPL-SCNC: 31.1 MMOL/L (ref 22–29)
CREAT SERPL-MCNC: 0.62 MG/DL (ref 0.57–1)
CRP SERPL-MCNC: <0.3 MG/DL (ref 0–0.5)
D-LACTATE SERPL-SCNC: 1.8 MMOL/L (ref 0.5–2)
DEPRECATED RDW RBC AUTO: 41.8 FL (ref 37–54)
EGFRCR SERPLBLD CKD-EPI 2021: 87.9 ML/MIN/1.73
EOSINOPHIL # BLD AUTO: 0.09 10*3/MM3 (ref 0–0.4)
EOSINOPHIL NFR BLD AUTO: 1.2 % (ref 0.3–6.2)
ERYTHROCYTE [DISTWIDTH] IN BLOOD BY AUTOMATED COUNT: 12.5 % (ref 12.3–15.4)
ERYTHROCYTE [SEDIMENTATION RATE] IN BLOOD: 20 MM/HR (ref 0–30)
GLUCOSE SERPL-MCNC: 109 MG/DL (ref 65–99)
HCT VFR BLD AUTO: 42.4 % (ref 34–46.6)
HDLC SERPL-MCNC: 70 MG/DL (ref 40–60)
HGB BLD-MCNC: 14.1 G/DL (ref 12–15.9)
IMM GRANULOCYTES # BLD AUTO: 0.02 10*3/MM3 (ref 0–0.05)
IMM GRANULOCYTES NFR BLD AUTO: 0.3 % (ref 0–0.5)
INR PPP: 1.05 (ref 0.9–1.1)
LDLC SERPL CALC-MCNC: 89 MG/DL (ref 0–100)
LDLC/HDLC SERPL: 1.23 {RATIO}
LYMPHOCYTES # BLD AUTO: 2.06 10*3/MM3 (ref 0.7–3.1)
LYMPHOCYTES NFR BLD AUTO: 27.8 % (ref 19.6–45.3)
MAGNESIUM SERPL-MCNC: 2.2 MG/DL (ref 1.6–2.4)
MAXIMAL PREDICTED HEART RATE: 136 BPM
MCH RBC QN AUTO: 30.7 PG (ref 26.6–33)
MCHC RBC AUTO-ENTMCNC: 33.3 G/DL (ref 31.5–35.7)
MCV RBC AUTO: 92.2 FL (ref 79–97)
MONOCYTES # BLD AUTO: 0.56 10*3/MM3 (ref 0.1–0.9)
MONOCYTES NFR BLD AUTO: 7.6 % (ref 5–12)
NEUTROPHILS NFR BLD AUTO: 4.62 10*3/MM3 (ref 1.7–7)
NEUTROPHILS NFR BLD AUTO: 62.4 % (ref 42.7–76)
NRBC BLD AUTO-RTO: 0 /100 WBC (ref 0–0.2)
NT-PROBNP SERPL-MCNC: 661 PG/ML (ref 0–1800)
PHOSPHATE SERPL-MCNC: 2.6 MG/DL (ref 2.5–4.5)
PLATELET # BLD AUTO: 172 10*3/MM3 (ref 140–450)
PMV BLD AUTO: 10.5 FL (ref 6–12)
POTASSIUM SERPL-SCNC: 3.1 MMOL/L (ref 3.5–5.2)
PROCALCITONIN SERPL-MCNC: 0.02 NG/ML (ref 0–0.25)
PROTHROMBIN TIME: 13.6 SECONDS (ref 11.7–14.2)
QT INTERVAL: 422 MS
RBC # BLD AUTO: 4.6 10*6/MM3 (ref 3.77–5.28)
SODIUM SERPL-SCNC: 143 MMOL/L (ref 136–145)
STRESS TARGET HR: 116 BPM
T3FREE SERPL-MCNC: 3.14 PG/ML (ref 2–4.4)
T4 FREE SERPL-MCNC: 1.06 NG/DL (ref 0.93–1.7)
TRIGL SERPL-MCNC: 121 MG/DL (ref 0–150)
TSH SERPL DL<=0.05 MIU/L-ACNC: 0.87 UIU/ML (ref 0.27–4.2)
URATE SERPL-MCNC: 4.5 MG/DL (ref 2.4–5.7)
VLDLC SERPL-MCNC: 21 MG/DL (ref 5–40)
WBC NRBC COR # BLD: 7.4 10*3/MM3 (ref 3.4–10.8)

## 2022-09-30 PROCEDURE — 84439 ASSAY OF FREE THYROXINE: CPT | Performed by: INTERNAL MEDICINE

## 2022-09-30 PROCEDURE — 85025 COMPLETE CBC W/AUTO DIFF WBC: CPT | Performed by: INTERNAL MEDICINE

## 2022-09-30 PROCEDURE — G0378 HOSPITAL OBSERVATION PER HR: HCPCS

## 2022-09-30 PROCEDURE — 87040 BLOOD CULTURE FOR BACTERIA: CPT | Performed by: INTERNAL MEDICINE

## 2022-09-30 PROCEDURE — 73610 X-RAY EXAM OF ANKLE: CPT

## 2022-09-30 PROCEDURE — 93010 ELECTROCARDIOGRAM REPORT: CPT | Performed by: INTERNAL MEDICINE

## 2022-09-30 PROCEDURE — 76775 US EXAM ABDO BACK WALL LIM: CPT

## 2022-09-30 PROCEDURE — 25010000002 CEFTRIAXONE PER 250 MG: Performed by: INTERNAL MEDICINE

## 2022-09-30 PROCEDURE — 85652 RBC SED RATE AUTOMATED: CPT | Performed by: INTERNAL MEDICINE

## 2022-09-30 PROCEDURE — 84443 ASSAY THYROID STIM HORMONE: CPT | Performed by: INTERNAL MEDICINE

## 2022-09-30 PROCEDURE — 84550 ASSAY OF BLOOD/URIC ACID: CPT | Performed by: INTERNAL MEDICINE

## 2022-09-30 PROCEDURE — 63710000001 PREDNISONE PER 5 MG: Performed by: INTERNAL MEDICINE

## 2022-09-30 PROCEDURE — 99214 OFFICE O/P EST MOD 30 MIN: CPT | Performed by: NURSE PRACTITIONER

## 2022-09-30 PROCEDURE — 84100 ASSAY OF PHOSPHORUS: CPT | Performed by: INTERNAL MEDICINE

## 2022-09-30 PROCEDURE — 83880 ASSAY OF NATRIURETIC PEPTIDE: CPT | Performed by: INTERNAL MEDICINE

## 2022-09-30 PROCEDURE — 93971 EXTREMITY STUDY: CPT

## 2022-09-30 PROCEDURE — 85730 THROMBOPLASTIN TIME PARTIAL: CPT | Performed by: INTERNAL MEDICINE

## 2022-09-30 PROCEDURE — 99214 OFFICE O/P EST MOD 30 MIN: CPT | Performed by: ORTHOPAEDIC SURGERY

## 2022-09-30 PROCEDURE — 80048 BASIC METABOLIC PNL TOTAL CA: CPT | Performed by: INTERNAL MEDICINE

## 2022-09-30 PROCEDURE — 80061 LIPID PANEL: CPT | Performed by: INTERNAL MEDICINE

## 2022-09-30 PROCEDURE — 84145 PROCALCITONIN (PCT): CPT | Performed by: INTERNAL MEDICINE

## 2022-09-30 PROCEDURE — 86140 C-REACTIVE PROTEIN: CPT | Performed by: INTERNAL MEDICINE

## 2022-09-30 PROCEDURE — 93005 ELECTROCARDIOGRAM TRACING: CPT | Performed by: INTERNAL MEDICINE

## 2022-09-30 PROCEDURE — 73630 X-RAY EXAM OF FOOT: CPT

## 2022-09-30 PROCEDURE — L4360 PNEUMAT WALKING BOOT PRE CST: HCPCS

## 2022-09-30 PROCEDURE — 84481 FREE ASSAY (FT-3): CPT | Performed by: INTERNAL MEDICINE

## 2022-09-30 PROCEDURE — 83605 ASSAY OF LACTIC ACID: CPT | Performed by: INTERNAL MEDICINE

## 2022-09-30 PROCEDURE — 85610 PROTHROMBIN TIME: CPT | Performed by: INTERNAL MEDICINE

## 2022-09-30 PROCEDURE — 83735 ASSAY OF MAGNESIUM: CPT | Performed by: INTERNAL MEDICINE

## 2022-09-30 RX ORDER — NIFEDIPINE 30 MG/1
30 TABLET, EXTENDED RELEASE ORAL DAILY PRN
Status: DISCONTINUED | OUTPATIENT
Start: 2022-09-30 | End: 2022-10-03 | Stop reason: HOSPADM

## 2022-09-30 RX ORDER — FUROSEMIDE 40 MG/1
40 TABLET ORAL
Status: DISCONTINUED | OUTPATIENT
Start: 2022-09-30 | End: 2022-10-03 | Stop reason: HOSPADM

## 2022-09-30 RX ORDER — ACETAMINOPHEN 160 MG/5ML
650 SOLUTION ORAL EVERY 4 HOURS PRN
Status: DISCONTINUED | OUTPATIENT
Start: 2022-09-30 | End: 2022-10-03 | Stop reason: HOSPADM

## 2022-09-30 RX ORDER — IPRATROPIUM BROMIDE AND ALBUTEROL SULFATE 2.5; .5 MG/3ML; MG/3ML
3 SOLUTION RESPIRATORY (INHALATION) EVERY 4 HOURS PRN
Status: DISCONTINUED | OUTPATIENT
Start: 2022-09-30 | End: 2022-10-03 | Stop reason: HOSPADM

## 2022-09-30 RX ORDER — BISACODYL 10 MG
10 SUPPOSITORY, RECTAL RECTAL DAILY PRN
Status: DISCONTINUED | OUTPATIENT
Start: 2022-09-30 | End: 2022-10-03 | Stop reason: HOSPADM

## 2022-09-30 RX ORDER — MAGNESIUM SULFATE 1 G/100ML
1 INJECTION INTRAVENOUS AS NEEDED
Status: DISCONTINUED | OUTPATIENT
Start: 2022-09-30 | End: 2022-10-03 | Stop reason: HOSPADM

## 2022-09-30 RX ORDER — SODIUM CHLORIDE 0.9 % (FLUSH) 0.9 %
3 SYRINGE (ML) INJECTION EVERY 12 HOURS SCHEDULED
Status: DISCONTINUED | OUTPATIENT
Start: 2022-09-30 | End: 2022-10-03 | Stop reason: HOSPADM

## 2022-09-30 RX ORDER — CHOLECALCIFEROL (VITAMIN D3) 125 MCG
5 CAPSULE ORAL NIGHTLY PRN
Status: DISCONTINUED | OUTPATIENT
Start: 2022-09-30 | End: 2022-10-03 | Stop reason: HOSPADM

## 2022-09-30 RX ORDER — MULTIPLE VITAMINS W/ MINERALS TAB 9MG-400MCG
1 TAB ORAL DAILY
Status: DISCONTINUED | OUTPATIENT
Start: 2022-10-01 | End: 2022-10-03 | Stop reason: HOSPADM

## 2022-09-30 RX ORDER — POTASSIUM CHLORIDE 750 MG/1
40 TABLET, FILM COATED, EXTENDED RELEASE ORAL ONCE
Status: COMPLETED | OUTPATIENT
Start: 2022-09-30 | End: 2022-09-30

## 2022-09-30 RX ORDER — SODIUM CHLORIDE 0.9 % (FLUSH) 0.9 %
3-10 SYRINGE (ML) INJECTION AS NEEDED
Status: DISCONTINUED | OUTPATIENT
Start: 2022-09-30 | End: 2022-10-03 | Stop reason: HOSPADM

## 2022-09-30 RX ORDER — EPLERENONE 25 MG/1
50 TABLET, FILM COATED ORAL
Status: DISCONTINUED | OUTPATIENT
Start: 2022-09-30 | End: 2022-10-03 | Stop reason: HOSPADM

## 2022-09-30 RX ORDER — MELATONIN
2000 DAILY
Status: DISCONTINUED | OUTPATIENT
Start: 2022-09-30 | End: 2022-10-03 | Stop reason: HOSPADM

## 2022-09-30 RX ORDER — ONDANSETRON 2 MG/ML
4 INJECTION INTRAMUSCULAR; INTRAVENOUS EVERY 6 HOURS PRN
Status: DISCONTINUED | OUTPATIENT
Start: 2022-09-30 | End: 2022-10-03 | Stop reason: HOSPADM

## 2022-09-30 RX ORDER — NIFEDIPINE 60 MG/1
60 TABLET, EXTENDED RELEASE ORAL 2 TIMES DAILY
Status: DISCONTINUED | OUTPATIENT
Start: 2022-09-30 | End: 2022-09-30

## 2022-09-30 RX ORDER — BISACODYL 5 MG/1
5 TABLET, DELAYED RELEASE ORAL DAILY PRN
Status: DISCONTINUED | OUTPATIENT
Start: 2022-09-30 | End: 2022-10-03 | Stop reason: HOSPADM

## 2022-09-30 RX ORDER — DIPHENHYDRAMINE HCL 25 MG
25 CAPSULE ORAL EVERY 6 HOURS PRN
Status: DISCONTINUED | OUTPATIENT
Start: 2022-09-30 | End: 2022-10-03 | Stop reason: HOSPADM

## 2022-09-30 RX ORDER — HYDROXYZINE PAMOATE 25 MG/1
50 CAPSULE ORAL EVERY 6 HOURS PRN
Status: DISCONTINUED | OUTPATIENT
Start: 2022-09-30 | End: 2022-10-03 | Stop reason: HOSPADM

## 2022-09-30 RX ORDER — MAGNESIUM SULFATE HEPTAHYDRATE 40 MG/ML
2 INJECTION, SOLUTION INTRAVENOUS AS NEEDED
Status: DISCONTINUED | OUTPATIENT
Start: 2022-09-30 | End: 2022-10-03 | Stop reason: HOSPADM

## 2022-09-30 RX ORDER — BUTALBITAL, ACETAMINOPHEN AND CAFFEINE 50; 325; 40 MG/1; MG/1; MG/1
2 TABLET ORAL EVERY 6 HOURS PRN
Status: DISCONTINUED | OUTPATIENT
Start: 2022-09-30 | End: 2022-10-03 | Stop reason: HOSPADM

## 2022-09-30 RX ORDER — PREDNISONE 10 MG/1
10 TABLET ORAL EVERY OTHER DAY
Status: DISCONTINUED | OUTPATIENT
Start: 2022-09-30 | End: 2022-10-03 | Stop reason: HOSPADM

## 2022-09-30 RX ORDER — FLUTICASONE PROPIONATE 50 MCG
1 SPRAY, SUSPENSION (ML) NASAL 2 TIMES DAILY
Status: DISCONTINUED | OUTPATIENT
Start: 2022-09-30 | End: 2022-10-03 | Stop reason: HOSPADM

## 2022-09-30 RX ORDER — ONDANSETRON 2 MG/ML
4 INJECTION INTRAMUSCULAR; INTRAVENOUS EVERY 8 HOURS PRN
Status: DISCONTINUED | OUTPATIENT
Start: 2022-09-30 | End: 2022-09-30 | Stop reason: SDUPTHER

## 2022-09-30 RX ORDER — POLYETHYLENE GLYCOL 3350 17 G/17G
17 POWDER, FOR SOLUTION ORAL DAILY PRN
Status: DISCONTINUED | OUTPATIENT
Start: 2022-09-30 | End: 2022-10-03 | Stop reason: HOSPADM

## 2022-09-30 RX ORDER — NITROGLYCERIN 0.4 MG/1
0.4 TABLET SUBLINGUAL
Status: DISCONTINUED | OUTPATIENT
Start: 2022-09-30 | End: 2022-10-03 | Stop reason: HOSPADM

## 2022-09-30 RX ORDER — GUAIFENESIN 600 MG/1
600 TABLET, EXTENDED RELEASE ORAL EVERY 12 HOURS SCHEDULED
Status: DISCONTINUED | OUTPATIENT
Start: 2022-09-30 | End: 2022-10-03 | Stop reason: HOSPADM

## 2022-09-30 RX ORDER — LORAZEPAM 0.5 MG/1
0.5 TABLET ORAL EVERY 8 HOURS PRN
Status: DISCONTINUED | OUTPATIENT
Start: 2022-09-30 | End: 2022-09-30 | Stop reason: SDUPTHER

## 2022-09-30 RX ORDER — LORAZEPAM 0.5 MG/1
0.5 TABLET ORAL EVERY 8 HOURS PRN
Status: DISCONTINUED | OUTPATIENT
Start: 2022-09-30 | End: 2022-10-03 | Stop reason: HOSPADM

## 2022-09-30 RX ORDER — HYDROCODONE BITARTRATE AND ACETAMINOPHEN 10; 325 MG/1; MG/1
1 TABLET ORAL EVERY 8 HOURS PRN
Status: DISCONTINUED | OUTPATIENT
Start: 2022-09-30 | End: 2022-10-03 | Stop reason: HOSPADM

## 2022-09-30 RX ORDER — ACETAMINOPHEN 325 MG/1
650 TABLET ORAL EVERY 4 HOURS PRN
Status: DISCONTINUED | OUTPATIENT
Start: 2022-09-30 | End: 2022-10-03 | Stop reason: HOSPADM

## 2022-09-30 RX ORDER — MAGNESIUM SULFATE HEPTAHYDRATE 40 MG/ML
4 INJECTION, SOLUTION INTRAVENOUS AS NEEDED
Status: DISCONTINUED | OUTPATIENT
Start: 2022-09-30 | End: 2022-10-03 | Stop reason: HOSPADM

## 2022-09-30 RX ORDER — ACETAMINOPHEN 650 MG/1
650 SUPPOSITORY RECTAL EVERY 4 HOURS PRN
Status: DISCONTINUED | OUTPATIENT
Start: 2022-09-30 | End: 2022-10-03 | Stop reason: HOSPADM

## 2022-09-30 RX ORDER — POTASSIUM CHLORIDE 750 MG/1
10 TABLET, FILM COATED, EXTENDED RELEASE ORAL 2 TIMES DAILY
Status: DISCONTINUED | OUTPATIENT
Start: 2022-09-30 | End: 2022-09-30

## 2022-09-30 RX ORDER — NIFEDIPINE 30 MG/1
30 TABLET, EXTENDED RELEASE ORAL 2 TIMES DAILY
Status: DISCONTINUED | OUTPATIENT
Start: 2022-09-30 | End: 2022-10-01

## 2022-09-30 RX ORDER — AMOXICILLIN 250 MG
2 CAPSULE ORAL 2 TIMES DAILY
Status: DISCONTINUED | OUTPATIENT
Start: 2022-09-30 | End: 2022-10-03 | Stop reason: HOSPADM

## 2022-09-30 RX ORDER — POTASSIUM CHLORIDE 750 MG/1
20 TABLET, FILM COATED, EXTENDED RELEASE ORAL 2 TIMES DAILY
Status: DISCONTINUED | OUTPATIENT
Start: 2022-09-30 | End: 2022-10-03 | Stop reason: HOSPADM

## 2022-09-30 RX ORDER — CLONIDINE HYDROCHLORIDE 0.1 MG/1
0.3 TABLET ORAL DAILY PRN
Status: DISCONTINUED | OUTPATIENT
Start: 2022-09-30 | End: 2022-10-03 | Stop reason: HOSPADM

## 2022-09-30 RX ORDER — ROSUVASTATIN CALCIUM 5 MG/1
5 TABLET, COATED ORAL NIGHTLY
Status: DISCONTINUED | OUTPATIENT
Start: 2022-09-30 | End: 2022-10-03 | Stop reason: HOSPADM

## 2022-09-30 RX ORDER — POLYETHYLENE GLYCOL 3350 17 G/17G
17 POWDER, FOR SOLUTION ORAL DAILY
Status: DISCONTINUED | OUTPATIENT
Start: 2022-09-30 | End: 2022-10-03 | Stop reason: HOSPADM

## 2022-09-30 RX ORDER — PANTOPRAZOLE SODIUM 40 MG/1
40 TABLET, DELAYED RELEASE ORAL
Status: DISCONTINUED | OUTPATIENT
Start: 2022-10-01 | End: 2022-10-03 | Stop reason: HOSPADM

## 2022-09-30 RX ADMIN — POLYETHYLENE GLYCOL 3350 17 G: 17 POWDER, FOR SOLUTION ORAL at 13:14

## 2022-09-30 RX ADMIN — FUROSEMIDE 40 MG: 40 TABLET ORAL at 18:30

## 2022-09-30 RX ADMIN — MUPIROCIN 1 APPLICATION: 20 OINTMENT TOPICAL at 15:33

## 2022-09-30 RX ADMIN — ROSUVASTATIN CALCIUM 5 MG: 5 TABLET, FILM COATED ORAL at 22:19

## 2022-09-30 RX ADMIN — Medication 2000 UNITS: at 15:28

## 2022-09-30 RX ADMIN — RIVAROXABAN 20 MG: 20 TABLET, FILM COATED ORAL at 18:30

## 2022-09-30 RX ADMIN — BUTALBITAL, ACETAMINOPHEN, AND CAFFEINE 2 TABLET: 50; 325; 40 TABLET ORAL at 21:04

## 2022-09-30 RX ADMIN — NIFEDIPINE 30 MG: 30 TABLET, FILM COATED, EXTENDED RELEASE ORAL at 23:10

## 2022-09-30 RX ADMIN — NIFEDIPINE 30 MG: 30 TABLET, FILM COATED, EXTENDED RELEASE ORAL at 16:11

## 2022-09-30 RX ADMIN — CEFTRIAXONE SODIUM 1 G: 1 INJECTION, POWDER, FOR SOLUTION INTRAMUSCULAR; INTRAVENOUS at 13:12

## 2022-09-30 RX ADMIN — POTASSIUM CHLORIDE 20 MEQ: 750 TABLET, EXTENDED RELEASE ORAL at 22:23

## 2022-09-30 RX ADMIN — CLONIDINE HYDROCHLORIDE 0.3 MG: 0.1 TABLET ORAL at 16:11

## 2022-09-30 RX ADMIN — FLUTICASONE PROPIONATE 1 SPRAY: 50 SPRAY, METERED NASAL at 15:33

## 2022-09-30 RX ADMIN — FLUTICASONE PROPIONATE 1 SPRAY: 50 SPRAY, METERED NASAL at 22:23

## 2022-09-30 RX ADMIN — MUPIROCIN 1 APPLICATION: 20 OINTMENT TOPICAL at 22:24

## 2022-09-30 RX ADMIN — GUAIFENESIN 600 MG: 600 TABLET, EXTENDED RELEASE ORAL at 22:22

## 2022-09-30 RX ADMIN — POTASSIUM CHLORIDE 10 MEQ: 750 TABLET, EXTENDED RELEASE ORAL at 13:13

## 2022-09-30 RX ADMIN — PREDNISONE 10 MG: 10 TABLET ORAL at 15:29

## 2022-09-30 RX ADMIN — Medication 3 ML: at 22:24

## 2022-09-30 RX ADMIN — EPLERENONE 50 MG: 25 TABLET, FILM COATED ORAL at 15:30

## 2022-09-30 RX ADMIN — POTASSIUM CHLORIDE 40 MEQ: 750 TABLET, EXTENDED RELEASE ORAL at 18:30

## 2022-09-30 RX ADMIN — HYDROCODONE BITARTRATE AND ACETAMINOPHEN 1 TABLET: 10; 325 TABLET ORAL at 18:34

## 2022-09-30 RX ADMIN — GUAIFENESIN 600 MG: 600 TABLET, EXTENDED RELEASE ORAL at 15:29

## 2022-10-01 PROBLEM — I87.2 VENOUS INCOMPETENCE: Status: ACTIVE | Noted: 2022-10-01

## 2022-10-01 PROBLEM — R60.0 BILATERAL LOWER EXTREMITY EDEMA: Status: ACTIVE | Noted: 2022-10-01

## 2022-10-01 LAB
25(OH)D3 SERPL-MCNC: 60.5 NG/ML (ref 30–100)
ALBUMIN SERPL-MCNC: 3.7 G/DL (ref 3.5–5.2)
ALBUMIN/GLOB SERPL: 1.5 G/DL
ALP SERPL-CCNC: 99 U/L (ref 39–117)
ALT SERPL W P-5'-P-CCNC: 11 U/L (ref 1–33)
ANION GAP SERPL CALCULATED.3IONS-SCNC: 8.8 MMOL/L (ref 5–15)
AST SERPL-CCNC: 15 U/L (ref 1–32)
BASOPHILS # BLD AUTO: 0.06 10*3/MM3 (ref 0–0.2)
BASOPHILS NFR BLD AUTO: 1.1 % (ref 0–1.5)
BILIRUB SERPL-MCNC: 0.4 MG/DL (ref 0–1.2)
BUN SERPL-MCNC: 15 MG/DL (ref 8–23)
BUN/CREAT SERPL: 15.8 (ref 7–25)
CALCIUM SPEC-SCNC: 9.5 MG/DL (ref 8.6–10.5)
CHLORIDE SERPL-SCNC: 102 MMOL/L (ref 98–107)
CK SERPL-CCNC: 35 U/L (ref 20–180)
CO2 SERPL-SCNC: 31.2 MMOL/L (ref 22–29)
CREAT SERPL-MCNC: 0.95 MG/DL (ref 0.57–1)
DEPRECATED RDW RBC AUTO: 41.2 FL (ref 37–54)
EGFRCR SERPLBLD CKD-EPI 2021: 59.2 ML/MIN/1.73
EOSINOPHIL # BLD AUTO: 0.11 10*3/MM3 (ref 0–0.4)
EOSINOPHIL NFR BLD AUTO: 2.1 % (ref 0.3–6.2)
ERYTHROCYTE [DISTWIDTH] IN BLOOD BY AUTOMATED COUNT: 12.3 % (ref 12.3–15.4)
GLOBULIN UR ELPH-MCNC: 2.4 GM/DL
GLUCOSE SERPL-MCNC: 113 MG/DL (ref 65–99)
HCT VFR BLD AUTO: 40.7 % (ref 34–46.6)
HGB BLD-MCNC: 13.9 G/DL (ref 12–15.9)
IMM GRANULOCYTES # BLD AUTO: 0.01 10*3/MM3 (ref 0–0.05)
IMM GRANULOCYTES NFR BLD AUTO: 0.2 % (ref 0–0.5)
LYMPHOCYTES # BLD AUTO: 1.74 10*3/MM3 (ref 0.7–3.1)
LYMPHOCYTES NFR BLD AUTO: 33 % (ref 19.6–45.3)
MAGNESIUM SERPL-MCNC: 1.9 MG/DL (ref 1.6–2.4)
MCH RBC QN AUTO: 30.7 PG (ref 26.6–33)
MCHC RBC AUTO-ENTMCNC: 34.2 G/DL (ref 31.5–35.7)
MCV RBC AUTO: 89.8 FL (ref 79–97)
MONOCYTES # BLD AUTO: 0.46 10*3/MM3 (ref 0.1–0.9)
MONOCYTES NFR BLD AUTO: 8.7 % (ref 5–12)
NEUTROPHILS NFR BLD AUTO: 2.9 10*3/MM3 (ref 1.7–7)
NEUTROPHILS NFR BLD AUTO: 54.9 % (ref 42.7–76)
NRBC BLD AUTO-RTO: 0 /100 WBC (ref 0–0.2)
PHOSPHATE SERPL-MCNC: 2.9 MG/DL (ref 2.5–4.5)
PLATELET # BLD AUTO: 167 10*3/MM3 (ref 140–450)
PMV BLD AUTO: 10.4 FL (ref 6–12)
POTASSIUM SERPL-SCNC: 3.8 MMOL/L (ref 3.5–5.2)
PROT SERPL-MCNC: 6.1 G/DL (ref 6–8.5)
PTH-INTACT SERPL-MCNC: 81 PG/ML (ref 15–65)
RBC # BLD AUTO: 4.53 10*6/MM3 (ref 3.77–5.28)
SARS-COV-2 ORF1AB RESP QL NAA+PROBE: NOT DETECTED
SODIUM SERPL-SCNC: 142 MMOL/L (ref 136–145)
WBC NRBC COR # BLD: 5.28 10*3/MM3 (ref 3.4–10.8)

## 2022-10-01 PROCEDURE — 80053 COMPREHEN METABOLIC PANEL: CPT | Performed by: INTERNAL MEDICINE

## 2022-10-01 PROCEDURE — 82306 VITAMIN D 25 HYDROXY: CPT | Performed by: INTERNAL MEDICINE

## 2022-10-01 PROCEDURE — 99214 OFFICE O/P EST MOD 30 MIN: CPT | Performed by: NURSE PRACTITIONER

## 2022-10-01 PROCEDURE — 85025 COMPLETE CBC W/AUTO DIFF WBC: CPT | Performed by: INTERNAL MEDICINE

## 2022-10-01 PROCEDURE — 83735 ASSAY OF MAGNESIUM: CPT | Performed by: INTERNAL MEDICINE

## 2022-10-01 PROCEDURE — 82550 ASSAY OF CK (CPK): CPT | Performed by: INTERNAL MEDICINE

## 2022-10-01 PROCEDURE — 84100 ASSAY OF PHOSPHORUS: CPT | Performed by: INTERNAL MEDICINE

## 2022-10-01 PROCEDURE — 25010000002 CEFTRIAXONE PER 250 MG: Performed by: INTERNAL MEDICINE

## 2022-10-01 PROCEDURE — 82330 ASSAY OF CALCIUM: CPT | Performed by: INTERNAL MEDICINE

## 2022-10-01 PROCEDURE — U0004 COV-19 TEST NON-CDC HGH THRU: HCPCS | Performed by: INTERNAL MEDICINE

## 2022-10-01 PROCEDURE — G0378 HOSPITAL OBSERVATION PER HR: HCPCS

## 2022-10-01 PROCEDURE — 83970 ASSAY OF PARATHORMONE: CPT | Performed by: INTERNAL MEDICINE

## 2022-10-01 RX ORDER — PROMETHAZINE HYDROCHLORIDE AND CODEINE PHOSPHATE 6.25; 1 MG/5ML; MG/5ML
15 SYRUP ORAL EVERY 6 HOURS PRN
COMMUNITY
End: 2022-10-03 | Stop reason: HOSPADM

## 2022-10-01 RX ORDER — CLONIDINE HYDROCHLORIDE 0.1 MG/1
0.1 TABLET ORAL EVERY 12 HOURS SCHEDULED
Status: DISCONTINUED | OUTPATIENT
Start: 2022-10-01 | End: 2022-10-02

## 2022-10-01 RX ORDER — POTASSIUM CHLORIDE 1.5 G/1.77G
20 POWDER, FOR SOLUTION ORAL EVERY OTHER DAY
COMMUNITY

## 2022-10-01 RX ADMIN — EPLERENONE 50 MG: 25 TABLET, FILM COATED ORAL at 09:32

## 2022-10-01 RX ADMIN — Medication 2000 UNITS: at 09:33

## 2022-10-01 RX ADMIN — CLONIDINE HYDROCHLORIDE 0.3 MG: 0.1 TABLET ORAL at 02:32

## 2022-10-01 RX ADMIN — HYDROCODONE BITARTRATE AND ACETAMINOPHEN 1 TABLET: 10; 325 TABLET ORAL at 02:24

## 2022-10-01 RX ADMIN — CLONIDINE HYDROCHLORIDE 0.1 MG: 0.1 TABLET ORAL at 21:05

## 2022-10-01 RX ADMIN — FUROSEMIDE 40 MG: 40 TABLET ORAL at 09:32

## 2022-10-01 RX ADMIN — MULTIPLE VITAMINS W/ MINERALS TAB 1 TABLET: TAB at 09:32

## 2022-10-01 RX ADMIN — NIFEDIPINE 30 MG: 30 TABLET, FILM COATED, EXTENDED RELEASE ORAL at 09:37

## 2022-10-01 RX ADMIN — RIVAROXABAN 20 MG: 20 TABLET, FILM COATED ORAL at 17:31

## 2022-10-01 RX ADMIN — BUTALBITAL, ACETAMINOPHEN, AND CAFFEINE 2 TABLET: 50; 325; 40 TABLET ORAL at 17:36

## 2022-10-01 RX ADMIN — FLUTICASONE PROPIONATE 1 SPRAY: 50 SPRAY, METERED NASAL at 09:36

## 2022-10-01 RX ADMIN — Medication 3 ML: at 09:33

## 2022-10-01 RX ADMIN — FUROSEMIDE 40 MG: 40 TABLET ORAL at 17:31

## 2022-10-01 RX ADMIN — ROSUVASTATIN CALCIUM 5 MG: 5 TABLET, FILM COATED ORAL at 21:01

## 2022-10-01 RX ADMIN — POTASSIUM CHLORIDE 20 MEQ: 750 TABLET, EXTENDED RELEASE ORAL at 21:03

## 2022-10-01 RX ADMIN — HYDROCODONE BITARTRATE AND ACETAMINOPHEN 1 TABLET: 10; 325 TABLET ORAL at 12:04

## 2022-10-01 RX ADMIN — CEFTRIAXONE SODIUM 1 G: 1 INJECTION, POWDER, FOR SOLUTION INTRAMUSCULAR; INTRAVENOUS at 13:08

## 2022-10-01 RX ADMIN — FLUTICASONE PROPIONATE 1 SPRAY: 50 SPRAY, METERED NASAL at 21:09

## 2022-10-01 RX ADMIN — CLONIDINE HYDROCHLORIDE 0.1 MG: 0.1 TABLET ORAL at 13:07

## 2022-10-01 RX ADMIN — GUAIFENESIN 600 MG: 600 TABLET, EXTENDED RELEASE ORAL at 09:33

## 2022-10-01 RX ADMIN — Medication 3 ML: at 21:04

## 2022-10-01 RX ADMIN — GUAIFENESIN 600 MG: 600 TABLET, EXTENDED RELEASE ORAL at 21:01

## 2022-10-01 RX ADMIN — PANTOPRAZOLE SODIUM 40 MG: 40 TABLET, DELAYED RELEASE ORAL at 06:59

## 2022-10-01 RX ADMIN — MUPIROCIN 1 APPLICATION: 20 OINTMENT TOPICAL at 09:37

## 2022-10-01 RX ADMIN — POTASSIUM CHLORIDE 20 MEQ: 750 TABLET, EXTENDED RELEASE ORAL at 09:34

## 2022-10-01 RX ADMIN — MUPIROCIN 1 APPLICATION: 20 OINTMENT TOPICAL at 21:03

## 2022-10-01 NOTE — PROGRESS NOTES
Name: Sasha Barrett ADMIT: 2022   : 1940  PCP: Óscar Cadet MD    MRN: [id number] LOS: 0 days   AGE/SEX: 82 y.o. female  ROOM: [address]    [place], Subsequent Hospital Care    82 y.o. female with right lower extremity swelling with deep venous insufficiency    No acute DVT on duplex.  No new complaints    Scheduled Medications:   cefTRIAXone, 1 g, Intravenous, Q24H  cholecalciferol, 2,000 Units, Oral, Daily  cloNIDine, 0.1 mg, Oral, Q12H  eplerenone, 50 mg, Oral, Q24H  fluticasone, 1 spray, Each Nare, BID  furosemide, 40 mg, Oral, BID  guaiFENesin, 600 mg, Oral, Q12H  multivitamin with minerals, 1 tablet, Oral, Daily  mupirocin, 1 application, Topical, Q12H  [START ON 10/2/2022] NIFEdipine XL, 90 mg, Oral, Q24H  pantoprazole, 40 mg, Oral, Q AM  polyethylene glycol, 17 g, Oral, Daily  potassium chloride, 20 mEq, Oral, BID  predniSONE, 10 mg, Oral, Every Other Day  rivaroxaban, 20 mg, Oral, Daily With Dinner  rosuvastatin, 5 mg, Oral, Nightly  senna-docusate sodium, 2 tablet, Oral, BID  sodium chloride, 3 mL, Intravenous, Q12H      Active Infusions:  Pharmacy Consult,       Vital Signs  Vital Signs Patient Vitals for the past 24 hrs:   BP Temp Temp src Pulse Resp SpO2 Weight   10/01/22 1411 (!) 189/83 97.7 °F (36.5 °C) Oral 80 -- 96 % --   10/01/22 0727 (!) 152/115 97.6 °F (36.4 °C) Oral 63 16 96 % --   10/01/22 0538 -- -- -- -- -- -- 72.6 kg (160 lb 1.6 oz)   10/01/22 0500 134/67 -- -- 58 -- 98 % --   10/01/22 0400 141/65 97.8 °F (36.6 °C) Oral 56 16 98 % --   10/01/22 0325 (!) 190/91 97.6 °F (36.4 °C) Oral 69 16 98 % --   10/01/22 0311 (!) 197/82 -- -- -- -- 97 % --   10/01/22 023 (!) 194/100 -- -- 74 -- -- --   22 (!) 199/116 97.5 °F (36.4 °C) Oral 68 -- 99 % --   22 (!) 191/97 97.7 °F (36.5 °C) Oral 64 -- 96 % --     I/O:  I/O last 3 completed shifts:  In: -   Out: 1500 [Urine:1500]  Physical Exam:    Physical Exam   RLE swelling stable, no pitting  edema, no ulceration  CBC    Results from last 7 days   Lab Units 10/01/22  0655 09/30/22  1300   WBC 10*3/mm3 5.28 7.40   HEMOGLOBIN g/dL 13.9 14.1   PLATELETS 10*3/mm3 167 172     BMP   Results from last 7 days   Lab Units 10/01/22  0655 09/30/22  2037 09/30/22  1901 09/30/22  1300   SODIUM mmol/L 142  --   --  143   POTASSIUM mmol/L 3.8  --   --  3.1*   CHLORIDE mmol/L 102  --   --  102   CO2 mmol/L 31.2*  --   --  31.1*   BUN mg/dL 15  --   --  14   CREATININE mg/dL 0.95  --   --  0.62   GLUCOSE mg/dL 113*  --   --  109*   MAGNESIUM mg/dL 1.9  --  2.2  --    PHOSPHORUS mg/dL 2.9 2.6  --   --      Coag   Results from last 7 days   Lab Units 09/30/22  1300   INR  1.05   APTT seconds 23.6     Assessment & Plan   Assessment & Plan    Accelerated hypertension    Joint pain    Urinary retention self-caths ([facility])    Cervical disc displacement    DDD (degenerative disc disease), cervical    Hyperlipidemia LDL goal <70    Proctocele    Urge incontinence of urine    Chronic tension-type headache, intractable    Obesity (BMI 30.0-34.9)    Stage 3 chronic kidney disease ([facility])    Cerebral atherosclerosis    Malaise and fatigue progressive    Dizziness    Vitamin D deficiency    Moderate persistent asthmatic bronchitis without complication    Pleurisy    Knee pain, bilateral    Elevated PTH level    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)    Gastroesophageal reflux disease with esophagitis    Psoriasis ([facility])    Postmenopausal    Recurrent UTI    H/O CVA (cerebral vascular accident) ([facility])    Dyspnea on exertion    Acute joint pain    Easy bruisability    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[facility])    Decreased functional mobility and endurance since fall 12/20/17    Essential hypertension    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    Acute deep vein thrombosis (DVT) of right lower extremity ([facility])    Single subsegmental pulmonary embolism without acute cor pulmonale ([facility]) 10/30/19     Ataxia    Adult failure to thrive syndrome    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  T    82 y.o. female with right leg deep venous insufficiency, no acute or chronic DVT    -discussed diagnosis with patient and treatment, which is compression.  I have recommended 20-30 mmHg knee high compression stockings to be worn daily.  She has had trouble donning and doffing these in the past and may require either an aid or a custom garment ordered through my office.  I will follow up with her regarding this and provide a prescription for the compression hose.    -vascular surgery to sign off, will see patient again upon request    Personal protective equipment used for this patient encounter:  Patient wearing surgical mask []    Provider wearing a surgical mask [x]    Gloves []    Eye protection []    Face Shield []    Gown []    N 95 respirator or CAPR/PAPR []   Duration of interaction 10 mins    Sapna Colin MD  Vascular Surgery  [facility]  O: [phone]  F: [phone]      Please call my office with any question: [phone]    Active Hospital Problems    Diagnosis  POA   • **Accelerated hypertension [I10]  Yes   • Venous incompetence of popliteal vein on right [I87.2]  Yes   • Bilateral lower extremity edema, multifactorial = unrestricted salt intake, inadequate Lasix dosing, chronic lung disease, and obesity.  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 [R29.90, U09.9]  Not Applicable   • Blood pressure instability [I99.8]  Yes   • Ataxia [R27.0]  Yes   • Adult failure to thrive syndrome [R62.7]  Yes   • Acute intractable headache intensified since Covid-19 infection 10/2/2021 [R51.9]  Yes   • Single subsegmental pulmonary embolism without acute cor pulmonale (HCC) 10/30/19 [I26.93]  Yes   • Acute deep vein thrombosis (DVT) of right lower extremity (HCC) [I82.401]  Yes   • 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 [I66.22]  Yes   • Essential hypertension [I10]  Yes   • Decreased functional mobility and endurance since fall 12/20/17 [Z74.09]  Yes   • 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HCC) [J44.9]  Yes   • Easy bruisability [R23.3]  Yes   • Acute joint pain [M25.50]  Yes   • Dyspnea on exertion [R06.09]  Yes   • Stage 3 chronic kidney disease (HCC) [N18.30]  Yes   • Malaise and fatigue progressive [R53.81, R53.83]  Yes   • Dizziness [R42]  Yes   • Vitamin D deficiency [E55.9]  Yes   • Recurrent UTI [N39.0]  Yes   • Moderate persistent asthmatic bronchitis without complication [J45.40]  Yes   • 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[facility]) [C50.919]  Yes   • Knee pain, bilateral [M25.561, M25.562]  Yes   • H/O CVA (cerebral vascular accident) (HCC) [I63.9]  Yes   • Gastroesophageal reflux disease with esophagitis [K21.00]  Yes   • Elevated PTH level [R79.89]  Yes   • Chronic tension-type headache, intractable [G44.221]  Yes   • Cerebral atherosclerosis [I67.2]  Yes   • Psoriasis ([facility]) [L40.9]  Yes   • Postmenopausal [Z78.0]  Not Applicable   • Pleurisy [R09.1]  Yes   • Obesity (BMI 30.0-34.9) [E66.9]  Yes   • Urinary retention self-caths ([facility]) [R33.9]  Yes   • Hyperlipidemia LDL goal <70 [E78.5]  Yes   • DDD  (degenerative disc disease), cervical [M50.30]  Yes   • Cervical disc displacement [M50.20]  Yes   • Joint pain [M25.50]  Yes   • Urge incontinence of urine [N39.41]  Yes   • Proctocele [N81.6]  Yes      Resolved Hospital Problems   No resolved problems to display.

## 2022-10-01 NOTE — SIGNIFICANT NOTE
10/01/22 1231   OTHER   Discipline physical therapist   Rehab Time/Intention   Session Not Performed unable to evaluate, medical status change  (RN stating pts BP has been high this AM and to check prior to eval. BP was taken w/ RN in room at 217/93. As pts BP is over therapeutic guidelines PT will hold on eval until BP is better controlled. PT will cont to follow pt and perform eval when approp.)   Recommendation   PT - Next Appointment 10/02/22

## 2022-10-01 NOTE — PROGRESS NOTES
LOS: 3 days   Patient Care Team:  Nicolas Ugarte MD as PCP - General  Nicolas Ugarte MD as PCP - Family Medicine    Chief Complaint: No chief complaint on file.        Subjective    Today the patient is awake, alert, and seems to be relatively stable.  She thinks the swelling in her right lower extremity is gone down significantly.  She is a little anxious and upset about her blood pressure.  Apparently she takes clonidine at home and its not on her medicine list here in the hospital.  She wants that restarted.  I did check her home medicine list and and clonidine was listed as a twice a day medication so I will get that started again.  Otherwise she seems to be relatively stable at this point and in no distress    Interval History:     Patient Complaints: Right lower extremity swelling and difficulty with her right ankle  Patient Denies: She denies shortness of breath or chest pain, no abdominal pain, no new complaints  History taken from: patient chart    Review of Systems:    Overall her review of systems primarily revolves around the swelling in her leg and the difficulty with her foot and ankle on the right side.  Family she is eating and drinking relatively well, she has no nausea or vomiting, no change in bowel function, and has noted no new complaints.  She seems to be tolerating her medications well.  She is very anxious and upset that her blood pressure is still so high.  Hopefully getting her back on the clonidine will be helpful.  She is convinced that this medication works well for her.    Objective      Vital Signs  Temp:  [98 °F (36.7 °C)-99.2 °F (37.3 °C)] 98.9 °F (37.2 °C)  Pulse:  [] 109  Resp:  [18] 18  BP: (111-154)/(55-97) 154/97    I/O'S  Intake & Output (last 7 days)       09/24 0701 09/25 0700 09/25 0701 09/26 0700 09/26 0701 09/27 0700 09/27 0701 09/28 0700 09/28 0701 09/29 0700 09/29 0701 09/30 0700 09/30 0701  10/01 0700 10/01 0701  10/02 0700    Urine (mL/kg/hr)        1500     Total Output       1500     Net       -1500                 Urine Unmeasured Occurrence       1 x           Physical Exam:   General Appearance: alert, pleasant, appears stated age, interactive, cooperative, pale and she is in no distress.  She is very talkative and comfortable.  Lungs: clear to auscultation, respirations regular, respirations even and respirations unlabored  Heart: regular rhythm & normal rate  Abdomen: normal bowel sounds, soft non-tender, no guarding and no rebound tenderness  Extremities: She feels she has  less edema in the right lower extremity now that her diuretics have been increased.     Results Review:     I reviewed the patient's new clinical results.  I reviewed the patient's other test results and agree with the interpretation                     Results from last 7 days   Lab Units 10/01/22  0655 09/30/22  1300   SODIUM mmol/L 142 143   POTASSIUM mmol/L 3.8 3.1*   CHLORIDE mmol/L 102 102   CO2 mmol/L 31.2* 31.1*   BUN mg/dL 15 14   CREATININE mg/dL 0.95 0.62   GLUCOSE mg/dL 113* 109*   CALCIUM mg/dL 9.5 9.9     Results from last 7 days   Lab Units 10/01/22  0655   CK TOTAL U/L 35     Results from last 7 days   Lab Units 10/01/22  0655 09/30/22  1300   WBC 10*3/mm3 5.28 7.40   HEMOGLOBIN g/dL 13.9 14.1   HEMATOCRIT % 40.7 42.4   PLATELETS 10*3/mm3 167 172     Results from last 7 days   Lab Units 09/30/22  1300   INR  1.05   APTT seconds 23.6     Results from last 7 days   Lab Units 10/01/22  0655 09/30/22  1901   MAGNESIUM mg/dL 1.9 2.2     Results from last 7 days   Lab Units 09/30/22  1300   CHOLESTEROL mg/dL 180   TRIGLYCERIDES mg/dL 121   HDL CHOL mg/dL 70*   LDL CHOL mg/dL 89     Results from last 7 days   Lab Units 09/30/22  1901   PROBNP pg/mL 661.0     Results from last 7 days   Lab Units 09/30/22  1300   CRP mg/dL <0.30       Lab Results   Component Value Date    HGBA1C 6.10 (H) 05/25/2022   No results found for: POCGLU    eGFR   Date Value Ref Range Status    10/01/2022 59.2 (L) >60.0 mL/min/1.73 Final     Comment:     National Kidney Foundation and American Society of Nephrology (ASN) Task Force recommended calculation based on the Chronic Kidney Disease Epidemiology Collaboration (CKD-EPI) equation refit without adjustment for race.   09/30/2022 87.9 >60.0 mL/min/1.73 Final     Comment:     National Kidney Foundation and American Society of Nephrology (ASN) Task Force recommended calculation based on the Chronic Kidney Disease Epidemiology Collaboration (CKD-EPI) equation refit without adjustment for race.           Medication Review:   Scheduled Meds:cefTRIAXone, 1 g, Intravenous, Q24H  cholecalciferol, 2,000 Units, Oral, Daily  cloNIDine, 0.1 mg, Oral, Q12H  eplerenone, 50 mg, Oral, Q24H  fluticasone, 1 spray, Each Nare, BID  furosemide, 40 mg, Oral, BID  guaiFENesin, 600 mg, Oral, Q12H  multivitamin with minerals, 1 tablet, Oral, Daily  mupirocin, 1 application, Topical, Q12H  [START ON 10/2/2022] NIFEdipine XL, 90 mg, Oral, Q24H  pantoprazole, 40 mg, Oral, Q AM  polyethylene glycol, 17 g, Oral, Daily  potassium chloride, 20 mEq, Oral, BID  predniSONE, 10 mg, Oral, Every Other Day  rivaroxaban, 20 mg, Oral, Daily With Dinner  rosuvastatin, 5 mg, Oral, Nightly  senna-docusate sodium, 2 tablet, Oral, BID  sodium chloride, 3 mL, Intravenous, Q12H      Continuous Infusions:Pharmacy Consult,       PRN Meds:.•  acetaminophen **OR** acetaminophen **OR** acetaminophen  •  senna-docusate sodium **AND** polyethylene glycol **AND** bisacodyl **AND** bisacodyl  •  butalbital-acetaminophen-caffeine  •  cloNIDine  •  diphenhydrAMINE  •  HYDROcodone-acetaminophen  •  hydrOXYzine pamoate  •  influenza vaccine  •  ipratropium  •  ipratropium-albuterol  •  LORazepam  •  magnesium sulfate **OR** magnesium sulfate in D5W 1g/100mL (PREMIX) **OR** magnesium sulfate  •  melatonin  •  NIFEdipine XL  •  nitroglycerin  •  ondansetron  •  Pharmacy Consult  •  potassium & sodium phosphates  **OR** potassium & sodium phosphates  •  sodium chloride              Patient Active Problem List   Diagnosis   • Joint pain   • Urinary retention self-caths (Don)   • Conjunctivitis   • Arm pain   • Cervical disc displacement   • Cervical pain (Servando=PM)   • DDD (degenerative disc disease), cervical   • Hyperlipidemia LDL goal <70   • Preventative health care   • Medication management   • Proctocele   • Urge incontinence of urine   • Chronic tension-type headache, intractable   • Obesity (BMI 30.0-34.9)   • H/o Lyme disease   • Stage 3 chronic kidney disease ([facility])   • Cerebral atherosclerosis   • Allergic contact dermatitis   • Malaise and fatigue progressive   • Dizziness   • Vitamin D deficiency   • Moderate persistent asthmatic bronchitis without complication   • Anterior cervical adenopathy due to infection   • Pleurisy   • Knee pain, bilateral   • Elevated PTH level   • 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)   • Gastroesophageal reflux disease with esophagitis   • Psoriasis (Darryl Ochoa)   • Postmenopausal   • Recurrent UTI   • H/O CVA (cerebral vascular accident) ([facility])   • Dyspnea on exertion   • Precordial pain   • Abnormal EKG   • Arthralgia   • Accelerated hypertension   • Acute joint pain   • Easy bruisability   • Cough productive of purulent sputum   • Influenza A Subtype H3   • 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[facility])   • Acute chest wall pain   • Decreased functional mobility and endurance since fall 12/20/17   • Essential hypertension   • 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   • Migraine without aura and without status migrainosus, not intractable   • Thyroid nodule   • Osteoarthritis of left hip   • Spinal stenosis of lumbar region with radiculopathy   • Acute deep vein thrombosis (DVT) of right lower extremity ([facility])   • Acute deep vein thrombosis ([facility])   • Single subsegmental pulmonary embolism without acute cor pulmonale ([facility]) 10/30/19   • Other chronic  pain   • Palpitations   • Secondary hyperparathyroidism ([facility])   • Syncope   • Ataxia   • Adult failure to thrive syndrome   • Recent unexplained weight loss 42# over last 6 months   • Blood pressure instability   • 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  T     #1 Accelerated Hypertension-her blood pressures been somewhat labile since admission.  She is upset that she is not on her clonidine so I went ahead and started that back on a regular schedule.    2.  Edema-apparently she had extensive edema of her right lower extremity due to volume overload from having not taken her Lasix regularly.  Nephrology has been helping with that and she clearly has shown improvement since admission.-Venous Dopplers negative for clots.    3.  CKD 3-we will follow along her GFR as she diuresis since this is likely to go down.    4.  Right lower extremity weakness/ataxia-she has been seen by neurology who wants to do an MRI and to see if she has some impingement on that side.  She has a history of lumbar stenosis that could be impacting her right lower extremity function.    5.  Right foot and ankle deformity-she has been seen by orthopedics who is trying to get her fitted for a stabilizing brace.  At this point no surgery is planned    6.  COPD-at this point her lungs are clear and she has no wheezing or coughing    7.  Hyperlipidemia-well-controlled on medications    8.  Right foot and ankle  pain-apparently she fell getting out of the car and she was having pain in her right ankle.  Fortunately x-ray showed no evidence for fracture.    9.  Hyperglycemia-she has some modest elevations in her blood sugars that we will follow along closely.    10.  Adult failure to thrive-apparently the patient is followed by hospice who helps to manage her pain medications along with her anxiety medications.  At this point she seems stable          Plan for disposition:Where: home    Nicolas Ugarte MD  10/01/22  11:06 EDT          Time:

## 2022-10-01 NOTE — PLAN OF CARE
Goal Outcome Evaluation:  Plan of Care Reviewed With: patient           Outcome Evaluation: Blood pressures noted. Started on q12 hr Clonidine. Pt frustrated that she is not on her home routine of B/P meds. Medicated for rib pain and headache. Telemetry SB/SR.

## 2022-10-01 NOTE — CONSULTS
Patient was pleasant and talkative. She expressed concern about her blood pressure and medications as well as some family history. She has a strong nohemi and was once very active in a [employer] and seems at peace with no longer having that community. She indicates strong nohemi in God.

## 2022-10-01 NOTE — PROGRESS NOTES
[facility] PROGRESS NOTE     LOS: 0 days    Patient Care Team:  Óscar Cadet MD as PCP - General (Internal Medicine)      Subjective     Patient seen and examined this morning.  Resting comfortably.  Blood pressure running high overnight.  Good urine output.  Edema improving per patient    Objective     Vital Sign Min/Max for last 24 hours  Temp:  [97.4 °F (36.3 °C)-97.8 °F (36.6 °C)] 97.6 °F (36.4 °C)  Heart Rate:  [56-74] 63  Resp:  [16] 16  BP: (134-223)/() 152/115                       Flowsheet Rows    Flowsheet Row First Filed Value   Admission Height --   Admission Weight 72.6 kg (160 lb 1.6 oz) Documented at 10/01/2022 0538          No intake/output data recorded.  I/O last 3 completed shifts:  In: -   Out: 1500 [Urine:1500]    Physical Exam:  Physical Exam    General Appearance: alert, oriented x 3, no acute distress   Skin: warm and dry  HEENT: oral mucosa normal, nonicteric sclera  Neck: supple, no JVD  Lungs: CTA  Heart: RRR, normal S1 and S2  Abdomen: soft, nontender, nondistended  : no palpable bladder  Extremities: 2-3+ edema.  No cyanosis or clubbing  Neuro: normal speech and mental status        LABS:  Lab Results   Component Value Date    CALCIUM 9.5 10/01/2022    PHOS 2.9 10/01/2022     Results from last 7 days   Lab Units 10/01/22  0655 09/30/22  1901 09/30/22  1300   MAGNESIUM mg/dL 1.9 2.2  --    SODIUM mmol/L 142  --  143   POTASSIUM mmol/L 3.8  --  3.1*   CHLORIDE mmol/L 102  --  102   CO2 mmol/L 31.2*  --  31.1*   BUN mg/dL 15  --  14   CREATININE mg/dL 0.95  --  0.62   GLUCOSE mg/dL 113*  --  109*   CALCIUM mg/dL 9.5  --  9.9   WBC 10*3/mm3 5.28  --  7.40   HEMOGLOBIN g/dL 13.9  --  14.1   PLATELETS 10*3/mm3 167  --  172   ALT (SGPT) U/L 11  --   --    AST (SGOT) U/L 15  --   --      Lab Results   Component Value Date    CKTOTAL 35 10/01/2022    CKMB 1.69 04/06/2017    TROPONINT <0.010 05/25/2022     Estimated Creatinine Clearance: 41.1 mL/min (by C-G formula based on SCr  of 0.95 mg/dL).      Brief Urine Lab Results  (Last result in the past 365 days)      Color   Clarity   Blood   Leuk Est   Nitrite   Protein   CREAT   Urine HCG        05/25/22 2320 Yellow   Clear   Negative   Small (1+)   Negative   Negative               WEIGHTS:     Wt Readings from Last 1 Encounters:   10/01/22 0538 72.6 kg (160 lb 1.6 oz)       cefTRIAXone, 1 g, Intravenous, Q24H  cholecalciferol, 2,000 Units, Oral, Daily  eplerenone, 50 mg, Oral, Q24H  fluticasone, 1 spray, Each Nare, BID  furosemide, 40 mg, Oral, BID  guaiFENesin, 600 mg, Oral, Q12H  multivitamin with minerals, 1 tablet, Oral, Daily  mupirocin, 1 application, Topical, Q12H  NIFEdipine XL, 30 mg, Oral, BID  pantoprazole, 40 mg, Oral, Q AM  polyethylene glycol, 17 g, Oral, Daily  potassium chloride, 20 mEq, Oral, BID  predniSONE, 10 mg, Oral, Every Other Day  rivaroxaban, 20 mg, Oral, Daily With Dinner  rosuvastatin, 5 mg, Oral, Nightly  senna-docusate sodium, 2 tablet, Oral, BID  sodium chloride, 3 mL, Intravenous, Q12H      Pharmacy Consult,         Assessment & Plan       1.  Lower extremity edema  Multifactorial.  Probably due to venous insufficiency, obesity and chronic lung disease.  No proteinuria per last urinalysis.  Normal EF per last echocardiogram.  Improving with Lasix  2.  Hypertension.  Blood pressure running high  3.  COPD  4.  Hypokalemia.  Better    Recs:  -Continue Lasix 40 mg p.o. twice daily.  Keep off ethacrynic acid for now.  Patient has history of sulfa allergy but tolerating Lasix well  -Increase nifedipine dose if needed  -Monitor electrolytes and replace as needed  -Surveillance labs      Destin Trotter MD  10/01/22  09:57 EDT you

## 2022-10-01 NOTE — PROGRESS NOTES
Clinical Pharmacy Services: Medication History    Sasha Barrett is a 82 y.o. 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    Allergies as of 09/30/2022 - Reviewed 09/30/2022   Allergen Reaction Noted    Bee venom Shortness Of Breath and Rash 03/25/2019    Morphine Anaphylaxis and Nausea And Vomiting 06/29/2015    Sulfa antibiotics Anaphylaxis and Hives 06/29/2015    Tree extract Shortness Of Breath and Rash 07/25/2016    Ambien [zolpidem] Hallucinations 06/29/2015    Anastrozole Other (See Comments) 07/25/2016    Covid-19 (mrna) vaccine Swelling 05/25/2022    Eliquis [apixaban] Headache 06/10/2020    Hydralazine Myalgia 11/29/2016    Norvasc [amlodipine] Other (See Comments) 06/29/2015    Nsaids Other (See Comments) 06/29/2015    Penicillins Unknown (See Comments) 06/29/2015    Grass Rash 07/25/2016       Medication information was obtained from: Pharmacy fill records   Pharmacy and Phone Number: [facility]; [phone]    Prior to Admission Medications       Prescriptions Informant Taking?    albuterol (PROAIR HFA) 108 (90 Base) MCG/ACT inhaler Pharmacy Yes    4 puffs Every 4 (Four) Hours As Needed for Wheezing or Shortness of Air.    butalbital-acetaminophen-caffeine (FIORICET, ESGIC) -40 MG per tablet Pharmacy Yes    Take 2 tablets by mouth Every 4 (Four) Hours As Needed for Headache.    cloNIDine (CATAPRES) 0.1 MG tablet Pharmacy Yes    Take 1 tablet by mouth Every 12    eplerenone (INSPRA) 50 MG tablet Pharmacy Yes    Take 1 tablet by mouth Every 12 (Twelve)  Hours.    ethacrynic acid (EDECRIN) 25 MG tablet Pharmacy Yes    Take 1 tablet by mouth 2 (Two) Times a Day.    fluticasone (FLONASE) 50 MCG/ACT nasal spray Pharmacy Yes    1 spray by Each Nare route 2 (Two) Times a Day.    furosemide (LASIX) 40 MG tablet Pharmacy Yes    Take 40 mg by mouth Daily As Needed (swelling/weight gain).    HYDROcodone-acetaminophen (NORCO)  MG per tablet Pharmacy Yes    Take 1-2 tablets by mouth Every 6 (Six) Hours As Needed for Moderate Pain.    hydrOXYzine (ATARAX) 50 MG tablet Pharmacy Yes    Take 50 mg by mouth Every 6 (Six) Hours As Needed for Itching or Allergies.    NIFEdipine XL (ADALAT CC) 30 MG 24 hr tablet Pharmacy Yes    Take 1 tablet by mouth Daily.    ondansetron ODT (ZOFRAN-ODT) 8 MG disintegrating tablet Pharmacy Yes    Take 1 tablet by mouth every 6 (six) hours as needed for nausea or vomiting.    polyethylene glycol (MIRALAX) 17 g packet Pharmacy Yes    Take 17 g by mouth Daily As Needed for hard stools    potassium chloride (KLOR-CON) 20 MEQ packet Pharmacy Yes    Take 20 mEq by mouth Every Other Day.    predniSONE (DELTASONE) 10 MG tablet Pharmacy Yes    Take 1 tablet by mouth Every Other Day.    promethazine-codeine (PHENERGAN with CODEINE) 6.25-10 MG/5ML syrup Pharmacy Yes    Take 15 mL by mouth Every 6 (Six) Hours As Needed for Cough.    rivaroxaban (XARELTO) 10 MG tablet Pharmacy Yes    Take 20 mg by mouth Daily.    acetaminophen (TYLENOL) 325 MG tablet  No    Take 2 tablets by mouth Every 4 (Four) Hours As Needed for Mild Pain .    budesonide (PULMICORT) 180 MCG/ACT inhaler  No    Inhale 1 puff 2 (Two) Times a Day.    cetirizine (zyrTEC) 10 MG tablet Pharmacy No    Take 10 mg by mouth Daily. Alt with singulair    Cholecalciferol (VITAMIN D3) 1.25 MG (78899 UT) capsule  No    Take 50,000 Units by mouth 1 (One) Time Per Week. TUESDAYS    ciprofloxacin-dexamethasone (CIPRODEX) 0.3-0.1 % otic suspension  No    Administer 4 drops into both ears 2 (Two) Times a  "Day.    diphenhydrAMINE (BENADRYL) 50 MG tablet  No    Take 1 tablet by mouth every 4 (four) hours as needed for itching or allergies.    EPINEPHrine (EPIPEN) 0.3 MG/0.3ML solution auto-injector injection  No    INJECT THE CONTENTS OF ONE PEN AS NEEDED. MAY REPEAT ONE TIME.    ipratropium (ATROVENT) 0.02 % nebulizer solution  No    Take 500 mcg by nebulization 4 (Four) Times a Day.    KP KETOTIFEN FUMARATE OP  No    Apply 2 drops to eye(s) as directed by provider As Needed.    multivitamin with minerals tablet tablet  No    Take 1 tablet by mouth Daily.    omeprazole (priLOSEC) 40 MG capsule Pharmacy No    Take 40 mg by mouth Daily.    vitamin B-12 (VITAMIN B-12) 1000 MCG tablet  No    Take 1 tablet by mouth Daily.       Medication notes: Medication profile reviewed and compared against recent fill history with pharmacy. All medications with an informant of \"pharmacy\" were able to verified with recent pick-up history. This has been updated in the PTA medication list as well.    This medication list is complete to the best of my knowledge as of 10/1/2022    Please call if questions.    Isaura Gunderson PharmD    10/1/2022 12:40 EDT     "

## 2022-10-01 NOTE — CONSULTS
Nutrition Services    Patient Name:  Sasha Barrett  YOB: 1940  MRN: [id number]  Admit Date:  9/30/2022      Comment:  Nutrition consult to evaluate for Malnutrition and educate on a Low Na+ diet. Pt reports a good appetite. States she tries to limit her salt intake and feels like she has done well by losing 40lbs and think it's mostly fluid. Provided her with a low sodium diet handout and encouraged her to review it to see if there are any more changes she could make. Labs, meds, skin reviewed. No significant nutrition issues identified at this time to suggest malnutirtion.  Will follow as needed.      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 Yazmin Canseco   • CARDIAC ELECTROPHYSIOLOGY PROCEDURE N/A 5/27/2022    Procedure: Loop insertion;  Surgeon: Jeffrey Argueta MD;  Location: [facility] INVASIVE LOCATION;  Service: Cardiovascular;  Laterality: N/A;  biotronik   • CARDIAC ELECTROPHYSIOLOGY PROCEDURE N/A 7/13/2022    Procedure:  Loop recorder removal;  Surgeon: Jeffrey Argueta MD;  Location:  [facility] INV[facility] LOCATION;  Service: Cardiovascular;  Laterality: N/A;   • CARPAL TUNNEL RELEASE Right 05/25/2011    Dr. Caleb Bueno   • CARPAL TUNNEL RELEASE Left 04/26/2011    Dr. Caleb Bueno   • CATARACT EXTRACTION Left 11/29/2010    Dr. Eusebio Downs   • CATARACT EXTRACTION Right 11/17/2010    Dr. Eusebio Downs   • COLONOSCOPY N/A 12/06/2002    Sigmoid diverticulosis-Dr. Brandon Batista   • COLONOSCOPY N/A 12/12/2013    Tortuous colon, diverticulosis in the sigmoid colon and descending colon, stool in the rectum, sigmoid colon, descending colon, splenic flexure, transverse colon and hepatic flexure-Dr. Irma Brambila   • DEQUERVAIN RELEASE Left 9/23/2020    Procedure: DEQUERVAIN RELEASE LEFT HAND;  Surgeon: Caleb Bueno MD;  Location:  [facility];  Service: Plastics;  Laterality: Left;   • ENDOSCOPY N/A 09/13/2007    Normal-Dr. Pavel Quarles   • ENDOSCOPY AND COLONOSCOPY N/A 09/19/2005    1 cm hiatal hernia, mild Schatzki's ring, sigmoid diverticulosis-Dr. Yoel Farley   • GANGLION CYST EXCISION Left 12/18/2012    Release of A1 pulley flexor sheath, left fourth finger, excision of ganglion cyst 0.5 cm diameter, A2 pulley flexor sheath, left fourth finger-Dr. Caleb Bueno   • HEEL SPUR EXCISION Left 1968   • INGUINAL HERNIA REPAIR Right 1971    at 5 months pregnant    • INJECTION OF MEDICATION Left 9/23/2020    Procedure: KENOLOG INJECTION TO LEFT THUMB;  Surgeon: Caleb Bueno MD;  Location:  [facility];  Service: Plastics;  Laterality: Left;   • MASTECTOMY Right 08/05/2008    Right breast mastectomy and excision of left chest wall lesion-Dr. Yoel Farley   • MASTECTOMY RADICAL Left 04/29/2003    Left modified radical mastectomy-Dr. Yazmin Canseco   • PARATHYROIDECTOMY Right 05/04/2004    Excision of parathyroid adenoma (right superior)-Dr. Yoel Farley   • REPLACEMENT  TOTAL KNEE Right 04/09/2012    Dr. Lamonte Childress   • SINUS SURGERY  1984   • THYROIDECTOMY, PARTIAL Left 05/04/2004    Dr. Yoel Farley   • TOTAL ABDOMINAL HYSTERECTOMY Bilateral 1973    Dr. Mahan   • TRIGGER FINGER RELEASE Left 9/23/2020    Procedure: RELEASE LEFT FOURTH TRIGGER FINGER;  Surgeon: Caleb Bueno MD;  Location: [facility] OR [facility];  Service: Plastics;  Laterality: Left;   • UPPER GASTROINTESTINAL ENDOSCOPY N/A 02/18/2009    LA Grade A reflux esophagitis, normal stomach, normal 2nd part of the duodenum-Dr. Yoel Farley   • WRIST GANGLION EXCISION Left 9/23/2020    Procedure: EXCISION GANGLION CYST LEFT WRIST;  Surgeon: Caleb Bueno MD;  Location: [facility] OR [facility];  Service: Plastics;  Laterality: Left;        Current Problem edema     Encounter Information        Nutrition/Diet History:     Food Preferences:    Supplements:    Factors Affecting Intake: weakness     Anthropometrics        Current Height  Current Weight  BMI kg/m2    Weight: 72.6 kg (160 lb 1.6 oz) (10/01/22 0538)  Body mass index is 29.28 kg/m².       Admission Weight 72.6kg   Ideal Body Weight (IBW) 50.1kg   Usual Body Weight (UBW)    Weight Change/Trend -40lbs in 6 months (moslty related to fluid loss per pt)       Weight History Wt Readings from Last 30 Encounters:   10/01/22 0538 72.6 kg (160 lb 1.6 oz)   09/22/22 1038 79.3 kg (174 lb 12.8 oz)   07/26/22 1131 73 kg (161 lb)   07/13/22 0729 70.3 kg (155 lb)   07/06/22 1311 73.5 kg (162 lb)   06/20/22 1119 74.8 kg (165 lb)   06/06/22 1204 70.3 kg (155 lb)   05/29/22 0510 74.6 kg (164 lb 6.4 oz)   05/28/22 0614 75.5 kg (166 lb 8 oz)   05/27/22 0500 75.2 kg (165 lb 12.8 oz)   05/26/22 1319 74.4 kg (164 lb)   05/26/22 0500 74.6 kg (164 lb 8 oz)   05/25/22 2318 74.5 kg (164 lb 3.9 oz)   05/25/22 1912 74.5 kg (164 lb 4.8 oz)   04/01/22 1136 88.5 kg (195 lb)   01/28/22 1110 88.5 kg (195 lb)   12/27/21 1246 88.5 kg (195 lb)   10/04/21 0818 95.3 kg (210 lb)   10/04/21 0813  95.3 kg (210 lb)   07/01/21 1114 92.1 kg (203 lb)   04/23/21 1417 92.1 kg (203 lb)   02/05/21 1608 92.1 kg (203 lb)   02/02/21 0832 92.4 kg (203 lb 12.8 oz)   01/15/21 1539 86.2 kg (190 lb)   12/14/20 1306 86.2 kg (190 lb)   11/19/20 1112 88.5 kg (195 lb)   11/19/20 0740 88.5 kg (195 lb)   11/19/20 0712 88.5 kg (195 lb)   11/11/20 0917 88.5 kg (195 lb)   09/16/20 1454 88.9 kg (196 lb)   06/08/20 1052 88.5 kg (195 lb)   01/24/20 1553 90.3 kg (199 lb)   01/22/20 1256 90.3 kg (199 lb)   11/01/19 0557 89 kg (196 lb 4.8 oz)   10/31/19 1003 88.5 kg (195 lb)   10/31/19 0919 88.5 kg (195 lb)   10/30/19 0500 88.7 kg (195 lb 9.6 oz)   10/29/19 2049 89.9 kg (198 lb 3 oz)   09/13/19 1008 90 kg (198 lb 6.4 oz)   08/23/19 1515 90.3 kg (199 lb)   08/23/19 1307 87.5 kg (193 lb)   06/19/19 1352 88.7 kg (195 lb 9.6 oz)                 Tests/Procedures        Tests/Procedures X-Ray     Labs       Pertinent Labs    Results from last 7 days   Lab Units 10/01/22  0655 09/30/22  1300   SODIUM mmol/L 142 143   POTASSIUM mmol/L 3.8 3.1*   CHLORIDE mmol/L 102 102   CO2 mmol/L 31.2* 31.1*   BUN mg/dL 15 14   CREATININE mg/dL 0.95 0.62   CALCIUM mg/dL 9.5 9.9   BILIRUBIN mg/dL 0.4  --    ALK PHOS U/L 99  --    ALT (SGPT) U/L 11  --    AST (SGOT) U/L 15  --    GLUCOSE mg/dL 113* 109*     Results from last 7 days   Lab Units 10/01/22  0655 09/30/22 2037 09/30/22  1901 09/30/22  1300   MAGNESIUM mg/dL 1.9  --  2.2  --    PHOSPHORUS mg/dL 2.9   < >  --   --    HEMOGLOBIN g/dL 13.9  --   --  14.1   HEMATOCRIT % 40.7  --   --  42.4   WBC 10*3/mm3 5.28  --   --  7.40   TRIGLYCERIDES mg/dL  --   --   --  121    < > = values in this interval not displayed.     Results from last 7 days   Lab Units 10/01/22  0655 09/30/22  1300   INR   --  1.05   APTT seconds  --  23.6   PLATELETS 10*3/mm3 167 172     COVID19   Date Value Ref Range Status   07/12/2022 Not Detected Not Detected - Ref. Range Final     Lab Results   Component Value Date    HGBA1C 6.10 (H)  05/25/2022          Medications           Scheduled Medications cefTRIAXone, 1 g, Intravenous, Q24H  cholecalciferol, 2,000 Units, Oral, Daily  eplerenone, 50 mg, Oral, Q24H  fluticasone, 1 spray, Each Nare, BID  furosemide, 40 mg, Oral, BID  guaiFENesin, 600 mg, Oral, Q12H  multivitamin with minerals, 1 tablet, Oral, Daily  mupirocin, 1 application, Topical, Q12H  [START ON 10/2/2022] NIFEdipine XL, 90 mg, Oral, Q24H  pantoprazole, 40 mg, Oral, Q AM  polyethylene glycol, 17 g, Oral, Daily  potassium chloride, 20 mEq, Oral, BID  predniSONE, 10 mg, Oral, Every Other Day  rivaroxaban, 20 mg, Oral, Daily With Dinner  rosuvastatin, 5 mg, Oral, Nightly  senna-docusate sodium, 2 tablet, Oral, BID  sodium chloride, 3 mL, Intravenous, Q12H       Infusions Pharmacy Consult,        PRN Medications •  acetaminophen **OR** acetaminophen **OR** acetaminophen  •  senna-docusate sodium **AND** polyethylene glycol **AND** bisacodyl **AND** bisacodyl  •  butalbital-acetaminophen-caffeine  •  cloNIDine  •  diphenhydrAMINE  •  HYDROcodone-acetaminophen  •  hydrOXYzine pamoate  •  influenza vaccine  •  ipratropium  •  ipratropium-albuterol  •  LORazepam  •  magnesium sulfate **OR** magnesium sulfate in D5W 1g/100mL (PREMIX) **OR** magnesium sulfate  •  melatonin  •  NIFEdipine XL  •  nitroglycerin  •  ondansetron  •  Pharmacy Consult  •  potassium & sodium phosphates **OR** potassium & sodium phosphates  •  sodium chloride     Physical Findings        Physical Appearance overweight     NFPE Completed   --  Edema  lower extremity    Gastrointestinal normoactive   Tubes/Drains none   Oral/Mouth Cavity edentulous   Skin skin intact   --  Current Nutrition Orders & Evaluation of Intake       Oral Nutrition     Food Allergies NKFA   Current PO Diet Diet Regular; Cardiac, Low Sodium; No Added Salt   Supplement n/a   PO Evaluation     Trending % PO Intake 75% per pt       --  PES STATEMENT / NUTRITION DIAGNOSIS      Nutrition Dx Problem   Problem: Nutrition Appropriate for Condition at this Time  Etiology: Medical Diagnosis  Signs/Symptoms: Report/Observation good appetite per pt    Comment:    --  NUTRITION INTERVENTION      Intervention Goal(s) Maintain nutrition status, Improved nutrition related labs, Provide information, Reduce/improve symptoms, Meet estimated needs, Disease management/therapy, Tolerate PO  and Maintain weight         RD Intervention/Action Advise alternative selection, Advise available snack, Interview for preferences, Encourage intake, Follow Tx Progress and Care plan reviewed         Prescription/Orders:       PO Diet       Supplements       Enteral Nutrition       Parenteral Nutrition    New Prescription Ordered?    --      Monitor/Evaluation Per protocol, I&O, PO intake, Pertinent labs, Weight, Skin status, POC/GOC   Education Other: see below           Education topic: Sodium     Resources given:  Printed materials, Sample menus     Advised regarding: Food choices, Food preparation, Seasoning foods     Learner:  Patient     Readiness to learn: Accepting     RD contact info provided: Yes     Outpatient referral:        RD to follow per protocol.      Electronically signed by:  Nina Rivera RD  10/01/22 10:37 EDT

## 2022-10-01 NOTE — PROGRESS NOTES
DOS: 10/1/2022  NAME: Sasha Barrett   : 1940  PCP: Óscar Cadet MD    Patient seen in follow-up today; new to me      Neurology     Subjective: No acute neurologic events overnight.  Patient denies any new complaints or concerns reports decreased swelling of right lower extremities.  She verbalizes extreme concern about any medications and/or contrast dye therefore declined MRI-she reports she had to be intubated and had prolonged ICU stays for several different drug drug interactions therefore she is very cautious with any kind of testing and/or medication.    No family    Objective:  Vital signs: BP (!) 152/115 (BP Location: Right arm, Patient Position: Lying)   Pulse 63   Temp 97.6 °F (36.4 °C) (Oral)   Resp 16   Wt 72.6 kg (160 lb 1.6 oz)   SpO2 96%   BMI 29.28 kg/m²       HEENT: Normocephalic, atraumatic   COR: RRR  Resp: Even and unlabored  Extremities: Equal pulses, right lower extremity edema 2+    Neurological:   MS: AO. Language normal. No neglect. Higher integrative function normal  CN: II-XII normal  Motor: 5/5, normal tone.  Except right lower extremity weakness noted with hip flexion and plantar/dorsiflexion.  Reflexes: Toes downgoing  Sensory: Intact-to light touch  Coordination: Normal-finger-to-nose    Laboratory results:  Lab Results   Component Value Date    GLUCOSE 113 (H) 10/01/2022    CALCIUM 9.5 10/01/2022     10/01/2022    K 3.8 10/01/2022    CO2 31.2 (H) 10/01/2022     10/01/2022    BUN 15 10/01/2022    CREATININE 0.95 10/01/2022    EGFRIFAFRI 63 2021    EGFRIFNONA 52 (L) 2021    BCR 15.8 10/01/2022    ANIONGAP 8.8 10/01/2022     Lab Results   Component Value Date    WBC 5.28 10/01/2022    HGB 13.9 10/01/2022    HCT 40.7 10/01/2022    MCV 89.8 10/01/2022     10/01/2022     Lab Results   Component Value Date    CHOL 180 2022    CHOL 120 2022    CHOL 194 10/29/2019     Lab Results   Component Value Date    HDL 70 (H) 2022     HDL 46 05/25/2022    HDL 55 10/29/2019     Lab Results   Component Value Date    LDL 89 09/30/2022    LDL 55 05/25/2022     (H) 10/29/2019     Lab Results   Component Value Date    TRIG 121 09/30/2022    TRIG 104 05/25/2022    TRIG 130 10/29/2019          Review and interpretation of imaging:  Interpretation Summary    · Normal right lower extremity venous duplex scan.      Impression: This is a 82-year-old female medical history of hyperlipidemia, hypertension, COPD, CKD, DVT (on Xarelto), prior breast cancer, migraine (on Fioricet), lumbar spinal stenosis, urinary retention/incontinence requiring self-catheterization, remote CVA, failure to thrive with weight loss and recurrent syncopes and falls since diagnosis of COVID in October 2021.  Service was consulted due to report of lightheadedness/dizziness/ataxia and headache while admitted May 2022.  Work-up during that time showed negative CT head and unremarkable carotid ultrasound.    Patient presented this admission 9/30 due to right lower extremity weakness and inability to support weight.  Chart review reveals recent Ortho appointment right extremity complaints/right external rotation-recommendation of PT/bracing/orthotic recommended.  Patient reported chronic edema of right lower extremity with recent redness over the anterior portion of lower extremity concerning for cellulitis (treated with biotics and symptoms improved.  Right lower extremity venous ultrasound completed August which was negative for DVT.  Right lower extremity duplex completed this admission which was also normal/negative for DVT.  Patient recently traveled to [facility] and noted that she felt as if her legs for giving out and subsequently sustained a fall.  She felt as if her right leg was weaker than her left.  She did not seek medical attention at that time.  She has had persistent right leg weakness since that time above baseline.  On arrival patient was hypertensive at  223/125.  Vascular and orthopedic services have also been consulted.  MRI of the lumbar spine was ordered which patient since declined.        Neurologically, stable recommended MRI lumbar spine which patient declined; will cancel order.  Will recommend outpatient physical therapy services which patient is agreeable to. No further neurology workup indicated. We will sign off and see again upon request.        Diagnosis:  1.  Right lower extremity weakness with gait impairment; lumbar spine recommended although patient declined.      Plan:  · Outpatient physical therapy services recommended      Case reviewed with attending neurologist Dr. Rodrick Cruz he agrees with treatment plan above.    AC Evans

## 2022-10-02 LAB
ALBUMIN SERPL-MCNC: 4.1 G/DL (ref 3.5–5.2)
ALBUMIN/GLOB SERPL: 1.4 G/DL
ALP SERPL-CCNC: 110 U/L (ref 39–117)
ALT SERPL W P-5'-P-CCNC: 13 U/L (ref 1–33)
ANION GAP SERPL CALCULATED.3IONS-SCNC: 12 MMOL/L (ref 5–15)
AST SERPL-CCNC: 13 U/L (ref 1–32)
BASOPHILS # BLD AUTO: 0.05 10*3/MM3 (ref 0–0.2)
BASOPHILS NFR BLD AUTO: 0.8 % (ref 0–1.5)
BILIRUB SERPL-MCNC: 0.5 MG/DL (ref 0–1.2)
BUN SERPL-MCNC: 19 MG/DL (ref 8–23)
BUN/CREAT SERPL: 20 (ref 7–25)
CALCIUM SPEC-SCNC: 10.2 MG/DL (ref 8.6–10.5)
CHLORIDE SERPL-SCNC: 101 MMOL/L (ref 98–107)
CO2 SERPL-SCNC: 29 MMOL/L (ref 22–29)
CREAT SERPL-MCNC: 0.95 MG/DL (ref 0.57–1)
DEPRECATED RDW RBC AUTO: 41.1 FL (ref 37–54)
EGFRCR SERPLBLD CKD-EPI 2021: 59.2 ML/MIN/1.73
EOSINOPHIL # BLD AUTO: 0.1 10*3/MM3 (ref 0–0.4)
EOSINOPHIL NFR BLD AUTO: 1.5 % (ref 0.3–6.2)
ERYTHROCYTE [DISTWIDTH] IN BLOOD BY AUTOMATED COUNT: 12.4 % (ref 12.3–15.4)
GLOBULIN UR ELPH-MCNC: 2.9 GM/DL
GLUCOSE SERPL-MCNC: 110 MG/DL (ref 65–99)
HCT VFR BLD AUTO: 45.3 % (ref 34–46.6)
HGB BLD-MCNC: 15.2 G/DL (ref 12–15.9)
IMM GRANULOCYTES # BLD AUTO: 0.01 10*3/MM3 (ref 0–0.05)
IMM GRANULOCYTES NFR BLD AUTO: 0.2 % (ref 0–0.5)
LYMPHOCYTES # BLD AUTO: 2.23 10*3/MM3 (ref 0.7–3.1)
LYMPHOCYTES NFR BLD AUTO: 33.6 % (ref 19.6–45.3)
MAGNESIUM SERPL-MCNC: 2 MG/DL (ref 1.6–2.4)
MCH RBC QN AUTO: 30 PG (ref 26.6–33)
MCHC RBC AUTO-ENTMCNC: 33.6 G/DL (ref 31.5–35.7)
MCV RBC AUTO: 89.3 FL (ref 79–97)
MONOCYTES # BLD AUTO: 0.75 10*3/MM3 (ref 0.1–0.9)
MONOCYTES NFR BLD AUTO: 11.3 % (ref 5–12)
NEUTROPHILS NFR BLD AUTO: 3.49 10*3/MM3 (ref 1.7–7)
NEUTROPHILS NFR BLD AUTO: 52.6 % (ref 42.7–76)
NRBC BLD AUTO-RTO: 0 /100 WBC (ref 0–0.2)
PHOSPHATE SERPL-MCNC: 3.3 MG/DL (ref 2.5–4.5)
PLATELET # BLD AUTO: 177 10*3/MM3 (ref 140–450)
PMV BLD AUTO: 10.1 FL (ref 6–12)
POTASSIUM SERPL-SCNC: 3.8 MMOL/L (ref 3.5–5.2)
PROT SERPL-MCNC: 7 G/DL (ref 6–8.5)
RBC # BLD AUTO: 5.07 10*6/MM3 (ref 3.77–5.28)
SODIUM SERPL-SCNC: 142 MMOL/L (ref 136–145)
WBC NRBC COR # BLD: 6.63 10*3/MM3 (ref 3.4–10.8)

## 2022-10-02 PROCEDURE — 83735 ASSAY OF MAGNESIUM: CPT | Performed by: INTERNAL MEDICINE

## 2022-10-02 PROCEDURE — G0378 HOSPITAL OBSERVATION PER HR: HCPCS

## 2022-10-02 PROCEDURE — 84100 ASSAY OF PHOSPHORUS: CPT | Performed by: INTERNAL MEDICINE

## 2022-10-02 PROCEDURE — 63710000001 PREDNISONE PER 5 MG: Performed by: INTERNAL MEDICINE

## 2022-10-02 PROCEDURE — 25010000002 CEFTRIAXONE PER 250 MG: Performed by: INTERNAL MEDICINE

## 2022-10-02 PROCEDURE — 80053 COMPREHEN METABOLIC PANEL: CPT | Performed by: INTERNAL MEDICINE

## 2022-10-02 PROCEDURE — 85025 COMPLETE CBC W/AUTO DIFF WBC: CPT | Performed by: INTERNAL MEDICINE

## 2022-10-02 RX ORDER — CLONIDINE HYDROCHLORIDE 0.1 MG/1
0.2 TABLET ORAL EVERY 12 HOURS SCHEDULED
Status: DISCONTINUED | OUTPATIENT
Start: 2022-10-02 | End: 2022-10-03 | Stop reason: HOSPADM

## 2022-10-02 RX ADMIN — Medication 3 ML: at 08:50

## 2022-10-02 RX ADMIN — BUTALBITAL, ACETAMINOPHEN, AND CAFFEINE 2 TABLET: 50; 325; 40 TABLET ORAL at 12:57

## 2022-10-02 RX ADMIN — POTASSIUM CHLORIDE 20 MEQ: 750 TABLET, EXTENDED RELEASE ORAL at 21:57

## 2022-10-02 RX ADMIN — FUROSEMIDE 40 MG: 40 TABLET ORAL at 08:49

## 2022-10-02 RX ADMIN — POTASSIUM CHLORIDE 20 MEQ: 750 TABLET, EXTENDED RELEASE ORAL at 08:54

## 2022-10-02 RX ADMIN — CLONIDINE HYDROCHLORIDE 0.1 MG: 0.1 TABLET ORAL at 08:48

## 2022-10-02 RX ADMIN — CEFTRIAXONE SODIUM 1 G: 1 INJECTION, POWDER, FOR SOLUTION INTRAMUSCULAR; INTRAVENOUS at 12:58

## 2022-10-02 RX ADMIN — MULTIPLE VITAMINS W/ MINERALS TAB 1 TABLET: TAB at 08:50

## 2022-10-02 RX ADMIN — HYDROCODONE BITARTRATE AND ACETAMINOPHEN 1 TABLET: 10; 325 TABLET ORAL at 09:08

## 2022-10-02 RX ADMIN — PREDNISONE 10 MG: 10 TABLET ORAL at 08:49

## 2022-10-02 RX ADMIN — PANTOPRAZOLE SODIUM 40 MG: 40 TABLET, DELAYED RELEASE ORAL at 06:59

## 2022-10-02 RX ADMIN — FLUTICASONE PROPIONATE 1 SPRAY: 50 SPRAY, METERED NASAL at 08:49

## 2022-10-02 RX ADMIN — MUPIROCIN 1 APPLICATION: 20 OINTMENT TOPICAL at 08:50

## 2022-10-02 RX ADMIN — ROSUVASTATIN CALCIUM 5 MG: 5 TABLET, FILM COATED ORAL at 21:54

## 2022-10-02 RX ADMIN — GUAIFENESIN 600 MG: 600 TABLET, EXTENDED RELEASE ORAL at 08:49

## 2022-10-02 RX ADMIN — Medication 3 ML: at 21:57

## 2022-10-02 RX ADMIN — NIFEDIPINE 30 MG: 30 TABLET, FILM COATED, EXTENDED RELEASE ORAL at 03:07

## 2022-10-02 RX ADMIN — Medication 2000 UNITS: at 08:49

## 2022-10-02 RX ADMIN — CLONIDINE HYDROCHLORIDE 0.2 MG: 0.1 TABLET ORAL at 21:54

## 2022-10-02 RX ADMIN — HYDROCODONE BITARTRATE AND ACETAMINOPHEN 1 TABLET: 10; 325 TABLET ORAL at 18:38

## 2022-10-02 RX ADMIN — FUROSEMIDE 40 MG: 40 TABLET ORAL at 18:35

## 2022-10-02 RX ADMIN — EPLERENONE 50 MG: 25 TABLET, FILM COATED ORAL at 08:49

## 2022-10-02 RX ADMIN — FLUTICASONE PROPIONATE 1 SPRAY: 50 SPRAY, METERED NASAL at 21:55

## 2022-10-02 RX ADMIN — MUPIROCIN 1 APPLICATION: 20 OINTMENT TOPICAL at 21:55

## 2022-10-02 RX ADMIN — GUAIFENESIN 600 MG: 600 TABLET, EXTENDED RELEASE ORAL at 21:54

## 2022-10-02 RX ADMIN — NIFEDIPINE 90 MG: 60 TABLET, FILM COATED, EXTENDED RELEASE ORAL at 08:48

## 2022-10-02 RX ADMIN — RIVAROXABAN 20 MG: 20 TABLET, FILM COATED ORAL at 18:35

## 2022-10-02 NOTE — PROGRESS NOTES
LOS: 3 days   Patient Care Team:  Nicolas Ugarte MD as PCP - General  Nicolas Ugarte MD as PCP - Family Medicine    Chief Complaint: No chief complaint on file.        Subjective    Today the patient is awake, alert, very talkative, in no distress of any sort, she seems to be tolerating all of her medications well.  Her blood pressure continues to fluctuate through a wide range.  Fortunately she is not symptomatic in any way.  Nephrology continues to adjust her blood pressure medications.  She has no new complaints that she notes.  She is pleased that the swelling in her right lower extremity continues to go down.    Interval History:     Patient Complaints: The swelling in her right lower extremity is her only overt complaint other than the chronic pain from her degenerative disease which is chronic  Patient Denies: She denies shortness of breath or chest pain, no abdominal pain, no new complaints.  History taken from: patient chart    Review of Systems:    All systems were reviewed and negative except for:  Musculoskeletal: positive for  the the pain in her feet and ankles from her chronic disease.    Objective      Vital Signs  Temp:  [98 °F (36.7 °C)-99.2 °F (37.3 °C)] 98.9 °F (37.2 °C)  Pulse:  [] 109  Resp:  [18] 18  BP: (111-154)/(55-97) 154/97    I/O'S  Intake & Output (last 7 days)       09/25 0701  09/26 0700 09/26 0701  09/27 0700 09/27 0701  09/28 0700 09/28 0701  09/29 0700 09/29 0701 09/30 0700 09/30 0701  10/01 0700 10/01 0701  10/02 0700 10/02 0701  10/03 0700    Urine (mL/kg/hr)      1500 650 (0.4)     Total Output      1500 650     Net      -1500 -650                 Urine Unmeasured Occurrence      1 x            Physical Exam:   General Appearance: alert, pleasant, appears stated age, interactive and cooperative  Lungs: clear to auscultation, respirations regular, respirations even and respirations unlabored  Heart: regular rhythm & normal rate  Abdomen: normal bowel sounds,  soft non-tender, no guarding and no rebound tenderness  Extremities: moves extremities well and the swelling are likely lower extremity is significantly improved.  She continues to complain of pain in her right foot more than the left but this is all chronic.     Results Review:     I reviewed the patient's new clinical results.  I reviewed the patient's other test results and agree with the interpretation                     Results from last 7 days   Lab Units 10/02/22  0743 10/01/22  0655 09/30/22  1300   SODIUM mmol/L 142 142 143   POTASSIUM mmol/L 3.8 3.8 3.1*   CHLORIDE mmol/L 101 102 102   CO2 mmol/L 29.0 31.2* 31.1*   BUN mg/dL 19 15 14   CREATININE mg/dL 0.95 0.95 0.62   GLUCOSE mg/dL 110* 113* 109*   CALCIUM mg/dL 10.2 9.5 9.9     Results from last 7 days   Lab Units 10/01/22  0655   CK TOTAL U/L 35     Results from last 7 days   Lab Units 10/02/22  0743 10/01/22  0655 09/30/22  1300   WBC 10*3/mm3 6.63 5.28 7.40   HEMOGLOBIN g/dL 15.2 13.9 14.1   HEMATOCRIT % 45.3 40.7 42.4   PLATELETS 10*3/mm3 177 167 172     Results from last 7 days   Lab Units 09/30/22  1300   INR  1.05   APTT seconds 23.6     Results from last 7 days   Lab Units 10/02/22  0743 10/01/22  0655 09/30/22  1901   MAGNESIUM mg/dL 2.0 1.9 2.2     Results from last 7 days   Lab Units 09/30/22  1300   CHOLESTEROL mg/dL 180   TRIGLYCERIDES mg/dL 121   HDL CHOL mg/dL 70*   LDL CHOL mg/dL 89     Results from last 7 days   Lab Units 09/30/22  1901   PROBNP pg/mL 661.0     Results from last 7 days   Lab Units 09/30/22  1300   CRP mg/dL <0.30       Lab Results   Component Value Date    HGBA1C 6.10 (H) 05/25/2022   No results found for: POCGLU    eGFR   Date Value Ref Range Status   10/02/2022 59.2 (L) >60.0 mL/min/1.73 Final     Comment:     National Kidney Foundation and American Society of Nephrology (ASN) Task Force recommended calculation based on the Chronic Kidney Disease Epidemiology Collaboration (CKD-EPI) equation refit without adjustment  for race.   10/01/2022 59.2 (L) >60.0 mL/min/1.73 Final     Comment:     National Kidney Foundation and American Society of Nephrology (ASN) Task Force recommended calculation based on the Chronic Kidney Disease Epidemiology Collaboration (CKD-EPI) equation refit without adjustment for race.   09/30/2022 87.9 >60.0 mL/min/1.73 Final     Comment:     National Kidney Foundation and American Society of Nephrology (ASN) Task Force recommended calculation based on the Chronic Kidney Disease Epidemiology Collaboration (CKD-EPI) equation refit without adjustment for race.           Medication Review:   Scheduled Meds:cefTRIAXone, 1 g, Intravenous, Q24H  cholecalciferol, 2,000 Units, Oral, Daily  cloNIDine, 0.2 mg, Oral, Q12H  eplerenone, 50 mg, Oral, Q24H  fluticasone, 1 spray, Each Nare, BID  furosemide, 40 mg, Oral, BID  guaiFENesin, 600 mg, Oral, Q12H  multivitamin with minerals, 1 tablet, Oral, Daily  mupirocin, 1 application, Topical, Q12H  NIFEdipine XL, 90 mg, Oral, Q24H  pantoprazole, 40 mg, Oral, Q AM  polyethylene glycol, 17 g, Oral, Daily  potassium chloride, 20 mEq, Oral, BID  predniSONE, 10 mg, Oral, Every Other Day  rivaroxaban, 20 mg, Oral, Daily With Dinner  rosuvastatin, 5 mg, Oral, Nightly  senna-docusate sodium, 2 tablet, Oral, BID  sodium chloride, 3 mL, Intravenous, Q12H      Continuous Infusions:   PRN Meds:.•  acetaminophen **OR** acetaminophen **OR** acetaminophen  •  senna-docusate sodium **AND** polyethylene glycol **AND** bisacodyl **AND** bisacodyl  •  butalbital-acetaminophen-caffeine  •  cloNIDine  •  diphenhydrAMINE  •  HYDROcodone-acetaminophen  •  hydrOXYzine pamoate  •  influenza vaccine  •  ipratropium  •  ipratropium-albuterol  •  LORazepam  •  magnesium sulfate **OR** magnesium sulfate in D5W 1g/100mL (PREMIX) **OR** magnesium sulfate  •  melatonin  •  NIFEdipine XL  •  nitroglycerin  •  ondansetron  •  potassium & sodium phosphates **OR** potassium & sodium phosphates  •  sodium  chloride              Patient Active Problem List   Diagnosis   • Joint pain   • Urinary retention self-caths ([place])   • Conjunctivitis   • Arm pain   • Cervical disc displacement   • Cervical pain ([place]=PM)   • DDD (degenerative disc disease), cervical   • Hyperlipidemia LDL goal <70   • Preventative health care   • Medication management   • Proctocele   • Urge incontinence of urine   • Chronic tension-type headache, intractable   • Obesity (BMI 30.0-34.9)   • H/o Lyme disease   • Stage 3 chronic kidney disease ([facility])   • Cerebral atherosclerosis   • Allergic contact dermatitis   • Malaise and fatigue progressive   • Dizziness   • Vitamin D deficiency   • Moderate persistent asthmatic bronchitis without complication   • Anterior cervical adenopathy due to infection   • Pleurisy   • Knee pain, bilateral   • Elevated PTH level   • 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)   • Gastroesophageal reflux disease with esophagitis   • Psoriasis ([facility])   • Postmenopausal   • Recurrent UTI   • H/O CVA (cerebral vascular accident) ([facility])   • Dyspnea on exertion   • Precordial pain   • Abnormal EKG   • Arthralgia   • Accelerated hypertension   • Acute joint pain   • Easy bruisability   • Cough productive of purulent sputum   • Influenza A Subtype H3   • 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[facility])   • Acute chest wall pain   • Decreased functional mobility and endurance since fall 12/20/17   • Essential hypertension   • 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   • Migraine without aura and without status migrainosus, not intractable   • Thyroid nodule   • Osteoarthritis of left hip   • Spinal stenosis of lumbar region with radiculopathy   • Acute deep vein thrombosis (DVT) of right lower extremity ([facility])   • Acute deep vein thrombosis ([facility])   • Single subsegmental pulmonary embolism without acute cor pulmonale ([facility]) 10/30/19   • Other chronic pain   • Palpitations   • Secondary  hyperparathyroidism ([facility])   • Syncope   • Ataxia   • Adult failure to thrive syndrome   • Recent unexplained weight loss 42# over last 6 months   • Blood pressure instability   • 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  T       #1 Accelerated Hypertension-her blood pressure continues to fluctuate to a wide range.  She is back on all of her home medications and her doses have been increased.  Nephrology is also adjusting medications.  Hopefully this will stabilize soon.    2.  Edema-this continues to improve now that she is back on her Lasix regularly.  Vascular surgery is arranging for compression hose.    3.  CKD 3-her renal failure is relatively stable at this point.    4.  Right lower extremity weakness/ataxia-she has been seen by neurology who indicated they wanted to order an MRI.  Unfortunately patient refused the MRI.  Neurology has no other suggestions other than physical therapy at this point.    5.  Right foot and ankle deformity-she has been seen by orthopedics who ordered a stabilizing boot.    6.  COPD-her lungs are clear and her O2 sats are well-maintained    7.  Hyperlipidemia-controlled on medications    8.  Right foot and ankle pain-she has some chronic pain but it was made worse when she fell recently.  Fortunately x-rays did not show any fractures.  Hopefully the boot will give her some relief as well.    9.  Hyperglycemia-she has some modest elevations in her blood  sugars    10.  Adult failure to thrive-she seen and managed by hospice as an outpatient for her pain and anxiety medications.  In general she seems to be comfortable and stable.      Plan for disposition:Where: home    Nicolas Ugarte MD  10/02/22  11:26 EDT          Time:

## 2022-10-02 NOTE — SIGNIFICANT NOTE
Spoke with nurse Perkins, BP still high, above therapeutic guidelines.  Will attempt to evaluate again tomorrow.

## 2022-10-02 NOTE — PLAN OF CARE
Goal Outcome Evaluation:               Pt A/OX4 NSR On room air Blood pressure still fluctuating pt not happy with her blood pressure meds adjusted

## 2022-10-02 NOTE — PROGRESS NOTES
[facility] NEPHROLOGY PROGRESS NOTE     LOS: 0 days    Patient Care Team:  Óscar Cadet MD as PCP - General (Internal Medicine)      Subjective     Patient resting comfortably but feeling anxious.  Blood pressure was running high overnight  Edema improving per patient    Objective     Vital Sign Min/Max for last 24 hours  Temp:  [97.7 °F (36.5 °C)-98.4 °F (36.9 °C)] 98.1 °F (36.7 °C)  Heart Rate:  [63-80] 79  Resp:  [16] 16  BP: (137-189)/() 184/109                       Flowsheet Rows    Flowsheet Row First Filed Value   Admission Height --   Admission Weight 72.6 kg (160 lb 1.6 oz) Documented at 10/01/2022 0538          No intake/output data recorded.  I/O last 3 completed shifts:  In: -   Out: 2150 [Urine:2150]    Physical Exam:  Physical Exam    General Appearance: alert, oriented x 3, no acute distress   Skin: warm and dry  HEENT: oral mucosa normal, nonicteric sclera  Neck: supple, no JVD  Lungs: CTA  Heart: RRR, normal S1 and S2  Abdomen: soft, nontender, nondistended  : no palpable bladder  Extremities: 2+ edema.  No cyanosis or clubbing  Neuro: normal speech and mental status        LABS:  Lab Results   Component Value Date    CALCIUM 10.2 10/02/2022    PHOS 3.3 10/02/2022     Results from last 7 days   Lab Units 10/02/22  0743 10/01/22  0655 09/30/22  1901 09/30/22  1300   MAGNESIUM mg/dL 2.0 1.9 2.2  --    SODIUM mmol/L 142 142  --  143   POTASSIUM mmol/L 3.8 3.8  --  3.1*   CHLORIDE mmol/L 101 102  --  102   CO2 mmol/L 29.0 31.2*  --  31.1*   BUN mg/dL 19 15  --  14   CREATININE mg/dL 0.95 0.95  --  0.62   GLUCOSE mg/dL 110* 113*  --  109*   CALCIUM mg/dL 10.2 9.5  --  9.9   WBC 10*3/mm3 6.63 5.28  --  7.40   HEMOGLOBIN g/dL 15.2 13.9  --  14.1   PLATELETS 10*3/mm3 177 167  --  172   ALT (SGPT) U/L 13 11  --   --    AST (SGOT) U/L 13 15  --   --      Lab Results   Component Value Date    CKTOTAL 35 10/01/2022    CKMB 1.69 04/06/2017    TROPONINT <0.010 05/25/2022     Estimated Creatinine Clearance:  41.5 mL/min (by C-G formula based on SCr of 0.95 mg/dL).      Brief Urine Lab Results  (Last result in the past 365 days)      Color   Clarity   Blood   Leuk Est   Nitrite   Protein   CREAT   Urine HCG        05/25/22 2320 Yellow   Clear   Negative   Small (1+)   Negative   Negative               WEIGHTS:     Wt Readings from Last 1 Encounters:   10/02/22 0550 73.9 kg (163 lb)   10/01/22 0538 72.6 kg (160 lb 1.6 oz)       cefTRIAXone, 1 g, Intravenous, Q24H  cholecalciferol, 2,000 Units, Oral, Daily  cloNIDine, 0.2 mg, Oral, Q12H  eplerenone, 50 mg, Oral, Q24H  fluticasone, 1 spray, Each Nare, BID  furosemide, 40 mg, Oral, BID  guaiFENesin, 600 mg, Oral, Q12H  multivitamin with minerals, 1 tablet, Oral, Daily  mupirocin, 1 application, Topical, Q12H  NIFEdipine XL, 90 mg, Oral, Q24H  pantoprazole, 40 mg, Oral, Q AM  polyethylene glycol, 17 g, Oral, Daily  potassium chloride, 20 mEq, Oral, BID  predniSONE, 10 mg, Oral, Every Other Day  rivaroxaban, 20 mg, Oral, Daily With Dinner  rosuvastatin, 5 mg, Oral, Nightly  senna-docusate sodium, 2 tablet, Oral, BID  sodium chloride, 3 mL, Intravenous, Q12H           Assessment & Plan       1.  Lower extremity edema  Multifactorial.  Probably due to venous insufficiency, obesity and chronic lung disease.  No proteinuria per last urinalysis.  Normal EF per last echocardiogram.  Improving with Lasix  2.  Hypertension.  Blood pressure running high  3.  COPD  4.  Hypokalemia.  Better    Recs:  -Continue Lasix 40 mg p.o. twice daily.  Keep off ethacrynic acid.  Patient has history of sulfa allergy but tolerating Lasix well  -Increased clonidine dose this morning  -Continue nifedipine at current dose  -Surveillance labs      Destin Trotter MD  10/02/22  10:03 EDT you

## 2022-10-03 ENCOUNTER — READMISSION MANAGEMENT (OUTPATIENT)
Dept: CALL CENTER | Facility: HOSPITAL | Age: 82
End: 2022-10-03

## 2022-10-03 ENCOUNTER — HOME HEALTH ADMISSION (OUTPATIENT)
Dept: HOME HEALTH SERVICES | Facility: HOME HEALTHCARE | Age: 82
End: 2022-10-03

## 2022-10-03 VITALS
OXYGEN SATURATION: 98 % | RESPIRATION RATE: 16 BRPM | WEIGHT: 166.7 LBS | HEART RATE: 82 BPM | DIASTOLIC BLOOD PRESSURE: 88 MMHG | SYSTOLIC BLOOD PRESSURE: 145 MMHG | BODY MASS INDEX: 30.49 KG/M2 | TEMPERATURE: 98.1 F

## 2022-10-03 LAB
ALBUMIN SERPL-MCNC: 4 G/DL (ref 3.5–5.2)
ALBUMIN/GLOB SERPL: 1.4 G/DL
ALP SERPL-CCNC: 106 U/L (ref 39–117)
ALT SERPL W P-5'-P-CCNC: 14 U/L (ref 1–33)
ANION GAP SERPL CALCULATED.3IONS-SCNC: 13 MMOL/L (ref 5–15)
AST SERPL-CCNC: 18 U/L (ref 1–32)
BASOPHILS # BLD AUTO: 0.06 10*3/MM3 (ref 0–0.2)
BASOPHILS NFR BLD AUTO: 0.9 % (ref 0–1.5)
BILIRUB SERPL-MCNC: 0.4 MG/DL (ref 0–1.2)
BUN SERPL-MCNC: 21 MG/DL (ref 8–23)
BUN/CREAT SERPL: 26.9 (ref 7–25)
CALCIUM SPEC-SCNC: 9.7 MG/DL (ref 8.6–10.5)
CHLORIDE SERPL-SCNC: 99 MMOL/L (ref 98–107)
CO2 SERPL-SCNC: 28 MMOL/L (ref 22–29)
CREAT SERPL-MCNC: 0.78 MG/DL (ref 0.57–1)
DEPRECATED RDW RBC AUTO: 42.4 FL (ref 37–54)
EGFRCR SERPLBLD CKD-EPI 2021: 75 ML/MIN/1.73
EOSINOPHIL # BLD AUTO: 0.1 10*3/MM3 (ref 0–0.4)
EOSINOPHIL NFR BLD AUTO: 1.5 % (ref 0.3–6.2)
ERYTHROCYTE [DISTWIDTH] IN BLOOD BY AUTOMATED COUNT: 12.6 % (ref 12.3–15.4)
GLOBULIN UR ELPH-MCNC: 2.9 GM/DL
GLUCOSE SERPL-MCNC: 97 MG/DL (ref 65–99)
HCT VFR BLD AUTO: 45.1 % (ref 34–46.6)
HGB BLD-MCNC: 14.9 G/DL (ref 12–15.9)
IMM GRANULOCYTES # BLD AUTO: 0.01 10*3/MM3 (ref 0–0.05)
IMM GRANULOCYTES NFR BLD AUTO: 0.1 % (ref 0–0.5)
LYMPHOCYTES # BLD AUTO: 2.59 10*3/MM3 (ref 0.7–3.1)
LYMPHOCYTES NFR BLD AUTO: 37.6 % (ref 19.6–45.3)
MAGNESIUM SERPL-MCNC: 2.4 MG/DL (ref 1.6–2.4)
MCH RBC QN AUTO: 30.2 PG (ref 26.6–33)
MCHC RBC AUTO-ENTMCNC: 33 G/DL (ref 31.5–35.7)
MCV RBC AUTO: 91.5 FL (ref 79–97)
MONOCYTES # BLD AUTO: 0.7 10*3/MM3 (ref 0.1–0.9)
MONOCYTES NFR BLD AUTO: 10.2 % (ref 5–12)
NEUTROPHILS NFR BLD AUTO: 3.42 10*3/MM3 (ref 1.7–7)
NEUTROPHILS NFR BLD AUTO: 49.7 % (ref 42.7–76)
NRBC BLD AUTO-RTO: 0 /100 WBC (ref 0–0.2)
PHOSPHATE SERPL-MCNC: 2.8 MG/DL (ref 2.5–4.5)
PLATELET # BLD AUTO: 175 10*3/MM3 (ref 140–450)
PMV BLD AUTO: 10.3 FL (ref 6–12)
POTASSIUM SERPL-SCNC: 3.4 MMOL/L (ref 3.5–5.2)
PROT SERPL-MCNC: 6.9 G/DL (ref 6–8.5)
RBC # BLD AUTO: 4.93 10*6/MM3 (ref 3.77–5.28)
SODIUM SERPL-SCNC: 140 MMOL/L (ref 136–145)
WBC NRBC COR # BLD: 6.88 10*3/MM3 (ref 3.4–10.8)

## 2022-10-03 PROCEDURE — 85025 COMPLETE CBC W/AUTO DIFF WBC: CPT | Performed by: INTERNAL MEDICINE

## 2022-10-03 PROCEDURE — 96365 THER/PROPH/DIAG IV INF INIT: CPT

## 2022-10-03 PROCEDURE — 97165 OT EVAL LOW COMPLEX 30 MIN: CPT

## 2022-10-03 PROCEDURE — G0378 HOSPITAL OBSERVATION PER HR: HCPCS

## 2022-10-03 PROCEDURE — 97530 THERAPEUTIC ACTIVITIES: CPT

## 2022-10-03 PROCEDURE — 99213 OFFICE O/P EST LOW 20 MIN: CPT | Performed by: ORTHOPAEDIC SURGERY

## 2022-10-03 PROCEDURE — 84100 ASSAY OF PHOSPHORUS: CPT | Performed by: INTERNAL MEDICINE

## 2022-10-03 PROCEDURE — 80053 COMPREHEN METABOLIC PANEL: CPT | Performed by: INTERNAL MEDICINE

## 2022-10-03 PROCEDURE — 83735 ASSAY OF MAGNESIUM: CPT | Performed by: INTERNAL MEDICINE

## 2022-10-03 PROCEDURE — 25010000002 CEFTRIAXONE PER 250 MG: Performed by: INTERNAL MEDICINE

## 2022-10-03 RX ORDER — CLONIDINE HYDROCHLORIDE 0.2 MG/1
0.2 TABLET ORAL EVERY 12 HOURS SCHEDULED
Start: 2022-10-03

## 2022-10-03 RX ORDER — POTASSIUM CHLORIDE 750 MG/1
40 TABLET, FILM COATED, EXTENDED RELEASE ORAL ONCE
Status: COMPLETED | OUTPATIENT
Start: 2022-10-03 | End: 2022-10-03

## 2022-10-03 RX ORDER — CLONIDINE HYDROCHLORIDE 0.3 MG/1
0.3 TABLET ORAL DAILY PRN
Start: 2022-10-03

## 2022-10-03 RX ORDER — NIFEDIPINE 30 MG/1
30 TABLET, FILM COATED, EXTENDED RELEASE ORAL EVERY 8 HOURS PRN
Start: 2022-10-03

## 2022-10-03 RX ADMIN — GUAIFENESIN 600 MG: 600 TABLET, EXTENDED RELEASE ORAL at 08:23

## 2022-10-03 RX ADMIN — EPLERENONE 50 MG: 25 TABLET, FILM COATED ORAL at 08:20

## 2022-10-03 RX ADMIN — NIFEDIPINE 30 MG: 30 TABLET, FILM COATED, EXTENDED RELEASE ORAL at 06:32

## 2022-10-03 RX ADMIN — FUROSEMIDE 40 MG: 40 TABLET ORAL at 08:23

## 2022-10-03 RX ADMIN — NIFEDIPINE 90 MG: 60 TABLET, FILM COATED, EXTENDED RELEASE ORAL at 10:50

## 2022-10-03 RX ADMIN — MUPIROCIN 1 APPLICATION: 20 OINTMENT TOPICAL at 08:25

## 2022-10-03 RX ADMIN — FLUTICASONE PROPIONATE 1 SPRAY: 50 SPRAY, METERED NASAL at 08:29

## 2022-10-03 RX ADMIN — Medication 2000 UNITS: at 08:23

## 2022-10-03 RX ADMIN — Medication 3 ML: at 08:24

## 2022-10-03 RX ADMIN — BUTALBITAL, ACETAMINOPHEN, AND CAFFEINE 2 TABLET: 50; 325; 40 TABLET ORAL at 06:32

## 2022-10-03 RX ADMIN — MULTIPLE VITAMINS W/ MINERALS TAB 1 TABLET: TAB at 08:23

## 2022-10-03 RX ADMIN — DOCUSATE SODIUM 50MG AND SENNOSIDES 8.6MG 2 TABLET: 8.6; 5 TABLET, FILM COATED ORAL at 08:23

## 2022-10-03 RX ADMIN — POTASSIUM CHLORIDE 40 MEQ: 750 TABLET, EXTENDED RELEASE ORAL at 14:23

## 2022-10-03 RX ADMIN — PANTOPRAZOLE SODIUM 40 MG: 40 TABLET, DELAYED RELEASE ORAL at 06:32

## 2022-10-03 RX ADMIN — CLONIDINE HYDROCHLORIDE 0.2 MG: 0.1 TABLET ORAL at 08:23

## 2022-10-03 RX ADMIN — POTASSIUM CHLORIDE 20 MEQ: 750 TABLET, EXTENDED RELEASE ORAL at 08:24

## 2022-10-03 RX ADMIN — CEFTRIAXONE SODIUM 1 G: 1 INJECTION, POWDER, FOR SOLUTION INTRAMUSCULAR; INTRAVENOUS at 14:22

## 2022-10-03 NOTE — SIGNIFICANT NOTE
"   10/03/22 8824   OTHER   Discipline physical therapist   Rehab Time/Intention   Session Not Performed patient/family declined evaluation  (Pt refusing eval stating \"I am about to leave and will be getting PT at home, I do not need it here\". Ed given to pt on reason for eval and pt then agreeable. Pt then refused again once given R boot to put on. Pt increasing agitation and cont to refuse.)     Spent 10 minutes with pt. As pt is set to DC today w/ HH PT, PT will sign-off. Thank you.   "

## 2022-10-03 NOTE — PLAN OF CARE
Goal Outcome Evaluation:  Plan of Care Reviewed With: patient           Outcome Evaluation: Pt is a 82 year old female admitted to [facility] on 9/30 for generalized weakness. Pt with a past medical history of CAD and depression. Pt had a recent fall while visiting [place]. Pt seated EOB upon entering the room. Pt reports that she lives at home with her  and is ind. at baseline. Pt uses a cane and a walker at home when needed. Pt is currently at baseline and does not require skilled OT services. Will sign off.

## 2022-10-03 NOTE — THERAPY EVALUATION
Patient Name: Sasha Barrett  : 1940    MRN: [id number]                              Today's Date: 10/3/2022       Admit Date: 2022    Visit Dx: No diagnosis found.  Patient Active Problem List   Diagnosis   • Joint pain   • Urinary retention self-caths (Don)   • Conjunctivitis   • Arm pain   • Cervical disc displacement   • Cervical pain (Servando=PM)   • DDD (degenerative disc disease), cervical   • Hyperlipidemia LDL goal <70   • Preventative health care   • Medication management   • Proctocele   • Urge incontinence of urine   • Chronic tension-type headache, intractable   • Obesity (BMI 30.0-34.9)   • H/o Lyme disease   • Stage 3 chronic kidney disease ([facility])   • Cerebral atherosclerosis   • Allergic contact dermatitis   • Malaise and fatigue progressive   • Dizziness   • Vitamin D deficiency   • Moderate persistent asthmatic bronchitis without complication   • Anterior cervical adenopathy due to infection   • Pleurisy   • Knee pain, bilateral   • Elevated PTH level   • 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)   • Gastroesophageal reflux disease with esophagitis   • Psoriasis ([facility] Ochoa)   • Postmenopausal   • Recurrent UTI   • H/O CVA (cerebral vascular accident) ([facility])   • Dyspnea on exertion   • Precordial pain   • Abnormal EKG   • Arthralgia   • Accelerated hypertension   • Acute joint pain   • Easy bruisability   • Cough productive of purulent sputum   • Influenza A Subtype H3   • 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[facility])   • Acute chest wall pain   • Decreased functional mobility and endurance since fall 15   • Essential hypertension   • 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   • Migraine without aura and without status migrainosus, not intractable   • Thyroid nodule   • Osteoarthritis of left hip   • Spinal stenosis of lumbar region with radiculopathy   • Acute deep vein thrombosis (DVT) of right lower extremity ([facility])   • Acute deep vein thrombosis  (HCC)   • Single subsegmental pulmonary embolism without acute cor pulmonale (HCC) 10/30/19   • Other chronic pain   • Palpitations   • Secondary hyperparathyroidism (HCC)   • Syncope   • Ataxia   • Adult failure to thrive syndrome   • Recent unexplained weight loss 42# over last 6 months   • Blood pressure instability   • 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 Yazmin Canseco   • CARDIAC ELECTROPHYSIOLOGY PROCEDURE N/A 5/27/2022    Procedure: Loop insertion;  Surgeon: Jeffrey Argueta MD;  Location: [facility] INVASIVE LOCATION;  Service: Cardiovascular;  Laterality: N/A;  biotronik   • CARDIAC  ELECTROPHYSIOLOGY PROCEDURE N/A 7/13/2022    Procedure: Loop recorder removal;  Surgeon: Jeffrey Argueta MD;  Location:  [facility] CATH INVASIVE LOCATION;  Service: Cardiovascular;  Laterality: N/A;   • CARPAL TUNNEL RELEASE Right 05/25/2011    Dr. Caleb Bueno   • CARPAL TUNNEL RELEASE Left 04/26/2011    Dr. Caleb Bueno   • CATARACT EXTRACTION Left 11/29/2010    Dr. Eusebio Downs   • CATARACT EXTRACTION Right 11/17/2010    Dr. Eusebio Downs   • COLONOSCOPY N/A 12/06/2002    Sigmoid diverticulosis-Dr. Brandon Batista   • COLONOSCOPY N/A 12/12/2013    Tortuous colon, diverticulosis in the sigmoid colon and descending colon, stool in the rectum, sigmoid colon, descending colon, splenic flexure, transverse colon and hepatic flexure-Dr. Irma Brambila   • DEQUERVAIN RELEASE Left 9/23/2020    Procedure: DEQUERVAIN RELEASE LEFT HAND;  Surgeon: Caleb Bueno MD;  Location:  [facility];  Service: Plastics;  Laterality: Left;   • ENDOSCOPY N/A 09/13/2007    Normal-Dr. Pavel Quarles   • ENDOSCOPY AND COLONOSCOPY N/A 09/19/2005    1 cm hiatal hernia, mild Schatzki's ring, sigmoid diverticulosis-Dr. Yoel Farley   • GANGLION CYST EXCISION Left 12/18/2012    Release of A1 pulley flexor sheath, left fourth finger, excision of ganglion cyst 0.5 cm diameter, A2 pulley flexor sheath, left fourth finger-Dr. Caleb Bueno   • HEEL SPUR EXCISION Left 1968   • INGUINAL HERNIA REPAIR Right 1971    at 5 months pregnant    • INJECTION OF MEDICATION Left 9/23/2020    Procedure: KENOLOG INJECTION TO LEFT THUMB;  Surgeon: Caleb Bueno MD;  Location:  [facility];  Service: Plastics;  Laterality: Left;   • MASTECTOMY Right 08/05/2008    Right breast mastectomy and excision of left chest wall lesion-Dr. Yoel Farley   • MASTECTOMY RADICAL Left 04/29/2003    Left modified radical mastectomy-Dr. Yazmin Canseco   • PARATHYROIDECTOMY Right 05/04/2004    Excision of parathyroid  adenoma (right superior)-Dr. Yoel Farley   • REPLACEMENT TOTAL KNEE Right 04/09/2012    Dr. Lamonte Childress   • SINUS SURGERY  1984   • THYROIDECTOMY, PARTIAL Left 05/04/2004    Dr. Yoel Farley   • TOTAL ABDOMINAL HYSTERECTOMY Bilateral 1973    Dr. Mahan   • TRIGGER FINGER RELEASE Left 9/23/2020    Procedure: RELEASE LEFT FOURTH TRIGGER FINGER;  Surgeon: Caleb Bueno MD;  Location: [facility];  Service: Plastics;  Laterality: Left;   • UPPER GASTROINTESTINAL ENDOSCOPY N/A 02/18/2009    LA Grade A reflux esophagitis, normal stomach, normal 2nd part of the duodenum-Dr. Yoel Farley   • WRIST GANGLION EXCISION Left 9/23/2020    Procedure: EXCISION GANGLION CYST LEFT WRIST;  Surgeon: Caleb Bueno MD;  Location: [facility] OR [facility];  Service: Plastics;  Laterality: Left;      General Information     Row Name 10/03/22 1137          OT Time and Intention    Document Type discharge evaluation/summary  -KN     Mode of Treatment individual therapy;occupational therapy  -     Row Name 10/03/22 1137          General Information    Patient Profile Reviewed yes  -KN     Prior Level of Function independent:;ADL's;all household mobility  -KN     Existing Precautions/Restrictions fall  -KN     Barriers to Rehab none identified  -KN     Row Name 10/03/22 1137          Living Environment    People in Home spouse  -     Row Name 10/03/22 1137          Cognition    Orientation Status (Cognition) oriented x 4  -KN           User Key  (r) = Recorded By, (t) = Taken By, (c) = Cosigned By    Initials Name Provider Type    KN Zack Ling OT Occupational Therapist                 Mobility/ADL's     Row Name 10/03/22 1140          Transfers    Transfers sit-stand transfer  -KN     Sit-Stand [place] (Transfers) standby assist  -     Row Name 10/03/22 1140          Sit-Stand Transfer    Assistive Device (Sit-Stand Transfers) walker, front-wheeled  -KN           User Key  (r) = Recorded By, (t) = Taken  By, (c) = Cosigned By    Initials Name Provider Type    Zack Aragon OT Occupational Therapist               Obj/Interventions     Row Name 10/03/22 1144          Sensory Assessment (Somatosensory)    Sensory Assessment (Somatosensory) sensation intact  -[facility] Name 10/03/22 1144          Vision Assessment/Intervention    Visual Impairment/Limitations WFL  -[facility] Name 10/03/22 1144          Range of Motion Comprehensive    General Range of Motion no range of motion deficits identified  -[facility] Name 10/03/22 1144          Strength Comprehensive (MMT)    Comment, General Manual Muscle Testing (MMT) Assessment generalized weakness  -[facility] Name 10/03/22 1144          Motor Skills    Motor Skills coordination;functional endurance  -     Coordination WFL  -     Functional Endurance p+  -KN           User Key  (r) = Recorded By, (t) = Taken By, (c) = Cosigned By    Initials Name Provider Type    Zack Aragon OT Occupational Therapist               Goals/Plan    No documentation.                Clinical Impression     [facility] Name 10/03/22 1145          Pain Assessment    Pretreatment Pain Rating 0/10 - no pain  -     Posttreatment Pain Rating 0/10 - no pain  -KN     Row Name 10/03/22 1145          Plan of Care Review    Plan of Care Reviewed With patient  -     Outcome Evaluation Pt is a 82 year old female admitted to [facility] on 9/30 for generalized weakness. Pt with a past medical history of CAD and depression. Pt had a recent fall while visiting [place]. Pt seated EOB upon entering the room. Pt reports that she lives at home with her  and is ind. at baseline. Pt uses a cane and a walker at home when needed. Pt is currently at baseline and does not require skilled OT services. Will sign off.  -MARY ANNE     [facility] Name 10/03/22 1145          Therapy Assessment/Plan (OT)    Criteria for Skilled Therapeutic Interventions Met (OT) no problems identified which require skilled intervention  -MARY ANNE Triplett  Name 10/03/22 1145          Therapy Plan Review/Discharge Plan (OT)    Anticipated Discharge Disposition (OT) other (see comments);home with home health  Home with PT   -KN     Row Name 10/03/22 1145          Vital Signs    Pre Patient Position Sitting  -KN     Intra Patient Position Standing  -KN     Post Patient Position Supine  -KN     Row Name 10/03/22 1145          Positioning and Restraints    Pre-Treatment Position in bed  -KN     Post Treatment Position bed  -KN     In Bed notified nsg;side lying right;side lying left;sitting EOB;call light within reach;exit alarm on;encouraged to call for assist  -KN           User Key  (r) = Recorded By, (t) = Taken By, (c) = Cosigned By    Initials Name Provider Type    Zack Aragon OT Occupational Therapist               Outcome Measures     Row Name 10/03/22 1154          How much help from another is currently needed...    Putting on and taking off regular lower body clothing? 4  -KN     Bathing (including washing, rinsing, and drying) 4  -KN     Toileting (which includes using toilet bed pan or urinal) 4  -KN     Putting on and taking off regular upper body clothing 4  -KN     Taking care of personal grooming (such as brushing teeth) 4  -KN     Eating meals 4  -KN     AM-PAC 6 Clicks Score (OT) 24  -KN     Row Name 10/03/22 1154          Functional Assessment    Outcome Measure Options AM-PAC 6 Clicks Daily Activity (OT)  -KN           User Key  (r) = Recorded By, (t) = Taken By, (c) = Cosigned By    Initials Name Provider Type    Zack Aragon OT Occupational Therapist                Occupational Therapy Education                 Title: PT OT SLP Therapies (Done)     Topic: Occupational Therapy (Done)     Point: ADL training (Done)     Description:   Instruct learner(s) on proper safety adaptation and remediation techniques during self care or transfers.   Instruct in proper use of assistive devices.              Learning Progress Summary           Patient  Acceptance, E, VU by  at 10/3/2022 1154                   Point: Home exercise program (Done)     Description:   Instruct learner(s) on appropriate technique for monitoring, assisting and/or progressing therapeutic exercises/activities.              Learning Progress Summary           Patient Acceptance, E, VU by  at 10/3/2022 1154                   Point: Precautions (Done)     Description:   Instruct learner(s) on prescribed precautions during self-care and functional transfers.              Learning Progress Summary           Patient Acceptance, E, VU by  at 10/3/2022 1154                   Point: Body mechanics (Done)     Description:   Instruct learner(s) on proper positioning and spine alignment during self-care, functional mobility activities and/or exercises.              Learning Progress Summary           Patient Acceptance, E, VU by  at 10/3/2022 1154                               User Key     Initials Effective Dates Name Provider Type Discipline     08/02/22 -  Zack Ling, OT Occupational Therapist OT              OT Recommendation and Plan     Plan of Care Review  Plan of Care Reviewed With: patient  Outcome Evaluation: Pt is a 82 year old female admitted to [facility] on 9/30 for generalized weakness. Pt with a past medical history of CAD and depression. Pt had a recent fall while visiting [place]. Pt seated EOB upon entering the room. Pt reports that she lives at home with her  and is ind. at baseline. Pt uses a cane and a walker at home when needed. Pt is currently at baseline and does not require skilled OT services. Will sign off.     Time Calculation:    Time Calculation- OT     Row Name 10/03/22 1155             Time Calculation- OT    OT Start Time 0850  -      OT Stop Time 0910  -KN      OT Time Calculation (min) 20 min  -KN      OT Received On 10/03/22  -              Timed Charges    47857 - OT Therapeutic Activity Minutes 10  -KN              Untimed Charges    OT  Eval/Re-eval Minutes 10  -KN              Total Minutes    Timed Charges Total Minutes 10  -KN      Untimed Charges Total Minutes 10  -KN       Total Minutes 20  -KN            User Key  (r) = Recorded By, (t) = Taken By, (c) = Cosigned By    Initials Name Provider Type    Zack Aragon OT Occupational Therapist              Therapy Charges for Today     Code Description Service Date Service Provider Modifiers Qty    25849057599  OT THERAPEUTIC ACT EA 15 MIN 10/3/2022 Zack Ling OT GO 1    [id number]  OT EVAL LOW COMPLEXITY 3 10/3/2022 Zack Ling OT GO 1               Zack Ling OT  10/3/2022

## 2022-10-03 NOTE — PLAN OF CARE
Goal Outcome Evaluation:         Pt A/OX4 NSR,RA Still not happy about her blood pressure medication adjusted

## 2022-10-03 NOTE — DISCHARGE PLACEMENT REQUEST
"Leesa Barrett (82 y.o. Female)             Date of Birth   1940    Social Security Number       Address   [address] [address]    Home Phone   [phone]    MRN   [id number]       [employer]   [employer]    Marital Status                               Admission Date   9/30/22    Admission Type   Urgent    Admitting Provider   Óscar Cadet MD    Attending Provider   Óscar Cadet MD    Department, Room/Bed   [address], [address]/1       Discharge Date       Discharge Disposition   Home-Health Care [facility]    Discharge Destination                               Attending Provider: Óscar Cadet MD    Allergies: Bee Venom, Morphine, Sulfa Antibiotics, Tree Extract, Ambien [Zolpidem], Anastrozole, Covid-19 (Mrna) Vaccine, Eliquis [Apixaban], Hydralazine, Norvasc [Amlodipine], Nsaids, Penicillins, Grass    Isolation: None   Infection: None   Code Status: CPR   Advance Care Planning Activity    Ht: 157.5 cm (62\")   Wt: 75.6 kg (166 lb 11.2 oz)    Admission Cmt: None   Principal Problem: Accelerated hypertension [I10]                 Active Insurance as of 9/30/2022     Primary Coverage     Payor Plan Insurance Group Employer/Plan Group    HUMANA MEDICARE REPLACEMENT HUMANA MEDICARE REPLACEMENT [id number]     Payor Plan Address Payor Plan Phone Number Payor Plan Fax Number Effective Dates    [address] [address] [phone]  1/1/2013 - None Entered    [facility] [id number]       Subscriber Name Subscriber Birth Date Member ID       LEESA BARRETT 1940 [id number]                 Emergency Contacts      (Rel.) Home Phone Work Phone Mobile Phone    Adriel Barrett (Spouse) [phone] -- [phone]    ArnoldLeighton (Son) -- -- [phone]    Helen Barrett (Other) -- -- [phone]              "

## 2022-10-03 NOTE — CASE MANAGEMENT/SOCIAL WORK
Continued Stay Note  [facility]     Patient Name: Sasha Barrett  MRN: [id number]  Today's Date: 10/3/2022    Admit Date: 9/30/2022    Plan: Home w/ [facility]   Discharge Plan     Row Name 10/03/22 1442       Plan    Plan Home w/ [facility]    Plan Comments Incoming call from [facility] who states they can accept this patient and are following for her dc today. IDANIA WELLS    Row Name 10/03/22 1316       Plan    Plan Home w/ HH (pending)    Plan Comments D/c order & HH order noted. CCP spoke to patient at bedside who is agreeable to  and is requesting [facility]. Patient states she was already in the process of getting set up with [facility] prior to admission. Referral sent to [facility]. Patient states her family is able to transport her home and they will stop on the way home to get stockings. IDANIA WELLS               Discharge Codes    No documentation.               Expected Discharge Date and Time     Expected Discharge Date Expected Discharge Time    Oct 3, 2022             IDANIA Gurrola

## 2022-10-03 NOTE — CASE MANAGEMENT/SOCIAL WORK
Discharge Planning Assessment  [facility]     Patient Name: Sasha Barrett  MRN: [id number]  Today's Date: 10/3/2022    Admit Date: 9/30/2022    Plan: Home w/ HH (pending)   Discharge Needs Assessment    No documentation.                Discharge Plan     Row Name 10/03/22 1316       Plan    Plan Home w/ HH (pending)    Plan Comments D/c order & HH order noted. [facility] spoke to patient at bedside who is agreeable to  and is requesting [facility]. Patient states she was already in the process of getting set up with [facility] prior to admission. Referral sent to [facility]. Patient states her family is able to transport her home and they will stop on the way home to get stockings. IDANIA WELLS              Continued Care and Services - Admitted Since 9/30/2022     Home Medical Care     Service Provider Request Status Selected Services Address Phone Fax Patient Preferred     [facility]  Pending - Request Sent N/A [address] [address] [phone] --              Expected Discharge Date and Time     Expected Discharge Date Expected Discharge Time    Oct 3, 2022          Demographic Summary    No documentation.                Functional Status    No documentation.                Psychosocial    No documentation.                Abuse/Neglect    No documentation.                Legal    No documentation.                Substance Abuse    No documentation.                Patient Forms    No documentation.                   IDANIA Gurrola

## 2022-10-03 NOTE — PLAN OF CARE
Goal Outcome Evaluation:  Plan of Care Reviewed With: patient        Progress: no change  Outcome Evaluation: Pt awake and but tense, gets easily upset with her b/p, encourage to be calm and take deep breaths, voicing frustrations about her medication. Reassure pt that we will closely monitor her vs/bp. Nad, sr on tele, denies pain. Refused r boot this am, rle with edema subsided significantly , now just trace. Voiding adequately.

## 2022-10-03 NOTE — PROGRESS NOTES
Orthopedic Progress Note      Patient: Sasha Barrett    YOB: 1940    Medical Record Number: [id number]    Attending Physician: Óscar Cadet MD    Date of Admission: 9/30/2022 11:07 AM    Admitting Dx:  Weakness [R53.1]      Current Problem List:   Patient Active Problem List   Diagnosis   • Joint pain   • Urinary retention self-caths ([place])   • Conjunctivitis   • Arm pain   • Cervical disc displacement   • Cervical pain ([place]=PM)   • DDD (degenerative disc disease), cervical   • Hyperlipidemia LDL goal <70   • Preventative health care   • Medication management   • Proctocele   • Urge incontinence of urine   • Chronic tension-type headache, intractable   • Obesity (BMI 30.0-34.9)   • H/o Lyme disease   • Stage 3 chronic kidney disease ([facility])   • Cerebral atherosclerosis   • Allergic contact dermatitis   • Malaise and fatigue progressive   • Dizziness   • Vitamin D deficiency   • Moderate persistent asthmatic bronchitis without complication   • Anterior cervical adenopathy due to infection   • Pleurisy   • Knee pain, bilateral   • Elevated PTH level   • 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)   • Gastroesophageal reflux disease with esophagitis   • Psoriasis ([facility])   • Postmenopausal   • Recurrent UTI   • H/O CVA (cerebral vascular accident) ([facility])   • Dyspnea on exertion   • Precordial pain   • Abnormal EKG   • Arthralgia   • Accelerated hypertension   • Acute joint pain   • Easy bruisability   • Cough productive of purulent sputum   • Influenza A Subtype H3   • 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[facility])   • Acute chest wall pain   • Decreased functional mobility and endurance since fall 12/20/17   • Essential hypertension   • 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   • Migraine without aura and without status migrainosus, not intractable   • Thyroid nodule   • Osteoarthritis of left hip   • Spinal stenosis of lumbar region with radiculopathy   • Acute  deep vein thrombosis (DVT) of right lower extremity ([facility])   • Acute deep vein thrombosis (H[facility])   • Single subsegmental pulmonary embolism without acute cor pulmonale (HCC) 10/30/19   • Other chronic pain   • Palpitations   • Secondary hyperparathyroidism ([facility])   • Syncope   • Ataxia   • Adult failure to thrive syndrome   • Recent unexplained weight loss 42# over last 6 months   • Blood pressure instability   • 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    • DVT (deep venous thrombosis) ([facility])     right leg wearin marianne hose on both legs   • Emphysema lung ([facility])    • Generalized headaches    • Hyperlipidemia    • Hypertension    • Incontinence of urine     wear pads   • Kidney disease    • Staph infection 2003    after left breast cancer surgery   • Stroke ([facility])        Current Medications:  Scheduled Meds:cefTRIAXone, 1 g, Intravenous, Q24H  cholecalciferol, 2,000 Units, Oral, Daily  cloNIDine, 0.2 mg, Oral, Q12H  eplerenone, 50 mg, Oral, Q24H  fluticasone, 1 spray, Each Nare, BID  furosemide, 40 mg, Oral, BID  guaiFENesin, 600 mg, Oral, Q12H  multivitamin with  minerals, 1 tablet, Oral, Daily  mupirocin, 1 application, Topical, Q12H  NIFEdipine XL, 90 mg, Oral, Q24H  pantoprazole, 40 mg, Oral, Q AM  polyethylene glycol, 17 g, Oral, Daily  potassium chloride, 20 mEq, Oral, BID  predniSONE, 10 mg, Oral, Every Other Day  rivaroxaban, 20 mg, Oral, Daily With Dinner  rosuvastatin, 5 mg, Oral, Nightly  senna-docusate sodium, 2 tablet, Oral, BID  sodium chloride, 3 mL, Intravenous, Q12H      PRN Meds:.•  acetaminophen **OR** acetaminophen **OR** acetaminophen  •  senna-docusate sodium **AND** polyethylene glycol **AND** bisacodyl **AND** bisacodyl  •  butalbital-acetaminophen-caffeine  •  cloNIDine  •  diphenhydrAMINE  •  HYDROcodone-acetaminophen  •  hydrOXYzine pamoate  •  influenza vaccine  •  ipratropium  •  ipratropium-albuterol  •  LORazepam  •  magnesium sulfate **OR** magnesium sulfate in D5W 1g/100mL (PREMIX) **OR** magnesium sulfate  •  melatonin  •  NIFEdipine XL  •  nitroglycerin  •  ondansetron  •  potassium & sodium phosphates **OR** potassium & sodium phosphates  •  sodium chloride    SUBJECTIVE: 82 y.o.  female    OBJECTIVE:   Vitals:    10/03/22 0820 10/03/22 0826 10/03/22 1048 10/03/22 1050   BP:  (!) 168/108 145/88    BP Location:  Right arm Right arm    Patient Position:  Sitting Lying    Pulse: 89   82   Resp:       Temp:       TempSrc:       SpO2:       Weight:         I/O last 3 completed shifts:  In: 480 [P.O.:480]  Out: 2300 [Urine:2300]    Diagnostic Tests:  Lab Results (last 72 hours)       Procedure Component Value Units Date/Time    Comprehensive Metabolic Panel [[id number]]  (Abnormal) Collected: 10/03/22 0630    Specimen: Blood Updated: 10/03/22 0735     Glucose 97 mg/dL      BUN 21 mg/dL      Creatinine 0.78 mg/dL      Sodium 140 mmol/L      Potassium 3.4 mmol/L      Comment: Slight hemolysis detected by analyzer. Results may be affected.        Chloride 99 mmol/L      CO2 28.0 mmol/L      Calcium 9.7 mg/dL      Total Protein 6.9 g/dL      Albumin  4.00 g/dL      ALT (SGPT) 14 U/L      AST (SGOT) 18 U/L      Alkaline Phosphatase 106 U/L      Total Bilirubin 0.4 mg/dL      Globulin 2.9 gm/dL      A/G Ratio 1.4 g/dL      BUN/Creatinine Ratio 26.9     Anion Gap 13.0 mmol/L      eGFR 75.0 mL/min/1.73      Comment: National Kidney Foundation and American Society of Nephrology (ASN) Task Force recommended calculation based on the Chronic Kidney Disease Epidemiology Collaboration (CKD-EPI) equation refit without adjustment for race.       Narrative:      GFR Normal >60  Chronic Kidney Disease <60  Kidney Failure <15      Phosphorus [[id number]]  (Normal) Collected: 10/03/22 0630    Specimen: Blood Updated: 10/03/22 0735     Phosphorus 2.8 mg/dL     Magnesium [[id number]]  (Normal) Collected: 10/03/22 0630    Specimen: Blood Updated: 10/03/22 0735     Magnesium 2.4 mg/dL     CBC & Differential [[id number]]  (Normal) Collected: 10/03/22 0630    Specimen: Blood Updated: 10/03/22 0710    Narrative:      The following orders were created for panel order CBC & Differential.  Procedure                               Abnormality         Status                     ---------                               -----------         ------                     CBC Auto Differential[[id number]]        Normal              Final result                 Please view results for these tests on the individual orders.    CBC Auto Differential [[id number]]  (Normal) Collected: 10/03/22 0630    Specimen: Blood Updated: 10/03/22 0710     WBC 6.88 10*3/mm3      RBC 4.93 10*6/mm3      Hemoglobin 14.9 g/dL      Hematocrit 45.1 %      MCV 91.5 fL      MCH 30.2 pg      MCHC 33.0 g/dL      RDW 12.6 %      RDW-SD 42.4 fl      MPV 10.3 fL      Platelets 175 10*3/mm3      Neutrophil % 49.7 %      Lymphocyte % 37.6 %      Monocyte % 10.2 %      Eosinophil % 1.5 %      Basophil % 0.9 %      Immature Grans % 0.1 %      Neutrophils, Absolute 3.42 10*3/mm3      Lymphocytes, Absolute 2.59 10*3/mm3      Monocytes,  Absolute 0.70 10*3/mm3      Eosinophils, Absolute 0.10 10*3/mm3      Basophils, Absolute 0.06 10*3/mm3      Immature Grans, Absolute 0.01 10*3/mm3      nRBC 0.0 /100 WBC     Blood Culture - Blood, Arm, Right [[id number]]  (Normal) Collected: 09/30/22 1300    Specimen: Blood from Arm, Right Updated: 10/02/22 1331     Blood Culture No growth at 2 days    Comprehensive Metabolic Panel [[id number]]  (Abnormal) Collected: 10/02/22 0743    Specimen: Blood Updated: 10/02/22 0823     Glucose 110 mg/dL      BUN 19 mg/dL      Creatinine 0.95 mg/dL      Sodium 142 mmol/L      Potassium 3.8 mmol/L      Chloride 101 mmol/L      CO2 29.0 mmol/L      Calcium 10.2 mg/dL      Total Protein 7.0 g/dL      Albumin 4.10 g/dL      ALT (SGPT) 13 U/L      AST (SGOT) 13 U/L      Alkaline Phosphatase 110 U/L      Total Bilirubin 0.5 mg/dL      Globulin 2.9 gm/dL      A/G Ratio 1.4 g/dL      BUN/Creatinine Ratio 20.0     Anion Gap 12.0 mmol/L      eGFR 59.2 mL/min/1.73      Comment: National Kidney Foundation and American Society of Nephrology (ASN) Task Force recommended calculation based on the Chronic Kidney Disease Epidemiology Collaboration (CKD-EPI) equation refit without adjustment for race.       Narrative:      GFR Normal >60  Chronic Kidney Disease <60  Kidney Failure <15      Phosphorus [[id number]]  (Normal) Collected: 10/02/22 0743    Specimen: Blood Updated: 10/02/22 0823     Phosphorus 3.3 mg/dL     Magnesium [[id number]]  (Normal) Collected: 10/02/22 0743    Specimen: Blood Updated: 10/02/22 0823     Magnesium 2.0 mg/dL     CBC & Differential [[id number]]  (Normal) Collected: 10/02/22 0743    Specimen: Blood Updated: 10/02/22 0803    Narrative:      The following orders were created for panel order CBC & Differential.  Procedure                               Abnormality         Status                     ---------                               -----------         ------                     CBC Auto Differential[[id number]]         Normal              Final result                 Please view results for these tests on the individual orders.    CBC Auto Differential [[id number]]  (Normal) Collected: 10/02/22 0743    Specimen: Blood Updated: 10/02/22 0803     WBC 6.63 10*3/mm3      RBC 5.07 10*6/mm3      Hemoglobin 15.2 g/dL      Hematocrit 45.3 %      MCV 89.3 fL      MCH 30.0 pg      MCHC 33.6 g/dL      RDW 12.4 %      RDW-SD 41.1 fl      MPV 10.1 fL      Platelets 177 10*3/mm3      Neutrophil % 52.6 %      Lymphocyte % 33.6 %      Monocyte % 11.3 %      Eosinophil % 1.5 %      Basophil % 0.8 %      Immature Grans % 0.2 %      Neutrophils, Absolute 3.49 10*3/mm3      Lymphocytes, Absolute 2.23 10*3/mm3      Monocytes, Absolute 0.75 10*3/mm3      Eosinophils, Absolute 0.10 10*3/mm3      Basophils, Absolute 0.05 10*3/mm3      Immature Grans, Absolute 0.01 10*3/mm3      nRBC 0.0 /100 WBC     COVID PRE-OP / PRE-PROCEDURE SCREENING ORDER (NO ISOLATION) - Swab, Nasopharynx [[id number]]  (Normal) Collected: 10/01/22 0700    Specimen: Swab from Nasopharynx Updated: 10/01/22 1402    Narrative:      The following orders were created for panel order COVID PRE-OP / PRE-PROCEDURE SCREENING ORDER (NO ISOLATION) - Swab, Nasopharynx.  Procedure                               Abnormality         Status                     ---------                               -----------         ------                     COVID-19,APTIMA PANTHER(...[[id number]]  Normal              Final result                 Please view results for these tests on the individual orders.    COVID-19,APTIMA PANTHER(ELISEO),[facility], NP/OP SWAB IN UTM/VTM/SALINE TRANSPORT MEDIA,24 HR TAT - Swab, Nasopharynx [[id number]]  (Normal) Collected: 10/01/22 0700    Specimen: Swab from Nasopharynx Updated: 10/01/22 1402     COVID19 Not Detected    Narrative:      Fact sheet for providers: https://www.fda.gov/media/630203/download     Fact sheet for patients:  https://www.fda.gov/media/706990/download    Test performed by RT PCR.    Calcium, Ionized [[id number]] Collected: 10/01/22 1225    Specimen: Blood Updated: 10/01/22 1225    Comprehensive Metabolic Panel [[id number]]  (Abnormal) Collected: 10/01/22 0655    Specimen: Blood Updated: 10/01/22 0832     Glucose 113 mg/dL      BUN 15 mg/dL      Creatinine 0.95 mg/dL      Sodium 142 mmol/L      Potassium 3.8 mmol/L      Chloride 102 mmol/L      CO2 31.2 mmol/L      Calcium 9.5 mg/dL      Total Protein 6.1 g/dL      Albumin 3.70 g/dL      ALT (SGPT) 11 U/L      AST (SGOT) 15 U/L      Alkaline Phosphatase 99 U/L      Total Bilirubin 0.4 mg/dL      Globulin 2.4 gm/dL      A/G Ratio 1.5 g/dL      BUN/Creatinine Ratio 15.8     Anion Gap 8.8 mmol/L      eGFR 59.2 mL/min/1.73      Comment: National Kidney Foundation and American Society of Nephrology (ASN) Task Force recommended calculation based on the Chronic Kidney Disease Epidemiology Collaboration (CKD-EPI) equation refit without adjustment for race.       Narrative:      GFR Normal >60  Chronic Kidney Disease <60  Kidney Failure <15      CBC & Differential [[id number]]  (Normal) Collected: 10/01/22 0655    Specimen: Blood Updated: 10/01/22 0832    Narrative:      The following orders were created for panel order CBC & Differential.  Procedure                               Abnormality         Status                     ---------                               -----------         ------                     CBC Auto Differential[[id number]]        Normal              Final result                 Please view results for these tests on the individual orders.    CBC Auto Differential [[id number]]  (Normal) Collected: 10/01/22 0655    Specimen: Blood Updated: 10/01/22 0832     WBC 5.28 10*3/mm3      RBC 4.53 10*6/mm3      Hemoglobin 13.9 g/dL      Hematocrit 40.7 %      MCV 89.8 fL      MCH 30.7 pg      MCHC 34.2 g/dL      RDW 12.3 %      RDW-SD 41.2 fl      MPV 10.4 fL      Platelets  167 10*3/mm3      Neutrophil % 54.9 %      Lymphocyte % 33.0 %      Monocyte % 8.7 %      Eosinophil % 2.1 %      Basophil % 1.1 %      Immature Grans % 0.2 %      Neutrophils, Absolute 2.90 10*3/mm3      Lymphocytes, Absolute 1.74 10*3/mm3      Monocytes, Absolute 0.46 10*3/mm3      Eosinophils, Absolute 0.11 10*3/mm3      Basophils, Absolute 0.06 10*3/mm3      Immature Grans, Absolute 0.01 10*3/mm3      nRBC 0.0 /100 WBC     Vitamin D 25 Hydroxy [[id number]]  (Normal) Collected: 10/01/22 0657    Specimen: Blood Updated: 10/01/22 0755     25 Hydroxy, Vitamin D 60.5 ng/ml     Narrative:      Reference Range for Total Vitamin D 25(OH)     Deficiency <20.0 ng/mL   Insufficiency 21-29 ng/mL   Sufficiency  ng/mL  Toxicity >100 ng/ml    Results may be falsely increased if patient taking Biotin.      PTH, Intact [[id number]]  (Abnormal) Collected: 10/01/22 0655    Specimen: Blood Updated: 10/01/22 0745     PTH, Intact 81.0 pg/mL     Narrative:      Results may be falsely decreased if patient taking Biotin.      CK [[id number]]  (Normal) Collected: 10/01/22 0655    Specimen: Blood Updated: 10/01/22 0740     Creatine Kinase 35 U/L     Phosphorus [[id number]]  (Normal) Collected: 10/01/22 0655    Specimen: Blood Updated: 10/01/22 0740     Phosphorus 2.9 mg/dL     Magnesium [[id number]]  (Normal) Collected: 10/01/22 0655    Specimen: Blood Updated: 10/01/22 0740     Magnesium 1.9 mg/dL     BNP [[id number]]  (Normal) Collected: 09/30/22 1901    Specimen: Blood Updated: 09/30/22 2203     proBNP 661.0 pg/mL     Narrative:      Among patients with dyspnea, NT-proBNP is highly sensitive for the detection of acute congestive heart failure. In addition NT-proBNP of <300 pg/ml effectively rules out acute congestive heart failure with 99% negative predictive value.    Results may be falsely decreased if patient taking Biotin.      Phosphorus [[id number]]  (Normal) Collected: 09/30/22 2037    Specimen: Blood Updated: 09/30/22 2110      "Phosphorus 2.6 mg/dL     Magnesium [[id number]]  (Normal) Collected: 09/30/22 1901    Specimen: Blood Updated: 09/30/22 1936     Magnesium 2.2 mg/dL     T3, Free [[id number]]  (Normal) Collected: 09/30/22 1300    Specimen: Blood Updated: 09/30/22 1509     T3, Free 3.14 pg/mL     Narrative:      Results may be falsely increased if patient taking Biotin.      TSH [[id number]]  (Normal) Collected: 09/30/22 1300    Specimen: Blood Updated: 09/30/22 1445     TSH 0.869 uIU/mL     Procalcitonin [[id number]]  (Normal) Collected: 09/30/22 1300    Specimen: Blood Updated: 09/30/22 1445     Procalcitonin 0.02 ng/mL     Narrative:      As a Marker for Sepsis (Non-Neonates):    1. <0.5 ng/mL represents a low risk of severe sepsis and/or septic shock.  2. >2 ng/mL represents a high risk of severe sepsis and/or septic shock.    As a Marker for Lower Respiratory Tract Infections that require antibiotic therapy:    PCT on Admission    Antibiotic Therapy       6-12 Hrs later    >0.5                Strongly Recommended  >0.25 - <0.5        Recommended   0.1 - 0.25          Discouraged              Remeasure/reassess PCT  <0.1                Strongly Discouraged     Remeasure/reassess PCT    As 28 day mortality risk marker: \"Change in Procalcitonin Result\" (>80% or <=80%) if Day 0 (or Day 1) and Day 4 values are available. Refer to http://[email or web address]-pct-[email or web address]    Change in PCT <=80%  A decrease of PCT levels below or equal to 80% defines a positive change in PCT test result representing a higher risk for 28-day all-cause mortality of patients diagnosed with severe sepsis for septic shock.    Change in PCT >80%  A decrease of PCT levels of more than 80% defines a negative change in PCT result representing a lower risk for 28-day all-cause mortality of patients diagnosed with severe sepsis or septic shock.       T4, Free [[id number]]  (Normal) Collected: 09/30/22 1300    Specimen: Blood Updated: 09/30/22 1445     Free T4 1.06 ng/dL  "    Narrative:      Results may be falsely increased if patient taking Biotin.      Protime-INR [[id number]]  (Normal) Collected: 09/30/22 1300    Specimen: Blood from Arm, Right Updated: 09/30/22 1355     Protime 13.6 Seconds      INR 1.05    aPTT [[id number]]  (Normal) Collected: 09/30/22 1300    Specimen: Blood from Arm, Right Updated: 09/30/22 1355     PTT 23.6 seconds     Lipid Panel [[id number]]  (Abnormal) Collected: 09/30/22 1300    Specimen: Blood Updated: 09/30/22 1353     Total Cholesterol 180 mg/dL      Triglycerides 121 mg/dL      HDL Cholesterol 70 mg/dL      LDL Cholesterol  89 mg/dL      VLDL Cholesterol 21 mg/dL      LDL/HDL Ratio 1.23    Narrative:      Cholesterol Reference Ranges  (U.S. Department of Health and Human Services ATP III Classifications)    Desirable          <200 mg/dL  Borderline High    200-239 mg/dL  High Risk          >240 mg/dL      Triglyceride Reference Ranges  (U.S. Department of Health and Human Services ATP III Classifications)    Normal           <150 mg/dL  Borderline High  150-199 mg/dL  High             200-499 mg/dL  Very High        >500 mg/dL    HDL Reference Ranges  (U.S. Department of Health and Human Services ATP III Classifications)    Low     <40 mg/dl (major risk factor for CHD)  High    >60 mg/dl ('negative' risk factor for CHD)        LDL Reference Ranges  (U.S. Department of Health and Human Services ATP III Classifications)    Optimal          <100 mg/dL  Near Optimal     100-129 mg/dL  Borderline High  130-159 mg/dL  High             160-189 mg/dL  Very High        >189 mg/dL    Basic Metabolic Panel [[id number]]  (Abnormal) Collected: 09/30/22 1300    Specimen: Blood Updated: 09/30/22 1353     Glucose 109 mg/dL      BUN 14 mg/dL      Creatinine 0.62 mg/dL      Sodium 143 mmol/L      Potassium 3.1 mmol/L      Chloride 102 mmol/L      CO2 31.1 mmol/L      Calcium 9.9 mg/dL      BUN/Creatinine Ratio 22.6     Anion Gap 9.9 mmol/L      eGFR 87.9 mL/min/1.73       Comment: National Kidney Foundation and American Society of Nephrology (ASN) Task Force recommended calculation based on the Chronic Kidney Disease Epidemiology Collaboration (CKD-EPI) equation refit without adjustment for race.       Narrative:      GFR Normal >60  Chronic Kidney Disease <60  Kidney Failure <15      C-reactive Protein [[id number]]  (Normal) Collected: 09/30/22 1300    Specimen: Blood Updated: 09/30/22 1353     C-Reactive Protein <0.30 mg/dL     Uric Acid [[id number]]  (Normal) Collected: 09/30/22 1300    Specimen: Blood Updated: 09/30/22 1353     Uric Acid 4.5 mg/dL     Lactic Acid, Plasma [[id number]]  (Normal) Collected: 09/30/22 1300    Specimen: Blood Updated: 09/30/22 1349     Lactate 1.8 mmol/L     Sedimentation Rate [[id number]]  (Normal) Collected: 09/30/22 1300    Specimen: Blood Updated: 09/30/22 1336     Sed Rate 20 mm/hr     CBC & Differential [[id number]]  (Normal) Collected: 09/30/22 1300    Specimen: Blood Updated: 09/30/22 1332    Narrative:      The following orders were created for panel order CBC & Differential.  Procedure                               Abnormality         Status                     ---------                               -----------         ------                     CBC Auto Differential[[id number]]        Normal              Final result                 Please view results for these tests on the individual orders.    CBC Auto Differential [[id number]]  (Normal) Collected: 09/30/22 1300    Specimen: Blood Updated: 09/30/22 1332     WBC 7.40 10*3/mm3      RBC 4.60 10*6/mm3      Hemoglobin 14.1 g/dL      Hematocrit 42.4 %      MCV 92.2 fL      MCH 30.7 pg      MCHC 33.3 g/dL      RDW 12.5 %      RDW-SD 41.8 fl      MPV 10.5 fL      Platelets 172 10*3/mm3      Neutrophil % 62.4 %      Lymphocyte % 27.8 %      Monocyte % 7.6 %      Eosinophil % 1.2 %      Basophil % 0.7 %      Immature Grans % 0.3 %      Neutrophils, Absolute 4.62 10*3/mm3      Lymphocytes, Absolute 2.06  10*3/mm3      Monocytes, Absolute 0.56 10*3/mm3      Eosinophils, Absolute 0.09 10*3/mm3      Basophils, Absolute 0.05 10*3/mm3      Immature Grans, Absolute 0.02 10*3/mm3      nRBC 0.0 /100 WBC            I reviewed the x-rays of her right foot and ankle and they appear similar to the ones done in the office on 9/22/2022 where there is arthritic change of the first MTP joint with some hallux valgus mild arthritis at the midfoot and some loss of subtalar joint space consistent with pes planus configuration with chronic posterior tib insufficiency.  No tilt of the talus within the mortise  PHYSICAL EXAM: Right ankle is nontender to palpation.  She is able to actively invert and lance and has some weakness on resisted inversion but without focal tenderness to palpation.  Swelling is much improved after diuresing.  Has good motion and sensation to her foot and ankle.  She does have a fracture boot however presently it is off while in bed.  Advised her that she does need to wear her boot anytime she is out of bed would recommend that she wear stocking or sock protect her skin while wearing the boot.  There is no sign of infection about the ankle and skin is markedly improved.  Patient is declining to have the MRI after reading the multiple secondary effects and at reviewed with her I do not think it would be detrimental to her but given her improvement I do not think it would necessarily change anything that were doing at this point so we will hold off on that and not press the issue.     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  We discussed possibly transitioning to a PT TD brace which she does not think she wants to do as it rubbed her skin but she was also much more swollen at that time as well as have to see her  swelling does not outpost with the boot and possibly try some custom orthotics down the road if she is improving if not some other type of custom brace.    We will see her back as scheduled in about a month on 11/3/2022..      Dr. Cadet is in the room and is planning to discharge patient home.  Have again reinforced the importance of wearing her fracture boot anytime she is out of bed.  Patient will return to the office at her regular scheduled appointment to see Dr. Carlene Concepcion MD            Date: 10/3/2022    Pina Interiano RN

## 2022-10-03 NOTE — PROGRESS NOTES
[facility] to provide SN,PT and OT per orders per Dr Cadet, noting weekly labs on order.  Spoke with patient and verified all info.  She had our agency in July and is agreeable.  Prefers visits be after 10AM since they stay up late most nights.  Patient informed that start of care will likely be on Wednesday and patient voiced understanding.

## 2022-10-04 ENCOUNTER — HOME CARE VISIT (OUTPATIENT)
Dept: HOME HEALTH SERVICES | Facility: HOME HEALTHCARE | Age: 82
End: 2022-10-04

## 2022-10-04 LAB — CA-I SERPL ISE-MCNC: 5.9 MG/DL (ref 4.5–5.6)

## 2022-10-04 NOTE — OUTREACH NOTE
Prep Survey    Flowsheet Row Responses   [facility] facility patient discharged from? [place]   Is LACE score < 7 ? No   Emergency Room discharge w/ pulse ox? No   Eligibility Readm Mgmt   Discharge diagnosis   Lower extremity edema, Hypertension, exacerbated by hypervolemia   Does the patient have one of the following disease processes/diagnoses(primary or secondary)? Other   Does the patient have Home health ordered? Yes   What is the Home health agency?  [facility]   Is there a DME ordered? No   Prep survey completed? Yes          RAMESH VALENCIA - Registered Nurse

## 2022-10-04 NOTE — CASE MANAGEMENT/SOCIAL WORK
Case Management Discharge Note      Final Note: Pt discharged home with [facility] on 10/3.   MAURICE Michel RN         Selected Continued Care - Discharged on 10/3/2022 Admission date: 9/30/2022 - Discharge disposition: Home-Health Care Svc    Destination    No services have been selected for the patient.              Durable Medical Equipment    No services have been selected for the patient.              Dialysis/Infusion    No services have been selected for the patient.              Home Medical Care Coordination complete.    Service Provider Selected Services Address Phone Fax Patient Preferred    [facility]  Home Health Services ,  Home Nursing ,  Home Rehabilitation [address] [address] [phone] --          Therapy    No services have been selected for the patient.              Community Resources    No services have been selected for the patient.              Community & DME    No services have been selected for the patient.                  Transportation Services  Private: Car    Final Discharge Disposition Code: 06 - home with home health care

## 2022-10-04 NOTE — DISCHARGE SUMMARY
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 practitioner.  Patient currently quite stable.    Condition on Discharge:Stable    Discharge Disposition  Home-Health Care Svc    Transport Plan  Ambulance which is medically necessary by Medicare Standards    Hospital Treatments discontinued at time of Discharge  IV, Telemetry, Miller Catheter, Deep Lines and PICC LInes    Discharge Medications     Discharge Medications      New Medications      Instructions Start Date   mupirocin 2 % ointment  Commonly known as: BACTROBAN   1 application, Topical, Every 12 Hours Scheduled         Changes to Medications      Instructions Start Date   cloNIDine 0.3 MG tablet  Commonly known as: CATAPRES  What changed:   · medication strength  · how much to take  · when to take this  · reasons to take this  · additional instructions   0.3 mg, Oral, Daily PRN      cloNIDine 0.2 MG tablet  Commonly known as: CATAPRES  What changed: You were already taking a medication with the same name, and this prescription was added. Make sure you understand how and when to take each.   0.2 mg, Oral, Every 12 Hours Scheduled      NIFEdipine CC 30 MG 24 hr tablet  Commonly known as: ADALAT CC  What changed:   · when to take this  · additional instructions   30 mg, Oral, Every 24 Hours Scheduled      NIFEdipine CC 30 MG 24 hr  tablet  Commonly known as: ADALAT CC  What changed: You were already taking a medication with the same name, and this prescription was added. Make sure you understand how and when to take each.   30 mg, Oral, Every 8 Hours PRN      promethazine-codeine 6.25-10 MG/5ML syrup  Commonly known as: PHENERGAN with CODEINE  What changed:   · how much to take  · Another medication with the same name was removed. Continue taking this medication, and follow the directions you see here.   5 mL, Oral, 4 Times Daily PRN         Continue These Medications      Instructions Start Date   acetaminophen 325 MG tablet  Commonly known as: TYLENOL   650 mg, Oral, Every 4 Hours PRN      budesonide 180 MCG/ACT inhaler  Commonly known as: PULMICORT   1 puff, Inhalation, 2 Times Daily - RT      butalbital-acetaminophen-caffeine -40 MG per tablet  Commonly known as: FIORICET, ESGIC   2 tablets, Oral, Every 4 Hours PRN      cetirizine 10 MG tablet  Commonly known as: zyrTEC   10 mg, Oral, Daily, Alt with singulair      cyanocobalamin 1000 MCG tablet  Commonly known as: VITAMIN B-12   1,000 mcg, Oral, Daily      diphenhydrAMINE 50 MG tablet  Commonly known as: BENADRYL   50 mg, Oral, Every 4 Hours PRN      EPINEPHrine 0.3 MG/0.3ML solution auto-injector injection  Commonly known as: EPIPEN   INJECT THE CONTENTS OF ONE PEN AS NEEDED. MAY REPEAT ONE TIME.      eplerenone 50 MG tablet  Commonly known as: INSPRA   50 mg, Oral, Every 12 Hours Scheduled      fluticasone 50 MCG/ACT nasal spray  Commonly known as: FLONASE   1 spray, Each Nare, 2 Times Daily      furosemide 40 MG tablet  Commonly known as: LASIX   40 mg, Oral, Daily PRN      HYDROcodone-acetaminophen  MG per tablet  Commonly known as: NORCO   1-2 tablets, Oral, Every 6 Hours PRN      hydrOXYzine 50 MG tablet  Commonly known as: ATARAX   50 mg, Oral, Every 6 Hours PRN      ipratropium 0.02 % nebulizer solution  Commonly known as: ATROVENT   500 mcg, Nebulization, 4 Times  Daily - RT      multivitamin with minerals tablet tablet   1 tablet, Oral, Daily      omeprazole 40 MG capsule  Commonly known as: priLOSEC   40 mg, Oral, Daily      ondansetron ODT 8 MG disintegrating tablet  Commonly known as: ZOFRAN-ODT   8 mg, Oral, Every 6 Hours PRN      polyethylene glycol 17 g packet  Commonly known as: MIRALAX   17 g, Oral, Daily PRN, HARD STOOL      potassium chloride 20 MEQ packet  Commonly known as: KLOR-CON   20 mEq, Oral, Every Other Day      predniSONE 10 MG tablet  Commonly known as: DELTASONE   10 mg, Oral, Every Other Day      ProAir  (90 Base) MCG/ACT inhaler  Generic drug: albuterol sulfate HFA   4 puffs, Every 4 Hours PRN      rivaroxaban 10 MG tablet  Commonly known as: XARELTO   20 mg, Oral, Daily      Vitamin D3 1.25 MG (61688 UT) capsule   50,000 Units, Oral, Weekly, TUESDAYS         Stop These Medications    ciprofloxacin-dexamethasone 0.3-0.1 % otic suspension  Commonly known as: CIPRODEX     ethacrynic acid 25 MG tablet  Commonly known as: EDECRIN     KP KETOTIFEN FUMARATE OP     Potassium Acetate powder            Home Medication List  Prior to Admission medications    Medication Sig Start Date End Date Taking? Authorizing Provider   albuterol (PROAIR HFA) 108 (90 Base) MCG/ACT inhaler 4 puffs Every 4 (Four) Hours As Needed for Wheezing or Shortness of Air. 6/18/14  Yes ProviderVadim MD   butalbital-acetaminophen-caffeine (FIORICET, ESGIC) -40 MG per tablet Take 2 tablets by mouth Every 4 (Four) Hours As Needed for Headache. 3/27/17  Yes Óscar Cadet MD   cloNIDine (CATAPRES) 0.2 MG tablet Take 1 tablet by mouth Every 12 (Twelve) Hours. 10/3/22  Yes Óscar Cadet MD   cloNIDine (CATAPRES) 0.3 MG tablet Take 1 tablet by mouth Daily As Needed for High Blood Pressure (sys >160). 10/3/22  Yes Óscar Cadet MD   eplerenone (INSPRA) 50 MG tablet Take 1 tablet by mouth Every 12 (Twelve) Hours. 4/11/17  Yes Óscar Cadet MD   fluticasone  (FLONASE) 50 MCG/ACT nasal spray 1 spray by Each Nare route 2 (Two) Times a Day. 8/27/18  Yes Óscar Cadet MD   furosemide (LASIX) 40 MG tablet Take 40 mg by mouth Daily As Needed (swelling/weight gain). 7/11/22  Yes Vadim Best MD   HYDROcodone-acetaminophen (NORCO)  MG per tablet Take 1-2 tablets by mouth Every 6 (Six) Hours As Needed for Moderate Pain. 3/3/22  Yes Vadim Best MD   hydrOXYzine (ATARAX) 50 MG tablet Take 50 mg by mouth Every 6 (Six) Hours As Needed for Itching or Allergies. 12/12/19  Yes Vadim Best MD   mupirocin (BACTROBAN) 2 % ointment Apply 1 application topically to the appropriate area as directed Every 12 (Twelve) Hours. 10/3/22  Yes Óscar Cadet MD   NIFEdipine XL (ADALAT CC) 30 MG 24 hr tablet Take 1 tablet by mouth Daily.  Patient taking differently: Take 30 mg by mouth Daily. . 5/30/22  Yes Óscar Cadet MD   NIFEdipine XL (ADALAT CC) 30 MG 24 hr tablet Take 1 tablet by mouth Every 8 (Eight) Hours As Needed (FoSBP >180). 10/3/22  Yes Óscar Cadet MD   ondansetron ODT (ZOFRAN-ODT) 8 MG disintegrating tablet Take 1 tablet by mouth every 6 (six) hours as needed for nausea or vomiting. 9/8/16  Yes Óscar Cadet MD   polyethylene glycol (MIRALAX) 17 g packet Take 17 g by mouth Daily As Needed. HARD STOOL 6/6/22  Yes Vadim Best MD   potassium chloride (KLOR-CON) 20 MEQ packet Take 20 mEq by mouth Every Other Day.   Yes Vadim Best MD   predniSONE (DELTASONE) 10 MG tablet Take 1 tablet by mouth Every Other Day. 5/30/22  Yes Óscar Cadet MD   rivaroxaban (XARELTO) 10 MG tablet Take 20 mg by mouth Daily.   Yes Vadim Best MD   acetaminophen (TYLENOL) 325 MG tablet Take 2 tablets by mouth Every 4 (Four) Hours As Needed for Mild Pain . 5/29/22   Óscar Cadet MD   budesonide (PULMICORT) 180 MCG/ACT inhaler Inhale 1 puff 2 (Two) Times a Day.    Provider, MD Vadim   cetirizine (zyrTEC) 10 MG  tablet Take 10 mg by mouth Daily. Alt with singulair    Vadim Best MD   Cholecalciferol (VITAMIN D3) 1.25 MG (27877 UT) capsule Take 50,000 Units by mouth 1 (One) Time Per Week. TUESDAYS 5/11/20   Vadim Best MD   diphenhydrAMINE (BENADRYL) 50 MG tablet Take 1 tablet by mouth every 4 (four) hours as needed for itching or allergies. 7/25/16   Óscar Cadet MD   EPINEPHrine (EPIPEN) 0.3 MG/0.3ML solution auto-injector injection INJECT THE CONTENTS OF ONE PEN AS NEEDED. MAY REPEAT ONE TIME. 3/29/21   Vadim Best MD   ipratropium (ATROVENT) 0.02 % nebulizer solution Take 500 mcg by nebulization 4 (Four) Times a Day.    Vadim Best MD   multivitamin with minerals tablet tablet Take 1 tablet by mouth Daily. 5/30/22   Óscar Cadet MD   omeprazole (priLOSEC) 40 MG capsule Take 40 mg by mouth Daily. 2/23/21   Vadim Best MD   promethazine-codeine (PHENERGAN with CODEINE) 6.25-10 MG/5ML syrup Take 5 mL by mouth 4 (Four) Times a Day As Needed for cough.  Patient taking differently: Take 15 mL by mouth 4 (Four) Times a Day As Needed for Cough. 12/9/16   Óscar Cadet MD   vitamin B-12 (VITAMIN B-12) 1000 MCG tablet Take 1 tablet by mouth Daily. 4/11/17   Óscar Cadet MD       Discharge Diet   Diet Orders (active) (From admission, onward)    None          Activity at Discharge  Activity Instructions     Gradually Increase Activity Until at Pre-Hospitalization Level            Follow-up Appointments  Future Appointments   Date Time Provider Department Center   11/3/2022  1:30 PM Wicho Concepcion MD [facility] [address] [facility]     Additional Instructions for the Follow-ups that You Need to Schedule     Ambulatory Referral to Home Health   As directed      Face to Face Visit Date: 10/3/2022    Follow-up provider for Plan of Care?: I will be treating the patient on an ongoing basis.  Please send me the Plan of Care for signature.    Follow-up provider: ÓSCAR CADET  H [[id number]]    Reason/Clinical Findings: Cellulitis and edema RLE, COPD, Weakness, Fall as outpatient    Describe mobility limitations that make leaving home difficult: Needs assist of 1 to get out of house due to mobility limitstion    Nursing/Therapeutic Services Requested: Skilled Nursing (Needs labs werkly x 3 weeks:  CBC, CMP, Magnesium, Phosphorus) Physical Therapy Occupational Therapy    Skilled nursing orders: Medication education (Monitor redness RLE) Pain management COPD management Cardiopulmonary assessments    PT orders: Transfer training (help ambulate with boot after applying GLENN stockings to legs) Gait Training Therapeutic exercise Strengthening Home safety assessment    Weight Bearing Status: As Tolerated    Occupational orders: Activities of daily living (Help patient anf  leatn to put on GLENN styockings) Energy conservation Strengthening Cognition Fine motor Home safety assessment    Frequency: 1 Week 1         Discharge Follow-up with PCP   As directed       Currently Documented PCP:    Óscar Cadet MD    PCP Phone Number:    [phone]     Follow Up Details: Call [phone] for follow up with Dr. Cadet in 3 weeks         Discharge Follow-up with Specified Provider: Dr. Sawant in Vascular in 3 months for follow up on right  leg edema and valvular incompetense   As directed      To: Dr. Sawant in Vascular in 3 months for follow up on right  leg edema and valvular incompetense         Discharge Follow-up with Specified Provider: Dr. Concepcion in orthopedics as scheduled 11/3/2022 at 1:30 PM   As directed      To: Dr. Concepcion in orthopedics as scheduled 11/3/2022 at 1:30 PM         Discharge Follow-up with Specified Provider: Follow up with Dr. Bond in 4 weeks in office; 1 Month   As directed      To: Follow up with Dr. Bond in 4 weeks in office    Follow Up: 1 Month               Therapy Orders  Physical therapy, Occupational Therapy Per [facility] in the home environment    Test  Results Pending at Discharge  Pending Labs     Order Current Status    Calcium, Ionized In process    Blood Culture - Blood, Arm, Right Preliminary result          Óscar Cadet MD   10/3/2022  1650 EDT    Time: Discharge 90  min

## 2022-10-04 NOTE — CASE COMMUNICATION
Attempted to see patient today for physical therapy start of care. Patient declined visit stating that her  had an appointment and that she was told she was going to be seen on wednesday 10/5/22, she would prefer to stick with that schedule.

## 2022-10-05 ENCOUNTER — READMISSION MANAGEMENT (OUTPATIENT)
Dept: CALL CENTER | Facility: HOSPITAL | Age: 82
End: 2022-10-05

## 2022-10-05 ENCOUNTER — HOME CARE VISIT (OUTPATIENT)
Dept: HOME HEALTH SERVICES | Facility: HOME HEALTHCARE | Age: 82
End: 2022-10-05

## 2022-10-05 VITALS
WEIGHT: 163 LBS | HEART RATE: 80 BPM | OXYGEN SATURATION: 99 % | RESPIRATION RATE: 18 BRPM | SYSTOLIC BLOOD PRESSURE: 142 MMHG | HEIGHT: 62 IN | TEMPERATURE: 96.5 F | DIASTOLIC BLOOD PRESSURE: 70 MMHG | BODY MASS INDEX: 30 KG/M2

## 2022-10-05 LAB — BACTERIA SPEC AEROBE CULT: NORMAL

## 2022-10-05 PROCEDURE — G0299 HHS/HOSPICE OF RN EA 15 MIN: HCPCS

## 2022-10-05 NOTE — OUTREACH NOTE
Medical Week 1 Survey    Flowsheet Row Responses   [facility] patient discharged from? [place]   Does the patient have one of the following disease processes/diagnoses(primary or secondary)? Other   Week 1 attempt successful? Yes   Call start time 1027   Call end time 1038   Discharge diagnosis   Lower extremity edema, Hypertension, exacerbated by hypervolemia   Meds reviewed with patient/caregiver? Yes   Is the patient having any side effects they believe may be caused by any medication additions or changes? No   Does the patient have all medications ordered at discharge? Yes   Is the patient taking all medications as directed (includes completed medication regime)? Yes   Comments regarding appointments Pt. reports hospital f/u appt. with PCP 10/6/22   Does the patient have a primary care provider?  Yes   Does the patient have an appointment with their PCP within 7 days of discharge? Yes   Has the patient kept scheduled appointments due by today? N/A   What is the Home health agency?  [facility]   Psychosocial issues? No   Did the patient receive a copy of their discharge instructions? Yes   Nursing interventions Reviewed instructions with patient   What is the patient's perception of their health status since discharge? Improving   Is the patient/caregiver able to teach back signs and symptoms related to disease process for when to call PCP? Yes   Is the patient/caregiver able to teach back signs and symptoms related to disease process for when to call 911? Yes   Is the patient/caregiver able to teach back the hierarchy of who to call/visit for symptoms/problems? PCP, Specialist, Home health nurse, Urgent Care, ED, 911 Yes   If the patient is a current smoker, are they able to teach back resources for cessation? Not a smoker   Week 1 call completed? Yes          JUAN CARLOS Alston Registered Nurse

## 2022-10-06 ENCOUNTER — HOME CARE VISIT (OUTPATIENT)
Dept: HOME HEALTH SERVICES | Facility: HOME HEALTHCARE | Age: 82
End: 2022-10-06

## 2022-10-07 ENCOUNTER — HOME CARE VISIT (OUTPATIENT)
Dept: HOME HEALTH SERVICES | Facility: HOME HEALTHCARE | Age: 82
End: 2022-10-07

## 2022-10-07 NOTE — HOME HEALTH
SOC Note: Patient is A&Ox4, vitals are stable, lungs are clear throughout and diminished on the left side. trace edema noted in the ankles. Patient had complications with blood pressure being elevated. Reported staff in hospital had issues with managing medications the way she does. Reports legs were swollen but now only with trace edema. Patient does weigh daily. States that they went yesterday to get compression stockings but did not want to buy the stockings since they will not accepted returns on them. Patient reports in past she has had difficulties getting stockings on and off. Patient reports that she is going tomorrow to see PCP and he is going to be doing a in depth review of medications with her.                      Plan for next visit: follow up after PCP visit, review medications, assess blood pressures, assess weights, labs weekly as ordered.                      Home Health ordered for: disciplines PT, OT, SN                      Reason for Hosp/Primary Dx/Co-morbidities: Recent hospitalization at [facility] for bilateral lower extremity edema, hypervolemia, acute shoulder pain due to trauma, weakness, adult failure to thrive syndrome, CKD 3, arthralgia, urinary retention, HLD                     Focus of Care: Assessments and teaching regarding Volume Overload

## 2022-10-08 ENCOUNTER — HOME CARE VISIT (OUTPATIENT)
Dept: HOME HEALTH SERVICES | Facility: HOME HEALTHCARE | Age: 82
End: 2022-10-08

## 2022-10-08 VITALS
OXYGEN SATURATION: 99 % | TEMPERATURE: 96.6 F | SYSTOLIC BLOOD PRESSURE: 146 MMHG | HEART RATE: 61 BPM | RESPIRATION RATE: 18 BRPM | DIASTOLIC BLOOD PRESSURE: 84 MMHG

## 2022-10-08 PROCEDURE — G0151 HHCP-SERV OF PT,EA 15 MIN: HCPCS

## 2022-10-08 NOTE — HOME HEALTH
REASON FOR REFERRAL: decreased ability to ambulate in and out of the home due to recent hospital stay for HTN, chronic kidney disease stage III, fall resulting in functional decline, and LE weakness.     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    SKILLED PHYSICAL THERAPY IS MEDICALLY NECESSARY FOR: Instruction/education in lower extremity strengthening HEP, bed mobility/transfers training, gait training, balance training, fall prevention, and activity tolerance training to enable patient to safely exit home for medical appointments.

## 2022-10-10 ENCOUNTER — HOME CARE VISIT (OUTPATIENT)
Dept: HOME HEALTH SERVICES | Facility: HOME HEALTHCARE | Age: 82
End: 2022-10-10

## 2022-10-10 PROCEDURE — G0300 HHS/HOSPICE OF LPN EA 15 MIN: HCPCS

## 2022-10-11 ENCOUNTER — HOME CARE VISIT (OUTPATIENT)
Dept: HOME HEALTH SERVICES | Facility: HOME HEALTHCARE | Age: 82
End: 2022-10-11

## 2022-10-11 VITALS
DIASTOLIC BLOOD PRESSURE: 90 MMHG | TEMPERATURE: 97.8 F | SYSTOLIC BLOOD PRESSURE: 140 MMHG | OXYGEN SATURATION: 95 % | RESPIRATION RATE: 18 BRPM | HEART RATE: 78 BPM

## 2022-10-11 VITALS
HEART RATE: 78 BPM | SYSTOLIC BLOOD PRESSURE: 140 MMHG | RESPIRATION RATE: 18 BRPM | OXYGEN SATURATION: 95 % | TEMPERATURE: 97.8 F | DIASTOLIC BLOOD PRESSURE: 90 MMHG

## 2022-10-11 PROCEDURE — G0152 HHCP-SERV OF OT,EA 15 MIN: HCPCS

## 2022-10-11 PROCEDURE — G0151 HHCP-SERV OF PT,EA 15 MIN: HCPCS

## 2022-10-11 NOTE — HOME HEALTH
Patient BP has been high. Patient is checking 2x daily.  MD requested patient to continue checking BP 2x daily until patient goes to nephrology appointment. LPN educated on low sodium diet. Patient was unable to get compression stockings due to price. Patient using ace bandage wrap from toe to knee. Patient stated today we cannot start weekly labs that it needs to be on next visit due to wanting it to be atleast a week since home from hospital.     Plans for next visit- T/I CKD

## 2022-10-11 NOTE — HOME HEALTH
Patient reports no falls since last visit.    Plan for next visit:  -transfer training  -gait training with rolling walker  -education on HEP with progressions as appropriate  -standing balance exercises

## 2022-10-13 ENCOUNTER — HOME CARE VISIT (OUTPATIENT)
Dept: HOME HEALTH SERVICES | Facility: HOME HEALTHCARE | Age: 82
End: 2022-10-13

## 2022-10-13 ENCOUNTER — LAB REQUISITION (OUTPATIENT)
Dept: LAB | Facility: HOSPITAL | Age: 82
End: 2022-10-13

## 2022-10-13 VITALS — OXYGEN SATURATION: 98 % | HEART RATE: 88 BPM | DIASTOLIC BLOOD PRESSURE: 78 MMHG | SYSTOLIC BLOOD PRESSURE: 172 MMHG

## 2022-10-13 VITALS
TEMPERATURE: 97.6 F | OXYGEN SATURATION: 98 % | DIASTOLIC BLOOD PRESSURE: 82 MMHG | HEART RATE: 75 BPM | SYSTOLIC BLOOD PRESSURE: 138 MMHG

## 2022-10-13 DIAGNOSIS — Z00.00 ROUTINE GENERAL MEDICAL EXAMINATION AT A HEALTH CARE FACILITY: ICD-10-CM

## 2022-10-13 LAB
ALBUMIN SERPL-MCNC: 4.4 G/DL (ref 3.5–5.2)
ALBUMIN/GLOB SERPL: 1.6 G/DL
ALP SERPL-CCNC: 101 U/L (ref 39–117)
ALT SERPL W P-5'-P-CCNC: 10 U/L (ref 1–33)
ANION GAP SERPL CALCULATED.3IONS-SCNC: 14.3 MMOL/L (ref 5–15)
AST SERPL-CCNC: 19 U/L (ref 1–32)
BASOPHILS # BLD AUTO: 0.04 10*3/MM3 (ref 0–0.2)
BASOPHILS NFR BLD AUTO: 0.7 % (ref 0–1.5)
BILIRUB SERPL-MCNC: 0.3 MG/DL (ref 0–1.2)
BUN SERPL-MCNC: 27 MG/DL (ref 8–23)
BUN/CREAT SERPL: 24.5 (ref 7–25)
CALCIUM SPEC-SCNC: 9.7 MG/DL (ref 8.6–10.5)
CHLORIDE SERPL-SCNC: 99 MMOL/L (ref 98–107)
CO2 SERPL-SCNC: 27.7 MMOL/L (ref 22–29)
CREAT SERPL-MCNC: 1.1 MG/DL (ref 0.57–1)
DEPRECATED RDW RBC AUTO: 42.1 FL (ref 37–54)
EGFRCR SERPLBLD CKD-EPI 2021: 49.7 ML/MIN/1.73
EOSINOPHIL # BLD AUTO: 0.01 10*3/MM3 (ref 0–0.4)
EOSINOPHIL NFR BLD AUTO: 0.2 % (ref 0.3–6.2)
ERYTHROCYTE [DISTWIDTH] IN BLOOD BY AUTOMATED COUNT: 12.5 % (ref 12.3–15.4)
GLOBULIN UR ELPH-MCNC: 2.7 GM/DL
GLUCOSE SERPL-MCNC: 87 MG/DL (ref 65–99)
HCT VFR BLD AUTO: 41.6 % (ref 34–46.6)
HGB BLD-MCNC: 14 G/DL (ref 12–15.9)
IMM GRANULOCYTES # BLD AUTO: 0.02 10*3/MM3 (ref 0–0.05)
IMM GRANULOCYTES NFR BLD AUTO: 0.3 % (ref 0–0.5)
LYMPHOCYTES # BLD AUTO: 1.2 10*3/MM3 (ref 0.7–3.1)
LYMPHOCYTES NFR BLD AUTO: 19.9 % (ref 19.6–45.3)
MAGNESIUM SERPL-MCNC: 2.5 MG/DL (ref 1.6–2.4)
MCH RBC QN AUTO: 31 PG (ref 26.6–33)
MCHC RBC AUTO-ENTMCNC: 33.7 G/DL (ref 31.5–35.7)
MCV RBC AUTO: 92.2 FL (ref 79–97)
MONOCYTES # BLD AUTO: 0.26 10*3/MM3 (ref 0.1–0.9)
MONOCYTES NFR BLD AUTO: 4.3 % (ref 5–12)
NEUTROPHILS NFR BLD AUTO: 4.49 10*3/MM3 (ref 1.7–7)
NEUTROPHILS NFR BLD AUTO: 74.6 % (ref 42.7–76)
NRBC BLD AUTO-RTO: 0 /100 WBC (ref 0–0.2)
PHOSPHATE SERPL-MCNC: 2.9 MG/DL (ref 2.5–4.5)
PLATELET # BLD AUTO: 166 10*3/MM3 (ref 140–450)
PMV BLD AUTO: 11 FL (ref 6–12)
POTASSIUM SERPL-SCNC: 3.8 MMOL/L (ref 3.5–5.2)
PROT SERPL-MCNC: 7.1 G/DL (ref 6–8.5)
RBC # BLD AUTO: 4.51 10*6/MM3 (ref 3.77–5.28)
SODIUM SERPL-SCNC: 141 MMOL/L (ref 136–145)
WBC NRBC COR # BLD: 6.02 10*3/MM3 (ref 3.4–10.8)

## 2022-10-13 PROCEDURE — G0151 HHCP-SERV OF PT,EA 15 MIN: HCPCS

## 2022-10-13 PROCEDURE — G0152 HHCP-SERV OF OT,EA 15 MIN: HCPCS

## 2022-10-13 PROCEDURE — 80053 COMPREHEN METABOLIC PANEL: CPT | Performed by: INTERNAL MEDICINE

## 2022-10-13 PROCEDURE — 84100 ASSAY OF PHOSPHORUS: CPT | Performed by: INTERNAL MEDICINE

## 2022-10-13 PROCEDURE — G0493 RN CARE EA 15 MIN HH/HOSPICE: HCPCS

## 2022-10-13 PROCEDURE — 85025 COMPLETE CBC W/AUTO DIFF WBC: CPT | Performed by: INTERNAL MEDICINE

## 2022-10-13 PROCEDURE — 83735 ASSAY OF MAGNESIUM: CPT | Performed by: INTERNAL MEDICINE

## 2022-10-13 NOTE — HOME HEALTH
Reason for Referral: Recent hospitalization due to HTN, Stage 3 CKD, LE edema    Medical History:  COPD, DVT, PE, CVA, GERD, DDD, HLD, Stage 3 CKD, Urinary retention with self cath    Subjective: Patient reports doing ok, concerned about  who is at MD appt.    Home Environment: Patient lives with , using walker.    PLOF: Independent    Medical Necessity: Patient requires skilled occupational therapy for remediation of deficits to improve safety in home and improve self care, functional transfers, mobility, strength, endurance, DME/adaptive equipment    Plan for Next Visit: Adl safety, UE ex

## 2022-10-14 VITALS
HEART RATE: 75 BPM | OXYGEN SATURATION: 98 % | RESPIRATION RATE: 18 BRPM | TEMPERATURE: 97.6 F | SYSTOLIC BLOOD PRESSURE: 130 MMHG | DIASTOLIC BLOOD PRESSURE: 80 MMHG

## 2022-10-14 NOTE — HOME HEALTH
WEEKLY LABS X3 WEEKS - THRU 10/28/22  BLE EDEMA MANAGEMENT - PT TO USE COMPRESSION HOSE BUT IS USING ACE WRAPS    Plan for next visit: follow up after PCP visit, review medications, assess blood pressures, assess weights, labs weekly as ordered.    Focus of Care: Assessments and teaching regarding Volume Overload             Reason for Hosp/Primary Dx/Co-morbidities: Recent hospitalization at [facility] for bilateral lower extremity edema, hypervolemia, acute shoulder pain due to trauma, weakness, adult failure to thrive syndrome, CKD 3, arthralgia, urinary retention, HLD

## 2022-10-17 VITALS — HEART RATE: 69 BPM | OXYGEN SATURATION: 98 %

## 2022-10-18 ENCOUNTER — HOME CARE VISIT (OUTPATIENT)
Dept: HOME HEALTH SERVICES | Facility: HOME HEALTHCARE | Age: 82
End: 2022-10-18

## 2022-10-18 VITALS
SYSTOLIC BLOOD PRESSURE: 142 MMHG | DIASTOLIC BLOOD PRESSURE: 76 MMHG | OXYGEN SATURATION: 96 % | TEMPERATURE: 97 F | HEART RATE: 72 BPM

## 2022-10-18 PROCEDURE — G0151 HHCP-SERV OF PT,EA 15 MIN: HCPCS

## 2022-10-19 ENCOUNTER — HOME CARE VISIT (OUTPATIENT)
Dept: HOME HEALTH SERVICES | Facility: HOME HEALTHCARE | Age: 82
End: 2022-10-19

## 2022-10-19 ENCOUNTER — LAB REQUISITION (OUTPATIENT)
Dept: LAB | Facility: HOSPITAL | Age: 82
End: 2022-10-19

## 2022-10-19 VITALS
OXYGEN SATURATION: 98 % | RESPIRATION RATE: 16 BRPM | BODY MASS INDEX: 29.81 KG/M2 | HEART RATE: 74 BPM | DIASTOLIC BLOOD PRESSURE: 70 MMHG | SYSTOLIC BLOOD PRESSURE: 126 MMHG | WEIGHT: 163 LBS | TEMPERATURE: 98.5 F

## 2022-10-19 DIAGNOSIS — Z00.00 ENCOUNTER FOR GENERAL ADULT MEDICAL EXAMINATION WITHOUT ABNORMAL FINDINGS: ICD-10-CM

## 2022-10-19 LAB
ALBUMIN SERPL-MCNC: 4 G/DL (ref 3.5–5.2)
ALBUMIN/GLOB SERPL: 1.7 G/DL
ALP SERPL-CCNC: 94 U/L (ref 39–117)
ALT SERPL W P-5'-P-CCNC: 11 U/L (ref 1–33)
ANION GAP SERPL CALCULATED.3IONS-SCNC: 9.4 MMOL/L (ref 5–15)
AST SERPL-CCNC: 16 U/L (ref 1–32)
BILIRUB SERPL-MCNC: 0.3 MG/DL (ref 0–1.2)
BUN SERPL-MCNC: 21 MG/DL (ref 8–23)
BUN/CREAT SERPL: 23.9 (ref 7–25)
CALCIUM SPEC-SCNC: 9.6 MG/DL (ref 8.6–10.5)
CHLORIDE SERPL-SCNC: 105 MMOL/L (ref 98–107)
CO2 SERPL-SCNC: 27.6 MMOL/L (ref 22–29)
CREAT SERPL-MCNC: 0.88 MG/DL (ref 0.57–1)
DEPRECATED RDW RBC AUTO: 40.8 FL (ref 37–54)
EGFRCR SERPLBLD CKD-EPI 2021: 64.9 ML/MIN/1.73
ERYTHROCYTE [DISTWIDTH] IN BLOOD BY AUTOMATED COUNT: 12.2 % (ref 12.3–15.4)
GLOBULIN UR ELPH-MCNC: 2.3 GM/DL
GLUCOSE SERPL-MCNC: 145 MG/DL (ref 65–99)
HCT VFR BLD AUTO: 38.9 % (ref 34–46.6)
HGB BLD-MCNC: 13.1 G/DL (ref 12–15.9)
MAGNESIUM SERPL-MCNC: 2.3 MG/DL (ref 1.6–2.4)
MCH RBC QN AUTO: 30.3 PG (ref 26.6–33)
MCHC RBC AUTO-ENTMCNC: 33.7 G/DL (ref 31.5–35.7)
MCV RBC AUTO: 89.8 FL (ref 79–97)
PHOSPHATE SERPL-MCNC: 2.6 MG/DL (ref 2.5–4.5)
PLATELET # BLD AUTO: 153 10*3/MM3 (ref 140–450)
PMV BLD AUTO: 11.1 FL (ref 6–12)
POTASSIUM SERPL-SCNC: 4.3 MMOL/L (ref 3.5–5.2)
PROT SERPL-MCNC: 6.3 G/DL (ref 6–8.5)
RBC # BLD AUTO: 4.33 10*6/MM3 (ref 3.77–5.28)
SODIUM SERPL-SCNC: 142 MMOL/L (ref 136–145)
WBC NRBC COR # BLD: 6.17 10*3/MM3 (ref 3.4–10.8)

## 2022-10-19 PROCEDURE — G0493 RN CARE EA 15 MIN HH/HOSPICE: HCPCS

## 2022-10-19 PROCEDURE — 83735 ASSAY OF MAGNESIUM: CPT | Performed by: INTERNAL MEDICINE

## 2022-10-19 PROCEDURE — 84100 ASSAY OF PHOSPHORUS: CPT | Performed by: INTERNAL MEDICINE

## 2022-10-19 PROCEDURE — 80053 COMPREHEN METABOLIC PANEL: CPT | Performed by: INTERNAL MEDICINE

## 2022-10-19 PROCEDURE — G0152 HHCP-SERV OF OT,EA 15 MIN: HCPCS

## 2022-10-19 PROCEDURE — 85027 COMPLETE CBC AUTOMATED: CPT | Performed by: INTERNAL MEDICINE

## 2022-10-19 NOTE — HOME HEALTH
"WEEKLY LABS X3 WEEKS - THRU 10/28/22  BLE EDEMA MANAGEMENT - PT TO USE COMPRESSION HOSE BUT IS USING ACE WRAPS    10/19- pt denies med changes from md appt, PT W/ C/O PAIN IN \"ALL JOINTS\" R/T WEATHER CHANGES PER HER REPORT, WEIGHT STABLE, PT COMPLAINT W/ DAILY WEIGHT CHECKS AND CALL PARAMETERS, BLE W/ SMALL NON-PITTING EDEMA, PT COMPLAINT W/ WRAPPING RLE, SHE DOESN'T WRAP LLE, BLE INTEG W/O S/S COMPLICATIONS. LAB DRAWN , SAYNA TO [facility]    Focus of Care: Assessments and teaching regarding Volume Overload             Reason for Hosp/Primary Dx/Co-morbidities: Recent hospitalization at [facility] for bilateral lower extremity edema, hypervolemia, acute shoulder pain due to trauma, weakness, adult failure to thrive syndrome, CKD 3, arthralgia, urinary retention, HLD"

## 2022-10-20 ENCOUNTER — HOME CARE VISIT (OUTPATIENT)
Dept: HOME HEALTH SERVICES | Facility: HOME HEALTHCARE | Age: 82
End: 2022-10-20

## 2022-10-20 VITALS — HEART RATE: 68 BPM | OXYGEN SATURATION: 98 %

## 2022-10-20 VITALS
TEMPERATURE: 96.8 F | SYSTOLIC BLOOD PRESSURE: 152 MMHG | OXYGEN SATURATION: 97 % | HEART RATE: 78 BPM | DIASTOLIC BLOOD PRESSURE: 74 MMHG

## 2022-10-20 PROCEDURE — G0151 HHCP-SERV OF PT,EA 15 MIN: HCPCS

## 2022-10-20 NOTE — HOME HEALTH
Patient reports no falls since last visit.    Plan for next visit:  -transfer training  -gait training with rolling walker/cane  -education on HEP with progressions as appropriate  -stair mobility training  -standing balance exercises

## 2022-10-25 ENCOUNTER — HOME CARE VISIT (OUTPATIENT)
Dept: HOME HEALTH SERVICES | Facility: HOME HEALTHCARE | Age: 82
End: 2022-10-25

## 2022-10-25 ENCOUNTER — LAB REQUISITION (OUTPATIENT)
Dept: LAB | Facility: HOSPITAL | Age: 82
End: 2022-10-25

## 2022-10-25 VITALS
TEMPERATURE: 97.5 F | OXYGEN SATURATION: 97 % | HEART RATE: 83 BPM | DIASTOLIC BLOOD PRESSURE: 86 MMHG | SYSTOLIC BLOOD PRESSURE: 154 MMHG

## 2022-10-25 DIAGNOSIS — Z00.00 ENCOUNTER FOR GENERAL ADULT MEDICAL EXAMINATION WITHOUT ABNORMAL FINDINGS: ICD-10-CM

## 2022-10-25 LAB
ALBUMIN SERPL-MCNC: 4.4 G/DL (ref 3.5–5.2)
ALBUMIN/GLOB SERPL: 1.7 G/DL
ALP SERPL-CCNC: 101 U/L (ref 39–117)
ALT SERPL W P-5'-P-CCNC: 13 U/L (ref 1–33)
ANION GAP SERPL CALCULATED.3IONS-SCNC: 12.7 MMOL/L (ref 5–15)
AST SERPL-CCNC: 18 U/L (ref 1–32)
BILIRUB SERPL-MCNC: 0.4 MG/DL (ref 0–1.2)
BUN SERPL-MCNC: 20 MG/DL (ref 8–23)
BUN/CREAT SERPL: 23.8 (ref 7–25)
CALCIUM SPEC-SCNC: 9.9 MG/DL (ref 8.6–10.5)
CHLORIDE SERPL-SCNC: 103 MMOL/L (ref 98–107)
CO2 SERPL-SCNC: 29.3 MMOL/L (ref 22–29)
CREAT SERPL-MCNC: 0.84 MG/DL (ref 0.57–1)
DEPRECATED RDW RBC AUTO: 42.4 FL (ref 37–54)
EGFRCR SERPLBLD CKD-EPI 2021: 68.6 ML/MIN/1.73
ERYTHROCYTE [DISTWIDTH] IN BLOOD BY AUTOMATED COUNT: 12.9 % (ref 12.3–15.4)
GLOBULIN UR ELPH-MCNC: 2.6 GM/DL
GLUCOSE SERPL-MCNC: 114 MG/DL (ref 65–99)
HCT VFR BLD AUTO: 41.8 % (ref 34–46.6)
HGB BLD-MCNC: 13.9 G/DL (ref 12–15.9)
MAGNESIUM SERPL-MCNC: 2.4 MG/DL (ref 1.6–2.4)
MCH RBC QN AUTO: 30.1 PG (ref 26.6–33)
MCHC RBC AUTO-ENTMCNC: 33.3 G/DL (ref 31.5–35.7)
MCV RBC AUTO: 90.5 FL (ref 79–97)
PHOSPHATE SERPL-MCNC: 2.3 MG/DL (ref 2.5–4.5)
PLATELET # BLD AUTO: 167 10*3/MM3 (ref 140–450)
PMV BLD AUTO: 11.1 FL (ref 6–12)
POTASSIUM SERPL-SCNC: 3.1 MMOL/L (ref 3.5–5.2)
PROT SERPL-MCNC: 7 G/DL (ref 6–8.5)
RBC # BLD AUTO: 4.62 10*6/MM3 (ref 3.77–5.28)
SODIUM SERPL-SCNC: 145 MMOL/L (ref 136–145)
WBC NRBC COR # BLD: 7.48 10*3/MM3 (ref 3.4–10.8)

## 2022-10-25 PROCEDURE — 84100 ASSAY OF PHOSPHORUS: CPT | Performed by: INTERNAL MEDICINE

## 2022-10-25 PROCEDURE — 85027 COMPLETE CBC AUTOMATED: CPT | Performed by: INTERNAL MEDICINE

## 2022-10-25 PROCEDURE — G0162 HHC RN E&M PLAN SVS, 15 MIN: HCPCS

## 2022-10-25 PROCEDURE — G0151 HHCP-SERV OF PT,EA 15 MIN: HCPCS

## 2022-10-25 PROCEDURE — 83735 ASSAY OF MAGNESIUM: CPT | Performed by: INTERNAL MEDICINE

## 2022-10-25 PROCEDURE — 80053 COMPREHEN METABOLIC PANEL: CPT | Performed by: INTERNAL MEDICINE

## 2022-10-25 NOTE — HOME HEALTH
Patient reports no falls or changes in medication since last visit.    Plan for next visit:  -transfer training  -gait training with cane  -education on HEP with progressions as appropriate  -standing balance exercises

## 2022-10-26 ENCOUNTER — HOME CARE VISIT (OUTPATIENT)
Dept: HOME HEALTH SERVICES | Facility: HOME HEALTHCARE | Age: 82
End: 2022-10-26

## 2022-10-26 VITALS
HEART RATE: 83 BPM | TEMPERATURE: 97.3 F | OXYGEN SATURATION: 98 % | SYSTOLIC BLOOD PRESSURE: 144 MMHG | DIASTOLIC BLOOD PRESSURE: 82 MMHG

## 2022-10-26 VITALS
OXYGEN SATURATION: 97 % | RESPIRATION RATE: 18 BRPM | DIASTOLIC BLOOD PRESSURE: 70 MMHG | HEART RATE: 76 BPM | SYSTOLIC BLOOD PRESSURE: 130 MMHG | TEMPERATURE: 98.1 F

## 2022-10-26 PROCEDURE — G0151 HHCP-SERV OF PT,EA 15 MIN: HCPCS

## 2022-10-26 NOTE — HOME HEALTH
WEEKLY LABS X3 WEEKS - completed 10/24/22  BLE EDEMA MANAGEMENT - PT TO CONTINUE USE COMPRESSION HOSE BUT IS USING ACE WRAPS    PT CONTINUES W/ PHYSICAL THERPAY [facility]    Focus of Care: Assessments and teaching regarding Volume Overload             Reason for Hosp/Primary Dx/Co-morbidities: Recent hospitalization at [facility] for bilateral lower extremity edema, hypervolemia, acute shoulder pain due to trauma, weakness, adult failure to thrive syndrome, CKD 3, arthralgia, urinary retention, HLD

## 2022-11-03 ENCOUNTER — OFFICE VISIT (OUTPATIENT)
Dept: ORTHOPEDIC SURGERY | Facility: CLINIC | Age: 82
End: 2022-11-03

## 2022-11-03 VITALS — TEMPERATURE: 97.3 F | WEIGHT: 179 LBS | BODY MASS INDEX: 32.94 KG/M2 | HEIGHT: 62 IN

## 2022-11-03 DIAGNOSIS — M21.40 PES PLANUS, UNSPECIFIED LATERALITY: ICD-10-CM

## 2022-11-03 DIAGNOSIS — M76.829 POSTERIOR TIBIAL TENDON DYSFUNCTION: Primary | ICD-10-CM

## 2022-11-03 PROCEDURE — 99213 OFFICE O/P EST LOW 20 MIN: CPT | Performed by: ORTHOPAEDIC SURGERY

## 2022-11-03 NOTE — PROGRESS NOTES
Ankle Follow Up      Patient: Sasha Barrett    YOB: 1940 82 y.o. female    Chief Complaints: Ankle feels better in brace    History of Present Illness: Patient was seen initially in the office on 9/22/2022 with a history of chronic pes planus with valgus alignment to the right hindfoot and ankle.  She complains of swelling in her leg and ankle at that time and some questionable area of cellulitis of the anterior ankle.  She had a Doppler that was unremarkable for DVT on 9/21/2022 did show some valvular incompetence.  She had a Doppler in early August that showed chronic popliteal DVT.  Reviewed her at that time that the multiple medical problems are would not recommend any surgical treatment and did not want any custom bracing.  She was fitted with a PT TD brace instructed to watch her skin very carefully.    She was subsequently hospitalized after her trip to [place] she had a fall and saw her in the hospital on 9/30/2022.  At that time her swelling had markedly diminished after medical management of her fluid.  We had ordered an MRI at that time which she declined and she was fitted with a boot and instructed on use of that.  At that time her anterior skin is markedly improved with no sign of infection.    She is seen back today in the area of her anterior ankle remained improved had any sign of infection.  She did not use the boot and has been using the PT TD brace and watch her skin carefully and says her ankle does feel quite a bit better when she is in it..  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  She is with her .  She has moderate planovalgus alignment of the right hindfoot that is passively correctable without appreciable pain in the right hindfoot and was able to actively invert to some degree but was somewhat weak with this.      Radiology: None performed    Assessment/Plan: Right chronic pes planus with  posterior tib insufficiency  2.  Right hallux valgus     We discussed treatment going forward I would not recommend any surgical treatment for her.  We discussed a custom Brown brace which she did not want to do at this time and I do not think her deformity would be amenable to just orthotics.    She will continue with use of her PT TD brace and walker and understands that this may wear out and will need to be replaced at least every several months.    She can continue with therapy and stretching and strengthening exercises and by mutual agreement I will see her back as needed.    She inquired about a denial of right foot x-rays that were done at previous visit for payment.  These were medically in addition to the x-rays of her ankle in order to evaluate the alignment of her hindfoot and midfoot in conjunction with her deformity.      "

## 2023-02-07 ENCOUNTER — TRANSCRIBE ORDERS (OUTPATIENT)
Dept: ADMINISTRATIVE | Facility: HOSPITAL | Age: 83
End: 2023-02-07
Payer: MEDICARE

## 2023-02-07 DIAGNOSIS — R60.0 LOCALIZED EDEMA: ICD-10-CM

## 2023-02-07 DIAGNOSIS — M79.602 LEFT ARM PAIN: Primary | ICD-10-CM

## 2023-02-17 ENCOUNTER — HOSPITAL ENCOUNTER (OUTPATIENT)
Dept: CARDIOLOGY | Facility: HOSPITAL | Age: 83
Discharge: HOME | End: 2023-02-17
Admitting: INTERNAL MEDICINE
Payer: MEDICARE

## 2023-02-17 DIAGNOSIS — M79.602 LEFT ARM PAIN: ICD-10-CM

## 2023-02-17 DIAGNOSIS — R60.0 LOCALIZED EDEMA: ICD-10-CM

## 2023-02-17 LAB
BH CV UPPER VENOUS LEFT AXILLARY AUGMENT: NORMAL
BH CV UPPER VENOUS LEFT AXILLARY COMPRESS: NORMAL
BH CV UPPER VENOUS LEFT AXILLARY PHASIC: NORMAL
BH CV UPPER VENOUS LEFT AXILLARY SPONT: NORMAL
BH CV UPPER VENOUS LEFT BASILIC FOREARM COMPRESS: NORMAL
BH CV UPPER VENOUS LEFT BASILIC UPPER COMPRESS: NORMAL
BH CV UPPER VENOUS LEFT BRACHIAL COMPRESS: NORMAL
BH CV UPPER VENOUS LEFT CEPHALIC FOREARM COMPRESS: NORMAL
BH CV UPPER VENOUS LEFT CEPHALIC UPPER COMPRESS: NORMAL
BH CV UPPER VENOUS LEFT INTERNAL JUGULAR AUGMENT: NORMAL
BH CV UPPER VENOUS LEFT INTERNAL JUGULAR COMPRESS: NORMAL
BH CV UPPER VENOUS LEFT INTERNAL JUGULAR PHASIC: NORMAL
BH CV UPPER VENOUS LEFT INTERNAL JUGULAR SPONT: NORMAL
BH CV UPPER VENOUS LEFT MED CUBITAL COMPRESS: NORMAL
BH CV UPPER VENOUS LEFT RADIAL COMPRESS: NORMAL
BH CV UPPER VENOUS LEFT SUBCLAVIAN AUGMENT: NORMAL
BH CV UPPER VENOUS LEFT SUBCLAVIAN COMPRESS: NORMAL
BH CV UPPER VENOUS LEFT SUBCLAVIAN PHASIC: NORMAL
BH CV UPPER VENOUS LEFT SUBCLAVIAN SPONT: NORMAL
BH CV UPPER VENOUS LEFT ULNAR COMPRESS: NORMAL
BH CV UPPER VENOUS RIGHT INTERNAL JUGULAR AUGMENT: NORMAL
BH CV UPPER VENOUS RIGHT INTERNAL JUGULAR COMPRESS: NORMAL
BH CV UPPER VENOUS RIGHT INTERNAL JUGULAR PHASIC: NORMAL
BH CV UPPER VENOUS RIGHT INTERNAL JUGULAR SPONT: NORMAL
BH CV UPPER VENOUS RIGHT SUBCLAVIAN AUGMENT: NORMAL
BH CV UPPER VENOUS RIGHT SUBCLAVIAN COMPRESS: NORMAL
BH CV UPPER VENOUS RIGHT SUBCLAVIAN PHASIC: NORMAL
BH CV UPPER VENOUS RIGHT SUBCLAVIAN SPONT: NORMAL
MAXIMAL PREDICTED HEART RATE: 135 BPM
STRESS TARGET HR: 115 BPM

## 2023-02-17 PROCEDURE — 93971 EXTREMITY STUDY: CPT

## 2023-03-22 ENCOUNTER — TRANSCRIBE ORDERS (OUTPATIENT)
Dept: ADMINISTRATIVE | Facility: HOSPITAL | Age: 83
End: 2023-03-22
Payer: MEDICARE

## 2023-03-22 DIAGNOSIS — R60.9 SWELLING: Primary | ICD-10-CM

## 2023-04-04 ENCOUNTER — HOSPITAL ENCOUNTER (OUTPATIENT)
Dept: CT IMAGING | Facility: HOSPITAL | Age: 83
Discharge: HOME | End: 2023-04-04
Payer: MEDICARE

## 2023-04-04 ENCOUNTER — HOSPITAL ENCOUNTER (OUTPATIENT)
Dept: GENERAL RADIOLOGY | Facility: HOSPITAL | Age: 83
Discharge: HOME | End: 2023-04-04
Payer: MEDICARE

## 2023-04-04 ENCOUNTER — TRANSCRIBE ORDERS (OUTPATIENT)
Dept: ADMINISTRATIVE | Facility: HOSPITAL | Age: 83
End: 2023-04-04
Payer: MEDICARE

## 2023-04-04 DIAGNOSIS — R60.9 SWELLING: ICD-10-CM

## 2023-04-04 DIAGNOSIS — R22.2 CHEST SWELLING: Primary | ICD-10-CM

## 2023-04-04 DIAGNOSIS — R22.2 CHEST SWELLING: ICD-10-CM

## 2023-04-04 PROCEDURE — 71250 CT THORAX DX C-: CPT

## 2023-04-04 PROCEDURE — 71046 X-RAY EXAM CHEST 2 VIEWS: CPT

## 2023-05-02 ENCOUNTER — OFFICE VISIT (OUTPATIENT)
Dept: SURGERY | Facility: CLINIC | Age: 83
End: 2023-05-02
Payer: MEDICARE

## 2023-05-02 VITALS
WEIGHT: 162 LBS | OXYGEN SATURATION: 99 % | BODY MASS INDEX: 29.81 KG/M2 | HEIGHT: 62 IN | HEART RATE: 70 BPM | DIASTOLIC BLOOD PRESSURE: 78 MMHG | SYSTOLIC BLOOD PRESSURE: 146 MMHG

## 2023-05-02 DIAGNOSIS — M79.622 PAIN IN LEFT AXILLA: Primary | ICD-10-CM

## 2023-05-02 DIAGNOSIS — Z85.3 HISTORY OF BREAST CANCER: ICD-10-CM

## 2023-05-02 PROCEDURE — 3077F SYST BP >= 140 MM HG: CPT | Performed by: SURGERY

## 2023-05-02 PROCEDURE — 3078F DIAST BP <80 MM HG: CPT | Performed by: SURGERY

## 2023-05-02 PROCEDURE — 1159F MED LIST DOCD IN RCRD: CPT | Performed by: SURGERY

## 2023-05-02 PROCEDURE — 1160F RVW MEDS BY RX/DR IN RCRD: CPT | Performed by: SURGERY

## 2023-05-02 PROCEDURE — 99203 OFFICE O/P NEW LOW 30 MIN: CPT | Performed by: SURGERY

## 2023-05-02 RX ORDER — RIVAROXABAN 20 MG/1
20 TABLET, FILM COATED ORAL DAILY
COMMUNITY
Start: 2023-02-23

## 2023-05-02 RX ORDER — PREDNISONE 50 MG/1
1 TABLET ORAL DAILY
COMMUNITY
Start: 2023-02-15 | End: 2023-05-02 | Stop reason: SDUPTHER

## 2023-05-02 RX ORDER — ROSUVASTATIN CALCIUM 5 MG/1
5 TABLET, COATED ORAL EVERY MORNING
COMMUNITY
Start: 2023-04-17

## 2023-05-22 NOTE — PROGRESS NOTES
General Surgery  Initial Office Visit    CC: Left upper arm/axillary pain    HPI: The patient is a pleasant 83 y.o. year-old lady who presents today with her , Adriel, who is a former patient of mine.  Barb complains of severe left axillary and left chest wall pain that has been present ever since she had a loop recorder implanted and subsequently removed by Dr. Argueta.  The pain has been ongoing for over a year and a constant duration and has been worsening in severity.  She exhibits significant paranoia in the office today, complaining that the loop recorder was placed without her consent and that she is being treated like a lab rat with multiple experiments being run on her.  On exam, I can palpate no lymphadenopathy of the left axilla and no significant subcutaneous abnormality of the left upper arm or chest wall.  She recently had a CT of the chest which shows a multitude of clips in her bilateral axilla from her previous bilateral mastectomy with axillary lymph node dissection for breast cancer.  Her anatomy on her CT scan is unremarkable given her surgical history.  She denies any fever, chills, nausea, or vomiting.  It is difficult to get much of a history from her as she has trouble staying concentrated and continues to discuss unrelated topics and want answers related to why the loop recorder was placed.    Past Medical History:   Hypertension  Hyperlipidemia  History of DVT right lower extremity on anticoagulation  History of pulmonary embolism  Secondary hyperparathyroidism  GERD  History of breast cancer  Migraine headaches  History of stroke  COPD  Psoriasis    Past Surgical History:   Loop recorder insertion (May 2022) with subsequent removal (July 2022)  Left hand acromion release  Left wrist ganglion cyst excision  Right knee replacement  Colonoscopy (2013, Dr. Irma Brambila-diverticulosis, tortuous colon)  EGD (2009, Dr. Benny Farley)  Cataract extraction  Bilateral mastectomy with  axillary lymph node dissection  Parathyroidectomy  Hysterectomy  Right inguinal hernia repair  Partial thyroidectomy    Medications:   Tylenol as needed for pain  Albuterol inhaler as needed for wheezing  Fioricet 2 tablets every 4 hours as needed for headaches  Cetirizine 10 mg daily  Vitamin D3 50,000 units weekly  Clonidine 0.2 mg twice daily  Benadryl as needed for itching  Fluticasone nasal spray as needed for allergies  Lasix 40 mg daily as needed for swelling  Norco 10/325 1 to 2 tablets every 6 hours as needed for pain  Hydroxyzine 50 mg every 6 hours as needed for itching  Ipratropium nebulizer 4 times daily  Multivitamin once daily  Nifedipine XL 30 mg daily  Omeprazole 40 mg daily  Zofran as needed for nausea  MiraLAX as needed for constipation  Potassium chloride 20 mill equivalents every other day  Prednisone 10 mg every other day  Rosuvastatin 5 mg daily  Vitamin B12 1000 mcg daily  Xarelto 20 mg daily  Phenergan with codeine 4 times daily as needed for cough    Allergies: Multitude of allergies including bee venom (shortness of breath/rash), morphine (anaphylaxis), sulfa antibiotics (anaphylaxis), Ambien (hallucinations), anastrozole (unknown reaction, cannot remember), Eliquis (headache, nausea, itching), hydralazine (myalgias), amlodipine (renal failure), NSAIDs (renal failure), penicillin (unknown reaction as a child)    Family History: Mother with history of alcohol abuse, brother with history of brain cancer    Social History: , non-smoker, rare alcohol use    ROS:   Constitutional: Negative for fevers or chills  HENT: Negative for hearing loss or runny nose  Eyes: Negative for vision changes or scleral icterus  Respiratory: Negative for cough or shortness of breath  Cardiovascular: Positive for chest wall pain; negative for chest pain or heart palpitations  Gastrointestinal: Negative for abdominal distension, nausea, vomiting, constipation, melena, or hematochezia  Genitourinary: Negative  for hematuria or dysuria  Musculoskeletal: Positive for left axillary and left arm pain; negative for joint swelling or gait instability  Neurologic: Negative for tremors or seizures  Psychiatric: Negative for suicidal ideations or agitation  All other systems reviewed and negative    Physical Exam:  Height: 157 cm  Weight: 73 kg  BMI: 29.6  Heart rate 70  Blood pressure 146/78  Oxygen saturation 99% on room air  General: No acute distress, well-nourished & well-developed  HEAD: normocephalic, atraumatic  EYES: normal conjunctiva, sclera anicteric  EARS: grossly normal hearing  NECK: supple, no thyromegaly  CARDIOVASCULAR: regular rate and rhythm  RESPIRATORY: clear to auscultation bilaterally  GASTROINTESTINAL: soft, nontender, non-distended  MUSCULOSKELETAL: normal gait and station. No gross extremity abnormalities, specifically no gross abnormality of the left upper arm, left forearm, or left axilla  LYMPHATIC: No palpable axillary, supraclavicular, or cervical lymphadenopathy appreciable bilaterally  PSYCHIATRIC: Oriented x3 but extremely paranoid with flight of ideas and tangential thought process, difficult to keep her focus on the questions at hand, tearful    IMAGING:  CHEST XRAY (4/4/2023):  IMPRESSION:  Chronic changes in the chest without evidence for acute disease.    CT CHEST (4/4/2023):  CONCLUSION:  1. Mastectomy sites are stable and satisfactory in appearance. There are hemostatic clips again demonstrated within the right and left chest wall. Other smaller stable metallic densities also seen, similar to 05/26/2022. I see no unusual foreign body.  2. No indication of intrathoracic metastatic disease nor an acute pulmonary process, calcified benign granuloma left lower lobe.  3. Stable cardiac enlargement.    ASSESSMENT & PLAN  Mrs. Barrett is an 83-year-old lady with subjective sensation of left axillary, left upper arm, and left chest wall tenderness and swelling after a loop recorder was placed and  removed last year.  I reviewed her recent chest x-ray which shows no evidence for a retained foreign object.  I also reviewed her CT of her chest which on my review the images shows no suspicious abnormality of the bilateral axilla following her remote bilateral mastectomy with sentinel lymph node biopsy.  There is no evidence for recurrent breast cancer on imaging or on physical exam.  I have recommended we check an ultrasound of the left axilla to be thorough but I do not see any obvious abnormality on any of the radiologic studies done thus far or on physical exam.  I worry that most of her symptoms are in her head, and she has developed significant paranoia.  I will call her with the ultrasound results once available to me but have nothing further to add surgically at this time.  I spent about an hour with the patient and her  today in the office.    Angeline Pickering MD  General, Robotic, and Endoscopic Surgery  [facility]    [address], [address]  [place] [address]  P: [phone]  F: [phone]

## 2023-05-24 ENCOUNTER — HOSPITAL ENCOUNTER (OUTPATIENT)
Dept: ULTRASOUND IMAGING | Facility: HOSPITAL | Age: 83
Discharge: HOME | End: 2023-05-24
Admitting: SURGERY
Payer: MEDICARE

## 2023-05-24 DIAGNOSIS — Z85.3 HISTORY OF BREAST CANCER: ICD-10-CM

## 2023-05-24 DIAGNOSIS — M79.622 PAIN IN LEFT AXILLA: ICD-10-CM

## 2023-05-24 PROCEDURE — 76882 US LMTD JT/FCL EVL NVASC XTR: CPT

## 2023-05-25 ENCOUNTER — TELEPHONE (OUTPATIENT)
Dept: SURGERY | Facility: CLINIC | Age: 83
End: 2023-05-25
Payer: MEDICARE

## 2023-05-25 NOTE — TELEPHONE ENCOUNTER
----- Message from Angeline Pickering MD sent at 5/24/2023  8:03 PM EDT -----  Can someone please call Barb and let her know I personally reviewed her left axillary ultrasound and I see no abnormalities? Specifically I see nothing that would explain her left axillary pain radiating into her adjacent chest wall. There was also no abnormality on her recent CT of the chest. I have nothing more to offer her, unfortunately, so she will simply need to monitor her symptoms.    ThanksMIRIAM

## 2023-12-08 ENCOUNTER — APPOINTMENT (OUTPATIENT)
Dept: GENERAL RADIOLOGY | Facility: HOSPITAL | Age: 83
End: 2023-12-08
Payer: MEDICARE

## 2023-12-08 ENCOUNTER — HOSPITAL ENCOUNTER (EMERGENCY)
Facility: HOSPITAL | Age: 83
Discharge: HOME | End: 2023-12-08
Attending: EMERGENCY MEDICINE
Payer: MEDICARE

## 2023-12-08 VITALS
TEMPERATURE: 97.2 F | RESPIRATION RATE: 18 BRPM | HEART RATE: 86 BPM | OXYGEN SATURATION: 98 % | DIASTOLIC BLOOD PRESSURE: 86 MMHG | WEIGHT: 145 LBS | HEIGHT: 60 IN | SYSTOLIC BLOOD PRESSURE: 167 MMHG | BODY MASS INDEX: 28.47 KG/M2

## 2023-12-08 DIAGNOSIS — R10.32 LEFT INGUINAL PAIN: Primary | ICD-10-CM

## 2023-12-08 DIAGNOSIS — M16.12 ARTHRITIS OF LEFT HIP: ICD-10-CM

## 2023-12-08 PROCEDURE — 73502 X-RAY EXAM HIP UNI 2-3 VIEWS: CPT

## 2023-12-08 PROCEDURE — 99283 EMERGENCY DEPT VISIT LOW MDM: CPT

## 2023-12-08 RX ORDER — HYDROCODONE BITARTRATE AND ACETAMINOPHEN 10; 325 MG/1; MG/1
1 TABLET ORAL ONCE
Status: COMPLETED | OUTPATIENT
Start: 2023-12-08 | End: 2023-12-08

## 2023-12-08 RX ADMIN — HYDROCODONE BITARTRATE AND ACETAMINOPHEN 1 TABLET: 10; 325 TABLET ORAL at 19:30

## 2023-12-09 NOTE — ED PROVIDER NOTES
EMERGENCY DEPARTMENT ENCOUNTER    Room Number:  13/13  PCP: Óscar Cadet MD  Historian: Patient      HPI:  Chief Complaint: Left groin pain  A complete HPI/ROS/PMH/PSH/SH/FH are unobtainable due to: None    Context: Sasha Barrett is a 83 y.o. female who presents to the ED via private vehicle for evaluation for progressive left groin pain over the last 24 to 48 hours.  Worse with positional change and movement.  Denies any recent fall or trauma.  Home hydrocodone does help pain.  On Xarelto for history of DVTs.  Compliant.      MEDICAL RECORD REVIEW    External (non-ED) record review: No visible prior imaging of the left hip on chart review in epic    PAST MEDICAL HISTORY  Active Ambulatory Problems     Diagnosis Date Noted    Joint pain 07/25/2016    Urinary retention self-caths (Don) 07/25/2016    Conjunctivitis 07/25/2016    Arm pain 07/25/2016    Cervical disc displacement 07/25/2016    Cervical pain (Servando=PM) 07/25/2016    DDD (degenerative disc disease), cervical 07/25/2016    Hyperlipidemia LDL goal <70 07/25/2016    Preventative health care 09/08/2016    Medication management 09/08/2016    Proctocele 06/29/2015    Urge incontinence of urine 06/29/2015    Chronic tension-type headache, intractable 11/11/2016    Obesity (BMI 30.0-34.9) 11/11/2016    H/o Lyme disease 11/11/2016    Stage 3 chronic kidney disease 11/11/2016    Cerebral atherosclerosis 11/11/2016    Allergic contact dermatitis 11/11/2016    Malaise and fatigue progressive 11/11/2016    Dizziness 11/11/2016    Vitamin D deficiency 11/11/2016    Moderate persistent asthmatic bronchitis without complication 11/11/2016    Anterior cervical adenopathy due to infection 11/11/2016    Pleurisy 11/11/2016    Knee pain, bilateral 11/11/2016    Elevated PTH level 11/11/2016    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) 11/11/2016    Gastroesophageal reflux disease with esophagitis 11/11/2016    Psoriasis (Darryl Ochoa) 11/11/2016     Postmenopausal 11/11/2016    Recurrent UTI 11/11/2016    CVA (cerebral vascular accident) 11/11/2016    Dyspnea on exertion 01/19/2017    Precordial pain 02/02/2017    Abnormal EKG 02/02/2017    Arthralgia 04/06/2017    Accelerated hypertension 04/06/2017    Acute joint pain 04/06/2017    Easy bruisability 04/06/2017    Cough productive of purulent sputum 12/26/2017    Influenza A Subtype H3 12/26/2017    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    Acute chest wall pain 12/26/2017    Decreased functional mobility and endurance since fall 12/20/17 12/26/2017    Essential hypertension 08/22/2018    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/25/2018    Migraine without aura and without status migrainosus, not intractable 11/21/2018    Thyroid nodule 03/24/2019    Osteoarthritis of left hip 08/23/2019    Spinal stenosis of lumbar region with radiculopathy 08/23/2019    Acute deep vein thrombosis (DVT) of right lower extremity 10/29/2019    Acute deep vein thrombosis 10/29/2019    Single subsegmental pulmonary embolism without acute cor pulmonale 10/31/2019    Other chronic pain 01/22/2020    Palpitations 11/11/2020    Secondary hyperparathyroidism 02/02/2021    Syncope 05/25/2022    Ataxia 05/25/2022    Adult failure to thrive syndrome 05/25/2022    Recent unexplained weight loss 42# over last 6 months 05/25/2022    Blood pressure instability 05/25/2022    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 05/25/2022    Acute intractable headache intensified since Covid-19 infection 10/2/2021 05/25/2022    History of loop recorder 06/29/2022    Pes planus 09/22/2022    Posterior tibial tendon dysfunction 09/22/2022    Weakness 09/30/2022    Blunt trauma of right lower leg 09/30/2022    Acute shoulder pain due to trauma, right 09/30/2022    Weight gain with edema (13# over 2 months) 09/30/2022    Volume overload 09/30/2022    Venous incompetence of popliteal vein on right 10/01/2022     Bilateral lower extremity edema, multifactorial = unrestricted salt intake, inadequate Lasix dosing, chronic lung disease, and obesity.  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 Yazmin Canseco    CARDIAC ELECTROPHYSIOLOGY PROCEDURE N/A 5/27/2022    Procedure: Loop insertion;  Surgeon: Jeffrey Argueta MD;  Location:  [facility];  Service: Cardiovascular;  Laterality: N/A;  biotronik    CARDIAC ELECTROPHYSIOLOGY PROCEDURE N/A 7/13/2022    Procedure: Loop recorder removal;  Surgeon: Jeffrey Argueta MD;  Location:  [facility];  Service: Cardiovascular;  Laterality: N/A;    CARPAL TUNNEL RELEASE Right 05/25/2011    Dr. Caleb Bueno    CARPAL TUNNEL RELEASE Left 04/26/2011    Dr. Caleb Bueno    CATARACT EXTRACTION Left 11/29/2010    Dr. Eusebio Downs    CATARACT EXTRACTION Right 11/17/2010    Dr. Eusebio Downs    COLONOSCOPY N/A 12/06/2002    Sigmoid diverticulosis-Dr. Brandon Batista    COLONOSCOPY N/A 12/12/2013    Tortuous colon, diverticulosis in the sigmoid colon and descending colon, stool in the rectum, sigmoid colon, descending colon, splenic flexure, transverse colon and  hepatic flexure-Dr. Irma Brambila    DEQUERVAIN RELEASE Left 9/23/2020    Procedure: DEQUERVAIN RELEASE LEFT HAND;  Surgeon: Caleb Bueno MD;  Location: [facility];  Service: Plastics;  Laterality: Left;    ENDOSCOPY N/A 09/13/2007    Normal-Dr. Pavel Quarles    ENDOSCOPY AND COLONOSCOPY N/A 09/19/2005    1 cm hiatal hernia, mild Schatzki's ring, sigmoid diverticulosis-Dr. Yoel Farley    GANGLION CYST EXCISION Left 12/18/2012    Release of A1 pulley flexor sheath, left fourth finger, excision of ganglion cyst 0.5 cm diameter, A2 pulley flexor sheath, left fourth finger-Dr. Caleb Bueno    HEEL SPUR EXCISION Left 1968    INGUINAL HERNIA REPAIR Right 1971    at 5 months pregnant     INJECTION OF MEDICATION Left 9/23/2020    Procedure: KENOLOG INJECTION TO LEFT THUMB;  Surgeon: Caleb Bueno MD;  Location: [facility];  Service: Plastics;  Laterality: Left;    MASTECTOMY Right 08/05/2008    Right breast mastectomy and excision of left chest wall lesion-Dr. Yoel Farley    MASTECTOMY RADICAL Left 04/29/2003    Left modified radical mastectomy-Dr. Yazmin Canseco    PARATHYROIDECTOMY Right 05/04/2004    Excision of parathyroid adenoma (right superior)-Dr. Yoel Farley    REPLACEMENT TOTAL KNEE Right 04/09/2012    Dr. Lamonte Childress    SINUS SURGERY  1984    THYROIDECTOMY, PARTIAL Left 05/04/2004    Dr. Yoel Farley    TOTAL ABDOMINAL HYSTERECTOMY Bilateral 1973    Dr. Mahan    TRIGGER FINGER RELEASE Left 9/23/2020    Procedure: RELEASE LEFT FOURTH TRIGGER FINGER;  Surgeon: Caleb Bueno MD;  Location: [facility];  Service: Plastics;  Laterality: Left;    UPPER GASTROINTESTINAL ENDOSCOPY N/A 02/18/2009    LA Grade A reflux esophagitis, normal stomach, normal 2nd part of the duodenum-Dr. Yoel Farley    WRIST GANGLION EXCISION Left 9/23/2020    Procedure: EXCISION GANGLION CYST LEFT WRIST;  Surgeon: Caleb Bueno MD;  Location: [facility];   Service: Plastics;  Laterality: Left;         FAMILY HISTORY  Family History   Problem Relation Age of Onset    Brain cancer Brother     Alcohol abuse Mother     No Known Problems Father     No Known Problems Sister     Maljaziel Hyperthermia Neg Hx          SOCIAL HISTORY  Social History     Socioeconomic History    Marital status:      Spouse name: Joey    Number of children: 6    Highest education level: Some college, no degree   Tobacco Use    Smoking status: Never    Smokeless tobacco: Never   Vaping Use    Vaping Use: Never used   Substance and Sexual Activity    Alcohol use: Yes     Comment: 3 drinks yearly    Drug use: Never    Sexual activity: Defer     Birth control/protection: Surgical         ALLERGIES  Bee venom, Morphine, Sulfa antibiotics, Tree extract, Ambien [zolpidem], Anastrozole, Covid-19 (mrna) vaccine, Eliquis [apixaban], Hydralazine, Norvasc [amlodipine], Nsaids, Penicillins, and Grass        REVIEW OF SYSTEMS  Review of Systems     All systems reviewed and negative except for those discussed in HPI.       PHYSICAL EXAM    I have reviewed the triage vital signs and nursing notes.    ED Triage Vitals   Temp Heart Rate Resp BP SpO2   12/08/23 1716 12/08/23 1712 12/08/23 1712 12/08/23 1716 12/08/23 1712   97.2 °F (36.2 °C) 77 18 167/86 98 %      Temp src Heart Rate Source Patient Position BP Location FiO2 (%)   12/08/23 1716 12/08/23 1712 12/08/23 1716 12/08/23 1716 --   Tympanic Monitor Sitting Right arm        Physical Exam  General: No acute distress, nontoxic  HEENT: EOMI  Pulm: Symmetric chest rise, nonlabored breathing  CV: Regular rate and rhythm  GI: Nondistended  MSK: No deformity, full range of motion of the left hip without significant reproducible tenderness to palpation  Skin: Warm, dry  Neuro: Awake, alert, oriented x 4, moving all extremities, no focal deficits  Psych: Calm, cooperative    Vital signs and nursing notes reviewed.         LAB RESULTS  No results found for this  or any previous visit (from the past 24 hour(s)).    Ordered the above labs and independently interpreted results. My findings will be discussed in the medical decision making section below        RADIOLOGY  XR Hip With or Without Pelvis 2 - 3 View Left    Result Date: 12/8/2023  AP PELVIS AND AP/FROG LATERAL LEFT HIP  HISTORY: Left hip pain.  COMPARISON: AP pelvis 12/27/2017.  FINDINGS: Evaluation for fracture is limited by the degree of osteopenia. There are mild arthritic changes of both hips with medial migration of the femoral heads. No fracture or acute osseous abnormality is demonstrated. Soft tissues appear within normal limits.      Evaluation for fracture is limited by the degree of osteopenia. There is no evidence for fracture or acute abnormality of the pelvis or left hip though due to degree of osteopenia, recommend further action with CT or MRI if there remains strong clinical suspicion for fracture.  This report was finalized on 12/8/2023 7:55 PM by Dr. Yoel Jones M.D on Workstation: [employer]       Ordered the above noted radiological studies.  Independently interpreted by me and my independent review of findings can be found in the ED Course.  See dictation for official radiology interpretation.      PROCEDURES    Procedures      MEDICATIONS GIVEN IN ER    Medications   HYDROcodone-acetaminophen (NORCO)  MG per tablet 1 tablet (1 tablet Oral Given 12/8/23 1930)         PROGRESS, DATA ANALYSIS, CONSULTS, AND MEDICAL DECISION MAKING    Please note that this section constitutes my independent interpretation of clinical data including lab results, radiology, EKG's.  This constitutes my independent professional opinion regarding differential diagnosis and management of this patient.  It may include any factors such as history from outside sources, review of external records, social determinants of health, management of medications, response to those treatments, and discussions with other  providers.    Differential Diagnosis and Plan: Initial concern for musculoskeletal process such as hip arthritis, muscle strain or spasm, among others.  Lower concern for anticoagulant failure with DVT.  Will start with x-ray and pain control and reevaluate.    Additional sources:  - Discussed/ obtained information from independent historians:       - Chronic or social conditions impacting care:      - Shared decision making:  Patient and  at bedside fully updated on and in agreement with the course and plan moving forward    ED Course as of 12/08/23 2033   Fri Dec 08, 2023   1949 XR Hip With or Without Pelvis 2 - 3 View Left [DC]   1949 XR Hip With or Without Pelvis 2 - 3 View Left  Per my independent interpretation of the left hip x-ray, arthritic changes noted at the left hip [DC]   2014 Discussed with Dr. Ugarte, on call for her PMD Dr. Cadet, discussed patient's clinical course and findings today, he is comfortable with plan for discharge with outpatient follow-up on Monday. [DC]   2022 Patient and  updated on findings today and discussions with Dr. Ugarte, they are comfortable with the plan for discharge with outpatient follow-up with Dr. Cadet on Monday.  ED return for worsening symptoms as needed.  All questions or concerns addressed. [DC]      ED Course User Index  [DC] Everardo Patino MD       Hospitalization Considered?:     Orders Placed During This Visit:  Orders Placed This Encounter   Procedures    XR Hip With or Without Pelvis 2 - 3 View Left    Family Medicine Consult       Additional orders considered but not placed:      Independent interpretation of labs, radiology studies, and discussions with consultants: See ED Course        AS OF 20:33 EST VITALS:    BP - 167/86  HR - 65  TEMP - 97.2 °F (36.2 °C) (Tympanic)  02 SATS - 98%        DIAGNOSIS  Final diagnoses:   Left inguinal pain   Arthritis of left hip         DISPOSITION  DISCHARGE    Patient discharged in stable  condition.    Reviewed implications of results, diagnosis, meds, responsibility to follow up, warning signs and symptoms of possible worsening, potential complications and reasons to return to ER. If your blood pressure > 120/80 please follow up with your primary care provider for further management.    Patient/Family voiced understanding of above instructions.    Discussed plan for discharge, as there is no emergent indication for admission. Pt/family is agreeable and understands need for follow up and repeat testing.  Pt is aware that discharge does not mean that nothing is wrong but it indicates no emergency is present that requires admission and they must continue care with follow-up as given below or physician of their choice.     FOLLOW-UP  [facility] EMERGENCY DEPARTMENT  [address] [facility] [address] [phone]    As needed, If symptoms worsen    Óscar Cadet MD  [address] [place], [place] [address] [phone]    Call on 12/11/2023           Medication List        Changed      cloNIDine 0.2 MG tablet  Commonly known as: CATAPRES  Take 1 tablet by mouth Every 12 (Twelve) Hours.  What changed: how much to take     NIFEdipine CC 30 MG 24 hr tablet  Commonly known as: ADALAT CC  Take 1 tablet by mouth Daily.  What changed:   when to take this  additional instructions                            --    Please note that portions of this were completed with a voice recognition program.       Note Disclaimer: At [facility], we believe that sharing information builds trust and better relationships. You are receiving this note because you are receiving care at [facility] or recently visited. It is possible you will see health information before a provider has talked with you about it. This kind of information can be easy to misunderstand. To help you fully understand what it means for your health, we urge you to discuss this note with your provider.           Sukumar  Everardo FOWLER MD  12/08/23 [id number]

## 2023-12-09 NOTE — DISCHARGE INSTRUCTIONS
Continue current medications, follow-up with your primary care provider on Monday, ED return for worsening symptoms as needed.

## 2023-12-14 ENCOUNTER — TRANSCRIBE ORDERS (OUTPATIENT)
Dept: ADMINISTRATIVE | Facility: HOSPITAL | Age: 83
End: 2023-12-14
Payer: MEDICARE

## 2023-12-14 DIAGNOSIS — R27.0 ATAXIA: ICD-10-CM

## 2023-12-14 DIAGNOSIS — M25.552 LEFT HIP PAIN: Primary | ICD-10-CM

## 2023-12-21 ENCOUNTER — OFFICE VISIT (OUTPATIENT)
Dept: ORTHOPEDIC SURGERY | Facility: CLINIC | Age: 83
End: 2023-12-21
Payer: MEDICARE

## 2023-12-21 VITALS — TEMPERATURE: 98.2 F | BODY MASS INDEX: 30.51 KG/M2 | WEIGHT: 155.4 LBS | HEIGHT: 60 IN

## 2023-12-21 DIAGNOSIS — M25.552 LATERAL PAIN OF LEFT HIP: Primary | ICD-10-CM

## 2023-12-21 RX ORDER — METHYLPREDNISOLONE ACETATE 80 MG/ML
80 INJECTION, SUSPENSION INTRA-ARTICULAR; INTRALESIONAL; INTRAMUSCULAR; SOFT TISSUE
Status: COMPLETED | OUTPATIENT
Start: 2023-12-21 | End: 2023-12-21

## 2023-12-21 RX ORDER — EPLERENONE 25 MG/1
25 TABLET, FILM COATED ORAL DAILY
COMMUNITY

## 2023-12-21 RX ORDER — LORAZEPAM 0.5 MG/1
0.5 TABLET ORAL EVERY 8 HOURS PRN
COMMUNITY

## 2023-12-21 RX ORDER — CARBOXYMETHYLCELLULOSE SODIUM 5 MG/ML
1 SOLUTION/ DROPS OPHTHALMIC 2 TIMES DAILY PRN
COMMUNITY

## 2023-12-21 RX ORDER — LIDOCAINE HYDROCHLORIDE 10 MG/ML
2 INJECTION, SOLUTION EPIDURAL; INFILTRATION; INTRACAUDAL; PERINEURAL
Status: COMPLETED | OUTPATIENT
Start: 2023-12-21 | End: 2023-12-21

## 2023-12-21 RX ADMIN — LIDOCAINE HYDROCHLORIDE 2 ML: 10 INJECTION, SOLUTION EPIDURAL; INFILTRATION; INTRACAUDAL; PERINEURAL at 10:19

## 2023-12-21 RX ADMIN — METHYLPREDNISOLONE ACETATE 80 MG: 80 INJECTION, SUSPENSION INTRA-ARTICULAR; INTRALESIONAL; INTRAMUSCULAR; SOFT TISSUE at 10:19

## 2023-12-21 NOTE — PROGRESS NOTES
Patient Name: Sasha Barrett   YOB: 1940  Referring Primary Care Physician: Óscar Cadet MD  BMI: Body mass index is 30.35 kg/m².    Chief Complaint:    Chief Complaint   Patient presents with    Left Hip - Initial Evaluation, Pain        HPI:     Sasha Barrett is a 83 y.o. female who presents today for evaluation of   Chief Complaint   Patient presents with    Left Hip - Initial Evaluation, Pain   .  Ms. Barrett is seen today with her  complaining some left hip pain.  Apparently she had a fall about a month ago was seen in the emergency room.  It is sore laterally.  She normally walks on a walker.  She has had back trouble before seen Dr. RAINEY years ago reviewing her records and been through pain management.      Subjective   Medications:   Home Medications:  Current Outpatient Medications on File Prior to Visit   Medication Sig    acetaminophen (TYLENOL) 325 MG tablet Take 2 tablets by mouth Every 4 (Four) Hours As Needed for Mild Pain .    albuterol (PROAIR HFA) 108 (90 Base) MCG/ACT inhaler 4 puffs Every 4 (Four) Hours As Needed for Wheezing or Shortness of Air.    butalbital-acetaminophen-caffeine (FIORICET, ESGIC) -40 MG per tablet Take 2 tablets by mouth Every 4 (Four) Hours As Needed for Headache.    carboxymethylcellulose (REFRESH PLUS) 0.5 % solution Administer 1 drop to both eyes 2 (Two) Times a Day As Needed for Dry Eyes.    cetirizine (zyrTEC) 10 MG tablet Take 1 tablet by mouth Daily. Alt with singulair    Cholecalciferol (VITAMIN D3) 1.25 MG (02527 UT) capsule Take 1 capsule by mouth 1 (One) Time Per Week. TUESDAYS    cloNIDine (CATAPRES) 0.2 MG tablet Take 1 tablet by mouth Every 12 (Twelve) Hours. (Patient taking differently: Take 0.5 tablets by mouth Every 12 (Twelve) Hours.)    Diclofenac Sodium (VOLTAREN) 1 % gel gel Apply 4 g topically to the appropriate area as directed 4 (Four) Times a Day As Needed.    diphenhydrAMINE (BENADRYL) 50 MG tablet Take 1 tablet by mouth  every 4 (four) hours as needed for itching or allergies.    EPINEPHrine (EPIPEN) 0.3 MG/0.3ML solution auto-injector injection INJECT THE CONTENTS OF ONE PEN AS NEEDED. MAY REPEAT ONE TIME.    eplerenone (INSPRA) 25 MG tablet Take 1 tablet by mouth Daily.    fluticasone (FLONASE) 50 MCG/ACT nasal spray 1 spray by Each Nare route 2 (Two) Times a Day.    furosemide (LASIX) 40 MG tablet Take 1 tablet by mouth Daily As Needed (swelling/weight gain).    HYDROcodone-acetaminophen (NORCO)  MG per tablet Take 1-2 tablets by mouth Every 6 (Six) Hours As Needed for Moderate Pain.    hydrOXYzine (ATARAX) 50 MG tablet Take 1 tablet by mouth Every 6 (Six) Hours As Needed for Itching or Allergies.    ipratropium (ATROVENT) 0.02 % nebulizer solution Take 2.5 mL by nebulization 4 (Four) Times a Day.    LORazepam (ATIVAN) 0.5 MG tablet Take 1 tablet by mouth Every 8 (Eight) Hours As Needed for Anxiety.    multivitamin with minerals tablet tablet Take 1 tablet by mouth Daily.    mupirocin (BACTROBAN) 2 % ointment Apply 1 application topically to the appropriate area as directed Every 12 (Twelve) Hours.    NIFEdipine XL (ADALAT CC) 30 MG 24 hr tablet Take 1 tablet by mouth Daily. (Patient taking differently: Take 1 tablet by mouth Daily. .)    omeprazole (priLOSEC) 40 MG capsule Take 1 capsule by mouth Daily.    ondansetron ODT (ZOFRAN-ODT) 8 MG disintegrating tablet Take 1 tablet by mouth every 6 (six) hours as needed for nausea or vomiting.    polyethylene glycol (MIRALAX) 17 g packet Take 17 g by mouth Daily As Needed. HARD STOOL    potassium chloride (KLOR-CON) 20 MEQ packet Take 20 mEq by mouth Every Other Day.    predniSONE (DELTASONE) 10 MG tablet Take 1 tablet by mouth Every Other Day.    promethazine-codeine (PHENERGAN with CODEINE) 6.25-10 MG/5ML syrup Take 5 mL by mouth 4 (Four) Times a Day As Needed for cough.    rosuvastatin (CRESTOR) 5 MG tablet Take 1 tablet by mouth Every Morning.    vitamin B-12 (VITAMIN B-12)  1000 MCG tablet Take 1 tablet by mouth Daily.    Xarelto 20 MG tablet Take 1 tablet by mouth Daily. with food.     No current facility-administered medications on file prior to visit.     Current Medications:  Scheduled Meds:  Continuous Infusions:No current facility-administered medications for this visit.    PRN Meds:.    I have reviewed the patient's medical history in detail and updated the computerized patient record.  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 Yazmin Canseco    CARDIAC ELECTROPHYSIOLOGY PROCEDURE N/A 5/27/2022    Procedure: Loop insertion;  Surgeon: Jeffrey Argueta MD;  Location:  [facility];  Service: Cardiovascular;  Laterality: N/A;  biotronik    CARDIAC ELECTROPHYSIOLOGY PROCEDURE N/A 7/13/2022    Procedure: Loop recorder removal;  Surgeon: Jeffrey Argueta MD;  Location:  [facility];  Service: Cardiovascular;  Laterality: N/A;    CARPAL TUNNEL RELEASE Right 05/25/2011    Dr. Caleb Bueno    CARPAL TUNNEL RELEASE Left 04/26/2011    Dr. Caleb Bueno    CATARACT EXTRACTION Left 11/29/2010    Dr. Eusebio Downs    CATARACT EXTRACTION Right 11/17/2010    Dr. Eusebio Downs     COLONOSCOPY N/A 12/06/2002    Sigmoid diverticulosis-Dr. Brandon Batista    COLONOSCOPY N/A 12/12/2013    Tortuous colon, diverticulosis in the sigmoid colon and descending colon, stool in the rectum, sigmoid colon, descending colon, splenic flexure, transverse colon and hepatic flexure-Dr. Irma Brambila    DEQUERVAIN RELEASE Left 9/23/2020    Procedure: DEQUERVAIN RELEASE LEFT HAND;  Surgeon: Caleb Bueno MD;  Location:  [facility];  Service: Plastics;  Laterality: Left;    ENDOSCOPY N/A 09/13/2007    Normal-Dr. Pavel Quarles    ENDOSCOPY AND COLONOSCOPY N/A 09/19/2005    1 cm hiatal hernia, mild Schatzki's ring, sigmoid diverticulosis-Dr. Yoel Farley    GANGLION CYST EXCISION Left 12/18/2012    Release of A1 pulley flexor sheath, left fourth finger, excision of ganglion cyst 0.5 cm diameter, A2 pulley flexor sheath, left fourth finger-Dr. Caleb Bueno    HEEL SPUR EXCISION Left 1968    INGUINAL HERNIA REPAIR Right 1971    at 5 months pregnant     INJECTION OF MEDICATION Left 9/23/2020    Procedure: KENOLOG INJECTION TO LEFT THUMB;  Surgeon: Caleb Bueno MD;  Location:  [facility];  Service: Plastics;  Laterality: Left;    MASTECTOMY Right 08/05/2008    Right breast mastectomy and excision of left chest wall lesion-Dr. Yoel Farley    MASTECTOMY RADICAL Left 04/29/2003    Left modified radical mastectomy-Dr. Yazmin Canseco    PARATHYROIDECTOMY Right 05/04/2004    Excision of parathyroid adenoma (right superior)-Dr. Yoel Farley    REPLACEMENT TOTAL KNEE Right 04/09/2012    Dr. Lamonte Childress    SINUS SURGERY  1984    THYROIDECTOMY, PARTIAL Left 05/04/2004    Dr. Yoel Farley    TOTAL ABDOMINAL HYSTERECTOMY Bilateral 1973    Dr. Mahan    TRIGGER FINGER RELEASE Left 9/23/2020    Procedure: RELEASE LEFT FOURTH TRIGGER FINGER;  Surgeon: Caleb Bueno MD;  Location:  [facility];  Service: Plastics;  Laterality: Left;    UPPER GASTROINTESTINAL ENDOSCOPY N/A  02/18/2009    LA Grade A reflux esophagitis, normal stomach, normal 2nd part of the duodenum-Dr. Yoel Farley    WRIST GANGLION EXCISION Left 9/23/2020    Procedure: EXCISION GANGLION CYST LEFT WRIST;  Surgeon: Caleb Bueno MD;  [place] [facility]  Service: Plastics;  Laterality: Left;        Social History     Occupational History    Occupation: RETIRED   Tobacco Use    Smoking status: Never     Passive exposure: Never    Smokeless tobacco: Never   Vaping Use    Vaping Use: Never used   Substance and Sexual Activity    Alcohol use: Yes     Comment: 3 drinks yearly    Drug use: Never    Sexual activity: Defer     Birth control/protection: Surgical      Social History     Social History Narrative    LIVES WITH         Family History   Problem Relation Age of Onset    Brain cancer Brother     Alcohol abuse Mother     No Known Problems Father     No Known Problems Sister     Loan Hyperthermia Neg Hx        ROS: 14 point review of systems was performed and all other systems were reviewed and are negative except for documented findings in HPI and today's encounter.     Allergies:   Allergies   Allergen Reactions    Bee Venom Shortness Of Breath and Rash    Morphine Anaphylaxis and Nausea And Vomiting    Sulfa Antibiotics Anaphylaxis and Hives     Or sulfa fillers in meds  Broke out in internal and external hives      Tree Extract Shortness Of Breath and Rash    Ambien [Zolpidem] Hallucinations    Anastrozole Other (See Comments)     Can't remember what the reaction was     Covid-19 (Mrna) Vaccine Swelling     Facial swelling, blood clots    Eliquis [Apixaban] Headache     Headaches, nausea and itching.     Hydralazine Myalgia     Patient reports leg cramps, joint pain and increased fatigue    Norvasc [Amlodipine] Other (See Comments)     KIDNEYS SHUT DOWN    Nsaids Other (See Comments)     KIDNEY FAILURE    Penicillins Unknown (See Comments)     Severe reaction as a child  Tolerated  "ceftriaxone during 04/2017 admission    Grass Rash     Constitutional:  Denies fever, shaking or chills   Eyes:  Denies change in visual acuity   HENT:  Denies nasal congestion or sore throat   Respiratory:  Denies cough or shortness of breath   Cardiovascular:  Denies chest pain or severe LE edema   GI:  Denies abdominal pain, nausea, vomiting, bloody stools or diarrhea   Musculoskeletal:  Numbness, tingling, pain, or loss of motor function only as noted above in history of present illness.  : Denies painful urination or hematuria  Integument:  Denies rash, lesion or ulceration   Neurologic:  Denies headache or focal weakness  Endocrine:  Denies lymphadenopathy  Psych:  Denies confusion or change in mental status   Hem:  Denies active bleeding    OBJECTIVE:  Physical Exam: 83 y.o. female  Wt Readings from Last 3 Encounters:   12/21/23 70.5 kg (155 lb 6.4 oz)   12/08/23 65.8 kg (145 lb)   05/02/23 73.5 kg (162 lb)     Ht Readings from Last 1 Encounters:   12/21/23 152.4 cm (60\")     Body mass index is 30.35 kg/m².  Vitals:    12/21/23 1001   Temp: 98.2 °F (36.8 °C)     Vital signs reviewed.     General Appearance:    Alert, cooperative, in no acute distress                  Eyes: conjunctiva clear  ENT: external ears and nose atraumatic  CV: no peripheral edema  Resp: normal respiratory effort  Skin: no rashes or wounds; normal turgor  Psych: mood and affect appropriate  Lymph: no nodes appreciated  Neuro: gross sensation intact  Vascular:  Palpable peripheral pulse in noted extremity  Musculoskeletal Extremities: Her exam today shows she can transfer and walk her balance is poor but she does okay with a walker she point tender over greater trochanter Stinchfield is negative axial loading is negative you can internally externally rotate her hip.    Radiology:   Tray AP left hip lateral left hip from December 8 in [facility] is viewed and appears to have some joint space no obvious fracture        Assessment:     " ICD-10-CM ICD-9-CM   1. Lateral pain of left hip  M25.552 719.45        MDM/Plan:   The diagnosis(es), natural history, pathophysiology and treatment for diagnosis(es) were discussed. Opportunity given and questions answered.  Biomechanics of pertinent body areas discussed.  When appropriate, the use of ambulatory aids discussed.    Biomechanics of pertinent body areas discussed.  When appropriate, the use of ambulatory aids discussed.  Inflammation/pain control; with cold, heat, elevation and/or liniments discussed as appropriate  Cortisone Injection. See procedure note.  MEDICAL RECORDS reviewed from other provider(s) for past and current medical history pertinent to this complaint.  Injected her left trochanter where she was tender and she had some good numbing from this.  If this helps she can get recurrent injections either from pain management orthopedic sports medicine    12/21/2023    Dictated utilizing Dragon dictation        Large Joint Arthrocentesis: L greater trochanteric bursa  Date/Time: 12/21/2023 10:19 AM  Consent given by: patient  Site marked: site marked  Timeout: Immediately prior to procedure a time out was called to verify the correct patient, procedure, equipment, support staff and site/side marked as required   Supporting Documentation  Indications: pain and joint swelling   Procedure Details  Location: hip - L greater trochanteric bursa  Preparation: Patient was prepped and draped in the usual sterile fashion  Needle size: 22 G  Approach: anterolateral  Medications administered: 80 mg methylPREDNISolone acetate 80 MG/ML; 2 mL lidocaine PF 1% 1 %  Patient tolerance: patient tolerated the procedure well with no immediate complications

## 2024-03-07 ENCOUNTER — TELEPHONE (OUTPATIENT)
Dept: ORTHOPEDIC SURGERY | Facility: CLINIC | Age: 84
End: 2024-03-07

## 2024-03-07 ENCOUNTER — OFFICE VISIT (OUTPATIENT)
Dept: ORTHOPEDIC SURGERY | Facility: CLINIC | Age: 84
End: 2024-03-07
Payer: MEDICARE

## 2024-03-07 VITALS — HEIGHT: 60 IN | BODY MASS INDEX: 26.31 KG/M2 | WEIGHT: 134 LBS | TEMPERATURE: 97.8 F

## 2024-03-07 DIAGNOSIS — M79.671 FOOT PAIN, RIGHT: ICD-10-CM

## 2024-03-07 DIAGNOSIS — M25.871 IMPINGEMENT SYNDROME OF RIGHT ANKLE: ICD-10-CM

## 2024-03-07 DIAGNOSIS — M76.829 POSTERIOR TIBIAL TENDON DYSFUNCTION: ICD-10-CM

## 2024-03-07 DIAGNOSIS — S82.64XA CLOSED NONDISPLACED FRACTURE OF LATERAL MALLEOLUS OF RIGHT FIBULA, INITIAL ENCOUNTER: Primary | ICD-10-CM

## 2024-03-07 NOTE — TELEPHONE ENCOUNTER
URGENT - INCOMING ESTABLISHED PATIENT FROM [facility] MD TO DR BRITTON FOR RIGHT ANKLE PAIN; FELT POP - WORSENING PAIN AND SWELLING. NON WEIGHT-BEARING. INDXD REFERRAL 3/5/2024, DEMOGRAPHICS 3/5/2024, US VEINS RLE 2/29/2024, XR RT ANKLE 2/29/2024, XR RT KNEE 2/29/2024, XR RT TIB/FIB 2/29/2024, LABS 12/12/2023     Can MWM see soon?

## 2024-03-07 NOTE — PROGRESS NOTES
Ankle Follow Up      Patient: Sasha Barrett    YOB: 1940 84 y.o. female    Chief Complaints: Ankle pain    History of Present Illness:Patient was seen initially in the office on 9/22/2022 with a history of chronic pes planus with valgus alignment to the right hindfoot and ankle.  She complains of swelling in her leg and ankle at that time and some questionable area of cellulitis of the anterior ankle.  She had a Doppler that was unremarkable for DVT on 9/21/2022 did show some valvular incompetence.  She had a Doppler in early August that showed chronic popliteal DVT.  Reviewed her at that time that the multiple medical problems are would not recommend any surgical treatment and did not want any custom bracing.  She was fitted with a PT TD brace instructed to watch her skin very carefully.     She was subsequently hospitalized after her trip to [place] she had a fall and saw her in the hospital on 9/30/2022.  At that time her swelling had markedly diminished after medical management of her fluid.  We had ordered an MRI at that time which she declined and she was fitted with a boot and instructed on use of that.  At that time her anterior skin is markedly improved with no sign of infection.     Patient was seen on 11/3/2022 and the area of her anterior ankle remained improved and did not show any sign of infection.  She did not use the boot and had been using the PT TD brace and was watching her skin carefully and reported that her ankle did  feel quite a bit better when she was in it.    We discussed treatment going forward and she did not wish to pursue a custom Brown brace and I did not feel that her deformity would be amenable to just orthotics.  She was instructed to continue with her PTTD brace and walker and that it may need to be replaced every several months.  She was going to continue with therapy with stretching and strengthening and we  decided by mutual agreement that I will see her back as  "needed.    Patient is seen today after calling this morning for appointment reporting that she had some chronic swelling to her leg and persistent pain and deformity and about 2 weeks ago was walking some with walker and felt a pop in the lateral aspect of her right ankle with worsening pain and swelling.  She was seen by her PCP and sent for x-rays and is here today for further evaluation with moderate pain about the right ankle.  She been using some type of homemade splinting padded device.  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  She is with her  she is seated in a wheelchair there is moderate swelling about the right ankle but this appears chronic.  She is tender over the distal fibula and there is significant hindfoot valgus that I was unable to passively correct due to tightness and pain.  There was no break in the skin and no sign of infection.      Radiology: 3 views of the right foot and ankle ordered evaluate alignment reviewed and compared to previous x-rays.  There is increased abduction to the midfoot across the transverse tarsal joints.  There does appear to be fracture of the fibula about " 7 cm above the level of the ankle joint with minimal displacement.    3 views of the right ankle, 4 views of the right tib-fib and 2 views of the right knee reviewed from 2/29/2024 online from [facility].  There is a right total knee prosthesis in place with no obvious fracture or surrounding lucency.  Ankle x-rays show significant pes planus deformity but has appears to be well-seated within the mortise no and what appears to be an evolving fracture of the fibula.  Tib-fib x-rays show some soft tissue swelling but no obvious fracture.  These x-rays do not demonstrate appreciable change compared with x-rays from 9/22/2022 of the right foot and ankle.    Also reviewed Doppler report from 2/29/2024 from [facility] of the right lower extremity which was negative for any DVT.      Assessment/Plan:   Right chronic pes planus with  posterior tib insufficiency with chronic hindfoot impingement and subsequent fibular fracture  2.  Right hallux valgus     We discussed treatment going forward and a declined use of a boot that we tried to fit her with today feeling that it was  too heavy.  She was at least fitted with an ASO brace and will limit activity on this.    This is a fairly complex situation given the complexity of this but this fracture and degree of deformity that she has chronically to her hindfoot as well as constraints of my schedule going to refer her to see Dr. David Segundo for further evaluation and treatment.  She and her  were given his name and number instructed to call to make an appointment and referral was [facility] in [facility].  They were provided with copies of the current x-rays.  She will limit weight on this in the interim and use her wheelchair and will let me know how she is proceeding when she sees him.

## 2024-10-20 ENCOUNTER — APPOINTMENT (OUTPATIENT)
Dept: GENERAL RADIOLOGY | Facility: HOSPITAL | Age: 84
End: 2024-10-20
Payer: MEDICARE

## 2024-10-20 ENCOUNTER — HOSPITAL ENCOUNTER (OUTPATIENT)
Facility: HOSPITAL | Age: 84
Discharge: HOME HEALTH | End: 2024-10-22
Attending: INTERNAL MEDICINE | Admitting: INTERNAL MEDICINE
Payer: MEDICARE

## 2024-10-20 ENCOUNTER — APPOINTMENT (OUTPATIENT)
Dept: CARDIOLOGY | Facility: HOSPITAL | Age: 84
End: 2024-10-20
Payer: MEDICARE

## 2024-10-20 DIAGNOSIS — M54.16 SPINAL STENOSIS OF LUMBAR REGION WITH RADICULOPATHY: ICD-10-CM

## 2024-10-20 DIAGNOSIS — E43 SEVERE MALNUTRITION: Primary | ICD-10-CM

## 2024-10-20 DIAGNOSIS — R53.1 WEAKNESS: ICD-10-CM

## 2024-10-20 DIAGNOSIS — M48.061 SPINAL STENOSIS OF LUMBAR REGION WITH RADICULOPATHY: ICD-10-CM

## 2024-10-20 DIAGNOSIS — R62.7 ADULT FAILURE TO THRIVE SYNDROME: ICD-10-CM

## 2024-10-20 DIAGNOSIS — E87.70 HYPERVOLEMIA, UNSPECIFIED HYPERVOLEMIA TYPE: ICD-10-CM

## 2024-10-20 PROBLEM — R60.9 EDEMA: Status: ACTIVE | Noted: 2024-10-20

## 2024-10-20 LAB
ALBUMIN SERPL-MCNC: 3.6 G/DL (ref 3.5–5.2)
ALBUMIN/GLOB SERPL: 1.6 G/DL
ALP SERPL-CCNC: 82 U/L (ref 39–117)
ALT SERPL W P-5'-P-CCNC: 10 U/L (ref 1–33)
AMPHET+METHAMPHET UR QL: NEGATIVE
AMYLASE SERPL-CCNC: 40 U/L (ref 28–100)
ANION GAP SERPL CALCULATED.3IONS-SCNC: 8.7 MMOL/L (ref 5–15)
AST SERPL-CCNC: 13 U/L (ref 1–32)
BACTERIA UR QL AUTO: ABNORMAL /HPF
BARBITURATES UR QL SCN: POSITIVE
BASOPHILS # BLD MANUAL: 0 10*3/MM3 (ref 0–0.2)
BASOPHILS NFR BLD MANUAL: 0 % (ref 0–1.5)
BENZODIAZ UR QL SCN: NEGATIVE
BH CV LOWER VASCULAR LEFT COMMON FEMORAL AUGMENT: NORMAL
BH CV LOWER VASCULAR LEFT COMMON FEMORAL COMPETENT: NORMAL
BH CV LOWER VASCULAR LEFT COMMON FEMORAL COMPRESS: NORMAL
BH CV LOWER VASCULAR LEFT COMMON FEMORAL PHASIC: NORMAL
BH CV LOWER VASCULAR LEFT COMMON FEMORAL SPONT: NORMAL
BH CV LOWER VASCULAR LEFT DISTAL FEMORAL COMPRESS: NORMAL
BH CV LOWER VASCULAR LEFT GASTRONEMIUS COMPRESS: NORMAL
BH CV LOWER VASCULAR LEFT GREATER SAPH AK COMPRESS: NORMAL
BH CV LOWER VASCULAR LEFT GREATER SAPH BK COMPRESS: NORMAL
BH CV LOWER VASCULAR LEFT LESSER SAPH COMPRESS: NORMAL
BH CV LOWER VASCULAR LEFT MID FEMORAL AUGMENT: NORMAL
BH CV LOWER VASCULAR LEFT MID FEMORAL COMPETENT: NORMAL
BH CV LOWER VASCULAR LEFT MID FEMORAL COMPRESS: NORMAL
BH CV LOWER VASCULAR LEFT MID FEMORAL PHASIC: NORMAL
BH CV LOWER VASCULAR LEFT MID FEMORAL SPONT: NORMAL
BH CV LOWER VASCULAR LEFT PERONEAL COMPRESS: NORMAL
BH CV LOWER VASCULAR LEFT POPLITEAL AUGMENT: NORMAL
BH CV LOWER VASCULAR LEFT POPLITEAL COMPETENT: NORMAL
BH CV LOWER VASCULAR LEFT POPLITEAL COMPRESS: NORMAL
BH CV LOWER VASCULAR LEFT POPLITEAL PHASIC: NORMAL
BH CV LOWER VASCULAR LEFT POPLITEAL SPONT: NORMAL
BH CV LOWER VASCULAR LEFT POSTERIOR TIBIAL COMPRESS: NORMAL
BH CV LOWER VASCULAR LEFT PROFUNDA FEMORAL COMPRESS: NORMAL
BH CV LOWER VASCULAR LEFT PROXIMAL FEMORAL COMPRESS: NORMAL
BH CV LOWER VASCULAR LEFT SAPHENOFEMORAL JUNCTION COMPRESS: NORMAL
BH CV LOWER VASCULAR RIGHT COMMON FEMORAL AUGMENT: NORMAL
BH CV LOWER VASCULAR RIGHT COMMON FEMORAL COMPETENT: NORMAL
BH CV LOWER VASCULAR RIGHT COMMON FEMORAL COMPRESS: NORMAL
BH CV LOWER VASCULAR RIGHT COMMON FEMORAL PHASIC: NORMAL
BH CV LOWER VASCULAR RIGHT COMMON FEMORAL SPONT: NORMAL
BH CV LOWER VASCULAR RIGHT DISTAL FEMORAL COMPRESS: NORMAL
BH CV LOWER VASCULAR RIGHT GASTRONEMIUS COMPRESS: NORMAL
BH CV LOWER VASCULAR RIGHT GREATER SAPH AK COMPRESS: NORMAL
BH CV LOWER VASCULAR RIGHT GREATER SAPH BK COMPRESS: NORMAL
BH CV LOWER VASCULAR RIGHT LESSER SAPH COMPRESS: NORMAL
BH CV LOWER VASCULAR RIGHT MID FEMORAL AUGMENT: NORMAL
BH CV LOWER VASCULAR RIGHT MID FEMORAL COMPETENT: NORMAL
BH CV LOWER VASCULAR RIGHT MID FEMORAL COMPRESS: NORMAL
BH CV LOWER VASCULAR RIGHT MID FEMORAL PHASIC: NORMAL
BH CV LOWER VASCULAR RIGHT MID FEMORAL SPONT: NORMAL
BH CV LOWER VASCULAR RIGHT PERONEAL COMPRESS: NORMAL
BH CV LOWER VASCULAR RIGHT POPLITEAL AUGMENT: NORMAL
BH CV LOWER VASCULAR RIGHT POPLITEAL COMPETENT: NORMAL
BH CV LOWER VASCULAR RIGHT POPLITEAL COMPRESS: NORMAL
BH CV LOWER VASCULAR RIGHT POPLITEAL PHASIC: NORMAL
BH CV LOWER VASCULAR RIGHT POPLITEAL SPONT: NORMAL
BH CV LOWER VASCULAR RIGHT POSTERIOR TIBIAL COMPRESS: NORMAL
BH CV LOWER VASCULAR RIGHT PROFUNDA FEMORAL COMPRESS: NORMAL
BH CV LOWER VASCULAR RIGHT PROXIMAL FEMORAL COMPRESS: NORMAL
BH CV LOWER VASCULAR RIGHT SAPHENOFEMORAL JUNCTION COMPRESS: NORMAL
BILIRUB SERPL-MCNC: 0.2 MG/DL (ref 0–1.2)
BILIRUB UR QL STRIP: NEGATIVE
BUN SERPL-MCNC: 21 MG/DL (ref 8–23)
BUN/CREAT SERPL: 19.1 (ref 7–25)
CA-I SERPL ISE-MCNC: 1.23 MMOL/L (ref 1.15–1.35)
CALCIUM SPEC-SCNC: 9.3 MG/DL (ref 8.6–10.5)
CANNABINOIDS SERPL QL: NEGATIVE
CHLORIDE SERPL-SCNC: 103 MMOL/L (ref 98–107)
CHOLEST SERPL-MCNC: 155 MG/DL (ref 0–200)
CK SERPL-CCNC: 51 U/L (ref 20–180)
CLARITY UR: ABNORMAL
CO2 SERPL-SCNC: 27.3 MMOL/L (ref 22–29)
COCAINE UR QL: NEGATIVE
COLOR UR: YELLOW
CREAT SERPL-MCNC: 1.1 MG/DL (ref 0.57–1)
D DIMER PPP FEU-MCNC: 0.34 MCGFEU/ML (ref 0–0.86)
DEPRECATED RDW RBC AUTO: 45.7 FL (ref 37–54)
EGFRCR SERPLBLD CKD-EPI 2021: 49 ML/MIN/1.73
EOSINOPHIL # BLD MANUAL: 0 10*3/MM3 (ref 0–0.4)
EOSINOPHIL NFR BLD MANUAL: 0 % (ref 0.3–6.2)
ERYTHROCYTE [DISTWIDTH] IN BLOOD BY AUTOMATED COUNT: 12.7 % (ref 12.3–15.4)
FENTANYL UR-MCNC: NEGATIVE NG/ML
GEN 5 2HR TROPONIN T REFLEX: 22 NG/L
GLOBULIN UR ELPH-MCNC: 2.3 GM/DL
GLUCOSE SERPL-MCNC: 118 MG/DL (ref 65–99)
GLUCOSE UR STRIP-MCNC: NEGATIVE MG/DL
HBA1C MFR BLD: 5.3 % (ref 4.8–5.6)
HCT VFR BLD AUTO: 36 % (ref 34–46.6)
HDLC SERPL-MCNC: 58 MG/DL (ref 40–60)
HGB BLD-MCNC: 11.2 G/DL (ref 12–15.9)
HGB UR QL STRIP.AUTO: NEGATIVE
HYALINE CASTS UR QL AUTO: ABNORMAL /LPF
KETONES UR QL STRIP: NEGATIVE
LDLC SERPL CALC-MCNC: 74 MG/DL (ref 0–100)
LDLC/HDLC SERPL: 1.22 {RATIO}
LEUKOCYTE ESTERASE UR QL STRIP.AUTO: ABNORMAL
LIPASE SERPL-CCNC: 26 U/L (ref 13–60)
LYMPHOCYTES # BLD MANUAL: 1.24 10*3/MM3 (ref 0.7–3.1)
LYMPHOCYTES NFR BLD MANUAL: 6.1 % (ref 5–12)
MAGNESIUM SERPL-MCNC: 2.2 MG/DL (ref 1.6–2.4)
MCH RBC QN AUTO: 30.4 PG (ref 26.6–33)
MCHC RBC AUTO-ENTMCNC: 31.1 G/DL (ref 31.5–35.7)
MCV RBC AUTO: 97.8 FL (ref 79–97)
METHADONE UR QL SCN: NEGATIVE
MONOCYTES # BLD: 0.47 10*3/MM3 (ref 0.1–0.9)
NEUTROPHILS # BLD AUTO: 5.96 10*3/MM3 (ref 1.7–7)
NEUTROPHILS NFR BLD MANUAL: 77.8 % (ref 42.7–76)
NITRITE UR QL STRIP: NEGATIVE
NT-PROBNP SERPL-MCNC: 935 PG/ML (ref 0–1800)
OPIATES UR QL: POSITIVE
OXYCODONE UR QL SCN: NEGATIVE
PH UR STRIP.AUTO: 7.5 [PH] (ref 5–8)
PHOSPHATE SERPL-MCNC: 2.6 MG/DL (ref 2.5–4.5)
PLAT MORPH BLD: NORMAL
PLATELET # BLD AUTO: 181 10*3/MM3 (ref 140–450)
PMV BLD AUTO: 9.6 FL (ref 6–12)
POTASSIUM SERPL-SCNC: 3.1 MMOL/L (ref 3.5–5.2)
PROCALCITONIN SERPL-MCNC: 0.05 NG/ML (ref 0–0.25)
PROT SERPL-MCNC: 5.9 G/DL (ref 6–8.5)
PROT UR QL STRIP: ABNORMAL
RBC # BLD AUTO: 3.68 10*6/MM3 (ref 3.77–5.28)
RBC # UR STRIP: ABNORMAL /HPF
RBC MORPH BLD: NORMAL
REF LAB TEST METHOD: ABNORMAL
SARS-COV-2 RNA RESP QL NAA+PROBE: NOT DETECTED
SODIUM SERPL-SCNC: 139 MMOL/L (ref 136–145)
SP GR UR STRIP: 1.01 (ref 1–1.03)
SQUAMOUS #/AREA URNS HPF: ABNORMAL /HPF
TRIGL SERPL-MCNC: 131 MG/DL (ref 0–150)
TROPONIN T DELTA: 1 NG/L
TROPONIN T SERPL HS-MCNC: 21 NG/L
TSH SERPL DL<=0.05 MIU/L-ACNC: 1.1 UIU/ML (ref 0.27–4.2)
UROBILINOGEN UR QL STRIP: ABNORMAL
VARIANT LYMPHS NFR BLD MANUAL: 16.2 % (ref 19.6–45.3)
VLDLC SERPL-MCNC: 23 MG/DL (ref 5–40)
WBC # UR STRIP: ABNORMAL /HPF
WBC MORPH BLD: NORMAL
WBC NRBC COR # BLD AUTO: 7.66 10*3/MM3 (ref 3.4–10.8)

## 2024-10-20 PROCEDURE — 63710000001 HYDROCODONE-ACETAMINOPHEN 10-325 MG TABLET: Performed by: INTERNAL MEDICINE

## 2024-10-20 PROCEDURE — 80307 DRUG TEST PRSMV CHEM ANLYZR: CPT | Performed by: INTERNAL MEDICINE

## 2024-10-20 PROCEDURE — 82330 ASSAY OF CALCIUM: CPT | Performed by: INTERNAL MEDICINE

## 2024-10-20 PROCEDURE — 82550 ASSAY OF CK (CPK): CPT | Performed by: INTERNAL MEDICINE

## 2024-10-20 PROCEDURE — 96374 THER/PROPH/DIAG INJ IV PUSH: CPT

## 2024-10-20 PROCEDURE — 84145 PROCALCITONIN (PCT): CPT | Performed by: INTERNAL MEDICINE

## 2024-10-20 PROCEDURE — 63710000001 MULTIVITAMIN WITH MINERALS TABLET: Performed by: INTERNAL MEDICINE

## 2024-10-20 PROCEDURE — 63710000001 CLONIDINE 0.1 MG TABLET: Performed by: INTERNAL MEDICINE

## 2024-10-20 PROCEDURE — A9270 NON-COVERED ITEM OR SERVICE: HCPCS | Performed by: INTERNAL MEDICINE

## 2024-10-20 PROCEDURE — 80053 COMPREHEN METABOLIC PANEL: CPT | Performed by: INTERNAL MEDICINE

## 2024-10-20 PROCEDURE — 93970 EXTREMITY STUDY: CPT | Performed by: SURGERY

## 2024-10-20 PROCEDURE — 63710000001 POTASSIUM CHLORIDE 20 MEQ PACK: Performed by: INTERNAL MEDICINE

## 2024-10-20 PROCEDURE — 84443 ASSAY THYROID STIM HORMONE: CPT | Performed by: INTERNAL MEDICINE

## 2024-10-20 PROCEDURE — 63710000001 FLUTICASONE 50 MCG/ACT SUSPENSION 16 G BOTTLE: Performed by: INTERNAL MEDICINE

## 2024-10-20 PROCEDURE — 85379 FIBRIN DEGRADATION QUANT: CPT | Performed by: INTERNAL MEDICINE

## 2024-10-20 PROCEDURE — G0378 HOSPITAL OBSERVATION PER HR: HCPCS

## 2024-10-20 PROCEDURE — 93970 EXTREMITY STUDY: CPT

## 2024-10-20 PROCEDURE — 85007 BL SMEAR W/DIFF WBC COUNT: CPT | Performed by: INTERNAL MEDICINE

## 2024-10-20 PROCEDURE — 71046 X-RAY EXAM CHEST 2 VIEWS: CPT

## 2024-10-20 PROCEDURE — 81001 URINALYSIS AUTO W/SCOPE: CPT | Performed by: INTERNAL MEDICINE

## 2024-10-20 PROCEDURE — 63710000001 LORAZEPAM 0.5 MG TABLET: Performed by: INTERNAL MEDICINE

## 2024-10-20 PROCEDURE — 87635 SARS-COV-2 COVID-19 AMP PRB: CPT | Performed by: INTERNAL MEDICINE

## 2024-10-20 PROCEDURE — 82150 ASSAY OF AMYLASE: CPT | Performed by: INTERNAL MEDICINE

## 2024-10-20 PROCEDURE — 63710000001 NIFEDIPINE XL 30 MG TABLET SUSTAINED-RELEASE 24 HOUR: Performed by: INTERNAL MEDICINE

## 2024-10-20 PROCEDURE — 83690 ASSAY OF LIPASE: CPT | Performed by: INTERNAL MEDICINE

## 2024-10-20 PROCEDURE — 25010000002 FUROSEMIDE PER 20 MG: Performed by: INTERNAL MEDICINE

## 2024-10-20 PROCEDURE — 84484 ASSAY OF TROPONIN QUANT: CPT | Performed by: INTERNAL MEDICINE

## 2024-10-20 PROCEDURE — 63710000001 EPLERENONE 25 MG TABLET: Performed by: INTERNAL MEDICINE

## 2024-10-20 PROCEDURE — 83036 HEMOGLOBIN GLYCOSYLATED A1C: CPT | Performed by: INTERNAL MEDICINE

## 2024-10-20 PROCEDURE — 85027 COMPLETE CBC AUTOMATED: CPT | Performed by: INTERNAL MEDICINE

## 2024-10-20 PROCEDURE — 63710000001 RIVAROXABAN 20 MG TABLET: Performed by: INTERNAL MEDICINE

## 2024-10-20 PROCEDURE — 63710000001 VITAMIN B-12 500 MCG TABLET: Performed by: INTERNAL MEDICINE

## 2024-10-20 PROCEDURE — 63710000001 CETIRIZINE 10 MG TABLET: Performed by: INTERNAL MEDICINE

## 2024-10-20 PROCEDURE — 83735 ASSAY OF MAGNESIUM: CPT | Performed by: INTERNAL MEDICINE

## 2024-10-20 PROCEDURE — 84100 ASSAY OF PHOSPHORUS: CPT | Performed by: INTERNAL MEDICINE

## 2024-10-20 PROCEDURE — 63710000001 MUPIROCIN 2 % OINTMENT 22 G TUBE: Performed by: INTERNAL MEDICINE

## 2024-10-20 PROCEDURE — 80061 LIPID PANEL: CPT | Performed by: INTERNAL MEDICINE

## 2024-10-20 PROCEDURE — 83880 ASSAY OF NATRIURETIC PEPTIDE: CPT | Performed by: INTERNAL MEDICINE

## 2024-10-20 PROCEDURE — 93005 ELECTROCARDIOGRAM TRACING: CPT | Performed by: INTERNAL MEDICINE

## 2024-10-20 RX ORDER — ALBUTEROL SULFATE 0.83 MG/ML
2.5 SOLUTION RESPIRATORY (INHALATION) EVERY 4 HOURS PRN
Status: DISCONTINUED | OUTPATIENT
Start: 2024-10-20 | End: 2024-10-22 | Stop reason: HOSPADM

## 2024-10-20 RX ORDER — ACETAMINOPHEN 325 MG/1
650 TABLET ORAL EVERY 4 HOURS PRN
Status: DISCONTINUED | OUTPATIENT
Start: 2024-10-20 | End: 2024-10-22 | Stop reason: HOSPADM

## 2024-10-20 RX ORDER — ONDANSETRON 4 MG/1
8 TABLET, ORALLY DISINTEGRATING ORAL EVERY 6 HOURS PRN
Status: DISCONTINUED | OUTPATIENT
Start: 2024-10-20 | End: 2024-10-22 | Stop reason: HOSPADM

## 2024-10-20 RX ORDER — CETIRIZINE HYDROCHLORIDE 10 MG/1
10 TABLET ORAL DAILY
Status: DISCONTINUED | OUTPATIENT
Start: 2024-10-20 | End: 2024-10-22 | Stop reason: HOSPADM

## 2024-10-20 RX ORDER — POTASSIUM CHLORIDE 1.5 G/1.58G
20 POWDER, FOR SOLUTION ORAL EVERY OTHER DAY
Status: DISCONTINUED | OUTPATIENT
Start: 2024-10-21 | End: 2024-10-21 | Stop reason: SDUPTHER

## 2024-10-20 RX ORDER — PREDNISONE 10 MG/1
10 TABLET ORAL EVERY OTHER DAY
Status: DISCONTINUED | OUTPATIENT
Start: 2024-10-21 | End: 2024-10-22 | Stop reason: HOSPADM

## 2024-10-20 RX ORDER — ERGOCALCIFEROL 1.25 MG/1
50000 CAPSULE, LIQUID FILLED ORAL WEEKLY
Status: DISCONTINUED | OUTPATIENT
Start: 2024-10-22 | End: 2024-10-22 | Stop reason: HOSPADM

## 2024-10-20 RX ORDER — HYDROCODONE BITARTRATE AND ACETAMINOPHEN 10; 325 MG/1; MG/1
2 TABLET ORAL EVERY 6 HOURS PRN
Status: DISCONTINUED | OUTPATIENT
Start: 2024-10-20 | End: 2024-10-22 | Stop reason: HOSPADM

## 2024-10-20 RX ORDER — DIPHENHYDRAMINE HCL 25 MG
50 TABLET ORAL EVERY 4 HOURS PRN
Status: DISCONTINUED | OUTPATIENT
Start: 2024-10-20 | End: 2024-10-22 | Stop reason: HOSPADM

## 2024-10-20 RX ORDER — BUTALBITAL, ACETAMINOPHEN AND CAFFEINE 50; 325; 40 MG/1; MG/1; MG/1
2 TABLET ORAL EVERY 4 HOURS PRN
Status: DISCONTINUED | OUTPATIENT
Start: 2024-10-20 | End: 2024-10-22 | Stop reason: HOSPADM

## 2024-10-20 RX ORDER — MULTIPLE VITAMINS W/ MINERALS TAB 9MG-400MCG
1 TAB ORAL DAILY
Status: DISCONTINUED | OUTPATIENT
Start: 2024-10-20 | End: 2024-10-20 | Stop reason: SDUPTHER

## 2024-10-20 RX ORDER — BISACODYL 5 MG/1
5 TABLET, DELAYED RELEASE ORAL DAILY PRN
Status: DISCONTINUED | OUTPATIENT
Start: 2024-10-20 | End: 2024-10-22 | Stop reason: HOSPADM

## 2024-10-20 RX ORDER — AMOXICILLIN 250 MG
2 CAPSULE ORAL 2 TIMES DAILY PRN
Status: DISCONTINUED | OUTPATIENT
Start: 2024-10-20 | End: 2024-10-22 | Stop reason: HOSPADM

## 2024-10-20 RX ORDER — SODIUM CHLORIDE 0.9 % (FLUSH) 0.9 %
3 SYRINGE (ML) INJECTION EVERY 12 HOURS SCHEDULED
Status: DISCONTINUED | OUTPATIENT
Start: 2024-10-20 | End: 2024-10-22 | Stop reason: HOSPADM

## 2024-10-20 RX ORDER — HYDROXYZINE HYDROCHLORIDE 25 MG/1
50 TABLET, FILM COATED ORAL EVERY 6 HOURS PRN
Status: DISCONTINUED | OUTPATIENT
Start: 2024-10-20 | End: 2024-10-22 | Stop reason: HOSPADM

## 2024-10-20 RX ORDER — NITROGLYCERIN 0.4 MG/1
0.4 TABLET SUBLINGUAL
Status: DISCONTINUED | OUTPATIENT
Start: 2024-10-20 | End: 2024-10-22 | Stop reason: HOSPADM

## 2024-10-20 RX ORDER — POTASSIUM CHLORIDE 1.5 G/1.58G
40 POWDER, FOR SOLUTION ORAL ONCE
Status: COMPLETED | OUTPATIENT
Start: 2024-10-20 | End: 2024-10-20

## 2024-10-20 RX ORDER — POTASSIUM CHLORIDE 750 MG/1
40 TABLET, FILM COATED, EXTENDED RELEASE ORAL ONCE
Status: DISCONTINUED | OUTPATIENT
Start: 2024-10-20 | End: 2024-10-20

## 2024-10-20 RX ORDER — CARBOXYMETHYLCELLULOSE SODIUM 10 MG/ML
1 GEL OPHTHALMIC 2 TIMES DAILY PRN
Status: DISCONTINUED | OUTPATIENT
Start: 2024-10-20 | End: 2024-10-22 | Stop reason: HOSPADM

## 2024-10-20 RX ORDER — NIFEDIPINE 30 MG/1
30 TABLET, EXTENDED RELEASE ORAL DAILY
Status: DISCONTINUED | OUTPATIENT
Start: 2024-10-20 | End: 2024-10-22 | Stop reason: HOSPADM

## 2024-10-20 RX ORDER — PROMETHAZINE HYDROCHLORIDE AND CODEINE PHOSPHATE 6.25; 1 MG/5ML; MG/5ML
5 SYRUP ORAL 4 TIMES DAILY PRN
Status: DISCONTINUED | OUTPATIENT
Start: 2024-10-20 | End: 2024-10-22 | Stop reason: HOSPADM

## 2024-10-20 RX ORDER — PANTOPRAZOLE SODIUM 40 MG/1
40 TABLET, DELAYED RELEASE ORAL
Status: DISCONTINUED | OUTPATIENT
Start: 2024-10-21 | End: 2024-10-22 | Stop reason: HOSPADM

## 2024-10-20 RX ORDER — EPLERENONE 25 MG/1
25 TABLET, FILM COATED ORAL DAILY
Status: DISCONTINUED | OUTPATIENT
Start: 2024-10-20 | End: 2024-10-22 | Stop reason: HOSPADM

## 2024-10-20 RX ORDER — MUPIROCIN 20 MG/G
1 OINTMENT TOPICAL EVERY 12 HOURS SCHEDULED
Status: DISCONTINUED | OUTPATIENT
Start: 2024-10-20 | End: 2024-10-22 | Stop reason: HOSPADM

## 2024-10-20 RX ORDER — POLYETHYLENE GLYCOL 3350 17 G/17G
17 POWDER, FOR SOLUTION ORAL DAILY PRN
Status: DISCONTINUED | OUTPATIENT
Start: 2024-10-20 | End: 2024-10-22 | Stop reason: HOSPADM

## 2024-10-20 RX ORDER — FUROSEMIDE 10 MG/ML
40 INJECTION INTRAMUSCULAR; INTRAVENOUS EVERY 12 HOURS
Status: DISCONTINUED | OUTPATIENT
Start: 2024-10-20 | End: 2024-10-21

## 2024-10-20 RX ORDER — BISACODYL 10 MG
10 SUPPOSITORY, RECTAL RECTAL DAILY PRN
Status: DISCONTINUED | OUTPATIENT
Start: 2024-10-20 | End: 2024-10-22 | Stop reason: HOSPADM

## 2024-10-20 RX ORDER — UREA 10 %
1000 LOTION (ML) TOPICAL DAILY
Status: DISCONTINUED | OUTPATIENT
Start: 2024-10-20 | End: 2024-10-22 | Stop reason: HOSPADM

## 2024-10-20 RX ORDER — ONDANSETRON 2 MG/ML
4 INJECTION INTRAMUSCULAR; INTRAVENOUS EVERY 6 HOURS PRN
Status: DISCONTINUED | OUTPATIENT
Start: 2024-10-20 | End: 2024-10-22 | Stop reason: HOSPADM

## 2024-10-20 RX ORDER — FLUTICASONE PROPIONATE 50 UG/1
1 SPRAY, METERED NASAL 2 TIMES DAILY
Status: DISCONTINUED | OUTPATIENT
Start: 2024-10-20 | End: 2024-10-22 | Stop reason: HOSPADM

## 2024-10-20 RX ORDER — CLONIDINE HYDROCHLORIDE 0.1 MG/1
0.1 TABLET ORAL EVERY 12 HOURS SCHEDULED
Status: DISCONTINUED | OUTPATIENT
Start: 2024-10-20 | End: 2024-10-21

## 2024-10-20 RX ORDER — POLYETHYLENE GLYCOL 3350 17 G/17G
17 POWDER, FOR SOLUTION ORAL DAILY PRN
Status: DISCONTINUED | OUTPATIENT
Start: 2024-10-20 | End: 2024-10-20 | Stop reason: SDUPTHER

## 2024-10-20 RX ORDER — SODIUM CHLORIDE 0.9 % (FLUSH) 0.9 %
3-10 SYRINGE (ML) INJECTION AS NEEDED
Status: DISCONTINUED | OUTPATIENT
Start: 2024-10-20 | End: 2024-10-22 | Stop reason: HOSPADM

## 2024-10-20 RX ORDER — ROSUVASTATIN CALCIUM 5 MG/1
5 TABLET, COATED ORAL EVERY MORNING
Status: DISCONTINUED | OUTPATIENT
Start: 2024-10-21 | End: 2024-10-22 | Stop reason: HOSPADM

## 2024-10-20 RX ORDER — LORAZEPAM 0.5 MG/1
0.5 TABLET ORAL EVERY 8 HOURS PRN
Status: DISCONTINUED | OUTPATIENT
Start: 2024-10-20 | End: 2024-10-22 | Stop reason: HOSPADM

## 2024-10-20 RX ORDER — MULTIPLE VITAMINS W/ MINERALS TAB 9MG-400MCG
1 TAB ORAL DAILY
Status: DISCONTINUED | OUTPATIENT
Start: 2024-10-20 | End: 2024-10-22 | Stop reason: HOSPADM

## 2024-10-20 RX ADMIN — EPLERENONE 25 MG: 25 TABLET, FILM COATED ORAL at 18:08

## 2024-10-20 RX ADMIN — LORAZEPAM 0.5 MG: 0.5 TABLET ORAL at 18:08

## 2024-10-20 RX ADMIN — CLONIDINE HYDROCHLORIDE 0.1 MG: 0.1 TABLET ORAL at 20:32

## 2024-10-20 RX ADMIN — HYDROCODONE BITARTRATE AND ACETAMINOPHEN 2 TABLET: 10; 325 TABLET ORAL at 18:08

## 2024-10-20 RX ADMIN — CETIRIZINE HYDROCHLORIDE 10 MG: 10 TABLET ORAL at 18:08

## 2024-10-20 RX ADMIN — Medication 3 ML: at 20:32

## 2024-10-20 RX ADMIN — HYDROCODONE BITARTRATE AND ACETAMINOPHEN 2 TABLET: 10; 325 TABLET ORAL at 23:46

## 2024-10-20 RX ADMIN — NIFEDIPINE 30 MG: 30 TABLET, FILM COATED, EXTENDED RELEASE ORAL at 18:08

## 2024-10-20 RX ADMIN — Medication 1000 MCG: at 18:08

## 2024-10-20 RX ADMIN — RIVAROXABAN 20 MG: 20 TABLET, FILM COATED ORAL at 18:08

## 2024-10-20 RX ADMIN — Medication 1 TABLET: at 18:08

## 2024-10-20 RX ADMIN — MUPIROCIN 1 APPLICATION: 20 OINTMENT TOPICAL at 20:32

## 2024-10-20 RX ADMIN — POTASSIUM CHLORIDE 40 MEQ: 1.5 FOR SOLUTION ORAL at 18:43

## 2024-10-20 RX ADMIN — FUROSEMIDE 40 MG: 10 INJECTION, SOLUTION INTRAMUSCULAR; INTRAVENOUS at 14:03

## 2024-10-20 RX ADMIN — FLUTICASONE PROPIONATE 1 SPRAY: 50 SPRAY, METERED NASAL at 20:32

## 2024-10-20 RX ADMIN — Medication 3 ML: at 18:09

## 2024-10-21 LAB
ALBUMIN SERPL-MCNC: 3.6 G/DL (ref 3.5–5.2)
ALBUMIN/GLOB SERPL: 1.5 G/DL
ALP SERPL-CCNC: 86 U/L (ref 39–117)
ALT SERPL W P-5'-P-CCNC: 10 U/L (ref 1–33)
ANION GAP SERPL CALCULATED.3IONS-SCNC: 6.6 MMOL/L (ref 5–15)
AST SERPL-CCNC: 12 U/L (ref 1–32)
BASOPHILS # BLD AUTO: 0.04 10*3/MM3 (ref 0–0.2)
BASOPHILS NFR BLD AUTO: 0.5 % (ref 0–1.5)
BILIRUB SERPL-MCNC: 0.2 MG/DL (ref 0–1.2)
BUN SERPL-MCNC: 24 MG/DL (ref 8–23)
BUN/CREAT SERPL: 22.9 (ref 7–25)
CA-I SERPL ISE-MCNC: 1.26 MMOL/L (ref 1.15–1.35)
CALCIUM SPEC-SCNC: 9.4 MG/DL (ref 8.6–10.5)
CHLORIDE SERPL-SCNC: 105 MMOL/L (ref 98–107)
CO2 SERPL-SCNC: 29.4 MMOL/L (ref 22–29)
CREAT SERPL-MCNC: 1.05 MG/DL (ref 0.57–1)
DEPRECATED RDW RBC AUTO: 43.7 FL (ref 37–54)
EGFRCR SERPLBLD CKD-EPI 2021: 51.9 ML/MIN/1.73
EOSINOPHIL # BLD AUTO: 0.15 10*3/MM3 (ref 0–0.4)
EOSINOPHIL NFR BLD AUTO: 1.9 % (ref 0.3–6.2)
ERYTHROCYTE [DISTWIDTH] IN BLOOD BY AUTOMATED COUNT: 12.8 % (ref 12.3–15.4)
GLOBULIN UR ELPH-MCNC: 2.4 GM/DL
GLUCOSE SERPL-MCNC: 119 MG/DL (ref 65–99)
HCT VFR BLD AUTO: 36.3 % (ref 34–46.6)
HGB BLD-MCNC: 12.2 G/DL (ref 12–15.9)
IMM GRANULOCYTES # BLD AUTO: 0.01 10*3/MM3 (ref 0–0.05)
IMM GRANULOCYTES NFR BLD AUTO: 0.1 % (ref 0–0.5)
LYMPHOCYTES # BLD AUTO: 1.84 10*3/MM3 (ref 0.7–3.1)
LYMPHOCYTES NFR BLD AUTO: 23 % (ref 19.6–45.3)
MCH RBC QN AUTO: 31.7 PG (ref 26.6–33)
MCHC RBC AUTO-ENTMCNC: 33.6 G/DL (ref 31.5–35.7)
MCV RBC AUTO: 94.3 FL (ref 79–97)
MONOCYTES # BLD AUTO: 0.9 10*3/MM3 (ref 0.1–0.9)
MONOCYTES NFR BLD AUTO: 11.3 % (ref 5–12)
NEUTROPHILS NFR BLD AUTO: 5.06 10*3/MM3 (ref 1.7–7)
NEUTROPHILS NFR BLD AUTO: 63.2 % (ref 42.7–76)
NRBC BLD AUTO-RTO: 0 /100 WBC (ref 0–0.2)
PLATELET # BLD AUTO: 199 10*3/MM3 (ref 140–450)
PMV BLD AUTO: 9.3 FL (ref 6–12)
POTASSIUM SERPL-SCNC: 3.8 MMOL/L (ref 3.5–5.2)
PROT SERPL-MCNC: 6 G/DL (ref 6–8.5)
PTH-INTACT SERPL-MCNC: 104 PG/ML (ref 15–65)
QT INTERVAL: 412 MS
QTC INTERVAL: 429 MS
RBC # BLD AUTO: 3.85 10*6/MM3 (ref 3.77–5.28)
SODIUM SERPL-SCNC: 141 MMOL/L (ref 136–145)
WBC NRBC COR # BLD AUTO: 8 10*3/MM3 (ref 3.4–10.8)

## 2024-10-21 PROCEDURE — 94799 UNLISTED PULMONARY SVC/PX: CPT

## 2024-10-21 PROCEDURE — A9270 NON-COVERED ITEM OR SERVICE: HCPCS | Performed by: INTERNAL MEDICINE

## 2024-10-21 PROCEDURE — G0378 HOSPITAL OBSERVATION PER HR: HCPCS

## 2024-10-21 PROCEDURE — 80053 COMPREHEN METABOLIC PANEL: CPT | Performed by: INTERNAL MEDICINE

## 2024-10-21 PROCEDURE — 85025 COMPLETE CBC W/AUTO DIFF WBC: CPT | Performed by: INTERNAL MEDICINE

## 2024-10-21 PROCEDURE — 94664 DEMO&/EVAL PT USE INHALER: CPT

## 2024-10-21 PROCEDURE — 63710000001 MULTIVITAMIN WITH MINERALS TABLET: Performed by: INTERNAL MEDICINE

## 2024-10-21 PROCEDURE — 83970 ASSAY OF PARATHORMONE: CPT | Performed by: INTERNAL MEDICINE

## 2024-10-21 PROCEDURE — 97530 THERAPEUTIC ACTIVITIES: CPT

## 2024-10-21 PROCEDURE — 96376 TX/PRO/DX INJ SAME DRUG ADON: CPT

## 2024-10-21 PROCEDURE — 63710000001 NIFEDIPINE XL 30 MG TABLET SUSTAINED-RELEASE 24 HOUR: Performed by: INTERNAL MEDICINE

## 2024-10-21 PROCEDURE — 97166 OT EVAL MOD COMPLEX 45 MIN: CPT

## 2024-10-21 PROCEDURE — 63710000001 VITAMIN B-12 500 MCG TABLET: Performed by: INTERNAL MEDICINE

## 2024-10-21 PROCEDURE — 63710000001 ROSUVASTATIN 5 MG TABLET: Performed by: INTERNAL MEDICINE

## 2024-10-21 PROCEDURE — 63710000001 CETIRIZINE 10 MG TABLET: Performed by: INTERNAL MEDICINE

## 2024-10-21 PROCEDURE — 94640 AIRWAY INHALATION TREATMENT: CPT

## 2024-10-21 PROCEDURE — 63710000001 EPLERENONE 25 MG TABLET: Performed by: INTERNAL MEDICINE

## 2024-10-21 PROCEDURE — 63710000001 PANTOPRAZOLE 40 MG TABLET DELAYED-RELEASE: Performed by: INTERNAL MEDICINE

## 2024-10-21 PROCEDURE — 63710000001 HYDROCODONE-ACETAMINOPHEN 10-325 MG TABLET: Performed by: INTERNAL MEDICINE

## 2024-10-21 PROCEDURE — 63710000001 PETROLATUM 42 % OINTMENT: Performed by: INTERNAL MEDICINE

## 2024-10-21 PROCEDURE — 97162 PT EVAL MOD COMPLEX 30 MIN: CPT

## 2024-10-21 PROCEDURE — 63710000001 POTASSIUM CHLORIDE 20 MEQ PACK: Performed by: INTERNAL MEDICINE

## 2024-10-21 PROCEDURE — 94761 N-INVAS EAR/PLS OXIMETRY MLT: CPT

## 2024-10-21 PROCEDURE — 63710000001 CLONIDINE 0.1 MG TABLET: Performed by: INTERNAL MEDICINE

## 2024-10-21 PROCEDURE — 63710000001 LORAZEPAM 0.5 MG TABLET: Performed by: INTERNAL MEDICINE

## 2024-10-21 PROCEDURE — 25010000002 FUROSEMIDE PER 20 MG: Performed by: INTERNAL MEDICINE

## 2024-10-21 PROCEDURE — 63710000001 TORSEMIDE 100 MG TABLET: Performed by: INTERNAL MEDICINE

## 2024-10-21 PROCEDURE — 82330 ASSAY OF CALCIUM: CPT | Performed by: INTERNAL MEDICINE

## 2024-10-21 PROCEDURE — 63710000001 RIVAROXABAN 20 MG TABLET: Performed by: INTERNAL MEDICINE

## 2024-10-21 PROCEDURE — 63710000001 PREDNISONE PER 5 MG: Performed by: INTERNAL MEDICINE

## 2024-10-21 RX ORDER — CLONIDINE HYDROCHLORIDE 0.1 MG/1
0.3 TABLET ORAL EVERY 6 HOURS SCHEDULED
Status: DISCONTINUED | OUTPATIENT
Start: 2024-10-21 | End: 2024-10-22 | Stop reason: HOSPADM

## 2024-10-21 RX ORDER — CLONIDINE HYDROCHLORIDE 0.1 MG/1
0.2 TABLET ORAL EVERY 6 HOURS SCHEDULED
Status: DISCONTINUED | OUTPATIENT
Start: 2024-10-21 | End: 2024-10-22 | Stop reason: HOSPADM

## 2024-10-21 RX ORDER — PETROLATUM 420 MG/G
1 OINTMENT TOPICAL EVERY 12 HOURS SCHEDULED
Status: DISCONTINUED | OUTPATIENT
Start: 2024-10-21 | End: 2024-10-22 | Stop reason: HOSPADM

## 2024-10-21 RX ORDER — POTASSIUM CHLORIDE 1.5 G/1.58G
20 POWDER, FOR SOLUTION ORAL DAILY
Status: DISCONTINUED | OUTPATIENT
Start: 2024-10-21 | End: 2024-10-22 | Stop reason: HOSPADM

## 2024-10-21 RX ORDER — POTASSIUM CHLORIDE 750 MG/1
20 TABLET, FILM COATED, EXTENDED RELEASE ORAL DAILY
Status: DISCONTINUED | OUTPATIENT
Start: 2024-10-21 | End: 2024-10-21

## 2024-10-21 RX ORDER — TORSEMIDE 100 MG/1
100 TABLET ORAL DAILY
Status: DISCONTINUED | OUTPATIENT
Start: 2024-10-21 | End: 2024-10-22 | Stop reason: HOSPADM

## 2024-10-21 RX ADMIN — POTASSIUM CHLORIDE 20 MEQ: 1.5 FOR SOLUTION ORAL at 09:00

## 2024-10-21 RX ADMIN — PANTOPRAZOLE SODIUM 40 MG: 40 TABLET, DELAYED RELEASE ORAL at 05:38

## 2024-10-21 RX ADMIN — IPRATROPIUM BROMIDE 0.5 MG: 0.5 SOLUTION RESPIRATORY (INHALATION) at 14:59

## 2024-10-21 RX ADMIN — FUROSEMIDE 40 MG: 10 INJECTION, SOLUTION INTRAMUSCULAR; INTRAVENOUS at 00:49

## 2024-10-21 RX ADMIN — TORSEMIDE 100 MG: 100 TABLET ORAL at 09:00

## 2024-10-21 RX ADMIN — FLUTICASONE PROPIONATE 1 SPRAY: 50 SPRAY, METERED NASAL at 08:54

## 2024-10-21 RX ADMIN — PETROLATUM 1 APPLICATION: 420 OINTMENT TOPICAL at 15:44

## 2024-10-21 RX ADMIN — IPRATROPIUM BROMIDE 0.5 MG: 0.5 SOLUTION RESPIRATORY (INHALATION) at 07:09

## 2024-10-21 RX ADMIN — FLUTICASONE PROPIONATE 1 SPRAY: 50 SPRAY, METERED NASAL at 21:04

## 2024-10-21 RX ADMIN — PREDNISONE 10 MG: 10 TABLET ORAL at 08:53

## 2024-10-21 RX ADMIN — CLONIDINE HYDROCHLORIDE 0.1 MG: 0.1 TABLET ORAL at 08:52

## 2024-10-21 RX ADMIN — ROSUVASTATIN CALCIUM 5 MG: 5 TABLET, FILM COATED ORAL at 05:38

## 2024-10-21 RX ADMIN — IPRATROPIUM BROMIDE 0.5 MG: 0.5 SOLUTION RESPIRATORY (INHALATION) at 21:12

## 2024-10-21 RX ADMIN — MUPIROCIN 1 APPLICATION: 20 OINTMENT TOPICAL at 08:54

## 2024-10-21 RX ADMIN — IPRATROPIUM BROMIDE 0.5 MG: 0.5 SOLUTION RESPIRATORY (INHALATION) at 10:42

## 2024-10-21 RX ADMIN — PETROLATUM 1 APPLICATION: 420 OINTMENT TOPICAL at 21:04

## 2024-10-21 RX ADMIN — Medication 3 ML: at 08:54

## 2024-10-21 RX ADMIN — LORAZEPAM 0.5 MG: 0.5 TABLET ORAL at 21:09

## 2024-10-21 RX ADMIN — NIFEDIPINE 30 MG: 30 TABLET, FILM COATED, EXTENDED RELEASE ORAL at 08:53

## 2024-10-21 RX ADMIN — MUPIROCIN 1 APPLICATION: 20 OINTMENT TOPICAL at 21:04

## 2024-10-21 RX ADMIN — CETIRIZINE HYDROCHLORIDE 10 MG: 10 TABLET ORAL at 08:54

## 2024-10-21 RX ADMIN — HYDROCODONE BITARTRATE AND ACETAMINOPHEN 2 TABLET: 10; 325 TABLET ORAL at 21:03

## 2024-10-21 RX ADMIN — Medication 1 TABLET: at 08:53

## 2024-10-21 RX ADMIN — Medication 1000 MCG: at 08:53

## 2024-10-21 RX ADMIN — EPLERENONE 25 MG: 25 TABLET, FILM COATED ORAL at 08:53

## 2024-10-21 RX ADMIN — Medication 3 ML: at 21:04

## 2024-10-21 RX ADMIN — CLONIDINE HYDROCHLORIDE 0.2 MG: 0.1 TABLET ORAL at 22:36

## 2024-10-21 RX ADMIN — HYDROCODONE BITARTRATE AND ACETAMINOPHEN 2 TABLET: 10; 325 TABLET ORAL at 08:54

## 2024-10-21 RX ADMIN — CLONIDINE HYDROCHLORIDE 0.2 MG: 0.1 TABLET ORAL at 15:44

## 2024-10-21 RX ADMIN — LORAZEPAM 0.5 MG: 0.5 TABLET ORAL at 08:54

## 2024-10-21 RX ADMIN — RIVAROXABAN 20 MG: 20 TABLET, FILM COATED ORAL at 08:53

## 2024-10-21 NOTE — THERAPY EVALUATION
Patient Name: Sasha Barrett  : 1940    MRN: [id number]                              Today's Date: 10/21/2024       Admit Date: 10/20/2024    Visit Dx: No diagnosis found.  Patient Active Problem List   Diagnosis    Joint pain    Urinary retention self-caths (Don)    Conjunctivitis    Arm pain    Cervical disc displacement    Cervical pain (Servando=PM)    DDD (degenerative disc disease), cervical    Hyperlipidemia LDL goal <70    Preventative health care    Medication management    Proctocele    Urge incontinence of urine    Chronic tension-type headache, intractable    Obesity (BMI 30.0-34.9)    H/o Lyme disease    Stage 3 chronic kidney disease    Cerebral atherosclerosis    Allergic contact dermatitis    Malaise and fatigue progressive    Dizziness    Vitamin D deficiency    Moderate persistent asthmatic bronchitis without complication    Anterior cervical adenopathy due to infection    Pleurisy    Knee pain, bilateral    Elevated PTH level    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)    Gastroesophageal reflux disease with esophagitis    Psoriasis (Darryl Ochoa)    Postmenopausal    Recurrent UTI    CVA (cerebral vascular accident)    Dyspnea on exertion    Precordial pain    Abnormal EKG    Arthralgia    Accelerated hypertension    Acute joint pain    Easy bruisability    Cough productive of purulent sputum    Influenza A Subtype H3    COPD with asthma    Acute chest wall pain    Decreased functional mobility and endurance since fall 15    Essential hypertension    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    Migraine without aura and without status migrainosus, not intractable    Thyroid nodule    Osteoarthritis of left hip    Spinal stenosis of lumbar region with radiculopathy    Acute deep vein thrombosis (DVT) of right lower extremity    Acute deep vein thrombosis    Single subsegmental pulmonary embolism without acute cor pulmonale    Other  chronic pain    Palpitations    Secondary hyperparathyroidism    Syncope    Ataxia    Adult failure to thrive syndrome    Recent unexplained weight loss 42# over last 6 months    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 Yazmin Canseco    CARDIAC ELECTROPHYSIOLOGY PROCEDURE N/A 5/27/2022    Procedure: Loop insertion;  Surgeon: Jeffrey Argueta MD;  Location:  [facility];  Service: Cardiovascular;  Laterality: N/A;  biotronik    CARDIAC ELECTROPHYSIOLOGY PROCEDURE N/A 7/13/2022    Procedure: Loop recorder removal;  Surgeon: Jeffrey Argueta MD;  Location:  [facility];   Service: Cardiovascular;  Laterality: N/A;    CARPAL TUNNEL RELEASE Right 05/25/2011    Dr. Caleb Bueno    CARPAL TUNNEL RELEASE Left 04/26/2011    Dr. Caleb Bueno    CATARACT EXTRACTION Left 11/29/2010    Dr. Eusebio Downs    CATARACT EXTRACTION Right 11/17/2010    Dr. Eusebio Downs    COLONOSCOPY N/A 12/06/2002    Sigmoid diverticulosis-Dr. Brandon Batista    COLONOSCOPY N/A 12/12/2013    Tortuous colon, diverticulosis in the sigmoid colon and descending colon, stool in the rectum, sigmoid colon, descending colon, splenic flexure, transverse colon and hepatic flexure-Dr. Irma Brambila    DEQUERVAIN RELEASE Left 9/23/2020    Procedure: DEQUERVAIN RELEASE LEFT HAND;  Surgeon: Caleb Bueno MD;  Location: [facility] OR [facility] [place];  Service: Plastics;  Laterality: Left;    ENDOSCOPY N/A 09/13/2007    Normal-Dr. Pavel Quarles    ENDOSCOPY AND COLONOSCOPY N/A 09/19/2005    1 cm hiatal hernia, mild Schatzki's ring, sigmoid diverticulosis-Dr. Yoel Farley    GANGLION CYST EXCISION Left 12/18/2012    Release of A1 pulley flexor sheath, left fourth finger, excision of ganglion cyst 0.5 cm diameter, A2 pulley flexor sheath, left fourth finger-Dr. Caleb Bueno    HEEL SPUR EXCISION Left 1968    INGUINAL HERNIA REPAIR Right 1971    at 5 months pregnant     INJECTION OF MEDICATION Left 9/23/2020    Procedure: KENOLOG INJECTION TO LEFT THUMB;  Surgeon: Caleb Bueno MD;  Location: [facility] OR [facility] [place];  Service: Plastics;  Laterality: Left;    MASTECTOMY Right 08/05/2008    Right breast mastectomy and excision of left chest wall lesion-Dr. Yoel Farley    MASTECTOMY RADICAL Left 04/29/2003    Left modified radical mastectomy-Dr. Yazmin Canseco    PARATHYROIDECTOMY Right 05/04/2004    Excision of parathyroid adenoma (right superior)-Dr. Yoel Farley    REPLACEMENT TOTAL KNEE Right 04/09/2012    Dr. Lamonte Childress    SINUS SURGERY  1984    THYROIDECTOMY, PARTIAL Left 05/04/2004      Yoel Farley    TOTAL ABDOMINAL HYSTERECTOMY Bilateral 1973    Dr. Mahan    TRIGGER FINGER RELEASE Left 9/23/2020    Procedure: RELEASE LEFT FOURTH TRIGGER FINGER;  Surgeon: Caleb Bueno MD;  [place] [facility] [place] [facility] [place]  Service: [place]astics;  Laterality: Left;    UPPER GASTROINTESTINAL ENDOSCOPY N/A 02/18/2009    LA Grade A reflux esophagitis, normal stomach, normal 2nd part of the duodenum-Dr. Yoel Farley    WRIST GANGLION EXCISION Left 9/23/2020    Procedure: EXCISION GANGLION CYST LEFT WRIST;  Surgeon: Caleb Bueno MD;  Location[facility];  Service: Plastics;  Laterality: Left;      General Information       Row Name 10/21/24 1541          Physical Therapy Time and Intention    Document Type evaluation  -CW     Mode of Treatment physical therapy  -CW       Row Name 10/21/24 1541          General Information    Patient Profile Reviewed yes  -CW     Prior Level of Function independent:;transfer;all household mobility;bed mobility  pt reports furniture surfing, chronic R foot fractures but WBAT  -CW     Existing Precautions/Restrictions fall  -CW     Barriers to Rehab previous functional deficit  -CW       Row Name 10/21/24 1541          Living Environment    People in Home spouse  -CW       Row Name 10/21/24 1541          Home Main Entrance    Number of Stairs, Main Entrance three  -CW       Row Name 10/21/24 1541          Stairs Within Home, Primary    Number of Stairs, Within Home, Primary none  -CW       Row Name 10/21/24 1541          Cognition    Orientation Status (Cognition) oriented x 3  -CW       Row Name 10/21/24 1541          Safety Issues/Impairments Affecting Functional Mobility    Safety Issues Affecting Function (Mobility) impulsivity  -CW     Impairments Affecting Function (Mobility) endurance/activity tolerance;strength;range of motion (ROM);balance;pain  -CW               User Key  (r) = Recorded By, (t) = Taken By, (c) = Cosigned By      Initials Name Provider Type     CW Brittany Mckeon, PT Physical Therapist                   Mobility       Row Name 10/21/24 1542          Bed Mobility    Bed Mobility supine-sit;sit-supine  -CW     Supine-Sit [place] (Bed Mobility) standby assist  -CW     Sit-Supine [place] (Bed Mobility) standby assist  -CW     Assistive Device (Bed Mobility) bed rails;head of bed elevated  -CW       Row Name 10/21/24 1542          Sit-Stand Transfer    Sit-Stand [place] (Transfers) standby assist  -CW     Comment, (Sit-Stand Transfer) impulsively stood up at EOB  -CW       Row Name 10/21/24 1542          Gait/Stairs (Locomotion)    [place] Level (Gait) verbal cues;contact guard  -CW     Assistive Device (Gait) other (see comments)  bedrail for support  -CW     Distance in Feet (Gait) 1  -CW     Deviations/Abnormal Patterns (Gait) tim decreased;gait speed decreased;stride length decreased  -CW     Comment, (Gait/Stairs) a few small lateral steps towards HOB  -CW       Row Name 10/21/24 1542          Mobility    Extremity Weight-bearing Status right lower extremity  -CW     Right Lower Extremity (Weight-bearing Status) weight-bearing as tolerated (WBAT)  -CW               User Key  (r) = Recorded By, (t) = Taken By, (c) = Cosigned By      Initials Name Provider Type    CW Brittany Mckeon PT Physical Therapist                   Obj/Interventions       Row Name 10/21/24 1543          Range of Motion Comprehensive    General Range of Motion bilateral lower extremity ROM WFL  -CW       Row Name 10/21/24 1543          Strength Comprehensive (MMT)    Comment, General Manual Muscle Testing (MMT) Assessment Generalized weakness BLE  -CW       Row Name 10/21/24 1543          Balance    Balance Assessment standing dynamic balance;standing static balance;sitting static balance  -CW     Static Sitting Balance independent  -CW     Dynamic Sitting Balance supervision  -CW     Position, Sitting Balance sitting edge of bed  -CW     Static Standing  Balance standby assist  -CW     Dynamic Standing Balance standby assist  -CW     Position/Device Used, Standing Balance unsupported  -CW               User Key  (r) = Recorded By, (t) = Taken By, (c) = Cosigned By      Initials Name Provider Type    Brittany Yuan PT Physical Therapist                   Goals/Plan       Row Name 10/21/24 1542          Bed Mobility Goal 1 (PT)    Activity/Assistive Device (Bed Mobility Goal 1, PT) bed mobility activities, all  -CW     [place] Level/Cues Needed (Bed Mobility Goal 1, PT) modified independence  -CW     Time Frame (Bed Mobility Goal 1, PT) 2 weeks  -CW       Row Name 10/21/24 1543          Transfer Goal 1 (PT)    Activity/Assistive Device (Transfer Goal 1, PT) sit-to-stand/stand-to-sit;bed-to-chair/chair-to-bed  -CW     [place] Level/Cues Needed (Transfer Goal 1, PT) standby assist  -CW     Time Frame (Transfer Goal 1, PT) 2 weeks  -CW       Row Name 10/21/24 1247          Gait Training Goal 1 (PT)    Activity/Assistive Device (Gait Training Goal 1, PT) gait (walking locomotion)  -CW     [place] Level (Gait Training Goal 1, PT) standby assist  -CW     Distance (Gait Training Goal 1, PT) 40'  -CW     Time Frame (Gait Training Goal 1, PT) 2 weeks  -CW       Row Name 10/21/24 3039          Therapy Assessment/Plan (PT)    Planned Therapy Interventions (PT) balance training;bed mobility training;gait training;postural re-education;transfer training;ROM (range of motion);strengthening;patient/family education  -CW               User Key  (r) = Recorded By, (t) = Taken By, (c) = Cosigned By      Initials Name Provider Type    Brittany Yuan PT Physical Therapist                   Clinical Impression       Row Name 10/21/24 7743          Pain    Pretreatment Pain Rating 0/10 - no pain  -CW     Posttreatment Pain Rating 0/10 - no pain  -CW     Pre/Posttreatment Pain Comment R foot pain with weightbearing only - did not rate  -CW       Row Name  10/21/24 1545          Plan of Care Review    Plan of Care Reviewed With patient  -CW     Outcome Evaluation Pt is an 85 yo male admitted for BLE edema and SOA with exertion. Pt with hx of R foot fracture and is WBAT per orders and per pt. Pt reports she is independent with mobility, furniture surfs within her home and denies recent falls. Pt completed bed mobility with sba, transfers sba and took a few lateral steps cga using bedrail as support. Pt may benefit from ongoing PT services while inpatient to address strength, balance and endurance deficits. Pt adament about discharging back home, will not go to SNF but will work with HH PT.  -CW       Row Name 10/21/24 1540          Therapy Assessment/Plan (PT)    Rehab Potential (PT) fair  -CW     Criteria for Skilled Interventions Met (PT) yes  -CW     Therapy Frequency (PT) 5 times/wk  -CW       Row Name 10/21/24 1545          Vital Signs    O2 Delivery Pre Treatment room air  -CW       Row Name 10/21/24 154          Positioning and Restraints    Pre-Treatment Position in bed  -CW     Post Treatment Position bed  -CW     In Bed notified nsg;call light within reach;encouraged to call for assist;exit alarm on;fowlers  -CW               User Key  (r) = Recorded By, (t) = Taken By, (c) = Cosigned By      Initials Name Provider Type    CW Brittany Mckeon, PT Physical Therapist                   Outcome Measures       Row Name 10/21/24 8513          How much help from another person do you currently need...    Turning from your back to your side while in flat bed without using bedrails? 3  -CW     Moving from lying on back to sitting on the side of a flat bed without bedrails? 3  -CW     Moving to and from a bed to a chair (including a wheelchair)? 3  -CW     Standing up from a chair using your arms (e.g., wheelchair, bedside chair)? 3  -CW     Climbing 3-5 steps with a railing? 1  -CW     To walk in hospital room? 2  -CW     AM-PAC 6 Clicks Score (PT) 15  -CW      Highest Level of Mobility Goal 4 --> Transfer to chair/commode  -       Row Name 10/21/24 1545 10/21/24 1256       Functional Assessment    Outcome Measure Options AM-PAC 6 Clicks Basic Mobility (PT)  -CW AM-PAC 6 Clicks Daily Activity (OT)  -PP              User Key  (r) = Recorded By, (t) = Taken By, (c) = Cosigned By      Initials Name Provider Type    CW Brittany Mckeon, FRANSICO Physical Therapist    PP Darcy Marques, OT Occupational Therapist                                 Physical Therapy Education       Title: PT OT SLP Therapies (In Progress)       Topic: Physical Therapy (In Progress)       Point: Mobility training (Done)       Learning Progress Summary            Patient Acceptance, E, VU,NR by  at 10/21/2024 1546                      Point: Home exercise program (Not Started)       Learner Progress:  Not documented in this visit.              Point: Body mechanics (Not Started)       Learner Progress:  Not documented in this visit.              Point: Precautions (Not Started)       Learner Progress:  Not documented in this visit.                              User Key       Initials Effective Dates Name Provider Type Discipline     12/13/22 -  Brittany Mckeon PT Physical Therapist PT                  PT Recommendation and Plan  Planned Therapy Interventions (PT): balance training, bed mobility training, gait training, postural re-education, transfer training, ROM (range of motion), strengthening, patient/family education  Outcome Evaluation: Pt is an 85 yo male admitted for BLE edema and SOA with exertion. Pt with hx of R foot fracture and is WBAT per orders and per pt. Pt reports she is independent with mobility, furniture surfs within her home and denies recent falls. Pt completed bed mobility with sba, transfers sba and took a few lateral steps cga using bedrail as support. Pt may benefit from ongoing PT services while inpatient to address strength, balance and endurance deficits. Pt  adament about discharging back home, will not go to SNF but will work with  PT.     Time Calculation:         PT Charges       Row Name 10/21/24 1540             Time Calculation    Start Time 1100  -CW      Stop Time 1123  -CW      Time Calculation (min) 23 min  -CW      PT Received On 10/21/24  -CW      PT - Next Appointment 10/22/24  -CW      PT Goal Re-Cert Due Date 11/04/24  -CW         Time Calculation- PT    Total Timed Code Minutes- PT 15 minute(s)  -CW         Timed Charges    89682 - PT Therapeutic Activity Minutes 15  -CW         Total Minutes    Timed Charges Total Minutes 15  -CW       Total Minutes 15  -CW                User Key  (r) = Recorded By, (t) = Taken By, (c) = Cosigned By      Initials Name Provider Type    CW Brittany Mckeon, PT Physical Therapist                  Therapy Charges for Today       Code Description Service Date Service Provider Modifiers Qty    89176008765 HC PT EVAL MOD COMPLEXITY 3 10/21/2024 Brittany Mckeon, PT GP 1    69968298893 HC PT THERAPEUTIC ACT EA 15 MIN 10/21/2024 Brittany Mckeon, PT GP 1            PT G-Codes  Outcome Measure Options: AM-PAC 6 Clicks Basic Mobility (PT)  AM-PAC 6 Clicks Score (PT): 15  AM-PAC 6 Clicks Score (OT): 17  PT Discharge Summary  Anticipated Discharge Disposition (PT): home with home health, home with assist    Brittany Mckeon PT  10/21/2024

## 2024-10-21 NOTE — CONSULTS
Chap visited pt.  Pt was eating lunch.  Pt expressed concern over high bp and mix up over medicines.  Pt has support from  and kids.  Pt went to [employer] and has strong nohemi background.  Pt gave thanks for stopping in.  Chap remains available.

## 2024-10-21 NOTE — PLAN OF CARE
Goal Outcome Evaluation:  Plan of Care Reviewed With: patient           Outcome Evaluation: Pt is an 85 yo male admitted for BLE edema and SOA with exertion. Pt with hx of R foot fracture and is WBAT per orders and per pt. Pt reports she is independent with mobility, furniture surfs within her home and denies recent falls. Pt completed bed mobility with sba, transfers sba and took a few lateral steps cga using bedrail as support. Pt may benefit from ongoing PT services while inpatient to address strength, balance and endurance deficits. Pt adament about discharging back home, will not go to SNF but will work with  PT.    Anticipated Discharge Disposition (PT): home with home health, home with assist

## 2024-10-21 NOTE — NURSING NOTE
"CWON note: consult received from Dr Cadet regarding \"bug bites\" on the pt's extremities.  I was not able to see evidence of any \"bites\", despite the pt pointing out some freckles on her arm and some red scratch marks on her left foot and leg. When I told her that they did not look like bites to me, she became agitated and assured me they were. According to the pt, they only attack her and not her . When asked what the dermatologist ordered, she said, \"he ordered some creams but he doesn't know much about this because the bugs are still there. He said they were dead, but they are still there.\"     I really have nothing to offer this pt since there are no bites on her skin and she has already been seen and treated by dermatology. I did order some Aquaphor to moisturize her skin, but nothing further to add.   "

## 2024-10-21 NOTE — CONSULTS
Nutrition Services    Patient Name:  Sasha Barrett  YOB: 1940  MRN: [id number]  Admit Date:  10/20/2024    Assessment Date:  10/21/24    Summary: Consult for MSA  Pt is a 85 y/o female who presented with dyspnea. Pt has a hx of COPD, HLD, HTN, CKD.   Pt laying in bed when I visited. Pt stated she eats 2-3 meals per day and typically has a steady appetite. Pt stated her usual weight is around 140 and she has not had any weight loss recently. Per EMR, pt has had a 7.8% weight loss x 10 months (not significant). Pt currently diuresing on torsemide. Pt denied any usual chew/swallow issues or n/v. RD performed NFPE which revealed severe muscle wasting and mild fat loss. RD offered supplements and pt was agreeable.     Patient meets ASPEN/AND criteria for nutrition diagnosis of moderate malnutrition of chronic illness based on: muscle wasting and fluid accumulation.     NFPE results below    RECS:  Boost QD    CLINICAL NUTRITION ASSESSMENT      Reason for Assessment Malnutrition Severity Assessment, Physician Consult     Diagnosis/Problem   dyspnea. Pt has a hx of COPD, HLD, HTN, CKD.    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 Yazmin Canseco    CARDIAC ELECTROPHYSIOLOGY PROCEDURE N/A  5/27/2022    Procedure: Loop insertion;  Surgeon: Jeffrey Argueta MD;  Location:  [facility];  Service: Cardiovascular;  Laterality: N/A;  biotronik    CARDIAC ELECTROPHYSIOLOGY PROCEDURE N/A 7/13/2022    Procedure: Loop recorder removal;  Surgeon: Jeffrey Argueta MD;  Location:  [facility];  Service: Cardiovascular;  Laterality: N/A;    CARPAL TUNNEL RELEASE Right 05/25/2011    Dr. Caleb Bueno    CARPAL TUNNEL RELEASE Left 04/26/2011    Dr. Caleb Bueno    CATARACT EXTRACTION Left 11/29/2010    Dr. Eusebio Downs    CATARACT EXTRACTION Right 11/17/2010    Dr. Eusebio Downs    COLONOSCOPY N/A 12/06/2002    Sigmoid diverticulosis-Dr. Brandon Batista    COLONOSCOPY N/A 12/12/2013    Tortuous colon, diverticulosis in the sigmoid colon and descending colon, stool in the rectum, sigmoid colon, descending colon, splenic flexure, transverse colon and hepatic flexure-Dr. Irma Brambila    DEQUERVAIN RELEASE Left 9/23/2020    Procedure: DEQUERVAIN RELEASE LEFT HAND;  Surgeon: Caleb Bueno MD;  Location:  [facility];  Service: Plastics;  Laterality: Left;    ENDOSCOPY N/A 09/13/2007    Normal-Dr. Pavel Quarles    ENDOSCOPY AND COLONOSCOPY N/A 09/19/2005    1 cm hiatal hernia, mild Schatzki's ring, sigmoid diverticulosis-Dr. Yoel Farley    GANGLION CYST EXCISION Left 12/18/2012    Release of A1 pulley flexor sheath, left fourth finger, excision of ganglion cyst 0.5 cm diameter, A2 pulley flexor sheath, left fourth finger-Dr. Caleb Bueno    HEEL SPUR EXCISION Left 1968    INGUINAL HERNIA REPAIR Right 1971    at 5 months pregnant     INJECTION OF MEDICATION Left 9/23/2020    Procedure: KENOLOG INJECTION TO LEFT THUMB;  Surgeon: Caleb Bueno MD;  Location:  [facility];  Service: Plastics;  Laterality: Left;    MASTECTOMY Right 08/05/2008    Right breast mastectomy and excision of left chest wall lesion-Dr. Diallo  Miguelito    MASTECTOMY RADICAL Left 04/29/2003    Left modified radical mastectomy-Dr. Yazmin Canseco    PARATHYROIDECTOMY Right 05/04/2004    Excision of parathyroid adenoma (right superior)-Dr. Yoel aFrley    REPLACEMENT TOTAL KNEE Right 04/09/2012    Dr. Lamonte Childress    SINUS SURGERY  1984    THYROIDECTOMY, PARTIAL Left 05/04/2004    Dr. Yoel Farley    TOTAL ABDOMINAL HYSTERECTOMY Bilateral 1973    Dr. Mahan    TRIGGER FINGER RELEASE Left 9/23/2020    Procedure: RELEASE LEFT FOURTH TRIGGER FINGER;  Surgeon: Caleb Bueno MD;  [place] [facility] [place] [facility] [place]  Service: Plastics;  Laterality: Left;    UPPER GASTROINTESTINAL ENDOSCOPY N/A 02/18/2009    LA Grade A reflux esophagitis, normal stomach, normal 2nd part of the duodenum-Dr. Yoel Farley    WRIST GANGLION EXCISION Left 9/23/2020    Procedure: EXCISION GANGLION CYST LEFT WRIST;  Surgeon: Caleb Bueno MD;  [place] [facility] [place] [facility] [place]  Service: Plastics;  Laterality: Left;        Anthropometrics        Current Height  Current Weight  BMI kg/m2    Weight: 64.9 kg (143 lb 1.3 oz) (10/21/24 0525)  Body mass index is 27.94 kg/m².   Adjusted BMI (if applicable)    BMI Category Overweight (25 - 29.9)   Ideal Body Weight (IBW) 100 lbs   Usual Body Weight (UBW) 140 lbs   Weight Trend Loss   Weight History Wt Readings from Last 30 Encounters:   10/21/24 0525 64.9 kg (143 lb 1.3 oz)   03/07/24 1427 60.8 kg (134 lb)   12/21/23 1001 70.5 kg (155 lb 6.4 oz)   12/08/23 1712 65.8 kg (145 lb)   05/02/23 0857 73.5 kg (162 lb)   11/03/22 1339 81.2 kg (179 lb)   10/19/22 1403 73.9 kg (163 lb)   10/05/22 1254 73.9 kg (163 lb)   10/03/22 0604 75.6 kg (166 lb 11.2 oz)   10/02/22 0550 73.9 kg (163 lb)   10/01/22 0538 72.6 kg (160 lb 1.6 oz)   09/22/22 1038 79.3 kg (174 lb 12.8 oz)   07/26/22 1131 73 kg (161 lb)   07/13/22 0729 70.3 kg (155 lb)   07/06/22 1311 73.5 kg (162 lb)   06/20/22 1119 74.8 kg (165 lb)   06/06/22 1204 70.3 kg (155 lb)   05/29/22 0510  74.6 kg (164 lb 6.4 oz)   05/28/22 0614 75.5 kg (166 lb 8 oz)   05/27/22 0500 75.2 kg (165 lb 12.8 oz)   05/26/22 1319 74.4 kg (164 lb)   05/26/22 0500 74.6 kg (164 lb 8 oz)   05/25/22 2318 74.5 kg (164 lb 3.9 oz)   05/25/22 1912 74.5 kg (164 lb 4.8 oz)   04/01/22 1136 88.5 kg (195 lb)   01/28/22 1110 88.5 kg (195 lb)   12/27/21 1246 88.5 kg (195 lb)   10/04/21 0818 95.3 kg (210 lb)   10/04/21 0813 95.3 kg (210 lb)   07/01/21 1114 92.1 kg (203 lb)   04/23/21 1417 92.1 kg (203 lb)   02/05/21 1608 92.1 kg (203 lb)   02/02/21 0832 92.4 kg (203 lb 12.8 oz)   01/15/21 1539 86.2 kg (190 lb)   12/14/20 1306 86.2 kg (190 lb)   11/19/20 1112 88.5 kg (195 lb)   11/19/20 0740 88.5 kg (195 lb)   11/19/20 0712 88.5 kg (195 lb)   11/11/20 0917 88.5 kg (195 lb)   09/16/20 1454 88.9 kg (196 lb)        Estimated/Assessed Needs        Energy Requirements    Weight for Calculation 65 kg   Method for Estimation  kcal/kg, other:20-25   EST Needs (kcal/day) 9847-3698       Protein Requirements    Weight for Calculation 65 kg   EST Protein Needs (g/kg) 1.2 - 1.5 gm/kg   EST Daily Needs (g/day) 78-97       Fluid Requirements     Method for Estimation Defer to physician    Estimated Needs (mL/day)        Fluid Deficit    Current Na Level (mEq/L)    Desired Na Level (mEq/L)    Estimated Fluid Deficit (L)       Labs       Pertinent Labs    Results from last 7 days   Lab Units 10/21/24  0018 10/20/24  1439   SODIUM mmol/L 141 139   POTASSIUM mmol/L 3.8 3.1*   CHLORIDE mmol/L 105 103   CO2 mmol/L 29.4* 27.3   BUN mg/dL 24* 21   CREATININE mg/dL 1.05* 1.10*   CALCIUM mg/dL 9.4 9.3   BILIRUBIN mg/dL 0.2 0.2   ALK PHOS U/L 86 82   ALT (SGPT) U/L 10 10   AST (SGOT) U/L 12 13   GLUCOSE mg/dL 119* 118*     Results from last 7 days   Lab Units 10/21/24  0018 10/20/24  1439   MAGNESIUM mg/dL  --  2.2   PHOSPHORUS mg/dL  --  2.6   HEMOGLOBIN g/dL 12.2 11.2*   HEMATOCRIT % 36.3 36.0   WBC 10*3/mm3 8.00 7.66   TRIGLYCERIDES mg/dL  --  131   ALBUMIN g/dL  3.6 3.6     Results from last 7 days   Lab Units 10/21/24  0018 10/20/24  1439   PLATELETS 10*3/mm3 199 181     COVID19   Date Value Ref Range Status   10/20/2024 Not Detected Not Detected - Ref. Range Final     Lab Results   Component Value Date    HGBA1C 5.30 10/20/2024          Medications           Scheduled Medications cetirizine, 10 mg, Oral, Daily  cloNIDine, 0.1 mg, Oral, Q12H  eplerenone, 25 mg, Oral, Daily  fluticasone, 1 spray, Each Nare, BID  ipratropium, 500 mcg, Nebulization, 4x Daily - RT  multivitamin with minerals, 1 tablet, Oral, Daily  mupirocin, 1 application , Topical, Q12H  NIFEdipine XL, 30 mg, Oral, Daily  pantoprazole, 40 mg, Oral, Q AM  potassium chloride, 20 mEq, Oral, Daily  predniSONE, 10 mg, Oral, Every Other Day  rivaroxaban, 20 mg, Oral, Daily  rosuvastatin, 5 mg, Oral, QAM  sodium chloride, 3 mL, Intravenous, Q12H  torsemide, 100 mg, Oral, Daily  cyanocobalamin, 1,000 mcg, Oral, Daily  [START ON 10/22/2024] vitamin D, 50,000 Units, Oral, Weekly       Infusions     PRN Medications   acetaminophen    albuterol    senna-docusate sodium **AND** polyethylene glycol **AND** bisacodyl **AND** bisacodyl    butalbital-acetaminophen-caffeine    Calcium Replacement - Follow Nurse / BPA Driven Protocol    carboxymethylcellulose sod (PF)    Diclofenac Sodium    diphenhydrAMINE    EPINEPHrine (Anaphylaxis)    HYDROcodone-acetaminophen    hydrOXYzine    LORazepam    Magnesium Low Dose Replacement - Follow Nurse / BPA Driven Protocol    melatonin    nitroglycerin    ondansetron    ondansetron ODT    Phosphorus Replacement - Follow Nurse / BPA Driven Protocol    Potassium Replacement - Follow Nurse / BPA Driven Protocol    promethazine-codeine    sodium chloride     Physical Findings          General Findings alert, generalized wasting, loss of muscle mass, loss of subcutaneous fat, oriented, overweight, room air   Oral/Mouth Cavity tooth or teeth missing   Edema  4+ (severe)   Gastrointestinal last  bowel movement: 10/21   Skin  skin intact   Tubes/Drains/Lines none   NFPE See Malnutrition Severity Assessment     Malnutrition Severity Assessment      Patient meets criteria for : Severe Malnutrition  Malnutrition Type (Last 8 Hours)       Malnutrition Severity Assessment       Row Name 10/21/24 1156       Malnutrition Severity Assessment    Malnutrition Type Chronic Disease - Related Malnutrition      Row Name 10/21/24 1156       Insufficient Energy Intake     Insufficient Energy Intake Findings None      Row Name 10/21/24 1156       Unintentional Weight Loss     Unintentional Weight Loss Findings None      Row Name 10/21/24 1156       Muscle Loss    Loss of Muscle Mass Findings Severe    [place] Region Severe - deep hollowing/scooping, lack of muscle to touch, facial bones well defined    Clavicle Bone Region Severe - protruding prominent bone    Acromion Bone Region Severe - squared shoulders, bones, and acromion process protrusion prominent    Scapular Bone Region Severe - prominent bones, depressions easily visible between ribs, scapula, spine, shoulders    Dorsal Hand Region Severe - prominent depression      Row Name 10/21/24 1156       Fat Loss    Subcutaneous Fat Loss Findings Mild      Row Name 10/21/24 1156       Fluid Accumulation (Edema)    Fluid Acumulation Findings Severe    Fluid Accumulation  Severe equals 3+ or 4+ pitting edema      Row Name 10/21/24 1156       Criteria Met (Must meet criteria for severity in at least 2 of these categories: M Wasting, Fat Loss, Fluid, Secondary Signs, Wt. Status, Intake)    Patient meets criteria for  Severe Malnutrition                     Current Nutrition Orders & Evaluation of Intake       Oral Nutrition     Food Allergies NKFA   Current PO Diet Diet: Cardiac; Healthy Heart (2-3 Na+); Texture: Regular (IDDSI 7); Fluid Consistency: Thin (IDDSI 0)   Supplement n/a   PO Evaluation     % PO Intake Pt states she eats 2-3 meals per day    Factors Affecting Intake:  No factors at this time     PES STATEMENT / NUTRITION DIAGNOSIS      Nutrition Dx Problem  Problem: Malnutrition (severe)  Etiology: Medical Diagnosis - dyspnea. Pt has a hx of COPD, HLD, HTN, CKD.     Signs/Symptoms: Fluid and NFPE Results   --  NUTRITION INTERVENTION / PLAN OF CARE      Intervention Goal(s) Maintain nutrition status, Establish goals of care, Meet estimated needs, Increase intake, Accepts oral nutrition supplement, Appropriate weight loss, and PO intake goal %: 75%         RD Intervention/Action Encourage intake, Continue to monitor, Care plan reviewed, and Recommend/order: Boost QD         Prescription/Orders:       PO Diet       Supplements Boost QD      Enteral Nutrition       Parenteral Nutrition    New Prescription Ordered? Yes   --      Monitor/Evaluation Per protocol, PO intake, Supplement intake, Weight, Skin status, GI status, Symptoms, POC/GOC, Swallow function   Discharge Plan/Needs No discharge needs identified at this time   --    RD to follow per protocol.      Electronically signed by:  Manuel Ahumada RDN, LD  10/21/24 11:48 EDT

## 2024-10-21 NOTE — PLAN OF CARE
Goal Outcome Evaluation:  Plan of Care Reviewed With: patient        Progress: no change  Outcome Evaluation: Pt is a 85 y/o female admitted to [facility] for BLE edema. Pt PMHx medically complex. Pt presents to OT with increased R foot pain w/ fxnl mob, and decreased ROM and strength in UE's impacting pt's ability to perform ADLs. Pt reqs Sba mostly for all bed mob, and STS from EOB. S/u for LBD and Mod I to don garcia wrist splints. She tolerates taking a couple steps along EOB for positioning while WBAT on R foot. Skilled OT req to address deficits. Pt anticipates dc home w/ assist from  and HHOT to address additional Ed in AE use to maximize (I) w/ UE tasks such as cooking and dressing d/t limited shoulder ROM.    Anticipated Discharge Disposition (OT): home with assist, home with home health (HH OT/PT)

## 2024-10-21 NOTE — H&P
"                     [facility] MD [facility]  INTERNAL MEDICINE  ÓSCAR CADET MD  [address] [place] [address]  Phone [phone] Fax [phone]  E-mail:  [email or web address]      INTERNAL MEDICINE HISTORY AND PHYSICAL EXAM  Óscar Cadet M.D.  10/20/2024            Patient Identification:  Name: Sasha Barrett  Age: 84 y.o.  Sex: female  :  1940  MRN: [id number]         Primary Care Physician: Óscar Cadet MD  LENGTH OF STAY 1 DAYS    Consults       Date and Time Order Name Status Description    10/20/2024 12:56 PM Inpatient Pulmonology Consult      10/20/2024 12:56 PM Inpatient Nephrology Consult              Chief Complaint:    Dyspnea on exertion with lower extremity bilateral edema exacerbated by recent problems with bug bites    History of Present Illness:  Subjective     Interval History: Patient is a 84 y.o.female who presented with   Dyspnea on exertion with lower extremity bilateral edema which has been exacerbated by recent problems with bug bites.  Patient is a delightful 84-year-old female who I have followed for many years in my office.  For the last several weeks she has had great difficulties and issue with bug bites which she believes are coming from a type of bug called  \"no-see-ums\".   review of literature shows that these bugs are more typical in tropical environments and it really should not be present in our environment now that we have had the first frost here in [place].  Patient believes strongly that she is still dealing with effects from the bites of of these insects and recently did see Dr. Scott Ochoa in dermatology who found no evidence of live bugs and recommended topical treatment.  Patient has had a long history of bilateral pedal edema and in the past this i was found to be multifactorial and related to unrestricted salt intake, and adequate Lasix dosing, chronic lung disease, and obesity.  Patient reported to me on the phone  yesterday " that the swelling in her legs was becoming almost unmanageable in her home environment.  She was having little to no output from her regular Lasix dosing and felt that she was in crisis due to the volume overload.  Patient was made a direct admission to a general medical telemetry bed for further evaluation of her pedal edema and volume overload status as well as for further evaluation of the skin irritation she is experiencing.  Patient also has  worsening of her chronic kidney disease stage 3 with elevation of her creatinine to 1.10 and low potassium at 3.1. Patient does have multiple comorbidities that complicate her medical condition including most significantly: Stage III chronic kidney disease, progressive malaise and fatigue, dizziness, dyspnea on exertion, accelerated hypertension, ataxia with right foot deformity from fracture in past, blood pressure instability, acute intractable muscle tension headaches that have been intensified since COVID-19 infection 10/2/2021, generalized weakness, volume overload, multifactorial edema related to unrestricted salt intake, and adequate Lasix dosing, chronic lung disease treated with prednisone every other day, and obesity.,  Urinary retention requiring self-catheterization, cervical disc disease with degenerative disc disease, hyperlipidemia, proctocele, urge incontinence, chronic tension headaches, cerebral atherosclerosis, vitamin D deficiency, moderate persistent asthmatic bronchitis, bilateral knee pain, elevated PTH levels, history of breast cancer on left side infiltrating ductal, gastroesophageal reflux disease with esophagitis, recurrent UTIs, cerebrovascular accident, acute joint pain, easy bruisability, COPD with asthma, essential hypertension, thrombosis of left posterior cerebral artery, acute DVT in the past the right lower extremity, adult failure to thrive on palliative care team, posterior tibial tendon dysfunction, pleurisy, psoriasis, postmenopausal,  venous incompetence of popliteal vein on right.    10/20/2024.  I personally saw this patient for the first time during this hospitalization while she was in radiology triage waiting to be transported back to her room.  Her room is on 4 [address]416.  Patient was quite talkative in the triage area and was very entertaining for the nurses there.  She had quite a bit of urinary output while in the midst of her x-ray testing and had totally saturated the blankets on the bed in which she was lying.  We did get her some dry blankets and covered her up to keep her from getting chilled.  I wore full PPE as indicated including an N95 mask when needed, gloves when touching patient, goggles, and white lab coat.  I performed thorough hand hygiene before and after the hospitalization visit.  I did review my planned treatment with the patient.  We are going to ask her renal team to look at her with respect to her volume overload status and make recommendations on perhaps adjusting somewhat her Lasix.  Patient is very insistent that she would like to see  before any major changes are made in her medication regimens.  Patient will also be seen by pulmonary with respect to her dyspnea on exertion and dizziness.  Hopefully some minor adjustments in medications can help greatly with the symptoms.  I did review labs with the patient and they all do seem to be fairly stable at present time.  She does have a potassium which needs to be corrected because it was low on admission.  Cardiac enzymes are stable and not elevated.  Patient's PTH was slightly high at 104 but this is similar to previous trending it has been done with that.  Patient's hemoglobin A1c was 5.30.  Patient's TSH was 1.1.  Cholesterol levels were all normal, and pancreatic enzymes amylase and lipase were also normal.  Patient's procalcitonin was extremely low at 0.05.  Her D-dimer was also in the normal range at 0.34.  Patient does have a white count that is normal  at 7.66, her hemoglobin is slightly low at 11.2 we will check vitamin D, B12, and an iron panel.  Patient's UA showed trace of protein and trace of leukocyte esterase but white blood cell count was only 3-5.  COVID screening was negative.  Urine tox did show that patient had barbiturates and opiates in her system which are expected based on her drug list as an outpatient.  Patient did have a chest x-ray which was unremarkable except for a left lower lobe calcified granuloma.  A venous Doppler was done which showed bilateral lower extremity normal circulation with no evidence of DVT or blood clotting.  EKG showed normal sinus rhythm with prolonged IA interval considering possibility of left ventricular hypertrophy.  I did ask the palliative care team here at the hospital to take a look at the patient and work with her on clarification of her CODE STATUS.  They may have more information in the notes from their associate Hedy who is the nurse that visits the patient's home on a regular basis.  I am continuing patient's IV Lasix for a total of 4 doses.  We will monitor her BRIAN's and her weight changes.  I anticipate a stay of 48 to 72 hours.    Review of Systems:    A comprehensive 14 point review of systems was negative except for:  Constitution:  positive for fatigue, malaise, and weight loss  ENT:  positive for nasal congestion and sore mouth  Cardiovascular: positive for  irregular pulse and palpitations  Gastrointestinal: positive for  diarrhea, early satiety, heartburn, and nausea  Integument: positive for  dryness, itching, and rash  Hematologic / Lymphatic: positive for  fatigue  Musculoskeletal: positive for  back pain, joint pain, joint stiffness, muscle pain, and muscle weakness  Neurological: positive for  coordination abnormal, difficulty walking, and dizziness  Behavioral/Psych: positive for  anxiety and appetite change appetite decreased    Past Medical History:   Diagnosis Date    Arthritis     Asthma      Breast cancer 2003    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, grade 2    COPD (chronic obstructive pulmonary disease)     Current use of long term anticoagulation     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 Yazmin Canseco    CARDIAC ELECTROPHYSIOLOGY PROCEDURE N/A 5/27/2022    Procedure: Loop insertion;  Surgeon: Jeffrey Argueta MD;  Location:  [facility];  Service: Cardiovascular;  Laterality: N/A;  biotronik    CARDIAC ELECTROPHYSIOLOGY PROCEDURE N/A 7/13/2022    Procedure: Loop recorder removal;  Surgeon: Jeffrey Argueta MD;  Location:  [facility];  Service: Cardiovascular;  Laterality: N/A;    CARPAL TUNNEL RELEASE Right 05/25/2011    Dr. Caleb Bueno    CARPAL TUNNEL RELEASE Left 04/26/2011    Dr. Caleb Bueno    CATARACT EXTRACTION Left 11/29/2010    Dr. Eusebio Downs    CATARACT EXTRACTION Right 11/17/2010    Dr. Eusebio Downs    COLONOSCOPY N/A 12/06/2002    Sigmoid diverticulosis-Dr. Brandon Batista    COLONOSCOPY N/A 12/12/2013    Tortuous colon, diverticulosis in the sigmoid colon and descending colon, stool in the rectum, sigmoid colon, descending colon, splenic flexure, transverse colon and hepatic flexure-Dr. Irma Brambila    DEQUERVAIN RELEASE Left 9/23/2020    Procedure: DEQUERVAIN RELEASE LEFT HAND;  Surgeon: Caleb Bueno MD;  Location:  [facility];  Service: Plastics;  Laterality: Left;    ENDOSCOPY N/A 09/13/2007    Normal-Dr. Pavel Quarles     ENDOSCOPY AND COLONOSCOPY N/A 09/19/2005    1 cm hiatal hernia, mild Schatzki's ring, sigmoid diverticulosis-Dr. Yoel Farley    GANGLION CYST EXCISION Left 12/18/2012    Release of A1 pulley flexor sheath, left fourth finger, excision of ganglion cyst 0.5 cm diameter, A2 pulley flexor sheath, left fourth finger-Dr. Caleb Bueno    HEEL SPUR EXCISION Left 1968    INGUINAL HERNIA REPAIR Right 1971    at 5 months pregnant     INJECTION OF MEDICATION Left 9/23/2020    Procedure: KENOLOG INJECTION TO LEFT THUMB;  Surgeon: Caleb Bueno MD;  Location: [facility] OR [facility];  Service: Plastics;  Laterality: Left;    MASTECTOMY Right 08/05/2008    Right breast mastectomy and excision of left chest wall lesion-Dr. Yoel Farley    MASTECTOMY RADICAL Left 04/29/2003    Left modified radical mastectomy-Dr. Yazmin Canseco    PARATHYROIDECTOMY Right 05/04/2004    Excision of parathyroid adenoma (right superior)-Dr. Yoel Farley    REPLACEMENT TOTAL KNEE Right 04/09/2012    Dr. Lamonte Childress    SINUS SURGERY  1984    THYROIDECTOMY, PARTIAL Left 05/04/2004    Dr. Yoel Farley    TOTAL ABDOMINAL HYSTERECTOMY Bilateral 1973    Dr. Mahan    TRIGGER FINGER RELEASE Left 9/23/2020    Procedure: RELEASE LEFT FOURTH TRIGGER FINGER;  Surgeon: Caleb Bueno MD;  Location: [facility] OR [facility];  Service: Plastics;  Laterality: Left;    UPPER GASTROINTESTINAL ENDOSCOPY N/A 02/18/2009    LA Grade A reflux esophagitis, normal stomach, normal 2nd part of the duodenum-Dr. Yoel Farley    WRIST GANGLION EXCISION Left 9/23/2020    Procedure: EXCISION GANGLION CYST LEFT WRIST;  Surgeon: Caleb Bueno MD;  Location: [facility] OR [facility];  Service: Plastics;  Laterality: Left;     Allergies   Allergen Reactions    Bee Venom Shortness Of Breath and Rash    Morphine Anaphylaxis and Nausea And Vomiting    Sulfa Antibiotics Anaphylaxis and Hives     Or sulfa fillers in meds  Broke out in internal and external hives       Tree Extract Shortness Of Breath and Rash    Ambien [Zolpidem] Hallucinations    Anastrozole Other (See Comments)     Can't remember what the reaction was     Covid-19 (Mrna) Vaccine Swelling     Facial swelling, blood clots    Eliquis [Apixaban] Headache     Headaches, nausea and itching.     Hydralazine Myalgia     Patient reports leg cramps, joint pain and increased fatigue    Norvasc [Amlodipine] Other (See Comments)     KIDNEYS SHUT DOWN    Nsaids Other (See Comments)     KIDNEY FAILURE    Penicillins Unknown (See Comments)     Severe reaction as a child  Tolerated ceftriaxone during 04/2017 admission    Grass Rash       Family History   Problem Relation Age of Onset    Brain cancer Brother     Alcohol abuse Mother     No Known Problems Father     No Known Problems Sister     Maljaziel Hyperthermia Neg Hx        Social History     Socioeconomic History    Marital status:      Spouse name: Joey    Number of children: 6    Highest education level: Some college, no degree   Tobacco Use    Smoking status: Never     Passive exposure: Never    Smokeless tobacco: Never   Vaping Use    Vaping status: Never Used   Substance and Sexual Activity    Alcohol use: Yes     Comment: 3 drinks yearly    Drug use: Never    Sexual activity: Defer     Birth control/protection: Surgical       PMH, FH, SH and ROS completed with Admission History and Physical and updated in EPIC system.        Objective     Scheduled Meds:cetirizine, 10 mg, Oral, Daily  cloNIDine, 0.1 mg, Oral, Q12H  eplerenone, 25 mg, Oral, Daily  fluticasone, 1 spray, Each Nare, BID  furosemide, 40 mg, Intravenous, Q12H  ipratropium, 500 mcg, Nebulization, 4x Daily - RT  multivitamin with minerals, 1 tablet, Oral, Daily  mupirocin, 1 application , Topical, Q12H  NIFEdipine XL, 30 mg, Oral, Daily  [START ON 10/21/2024] pantoprazole, 40 mg, Oral, Q AM  [START ON 10/21/2024] potassium chloride, 20 mEq, Oral, Every Other Day  [START ON 10/21/2024] predniSONE, 10  mg, Oral, Every Other Day  rivaroxaban, 20 mg, Oral, Daily  [START ON 10/21/2024] rosuvastatin, 5 mg, Oral, QAM  sodium chloride, 3 mL, Intravenous, Q12H  cyanocobalamin, 1,000 mcg, Oral, Daily  [START ON 10/22/2024] vitamin D, 50,000 Units, Oral, Weekly      Continuous Infusions:     Vital signs in last 24 hours:  Temp:  [98.1 °F (36.7 °C)-98.6 °F (37 °C)] 98.6 °F (37 °C)  Heart Rate:  [72] 72  Resp:  [18] 18  BP: (117-137)/(77-89) 117/89    Intake/Output:  No intake or output data in the 24 hours ending 10/20/24 2309    Exam:  /89 (BP Location: Left arm, Patient Position: Lying)   Pulse 72   Temp 98.6 °F (37 °C) (Oral)   Resp 18   SpO2 100%     Constitutional: Alert, cooperative, moderatevery anxious distress, AAOx3, resting comfortably but incontinent of urine and blankets very soaked   Head: Normocephalic, without obvious abnormality, atraumatic   Eyes: PERRLA, conjunctiva/corneas clear, no icterus, no conjunctival pallor, EOM's intact, both eyes   ENT and Mouth: Lips, tongue, gums normal; oral mucosa pink and dry   Neck: Supple, symmetrical, trachea midline, no JVD   Respiratory: Clear to auscultation bilaterally, respirations unlabored   Cardiovascular: Regular rate and rhythm, S1 and S2 normal, no murmur, No rub or gallop. Pulses normal.   Gastrointestinal: BS present x4. Soft, non-tender, bowels sounds active, no masses no hepatosplenomegaly.   : No hernia. Normal exam for sex.   Neurologic: CN-XII intact, motor strength grossly intact, sensation grossly intact to light touch, no focal reflex deficits noted.   Psychiatric: Alert, oriented X3, no delusions, psychoses, depression or anxiety   Heme/Lymph/Imun: No bruises, petechiae. Lymph nodes normal in size / configuration.     Data Review:  Lab Results   Component Value Date    CALCIUM 9.3 10/20/2024    PHOS 2.6 10/20/2024     Results from last 7 days   Lab Units 10/20/24  1439   AST (SGOT) U/L 13   ALT (SGPT) U/L 10   MAGNESIUM mg/dL 2.2    SODIUM mmol/L 139   POTASSIUM mmol/L 3.1*   CHLORIDE mmol/L 103   CO2 mmol/L 27.3   BUN mg/dL 21   CREATININE mg/dL 1.10*   GLUCOSE mg/dL 118*   CALCIUM mg/dL 9.3   WBC 10*3/mm3 7.66   HEMOGLOBIN g/dL 11.2*   PLATELETS 10*3/mm3 181     Lab Results   Component Value Date    CKTOTAL 51 10/20/2024    CKMB 1.69 04/06/2017    TROPONINT 22 (H) 10/20/2024     CrCl cannot be calculated (Unknown ideal weight.).  WEIGHTS:     Wt Readings from Last 1 Encounters:   03/07/24 1427 60.8 kg (134 lb)         Assessment:    Edema    Stage 3 chronic kidney disease    Malaise and fatigue progressive    Dizziness    Dyspnea on exertion    Accelerated hypertension    Ataxia    Blood pressure instability    Acute intractable headache intensified since Covid-19 infection 10/2/2021    Weakness    Volume overload    Bilateral lower extremity edema, multifactorial = unrestricted salt intake, inadequate Lasix dosing, chronic lung disease, and obesity.  T    Urinary retention self-caths (Don)    Cervical disc displacement    DDD (degenerative disc disease), cervical    Hyperlipidemia LDL goal <70    Proctocele    Urge incontinence of urine    Chronic tension-type headache, intractable    Cerebral atherosclerosis    Vitamin D deficiency    Moderate persistent asthmatic bronchitis without complication    Knee pain, bilateral    Elevated PTH level    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)    Gastroesophageal reflux disease with esophagitis    Recurrent UTI    CVA (cerebral vascular accident)    Acute joint pain    Easy bruisability    COPD with asthma    Essential hypertension    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  Bilateral lower extremity edema multifactorial including unrestricted salt intake, inadequate dosing of Lasix, chronic lung disease, obesity, and chronic prednisone usage for lung disease.  IV Lasix being used for 48 hours to try to correct volume overload status.  We have asked the patient's renal team to consult on the issue and help with this treatment.  Patient does see Dr. Bond in clinic on a regular basis.  Patient did have significant urinary output with the first dose of IV Lasix and saturated her entire stretcher in the radiology triage area.  Will continue to monitor closely.    2.  Volume overload status in patient with chronic kidney disease stage III.  Weight is not a good measure to follow with this patient since she has had some muscle wasting while on high doses of narcotics for palliative care by her [facility] team.  Patient with good diuretic response to IV Lasix which will be continued for now.  Additional input sought from her nephrology team regarding appropriate diuresis in this patient.    3.  Weakness, malaise, and severe fatigue.  Appropriate lab panels have been drawn to check for possible etiologies of the symptoms.  This includes a detailed thyroid workup, PTH, CBC, CMP.  Suspect that the volume overload status discussed above is probably intensifying these effects for the patient at the present time    4.  Dyspnea on exertion.  Patient does have great difficulty ambulating because of deformity of her right ankle accompanied by poor balance, ataxia and dizziness.  PT and OT consults are placed.  Pulmonary consult also requested.  Additional input and treatment as they recommend.  Will continue to follow closely.    5.Accelerated hypertension with great variability of blood pressures.  Problem fairly stable at present time but  patient does have some irregular beats.  Hopefully once volume status is readjusted and transition to oral Lasix is complete we will be able to stabilize the patient on a new regimen of blood pressure medication.    6.  Incontinence of urine requiring self-catheterization.  This has been a long-term problem for the patient and she seems quite familiar with treatment options.  We will continue her treatments as she has been doing in the past for this problem.  She does follow with Dr. Fazal Ochoa in urology for this.    7.  Hyperlipidemia.  Continue regular treatment that has been done on an outpatient basis in the past by the patient.    8.  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  This is also an ongoing problem for the patient and does not seem to be acutely exacerbated at present time    9.  Vitamin B-12 and D deficiencies in the past.  Will check these to vitamin levels while here in the hospital.    10.  Moderate persistent asthmatic bronchitis without complication.  To be followed by pulmonary and we would appreciate their suggestions with respect to the patient's medications.    11.  History of hyperparathyroidism with ongoing elevated PTH levels.  Current level is fairly stable and we will follow no evidence for urgent evaluation at present time.    12.  History of breast cancer which was on left side infiltrating ductal.  This seems to be very stable and in remission at present time.    13.  History of DVT right lower extremity in the past along with single subsegmental pulmonary embolism without acute cor pulmonale.  Patient does have venous incompetence of popliteal vein on right.  Plan to check venous Doppler of bilateral lower extremities which was negative.  Patient does continue on same medications for treatment of this issue.    14.  Psoriasis followed by Dr. Scott Ochoa in the past and stable.    15.  [facility]  palliative care status.  Patient is followed in her home by Hedy from the palliative care team.  I did place a consult on the chart so they will be aware of patient's admission and can help us if there is any needed information from Hedy's charting.  Patient's CODE STATUS seems to vary at times and might be beneficial to check the status with Hedy.    Plan for disposition: with home health in 48 to 72 hours     Copied text in this note has been reviewed by me and is accurate as of 10/20/24  Much of this dictation is completed using dragon voice activated software which can result in misspelled words and nonsensical phrases      Óscar Cadet MD  10/20/2024  1800 EDT

## 2024-10-21 NOTE — THERAPY EVALUATION
Patient Name: Sasha Barrett  : 1940    MRN: [id number]                              Today's Date: 10/21/2024       Admit Date: 10/20/2024    Visit Dx: No diagnosis found.  Patient Active Problem List   Diagnosis    Joint pain    Urinary retention self-caths (Don)    Conjunctivitis    Arm pain    Cervical disc displacement    Cervical pain (Servando=PM)    DDD (degenerative disc disease), cervical    Hyperlipidemia LDL goal <70    Preventative health care    Medication management    Proctocele    Urge incontinence of urine    Chronic tension-type headache, intractable    Obesity (BMI 30.0-34.9)    H/o Lyme disease    Stage 3 chronic kidney disease    Cerebral atherosclerosis    Allergic contact dermatitis    Malaise and fatigue progressive    Dizziness    Vitamin D deficiency    Moderate persistent asthmatic bronchitis without complication    Anterior cervical adenopathy due to infection    Pleurisy    Knee pain, bilateral    Elevated PTH level    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)    Gastroesophageal reflux disease with esophagitis    Psoriasis (Darryl Ochoa)    Postmenopausal    Recurrent UTI    CVA (cerebral vascular accident)    Dyspnea on exertion    Precordial pain    Abnormal EKG    Arthralgia    Accelerated hypertension    Acute joint pain    Easy bruisability    Cough productive of purulent sputum    Influenza A Subtype H3    COPD with asthma    Acute chest wall pain    Decreased functional mobility and endurance since fall 15    Essential hypertension    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    Migraine without aura and without status migrainosus, not intractable    Thyroid nodule    Osteoarthritis of left hip    Spinal stenosis of lumbar region with radiculopathy    Acute deep vein thrombosis (DVT) of right lower extremity    Acute deep vein thrombosis    Single subsegmental pulmonary embolism without acute cor pulmonale    Other  chronic pain    Palpitations    Secondary hyperparathyroidism    Syncope    Ataxia    Adult failure to thrive syndrome    Recent unexplained weight loss 42# over last 6 months    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 Yazmin Canseco    CARDIAC ELECTROPHYSIOLOGY PROCEDURE N/A 5/27/2022    Procedure: Loop insertion;  Surgeon: Jeffrey Argueta MD;  Location:  [facility];  Service: Cardiovascular;  Laterality: N/A;  biotronik    CARDIAC ELECTROPHYSIOLOGY PROCEDURE N/A 7/13/2022    Procedure: Loop recorder removal;  Surgeon: Jeffrey Argueta MD;  Location:  [facility];   Service: Cardiovascular;  Laterality: N/A;    CARPAL TUNNEL RELEASE Right 05/25/2011    Dr. Caleb Bueno    CARPAL TUNNEL RELEASE Left 04/26/2011    Dr. Caleb Bueno    CATARACT EXTRACTION Left 11/29/2010    Dr. Eusebio Downs    CATARACT EXTRACTION Right 11/17/2010    Dr. Eusebio Downs    COLONOSCOPY N/A 12/06/2002    Sigmoid diverticulosis-Dr. Brandon Batista    COLONOSCOPY N/A 12/12/2013    Tortuous colon, diverticulosis in the sigmoid colon and descending colon, stool in the rectum, sigmoid colon, descending colon, splenic flexure, transverse colon and hepatic flexure-Dr. Irma Brambila    DEQUERVAIN RELEASE Left 9/23/2020    Procedure: DEQUERVAIN RELEASE LEFT HAND;  Surgeon: Caleb Bueno MD;  [place] [facility] [place] [facility] [place]  Service: Plastics;  Laterality: Left;    ENDOSCOPY N/A 09/13/2007    Normal-Dr. Pavel Quarles    ENDOSCOPY AND COLONOSCOPY N/A 09/19/2005    1 cm hiatal hernia, mild Schatzki's ring, sigmoid diverticulosis-Dr. Yoel Farley    GANGLION CYST EXCISION Left 12/18/2012    Release of A1 pulley flexor sheath, left fourth finger, excision of ganglion cyst 0.5 cm diameter, A2 pulley flexor sheath, left fourth finger-Dr. Caleb Bueno    HEEL SPUR EXCISION Left 1968    INGUINAL HERNIA REPAIR Right 1971    at 5 months pregnant     INJECTION OF MEDICATION Left 9/23/2020    Procedure: KENOLOG INJECTION TO LEFT THUMB;  Surgeon: Caleb Bueno MD;  Location: [facility] OR [facility] [place];  Service: Plastics;  Laterality: Left;    MASTECTOMY Right 08/05/2008    Right breast mastectomy and excision of left chest wall lesion-Dr. Yoel Farley    MASTECTOMY RADICAL Left 04/29/2003    Left modified radical mastectomy-Dr. Yazmin Canseco    PARATHYROIDECTOMY Right 05/04/2004    Excision of parathyroid adenoma (right superior)-Dr. Yoel Farley    REPLACEMENT TOTAL KNEE Right 04/09/2012    Dr. Lamonte Childress    SINUS SURGERY  1984    THYROIDECTOMY, PARTIAL Left 05/04/2004      Yoel Farley    TOTAL ABDOMINAL HYSTERECTOMY Bilateral 1973    Dr. Mahan    TRIGGER FINGER RELEASE Left 9/23/2020    Procedure: RELEASE LEFT FOURTH TRIGGER FINGER;  Surgeon: Caleb Bueno MD;  Location: [facility] [place];  Service: Plastics;  Laterality: Left;    UPPER GASTROINTESTINAL ENDOSCOPY N/A 02/18/2009    LA Grade A reflux esophagitis, normal stomach, normal 2nd part of the duodenum-Dr. Yoel Farley    WRIST GANGLION EXCISION Left 9/23/2020    Procedure: EXCISION GANGLION CYST LEFT WRIST;  Surgeon: Caleb Bueno MD;  Location: [facility];  Service: Plastics;  Laterality: Left;      General Information       Row Name 10/21/24 1229          OT Time and Intention    Document Type evaluation  -PP     Mode of Treatment individual therapy;occupational therapy  -PP       Row Name 10/21/24 1229          General Information    Patient Profile Reviewed yes  -PP     Prior Level of Function independent:  (I) mostly for ADLs but reports furniture surfing w/ fxnl mob at home.  -PP     Existing Precautions/Restrictions fall  -PP     Barriers to Rehab previous functional deficit  previous R foot fx causing difficulty ambulating  -PP       Row Name 10/21/24 1229          Living Environment    People in Home spouse  -PP       Row Name 10/21/24 1229          Cognition    Orientation Status (Cognition) oriented x 3  -PP       Row Name 10/21/24 1229          Safety Issues/Impairments Affecting Functional Mobility    Safety Issues Affecting Function (Mobility) impulsivity  -PP     Impairments Affecting Function (Mobility) endurance/activity tolerance;strength;range of motion (ROM);balance  -PP     Comment, Safety Issues/Impairments (Mobility) gait belt and non skid socks worn for safety  -PP               User Key  (r) = Recorded By, (t) = Taken By, (c) = Cosigned By      Initials Name Provider Type    PP Darcy Marques OT Occupational Therapist                     Mobility/ADL's       Row Name  10/21/24 1234          Bed Mobility    Bed Mobility supine-sit;sit-supine  -PP     Supine-Sit [place] (Bed Mobility) standby assist  -PP     Sit-Supine [place] (Bed Mobility) standby assist  -PP     Assistive Device (Bed Mobility) bed rails;head of bed elevated  -PP       Row Name 10/21/24 1234          Transfers    Transfers sit-stand transfer;stand-sit transfer  -PP     Comment, (Transfers) Pt uses bed rail for UE support during STS and while taking side steps.  -PP       Row Name 10/21/24 1234          Sit-Stand Transfer    Sit-Stand [place] (Transfers) standby assist  -PP       Row Name 10/21/24 1234          Functional Mobility    Functional Mobility- Ind. Level standby assist;contact guard assist  -PP     Functional Mobility- Comment Pt able to take a couple of side steps along EOB  -PP       Row Name 10/21/24 1234          Activities of Daily Living    BADL Assessment/Intervention lower body dressing;grooming;toileting;upper body dressing  -PP       Row Name 10/21/24 1234          Mobility    Extremity Weight-bearing Status right lower extremity  -PP     Right Lower Extremity (Weight-bearing Status) weight-bearing as tolerated (WBAT)  -PP       Row Name 10/21/24 1234          Lower Body Dressing Assessment/Training    [place] Level (Lower Body Dressing) doff;don;set up;socks  -PP     Position (Lower Body Dressing) edge of bed sitting  -PP       Row Name 10/21/24 1234          Grooming Assessment/Training    [place] Level (Grooming) grooming skills;set up  -PP       Row Name 10/21/24 1234          Toileting Assessment/Training    [place] Level (Toileting) toileting skills;dependent (less than 25% patient effort)  -PP     Comment, (Toileting) Pt has johana and brief present d/t frequency but could likley use BSC and encouraged to do so w/ [facility] [place] [facility] [place] w/ xfer.  -PP       Row Name 10/21/24 1234          Upper Body Dressing Assessment/Training    Comment, (Upper Body  Dressing) Pt Mod I to don garcia wrist splints  -PP               User Key  (r) = Recorded By, (t) = Taken By, (c) = Cosigned By      Initials Name Provider Type    PP Darcy Marques OT Occupational Therapist                   Obj/Interventions       Row Name 10/21/24 1240          Sensory Assessment (Somatosensory)    Sensory Assessment (Somatosensory) UE sensation intact  -PP       Row Name 10/21/24 1240          Vision Assessment/Intervention    Visual Impairment/Limitations WFL  -PP       Row Name 10/21/24 1240          Range of Motion Comprehensive    Comment, General Range of Motion Pt has limited R shoulder ROM but able to get ~40 degress PROM, LUE WFL  -PP       Row Name 10/21/24 1240          Strength Comprehensive (MMT)    Comment, General Manual Muscle Testing (MMT) Assessment BUE gen weakness observed but not formally assessed  -PP       Row Name 10/21/24 1240          Balance    Balance Assessment sitting static balance;sitting dynamic balance;standing static balance;standing dynamic balance  -PP     Static Sitting Balance independent  -PP     Dynamic Sitting Balance supervision  -PP     Position, Sitting Balance sitting edge of bed  -PP     Static Standing Balance standby assist  -PP     Dynamic Standing Balance standby assist  -PP     Position/Device Used, Standing Balance unsupported  -PP               User Key  (r) = Recorded By, (t) = Taken By, (c) = Cosigned By      Initials Name Provider Type    PP Darcy Marques OT Occupational Therapist                   Goals/Plan       Row Name 10/21/24 1255          Bed Mobility Goal 1 (OT)    Activity/Assistive Device (Bed Mobility Goal 1, OT) --  -PP     [place] Level/Cues Needed (Bed Mobility Goal 1, OT) --  -PP     Time Frame (Bed Mobility Goal 1, OT) --  -PP       Row Name 10/21/24 1255          Transfer Goal 1 (OT)    Activity/Assistive Device (Transfer Goal 1, OT) transfers, all  -PP     [place] Level/Cues Needed (Transfer Goal 1, OT)  modified independence  -PP     Time Frame (Transfer Goal 1, OT) short term goal (STG);2 weeks  -PP     Progress/Outcome (Transfer Goal 1, OT) new goal  -PP       Row Name 10/21/24 1255          Dressing Goal 1 (OT)    Activity/Device (Dressing Goal 1, OT) dressing skills, all;upper body dressing;lower body dressing  -PP     Centre/Cues Needed (Dressing Goal 1, OT) modified independence  -PP     Time Frame (Dressing Goal 1, OT) short term goal (STG);2 weeks  -PP     Progress/Outcome (Dressing Goal 1, OT) new goal  -PP       Row Name 10/21/24 1255          Toileting Goal 1 (OT)    Activity/Device (Toileting Goal 1, OT) toileting skills, all  -PP     Centre Level/Cues Needed (Toileting Goal 1, OT) modified independence  -PP     Time Frame (Toileting Goal 1, OT) short term goal (STG);2 weeks  -PP     Progress/Outcome (Toileting Goal 1, OT) new goal  -PP       Row Name 10/21/24 1255          Grooming Goal 1 (OT)    Activity/Device (Grooming Goal 1, OT) grooming skills, all  -PP     Centre (Grooming Goal 1, OT) modified independence  -PP     Time Frame (Grooming Goal 1, OT) short term goal (STG);2 weeks  -PP     Strategies/Barriers (Grooming Goal 1, OT) using AE as needed and trained  -PP     Progress/Outcome (Grooming Goal 1, OT) new goal  -PP       Row Name 10/21/24 125          Therapy Assessment/Plan (OT)    Planned Therapy Interventions (OT) activity tolerance training;BADL retraining;functional balance retraining;occupation/activity based interventions;patient/caregiver education/training;strengthening exercise;transfer/mobility retraining  -PP               User Key  (r) = Recorded By, (t) = Taken By, (c) = Cosigned By      Initials Name Provider Type    PP Darcy Marques OT Occupational Therapist                   Clinical Impression       Row Name 10/21/24 1242          Pain Assessment    Pretreatment Pain Rating 0/10 - no pain  -PP     Posttreatment Pain Rating 0/10 - no pain  -PP        Row Name 10/21/24 1242          Plan of Care Review    Plan of Care Reviewed With patient  -PP     Progress no change  -PP     Outcome Evaluation Pt is a 85 y/o female admitted to [facility] for BLE edema. Pt PMHx medically complex. Pt presents to OT with increased R foot pain w/ fxnl mob, and decreased ROM and strength in UE's impacting pt's ability to perform ADLs. Pt reqs Sba mostly for all bed mob, and STS from EOB. S/u for LBD and Mod I to don garcia wrist splints. She tolerates taking a couple steps along EOB for positioning while WBAT on R foot. Skilled OT req to address deficits. Pt anticipates dc home w/ assist from  and HHOT to address additional Ed in AE use to maximize (I) w/ UE tasks such as cooking and dressing d/t limited shoulder ROM.  -PP       Row Name 10/21/24 1242          Therapy Assessment/Plan (OT)    Rehab Potential (OT) good  -PP     Criteria for Skilled Therapeutic Interventions Met (OT) yes;skilled treatment is necessary  -PP     Therapy Frequency (OT) 5 times/wk  -PP       Row Name 10/21/24 1242          Therapy Plan Review/Discharge Plan (OT)    Anticipated Discharge Disposition (OT) home with assist;home with home health  HH OT/PT  -PP       Row Name 10/21/24 1242          Vital Signs    O2 Delivery Pre Treatment room air  -PP     O2 Delivery Intra Treatment room air  -PP     O2 Delivery Post Treatment room air  -PP     Pre Patient Position Supine  -PP     Intra Patient Position Standing  -PP     Post Patient Position Supine  -PP       Row Name 10/21/24 1242          Positioning and Restraints    Pre-Treatment Position in bed  -PP     Post Treatment Position bed  -PP     In Bed notified nsg;fowlers;call light within reach;encouraged to call for assist;exit alarm on  -PP               User Key  (r) = Recorded By, (t) = Taken By, (c) = Cosigned By      Initials Name Provider Type    PP Darcy Marques OT Occupational Therapist                   Outcome Measures       Row Name 10/21/24  1256          How much help from another is currently needed...    Putting on and taking off regular lower body clothing? 3  -PP     Bathing (including washing, rinsing, and drying) 2  -PP     Toileting (which includes using toilet bed pan or urinal) 3  -PP     Putting on and taking off regular upper body clothing 2  -PP     Taking care of personal grooming (such as brushing teeth) 3  -PP     Eating meals 4  -PP     AM-PAC 6 Clicks Score (OT) 17  -PP       Row Name 10/21/24 1256          Functional Assessment    Outcome Measure Options AM-PAC 6 Clicks Daily Activity (OT)  -PP               User Key  (r) = Recorded By, (t) = Taken By, (c) = Cosigned By      Initials Name Provider Type    PP Darcy Marques, MANN Occupational Therapist                    Occupational Therapy Education        No education to display                  OT Recommendation and Plan  Planned Therapy Interventions (OT): activity tolerance training, BADL retraining, functional balance retraining, occupation/activity based interventions, patient/caregiver education/training, strengthening exercise, transfer/mobility retraining  Therapy Frequency (OT): 5 times/wk  Plan of Care Review  Plan of Care Reviewed With: patient  Progress: no change  Outcome Evaluation: Pt is a 83 y/o female admitted to [facility] for BLE edema. Pt PMHx medically complex. Pt presents to OT with increased R foot pain w/ fxnl mob, and decreased ROM and strength in UE's impacting pt's ability to perform ADLs. Pt reqs Sba mostly for all bed mob, and STS from EOB. S/u for LBD and Mod I to don garcia wrist splints. She tolerates taking a couple steps along EOB for positioning while WBAT on R foot. Skilled OT req to address deficits. Pt anticipates dc home w/ assist from  and HHOT to address additional Ed in AE use to maximize (I) w/ UE tasks such as cooking and dressing d/t limited shoulder ROM.     Time Calculation:   Evaluation Complexity (OT)  Review Occupational  Profile/Medical/Therapy History Complexity: expanded/moderate complexity  Assessment, Occupational Performance/Identification of Deficit Complexity: 3-5 performance deficits  Clinical Decision Making Complexity (OT): detailed assessment/moderate complexity  Overall Complexity of Evaluation (OT): moderate complexity     Time Calculation- OT       Row Name 10/21/24 1301             Time Calculation- OT    OT Start Time 1100  -PP      OT Stop Time 1123  -PP      OT Time Calculation (min) 23 min  -PP      Total Timed Code Minutes- OT 13 minute(s)  -PP      OT Received On 10/21/24  -PP      OT - Next Appointment 10/22/24  -PP      OT Goal Re-Cert Due Date 11/04/24  -PP         Timed Charges    43323 - OT Therapeutic Activity Minutes 13  -PP         Untimed Charges    OT Eval/Re-eval Minutes 10  -PP         Total Minutes    Timed Charges Total Minutes 13  -PP      Untimed Charges Total Minutes 10  -PP       Total Minutes 23  -PP                User Key  (r) = Recorded By, (t) = Taken By, (c) = Cosigned By      Initials Name Provider Type    PP JeraldDarcy gutierrez, OT Occupational Therapist                  Therapy Charges for Today       Code Description Service Date Service Provider Modifiers Qty    27403241685  OT THERAPEUTIC ACT EA 15 MIN 10/21/2024 Jerald, Porshia, OT GO 1    [id number] HC OT EVAL MOD COMPLEXITY 2 10/21/2024 [place], Porshia, OT GO 1                 Porshia [place], OT  10/21/2024

## 2024-10-21 NOTE — CONSULTS
"  [facility] Consult Note      Patient Name: Sasha Barrett  : 1940  MRN: [id number]  Primary Care Physician:  Óscar Cadet MD  Referring Physician: Óscar Cadet MD  Date of admission: 10/20/2024    Subjective     Reason for Consult:  vol overload     HPI:   Sasha Barrett is a 84 y.o. female with hx CKD stage 3A (BL Cr low 1s), HTN (+ orthostatic hypotension), primary HPT s/p PTHectomy, PE, breast cancer s/p mastectomy (L) who presented last night with dyspnea on exertion, cough, fatigue/malaise and dizziness.  Recent R foot fracture with difficulty ambulating.  RLE > LLE ankle with recent R foot fracture she states is not repairable).  BLE doppler NEG for DVT.  CXR shows no central congestion.  Cr stable 1.10 yesterday and 1.05 today.  K up 3.1 to 3.8 with replacement.  Intact PTH mildly elevated 104 with normal calcium 9.4.  BP labile 130s to 190 systolic thus far.  Takes lasix 80mg daily at home but doubles dose (80 BID) PRN wt gain. Also on prednisone 10mg QOD.  Albumin is low-normal 3.6.  pro BNP is normal range 935.  UA with trace LE/protein.  Sees Dr Bond in our office.  Lasix 40mg IV q12h started.  Good diuresis thus far 2400 cc.  She thinks she \"was bitten by something\" recently to explain red lesions on legs.      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 Yazmin Canseco    CARDIAC ELECTROPHYSIOLOGY PROCEDURE N/A 5/27/2022    Procedure: Loop insertion;  Surgeon: Jeffrey Argueta MD;  Location:  [facility];  Service: Cardiovascular;  Laterality: N/A;  biotronik    CARDIAC ELECTROPHYSIOLOGY PROCEDURE N/A 7/13/2022    Procedure: Loop recorder removal;  Surgeon: Jeffrey Argueta MD;  Location:  [facility];  Service: Cardiovascular;  Laterality: N/A;    CARPAL TUNNEL RELEASE Right 05/25/2011    Dr. Caleb Bueno    CARPAL TUNNEL RELEASE Left 04/26/2011    Dr. Caleb Bueno    CATARACT EXTRACTION Left 11/29/2010    Dr. Eusebio Downs    CATARACT EXTRACTION Right 11/17/2010    Dr. Eusebio Downs    COLONOSCOPY N/A 12/06/2002    Sigmoid diverticulosis-Dr. Brandon Batista    COLONOSCOPY N/A 12/12/2013    Tortuous colon, diverticulosis in the sigmoid colon and descending colon, stool in the rectum, sigmoid colon, descending colon, splenic flexure, transverse colon and hepatic flexure-Dr. Irma Brambila    DEQUERVAIN RELEASE Left 9/23/2020    Procedure: DEQUERVAIN RELEASE LEFT HAND;  Surgeon: Caleb Bueno MD;  Location:  [facility] [place];  Service: Plastics;  Laterality: Left;    ENDOSCOPY N/A 09/13/2007    Normal-Dr. Pavel Quarles    ENDOSCOPY AND COLONOSCOPY N/A 09/19/2005    1 cm hiatal hernia, mild Schatzki's ring, sigmoid diverticulosis-Dr. Yoel Farley    GANGLION CYST EXCISION Left 12/18/2012    Release of A1 pulley flexor sheath, left fourth finger, excision of ganglion cyst 0.5 cm diameter, A2 pulley flexor sheath, left fourth finger-Dr. Caleb Bueno    HEEL SPUR EXCISION Left 1968    INGUINAL HERNIA REPAIR Right 1971    at 5 months pregnant     INJECTION OF  MEDICATION Left 9/23/2020    Procedure: KENOLOG INJECTION TO LEFT THUMB;  Surgeon: Caleb Bueno MD;  [place] [facility] [place] [facility] [place]  Service: Plastics;  Laterality: Left;    MASTECTOMY Right 08/05/2008    Right breast mastectomy and excision of left chest wall lesion-Dr. Yoel Farley    MASTECTOMY RADICAL Left 04/29/2003    Left modified radical mastectomy-Dr. Yazmin Canseco    PARATHYROIDECTOMY Right 05/04/2004    Excision of parathyroid adenoma (right superior)-Dr. Yoel Farley    REPLACEMENT TOTAL KNEE Right 04/09/2012    Dr. Lamonte Childress    SINUS SURGERY  1984    THYROIDECTOMY, PARTIAL Left 05/04/2004    Dr. Yoel Farley    TOTAL ABDOMINAL HYSTERECTOMY Bilateral 1973    Dr. Mahan    TRIGGER FINGER RELEASE Left 9/23/2020    Procedure: RELEASE LEFT FOURTH TRIGGER FINGER;  Surgeon: Caleb Bueno MD;  [place] [facility] [place] [facility] [place]  Service: Plastics;  Laterality: Left;    UPPER GASTROINTESTINAL ENDOSCOPY N/A 02/18/2009    LA Grade A reflux esophagitis, normal stomach, normal 2nd part of the duodenum-Dr. Yoel Farley    WRIST GANGLION EXCISION Left 9/23/2020    Procedure: EXCISION GANGLION CYST LEFT WRIST;  Surgeon: Caleb Bueno MD;  [place] [facility] [place] [facility] [place]  Service: Plastics;  Laterality: Left;       Family History: family history includes Alcohol abuse in her mother; Brain cancer in her brother; No Known Problems in her father and sister.    Social History:  reports that she has never smoked. She has never been exposed to tobacco smoke. She has never used smokeless tobacco. She reports current alcohol use. She reports that she does not use drugs.    Home Medications:  Prior to Admission medications    Medication Sig Start Date End Date Taking? Authorizing Provider   albuterol (PROAIR HFA) 108 (90 Base) MCG/ACT inhaler 4 puffs Every 4 (Four) Hours As Needed for Wheezing or Shortness of Air. 6/18/14  Yes Provider, MD Vadim   butalbital-acetaminophen-caffeine (FIORICET,  ESGIC) -40 MG per tablet Take 2 tablets by mouth Every 4 (Four) Hours As Needed for Headache. 3/27/17  Yes Óscar Cadet MD   carboxymethylcellulose (REFRESH PLUS) 0.5 % solution Administer 1 drop to both eyes 2 (Two) Times a Day As Needed for Dry Eyes.   Yes Vadim Best MD   Cholecalciferol (VITAMIN D3) 1.25 MG (13747 UT) capsule Take 1 capsule by mouth 1 (One) Time Per Week. TUESDAYS 5/11/20  Yes Vadim Best MD   cloNIDine (CATAPRES) 0.2 MG tablet Take 1 tablet by mouth Every 12 (Twelve) Hours.  Patient taking differently: Take 0.5 tablets by mouth Every 12 (Twelve) Hours. 10/3/22  Yes Óscar Cadet MD   diphenhydrAMINE (BENADRYL) 50 MG tablet Take 1 tablet by mouth every 4 (four) hours as needed for itching or allergies. 7/25/16  Yes Óscar Cadet MD   fluticasone (FLONASE) 50 MCG/ACT nasal spray 1 spray by Each Nare route 2 (Two) Times a Day. 8/27/18  Yes Óscar Cadet MD   furosemide (LASIX) 40 MG tablet Take 1 tablet by mouth Daily As Needed (swelling/weight gain). 7/11/22  Yes Vadim Best MD   HYDROcodone-acetaminophen (NORCO)  MG per tablet Take 1-2 tablets by mouth Every 6 (Six) Hours As Needed for Moderate Pain. 3/3/22  Yes Vadim Best MD   hydrOXYzine (ATARAX) 50 MG tablet Take 1 tablet by mouth Every 6 (Six) Hours As Needed for Itching or Allergies. 12/12/19  Yes Vadim Best MD   ipratropium (ATROVENT) 0.02 % nebulizer solution Take 2.5 mL by nebulization 4 (Four) Times a Day.   Yes Vadim Best MD   LORazepam (ATIVAN) 0.5 MG tablet Take 1 tablet by mouth Every 8 (Eight) Hours As Needed for Anxiety.   Yes Vadim Best MD   multivitamin with minerals tablet tablet Take 1 tablet by mouth Daily. 5/30/22  Yes Óscar Cadet MD   NIFEdipine XL (ADALAT CC) 30 MG 24 hr tablet Take 1 tablet by mouth Daily.  Patient taking differently: Take 1 tablet by mouth Daily. . 5/30/22  Yes Óscar Cadet MD   omeprazole  (priLOSEC) 40 MG capsule Take 1 capsule by mouth Daily. 2/23/21  Yes Vadim Best MD   polyethylene glycol (MIRALAX) 17 g packet Take 17 g by mouth Daily As Needed. HARD STOOL 6/6/22  Yes Vadim Best MD   potassium chloride (KLOR-CON) 20 MEQ packet Take 20 mEq by mouth Every Other Day.   Yes Vadim Best MD   predniSONE (DELTASONE) 10 MG tablet Take 1 tablet by mouth Every Other Day. 5/30/22  Yes Óscar Cadet MD   promethazine-codeine (PHENERGAN with CODEINE) 6.25-10 MG/5ML syrup Take 5 mL by mouth 4 (Four) Times a Day As Needed for cough. 12/9/16  Yes Óscar Cadet MD   vitamin B-12 (VITAMIN B-12) 1000 MCG tablet Take 1 tablet by mouth Daily. 4/11/17  Yes Óscar Cadet MD   Xarelto 20 MG tablet Take 1 tablet by mouth Daily. with food. 2/23/23  Yes Vadim Best MD   acetaminophen (TYLENOL) 325 MG tablet Take 2 tablets by mouth Every 4 (Four) Hours As Needed for Mild Pain . 5/29/22   Óscar Cadet MD   cetirizine (zyrTEC) 10 MG tablet Take 1 tablet by mouth Daily. Alt with singulair    Vadim Best MD   Diclofenac Sodium (VOLTAREN) 1 % gel gel Apply 4 g topically to the appropriate area as directed 4 (Four) Times a Day As Needed.    Vadim Best MD   EPINEPHrine (EPIPEN) 0.3 MG/0.3ML solution auto-injector injection INJECT THE CONTENTS OF ONE PEN AS NEEDED. MAY REPEAT ONE TIME. 3/29/21   Vadim Best MD   eplerenone (INSPRA) 25 MG tablet Take 1 tablet by mouth Daily.    Vadim Best MD   mupirocin (BACTROBAN) 2 % ointment Apply 1 application topically to the appropriate area as directed Every 12 (Twelve) Hours. 10/3/22   Óscar Cadet MD   ondansetron ODT (ZOFRAN-ODT) 8 MG disintegrating tablet Take 1 tablet by mouth every 6 (six) hours as needed for nausea or vomiting. 9/8/16   Óscar Cadet MD   rosuvastatin (CRESTOR) 5 MG tablet Take 1 tablet by mouth Every Morning. 4/17/23   Provider, MD Vadim        Allergies:  Allergies   Allergen Reactions    Bee Venom Shortness Of Breath and Rash    Morphine Anaphylaxis and Nausea And Vomiting    Sulfa Antibiotics Anaphylaxis and Hives     Or sulfa fillers in meds  Broke out in internal and external hives      Tree Extract Shortness Of Breath and Rash    Ambien [Zolpidem] Hallucinations    Anastrozole Other (See Comments)     Can't remember what the reaction was     Covid-19 (Mrna) Vaccine Swelling     Facial swelling, blood clots    Eliquis [Apixaban] Headache     Headaches, nausea and itching.     Hydralazine Myalgia     Patient reports leg cramps, joint pain and increased fatigue    Norvasc [Amlodipine] Other (See Comments)     KIDNEYS SHUT DOWN    Nsaids Other (See Comments)     KIDNEY FAILURE    Penicillins Unknown (See Comments)     Severe reaction as a child  Tolerated ceftriaxone during 04/2017 admission    Grass Rash       Objective     Vitals:   Temp:  [98.1 °F (36.7 °C)-98.6 °F (37 °C)] 98.2 °F (36.8 °C)  Heart Rate:  [66-72] 66  Resp:  [18] 18  BP: (117-190)/(61-89) 148/61    Intake/Output Summary (Last 24 hours) at 10/21/2024 0732  Last data filed at 10/21/2024 0525  Gross per 24 hour   Intake --   Output 2400 ml   Net -2400 ml       Physical Exam:    General Appearance: pleasant WF comfortable alert on RA   Skin: warm and dry  HEENT: oral mucosa normal, nonicteric sclera  Neck: supple, no JVD  Lungs: CTA bilat no rales   Heart: RRR, normal S1 and S2  Abdomen: soft, nontender, nondistended  : no palpable bladder  Extremities: 1+ R ankle edema but trace BLE pedal edema   Neuro: normal speech and mental status     Scheduled Meds:     cetirizine, 10 mg, Oral, Daily  cloNIDine, 0.1 mg, Oral, Q12H  eplerenone, 25 mg, Oral, Daily  fluticasone, 1 spray, Each Nare, BID  furosemide, 40 mg, Intravenous, Q12H  ipratropium, 500 mcg, Nebulization, 4x Daily - RT  multivitamin with minerals, 1 tablet, Oral, Daily  mupirocin, 1 application , Topical, Q12H  NIFEdipine  XL, 30 mg, Oral, Daily  pantoprazole, 40 mg, Oral, Q AM  potassium chloride, 20 mEq, Oral, Every Other Day  predniSONE, 10 mg, Oral, Every Other Day  rivaroxaban, 20 mg, Oral, Daily  rosuvastatin, 5 mg, Oral, QAM  sodium chloride, 3 mL, Intravenous, Q12H  cyanocobalamin, 1,000 mcg, Oral, Daily  [START ON 10/22/2024] vitamin D, 50,000 Units, Oral, Weekly      IV Meds:        Results Reviewed:   I have personally reviewed the results from the time of this admission to 10/21/2024 07:32 EDT     Lab Results   Component Value Date    GLUCOSE 119 (H) 10/21/2024    CALCIUM 9.4 10/21/2024     10/21/2024    K 3.8 10/21/2024    CO2 29.4 (H) 10/21/2024     10/21/2024    BUN 24 (H) 10/21/2024    CREATININE 1.05 (H) 10/21/2024    EGFRIFAFRI 63 02/02/2021    EGFRIFNONA 52 (L) 02/02/2021    BCR 22.9 10/21/2024    ANIONGAP 6.6 10/21/2024      Lab Results   Component Value Date    MG 2.2 10/20/2024    PHOS 2.6 10/20/2024    ALBUMIN 3.6 10/21/2024           Assessment / Plan     ASSESSMENT:  CKD stage 3A - Cr stable 1.05, BL is low 1's.  Dr Bond follows  Vol overload - periph > central.  No congestion on CXR.  Good diuresis with IV lasix.  Response to PO dose of lasix subpar lately (up to 160 mg/day)  Hypokalemia, due to diuresis, repleted up to 3.8  Hx DVT & PE on AC (xarelto)  Hx primary hyperparathyroidism s/p PTHectomy.  iPTH ~ 100 but Ca normal   HTN - BP labile, latest reading better.  On nifedipine ER (factor re: edema), clonidine    PLAN:  DC IV lasix & transition to high dose torsemide 100 mg daily in place of lasix  Sched KCL 20meq daily with this    Thank you Dr Cadet for involving us in the care of Sasha Barrett.  Please feel free to call with any questions.    Wicho Hallman MD  10/21/24  07:32 EDT    [facility]  [phone]

## 2024-10-21 NOTE — CONSULTS
Group: [facility]         CONSULT NOTE    Patient Identification:  Sasha Barrett  84 y.o.  female  1940  [id number]            Requesting physician: Dr. 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  Lifelong non-smoker, but had secondhand smoke exposure from  and son.    She presented to the hospital as a direct admission on 10/20/2024 with complaints of unmanageable bilateral lower extremity edema decreased urine output despite diuretic use.  She also endorses malaise, fatigue, dizziness, and dyspnea with exertion.  In discussion with patient, she seems very fixated on these bug bites that she is been having over the past several weeks.  She says that these have been causing increased swelling and this seems to be the root of all of her problems.  In addition, she broke her right foot recently and has been having difficulty ambulating.  She also reports that  her and her  have not been leaving the house much because she is concerned that more bugs will bite her.  On assessment while patient does have increased bilateral lower extremity edema, redness.  Oxygenation is appropriate on room air.  Does not appear to be acute distress.  Initial lab work was unremarkable.  Attending his asked pulmonary to see patient for her complaints of increased dyspnea on exertion.    Review of Systems:  CONSTITUTIONAL:  Denies fevers or chills positive for fatigue, malaise  EYE:  No new vision changes  EAR:  No change in hearing  CARDIAC:  No chest pain  PULMONARY:  No productive cough, + shortness of breath  GI:  No diarrhea, hematemesis  or hematochezia  RENAL:  No dysuria or urinary frequency  MUSCULOSKELETAL:  + Right foot pain (known fracture)  ENDOCRINE:  No heat or cold intolerance  INTEGUMENTARY: No skin rashes  NEUROLOGICAL:  No dizziness or confusion.  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 Yazmin Canseco    CARDIAC ELECTROPHYSIOLOGY PROCEDURE N/A 5/27/2022    Procedure: Loop insertion;  Surgeon: Jeffrey Argueta MD;  Location:  [facility];  Service: Cardiovascular;  Laterality: N/A;  biotronik    CARDIAC ELECTROPHYSIOLOGY PROCEDURE N/A 7/13/2022    Procedure: Loop recorder removal;  Surgeon: Jeffrey Argueta MD;  Location:  [facility];  Service: Cardiovascular;  Laterality: N/A;    CARPAL TUNNEL RELEASE Right 05/25/2011    Dr. Caleb Bueno    CARPAL TUNNEL RELEASE Left 04/26/2011    Dr. Caleb Bueno    CATARACT EXTRACTION Left 11/29/2010    Dr. Eusebio Downs    CATARACT EXTRACTION Right 11/17/2010    Dr. Eusebio Downs    COLONOSCOPY N/A 12/06/2002    Sigmoid diverticulosis-Dr. Brandon Batista     COLONOSCOPY N/A 12/12/2013    Tortuous colon, diverticulosis in the sigmoid colon and descending colon, stool in the rectum, sigmoid colon, descending colon, splenic flexure, transverse colon and hepatic flexure-Dr. Irma Brambila    DEQUERVAIN RELEASE Left 9/23/2020    Procedure: DEQUERVAIN RELEASE LEFT HAND;  Surgeon: Caleb Bueno MD;  Location:  [facility] OR [facility] [place];  Service: Plastics;  Laterality: Left;    ENDOSCOPY N/A 09/13/2007    Normal-Dr. Pavel Quarles    ENDOSCOPY AND COLONOSCOPY N/A 09/19/2005    1 cm hiatal hernia, mild Schatzki's ring, sigmoid diverticulosis-Dr. Yoel Farley    GANGLION CYST EXCISION Left 12/18/2012    Release of A1 pulley flexor sheath, left fourth finger, excision of ganglion cyst 0.5 cm diameter, A2 pulley flexor sheath, left fourth finger-Dr. Caleb Bueno    HEEL SPUR EXCISION Left 1968    INGUINAL HERNIA REPAIR Right 1971    at 5 months pregnant     INJECTION OF MEDICATION Left 9/23/2020    Procedure: KENOLOG INJECTION TO LEFT THUMB;  Surgeon: Caleb Bueno MD;  Location: [facility] OR [facility] [place];  Service: Plastics;  Laterality: Left;    MASTECTOMY Right 08/05/2008    Right breast mastectomy and excision of left chest wall lesion-Dr. Yoel Farley    MASTECTOMY RADICAL Left 04/29/2003    Left modified radical mastectomy-Dr. Yazmin Canseco    PARATHYROIDECTOMY Right 05/04/2004    Excision of parathyroid adenoma (right superior)-Dr. Yoel Farley    REPLACEMENT TOTAL KNEE Right 04/09/2012    Dr. Lamonte Childress    SINUS SURGERY  1984    THYROIDECTOMY, PARTIAL Left 05/04/2004    Dr. Yoel Farley    TOTAL ABDOMINAL HYSTERECTOMY Bilateral 1973    Dr. Mahan    TRIGGER FINGER RELEASE Left 9/23/2020    Procedure: RELEASE LEFT FOURTH TRIGGER FINGER;  Surgeon: Caleb Bueno MD;  Location:  [facility] [place];  Service: Plastics;  Laterality: Left;    UPPER GASTROINTESTINAL ENDOSCOPY N/A 02/18/2009    LA Grade A reflux esophagitis, normal stomach, normal 2nd part  of the duodenum-Dr. Yoel Farley    WRIST GANGLION EXCISION Left 9/23/2020    Procedure: EXCISION GANGLION CYST LEFT WRIST;  Surgeon: Caleb Bueno MD;  [place] [facility] [place] [facility] [place]  Service: Plastics;  Laterality: Left;        Home Meds:  Medications Prior to Admission   Medication Sig Dispense Refill Last Dose/Taking    albuterol (PROAIR HFA) 108 (90 Base) MCG/ACT inhaler 4 puffs Every 4 (Four) Hours As Needed for Wheezing or Shortness of Air.   10/19/2024    butalbital-acetaminophen-caffeine (FIORICET, ESGIC) -40 MG per tablet Take 2 tablets by mouth Every 4 (Four) Hours As Needed for Headache. 240 tablet 0 10/19/2024    carboxymethylcellulose (REFRESH PLUS) 0.5 % solution Administer 1 drop to both eyes 2 (Two) Times a Day As Needed for Dry Eyes.   10/19/2024    Cholecalciferol (VITAMIN D3) 1.25 MG (54164 UT) capsule Take 1 capsule by mouth 1 (One) Time Per Week. TUESDAYS   10/19/2024    cloNIDine (CATAPRES) 0.2 MG tablet Take 1 tablet by mouth Every 12 (Twelve) Hours. (Patient taking differently: Take 0.5 tablets by mouth Every 12 (Twelve) Hours.)   10/20/2024    diphenhydrAMINE (BENADRYL) 50 MG tablet Take 1 tablet by mouth every 4 (four) hours as needed for itching or allergies. 180 tablet 0 Past Week    fluticasone (FLONASE) 50 MCG/ACT nasal spray 1 spray by Each Nare route 2 (Two) Times a Day.   10/19/2024    furosemide (LASIX) 40 MG tablet Take 1 tablet by mouth Daily As Needed (swelling/weight gain).   10/19/2024    HYDROcodone-acetaminophen (NORCO)  MG per tablet Take 1-2 tablets by mouth Every 6 (Six) Hours As Needed for Moderate Pain.   10/19/2024    hydrOXYzine (ATARAX) 50 MG tablet Take 1 tablet by mouth Every 6 (Six) Hours As Needed for Itching or Allergies.   10/19/2024    ipratropium (ATROVENT) 0.02 % nebulizer solution Take 2.5 mL by nebulization 4 (Four) Times a Day.   10/19/2024    LORazepam (ATIVAN) 0.5 MG tablet Take 1 tablet by mouth Every 8 (Eight) Hours As Needed for  Anxiety.   Past Month    multivitamin with minerals tablet tablet Take 1 tablet by mouth Daily.   10/19/2024    NIFEdipine XL (ADALAT CC) 30 MG 24 hr tablet Take 1 tablet by mouth Daily. (Patient taking differently: Take 1 tablet by mouth Daily. .) 90 tablet 1 10/19/2024    omeprazole (priLOSEC) 40 MG capsule Take 1 capsule by mouth Daily.   10/19/2024    polyethylene glycol (MIRALAX) 17 g packet Take 17 g by mouth Daily As Needed. HARD STOOL   10/19/2024    potassium chloride (KLOR-CON) 20 MEQ packet Take 20 mEq by mouth Every Other Day.   10/19/2024    predniSONE (DELTASONE) 10 MG tablet Take 1 tablet by mouth Every Other Day. 45 tablet 1 10/19/2024    promethazine-codeine (PHENERGAN with CODEINE) 6.25-10 MG/5ML syrup Take 5 mL by mouth 4 (Four) Times a Day As Needed for cough. 360 mL 0 Past Month    vitamin B-12 (VITAMIN B-12) 1000 MCG tablet Take 1 tablet by mouth Daily. 90 tablet 3 10/19/2024    Xarelto 20 MG tablet Take 1 tablet by mouth Daily. with food.   10/19/2024    acetaminophen (TYLENOL) 325 MG tablet Take 2 tablets by mouth Every 4 (Four) Hours As Needed for Mild Pain .   Unknown    cetirizine (zyrTEC) 10 MG tablet Take 1 tablet by mouth Daily. Alt with singulair   More than a month    Diclofenac Sodium (VOLTAREN) 1 % gel gel Apply 4 g topically to the appropriate area as directed 4 (Four) Times a Day As Needed.   Unknown    EPINEPHrine (EPIPEN) 0.3 MG/0.3ML solution auto-injector injection INJECT THE CONTENTS OF ONE PEN AS NEEDED. MAY REPEAT ONE TIME.   Unknown    eplerenone (INSPRA) 25 MG tablet Take 1 tablet by mouth Daily.   Unknown    mupirocin (BACTROBAN) 2 % ointment Apply 1 application topically to the appropriate area as directed Every 12 (Twelve) Hours.   Unknown    ondansetron ODT (ZOFRAN-ODT) 8 MG disintegrating tablet Take 1 tablet by mouth every 6 (six) hours as needed for nausea or vomiting. 360 tablet 1 Unknown    rosuvastatin (CRESTOR) 5 MG tablet Take 1 tablet by mouth Every Morning.    Unknown       Allergies:  Allergies   Allergen Reactions    Bee Venom Shortness Of Breath and Rash    Morphine Anaphylaxis and Nausea And Vomiting    Sulfa Antibiotics Anaphylaxis and Hives     Or sulfa fillers in meds  Broke out in internal and external hives      Tree Extract Shortness Of Breath and Rash    Ambien [Zolpidem] Hallucinations    Anastrozole Other (See Comments)     Can't remember what the reaction was     Covid-19 (Mrna) Vaccine Swelling     Facial swelling, blood clots    Eliquis [Apixaban] Headache     Headaches, nausea and itching.     Hydralazine Myalgia     Patient reports leg cramps, joint pain and increased fatigue    Norvasc [Amlodipine] Other (See Comments)     KIDNEYS SHUT DOWN    Nsaids Other (See Comments)     KIDNEY FAILURE    Penicillins Unknown (See Comments)     Severe reaction as a child  Tolerated ceftriaxone during 04/2017 admission    Grass Rash       Social History:   Social History     Socioeconomic History    Marital status:      Spouse name: Joey    Number of children: 6    Highest education level: Some college, no degree   Tobacco Use    Smoking status: Never     Passive exposure: Never    Smokeless tobacco: Never   Vaping Use    Vaping status: Never Used   Substance and Sexual Activity    Alcohol use: Yes     Comment: 3 drinks yearly    Drug use: Never    Sexual activity: Defer     Birth control/protection: Surgical       Family History:  Family History   Problem Relation Age of Onset    Brain cancer Brother     Alcohol abuse Mother     No Known Problems Father     No Known Problems Sister     Malig Hyperthermia Neg Hx        Physical Exam:  /61 (BP Location: Left arm, Patient Position: Lying)   Pulse 66   Temp 98.2 °F (36.8 °C) (Oral)   Resp 18   SpO2 93%  There is no height or weight on file to calculate BMI. 93%    Constitutional: Elderly, chronically ill-appearing, very pleasant and talkative female pt in bed, No acute respiratory distress, no, oxygen  appropriate on room air accessory muscle use  Head: - NCAT  Eyes: No pallor, Anicteric conjunctiva, EOMI.  ENMT:  Mallampati 3, no oral thrush.  Moist, no JVD MM.   NECK: Trachea midline, No thyromegaly, no palpable cervical LNpathy  Heart: RRR, no murmur.  2+ bilateral lower extremity edema with redness  Lungs: BRANDON +, No wheezes/ crackles heard    Abdomen: Soft. No tenderness, guarding or rigidity. No palpable masses  Extremities: Extremities warm and well perfused. No cyanosis/ clubbing  Neuro: Conscious, answers appropriately, no gross focal neuro deficits  Psych: Mood and affect appropriate    PPE recommended per [facility] infectious disease Isolation protocol for the current clinical scenario(as mentioned below) was followed.      LABS:  COVID19   Date Value Ref Range Status   10/20/2024 Not Detected Not Detected - Ref. Range Final       Lab Results   Component Value Date    CALCIUM 9.4 10/21/2024    PHOS 2.6 10/20/2024     Results from last 7 days   Lab Units 10/21/24  0018 10/20/24  1439   MAGNESIUM mg/dL  --  2.2   SODIUM mmol/L 141 139   POTASSIUM mmol/L 3.8 3.1*   CHLORIDE mmol/L 105 103   CO2 mmol/L 29.4* 27.3   BUN mg/dL 24* 21   CREATININE mg/dL 1.05* 1.10*   GLUCOSE mg/dL 119* 118*   CALCIUM mg/dL 9.4 9.3   WBC 10*3/mm3 8.00 7.66   HEMOGLOBIN g/dL 12.2 11.2*   PLATELETS 10*3/mm3 199 181   ALT (SGPT) U/L 10 10   AST (SGOT) U/L 12 13   PROBNP pg/mL  --  935.0   PROCALCITONIN ng/mL  --  0.05     Lab Results   Component Value Date    CKTOTAL 51 10/20/2024    CKMB 1.69 04/06/2017    TROPONINT 22 (H) 10/20/2024     Results from last 7 days   Lab Units 10/20/24  2038 10/20/24  1439   CK TOTAL U/L  --  51   HSTROP T ng/L 22* 21*         Results from last 7 days   Lab Units 10/20/24  1439   PROCALCITONIN ng/mL 0.05                     Lab Results   Component Value Date    TSH 1.100 10/20/2024     CrCl cannot be calculated (Unknown ideal weight.).  Results from last 7 days   Lab Units 10/20/24  7915    NITRITE UA  Negative   WBC UA /HPF 3-5*   BACTERIA UA /HPF None Seen   SQUAM EPITHEL UA /HPF 0-2        Imaging: I personally visualized the images of scans/x-rays performed within last 3 days.      Assessment:  Bilateral lower extremity edema  Volume overload  Chronic kidney disease  Weakness, malaise, severe fatigue  Dyspnea on exertion  Hypertension  Incontinence of urine  Hyperlipidemia  Osteoarthritis  Moderate persistent asthmatic bronchitis without complication, no exacerbation  History of DVT and PE  Psoriasis    Recommendations:  Dyspnea on exertion  Moderate persistent asthma  No evidence of current asthma exacerbation.  Patient's complaints of dyspnea on exertion is like related to combination of factors including fluid overload, physical deconditioning, and obesity.  Discussed her current medication regimen for her moderate persistent asthma-patient reports that she feels like she has a reasonable control on her asthma with using her bronchodilators every day and nebulizer as needed for shortness of breath.  She denies any recent wheezing or cough.  Patient has not seen pulmonology before outside of the hospital, was previously seeing an allergist for her multiple allergies.  Appears the patient's previously been on Pulmicort, Singulair and as needed albuterol.  Would recommend continuing these medications.  Discussed possibly assessing patient for obstructive sleep apnea-however she reports that she would never used a CPAP machine and is not interested in being tested.    Recommend continued diuresis and monitoring for improvement in oxygenation.  Recommend a PT/OT consultation to determine if patient is appropriate for rehabilitation services to aid with her physical deconditioning.    Patient was placed in face mask upon entering room and kept mask on throughout our encounter. I wore full protective equipment throughout this patient encounter including a face mask, gown and gloves. Hand hygiene was  performed before donning protective equipment and after removal when leaving the room.    Charla Mensah, AC  10/21/2024  05:07 EDT      Much of this encounter note is an electronic transcription/translation of spoken language to printed text using Dragon Software.

## 2024-10-21 NOTE — PLAN OF CARE
Problem: Malnutrition  Goal: Improved Nutritional Intake  Outcome: Progressing   Goal Outcome Evaluation:         Rd to follow

## 2024-10-21 NOTE — PLAN OF CARE
Pt states she is at the hospital for many issues, including bilateral lower extremity edema, incorrect dosing on her Lasix prescription, and bug bites that have caused open wounds on her legs. +3 ELISEO present. Did not find bug bites or open wounds on pt's legs. Pulmonology and Nephrology consulted.      Problem: Adult Inpatient Plan of Care  Goal: Plan of Care Review  Outcome: Progressing   Goal Outcome Evaluation:

## 2024-10-21 NOTE — CASE MANAGEMENT/SOCIAL WORK
Discharge Planning Assessment  [facility]     Patient Name: Sasha Barrett  MRN: [id number]  Today's Date: 10/21/2024    Admit Date: 10/20/2024    Plan: Home, spouse to transport.   Discharge Needs Assessment       Row Name 10/21/24 1603       Living Environment    People in Home spouse    Current Living Arrangements home    Family Caregiver if Needed spouse    Quality of Family Relationships helpful;involved;supportive    Able to Return to Prior Arrangements yes       Transition Planning    Patient/Family Anticipates Transition to home with family    Patient/Family Anticipated Services at Transition     Transportation Anticipated family or friend will provide       Discharge Needs Assessment    Readmission Within the Last 30 Days no previous admission in last 30 days    Equipment Currently Used at Home walker, rolling;commode;cane, quad tip    Concerns to be Addressed discharge planning    Equipment Needed After Discharge none                   Discharge Plan       Row Name 10/21/24 1604       Plan    Plan Home, spouse to transport.    Patient/Family in Agreement with Plan yes    Plan Comments CCP met with pt at bedside, introduced self and role of CCP. Face sheet information and pharmacy verified. Pt lives in a single-story home with 2STE with her spouse Adriel. Pt does not drive, IADL's and has a walker, cane and bedside commode at home. Pt has a living will. Pt is enrolled in meds to beds and denies trouble affording or managing her medications. Pt has used HH in the past and states will not go to SNF even if it is recommended. DC plan is to return home, family to transport. Mic RN/CCP                  Continued Care and Services - Admitted Since 10/20/2024    No active coordination exists for this encounter.       Expected Discharge Date and Time       Expected Discharge Date Expected Discharge Time    Oct 23, 2024            Demographic Summary       Row Name 10/21/24 1603       General  Information    Admission Type observation    Arrived From home    Required Notices Provided Observation Status Notice    Referral Source case finding;admission list    Reason for Consult discharge planning    Preferred Language English       Contact Information    Permission Granted to Share Info With                    Functional Status       Row Name 10/21/24 1603       Functional Status    Usual Activity Tolerance good    Current Activity Tolerance moderate       Assessment of Health Literacy    How often do you have someone help you read hospital materials? Never    How often do you have problems learning about your medical condition because of difficulty understanding written information? Never    How often do you have a problem understanding what is told to you about your medical condition? Never    How confident are you filling out medical forms by yourself? Extremely    Health Literacy Excellent       Functional Status, IADL    Medications independent    Meal Preparation independent    Housekeeping independent    Laundry independent    Shopping independent                               Tiffany Larson RN

## 2024-10-22 ENCOUNTER — READMISSION MANAGEMENT (OUTPATIENT)
Dept: CALL CENTER | Facility: HOSPITAL | Age: 84
End: 2024-10-22
Payer: MEDICARE

## 2024-10-22 VITALS
BODY MASS INDEX: 28 KG/M2 | RESPIRATION RATE: 18 BRPM | HEART RATE: 77 BPM | WEIGHT: 142.64 LBS | HEIGHT: 60 IN | DIASTOLIC BLOOD PRESSURE: 80 MMHG | OXYGEN SATURATION: 98 % | SYSTOLIC BLOOD PRESSURE: 124 MMHG | TEMPERATURE: 99 F

## 2024-10-22 PROBLEM — E43 SEVERE MALNUTRITION: Status: ACTIVE | Noted: 2024-10-22

## 2024-10-22 LAB
ALBUMIN SERPL-MCNC: 3.2 G/DL (ref 3.5–5.2)
ALBUMIN/GLOB SERPL: 1.2 G/DL
ALP SERPL-CCNC: 80 U/L (ref 39–117)
ALT SERPL W P-5'-P-CCNC: 8 U/L (ref 1–33)
ANION GAP SERPL CALCULATED.3IONS-SCNC: 5.8 MMOL/L (ref 5–15)
AST SERPL-CCNC: 11 U/L (ref 1–32)
BASOPHILS # BLD AUTO: 0.03 10*3/MM3 (ref 0–0.2)
BASOPHILS NFR BLD AUTO: 0.4 % (ref 0–1.5)
BILIRUB SERPL-MCNC: 0.5 MG/DL (ref 0–1.2)
BUN SERPL-MCNC: 24 MG/DL (ref 8–23)
BUN/CREAT SERPL: 27.9 (ref 7–25)
CALCIUM SPEC-SCNC: 9.3 MG/DL (ref 8.6–10.5)
CHLORIDE SERPL-SCNC: 102 MMOL/L (ref 98–107)
CO2 SERPL-SCNC: 31.2 MMOL/L (ref 22–29)
CREAT SERPL-MCNC: 0.86 MG/DL (ref 0.57–1)
DEPRECATED RDW RBC AUTO: 42.6 FL (ref 37–54)
EGFRCR SERPLBLD CKD-EPI 2021: 65.9 ML/MIN/1.73
EOSINOPHIL # BLD AUTO: 0.16 10*3/MM3 (ref 0–0.4)
EOSINOPHIL NFR BLD AUTO: 2 % (ref 0.3–6.2)
ERYTHROCYTE [DISTWIDTH] IN BLOOD BY AUTOMATED COUNT: 12.6 % (ref 12.3–15.4)
GLOBULIN UR ELPH-MCNC: 2.6 GM/DL
GLUCOSE SERPL-MCNC: 113 MG/DL (ref 65–99)
HCT VFR BLD AUTO: 35.7 % (ref 34–46.6)
HGB BLD-MCNC: 12.2 G/DL (ref 12–15.9)
IMM GRANULOCYTES # BLD AUTO: 0.03 10*3/MM3 (ref 0–0.05)
IMM GRANULOCYTES NFR BLD AUTO: 0.4 % (ref 0–0.5)
LYMPHOCYTES # BLD AUTO: 1.85 10*3/MM3 (ref 0.7–3.1)
LYMPHOCYTES NFR BLD AUTO: 23.4 % (ref 19.6–45.3)
MCH RBC QN AUTO: 32 PG (ref 26.6–33)
MCHC RBC AUTO-ENTMCNC: 34.2 G/DL (ref 31.5–35.7)
MCV RBC AUTO: 93.7 FL (ref 79–97)
MONOCYTES # BLD AUTO: 0.85 10*3/MM3 (ref 0.1–0.9)
MONOCYTES NFR BLD AUTO: 10.8 % (ref 5–12)
NEUTROPHILS NFR BLD AUTO: 4.98 10*3/MM3 (ref 1.7–7)
NEUTROPHILS NFR BLD AUTO: 63 % (ref 42.7–76)
NRBC BLD AUTO-RTO: 0 /100 WBC (ref 0–0.2)
PLATELET # BLD AUTO: 185 10*3/MM3 (ref 140–450)
PMV BLD AUTO: 9.8 FL (ref 6–12)
POTASSIUM SERPL-SCNC: 3.2 MMOL/L (ref 3.5–5.2)
PROT SERPL-MCNC: 5.8 G/DL (ref 6–8.5)
RBC # BLD AUTO: 3.81 10*6/MM3 (ref 3.77–5.28)
SODIUM SERPL-SCNC: 139 MMOL/L (ref 136–145)
WBC NRBC COR # BLD AUTO: 7.9 10*3/MM3 (ref 3.4–10.8)

## 2024-10-22 PROCEDURE — A9270 NON-COVERED ITEM OR SERVICE: HCPCS | Performed by: INTERNAL MEDICINE

## 2024-10-22 PROCEDURE — 63710000001 ROSUVASTATIN 5 MG TABLET: Performed by: INTERNAL MEDICINE

## 2024-10-22 PROCEDURE — 63710000001 DICLOFENAC SODIUM 1 % GEL 100 G TUBE: Performed by: INTERNAL MEDICINE

## 2024-10-22 PROCEDURE — 99497 ADVNCD CARE PLAN 30 MIN: CPT | Performed by: NURSE PRACTITIONER

## 2024-10-22 PROCEDURE — 63710000001 HYDROCODONE-ACETAMINOPHEN 10-325 MG TABLET: Performed by: INTERNAL MEDICINE

## 2024-10-22 PROCEDURE — 63710000001 HYDROXYZINE 25 MG TABLET: Performed by: INTERNAL MEDICINE

## 2024-10-22 PROCEDURE — 63710000001 CLONIDINE 0.1 MG TABLET: Performed by: INTERNAL MEDICINE

## 2024-10-22 PROCEDURE — 63710000001 MULTIVITAMIN WITH MINERALS TABLET: Performed by: INTERNAL MEDICINE

## 2024-10-22 PROCEDURE — 85025 COMPLETE CBC W/AUTO DIFF WBC: CPT | Performed by: INTERNAL MEDICINE

## 2024-10-22 PROCEDURE — 94799 UNLISTED PULMONARY SVC/PX: CPT

## 2024-10-22 PROCEDURE — 63710000001 VITAMIN B-12 500 MCG TABLET: Performed by: INTERNAL MEDICINE

## 2024-10-22 PROCEDURE — 63710000001 POTASSIUM CHLORIDE 20 MEQ PACK: Performed by: INTERNAL MEDICINE

## 2024-10-22 PROCEDURE — 63710000001 BUTALBITAL-ACETAMINOPHEN-CAFFEINE 50-325-40 MG TABLET: Performed by: INTERNAL MEDICINE

## 2024-10-22 PROCEDURE — 63710000001 VITAMIN D 1.25 MG (50000 UT) CAPSULE: Performed by: INTERNAL MEDICINE

## 2024-10-22 PROCEDURE — 80053 COMPREHEN METABOLIC PANEL: CPT | Performed by: INTERNAL MEDICINE

## 2024-10-22 PROCEDURE — 94664 DEMO&/EVAL PT USE INHALER: CPT

## 2024-10-22 PROCEDURE — 63710000001 RIVAROXABAN 20 MG TABLET: Performed by: INTERNAL MEDICINE

## 2024-10-22 PROCEDURE — 63710000001 CETIRIZINE 10 MG TABLET: Performed by: INTERNAL MEDICINE

## 2024-10-22 PROCEDURE — 63710000001 PETROLATUM 42 % OINTMENT: Performed by: INTERNAL MEDICINE

## 2024-10-22 PROCEDURE — G0378 HOSPITAL OBSERVATION PER HR: HCPCS

## 2024-10-22 PROCEDURE — 63710000001 PANTOPRAZOLE 40 MG TABLET DELAYED-RELEASE: Performed by: INTERNAL MEDICINE

## 2024-10-22 PROCEDURE — 99221 1ST HOSP IP/OBS SF/LOW 40: CPT | Performed by: NURSE PRACTITIONER

## 2024-10-22 PROCEDURE — 94761 N-INVAS EAR/PLS OXIMETRY MLT: CPT

## 2024-10-22 PROCEDURE — 63710000001 EPLERENONE 25 MG TABLET: Performed by: INTERNAL MEDICINE

## 2024-10-22 PROCEDURE — 63710000001 TORSEMIDE 100 MG TABLET: Performed by: INTERNAL MEDICINE

## 2024-10-22 PROCEDURE — 63710000001 NIFEDIPINE XL 30 MG TABLET SUSTAINED-RELEASE 24 HOUR: Performed by: INTERNAL MEDICINE

## 2024-10-22 RX ORDER — CLONIDINE HYDROCHLORIDE 0.3 MG/1
0.3 TABLET ORAL EVERY 6 HOURS SCHEDULED
Start: 2024-10-22

## 2024-10-22 RX ORDER — POTASSIUM CHLORIDE 20MEQ/15ML
20 LIQUID (ML) ORAL DAILY
Qty: 90 ML | Refills: 5 | Status: SHIPPED | OUTPATIENT
Start: 2024-10-22

## 2024-10-22 RX ORDER — PETROLATUM 420 MG/G
1 OINTMENT TOPICAL EVERY 12 HOURS SCHEDULED
Qty: 100 G | Refills: 5 | Status: SHIPPED | OUTPATIENT
Start: 2024-10-22

## 2024-10-22 RX ORDER — TORSEMIDE 100 MG/1
100 TABLET ORAL DAILY
Qty: 90 TABLET | Refills: 1 | Status: SHIPPED | OUTPATIENT
Start: 2024-10-22

## 2024-10-22 RX ORDER — MUPIROCIN 20 MG/G
1 OINTMENT TOPICAL EVERY 12 HOURS SCHEDULED
Start: 2024-10-22

## 2024-10-22 RX ORDER — POTASSIUM CHLORIDE 1.5 G/1.58G
40 POWDER, FOR SOLUTION ORAL EVERY 4 HOURS
Status: DISCONTINUED | OUTPATIENT
Start: 2024-10-22 | End: 2024-10-22 | Stop reason: HOSPADM

## 2024-10-22 RX ORDER — CLONIDINE HYDROCHLORIDE 0.2 MG/1
0.2 TABLET ORAL EVERY 6 HOURS SCHEDULED
Start: 2024-10-22

## 2024-10-22 RX ADMIN — HYDROCODONE BITARTRATE AND ACETAMINOPHEN 2 TABLET: 10; 325 TABLET ORAL at 09:27

## 2024-10-22 RX ADMIN — IPRATROPIUM BROMIDE 0.5 MG: 0.5 SOLUTION RESPIRATORY (INHALATION) at 07:32

## 2024-10-22 RX ADMIN — Medication 3 ML: at 09:37

## 2024-10-22 RX ADMIN — Medication 1 TABLET: at 09:27

## 2024-10-22 RX ADMIN — PANTOPRAZOLE SODIUM 40 MG: 40 TABLET, DELAYED RELEASE ORAL at 06:12

## 2024-10-22 RX ADMIN — EPLERENONE 25 MG: 25 TABLET, FILM COATED ORAL at 09:27

## 2024-10-22 RX ADMIN — Medication 1000 MCG: at 09:32

## 2024-10-22 RX ADMIN — HYDROXYZINE HYDROCHLORIDE 50 MG: 25 TABLET ORAL at 09:27

## 2024-10-22 RX ADMIN — POTASSIUM CHLORIDE 20 MEQ: 1.5 FOR SOLUTION ORAL at 09:32

## 2024-10-22 RX ADMIN — RIVAROXABAN 20 MG: 20 TABLET, FILM COATED ORAL at 09:32

## 2024-10-22 RX ADMIN — DICLOFENAC SODIUM 4 G: 10 GEL TOPICAL at 09:36

## 2024-10-22 RX ADMIN — IPRATROPIUM BROMIDE 0.5 MG: 0.5 SOLUTION RESPIRATORY (INHALATION) at 12:12

## 2024-10-22 RX ADMIN — POTASSIUM CHLORIDE 40 MEQ: 1.5 FOR SOLUTION ORAL at 13:48

## 2024-10-22 RX ADMIN — BUTALBITAL, ACETAMINOPHEN, AND CAFFEINE 2 TABLET: 50; 325; 40 TABLET ORAL at 06:11

## 2024-10-22 RX ADMIN — TORSEMIDE 100 MG: 100 TABLET ORAL at 09:27

## 2024-10-22 RX ADMIN — FLUTICASONE PROPIONATE 1 SPRAY: 50 SPRAY, METERED NASAL at 09:34

## 2024-10-22 RX ADMIN — NIFEDIPINE 30 MG: 30 TABLET, FILM COATED, EXTENDED RELEASE ORAL at 09:27

## 2024-10-22 RX ADMIN — CLONIDINE HYDROCHLORIDE 0.2 MG: 0.1 TABLET ORAL at 06:12

## 2024-10-22 RX ADMIN — ROSUVASTATIN CALCIUM 5 MG: 5 TABLET, FILM COATED ORAL at 06:15

## 2024-10-22 RX ADMIN — MUPIROCIN 1 APPLICATION: 20 OINTMENT TOPICAL at 09:36

## 2024-10-22 RX ADMIN — ERGOCALCIFEROL 50000 UNITS: 1.25 CAPSULE ORAL at 09:27

## 2024-10-22 RX ADMIN — PETROLATUM 1 APPLICATION: 420 OINTMENT TOPICAL at 09:36

## 2024-10-22 RX ADMIN — CETIRIZINE HYDROCHLORIDE 10 MG: 10 TABLET ORAL at 09:32

## 2024-10-22 RX ADMIN — CLONIDINE HYDROCHLORIDE 0.2 MG: 0.1 TABLET ORAL at 13:48

## 2024-10-22 NOTE — CASE MANAGEMENT/SOCIAL WORK
Continued Stay Note  [facility]     Patient Name: Sasha Barrett  MRN: [id number]  Today's Date: 10/22/2024    Admit Date: 10/20/2024    Plan: Home via family   Discharge Plan       Row Name 10/22/24 0377       Plan    Plan Home via family    Patient/Family in Agreement with Plan yes    Plan Comments DC orders noted and as well as orders for HH at DC. CCP met with pt to discuss HH referral. Pt verbalized understanding but declines HH referral at this time. DC plan is home, family to transport. Mic ABREU/CCP    Final Discharge Disposition Code 01 - home or self-care    Final Note Home via family, no additional CCP needs.                   Discharge Codes    No documentation.                 Expected Discharge Date and Time       Expected Discharge Date Expected Discharge Time    Oct 22, 2024               Tiffany Larson RN

## 2024-10-22 NOTE — DISCHARGE SUMMARY
[facility]  INTERNAL MEDICINE  ALEKSANDAR GALVAN MD  [address] [place] [address]  Phone [phone] Fax [phone]  E-mail:  [email or web address]    [facility]   DISCHARGE SUMMARY  ALEKSANDAR GALVAN MD      Date of Discharge:  10/22/2024    Discharge Diagnosis:     Edema    Stage 3 chronic kidney disease    Malaise and fatigue progressive    Dizziness    Dyspnea on exertion    Accelerated hypertension    Ataxia    Blood pressure instability    Acute intractable headache intensified since Covid-19 infection 10/2/2021    Weakness    Volume overload    Bilateral lower extremity edema, multifactorial = unrestricted salt intake, inadequate Lasix dosing, chronic lung disease, and obesity.  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)    Gastroesophageal reflux disease with esophagitis    Recurrent UTI    CVA (cerebral vascular accident)    Acute joint pain    Easy bruisability    COPD with asthma    Essential hypertension    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  2.  Potassium ranging from 3.1 on admission to a max of 3.8 during hospital stay and back down to 3.2 prior to discharge though giving 40 mEq of potassium prior to discharge  3.  proBNP normal at 935.  4.  CO2 ranging from 27.3 on admission up to 31.3 at discharge  5.  BUN ranging from 21 on admission up to 24 at discharge after diuresis using torsemide  6.  Creatinine ranging from 1.10 on admission down to 0.88 prior to discharge  7.  Estimated GFR ranging from 49 on admission up to 85.9 prior to discharge.  8.  Blood sugars ranging from 118 down to 113 prior to discharge  9.  Magnesium 2.2 with phosphorus 2.6  10. Total protein low at 5.8 with albumin low at 3.2  11  Liver function test within normal limits  12. Hemoglobin A1c 5.30  13.    14.  TSH 1.1  15.   Lipid profile showing cholesterol 155, HDL 58, LDL 74, triglycerides 131, and ratio LDL HDL 1.22  16.  Amylase 40 lipase 28  17.  Procalcitonin 0.05  18.  D-dimer 0.34  19.  White blood cell count ranging from 7.66 up to 7.90  20.  Hemoglobin ranging from 11.2 on admission up to 12.2 at discharge  21.  Platelet count of 185,000  22.  UA unremarkable for infection  23.  Urine drug screen positive for barbiturates and positive for opiates  24.  Patient on palliative care  25.    EKG showing normal sinus rhythm heart rate 65 prolonged CO consider left ventricular hypertrophy  27. Venous duplex study negative for DVT bilaterally       Procedures  Imaging Results (All)       Procedure Component Value Units Date/Time    XR Chest PA & Lateral [[id number]] Collected: 10/20/24 1717     Updated: 10/20/24 1724    Narrative:      XR CHEST PA AND LATERAL-     INDICATION: Dyspnea with exertion and persistent cough     COMPARISON: CT chest 4/4/2023 and radiographs dating back to 1/3/2017       Impression:      Dense left lower lobe calcified granuloma. No other focal  consolidation. No pleural effusion or pneumothorax. Cardiac silhouette  remains at the  "upper limits of normal. No focal osseous abnormality.  Lateral radiograph is partially compromised by arm positioning.     This report was finalized on 10/20/2024 5:21 PM by Dr. Anthony Green M.D on Workstation: [id number]                 Treatment Team at [facility]  Treatment Team:   Consulting Physician: Rj Bond MD  Consulting Physician: Porfirio Mccloud Jr., MD  Nurse Practitioner: Monalisa Espana APRN  Admitting Provider: Óscar Cadet MD        Hospital Course           Chief Complaint:    Dyspnea on exertion with lower extremity bilateral edema exacerbated by recent problems with bug bites     History of Present Illness:     Subjective  Interval History: Patient is a 84 y.o.female who presented with   Dyspnea on exertion with lower extremity bilateral edema which has been exacerbated by recent problems with bug bites.  Patient is a delightful 84-year-old female who I have followed for many years in my office.  For the last several weeks she has had great difficulties and issue with bug bites which she believes are coming from a type of bug called  \"no-see-ums\".   review of literature shows that these bugs are more typical in tropical environments and it really should not be present in our environment now that we have had the first frost here in [place].  Patient believes strongly that she is still dealing with effects from the bites of of these insects and recently did see Dr. Scott Ochoa in dermatology who found no evidence of live bugs and recommended topical treatment.  Patient has had a long history of bilateral pedal edema and in the past this i was found to be multifactorial and related to unrestricted salt intake, and adequate Lasix dosing, chronic lung disease, and obesity.  Patient reported to me on the phone  yesterday that the swelling in her legs was becoming almost unmanageable in her home environment.  She was having little to no output from her regular Lasix " dosing and felt that she was in crisis due to the volume overload.  Patient was made a direct admission to a general medical telemetry bed for further evaluation of her pedal edema and volume overload status as well as for further evaluation of the skin irritation she is experiencing.  Patient also has  worsening of her chronic kidney disease stage 3 with elevation of her creatinine to 1.10 and low potassium at 3.1. Patient does have multiple comorbidities that complicate her medical condition including most significantly: Stage III chronic kidney disease, progressive malaise and fatigue, dizziness, dyspnea on exertion, accelerated hypertension, ataxia with right foot deformity from fracture in past, blood pressure instability, acute intractable muscle tension headaches that have been intensified since COVID-19 infection 10/2/2021, generalized weakness, volume overload, multifactorial edema related to unrestricted salt intake, and adequate Lasix dosing, chronic lung disease treated with prednisone every other day, and obesity.,  Urinary retention requiring self-catheterization, cervical disc disease with degenerative disc disease, hyperlipidemia, proctocele, urge incontinence, chronic tension headaches, cerebral atherosclerosis, vitamin D deficiency, moderate persistent asthmatic bronchitis, bilateral knee pain, elevated PTH levels, history of breast cancer on left side infiltrating ductal, gastroesophageal reflux disease with esophagitis, recurrent UTIs, cerebrovascular accident, acute joint pain, easy bruisability, COPD with asthma, essential hypertension, thrombosis of left posterior cerebral artery, acute DVT in the past the right lower extremity, adult failure to thrive on palliative care team, posterior tibial tendon dysfunction, pleurisy, psoriasis, postmenopausal, venous incompetence of popliteal vein on right.     10/20/2024.  I personally saw this patient for the first time during this hospitalization  while she was in radiology triage waiting to be transported back to her room.  Her room is on 4 S. #416.  Patient was quite talkative in the triage area and was very entertaining for the nurses there.  She had quite a bit of urinary output while in the midst of her x-ray testing and had totally saturated the blankets on the bed in which she was lying.  We did get her some dry blankets and covered her up to keep her from getting chilled.  I wore full PPE as indicated including an N95 mask when needed, gloves when touching patient, goggles, and white lab coat.  I performed thorough hand hygiene before and after the hospitalization visit.  I did review my planned treatment with the patient.  We are going to ask her renal team to look at her with respect to her volume overload status and make recommendations on perhaps adjusting somewhat her Lasix.  Patient is very insistent that she would like to see  before any major changes are made in her medication regimens.  Patient will also be seen by pulmonary with respect to her dyspnea on exertion and dizziness.  Hopefully some minor adjustments in medications can help greatly with the symptoms.  I did review labs with the patient and they all do seem to be fairly stable at present time.  She does have a potassium which needs to be corrected because it was low on admission.  Cardiac enzymes are stable and not elevated.  Patient's PTH was slightly high at 104 but this is similar to previous trending it has been done with that.  Patient's hemoglobin A1c was 5.30.  Patient's TSH was 1.1.  Cholesterol levels were all normal, and pancreatic enzymes amylase and lipase were also normal.  Patient's procalcitonin was extremely low at 0.05.  Her D-dimer was also in the normal range at 0.34.  Patient does have a white count that is normal at 7.66, her hemoglobin is slightly low at 11.2 we will check vitamin D, B12, and an iron panel.  Patient's UA showed trace of protein and  trace of leukocyte esterase but white blood cell count was only 3-5.  COVID screening was negative.  Urine tox did show that patient had barbiturates and opiates in her system which are expected based on her drug list as an outpatient.  Patient did have a chest x-ray which was unremarkable except for a left lower lobe calcified granuloma.  A venous Doppler was done which showed bilateral lower extremity normal circulation with no evidence of DVT or blood clotting.  EKG showed normal sinus rhythm with prolonged IA interval considering possibility of left ventricular hypertrophy.  I did ask the palliative care team here at the hospital to take a look at the patient and work with her on clarification of her CODE STATUS.  They may have more information in the notes from their associate Hedy who is the nurse that visits the patient's home on a regular basis.  I am continuing patient's IV Lasix for a total of 4 doses.  We will monitor her BRIAN's and her weight changes.  I anticipate a stay of 48 to 72 hours.     10/21/2024. Patient is seen again today in her room on 4 S. #416.  Patient was resting quietly in bed when I entered the room and had multiple questions and concerns that she voiced to me at the time of my visit.  We did discuss her case with her nurse.  He will be working on getting her clonidine scheduled as she takes it at home and also working on getting her up in chair if possible.  I will full PPE as needed including an N95 mask as indicated, goggles, gloves when touching patient, and white lab coat.  I performed thorough hand hygiene before and after the patient visit.  Patient's vital signs today show a temperature of 97.7 with pulse of 70, respiratory rate 19, blood pressure 123/63 and sat 97%.  She has had blood pressures earlier this a.m. as high as 182/71 but has been able to get those under better control with her clonidine.  Patient was reweighed today and actually her weight on a bed scale was 143  which is more consistent with her previous weights in previous admissions.  Labs today show the following: Her sodium is stable at 141 and CO2 is slightly up at 29.4.  BUN is 24 which is slightly up after diuresis with IV Lasix and creatinine is at 1.05.  Patient's estimated GFR is 51.9 and her glucose was 119.  Ionized calcium is 1.26 and liver function tests are all within normal limits.  Patient's PTH is elevated slightly at 104 and we are continuing to follow closely in the outpatient environment.  Patient's white blood cell count is 8.0 with her hemoglobin being 12.2 and her hematocrit being 36.3.  Nursing noted +3 lower extremity edema and really could not identify bug bites or open wounds.  Patient does complain of bug bites incessantly.  Renal is addressing patient's Lasix dosing and diuresis.  Rodrick Dolan from pulmonary saw the patient working under direction of Bowen Garza MD.  They do not feel that her asthma is currently exacerbated but is stable moderate persistent asthma.  I am testing for possible sleep apnea.  Diuresis recommended.  PT and OT consultation also suggested.  Dr. Hallman from renal saw her on behalf of Dr. Bond.  He was pleased with her diuresis on the IV Lasix at 2400 cc. over the last 24 hours.  He does feel that her chronic kidney disease stage III AA is fairly stable at present with creatinine of 1.05.  Volume overload seems to be more peripheral than central.  Hypokalemia is being repleted.  Primary hyperparathyroidism status post parathyroidectomy continues to be followed but calcium is normal at present.  Blood pressure somewhat labile and controlled primarily with clonidine.  IV Lasix now discontinued and transition to high-dose torsemide 100 mg daily in place of the Lasix.  Scheduled dose of potassium chloride added also.  Patient will follow-up on outpatient basis with Dr. Bond in renal clinic.  Dietitian feels patient does meet criteria for severe malnutrition.   Recommended that she take Boost daily to improve nutritional status.  OT did see patient and recommended continued home OT with home health to work on mobility issues and stabilization.  Wound care nurse did see patient at my request regarding bug bites on patient's extremities.  Not able to really see much evidence of bug bites at present time despite patient pointing out some freckles on her arm and some red scratch marks on her left foot and leg.  Patient agitated when told that these were not really bites.  The bugs attacked her only and not her .  She has seen a dermatologist and he ordered some creams but did not know how much they would help with the bites.  Wound care did recommend Aquaphor to try to help moisturize her skin to improve the areas of irritation.  PT reports patient furniture surfs at home and with that has very few falls.  Patient completed bed mobility exercises and took a few lateral steps using bed rail for support.  Continued PT to address strength, balance, and endurance deficits was recommended.  Patient adamant about discharging to home and will not go to skilled nursing facility.  Generally patient had a good day and feels stable. she is agreeable to possibly considering discharge to home tomorrow if everyone is in agreement.    10/22/2024.  Patient is seen again today in her room on 4 S. #416.  Patient was resting quietly in bed eating her lunch at the time of my visit.  Patient had no new significant complaints.  She was a bit fussy with the nurse about getting her medicines and treatments done on time.  Pharmacy corrected at her request her orders for clonidine so that she can take 1-2 or 3 of the 0.1 tablets depending on what her current level is with her blood pressure at the time.  I will full PPE for the exam as appropriate including an N95 mask, goggles, gloves when touching patient, and white lab coat.  I performed thorough hand hygiene before and after the patient  visit.  Patient's swelling has significantly improved on torsemide 100 mg daily as suggested by Dr. Hallman in nephrology department.  Hypokalemia has been noted and patient will be repleted up to 3.8 and she is scheduled now to get 20 mEq daily of potassium with her diuretic.  Patient continues on Xarelto because of DVT and PE in the past.  Further treatment of hyperparathyroidism which is a primary diagnosis for the patient is not indicated since even with a PTH of 100 her calcium is normal.  Hypertension seems stable on current medications.  Renal feels patient is safe for discharge today and will arrange follow-up with Dr. Bond in renal clinic in 1 to 2 weeks.  The palliative care nurse practitioner did see patient for advance planning.  Her palliative performance scale score was 60% based on measures that were obtained.  Patient remains full code for now and will continue on outpatient support with palliative's.  Patient apparently declined home health at the time of her discharge.  I will ask my office to follow-up with her on this because I do think home health is necessary at present time especially to monitor patient as she switches diuretics and be sure that she does not develop increased swelling or symptoms of heart failure.  We also need to carefully watch her potassium on the stronger diuretic to be sure that she does not become potassium depleted.  Otherwise patient is stable at present time for discharge to home.I do feel she is maximized benefit from this hospitalization.            Vital Signs  Temp:  [97.7 °F (36.5 °C)-99 °F (37.2 °C)] 99 °F (37.2 °C)  Heart Rate:  [58-79] 77  Resp:  [16-19] 18  BP: (110-149)/(49-95) 124/80    Physical Exam at Discharge    Constitutional:             Alert, cooperative, no distress, AAOx3, resting comfortably   Head:                          Normocephalic, without obvious abnormality, atraumatic   Eyes:                          PERRLA, conjunctiva/corneas  clear, no icterus, no conjunctival                                     pallor, EOM's intact, both eyes      ENT and Mouth:         Lips, tongue, gums normal; oral mucosa pink and moist   Neck:                          Supple, symmetrical, trachea midline, no JVD  Respiratory:                 Clear to auscultation bilaterally, respirations unlabored  Cardiovascular:           Regular rate and rhythm, S1 and S2 normal, no murmur,                                       no  Rub or gallop.  Pulses normal.    Gastrointestinal:          BS present x 4 Soft, non-tender, bowel sounds active,                                       no masses, no hepatosplenomegaly                                                      :                             No hernia.  Normal exam for sex.         Musculoskeletal:        Extremities normal, atraumatic, no cyanosis or edema                                      No arthropathy.  No deformity.  Gait normal                                                 Skin:                           Skin is warm and dry,  no rashes, swelling or palpable lesions.  Skin  irritation is minimal at present.  Aquaphor to be applied as recommended by wound care.   Neurologic:                 CN -XII intact, motor strength grossly intact, sensation grossly intact to light touch, no focal reflex deficits noted    Psychiatric:                 Alert,oriented X3, no delusions, psychoses, depression or anxiety    Heme/Lymph/Imun:   No bruises, petechiae.  Lymph nodes normal in size/configuration          Pertinent Test Results:    Results from last 7 days   Lab Units 10/22/24  0805 10/21/24  0018 10/20/24  1439   SODIUM mmol/L 139 141 139   POTASSIUM mmol/L 3.2* 3.8 3.1*   CHLORIDE mmol/L 102 105 103   CO2 mmol/L 31.2* 29.4* 27.3   BUN mg/dL 24* 24* 21   CREATININE mg/dL 0.86 1.05* 1.10*   GLUCOSE mg/dL 113* 119* 118*   CALCIUM mg/dL 9.3 9.4 9.3       Results from last 7 days   Lab Units 10/22/24  0805 10/21/24  0018  10/20/24  1439   WBC 10*3/mm3 7.90 8.00 7.66   HEMOGLOBIN g/dL 12.2 12.2 11.2*   HEMATOCRIT % 35.7 36.3 36.0   PLATELETS 10*3/mm3 185 199 181       Attending Physician Final Assessment and Plan        1.  Bilateral lower extremity edema multifactorial including unrestricted salt intake, inadequate dosing of Lasix, chronic lung disease, obesity, and chronic prednisone usage for lung disease.  IV Lasix being used for 48 hours to try to correct volume overload status.  We have asked the patient's renal team to consult on the issue and help with this treatment.  Patient does see Dr. Bond in clinic on a regular basis.  Patient did have significant urinary output with the first dose of IV Lasix and saturated her entire stretcher in the radiology triage area.  Will continue to monitor closely.  Renal has transition patient from IV Lasix now to torsemide once daily 100 mg and will continue this on the home environment.  Patient will need potassium supplement also daily because of this medication.     2.  Volume overload status in patient with chronic kidney disease stage III.  Weight is not a good measure to follow with this patient since she has had some muscle wasting while on high doses of narcotics for palliative care by her [facility] team.  Patient with good diuretic response to IV Lasix which will be continued for now.  Additional input sought from her nephrology team regarding appropriate diuresis in this patient.  Peripheral volume is now corrected significantly.  Patient had great urinary output of 2200 cc.     3.  Weakness, malaise, and severe fatigue.  Appropriate lab panels have been drawn to check for possible etiologies of the symptoms.  This includes a detailed thyroid workup, PTH, CBC, CMP.  Suspect that the volume overload status discussed above is probably intensifying these effects for the patient at the present time.    Home PT and OT recommended to try to help encourage exercise routine.     4.   Dyspnea on exertion.  Patient does have great difficulty ambulating because of deformity of her right ankle accompanied by poor balance, ataxia and dizziness.  PT and OT consults are placed.  Pulmonary consult also requested.  Additional input and treatment as they recommend.  Will continue to follow closely.     5.Accelerated hypertension with great variability of blood pressures.  Problem fairly stable at present time but patient does have some irregular beats.  Hopefully once volume status is readjusted and transition to oral Lasix is complete we will be able to stabilize the patient on a new regimen of blood pressure medication.     6.  Incontinence of urine requiring self-catheterization.  This has been a long-term problem for the patient and she seems quite familiar with treatment options.  We will continue her treatments as she has been doing in the past for this problem.  She does follow with Dr. Fazal Ochoa in urology for this.     7.  Hyperlipidemia.  Continue regular treatment that has been done on an outpatient basis in the past by the patient.     8.  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  This is also an ongoing problem for the patient and does not seem to be acutely exacerbated at present time     9.  Vitamin B-12 and D deficiencies in the past.  Will check these to vitamin levels while here in the hospital.     10.  Moderate persistent asthmatic bronchitis without complication.  To be followed by pulmonary and we would appreciate their suggestions with respect to the patient's medications.     11.  History of hyperparathyroidism with ongoing elevated PTH levels.  Current level is fairly stable and we will follow no evidence for urgent evaluation at present time.     12.  History of breast cancer which was on left side infiltrating ductal.  This seems to be very stable and in remission at present time.     13.  History  of DVT right lower extremity in the past along with single subsegmental pulmonary embolism without acute cor pulmonale.  Patient does have venous incompetence of popliteal vein on right.  Plan to check venous Doppler of bilateral lower extremities which was negative.  Patient does continue on same medications for treatment of this issue.     14.  Psoriasis followed by Dr. Scott Ochoa in the past and stable.     15.  [facility] palliative care status.  Patient is followed in her home by Hedy from the palliative care team.  I did place a consult on the chart so they will be aware of patient's admission and can help us if there is any needed information from [facility]'s charting.  Patient's CODE STATUS seems to vary at times and might be beneficial to check the status with [facility].         Condition on Discharge:    stable    Discharge Disposition  Home-Health Care Svc    Transport Plan     to transport home    Hospital Treatments discontinued at time of Discharge  IV, Telemetry, Miller Catheter, Deep Lines and PICC LInes    Discharge Medications     Discharge Medications        New Medications        Instructions Start Date   Petrolatum 42 % ointment   1 Application, Topical, Every 12 Hours Scheduled, Use on irritated skin from bug bites      potassium chloride 20 mEq/15 mL solution  Commonly known as: KAYCIEL  Replaces: potassium chloride 20 MEQ packet   20 mEq, Oral, Daily      torsemide 100 MG tablet  Commonly known as: DEMADEX   100 mg, Oral, Daily             Changes to Medications        Instructions Start Date   cloNIDine 0.2 MG tablet  Commonly known as: CATAPRES  What changed:   when to take this  additional instructions   0.2 mg, Oral, Every 6 Hours Scheduled, Dose titrated for BP control by patient      cloNIDine 0.3 MG tablet  Commonly known as: CATAPRES  What changed: You were already taking a medication with the same name, and this prescription was added. Make sure you understand how and when to take  each.   0.3 mg, Oral, Every 6 Hours Scheduled, Dose titrated by patient for BP control      Diclofenac Sodium 1 % gel gel  Commonly known as: VOLTAREN  What changed: reasons to take this   4 g, Topical, 4 Times Daily PRN             Continue These Medications        Instructions Start Date   acetaminophen 325 MG tablet  Commonly known as: TYLENOL   650 mg, Oral, Every 4 Hours PRN      butalbital-acetaminophen-caffeine -40 MG per tablet  Commonly known as: FIORICET, ESGIC   2 tablets, Oral, Every 4 Hours PRN      carboxymethylcellulose 0.5 % solution  Commonly known as: REFRESH PLUS   1 drop, 2 Times Daily PRN      cetirizine 10 MG tablet  Commonly known as: zyrTEC   10 mg, Oral, Daily, Alt with singulair      cyanocobalamin 1000 MCG tablet  Commonly known as: VITAMIN B-12   1,000 mcg, Oral, Daily      diphenhydrAMINE 50 MG tablet  Commonly known as: BENADRYL   50 mg, Oral, Every 4 Hours PRN      EPINEPHrine 0.3 MG/0.3ML solution auto-injector injection  Commonly known as: EPIPEN   INJECT THE CONTENTS OF ONE PEN AS NEEDED. MAY REPEAT ONE TIME.      eplerenone 25 MG tablet  Commonly known as: INSPRA   25 mg, Oral, Daily      fluticasone 50 MCG/ACT nasal spray  Commonly known as: FLONASE   1 spray, Each Nare, 2 Times Daily      HYDROcodone-acetaminophen  MG per tablet  Commonly known as: NORCO   1-2 tablets, Every 6 Hours PRN      hydrOXYzine 50 MG tablet  Commonly known as: ATARAX   50 mg, Every 6 Hours PRN      ipratropium 0.02 % nebulizer solution  Commonly known as: ATROVENT   500 mcg, 4 Times Daily - RT      LORazepam 0.5 MG tablet  Commonly known as: ATIVAN   0.5 mg, Every 8 Hours PRN      multivitamin with minerals tablet tablet   1 tablet, Oral, Daily      mupirocin 2 % ointment  Commonly known as: BACTROBAN   1 Application, Topical, Every 12 Hours Scheduled      NIFEdipine CC 30 MG 24 hr tablet  Commonly known as: ADALAT CC   30 mg, Oral, Every 24 Hours Scheduled      omeprazole 40 MG  capsule  Commonly known as: priLOSEC   40 mg, Daily      ondansetron ODT 8 MG disintegrating tablet  Commonly known as: ZOFRAN-ODT   8 mg, Oral, Every 6 Hours PRN      polyethylene glycol 17 g packet  Commonly known as: MIRALAX   17 g, Daily PRN      predniSONE 10 MG tablet  Commonly known as: DELTASONE   10 mg, Oral, Every Other Day      ProAir  (90 Base) MCG/ACT inhaler  Generic drug: albuterol sulfate HFA   4 puffs, Every 4 Hours PRN      promethazine-codeine 6.25-10 MG/5ML syrup  Commonly known as: PHENERGAN with CODEINE   5 mL, Oral, 4 Times Daily PRN      rosuvastatin 5 MG tablet  Commonly known as: CRESTOR   5 mg, Oral, Every Morning      Vitamin D3 1.25 MG (38022 UT) capsule   50,000 Units, Weekly      Xarelto 20 MG tablet  Generic drug: rivaroxaban   20 mg, Daily             Stop These Medications      furosemide 40 MG tablet  Commonly known as: LASIX     potassium chloride 20 MEQ packet  Commonly known as: KLOR-CON  Replaced by: potassium chloride 20 mEq/15 mL solution              Home Medication List  Prior to Admission medications    Medication Sig Start Date End Date Taking? Authorizing Provider   albuterol (PROAIR HFA) 108 (90 Base) MCG/ACT inhaler 4 puffs Every 4 (Four) Hours As Needed for Wheezing or Shortness of Air. 6/18/14  Yes Vadim Best MD   butalbital-acetaminophen-caffeine (FIORICET, ESGIC) -40 MG per tablet Take 2 tablets by mouth Every 4 (Four) Hours As Needed for Headache. 3/27/17  Yes Óscar Cadet MD   carboxymethylcellulose (REFRESH PLUS) 0.5 % solution Administer 1 drop to both eyes 2 (Two) Times a Day As Needed for Dry Eyes.   Yes Vadim Best MD   Cholecalciferol (VITAMIN D3) 1.25 MG (82210 UT) capsule Take 1 capsule by mouth 1 (One) Time Per Week. TUESDAYS 5/11/20  Yes Vadim Best MD   cloNIDine (CATAPRES) 0.2 MG tablet Take 1 tablet by mouth Every 6 (Six) Hours. Dose titrated for BP control by patient 10/22/24  Yes Óscar Cadet MD    cloNIDine (CATAPRES) 0.3 MG tablet Take 1 tablet by mouth Every 6 (Six) Hours. Dose titrated by patient for BP control 10/22/24  Yes Óscar Cadet MD   Diclofenac Sodium (VOLTAREN) 1 % gel gel Apply 4 g topically to the appropriate area as directed 4 (Four) Times a Day As Needed (Apply to knees or other joints when pain is severe and flares). 10/22/24  Yes Óscar Cadet MD   diphenhydrAMINE (BENADRYL) 50 MG tablet Take 1 tablet by mouth every 4 (four) hours as needed for itching or allergies. 7/25/16  Yes Óscar Cadet MD   fluticasone (FLONASE) 50 MCG/ACT nasal spray 1 spray by Each Nare route 2 (Two) Times a Day. 8/27/18  Yes Óscar Cadet MD   HYDROcodone-acetaminophen (NORCO)  MG per tablet Take 1-2 tablets by mouth Every 6 (Six) Hours As Needed for Moderate Pain. 3/3/22  Yes Vadim Best MD   hydrOXYzine (ATARAX) 50 MG tablet Take 1 tablet by mouth Every 6 (Six) Hours As Needed for Itching or Allergies. 12/12/19  Yes Vadim Best MD   ipratropium (ATROVENT) 0.02 % nebulizer solution Take 2.5 mL by nebulization 4 (Four) Times a Day.   Yes Vadim Best MD   LORazepam (ATIVAN) 0.5 MG tablet Take 1 tablet by mouth Every 8 (Eight) Hours As Needed for Anxiety.   Yes Vadim Best MD   multivitamin with minerals tablet tablet Take 1 tablet by mouth Daily. 5/30/22  Yes Óscar Cadet MD   mupirocin (BACTROBAN) 2 % ointment Apply 1 Application topically to the appropriate area as directed Every 12 (Twelve) Hours. 10/22/24  Yes Óscar Cadet MD   NIFEdipine XL (ADALAT CC) 30 MG 24 hr tablet Take 1 tablet by mouth Daily. 5/30/22  Yes Óscar Cadet MD   omeprazole (priLOSEC) 40 MG capsule Take 1 capsule by mouth Daily. 2/23/21  Yes Vadim Best MD   Petrolatum 42 % ointment Apply 1 Application topically to the appropriate area as directed Every 12 (Twelve) Hours. Use on irritated skin from bug bites 10/22/24  Yes Óscar Cadet MD    polyethylene glycol (MIRALAX) 17 g packet Take 17 g by mouth Daily As Needed. HARD STOOL 6/6/22  Yes Vadim Best MD   predniSONE (DELTASONE) 10 MG tablet Take 1 tablet by mouth Every Other Day. 5/30/22  Yes Óscar Cadet MD   promethazine-codeine (PHENERGAN with CODEINE) 6.25-10 MG/5ML syrup Take 5 mL by mouth 4 (Four) Times a Day As Needed for cough. 12/9/16  Yes Óscar Cadet MD   torsemide (DEMADEX) 100 MG tablet Take 1 tablet by mouth Daily. 10/22/24  Yes Óscar Cadet MD   vitamin B-12 (VITAMIN B-12) 1000 MCG tablet Take 1 tablet by mouth Daily. 4/11/17  Yes Óscar Cadet MD   Xarelto 20 MG tablet Take 1 tablet by mouth Daily. with food. 2/23/23  Yes Vadim Best MD   cloNIDine (CATAPRES) 0.2 MG tablet Take 1 tablet by mouth Every 12 (Twelve) Hours.  Patient taking differently: Take 0.5 tablets by mouth Every 12 (Twelve) Hours. 10/3/22 10/22/24 Yes Óscar Cadet MD   furosemide (LASIX) 40 MG tablet Take 1 tablet by mouth Daily As Needed (swelling/weight gain). 7/11/22 10/22/24 Yes Vadim Best MD   potassium chloride (KLOR-CON) 20 MEQ packet Take 20 mEq by mouth Every Other Day.  10/22/24 Yes Vadim Best MD   acetaminophen (TYLENOL) 325 MG tablet Take 2 tablets by mouth Every 4 (Four) Hours As Needed for Mild Pain . 5/29/22   Óscar Cadet MD   cetirizine (zyrTEC) 10 MG tablet Take 1 tablet by mouth Daily. Alt with singulair    Vadim Best MD   EPINEPHrine (EPIPEN) 0.3 MG/0.3ML solution auto-injector injection INJECT THE CONTENTS OF ONE PEN AS NEEDED. MAY REPEAT ONE TIME. 3/29/21   Vadim Best MD   eplerenone (INSPRA) 25 MG tablet Take 1 tablet by mouth Daily.    Vadim Best MD   ondansetron ODT (ZOFRAN-ODT) 8 MG disintegrating tablet Take 1 tablet by mouth every 6 (six) hours as needed for nausea or vomiting. 9/8/16   Óscar Cadet MD   potassium chloride (KAYCIEL) 20 mEq/15 mL solution Take 15 mL by mouth Daily.  10/22/24   Aleksandar Cadet MD   rosuvastatin (CRESTOR) 5 MG tablet Take 1 tablet by mouth Every Morning. 4/17/23   Provider, MD Vadim   Diclofenac Sodium (VOLTAREN) 1 % gel gel Apply 4 g topically to the appropriate area as directed 4 (Four) Times a Day As Needed.  10/22/24  Vadim Best MD   mupirocin (BACTROBAN) 2 % ointment Apply 1 application topically to the appropriate area as directed Every 12 (Twelve) Hours. 10/3/22 10/22/24  Aleksandar Cadet MD       Discharge Diet   Diet Orders (active) (From admission, onward)      None            Activity at Discharge  Activity Instructions       Activity as Tolerated      Gradually Increase Activity Until at Pre-Hospitalization Level      Measure Blood Pressure      Measure blood pressure at least 2 times daily and adjust with clonidine as previously scheduled    Measure Weight      Weigh  twice weekly and notify Dr. Cadet's office if weight goes up by more than 5 pounds per week or down by more than 5 pounds per week            Follow-up Appointments  No future appointments.  Additional Instructions for the Follow-ups that You Need to Schedule       Ambulatory Referral to Home Health   As directed      Face to Face Visit Date: 10/22/2024   Follow-up provider for Plan of Care?: I will be treating the patient on an ongoing basis.  Please send me the Plan of Care for signature.   Follow-up provider: ALEKSANDAR CADET [[id number]]   Reason/Clinical Findings: Volume overload, peropheral edema, blood pressure variations   Describe mobility limitations that make leaving home difficult: Patient with ataxic gait, weakness and deconditioning.  Increased fall risk.  Needs CGA of one when going out of house   Nursing/Therapeutic Services Requested: Skilled Nursing (Labs to be cheked twice eekly x 2 eeks:  CBC, CMP, Magnesium, Phosphorus and send to Dr. Cadet) Physical Therapy Occupational Therapy Dietician Medical / Social Work   Skilled nursing orders: Medication  education (use aquafor to coat area of skin irritation.  Patient worried about bug bites but has been told no residual bugs at present.) Pain management COPD management Mental health Wound care dressing/changes Cardiopulmonary assessments   PT orders: Therapeutic exercise Gait Training Transfer training Strengthening Home safety assessment   Weight Bearing Status: As Tolerated   Occupational orders: Activities of daily living Energy conservation Strengthening Cognition Fine motor Home safety assessment   Social work orders: Community resources (Needs Meals on Wheels if available) Other   Frequency: 1 Week 1        Discharge Follow-up with PCP   As directed       Currently Documented PCP:    Óscar Cadet MD    PCP Phone Number:    [phone]     Follow Up Details: Call Dr. Cadet's office to schedule follow up vide or in person visit after DC on next Tuesday        Discharge Follow-up with Specified Provider: Follow up with Dr. Melgoza in Podiatry to evaluate for orthopedic shoes and treatment of foot deformities.; 1 Month   As directed      To: Follow up with Dr. Melgoza in Podiatry to evaluate for orthopedic shoes and treatment of foot deformities.   Follow Up: 1 Month        Discharge Follow-up with Specified Provider: Follow up with Dr. Bond in Nphrology in 3-4 weeks   As directed      To: Follow up with Dr. Bond in Nphrology in 3-4 weeks   Follow Up Details: Transitioned o Torsemide from Lasix by Dr. Hallman last hospital visit                Therapy Orders  Physical therapy, Occupational Therapy from [facility] to treat  Nutrition/Dieticien to follow weights and determine further nutritional needs.  [facility]  to check on additional support patient might benefit from in-home including Meals on Wheels.    Test Results Pending at Discharge   none    Óscar Cadet MD  10/22/2024  3259 EDT    Time: Discharge 75 min

## 2024-10-22 NOTE — OUTREACH NOTE
Prep Survey      Flowsheet Row Responses   Jew facility patient discharged from? [place]   Is LACE score < 7 ? No   Eligibility Readm Mgmt   Discharge diagnosis Edema   Does the patient have one of the following disease processes/diagnoses(primary or secondary)? Other   Prep survey completed? Yes            Kelsi PARRA - Registered Nurse

## 2024-10-22 NOTE — CASE MANAGEMENT/SOCIAL WORK
Case Management Discharge Note      Final Note: Home via family, no additional CCP needs.         Selected Continued Care - Admitted Since 10/20/2024       Destination    No services have been selected for the patient.                Durable Medical Equipment    No services have been selected for the patient.                Dialysis/Infusion    No services have been selected for the patient.                Home Medical Care    No services have been selected for the patient.                Therapy    No services have been selected for the patient.                Community Resources    No services have been selected for the patient.                Community & DME    No services have been selected for the patient.                         Final Discharge Disposition Code: 01 - home or self-care

## 2024-10-22 NOTE — CONSULTS
.            [facility]iative Care Services    Palliative Care Initial Consult   Attending Physician: Óscar Cadet MD  Referring Provider: Dr Cadet    Reason for Referral: assistance with clarification of goals of care  Family/Support: spouse and children     Code Status and Medical Interventions: CPR (Attempt to Resuscitate); Full Support   Ordered at: 10/20/24 1256     Level Of Support Discussed With:    Patient     Code Status (Patient has no pulse and is not breathing):    CPR (Attempt to Resuscitate)     Medical Interventions (Patient has pulse or is breathing):    Full Support     Goals of Care: To be treated for acute conditions and return to home setting with continued support from [facility].     HPI:   84 y.o. female with history of stage III chronic kidney disease, progressive malaise and fatigue, dizziness, dyspnea on exertion, accelerated hypertension, ataxia with right foot deformity from fracture in past, blood pressure instability, acute intractable muscle tension headaches that have been intensified since COVID-19 infection 10/2/2021, generalized weakness, volume overload, multifactorial edema related to unrestricted salt intake, and adequate Lasix dosing, chronic lung disease treated with prednisone every other day, and obesity, urinary retention requiring self-catheterization, cervical disc disease with degenerative disc disease, hyperlipidemia, proctocele, urge incontinence, chronic tension headaches, cerebral atherosclerosis, vitamin D deficiency, moderate persistent asthmatic bronchitis, bilateral knee pain, elevated PTH levels, history of breast cancer on left side infiltrating ductal, gastroesophageal reflux disease with esophagitis, recurrent UTIs, cerebrovascular accident, acute joint pain, easy bruisability, COPD with asthma, essential hypertension, thrombosis of left posterior cerebral artery, acute DVT in the past the right lower extremity, adult failure to thrive  on palliative care team, posterior tibial tendon dysfunction, pleurisy, psoriasis, postmenopausal, venous incompetence of popliteal vein on right. She resided at home prior to admission.   Patient presented to [facility] on 10/20/2024 related to dyspnea and lower extremity edema. She was admitted to the hospital as a direct admit. She had CXR that was unremarkable except for a left lower lobe calcified granuloma. Doppler of  bilateral lower extremity revealed normal circulation with no evidence of DVT or blood clotting. She was started on IV lasix and consults placed for pulmonology and nephrology. Nephrology eventually transitioned her from IV lasix to torsemide and she had good diuresis. Pulmonology saw patient as well felt she wasn't in an exacerbation. There was discussion regarding possibly assessing for obstructive sleep apnea, however patient reports she would never use a CPAP and not interested in being tested. The palliative care team was consulted for additional support with goals of care. Of note, she is current with [facility] and sees AC Knott.   Palliative Care Spoke With: patient  Quality of life: fair  Functional Status: Pt with hx of R foot fracture and is WBAT per orders and per pt. Pt reports she is independent with mobility, furniture surfs within her home and denies recent falls. Pt completed bed mobility with sba, transfers sba and took a few lateral steps cga using bedrail as support. Pt may benefit from ongoing PT services while inpatient to address strength, balance and endurance deficits. Per PT notes on 10/21/2024  Due to the Palliative Care Topics Discussed: palliative care, goals of care, care options, resuscitation status, and discharge options we will establish an advance care plan.   Advance Care Planning   Advance Care Planning Discussion: see below         Review of Systems   Constitutional: Negative for decreased appetite and malaise/fatigue.   Cardiovascular:   Negative for chest pain.   Respiratory:  Negative for shortness of breath.    Skin:  Positive for itching.        Bug bites   Musculoskeletal:  Positive for joint pain (chronic).   Gastrointestinal:  Negative for abdominal pain and nausea.   Neurological:  Negative for weakness.   Psychiatric/Behavioral:  Negative for depression. The patient is nervous/anxious.        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 Yazmin Canseco    CARDIAC ELECTROPHYSIOLOGY PROCEDURE N/A 5/27/2022    Procedure: Loop insertion;  Surgeon: Jeffrey Argueta MD;  Location:  [facility];  Service: Cardiovascular;  Laterality: N/A;  biotronik    CARDIAC ELECTROPHYSIOLOGY PROCEDURE N/A 7/13/2022    Procedure: Loop recorder removal;  Surgeon: Jeffrey Argueta MD;  Location:  [facility];  Service: Cardiovascular;  Laterality: N/A;    CARPAL TUNNEL RELEASE Right 05/25/2011    Dr. Caleb Bueno    CARPAL TUNNEL RELEASE Left 04/26/2011    Dr. Caleb Bueno    CATARACT EXTRACTION Left 11/29/2010    Dr. Eusebio Downs    CATARACT EXTRACTION Right 11/17/2010    Dr. Eusebio Downs    COLONOSCOPY N/A 12/06/2002     Sigmoid diverticulosis-Dr. Brandon Batista    COLONOSCOPY N/A 12/12/2013    Tortuous colon, diverticulosis in the sigmoid colon and descending colon, stool in the rectum, sigmoid colon, descending colon, splenic flexure, transverse colon and hepatic flexure-Dr. Irma Brambila    DEQUERVAIN RELEASE Left 9/23/2020    Procedure: DEQUERVAIN RELEASE LEFT HAND;  Surgeon: Caleb Bueno MD;  Location:  [facility];  Service: Plastics;  Laterality: Left;    ENDOSCOPY N/A 09/13/2007    Normal-Dr. Pavel Quarles    ENDOSCOPY AND COLONOSCOPY N/A 09/19/2005    1 cm hiatal hernia, mild Schatzki's ring, sigmoid diverticulosis-Dr. Yoel Farley    GANGLION CYST EXCISION Left 12/18/2012    Release of A1 pulley flexor sheath, left fourth finger, excision of ganglion cyst 0.5 cm diameter, A2 pulley flexor sheath, left fourth finger-Dr. Caleb Bueno    HEEL SPUR EXCISION Left 1968    INGUINAL HERNIA REPAIR Right 1971    at 5 months pregnant     INJECTION OF MEDICATION Left 9/23/2020    Procedure: KENOLOG INJECTION TO LEFT THUMB;  Surgeon: Caleb Bueno MD;  Location:  [facility];  Service: Plastics;  Laterality: Left;    MASTECTOMY Right 08/05/2008    Right breast mastectomy and excision of left chest wall lesion-Dr. Yoel Farley    MASTECTOMY RADICAL Left 04/29/2003    Left modified radical mastectomy-Dr. Yazmin Canseco    PARATHYROIDECTOMY Right 05/04/2004    Excision of parathyroid adenoma (right superior)-Dr. Yoel Farley    REPLACEMENT TOTAL KNEE Right 04/09/2012    Dr. Lamonte Childress    SINUS SURGERY  1984    THYROIDECTOMY, PARTIAL Left 05/04/2004    Dr. Yoel Farley    TOTAL ABDOMINAL HYSTERECTOMY Bilateral 1973    Dr. Mahan    TRIGGER FINGER RELEASE Left 9/23/2020    Procedure: RELEASE LEFT FOURTH TRIGGER FINGER;  Surgeon: Caleb Bueno MD;  Location:  [facility];  Service: Plastics;  Laterality: Left;    UPPER GASTROINTESTINAL ENDOSCOPY N/A 02/18/2009    LA Grade A  reflux esophagitis, normal stomach, normal 2nd part of the duodenum-Dr. Yoel Farley    WRIST GANGLION EXCISION Left 9/23/2020    Procedure: EXCISION GANGLION CYST LEFT WRIST;  Surgeon: Caleb Bueno MD;  [facility];  Service: Plastics;  Laterality: Left;     Social History     Socioeconomic History    Marital status:      Spouse name: Joey    Number of children: 6    Highest education level: Some college, no degree   Tobacco Use    Smoking status: Never     Passive exposure: Never    Smokeless tobacco: Never   Vaping Use    Vaping status: Never Used   Substance and Sexual Activity    Alcohol use: Yes     Comment: 3 drinks yearly    Drug use: Never    Sexual activity: Defer     Birth control/protection: Surgical       Current Facility-Administered Medications   Medication Dose Route Frequency Provider Last Rate Last Admin    acetaminophen (TYLENOL) tablet 650 mg  650 mg Oral Q4H PRN Óscar Cadet MD        albuterol (PROVENTIL) nebulizer solution 0.083% 2.5 mg/3mL  2.5 mg Nebulization Q4H PRN Óscar Cadet MD        sennosides-docusate (PERICOLACE) 8.6-50 MG per tablet 2 tablet  2 tablet Oral BID PRN Óscar Cadet MD        And    polyethylene glycol (MIRALAX) packet 17 g  17 g Oral Daily PRN Óscar Cadet MD        And    bisacodyl (DULCOLAX) EC tablet 5 mg  5 mg Oral Daily PRN Óscar Cadet MD        And    bisacodyl (DULCOLAX) suppository 10 mg  10 mg Rectal Daily PRN Óscar Cadet MD        butalbital-acetaminophen-caffeine (FIORICET, ESGIC) -40 MG per tablet 2 tablet  2 tablet Oral Q4H PRN Óscar Cadet MD   2 tablet at 10/22/24 0611    Calcium Replacement - Follow Nurse / BPA Driven Protocol   Does not apply PRÓscar Quintana MD        carboxymethylcellulose sod (PF) 1 % eye gel 1 drop  1 drop Both Eyes BID PRN Óscar Cadet MD        cetirizine (zyrTEC) tablet 10 mg  10 mg Oral Daily Óscar Cadet MD   10 mg at 10/22/24 0932     cloNIDine (CATAPRES) tablet 0.2 mg  0.2 mg Oral Q6H Óscar Cadet MD   0.2 mg at 10/22/24 0612    Or    cloNIDine (CATAPRES) tablet 0.3 mg  0.3 mg Oral Q6H Óscar Cadet MD        Diclofenac Sodium (VOLTAREN) 1 % gel 4 g  4 g Topical 4x Daily PRN Óscar Cadet MD   4 g at 10/22/24 0936    diphenhydrAMINE (BENADRYL) tablet 50 mg  50 mg Oral Q4H PRN Óscar Cadet MD        EPINEPHrine (ANAPHYLAXIS) 1 mg/ml injection kit  0.3 mg Intramuscular Q1H PRN Óscar Cadet MD        eplerenone (INSPRA) tablet 25 mg  25 mg Oral Daily Óscar Cadet MD   25 mg at 10/22/24 0927    fluticasone (FLONASE) 50 MCG/ACT nasal spray 1 spray  1 spray Each Nare BID Óscar Cadet MD   1 spray at 10/22/24 0934    HYDROcodone-acetaminophen (NORCO)  MG per tablet 2 tablet  2 tablet Oral Q6H PRN Óscar Cadet MD   2 tablet at 10/22/24 0927    hydrOXYzine (ATARAX) tablet 50 mg  50 mg Oral Q6H PRN Ósacr Cadet MD   50 mg at 10/22/24 0927    ipratropium (ATROVENT) nebulizer solution 0.5 mg  500 mcg Nebulization 4x Daily - RT Óscar Cadet MD   0.5 mg at 10/22/24 0732    LORazepam (ATIVAN) tablet 0.5 mg  0.5 mg Oral Q8H PRN Óscar Cadet MD   0.5 mg at 10/21/24 2109    Magnesium Low Dose Replacement - Follow Nurse / BPA Driven Protocol   Does not apply PRÓscar Quintana MD        melatonin tablet 5 mg  5 mg Oral Nightly PRN Óscar Cadet MD        multivitamin with minerals 1 tablet  1 tablet Oral Daily Óscar Cadet MD   1 tablet at 10/22/24 0927    mupirocin (BACTROBAN) 2 % ointment 1 Application  1 application  Topical Q12H Óscar Cadet MD   1 Application at 10/22/24 0936    NIFEdipine XL (PROCARDIA XL) 24 hr tablet 30 mg  30 mg Oral Daily Óscar Cadet MD   30 mg at 10/22/24 0927    nitroglycerin (NITROSTAT) SL tablet 0.4 mg  0.4 mg Sublingual Q5 Min PRN Óscar Cadet MD        ondansetron (ZOFRAN) injection 4 mg  4 mg Intravenous Q6H PRN Óscar Cadet MD         ondansetron ODT (ZOFRAN-ODT) disintegrating tablet 8 mg  8 mg Oral Q6H PRN Óscar Cadet MD        pantoprazole (PROTONIX) EC tablet 40 mg  40 mg Oral Q AM Óscar Cadet MD   40 mg at 10/22/24 0612    Petrolatum ointment 1 Application  1 Application Topical Q12H Óscar Cadet MD   1 Application at 10/22/24 0936    Phosphorus Replacement - Follow Nurse / BPA Driven Protocol   Does not apply PRN Óscar Cadet MD        potassium chloride (KLOR-CON) packet 20 mEq  20 mEq Oral Daily Óscar Cadet MD   20 mEq at 10/22/24 0932    Potassium Replacement - Follow Nurse / BPA Driven Protocol   Does not apply PRN Óscar Cadet MD        predniSONE (DELTASONE) tablet 10 mg  10 mg Oral Every Other Day Óscar Cadet MD   10 mg at 10/21/24 0853    promethazine-codeine (PHENERGAN with CODEINE) 6.25-10 MG/5ML syrup 5 mL  5 mL Oral 4x Daily PRN Óscar Cadet MD        rivaroxaban (XARELTO) tablet 20 mg  20 mg Oral Daily Óscar Cadet MD   20 mg at 10/22/24 0932    rosuvastatin (CRESTOR) tablet 5 mg  5 mg Oral QAM Óscar Cadet MD   5 mg at 10/22/24 0615    sodium chloride 0.9 % flush 3 mL  3 mL Intravenous Q12H Óscar Cadet MD   3 mL at 10/22/24 0937    sodium chloride 0.9 % flush 3-10 mL  3-10 mL Intravenous PRN Óscar Cadet MD        torsemide (DEMADEX) tablet 100 mg  100 mg Oral Daily Wicho Hallman MD   100 mg at 10/22/24 0927    vitamin B-12 (CYANOCOBALAMIN) tablet 1,000 mcg  1,000 mcg Oral Daily Óscar Cadet MD   1,000 mcg at 10/22/24 0932    vitamin D (ERGOCALCIFEROL) capsule 50,000 Units  50,000 Units Oral Weekly Óscar Cadet MD   50,000 Units at 10/22/24 0927          acetaminophen    albuterol    senna-docusate sodium **AND** polyethylene glycol **AND** bisacodyl **AND** bisacodyl    butalbital-acetaminophen-caffeine    Calcium Replacement - Follow Nurse / BPA Driven Protocol    carboxymethylcellulose sod (PF)    Diclofenac Sodium    diphenhydrAMINE     EPINEPHrine (Anaphylaxis)    HYDROcodone-acetaminophen    hydrOXYzine    LORazepam    Magnesium Low Dose Replacement - Follow Nurse / BPA Driven Protocol    melatonin    nitroglycerin    ondansetron    ondansetron ODT    Phosphorus Replacement - Follow Nurse / BPA Driven Protocol    Potassium Replacement - Follow Nurse / BPA Driven Protocol    promethazine-codeine    sodium chloride    Allergies   Allergen Reactions    Bee Venom Shortness Of Breath and Rash    Morphine Anaphylaxis and Nausea And Vomiting    Sulfa Antibiotics Anaphylaxis and Hives     Or sulfa fillers in meds  Broke out in internal and external hives      Tree Extract Shortness Of Breath and Rash    Ambien [Zolpidem] Hallucinations    Anastrozole Other (See Comments)     Can't remember what the reaction was     Covid-19 (Mrna) Vaccine Swelling     Facial swelling, blood clots    Eliquis [Apixaban] Headache     Headaches, nausea and itching.     Hydralazine Myalgia     Patient reports leg cramps, joint pain and increased fatigue    Norvasc [Amlodipine] Other (See Comments)     KIDNEYS SHUT DOWN    Nsaids Other (See Comments)     KIDNEY FAILURE    Penicillins Unknown (See Comments)     Severe reaction as a child  Tolerated ceftriaxone during 04/2017 admission    Grass Rash     Attest that current medications reviewed  including but not limited to prescriptions, over-the counter, herbals and vitamin/mineral/dietary (nutritional) supplements for name, route of administration, type, dose and frequency and are current using all immediate resources available at time of dictation.      Intake/Output Summary (Last 24 hours) at 10/22/2024 1121  Last data filed at 10/21/2024 1414  Gross per 24 hour   Intake --   Output 1350 ml   Net -1350 ml       Physical Exam:    Diagnostics: Reviewed  /65 (BP Location: Left arm, Patient Position: Sitting)   Pulse 67   Temp 98.4 °F (36.9 °C) (Oral)   Resp 18   Wt 64.7 kg (142 lb 10.2 oz)   SpO2 96%   BMI 27.86  kg/m²     Constitutional:       General: Sleeping. Not in acute distress.     Appearance: Not in distress. Frail. Chronically ill-appearing.   Pulmonary:      Effort: Pulmonary effort is normal.      Breath sounds: Examination of the right-lower field reveals decreased breath sounds. Examination of the left-lower field reveals decreased breath sounds. Decreased breath sounds present.   Cardiovascular:      PMI at left midclavicular line. Normal rate.   Edema:     Peripheral edema absent.   Abdominal:      General: Bowel sounds are normal.      Palpations: Abdomen is soft.      Tenderness: There is no abdominal tenderness.   Neurological:      Mental Status: Alert, oriented to person, place, and time and easily aroused.   Psychiatric:         Mood and Affect: Mood and affect normal.         Speech: Speech normal.         Behavior: Behavior normal.         Thought Content: Thought content normal.         Cognition and Memory: Cognition normal.         Judgment: Judgment normal.       Patient status: Disease state: Controlled with current treatments.  Functional status: Palliative Performance Scale Score: Performance 60% based on the following measures: Ambulation: Reduced, Activity and Evidence of Disease: Unable to do hobby or some work, significant evidence of disease, Self-Care: Occasional assist necessary,  Intake: Normal or reduced, LOC: Full or confusion   Nutritional status: Albumin 3.2 on 10/22/2024 Body mass index is 27.86 kg/m².  Family support: The patient receives support from her  and children..  Advance Directives: Advance Directive Status: Patient has advance directive, copy in chart   POA/Healthcare surrogate-spouseAdriel       Impression/Problem List:    Bilateral lower extremity edema  Volume overload  Chronic kidney disease stage III  Asthmatic bronchitis, no exacerbation  Anxiety   Osteoarthritis      Prior DVT and PE  Psoriasis  Hypertension  History of breast cancer s/p left mastectomy          Recommendations/Plan:  1. Provide support with goals of care  2. FULL code   3. Continue with outpatient support with [facility]         2.  Palliative care encounter  I spoke with patient regarding goals of care. She provided me detailed history of her chronic conditions and reviewed acute conditions as well. She resides at home with her spouse. She is typically independent with ADLs. She has the support of [facility] at home and routinely sees AC Knott. She plans to have follow up appointment with her after discharge. [facility] supports her symptom management and routine goals of care conversations. Per patient she has been with their services about two years.  We discussed CODE status and medical interventions and she wishes to remain FULL Code. I have encouraged further conversation with Dr Cadet and Hedy as she endorses their opinions due to established rapport. Cultural and spiritual needs have been assessed and no interventions warranted. Her goal at this time is to be treated for acute conditions and return to home setting. She is hopeful to be returning home later today. She was appreciative of conversation and support provided. I spoke with CONCETTA Allen.       Thank you for this consult and allowing us to participate in patient's plan of care. Palliative Care Team will sign off and see PRN.     Time spent:30 minutes spent reviewing medical and medication records, assessing and examining patient, discussing with family, answering questions, providing some guidance about a plan and documentation of care, and coordinating care with other healthcare members, with > 50% time spent face to face.   .30 additional minutes spent on advance care planning, goals of care and code status discussions.     AC Condon  10/22/2024  11:21 EDT

## 2024-10-22 NOTE — PROGRESS NOTES
[facility] Progress Note      Patient Name: Sasha Barrett  : 1940  MRN: [id number]  Primary Care Physician:  Óscar Cadet MD  Date of admission: 10/20/2024    Subjective     Interval History:   F/u CKD3A    Review of Systems:   UOP 1350  Wt stable 142 by bed scale  Denies dyspnea     Objective     Vitals:   Temp:  [97.7 °F (36.5 °C)-98.6 °F (37 °C)] 97.7 °F (36.5 °C)  Heart Rate:  [67-79] 67  Resp:  [18-19] 19  BP: (123-182)/(63-95) 149/68    Intake/Output Summary (Last 24 hours) at 10/22/2024 0719  Last data filed at 10/21/2024 1414  Gross per 24 hour   Intake --   Output 1350 ml   Net -1350 ml       Physical Exam:    General Appearance: frail pleasant WF no distress  Neck: supple, no JVD  Lungs: CTA bilat no rales  Heart: RRR, normal S1 and S2  Abdomen: soft, nontender, nondistended  Extremities: trace ankle edema BLE    Scheduled Meds:     cetirizine, 10 mg, Oral, Daily  cloNIDine, 0.2 mg, Oral, Q6H   Or  cloNIDine, 0.3 mg, Oral, Q6H  eplerenone, 25 mg, Oral, Daily  fluticasone, 1 spray, Each Nare, BID  ipratropium, 500 mcg, Nebulization, 4x Daily - RT  multivitamin with minerals, 1 tablet, Oral, Daily  mupirocin, 1 application , Topical, Q12H  NIFEdipine XL, 30 mg, Oral, Daily  pantoprazole, 40 mg, Oral, Q AM  Petrolatum, 1 Application, Topical, Q12H  potassium chloride, 20 mEq, Oral, Daily  predniSONE, 10 mg, Oral, Every Other Day  rivaroxaban, 20 mg, Oral, Daily  rosuvastatin, 5 mg, Oral, QAM  sodium chloride, 3 mL, Intravenous, Q12H  torsemide, 100 mg, Oral, Daily  cyanocobalamin, 1,000 mcg, Oral, Daily  vitamin D, 50,000 Units, Oral, Weekly      IV Meds:        Results Reviewed:   I have personally reviewed the results from the time of this admission to 10/22/2024 07:19 EDT     Results from last 7 days   Lab Units 10/21/24  0018 10/20/24  1439   SODIUM mmol/L 141 139   POTASSIUM mmol/L 3.8 3.1*   CHLORIDE mmol/L 105 103   CO2 mmol/L 29.4* 27.3   BUN mg/dL 24* 21    CREATININE mg/dL 1.05* 1.10*   CALCIUM mg/dL 9.4 9.3   BILIRUBIN mg/dL 0.2 0.2   ALK PHOS U/L 86 82   ALT (SGPT) U/L 10 10   AST (SGOT) U/L 12 13   GLUCOSE mg/dL 119* 118*     Estimated Creatinine Clearance: 32.3 mL/min (A) (by C-G formula based on SCr of 1.05 mg/dL (H)).  Results from last 7 days   Lab Units 10/20/24  1439   MAGNESIUM mg/dL 2.2   PHOSPHORUS mg/dL 2.6         Results from last 7 days   Lab Units 10/21/24  0018 10/20/24  1439   WBC 10*3/mm3 8.00 7.66   HEMOGLOBIN g/dL 12.2 11.2*   PLATELETS 10*3/mm3 199 181           Assessment / Plan     ASSESSMENT:  CKD stage 3A - Cr stable 1.05, BL is low 1's.  Dr Bond follows.  Labs today are pending.  Vol overload - periph > central.  No congestion on CXR.  CCB use poss factor.  Albumin low normal 3.6.  Had good diuresis with IV lasix, transitioned to PO torsemide 100mg daily in place of lasix (was taking latter 80 to 160 mg/day).  UOP expectedly less but weight stable and no dyspnea with minimal periph edema currently.  Also on eplerenone.    Hypokalemia, due to diuresis, repleted up to 3.8.  Sched KCL 20 meq daily with diuretic   Hx DVT & PE on AC (xarelto)  Hx primary hyperparathyroidism s/p PTHectomy.  iPTH ~ 100 but Ca normal   HTN - BP labile, but improved.  On nifedipine ER, clonidine    PLAN:  Follow up pending labs  Continue torsemide 100 mg daily   Continue KCL 20meq daily   No objection to discharge today on this regimen and will arrange follow up with Dr Bond 1 to 2 weeks.        Wicho Hallman MD  10/22/24  07:19 EDT    [facility]\""  [phone]

## 2024-10-22 NOTE — PROGRESS NOTES
"                     [facility]  INTERNAL MEDICINE  ÓSCAR CADET MD  [address] [place] [address]  Phone [phone] Fax [phone]  E-mail:  [email or web address]      INTERNAL MEDICINE DAILY PROGRESS NOTE  Óscar Cadet M.D.  10/21/2024            Patient Identification:  Name: Sasha Barrett  Age: 84 y.o.  Sex: female  :  1940  MRN: [id number]         Primary Care Physician: Óscar Cadet MD  LENGTH OF STAY 1 DAYS    Consults       Date and Time Order Name Status Description    10/20/2024 12:56 PM Inpatient Pulmonology Consult Completed     10/20/2024 12:56 PM Inpatient Nephrology Consult Completed                Chief Complaint:    Dyspnea on exertion with lower extremity bilateral edema exacerbated by recent problems with bug bites     History of Present Illness:     Subjective  Interval History: Patient is a 84 y.o.female who presented with   Dyspnea on exertion with lower extremity bilateral edema which has been exacerbated by recent problems with bug bites.  Patient is a delightful 84-year-old female who I have followed for many years in my office.  For the last several weeks she has had great difficulties and issue with bug bites which she believes are coming from a type of bug called  \"no-see-ums\".   review of literature shows that these bugs are more typical in tropical environments and it really should not be present in our environment now that we have had the first frost here in [place].  Patient believes strongly that she is still dealing with effects from the bites of of these insects and recently did see Dr. Scott Ochoa in dermatology who found no evidence of live bugs and recommended topical treatment.  Patient has had a long history of bilateral pedal edema and in the past this i was found to be multifactorial and related to unrestricted salt intake, and adequate Lasix dosing, chronic lung disease, and obesity.  Patient reported to me on the " phone  yesterday that the swelling in her legs was becoming almost unmanageable in her home environment.  She was having little to no output from her regular Lasix dosing and felt that she was in crisis due to the volume overload.  Patient was made a direct admission to a general medical telemetry bed for further evaluation of her pedal edema and volume overload status as well as for further evaluation of the skin irritation she is experiencing.  Patient also has  worsening of her chronic kidney disease stage 3 with elevation of her creatinine to 1.10 and low potassium at 3.1. Patient does have multiple comorbidities that complicate her medical condition including most significantly: Stage III chronic kidney disease, progressive malaise and fatigue, dizziness, dyspnea on exertion, accelerated hypertension, ataxia with right foot deformity from fracture in past, blood pressure instability, acute intractable muscle tension headaches that have been intensified since COVID-19 infection 10/2/2021, generalized weakness, volume overload, multifactorial edema related to unrestricted salt intake, and adequate Lasix dosing, chronic lung disease treated with prednisone every other day, and obesity.,  Urinary retention requiring self-catheterization, cervical disc disease with degenerative disc disease, hyperlipidemia, proctocele, urge incontinence, chronic tension headaches, cerebral atherosclerosis, vitamin D deficiency, moderate persistent asthmatic bronchitis, bilateral knee pain, elevated PTH levels, history of breast cancer on left side infiltrating ductal, gastroesophageal reflux disease with esophagitis, recurrent UTIs, cerebrovascular accident, acute joint pain, easy bruisability, COPD with asthma, essential hypertension, thrombosis of left posterior cerebral artery, acute DVT in the past the right lower extremity, adult failure to thrive on palliative care team, posterior tibial tendon dysfunction, pleurisy,  psoriasis, postmenopausal, venous incompetence of popliteal vein on right.     10/20/2024.  I personally saw this patient for the first time during this hospitalization while she was in radiology triage waiting to be transported back to her room.  Her room is on 4 S. #416.  Patient was quite talkative in the triage area and was very entertaining for the nurses there.  She had quite a bit of urinary output while in the midst of her x-ray testing and had totally saturated the blankets on the bed in which she was lying.  We did get her some dry blankets and covered her up to keep her from getting chilled.  I wore full PPE as indicated including an N95 mask when needed, gloves when touching patient, goggles, and white lab coat.  I performed thorough hand hygiene before and after the hospitalization visit.  I did review my planned treatment with the patient.  We are going to ask her renal team to look at her with respect to her volume overload status and make recommendations on perhaps adjusting somewhat her Lasix.  Patient is very insistent that she would like to see  before any major changes are made in her medication regimens.  Patient will also be seen by pulmonary with respect to her dyspnea on exertion and dizziness.  Hopefully some minor adjustments in medications can help greatly with the symptoms.  I did review labs with the patient and they all do seem to be fairly stable at present time.  She does have a potassium which needs to be corrected because it was low on admission.  Cardiac enzymes are stable and not elevated.  Patient's PTH was slightly high at 104 but this is similar to previous trending it has been done with that.  Patient's hemoglobin A1c was 5.30.  Patient's TSH was 1.1.  Cholesterol levels were all normal, and pancreatic enzymes amylase and lipase were also normal.  Patient's procalcitonin was extremely low at 0.05.  Her D-dimer was also in the normal range at 0.34.  Patient does have a  white count that is normal at 7.66, her hemoglobin is slightly low at 11.2 we will check vitamin D, B12, and an iron panel.  Patient's UA showed trace of protein and trace of leukocyte esterase but white blood cell count was only 3-5.  COVID screening was negative.  Urine tox did show that patient had barbiturates and opiates in her system which are expected based on her drug list as an outpatient.  Patient did have a chest x-ray which was unremarkable except for a left lower lobe calcified granuloma.  A venous Doppler was done which showed bilateral lower extremity normal circulation with no evidence of DVT or blood clotting.  EKG showed normal sinus rhythm with prolonged VA interval considering possibility of left ventricular hypertrophy.  I did ask the palliative care team here at the hospital to take a look at the patient and work with her on clarification of her CODE STATUS.  They may have more information in the notes from their associate Hedy who is the nurse that visits the patient's home on a regular basis.  I am continuing patient's IV Lasix for a total of 4 doses.  We will monitor her BRIAN's and her weight changes.  I anticipate a stay of 48 to 72 hours.    10/21/2024. Patient is seen again today in her room on 4 S. #416.  Patient was resting quietly in bed when I entered the room and had multiple questions and concerns that she voiced to me at the time of my visit.  We did discuss her case with her nurse.  He will be working on getting her clonidine scheduled as she takes it at home and also working on getting her up in chair if possible.  I will full PPE as needed including an N95 mask as indicated, goggles, gloves when touching patient, and white lab coat.  I performed thorough hand hygiene before and after the patient visit.  Patient's vital signs today show a temperature of 97.7 with pulse of 70, respiratory rate 19, blood pressure 123/63 and sat 97%.  She has had blood pressures earlier this a.m. as  high as 182/71 but has been able to get those under better control with her clonidine.  Patient was reweighed today and actually her weight on a bed scale was 143 which is more consistent with her previous weights in previous admissions.  Labs today show the following: Her sodium is stable at 141 and CO2 is slightly up at 29.4.  BUN is 24 which is slightly up after diuresis with IV Lasix and creatinine is at 1.05.  Patient's estimated GFR is 51.9 and her glucose was 119.  Ionized calcium is 1.26 and liver function tests are all within normal limits.  Patient's PTH is elevated slightly at 104 and we are continuing to follow closely in the outpatient environment.  Patient's white blood cell count is 8.0 with her hemoglobin being 12.2 and her hematocrit being 36.3.  Nursing noted +3 lower extremity edema and really could not identify bug bites or open wounds.  Patient does complain of bug bites incessantly.  Renal is addressing patient's Lasix dosing and diuresis.  Rodrick Dolan from pulmonary saw the patient working under direction of Bowen Garza MD.  They do not feel that her asthma is currently exacerbated but is stable moderate persistent asthma.  I am testing for possible sleep apnea.  Diuresis recommended.  PT and OT consultation also suggested.  Dr. Hallman from renal saw her on behalf of Dr. Bond.  He was pleased with her diuresis on the IV Lasix at 2400 cc. over the last 24 hours.  He does feel that her chronic kidney disease stage III AA is fairly stable at present with creatinine of 1.05.  Volume overload seems to be more peripheral than central.  Hypokalemia is being repleted.  Primary hyperparathyroidism status post parathyroidectomy continues to be followed but calcium is normal at present.  Blood pressure somewhat labile and controlled primarily with clonidine.  IV Lasix now discontinued and transition to high-dose torsemide 100 mg daily in place of the Lasix.  Scheduled dose of potassium chloride  added also.  Patient will follow-up on outpatient basis with Dr. Bond in renal clinic.  Dietitian feels patient does meet criteria for severe malnutrition.  Recommended that she take Boost daily to improve nutritional status.  OT did see patient and recommended continued home OT with home health to work on mobility issues and stabilization.  Wound care nurse did see patient at my request regarding bug bites on patient's extremities.  Not able to really see much evidence of bug bites at present time despite patient pointing out some freckles on her arm and some red scratch marks on her left foot and leg.  Patient agitated when told that these were not really bites.  The bugs attacked her only and not her .  She has seen a dermatologist and he ordered some creams but did not know how much they would help with the bites.  Wound care did recommend Aquaphor to try to help moisturize her skin to improve the areas of irritation.  PT reports patient furniture surfs at home and with that has very few falls.  Patient completed bed mobility exercises and took a few lateral steps using bed rail for support.  Continued PT to address strength, balance, and endurance deficits was recommended.  Patient adamant about discharging to home and will not go to skilled nursing facility.  Generally patient had a good day and feels stable. she is agreeable to possibly considering discharge to home tomorrow if everyone is in agreement.         Review of Systems:               A comprehensive 14 point review of systems was negative except for:  Constitution:  positive for fatigue, malaise, and weight loss  ENT:  positive for nasal congestion and sore mouth  Cardiovascular: positive for  irregular pulse and palpitations  Gastrointestinal: positive for  diarrhea, early satiety, heartburn, and nausea  Integument: positive for  dryness, itching, and rash  Hematologic / Lymphatic: positive for  fatigue  Musculoskeletal: positive for  back  pain, joint pain, joint stiffness, muscle pain, and muscle weakness  Neurological: positive for  coordination abnormal, difficulty walking, and dizziness  Behavioral/Psych: positive for  anxiety and appetite change appetite decreased       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 Yazmin Canseco    CARDIAC ELECTROPHYSIOLOGY PROCEDURE N/A 5/27/2022    Procedure: Loop insertion;  Surgeon: Jeffrey Argueta MD;  Location:  [facility];  Service: Cardiovascular;  Laterality: N/A;  biotronik    CARDIAC ELECTROPHYSIOLOGY PROCEDURE N/A 7/13/2022    Procedure: Loop recorder removal;  Surgeon: Jeffrey Argueta MD;  Location:  [facility];  Service: Cardiovascular;  Laterality: N/A;    CARPAL TUNNEL RELEASE Right 05/25/2011    Dr. Caleb Bueno    CARPAL TUNNEL RELEASE Left 04/26/2011    Dr. Caleb Bueno    CATARACT EXTRACTION Left 11/29/2010    Dr. Eusebio Downs    CATARACT EXTRACTION Right 11/17/2010    Dr. Eusebio Downs    COLONOSCOPY N/A 12/06/2002    Sigmoid diverticulosis-Dr. Brandon Batista    COLONOSCOPY N/A 12/12/2013    Tortuous colon, diverticulosis in the sigmoid colon and descending colon, stool in the rectum, sigmoid colon, descending colon, splenic  flexure, transverse colon and hepatic flexure-Dr. Irma Brambila    DEQUERVAIN RELEASE Left 9/23/2020    Procedure: DEQUERVAIN RELEASE LEFT HAND;  Surgeon: Caleb Bueno MD;  [place] [facility] [place] [facility] [place]  Service: Plastics;  Laterality: Left;    ENDOSCOPY N/A 09/13/2007    Normal-Dr. Pavel Quarles    ENDOSCOPY AND COLONOSCOPY N/A 09/19/2005    1 cm hiatal hernia, mild Schatzki's ring, sigmoid diverticulosis-Dr. Yoel Farley    GANGLION CYST EXCISION Left 12/18/2012    Release of A1 pulley flexor sheath, left fourth finger, excision of ganglion cyst 0.5 cm diameter, A2 pulley flexor sheath, left fourth finger-Dr. Caleb Bueno    HEEL SPUR EXCISION Left 1968    INGUINAL HERNIA REPAIR Right 1971    at 5 months pregnant     INJECTION OF MEDICATION Left 9/23/2020    Procedure: KENOLOG INJECTION TO LEFT THUMB;  Surgeon: Caleb Bueno MD;  [place] [facility] [place] [facility] [place]  Service: Plastics;  Laterality: Left;    MASTECTOMY Right 08/05/2008    Right breast mastectomy and excision of left chest wall lesion-Dr. Yoel Farley    MASTECTOMY RADICAL Left 04/29/2003    Left modified radical mastectomy-Dr. Yazmin Canseco    PARATHYROIDECTOMY Right 05/04/2004    Excision of parathyroid adenoma (right superior)-Dr. Yoel Farley    REPLACEMENT TOTAL KNEE Right 04/09/2012    Dr. Lamonte Childress    SINUS SURGERY  1984    THYROIDECTOMY, PARTIAL Left 05/04/2004    Dr. Yoel Farley    TOTAL ABDOMINAL HYSTERECTOMY Bilateral 1973    Dr. Mahan    TRIGGER FINGER RELEASE Left 9/23/2020    Procedure: RELEASE LEFT FOURTH TRIGGER FINGER;  Surgeon: Caleb Bueno MD;  [place] [facility] [place] [facility] [place]  Service: Plastics;  Laterality: Left;    UPPER GASTROINTESTINAL ENDOSCOPY N/A 02/18/2009    LA Grade A reflux esophagitis, normal stomach, normal 2nd part of the duodenum-Dr. Yoel Farley    WRIST GANGLION EXCISION Left 9/23/2020    Procedure: EXCISION GANGLION CYST LEFT WRIST;  Surgeon: Caleb Bueno MD;   [place] [facility] [place];  Service: Plastics;  Laterality: Left;     Allergies   Allergen Reactions    Bee Venom Shortness Of Breath and Rash    Morphine Anaphylaxis and Nausea And Vomiting    Sulfa Antibiotics Anaphylaxis and Hives     Or sulfa fillers in meds  Broke out in internal and external hives      Tree Extract Shortness Of Breath and Rash    Ambien [Zolpidem] Hallucinations    Anastrozole Other (See Comments)     Can't remember what the reaction was     Covid-19 (Mrna) Vaccine Swelling     Facial swelling, blood clots    Eliquis [Apixaban] Headache     Headaches, nausea and itching.     Hydralazine Myalgia     Patient reports leg cramps, joint pain and increased fatigue    Norvasc [Amlodipine] Other (See Comments)     KIDNEYS SHUT DOWN    Nsaids Other (See Comments)     KIDNEY FAILURE    Penicillins Unknown (See Comments)     Severe reaction as a child  Tolerated ceftriaxone during 04/2017 admission    Grass Rash       Family History   Problem Relation Age of Onset    Brain cancer Brother     Alcohol abuse Mother     No Known Problems Father     No Known Problems Sister     Malig Hyperthermia Neg Hx        Social History     Socioeconomic History    Marital status:      Spouse name: Joey    Number of children: 6    Highest education level: Some college, no degree   Tobacco Use    Smoking status: Never     Passive exposure: Never    Smokeless tobacco: Never   Vaping Use    Vaping status: Never Used   Substance and Sexual Activity    Alcohol use: Yes     Comment: 3 drinks yearly    Drug use: Never    Sexual activity: Defer     Birth control/protection: Surgical       PMH, FH, SH and ROS completed with Admission History and Physical and updated in EPIC system.        Objective     Scheduled Meds:cetirizine, 10 mg, Oral, Daily  cloNIDine, 0.2 mg, Oral, Q6H   Or  cloNIDine, 0.3 mg, Oral, Q6H  eplerenone, 25 mg, Oral, Daily  fluticasone, 1 spray, Each Nare, BID  ipratropium, 500 mcg, Nebulization, 4x  Daily - RT  multivitamin with minerals, 1 tablet, Oral, Daily  mupirocin, 1 application , Topical, Q12H  NIFEdipine XL, 30 mg, Oral, Daily  pantoprazole, 40 mg, Oral, Q AM  Petrolatum, 1 Application, Topical, Q12H  potassium chloride, 20 mEq, Oral, Daily  predniSONE, 10 mg, Oral, Every Other Day  rivaroxaban, 20 mg, Oral, Daily  rosuvastatin, 5 mg, Oral, QAM  sodium chloride, 3 mL, Intravenous, Q12H  torsemide, 100 mg, Oral, Daily  cyanocobalamin, 1,000 mcg, Oral, Daily  vitamin D, 50,000 Units, Oral, Weekly      Continuous Infusions:     Vital signs in last 24 hours:  Temp:  [97.7 °F (36.5 °C)-98.6 °F (37 °C)] 97.7 °F (36.5 °C)  Heart Rate:  [70-79] 70  Resp:  [18-19] 19  BP: (123-182)/(63-95) 123/63    Intake/Output:    Intake/Output Summary (Last 24 hours) at 10/22/2024 0223  Last data filed at 10/21/2024 1414  Gross per 24 hour   Intake --   Output 2450 ml   Net -2450 ml       Exam:  /63 (BP Location: Left arm, Patient Position: Lying)   Pulse 70   Temp 97.7 °F (36.5 °C) (Oral)   Resp 19   Wt 64.9 kg (143 lb 1.3 oz)   SpO2 97%   BMI 27.94 kg/m²     Constitutional:  Alert, cooperative, no distress, AAOx3, resting comfortably   Head:      Normocephalic, without obvious abnormality, atraumatic   Eyes:     PERRLA, conjunctiva/corneas clear, no icterus, no conjunctival                                     pallor, EOM's intact, both eyes      ENT and Mouth: Lips, tongue, gums normal; oral mucosa pink and moist   Neck:     Supple, symmetrical, trachea midline, no JVD  Respiratory:     Clear to auscultation bilaterally, respirations unlabored  Cardiovascular:  Regular rate and rhythm, S1 and S2 normal, no murmur,      no  Rub or gallop.  Pulses normal.    Gastrointestinal:   BS present x 4 Soft, non-tender, bowel sounds active,      no masses, no hepatosplenomegaly                                                     :       No hernia.  Normal exam for sex.         Musculoskeletal: Extremities normal,  atraumatic, no cyanosis or edema     No arthropathy.  No deformity.  Gait normal                                                 Skin:   Skin is warm and dry,  no rashes, swelling or palpable lesions.  Skin  irritation is minimal at present.  Aquaphor to be applied as recommended by wound care.   Neurologic:  CN -XII intact, motor strength grossly intact, sensation grossly intact to light touch, no focal reflex deficits noted    Psychiatric:     Alert,oriented X3, no delusions, psychoses, depression or anxiety    Heme/Lymph/Imun:   No bruises, petechiae.  Lymph nodes normal in size/configuration       Data Review:  Lab Results   Component Value Date    CALCIUM 9.4 10/21/2024    PHOS 2.6 10/20/2024     Results from last 7 days   Lab Units 10/21/24  0018 10/20/24  1439   AST (SGOT) U/L 12 13   ALT (SGPT) U/L 10 10   MAGNESIUM mg/dL  --  2.2   SODIUM mmol/L 141 139   POTASSIUM mmol/L 3.8 3.1*   CHLORIDE mmol/L 105 103   CO2 mmol/L 29.4* 27.3   BUN mg/dL 24* 21   CREATININE mg/dL 1.05* 1.10*   GLUCOSE mg/dL 119* 118*   CALCIUM mg/dL 9.4 9.3   WBC 10*3/mm3 8.00 7.66   HEMOGLOBIN g/dL 12.2 11.2*   PLATELETS 10*3/mm3 199 181     Lab Results   Component Value Date    CKTOTAL 51 10/20/2024    CKMB 1.69 04/06/2017    TROPONINT 22 (H) 10/20/2024     Estimated Creatinine Clearance: 32.4 mL/min (A) (by C-G formula based on SCr of 1.05 mg/dL (H)).  WEIGHTS:     Wt Readings from Last 1 Encounters:   10/21/24 0525 64.9 kg (143 lb 1.3 oz)         Assessment:    Edema    Stage 3 chronic kidney disease    Malaise and fatigue progressive    Dizziness    Dyspnea on exertion    Accelerated hypertension    Ataxia    Blood pressure instability    Acute intractable headache intensified since Covid-19 infection 10/2/2021    Weakness    Volume overload    Bilateral lower extremity edema, multifactorial = unrestricted salt intake, inadequate Lasix dosing, chronic lung disease, and obesity.  T    Urinary retention self-caths (Ochoa)    Cervical  disc displacement    DDD (degenerative disc disease), cervical    Hyperlipidemia LDL goal <70    Proctocele    Urge incontinence of urine    Chronic tension-type headache, intractable    Cerebral atherosclerosis    Vitamin D deficiency    Moderate persistent asthmatic bronchitis without complication    Knee pain, bilateral    Elevated PTH level    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)    Gastroesophageal reflux disease with esophagitis    Recurrent UTI    CVA (cerebral vascular accident)    Acute joint pain    Easy bruisability    COPD with asthma    Essential hypertension    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  Bilateral lower extremity edema multifactorial including unrestricted salt intake, inadequate dosing of Lasix, chronic lung disease, obesity, and chronic prednisone usage for lung disease.  IV Lasix being used for 48 hours to try to correct volume overload status.  We have asked the patient's renal team to consult on the issue and help with this treatment.  Patient does see Dr. Bond in clinic on a regular basis.  Patient did have significant urinary output with the first dose of IV Lasix and saturated her entire stretcher in the radiology triage area.  Will continue to monitor closely.  Renal has transition patient from IV Lasix now to torsemide once daily 100 mg and will continue this on the home environment.  Patient will need potassium supplement also daily because of this medication.     2.  Volume overload status  in patient with chronic kidney disease stage III.  Weight is not a good measure to follow with this patient since she has had some muscle wasting while on high doses of narcotics for palliative care by her [facility] team.  Patient with good diuretic response to IV Lasix which will be continued for now.  Additional input sought from her nephrology team regarding appropriate diuresis in this patient.  Peripheral volume is now corrected significantly.  Patient had great urinary output of 2200 cc.     3.  Weakness, malaise, and severe fatigue.  Appropriate lab panels have been drawn to check for possible etiologies of the symptoms.  This includes a detailed thyroid workup, PTH, CBC, CMP.  Suspect that the volume overload status discussed above is probably intensifying these effects for the patient at the present time.    Home PT and OT recommended to try to help encourage exercise routine.     4.  Dyspnea on exertion.  Patient does have great difficulty ambulating because of deformity of her right ankle accompanied by poor balance, ataxia and dizziness.  PT and OT consults are placed.  Pulmonary consult also requested.  Additional input and treatment as they recommend.  Will continue to follow closely.     5.Accelerated hypertension with great variability of blood pressures.  Problem fairly stable at present time but patient does have some irregular beats.  Hopefully once volume status is readjusted and transition to oral Lasix is complete we will be able to stabilize the patient on a new regimen of blood pressure medication.     6.  Incontinence of urine requiring self-catheterization.  This has been a long-term problem for the patient and she seems quite familiar with treatment options.  We will continue her treatments as she has been doing in the past for this problem.  She does follow with Dr. Fazal Ochoa in urology for this.     7.  Hyperlipidemia.  Continue regular treatment that has been done on an outpatient  basis in the past by the patient.     8.  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  This is also an ongoing problem for the patient and does not seem to be acutely exacerbated at present time     9.  Vitamin B-12 and D deficiencies in the past.  Will check these to vitamin levels while here in the hospital.     10.  Moderate persistent asthmatic bronchitis without complication.  To be followed by pulmonary and we would appreciate their suggestions with respect to the patient's medications.     11.  History of hyperparathyroidism with ongoing elevated PTH levels.  Current level is fairly stable and we will follow no evidence for urgent evaluation at present time.     12.  History of breast cancer which was on left side infiltrating ductal.  This seems to be very stable and in remission at present time.     13.  History of DVT right lower extremity in the past along with single subsegmental pulmonary embolism without acute cor pulmonale.  Patient does have venous incompetence of popliteal vein on right.  Plan to check venous Doppler of bilateral lower extremities which was negative.  Patient does continue on same medications for treatment of this issue.     14.  Psoriasis followed by Dr. Scott Ochoa in the past and stable.     15.  [facility] palliative care status.  Patient is followed in her home by Hedy from the palliative care team.  I did place a consult on the chart so they will be aware of patient's admission and can help us if there is any needed information from Hedy's charting.  Patient's CODE STATUS seems to vary at times and might be beneficial to check the status with Hedy.       Plan for disposition:Where: home and home health, When:  1-2 days, and Follow up with wound care nurse at home    Copied text in this note has been reviewed by me and is accurate as of 10/21/2024  Much of this dictation is completed using dragon  voice activated software which can result in misspelled words and nonsensical phrases    Óscar Cadet MD  10/21/2024  1330 EDT

## 2024-10-22 NOTE — PLAN OF CARE
Refusing blood draws this morning. Overall, did well overnight.    Problem: Adult Inpatient Plan of Care  Goal: Plan of Care Review  Outcome: Progressing   Goal Outcome Evaluation:

## 2024-10-31 ENCOUNTER — READMISSION MANAGEMENT (OUTPATIENT)
Dept: CALL CENTER | Facility: HOSPITAL | Age: 84
End: 2024-10-31
Payer: MEDICARE

## 2024-10-31 NOTE — OUTREACH NOTE
Medical Week 2 Survey      Flowsheet Row Responses   [facility] patient discharged from? [facility]   Does the patient have one of the following disease processes/diagnoses(primary or secondary)? Other   Week 2 attempt successful? Yes   Call start time 1110   Discharge diagnosis Edema   Call end time 1124   Meds reviewed with patient/caregiver? Yes   Is the patient having any side effects they believe may be caused by any medication additions or changes? No   Does the patient have all medications ordered at discharge? Yes   Is the patient taking all medications as directed (includes completed medication regime)? Yes   Does the patient have a primary care provider?  Yes   Does the patient have an appointment with their PCP within 7 days of discharge? Yes   Has the patient kept scheduled appointments due by today? Yes   Has home health visited the patient within 72 hours of discharge? N/A   Psychosocial issues? No   Did the patient receive a copy of their discharge instructions? Yes   What is the patient's perception of their health status since discharge? Improving   Is the patient/caregiver able to teach back the hierarchy of who to call/visit for symptoms/problems? PCP, Specialist, Home health nurse, Urgent Care, ED, 911 Yes   Week 2 Call Completed? Yes   Graduated Yes   Is the patient interested in additional calls from an ambulatory ? No   Would this patient benefit from a Referral to Amb Social Work? No   Graduated/Revoked comments Pt reports doing well, no needs at this time   Call end time 1124            Sury QUINTANA - Registered Nurse

## 2024-12-05 ENCOUNTER — APPOINTMENT (OUTPATIENT)
Dept: CT IMAGING | Facility: HOSPITAL | Age: 84
End: 2024-12-05
Payer: MEDICARE

## 2024-12-05 ENCOUNTER — APPOINTMENT (OUTPATIENT)
Dept: GENERAL RADIOLOGY | Facility: HOSPITAL | Age: 84
End: 2024-12-05
Payer: MEDICARE

## 2024-12-05 ENCOUNTER — HOSPITAL ENCOUNTER (EMERGENCY)
Facility: HOSPITAL | Age: 84
Discharge: HOME | End: 2024-12-05
Attending: EMERGENCY MEDICINE
Payer: MEDICARE

## 2024-12-05 VITALS
SYSTOLIC BLOOD PRESSURE: 180 MMHG | TEMPERATURE: 99.2 F | BODY MASS INDEX: 29.45 KG/M2 | HEIGHT: 60 IN | WEIGHT: 150 LBS | OXYGEN SATURATION: 96 % | DIASTOLIC BLOOD PRESSURE: 80 MMHG | RESPIRATION RATE: 17 BRPM | HEART RATE: 78 BPM

## 2024-12-05 DIAGNOSIS — S20.212A CONTUSION OF LEFT CHEST WALL, INITIAL ENCOUNTER: Primary | ICD-10-CM

## 2024-12-05 DIAGNOSIS — I10 ESSENTIAL HYPERTENSION: ICD-10-CM

## 2024-12-05 LAB
ALBUMIN SERPL-MCNC: 3.8 G/DL (ref 3.5–5.2)
ALBUMIN/GLOB SERPL: 1.6 G/DL
ALP SERPL-CCNC: 109 U/L (ref 39–117)
ALT SERPL W P-5'-P-CCNC: 9 U/L (ref 1–33)
ANION GAP SERPL CALCULATED.3IONS-SCNC: 9.9 MMOL/L (ref 5–15)
AST SERPL-CCNC: 10 U/L (ref 1–32)
BASOPHILS # BLD AUTO: 0.02 10*3/MM3 (ref 0–0.2)
BASOPHILS NFR BLD AUTO: 0.3 % (ref 0–1.5)
BILIRUB SERPL-MCNC: 0.6 MG/DL (ref 0–1.2)
BILIRUB UR QL STRIP: NEGATIVE
BUN SERPL-MCNC: 29 MG/DL (ref 8–23)
BUN/CREAT SERPL: 26.1 (ref 7–25)
CALCIUM SPEC-SCNC: 10.1 MG/DL (ref 8.6–10.5)
CHLORIDE SERPL-SCNC: 106 MMOL/L (ref 98–107)
CLARITY UR: CLEAR
CO2 SERPL-SCNC: 25.1 MMOL/L (ref 22–29)
COLOR UR: YELLOW
CREAT SERPL-MCNC: 1.11 MG/DL (ref 0.57–1)
DEPRECATED RDW RBC AUTO: 48.8 FL (ref 37–54)
EGFRCR SERPLBLD CKD-EPI 2021: 48.5 ML/MIN/1.73
EOSINOPHIL # BLD AUTO: 0 10*3/MM3 (ref 0–0.4)
EOSINOPHIL NFR BLD AUTO: 0 % (ref 0.3–6.2)
ERYTHROCYTE [DISTWIDTH] IN BLOOD BY AUTOMATED COUNT: 13.5 % (ref 12.3–15.4)
GEN 5 1HR TROPONIN T REFLEX: 21 NG/L
GLOBULIN UR ELPH-MCNC: 2.4 GM/DL
GLUCOSE SERPL-MCNC: 145 MG/DL (ref 65–99)
GLUCOSE UR STRIP-MCNC: NEGATIVE MG/DL
HCT VFR BLD AUTO: 39.5 % (ref 34–46.6)
HGB BLD-MCNC: 12.5 G/DL (ref 12–15.9)
HGB UR QL STRIP.AUTO: NEGATIVE
IMM GRANULOCYTES # BLD AUTO: 0.01 10*3/MM3 (ref 0–0.05)
IMM GRANULOCYTES NFR BLD AUTO: 0.1 % (ref 0–0.5)
KETONES UR QL STRIP: NEGATIVE
LEUKOCYTE ESTERASE UR QL STRIP.AUTO: ABNORMAL
LIPASE SERPL-CCNC: 15 U/L (ref 13–60)
LYMPHOCYTES # BLD AUTO: 0.81 10*3/MM3 (ref 0.7–3.1)
LYMPHOCYTES NFR BLD AUTO: 11.4 % (ref 19.6–45.3)
MCH RBC QN AUTO: 30.2 PG (ref 26.6–33)
MCHC RBC AUTO-ENTMCNC: 31.6 G/DL (ref 31.5–35.7)
MCV RBC AUTO: 95.4 FL (ref 79–97)
MONOCYTES # BLD AUTO: 0.26 10*3/MM3 (ref 0.1–0.9)
MONOCYTES NFR BLD AUTO: 3.7 % (ref 5–12)
NEUTROPHILS NFR BLD AUTO: 5.98 10*3/MM3 (ref 1.7–7)
NEUTROPHILS NFR BLD AUTO: 84.5 % (ref 42.7–76)
NITRITE UR QL STRIP: NEGATIVE
NT-PROBNP SERPL-MCNC: 1497 PG/ML (ref 0–1800)
PH UR STRIP.AUTO: 6 [PH] (ref 5–8)
PLATELET # BLD AUTO: 188 10*3/MM3 (ref 140–450)
PMV BLD AUTO: 10.1 FL (ref 6–12)
POTASSIUM SERPL-SCNC: 3.6 MMOL/L (ref 3.5–5.2)
PROT SERPL-MCNC: 6.2 G/DL (ref 6–8.5)
PROT UR QL STRIP: ABNORMAL
RBC # BLD AUTO: 4.14 10*6/MM3 (ref 3.77–5.28)
SODIUM SERPL-SCNC: 141 MMOL/L (ref 136–145)
SP GR UR STRIP: 1.01 (ref 1–1.03)
TROPONIN T DELTA: -1 NG/L
TROPONIN T SERPL HS-MCNC: 22 NG/L
UROBILINOGEN UR QL STRIP: ABNORMAL
WBC NRBC COR # BLD AUTO: 7.08 10*3/MM3 (ref 3.4–10.8)

## 2024-12-05 PROCEDURE — 71045 X-RAY EXAM CHEST 1 VIEW: CPT

## 2024-12-05 PROCEDURE — 99284 EMERGENCY DEPT VISIT MOD MDM: CPT | Performed by: EMERGENCY MEDICINE

## 2024-12-05 PROCEDURE — 99284 EMERGENCY DEPT VISIT MOD MDM: CPT

## 2024-12-05 PROCEDURE — 83880 ASSAY OF NATRIURETIC PEPTIDE: CPT | Performed by: EMERGENCY MEDICINE

## 2024-12-05 PROCEDURE — 71250 CT THORAX DX C-: CPT

## 2024-12-05 PROCEDURE — 81003 URINALYSIS AUTO W/O SCOPE: CPT | Performed by: EMERGENCY MEDICINE

## 2024-12-05 PROCEDURE — 93010 ELECTROCARDIOGRAM REPORT: CPT | Performed by: INTERNAL MEDICINE

## 2024-12-05 PROCEDURE — 83690 ASSAY OF LIPASE: CPT | Performed by: EMERGENCY MEDICINE

## 2024-12-05 PROCEDURE — 36415 COLL VENOUS BLD VENIPUNCTURE: CPT

## 2024-12-05 PROCEDURE — 85025 COMPLETE CBC W/AUTO DIFF WBC: CPT | Performed by: EMERGENCY MEDICINE

## 2024-12-05 PROCEDURE — 93005 ELECTROCARDIOGRAM TRACING: CPT | Performed by: EMERGENCY MEDICINE

## 2024-12-05 PROCEDURE — 84484 ASSAY OF TROPONIN QUANT: CPT | Performed by: EMERGENCY MEDICINE

## 2024-12-05 PROCEDURE — 80053 COMPREHEN METABOLIC PANEL: CPT | Performed by: EMERGENCY MEDICINE

## 2024-12-05 RX ORDER — CLONIDINE HYDROCHLORIDE 0.1 MG/1
0.1 TABLET ORAL ONCE
Status: COMPLETED | OUTPATIENT
Start: 2024-12-05 | End: 2024-12-05

## 2024-12-05 RX ORDER — LIDOCAINE 50 MG/G
1 PATCH TOPICAL DAILY PRN
Qty: 5 PATCH | Refills: 0 | Status: SHIPPED | OUTPATIENT
Start: 2024-12-05

## 2024-12-05 RX ADMIN — CLONIDINE HYDROCHLORIDE 0.1 MG: 0.1 TABLET ORAL at 22:20

## 2024-12-06 LAB
QT INTERVAL: 375 MS
QTC INTERVAL: 422 MS

## 2024-12-06 NOTE — FSED PROVIDER NOTE
"Subjective   History of Present Illness  83yo female pmh significant htn/hyperlipidemia/copd/cva/ckd/breast ca/ddd/PE presents ED c/o mechanical ground level fall 10d previously with persistent left sided chest wall pain exacerbated movement.  Denies loc/neck pain/back pain/soa/abd pain/nausea/vomiting/soa.  ROS (+) productive cough \"white sputum.\"    History provided by:  Patient and spouse  Fall  Associated symptoms: chest pain        Review of Systems   Constitutional: Negative.    HENT: Negative.     Eyes: Negative.    Respiratory:  Positive for cough. Negative for shortness of breath.    Cardiovascular:  Positive for chest pain and leg swelling.   Gastrointestinal: Negative.    Musculoskeletal: Negative.    Neurological: Negative.    All other systems reviewed and are negative.      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     DVT (deep venous thrombosis)     right leg wearin marianne hose on both legs    Emphysema lung     Generalized headaches     Hyperlipidemia     Hypertension     Incontinence of urine     wear pads    Kidney disease     Staph infection 2003    after left breast cancer surgery    Stroke        Allergies   Allergen Reactions    Bee Venom Shortness Of Breath and Rash    Morphine Anaphylaxis and Nausea And Vomiting    Sulfa Antibiotics Anaphylaxis and Hives     Or sulfa fillers in meds  Broke out in internal and external hives      Tree Extract Shortness Of Breath and Rash    Ambien [Zolpidem] Hallucinations    Anastrozole Other (See Comments)     Can't remember what the reaction was     Covid-19 (Mrna) Vaccine Swelling     Facial swelling, blood clots    Eliquis [Apixaban] Headache     Headaches, nausea and itching.     Hydralazine Myalgia "     Patient reports leg cramps, joint pain and increased fatigue    Norvasc [Amlodipine] Other (See Comments)     KIDNEYS SHUT DOWN    Nsaids Other (See Comments)     KIDNEY FAILURE    Penicillins Unknown (See Comments)     Severe reaction as a child  Tolerated ceftriaxone during 04/2017 admission    Grass Rash       Past Surgical History:   Procedure Laterality Date    BREAST EXCISIONAL BIOPSY Left 03/27/2003    Left excisional needle localization breast biopsy-Dr. Yazmin Canseco    CARDIAC ELECTROPHYSIOLOGY PROCEDURE N/A 5/27/2022    Procedure: Loop insertion;  Surgeon: Jeffrey Argueta MD;  Location: [facility] CATH INVASIVE LOCATION;  Service: Cardiovascular;  Laterality: N/A;  biotronik    CARDIAC ELECTROPHYSIOLOGY PROCEDURE N/A 7/13/2022    Procedure: Loop recorder removal;  Surgeon: Jeffrey Argueta MD;  Location: [facility] CATH INVASIVE LOCATION;  Service: Cardiovascular;  Laterality: N/A;    CARPAL TUNNEL RELEASE Right 05/25/2011    Dr. Caleb Bueno    CARPAL TUNNEL RELEASE Left 04/26/2011    Dr. Caleb Bueno    CATARACT EXTRACTION Left 11/29/2010    Dr. Eusebio Downs    CATARACT EXTRACTION Right 11/17/2010    Dr. Eusebio Downs    COLONOSCOPY N/A 12/06/2002    Sigmoid diverticulosis-Dr. Brandon Batista    COLONOSCOPY N/A 12/12/2013    Tortuous colon, diverticulosis in the sigmoid colon and descending colon, stool in the rectum, sigmoid colon, descending colon, splenic flexure, transverse colon and hepatic flexure-Dr. Irma Brambila    DEQUERVAIN RELEASE Left 9/23/2020    Procedure: DEQUERVAIN RELEASE LEFT HAND;  Surgeon: Caleb Bueno MD;  Location: [facility];  Service: Plastics;  Laterality: Left;    ENDOSCOPY N/A 09/13/2007    Normal-Dr. Pavel Quarles    ENDOSCOPY AND COLONOSCOPY N/A 09/19/2005    1 cm hiatal hernia, mild Schatzki's ring, sigmoid diverticulosis-Dr. Yoel Farley    GANGLION CYST EXCISION Left 12/18/2012    Release of A1 pulley flexor sheath,  left fourth finger, excision of ganglion cyst 0.5 cm diameter, A2 pulley flexor sheath, left fourth finger-Dr. Caleb Bueno    HEEL SPUR EXCISION Left 1968    INGUINAL HERNIA REPAIR Right 1971    at 5 months pregnant     INJECTION OF MEDICATION Left 9/23/2020    Procedure: KENOLOG INJECTION TO LEFT THUMB;  Surgeon: Caleb Bueno MD;  Location: [facility];  Service: Plastics;  Laterality: Left;    MASTECTOMY Right 08/05/2008    Right breast mastectomy and excision of left chest wall lesion-Dr. Yoel Farley    MASTECTOMY RADICAL Left 04/29/2003    Left modified radical mastectomy-Dr. Yazmin Canseco    PARATHYROIDECTOMY Right 05/04/2004    Excision of parathyroid adenoma (right superior)-Dr. Yoel Farley    REPLACEMENT TOTAL KNEE Right 04/09/2012    Dr. Lamonte Childress    SINUS SURGERY  1984    THYROIDECTOMY, PARTIAL Left 05/04/2004    Dr. Yoel Farley    TOTAL ABDOMINAL HYSTERECTOMY Bilateral 1973    Dr. Mahan    TRIGGER FINGER RELEASE Left 9/23/2020    Procedure: RELEASE LEFT FOURTH TRIGGER FINGER;  Surgeon: Caleb Bueno MD;  Location: [facility];  Service: Plastics;  Laterality: Left;    UPPER GASTROINTESTINAL ENDOSCOPY N/A 02/18/2009    LA Grade A reflux esophagitis, normal stomach, normal 2nd part of the duodenum-Dr. Yoel Farley    WRIST GANGLION EXCISION Left 9/23/2020    Procedure: EXCISION GANGLION CYST LEFT WRIST;  Surgeon: Caleb Bueno MD;  Location: [facility];  Service: Plastics;  Laterality: Left;       Family History   Problem Relation Age of Onset    Brain cancer Brother     Alcohol abuse Mother     No Known Problems Father     No Known Problems Sister     Malig Hyperthermia Neg Hx        Social History     Socioeconomic History    Marital status:      Spouse name: Joey    Number of children: 6    Highest education level: Some college, no degree   Tobacco Use    Smoking status: Never     Passive exposure: Never    Smokeless tobacco:  Never   Vaping Use    Vaping status: Never Used   Substance and Sexual Activity    Alcohol use: Yes     Comment: 3 drinks yearly    Drug use: Never    Sexual activity: Defer     Birth control/protection: Surgical           Objective   Physical Exam  Vitals and nursing note reviewed.   Constitutional:       Appearance: Normal appearance.   HENT:      Head: Normocephalic and atraumatic.      Right Ear: External ear normal.      Left Ear: External ear normal.      Nose: Nose normal.      Mouth/Throat:      Mouth: Mucous membranes are dry.      Pharynx: No oropharyngeal exudate or posterior oropharyngeal erythema.   Eyes:      Conjunctiva/sclera: Conjunctivae normal.      Pupils: Pupils are equal, round, and reactive to light.   Cardiovascular:      Rate and Rhythm: Normal rate and regular rhythm.      Pulses: Normal pulses.      Heart sounds: Normal heart sounds. No murmur heard.     No friction rub. No gallop.   Pulmonary:      Effort: Pulmonary effort is normal. No respiratory distress.      Breath sounds: Normal breath sounds. No wheezing, rhonchi or rales.   Chest:      Chest wall: No tenderness.   Abdominal:      General: Abdomen is flat. Bowel sounds are normal. There is no distension.      Palpations: Abdomen is soft.      Tenderness: There is no abdominal tenderness. There is no guarding or rebound.   Musculoskeletal:      Cervical back: Normal range of motion and neck supple. No rigidity.      Right lower leg: Edema present.      Left lower leg: Edema present.   Lymphadenopathy:      Cervical: No cervical adenopathy.   Skin:     General: Skin is warm and dry.   Neurological:      General: No focal deficit present.      Mental Status: She is alert and oriented to person, place, and time.      GCS: GCS eye subscore is 4. GCS verbal subscore is 5. GCS motor subscore is 6.      Sensory: Sensation is intact.      Motor: Motor function is intact.         ECG 12 Lead Chest Pain      Date/Time: 12/5/2024 8:35  PM    Performed by: Dmitri Espino MD  Authorized by: Dmitri Espino MD  Interpreted by ED physician  Rhythm: sinus rhythm  Rate: normal  BPM: 76  QRS axis: normal  Conduction: 1st degree  ST Segments: ST segments normal  T Waves: T waves normal  Other: no other findings  Clinical impression: non-specific ECG               ED Course      Labs Reviewed   COMPREHENSIVE METABOLIC PANEL - Abnormal; Notable for the following components:       Result Value    Glucose 145 (*)     BUN 29 (*)     Creatinine 1.11 (*)     BUN/Creatinine Ratio 26.1 (*)     eGFR 48.5 (*)     All other components within normal limits    Narrative:     GFR Normal >60  Chronic Kidney Disease <60  Kidney Failure <15    The GFR formula is only valid for adults with stable renal function between ages 18 and 70.   URINALYSIS WITHOUT MICROSCOPIC (NO CULTURE) - Abnormal; Notable for the following components:    Protein, UA 30 mg/dL (1+) (*)     Leuk Esterase, UA Trace (*)     All other components within normal limits   TROPONIN - Abnormal; Notable for the following components:    HS Troponin T 22 (*)     All other components within normal limits    Narrative:     High Sensitive Troponin T Reference Range:  <14.0 ng/L- Negative Female for AMI  <22.0 ng/L- Negative Male for AMI  >=14 - Abnormal Female indicating possible myocardial injury.  >=22 - Abnormal Male indicating possible myocardial injury.   Clinicians would have to utilize clinical acumen, EKG, Troponin, and serial changes to determine if it is an Acute Myocardial Infarction or myocardial injury due to an underlying chronic condition.        CBC WITH AUTO DIFFERENTIAL - Abnormal; Notable for the following components:    Neutrophil % 84.5 (*)     Lymphocyte % 11.4 (*)     Monocyte % 3.7 (*)     Eosinophil % 0.0 (*)     All other components within normal limits   HIGH SENSITIVITIY TROPONIN T 2HR - Abnormal; Notable for the following components:    HS Troponin T 21 (*)     All other components  within normal limits    Narrative:     High Sensitive Troponin T Reference Range:  <14.0 ng/L- Negative Female for AMI  <22.0 ng/L- Negative Male for AMI  >=14 - Abnormal Female indicating possible myocardial injury.  >=22 - Abnormal Male indicating possible myocardial injury.   Clinicians would have to utilize clinical acumen, EKG, Troponin, and serial changes to determine if it is an Acute Myocardial Infarction or myocardial injury due to an underlying chronic condition.        LIPASE - Normal   BNP (IN-HOUSE) - Normal    Narrative:     This assay is used as an aid in the diagnosis of individuals suspected of having heart failure. It can be used as an aid in the diagnosis of acute decompensated heart failure (ADHF) in patients presenting with signs and symptoms of ADHF to the emergency department (ED). In addition, NT-proBNP of <300 pg/mL indicates ADHF is not likely.    Age Range Result Interpretation  NT-proBNP Concentration (pg/mL:      <50             Positive            >450                   Gray                 300-450                    Negative             <300    50-75           Positive            >900                  Gray                300-900                  Negative            <300      >75             Positive            >1800                  Gray                300-1800                  Negative            <300   CBC AND DIFFERENTIAL    Narrative:     The following orders were created for panel order CBC & Differential.  Procedure                               Abnormality         Status                     ---------                               -----------         ------                     CBC Auto Differential[[id number]]        Abnormal            Final result                 Please view results for these tests on the individual orders.     CT Chest Without Contrast Diagnostic    Result Date: 12/5/2024  Narrative: CT CHEST WO CONTRAST DIAGNOSTIC-  Radiation dose reduction techniques were  utilized, including automated exposure control and exposure modulation based on body size.  Clinical: Fell with left chest pain  COMPARISON examination 4/4/2023  FINDINGS: 1. No rib fracture demonstrated with special attention to the left ribs. There is minimal old appearing deformity of the manubrium, the sternal body is satisfactory in appearance. No thoracic compression deformity seen.  2. Tiny calcified benign granuloma noted within the left. The left lung is otherwise clear. Linear areas of scarring versus discoid atelectasis at the base of the right lung. No pleural effusion or vascular congestion seen.  3. Cardiac size upper normal to mildly enlarged no pericardial or esophageal abnormality seen. There is atherosclerotic calcification of a normal diameter aorta. There is coronary artery calcification. No mediastinal or hilar mass/lymphadenopathy.  4. Considerable bilateral shoulder joint degeneration with large effusions. Limited images through the upper abdomen demonstrate a normal-appearing spleen. The remainder of the upper abdominal viscera is unremarkable. There is subcutaneous fat edema demonstrated along the right and left anterior upper abdominal wall. The remainder is unremarkable.     This report was finalized on 12/5/2024 8:32 PM by Dr. Wojciech Bolanos M.D on Workstation: [id number]      XR Chest 1 View    Result Date: 12/5/2024  Narrative: XR CHEST 1 VW-  Clinical: Left rib pain  COMPARISON examination 10/20/2024  FINDINGS: Cardiac size upper limits of normal to mildly enlarged. No mediastinal or hilar abnormality is straightened. The lungs are clear. No effusion or pneumothorax seen. Hemostatic clips demonstrated within the chest wall as before.  CONCLUSION: No active disease of the chest  This report was finalized on 12/5/2024 8:21 PM by Dr. Wojciech Bolanos M.D on Workstation: [id number]                                          Medical Decision Making  Labs/radiographic findings reviewed.   EKG: SR 1AVB/rate 76/normal axis/no acute STTW changes.  CBC: unremarkable.  CMP: creatinine 1.11/BUN 29.  Lipase: normal.  BNP: normal.  hsTnT: chronic mild elevation noted previous values, similar mild elevation x2 in ED with delta (-1).  Pt reports left chest wall tenderness palpation x1wk duration.  CT chest: no active disease/no rib fractures/no pneumothorax/no pleural effusion.  Pt notably hypertensive in ED /80.  Pt reported had not taken usual night time clonidine dose.  Pt given clonidine 0.1mg po in ED. Stable discharge with pmd followup.  Plan lidoderm patch 5% daily prn/tylenol prn.  Strict return precautions.    Problems Addressed:  Contusion of left chest wall, initial encounter: complicated acute illness or injury  Essential hypertension: complicated acute illness or injury    Amount and/or Complexity of Data Reviewed  Labs: ordered.  Radiology: ordered.  ECG/medicine tests: ordered and independent interpretation performed.    Risk  Prescription drug management.        Final diagnoses:   Contusion of left chest wall, initial encounter   Essential hypertension       ED Disposition  ED Disposition       ED Disposition   Discharge    Condition   Good    Comment   --               Óscar Cadet MD  [address] [place] [address]  [phone]    In 1 day           Medication List        New Prescriptions      lidocaine 5 %  Commonly known as: Lidoderm  Place 1 patch on the skin as directed by provider Daily As Needed for Mild Pain. Remove & Discard patch within 12 hours or as directed by MD               Where to Get Your Medications        These medications were sent to [facility] - [place], [place] - [id number] [facility] - [phone]  - [phone]   [id number] [facility], [facility] [address]      Phone: [phone]   lidocaine 5 %

## 2024-12-06 NOTE — ED NOTES
Pt assisted with incontinence care, brief and Poise pads removed and discarded. Stretcher and clothing clean and dry. Pt assisted up to [facility] and assisted pt with changing her pad and pull up. Pt is also requesting her night time dose of Clonidine. MD notified.

## 2024-12-27 ENCOUNTER — TRANSCRIBE ORDERS (OUTPATIENT)
Dept: ADMINISTRATIVE | Facility: HOSPITAL | Age: 84
End: 2024-12-27
Payer: MEDICARE

## 2024-12-27 ENCOUNTER — HOSPITAL ENCOUNTER (OUTPATIENT)
Dept: GENERAL RADIOLOGY | Facility: HOSPITAL | Age: 84
Discharge: HOME | End: 2024-12-27
Payer: MEDICARE

## 2024-12-27 DIAGNOSIS — M79.89 SWELLING OF LIMB: ICD-10-CM

## 2024-12-27 DIAGNOSIS — M79.673 PAIN OF FOOT, UNSPECIFIED LATERALITY: ICD-10-CM

## 2024-12-27 DIAGNOSIS — M79.89 SWELLING OF LIMB: Primary | ICD-10-CM

## 2024-12-27 PROCEDURE — 73610 X-RAY EXAM OF ANKLE: CPT

## 2024-12-27 PROCEDURE — 73630 X-RAY EXAM OF FOOT: CPT

## 2025-01-12 ENCOUNTER — HOSPITAL ENCOUNTER (INPATIENT)
Facility: HOSPITAL | Age: 85
LOS: 3 days | Discharge: HOME HEALTH | End: 2025-01-15
Attending: EMERGENCY MEDICINE | Admitting: INTERNAL MEDICINE
Payer: MEDICARE

## 2025-01-12 ENCOUNTER — APPOINTMENT (OUTPATIENT)
Dept: GENERAL RADIOLOGY | Facility: HOSPITAL | Age: 85
End: 2025-01-12
Payer: MEDICARE

## 2025-01-12 ENCOUNTER — APPOINTMENT (OUTPATIENT)
Dept: CT IMAGING | Facility: HOSPITAL | Age: 85
End: 2025-01-12
Payer: MEDICARE

## 2025-01-12 DIAGNOSIS — F41.1 GAD (GENERALIZED ANXIETY DISORDER): ICD-10-CM

## 2025-01-12 DIAGNOSIS — R42 DIZZINESS: ICD-10-CM

## 2025-01-12 DIAGNOSIS — Z78.9 DECREASED ACTIVITIES OF DAILY LIVING (ADL): ICD-10-CM

## 2025-01-12 DIAGNOSIS — R62.7 ADULT FAILURE TO THRIVE SYNDROME: ICD-10-CM

## 2025-01-12 DIAGNOSIS — R29.6 RECURRENT FALLS: ICD-10-CM

## 2025-01-12 DIAGNOSIS — R53.1 GENERALIZED WEAKNESS: Primary | ICD-10-CM

## 2025-01-12 DIAGNOSIS — N39.41 URGE INCONTINENCE OF URINE: ICD-10-CM

## 2025-01-12 DIAGNOSIS — N28.9 ACUTE RENAL INSUFFICIENCY: ICD-10-CM

## 2025-01-12 DIAGNOSIS — R33.9 URINARY RETENTION: ICD-10-CM

## 2025-01-12 DIAGNOSIS — R29.6 RECURRENT FALLS WHILE WALKING: ICD-10-CM

## 2025-01-12 DIAGNOSIS — I10 ACCELERATED HYPERTENSION: ICD-10-CM

## 2025-01-12 DIAGNOSIS — E87.6 HYPOKALEMIA: ICD-10-CM

## 2025-01-12 DIAGNOSIS — I50.32 DIASTOLIC DYSFUNCTION WITH CHRONIC HEART FAILURE: ICD-10-CM

## 2025-01-12 PROBLEM — E86.9 VOLUME DEPLETION: Status: ACTIVE | Noted: 2025-01-12

## 2025-01-12 PROBLEM — N17.9 ACUTE KIDNEY INJURY SUPERIMPOSED ON CKD: Status: ACTIVE | Noted: 2025-01-12

## 2025-01-12 PROBLEM — D64.9 ANEMIA, MILD: Status: ACTIVE | Noted: 2025-01-12

## 2025-01-12 PROBLEM — E04.0 GOITER DIFFUSE: Status: ACTIVE | Noted: 2025-01-12

## 2025-01-12 PROBLEM — S06.0XAA CLOSED HEAD INJURY WITH CONCUSSION: Status: ACTIVE | Noted: 2025-01-12

## 2025-01-12 PROBLEM — N18.9 ACUTE KIDNEY INJURY SUPERIMPOSED ON CKD: Status: ACTIVE | Noted: 2025-01-12

## 2025-01-12 PROBLEM — I99.8 BLOOD PRESSURE INSTABILITY: Status: RESOLVED | Noted: 2022-05-25 | Resolved: 2025-01-12

## 2025-01-12 PROBLEM — M48.02 CERVICAL STENOSIS OF SPINE: Status: ACTIVE | Noted: 2025-01-12

## 2025-01-12 PROBLEM — M62.81 GENERALIZED MUSCLE WEAKNESS: Status: ACTIVE | Noted: 2022-09-30

## 2025-01-12 LAB
ALBUMIN SERPL-MCNC: 3.5 G/DL (ref 3.5–5.2)
ALBUMIN/GLOB SERPL: 1.2 G/DL
ALP SERPL-CCNC: 100 U/L (ref 39–117)
ALT SERPL W P-5'-P-CCNC: 27 U/L (ref 1–33)
ANION GAP SERPL CALCULATED.3IONS-SCNC: 12.8 MMOL/L (ref 5–15)
AST SERPL-CCNC: 35 U/L (ref 1–32)
BACTERIA UR QL AUTO: NORMAL /HPF
BASOPHILS # BLD AUTO: 0.03 10*3/MM3 (ref 0–0.2)
BASOPHILS NFR BLD AUTO: 0.4 % (ref 0–1.5)
BILIRUB SERPL-MCNC: 0.3 MG/DL (ref 0–1.2)
BILIRUB UR QL STRIP: NEGATIVE
BUN SERPL-MCNC: 26 MG/DL (ref 8–23)
BUN/CREAT SERPL: 18.8 (ref 7–25)
CALCIUM SPEC-SCNC: 9.3 MG/DL (ref 8.6–10.5)
CHLORIDE SERPL-SCNC: 102 MMOL/L (ref 98–107)
CK SERPL-CCNC: 159 U/L (ref 20–180)
CLARITY UR: CLEAR
CO2 SERPL-SCNC: 29.2 MMOL/L (ref 22–29)
COLOR UR: YELLOW
CREAT SERPL-MCNC: 1.38 MG/DL (ref 0.57–1)
DEPRECATED RDW RBC AUTO: 45.6 FL (ref 37–54)
EGFRCR SERPLBLD CKD-EPI 2021: 37.4 ML/MIN/1.73
EOSINOPHIL # BLD AUTO: 0.07 10*3/MM3 (ref 0–0.4)
EOSINOPHIL NFR BLD AUTO: 0.9 % (ref 0.3–6.2)
ERYTHROCYTE [DISTWIDTH] IN BLOOD BY AUTOMATED COUNT: 13.1 % (ref 12.3–15.4)
GLOBULIN UR ELPH-MCNC: 3 GM/DL
GLUCOSE SERPL-MCNC: 114 MG/DL (ref 65–99)
GLUCOSE UR STRIP-MCNC: NEGATIVE MG/DL
HCT VFR BLD AUTO: 37.5 % (ref 34–46.6)
HGB BLD-MCNC: 11.6 G/DL (ref 12–15.9)
HGB UR QL STRIP.AUTO: NEGATIVE
HYALINE CASTS UR QL AUTO: NORMAL /LPF
IMM GRANULOCYTES # BLD AUTO: 0.02 10*3/MM3 (ref 0–0.05)
IMM GRANULOCYTES NFR BLD AUTO: 0.2 % (ref 0–0.5)
KETONES UR QL STRIP: NEGATIVE
LEUKOCYTE ESTERASE UR QL STRIP.AUTO: ABNORMAL
LYMPHOCYTES # BLD AUTO: 2 10*3/MM3 (ref 0.7–3.1)
LYMPHOCYTES NFR BLD AUTO: 24.8 % (ref 19.6–45.3)
MAGNESIUM SERPL-MCNC: 1.9 MG/DL (ref 1.6–2.4)
MCH RBC QN AUTO: 29.3 PG (ref 26.6–33)
MCHC RBC AUTO-ENTMCNC: 30.9 G/DL (ref 31.5–35.7)
MCV RBC AUTO: 94.7 FL (ref 79–97)
MONOCYTES # BLD AUTO: 0.73 10*3/MM3 (ref 0.1–0.9)
MONOCYTES NFR BLD AUTO: 9.1 % (ref 5–12)
NEUTROPHILS NFR BLD AUTO: 5.2 10*3/MM3 (ref 1.7–7)
NEUTROPHILS NFR BLD AUTO: 64.6 % (ref 42.7–76)
NITRITE UR QL STRIP: NEGATIVE
NRBC BLD AUTO-RTO: 0 /100 WBC (ref 0–0.2)
PH UR STRIP.AUTO: 6 [PH] (ref 5–8)
PLATELET # BLD AUTO: 187 10*3/MM3 (ref 140–450)
PMV BLD AUTO: 10.4 FL (ref 6–12)
POTASSIUM SERPL-SCNC: 3 MMOL/L (ref 3.5–5.2)
PROT SERPL-MCNC: 6.5 G/DL (ref 6–8.5)
PROT UR QL STRIP: ABNORMAL
RBC # BLD AUTO: 3.96 10*6/MM3 (ref 3.77–5.28)
RBC # UR STRIP: NORMAL /HPF
REF LAB TEST METHOD: NORMAL
SODIUM SERPL-SCNC: 144 MMOL/L (ref 136–145)
SP GR UR STRIP: 1.02 (ref 1–1.03)
SQUAMOUS #/AREA URNS HPF: NORMAL /HPF
UROBILINOGEN UR QL STRIP: ABNORMAL
WBC # UR STRIP: NORMAL /HPF
WBC NRBC COR # BLD AUTO: 8.05 10*3/MM3 (ref 3.4–10.8)

## 2025-01-12 PROCEDURE — 81001 URINALYSIS AUTO W/SCOPE: CPT | Performed by: EMERGENCY MEDICINE

## 2025-01-12 PROCEDURE — G0378 HOSPITAL OBSERVATION PER HR: HCPCS

## 2025-01-12 PROCEDURE — P9612 CATHETERIZE FOR URINE SPEC: HCPCS

## 2025-01-12 PROCEDURE — 73030 X-RAY EXAM OF SHOULDER: CPT

## 2025-01-12 PROCEDURE — 70450 CT HEAD/BRAIN W/O DYE: CPT

## 2025-01-12 PROCEDURE — 82550 ASSAY OF CK (CPK): CPT | Performed by: EMERGENCY MEDICINE

## 2025-01-12 PROCEDURE — 25810000003 SODIUM CHLORIDE 0.9 % SOLUTION: Performed by: EMERGENCY MEDICINE

## 2025-01-12 PROCEDURE — 72125 CT NECK SPINE W/O DYE: CPT

## 2025-01-12 PROCEDURE — 85025 COMPLETE CBC W/AUTO DIFF WBC: CPT | Performed by: EMERGENCY MEDICINE

## 2025-01-12 PROCEDURE — 99285 EMERGENCY DEPT VISIT HI MDM: CPT

## 2025-01-12 PROCEDURE — 83735 ASSAY OF MAGNESIUM: CPT | Performed by: EMERGENCY MEDICINE

## 2025-01-12 PROCEDURE — 80053 COMPREHEN METABOLIC PANEL: CPT | Performed by: EMERGENCY MEDICINE

## 2025-01-12 RX ORDER — PREDNISONE 10 MG/1
10 TABLET ORAL DAILY
Status: DISCONTINUED | OUTPATIENT
Start: 2025-01-13 | End: 2025-01-15 | Stop reason: HOSPADM

## 2025-01-12 RX ORDER — HYDROCODONE BITARTRATE AND ACETAMINOPHEN 10; 325 MG/1; MG/1
1 TABLET ORAL EVERY 4 HOURS PRN
Status: DISCONTINUED | OUTPATIENT
Start: 2025-01-12 | End: 2025-01-15 | Stop reason: HOSPADM

## 2025-01-12 RX ORDER — SODIUM CHLORIDE 450 MG/100ML
50 INJECTION, SOLUTION INTRAVENOUS CONTINUOUS
Status: DISCONTINUED | OUTPATIENT
Start: 2025-01-13 | End: 2025-01-14

## 2025-01-12 RX ORDER — POLYETHYLENE GLYCOL 3350 17 G/17G
17 POWDER, FOR SOLUTION ORAL DAILY PRN
Status: DISCONTINUED | OUTPATIENT
Start: 2025-01-12 | End: 2025-01-15 | Stop reason: HOSPADM

## 2025-01-12 RX ORDER — NIFEDIPINE 30 MG/1
30 TABLET, EXTENDED RELEASE ORAL
Status: DISCONTINUED | OUTPATIENT
Start: 2025-01-13 | End: 2025-01-15

## 2025-01-12 RX ORDER — POTASSIUM CHLORIDE 1.5 G/1.58G
20 POWDER, FOR SOLUTION ORAL DAILY
Status: DISCONTINUED | OUTPATIENT
Start: 2025-01-13 | End: 2025-01-14

## 2025-01-12 RX ORDER — BUTALBITAL, ACETAMINOPHEN AND CAFFEINE 50; 325; 40 MG/1; MG/1; MG/1
2 TABLET ORAL EVERY 4 HOURS PRN
Status: DISCONTINUED | OUTPATIENT
Start: 2025-01-12 | End: 2025-01-15 | Stop reason: HOSPADM

## 2025-01-12 RX ORDER — TRIAMCINOLONE ACETONIDE 1 MG/G
1 CREAM TOPICAL 2 TIMES DAILY
Status: DISCONTINUED | OUTPATIENT
Start: 2025-01-13 | End: 2025-01-15 | Stop reason: HOSPADM

## 2025-01-12 RX ORDER — EPLERENONE 25 MG/1
50 TABLET, FILM COATED ORAL EVERY 12 HOURS SCHEDULED
Status: DISCONTINUED | OUTPATIENT
Start: 2025-01-13 | End: 2025-01-15 | Stop reason: HOSPADM

## 2025-01-12 RX ORDER — MUPIROCIN 20 MG/G
1 OINTMENT TOPICAL
Status: DISCONTINUED | OUTPATIENT
Start: 2025-01-13 | End: 2025-01-15 | Stop reason: HOSPADM

## 2025-01-12 RX ORDER — DIPHENHYDRAMINE HCL 25 MG
50 TABLET ORAL EVERY 4 HOURS PRN
Status: DISCONTINUED | OUTPATIENT
Start: 2025-01-12 | End: 2025-01-15 | Stop reason: HOSPADM

## 2025-01-12 RX ORDER — ACETAMINOPHEN 325 MG/1
650 TABLET ORAL EVERY 4 HOURS PRN
Status: DISCONTINUED | OUTPATIENT
Start: 2025-01-12 | End: 2025-01-15 | Stop reason: HOSPADM

## 2025-01-12 RX ORDER — LEVOCETIRIZINE DIHYDROCHLORIDE 5 MG/1
1 TABLET, FILM COATED ORAL EVERY MORNING
COMMUNITY
Start: 2025-01-12

## 2025-01-12 RX ORDER — ONDANSETRON 4 MG/1
8 TABLET, ORALLY DISINTEGRATING ORAL EVERY 6 HOURS PRN
Status: DISCONTINUED | OUTPATIENT
Start: 2025-01-12 | End: 2025-01-15 | Stop reason: HOSPADM

## 2025-01-12 RX ORDER — NITROGLYCERIN 0.4 MG/1
0.4 TABLET SUBLINGUAL
Status: DISCONTINUED | OUTPATIENT
Start: 2025-01-12 | End: 2025-01-15 | Stop reason: HOSPADM

## 2025-01-12 RX ORDER — ERGOCALCIFEROL 1.25 MG/1
50000 CAPSULE, LIQUID FILLED ORAL WEEKLY
Status: DISCONTINUED | OUTPATIENT
Start: 2025-01-14 | End: 2025-01-15 | Stop reason: HOSPADM

## 2025-01-12 RX ORDER — FLUTICASONE PROPIONATE 50 MCG
1 SPRAY, SUSPENSION (ML) NASAL 2 TIMES DAILY
Status: DISCONTINUED | OUTPATIENT
Start: 2025-01-13 | End: 2025-01-15 | Stop reason: HOSPADM

## 2025-01-12 RX ORDER — POTASSIUM CHLORIDE 1.5 G/1.58G
20 POWDER, FOR SOLUTION ORAL DAILY
COMMUNITY
End: 2025-01-15 | Stop reason: HOSPADM

## 2025-01-12 RX ORDER — SODIUM CHLORIDE 9 MG/ML
75 INJECTION, SOLUTION INTRAVENOUS CONTINUOUS
Status: DISCONTINUED | OUTPATIENT
Start: 2025-01-12 | End: 2025-01-12

## 2025-01-12 RX ORDER — HYDROCODONE BITARTRATE AND ACETAMINOPHEN 10; 325 MG/1; MG/1
1 TABLET ORAL EVERY 6 HOURS PRN
Status: DISCONTINUED | OUTPATIENT
Start: 2025-01-12 | End: 2025-01-13

## 2025-01-12 RX ORDER — MULTIVITAMIN WITH IRON
1000 TABLET ORAL DAILY
Status: DISCONTINUED | OUTPATIENT
Start: 2025-01-13 | End: 2025-01-15 | Stop reason: HOSPADM

## 2025-01-12 RX ORDER — PROMETHAZINE HYDROCHLORIDE AND CODEINE PHOSPHATE 6.25; 1 MG/5ML; MG/5ML
5 SYRUP ORAL 4 TIMES DAILY PRN
Status: DISCONTINUED | OUTPATIENT
Start: 2025-01-12 | End: 2025-01-15 | Stop reason: HOSPADM

## 2025-01-12 RX ORDER — CLONIDINE HYDROCHLORIDE 0.1 MG/1
0.1 TABLET ORAL EVERY 6 HOURS PRN
Status: DISCONTINUED | OUTPATIENT
Start: 2025-01-12 | End: 2025-01-15 | Stop reason: HOSPADM

## 2025-01-12 RX ORDER — LORAZEPAM 0.5 MG/1
0.5 TABLET ORAL EVERY 8 HOURS PRN
Status: DISCONTINUED | OUTPATIENT
Start: 2025-01-12 | End: 2025-01-15 | Stop reason: HOSPADM

## 2025-01-12 RX ORDER — PANTOPRAZOLE SODIUM 40 MG/1
40 TABLET, DELAYED RELEASE ORAL
Status: DISCONTINUED | OUTPATIENT
Start: 2025-01-13 | End: 2025-01-15 | Stop reason: HOSPADM

## 2025-01-12 RX ORDER — MULTIPLE VITAMINS W/ MINERALS TAB 9MG-400MCG
1 TAB ORAL DAILY
Status: DISCONTINUED | OUTPATIENT
Start: 2025-01-13 | End: 2025-01-15 | Stop reason: HOSPADM

## 2025-01-12 RX ORDER — TRIAMCINOLONE ACETONIDE 1 MG/G
1 CREAM TOPICAL 2 TIMES DAILY
COMMUNITY
Start: 2025-01-08 | End: 2025-01-15 | Stop reason: HOSPADM

## 2025-01-12 RX ORDER — HYDROCODONE BITARTRATE AND ACETAMINOPHEN 10; 325 MG/1; MG/1
2 TABLET ORAL EVERY 6 HOURS PRN
Status: DISCONTINUED | OUTPATIENT
Start: 2025-01-12 | End: 2025-01-13

## 2025-01-12 RX ORDER — MULTIPLE VITAMINS W/ MINERALS TAB 9MG-400MCG
1 TAB ORAL DAILY
Status: DISCONTINUED | OUTPATIENT
Start: 2025-01-13 | End: 2025-01-13 | Stop reason: SDUPTHER

## 2025-01-12 RX ORDER — TORSEMIDE 20 MG/1
50 TABLET ORAL DAILY
Status: DISCONTINUED | OUTPATIENT
Start: 2025-01-13 | End: 2025-01-15 | Stop reason: HOSPADM

## 2025-01-12 RX ORDER — SODIUM CHLORIDE 9 MG/ML
40 INJECTION, SOLUTION INTRAVENOUS AS NEEDED
Status: DISCONTINUED | OUTPATIENT
Start: 2025-01-12 | End: 2025-01-15 | Stop reason: HOSPADM

## 2025-01-12 RX ORDER — ROSUVASTATIN CALCIUM 5 MG/1
5 TABLET, COATED ORAL EVERY MORNING
Status: DISCONTINUED | OUTPATIENT
Start: 2025-01-13 | End: 2025-01-15 | Stop reason: HOSPADM

## 2025-01-12 RX ORDER — HYDROXYZINE HYDROCHLORIDE 10 MG/1
1 TABLET, FILM COATED ORAL 3 TIMES DAILY PRN
COMMUNITY
Start: 2025-01-12

## 2025-01-12 RX ORDER — HYDROXYZINE HYDROCHLORIDE 10 MG/1
10 TABLET, FILM COATED ORAL 3 TIMES DAILY PRN
Status: DISCONTINUED | OUTPATIENT
Start: 2025-01-12 | End: 2025-01-15 | Stop reason: HOSPADM

## 2025-01-12 RX ORDER — FAMOTIDINE 20 MG/1
40 TABLET, FILM COATED ORAL DAILY
Status: DISCONTINUED | OUTPATIENT
Start: 2025-01-13 | End: 2025-01-15 | Stop reason: HOSPADM

## 2025-01-12 RX ORDER — SODIUM CHLORIDE 0.9 % (FLUSH) 0.9 %
3 SYRINGE (ML) INJECTION EVERY 12 HOURS SCHEDULED
Status: DISCONTINUED | OUTPATIENT
Start: 2025-01-13 | End: 2025-01-15 | Stop reason: HOSPADM

## 2025-01-12 RX ORDER — ALBUTEROL SULFATE 0.83 MG/ML
2.5 SOLUTION RESPIRATORY (INHALATION) EVERY 6 HOURS PRN
Status: DISCONTINUED | OUTPATIENT
Start: 2025-01-12 | End: 2025-01-15 | Stop reason: HOSPADM

## 2025-01-12 RX ORDER — CETIRIZINE HYDROCHLORIDE 10 MG/1
10 TABLET ORAL DAILY
Status: DISCONTINUED | OUTPATIENT
Start: 2025-01-13 | End: 2025-01-15 | Stop reason: HOSPADM

## 2025-01-12 RX ORDER — CLONIDINE HYDROCHLORIDE 0.1 MG/1
0.2 TABLET ORAL EVERY 6 HOURS PRN
Status: DISCONTINUED | OUTPATIENT
Start: 2025-01-12 | End: 2025-01-15 | Stop reason: HOSPADM

## 2025-01-12 RX ORDER — SODIUM CHLORIDE 0.9 % (FLUSH) 0.9 %
3-10 SYRINGE (ML) INJECTION AS NEEDED
Status: DISCONTINUED | OUTPATIENT
Start: 2025-01-12 | End: 2025-01-15 | Stop reason: HOSPADM

## 2025-01-12 RX ORDER — POTASSIUM CHLORIDE 750 MG/1
40 TABLET, FILM COATED, EXTENDED RELEASE ORAL ONCE
Status: COMPLETED | OUTPATIENT
Start: 2025-01-12 | End: 2025-01-12

## 2025-01-12 RX ADMIN — SODIUM CHLORIDE 75 ML/HR: 9 INJECTION, SOLUTION INTRAVENOUS at 17:13

## 2025-01-12 RX ADMIN — SODIUM CHLORIDE 500 ML: 9 INJECTION, SOLUTION INTRAVENOUS at 17:12

## 2025-01-12 RX ADMIN — POTASSIUM CHLORIDE 40 MEQ: 750 TABLET, EXTENDED RELEASE ORAL at 17:43

## 2025-01-12 NOTE — PROGRESS NOTES
Clinical Pharmacy Services: Medication History    Sasha Barrett is a 84 y.o. 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    Allergies as of 01/12/2025 - Reviewed 01/12/2025   Allergen Reaction Noted    Bee venom Shortness Of Breath and Rash 03/25/2019    Morphine Anaphylaxis and Nausea And Vomiting 06/29/2015    Sulfa antibiotics Anaphylaxis and Hives 06/29/2015    Tree extract Shortness Of Breath and Rash 07/25/2016    Ambien [zolpidem] Hallucinations 06/29/2015    Anastrozole Other (See Comments) 07/25/2016    Covid-19 (mrna) vaccine Swelling 05/25/2022    Eliquis [apixaban] Headache 06/10/2020    Hydralazine Myalgia 11/29/2016    Norvasc [amlodipine] Other (See Comments) 06/29/2015    Nsaids Other (See Comments) 06/29/2015    Penicillins Unknown (See Comments) 06/29/2015    Grass Rash 07/25/2016       Medication information was obtained from: Patient  Pharmacy and Phone Number:     Prior to Admission Medications       Prescriptions Last Dose Informant Patient Reported? Taking?    acetaminophen (TYLENOL) 325 MG tablet  Self No Yes    Take 2 tablets by mouth Every 4 (Four) Hours As Needed for Mild Pain .    albuterol (PROAIR HFA) 108 (90 Base) MCG/ACT inhaler 1/11/2025 Self Yes Yes    4 puffs Every 4 (Four) Hours As Needed for Wheezing or Shortness of Air.    butalbital-acetaminophen-caffeine (FIORICET, ESGIC) -40 MG per tablet 1/12/2025 Self No Yes    Take 2 tablets by mouth Every 4 (Four) Hours As Needed for Headache.    carboxymethylcellulose (REFRESH PLUS) 0.5 % solution  Self Yes Yes     Administer 1 drop to both eyes 2 (Two) Times a Day As Needed for Dry Eyes.    cetirizine (zyrTEC) 10 MG tablet 1/11/2025 Self Yes Yes    Take 1 tablet by mouth Daily. Alt with singulair    Cholecalciferol (VITAMIN D3) 1.25 MG (33557 UT) capsule Past Week Self Yes Yes    Take 1 capsule by mouth 1 (One) Time Per Week. TUESDAYS    cloNIDine (CATAPRES) 0.2 MG tablet  Self No Yes    Take 1 tablet by mouth Every 6 (Six) Hours. Dose titrated for BP control by patient    cloNIDine (CATAPRES) 0.3 MG tablet 1/12/2025 Self No Yes    Take 1 tablet by mouth Every 6 (Six) Hours. Dose titrated by patient for BP control    Diclofenac Sodium (VOLTAREN) 1 % gel gel 1/11/2025 Self No Yes    Apply 4 g topically to the appropriate area as directed 4 (Four) Times a Day As Needed (Apply to knees or other joints when pain is severe and flares).    diphenhydrAMINE (BENADRYL) 50 MG tablet 1/11/2025 Self No Yes    Take 1 tablet by mouth every 4 (four) hours as needed for itching or allergies.    EPINEPHrine (EPIPEN) 0.3 MG/0.3ML solution auto-injector injection  Self Yes Yes    INJECT THE CONTENTS OF ONE PEN AS NEEDED. MAY REPEAT ONE TIME.    eplerenone (INSPRA) 50 MG tablet 1/11/2025 Self Yes Yes    Take 1 tablet by mouth Daily.    fluticasone (FLONASE) 50 MCG/ACT nasal spray 1/11/2025 Self No Yes    1 spray by Each Nare route 2 (Two) Times a Day.    HYDROcodone-acetaminophen (NORCO)  MG per tablet 1/12/2025 Self Yes Yes    Take 1-2 tablets by mouth Every 6 (Six) Hours As Needed for Moderate Pain.    hydrOXYzine (ATARAX) 10 MG tablet  Self Yes Yes    Take 1 tablet by mouth 3 (Three) Times a Day As Needed for Itching.    hydrOXYzine (ATARAX) 50 MG tablet  Self Yes Yes    Take 1 tablet by mouth Every 6 (Six) Hours As Needed for Itching or Allergies.    ipratropium (ATROVENT) 0.02 % nebulizer solution Past Week Self Yes Yes    Take 2.5 mL by nebulization 4 (Four) Times a Day.    LORazepam (ATIVAN) 0.5 MG tablet  Self Yes Yes    Take 1  tablet by mouth Every 8 (Eight) Hours As Needed for Anxiety.    multivitamin with minerals tablet tablet 1/11/2025 Self No Yes    Take 1 tablet by mouth Daily.    mupirocin (BACTROBAN) 2 % ointment 1/11/2025 Self No Yes    Apply 1 Application topically to the appropriate area as directed Every 12 (Twelve) Hours.    Patient taking differently:  Apply 1 Application topically to the appropriate area as directed Every 12 (Twelve) Hours. Applies between toes and bottoms of feet at bedtime    naloxone (NARCAN) 4 MG/0.1ML nasal spray  Self Yes Yes    Administer 1 spray into the nostril(s) as directed by provider As Needed for Opioid Reversal. Call 911. Don't prime. [place] in 1 nostril for overdose. Repeat in 2-3 minutes in other nostril if no or minimal breathing/responsiveness.    Niacin (VITAMIN B-3 PO) 1/11/2025 Self Yes Yes    Take  by mouth.    NIFEdipine XL (ADALAT CC) 30 MG 24 hr tablet 1/11/2025 Self No Yes    Take 1 tablet by mouth Daily.    omeprazole (priLOSEC) 40 MG capsule  Pharmacy Yes Yes    Take 1 capsule by mouth Daily As Needed.    ondansetron ODT (ZOFRAN-ODT) 8 MG disintegrating tablet  Self No Yes    Take 1 tablet by mouth every 6 (six) hours as needed for nausea or vomiting.    polyethylene glycol (MIRALAX) 17 g packet 1/11/2025 Self Yes Yes    Take 17 g by mouth Daily As Needed.    potassium chloride (KLOR-CON) 20 MEQ packet 1/11/2025 Self Yes Yes    Take 20 mEq by mouth Daily.    predniSONE (DELTASONE) 10 MG tablet 1/11/2025 Self No Yes    Take 1 tablet by mouth Every Other Day.    Patient taking differently:  Take 2 tablets by mouth Daily.    promethazine-codeine (PHENERGAN with CODEINE) 6.25-10 MG/5ML syrup  Self No Yes    Take 5 mL by mouth 4 (Four) Times a Day As Needed for cough.    rosuvastatin (CRESTOR) 5 MG tablet 1/11/2025 Self Yes Yes    Take 1 tablet by mouth Every Morning.    torsemide (DEMADEX) 100 MG tablet  Self No Yes    Take 1 tablet by mouth Daily.    Patient taking differently:   "Take 1 tablet by mouth Daily As Needed.    triamcinolone (KENALOG) 0.1 % cream   Yes Yes    Apply 1 Application topically to the appropriate area as directed 2 (Two) Times a Day.    vitamin B-12 (VITAMIN B-12) 1000 MCG tablet 1/11/2025 Self No Yes    Take 1 tablet by mouth Daily.    Xarelto 20 MG tablet 1/11/2025 Self Yes Yes    Take 1 tablet by mouth Daily.    levocetirizine (XYZAL) 5 MG tablet   Yes No    Take 1 tablet by mouth Every Morning.    lidocaine (Lidoderm) 5 % Not Taking  No No    Place 1 patch on the skin as directed by provider Daily As Needed for Mild Pain. Remove & Discard patch within 12 hours or as directed by MD    Patient not taking:  Reported on 1/12/2025    Petrolatum 42 % ointment Not Taking  No No    Apply 1 Application topically to the appropriate area as directed Every 12 (Twelve) Hours. Use on irritated skin from bug bites    Patient not taking:  Reported on 1/12/2025    potassium chloride (KAYCIEL) 20 mEq/15 mL solution Not Taking  No No    Take 15 mL by mouth Daily.    Patient not taking:  Reported on 1/12/2025              Medication notes: Patient has been experiencing \"bug bites\" for which cause her itching and red spots on her skin. She has been taking xyzal, hydroxyzine 50mg and 10mg alternating depending how itchy she is, benadryl, and zyrtec. She does have allergies apart from this acute issue.     This medication list is complete to the best of my knowledge as of 1/12/2025    Please call if questions.    Uri Monaco  Pharmacy Intern  Phone [phone]  1/12/2025 18:46 EST             "

## 2025-01-12 NOTE — ED PROVIDER NOTES
EMERGENCY DEPARTMENT ENCOUNTER    Room Number:  S610/1  PCP: Óscar Cadet MD  Historian: Patient      HPI:  Chief Complaint: Recurrent falls, weakness, shoulder pain  A complete HPI/ROS/PMH/PSH/SH/FH are unobtainable due to: None    Context: Sasha Barrett is a 84 y.o. female who presents to the ED via private vehicle from home c/o acute increasing generalized weakness with recurrent falls, did have a prolonged downtime about 7 to 8 hours on the floor.  States she did not her head but no LOC.  Complaining of right shoulder pain.  Chronic bilateral feet pain.  Has had urinary incontinence recently.  No reported fevers, chills, cough, chest pain, shortness of breath, vomiting, diarrhea.      MEDICAL RECORD REVIEW    External (non-ED) record review: No anticoagulants noted on chart review in epic    MARY LOU reviewed by Óscar Cadet MD, Everardo Patino MD     [facility] query complete and reviewed. Patient receives regular prescriptions for controlled substances.    PAST MEDICAL HISTORY  Active Ambulatory Problems     Diagnosis Date Noted    Joint pain 07/25/2016    Urinary retention self-caths (Ochoa) 07/25/2016    Conjunctivitis 07/25/2016    Arm pain 07/25/2016    Cervical disc displacement 07/25/2016    Cervical pain (Servando=PM) 07/25/2016    DDD (degenerative disc disease), cervical 07/25/2016    Hyperlipidemia LDL goal <70 07/25/2016    Preventative health care 09/08/2016    Medication management 09/08/2016    Proctocele 06/29/2015    Urge incontinence of urine 06/29/2015    Chronic tension-type headache, intractable 11/11/2016    Obesity (BMI 30.0-34.9) 11/11/2016    H/o Lyme disease 11/11/2016    Stage 2 chronic kidney disease 11/11/2016    Cerebral atherosclerosis 11/11/2016    Allergic contact dermatitis 11/11/2016    Malaise and fatigue progressive 11/11/2016    Dizziness 11/11/2016    Vitamin D deficiency 11/11/2016    Moderate persistent asthmatic bronchitis without complication 11/11/2016    Anterior  cervical adenopathy due to infection 11/11/2016    Pleurisy 11/11/2016    Knee pain, bilateral 11/11/2016    Elevated PTH level 11/11/2016    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Ochoa) 11/11/2016    Gastroesophageal reflux disease with esophagitis 11/11/2016    Psoriasis (Darryl Ochoa) 11/11/2016    Postmenopausal 11/11/2016    Recurrent UTI 11/11/2016    CVA (cerebral vascular accident) 11/11/2016    Dyspnea on exertion 01/19/2017    Precordial pain 02/02/2017    Abnormal EKG 02/02/2017    Arthralgia 04/06/2017    Accelerated hypertension 04/06/2017    Acute joint pain 04/06/2017    Easy bruisability 04/06/2017    Cough productive of purulent sputum 12/26/2017    Influenza A Subtype H3 12/26/2017    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    Acute chest wall pain 12/26/2017    Decreased functional mobility and endurance since fall 12/20/17 12/26/2017    Essential hypertension 08/22/2018    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/25/2018    Migraine without aura and without status migrainosus, not intractable 11/21/2018    Thyroid nodule 03/24/2019    Osteoarthritis of left hip 08/23/2019    Spinal stenosis of lumbar region with radiculopathy 08/23/2019    Acute deep vein thrombosis (DVT) of right lower extremity 10/29/2019    Acute deep vein thrombosis 10/29/2019    Single subsegmental pulmonary embolism without acute cor pulmonale 10/31/2019    Other chronic pain 01/22/2020    Palpitations 11/11/2020    Secondary hyperparathyroidism 02/02/2021    Syncope 05/25/2022    Ataxia 05/25/2022    Adult failure to thrive syndrome 05/25/2022    Recent unexplained weight loss 42# over last 6 months 05/25/2022    Blood pressure instability 05/25/2022    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 05/25/2022    Acute intractable headache intensified since Covid-19 infection 10/2/2021 05/25/2022    History of loop recorder 06/29/2022    Pes planus 09/22/2022     Posterior tibial tendon dysfunction 09/22/2022    Weakness 09/30/2022    Blunt trauma of right lower leg 09/30/2022    Acute shoulder pain due to trauma, right 09/30/2022    Weight gain with edema (13# over 2 months) 09/30/2022    Volume overload 09/30/2022    Venous incompetence of popliteal vein on right 10/01/2022    Bilateral lower extremity edema, multifactorial = unrestricted salt intake, inadequate Lasix dosing, chronic lung disease, and obesity.  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 Yazmin Canseco    CARDIAC ELECTROPHYSIOLOGY PROCEDURE N/A 5/27/2022    Procedure: Loop insertion;  Surgeon: Jeffrey Argueta MD;  Location:  [facility];  Service: Cardiovascular;  Laterality: N/A;  biotronik    CARDIAC ELECTROPHYSIOLOGY PROCEDURE N/A 7/13/2022    Procedure: Loop recorder removal;  Surgeon: Jeffrey Argueta MD;  Location:  [facility];  Service: Cardiovascular;  Laterality: N/A;    CARPAL TUNNEL RELEASE Right 05/25/2011    Dr. Caleb Bueno    CARPAL TUNNEL RELEASE Left 04/26/2011    Dr. Caleb Bueno    CATARACT EXTRACTION Left  11/29/2010    Dr. Eusebio Downs    CATARACT EXTRACTION Right 11/17/2010    Dr. Eusebio Downs    COLONOSCOPY N/A 12/06/2002    Sigmoid diverticulosis-Dr. Brandon Batista    COLONOSCOPY N/A 12/12/2013    Tortuous colon, diverticulosis in the sigmoid colon and descending colon, stool in the rectum, sigmoid colon, descending colon, splenic flexure, transverse colon and hepatic flexure-Dr. Irma Brambila    DEQUERVAIN RELEASE Left 9/23/2020    Procedure: DEQUERVAIN RELEASE LEFT HAND;  Surgeon: Caleb Bueno MD;  [place] [facility] [place] [facility] [place]  Service: Plastics;  Laterality: Left;    ENDOSCOPY N/A 09/13/2007    Normal-Dr. Pavel Quarles    ENDOSCOPY AND COLONOSCOPY N/A 09/19/2005    1 cm hiatal hernia, mild Schatzki's ring, sigmoid diverticulosis-Dr. Yoel Farley    GANGLION CYST EXCISION Left 12/18/2012    Release of A1 pulley flexor sheath, left fourth finger, excision of ganglion cyst 0.5 cm diameter, A2 pulley flexor sheath, left fourth finger-Dr. Caleb Bueno    HEEL SPUR EXCISION Left 1968    INGUINAL HERNIA REPAIR Right 1971    at 5 months pregnant     INJECTION OF MEDICATION Left 9/23/2020    Procedure: KENOLOG INJECTION TO LEFT THUMB;  Surgeon: Caleb Bueno MD;  [place] [facility] [place] [facility] [place]  Service: Plastics;  Laterality: Left;    MASTECTOMY Right 08/05/2008    Right breast mastectomy and excision of left chest wall lesion-Dr. Yoel Farley    MASTECTOMY RADICAL Left 04/29/2003    Left modified radical mastectomy-Dr. Yazmin Canseco    PARATHYROIDECTOMY Right 05/04/2004    Excision of parathyroid adenoma (right superior)-Dr. Yoel Farley    REPLACEMENT TOTAL KNEE Right 04/09/2012    Dr. Lamonte Childress    SINUS SURGERY  1984    THYROIDECTOMY, PARTIAL Left 05/04/2004    Dr. Yoel Farley    TOTAL ABDOMINAL HYSTERECTOMY Bilateral 1973    Dr. Mahan    TRIGGER FINGER RELEASE Left 9/23/2020    Procedure: RELEASE LEFT FOURTH TRIGGER FINGER;  Surgeon: Caleb Bueno MD;   [place] [facility] [place];  Service: Plastics;  Laterality: Left;    UPPER GASTROINTESTINAL ENDOSCOPY N/A 02/18/2009    LA Grade A reflux esophagitis, normal stomach, normal 2nd part of the duodenum-Dr. Yoel Farley    WRIST GANGLION EXCISION Left 9/23/2020    Procedure: EXCISION GANGLION CYST LEFT WRIST;  Surgeon: Caleb Bueno MD;  Location: [facility] [place];  Service: Plastics;  Laterality: Left;         FAMILY HISTORY  Family History   Problem Relation Age of Onset    Brain cancer Brother     Alcohol abuse Mother     No Known Problems Father     No Known Problems Sister     Loan Hyperthermia Neg Hx          SOCIAL HISTORY  Social History     Socioeconomic History    Marital status:      Spouse name: Joey    Number of children: 6    Highest education level: Some college, no degree   Tobacco Use    Smoking status: Never     Passive exposure: Never    Smokeless tobacco: Never   Vaping Use    Vaping status: Never Used   Substance and Sexual Activity    Alcohol use: Yes     Comment: 3 drinks yearly    Drug use: Never    Sexual activity: Defer     Birth control/protection: Surgical         ALLERGIES  Bee venom, Morphine, Sulfa antibiotics, Tree extract, Ambien [zolpidem], Anastrozole, Covid-19 (mrna) vaccine, Eliquis [apixaban], Hydralazine, Norvasc [amlodipine], Nsaids, Penicillins, and Grass        REVIEW OF SYSTEMS  Review of Systems     All systems reviewed and negative except for those discussed in HPI.       PHYSICAL EXAM    I have reviewed the triage vital signs and nursing notes.    ED Triage Vitals [01/12/25 1529]   Temp Heart Rate Resp BP SpO2   97.7 °F (36.5 °C) 77 18 -- 99 %      Temp src Heart Rate Source Patient Position BP Location FiO2 (%)   Tympanic Monitor -- -- --       Physical Exam  General: No acute distress, nontoxic  HEENT: Mucous membranes moist, atraumatic, EOMI  Neck: Full ROM supple, nontender  Pulm: Symmetric chest rise, nonlabored, lungs CTAB  Cardiovascular: Regular  rate and rhythm, intact distal pulses  GI: Soft, nontender, nondistended, no rebound, no guarding, bowel sounds present  MSK: Slightly reduced range of motion, edema at the right shoulder as compared to the left  Skin: Warm, dry  Neuro: Awake, alert, GCS 15, moving all extremities, no focal deficits  Psych: Calm, cooperative        LAB RESULTS  Recent Results (from the past 24 hours)   Comprehensive Metabolic Panel    Collection Time: 01/12/25  3:51 PM    Specimen: Blood   Result Value Ref Range    Glucose 114 (H) 65 - 99 mg/dL    BUN 26 (H) 8 - 23 mg/dL    Creatinine 1.38 (H) 0.57 - 1.00 mg/dL    Sodium 144 136 - 145 mmol/L    Potassium 3.0 (L) 3.5 - 5.2 mmol/L    Chloride 102 98 - 107 mmol/L    CO2 29.2 (H) 22.0 - 29.0 mmol/L    Calcium 9.3 8.6 - 10.5 mg/dL    Total Protein 6.5 6.0 - 8.5 g/dL    Albumin 3.5 3.5 - 5.2 g/dL    ALT (SGPT) 27 1 - 33 U/L    AST (SGOT) 35 (H) 1 - 32 U/L    Alkaline Phosphatase 100 39 - 117 U/L    Total Bilirubin 0.3 0.0 - 1.2 mg/dL    Globulin 3.0 gm/dL    A/G Ratio 1.2 g/dL    BUN/Creatinine Ratio 18.8 7.0 - 25.0    Anion Gap 12.8 5.0 - 15.0 mmol/L    eGFR 37.4 (L) >60.0 mL/min/1.73   Magnesium    Collection Time: 01/12/25  3:51 PM    Specimen: Blood   Result Value Ref Range    Magnesium 1.9 1.6 - 2.4 mg/dL   CBC Auto Differential    Collection Time: 01/12/25  3:51 PM    Specimen: Blood   Result Value Ref Range    WBC 8.05 3.40 - 10.80 10*3/mm3    RBC 3.96 3.77 - 5.28 10*6/mm3    Hemoglobin 11.6 (L) 12.0 - 15.9 g/dL    Hematocrit 37.5 34.0 - 46.6 %    MCV 94.7 79.0 - 97.0 fL    MCH 29.3 26.6 - 33.0 pg    MCHC 30.9 (L) 31.5 - 35.7 g/dL    RDW 13.1 12.3 - 15.4 %    RDW-SD 45.6 37.0 - 54.0 fl    MPV 10.4 6.0 - 12.0 fL    Platelets 187 140 - 450 10*3/mm3    Neutrophil % 64.6 42.7 - 76.0 %    Lymphocyte % 24.8 19.6 - 45.3 %    Monocyte % 9.1 5.0 - 12.0 %    Eosinophil % 0.9 0.3 - 6.2 %    Basophil % 0.4 0.0 - 1.5 %    Immature Grans % 0.2 0.0 - 0.5 %    Neutrophils, Absolute 5.20 1.70 -  7.00 10*3/mm3    Lymphocytes, Absolute 2.00 0.70 - 3.10 10*3/mm3    Monocytes, Absolute 0.73 0.10 - 0.90 10*3/mm3    Eosinophils, Absolute 0.07 0.00 - 0.40 10*3/mm3    Basophils, Absolute 0.03 0.00 - 0.20 10*3/mm3    Immature Grans, Absolute 0.02 0.00 - 0.05 10*3/mm3    nRBC 0.0 0.0 - 0.2 /100 WBC   CK    Collection Time: 01/12/25  3:51 PM    Specimen: Blood   Result Value Ref Range    Creatine Kinase 159 20 - 180 U/L   Urinalysis With Microscopic If Indicated (No Culture) - Straight Cath    Collection Time: 01/12/25  4:12 PM    Specimen: Straight Cath; Urine   Result Value Ref Range    Color, UA Yellow Yellow, Straw    Appearance, UA Clear Clear    pH, UA 6.0 5.0 - 8.0    Specific Gravity, UA 1.017 1.005 - 1.030    Glucose, UA Negative Negative    Ketones, UA Negative Negative    Bilirubin, UA Negative Negative    Blood, UA Negative Negative    Protein, UA 30 mg/dL (1+) (A) Negative    Leuk Esterase, UA Trace (A) Negative    Nitrite, UA Negative Negative    Urobilinogen, UA 1.0 E.U./dL 0.2 - 1.0 E.U./dL   Urinalysis, Microscopic Only - Straight Cath    Collection Time: 01/12/25  4:12 PM    Specimen: Straight Cath; Urine   Result Value Ref Range    RBC, UA 0-2 None Seen, 0-2 /HPF    WBC, UA 0-2 None Seen, 0-2 /HPF    Bacteria, UA None Seen None Seen /HPF    Squamous Epithelial Cells, UA 0-2 None Seen, 0-2 /HPF    Hyaline Casts, UA 31-50 None Seen /LPF    Methodology Automated Microscopy        Ordered the above labs and independently interpreted results. My findings will be discussed in the medical decision making section below        RADIOLOGY  CT Head Without Contrast, CT Cervical Spine Without Contrast    Result Date: 1/12/2025  CT HEAD WITHOUT CONTRAST, CT CERVICAL SPINE WITHOUT CONTRAST  HISTORY: Fall. Found on floor. Head injury. Neck pain.  TECHNIQUE: Head CT includes axial imaging from the base of skull to vertex without IV contrast and this data was reconstructed in coronal and sagittal planes. Cervical  spine CT includes axial imaging from the skull base to the upper thoracic spine and this data was reconstructed in coronal and sagittal planes. Radiation dose reduction techniques were utilized, including automated exposure control and exposure modulation based on body size.  COMPARISON: Head CT 5/28/2022, CT head and cervical spine 10/04/2021.  FINDINGS: Head CT: Mild chronic small vessel ischemic white matter change. There are no abnormal areas of increased attenuation intraaxially to suggest hemorrhage. No extra-axial fluid collection is observed. Within the posteromedial left occipital lobe there is a focal area of encephalomalacia measuring 1.5 x 1.5 cm consistent with a chronic infarction, and this is similar to the prior exam 04/21/2022. Intracranial atherosclerotic calcifications are present. Bone windows demonstrate no calvarial fracture. There is a 5 mm chronic dural based calcification medial to the left sigmoid sinus and posterior to the left mastoid air cells.  Cervical spine CT: Advanced chronic arthritic changes at the anterior atlantoaxial articulation where there is joint space loss and prominent spur formation, progressive when compared to exam 10/04/2021. Cervical spine vertebral body heights appear within normal limits. The C1 ring and the odontoid process are intact. There is multilevel degenerative disc disease with disc space narrowing greatest at C6-C7, and this is similar to the previous exam. Bilateral facet arthritis is present that is greater on the right as compared to the left best demonstrated at C3-C4, C4-C5, C5-C6. At C5-C6, there is a central posterior disc protrusion that contacts the anterior cervical cord with mild narrowing of the central canal. No evidence for fracture or acute abnormality. There are carotid vascular calcifications.  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      . 1. Mild chronic small  vessel ischemic white matter change. Chronic encephalomalacia posteromedial left occipital lobe consistent with an old infarction, and this is without change. No evidence for acute intracranial abnormality. 2. 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 At C5-C6, there is a small central posterior disc protrusion that contacts the anterior cervical cord with mild narrowing of the central canal. This was not evident on the prior exam 10/04/2021. There is degenerative disc disease with disc space narrowing greatest at C6-C7. Right greater than left facet arthritis. No evidence for acute osseous abnormality in the cervical spine. 3. 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   Radiation dose reduction techniques were utilized, including automated exposure control and exposure modulation based on body size.    This report was finalized on 1/12/2025 5:58 PM by Yoel Jones M.D on Workstation: [id number]      XR Shoulder 2+ View Right    Result Date: 1/12/2025  XR SHOULDER 2+ VW RIGHT-  HISTORY: Fall with shoulder pain.  COMPARISON: None  FINDINGS: Humeral head is high riding and there is moderate acromioclavicular glenohumeral joint osteoarthritis. The bones are osteopenic. There is no evidence for fracture or acute osseous abnormality.      Osteopenia limits evaluation for fracture. There is moderate AC and glenohumeral joint arthritis and the humeral head is high riding. No demonstrated fracture.  This report was finalized on 1/12/2025 5:08 PM by Yoel Jones M.D on Workstation: [id number]       Ordered the above noted radiological studies.  Independently interpreted by me and my independent review of findings can be found in the ED Course.  See dictation for official radiology interpretation.      PROCEDURES    Procedures        MEDICATIONS GIVEN IN ER    Medications   sodium chloride 0.9 % infusion (75 mL/hr  Intravenous Currently Infusing 1/12/25 2006)   Potassium Replacement - Follow Nurse / BPA Driven Protocol (has no administration in time range)   sodium chloride 0.9 % bolus 500 mL (0 mL Intravenous Stopped 1/12/25 1742)   potassium chloride (K-DUR,KLOR-CON) ER tablet 40 mEq (40 mEq Oral Given 1/12/25 1743)         PROGRESS, DATA ANALYSIS, CONSULTS, AND MEDICAL DECISION MAKING    Please note that this section constitutes my independent interpretation of clinical data including lab results, radiology, EKG's.  This constitutes my independent professional opinion regarding differential diagnosis and management of this patient.  It may include any factors such as history from outside sources, review of external records, social determinants of health, management of medications, response to those treatments, and discussions with other providers.    Differential Diagnosis and Plan: Initial concern for closed head injury including intracranial hemorrhage, concussion, cervical fracture, skull fracture, shoulder fracture, dehydration, renal failure, electrolyte abnormalities, rhabdomyolysis, anemia, urinary tract infection, among others.  Plan for labs, CT head, CT C-spine, right foot x-ray, and reevaluation with results.    Additional sources:  - Discussed/ obtained information from independent historians: Family at bedside states that they think her last fall happened yesterday.     - (Social Determinants of Health): None     - Shared decision making:  Patient and family at bedside fully updated on and in agreement with the course and plan moving forward    ED Course as of 01/12/25 2133   Sun Jan 12, 2025   1603 WBC: 8.05 [DC]   1603 Hemoglobin(!): 11.6 [DC]   1603 Platelets: 187 [DC]   1610 XR Shoulder 2+ View Right  Per my independent interpretation of the right shoulder x-ray, no overtly evident fracture or dislocation of the right shoulder [DC]   1637 Nitrite, UA: Negative [DC]   1637 Leukocytes, UA(!): Trace [DC]    1637 Ketones, UA: Negative [DC]   1637 Magnesium: 1.9 [DC]   1637 Creatine Kinase: 159 [DC]   1637 Glucose(!): 114 [DC]   1637 BUN(!): 26 [DC]   1701 Discussed with Dr. Cadet, PMD, discussed options, course and findings today, treatment modalities, and need for hospitalization.  He requested telemetry bed. [DC]   1715 CT Head Without Contrast  Per my independent interpretation of the CT head, questionable hemorrhage in the right cerebellum will defer to final radiology report, otherwise no overtly evident intracranial hemorrhage [DC]      ED Course User Index  [DC] Everardo Patino MD     Patient with mild renal insufficiency and a mild hypokalemia, no other acute emergent processes noted.  Plan for hospitalization for further supportive care and monitoring.  Patient and family at bedside fully updated on the findings today and plan for hospitalization.  All questions and concerns addressed.    Hospitalization Considered?: yes    Orders Placed During This Visit:  Orders Placed This Encounter   Procedures    CT Head Without Contrast    CT Cervical Spine Without Contrast    XR Shoulder 2+ View Right    Comprehensive Metabolic Panel    Urinalysis With Microscopic If Indicated (No Culture) - Urine, Clean Catch    Magnesium    CBC Auto Differential    CK    Urinalysis, Microscopic Only - Urine, Clean Catch    Diet: Regular/House; Fluid Consistency: Thin (IDDSI 0)    Telemetry Scan    Initiate Observation Status    CBC & Differential       Additional orders considered but not placed:      Independent interpretation of labs, radiology studies, and discussions with consultants: See ED Course        DIAGNOSIS  Final diagnoses:   Generalized weakness   Recurrent falls   Hypokalemia   Acute renal insufficiency         DISPOSITION  ED Disposition       ED Disposition   Decision to Admit    Condition   --    Comment   Level of Care: Telemetry [5]   Diagnosis: Acute renal insufficiency [916510]   Admitting Physician: COLE  ALEKSANDAR OKEEFE [[id number]]   Attending Physician: ALEKSANDAR GALVAN [[id number]]   Is patient appropriate for Inpatient Observation Unit?: No [0]                  Please note that portions of this document were completed with a voice recognition program.    Note Disclaimer: At [facility], we believe that sharing information builds trust and better relationships. You are receiving this note because you recently visited [facility]. It is possible you will see health information before a provider has talked with you about it. This kind of information can be easy to misunderstand. To help you fully understand what it means for your health, we urge you to discuss this note with your provider.                       Everardo Patino MD  01/12/25 0031

## 2025-01-12 NOTE — ED NOTES
..Nursing report ED to floor  Sasha Barrett  84 y.o.  female    HPI :  HPI  Stated Reason for Visit: falls  History Obtained From: patient    Chief Complaint  Chief Complaint   Patient presents with    Weakness - Generalized    Fall       Admitting doctor:   Óscar Cadet MD    Admitting diagnosis:   The primary encounter diagnosis was Generalized weakness. Diagnoses of Recurrent falls, Hypokalemia, and Acute renal insufficiency were also pertinent to this visit.    Code status:   Current Code Status       Date Active Code Status Order ID Comments User Context       Prior            Allergies:   Bee venom, Morphine, Sulfa antibiotics, Tree extract, Ambien [zolpidem], Anastrozole, Covid-19 (mrna) vaccine, Eliquis [apixaban], Hydralazine, Norvasc [amlodipine], Nsaids, Penicillins, and Grass    Isolation:   No active isolations    Intake and Output    Intake/Output Summary (Last 24 hours) at 1/12/2025 1745  Last data filed at 1/12/2025 1742  Gross per 24 hour   Intake 500 ml   Output --   Net 500 ml       Weight:   There were no vitals filed for this visit.    Most recent vitals:   Vitals:    01/12/25 1615 01/12/25 1620 01/12/25 1640 01/12/25 1643   BP: 121/61      Pulse: 67      Resp:       Temp:       TempSrc:       SpO2:  98% (!) 85% 99%       Active LDAs/IV Access:   Lines, Drains & Airways       Active LDAs       Name Placement date Placement time Site Days    Peripheral IV 01/12/25 1600 Left Antecubital 01/12/25  1600  Antecubital  less than 1                    Labs (abnormal labs have a star):   Labs Reviewed   COMPREHENSIVE METABOLIC PANEL - Abnormal; Notable for the following components:       Result Value    Glucose 114 (*)     BUN 26 (*)     Creatinine 1.38 (*)     Potassium 3.0 (*)     CO2 29.2 (*)     AST (SGOT) 35 (*)     eGFR 37.4 (*)     All other components within normal limits    Narrative:     GFR Categories in Chronic Kidney Disease (CKD)      GFR Category          GFR (mL/min/1.73)     Interpretation  G1                     90 or greater         Normal or high (1)  G2                      60-89                Mild decrease (1)  G3a                   45-59                Mild to moderate decrease  G3b                   30-44                Moderate to severe decrease  G4                    15-29                Severe decrease  G5                    14 or less           Kidney failure          (1)In the absence of evidence of kidney disease, neither GFR category G1 or G2 fulfill the criteria for CKD.    eGFR calculation 2021 CKD-EPI creatinine equation, which does not include race as a factor   URINALYSIS W/ MICROSCOPIC IF INDICATED (NO CULTURE) - Abnormal; Notable for the following components:    Protein, UA 30 mg/dL (1+) (*)     Leuk Esterase, UA Trace (*)     All other components within normal limits   CBC WITH AUTO DIFFERENTIAL - Abnormal; Notable for the following components:    Hemoglobin 11.6 (*)     MCHC 30.9 (*)     All other components within normal limits   MAGNESIUM - Normal   CK - Normal   URINALYSIS, MICROSCOPIC ONLY   CBC AND DIFFERENTIAL    Narrative:     The following orders were created for panel order CBC & Differential.  Procedure                               Abnormality         Status                     ---------                               -----------         ------                     CBC Auto Differential[[id number]]        Abnormal            Final result                 Please view results for these tests on the individual orders.       EKG:   No orders to display       Meds given in ED:   Medications   sodium chloride 0.9 % infusion (75 mL/hr Intravenous New Bag 1/12/25 1713)   Potassium Replacement - Follow Nurse / BPA Driven Protocol (has no administration in time range)   sodium chloride 0.9 % bolus 500 mL (0 mL Intravenous Stopped 1/12/25 1742)   potassium chloride (K-DUR,KLOR-CON) ER tablet 40 mEq (40 mEq Oral Given 1/12/25 1743)       Imaging results:  CT Head  Without Contrast    Result Date: 1/12/2025  . 1. Mild chronic small vessel ischemic white matter change. Chronic encephalomalacia posteromedial left occipital lobe consistent with an old infarction, and this is without change. No evidence for acute intracranial abnormality. 2. 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 At C5-C6, there is a small central posterior disc protrusion that contacts the anterior cervical cord with mild narrowing of the central canal. This was not evident on the prior exam 10/04/2021. There is degenerative disc disease with disc space narrowing greatest at C6-C7. Right greater than left facet arthritis. No evidence for acute osseous abnormality in the cervical spine. 3. 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   Radiation dose reduction techniques were utilized, including automated exposure control and exposure modulation based on body size.        CT Cervical Spine Without Contrast    Result Date: 1/12/2025  . 1. Mild chronic small vessel ischemic white matter change. Chronic encephalomalacia posteromedial left occipital lobe consistent with an old infarction, and this is without change. No evidence for acute intracranial abnormality. 2. 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 At C5-C6, there is a small central posterior disc protrusion that contacts the anterior cervical cord with mild narrowing of the central canal. This was not evident on the prior exam 10/04/2021. There is degenerative disc disease with disc space narrowing greatest at C6-C7. Right greater than left facet arthritis. No evidence for acute osseous abnormality in the cervical spine. 3. 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   Radiation dose reduction techniques were utilized, including automated exposure control and exposure  modulation based on body size.        XR Shoulder 2+ View Right    Result Date: 1/12/2025  Osteopenia limits evaluation for fracture. There is moderate AC and glenohumeral joint arthritis and the humeral head is high riding. No demonstrated fracture.  This report was finalized on 1/12/2025 5:08 PM by Yoel Jones M.D on Workstation: [employer]       Ambulatory status:   - assist x2    Social issues:   Social History     Socioeconomic History    Marital status:      Spouse name: Joey    Number of children: 6    Highest education level: Some college, no degree   Tobacco Use    Smoking status: Never     Passive exposure: Never    Smokeless tobacco: Never   Vaping Use    Vaping status: Never Used   Substance and Sexual Activity    Alcohol use: Yes     Comment: 3 drinks yearly    Drug use: Never    Sexual activity: Defer     Birth control/protection: Surgical       Peripheral Neurovascular  Peripheral Neurovascular (Adult)  Peripheral Neurovascular WDL: WDL    Neuro Cognitive  Neuro Cognitive (Adult)  Cognitive/Neuro/Behavioral WDL: orientation, WDL  Orientation: oriented x 4 (able to answer orientation questions but is still confused)  [place] Coma Scale  Best Eye Response: 4-->(E4) spontaneous  Best Motor Response: 6-->(M6) obeys commands  Best Verbal Response: 5-->(V5) oriented  [place] Coma Scale Score: 15    Learning  Learning Assessment  Learning Readiness and Ability: cognitive limitation noted  Education Provided  Person Taught: patient, family member/friend  Teaching Method: verbal instruction    Respiratory  Respiratory WDL  Respiratory WDL: WDL    Abdominal Pain       Pain Assessments  Pain (Adult)  (0-10) Pain Rating: Rest: 10  (0-10) Pain Rating: Activity: 10    NIH Stroke Scale       Candice Hi RN  01/12/25 17:45 EST

## 2025-01-12 NOTE — ED NOTES
Pt had fall 2 days ago and hit head. Pt denies LOC, pt has had trouble walking. Pt is on thinners. Pt c/o R shoulder pain. Pt reports generally weak causing her to fall. Pt states symptoms started 3-4 days ago.

## 2025-01-13 ENCOUNTER — APPOINTMENT (OUTPATIENT)
Dept: ULTRASOUND IMAGING | Facility: HOSPITAL | Age: 85
End: 2025-01-13
Payer: MEDICARE

## 2025-01-13 ENCOUNTER — APPOINTMENT (OUTPATIENT)
Dept: GENERAL RADIOLOGY | Facility: HOSPITAL | Age: 85
End: 2025-01-13
Payer: MEDICARE

## 2025-01-13 ENCOUNTER — APPOINTMENT (OUTPATIENT)
Dept: CARDIOLOGY | Facility: HOSPITAL | Age: 85
End: 2025-01-13
Payer: MEDICARE

## 2025-01-13 LAB
25(OH)D3 SERPL-MCNC: 70.2 NG/ML (ref 30–100)
ALBUMIN SERPL-MCNC: 2.9 G/DL (ref 3.5–5.2)
ALBUMIN/GLOB SERPL: 1.1 G/DL
ALP SERPL-CCNC: 85 U/L (ref 39–117)
ALT SERPL W P-5'-P-CCNC: 22 U/L (ref 1–33)
AMPHET+METHAMPHET UR QL: NEGATIVE
AMPHETAMINES UR QL: NEGATIVE
ANION GAP SERPL CALCULATED.3IONS-SCNC: 8.7 MMOL/L (ref 5–15)
AORTIC DIMENSIONLESS INDEX: 1 (DI)
ASCENDING AORTA: 3 CM
AST SERPL-CCNC: 28 U/L (ref 1–32)
AV MEAN PRESS GRAD SYS DOP V1V2: 4.2 MMHG
AV VMAX SYS DOP: 131.7 CM/SEC
B PARAPERT DNA SPEC QL NAA+PROBE: NOT DETECTED
B PERT DNA SPEC QL NAA+PROBE: NOT DETECTED
BARBITURATES UR QL SCN: POSITIVE
BASOPHILS # BLD AUTO: 0.02 10*3/MM3 (ref 0–0.2)
BASOPHILS NFR BLD AUTO: 0.3 % (ref 0–1.5)
BENZODIAZ UR QL SCN: POSITIVE
BH CV ECHO MEAS - ACS: 1.84 CM
BH CV ECHO MEAS - AO MAX PG: 6.9 MMHG
BH CV ECHO MEAS - AO ROOT AREA (BSA CORRECTED): 1.7 CM2
BH CV ECHO MEAS - AO ROOT DIAM: 2.8 CM
BH CV ECHO MEAS - AO V2 VTI: 27.2 CM
BH CV ECHO MEAS - AVA(I,D): 3.3 CM2
BH CV ECHO MEAS - EDV(CUBED): 82.6 ML
BH CV ECHO MEAS - EDV(MOD-SP2): 70 ML
BH CV ECHO MEAS - EDV(MOD-SP4): 75 ML
BH CV ECHO MEAS - EF(MOD-SP2): 62.9 %
BH CV ECHO MEAS - EF(MOD-SP4): 65.3 %
BH CV ECHO MEAS - ESV(CUBED): 27.7 ML
BH CV ECHO MEAS - ESV(MOD-SP2): 26 ML
BH CV ECHO MEAS - ESV(MOD-SP4): 26 ML
BH CV ECHO MEAS - FS: 30.5 %
BH CV ECHO MEAS - IVS/LVPW: 0.95 CM
BH CV ECHO MEAS - IVSD: 0.98 CM
BH CV ECHO MEAS - LAT PEAK E' VEL: 6.2 CM/SEC
BH CV ECHO MEAS - LV DIASTOLIC VOL/BSA (35-75): 46.1 CM2
BH CV ECHO MEAS - LV MASS(C)D: 145.8 GRAMS
BH CV ECHO MEAS - LV MAX PG: 7.3 MMHG
BH CV ECHO MEAS - LV MEAN PG: 3.7 MMHG
BH CV ECHO MEAS - LV SYSTOLIC VOL/BSA (12-30): 16 CM2
BH CV ECHO MEAS - LV V1 MAX: 135.3 CM/SEC
BH CV ECHO MEAS - LV V1 VTI: 29.2 CM
BH CV ECHO MEAS - LVIDD: 4.4 CM
BH CV ECHO MEAS - LVIDS: 3 CM
BH CV ECHO MEAS - LVOT AREA: 3 CM2
BH CV ECHO MEAS - LVOT DIAM: 1.97 CM
BH CV ECHO MEAS - LVPWD: 1.03 CM
BH CV ECHO MEAS - MED PEAK E' VEL: 5.3 CM/SEC
BH CV ECHO MEAS - MV A DUR: 0.12 SEC
BH CV ECHO MEAS - MV A MAX VEL: 90.8 CM/SEC
BH CV ECHO MEAS - MV DEC SLOPE: 222.6 CM/SEC2
BH CV ECHO MEAS - MV DEC TIME: 0.3 SEC
BH CV ECHO MEAS - MV E MAX VEL: 56.3 CM/SEC
BH CV ECHO MEAS - MV E/A: 0.62
BH CV ECHO MEAS - MV MAX PG: 4 MMHG
BH CV ECHO MEAS - MV MEAN PG: 1.67 MMHG
BH CV ECHO MEAS - MV P1/2T: 100 MSEC
BH CV ECHO MEAS - MV V2 VTI: 25.6 CM
BH CV ECHO MEAS - MVA(P1/2T): 2.2 CM2
BH CV ECHO MEAS - MVA(VTI): 3.5 CM2
BH CV ECHO MEAS - PA ACC TIME: 0.15 SEC
BH CV ECHO MEAS - PA V2 MAX: 74.7 CM/SEC
BH CV ECHO MEAS - PULM A REVS DUR: 0.13 SEC
BH CV ECHO MEAS - PULM A REVS VEL: 37.7 CM/SEC
BH CV ECHO MEAS - PULM DIAS VEL: 40.5 CM/SEC
BH CV ECHO MEAS - PULM S/D: 1.47
BH CV ECHO MEAS - PULM SYS VEL: 59.6 CM/SEC
BH CV ECHO MEAS - RAP SYSTOLE: 8 MMHG
BH CV ECHO MEAS - RV MAX PG: 1.28 MMHG
BH CV ECHO MEAS - RV V1 MAX: 56.6 CM/SEC
BH CV ECHO MEAS - RV V1 VTI: 11.3 CM
BH CV ECHO MEAS - RVSP: 32 MMHG
BH CV ECHO MEAS - SV(LVOT): 88.7 ML
BH CV ECHO MEAS - SV(MOD-SP2): 44 ML
BH CV ECHO MEAS - SV(MOD-SP4): 49 ML
BH CV ECHO MEAS - SVI(LVOT): 54.4 ML/M2
BH CV ECHO MEAS - SVI(MOD-SP2): 27 ML/M2
BH CV ECHO MEAS - SVI(MOD-SP4): 30.1 ML/M2
BH CV ECHO MEAS - TAPSE (>1.6): 2.5 CM
BH CV ECHO MEAS - TR MAX PG: 23.1 MMHG
BH CV ECHO MEAS - TR MAX VEL: 240.3 CM/SEC
BH CV ECHO MEASUREMENTS AVERAGE E/E' RATIO: 9.79
BH CV XLRA - RV BASE: 2.9 CM
BH CV XLRA - RV MID: 2.13 CM
BH CV XLRA - TDI S': 12.6 CM/SEC
BILIRUB SERPL-MCNC: 0.4 MG/DL (ref 0–1.2)
BUN SERPL-MCNC: 19 MG/DL (ref 8–23)
BUN/CREAT SERPL: 18.1 (ref 7–25)
BUPRENORPHINE SERPL-MCNC: NEGATIVE NG/ML
C PNEUM DNA NPH QL NAA+NON-PROBE: NOT DETECTED
CA-I SERPL ISE-MCNC: 1.26 MMOL/L (ref 1.15–1.35)
CALCIUM SPEC-SCNC: 8.9 MG/DL (ref 8.6–10.5)
CANNABINOIDS SERPL QL: NEGATIVE
CHLORIDE SERPL-SCNC: 108 MMOL/L (ref 98–107)
CHOLEST SERPL-MCNC: 113 MG/DL (ref 0–200)
CK SERPL-CCNC: 92 U/L (ref 20–180)
CO2 SERPL-SCNC: 27.3 MMOL/L (ref 22–29)
COCAINE UR QL: NEGATIVE
CREAT SERPL-MCNC: 1.05 MG/DL (ref 0.57–1)
CREAT UR-MCNC: 127.5 MG/DL
D-LACTATE SERPL-SCNC: 1.1 MMOL/L (ref 0.5–2)
DEPRECATED RDW RBC AUTO: 42.4 FL (ref 37–54)
EGFRCR SERPLBLD CKD-EPI 2021: 51.9 ML/MIN/1.73
EOSINOPHIL # BLD AUTO: 0.07 10*3/MM3 (ref 0–0.4)
EOSINOPHIL NFR BLD AUTO: 0.9 % (ref 0.3–6.2)
ERYTHROCYTE [DISTWIDTH] IN BLOOD BY AUTOMATED COUNT: 12.8 % (ref 12.3–15.4)
FENTANYL UR-MCNC: NEGATIVE NG/ML
FLUAV SUBTYP SPEC NAA+PROBE: NOT DETECTED
FLUBV RNA ISLT QL NAA+PROBE: NOT DETECTED
FOLATE SERPL-MCNC: 6.62 NG/ML (ref 4.78–24.2)
FOLATE SERPL-MCNC: 7.24 NG/ML (ref 4.78–24.2)
GEN 5 1HR TROPONIN T REFLEX: 31 NG/L
GLOBULIN UR ELPH-MCNC: 2.6 GM/DL
GLUCOSE SERPL-MCNC: 97 MG/DL (ref 65–99)
HADV DNA SPEC NAA+PROBE: NOT DETECTED
HBA1C MFR BLD: 5.6 % (ref 4.8–5.6)
HCOV 229E RNA SPEC QL NAA+PROBE: NOT DETECTED
HCOV HKU1 RNA SPEC QL NAA+PROBE: NOT DETECTED
HCOV NL63 RNA SPEC QL NAA+PROBE: NOT DETECTED
HCOV OC43 RNA SPEC QL NAA+PROBE: NOT DETECTED
HCT VFR BLD AUTO: 32.5 % (ref 34–46.6)
HDLC SERPL-MCNC: 36 MG/DL (ref 40–60)
HGB BLD-MCNC: 10.7 G/DL (ref 12–15.9)
HMPV RNA NPH QL NAA+NON-PROBE: NOT DETECTED
HPIV1 RNA ISLT QL NAA+PROBE: NOT DETECTED
HPIV2 RNA SPEC QL NAA+PROBE: NOT DETECTED
HPIV3 RNA NPH QL NAA+PROBE: NOT DETECTED
HPIV4 P GENE NPH QL NAA+PROBE: NOT DETECTED
IMM GRANULOCYTES # BLD AUTO: 0.02 10*3/MM3 (ref 0–0.05)
IMM GRANULOCYTES NFR BLD AUTO: 0.3 % (ref 0–0.5)
IRON 24H UR-MRATE: 57 MCG/DL (ref 37–145)
IRON SATN MFR SERPL: 32 % (ref 20–50)
LDLC SERPL CALC-MCNC: 57 MG/DL (ref 0–100)
LDLC/HDLC SERPL: 1.54 {RATIO}
LEFT ATRIUM VOLUME INDEX: 33.4 ML/M2
LIPASE SERPL-CCNC: 18 U/L (ref 13–60)
LV EF BIPLANE MOD: 63 %
LYMPHOCYTES # BLD AUTO: 1.94 10*3/MM3 (ref 0.7–3.1)
LYMPHOCYTES NFR BLD AUTO: 25.2 % (ref 19.6–45.3)
M PNEUMO IGG SER IA-ACNC: NOT DETECTED
MAGNESIUM SERPL-MCNC: 1.9 MG/DL (ref 1.6–2.4)
MCH RBC QN AUTO: 30.4 PG (ref 26.6–33)
MCHC RBC AUTO-ENTMCNC: 32.9 G/DL (ref 31.5–35.7)
MCV RBC AUTO: 92.3 FL (ref 79–97)
METHADONE UR QL SCN: NEGATIVE
MONOCYTES # BLD AUTO: 0.69 10*3/MM3 (ref 0.1–0.9)
MONOCYTES NFR BLD AUTO: 9 % (ref 5–12)
NEUTROPHILS NFR BLD AUTO: 4.96 10*3/MM3 (ref 1.7–7)
NEUTROPHILS NFR BLD AUTO: 64.3 % (ref 42.7–76)
NRBC BLD AUTO-RTO: 0 /100 WBC (ref 0–0.2)
NT-PROBNP SERPL-MCNC: 1069 PG/ML (ref 0–1800)
OPIATES UR QL: POSITIVE
OXYCODONE UR QL SCN: NEGATIVE
PCP UR QL SCN: NEGATIVE
PHOSPHATE SERPL-MCNC: 2.1 MG/DL (ref 2.5–4.5)
PLATELET # BLD AUTO: 179 10*3/MM3 (ref 140–450)
PMV BLD AUTO: 10.4 FL (ref 6–12)
POTASSIUM SERPL-SCNC: 3.6 MMOL/L (ref 3.5–5.2)
PROT ?TM UR-MCNC: 93.7 MG/DL
PROT SERPL-MCNC: 5.5 G/DL (ref 6–8.5)
PROT/CREAT UR: 734.9 MG/G CREA (ref 0–200)
PTH-INTACT SERPL-MCNC: 80.8 PG/ML (ref 15–65)
QT INTERVAL: 376 MS
QTC INTERVAL: 442 MS
RBC # BLD AUTO: 3.52 10*6/MM3 (ref 3.77–5.28)
RHINOVIRUS RNA SPEC NAA+PROBE: NOT DETECTED
RSV RNA NPH QL NAA+NON-PROBE: NOT DETECTED
SARS-COV-2 RNA NPH QL NAA+NON-PROBE: NOT DETECTED
SINUS: 2.6 CM
SODIUM SERPL-SCNC: 144 MMOL/L (ref 136–145)
SODIUM UR-SCNC: 61 MMOL/L
STJ: 2.6 CM
TIBC SERPL-MCNC: 177 MCG/DL (ref 298–536)
TRANSFERRIN SERPL-MCNC: 119 MG/DL (ref 200–360)
TRICYCLICS UR QL SCN: NEGATIVE
TRIGL SERPL-MCNC: 107 MG/DL (ref 0–150)
TROPONIN T % DELTA: -11 %
TROPONIN T NUMERIC DELTA: -4 NG/L
TROPONIN T SERPL HS-MCNC: 31 NG/L
TROPONIN T SERPL HS-MCNC: 35 NG/L
TSH SERPL DL<=0.05 MIU/L-ACNC: 1.57 UIU/ML (ref 0.27–4.2)
VIT B12 BLD-MCNC: 290 PG/ML (ref 211–946)
VIT B12 BLD-MCNC: 299 PG/ML (ref 211–946)
VLDLC SERPL-MCNC: 20 MG/DL (ref 5–40)
WBC NRBC COR # BLD AUTO: 7.7 10*3/MM3 (ref 3.4–10.8)

## 2025-01-13 PROCEDURE — 72070 X-RAY EXAM THORAC SPINE 2VWS: CPT

## 2025-01-13 PROCEDURE — 93306 TTE W/DOPPLER COMPLETE: CPT | Performed by: INTERNAL MEDICINE

## 2025-01-13 PROCEDURE — 84484 ASSAY OF TROPONIN QUANT: CPT | Performed by: INTERNAL MEDICINE

## 2025-01-13 PROCEDURE — 84100 ASSAY OF PHOSPHORUS: CPT | Performed by: INTERNAL MEDICINE

## 2025-01-13 PROCEDURE — 80307 DRUG TEST PRSMV CHEM ANLYZR: CPT | Performed by: INTERNAL MEDICINE

## 2025-01-13 PROCEDURE — 80053 COMPREHEN METABOLIC PANEL: CPT | Performed by: INTERNAL MEDICINE

## 2025-01-13 PROCEDURE — 82746 ASSAY OF FOLIC ACID SERUM: CPT | Performed by: INTERNAL MEDICINE

## 2025-01-13 PROCEDURE — 99223 1ST HOSP IP/OBS HIGH 75: CPT

## 2025-01-13 PROCEDURE — 94640 AIRWAY INHALATION TREATMENT: CPT

## 2025-01-13 PROCEDURE — 82607 VITAMIN B-12: CPT

## 2025-01-13 PROCEDURE — 83540 ASSAY OF IRON: CPT | Performed by: INTERNAL MEDICINE

## 2025-01-13 PROCEDURE — 84466 ASSAY OF TRANSFERRIN: CPT | Performed by: INTERNAL MEDICINE

## 2025-01-13 PROCEDURE — 93010 ELECTROCARDIOGRAM REPORT: CPT | Performed by: INTERNAL MEDICINE

## 2025-01-13 PROCEDURE — 82306 VITAMIN D 25 HYDROXY: CPT | Performed by: INTERNAL MEDICINE

## 2025-01-13 PROCEDURE — 97530 THERAPEUTIC ACTIVITIES: CPT

## 2025-01-13 PROCEDURE — 82550 ASSAY OF CK (CPK): CPT | Performed by: INTERNAL MEDICINE

## 2025-01-13 PROCEDURE — 83036 HEMOGLOBIN GLYCOSYLATED A1C: CPT | Performed by: INTERNAL MEDICINE

## 2025-01-13 PROCEDURE — 0202U NFCT DS 22 TRGT SARS-COV-2: CPT | Performed by: INTERNAL MEDICINE

## 2025-01-13 PROCEDURE — 82330 ASSAY OF CALCIUM: CPT | Performed by: INTERNAL MEDICINE

## 2025-01-13 PROCEDURE — 86592 SYPHILIS TEST NON-TREP QUAL: CPT

## 2025-01-13 PROCEDURE — 84443 ASSAY THYROID STIM HORMONE: CPT | Performed by: INTERNAL MEDICINE

## 2025-01-13 PROCEDURE — 92610 EVALUATE SWALLOWING FUNCTION: CPT | Performed by: SPEECH-LANGUAGE PATHOLOGIST

## 2025-01-13 PROCEDURE — 94664 DEMO&/EVAL PT USE INHALER: CPT

## 2025-01-13 PROCEDURE — 84484 ASSAY OF TROPONIN QUANT: CPT

## 2025-01-13 PROCEDURE — 83970 ASSAY OF PARATHORMONE: CPT | Performed by: INTERNAL MEDICINE

## 2025-01-13 PROCEDURE — 97162 PT EVAL MOD COMPLEX 30 MIN: CPT

## 2025-01-13 PROCEDURE — 72114 X-RAY EXAM L-S SPINE BENDING: CPT

## 2025-01-13 PROCEDURE — 83880 ASSAY OF NATRIURETIC PEPTIDE: CPT | Performed by: INTERNAL MEDICINE

## 2025-01-13 PROCEDURE — 85025 COMPLETE CBC W/AUTO DIFF WBC: CPT | Performed by: INTERNAL MEDICINE

## 2025-01-13 PROCEDURE — 82746 ASSAY OF FOLIC ACID SERUM: CPT

## 2025-01-13 PROCEDURE — 82607 VITAMIN B-12: CPT | Performed by: INTERNAL MEDICINE

## 2025-01-13 PROCEDURE — 99222 1ST HOSP IP/OBS MODERATE 55: CPT | Performed by: STUDENT IN AN ORGANIZED HEALTH CARE EDUCATION/TRAINING PROGRAM

## 2025-01-13 PROCEDURE — 80061 LIPID PANEL: CPT | Performed by: INTERNAL MEDICINE

## 2025-01-13 PROCEDURE — 94799 UNLISTED PULMONARY SVC/PX: CPT

## 2025-01-13 PROCEDURE — 63710000001 PREDNISONE PER 5 MG: Performed by: INTERNAL MEDICINE

## 2025-01-13 PROCEDURE — 93306 TTE W/DOPPLER COMPLETE: CPT

## 2025-01-13 PROCEDURE — 76536 US EXAM OF HEAD AND NECK: CPT

## 2025-01-13 PROCEDURE — 83690 ASSAY OF LIPASE: CPT | Performed by: INTERNAL MEDICINE

## 2025-01-13 PROCEDURE — 82570 ASSAY OF URINE CREATININE: CPT

## 2025-01-13 PROCEDURE — 84156 ASSAY OF PROTEIN URINE: CPT

## 2025-01-13 PROCEDURE — 99221 1ST HOSP IP/OBS SF/LOW 40: CPT | Performed by: ORTHOPAEDIC SURGERY

## 2025-01-13 PROCEDURE — 93005 ELECTROCARDIOGRAM TRACING: CPT | Performed by: INTERNAL MEDICINE

## 2025-01-13 PROCEDURE — 84300 ASSAY OF URINE SODIUM: CPT

## 2025-01-13 PROCEDURE — 99221 1ST HOSP IP/OBS SF/LOW 40: CPT | Performed by: INTERNAL MEDICINE

## 2025-01-13 PROCEDURE — 83605 ASSAY OF LACTIC ACID: CPT | Performed by: INTERNAL MEDICINE

## 2025-01-13 PROCEDURE — 83735 ASSAY OF MAGNESIUM: CPT | Performed by: INTERNAL MEDICINE

## 2025-01-13 RX ORDER — POTASSIUM CHLORIDE 1.5 G/1.58G
40 POWDER, FOR SOLUTION ORAL EVERY 4 HOURS
Status: COMPLETED | OUTPATIENT
Start: 2025-01-13 | End: 2025-01-13

## 2025-01-13 RX ORDER — HYDROCODONE BITARTRATE AND ACETAMINOPHEN 10; 325 MG/1; MG/1
2 TABLET ORAL EVERY 4 HOURS PRN
Status: DISCONTINUED | OUTPATIENT
Start: 2025-01-13 | End: 2025-01-15 | Stop reason: HOSPADM

## 2025-01-13 RX ADMIN — LORAZEPAM 0.5 MG: 0.5 TABLET ORAL at 01:02

## 2025-01-13 RX ADMIN — HYDROXYZINE HYDROCHLORIDE 10 MG: 10 TABLET ORAL at 20:55

## 2025-01-13 RX ADMIN — Medication 1 TABLET: at 09:42

## 2025-01-13 RX ADMIN — TRIAMCINOLONE ACETONIDE 1 APPLICATION: 1 CREAM TOPICAL at 21:01

## 2025-01-13 RX ADMIN — CETIRIZINE HYDROCHLORIDE 10 MG: 10 TABLET, FILM COATED ORAL at 09:43

## 2025-01-13 RX ADMIN — FLUTICASONE PROPIONATE 1 SPRAY: 50 SPRAY, METERED NASAL at 21:01

## 2025-01-13 RX ADMIN — EPLERENONE 50 MG: 25 TABLET, FILM COATED ORAL at 09:43

## 2025-01-13 RX ADMIN — POTASSIUM CHLORIDE 20 MEQ: 1.5 POWDER, FOR SOLUTION ORAL at 09:43

## 2025-01-13 RX ADMIN — IPRATROPIUM BROMIDE 0.5 MG: 0.5 SOLUTION RESPIRATORY (INHALATION) at 16:57

## 2025-01-13 RX ADMIN — CYANOCOBALAMIN TAB 500 MCG 1000 MCG: 500 TAB at 09:42

## 2025-01-13 RX ADMIN — RIVAROXABAN 20 MG: 20 TABLET, FILM COATED ORAL at 09:43

## 2025-01-13 RX ADMIN — Medication 3 ML: at 01:03

## 2025-01-13 RX ADMIN — EPLERENONE 50 MG: 25 TABLET, FILM COATED ORAL at 20:54

## 2025-01-13 RX ADMIN — TORSEMIDE 50 MG: 20 TABLET ORAL at 09:42

## 2025-01-13 RX ADMIN — Medication 5 MG: at 21:01

## 2025-01-13 RX ADMIN — TRIAMCINOLONE ACETONIDE 1 APPLICATION: 1 CREAM TOPICAL at 01:17

## 2025-01-13 RX ADMIN — CLONIDINE HYDROCHLORIDE 0.2 MG: 0.1 TABLET ORAL at 17:18

## 2025-01-13 RX ADMIN — HYDROCODONE BITARTRATE AND ACETAMINOPHEN 2 TABLET: 10; 325 TABLET ORAL at 20:55

## 2025-01-13 RX ADMIN — POTASSIUM CHLORIDE 40 MEQ: 1.5 FOR SOLUTION ORAL at 17:18

## 2025-01-13 RX ADMIN — PANTOPRAZOLE SODIUM 40 MG: 40 TABLET, DELAYED RELEASE ORAL at 06:08

## 2025-01-13 RX ADMIN — FAMOTIDINE 40 MG: 20 TABLET, FILM COATED ORAL at 09:42

## 2025-01-13 RX ADMIN — ROSUVASTATIN CALCIUM 5 MG: 5 TABLET, FILM COATED ORAL at 06:08

## 2025-01-13 RX ADMIN — EPLERENONE 50 MG: 25 TABLET, FILM COATED ORAL at 01:17

## 2025-01-13 RX ADMIN — TRIAMCINOLONE ACETONIDE 1 APPLICATION: 1 CREAM TOPICAL at 09:43

## 2025-01-13 RX ADMIN — HYDROCODONE BITARTRATE AND ACETAMINOPHEN 1 TABLET: 10; 325 TABLET ORAL at 02:21

## 2025-01-13 RX ADMIN — IPRATROPIUM BROMIDE 0.5 MG: 0.5 SOLUTION RESPIRATORY (INHALATION) at 11:40

## 2025-01-13 RX ADMIN — NIFEDIPINE 30 MG: 30 TABLET, FILM COATED, EXTENDED RELEASE ORAL at 09:44

## 2025-01-13 RX ADMIN — HYDROCODONE BITARTRATE AND ACETAMINOPHEN 2 TABLET: 10; 325 TABLET ORAL at 15:57

## 2025-01-13 RX ADMIN — SODIUM CHLORIDE 50 ML/HR: 4.5 INJECTION, SOLUTION INTRAVENOUS at 01:02

## 2025-01-13 RX ADMIN — Medication 3 ML: at 21:01

## 2025-01-13 RX ADMIN — PREDNISONE 10 MG: 10 TABLET ORAL at 09:43

## 2025-01-13 RX ADMIN — MUPIROCIN 1 APPLICATION: 20 OINTMENT TOPICAL at 09:42

## 2025-01-13 RX ADMIN — Medication 3 ML: at 09:44

## 2025-01-13 RX ADMIN — FLUTICASONE PROPIONATE 1 SPRAY: 50 SPRAY, METERED NASAL at 09:44

## 2025-01-13 RX ADMIN — IPRATROPIUM BROMIDE 0.5 MG: 0.5 SOLUTION RESPIRATORY (INHALATION) at 19:22

## 2025-01-13 RX ADMIN — Medication 5 MG: at 01:02

## 2025-01-13 RX ADMIN — HYDROCODONE BITARTRATE AND ACETAMINOPHEN 2 TABLET: 10; 325 TABLET ORAL at 09:55

## 2025-01-13 RX ADMIN — IPRATROPIUM BROMIDE 0.5 MG: 0.5 SOLUTION RESPIRATORY (INHALATION) at 01:27

## 2025-01-13 RX ADMIN — FLUTICASONE PROPIONATE 1 SPRAY: 50 SPRAY, METERED NASAL at 01:17

## 2025-01-13 RX ADMIN — Medication 2 PACKET: at 15:57

## 2025-01-13 RX ADMIN — POTASSIUM CHLORIDE 40 MEQ: 1.5 FOR SOLUTION ORAL at 15:57

## 2025-01-13 NOTE — CONSULTS
[facility]  Cardiology Consult Note    Patient Identification:  Name: Sasha Barrett  Age: 84 y.o.  Sex: female  :  1940  MRN: [id number]             Requesting Physician: Dr Cadet    Reason for Consultation / Chief Complaint: intermittent chest pain with exertion    History of Present Illness:     This is a 84  year old female who is known with our service with Hypertension, hyperlipidemia, hx DVT/PE, CKD, COPD. She presented to [facility] ER with generalized weakness and falls with right shoulder pain and foot pain. Work up in ER with K 3.0, cr 1.38, XR shoulder with out acute fracture or dislocation, CT head mild chronic small vessel ischemic white matter change, left occipital lobe consistent with old infarction, no evidence for acute intracranial abnormality. She was treated with IVF and potassium replacement and admitted for further management. Cardiology has been consulted for intermittent chest pain with exertion. Troponin this am 35, BNP 1069. ECG SR without acute ST changes.     Echo  EF 63%, normal LV function.     Stress test  normal myocardial perfusion study no evidence of ischemia, very small inferior wall ischemia could not be ruled out, low risk study.     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 Yazmin Canseco    CARDIAC ELECTROPHYSIOLOGY PROCEDURE N/A 5/27/2022    Procedure: Loop insertion;  Surgeon: Jeffrey Argueta MD;  [facility];  Service: Cardiovascular;  Laterality: N/A;  biotronik    CARDIAC ELECTROPHYSIOLOGY PROCEDURE N/A 7/13/2022    Procedure: Loop recorder removal;  Surgeon: Jeffrey Argueta MD;  Location:  [facility] CATH INVASIVE LOCATION;  Service: Cardiovascular;  Laterality: N/A;    CARPAL TUNNEL RELEASE Right 05/25/2011    Dr. Caleb Bueno    CARPAL TUNNEL RELEASE Left 04/26/2011    Dr. Caleb Bueno    CATARACT EXTRACTION Left 11/29/2010    Dr. Eusebio Downs    CATARACT EXTRACTION Right 11/17/2010    Dr. Eusebio Downs    COLONOSCOPY N/A 12/06/2002    Sigmoid diverticulosis-Dr. Brandon Batista    COLONOSCOPY N/A 12/12/2013    Tortuous colon, diverticulosis in the sigmoid colon and descending colon, stool in the rectum, sigmoid colon, descending colon, splenic flexure, transverse colon and hepatic flexure-Dr. Irma Brambila    DEQUERVAIN RELEASE Left 9/23/2020    Procedure: DEQUERVAIN RELEASE LEFT HAND;  Surgeon: Caleb Bueno MD;  Location:  [facility] OR [facility] [place];  Service: Plastics;  Laterality: Left;    ENDOSCOPY N/A 09/13/2007    Normal-Dr. Pavel Quarles    ENDOSCOPY AND COLONOSCOPY N/A 09/19/2005    1 cm hiatal hernia, mild Schatzki's ring, sigmoid diverticulosis-Dr. Yoel Farley    GANGLION CYST EXCISION Left 12/18/2012    Release of A1 pulley flexor sheath, left fourth finger, excision of ganglion cyst 0.5 cm diameter, A2 pulley flexor sheath, left fourth finger-Dr. Caleb Bueno    HEEL SPUR EXCISION Left 1968    INGUINAL HERNIA REPAIR Right 1971    at 5 months pregnant     INJECTION OF MEDICATION Left 9/23/2020    Procedure: KENOLOG INJECTION TO LEFT THUMB;  Surgeon: Caleb Bueno MD;   [facility] [place];  Service: Plastics;  Laterality: Left;    MASTECTOMY Right 08/05/2008    Right breast mastectomy and excision of left chest wall lesion-Dr. Yoel Farley    MASTECTOMY RADICAL Left 04/29/2003    Left modified radical mastectomy-Dr. Yazmin Canseco    PARATHYROIDECTOMY Right 05/04/2004    Excision of parathyroid adenoma (right superior)-Dr. Yoel Farley    REPLACEMENT TOTAL KNEE Right 04/09/2012    Dr. Lamonte Childress    SINUS SURGERY  1984    THYROIDECTOMY, PARTIAL Left 05/04/2004    Dr. Yoel Farley    TOTAL ABDOMINAL HYSTERECTOMY Bilateral 1973    Dr. Mahan    TRIGGER FINGER RELEASE Left 9/23/2020    Procedure: RELEASE LEFT FOURTH TRIGGER FINGER;  Surgeon: Caleb Bueno MD;  Location: [facility] OR [facility] [place];  Service: Plastics;  Laterality: Left;    UPPER GASTROINTESTINAL ENDOSCOPY N/A 02/18/2009    LA Grade A reflux esophagitis, normal stomach, normal 2nd part of the duodenum-Dr. Yoel Farley    WRIST GANGLION EXCISION Left 9/23/2020    Procedure: EXCISION GANGLION CYST LEFT WRIST;  Surgeon: Caleb Bueno MD;  Location: [facility] [place];  Service: Plastics;  Laterality: Left;      Allergies:  Allergies   Allergen Reactions    Bee Venom Shortness Of Breath and Rash    Morphine Anaphylaxis and Nausea And Vomiting    Sulfa Antibiotics Anaphylaxis and Hives     Or sulfa fillers in meds  Broke out in internal and external hives      Tree Extract Shortness Of Breath and Rash    Ambien [Zolpidem] Hallucinations    Anastrozole Other (See Comments)     Can't remember what the reaction was     Covid-19 (Mrna) Vaccine Swelling     Facial swelling, blood clots    Eliquis [Apixaban] Headache     Headaches, nausea and itching.     Hydralazine Myalgia     Patient reports leg cramps, joint pain and increased fatigue    Norvasc [Amlodipine] Other (See Comments)     KIDNEYS SHUT DOWN    Nsaids Other (See Comments)     KIDNEY FAILURE    Penicillins Unknown (See Comments)     Severe  reaction as a child  Tolerated ceftriaxone during 04/2017 admission    Grass Rash     Home Meds:  Medications Prior to Admission   Medication Sig Dispense Refill Last Dose/Taking    acetaminophen (TYLENOL) 325 MG tablet Take 2 tablets by mouth Every 4 (Four) Hours As Needed for Mild Pain .   Taking As Needed    albuterol (PROAIR HFA) 108 (90 Base) MCG/ACT inhaler 4 puffs Every 4 (Four) Hours As Needed for Wheezing or Shortness of Air.   1/11/2025    butalbital-acetaminophen-caffeine (FIORICET, ESGIC) -40 MG per tablet Take 2 tablets by mouth Every 4 (Four) Hours As Needed for Headache. 240 tablet 0 1/12/2025    cetirizine (zyrTEC) 10 MG tablet Take 1 tablet by mouth Daily. Alt with singulair   1/11/2025    Cholecalciferol (VITAMIN D3) 1.25 MG (97024 UT) capsule Take 1 capsule by mouth 1 (One) Time Per Week. TUESDAYS   Past Week    cloNIDine (CATAPRES) 0.2 MG tablet Take 1 tablet by mouth Every 6 (Six) Hours. Dose titrated for BP control by patient   Taking    cloNIDine (CATAPRES) 0.3 MG tablet Take 1 tablet by mouth Every 6 (Six) Hours. Dose titrated by patient for BP control   1/12/2025    Diclofenac Sodium (VOLTAREN) 1 % gel gel Apply 4 g topically to the appropriate area as directed 4 (Four) Times a Day As Needed (Apply to knees or other joints when pain is severe and flares). 100 g 5 1/11/2025    diphenhydrAMINE (BENADRYL) 50 MG tablet Take 1 tablet by mouth every 4 (four) hours as needed for itching or allergies. 180 tablet 0 1/11/2025    eplerenone (INSPRA) 50 MG tablet Take 1 tablet by mouth Daily.   1/11/2025    fluticasone (FLONASE) 50 MCG/ACT nasal spray 1 spray by Each Nare route 2 (Two) Times a Day.   1/11/2025    HYDROcodone-acetaminophen (NORCO)  MG per tablet Take 1-2 tablets by mouth Every 6 (Six) Hours As Needed for Moderate Pain.   1/12/2025    hydrOXYzine (ATARAX) 10 MG tablet Take 1 tablet by mouth 3 (Three) Times a Day As Needed for Itching.   Past Week    hydrOXYzine (ATARAX) 50 MG  tablet Take 1 tablet by mouth Every 6 (Six) Hours As Needed for Itching or Allergies.   Past Week    ipratropium (ATROVENT) 0.02 % nebulizer solution Take 2.5 mL by nebulization 4 (Four) Times a Day.   Past Week    LORazepam (ATIVAN) 0.5 MG tablet Take 1 tablet by mouth Every 8 (Eight) Hours As Needed for Anxiety.   Past Week    multivitamin with minerals tablet tablet Take 1 tablet by mouth Daily.   1/11/2025    mupirocin (BACTROBAN) 2 % ointment Apply 1 Application topically to the appropriate area as directed Every 12 (Twelve) Hours. (Patient taking differently: Apply 1 Application topically to the appropriate area as directed Every 12 (Twelve) Hours. Applies between toes and bottoms of feet at bedtime)   1/11/2025    Niacin (VITAMIN B-3 PO) Take  by mouth.   1/11/2025    NIFEdipine XL (ADALAT CC) 30 MG 24 hr tablet Take 1 tablet by mouth Daily. 90 tablet 1 1/11/2025    omeprazole (priLOSEC) 40 MG capsule Take 1 capsule by mouth Daily As Needed.   Past Month    ondansetron ODT (ZOFRAN-ODT) 8 MG disintegrating tablet Take 1 tablet by mouth every 6 (six) hours as needed for nausea or vomiting. 360 tablet 1 Past Month    polyethylene glycol (MIRALAX) 17 g packet Take 17 g by mouth Daily As Needed.   1/11/2025    potassium chloride (KLOR-CON) 20 MEQ packet Take 20 mEq by mouth Daily.   1/11/2025    predniSONE (DELTASONE) 10 MG tablet Take 1 tablet by mouth Every Other Day. (Patient taking differently: Take 2 tablets by mouth Daily.) 45 tablet 1 1/11/2025    promethazine-codeine (PHENERGAN with CODEINE) 6.25-10 MG/5ML syrup Take 5 mL by mouth 4 (Four) Times a Day As Needed for cough. 360 mL 0 Past Week    rosuvastatin (CRESTOR) 5 MG tablet Take 1 tablet by mouth Every Morning.   1/11/2025    torsemide (DEMADEX) 100 MG tablet Take 1 tablet by mouth Daily. (Patient taking differently: Take 1 tablet by mouth Daily As Needed.) 90 tablet 1 1/11/2025    triamcinolone (KENALOG) 0.1 % cream Apply 1 Application topically to  the appropriate area as directed 2 (Two) Times a Day.   Past Week    vitamin B-12 (VITAMIN B-12) 1000 MCG tablet Take 1 tablet by mouth Daily. 90 tablet 3 1/11/2025    Xarelto 20 MG tablet Take 1 tablet by mouth Daily.   1/11/2025    carboxymethylcellulose (REFRESH PLUS) 0.5 % solution Administer 1 drop to both eyes 2 (Two) Times a Day As Needed for Dry Eyes.   More than a month    EPINEPHrine (EPIPEN) 0.3 MG/0.3ML solution auto-injector injection INJECT THE CONTENTS OF ONE PEN AS NEEDED. MAY REPEAT ONE TIME.   Unknown    levocetirizine (XYZAL) 5 MG tablet Take 1 tablet by mouth Every Morning.   More than a month    lidocaine (Lidoderm) 5 % Place 1 patch on the skin as directed by provider Daily As Needed for Mild Pain. Remove & Discard patch within 12 hours or as directed by MD (Patient not taking: Reported on 1/12/2025) 5 patch 0 Not Taking    naloxone (NARCAN) 4 MG/0.1ML nasal spray Administer 1 spray into the nostril(s) as directed by provider As Needed for Opioid Reversal. Call 911. Don't prime. [place] in 1 nostril for overdose. Repeat in 2-3 minutes in other nostril if no or minimal breathing/responsiveness.   Unknown    Petrolatum 42 % ointment Apply 1 Application topically to the appropriate area as directed Every 12 (Twelve) Hours. Use on irritated skin from bug bites (Patient not taking: Reported on 1/12/2025) 100 g 5 Not Taking    potassium chloride (KAYCIEL) 20 mEq/15 mL solution Take 15 mL by mouth Daily. (Patient not taking: Reported on 1/12/2025) 90 mL 5 Unknown     Current Meds:   [unfilled]  Social History:   Social History     Tobacco Use    Smoking status: Never     Passive exposure: Never    Smokeless tobacco: Never   Substance Use Topics    Alcohol use: Yes     Comment: 3 drinks yearly      Family History:  Family History   Problem Relation Age of Onset    Brain cancer Brother     Alcohol abuse Mother     No Known Problems Father     No Known Problems Sister     Malig Hyperthermia Neg Hx      "    ROS  Constitutional: Awake and alert, no fever. No nosebleeds  Abdomen           no abdominal pain   Cardiac              no chest pain  Pulmonary          no shortness of breath      BP (!) 184/82   Pulse 73   Temp 98.1 °F (36.7 °C) (Oral)   Resp 18   Ht 152.4 cm (60\")   Wt 65.8 kg (145 lb)   SpO2 96%   BMI 28.32 kg/m²   General appearance: No acute changes   Neck: Trachea midline; NECK, supple, no thyromegaly or lymphadenopathy   Lungs: Normal size and shape, normal breath sounds, equal distribution of air, no rales or rhonchi   CV: S1-S2 regular, sys murmurs, no rub, no gallop   Abdomen: Soft, nontender; no masses , no abnormal abdominal sounds   Extremities: No deformity , normal color , no peripheral edema   Skin: Normal temperature, turgor and texture; no rash, ulcers                  Cardiographics  ECG:     Telemetry:    Echocardiogram:     Imaging  Chest X-ray:     Lab Review   Results from last 7 days   Lab Units 01/12/25  1551   CK TOTAL U/L 159     Results from last 7 days   Lab Units 01/12/25  1551   MAGNESIUM mg/dL 1.9     Results from last 7 days   Lab Units 01/12/25  1551   SODIUM mmol/L 144   POTASSIUM mmol/L 3.0*   BUN mg/dL 26*   CREATININE mg/dL 1.38*   CALCIUM mg/dL 9.3     @[id number]@  Results from last 7 days   Lab Units 01/12/25  1551   WBC 10*3/mm3 8.05   HEMOGLOBIN g/dL 11.6*   HEMATOCRIT % 37.5   PLATELETS 10*3/mm3 187             Assessment:  Atypical chest pain  Mild renal failure  Recommendations / Plan:   Echocardiogram is pending  Patient will need a stress test    Labs/tests ordered for am  Chest pains appears atypical      Jeffrey Argueta MD  1/13/2025, 06:36 EST      EMR Dragon/Transcription:   Dictated utilizing Dragon dictation    "

## 2025-01-13 NOTE — SIGNIFICANT NOTE
01/13/25 1540   OTHER   Discipline occupational therapist   Rehab Time/Intention   Session Not Performed other (see comments)  (Patient is off unit having ECHO at OT attempt for evaluation completion. OT will follow up 1/14/2025 or as patient is available.)   Recommendation   OT - Next Appointment 01/14/25

## 2025-01-13 NOTE — CONSULTS
Neurology Consult Note    Consult Date: 1/13/2025    Referring MD: No ref. provider found    Reason for Consult I have been asked to see the patient in neurological consultation to render advice and opinion regarding unable to walk after fall 3 days ago.  Patient worried extreme weakness similar to what happened after CVA.    Sasha Barrett is a 84 y.o. female with a past medical history of breast cancer status post bilateral mastectomies and chemotherapy, migraine, lumbar spinal stenosis, COPD, DVT/PE on Xarelto, hyperlipidemia, hypertension, CKD, chronic ischemic left occipital lobe infarct (over 10 years ago), psoriasis, Lyme disease, urinary retention requiring self cath, drug-induced lupus presents to the ED with acute generalized weakness with recurrent falls.  She has been having progressive bilateral lower extremity weakness and experienced a fall at home over the weekend where she hit her head.  Patient did have prolonged downtime with 7-8 hours on floor.  Not any loss of consciousness or dizziness.  She complains of right shoulder plain since fall.  She has a right foot deformity which makes ambulating more difficult for several years and has chronic bilateral feet pain.  She states she has been having weakness in bilateral lower extremities from knee down for the past several months after getting bit by Japanese pedal around memorial day.  She denies any neck pain except when it rains and she attributed this to arthritis.  She has had urinary incontinence recently.  She denies any constipation/fecal incontinence, saddle anesthesia, Lhermitte sign, unilateral weakness/numbness, double vision, blurry vision, vision loss, speech disturbance, dizziness/lightheadedness.    Patient was seen in May 2022 for lightheadedness/dizziness/ataxia and headache.  Workup included CT head which was unremarkable and carotid ultrasound also unremarkable.  She was seen again in September 30 for right lower extremity weakness  and inability to support weight.  He had a Ortho appointment which showed right external rotation and they recommended PT/bracing/orthotic recommendation.  She had chronic edema in right lower extremity with redness and had duplex ultrasound done which was negative for DVT.  She had recently traveled to [facility] and noted that she felt her legs were giving out and fell.  She thought her right leg was weaker than her left and did not seek medical attention at the time.  She had persistent right leg weakness.  On arrival, patient hypertensive at 223/125 mmHg.  Patient suggested to have lumbar MRI which was declined.  Plan was outpatient PT.      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 Yazmin Canseco    CARDIAC ELECTROPHYSIOLOGY PROCEDURE N/A 5/27/2022    Procedure: Loop insertion;  Surgeon: Jeffrey Argueta MD;  Location:  [facility];  Service: Cardiovascular;  Laterality: N/A;  biotronik    CARDIAC ELECTROPHYSIOLOGY PROCEDURE N/A 7/13/2022    Procedure: Loop recorder removal;  Surgeon: Jeffrey Argueta MD;  Location:  [facility];  Service: Cardiovascular;  Laterality: N/A;    CARPAL TUNNEL RELEASE Right 05/25/2011    Dr. Caleb Bueno     CARPAL TUNNEL RELEASE Left 04/26/2011    Dr. Caleb Bueno    CATARACT EXTRACTION Left 11/29/2010    Dr. Eusebio Downs    CATARACT EXTRACTION Right 11/17/2010    Dr. Eusebio Downs    COLONOSCOPY N/A 12/06/2002    Sigmoid diverticulosis-Dr. Brandon Batista    COLONOSCOPY N/A 12/12/2013    Tortuous colon, diverticulosis in the sigmoid colon and descending colon, stool in the rectum, sigmoid colon, descending colon, splenic flexure, transverse colon and hepatic flexure-Dr. Irma Brambila    DEQUERVAIN RELEASE Left 9/23/2020    Procedure: DEQUERVAIN RELEASE LEFT HAND;  Surgeon: Caleb Bueno MD;  Location:  [facility];  Service: Plastics;  Laterality: Left;    ENDOSCOPY N/A 09/13/2007    Normal-Dr. Pavel Quarles    ENDOSCOPY AND COLONOSCOPY N/A 09/19/2005    1 cm hiatal hernia, mild Schatzki's ring, sigmoid diverticulosis-Dr. Yoel Farley    GANGLION CYST EXCISION Left 12/18/2012    Release of A1 pulley flexor sheath, left fourth finger, excision of ganglion cyst 0.5 cm diameter, A2 pulley flexor sheath, left fourth finger-Dr. Caleb Bueno    HEEL SPUR EXCISION Left 1968    INGUINAL HERNIA REPAIR Right 1971    at 5 months pregnant     INJECTION OF MEDICATION Left 9/23/2020    Procedure: KENOLOG INJECTION TO LEFT THUMB;  Surgeon: Caleb Bueno MD;  Location:  [facility];  Service: Plastics;  Laterality: Left;    MASTECTOMY Right 08/05/2008    Right breast mastectomy and excision of left chest wall lesion-Dr. Yoel Farley    MASTECTOMY RADICAL Left 04/29/2003    Left modified radical mastectomy-Dr. Yazmin Canseco    PARATHYROIDECTOMY Right 05/04/2004    Excision of parathyroid adenoma (right superior)-Dr. Yoel Farley    REPLACEMENT TOTAL KNEE Right 04/09/2012    Dr. Lamonte Childress    SINUS SURGERY  1984    THYROIDECTOMY, PARTIAL Left 05/04/2004    Dr. Yoel Farley    TOTAL ABDOMINAL HYSTERECTOMY Bilateral 1973    Dr. Mahan    TRIGGER FINGER RELEASE Left  9/23/2020    Procedure: RELEASE LEFT FOURTH TRIGGER FINGER;  Surgeon: Caleb Bueno MD;  [place] [facility] [place] [facility]  Service: Plastics;  Laterality: Left;    UPPER GASTROINTESTINAL ENDOSCOPY N/A 02/18/2009    LA Grade A reflux esophagitis, normal stomach, normal 2nd part of the duodenum-Dr. Yoel Farley    WRIST GANGLION EXCISION Left 9/23/2020    Procedure: EXCISION GANGLION CYST LEFT WRIST;  Surgeon: Caleb Bueno MD;  [place] [facility] [place] [facility]  Service: Plastics;  Laterality: Left;       Medications On Admission  Medications Prior to Admission   Medication Sig Dispense Refill Last Dose/Taking    acetaminophen (TYLENOL) 325 MG tablet Take 2 tablets by mouth Every 4 (Four) Hours As Needed for Mild Pain .   Taking As Needed    albuterol (PROAIR HFA) 108 (90 Base) MCG/ACT inhaler 4 puffs Every 4 (Four) Hours As Needed for Wheezing or Shortness of Air.   1/11/2025    butalbital-acetaminophen-caffeine (FIORICET, ESGIC) -40 MG per tablet Take 2 tablets by mouth Every 4 (Four) Hours As Needed for Headache. 240 tablet 0 1/12/2025    cetirizine (zyrTEC) 10 MG tablet Take 1 tablet by mouth Daily. Alt with singulair   1/11/2025    Cholecalciferol (VITAMIN D3) 1.25 MG (54143 UT) capsule Take 1 capsule by mouth 1 (One) Time Per Week. TUESDAYS   Past Week    cloNIDine (CATAPRES) 0.2 MG tablet Take 1 tablet by mouth Every 6 (Six) Hours. Dose titrated for BP control by patient   Taking    cloNIDine (CATAPRES) 0.3 MG tablet Take 1 tablet by mouth Every 6 (Six) Hours. Dose titrated by patient for BP control   1/12/2025    Diclofenac Sodium (VOLTAREN) 1 % gel gel Apply 4 g topically to the appropriate area as directed 4 (Four) Times a Day As Needed (Apply to knees or other joints when pain is severe and flares). 100 g 5 1/11/2025    diphenhydrAMINE (BENADRYL) 50 MG tablet Take 1 tablet by mouth every 4 (four) hours as needed for itching or allergies. 180 tablet 0 1/11/2025    eplerenone (INSPRA) 50 MG  tablet Take 1 tablet by mouth Daily.   1/11/2025    fluticasone (FLONASE) 50 MCG/ACT nasal spray 1 spray by Each Nare route 2 (Two) Times a Day.   1/11/2025    HYDROcodone-acetaminophen (NORCO)  MG per tablet Take 1-2 tablets by mouth Every 6 (Six) Hours As Needed for Moderate Pain.   1/12/2025    hydrOXYzine (ATARAX) 10 MG tablet Take 1 tablet by mouth 3 (Three) Times a Day As Needed for Itching.   Past Week    hydrOXYzine (ATARAX) 50 MG tablet Take 1 tablet by mouth Every 6 (Six) Hours As Needed for Itching or Allergies.   Past Week    ipratropium (ATROVENT) 0.02 % nebulizer solution Take 2.5 mL by nebulization 4 (Four) Times a Day.   Past Week    LORazepam (ATIVAN) 0.5 MG tablet Take 1 tablet by mouth Every 8 (Eight) Hours As Needed for Anxiety.   Past Week    multivitamin with minerals tablet tablet Take 1 tablet by mouth Daily.   1/11/2025    mupirocin (BACTROBAN) 2 % ointment Apply 1 Application topically to the appropriate area as directed Every 12 (Twelve) Hours. (Patient taking differently: Apply 1 Application topically to the appropriate area as directed Every 12 (Twelve) Hours. Applies between toes and bottoms of feet at bedtime)   1/11/2025    Niacin (VITAMIN B-3 PO) Take  by mouth.   1/11/2025    NIFEdipine XL (ADALAT CC) 30 MG 24 hr tablet Take 1 tablet by mouth Daily. 90 tablet 1 1/11/2025    omeprazole (priLOSEC) 40 MG capsule Take 1 capsule by mouth Daily As Needed.   Past Month    ondansetron ODT (ZOFRAN-ODT) 8 MG disintegrating tablet Take 1 tablet by mouth every 6 (six) hours as needed for nausea or vomiting. 360 tablet 1 Past Month    polyethylene glycol (MIRALAX) 17 g packet Take 17 g by mouth Daily As Needed.   1/11/2025    potassium chloride (KLOR-CON) 20 MEQ packet Take 20 mEq by mouth Daily.   1/11/2025    predniSONE (DELTASONE) 10 MG tablet Take 1 tablet by mouth Every Other Day. (Patient taking differently: Take 2 tablets by mouth Daily.) 45 tablet 1 1/11/2025     promethazine-codeine (PHENERGAN with CODEINE) 6.25-10 MG/5ML syrup Take 5 mL by mouth 4 (Four) Times a Day As Needed for cough. 360 mL 0 Past Week    rosuvastatin (CRESTOR) 5 MG tablet Take 1 tablet by mouth Every Morning.   1/11/2025    torsemide (DEMADEX) 100 MG tablet Take 1 tablet by mouth Daily. (Patient taking differently: Take 1 tablet by mouth Daily As Needed.) 90 tablet 1 1/11/2025    triamcinolone (KENALOG) 0.1 % cream Apply 1 Application topically to the appropriate area as directed 2 (Two) Times a Day.   Past Week    vitamin B-12 (VITAMIN B-12) 1000 MCG tablet Take 1 tablet by mouth Daily. 90 tablet 3 1/11/2025    Xarelto 20 MG tablet Take 1 tablet by mouth Daily.   1/11/2025    carboxymethylcellulose (REFRESH PLUS) 0.5 % solution Administer 1 drop to both eyes 2 (Two) Times a Day As Needed for Dry Eyes.   More than a month    EPINEPHrine (EPIPEN) 0.3 MG/0.3ML solution auto-injector injection INJECT THE CONTENTS OF ONE PEN AS NEEDED. MAY REPEAT ONE TIME.   Unknown    levocetirizine (XYZAL) 5 MG tablet Take 1 tablet by mouth Every Morning.   More than a month    lidocaine (Lidoderm) 5 % Place 1 patch on the skin as directed by provider Daily As Needed for Mild Pain. Remove & Discard patch within 12 hours or as directed by MD (Patient not taking: Reported on 1/12/2025) 5 patch 0 Not Taking    naloxone (NARCAN) 4 MG/0.1ML nasal spray Administer 1 spray into the nostril(s) as directed by provider As Needed for Opioid Reversal. Call 911. Don't prime. [place] in 1 nostril for overdose. Repeat in 2-3 minutes in other nostril if no or minimal breathing/responsiveness.   Unknown    Petrolatum 42 % ointment Apply 1 Application topically to the appropriate area as directed Every 12 (Twelve) Hours. Use on irritated skin from bug bites (Patient not taking: Reported on 1/12/2025) 100 g 5 Not Taking    potassium chloride (KAYCIEL) 20 mEq/15 mL solution Take 15 mL by mouth Daily. (Patient not taking: Reported on  "1/12/2025) 90 mL 5 Unknown       Allergies:  Allergies   Allergen Reactions    Bee Venom Shortness Of Breath and Rash    Morphine Anaphylaxis and Nausea And Vomiting    Sulfa Antibiotics Anaphylaxis and Hives     Or sulfa fillers in meds  Broke out in internal and external hives      Tree Extract Shortness Of Breath and Rash    Ambien [Zolpidem] Hallucinations    Anastrozole Other (See Comments)     Can't remember what the reaction was     Covid-19 (Mrna) Vaccine Swelling     Facial swelling, blood clots    Eliquis [Apixaban] Headache     Headaches, nausea and itching.     Hydralazine Myalgia     Patient reports leg cramps, joint pain and increased fatigue    Norvasc [Amlodipine] Other (See Comments)     KIDNEYS SHUT DOWN    Nsaids Other (See Comments)     KIDNEY FAILURE    Penicillins Unknown (See Comments)     Severe reaction as a child  Tolerated ceftriaxone during 04/2017 admission    Grass Rash       Social Hx:  Social History     Socioeconomic History    Marital status:      Spouse name: Joey    Number of children: 6    Highest education level: Some college, no degree   Tobacco Use    Smoking status: Never     Passive exposure: Never    Smokeless tobacco: Never   Vaping Use    Vaping status: Never Used   Substance and Sexual Activity    Alcohol use: Yes     Comment: 3 drinks yearly    Drug use: Never    Sexual activity: Defer     Birth control/protection: Surgical       Family Hx:  Family History   Problem Relation Age of Onset    Brain cancer Brother     Alcohol abuse Mother     No Known Problems Father     No Known Problems Sister     Malig Hyperthermia Neg Hx        Exam    BP (!) 184/82   Pulse 73   Temp 98.1 °F (36.7 °C) (Oral)   Resp 18   Ht 152.4 cm (60\")   Wt 65.8 kg (145 lb)   SpO2 96%   BMI 28.32 kg/m²   gen: NAD, vitals reviewed  MS: oriented x3, recent/remote memory intact, normal attention/concentration, language intact, no neglect, normal fund of knowledge  CN: visual acuity " grossly normal, visual fields full, PERRL, EOMI, facial sensation equal, no facial droop, hearing symmetric, palate elevates symmetrically, shoulder shrug equal, tongue midline  Motor: 4/5 bilateral shoulder abduction/adduction, shoulder flexion/extension, hip flexion/extension, hip adduction/abduction, and 4+ out of 5 left thumb abduction, 5/5 bilateral biceps/triceps, finger flexion/extension, right thumb abduction, knee flexion/extension, dorsiflexion/plantarflexion  Sensation: Decreased to vibration and cold temperature in bilateral lower extremities.  Reflexes: 2+ throughout upper and lower extremities, downgoing plantars  Coordination: no dysmetria with finger to nose bilaterally  Gait: Deferred secondary to weakness    DATA:    Lab Results   Component Value Date    GLUCOSE 97 01/13/2025    CALCIUM 8.9 01/13/2025     01/13/2025    K 3.6 01/13/2025    CO2 27.3 01/13/2025     (H) 01/13/2025    BUN 19 01/13/2025    CREATININE 1.05 (H) 01/13/2025    EGFRIFAFRI 63 02/02/2021    EGFRIFNONA 52 (L) 02/02/2021    BCR 18.1 01/13/2025    ANIONGAP 8.7 01/13/2025     Lab Results   Component Value Date    WBC 7.70 01/13/2025    HGB 10.7 (L) 01/13/2025    HCT 32.5 (L) 01/13/2025    MCV 92.3 01/13/2025     01/13/2025     Lab Results   Component Value Date    LDL 57 01/13/2025    LDL 74 10/20/2024    LDL 89 09/30/2022     Lab Results   Component Value Date    HGBA1C 5.60 01/13/2025     Lab Results   Component Value Date    INR 1.05 09/30/2022    INR 1.33 (H) 05/25/2022    PROTIME 13.6 09/30/2022    PROTIME 16.3 (H) 05/25/2022       Lab review:   CK 92  proBNP 1069    Creatinine 1.05  eGFR 51.9  Magnesium 1.9  Phosphorus 2.1  Calcium 8.9  Ionized calcium 1.26  Glucose 97    Hemoglobin A1c 5.6  PTH 80.8  TSH 1.57 no    LDL 57  Lactate 1.1  Lipase 18  25 hydroxy, vitamin D: 70.2    WBC 7.70  Hemoglobin 10.7    Urinalysis trace leukocytes, 1+ protein, rest WNL  Urine drug screen: Positive barbiturates, positive  benzodiazepines, positive opiates  Respiratory panel negative    Imaging review:   Head CT: Mild chronic small vessel ischemic white matter change. There  are no abnormal areas of increased attenuation intraaxially to suggest  hemorrhage. No extra-axial fluid collection is observed. Within the  posteromedial left occipital lobe there is a focal area of  encephalomalacia measuring 1.5 x 1.5 cm consistent with a chronic  infarction, and this is similar to the prior exam 04/21/2022.  Intracranial atherosclerotic calcifications are present. Bone windows  demonstrate no calvarial fracture. There is a 5 mm chronic dural based  calcification medial to the left sigmoid sinus and posterior to the left  mastoid air cells.     Cervical spine CT: Advanced chronic arthritic changes at the anterior  atlantoaxial articulation where there is joint space loss and prominent  spur formation, progressive when compared to exam 10/04/2021. Cervical  spine vertebral body heights appear within normal limits. The C1 ring  and the odontoid process are intact. There is multilevel degenerative  disc disease with disc space narrowing greatest at C6-C7, and this is  similar to the previous exam. Bilateral facet arthritis is present that  is greater on the right as compared to the left best demonstrated at  C3-C4, C4-C5, C5-C6. At C5-C6, there is a central posterior disc  protrusion that contacts the anterior cervical cord with mild narrowing  of the central canal. No evidence for fracture or acute abnormality.  There are carotid vascular calcifications.     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     IMPRESSION:  1. Mild chronic small vessel ischemic white matter change. Chronic  encephalomalacia posteromedial left occipital lobe consistent with an  old infarction, and this is without change. No evidence for acute  intracranial abnormality.  2. Degenerative disc disease  in the cervical spine. At C5-C6, there is a  small central posterior disc protrusion that contacts the anterior  cervical cord with mild narrowing of the central canal. This was not  evident on the prior exam 10/04/2021. There is degenerative disc disease  with disc space narrowing greatest at C6-C7. Right greater than left  facet arthritis. No evidence for acute osseous abnormality in the  cervical spine.  3. 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    Thoracic spine x-ray:  Vertebral body heights appear stable. No acute fracture is identified.  Multilevel endplate spurring, disc space narrowing, and facet  arthropathy are present. Mild anterolisthesis of L4 on L5 and L5 on S1,  without significant change between flexion and extension. Alignment is  otherwise in range of normal. Aortic calcification is present.  IMPRESSION:  No acute fracture or laxity is identified. Degenerative changes in the  spine. If further imaging evaluation is indicated, MRI could be  considered.    Lumbar spine x-ray:  FINDINGS:  Vertebral body heights appear stable. No acute fracture is identified.  Multilevel endplate spurring, disc space narrowing, and facet  arthropathy are present. Mild anterolisthesis of L4 on L5 and L5 on S1,  without significant change between flexion and extension. Alignment is  otherwise in range of normal. Aortic calcification is present.  IMPRESSION:  No acute fracture or laxity is identified. Degenerative changes in the  spine. If further imaging evaluation is indicated, MRI could be  considered.    Right shoulder x-ray:  IMPRESSION:  Osteopenia limits evaluation for fracture. There is moderate  AC and glenohumeral joint arthritis and the humeral head is high riding.  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  Patient has had progressive generalized weakness in the setting of arthritis and multiple recurrent falls.  She denies increased neck or back pain since recent fall.  However she has been and able to get up on her own and walk since the fall.  At baseline she uses a walker to get around.  She dresses, bathes and toilets herself per .  However he does cook and clean and pay bills for her secondary to her weakness.  Will further evaluate with neuropathy labs listed below.  Neurosurgery planning for MRI of cervical/thoracic/lumbar spine.  Orthopedics states they believe right shoulder pain is due to arthritis with tendinopathy which has been chronic.  They are recommending Voltaren gel, ice, PT and follow-up with orthopedics outpatient.  Patient walked 15 feet with physical therapy today very slowly and they anticipated skilled nursing facility on discharge.    CT head demonstrates small vessel disease and a chronic left occipital infarct.  CT cervical spine demonstrates cervical degenerative disc disease with mild central narrowing at C5-C6 and worse degenerative disc disease seen at C6-C7 right greater than left.  Thoracic and lumbar spine x-ray demonstrates no acute fracture or laxity with degenerative changes noted.  Right shoulder x-ray demonstrates osteopenia which limits the exam with moderate AC and glenohumeral joint arthritis with humeral head high riding.  TSH 1.570 and hemoglobin A1c 5.60.  CK1 59 on arrival.  Urine drug screen positive for opiates, benzodiazepines and barbiturates.  Patient on as needed hydrocodone for pain and as needed Ativan  for anxiety.  Patient on Fioricet as needed for migraine/headaches.    PLAN:   Neurosurgery planning to evaluate further with MRI of cervical/thoracic/lumbar spine  Orthopedic recommending topical diclofenac/Voltaren gel, ice, PT and follow-up for right shoulder arthritis with tendinopathy.  Thyroid ultrasound  -VTIAMIN B12  -RPR  -Cryoglobulins  -SPEP/IPE  -Heavy metals  -Copper     Recommendations discussed with Dr. Villar using agreement with plan.

## 2025-01-13 NOTE — PROGRESS NOTES
Discharge Planning Assessment  [facility]     Patient Name: Sasha Barrett  MRN: [id number]  Today's Date: 1/13/2025    Admit Date: 1/12/2025    Plan: Return home with spouse and [facility] following (referral pending)   Discharge Needs Assessment       Row Name 01/13/25 1018       Living Environment    People in Home spouse    Name(s) of People in Home  Adriel    Current Living Arrangements home    Potentially Unsafe Housing Conditions none    Primary Care Provided by self    Provides Primary Care For no one, unable/limited ability to care for self    Family Caregiver if Needed spouse    Family Caregiver Names  Adriel [phone], son Leighton [phone] lives ~3 miles away    Quality of Family Relationships helpful    Able to Return to Prior Arrangements other (see comments)  Unsure -- will likely need SNF       Resource/Environmental Concerns    Resource/Environmental Concerns none    Transportation Concerns none       Transition Planning    Patient/Family Anticipates Transition to home with family    Patient/Family Anticipated Services at Transition home health care    Transportation Anticipated family or friend will provide       Discharge Needs Assessment    Readmission Within the Last 30 Days no previous admission in last 30 days    Equipment Currently Used at Home walker, standard;cane, straight;wheelchair;grab bar;bath bench;nebulizer    Concerns to be Addressed discharge planning    Anticipated Changes Related to Illness inability to care for self    Equipment Needed After Discharge none    Provided Post Acute Provider List? Yes    Post Acute Provider List Nursing Home;Home Health    Provided Post Acute Provider Quality & Resource List? Yes    Delivered To Patient    Method of Delivery In person                   Discharge Plan       Row Name 01/13/25 1027       Plan    Plan Return home with spouse and [facility] following (referral pending)    Patient/Family in Agreement with Plan yes    Plan Comments  Spoke with patient at bedside.  She lives with  Adriel [phone] in a house with 2 TAMAR, uses a cane, walker, W/C, grab bars, bath bench, nebulizer.  She has used [facility] in the past and has never been to SNF.  Patient states she sleeps on the couch at home.   drives and assists her some.  States her son Leighton [phone] lives nearby and he or other children will assist if needed.  She is agreeable to  - would like to use [facility] again. She states she is not interested in SNF.  [facility] will follow.  Jluis ABREU                  Continued Care and Services - Admitted Since 1/12/2025       Home Medical Care       Service Provider Request Status Services Address Phone Fax Patient Preferred     [facility] Pending - Request Sent -- Avril SIRI [address] [address] [phone] --                  Expected Discharge Date and Time       Expected Discharge Date Expected Discharge Time    Jan 16, 2025            Demographic Summary       Row Name 01/13/25 1015       General Information    Admission Type inpatient    Arrived From home    Required Notices Provided Important Message from Medicare    Referral Source admission list    Reason for Consult discharge planning    Preferred Language English                   Functional Status       Row Name 01/13/25 1016       Functional Status    Usual Activity Tolerance moderate    Current Activity Tolerance fair       Functional Status, IADL    Medications independent    Meal Preparation independent;assistive person   assists    Housekeeping assistive person;independent    Laundry independent;assistive person    Shopping assistive person       Mental Status    General Appearance WDL WDL       Mental Status Summary    Recent Changes in Mental Status/Cognitive Functioning no changes                            Becky S. [place], BRADY

## 2025-01-13 NOTE — THERAPY EVALUATION
Acute Care - Speech Language Pathology   Swallow Initial Evaluation [facility]     Patient Name: Sasha Barrett  : 1940  MRN: [id number]  Today's Date: 2025               Admit Date: 2025    Visit Dx:     ICD-10-CM ICD-9-CM   1. Generalized weakness  R53.1 780.79   2. Recurrent falls  R29.6 V15.88   3. Hypokalemia  E87.6 276.8   4. Acute renal insufficiency  N28.9 593.9     Patient Active Problem List   Diagnosis    Joint pain    Urinary retention self-caths (Don)    Conjunctivitis    Arm pain    Other cervical disc displacement at C5-C6 level    Cervical pain (Servando=PM)    DDD (degenerative disc disease), cervical    Hyperlipidemia LDL goal <70    Preventative health care    Medication management    Proctocele    Urge incontinence of urine    Chronic tension-type headache, intractable    Obesity (BMI 30.0-34.9)    H/o Lyme disease    Stage 2 chronic kidney disease    Cerebral atherosclerosis    Allergic contact dermatitis    Malaise and fatigue progressive    Dizziness    Vitamin D deficiency    Moderate persistent asthmatic bronchitis without complication    Anterior cervical adenopathy due to infection    Pleurisy    Knee pain, bilateral    Elevated PTH level    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)    Gastroesophageal reflux disease with esophagitis    Psoriasis (Darryl Ochoa)    Postmenopausal    Recurrent UTI    H/O Cerebral vascular accident in left occipital area    Dyspnea on exertion    Precordial pain    Abnormal EKG    Arthralgia    Accelerated hypertension    Acute joint pain    Easy bruisability    Cough productive of purulent sputum    Influenza A Subtype H3    COPD with asthma    Acute chest wall pain    Decreased functional mobility and endurance since fall 15    Essential hypertension    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    Migraine without aura and without status migrainosus, not intractable    Thyroid nodule     Osteoarthritis of left hip    Spinal stenosis of lumbar region with radiculopathy    Acute deep vein thrombosis (DVT) of right lower extremity    Acute deep vein thrombosis    Single subsegmental pulmonary embolism without acute cor pulmonale    Other chronic pain    Palpitations    Secondary hyperparathyroidism    Syncope    Ataxia    Adult failure to thrive syndrome    Recent unexplained weight loss 42# over last 6 months    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 Yazmin Canseco    CARDIAC ELECTROPHYSIOLOGY PROCEDURE N/A 5/27/2022    Procedure: Loop insertion;  Surgeon: Jeffrey Argueta MD;  [facility];  Service: Cardiovascular;  Laterality: N/A;  biotronik    CARDIAC ELECTROPHYSIOLOGY PROCEDURE N/A 7/13/2022    Procedure: Loop recorder removal;  Surgeon: Jeffrey Argueta MD;  Location:  [facility] CATH INVASIVE LOCATION;  Service: Cardiovascular;  Laterality: N/A;    CARPAL TUNNEL RELEASE Right 05/25/2011    Dr. Caleb Bueno    CARPAL TUNNEL RELEASE Left 04/26/2011    Dr. Caleb Bueno    CATARACT EXTRACTION Left 11/29/2010    Dr. Eusebio Downs    CATARACT EXTRACTION Right 11/17/2010    Dr. Eusebio Downs    COLONOSCOPY N/A 12/06/2002    Sigmoid diverticulosis-Dr. Brandon Batista    COLONOSCOPY N/A 12/12/2013    Tortuous colon, diverticulosis in the sigmoid colon and descending colon, stool in the rectum, sigmoid colon, descending colon, splenic flexure, transverse colon and hepatic flexure-Dr. Irma Brambila    DEQUERVAIN RELEASE Left 9/23/2020    Procedure: DEQUERVAIN RELEASE LEFT HAND;  Surgeon: Caleb Bueno MD;  Location:  [facility] [place];  Service: Plastics;  Laterality: Left;    ENDOSCOPY N/A 09/13/2007    Normal-Dr. Pavel Quarles    ENDOSCOPY AND COLONOSCOPY N/A 09/19/2005    1 cm hiatal hernia, mild Schatzki's ring, sigmoid diverticulosis-Dr. Yoel Farley    GANGLION CYST EXCISION Left 12/18/2012    Release of A1 pulley flexor sheath, left fourth finger, excision of ganglion cyst 0.5 cm diameter, A2 pulley flexor sheath, left fourth finger-Dr. Caleb Bueno    HEEL SPUR EXCISION Left 1968    INGUINAL HERNIA REPAIR Right 1971    at 5 months pregnant     INJECTION OF MEDICATION Left 9/23/2020    Procedure: KENOLOG INJECTION TO LEFT THUMB;  Surgeon: Caleb Bueno MD;   Location: [facility] [place];  Service: Plastics;  Laterality: Left;    MASTECTOMY Right 08/05/2008    Right breast mastectomy and excision of left chest wall lesion-Dr. Yoel Farley    MASTECTOMY RADICAL Left 04/29/2003    Left modified radical mastectomy-Dr. Yazmin Canseco    PARATHYROIDECTOMY Right 05/04/2004    Excision of parathyroid adenoma (right superior)-Dr. Yoel Farley    REPLACEMENT TOTAL KNEE Right 04/09/2012    Dr. Lamonte Childress    SINUS SURGERY  1984    THYROIDECTOMY, PARTIAL Left 05/04/2004    Dr. Yoel Farley    TOTAL ABDOMINAL HYSTERECTOMY Bilateral 1973    Dr. Mahan    TRIGGER FINGER RELEASE Left 9/23/2020    Procedure: RELEASE LEFT FOURTH TRIGGER FINGER;  Surgeon: Caleb Bueno MD;  Location: [facility] [place];  Service: Plastics;  Laterality: Left;    UPPER GASTROINTESTINAL ENDOSCOPY N/A 02/18/2009    LA Grade A reflux esophagitis, normal stomach, normal 2nd part of the duodenum-Dr. Yoel Farley    WRIST GANGLION EXCISION Left 9/23/2020    Procedure: EXCISION GANGLION CYST LEFT WRIST;  Surgeon: Caleb Bueno MD;  Location: [facility] [place];  Service: Plastics;  Laterality: Left;       SLP Recommendation and Plan  SLP Swallowing Diagnosis: functional oral phase, functional pharyngeal phase (01/13/25 0930)  SLP Diet Recommendation: soft to chew textures, chopped, thin liquids (01/13/25 0930)  Recommended Precautions and Strategies: upright posture during/after eating, small bites of food and sips of liquid (01/13/25 0930)  SLP Rec. for Method of Medication Administration: as tolerated (01/13/25 0930)     Monitor for Signs of Aspiration: notify SLP if any concerns (01/13/25 0930)              Therapy Frequency (Swallow): evaluation only (01/13/25 0930)  Predicted Duration Therapy Intervention (Days): until discharge (01/13/25 0930)  Oral Care Recommendations: Oral Care BID/PRN (01/13/25 0930)                                        Outcome Evaluation: Orders received, chart  reviewed.  Pt demonstrated no s/s aspiration thin or solids.  Pt reported dentures broken and has some difficulties with hard things.  Able to masticate peaches and jo cracker w/ extra time.  Pt states now takes pills one at a time, d/t new difficulties when taking several at once.  REC soft diet, chopped meats, thin liquids.  Meds 1 at a time as tolerated.  Upright with all po.      SWALLOW EVALUATION (Last 72 Hours)       SLP Adult Swallow Evaluation       Row Name 01/13/25 7069                   Rehab Evaluation    Document Type evaluation  -SA        Subjective Information no complaints  -SA        Patient Observations alert;cooperative  -SA        Patient Effort good  -SA        Symptoms Noted During/After Treatment none  -SA           General Information    Patient Profile Reviewed yes  -SA        Pertinent History Of Current Problem adm w/ ERMIAS/CKD  -SA        Current Method of Nutrition regular textures;thin liquids  -SA        Precautions/Limitations, Vision WFL;for purposes of eval  -SA        Precautions/Limitations, Hearing WFL;for purposes of eval  -SA        Prior Level of Function-Communication cognitive-linguistic impairment;other (see comments)  tangential  -SA        Prior Level of Function-Swallowing unknown  -SA        Plans/Goals Discussed with patient;agreed upon  -SA        Barriers to Rehab cognitive status  -SA        Patient's Goals for Discharge patient did not state  -SA           Oral Motor Structure and Function    Dentition Assessment other (see comments)  reports dentures broken  -SA        Secretion Management WNL/WFL  -SA           Clinical Swallow Eval    Clinical Swallow Evaluation Summary no s/s aspiration; delayed mastication  -SA           SLP Evaluation Clinical Impression    SLP Swallowing Diagnosis functional oral phase;functional pharyngeal phase  -SA        Functional Impact no impact on function  -SA           Recommendations    Therapy Frequency (Swallow) evaluation  only  -        Predicted Duration Therapy Intervention (Days) until discharge  -        SLP Diet Recommendation soft to chew textures;chopped;thin liquids  -        Recommended Precautions and Strategies upright posture during/after eating;small bites of food and sips of liquid  -        Oral Care Recommendations Oral Care BID/PRN  -        SLP Rec. for Method of Medication Administration as tolerated  -        Monitor for Signs of Aspiration notify SLP if any concerns  -                  User Key  (r) = Recorded By, (t) = Taken By, (c) = Cosigned By      Initials Name Effective Dates    Juju Vasquez SLP 01/11/24 -                     EDUCATION  The patient has been educated in the following areas:   Dysphagia (Swallowing Impairment) Oral Care/Hydration Modified Diet Instruction.                Time Calculation:    Time Calculation- SLP       Row Name 01/13/25 1246             Time Calculation- SLP    SLP Start Time 0930  -      SLP Received On 01/13/25  -                User Key  (r) = Recorded By, (t) = Taken By, (c) = Cosigned By      Initials Name Provider Type    Juju Vasquez SLP Speech and Language Pathologist                    Therapy Charges for Today       Code Description Service Date Service Provider Modifiers Qty    [id number]  ST EVAL ORAL PHARYNG SWALLOW 4 1/13/2025 Juju Hernandez SLP GN 1                 MATT Loera  1/13/2025

## 2025-01-13 NOTE — PROGRESS NOTES
Pharmacy Consult: Please ask PharmD on patient's floor to review meds with patient and adjust schedule to match what patient takes at home.  In particular review Norco and Clonidine dosing which seem to be unclear in what patient says she takes.    Norco 10 1-2 q4h prn pain (up to 10 tab/day) pt states usually takes 7-10 tab/day total    Clonidine 0.1 mg tab pt states takes 3 tabs QAM and may take another 3 tabs in the afternoon if her weight is up [I did explain that this is typically how we use furosemide or torsemide to get off additional fluid weight, which she has been prescribed in the past, but she stated this was how she is taking her clonidine]    Angelica Parnell, MalloryD, BCPS  1/13/2025 13:19 EST

## 2025-01-13 NOTE — PLAN OF CARE
"Goal Outcome Evaluation:                 Pt arrived to the floor with her personal walker.  Unable to stand due to toe pain. Pt became very anxious awaiting orders to be place, she stated \"I am going through withdrawals and need my pain medications.\" Was able to give her ativan per MAR, followed by norco per MAR. No further complaints of pain. 1/2NS running at 50, NSR with 1st degree AVB on tele.call light within reach                           "

## 2025-01-13 NOTE — PLAN OF CARE
Goal Outcome Evaluation:  Plan of Care Reviewed With: patient           Outcome Evaluation: Orders received, chart reviewed.  Pt demonstrated no s/s aspiration thin or solids.  Pt reported dentures broken and has some difficulties with hard things.  Able to masticate peaches and jo cracker w/ extra time.  Pt states now takes pills one at a time, d/t new difficulties when taking several at once.  REC soft diet, chopped meats, thin liquids.  Meds 1 at a time as tolerated.  Upright with all po.

## 2025-01-13 NOTE — PROGRESS NOTES
[facility]  INTERNAL MEDICINE  ÓSCAR CADET MD  [address] [place] [address]  Phone [phone] Fax [phone]  E-mail:  [email or web address]      INTERNAL MEDICINE HISTORY AND PHYSICAL EXAM  Óscar Cadet M.D.  2025            Patient Identification:  Name: Sasha Barrett  Age: 84 y.o.  Sex: female  :  1940  MRN: [id number]         Primary Care Physician: Óscar Cadet MD  LENGTH OF STAY 0 DAYS    Consults       Date and Time Order Name Status Description    2025 11:57 PM Inpatient Nephrology Consult      2025 11:57 PM Inpatient Neurosurgery Consult      2025 11:57 PM Inpatient Neurology Consult Stroke      2025 11:57 PM Inpatient Orthopedic Surgery Consult      2025 11:57 PM Inpatient Cardiology Consult              Chief Complaint:  Recurrent falls with generalized weakness and severe right shoulder pain    History of Present Illness:  Subjective     Interval History: Patient is a 84 y.o.female who presented to the [facility] emergency room by private vehicle on 2025 at 1538 for evaluation of recurrent falls with generalized weakness and severe right shoulder pain.  Mrs. Barrett is a delightful 84-year-old female who I had the pleasure of taking care of for many years in my office.  She and her  of many years lives independently in a home here in the [place] area but do have several children who live nearby and help them with their medical care and other day today needs.  Patient reports that she was in her usual state of health until approximately 3 days ago when she began to fall due to lack of balance and generalized weakness of her lower extremities when trying to bear weight.  At one point, she fell from standing into the floor and struck her head in the back occipital region very hard.  She was unable to get herself out of the floor after this fall and was actually laying in the  "floor for approximately 7 to 8 hours before her  was able to contact two paramedic friends who came and helped get her up and onto the couch.  Since that time, she has been unable to really walk by bearing weight on her lower extremities and has had to use a walker to get around any at all in the house.  Her  has tried to help take care of her, but he is quite ill himself, and really has not been able to provide all the care that she needs to perform her activities of daily living.  Finally, earlier today, patient did call me and reported the above events and at my request has come to the [facility] emergency room for further evaluation of her symptoms.  Patient is complaining of worsening of right shoulder pain which now is quite excruciating and prevents her from using the right arm to do any tasks.  Patient does report she is right-handed.  Patient also has had chronic bilateral foot pain.  She has followed with Dr. Concepcion in the past for this.  In addition, she has seen Dr. Pichardo in the past for the shoulder pain.  Patient also has had increasingly more severe bouts of urinary incontinence since the falls and her  has had a great deal of difficulty keeping her dry.  Patient denies fever, chills, cough, shortness of breath, vomiting, or diarrhea.  She does tell me that she has some mild intermittent chest pain with activity but it does not radiate into her neck or arms and may just represent the pleurisy that she has been diagnosed with in the past.  Patient does admit that since her fall, she has not been able to take her medications regularly and her  has\" messed up her system for management of her meds.\"  She has not been eating or drinking as much as usual but has been taking her diuretic therapy.  When seen in the ER, patient indicates that she had not had her morning medications even though I was seeing her at 7:30 at night.  Patient was last hospitalized here at " [facility] between 10/20/2024 and 10/22/2024 for worsening edema, progressive chronic stage III kidney disease, malaise and fatigue progressive, dyspnea, accelerated hypertension, some ataxia, blood pressure instability, and severe bilateral lower extremity edema.  At that time it was felt that the patient did have adult failure to thrive and dietitian found that she had evidence of severe malnutrition.  Patient insisted on discharge back to home after that visit and did have home health on the case for a few weeks afterwards.  Patient is followed by a nurse practitioner from [facility] in their palliative care program for management of her severe anxiety and intractable pain.  She does take up to 8 Norco 10 tablets/day and up to 3 lorazepam 0.5 mg tablets per day.  In addition she takes Fioricet 2 tablets every 6 hours as needed for severe headaches.  Patient does have multiple other medical comorbidities that complicate her medical condition.  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    Patient was seen and evaluated in the [facility] emergency room by Dr. Everardo Patino, who was the ER attending physician on-call at the  time of her arrival.  She was seen by Dr. Patino on 1/12/2025 at 1538.  The ER HPI was consistent with the story which I given above.  Review of systems in the ER was unremarkable except for the issues which have been discussed above in the HPI.  Physical exam in the emergency room showed temperature of 97.7, pulse 77, respirations 18, blood pressure 136/71, room air oxygen 98%.  Physical exam showed patient who is in no acute distress and nontoxic-appearing.  Her breath sounds were equal when lungs were clear to auscultation.  Her heart showed regular rate and rhythm with intact distal pulses, and GI was soft nontender nondistended no guarding and normal bowel sounds.  Patient had a GCS of 15, moves all extremities no focal deficits but was not walked by the ER staff.  Psychiatrically she was calm and cooperative.  Labs that were collected in the ER included a glucose which was slightly up at 114, BUN was elevated at 26 and creatinine that was elevated at 1.38.  Her sodium was normal at 144 and her potassium was slightly low at 3.0 with a CO2 of 29.2.  A single liver enzyme the AST was elevated at 35.  Patient's estimated GFR was 37.4 and her anion gap was 12.8.  Magnesium level was normal at 1.9.  White blood cell count was normal at 8.05.  Hemoglobin was slightly low at 11.6 and platelets were 187,000.  Patient CK was 859.  Her UA was clear yellow normal specific gravity of 1.017 and otherwise unremarked couple.  Patient did have a CT scan of head done in the emergency room which showed chronic encephalomalacia in the left occipital lobe with evidence of an old infarct that is unchanged.  There was also degenerative disc disease in the cervical spine at C5-6 with a small central posterior disc protrusion that contacts the anterior cervical cord with mild narrowing of the central canal.  This was not evident on a previous CT scan from 10/4/2021.  There also was note made of mild goitrous enlargement of the thyroid  gland with several low-density nodules.  X-ray of the shoulder shows that the humeral head is high riding and there is moderate acromioclavicular glenohumeral joint osteoarthritis.  Bones are noted to be osteopenic but there is no clear-cut evidence of fracture.  Medications given in the ER included a small 500 cc bolus of normal saline with fluid of normal saline at 75 cc/h, potassium replacement was given, case was discussed with me by the ER attending and above positive findings were reviewed.  Due to her persistent generalized weakness and inability to walk along with the acute kidney injury probably due to volume depletion, it was felt that we did need to admit the patient for further diagnostic evaluation and workup of these issues along with treatment.  Patient was admitted to my service on a telemetry floor with final ER diagnoses of generalized weakness, recurrent falls, hypokalemia, acute renal insufficiency.  Patient was waiting for transport to the floor when I left the ER after seeing the patient there.    1/12/2025.  I personally saw the patient for the first time during this hospitalization while she still remained in the emergency room in room #14.  Patient was resting quietly on the emergency room stretcher and immediately recognized me upon entry into the room.  She was quite tearful at times during my interview with her and seemed very upset with her  because he has not been able to meet all of her needs recently.  She really did not want to bother her kids and as a result had not called them until today.  Since her  was unable to drive her to the emergency room, she did call her kids to make them aware of her situation and they were the ones who brought her in to the emergency room for this evaluation.  I wore full PPE for the exam as indicated including an N95 mask for needed, goggles, white lab coat, and gloves when touching patient.  I performed thorough hand hygiene before and  after the patient visit.  I confirmed the physical exam from the emergency room.  Patient was able to raise both legs off the bed and did have some mild passive resistance but legs muscles did feel quite weak.  Patient reported she was unable to bear weight on her legs or stand when I ask her to get out of bed.  Deformity of right foot is stable.  Patient's speech is a little more garbled than usual.  She also seems to be having trouble recalling specific details which is an unusual finding for this patient who usually is very sharp and focused with her thought processes.  Patient is concerned that she might of had another stroke like the 1 she had several years ago and does not want to consider further evaluation.  I did discuss my plans with her and we will get consultations that will include renal to evaluate her volume status and acute kidney injury, neurology for evaluation of mental status changes, possible closed head injury, history of old CVA, neurosurgery to evaluate the CT changes and cervical spine relating them to difficulty walking, cardiology requested by patient because of intermittent chest pain she has been experiencing, orthopedics for evaluation of the right shoulder deformity which is an ongoing problem and has been seen by Dr. Pichardo in the past.  Patient is agreeable to this workup.  Patient is continued on her other medications as before.  For workup we will consider doing MRIs once neurology and neurosurgery to see her and declare exactly which types of MRIs they feel would be most appropriate.  I did order additional x-rays to include thoracic and lumbar spine films.  EKG is to be done in AM.  Additional labs are ordered and will be evaluated for possible sources of the weakness.  Patient otherwise is continued on her same regular medical therapy at this point.  Patient was medically stable when I left the room and was on her way to the floor by transport a short time later.    Review of  Systems:    A comprehensive 14 point review of systems was negative except for:  Constitution:  positive for anorexia, fatigue, and malaise  Eyes:  positive for blurriness and dryness  Cardiovascular: positive for  chest pressure / pain, at rest, lower extremity edema, and palpitations  Gastrointestinal: positive for  early satiety, heartburn, nausea, and vomiting  Hematologic / Lymphatic: positive for  fatigue  Musculoskeletal: positive for  back pain, joint pain, muscle pain, muscle weakness, and neck pain  Neurological: positive for  coordination abnormal, difficulty walking, confusion, dizziness, headaches, vertigo, and weakness  Behavioral/Psych: positive for  anxiety and depression  Endocrine: positive for  diabetes:  dry mouth and hot flashes    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 Yazmin Canseco    CARDIAC ELECTROPHYSIOLOGY PROCEDURE N/A 5/27/2022    Procedure: Loop insertion;  Surgeon: Jeffrey Argueta MD;  Location:  [facility];  Service: Cardiovascular;  Laterality: N/A;  biotronik    CARDIAC ELECTROPHYSIOLOGY PROCEDURE N/A 7/13/2022    Procedure: Loop recorder removal;  Surgeon: Jeffrey Argueta MD;  Location:  [facility];  Service: Cardiovascular;  Laterality:  N/A;    CARPAL TUNNEL RELEASE Right 05/25/2011    Dr. Caleb Bueno    CARPAL TUNNEL RELEASE Left 04/26/2011    Dr. Caleb Bueno    CATARACT EXTRACTION Left 11/29/2010    Dr. Eusebio Downs    CATARACT EXTRACTION Right 11/17/2010    Dr. Eusebio Downs    COLONOSCOPY N/A 12/06/2002    Sigmoid diverticulosis-Dr. Brandon Batista    COLONOSCOPY N/A 12/12/2013    Tortuous colon, diverticulosis in the sigmoid colon and descending colon, stool in the rectum, sigmoid colon, descending colon, splenic flexure, transverse colon and hepatic flexure-Dr. Irma Brambila    DEQUERVAIN RELEASE Left 9/23/2020    Procedure: DEQUERVAIN RELEASE LEFT HAND;  Surgeon: Caleb Bueno MD;  [facility] [facility] [place] [facility] [place]  Service: Plastics;  Laterality: Left;    ENDOSCOPY N/A 09/13/2007    Normal-Dr. Pavel Quarles    ENDOSCOPY AND COLONOSCOPY N/A 09/19/2005    1 cm hiatal hernia, mild Schatzki's ring, sigmoid diverticulosis-Dr. Yoel Farley    GANGLION CYST EXCISION Left 12/18/2012    Release of A1 pulley flexor sheath, left fourth finger, excision of ganglion cyst 0.5 cm diameter, A2 pulley flexor sheath, left fourth finger-Dr. Caleb Bueno    HEEL SPUR EXCISION Left 1968    INGUINAL HERNIA REPAIR Right 1971    at 5 months pregnant     INJECTION OF MEDICATION Left 9/23/2020    Procedure: KENOLOG INJECTION TO LEFT THUMB;  Surgeon: Caleb Bueno MD;  [place]:  [facility] [place] [facility] [place];  Service: Plastics;  Laterality: Left;    MASTECTOMY Right 08/05/2008    Right breast mastectomy and excision of left chest wall lesion-Dr. Yoel Farley    MASTECTOMY RADICAL Left 04/29/2003    Left modified radical mastectomy-Dr. Yazmin Canseco    PARATHYROIDECTOMY Right 05/04/2004    Excision of parathyroid adenoma (right superior)-Dr. Yoel Farley    REPLACEMENT TOTAL KNEE Right 04/09/2012    Dr. Lamonte Childress    SINUS SURGERY  1984    THYROIDECTOMY, PARTIAL Left 05/04/2004    Dr. Yoel Farley    TOTAL ABDOMINAL  HYSTERECTOMY Bilateral 1973    Dr. Mahan    TRIGGER FINGER RELEASE Left 9/23/2020    Procedure: RELEASE LEFT FOURTH TRIGGER FINGER;  Surgeon: Caleb Bueno MD;  Location: [facility] [place];  Service: Plastics;  Laterality: Left;    UPPER GASTROINTESTINAL ENDOSCOPY N/A 02/18/2009    LA Grade A reflux esophagitis, normal stomach, normal 2nd part of the duodenum-Dr. Yoel Farley    WRIST GANGLION EXCISION Left 9/23/2020    Procedure: EXCISION GANGLION CYST LEFT WRIST;  Surgeon: Caleb Bueno MD;  Location: [facility] [place];  Service: Plastics;  Laterality: Left;     Allergies   Allergen Reactions    Bee Venom Shortness Of Breath and Rash    Morphine Anaphylaxis and Nausea And Vomiting    Sulfa Antibiotics Anaphylaxis and Hives     Or sulfa fillers in meds  Broke out in internal and external hives      Tree Extract Shortness Of Breath and Rash    Ambien [Zolpidem] Hallucinations    Anastrozole Other (See Comments)     Can't remember what the reaction was     Covid-19 (Mrna) Vaccine Swelling     Facial swelling, blood clots    Eliquis [Apixaban] Headache     Headaches, nausea and itching.     Hydralazine Myalgia     Patient reports leg cramps, joint pain and increased fatigue    Norvasc [Amlodipine] Other (See Comments)     KIDNEYS SHUT DOWN    Nsaids Other (See Comments)     KIDNEY FAILURE    Penicillins Unknown (See Comments)     Severe reaction as a child  Tolerated ceftriaxone during 04/2017 admission    Grass Rash       Family History   Problem Relation Age of Onset    Brain cancer Brother     Alcohol abuse Mother     No Known Problems Father     No Known Problems Sister     Malig Hyperthermia Neg Hx        Social History     Socioeconomic History    Marital status:      Spouse name: Joey    Number of children: 6    Highest education level: Some college, no degree   Tobacco Use    Smoking status: Never     Passive exposure: Never    Smokeless tobacco: Never   Vaping Use    Vaping status:  Never Used   Substance and Sexual Activity    Alcohol use: Yes     Comment: 3 drinks yearly    Drug use: Never    Sexual activity: Defer     Birth control/protection: Surgical       PMH, FH, SH and ROS completed with Admission History and Physical and updated in EPIC system.        Objective     Scheduled Meds:cetirizine, 10 mg, Oral, Daily  eplerenone, 50 mg, Oral, Q12H  famotidine, 40 mg, Oral, Daily  fluticasone, 1 spray, Each Nare, BID  ipratropium, 500 mcg, Nebulization, 4x Daily - RT  multivitamin with minerals, 1 tablet, Oral, Daily  mupirocin, 1 application , Topical, Q24H  NIFEdipine XL, 30 mg, Oral, Q24H  pantoprazole, 40 mg, Oral, Q AM  potassium chloride, 20 mEq, Oral, Daily  predniSONE, 10 mg, Oral, Daily  rivaroxaban, 20 mg, Oral, Daily  rosuvastatin, 5 mg, Oral, QAM  sodium chloride, 3 mL, Intravenous, Q12H  torsemide, 50 mg, Oral, Daily  triamcinolone, 1 Application, Topical, BID  cyanocobalamin, 1,000 mcg, Oral, Daily  [START ON 1/14/2025] vitamin D, 50,000 Units, Oral, Weekly      Continuous Infusions:Pharmacy Consult,   sodium chloride, 50 mL/hr        Vital signs in last 24 hours:  Temp:  [97.7 °F (36.5 °C)] 97.7 °F (36.5 °C)  Heart Rate:  [67-78] 73  Resp:  [16-18] 16  BP: (121-182)/(61-81) 182/81    Intake/Output:    Intake/Output Summary (Last 24 hours) at 1/13/2025 0024  Last data filed at 1/12/2025 1742  Gross per 24 hour   Intake 500 ml   Output --   Net 500 ml       Exam:  BP (!) 182/81 (BP Location: Right arm, Patient Position: Lying)   Pulse 73   Temp 97.7 °F (36.5 °C) (Oral)   Resp 16   SpO2 93%     Constitutional: Alert, cooperative,  moderate distress, tearful at times, AAOx2, resting comfortably   Head: Normocephalic, without obvious abnormality, atraumatic   Eyes: PERRLA, conjunctiva/corneas clear, no icterus, no conjunctival pallor, EOM's intact, both eyes   ENT and Mouth: Lips, tongue, gums normal; oral mucosa pink and dry   Neck: Supple, symmetrical, trachea midline, no JVD    Respiratory: Clear to auscultation bilaterally, respirations unlabored   Cardiovascular: Regular rate and rhythm, S1 and S2 normal, no murmur, No rub or gallop. Pulses normal.   Gastrointestinal: BS present x4. Soft, non-tender, bowels sounds active, no masses no hepatosplenomegaly.   : No hernia. Normal exam for sex.   Neurologic: CN-XII intact, motor strength grossly intact, sensation grossly intact to light touch, no focal reflex deficits noted.   Psychiatric: Alert, oriented X3, no delusions, psychoses, depression or anxiety   Heme/Lymph/Imun: No bruises, petechiae. Lymph nodes normal in size / configuration.     Data Review:  Lab Results   Component Value Date    CALCIUM 9.3 01/12/2025    PHOS 2.6 10/20/2024     Results from last 7 days   Lab Units 01/12/25  1551   AST (SGOT) U/L 35*   ALT (SGPT) U/L 27   MAGNESIUM mg/dL 1.9   SODIUM mmol/L 144   POTASSIUM mmol/L 3.0*   CHLORIDE mmol/L 102   CO2 mmol/L 29.2*   BUN mg/dL 26*   CREATININE mg/dL 1.38*   GLUCOSE mg/dL 114*   CALCIUM mg/dL 9.3   WBC 10*3/mm3 8.05   HEMOGLOBIN g/dL 11.6*   PLATELETS 10*3/mm3 187     Lab Results   Component Value Date    CKTOTAL 159 01/12/2025    CKMB 1.69 04/06/2017    TROPONINT 21 (H) 12/05/2024     CrCl cannot be calculated (Unknown ideal weight.).  WEIGHTS:     Wt Readings from Last 1 Encounters:   12/05/24 1921 68 kg (150 lb)         Assessment:    Acute kidney injury superimposed on CKD    Other cervical disc displacement at C5-C6 level    Stage 2 chronic kidney disease    Malaise and fatigue progressive    Dizziness    Dyspnea on exertion    Accelerated hypertension    Ataxia    Generalized muscle weakness    Volume depletion    Cervical stenosis of spine    Recurrent falls while walking    Closed head injury with concussion    Hypokalemia    Urinary retention self-caths (Don)    Hyperlipidemia LDL goal <70    Urge incontinence of urine    Chronic tension-type headache, intractable    Cerebral atherosclerosis    Vitamin D  deficiency    Moderate persistent asthmatic bronchitis without complication    Elevated PTH level    H/O Cerebral vascular accident in left occipital area    Acute joint pain    COPD with asthma    Adult failure to thrive syndrome    Acute intractable headache intensified since Covid-19 infection 10/2/2021    Bilateral lower extremity edema, multifactorial = unrestricted salt intake, inadequate Lasix dosing, chronic lung disease, and obesity.  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  Fall from standing with closed head injury 3 days ago denying loss of consciousness, but unable to walk or bear weight on lower extremities since event with generalized muscle weakness and dizziness.  Exact cause of patient's symptoms not clear at this point.  Neurology and neurosurgery asked to evaluate.  Plan to follow-up plain films and CT scans with MRI of head and entire spine if felt to be appropriate by neurology and / or neurosurgery.  Comprehensive labs are ordered.  PT OT and speech therapy consults are requested.  Patient may require short stay in rehab because of the profound weakness and poor balance and will have CCP see her in preparation for this possible eventuality.    2.  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  Renal asked to see patient in consultation for their input on her condition.  She is followed regularly by Dr. Bond in his clinic.  I have also ask nephrology to help with management of patient's diuretics and her blood pressure.  Blood pressure readings appear to be quite labile since arrival here in the hospital.  Patient reports that she is using variable amounts of clonidine up to 0.3 every morning, 0 point 2 in the  afternoon and 0.2 at bedtime.  Patient is admittedly a bit confused about the dosing on her medications at present time.    3.  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  Neurosurgery and neurology consults have been placed.  Will obtain additional films including thoracic and lumbar spine films.  MRIs to be ordered of head and entire spine if felt to be indicated by neurology and neurosurgery after review of the patient's case tomorrow.  In addition to the ataxia and walking difficulties, patient also reports an increase in the level and intensity of her urinary incontinence.  She has had trouble in the past with urinary retention and needed to do in and out caths.    4.  Accelerated hypertension with great variability in levels possibly related to abnormal volume status and patient..  As above, we have asked nephrology to help with this issue.  I suspect it may be volume related and it is unclear if patient is actually drinking adequate amounts of fluid at home.  We have given her a small bolus of normal saline here 500 cc and I am going to continue her overnight on half-normal saline at 75 cc/h.  Further input on fluid replacement for this patient would be appreciated.  Also need input on level of diuretics.    5.  Dyspnea on exertion with mild intermittent chest pain.  EKG and troponin are ordered for tomorrow a.m.  Patient does not seem to be having any sustained chest pain at present time.  We did ask patient's regular cardiology group Dr. Soto to take a look at the patient and make recommendations on any additional workup that they might feel necessary.  Patient did request the cardiology evaluation.    6.  Electrolyte abnormality of hypokalemia noted on admission in ER.  Correction given in ER and patient placed on electrolyte replacement protocol low level renal dosing.  Will continue to  monitor and make further adjustments in electrolytes as needed.    7.  Increased urinary incontinence and patient with long history of urinary retention requiring self-catheterization as taught to her by Dr. Fazal Ochoa's office in urology.  Uncertain at this point if this issue is related in part to the closed head injury and the cervical spine stenosis that seems to be new.  Will ask neurology and neurosurgery for their input on that issue.  May need to consider urology consult also.    8.  Hyperlipidemia.  Fasting lipid profile ordered for tomorrow a.m.  Will adjust meds as necessary.    9.  Chronic tension type headaches that are intractable and do require Fioricet for management.  This medication is continued.    10.  Chronic pain management on [facility] palliative care program.  Patient at present receiving up to 8 Norco 10 tablets/day along with Ativan 0.5 x 3/day.  These medications are continued at the time of patient's admission due to her palliative care status.    11.  Cerebral atherosclerosis with history of left occipital CVA in the past and now with new neurologic changes.  As above, neurology and neurosurgery requested to see the patient to determine need for further workup and evaluation of the symptoms.    12.  History of elevated PTH.  Ionized calcium level and PTH have been ordered to recheck on the situation.    13.  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  Plan to order ultrasound of thyroid to further evaluate this situation and determine need for any biopsies of nodules that might be necessary.    14.  Vitamin D deficiency.  Plan to check level of vitamin D while here in the hospital.    15.  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  Patient has had recurrent bouts of COPD exacerbations and now takes 10 mg of prednisone on a daily basis for prevention of additional COPD exacerbations.  Patient also using  regular nebulized ipratropium and albuterol as needed.          Plan for disposition:Where: SNF and When:  3-4 days    Copied text in this note has been reviewed by me and is accurate as of 01/12/20256  Much of this dictation is completed using dragon voice activated software which can result in misspelled words and nonsensical phrases      Óscar Cadet MD  01/12/2025  1930 EDT

## 2025-01-13 NOTE — CONSULTS
[facility] Consult Note      Patient Name: Sasha Barrett  : 1940  MRN: [id number]  Primary Care Physician:  Óscar Cadet MD  Referring Physician: No ref. 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 Fuller), history of breast cancer, history of DVT, history of stroke, COPD, and hypertension, presented to the hospital yesterday s/p 2 recent falls within the past 2 weeks.  Patient was laying on the floor for 7 to 8 hours after the last fall (3 days ago), has been having increased weakness and debility since.    On admission, SCr 1.38, patient was given 500 cc IV NS bolus, then started on IV 1/2 NS at 50 cc/h, today SCr down to 1.0.    Patient reports decreased oral intake in the past few days, no N/V/D; no SOA, orthopnea, urinary symptoms, or NSAID use.    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 Yazmin Canseco    CARDIAC ELECTROPHYSIOLOGY PROCEDURE N/A 2022    Procedure:  Loop insertion;  Surgeon: Jeffrey Argueta MD;  Location:  [facility];  Service: Cardiovascular;  Laterality: N/A;  biotronik    CARDIAC ELECTROPHYSIOLOGY PROCEDURE N/A 7/13/2022    Procedure: Loop recorder removal;  Surgeon: Jeffrey Argueta MD;  Location:  [facility];  Service: Cardiovascular;  Laterality: N/A;    CARPAL TUNNEL RELEASE Right 05/25/2011    Dr. Caleb Bueno    CARPAL TUNNEL RELEASE Left 04/26/2011    Dr. Caleb Bueno    CATARACT EXTRACTION Left 11/29/2010    Dr. Eusebio Downs    CATARACT EXTRACTION Right 11/17/2010    Dr. Eusebio Downs    COLONOSCOPY N/A 12/06/2002    Sigmoid diverticulosis-Dr. Brandon Batista    COLONOSCOPY N/A 12/12/2013    Tortuous colon, diverticulosis in the sigmoid colon and descending colon, stool in the rectum, sigmoid colon, descending colon, splenic flexure, transverse colon and hepatic flexure-Dr. Irma Brambila    DEQUERVAIN RELEASE Left 9/23/2020    Procedure: DEQUERVAIN RELEASE LEFT HAND;  Surgeon: Caleb Bueno MD;  Location:  [facility];  Service: Plastics;  Laterality: Left;    ENDOSCOPY N/A 09/13/2007    Normal-Dr. Pavel Quarles    ENDOSCOPY AND COLONOSCOPY N/A 09/19/2005    1 cm hiatal hernia, mild Schatzki's ring, sigmoid diverticulosis-Dr. Yoel Farley    GANGLION CYST EXCISION Left 12/18/2012    Release of A1 pulley flexor sheath, left fourth finger, excision of ganglion cyst 0.5 cm diameter, A2 pulley flexor sheath, left fourth finger-Dr. Caleb Bueno    HEEL SPUR EXCISION Left 1968    INGUINAL HERNIA REPAIR Right 1971    at 5 months pregnant     INJECTION OF MEDICATION Left 9/23/2020    Procedure: KENOLOG INJECTION TO LEFT THUMB;  Surgeon: Caleb Bueno MD;  Location:  [facility];  Service: Plastics;  Laterality: Left;    MASTECTOMY Right 08/05/2008    Right breast mastectomy and excision of left chest wall lesion-Dr. Yoel Farley    MASTECTOMY  RADICAL Left 04/29/2003    Left modified radical mastectomy-Dr. Yazmin Canseco    PARATHYROIDECTOMY Right 05/04/2004    Excision of parathyroid adenoma (right superior)-Dr. Yoel Farley    REPLACEMENT TOTAL KNEE Right 04/09/2012    Dr. Lamonte Childress    SINUS SURGERY  1984    THYROIDECTOMY, PARTIAL Left 05/04/2004    Dr. Yoel Farley    TOTAL ABDOMINAL HYSTERECTOMY Bilateral 1973    Dr. Mahan    TRIGGER FINGER RELEASE Left 9/23/2020    Procedure: RELEASE LEFT FOURTH TRIGGER FINGER;  Surgeon: Caleb Bueno MD;  [facility] [facility]  Service: Plastics;  Laterality: Left;    UPPER GASTROINTESTINAL ENDOSCOPY N/A 02/18/2009    LA Grade A reflux esophagitis, normal stomach, normal 2nd part of the duodenum-Dr. Yoel Farley    WRIST GANGLION EXCISION Left 9/23/2020    Procedure: EXCISION GANGLION CYST LEFT WRIST;  Surgeon: Caleb Bueno MD;  [place] [facility];  Service: Plastics;  Laterality: Left;       Family History: family history includes Alcohol abuse in her mother; Brain cancer in her brother; No Known Problems in her father and sister.    Social History:  reports that she has never smoked. She has never been exposed to tobacco smoke. She has never used smokeless tobacco. She reports current alcohol use. She reports that she does not use drugs.    Home Medications:  Prior to Admission medications    Medication Sig Start Date End Date Taking? Authorizing Provider   acetaminophen (TYLENOL) 325 MG tablet Take 2 tablets by mouth Every 4 (Four) Hours As Needed for Mild Pain . 5/29/22  Yes Óscar Cadet MD   albuterol (PROAIR HFA) 108 (90 Base) MCG/ACT inhaler 4 puffs Every 4 (Four) Hours As Needed for Wheezing or Shortness of Air. 6/18/14  Yes ProviderVadim MD   butalbital-acetaminophen-caffeine (FIORICET, ESGIC) -40 MG per tablet Take 2 tablets by mouth Every 4 (Four) Hours As Needed for Headache. 3/27/17  Yes Óscar Cadet MD   cetirizine (zyrTEC) 10 MG  tablet Take 1 tablet by mouth Daily. Alt with singulair   Yes Vadim Best MD   Cholecalciferol (VITAMIN D3) 1.25 MG (86883 UT) capsule Take 1 capsule by mouth 1 (One) Time Per Week. TUESDAYS 5/11/20  Yes Vadim Best MD   cloNIDine (CATAPRES) 0.2 MG tablet Take 1 tablet by mouth Every 6 (Six) Hours. Dose titrated for BP control by patient 10/22/24  Yes Óscar Cadet MD   cloNIDine (CATAPRES) 0.3 MG tablet Take 1 tablet by mouth Every 6 (Six) Hours. Dose titrated by patient for BP control 10/22/24  Yes Óscar Cadet MD   Diclofenac Sodium (VOLTAREN) 1 % gel gel Apply 4 g topically to the appropriate area as directed 4 (Four) Times a Day As Needed (Apply to knees or other joints when pain is severe and flares). 10/22/24  Yes Óscar Cadet MD   diphenhydrAMINE (BENADRYL) 50 MG tablet Take 1 tablet by mouth every 4 (four) hours as needed for itching or allergies. 7/25/16  Yes Óscar Cadet MD   eplerenone (INSPRA) 50 MG tablet Take 1 tablet by mouth Daily.   Yes Vadim Best MD   fluticasone (FLONASE) 50 MCG/ACT nasal spray 1 spray by Each Nare route 2 (Two) Times a Day. 8/27/18  Yes Óscar Cadet MD   HYDROcodone-acetaminophen (NORCO)  MG per tablet Take 1-2 tablets by mouth Every 6 (Six) Hours As Needed for Moderate Pain. 3/3/22  Yes Vadim Best MD   hydrOXYzine (ATARAX) 10 MG tablet Take 1 tablet by mouth 3 (Three) Times a Day As Needed for Itching.   Yes Vadim Best MD   hydrOXYzine (ATARAX) 50 MG tablet Take 1 tablet by mouth Every 6 (Six) Hours As Needed for Itching or Allergies. 12/12/19  Yes Vadim Best MD   ipratropium (ATROVENT) 0.02 % nebulizer solution Take 2.5 mL by nebulization 4 (Four) Times a Day.   Yes Vadim Best MD   LORazepam (ATIVAN) 0.5 MG tablet Take 1 tablet by mouth Every 8 (Eight) Hours As Needed for Anxiety.   Yes Nasir MD Vadim   multivitamin with minerals tablet tablet Take 1 tablet by  mouth Daily. 5/30/22  Yes Óscar Cadet MD   mupirocin (BACTROBAN) 2 % ointment Apply 1 Application topically to the appropriate area as directed Every 12 (Twelve) Hours.  Patient taking differently: Apply 1 Application topically to the appropriate area as directed Every 12 (Twelve) Hours. Applies between toes and bottoms of feet at bedtime 10/22/24  Yes Óscar Cadet MD   Niacin (VITAMIN B-3 PO) Take  by mouth.   Yes Vadim Best MD   NIFEdipine XL (ADALAT CC) 30 MG 24 hr tablet Take 1 tablet by mouth Daily. 5/30/22  Yes Óscar Cadet MD   omeprazole (priLOSEC) 40 MG capsule Take 1 capsule by mouth Daily As Needed. 2/23/21  Yes Vadim Best MD   ondansetron ODT (ZOFRAN-ODT) 8 MG disintegrating tablet Take 1 tablet by mouth every 6 (six) hours as needed for nausea or vomiting. 9/8/16  Yes Óscar Cadet MD   polyethylene glycol (MIRALAX) 17 g packet Take 17 g by mouth Daily As Needed. 6/6/22  Yes Vadim Best MD   potassium chloride (KLOR-CON) 20 MEQ packet Take 20 mEq by mouth Daily.   Yes Vadim Best MD   predniSONE (DELTASONE) 10 MG tablet Take 1 tablet by mouth Every Other Day.  Patient taking differently: Take 2 tablets by mouth Daily. 5/30/22  Yes Óscar Cadet MD   promethazine-codeine (PHENERGAN with CODEINE) 6.25-10 MG/5ML syrup Take 5 mL by mouth 4 (Four) Times a Day As Needed for cough. 12/9/16  Yes Óscar Cadet MD   rosuvastatin (CRESTOR) 5 MG tablet Take 1 tablet by mouth Every Morning. 4/17/23  Yes Vadim Best MD   torsemide (DEMADEX) 100 MG tablet Take 1 tablet by mouth Daily.  Patient taking differently: Take 1 tablet by mouth Daily As Needed. 10/22/24  Yes Óscar Cadet MD   triamcinolone (KENALOG) 0.1 % cream Apply 1 Application topically to the appropriate area as directed 2 (Two) Times a Day. 1/8/25  Yes Vadim Best MD   vitamin B-12 (VITAMIN B-12) 1000 MCG tablet Take 1 tablet by mouth Daily. 4/11/17  Yes Helio,  Óscar OKEEFE MD   Xarelto 20 MG tablet Take 1 tablet by mouth Daily. 2/23/23  Yes Vadim Best MD   carboxymethylcellulose (REFRESH PLUS) 0.5 % solution Administer 1 drop to both eyes 2 (Two) Times a Day As Needed for Dry Eyes.    Vadim Best MD   EPINEPHrine (EPIPEN) 0.3 MG/0.3ML solution auto-injector injection INJECT THE CONTENTS OF ONE PEN AS NEEDED. MAY REPEAT ONE TIME. 3/29/21   Vadim Best MD   levocetirizine (XYZAL) 5 MG tablet Take 1 tablet by mouth Every Morning.    Vadim Best MD   lidocaine (Lidoderm) 5 % Place 1 patch on the skin as directed by provider Daily As Needed for Mild Pain. Remove & Discard patch within 12 hours or as directed by MD  Patient not taking: Reported on 1/12/2025 12/5/24   Dmitri Espino MD   naloxone (NARCAN) 4 MG/0.1ML nasal spray Administer 1 spray into the nostril(s) as directed by provider As Needed for Opioid Reversal. Call 911. Don't prime. [place]kins in 1 nostril for overdose. Repeat in 2-3 minutes in other nostril if no or minimal breathing/responsiveness.    Vadim Best MD   Petrolatum 42 % ointment Apply 1 Application topically to the appropriate area as directed Every 12 (Twelve) Hours. Use on irritated skin from bug bites  Patient not taking: Reported on 1/12/2025 10/22/24   Óscar Cadet MD   potassium chloride (KAYCIEL) 20 mEq/15 mL solution Take 15 mL by mouth Daily.  Patient not taking: Reported on 1/12/2025 10/22/24   Óscar Cadet MD       Allergies:  Allergies   Allergen Reactions    Bee Venom Shortness Of Breath and Rash    Morphine Anaphylaxis and Nausea And Vomiting    Sulfa Antibiotics Anaphylaxis and Hives     Or sulfa fillers in meds  Broke out in internal and external hives      Tree Extract Shortness Of Breath and Rash    Ambien [Zolpidem] Hallucinations    Anastrozole Other (See Comments)     Can't remember what the reaction was     Covid-19 (Mrna) Vaccine Swelling     Facial swelling, blood clots     Eliquis [Apixaban] Headache     Headaches, nausea and itching.     Hydralazine Myalgia     Patient reports leg cramps, joint pain and increased fatigue    Norvasc [Amlodipine] Other (See Comments)     KIDNEYS SHUT DOWN    Nsaids Other (See Comments)     KIDNEY FAILURE    Penicillins Unknown (See Comments)     Severe reaction as a child  Tolerated ceftriaxone during 04/2017 admission    Grass Rash       Objective     Vitals:   Temp:  [97.7 °F (36.5 °C)-98.1 °F (36.7 °C)] 98.1 °F (36.7 °C)  Heart Rate:  [72-83] 81  Resp:  [16-18] 18  BP: (115-184)/(67-82) 184/82  Flow (L/min) (Oxygen Therapy):  [2] 2    Intake/Output Summary (Last 24 hours) at 1/13/2025 1713  Last data filed at 1/12/2025 1742  Gross per 24 hour   Intake 500 ml   Output --   Net 500 ml       Physical Exam:   Constitutional: Awake, currently ill, no acute distress.  HEENT: Sclera anicteric, no conjunctival injection  Neck: Supple, no JVD  Respiratory: Clear to auscultation bilaterally, nonlabored respiration on RA  Cardiovascular: RRR, no murmurs, no rubs, no carotid bruit  Gastrointestinal: BS +, abdomen is soft, nontender and nondistended  : No palpable bladder  Musculoskeletal: No edema, no clubbing or cyanosis  Psychiatric: Appropriate affect, cooperative  Neurologic: Oriented x3, moving all extremities, normal speech and mental status, no asterixis  Skin: Warm and dry       Scheduled Meds:     cetirizine, 10 mg, Oral, Daily  eplerenone, 50 mg, Oral, Q12H  famotidine, 40 mg, Oral, Daily  fluticasone, 1 spray, Each Nare, BID  ipratropium, 500 mcg, Nebulization, 4x Daily - RT  multivitamin with minerals, 1 tablet, Oral, Daily  mupirocin, 1 application , Topical, Q24H  NIFEdipine XL, 30 mg, Oral, Q24H  pantoprazole, 40 mg, Oral, Q AM  potassium chloride, 20 mEq, Oral, Daily  potassium chloride, 40 mEq, Oral, Q4H  predniSONE, 10 mg, Oral, Daily  rivaroxaban, 20 mg, Oral, Daily  rosuvastatin, 5 mg, Oral, QAM  sodium chloride, 3 mL, Intravenous,  Q12H  torsemide, 50 mg, Oral, Daily  triamcinolone, 1 Application, Topical, BID  cyanocobalamin, 1,000 mcg, Oral, Daily  [START ON 1/14/2025] vitamin D, 50,000 Units, Oral, Weekly      IV Meds:   sodium chloride, 50 mL/hr, Last Rate: 50 mL/hr (01/13/25 0102)        Results Reviewed:   I have personally reviewed the results from the time of this admission to 1/13/2025 17:13 EST     Lab Results   Component Value Date    GLUCOSE 97 01/13/2025    CALCIUM 8.9 01/13/2025     01/13/2025    K 3.6 01/13/2025    CO2 27.3 01/13/2025     (H) 01/13/2025    BUN 19 01/13/2025    CREATININE 1.05 (H) 01/13/2025    EGFRIFAFRI 63 02/02/2021    EGFRIFNONA 52 (L) 02/02/2021    BCR 18.1 01/13/2025    ANIONGAP 8.7 01/13/2025      Lab Results   Component Value Date    MG 1.9 01/13/2025    PHOS 2.1 (L) 01/13/2025    ALBUMIN 2.9 (L) 01/13/2025           Assessment / Plan       Acute kidney injury superimposed on CKD    Urinary retention self-caths (Don)    Other cervical disc displacement at C5-C6 level    Hyperlipidemia LDL goal <70    Urge incontinence of urine    Chronic tension-type headache, intractable    Stage 2 chronic kidney disease    Cerebral atherosclerosis    Malaise and fatigue progressive    Dizziness    Vitamin D deficiency    Moderate persistent asthmatic bronchitis without complication    Pleurisy    Elevated PTH level    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  T    Severe malnutrition    KECIA (generalized  anxiety disorder)    Volume depletion    Cervical stenosis of spine    Recurrent falls while walking    Closed head injury with concussion    Hypokalemia    Anemia, mild    Goiter diffuse      ASSESSMENT:  ERMIAS, UOP not quantified, SCr 1.38 on admission, secondary to prerenal due to poor p.o. intake (on the floor for 7 to 8 hours post fall), and concurrent diuretic use.  Euvolemic; electrolytes within acceptable range except low Phos  CKD stage II-IIIa, baseline SCr 0.9-1.0, secondary to longstanding hypertensive nephrosclerosis, chronic cardiorenal syndrome, and age-related nephron loss  Mechanical fall, managed by primary team  HFpEF, on torsemide at 100 mg daily, no exacerbation of heart failure, dose was halved this morning  HTN with CKD, BP labile  History of DVT and PE, on Xarelto  History of primary hyperparathyroidism s/p parathyroidectomy    PLAN:  Discontinue IVF  Decrease torsemide to 50 mg p.o. daily  Check Nikita, protein to creatinine ratio, microalbumin to creatinine ratio  Check orthostatic blood pressure and bladder scan for completeness  Surveillance labs    Thank you for involving us in the care of Sasha Barrett.  Please feel free to call with any questions.    Patrick Chavis MD  01/13/25  17:13 [facility]    [facility]  [phone]      Please note that portions of this note were completed with a voice recognition program.

## 2025-01-13 NOTE — CONSULTS
[facility] NEUROSURGERY CONSULT NOTE    Patient name: Sasha Barrett  Referring Provider: Dr. Cadet  Reason for Consultation: extreme lower extrwmity weakness, ataxia, loss of balance.  After fall 3 days ago.  New cervicalstenosis on CT Scan.  MRI of head and entire spine planned     Patient Care Team:  Óscar Cadet MD as PCP - General (Internal Medicine)    Chief complaint: falls, progressive weakness    Subjective .     History of present illness:    Patient is a 84 y.o.  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  She's been having progressive Ble weakness and experienced a fall at home over the weekend where she hit her head. She was down for about 7 to 8 hours before someone could help get her up.  She denies any loss of consciousness or dizziness.  She states that she is been having weakness in her bilateral lower extremities particularly from the knee down for the past several months after getting bit from Japanese beetle around [place].  She denies any significant neck pain and states that it only occurs when it rains and attributed to arthritis.  She does attest to intermittently dropping things but nothing that she has noticed or worsened recently.  She does have some right shoulder pain attributed to her fall. She has a right foot deformity which makes ambulating more difficult but has been present for several years.     Review of Systems  Review of Systems   Musculoskeletal:  Positive for gait problem. Negative for neck pain.   Neurological:  Positive for weakness. Negative for dizziness, light-headedness, numbness and headaches.       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 Yazmin Canseco    CARDIAC ELECTROPHYSIOLOGY PROCEDURE N/A 5/27/2022    Procedure: Loop insertion;  Surgeon: Jeffrey Argueta MD;  Location:  [facility];  Service: Cardiovascular;  Laterality: N/A;  biotronik    CARDIAC ELECTROPHYSIOLOGY PROCEDURE N/A 7/13/2022    Procedure: Loop recorder removal;  Surgeon: Jeffrey Argueta MD;  Location:  [facility];  Service: Cardiovascular;  Laterality: N/A;    CARPAL TUNNEL RELEASE Right 05/25/2011    Dr. Caleb Bueno    CARPAL TUNNEL RELEASE Left 04/26/2011    Dr. Caleb Bueno    CATARACT EXTRACTION Left 11/29/2010    Dr. Eusebio Downs    CATARACT EXTRACTION Right 11/17/2010    Dr. Eusebio Downs    COLONOSCOPY N/A 12/06/2002    Sigmoid diverticulosis-Dr. Brandon Batista    COLONOSCOPY N/A 12/12/2013    Tortuous colon, diverticulosis in the sigmoid colon and descending colon, stool in the rectum, sigmoid colon, descending colon, splenic flexure, transverse colon and hepatic flexure-Dr. Irma Brambila    DEQUERVAIN RELEASE Left 9/23/2020    Procedure: DEQUERVAIN RELEASE LEFT HAND;  Surgeon: Caleb Bueno MD;  Location:  [facility];  Service: Plastics;  Laterality: Left;    ENDOSCOPY N/A 09/13/2007    Normal-Dr. Zhao  [facility]    ENDOSCOPY AND COLONOSCOPY N/A 09/19/2005    1 cm hiatal hernia, mild Schatzki's ring, sigmoid diverticulosis-Dr. Yoel Farley    GANGLION CYST EXCISION Left 12/18/2012    Release of A1 pulley flexor sheath, left fourth finger, excision of ganglion cyst 0.5 cm diameter, A2 pulley flexor sheath, left fourth finger-Dr. Caleb Bueno    HEEL SPUR EXCISION Left 1968    INGUINAL HERNIA REPAIR Right 1971    at 5 months pregnant     INJECTION OF MEDICATION Left 9/23/2020    Procedure: KENOLOG INJECTION TO LEFT THUMB;  Surgeon: Caleb Bueno MD;  Location: [facility] OR [facility];  Service: Plastics;  Laterality: Left;    MASTECTOMY Right 08/05/2008    Right breast mastectomy and excision of left chest wall lesion-Dr. Yoel Farley    MASTECTOMY RADICAL Left 04/29/2003    Left modified radical mastectomy-Dr. Yazmin Canseco    PARATHYROIDECTOMY Right 05/04/2004    Excision of parathyroid adenoma (right superior)-Dr. Yoel Farley    REPLACEMENT TOTAL KNEE Right 04/09/2012    Dr. Lamonte Childress    SINUS SURGERY  1984    THYROIDECTOMY, PARTIAL Left 05/04/2004    Dr. Yoel Farley    TOTAL ABDOMINAL HYSTERECTOMY Bilateral 1973    Dr. Mahan    TRIGGER FINGER RELEASE Left 9/23/2020    Procedure: RELEASE LEFT FOURTH TRIGGER FINGER;  Surgeon: Caleb Bueno MD;  Location: [facility] OR [facility];  Service: Plastics;  Laterality: Left;    UPPER GASTROINTESTINAL ENDOSCOPY N/A 02/18/2009    LA Grade A reflux esophagitis, normal stomach, normal 2nd part of the duodenum-Dr. Yoel Farley    WRIST GANGLION EXCISION Left 9/23/2020    Procedure: EXCISION GANGLION CYST LEFT WRIST;  Surgeon: Caleb Bueno MD;  Location: [facility] OR [facility];  Service: Plastics;  Laterality: Left;       FAMILY HISTORY  Family History   Problem Relation Age of Onset    Brain cancer Brother     Alcohol abuse Mother     No Known Problems Father     No Known Problems Sister     Malig Hyperthermia Neg Hx        SOCIAL  HISTORY  Social History     Tobacco Use    Smoking status: Never     Passive exposure: Never    Smokeless tobacco: Never   Vaping Use    Vaping status: Never Used   Substance Use Topics    Alcohol use: Yes     Comment: 3 drinks yearly    Drug use: Never     No family or visitors at bedside    Allergies:  Bee venom, Morphine, Sulfa antibiotics, Tree extract, Ambien [zolpidem], Anastrozole, Covid-19 (mrna) vaccine, Eliquis [apixaban], Hydralazine, Norvasc [amlodipine], Nsaids, Penicillins, and Grass    MEDICATIONS:  Medications Prior to Admission   Medication Sig Dispense Refill Last Dose/Taking    acetaminophen (TYLENOL) 325 MG tablet Take 2 tablets by mouth Every 4 (Four) Hours As Needed for Mild Pain .   Taking As Needed    albuterol (PROAIR HFA) 108 (90 Base) MCG/ACT inhaler 4 puffs Every 4 (Four) Hours As Needed for Wheezing or Shortness of Air.   1/11/2025    butalbital-acetaminophen-caffeine (FIORICET, ESGIC) -40 MG per tablet Take 2 tablets by mouth Every 4 (Four) Hours As Needed for Headache. 240 tablet 0 1/12/2025    cetirizine (zyrTEC) 10 MG tablet Take 1 tablet by mouth Daily. Alt with singulair   1/11/2025    Cholecalciferol (VITAMIN D3) 1.25 MG (73220 UT) capsule Take 1 capsule by mouth 1 (One) Time Per Week. TUESDAYS   Past Week    cloNIDine (CATAPRES) 0.2 MG tablet Take 1 tablet by mouth Every 6 (Six) Hours. Dose titrated for BP control by patient   Taking    cloNIDine (CATAPRES) 0.3 MG tablet Take 1 tablet by mouth Every 6 (Six) Hours. Dose titrated by patient for BP control   1/12/2025    Diclofenac Sodium (VOLTAREN) 1 % gel gel Apply 4 g topically to the appropriate area as directed 4 (Four) Times a Day As Needed (Apply to knees or other joints when pain is severe and flares). 100 g 5 1/11/2025    diphenhydrAMINE (BENADRYL) 50 MG tablet Take 1 tablet by mouth every 4 (four) hours as needed for itching or allergies. 180 tablet 0 1/11/2025    eplerenone (INSPRA) 50 MG tablet Take 1 tablet by  mouth Daily.   1/11/2025    fluticasone (FLONASE) 50 MCG/ACT nasal spray 1 spray by Each Nare route 2 (Two) Times a Day.   1/11/2025    HYDROcodone-acetaminophen (NORCO)  MG per tablet Take 1-2 tablets by mouth Every 6 (Six) Hours As Needed for Moderate Pain.   1/12/2025    hydrOXYzine (ATARAX) 10 MG tablet Take 1 tablet by mouth 3 (Three) Times a Day As Needed for Itching.   Past Week    hydrOXYzine (ATARAX) 50 MG tablet Take 1 tablet by mouth Every 6 (Six) Hours As Needed for Itching or Allergies.   Past Week    ipratropium (ATROVENT) 0.02 % nebulizer solution Take 2.5 mL by nebulization 4 (Four) Times a Day.   Past Week    LORazepam (ATIVAN) 0.5 MG tablet Take 1 tablet by mouth Every 8 (Eight) Hours As Needed for Anxiety.   Past Week    multivitamin with minerals tablet tablet Take 1 tablet by mouth Daily.   1/11/2025    mupirocin (BACTROBAN) 2 % ointment Apply 1 Application topically to the appropriate area as directed Every 12 (Twelve) Hours. (Patient taking differently: Apply 1 Application topically to the appropriate area as directed Every 12 (Twelve) Hours. Applies between toes and bottoms of feet at bedtime)   1/11/2025    Niacin (VITAMIN B-3 PO) Take  by mouth.   1/11/2025    NIFEdipine XL (ADALAT CC) 30 MG 24 hr tablet Take 1 tablet by mouth Daily. 90 tablet 1 1/11/2025    omeprazole (priLOSEC) 40 MG capsule Take 1 capsule by mouth Daily As Needed.   Past Month    ondansetron ODT (ZOFRAN-ODT) 8 MG disintegrating tablet Take 1 tablet by mouth every 6 (six) hours as needed for nausea or vomiting. 360 tablet 1 Past Month    polyethylene glycol (MIRALAX) 17 g packet Take 17 g by mouth Daily As Needed.   1/11/2025    potassium chloride (KLOR-CON) 20 MEQ packet Take 20 mEq by mouth Daily.   1/11/2025    predniSONE (DELTASONE) 10 MG tablet Take 1 tablet by mouth Every Other Day. (Patient taking differently: Take 2 tablets by mouth Daily.) 45 tablet 1 1/11/2025    promethazine-codeine (PHENERGAN with  CODEINE) 6.25-10 MG/5ML syrup Take 5 mL by mouth 4 (Four) Times a Day As Needed for cough. 360 mL 0 Past Week    rosuvastatin (CRESTOR) 5 MG tablet Take 1 tablet by mouth Every Morning.   1/11/2025    torsemide (DEMADEX) 100 MG tablet Take 1 tablet by mouth Daily. (Patient taking differently: Take 1 tablet by mouth Daily As Needed.) 90 tablet 1 1/11/2025    triamcinolone (KENALOG) 0.1 % cream Apply 1 Application topically to the appropriate area as directed 2 (Two) Times a Day.   Past Week    vitamin B-12 (VITAMIN B-12) 1000 MCG tablet Take 1 tablet by mouth Daily. 90 tablet 3 1/11/2025    Xarelto 20 MG tablet Take 1 tablet by mouth Daily.   1/11/2025    carboxymethylcellulose (REFRESH PLUS) 0.5 % solution Administer 1 drop to both eyes 2 (Two) Times a Day As Needed for Dry Eyes.   More than a month    EPINEPHrine (EPIPEN) 0.3 MG/0.3ML solution auto-injector injection INJECT THE CONTENTS OF ONE PEN AS NEEDED. MAY REPEAT ONE TIME.   Unknown    levocetirizine (XYZAL) 5 MG tablet Take 1 tablet by mouth Every Morning.   More than a month    lidocaine (Lidoderm) 5 % Place 1 patch on the skin as directed by provider Daily As Needed for Mild Pain. Remove & Discard patch within 12 hours or as directed by MD (Patient not taking: Reported on 1/12/2025) 5 patch 0 Not Taking    naloxone (NARCAN) 4 MG/0.1ML nasal spray Administer 1 spray into the nostril(s) as directed by provider As Needed for Opioid Reversal. Call 911. Don't prime. [place] in 1 nostril for overdose. Repeat in 2-3 minutes in other nostril if no or minimal breathing/responsiveness.   Unknown    Petrolatum 42 % ointment Apply 1 Application topically to the appropriate area as directed Every 12 (Twelve) Hours. Use on irritated skin from bug bites (Patient not taking: Reported on 1/12/2025) 100 g 5 Not Taking    potassium chloride (KAYCIEL) 20 mEq/15 mL solution Take 15 mL by mouth Daily. (Patient not taking: Reported on 1/12/2025) 90 mL 5 Unknown         Current  Facility-Administered Medications:     acetaminophen (TYLENOL) tablet 650 mg, 650 mg, Oral, Q4H PRN, Óscar Cadet MD    albuterol (PROVENTIL) nebulizer solution 0.083% 2.5 mg/3mL, 2.5 mg, Nebulization, Q6H PRN, Óscar Cadet MD    butalbital-acetaminophen-caffeine (FIORICET, ESGIC) -40 MG per tablet 2 tablet, 2 tablet, Oral, Q4H PRN, Óscar Cadet MD    Calcium Replacement - Follow Nurse / BPA Driven Protocol, , Not Applicable, PRN, Óscar Cadet MD    cetirizine (zyrTEC) tablet 10 mg, 10 mg, Oral, Daily, Óscar Cadet MD, 10 mg at 01/13/25 0943    cloNIDine (CATAPRES) tablet 0.1 mg, 0.1 mg, Oral, Q6H PRN, Óscar Cadet MD    cloNIDine (CATAPRES) tablet 0.2 mg, 0.2 mg, Oral, Q6H PRN, Óscar Cadet MD    Diclofenac Sodium (VOLTAREN) 1 % gel 4 g, 4 g, Topical, 4x Daily PRN, Óscar Cadet MD    diphenhydrAMINE (BENADRYL) tablet 50 mg, 50 mg, Oral, Q4H PRN, Óscar Cadet MD    EPINEPHrine (ANAPHYLAXIS) 1 mg/ml injection kit, 0.3 mg, Intramuscular, Q1H PRN, Óscar Cadet MD    eplerenone (INSPRA) tablet 50 mg, 50 mg, Oral, Q12H, Óscar Cadet MD, 50 mg at 01/13/25 0943    famotidine (PEPCID) tablet 40 mg, 40 mg, Oral, Daily, Óscar Cadet MD, 40 mg at 01/13/25 0942    fluticasone (FLONASE) 50 MCG/ACT nasal spray 1 spray, 1 spray, Each Nare, BID, Óscar Cadet MD, 1 spray at 01/13/25 0944    HYDROcodone-acetaminophen (NORCO)  MG per tablet 1 tablet, 1 tablet, Oral, Q4H PRN, Óscar Cadet MD, 1 tablet at 01/13/25 0221    HYDROcodone-acetaminophen (NORCO)  MG per tablet 1 tablet, 1 tablet, Oral, Q6H PRN, Óscar Cadet MD    HYDROcodone-acetaminophen (NORCO)  MG per tablet 2 tablet, 2 tablet, Oral, Q6H PRN, Óscar Cadet MD, 2 tablet at 01/13/25 0955    hydrOXYzine (ATARAX) tablet 10 mg, 10 mg, Oral, TID PRN, Óscar Cadet MD    ipratropium (ATROVENT) nebulizer solution 0.5 mg, 500 mcg, Nebulization, 4x Daily - RT, Óscar Cadet,  MD, 0.5 mg at 01/13/25 1140    LORazepam (ATIVAN) tablet 0.5 mg, 0.5 mg, Oral, Q8H PRN, Óscar Cadet MD, 0.5 mg at 01/13/25 0102    Magnesium Low Dose Replacement - Follow Nurse / BPA Driven Protocol, , Not Applicable, PRN, Óscar Cadet MD    melatonin tablet 5 mg, 5 mg, Oral, Nightly PRN, Óscar Cadet MD, 5 mg at 01/13/25 0102    multivitamin with minerals 1 tablet, 1 tablet, Oral, Daily, Óscar Cadet MD, 1 tablet at 01/13/25 0942    mupirocin (BACTROBAN) 2 % ointment 1 Application, 1 application , Topical, Q24H, Óscar Cadet MD, 1 Application at 01/13/25 0942    NIFEdipine XL (PROCARDIA XL) 24 hr tablet 30 mg, 30 mg, Oral, Q24H, Óscar Cadet MD, 30 mg at 01/13/25 0944    nitroglycerin (NITROSTAT) SL tablet 0.4 mg, 0.4 mg, Sublingual, Q5 Min PRN, Óscar Cadet MD    ondansetron ODT (ZOFRAN-ODT) disintegrating tablet 8 mg, 8 mg, Oral, Q6H PRN, Óscar Cadet MD    pantoprazole (PROTONIX) EC tablet 40 mg, 40 mg, Oral, Q AM, Óscar Cadet MD, 40 mg at 01/13/25 0608    Pharmacy Consult, , Not Applicable, Continuous PRN, Óscar Cadet MD    Phosphorus Replacement - Follow Nurse / BPA Driven Protocol, , Not Applicable, PRN, Óscar Cadet MD    polyethylene glycol (MIRALAX) packet 17 g, 17 g, Oral, Daily PRN, Óscar Cadet MD    Polyvinyl Alcohol-Povidone PF (ARTIFICIAL TEARS) 1.4-0.6 % ophthalmic solution 1 drop, 1 drop, Both Eyes, 4x Daily PRN, Óscar Cadet MD    potassium & sodium phosphates (PHOS-NAK) 280-160-250 MG packet 2 packet, 2 packet, Oral, Once, Óscar Cadet MD    potassium chloride (KLOR-CON) packet 20 mEq, 20 mEq, Oral, Daily, Óscar Cadet MD, 20 mEq at 01/13/25 0943    potassium chloride (KLOR-CON) packet 40 mEq, 40 mEq, Oral, Q4H, Óscar Cadet MD    Potassium Replacement - Follow Nurse / BPA Driven Protocol, , Not Applicable, PRN, Óscar Cadet MD    predniSONE (DELTASONE) tablet 10 mg, 10 mg, Oral, Daily, Óscar Cadet MD,  10 mg at 01/13/25 0943    promethazine-codeine (PHENERGAN with CODEINE) 6.25-10 MG/5ML syrup 5 mL, 5 mL, Oral, 4x Daily PRN, Óscar Cadet MD    rivaroxaban (XARELTO) tablet 20 mg, 20 mg, Oral, Daily, Óscar Cadet MD, 20 mg at 01/13/25 0943    rosuvastatin (CRESTOR) tablet 5 mg, 5 mg, Oral, QAM, Óscar Cadet MD, 5 mg at 01/13/25 0608    sodium chloride 0.45 % infusion, 50 mL/hr, Intravenous, Continuous, Óscar Cadet MD, Last Rate: 50 mL/hr at 01/13/25 0102, 50 mL/hr at 01/13/25 0102    sodium chloride 0.9 % flush 3 mL, 3 mL, Intravenous, Q12H, Óscar Cadet MD, 3 mL at 01/13/25 0944    sodium chloride 0.9 % flush 3-10 mL, 3-10 mL, Intravenous, PRN, Óscar Cadet MD    sodium chloride 0.9 % infusion 40 mL, 40 mL, Intravenous, PRN, Óscar Cadet MD    torsemide (DEMADEX) tablet 50 mg, 50 mg, Oral, Daily, Óscar Cadet MD, 50 mg at 01/13/25 0942    triamcinolone (KENALOG) 0.1 % cream 1 Application, 1 Application, Topical, BID, Óscar Cadet MD, 1 Application at 01/13/25 0943    vitamin B-12 (CYANOCOBALAMIN) tablet 1,000 mcg, 1,000 mcg, Oral, Daily, Óscar Cadet MD, 1,000 mcg at 01/13/25 0942    [START ON 1/14/2025] vitamin D (ERGOCALCIFEROL) capsule 50,000 Units, 50,000 Units, Oral, Weekly, Óscar Cadet MD    COMORBID CONDITIONS:  Cancer including (primary or metastatic), chronic kidney disease including stage, Chronic respiratory failure, Coagulopathy due to antiplatelet agent/anticoagulant/tPA, History of CVA, Hypertension, and falls, and hx DVT/PE    Objective     Results Review:  LABS:  Results from last 7 days   Lab Units 01/13/25  0649 01/12/25  1551   WBC 10*3/mm3 7.70 8.05   HEMOGLOBIN g/dL 10.7* 11.6*   HEMATOCRIT % 32.5* 37.5   PLATELETS 10*3/mm3 179 187     Results from last 7 days   Lab Units 01/13/25  0648 01/12/25  1551   SODIUM mmol/L 144 144   POTASSIUM mmol/L 3.6 3.0*   CHLORIDE mmol/L 108* 102   CO2 mmol/L 27.3 29.2*   BUN mg/dL 19 26*   CREATININE  mg/dL 1.05* 1.38*   CALCIUM mg/dL 8.9 9.3   BILIRUBIN mg/dL 0.4 0.3   ALK PHOS U/L 85 100   ALT (SGPT) U/L 22 27   AST (SGOT) U/L 28 35*   GLUCOSE mg/dL 97 114*         DIAGNOSTICS:  XR Spine Thoracic 2 View    Result Date: 1/13/2025  XR SPINE THORACIC 2 VW-, XR SPINE LUMBAR COMPLETE W FLEX EXT-  INDICATIONS: Spinal stenosis.  TECHNIQUE: 7 views of the lumbar spine, 4 views of the thoracic spine  COMPARISON: CT from 12/5/2024, 5/26/2022  FINDINGS:  Vertebral body heights appear stable. No acute fracture is identified. Multilevel endplate spurring, disc space narrowing, and facet arthropathy are present. Mild anterolisthesis of L4 on L5 and L5 on S1, without significant change between flexion and extension. Alignment is otherwise in range of normal. Aortic calcification is present.       No acute fracture or laxity is identified. Degenerative changes in the spine. If further imaging evaluation is indicated, MRI could be considered.    This report was finalized on 1/13/2025 9:30 AM by Dr. Umesh Harrison M.D on Workstation: [email or web address]      XR Spine Lumbar Complete With Flex & Ext    Result Date: 1/13/2025  XR SPINE THORACIC 2 VW-, XR SPINE LUMBAR COMPLETE W FLEX EXT-  INDICATIONS: Spinal stenosis.  TECHNIQUE: 7 views of the lumbar spine, 4 views of the thoracic spine  COMPARISON: CT from 12/5/2024, 5/26/2022  FINDINGS:  Vertebral body heights appear stable. No acute fracture is identified. Multilevel endplate spurring, disc space narrowing, and facet arthropathy are present. Mild anterolisthesis of L4 on L5 and L5 on S1, without significant change between flexion and extension. Alignment is otherwise in range of normal. Aortic calcification is present.       No acute fracture or laxity is identified. Degenerative changes in the spine. If further imaging evaluation is indicated, MRI could be considered.    This report was finalized on 1/13/2025 9:30 AM by Dr. Umesh Harrison M.D on Workstation: [email or web address]      CT Head  Without Contrast    Result Date: 1/12/2025  CT HEAD WITHOUT CONTRAST, CT CERVICAL SPINE WITHOUT CONTRAST  HISTORY: Fall. Found on floor. Head injury. Neck pain.  TECHNIQUE: Head CT includes axial imaging from the base of skull to vertex without IV contrast and this data was reconstructed in coronal and sagittal planes. Cervical spine CT includes axial imaging from the skull base to the upper thoracic spine and this data was reconstructed in coronal and sagittal planes. Radiation dose reduction techniques were utilized, including automated exposure control and exposure modulation based on body size.  COMPARISON: Head CT 5/28/2022, CT head and cervical spine 10/04/2021.  FINDINGS: Head CT: Mild chronic small vessel ischemic white matter change. There are no abnormal areas of increased attenuation intraaxially to suggest hemorrhage. No extra-axial fluid collection is observed. Within the posteromedial left occipital lobe there is a focal area of encephalomalacia measuring 1.5 x 1.5 cm consistent with a chronic infarction, and this is similar to the prior exam 04/21/2022. Intracranial atherosclerotic calcifications are present. Bone windows demonstrate no calvarial fracture. There is a 5 mm chronic dural based calcification medial to the left sigmoid sinus and posterior to the left mastoid air cells.  Cervical spine CT: Advanced chronic arthritic changes at the anterior atlantoaxial articulation where there is joint space loss and prominent spur formation, progressive when compared to exam 10/04/2021. Cervical spine vertebral body heights appear within normal limits. The C1 ring and the odontoid process are intact. There is multilevel degenerative disc disease with disc space narrowing greatest at C6-C7, and this is similar to the previous exam. Bilateral facet arthritis is present that is greater on the right as compared to the left best demonstrated at C3-C4, C4-C5, C5-C6. At C5-C6, there is a central posterior disc  protrusion that contacts the anterior cervical cord with mild narrowing of the central canal. No evidence for fracture or acute abnormality. There are carotid vascular calcifications.  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      . 1. Mild chronic small vessel ischemic white matter change. Chronic encephalomalacia posteromedial left occipital lobe consistent with an old infarction, and this is without change. No evidence for acute intracranial abnormality. 2. 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 At C5-C6, there is a small central posterior disc protrusion that contacts the anterior cervical cord with mild narrowing of the central canal. This was not evident on the prior exam 10/04/2021. There is degenerative disc disease with disc space narrowing greatest at C6-C7. Right greater than left facet arthritis. No evidence for acute osseous abnormality in the cervical spine. 3. 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   Radiation dose reduction techniques were utilized, including automated exposure control and exposure modulation based on body size.    This report was finalized on 1/12/2025 5:58 PM by Yoel Jones M.D on Workstation: [id number]      CT Cervical Spine Without Contrast    Result Date: 1/12/2025  CT HEAD WITHOUT CONTRAST, CT CERVICAL SPINE WITHOUT CONTRAST  HISTORY: Fall. Found on floor. Head injury. Neck pain.  TECHNIQUE: Head CT includes axial imaging from the base of skull to vertex without IV contrast and this data was reconstructed in coronal and sagittal planes. Cervical spine CT includes axial imaging from the skull base to the upper thoracic spine and this data was reconstructed in coronal and sagittal planes. Radiation dose reduction techniques were utilized, including automated exposure control and exposure modulation  based on body size.  COMPARISON: Head CT 5/28/2022, CT head and cervical spine 10/04/2021.  FINDINGS: Head CT: Mild chronic small vessel ischemic white matter change. There are no abnormal areas of increased attenuation intraaxially to suggest hemorrhage. No extra-axial fluid collection is observed. Within the posteromedial left occipital lobe there is a focal area of encephalomalacia measuring 1.5 x 1.5 cm consistent with a chronic infarction, and this is similar to the prior exam 04/21/2022. Intracranial atherosclerotic calcifications are present. Bone windows demonstrate no calvarial fracture. There is a 5 mm chronic dural based calcification medial to the left sigmoid sinus and posterior to the left mastoid air cells.  Cervical spine CT: Advanced chronic arthritic changes at the anterior atlantoaxial articulation where there is joint space loss and prominent spur formation, progressive when compared to exam 10/04/2021. Cervical spine vertebral body heights appear within normal limits. The C1 ring and the odontoid process are intact. There is multilevel degenerative disc disease with disc space narrowing greatest at C6-C7, and this is similar to the previous exam. Bilateral facet arthritis is present that is greater on the right as compared to the left best demonstrated at C3-C4, C4-C5, C5-C6. At C5-C6, there is a central posterior disc protrusion that contacts the anterior cervical cord with mild narrowing of the central canal. No evidence for fracture or acute abnormality. There are carotid vascular calcifications.  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      . 1. Mild chronic small vessel ischemic white matter change. Chronic encephalomalacia posteromedial left occipital lobe consistent with an old infarction, and this is without change. No evidence for acute intracranial abnormality. 2. 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  At C5-C6, there is a small central posterior disc protrusion that contacts the anterior cervical cord with mild narrowing of the central canal. This was not evident on the prior exam 10/04/2021. There is degenerative disc disease with disc space narrowing greatest at C6-C7. Right greater than left facet arthritis. No evidence for acute osseous abnormality in the cervical spine. 3. 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   Radiation dose reduction techniques were utilized, including automated exposure control and exposure modulation based on body size.    This report was finalized on 1/12/2025 5:58 PM by Yoel Jones M.D on Workstation: [id number]      XR Shoulder 2+ View Right    Result Date: 1/12/2025  XR SHOULDER 2+ VW RIGHT-  HISTORY: Fall with shoulder pain.  COMPARISON: None  FINDINGS: Humeral head is high riding and there is moderate acromioclavicular glenohumeral joint osteoarthritis. The bones are osteopenic. There is no evidence for fracture or acute osseous abnormality.      Osteopenia limits evaluation for fracture. There is moderate AC and glenohumeral joint arthritis and the humeral head is high riding. No demonstrated fracture.  This report was finalized on 1/12/2025 5:08 PM by Yoel Jones M.D on Workstation: [id number]          Results Review:   I reviewed the patient's new clinical results.  I personally viewed and interpreted the patient's chart, medications, labs, and imaging    Vital Signs   Temp:  [97.7 °F (36.5 °C)-98.1 °F (36.7 °C)] 98.1 °F (36.7 °C)  Heart Rate:  [67-83] 73  Resp:  [16-18] 18  BP: (115-182)/(61-81) 115/67    Physical Exam:  Physical Exam  Vitals reviewed.   Constitutional:       Appearance: Normal appearance.   HENT:      Head: Normocephalic and atraumatic.   Pulmonary:      Effort: Pulmonary effort is normal.   Skin:     General: Skin is warm and dry.   Neurological:       Mental Status: She is alert.      Deep Tendon Reflexes:      Reflex Scores:       Tricep reflexes are 2+ on the right side and 2+ on the left side.       Bicep reflexes are 2+ on the right side and 2+ on the left side.       Brachioradialis reflexes are 2+ on the right side and 2+ on the left side.       Patellar reflexes are 2+ on the right side and 2+ on the left side.       Achilles reflexes are 2+ on the right side and 2+ on the left side.  Psychiatric:         Speech: Speech normal.       Neurological Exam  Mental Status  Alert. Oriented to person, place and time. Recent and remote memory are intact. Speech is normal. Language is fluent with no aphasia.    Motor    RAFA R deltoid d/t limited ROM from pain.   4+/5 L deltoid.   4+/5 bilateral tricep and bicep  4/5 bilateral IP  4+/5 bilateral quad and hamstring  .    Sensory  Sensation is intact to light touch, pinprick, vibration and proprioception in all four extremities.    Reflexes                                            Right                      Left  Brachioradialis                    2+                         2+  Biceps                                 2+                         2+  Triceps                                2+                         2+  Patellar                                2+                         2+  Achilles                                2+                         2+    Right pathological reflexes: Emiliana's absent.  Left pathological reflexes: Emiliana's absent. Ankle clonus absent.    Gait    Not tested.      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  T    Severe malnutrition    KECIA (generalized anxiety disorder)    Volume depletion    Cervical stenosis of spine    Recurrent falls while walking    Closed head injury with concussion    Hypokalemia    Anemia, mild    Goiter diffuse      Problem List Items Addressed This Visit       * (Principal) Acute kidney injury superimposed on CKD    Relevant Medications    torsemide (DEMADEX) tablet 50 mg    Hypokalemia     Other Visit Diagnoses       Generalized weakness    -  Primary    Recurrent falls               Pleasant 84-year-old female with multiple comorbidities including h/o breast cancer s/p double mastectomy presented to the ED after being found on the floor for several hours, unable to get up on her own.  She verbalizes history of falls with progressive lower extremity weakness. According to chart review she has told other providers her weakness is acute.  States she feels her weakness is primarily distal to her knees. It was difficult getting a good history from her as she seems very fixated on her bug bites. According to chart review she has a history of lyme disease from years ago but the bug bites she is concerned about occurred over the summer. She has some general weakness on exam but did not appreciate anything focal. No hyperreflexia, hoffmans or clonus. However unable to assess clonus  "in right foot given deformity.  I have not observed her ambulating but she was able to walk 15 feet with rwx with PT today who also noted balance deficits. No acute findings on imaging. Degenerative changes on CT cervical but no significant stenosis appreciated. Will need further work-up.      PLAN:     MRI brain, cervical, and lumbar w/ & w/o contrast    I discussed the patient's findings and my recommendations with patient, nursing staff, and Dr. Vasquez    During patient visit, I utilized appropriate personal protective equipment including gloves and mask.  Mask used was standard procedure mask. Appropriate PPE was worn during the entire visit.  Hand hygiene was completed before and after.     Shruti Pires, APRN  01/13/25  12:23 EST    \"Dictated utilizing Dragon dictation\".     "

## 2025-01-13 NOTE — PLAN OF CARE
Goal Outcome Evaluation:  Plan of Care Reviewed With: patient              Patient is an 84 y.o. female admitted to [facility] for weakness, recurrent falls on 1/12/2025. She did hit her head this time and reported R shoulder pain. CT head (-) for acute infarct and x-ray shoulder revealed moderate AC and glenohumeral joint arthritis and the humeral head is high riding. Patient is ind at baseline for household distances at rwx. She has 2 TAMAR and lives with her spouse. Today, patient performed bed mobility with SBA, required Elen for transfers, and ambulated 15ft using rwx requiring Elen. Narrow INNA and unsteadiness noted. Very slow pace. Strength, activity tolerance, balance, safety awareness deficits noted. Patient may benefit from skilled PT services to address functional deficits and improve level of independence prior to discharge. Anticipate SNF upon DC but pt does not wish to go. She is not safe to dc home at this point.       Anticipated Discharge Disposition (PT): skilled nursing facility

## 2025-01-13 NOTE — THERAPY EVALUATION
Patient Name: Sasha Barrett  : 1940    MRN: [id number]                              Today's Date: 2025       Admit Date: 2025    Visit Dx:     ICD-10-CM ICD-9-CM   1. Generalized weakness  R53.1 780.79   2. Recurrent falls  R29.6 V15.88   3. Hypokalemia  E87.6 276.8   4. Acute renal insufficiency  N28.9 593.9     Patient Active Problem List   Diagnosis    Joint pain    Urinary retention self-caths (Don)    Conjunctivitis    Arm pain    Other cervical disc displacement at C5-C6 level    Cervical pain (Servando=PM)    DDD (degenerative disc disease), cervical    Hyperlipidemia LDL goal <70    Preventative health care    Medication management    Proctocele    Urge incontinence of urine    Chronic tension-type headache, intractable    Obesity (BMI 30.0-34.9)    H/o Lyme disease    Stage 2 chronic kidney disease    Cerebral atherosclerosis    Allergic contact dermatitis    Malaise and fatigue progressive    Dizziness    Vitamin D deficiency    Moderate persistent asthmatic bronchitis without complication    Anterior cervical adenopathy due to infection    Pleurisy    Knee pain, bilateral    Elevated PTH level    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)    Gastroesophageal reflux disease with esophagitis    Psoriasis (Darryl Ochoa)    Postmenopausal    Recurrent UTI    H/O Cerebral vascular accident in left occipital area    Dyspnea on exertion    Precordial pain    Abnormal EKG    Arthralgia    Accelerated hypertension    Acute joint pain    Easy bruisability    Cough productive of purulent sputum    Influenza A Subtype H3    COPD with asthma    Acute chest wall pain    Decreased functional mobility and endurance since fall 15    Essential hypertension    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    Migraine without aura and without status migrainosus, not intractable    Thyroid nodule    Osteoarthritis of left hip    Spinal stenosis of lumbar  region with radiculopathy    Acute deep vein thrombosis (DVT) of right lower extremity    Acute deep vein thrombosis    Single subsegmental pulmonary embolism without acute cor pulmonale    Other chronic pain    Palpitations    Secondary hyperparathyroidism    Syncope    Ataxia    Adult failure to thrive syndrome    Recent unexplained weight loss 42# over last 6 months    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 Yazmin Canseco    CARDIAC ELECTROPHYSIOLOGY PROCEDURE N/A 5/27/2022    Procedure: Loop insertion;  Surgeon: Jeffrey Argueta MD;  Location:  [facility];  Service: Cardiovascular;  Laterality: N/A;  biotronik    CARDIAC ELECTROPHYSIOLOGY PROCEDURE N/A 7/13/2022    Procedure: Loop recorder removal;  Surgeon: Jeffrey Argueta MD;  Location:  [facility];  Service: Cardiovascular;  Laterality: N/A;    CARPAL TUNNEL RELEASE Right 05/25/2011    Dr. Caleb Bueno    CARPAL TUNNEL RELEASE Left 04/26/2011    Dr. Caleb Bueno    CATARACT EXTRACTION Left 11/29/2010    Dr. Eusebio Downs    CATARACT EXTRACTION Right 11/17/2010    Dr. Eusebio Downs    COLONOSCOPY N/A 12/06/2002    Sigmoid diverticulosis-Dr. Brandon Batista    COLONOSCOPY N/A 12/12/2013    Tortuous colon, diverticulosis in the sigmoid colon and descending colon, stool in the rectum, sigmoid colon, descending colon, splenic flexure, transverse colon and hepatic flexure-Dr. Irma Brambila    DEQUERVAIN RELEASE Left 9/23/2020    Procedure: DEQUERVAIN RELEASE LEFT HAND;  Surgeon: Caleb Bueno MD;  Location:  [facility];  Service: Plastics;  Laterality: Left;    ENDOSCOPY N/A 09/13/2007    Normal-Dr. Pavel Quarles    ENDOSCOPY AND COLONOSCOPY N/A 09/19/2005    1 cm hiatal hernia, mild Schatzki's ring, sigmoid diverticulosis-Dr. Yoel Farley    GANGLION CYST EXCISION Left 12/18/2012    Release of A1 pulley flexor sheath, left fourth finger, excision of ganglion cyst 0.5 cm diameter, A2 pulley flexor sheath, left fourth finger-Dr. Caleb Bueno    HEEL SPUR EXCISION Left 1968    INGUINAL HERNIA REPAIR Right 1971    at 5 months pregnant     INJECTION OF MEDICATION Left 9/23/2020    Procedure: KENOLOG INJECTION TO LEFT THUMB;  Surgeon: Caleb Bueno MD;  Location:  [facility];  Service: Plastics;  Laterality: Left;     MASTECTOMY Right 08/05/2008    Right breast mastectomy and excision of left chest wall lesion-Dr. Yoel Farley    MASTECTOMY RADICAL Left 04/29/2003    Left modified radical mastectomy-Dr. Yazmin Canseco    PARATHYROIDECTOMY Right 05/04/2004    Excision of parathyroid adenoma (right superior)-Dr. Yoel Farley    REPLACEMENT TOTAL KNEE Right 04/09/2012    Dr. Lamonte Childress    SINUS SURGERY  1984    THYROIDECTOMY, PARTIAL Left 05/04/2004    Dr. Yoel Farley    TOTAL ABDOMINAL HYSTERECTOMY Bilateral 1973    Dr. Mahan    TRIGGER FINGER RELEASE Left 9/23/2020    Procedure: RELEASE LEFT FOURTH TRIGGER FINGER;  Surgeon: Caleb Bueno MD;  Location: [facility];  Service: Plastics;  Laterality: Left;    UPPER GASTROINTESTINAL ENDOSCOPY N/A 02/18/2009    LA Grade A reflux esophagitis, normal stomach, normal 2nd part of the duodenum-Dr. Yoel Farley    WRIST GANGLION EXCISION Left 9/23/2020    Procedure: EXCISION GANGLION CYST LEFT WRIST;  Surgeon: Caleb Bueno MD;  Location: [facility] OR [facility];  Service: Plastics;  Laterality: Left;      General Information       Row Name 01/13/25 1027          Physical Therapy Time and Intention    Document Type evaluation  -CB     Mode of Treatment individual therapy;physical therapy  -CB       Row Name 01/13/25 1027          General Information    Patient Profile Reviewed yes  -CB     Prior Level of Function independent:;gait;transfer;bed mobility;all household mobility  rwx  in home at baseline per pt report; falls risk  -CB     Existing Precautions/Restrictions fall  -CB       Row Name 01/13/25 1027          Living Environment    People in Home spouse  -CB       Row Name 01/13/25 1027          Home Main Entrance    Number of Stairs, Main Entrance two  -CB       Row Name 01/13/25 1027          Cognition    Orientation Status (Cognition) oriented x 3  -CB       Row Name 01/13/25 1027          Safety Issues/Impairments Affecting Functional Mobility    Safety  Issues Affecting Function (Mobility) insight into deficits/self-awareness;awareness of need for assistance;problem-solving;safety precaution awareness;judgment  -CB     Impairments Affecting Function (Mobility) balance;endurance/activity tolerance;strength;pain  -CB               User Key  (r) = Recorded By, (t) = Taken By, (c) = Cosigned By      Initials Name Provider Type    CB Maryann Maldonado, PT Physical Therapist                   Mobility       Row Name 01/13/25 1027          Bed Mobility    Bed Mobility supine-sit  -CB     Supine-Sit [place] (Bed Mobility) standby assist  -CB       Row Name 01/13/25 1027          Sit-Stand Transfer    Sit-Stand [place] (Transfers) minimum assist (75% patient effort);verbal cues  -CB     Assistive Device (Sit-Stand Transfers) walker, front-wheeled  -CB       Row Name 01/13/25 1027          Gait/Stairs (Locomotion)    [place] Level (Gait) minimum assist (75% patient effort);verbal cues  -CB     Assistive Device (Gait) walker, front-wheeled  -CB     Distance in Feet (Gait) 15  -CB     Deviations/Abnormal Patterns (Gait) gait speed decreased;stride length decreased;base of support, narrow  -CB     Bilateral Gait Deviations heel strike decreased  -CB     Comment, (Gait/Stairs) excessive ER BLE; pt reports R foot fx years ago which causes her difficulty when walking at baseline  -CB               User Key  (r) = Recorded By, (t) = Taken By, (c) = Cosigned By      Initials Name Provider Type    Maryann Cárdenas PT Physical Therapist                   Obj/Interventions       Row Name 01/13/25 1028          Range of Motion Comprehensive    Comment, General Range of Motion ankle ROM limited  -CB       Row Name 01/13/25 1028          Strength Comprehensive (MMT)    General Manual Muscle Testing (MMT) Assessment lower extremity strength deficits identified  -CB     Comment, General Manual Muscle Testing (MMT) Assessment BLe 3+/5 and pain in ant LE with MMT  -CB       Row  Name 01/13/25 1028          Balance    Balance Assessment standing static balance;standing dynamic balance;sitting static balance  -CB     Static Sitting Balance standby assist  -CB     Position, Sitting Balance sitting edge of bed  -CB     Static Standing Balance minimal assist;contact guard  -CB     Dynamic Standing Balance minimal assist  -CB     Position/Device Used, Standing Balance supported;walker, front-wheeled  -CB     Balance Interventions sitting;standing;sit to stand;supported;static;dynamic;minimal challenge  -CB       Row Name 01/13/25 1028          Sensory Assessment (Somatosensory)    Sensory Assessment (Somatosensory) sensation intact  -CB               User Key  (r) = Recorded By, (t) = Taken By, (c) = Cosigned By      Initials Name Provider Type    CB Maryann Maldonado, PT Physical Therapist                   Goals/Plan       Row Name 01/13/25 1033          Bed Mobility Goal 1 (PT)    Activity/Assistive Device (Bed Mobility Goal 1, PT) bed mobility activities, all  -CB     [place] Level/Cues Needed (Bed Mobility Goal 1, PT) modified independence  -CB     Time Frame (Bed Mobility Goal 1, PT) long term goal (LTG);1 week  -CB       Row Name 01/13/25 1033          Transfer Goal 1 (PT)    Activity/Assistive Device (Transfer Goal 1, PT) sit-to-stand/stand-to-sit;bed-to-chair/chair-to-bed  -CB     [place] Level/Cues Needed (Transfer Goal 1, PT) modified independence  -CB     Time Frame (Transfer Goal 1, PT) long term goal (LTG);1 week  -CB       Row Name 01/13/25 1033          Gait Training Goal 1 (PT)    Activity/Assistive Device (Gait Training Goal 1, PT) gait (walking locomotion);assistive device use;walker, rolling  -CB     [place] Level (Gait Training Goal 1, PT) standby assist  -CB     Distance (Gait Training Goal 1, PT) 75ft  -CB     Time Frame (Gait Training Goal 1, PT) long term goal (LTG);1 week  -CB       Row Name 01/13/25 1033          Stairs Goal 1 (PT)    Activity/Assistive  Device (Stairs Goal 1, PT) ascending stairs;descending stairs  -CB     [place] Level/Cues Needed (Stairs Goal 1, PT) contact guard required  -CB     Number of Stairs (Stairs Goal 1, PT) 2  -CB     Time Frame (Stairs Goal 1, PT) long term goal (LTG);1 week  -CB       Row Name 01/13/25 1033          Therapy Assessment/Plan (PT)    Planned Therapy Interventions (PT) balance training;bed mobility training;gait training;home exercise program;patient/family education;strengthening;transfer training  -CB               User Key  (r) = Recorded By, (t) = Taken By, (c) = Cosigned By      Initials Name Provider Type    CB Maryann Maldonado, PT Physical Therapist                   Clinical Impression       Row Name 01/13/25 1029          Pain    Pretreatment Pain Rating 5/10  -CB     Posttreatment Pain Rating 5/10  -CB     Pain Side/Orientation generalized  -CB       Row Name 01/13/25 1029          Plan of Care Review    Plan of Care Reviewed With patient  -CB     Outcome Evaluation Patient is an 84 y.o. female admitted to [facility] for weakness, recurrent falls on 1/12/2025. She did hit her head this time and reported R shoulder pain. CT head (-) for acute infarct and x-ray shoulder revealed moderate AC and glenohumeral joint arthritis and the humeral head is high riding. Patient is ind at baseline for household distances at x. She has 2 TAMAR and lives with her spouse. Today, patient performed bed mobility with SBA, required Elen for transfers, and ambulated 15ft using rwx requiring Elen. Narrow INNA and unsteadiness noted. Very slow pace. Strength, activity tolerance, balance, safety awareness deficits noted. Patient may benefit from skilled PT services to address functional deficits and improve level of independence prior to discharge. Anticipate SNF upon DC but pt does not wish to go. She is not safe to dc home at this point.  -CB       Row Name 01/13/25 1029          Therapy Assessment/Plan (PT)    Rehab Potential (PT) good   -CB     Criteria for Skilled Interventions Met (PT) yes  -CB     Therapy Frequency (PT) 5 times/wk  -CB       Row Name 01/13/25 1029          Positioning and Restraints    Pre-Treatment Position in bed  -CB     Post Treatment Position chair  -CB     In Chair notified nsg;reclined;call light within reach;encouraged to call for assist;exit alarm on  -CB               User Key  (r) = Recorded By, (t) = Taken By, (c) = Cosigned By      Initials Name Provider Type    Maryann Cárdenas PT Physical Therapist                   Outcome Measures       Row Name 01/13/25 1033          How much help from another person do you currently need...    Turning from your back to your side while in flat bed without using bedrails? 3  -CB     Moving from lying on back to sitting on the side of a flat bed without bedrails? 3  -CB     Moving to and from a bed to a chair (including a wheelchair)? 3  -CB     Standing up from a chair using your arms (e.g., wheelchair, bedside chair)? 3  -CB     Climbing 3-5 steps with a railing? 1  -CB     To walk in hospital room? 3  -CB     AM-PAC 6 Clicks Score (PT) 16  -CB     Highest Level of Mobility Goal 5 --> Static standing  -CB       Row Name 01/13/25 1033          Functional Assessment    Outcome Measure Options AM-PAC 6 Clicks Basic Mobility (PT)  -CB               User Key  (r) = Recorded By, (t) = Taken By, (c) = Cosigned By      Initials Name Provider Type    Maryann Cárdenas PT Physical Therapist                                 Physical Therapy Education       Title: PT OT SLP Therapies (In Progress)       Topic: Physical Therapy (In Progress)       Point: Mobility training (Done)       Learning Progress Summary            Patient Acceptance, E,TB, VU,NR by CB at 1/13/2025 1034                      Point: Home exercise program (Not Started)       Learner Progress:  Not documented in this visit.              Point: Body mechanics (Done)       Learning Progress Summary            Patient  Acceptance, E,TB, VU,NR by  at 1/13/2025 1034                      Point: Precautions (Done)       Learning Progress Summary            Patient Acceptance, E,TB, VU,NR by  at 1/13/2025 1034                                      User Key       Initials Effective Dates Name Provider Type Discipline     10/22/21 -  Maryann Maldonado, PT Physical Therapist PT                  PT Recommendation and Plan  Planned Therapy Interventions (PT): balance training, bed mobility training, gait training, home exercise program, patient/family education, strengthening, transfer training  Outcome Evaluation: Patient is an 84 y.o. female admitted to [facility] for weakness, recurrent falls on 1/12/2025. She did hit her head this time and reported R shoulder pain. CT head (-) for acute infarct and x-ray shoulder revealed moderate AC and glenohumeral joint arthritis and the humeral head is high riding. Patient is ind at baseline for household distances at rwx. She has 2 TAMAR and lives with her spouse. Today, patient performed bed mobility with SBA, required Elen for transfers, and ambulated 15ft using rwx requiring Elen. Narrow INNA and unsteadiness noted. Very slow pace. Strength, activity tolerance, balance, safety awareness deficits noted. Patient may benefit from skilled PT services to address functional deficits and improve level of independence prior to discharge. Anticipate SNF upon DC but pt does not wish to go. She is not safe to dc home at this point.     Time Calculation:         PT Charges       Row Name 01/13/25 1050             Time Calculation    Start Time 0953  -CB      Stop Time 1017  -CB      Time Calculation (min) 24 min  -CB      PT Received On 01/13/25  -CB      PT - Next Appointment 01/14/25  -      PT Goal Re-Cert Due Date 01/20/25  -CB         Time Calculation- PT    Total Timed Code Minutes- PT 12 minute(s)  -CB         Timed Charges    18154 - PT Therapeutic Activity Minutes 12  -CB         Total Minutes    Timed  Charges Total Minutes 12  -CB       Total Minutes 12  -CB                User Key  (r) = Recorded By, (t) = Taken By, (c) = Cosigned By      Initials Name Provider Type    CB Maryann Maldonado, PT Physical Therapist                  Therapy Charges for Today       Code Description Service Date Service Provider Modifiers Qty    27961483838  PT THERAPEUTIC ACT EA 15 MIN 1/13/2025 Maryann Maldonado, PT GP 1    43290421765 HC PT EVAL MOD COMPLEXITY 3 1/13/2025 Maryann Maldonado, PT GP 1            PT G-Codes  Outcome Measure Options: AM-PAC 6 Clicks Basic Mobility (PT)  AM-PAC 6 Clicks Score (PT): 16  PT Discharge Summary  Anticipated Discharge Disposition (PT): skilled nursing facility    Maryann Maldonado PT  1/13/2025

## 2025-01-13 NOTE — DISCHARGE PLACEMENT REQUEST
"Leesa Barrett \"JERSON\" (84 y.o. Female)       Date of Birth   1940    Social Security Number       Address   [address] [place] [address]    Home Phone   [phone]    MRN   [id number]       Yazidism   Anabaptism    Marital Status                               Admission Date   1/12/25    Admission Type   Emergency    Admitting Provider   Óscar Cadet MD    Attending Provider   Óscar Cadet MD    Department, Room/Bed   [address], S610/1       Discharge Date       Discharge Disposition       Discharge Destination                                 Attending Provider: Óscar Cadet MD    Allergies: Bee Venom, Morphine, Sulfa Antibiotics, Tree Extract, Ambien [Zolpidem], Anastrozole, Covid-19 (Mrna) Vaccine, Eliquis [Apixaban], Hydralazine, Norvasc [Amlodipine], Nsaids, Penicillins, Grass    Isolation: None   Infection: None   Code Status: CPR    Ht: 152.4 cm (60\")   Wt: 65.8 kg (145 lb 1 oz)    Admission Cmt: None   Principal Problem: Acute kidney injury superimposed on CKD [N17.9,N18.9]                   Active Insurance as of 1/12/2025       Primary Coverage       Payor Plan Insurance Group Employer/Plan Group    HUMANA MEDICARE REPLACEMENT [employer] MED ADV GROUP [id number]       Payor Plan Address Payor Plan Phone Number Payor Plan Fax Number Effective Dates    [address] [address] [phone]  1/1/2018 - None Entered    [facility] [id number]         Subscriber Name Subscriber Birth Date Member ID       LEESA BARRETT 1940 [id number]                     Emergency Contacts        (Rel.) Home Phone Work Phone Mobile Phone    ArnoldAdriel ([employer]) (Spouse) [phone] -- [phone]    ArnoldLeighton (Son) [phone] -- [phone]    Helen Barrett (Other) -- -- [phone]                "

## 2025-01-13 NOTE — CONSULTS
Orthopedic Consult      Patient: Sasha Barrett    Date of Admission: 1/12/2025  3:28 PM    YOB: 1940    Medical Record Number: [id number]    Consulting Physician: Óscar Cadet MD    Chief Complaints: Right shoulder pain    History of Present Illness: 84 y.o. female admitted to [facility] to services of Óscar Cadet MD.  Patient was complaining of right shoulder pain orthopedics consulted for evaluation.  Patient was seen and examined.  Patient states has been a chronic issue.  She states the shoulder pain was little worse after fall several days ago.  She feels little bit better today but has not done a whole lot.  She states she has had generalized shoulder pain for some time and she has had an injection the past which was not very helpful.  She states some topical diclofenac gel has been helpful.    Allergies:   Allergies   Allergen Reactions    Bee Venom Shortness Of Breath and Rash    Morphine Anaphylaxis and Nausea And Vomiting    Sulfa Antibiotics Anaphylaxis and Hives     Or sulfa fillers in meds  Broke out in internal and external hives      Tree Extract Shortness Of Breath and Rash    Ambien [Zolpidem] Hallucinations    Anastrozole Other (See Comments)     Can't remember what the reaction was     Covid-19 (Mrna) Vaccine Swelling     Facial swelling, blood clots    Eliquis [Apixaban] Headache     Headaches, nausea and itching.     Hydralazine Myalgia     Patient reports leg cramps, joint pain and increased fatigue    Norvasc [Amlodipine] Other (See Comments)     KIDNEYS SHUT DOWN    Nsaids Other (See Comments)     KIDNEY FAILURE    Penicillins Unknown (See Comments)     Severe reaction as a child  Tolerated ceftriaxone during 04/2017 admission    Grass Rash       Home Medications:    Current Facility-Administered Medications:     acetaminophen (TYLENOL) tablet 650 mg, 650 mg, Oral, Q4H PRN, Óscar Cadet MD    albuterol (PROVENTIL) nebulizer solution 0.083% 2.5 mg/3mL,  2.5 mg, Nebulization, Q6H PRN, Óscar Cadet MD    butalbital-acetaminophen-caffeine (FIORICET, ESGIC) -40 MG per tablet 2 tablet, 2 tablet, Oral, Q4H PRN, Óscar Cadet MD    Calcium Replacement - Follow Nurse / BPA Driven Protocol, , Not Applicable, PRN, Óscar Cadet MD    cetirizine (zyrTEC) tablet 10 mg, 10 mg, Oral, Daily, Óscar Cadet MD, 10 mg at 01/13/25 0943    cloNIDine (CATAPRES) tablet 0.1 mg, 0.1 mg, Oral, Q6H PRN, Óscar Cadet MD    cloNIDine (CATAPRES) tablet 0.2 mg, 0.2 mg, Oral, Q6H PRN, Óscar Cadet MD    Diclofenac Sodium (VOLTAREN) 1 % gel 4 g, 4 g, Topical, 4x Daily PRN, Óscar Cadet MD    diphenhydrAMINE (BENADRYL) tablet 50 mg, 50 mg, Oral, Q4H PRN, Óscar Cadet MD    EPINEPHrine (ANAPHYLAXIS) 1 mg/ml injection kit, 0.3 mg, Intramuscular, Q1H PRN, Óscar Cadet MD    eplerenone (INSPRA) tablet 50 mg, 50 mg, Oral, Q12H, Óscar Cadet MD, 50 mg at 01/13/25 0943    famotidine (PEPCID) tablet 40 mg, 40 mg, Oral, Daily, Óscar Cadet MD, 40 mg at 01/13/25 0942    fluticasone (FLONASE) 50 MCG/ACT nasal spray 1 spray, 1 spray, Each Nare, BID, Óscar Cadet MD, 1 spray at 01/13/25 0944    HYDROcodone-acetaminophen (NORCO)  MG per tablet 1 tablet, 1 tablet, Oral, Q4H PRN, Óscar Cadet MD, 1 tablet at 01/13/25 0221    HYDROcodone-acetaminophen (NORCO)  MG per tablet 2 tablet, 2 tablet, Oral, Q4H PRN, Óscar Cadet MD    hydrOXYzine (ATARAX) tablet 10 mg, 10 mg, Oral, TID PRN, Óscar Cadet MD    ipratropium (ATROVENT) nebulizer solution 0.5 mg, 500 mcg, Nebulization, 4x Daily - RT, Óscar Cadet MD, 0.5 mg at 01/13/25 1140    LORazepam (ATIVAN) tablet 0.5 mg, 0.5 mg, Oral, Q8H PRN, Óscar Cadet MD, 0.5 mg at 01/13/25 0102    Magnesium Low Dose Replacement - Follow Nurse / BPA Driven Protocol, , Not Applicable, PRN, Óscar Cadet MD    melatonin tablet 5 mg, 5 mg, Oral, Nightly PRN, Óscar Cadet MD, 5  mg at 01/13/25 0102    multivitamin with minerals 1 tablet, 1 tablet, Oral, Daily, Óscar Cadet MD, 1 tablet at 01/13/25 0942    mupirocin (BACTROBAN) 2 % ointment 1 Application, 1 application , Topical, Q24H, Óscar Cadet MD, 1 Application at 01/13/25 0942    NIFEdipine XL (PROCARDIA XL) 24 hr tablet 30 mg, 30 mg, Oral, Q24H, Óscar Cadet MD, 30 mg at 01/13/25 0944    nitroglycerin (NITROSTAT) SL tablet 0.4 mg, 0.4 mg, Sublingual, Q5 Min PRN, Óscar Cadet MD    ondansetron ODT (ZOFRAN-ODT) disintegrating tablet 8 mg, 8 mg, Oral, Q6H PRN, Ócsar Cadet MD    pantoprazole (PROTONIX) EC tablet 40 mg, 40 mg, Oral, Q AM, Óscar Cadet MD, 40 mg at 01/13/25 0608    Phosphorus Replacement - Follow Nurse / BPA Driven Protocol, , Not Applicable, PRN, Óscar Cadet MD    polyethylene glycol (MIRALAX) packet 17 g, 17 g, Oral, Daily PRN, Óscar Cadet MD    Polyvinyl Alcohol-Povidone PF (ARTIFICIAL TEARS) 1.4-0.6 % ophthalmic solution 1 drop, 1 drop, Both Eyes, 4x Daily PRN, Óscar Cadet MD    potassium & sodium phosphates (PHOS-NAK) 280-160-250 MG packet 2 packet, 2 packet, Oral, Once, Óscar Cadet MD    potassium chloride (KLOR-CON) packet 20 mEq, 20 mEq, Oral, Daily, Óscar Cadet MD, 20 mEq at 01/13/25 0943    potassium chloride (KLOR-CON) packet 40 mEq, 40 mEq, Oral, Q4H, Óscar Cadet MD    Potassium Replacement - Follow Nurse / BPA Driven Protocol, , Not Applicable, PRN, Óscar Cadet MD    predniSONE (DELTASONE) tablet 10 mg, 10 mg, Oral, Daily, Óscar Cadet MD, 10 mg at 01/13/25 0943    promethazine-codeine (PHENERGAN with CODEINE) 6.25-10 MG/5ML syrup 5 mL, 5 mL, Oral, 4x Daily PRN, Óscar Cadet MD    rivaroxaban (XARELTO) tablet 20 mg, 20 mg, Oral, Daily, Óscar Cadet MD, 20 mg at 01/13/25 0943    rosuvastatin (CRESTOR) tablet 5 mg, 5 mg, Oral, QAM, Óscar Cadet MD, 5 mg at 01/13/25 0608    sodium chloride 0.45 % infusion, 50 mL/hr,  Intravenous, Continuous, Óscar Cadet MD, Last Rate: 50 mL/hr at 01/13/25 0102, 50 mL/hr at 01/13/25 0102    sodium chloride 0.9 % flush 3 mL, 3 mL, Intravenous, Q12H, Óscar Cadet MD, 3 mL at 01/13/25 0944    sodium chloride 0.9 % flush 3-10 mL, 3-10 mL, Intravenous, PRN, Óscar Cadet MD    sodium chloride 0.9 % infusion 40 mL, 40 mL, Intravenous, PRN, Óscar Cadet MD    torsemide (DEMADEX) tablet 50 mg, 50 mg, Oral, Daily, Óscar Cadet MD, 50 mg at 01/13/25 0942    triamcinolone (KENALOG) 0.1 % cream 1 Application, 1 Application, Topical, BID, Óscar Cadet MD, 1 Application at 01/13/25 0943    vitamin B-12 (CYANOCOBALAMIN) tablet 1,000 mcg, 1,000 mcg, Oral, Daily, Óscar Cadet MD, 1,000 mcg at 01/13/25 0942    [START ON 1/14/2025] vitamin D (ERGOCALCIFEROL) capsule 50,000 Units, 50,000 Units, Oral, Weekly, Óscar Cadet MD    Current Medications:  Scheduled Meds:cetirizine, 10 mg, Oral, Daily  eplerenone, 50 mg, Oral, Q12H  famotidine, 40 mg, Oral, Daily  fluticasone, 1 spray, Each Nare, BID  ipratropium, 500 mcg, Nebulization, 4x Daily - RT  multivitamin with minerals, 1 tablet, Oral, Daily  mupirocin, 1 application , Topical, Q24H  NIFEdipine XL, 30 mg, Oral, Q24H  pantoprazole, 40 mg, Oral, Q AM  potassium & sodium phosphates, 2 packet, Oral, Once  potassium chloride, 20 mEq, Oral, Daily  potassium chloride, 40 mEq, Oral, Q4H  predniSONE, 10 mg, Oral, Daily  rivaroxaban, 20 mg, Oral, Daily  rosuvastatin, 5 mg, Oral, QAM  sodium chloride, 3 mL, Intravenous, Q12H  torsemide, 50 mg, Oral, Daily  triamcinolone, 1 Application, Topical, BID  cyanocobalamin, 1,000 mcg, Oral, Daily  [START ON 1/14/2025] vitamin D, 50,000 Units, Oral, Weekly      Continuous Infusions:sodium chloride, 50 mL/hr, Last Rate: 50 mL/hr (01/13/25 0102)      PRN Meds:.  acetaminophen    albuterol    butalbital-acetaminophen-caffeine    Calcium Replacement - Follow Nurse / BPA Driven Protocol    cloNIDine     cloNIDine    Diclofenac Sodium    diphenhydrAMINE    EPINEPHrine (Anaphylaxis)    HYDROcodone-acetaminophen    HYDROcodone-acetaminophen    hydrOXYzine    LORazepam    Magnesium Low Dose Replacement - Follow Nurse / BPA Driven Protocol    melatonin    nitroglycerin    ondansetron ODT    Phosphorus Replacement - Follow Nurse / BPA Driven Protocol    polyethylene glycol    Polyvinyl Alcohol-Povidone PF    Potassium Replacement - Follow Nurse / BPA Driven Protocol    promethazine-codeine    sodium chloride    sodium chloride    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 Yazmin Canseco    CARDIAC ELECTROPHYSIOLOGY PROCEDURE N/A 5/27/2022    Procedure: Loop insertion;  Surgeon: Jeffrey Argueta MD;  Location:  [facility];  Service: Cardiovascular;  Laterality: N/A;  biotronik    CARDIAC ELECTROPHYSIOLOGY PROCEDURE N/A 7/13/2022    Procedure: Loop recorder removal;  Surgeon: Jeffrey Argueta MD;  Location:  [facility];  Service: Cardiovascular;  Laterality: N/A;    CARPAL TUNNEL RELEASE Right 05/25/2011    Dr. Caleb Bueno    CARPAL TUNNEL RELEASE Left 04/26/2011    Dr. Caleb Bueno    CATARACT EXTRACTION Left 11/29/2010    Dr. Eusebio Downs    CATARACT  EXTRACTION Right 11/17/2010    Dr. Eusebio Downs    COLONOSCOPY N/A 12/06/2002    Sigmoid diverticulosis-Dr. Brandon Batista    COLONOSCOPY N/A 12/12/2013    Tortuous colon, diverticulosis in the sigmoid colon and descending colon, stool in the rectum, sigmoid colon, descending colon, splenic flexure, transverse colon and hepatic flexure-Dr. Irma Brambila    DEQUERVAIN RELEASE Left 9/23/2020    Procedure: DEQUERVAIN RELEASE LEFT HAND;  Surgeon: Caleb Bueno MD;  Location:  [facility];  Service: Plastics;  Laterality: Left;    ENDOSCOPY N/A 09/13/2007    Normal-Dr. Pavel Quarles    ENDOSCOPY AND COLONOSCOPY N/A 09/19/2005    1 cm hiatal hernia, mild Schatzki's ring, sigmoid diverticulosis-Dr. Yoel Farley    GANGLION CYST EXCISION Left 12/18/2012    Release of A1 pulley flexor sheath, left fourth finger, excision of ganglion cyst 0.5 cm diameter, A2 pulley flexor sheath, left fourth finger-Dr. Caleb Bueno    HEEL SPUR EXCISION Left 1968    INGUINAL HERNIA REPAIR Right 1971    at 5 months pregnant     INJECTION OF MEDICATION Left 9/23/2020    Procedure: KENOLOG INJECTION TO LEFT THUMB;  Surgeon: Caleb Bueno MD;  Location:  [facility];  Service: Plastics;  Laterality: Left;    MASTECTOMY Right 08/05/2008    Right breast mastectomy and excision of left chest wall lesion-Dr. Yoel Farley    MASTECTOMY RADICAL Left 04/29/2003    Left modified radical mastectomy-Dr. Yazmin Canseco    PARATHYROIDECTOMY Right 05/04/2004    Excision of parathyroid adenoma (right superior)-Dr. Yoel Farley    REPLACEMENT TOTAL KNEE Right 04/09/2012    Dr. Lamonte Childress    SINUS SURGERY  1984    THYROIDECTOMY, PARTIAL Left 05/04/2004    Dr. Yoel Farley    TOTAL ABDOMINAL HYSTERECTOMY Bilateral 1973    Dr. Mahan    TRIGGER FINGER RELEASE Left 9/23/2020    Procedure: RELEASE LEFT FOURTH TRIGGER FINGER;  Surgeon: Caleb Bueno MD;  Location:  [facility];  Service: Plastics;   Laterality: Left;    UPPER GASTROINTESTINAL ENDOSCOPY N/A 02/18/2009    LA Grade A reflux esophagitis, normal stomach, normal 2nd part of the duodenum-Dr. Yoel Farley    WRIST GANGLION EXCISION Left 9/23/2020    Procedure: EXCISION GANGLION CYST LEFT WRIST;  Surgeon: Caleb Bueno MD;  [place] [facility] [place] [facility] [place]  Service: Plastics;  Laterality: Left;       Social History     Occupational History    Occupation: RETIRED   Tobacco Use    Smoking status: Never     Passive exposure: Never    Smokeless tobacco: Never   Vaping Use    Vaping status: Never Used   Substance and Sexual Activity    Alcohol use: Yes     Comment: 3 drinks yearly    Drug use: Never    Sexual activity: Defer     Birth control/protection: Surgical      Social History     Social History Narrative    LIVES WITH        Family History   Problem Relation Age of Onset    Brain cancer Brother     Alcohol abuse Mother     No Known Problems Father     No Known Problems Sister     Malig Hyperthermia Neg Hx        Review of Systems:     Constitutional:  Denies fever, shaking or chills     All other pertinent positives and negatives as noted above in HPI.    Physical Exam: 84 y.o. female    Vitals:    01/13/25 0127 01/13/25 0500 01/13/25 1140 01/13/25 1143   BP:       BP Location:       Patient Position:       Pulse: 83  81 73   Resp: 18  18 18   Temp:       TempSrc:       SpO2: 98%  96%    Weight:  65.8 kg (145 lb 1 oz)     Height:         General:  Awake, alert. No acute distress.          Extremities: Right shoulder examined:  Skin appears benign without obvious lacerations, ulcerations or lesions.  No gross deformity of malalignment noted.  Compartments soft without evidence for DVT or compartment syndrome.  No atrophy.  No palpable masses or adenopathy.  No significant tenderness noted about the shoulder.  ROM limited due to some discomfort.   No obvious instability although exam is limited due to discomfort.  Strength well-preserved  distally.  Sensation to light touch grossly intact distally.  Good skin turgor, brisk cap refill and good pulses distally.    All other extremities atraumatic without gross abnormality.     Diagnostic Tests:    Admission on 01/12/2025   Component Date Value Ref Range Status    Glucose 01/12/2025 114 (H)  65 - 99 mg/dL Final    BUN 01/12/2025 26 (H)  8 - 23 mg/dL Final    Creatinine 01/12/2025 1.38 (H)  0.57 - 1.00 mg/dL Final    Sodium 01/12/2025 144  136 - 145 mmol/L Final    Potassium 01/12/2025 3.0 (L)  3.5 - 5.2 mmol/L Final    Chloride 01/12/2025 102  98 - 107 mmol/L Final    CO2 01/12/2025 29.2 (H)  22.0 - 29.0 mmol/L Final    Calcium 01/12/2025 9.3  8.6 - 10.5 mg/dL Final    Total Protein 01/12/2025 6.5  6.0 - 8.5 g/dL Final    Albumin 01/12/2025 3.5  3.5 - 5.2 g/dL Final    ALT (SGPT) 01/12/2025 27  1 - 33 U/L Final    AST (SGOT) 01/12/2025 35 (H)  1 - 32 U/L Final    Alkaline Phosphatase 01/12/2025 100  39 - 117 U/L Final    Total Bilirubin 01/12/2025 0.3  0.0 - 1.2 mg/dL Final    Globulin 01/12/2025 3.0  gm/dL Final    A/G Ratio 01/12/2025 1.2  g/dL Final    BUN/Creatinine Ratio 01/12/2025 18.8  7.0 - 25.0 Final    Anion Gap 01/12/2025 12.8  5.0 - 15.0 mmol/L Final    eGFR 01/12/2025 37.4 (L)  >60.0 mL/min/1.73 Final    Color, UA 01/12/2025 Yellow  Yellow, Straw Final    Appearance, UA 01/12/2025 Clear  Clear Final    pH, UA 01/12/2025 6.0  5.0 - 8.0 Final    Specific Gravity, UA 01/12/2025 1.017  1.005 - 1.030 Final    Glucose, UA 01/12/2025 Negative  Negative Final    Ketones, UA 01/12/2025 Negative  Negative Final    Bilirubin, UA 01/12/2025 Negative  Negative Final    Blood, UA 01/12/2025 Negative  Negative Final    Protein, UA 01/12/2025 30 mg/dL (1+) (A)  Negative Final    Leuk Esterase, UA 01/12/2025 Trace (A)  Negative Final    Nitrite, UA 01/12/2025 Negative  Negative Final    Urobilinogen, UA 01/12/2025 1.0 E.U./dL  0.2 - 1.0 E.U./dL Final    Magnesium 01/12/2025 1.9  1.6 - 2.4 mg/dL Final    WBC  01/12/2025 8.05  3.40 - 10.80 10*3/mm3 Final    RBC 01/12/2025 3.96  3.77 - 5.28 10*6/mm3 Final    Hemoglobin 01/12/2025 11.6 (L)  12.0 - 15.9 g/dL Final    Hematocrit 01/12/2025 37.5  34.0 - 46.6 % Final    MCV 01/12/2025 94.7  79.0 - 97.0 fL Final    MCH 01/12/2025 29.3  26.6 - 33.0 pg Final    MCHC 01/12/2025 30.9 (L)  31.5 - 35.7 g/dL Final    RDW 01/12/2025 13.1  12.3 - 15.4 % Final    RDW-SD 01/12/2025 45.6  37.0 - 54.0 fl Final    MPV 01/12/2025 10.4  6.0 - 12.0 fL Final    Platelets 01/12/2025 187  140 - 450 10*3/mm3 Final    Neutrophil % 01/12/2025 64.6  42.7 - 76.0 % Final    Lymphocyte % 01/12/2025 24.8  19.6 - 45.3 % Final    Monocyte % 01/12/2025 9.1  5.0 - 12.0 % Final    Eosinophil % 01/12/2025 0.9  0.3 - 6.2 % Final    Basophil % 01/12/2025 0.4  0.0 - 1.5 % Final    Immature Grans % 01/12/2025 0.2  0.0 - 0.5 % Final    Neutrophils, Absolute 01/12/2025 5.20  1.70 - 7.00 10*3/mm3 Final    Lymphocytes, Absolute 01/12/2025 2.00  0.70 - 3.10 10*3/mm3 Final    Monocytes, Absolute 01/12/2025 0.73  0.10 - 0.90 10*3/mm3 Final    Eosinophils, Absolute 01/12/2025 0.07  0.00 - 0.40 10*3/mm3 Final    Basophils, Absolute 01/12/2025 0.03  0.00 - 0.20 10*3/mm3 Final    Immature Grans, Absolute 01/12/2025 0.02  0.00 - 0.05 10*3/mm3 Final    nRBC 01/12/2025 0.0  0.0 - 0.2 /100 WBC Final    Creatine Kinase 01/12/2025 159  20 - 180 U/L Final    RBC, UA 01/12/2025 0-2  None Seen, 0-2 /HPF Final    WBC, UA 01/12/2025 0-2  None Seen, 0-2 /HPF Final    Bacteria, UA 01/12/2025 None Seen  None Seen /HPF Final    Squamous Epithelial Cells, UA 01/12/2025 0-2  None Seen, 0-2 /HPF Final    Hyaline Casts, UA 01/12/2025 31-50  None Seen /LPF Final    Methodology 01/12/2025 Automated Microscopy   Final    proBNP 01/13/2025 1,069.0  0.0 - 1,800.0 pg/mL Final    Ionized Calcium 01/13/2025 1.26  1.15 - 1.35 mmol/L Final    Creatine Kinase 01/13/2025 92  20 - 180 U/L Final    Glucose 01/13/2025 97  65 - 99 mg/dL Final    BUN 01/13/2025  19  8 - 23 mg/dL Final    Creatinine 01/13/2025 1.05 (H)  0.57 - 1.00 mg/dL Final    Sodium 01/13/2025 144  136 - 145 mmol/L Final    Potassium 01/13/2025 3.6  3.5 - 5.2 mmol/L Final    Chloride 01/13/2025 108 (H)  98 - 107 mmol/L Final    CO2 01/13/2025 27.3  22.0 - 29.0 mmol/L Final    Calcium 01/13/2025 8.9  8.6 - 10.5 mg/dL Final    Total Protein 01/13/2025 5.5 (L)  6.0 - 8.5 g/dL Final    Albumin 01/13/2025 2.9 (L)  3.5 - 5.2 g/dL Final    ALT (SGPT) 01/13/2025 22  1 - 33 U/L Final    AST (SGOT) 01/13/2025 28  1 - 32 U/L Final    Alkaline Phosphatase 01/13/2025 85  39 - 117 U/L Final    Total Bilirubin 01/13/2025 0.4  0.0 - 1.2 mg/dL Final    Globulin 01/13/2025 2.6  gm/dL Final    A/G Ratio 01/13/2025 1.1  g/dL Final    BUN/Creatinine Ratio 01/13/2025 18.1  7.0 - 25.0 Final    Anion Gap 01/13/2025 8.7  5.0 - 15.0 mmol/L Final    eGFR 01/13/2025 51.9 (L)  >60.0 mL/min/1.73 Final    Lactate 01/13/2025 1.1  0.5 - 2.0 mmol/L Final    Hemoglobin A1C 01/13/2025 5.60  4.80 - 5.60 % Final    Iron 01/13/2025 57  37 - 145 mcg/dL Final    Iron Saturation (TSAT) 01/13/2025 32  20 - 50 % Final    Transferrin 01/13/2025 119 (L)  200 - 360 mg/dL Final    TIBC 01/13/2025 177 (L)  298 - 536 mcg/dL Final    Lipase 01/13/2025 18  13 - 60 U/L Final    Total Cholesterol 01/13/2025 113  0 - 200 mg/dL Final    Triglycerides 01/13/2025 107  0 - 150 mg/dL Final    HDL Cholesterol 01/13/2025 36 (L)  40 - 60 mg/dL Final    LDL Cholesterol  01/13/2025 57  0 - 100 mg/dL Final    VLDL Cholesterol 01/13/2025 20  5 - 40 mg/dL Final    LDL/HDL Ratio 01/13/2025 1.54   Final    Magnesium 01/13/2025 1.9  1.6 - 2.4 mg/dL Final    Phosphorus 01/13/2025 2.1 (L)  2.5 - 4.5 mg/dL Final    HS Troponin T 01/13/2025 35 (H)  <14 ng/L Final    TSH 01/13/2025 1.570  0.270 - 4.200 uIU/mL Final    THC, Screen, Urine 01/13/2025 Negative  Negative Final    Phencyclidine (PCP), Urine 01/13/2025 Negative  Negative Final    Cocaine Screen, Urine 01/13/2025  Negative  Negative Final    Methamphetamine, Ur 01/13/2025 Negative  Negative Final    Opiate Screen 01/13/2025 Positive (A)  Negative Final    Amphetamine Screen, Urine 01/13/2025 Negative  Negative Final    Benzodiazepine Screen, Urine 01/13/2025 Positive (A)  Negative Final    Tricyclic Antidepressants Screen 01/13/2025 Negative  Negative Final    Methadone Screen, Urine 01/13/2025 Negative  Negative Final    Barbiturates Screen, Urine 01/13/2025 Positive (A)  Negative Final    Oxycodone Screen, Urine 01/13/2025 Negative  Negative Final    Buprenorphine, Screen, Urine 01/13/2025 Negative  Negative Final    ADENOVIRUS, PCR 01/13/2025 Not Detected  Not Detected Final    Coronavirus 229E 01/13/2025 Not Detected  Not Detected Final    Coronavirus HKU1 01/13/2025 Not Detected  Not Detected Final    Coronavirus NL63 01/13/2025 Not Detected  Not Detected Final    Coronavirus OC43 01/13/2025 Not Detected  Not Detected Final    COVID19 01/13/2025 Not Detected  Not Detected - Ref. Range Final    Human Metapneumovirus 01/13/2025 Not Detected  Not Detected Final    Human Rhinovirus/Enterovirus 01/13/2025 Not Detected  Not Detected Final    Influenza A PCR 01/13/2025 Not Detected  Not Detected Final    Influenza B PCR 01/13/2025 Not Detected  Not Detected Final    Parainfluenza Virus 1 01/13/2025 Not Detected  Not Detected Final    Parainfluenza Virus 2 01/13/2025 Not Detected  Not Detected Final    Parainfluenza Virus 3 01/13/2025 Not Detected  Not Detected Final    Parainfluenza Virus 4 01/13/2025 Not Detected  Not Detected Final    RSV, PCR 01/13/2025 Not Detected  Not Detected Final    Bordetella pertussis pcr 01/13/2025 Not Detected  Not Detected Final    Bordetella parapertussis PCR 01/13/2025 Not Detected  Not Detected Final    Chlamydophila pneumoniae PCR 01/13/2025 Not Detected  Not Detected Final    Mycoplasma pneumo by PCR 01/13/2025 Not Detected  Not Detected Final    WBC 01/13/2025 7.70  3.40 - 10.80 10*3/mm3  Final    RBC 01/13/2025 3.52 (L)  3.77 - 5.28 10*6/mm3 Final    Hemoglobin 01/13/2025 10.7 (L)  12.0 - 15.9 g/dL Final    Hematocrit 01/13/2025 32.5 (L)  34.0 - 46.6 % Final    MCV 01/13/2025 92.3  79.0 - 97.0 fL Final    MCH 01/13/2025 30.4  26.6 - 33.0 pg Final    MCHC 01/13/2025 32.9  31.5 - 35.7 g/dL Final    RDW 01/13/2025 12.8  12.3 - 15.4 % Final    RDW-SD 01/13/2025 42.4  37.0 - 54.0 fl Final    MPV 01/13/2025 10.4  6.0 - 12.0 fL Final    Platelets 01/13/2025 179  140 - 450 10*3/mm3 Final    Neutrophil % 01/13/2025 64.3  42.7 - 76.0 % Final    Lymphocyte % 01/13/2025 25.2  19.6 - 45.3 % Final    Monocyte % 01/13/2025 9.0  5.0 - 12.0 % Final    Eosinophil % 01/13/2025 0.9  0.3 - 6.2 % Final    Basophil % 01/13/2025 0.3  0.0 - 1.5 % Final    Immature Grans % 01/13/2025 0.3  0.0 - 0.5 % Final    Neutrophils, Absolute 01/13/2025 4.96  1.70 - 7.00 10*3/mm3 Final    Lymphocytes, Absolute 01/13/2025 1.94  0.70 - 3.10 10*3/mm3 Final    Monocytes, Absolute 01/13/2025 0.69  0.10 - 0.90 10*3/mm3 Final    Eosinophils, Absolute 01/13/2025 0.07  0.00 - 0.40 10*3/mm3 Final    Basophils, Absolute 01/13/2025 0.02  0.00 - 0.20 10*3/mm3 Final    Immature Grans, Absolute 01/13/2025 0.02  0.00 - 0.05 10*3/mm3 Final    nRBC 01/13/2025 0.0  0.0 - 0.2 /100 WBC Final    QT Interval 01/13/2025 376  ms Final    QTC Interval 01/13/2025 442  ms Final    25 Hydroxy, Vitamin D 01/13/2025 70.2  30.0 - 100.0 ng/ml Final    Vitamin B-12 01/13/2025 299  211 - 946 pg/mL Final    Folate 01/13/2025 7.24  4.78 - 24.20 ng/mL Final    PTH, Intact 01/13/2025 80.8 (H)  15.0 - 65.0 pg/mL Final    Fentanyl, Urine 01/13/2025 Negative  Negative Final    HS Troponin T 01/13/2025 31 (H)  <14 ng/L Final    Troponin T Numeric Delta 01/13/2025 -4  ng/L Final    Troponin T % Delta 01/13/2025 -11  Abnormal if >/= 20% % Final    HS Troponin T 01/13/2025 31 (H)  <14 ng/L Final    Sodium, Urine 01/13/2025 61  mmol/L Final    Protein/Creatinine Ratio, Urine  01/13/2025 734.9 (H)  0.0 - 200.0 mg/G Crea Final    Creatinine, Urine 01/13/2025 127.5  mg/dL Final    Total Protein, Urine 01/13/2025 93.7  mg/dL Final     Lab Results (last 24 hours)       Procedure Component Value Units Date/Time    High Sensitivity Troponin T [[id number]]  (Abnormal) Collected: 01/13/25 1152    Specimen: Blood Updated: 01/13/25 1329     HS Troponin T 31 ng/L     Narrative:      High Sensitive Troponin T Reference Range:  <14.0 ng/L- Negative Female for AMI  <22.0 ng/L- Negative Male for AMI  >=14 - Abnormal Female indicating possible myocardial injury.  >=22 - Abnormal Male indicating possible myocardial injury.   Clinicians would have to utilize clinical acumen, EKG, Troponin, and serial changes to determine if it is an Acute Myocardial Infarction or myocardial injury due to an underlying chronic condition.         Sodium, Urine, Random - Urine, Clean Catch [[id number]] Collected: 01/13/25 0631    Specimen: Urine, Clean Catch Updated: 01/13/25 1207     Sodium, Urine 61 mmol/L     Narrative:      Reference intervals for random urine have not been established.  Clinical usage is dependent upon physician's interpretation in combination with other laboratory tests.       Protein / Creatinine Ratio, Urine - Urine, Clean Catch [[id number]]  (Abnormal) Collected: 01/13/25 0631    Specimen: Urine, Clean Catch Updated: 01/13/25 1207     Protein/Creatinine Ratio, Urine 734.9 mg/G Crea      Creatinine, Urine 127.5 mg/dL      Total Protein, Urine 93.7 mg/dL     High Sensitivity Troponin T 1Hr [[id number]]  (Abnormal) Collected: 01/13/25 0905    Specimen: Blood Updated: 01/13/25 1021     HS Troponin T 31 ng/L      Troponin T Numeric Delta -4 ng/L      Troponin T % Delta -11 %     Narrative:      High Sensitive Troponin T Reference Range:  <14.0 ng/L- Negative Female for AMI  <22.0 ng/L- Negative Male for AMI  >=14 - Abnormal Female indicating possible myocardial injury.  >=22 - Abnormal Male indicating  possible myocardial injury.   Clinicians would have to utilize clinical acumen, EKG, Troponin, and serial changes to determine if it is an Acute Myocardial Infarction or myocardial injury due to an underlying chronic condition.         Vitamin D,25-Hydroxy [[id number]]  (Normal) Collected: 01/13/25 0648    Specimen: Blood Updated: 01/13/25 0750     25 Hydroxy, Vitamin D 70.2 ng/ml     Narrative:      Reference Range for Total Vitamin D 25(OH)     Deficiency <20.0 ng/mL   Insufficiency 21-29 ng/mL   Sufficiency  ng/mL  Toxicity >100 ng/ml      Vitamin B12 [[id number]]  (Normal) Collected: 01/13/25 0648    Specimen: Blood Updated: 01/13/25 0750     Vitamin B-12 299 pg/mL     Narrative:      Results may be falsely increased if patient taking Biotin.      Folate [[id number]]  (Normal) Collected: 01/13/25 0648    Specimen: Blood Updated: 01/13/25 0750     Folate 7.24 ng/mL     Narrative:      Results may be falsely increased if patient taking Biotin.      Magnesium [[id number]]  (Normal) Collected: 01/13/25 0648    Specimen: Blood Updated: 01/13/25 0746     Magnesium 1.9 mg/dL     Comprehensive Metabolic Panel [[id number]]  (Abnormal) Collected: 01/13/25 0648    Specimen: Blood Updated: 01/13/25 0746     Glucose 97 mg/dL      BUN 19 mg/dL      Creatinine 1.05 mg/dL      Sodium 144 mmol/L      Potassium 3.6 mmol/L      Chloride 108 mmol/L      CO2 27.3 mmol/L      Calcium 8.9 mg/dL      Total Protein 5.5 g/dL      Albumin 2.9 g/dL      ALT (SGPT) 22 U/L      AST (SGOT) 28 U/L      Alkaline Phosphatase 85 U/L      Total Bilirubin 0.4 mg/dL      Globulin 2.6 gm/dL      A/G Ratio 1.1 g/dL      BUN/Creatinine Ratio 18.1     Anion Gap 8.7 mmol/L      eGFR 51.9 mL/min/1.73     Narrative:      GFR Categories in Chronic Kidney Disease (CKD)      GFR Category          GFR (mL/min/1.73)    Interpretation  G1                     90 or greater         Normal or high (1)  G2                      60-89                Mild decrease  (1)  G3a                   45-59                Mild to moderate decrease  G3b                   30-44                Moderate to severe decrease  G4                    15-29                Severe decrease  G5                    14 or less           Kidney failure          (1)In the absence of evidence of kidney disease, neither GFR category G1 or G2 fulfill the criteria for CKD.    eGFR calculation 2021 CKD-EPI creatinine equation, which does not include race as a factor    BNP [[id number]]  (Normal) Collected: 01/13/25 0648    Specimen: Blood Updated: 01/13/25 0742     proBNP 1,069.0 pg/mL     Narrative:      This assay is used as an aid in the diagnosis of individuals suspected of having heart failure. It can be used as an aid in the diagnosis of acute decompensated heart failure (ADHF) in patients presenting with signs and symptoms of ADHF to the emergency department (ED). In addition, NT-proBNP of <300 pg/mL indicates ADHF is not likely.    Age Range Result Interpretation  NT-proBNP Concentration (pg/mL:      <50             Positive            >450                   Gray                 300-450                    Negative             <300    50-75           Positive            >900                  Gray                300-900                  Negative            <300      >75             Positive            >1800                  Gray                300-1800                  Negative            <300    High Sensitivity Troponin T [[id number]]  (Abnormal) Collected: 01/13/25 0648    Specimen: Blood Updated: 01/13/25 0742     HS Troponin T 35 ng/L     Narrative:      High Sensitive Troponin T Reference Range:  <14.0 ng/L- Negative Female for AMI  <22.0 ng/L- Negative Male for AMI  >=14 - Abnormal Female indicating possible myocardial injury.  >=22 - Abnormal Male indicating possible myocardial injury.   Clinicians would have to utilize clinical acumen, EKG, Troponin, and serial changes to determine if it is an  Acute Myocardial Infarction or myocardial injury due to an underlying chronic condition.         TSH Rfx On Abnormal To Free T4 [[id number]]  (Normal) Collected: 01/13/25 0648    Specimen: Blood Updated: 01/13/25 0742     TSH 1.570 uIU/mL     CK [[id number]]  (Normal) Collected: 01/13/25 0648    Specimen: Blood Updated: 01/13/25 0739     Creatine Kinase 92 U/L     Iron Profile [[id number]]  (Abnormal) Collected: 01/13/25 0648    Specimen: Blood Updated: 01/13/25 0739     Iron 57 mcg/dL      Iron Saturation (TSAT) 32 %      Transferrin 119 mg/dL      TIBC 177 mcg/dL     Lipase [[id number]]  (Normal) Collected: 01/13/25 0648    Specimen: Blood Updated: 01/13/25 0739     Lipase 18 U/L     Lipid Panel [[id number]]  (Abnormal) Collected: 01/13/25 0648    Specimen: Blood Updated: 01/13/25 0739     Total Cholesterol 113 mg/dL      Triglycerides 107 mg/dL      HDL Cholesterol 36 mg/dL      LDL Cholesterol  57 mg/dL      VLDL Cholesterol 20 mg/dL      LDL/HDL Ratio 1.54    Narrative:      Cholesterol Reference Ranges  (U.[place] Department of Health and Human Services ATP III Classifications)    Desirable          <200 mg/dL  Borderline High    200-239 mg/dL  High Risk          >240 mg/dL      Triglyceride Reference Ranges  (U.S. Department of Health and Human Services ATP III Classifications)    Normal           <150 mg/dL  Borderline High  150-199 mg/dL  High             200-499 mg/dL  Very High        >500 mg/dL    HDL Reference Ranges  (U.S. Department of Health and Human Services ATP III Classifications)    Low     <40 mg/dl (major risk factor for CHD)  High    >60 mg/dl ('negative' risk factor for CHD)        LDL Reference Ranges  (U.S. Department of Health and Human Services ATP III Classifications)    Optimal          <100 mg/dL  Near Optimal     100-129 mg/dL  Borderline High  130-159 mg/dL  High             160-189 mg/dL  Very High        >189 mg/dL    Phosphorus [[id number]]  (Abnormal) Collected: 01/13/25 0648    Specimen:  Blood Updated: 01/13/25 0739     Phosphorus 2.1 mg/dL     PTH, Intact [[id number]]  (Abnormal) Collected: 01/13/25 0648    Specimen: Blood Updated: 01/13/25 0739     PTH, Intact 80.8 pg/mL     Narrative:      Results may be falsely decreased if patient taking Biotin.      Lactic Acid, Plasma [[id number]]  (Normal) Collected: 01/13/25 0649    Specimen: Blood Updated: 01/13/25 0734     Lactate 1.1 mmol/L     Hemoglobin A1c [[id number]]  (Normal) Collected: 01/13/25 0649    Specimen: Blood Updated: 01/13/25 0727     Hemoglobin A1C 5.60 %     Narrative:      Hemoglobin A1C Ranges:    Increased Risk for Diabetes  5.7% to 6.4%  Diabetes                     >= 6.5%  Diabetic Goal                < 7.0%    Calcium, Ionized [[id number]]  (Normal) Collected: 01/13/25 0648    Specimen: Blood Updated: 01/13/25 0722     Ionized Calcium 1.26 mmol/L     Narrative:      This test was developed, its performance characteristics determined and judged suitable for clinical purposes by [facility].  The specimen type utilized for this test has not been cleared or approved by the FDA.  The laboratory is regulated under CLIA as qualified to perform high-complexity testing.    CBC & Differential [[id number]]  (Abnormal) Collected: 01/13/25 0649    Specimen: Blood Updated: 01/13/25 0718    Narrative:      The following orders were created for panel order CBC & Differential.  Procedure                               Abnormality         Status                     ---------                               -----------         ------                     CBC Auto Differential[[id number]]        Abnormal            Final result                 Please view results for these tests on the individual orders.    CBC Auto Differential [[id number]]  (Abnormal) Collected: 01/13/25 0649    Specimen: Blood Updated: 01/13/25 0718     WBC 7.70 10*3/mm3      RBC 3.52 10*6/mm3      Hemoglobin 10.7 g/dL      Hematocrit 32.5 %      MCV 92.3 fL       MCH 30.4 pg      MCHC 32.9 g/dL      RDW 12.8 %      RDW-SD 42.4 fl      MPV 10.4 fL      Platelets 179 10*3/mm3      Neutrophil % 64.3 %      Lymphocyte % 25.2 %      Monocyte % 9.0 %      Eosinophil % 0.9 %      Basophil % 0.3 %      Immature Grans % 0.3 %      Neutrophils, Absolute 4.96 10*3/mm3      Lymphocytes, Absolute 1.94 10*3/mm3      Monocytes, Absolute 0.69 10*3/mm3      Eosinophils, Absolute 0.07 10*3/mm3      Basophils, Absolute 0.02 10*3/mm3      Immature Grans, Absolute 0.02 10*3/mm3      nRBC 0.0 /100 WBC     Fentanyl, Urine - Urine, Clean Catch [[id number]]  (Normal) Collected: 01/13/25 0631    Specimen: Urine, Clean Catch Updated: 01/13/25 0713     Fentanyl, Urine Negative    Narrative:      Negative Threshold:      Fentanyl 5 ng/mL     The normal value for the drug tested is negative. This report includes final unconfirmed screening results to be used for medical treatment purposes only. Unconfirmed results must not be used for non-medical purposes such as employment or legal testing. Clinical consideration should be applied to any drug of abuse test, particularly when unconfirmed results are used.           Urine Drug Screen - Urine, Clean Catch [[id number]]  (Abnormal) Collected: 01/13/25 0631    Specimen: Urine, Clean Catch Updated: 01/13/25 0711     THC, Screen, Urine Negative     Phencyclidine (PCP), Urine Negative     Cocaine Screen, Urine Negative     Methamphetamine, Ur Negative     Opiate Screen Positive     Amphetamine Screen, Urine Negative     Benzodiazepine Screen, Urine Positive     Tricyclic Antidepressants Screen Negative     Methadone Screen, Urine Negative     Barbiturates Screen, Urine Positive     Oxycodone Screen, Urine Negative     Buprenorphine, Screen, Urine Negative    Narrative:      Cutoff For Drugs Screened:    Amphetamines               500 ng/ml  Barbiturates               200 ng/ml  Benzodiazepines            150 ng/ml  Cocaine                    150 ng/ml  Methadone                   200 ng/ml  Opiates                    100 ng/ml  Phencyclidine               25 ng/ml  THC                         50 ng/ml  Methamphetamine            500 ng/ml  Tricyclic Antidepressants  300 ng/ml  Oxycodone                  100 ng/ml  Buprenorphine               10 ng/ml    The normal value for all drugs tested is negative. This report includes unconfirmed screening results, with the cutoff values listed, to be used for medical treatment purposes only.  Unconfirmed results must not be used for non-medical purposes such as employment or legal testing.  Clinical consideration should be applied to any drug of abuse test, particularly when unconfirmed results are used.      Respiratory Panel PCR w/COVID-19(SARS-CoV-2) AJAY/JERED/MYAH/PAD/COR/ANG In-House, NP Swab in UTM/VTM, 2 HR TAT - Swab, Nasopharynx [[id number]]  (Normal) Collected: 01/13/25 0122    Specimen: Swab from Nasopharynx Updated: 01/13/25 0224     ADENOVIRUS, PCR Not Detected     Coronavirus 229E Not Detected     Coronavirus HKU1 Not Detected     Coronavirus NL63 Not Detected     Coronavirus OC43 Not Detected     COVID19 Not Detected     Human Metapneumovirus Not Detected     Human Rhinovirus/Enterovirus Not Detected     Influenza A PCR Not Detected     Influenza B PCR Not Detected     Parainfluenza Virus 1 Not Detected     Parainfluenza Virus 2 Not Detected     Parainfluenza Virus 3 Not Detected     Parainfluenza Virus 4 Not Detected     RSV, PCR Not Detected     Bordetella pertussis pcr Not Detected     Bordetella parapertussis PCR Not Detected     Chlamydophila pneumoniae PCR Not Detected     Mycoplasma pneumo by PCR Not Detected    Narrative:      In the setting of a positive respiratory panel with a viral infection PLUS a negative procalcitonin without other underlying concern for bacterial infection, consider observing off antibiotics or discontinuation of antibiotics and continue supportive care. If the respiratory panel is positive  for atypical bacterial infection (Bordetella pertussis, Chlamydophila pneumoniae, or Mycoplasma pneumoniae), consider antibiotic de-escalation to target atypical bacterial infection.    Urinalysis, Microscopic Only - Straight Cath [[id number]] Collected: 01/12/25 1612    Specimen: Urine from Straight Cath Updated: 01/12/25 1656     RBC, UA 0-2 /HPF      WBC, UA 0-2 /HPF      Bacteria, UA None Seen /HPF      Squamous Epithelial Cells, UA 0-2 /HPF      Hyaline Casts, UA 31-50 /LPF      Methodology Automated Microscopy    Urinalysis With Microscopic If Indicated (No Culture) - Straight Cath [[id number]]  (Abnormal) Collected: 01/12/25 1612    Specimen: Urine from Straight Cath Updated: 01/12/25 1629     Color, UA Yellow     Appearance, UA Clear     pH, UA 6.0     Specific Gravity, UA 1.017     Glucose, UA Negative     Ketones, UA Negative     Bilirubin, UA Negative     Blood, UA Negative     Protein, UA 30 mg/dL (1+)     Leuk Esterase, UA Trace     Nitrite, UA Negative     Urobilinogen, UA 1.0 E.U./dL    Comprehensive Metabolic Panel [[id number]]  (Abnormal) Collected: 01/12/25 1551    Specimen: Blood Updated: 01/12/25 1628     Glucose 114 mg/dL      BUN 26 mg/dL      Creatinine 1.38 mg/dL      Sodium 144 mmol/L      Potassium 3.0 mmol/L      Chloride 102 mmol/L      CO2 29.2 mmol/L      Calcium 9.3 mg/dL      Total Protein 6.5 g/dL      Albumin 3.5 g/dL      ALT (SGPT) 27 U/L      AST (SGOT) 35 U/L      Alkaline Phosphatase 100 U/L      Total Bilirubin 0.3 mg/dL      Globulin 3.0 gm/dL      A/G Ratio 1.2 g/dL      BUN/Creatinine Ratio 18.8     Anion Gap 12.8 mmol/L      eGFR 37.4 mL/min/1.73     Narrative:      GFR Categories in Chronic Kidney Disease (CKD)      GFR Category          GFR (mL/min/1.73)    Interpretation  G1                     90 or greater         Normal or high (1)  G2                      60-89                Mild decrease (1)  G3a                   45-59                Mild to moderate decrease  G3b                    30-44                Moderate to severe decrease  G4                    15-29                Severe decrease  G5                    14 or less           Kidney failure          (1)In the absence of evidence of kidney disease, neither GFR category G1 or G2 fulfill the criteria for CKD.    eGFR calculation 2021 CKD-EPI creatinine equation, which does not include race as a factor    Magnesium [[id number]]  (Normal) Collected: 01/12/25 1551    Specimen: Blood Updated: 01/12/25 1628     Magnesium 1.9 mg/dL     CK [[id number]]  (Normal) Collected: 01/12/25 1551    Specimen: Blood Updated: 01/12/25 1628     Creatine Kinase 159 U/L     CBC & Differential [[id number]]  (Abnormal) Collected: 01/12/25 1551    Specimen: Blood Updated: 01/12/25 1603    Narrative:      The following orders were created for panel order CBC & Differential.  Procedure                               Abnormality         Status                     ---------                               -----------         ------                     CBC Auto Differential[[id number]]        Abnormal            Final result                 Please view results for these tests on the individual orders.    CBC Auto Differential [[id number]]  (Abnormal) Collected: 01/12/25 1551    Specimen: Blood Updated: 01/12/25 1603     WBC 8.05 10*3/mm3      RBC 3.96 10*6/mm3      Hemoglobin 11.6 g/dL      Hematocrit 37.5 %      MCV 94.7 fL      MCH 29.3 pg      MCHC 30.9 g/dL      RDW 13.1 %      RDW-SD 45.6 fl      MPV 10.4 fL      Platelets 187 10*3/mm3      Neutrophil % 64.6 %      Lymphocyte % 24.8 %      Monocyte % 9.1 %      Eosinophil % 0.9 %      Basophil % 0.4 %      Immature Grans % 0.2 %      Neutrophils, Absolute 5.20 10*3/mm3      Lymphocytes, Absolute 2.00 10*3/mm3      Monocytes, Absolute 0.73 10*3/mm3      Eosinophils, Absolute 0.07 10*3/mm3      Basophils, Absolute 0.03 10*3/mm3      Immature Grans, Absolute 0.02 10*3/mm3      nRBC 0.0 /100 WBC              Imaging:   Narrative & Impression   XR SHOULDER 2+ VW RIGHT-     HISTORY: Fall with shoulder pain.     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 The bones are  osteopenic. There is no evidence for fracture or acute osseous  abnormality.     IMPRESSION:  Osteopenia limits evaluation for fracture. There is moderate  AC and glenohumeral joint arthritis and the humeral head is high riding.  No demonstrated fracture.     This report was finalized on 1/12/2025 5:08 PM by Yoel Jones M.D  on Workstation: [id number]          Assessment: Right shoulder arthritis with rotator cuff tendinopathy    Plan: Patient has had a chronic shoulder problem for some time.  She is actually been seen in our office and evaluated for this sometime ago.  She states injection was not helpful.  Given her symptoms we discussed through several options she stated that she did well/responded well with topical diclofenac/Voltaren gel.  I recommend this if she can use and tolerate.  Continue to ice as needed.  PT for mobilization.  Recommend her follow-up with one of her shoulder specialist Dr. Pichardo if she is continue to be symptomatic upon discharge.  Patient understood and agreed with this plan.  All questions answered.  Please call with any other questions or concerns thank you.    Date: 1/13/2025    Eitan Morin MD    CC: Óscar Cadet MD

## 2025-01-13 NOTE — PROGRESS NOTES
Assessment Date:  01/13/25    Summary: Calorie Count  Pt is a 85 yo female admitted for acute kidney injury superimposed on CKD. Liberalize diet from renal/cardiac to only low sodium diet to allow more food choices. Noted pt to be NPO after midnight for MRI, will monitor PO intake closely. Calorie count not appropriate at this time. Added Boost BID to provide additional kcals/protein.     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 Yazmin Canseco    CARDIAC ELECTROPHYSIOLOGY PROCEDURE N/A 5/27/2022    Procedure: Loop insertion;  Surgeon: Jeffrey Argueta MD;  Location:  [facility];  Service: Cardiovascular;  Laterality: N/A;  biotronik    CARDIAC ELECTROPHYSIOLOGY PROCEDURE N/A 7/13/2022    Procedure: Loop recorder removal;  Surgeon: Jeffrey Argueta MD;  Location:  [facility];  Service: Cardiovascular;  Laterality: N/A;    CARPAL TUNNEL RELEASE Right 05/25/2011    Dr. Caleb Bueno    CARPAL TUNNEL RELEASE Left 04/26/2011    Dr. Caleb Bueno    CATARACT EXTRACTION Left 11/29/2010     Dr. Eusebio Downs    CATARACT EXTRACTION Right 11/17/2010    Dr. Eusebio Downs    COLONOSCOPY N/A 12/06/2002    Sigmoid diverticulosis-Dr. Brandon Batista    COLONOSCOPY N/A 12/12/2013    Tortuous colon, diverticulosis in the sigmoid colon and descending colon, stool in the rectum, sigmoid colon, descending colon, splenic flexure, transverse colon and hepatic flexure-Dr. Irma Brambila    DEQUERVAIN RELEASE Left 9/23/2020    Procedure: DEQUERVAIN RELEASE LEFT HAND;  Surgeon: Caleb Bueno MD;  Location: [facility] OR [facility] [place];  Service: Plastics;  Laterality: Left;    ENDOSCOPY N/A 09/13/2007    Normal-Dr. Pavel Quarles    ENDOSCOPY AND COLONOSCOPY N/A 09/19/2005    1 cm hiatal hernia, mild Schatzki's ring, sigmoid diverticulosis-Dr. Yoel Farley    GANGLION CYST EXCISION Left 12/18/2012    Release of A1 pulley flexor sheath, left fourth finger, excision of ganglion cyst 0.5 cm diameter, A2 pulley flexor sheath, left fourth finger-Dr. Caleb Bueno    HEEL SPUR EXCISION Left 1968    INGUINAL HERNIA REPAIR Right 1971    at 5 months pregnant     INJECTION OF MEDICATION Left 9/23/2020    Procedure: KENOLOG INJECTION TO LEFT THUMB;  Surgeon: Caleb Bueno MD;  Location: [facility] [place];  Service: Plastics;  Laterality: Left;    MASTECTOMY Right 08/05/2008    Right breast mastectomy and excision of left chest wall lesion-Dr. Yoel Farley    MASTECTOMY RADICAL Left 04/29/2003    Left modified radical mastectomy-Dr. Yazmin Canseco    PARATHYROIDECTOMY Right 05/04/2004    Excision of parathyroid adenoma (right superior)-Dr. Yoel Farley    REPLACEMENT TOTAL KNEE Right 04/09/2012    Dr. Lamonte Childress    SINUS SURGERY  1984    THYROIDECTOMY, PARTIAL Left 05/04/2004    Dr. Yoel Farley    TOTAL ABDOMINAL HYSTERECTOMY Bilateral 1973    Dr. Mahan    TRIGGER FINGER RELEASE Left 9/23/2020    Procedure: RELEASE LEFT FOURTH TRIGGER FINGER;  Surgeon: Caleb Bueno MD;  Location: [facility]  "OR [facility];  Service: Plastics;  Laterality: Left;    UPPER GASTROINTESTINAL ENDOSCOPY N/A 02/18/2009    LA Grade A reflux esophagitis, normal stomach, normal 2nd part of the duodenum-Dr. Yoel Farley    WRIST GANGLION EXCISION Left 9/23/2020    Procedure: EXCISION GANGLION CYST LEFT WRIST;  Surgeon: Caleb Bueno MD;  Location: [facility];  Service: Plastics;  Laterality: Left;        Anthropometrics        Current Height  Current Weight  BMI kg/m2 Height: 152.4 cm (60\")  Weight: 65.8 kg (145 lb) (01/13/25 1514)  Body mass index is 28.32 kg/m².   Adjusted BMI (if applicable)    BMI Category Overweight (25 - 29.9)   Ideal Body Weight (IBW) 100#   Usual Body Weight (UBW)    Weight Trend Loss   Weight History Wt Readings from Last 30 Encounters:   01/13/25 1514 65.8 kg (145 lb)   01/13/25 0500 65.8 kg (145 lb 1 oz)   01/12/25 1945 64.5 kg (142 lb 3.2 oz)   12/05/24 1921 68 kg (150 lb)   10/22/24 0557 64.7 kg (142 lb 10.2 oz)   10/21/24 0525 64.9 kg (143 lb 1.3 oz)   03/07/24 1427 60.8 kg (134 lb)   12/21/23 1001 70.5 kg (155 lb 6.4 oz)   12/08/23 1712 65.8 kg (145 lb)   05/02/23 0857 73.5 kg (162 lb)   11/03/22 1339 81.2 kg (179 lb)   10/19/22 1403 73.9 kg (163 lb)   10/05/22 1254 73.9 kg (163 lb)   10/03/22 0604 75.6 kg (166 lb 11.2 oz)   10/02/22 0550 73.9 kg (163 lb)   10/01/22 0538 72.6 kg (160 lb 1.6 oz)   09/22/22 1038 79.3 kg (174 lb 12.8 oz)   07/26/22 1131 73 kg (161 lb)   07/13/22 0729 70.3 kg (155 lb)   07/06/22 1311 73.5 kg (162 lb)   06/20/22 1119 74.8 kg (165 lb)   06/06/22 1204 70.3 kg (155 lb)   05/29/22 0510 74.6 kg (164 lb 6.4 oz)   05/28/22 0614 75.5 kg (166 lb 8 oz)   05/27/22 0500 75.2 kg (165 lb 12.8 oz)   05/26/22 1319 74.4 kg (164 lb)   05/26/22 0500 74.6 kg (164 lb 8 oz)   05/25/22 2318 74.5 kg (164 lb 3.9 oz)   05/25/22 1912 74.5 kg (164 lb 4.8 oz)   04/01/22 1136 88.5 kg (195 lb)   01/28/22 1110 88.5 kg (195 lb)   12/27/21 1246 88.5 kg (195 lb)   10/04/21 0818 95.3 kg (210 lb) "   10/04/21 0813 95.3 kg (210 lb)   07/01/21 1114 92.1 kg (203 lb)   04/23/21 1417 92.1 kg (203 lb)   02/05/21 1608 92.1 kg (203 lb)   02/02/21 0832 92.4 kg (203 lb 12.8 oz)   01/15/21 1539 86.2 kg (190 lb)   12/14/20 1306 86.2 kg (190 lb)   11/19/20 1112 88.5 kg (195 lb)   11/19/20 0740 88.5 kg (195 lb)   11/19/20 0712 88.5 kg (195 lb)      --  Labs       Pertinent Labs    Results from last 7 days   Lab Units 01/13/25  0648 01/12/25  1551   SODIUM mmol/L 144 144   POTASSIUM mmol/L 3.6 3.0*   CHLORIDE mmol/L 108* 102   CO2 mmol/L 27.3 29.2*   BUN mg/dL 19 26*   CREATININE mg/dL 1.05* 1.38*   CALCIUM mg/dL 8.9 9.3   BILIRUBIN mg/dL 0.4 0.3   ALK PHOS U/L 85 100   ALT (SGPT) U/L 22 27   AST (SGOT) U/L 28 35*   GLUCOSE mg/dL 97 114*     Results from last 7 days   Lab Units 01/13/25  0649 01/13/25  0648 01/12/25  1551   MAGNESIUM mg/dL  --  1.9 1.9   PHOSPHORUS mg/dL  --  2.1*  --    HEMOGLOBIN g/dL 10.7*  --  11.6*   HEMATOCRIT % 32.5*  --  37.5   WBC 10*3/mm3 7.70  --  8.05   TRIGLYCERIDES mg/dL  --  107  --    ALBUMIN g/dL  --  2.9* 3.5     Results from last 7 days   Lab Units 01/13/25  0649 01/12/25  1551   PLATELETS 10*3/mm3 179 187     COVID19   Date Value Ref Range Status   01/13/2025 Not Detected Not Detected - Ref. Range Final     Lab Results   Component Value Date    HGBA1C 5.60 01/13/2025     Estimated Requirements         Weight used  65.8 kg    Calories  1151-0140 kcal (kcal/kg, other:22-25)    Protein  66-80 gm (1.0 - 1.2 gm/kg)    Fluid  9028-3348 mL (1 mL/kcal)        Medications           Scheduled Medications cetirizine, 10 mg, Oral, Daily  eplerenone, 50 mg, Oral, Q12H  famotidine, 40 mg, Oral, Daily  fluticasone, 1 spray, Each Nare, BID  ipratropium, 500 mcg, Nebulization, 4x Daily - RT  multivitamin with minerals, 1 tablet, Oral, Daily  mupirocin, 1 application , Topical, Q24H  NIFEdipine XL, 30 mg, Oral, Q24H  pantoprazole, 40 mg, Oral, Q AM  potassium chloride, 20 mEq, Oral, Daily  potassium  chloride, 40 mEq, Oral, Q4H  predniSONE, 10 mg, Oral, Daily  rivaroxaban, 20 mg, Oral, Daily  rosuvastatin, 5 mg, Oral, QAM  sodium chloride, 3 mL, Intravenous, Q12H  torsemide, 50 mg, Oral, Daily  triamcinolone, 1 Application, Topical, BID  cyanocobalamin, 1,000 mcg, Oral, Daily  [START ON 1/14/2025] vitamin D, 50,000 Units, Oral, Weekly       Infusions sodium chloride, 50 mL/hr, Last Rate: 50 mL/hr (01/13/25 0102)       PRN Medications   acetaminophen    albuterol    butalbital-acetaminophen-caffeine    Calcium Replacement - Follow Nurse / BPA Driven Protocol    cloNIDine    cloNIDine    Diclofenac Sodium    diphenhydrAMINE    EPINEPHrine (Anaphylaxis)    HYDROcodone-acetaminophen    HYDROcodone-acetaminophen    hydrOXYzine    LORazepam    Magnesium Low Dose Replacement - Follow Nurse / BPA Driven Protocol    melatonin    nitroglycerin    ondansetron ODT    Phosphorus Replacement - Follow Nurse / BPA Driven Protocol    polyethylene glycol    Polyvinyl Alcohol-Povidone PF    Potassium Replacement - Follow Nurse / BPA Driven Protocol    promethazine-codeine    sodium chloride    sodium chloride     Physical Findings          General Findings room air   Oral/Mouth Cavity tooth or teeth missing   Edema  no edema   Gastrointestinal last bowel movement: 1/13   Skin  skin intact   Tubes/Drains/Lines none   NFPE Other: Will need to be completed   --  Current Nutrition Orders & Evaluation of Intake       Oral Nutrition     Food Allergies NKFA   Current PO Diet NPO Diet NPO Type: Sips with Meds  Diet: Cardiac; Healthy Heart (2-3 Na+); Texture: Soft to Chew (NDD 3); Soft to Chew: Chopped Meat; Fluid Consistency: Thin (IDDSI 0)   Supplement n/a   PO Evaluation     % PO Intake --    Factors Affecting Intake: weakness   --  PES STATEMENT / NUTRITION DIAGNOSIS      Nutrition Dx Problem  Problem: Inadequate Oral Intake  Etiology: Medical Diagnosis - Hx of COPD, breast cancer, CKD, DVT     Signs/Symptoms: PO intake, Report of  Minimal PO Intake, and Report/Observation     NUTRITION INTERVENTION / PLAN OF CARE      Intervention Goal(s) Establish goals of care, Reduce/improve symptoms, Meet estimated needs, Increase intake, Accepts oral nutrition supplement, No significant weight loss, and PO intake goal %: >75%         RD Intervention/Action Liberalize or adjust diet to: Low-sodium, Supplement provided, Encourage intake, Continue to monitor, and Care plan reviewed   --      Prescription/Orders:       PO Diet       Supplements Boost BID      Enteral Nutrition       Parenteral Nutrition    New Prescription Ordered? Yes   --      Monitor/Evaluation Per protocol, PO intake, Supplement intake, Pertinent labs, Weight, GI status, Symptoms   Discharge Plan/Needs Pending clinical course   --    RD to follow per protocol.      Electronically signed by:   Lizz Curry  01/13/25 16:25 EST

## 2025-01-14 ENCOUNTER — APPOINTMENT (OUTPATIENT)
Dept: NUCLEAR MEDICINE | Facility: HOSPITAL | Age: 85
End: 2025-01-14
Payer: MEDICARE

## 2025-01-14 ENCOUNTER — APPOINTMENT (OUTPATIENT)
Dept: MRI IMAGING | Facility: HOSPITAL | Age: 85
End: 2025-01-14
Payer: MEDICARE

## 2025-01-14 ENCOUNTER — APPOINTMENT (OUTPATIENT)
Dept: GENERAL RADIOLOGY | Facility: HOSPITAL | Age: 85
End: 2025-01-14
Payer: MEDICARE

## 2025-01-14 LAB
ALBUMIN SERPL-MCNC: 2.9 G/DL (ref 3.5–5.2)
ALBUMIN/GLOB SERPL: 1.2 G/DL
ALP SERPL-CCNC: 88 U/L (ref 39–117)
ALT SERPL W P-5'-P-CCNC: 22 U/L (ref 1–33)
ANION GAP SERPL CALCULATED.3IONS-SCNC: 7 MMOL/L (ref 5–15)
AST SERPL-CCNC: 16 U/L (ref 1–32)
BASOPHILS # BLD AUTO: 0.05 10*3/MM3 (ref 0–0.2)
BASOPHILS NFR BLD AUTO: 0.7 % (ref 0–1.5)
BILIRUB SERPL-MCNC: 0.4 MG/DL (ref 0–1.2)
BUN SERPL-MCNC: 20 MG/DL (ref 8–23)
BUN/CREAT SERPL: 21.3 (ref 7–25)
CALCIUM SPEC-SCNC: 9.1 MG/DL (ref 8.6–10.5)
CHLORIDE SERPL-SCNC: 109 MMOL/L (ref 98–107)
CO2 SERPL-SCNC: 28 MMOL/L (ref 22–29)
CREAT SERPL-MCNC: 0.94 MG/DL (ref 0.57–1)
DEPRECATED RDW RBC AUTO: 43.9 FL (ref 37–54)
EGFRCR SERPLBLD CKD-EPI 2021: 59.2 ML/MIN/1.73
EOSINOPHIL # BLD AUTO: 0.12 10*3/MM3 (ref 0–0.4)
EOSINOPHIL NFR BLD AUTO: 1.7 % (ref 0.3–6.2)
ERYTHROCYTE [DISTWIDTH] IN BLOOD BY AUTOMATED COUNT: 12.8 % (ref 12.3–15.4)
ERYTHROCYTE [SEDIMENTATION RATE] IN BLOOD: 29 MM/HR (ref 0–30)
GLOBULIN UR ELPH-MCNC: 2.5 GM/DL
GLUCOSE SERPL-MCNC: 91 MG/DL (ref 65–99)
HCT VFR BLD AUTO: 32.3 % (ref 34–46.6)
HGB BLD-MCNC: 10.6 G/DL (ref 12–15.9)
HOLD SPECIMEN: NORMAL
IMM GRANULOCYTES # BLD AUTO: 0.03 10*3/MM3 (ref 0–0.05)
IMM GRANULOCYTES NFR BLD AUTO: 0.4 % (ref 0–0.5)
LYMPHOCYTES # BLD AUTO: 2.08 10*3/MM3 (ref 0.7–3.1)
LYMPHOCYTES NFR BLD AUTO: 29.7 % (ref 19.6–45.3)
MAGNESIUM SERPL-MCNC: 1.9 MG/DL (ref 1.6–2.4)
MCH RBC QN AUTO: 30.6 PG (ref 26.6–33)
MCHC RBC AUTO-ENTMCNC: 32.8 G/DL (ref 31.5–35.7)
MCV RBC AUTO: 93.4 FL (ref 79–97)
MONOCYTES # BLD AUTO: 0.72 10*3/MM3 (ref 0.1–0.9)
MONOCYTES NFR BLD AUTO: 10.3 % (ref 5–12)
NEUTROPHILS NFR BLD AUTO: 4 10*3/MM3 (ref 1.7–7)
NEUTROPHILS NFR BLD AUTO: 57.2 % (ref 42.7–76)
NRBC BLD AUTO-RTO: 0 /100 WBC (ref 0–0.2)
PLATELET # BLD AUTO: 170 10*3/MM3 (ref 140–450)
PMV BLD AUTO: 10 FL (ref 6–12)
POTASSIUM SERPL-SCNC: 4.6 MMOL/L (ref 3.5–5.2)
PROT SERPL-MCNC: 5.4 G/DL (ref 6–8.5)
QT INTERVAL: 412 MS
QTC INTERVAL: 442 MS
RBC # BLD AUTO: 3.46 10*6/MM3 (ref 3.77–5.28)
RPR SER QL: NORMAL
SODIUM SERPL-SCNC: 144 MMOL/L (ref 136–145)
TROPONIN T SERPL HS-MCNC: 29 NG/L
URATE SERPL-MCNC: 5.2 MG/DL (ref 2.4–5.7)
WBC NRBC COR # BLD AUTO: 7 10*3/MM3 (ref 3.4–10.8)

## 2025-01-14 PROCEDURE — 85652 RBC SED RATE AUTOMATED: CPT

## 2025-01-14 PROCEDURE — 72156 MRI NECK SPINE W/O & W/DYE: CPT

## 2025-01-14 PROCEDURE — 86334 IMMUNOFIX E-PHORESIS SERUM: CPT

## 2025-01-14 PROCEDURE — 84484 ASSAY OF TROPONIN QUANT: CPT

## 2025-01-14 PROCEDURE — 84165 PROTEIN E-PHORESIS SERUM: CPT

## 2025-01-14 PROCEDURE — 82784 ASSAY IGA/IGD/IGG/IGM EACH: CPT

## 2025-01-14 PROCEDURE — 72158 MRI LUMBAR SPINE W/O & W/DYE: CPT

## 2025-01-14 PROCEDURE — 83735 ASSAY OF MAGNESIUM: CPT

## 2025-01-14 PROCEDURE — 94799 UNLISTED PULMONARY SVC/PX: CPT

## 2025-01-14 PROCEDURE — 70553 MRI BRAIN STEM W/O & W/DYE: CPT

## 2025-01-14 PROCEDURE — 94761 N-INVAS EAR/PLS OXIMETRY MLT: CPT

## 2025-01-14 PROCEDURE — 83825 ASSAY OF MERCURY: CPT

## 2025-01-14 PROCEDURE — 80053 COMPREHEN METABOLIC PANEL: CPT | Performed by: INTERNAL MEDICINE

## 2025-01-14 PROCEDURE — 63710000001 PREDNISONE PER 5 MG: Performed by: INTERNAL MEDICINE

## 2025-01-14 PROCEDURE — 83655 ASSAY OF LEAD: CPT

## 2025-01-14 PROCEDURE — 73502 X-RAY EXAM HIP UNI 2-3 VIEWS: CPT

## 2025-01-14 PROCEDURE — 84550 ASSAY OF BLOOD/URIC ACID: CPT

## 2025-01-14 PROCEDURE — A9577 INJ MULTIHANCE: HCPCS | Performed by: INTERNAL MEDICINE

## 2025-01-14 PROCEDURE — 94664 DEMO&/EVAL PT USE INHALER: CPT

## 2025-01-14 PROCEDURE — 85025 COMPLETE CBC W/AUTO DIFF WBC: CPT | Performed by: INTERNAL MEDICINE

## 2025-01-14 PROCEDURE — 82595 ASSAY OF CRYOGLOBULIN: CPT

## 2025-01-14 PROCEDURE — 93010 ELECTROCARDIOGRAM REPORT: CPT | Performed by: INTERNAL MEDICINE

## 2025-01-14 PROCEDURE — 93005 ELECTROCARDIOGRAM TRACING: CPT

## 2025-01-14 PROCEDURE — 99232 SBSQ HOSP IP/OBS MODERATE 35: CPT | Performed by: NURSE PRACTITIONER

## 2025-01-14 PROCEDURE — 82175 ASSAY OF ARSENIC: CPT

## 2025-01-14 PROCEDURE — 82525 ASSAY OF COPPER: CPT

## 2025-01-14 PROCEDURE — 25510000002 GADOBENATE DIMEGLUMINE 529 MG/ML SOLUTION: Performed by: INTERNAL MEDICINE

## 2025-01-14 RX ADMIN — FLUTICASONE PROPIONATE 1 SPRAY: 50 SPRAY, METERED NASAL at 20:48

## 2025-01-14 RX ADMIN — PREDNISONE 10 MG: 10 TABLET ORAL at 09:29

## 2025-01-14 RX ADMIN — EPLERENONE 50 MG: 25 TABLET, FILM COATED ORAL at 09:28

## 2025-01-14 RX ADMIN — IPRATROPIUM BROMIDE 0.5 MG: 0.5 SOLUTION RESPIRATORY (INHALATION) at 07:22

## 2025-01-14 RX ADMIN — RIVAROXABAN 20 MG: 20 TABLET, FILM COATED ORAL at 09:29

## 2025-01-14 RX ADMIN — TORSEMIDE 50 MG: 20 TABLET ORAL at 09:29

## 2025-01-14 RX ADMIN — CLONIDINE HYDROCHLORIDE 0.1 MG: 0.1 TABLET ORAL at 09:53

## 2025-01-14 RX ADMIN — CETIRIZINE HYDROCHLORIDE 10 MG: 10 TABLET, FILM COATED ORAL at 09:29

## 2025-01-14 RX ADMIN — GADOBENATE DIMEGLUMINE 12 ML: 529 INJECTION, SOLUTION INTRAVENOUS at 23:28

## 2025-01-14 RX ADMIN — HYDROCODONE BITARTRATE AND ACETAMINOPHEN 2 TABLET: 10; 325 TABLET ORAL at 20:38

## 2025-01-14 RX ADMIN — ROSUVASTATIN CALCIUM 5 MG: 5 TABLET, FILM COATED ORAL at 09:29

## 2025-01-14 RX ADMIN — CYANOCOBALAMIN TAB 500 MCG 1000 MCG: 500 TAB at 09:29

## 2025-01-14 RX ADMIN — IPRATROPIUM BROMIDE 0.5 MG: 0.5 SOLUTION RESPIRATORY (INHALATION) at 15:11

## 2025-01-14 RX ADMIN — FAMOTIDINE 40 MG: 20 TABLET, FILM COATED ORAL at 09:29

## 2025-01-14 RX ADMIN — MUPIROCIN 1 APPLICATION: 20 OINTMENT TOPICAL at 09:29

## 2025-01-14 RX ADMIN — POTASSIUM CHLORIDE 20 MEQ: 1.5 POWDER, FOR SOLUTION ORAL at 09:28

## 2025-01-14 RX ADMIN — FLUTICASONE PROPIONATE 1 SPRAY: 50 SPRAY, METERED NASAL at 09:29

## 2025-01-14 RX ADMIN — Medication 3 ML: at 09:31

## 2025-01-14 RX ADMIN — CLONIDINE HYDROCHLORIDE 0.2 MG: 0.1 TABLET ORAL at 20:40

## 2025-01-14 RX ADMIN — IPRATROPIUM BROMIDE 0.5 MG: 0.5 SOLUTION RESPIRATORY (INHALATION) at 11:02

## 2025-01-14 RX ADMIN — HYDROXYZINE HYDROCHLORIDE 10 MG: 10 TABLET ORAL at 09:53

## 2025-01-14 RX ADMIN — TRIAMCINOLONE ACETONIDE 1 APPLICATION: 1 CREAM TOPICAL at 09:31

## 2025-01-14 RX ADMIN — Medication 3 ML: at 20:48

## 2025-01-14 RX ADMIN — LORAZEPAM 0.5 MG: 0.5 TABLET ORAL at 20:48

## 2025-01-14 RX ADMIN — HYDROCODONE BITARTRATE AND ACETAMINOPHEN 2 TABLET: 10; 325 TABLET ORAL at 09:29

## 2025-01-14 RX ADMIN — Medication 1 TABLET: at 09:29

## 2025-01-14 RX ADMIN — TRIAMCINOLONE ACETONIDE 1 APPLICATION: 1 CREAM TOPICAL at 20:48

## 2025-01-14 RX ADMIN — IPRATROPIUM BROMIDE 0.5 MG: 0.5 SOLUTION RESPIRATORY (INHALATION) at 20:04

## 2025-01-14 RX ADMIN — HYDROCODONE BITARTRATE AND ACETAMINOPHEN 2 TABLET: 10; 325 TABLET ORAL at 14:56

## 2025-01-14 RX ADMIN — ERGOCALCIFEROL 50000 UNITS: 1.25 CAPSULE ORAL at 09:29

## 2025-01-14 RX ADMIN — EPLERENONE 50 MG: 25 TABLET, FILM COATED ORAL at 20:39

## 2025-01-14 RX ADMIN — NIFEDIPINE 30 MG: 30 TABLET, FILM COATED, EXTENDED RELEASE ORAL at 09:29

## 2025-01-14 RX ADMIN — HYDROCODONE BITARTRATE AND ACETAMINOPHEN 2 TABLET: 10; 325 TABLET ORAL at 03:06

## 2025-01-14 NOTE — H&P
[facility]  INTERNAL MEDICINE  ÓSCAR CADET MD  [address] [place] [address]  Phone [phone] Fax [phone]  E-mail:  [email or web address]        INTERNAL MEDICINE HISTORY AND PHYSICAL EXAM  Óscar Cadet M.D.  2025         (Note transferred to H and PE Template on 2024 at 2:45 am when I discovered that it had accidentally been labeled as PROGRESS NOTE when initially completed.  Note itself has always been in H and PE format  Electronically signed by Óscar Cadet MD, 25, 2:50 AM EST. )      Patient Identification:  Name: Sasha Barrett  Age: 84 y.o.  Sex: female  :  1940  MRN: [id number]         Primary Care Physician: Óscar Cadet MD  LENGTH OF STAY 0 DAYS     Consults         Date and Time Order Name Status Description     2025 11:57 PM Inpatient Nephrology Consult         2025 11:57 PM Inpatient Neurosurgery Consult         2025 11:57 PM Inpatient Neurology Consult Stroke         2025 11:57 PM Inpatient Orthopedic Surgery Consult         2025 11:57 PM Inpatient Cardiology Consult                    Chief Complaint:  Recurrent falls with generalized weakness and severe right shoulder pain     History of Present Illness:     Subjective  Interval History: Patient is a 84 y.o.female who presented to the [facility] emergency room by private vehicle on 2025 at 1538 for evaluation of recurrent falls with generalized weakness and severe right shoulder pain.  Mrs. Barrett is a delightful 84-year-old female who I had the pleasure of taking care of for many years in my office.  She and her  of many years lives independently in a home here in the [place] area but do have several children who live nearby and help them with their medical care and other day today needs.  Patient reports that she was in her usual state of health until approximately 3 days ago when she began to fall  "due to lack of balance and generalized weakness of her lower extremities when trying to bear weight.  At one point, she fell from standing into the floor and struck her head in the back occipital region very hard.  She was unable to get herself out of the floor after this fall and was actually laying in the floor for approximately 7 to 8 hours before her  was able to contact two paramedic friends who came and helped get her up and onto the couch.  Since that time, she has been unable to really walk by bearing weight on her lower extremities and has had to use a walker to get around any at all in the house.  Her  has tried to help take care of her, but he is quite ill himself, and really has not been able to provide all the care that she needs to perform her activities of daily living.  Finally, earlier today, patient did call me and reported the above events and at my request has come to the [facility] emergency room for further evaluation of her symptoms.  Patient is complaining of worsening of right shoulder pain which now is quite excruciating and prevents her from using the right arm to do any tasks.  Patient does report she is right-handed.  Patient also has had chronic bilateral foot pain.  She has followed with Dr. Concepcion in the past for this.  In addition, she has seen Dr. Pichardo in the past for the shoulder pain.  Patient also has had increasingly more severe bouts of urinary incontinence since the falls and her  has had a great deal of difficulty keeping her dry.  Patient denies fever, chills, cough, shortness of breath, vomiting, or diarrhea.  She does tell me that she has some mild intermittent chest pain with activity but it does not radiate into her neck or arms and may just represent the pleurisy that she has been diagnosed with in the past.  Patient does admit that since her fall, she has not been able to take her medications regularly and her  has\" messed up " "her system for management of her meds.\"  She has not been eating or drinking as much as usual but has been taking her diuretic therapy.  When seen in the ER, patient indicates that she had not had her morning medications even though I was seeing her at 7:30 at night.  Patient was last hospitalized here at [facility] between 10/20/2024 and 10/22/2024 for worsening edema, progressive chronic stage III kidney disease, malaise and fatigue progressive, dyspnea, accelerated hypertension, some ataxia, blood pressure instability, and severe bilateral lower extremity edema.  At that time it was felt that the patient did have adult failure to thrive and dietitian found that she had evidence of severe malnutrition.  Patient insisted on discharge back to home after that visit and did have home health on the case for a few weeks afterwards.  Patient is followed by a nurse practitioner from \[facility]\"" in their palliative care program for management of her severe anxiety and intractable pain.  She does take up to 8 Norco 10 tablets/day and up to 3 lorazepam 0.5 mg tablets per day.  In addition she takes Fioricet 2 tablets every 6 hours as needed for severe headaches.  Patient does have multiple other medical comorbidities that complicate her medical condition.  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     Patient was seen and evaluated in the [facility] emergency room by Dr. Everardo Patino, who was the ER attending physician on-call at the time of her arrival.  She was seen by Dr. Patino on 1/12/2025 at 1538.  The ER HPI was consistent with the story which I given above.  Review of systems in the ER was unremarkable except for the issues which have been discussed above in the HPI.  Physical exam in the emergency room showed temperature of 97.7, pulse 77, respirations 18, blood pressure 136/71, room air oxygen 98%.  Physical exam showed patient who is in no acute distress and nontoxic-appearing.  Her breath sounds were equal when lungs were clear to auscultation.  Her heart showed regular rate and rhythm with intact distal pulses, and GI was soft nontender nondistended no guarding and normal bowel sounds.  Patient had a GCS of 15, moves all extremities no focal deficits but was not walked by the ER staff.  Psychiatrically she was calm and cooperative.  Labs that were collected in the ER included a glucose which was slightly up at 114, BUN was elevated at 26 and creatinine that was elevated at 1.38.  Her sodium was normal at 144 and her potassium was slightly low at 3.0 with a CO2 of 29.2.  A single liver enzyme the AST was elevated at 35.  Patient's estimated GFR was 37.4 and her anion gap was 12.8.  Magnesium level was normal at 1.9.  White blood cell count was normal at 8.05.  Hemoglobin was slightly low at 11.6 and platelets were 187,000.  Patient CK was 859.  Her UA was clear yellow normal specific gravity of 1.017 and otherwise unremarked couple.  Patient did have a CT scan of head done in the emergency room which showed chronic encephalomalacia in the left occipital lobe with evidence of an old infarct that is unchanged.  There was also  degenerative disc disease in the cervical spine at C5-6 with a small central posterior disc protrusion that contacts the anterior cervical cord with mild narrowing of the central canal.  This was not evident on a previous CT scan from 10/4/2021.  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  X-ray of the shoulder shows that the humeral head is high riding and there is moderate acromioclavicular glenohumeral joint osteoarthritis.  Bones are noted to be osteopenic but there is no clear-cut evidence of fracture.  Medications given in the ER included a small 500 cc bolus of normal saline with fluid of normal saline at 75 cc/h, potassium replacement was given, case was discussed with me by the ER attending and above positive findings were reviewed.  Due to her persistent generalized weakness and inability to walk along with the acute kidney injury probably due to volume depletion, it was felt that we did need to admit the patient for further diagnostic evaluation and workup of these issues along with treatment.  Patient was admitted to my service on a telemetry floor with final ER diagnoses of generalized weakness, recurrent falls, hypokalemia, acute renal insufficiency.  Patient was waiting for transport to the floor when I left the ER after seeing the patient there.     1/12/2025.  I personally saw the patient for the first time during this hospitalization while she still remained in the emergency room in room #14.  Patient was resting quietly on the emergency room stretcher and immediately recognized me upon entry into the room.  She was quite tearful at times during my interview with her and seemed very upset with her  because he has not been able to meet all of her needs recently.  She really did not want to bother her kids and as a result had not called them until today.  Since her  was unable to drive her to the emergency room, she did  call her kids to make them aware of her situation and they were the ones who brought her in to the emergency room for this evaluation.  I wore full PPE for the exam as indicated including an N95 mask for needed, goggles, white lab coat, and gloves when touching patient.  I performed thorough hand hygiene before and after the patient visit.  I confirmed the physical exam from the emergency room.  Patient was able to raise both legs off the bed and did have some mild passive resistance but legs muscles did feel quite weak.  Patient reported she was unable to bear weight on her legs or stand when I ask her to get out of bed.  Deformity of right foot is stable.  Patient's speech is a little more garbled than usual.  She also seems to be having trouble recalling specific details which is an unusual finding for this patient who usually is very sharp and focused with her thought processes.  Patient is concerned that she might of had another stroke like the 1 she had several years ago and does not want to consider further evaluation.  I did discuss my plans with her and we will get consultations that will include renal to evaluate her volume status and acute kidney injury, neurology for evaluation of mental status changes, possible closed head injury, history of old CVA, neurosurgery to evaluate the CT changes and cervical spine relating them to difficulty walking, cardiology requested by patient because of intermittent chest pain she has been experiencing, orthopedics for evaluation of the right shoulder deformity which is an ongoing problem and has been seen by Dr. Pichardo in the past.  Patient is agreeable to this workup.  Patient is continued on her other medications as before.  For workup we will consider doing MRIs once neurology and neurosurgery to see her and declare exactly which types of MRIs they feel would be most appropriate.  I did order additional x-rays to include thoracic and lumbar spine films.  EKG is to  be done in AM.  Additional labs are ordered and will be evaluated for possible sources of the weakness.  Patient otherwise is continued on her same regular medical therapy at this point.  Patient was medically stable when I left the room and was on her way to the floor by transport a short time later.     Review of Systems:               A comprehensive 14 point review of systems was negative except for:  Constitution:  positive for anorexia, fatigue, and malaise  Eyes:  positive for blurriness and dryness  Cardiovascular: positive for  chest pressure / pain, at rest, lower extremity edema, and palpitations  Gastrointestinal: positive for  early satiety, heartburn, nausea, and vomiting  Hematologic / Lymphatic: positive for  fatigue  Musculoskeletal: positive for  back pain, joint pain, muscle pain, muscle weakness, and neck pain  Neurological: positive for  coordination abnormal, difficulty walking, confusion, dizziness, headaches, vertigo, and weakness  Behavioral/Psych: positive for  anxiety and depression  Endocrine: positive for  diabetes:  dry mouth and hot flashes     Medical History        Past Medical History:   Diagnosis Date    Arthritis      Asthma      Breast cancer 2003     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, grade 2    COPD (chronic obstructive pulmonary disease)      Current use of long term anticoagulation      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 Yazmin Canseco    CARDIAC  ELECTROPHYSIOLOGY PROCEDURE N/A 5/27/2022     Procedure: Loop insertion;  Surgeon: Jeffrey Argueta MD;  Location:  [facility];  Service: Cardiovascular;  Laterality: N/A;  biotronik    CARDIAC ELECTROPHYSIOLOGY PROCEDURE N/A 7/13/2022     Procedure: Loop recorder removal;  Surgeon: Jeffrey Argueta MD;  Location:  [facility];  Service: Cardiovascular;  Laterality: N/A;    CARPAL TUNNEL RELEASE Right 05/25/2011     Dr. Caleb Bueno    CARPAL TUNNEL RELEASE Left 04/26/2011     Dr. Caleb Bueno    CATARACT EXTRACTION Left 11/29/2010     Dr. Eusebio Downs    CATARACT EXTRACTION Right 11/17/2010     Dr. Eusebio Downs    COLONOSCOPY N/A 12/06/2002     Sigmoid diverticulosis-Dr. Brandon Batista    COLONOSCOPY N/A 12/12/2013     Tortuous colon, diverticulosis in the sigmoid colon and descending colon, stool in the rectum, sigmoid colon, descending colon, splenic flexure, transverse colon and hepatic flexure-Dr. Irma Brambila    DEQUERVAIN RELEASE Left 9/23/2020     Procedure: DEQUERVAIN RELEASE LEFT HAND;  Surgeon: Caleb Bueno MD;  Location:  [facility];  Service: Plastics;  Laterality: Left;    ENDOSCOPY N/A 09/13/2007     Normal-Dr. Pavel Quarles    ENDOSCOPY AND COLONOSCOPY N/A 09/19/2005     1 cm hiatal hernia, mild Schatzki's ring, sigmoid diverticulosis-Dr. Yoel Farley    GANGLION CYST EXCISION Left 12/18/2012     Release of A1 pulley flexor sheath, left fourth finger, excision of ganglion cyst 0.5 cm diameter, A2 pulley flexor sheath, left fourth finger-Dr. Caleb Bueno    HEEL SPUR EXCISION Left 1968    INGUINAL HERNIA REPAIR Right 1971     at 5 months pregnant     INJECTION OF MEDICATION Left 9/23/2020     Procedure: KENOLOG INJECTION TO LEFT THUMB;  Surgeon: Caleb Bueno MD;  Location:  [facility];  Service: Plastics;  Laterality: Left;    MASTECTOMY Right 08/05/2008     Right breast mastectomy and  excision of left chest wall lesion-Dr. Yoel Farley    MASTECTOMY RADICAL Left 04/29/2003     Left modified radical mastectomy-Dr. Arce Figahsan    PARATHYROIDECTOMY Right 05/04/2004     Excision of parathyroid adenoma (right superior)-Dr. Yoel Farley    REPLACEMENT TOTAL KNEE Right 04/09/2012     Dr. Lamonte Childress    SINUS SURGERY   1984    THYROIDECTOMY, PARTIAL Left 05/04/2004     Dr. Yoel Farley    TOTAL ABDOMINAL HYSTERECTOMY Bilateral 1973     Dr. Mahan    TRIGGER FINGER RELEASE Left 9/23/2020     Procedure: RELEASE LEFT FOURTH TRIGGER FINGER;  Surgeon: Caleb Bueno MD;  [facility];  Service: Plastics;  Laterality: Left;    UPPER GASTROINTESTINAL ENDOSCOPY N/A 02/18/2009     LA Grade A reflux esophagitis, normal stomach, normal 2nd part of the duodenum-Dr. Yoel Farley    WRIST GANGLION EXCISION Left 9/23/2020     Procedure: EXCISION GANGLION CYST LEFT WRIST;  Surgeon: Caleb Bueno MD;  Location: [facility];  Service: Plastics;  Laterality: Left;         Allergies         Allergies   Allergen Reactions    Bee Venom Shortness Of Breath and Rash    Morphine Anaphylaxis and Nausea And Vomiting    Sulfa Antibiotics Anaphylaxis and Hives       Or sulfa fillers in meds  Broke out in internal and external hives       Tree Extract Shortness Of Breath and Rash    Ambien [Zolpidem] Hallucinations    Anastrozole Other (See Comments)       Can't remember what the reaction was     Covid-19 (Mrna) Vaccine Swelling       Facial swelling, blood clots    Eliquis [Apixaban] Headache       Headaches, nausea and itching.     Hydralazine Myalgia       Patient reports leg cramps, joint pain and increased fatigue    Norvasc [Amlodipine] Other (See Comments)       KIDNEYS SHUT DOWN    Nsaids Other (See Comments)       KIDNEY FAILURE    Penicillins Unknown (See Comments)       Severe reaction as a child  Tolerated ceftriaxone during 04/2017 admission    Grass Rash                  Family History   Problem Relation Age of Onset    Brain cancer Brother      Alcohol abuse Mother      No Known Problems Father      No Known Problems Sister      Maljaziel Hyperthermia Neg Hx          Social History   Social History            Socioeconomic History    Marital status:        Spouse name: Joey    Number of children: 6    Highest education level: Some college, no degree   Tobacco Use    Smoking status: Never       Passive exposure: Never    Smokeless tobacco: Never   Vaping Use    Vaping status: Never Used   Substance and Sexual Activity    Alcohol use: Yes       Comment: 3 drinks yearly    Drug use: Never    Sexual activity: Defer       Birth control/protection: Surgical            PMH, FH, SH and ROS completed with Admission History and Physical and updated in EPIC system.            Objective  Scheduled Meds:  Scheduled Medication   cetirizine, 10 mg, Oral, Daily  eplerenone, 50 mg, Oral, Q12H  famotidine, 40 mg, Oral, Daily  fluticasone, 1 spray, Each Nare, BID  ipratropium, 500 mcg, Nebulization, 4x Daily - RT  multivitamin with minerals, 1 tablet, Oral, Daily  mupirocin, 1 application , Topical, Q24H  NIFEdipine XL, 30 mg, Oral, Q24H  pantoprazole, 40 mg, Oral, Q AM  potassium chloride, 20 mEq, Oral, Daily  predniSONE, 10 mg, Oral, Daily  rivaroxaban, 20 mg, Oral, Daily  rosuvastatin, 5 mg, Oral, QAM  sodium chloride, 3 mL, Intravenous, Q12H  torsemide, 50 mg, Oral, Daily  triamcinolone, 1 Application, Topical, BID  cyanocobalamin, 1,000 mcg, Oral, Daily  [START ON 1/14/2025] vitamin D, 50,000 Units, Oral, Weekly         Continuous Infusions:  Infusion Medications   Pharmacy Consult,   sodium chloride, 50 mL/hr            Vital signs in last 24 hours:  Temp:  [97.7 °F (36.5 °C)] 97.7 °F (36.5 °C)  Heart Rate:  [67-78] 73  Resp:  [16-18] 16  BP: (121-182)/(61-81) 182/81     Intake/Output:     Intake/Output Summary (Last 24 hours) at 1/13/2025 0024  Last data filed at 1/12/2025 9511       Gross per 24 hour   Intake 500 ml   Output --   Net 500 ml         Exam:  BP (!) 182/81 (BP Location: Right arm, Patient Position: Lying)   Pulse 73   Temp 97.7 °F (36.5 °C) (Oral)   Resp 16   SpO2 93%      Constitutional: Alert, cooperative,  moderate distress, tearful at times, AAOx2, resting comfortably   Head: Normocephalic, without obvious abnormality, atraumatic   Eyes: PERRLA, conjunctiva/corneas clear, no icterus, no conjunctival pallor, EOM's intact, both eyes   ENT and Mouth: Lips, tongue, gums normal; oral mucosa pink and dry   Neck: Supple, symmetrical, trachea midline, no JVD   Respiratory: Clear to auscultation bilaterally, respirations unlabored   Cardiovascular: Regular rate and rhythm, S1 and S2 normal, no murmur, No rub or gallop. Pulses normal.   Gastrointestinal: BS present x4. Soft, non-tender, bowels sounds active, no masses no hepatosplenomegaly.   : No hernia. Normal exam for sex.   Neurologic: CN-XII intact, motor strength grossly intact, sensation grossly intact to light touch, no focal reflex deficits noted.   Psychiatric: Alert, oriented X3, no delusions, psychoses, depression or anxiety   Heme/Lymph/Imun: No bruises, petechiae. Lymph nodes normal in size / configuration.      Data Review:        Lab Results   Component Value Date     CALCIUM 9.3 01/12/2025     PHOS 2.6 10/20/2024           Results from last 7 days   Lab Units 01/12/25  1551   AST (SGOT) U/L 35*   ALT (SGPT) U/L 27   MAGNESIUM mg/dL 1.9   SODIUM mmol/L 144   POTASSIUM mmol/L 3.0*   CHLORIDE mmol/L 102   CO2 mmol/L 29.2*   BUN mg/dL 26*   CREATININE mg/dL 1.38*   GLUCOSE mg/dL 114*   CALCIUM mg/dL 9.3   WBC 10*3/mm3 8.05   HEMOGLOBIN g/dL 11.6*   PLATELETS 10*3/mm3 187            Lab Results   Component Value Date     CKTOTAL 159 01/12/2025     CKMB 1.69 04/06/2017     TROPONINT 21 (H) 12/05/2024      CrCl cannot be calculated (Unknown ideal weight.).  WEIGHTS:          Wt Readings from Last 1 Encounters:    12/05/24 1921 68 kg (150 lb)            Assessment:    Acute kidney injury superimposed on CKD    Other cervical disc displacement at C5-C6 level    Stage 2 chronic kidney disease    Malaise and fatigue progressive    Dizziness    Dyspnea on exertion    Accelerated hypertension    Ataxia    Generalized muscle weakness    Volume depletion    Cervical stenosis of spine    Recurrent falls while walking    Closed head injury with concussion    Hypokalemia    Urinary retention self-caths (Don)    Hyperlipidemia LDL goal <70    Urge incontinence of urine    Chronic tension-type headache, intractable    Cerebral atherosclerosis    Vitamin D deficiency    Moderate persistent asthmatic bronchitis without complication    Elevated PTH level    H/O Cerebral vascular accident in left occipital area    Acute joint pain    COPD with asthma    Adult failure to thrive syndrome    Acute intractable headache intensified since Covid-19 infection 10/2/2021    Bilateral lower extremity edema, multifactorial = unrestricted salt intake, inadequate Lasix dosing, chronic lung disease, and obesity.  T    Severe malnutrition    Anemia, mild    Goiter diffuse    Pleurisy    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)    Psoriasis (Darryl Ochoa)    Postmenopausal    KECIA (generalized anxiety disorder)        Attending Physician Assessment and Plan:     1.  Fall from standing with closed head injury 3 days ago denying loss of consciousness, but unable to walk or bear weight on lower extremities since event with generalized muscle weakness and dizziness.  Exact cause of patient's symptoms not clear at this point.  Neurology and neurosurgery asked to evaluate.  Plan to follow-up plain films and CT scans with MRI of head and entire spine if felt to be appropriate by neurology and / or neurosurgery.  Comprehensive labs are ordered.  PT OT and speech therapy consults are requested.  Patient may require short stay in rehab because  of the profound weakness and poor balance and will have CCP see her in preparation for this possible eventuality.     2.  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  Renal asked to see patient in consultation for their input on her condition.  She is followed regularly by Dr. Bond in his clinic.  I have also ask nephrology to help with management of patient's diuretics and her blood pressure.  Blood pressure readings appear to be quite labile since arrival here in the hospital.  Patient reports that she is using variable amounts of clonidine up to 0.3 every morning, 0 point 2 in the afternoon and 0.2 at bedtime.  Patient is admittedly a bit confused about the dosing on her medications at present time.     3.  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  Neurosurgery and neurology consults have been placed.  Will obtain additional films including thoracic and lumbar spine films.  MRIs to be ordered of head and entire spine if felt to be indicated by neurology and neurosurgery after review of the patient's case tomorrow.  In addition to the ataxia and walking difficulties, patient also reports an increase in the level and intensity of her urinary incontinence.  She has had trouble in the past with urinary retention and needed to do in and out caths.     4.  Accelerated hypertension with great variability in levels possibly related to abnormal volume status and patient..  As above, we have asked nephrology to help with this issue.  I suspect it may be volume related and it is unclear if patient is actually drinking adequate amounts of fluid at home.  We have given her a small bolus of normal saline here 500 cc and I am going to continue her overnight on half-normal saline at 75 cc/h.  Further input on fluid replacement for this patient would be  appreciated.  Also need input on level of diuretics.     5.  Dyspnea on exertion with mild intermittent chest pain.  EKG and troponin are ordered for tomorrow a.m.  Patient does not seem to be having any sustained chest pain at present time.  We did ask patient's regular cardiology group Dr. Soto to take a look at the patient and make recommendations on any additional workup that they might feel necessary.  Patient did request the cardiology evaluation.     6.  Electrolyte abnormality of hypokalemia noted on admission in ER.  Correction given in ER and patient placed on electrolyte replacement protocol low level renal dosing.  Will continue to monitor and make further adjustments in electrolytes as needed.     7.  Increased urinary incontinence and patient with long history of urinary retention requiring self-catheterization as taught to her by Dr. Fazal Ochoa's office in urology.  Uncertain at this point if this issue is related in part to the closed head injury and the cervical spine stenosis that seems to be new.  Will ask neurology and neurosurgery for their input on that issue.  May need to consider urology consult also.     8.  Hyperlipidemia.  Fasting lipid profile ordered for tomorrow a.m.  Will adjust meds as necessary.     9.  Chronic tension type headaches that are intractable and do require Fioricet for management.  This medication is continued.     10.  Chronic pain management on [facility] palliative care program.  Patient at present receiving up to 8 Norco 10 tablets/day along with Ativan 0.5 x 3/day.  These medications are continued at the time of patient's admission due to her palliative care status.     11.  Cerebral atherosclerosis with history of left occipital CVA in the past and now with new neurologic changes.  As above, neurology and neurosurgery requested to see the patient to determine need for further workup and evaluation of the symptoms.     12.  History of elevated PTH.  Ionized calcium  level and PTH have been ordered to recheck on the situation.     13.  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  Plan to order ultrasound of thyroid to further evaluate this situation and determine need for any biopsies of nodules that might be necessary.     14.  Vitamin D deficiency.  Plan to check level of vitamin D while here in the hospital.     15.  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  Patient has had recurrent bouts of COPD exacerbations and now takes 10 mg of prednisone on a daily basis for prevention of additional COPD exacerbations.  Patient also using regular nebulized ipratropium and albuterol as needed.              Plan for disposition:Where: SNF verses home and When:  3-4 days     Copied text in this note has been reviewed by me and is accurate as of 01/12/20256  Much of this dictation is completed using dragon voice activated software.        Óscar Cadet MD  01/12/2025  1930 EDT

## 2025-01-14 NOTE — PLAN OF CARE
Goal Outcome Evaluation:      No acute events. A&Ox4. Refused MRI during night shift r/t wanting sleep. Pt also refuses stress test that is ordered. Pain managed per MAR. VSS. Room air.

## 2025-01-14 NOTE — PROGRESS NOTES
[facility] THORACIC/LUMBAR NEUROSURGERY PROGRESS NOTE      CC:falls, weakness      Subjective     Interval History: refused stress test this morning and MRI's last night. Asking for medication for MRI. Still fixated on bug bites when asking about BLE weakness        Objective     Vital signs in last 24 hours:  Temp:  [97.7 °F (36.5 °C)-98.4 °F (36.9 °C)] 97.7 °F (36.5 °C)  Heart Rate:  [65-81] 81  Resp:  [16-22] 20  BP: (139-184)/(61-99) 154/99    Intake/Output this shift:  No intake/output data recorded.    LABS:  Results from last 7 days   Lab Units 01/14/25  0540 01/13/25  0649 01/12/25  1551   WBC 10*3/mm3 7.00 7.70 8.05   HEMOGLOBIN g/dL 10.6* 10.7* 11.6*   HEMATOCRIT % 32.3* 32.5* 37.5   PLATELETS 10*3/mm3 170 179 187      Results from last 7 days   Lab Units 01/14/25  0540 01/13/25  0648 01/12/25  1551   SODIUM mmol/L 144 144 144   POTASSIUM mmol/L 4.6 3.6 3.0*   CHLORIDE mmol/L 109* 108* 102   CO2 mmol/L 28.0 27.3 29.2*   BUN mg/dL 20 19 26*   CREATININE mg/dL 0.94 1.05* 1.38*   GLUCOSE mg/dL 91 97 114*   CALCIUM mg/dL 9.1 8.9 9.3        IMAGING STUDIES:  No new imaging    I personally viewed and interpreted the patient's chart, labs, medications.    Meds reviewed/changed: Yes    Current Facility-Administered Medications:     acetaminophen (TYLENOL) tablet 650 mg, 650 mg, Oral, Q4H PRN, Óscar Cadet MD    albuterol (PROVENTIL) nebulizer solution 0.083% 2.5 mg/3mL, 2.5 mg, Nebulization, Q6H PRN, Óscar Cadet MD    butalbital-acetaminophen-caffeine (FIORICET, ESGIC) -40 MG per tablet 2 tablet, 2 tablet, Oral, Q4H PRN, Óscar Cadet MD    Calcium Replacement - Follow Nurse / BPA Driven Protocol, , Not Applicable, PRN, Óscar Cadet MD    cetirizine (zyrTEC) tablet 10 mg, 10 mg, Oral, Daily, Óscar Cadet MD, 10 mg at 01/14/25 0929    cloNIDine (CATAPRES) tablet 0.1 mg, 0.1 mg, Oral, Q6H PRN, Óscar Cadet MD, 0.1 mg at 01/14/25 0953    cloNIDine (CATAPRES) tablet 0.2 mg, 0.2 mg,  Oral, Q6H PRN, Óscar Cadet MD, 0.2 mg at 01/13/25 1718    Diclofenac Sodium (VOLTAREN) 1 % gel 4 g, 4 g, Topical, 4x Daily PRN, Óscar Cadet MD    diphenhydrAMINE (BENADRYL) tablet 50 mg, 50 mg, Oral, Q4H PRN, Óscar Cadet MD    EPINEPHrine (ANAPHYLAXIS) 1 mg/ml injection kit, 0.3 mg, Intramuscular, Q1H PRN, Óscar Cadet MD    eplerenone (INSPRA) tablet 50 mg, 50 mg, Oral, Q12H, Óscar Cadet MD, 50 mg at 01/14/25 0928    famotidine (PEPCID) tablet 40 mg, 40 mg, Oral, Daily, Óscar Cadet MD, 40 mg at 01/14/25 0929    fluticasone (FLONASE) 50 MCG/ACT nasal spray 1 spray, 1 spray, Each Nare, BID, Óscar Cadet MD, 1 spray at 01/14/25 0929    HYDROcodone-acetaminophen (NORCO)  MG per tablet 1 tablet, 1 tablet, Oral, Q4H PRN, Óscar Cadet MD, 1 tablet at 01/13/25 0221    HYDROcodone-acetaminophen (NORCO)  MG per tablet 2 tablet, 2 tablet, Oral, Q4H PRN, Óscar Cadet MD, 2 tablet at 01/14/25 0929    hydrOXYzine (ATARAX) tablet 10 mg, 10 mg, Oral, TID PRN, Óscar Cadet MD, 10 mg at 01/14/25 0953    ipratropium (ATROVENT) nebulizer solution 0.5 mg, 500 mcg, Nebulization, 4x Daily - RT, Óscar Cadet MD, 0.5 mg at 01/14/25 1102    LORazepam (ATIVAN) tablet 0.5 mg, 0.5 mg, Oral, Q8H PRN, Óscar Cadet MD, 0.5 mg at 01/13/25 0102    Magnesium Low Dose Replacement - Follow Nurse / BPA Driven Protocol, , Not Applicable, PRN, Óscar Cadet MD    melatonin tablet 5 mg, 5 mg, Oral, Nightly PRN, Óscar Cadet MD, 5 mg at 01/13/25 2101    multivitamin with minerals 1 tablet, 1 tablet, Oral, Daily, Óscar Cadet MD, 1 tablet at 01/14/25 0929    mupirocin (BACTROBAN) 2 % ointment 1 Application, 1 application , Topical, Q24H, Óscar Cadet MD, 1 Application at 01/14/25 0929    NIFEdipine XL (PROCARDIA XL) 24 hr tablet 30 mg, 30 mg, Oral, Q24H, Óscar Cadet MD, 30 mg at 01/14/25 0929    nitroglycerin (NITROSTAT) SL tablet 0.4 mg, 0.4 mg,  Sublingual, Q5 Min PRN, Óscar Cadet MD    ondansetron ODT (ZOFRAN-ODT) disintegrating tablet 8 mg, 8 mg, Oral, Q6H PRN, Óscar Cadet MD    pantoprazole (PROTONIX) EC tablet 40 mg, 40 mg, Oral, Q AM, Óscar Cadet MD, 40 mg at 01/13/25 0608    Phosphorus Replacement - Follow Nurse / BPA Driven Protocol, , Not Applicable, PRN, Óscar Cadet MD    polyethylene glycol (MIRALAX) packet 17 g, 17 g, Oral, Daily PRN, Óscar Cadet MD    Polyvinyl Alcohol-Povidone PF (ARTIFICIAL TEARS) 1.4-0.6 % ophthalmic solution 1 drop, 1 drop, Both Eyes, 4x Daily PRN, Óscar Cadet MD    Potassium Replacement - Follow Nurse / BPA Driven Protocol, , Not Applicable, PRN, Óscar Cadet MD    predniSONE (DELTASONE) tablet 10 mg, 10 mg, Oral, Daily, Óscar Cadet MD, 10 mg at 01/14/25 0929    promethazine-codeine (PHENERGAN with CODEINE) 6.25-10 MG/5ML syrup 5 mL, 5 mL, Oral, 4x Daily PRN, Óscar Cadet MD    rivaroxaban (XARELTO) tablet 20 mg, 20 mg, Oral, Daily, Óscar Cadet MD, 20 mg at 01/14/25 0929    rosuvastatin (CRESTOR) tablet 5 mg, 5 mg, Oral, QAM, Óscar Cadet MD, 5 mg at 01/14/25 0929    sodium chloride 0.9 % flush 3 mL, 3 mL, Intravenous, Q12H, Óscar Cadet MD, 3 mL at 01/14/25 0931    sodium chloride 0.9 % flush 3-10 mL, 3-10 mL, Intravenous, PRN, Óscar Cadet MD    sodium chloride 0.9 % infusion 40 mL, 40 mL, Intravenous, PRN, Óscar Cadet MD    torsemide (DEMADEX) tablet 50 mg, 50 mg, Oral, Daily, Óscar Cadet MD, 50 mg at 01/14/25 0929    triamcinolone (KENALOG) 0.1 % cream 1 Application, 1 Application, Topical, BID, Óscar Cadet MD, 1 Application at 01/14/25 0931    vitamin B-12 (CYANOCOBALAMIN) tablet 1,000 mcg, 1,000 mcg, Oral, Daily, Óscar Cadet MD, 1,000 mcg at 01/14/25 0929    vitamin D (ERGOCALCIFEROL) capsule 50,000 Units, 50,000 Units, Oral, Weekly, Óscar Cadet MD, 50,000 Units at 01/14/25 0929     Physical  Exam:    General:   Awake, alert. Laying in bed eating breakfast. 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    Hyperlipidemia LDL goal <70    Urge incontinence of urine    Chronic tension-type headache, intractable    Stage 2 chronic kidney disease    Cerebral atherosclerosis    Malaise and fatigue progressive    Dizziness    Vitamin D deficiency    Moderate persistent asthmatic bronchitis without complication    Pleurisy    Elevated PTH level    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  T    Severe malnutrition    KECIA (generalized anxiety disorder)    Volume depletion    Cervical stenosis of spine    Recurrent falls while walking    Closed head injury with concussion    Hypokalemia    Anemia, mild    Goiter diffuse    Pleasant 84-year-old female with multiple comorbidities including h/o breast cancer s/p double mastectomy presented to the ED complaining of bilateral lower extremity weakness after falling on floor and unable to get up on her own.  During my initial interview she told me her leg weakness has been progressive but appears that she is told other providers it is acute.  Tried to discuss time frame of weakness again with her  "today but she continues to be fixated on bug bites. Her legs do feel stronger on exam today. Refused stress test this morning and MRI's last night. Asking for medication prior to MRI. We have no issue with this, will defer to medicine given her multiple comorbidities. Encouraged her to try to get MRI as we need this to further address weakness.     PLAN:     MRI brain, cervical, and lumbar w/ & w/o contrast  Will defer antianxiety medication prior to MRI to medicine      I discussed the patient's findings and my recommendations with patient, nursing staff, and Dr. Vasquez    During patient visit, I utilized appropriate personal protective equipment including mask and gloves.  Mask used was standard procedure mask. Appropriate PPE was worn during the entire visit.  Hand hygiene was completed before and after.      LOS: 2 days       Srhuti Pires, APRN  1/14/2025  13:44 EST    \"Dictated utilizing Dragon dictation\".     "

## 2025-01-14 NOTE — PROGRESS NOTES
[facility] Progress Note      Patient Name: Sasha Barrett  : 1940  MRN: [id number]  Primary Care Physician:  Óscar Cadet MD  Date of admission: 2025    Subjective     Interval History:   Follow-up ERMIAS on CKD 2.  Follow-up ERMIAS on CKD 2.  Excellent urine output.  Eating.  Bowels moving.    Review of Systems:   As noted above    Objective     Vitals:   Temp:  [97.7 °F (36.5 °C)-98.4 °F (36.9 °C)] 97.7 °F (36.5 °C)  Heart Rate:  [65-81] 81  Resp:  [16-22] 20  BP: (139-184)/(61-99) 154/99    Intake/Output Summary (Last 24 hours) at 2025 1342  Last data filed at 2025 0655  Gross per 24 hour   Intake 120 ml   Output 1500 ml   Net -1380 ml       Physical Exam:    General Appearance: alert, oriented x 3, no acute distress   Skin: warm and dry  HEENT: oral mucosa edentulous, nonicteric sclera  Neck: supple, no JVD  Lungs: Clear to auscultation  Heart: RRR, normal S1 and S2  Abdomen: soft, nontender, nondistended.+bs  : no palpable bladder  Extremities: no edema      Scheduled Meds:     cetirizine, 10 mg, Oral, Daily  eplerenone, 50 mg, Oral, Q12H  famotidine, 40 mg, Oral, Daily  fluticasone, 1 spray, Each Nare, BID  ipratropium, 500 mcg, Nebulization, 4x Daily - RT  multivitamin with minerals, 1 tablet, Oral, Daily  mupirocin, 1 application , Topical, Q24H  NIFEdipine XL, 30 mg, Oral, Q24H  pantoprazole, 40 mg, Oral, Q AM  potassium chloride, 20 mEq, Oral, Daily  predniSONE, 10 mg, Oral, Daily  rivaroxaban, 20 mg, Oral, Daily  rosuvastatin, 5 mg, Oral, QAM  sodium chloride, 3 mL, Intravenous, Q12H  torsemide, 50 mg, Oral, Daily  triamcinolone, 1 Application, Topical, BID  cyanocobalamin, 1,000 mcg, Oral, Daily  vitamin D, 50,000 Units, Oral, Weekly      IV Meds:        Results Reviewed:   I have personally reviewed the results from the time of this admission to 2025 13:42 EST     Results from last 7 days   Lab Units 25  0540 25  0648 25  1551    SODIUM mmol/L 144 144 144   POTASSIUM mmol/L 4.6 3.6 3.0*   CHLORIDE mmol/L 109* 108* 102   CO2 mmol/L 28.0 27.3 29.2*   BUN mg/dL 20 19 26*   CREATININE mg/dL 0.94 1.05* 1.38*   CALCIUM mg/dL 9.1 8.9 9.3   BILIRUBIN mg/dL 0.4 0.4 0.3   ALK PHOS U/L 88 85 100   ALT (SGPT) U/L 22 22 27   AST (SGOT) U/L 16 28 35*   GLUCOSE mg/dL 91 97 114*       Estimated Creatinine Clearance: 35.1 mL/min (by C-G formula based on SCr of 0.94 mg/dL).    Results from last 7 days   Lab Units 01/14/25  0540 01/13/25  0648 01/12/25  1551   MAGNESIUM mg/dL 1.9 1.9 1.9   PHOSPHORUS mg/dL  --  2.1*  --        Results from last 7 days   Lab Units 01/14/25  0540   URIC ACID mg/dL 5.2       Results from last 7 days   Lab Units 01/14/25  0540 01/13/25  0649 01/12/25  1551   WBC 10*3/mm3 7.00 7.70 8.05   HEMOGLOBIN g/dL 10.6* 10.7* 11.6*   PLATELETS 10*3/mm3 170 179 187             Assessment / Plan     ASSESSMENT:  Acute kidney injury on CKD stage II baseline creatinine 0.9-1.  Underlying hypertensive nephrosclerosis.  Function has returned to baseline.  Heart failure preserved ejection fraction on oral torsemide.  Compensated.  Status post mechanical fall.  History of DVT and [place] embolism.  On Xarelto.  History of primary hyperparathyroidism status post parathyroidectomy.  Calcium corrects to 9.9.  Phosphorus low yesterday.  Recheck phos tomorrow.  6.  Hypertension.  Watch today.  May need to increase Procardia XL to 60 mg daily.  PLAN:  DC K supplement  Recheck phos in am .    Thank you for involving us in the care of Sasha Barrett.  Please feel free to call with any questions.    Alexa Espinoza MD  01/14/25  13:42 [facility]    [facility]  [phone]    Please note that portions of this note were completed with a voice recognition program.

## 2025-01-14 NOTE — PROGRESS NOTES
[facility]  Cardiology Progress Note    Patient Identification:  Name: Sasha Barrett  Age: 84 y.o.  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 Yazmin Canseco    CARDIAC ELECTROPHYSIOLOGY PROCEDURE N/A 2022    Procedure: Loop insertion;  Surgeon: Jeffrey Argueta MD;  Location:  [facility];  Service: Cardiovascular;  Laterality: N/A;  biotronik    CARDIAC ELECTROPHYSIOLOGY PROCEDURE N/A 2022    Procedure: Loop recorder removal;  Surgeon: Jeffrey Argueta MD;  Location:  [facility];  Service: Cardiovascular;  Laterality: N/A;    CARPAL TUNNEL RELEASE Right 2011    Dr. Caleb Bueno    CARPAL TUNNEL RELEASE Left 2011    Dr. Caleb Bueno    CATARACT EXTRACTION Left 2010    Dr. Eusebio Dwons    CATARACT EXTRACTION Right 2010    Dr. Eusebio Downs    COLONOSCOPY N/A 2002    Sigmoid diverticulosis-Dr. Brandon Batista    COLONOSCOPY N/A 2013    Tortuous colon, diverticulosis  in the sigmoid colon and descending colon, stool in the rectum, sigmoid colon, descending colon, splenic flexure, transverse colon and hepatic flexure-Dr. Irma Brambila    DEQUERVAIN RELEASE Left 9/23/2020    Procedure: DEQUERVAIN RELEASE LEFT HAND;  Surgeon: Caleb Bueno MD;  [facility] [place];  Service: Plastics;  Laterality: Left;    ENDOSCOPY N/A 09/13/2007    Normal-Dr. Pavel Quarles    ENDOSCOPY AND COLONOSCOPY N/A 09/19/2005    1 cm hiatal hernia, mild Schatzki's ring, sigmoid diverticulosis-Dr. Yoel Farley    GANGLION CYST EXCISION Left 12/18/2012    Release of A1 pulley flexor sheath, left fourth finger, excision of ganglion cyst 0.5 cm diameter, A2 pulley flexor sheath, left fourth finger-Dr. Caleb Bueno    HEEL SPUR EXCISION Left 1968    INGUINAL HERNIA REPAIR Right 1971    at 5 months pregnant     INJECTION OF MEDICATION Left 9/23/2020    Procedure: KENOLOG INJECTION TO LEFT THUMB;  Surgeon: Caleb Bueno MD;  Location: [facility] OR [facility] [place];  Service: Plastics;  Laterality: Left;    MASTECTOMY Right 08/05/2008    Right breast mastectomy and excision of left chest wall lesion-Dr. Yoel Farley    MASTECTOMY RADICAL Left 04/29/2003    Left modified radical mastectomy-Dr. Yazmin Canseco    PARATHYROIDECTOMY Right 05/04/2004    Excision of parathyroid adenoma (right superior)-Dr. Yoel Farley    REPLACEMENT TOTAL KNEE Right 04/09/2012    Dr. Lamonte Childress    SINUS SURGERY  1984    THYROIDECTOMY, PARTIAL Left 05/04/2004    Dr. Yoel Farley    TOTAL ABDOMINAL HYSTERECTOMY Bilateral 1973    Dr. Mahan    TRIGGER FINGER RELEASE Left 9/23/2020    Procedure: RELEASE LEFT FOURTH TRIGGER FINGER;  Surgeon: Caleb Bueno MD;  Location: [facility] OR [facility] [place];  Service: Plastics;  Laterality: Left;    UPPER GASTROINTESTINAL ENDOSCOPY N/A 02/18/2009    LA Grade A reflux esophagitis, normal stomach, normal 2nd part of the duodenum-Dr. Yoel Farley    WRIST GANGLION  EXCISION Left 9/23/2020    Procedure: EXCISION GANGLION CYST LEFT WRIST;  Surgeon: Caleb Bueno MD;  Location: [facility] OR [facility] [place];  Service: Plastics;  Laterality: Left;        Social History:   Social History     Tobacco Use    Smoking status: Never     Passive exposure: Never    Smokeless tobacco: Never   Substance Use Topics    Alcohol use: Yes     Comment: 3 drinks yearly      Family History:  Family History   Problem Relation Age of Onset    Brain cancer Brother     Alcohol abuse Mother     No Known Problems Father     No Known Problems Sister     Maljaziel Hyperthermia Neg Hx           Allergies:  Allergies   Allergen Reactions    Bee Venom Shortness Of Breath and Rash    Morphine Anaphylaxis and Nausea And Vomiting    Sulfa Antibiotics Anaphylaxis and Hives     Or sulfa fillers in meds  Broke out in internal and external hives      Tree Extract Shortness Of Breath and Rash    Ambien [Zolpidem] Hallucinations    Anastrozole Other (See Comments)     Can't remember what the reaction was     Covid-19 (Mrna) Vaccine Swelling     Facial swelling, blood clots    Eliquis [Apixaban] Headache     Headaches, nausea and itching.     Hydralazine Myalgia     Patient reports leg cramps, joint pain and increased fatigue    Norvasc [Amlodipine] Other (See Comments)     KIDNEYS SHUT DOWN    Nsaids Other (See Comments)     KIDNEY FAILURE    Penicillins Unknown (See Comments)     Severe reaction as a child  Tolerated ceftriaxone during 04/2017 admission    Grass Rash     Scheduled Meds:  cetirizine, 10 mg, Daily  eplerenone, 50 mg, Q12H  famotidine, 40 mg, Daily  fluticasone, 1 spray, BID  ipratropium, 500 mcg, 4x Daily - RT  multivitamin with minerals, 1 tablet, Daily  mupirocin, 1 application , Q24H  NIFEdipine XL, 30 mg, Q24H  pantoprazole, 40 mg, Q AM  predniSONE, 10 mg, Daily  rivaroxaban, 20 mg, Daily  rosuvastatin, 5 mg, QAM  sodium chloride, 3 mL, Q12H  torsemide, 50 mg, Daily  triamcinolone, 1 Application,  BID  cyanocobalamin, 1,000 mcg, Daily  vitamin D, 50,000 Units, Weekly            INTAKE AND OUTPUT:    Intake/Output Summary (Last 24 hours) at 1/14/2025 1554  Last data filed at 1/14/2025 1417  Gross per 24 hour   Intake 120 ml   Output 2500 ml   Net -2380 ml       Review of Systems:    GI:  Cardiac:  Pulmonary:    Constitutional:  Temp:  [97.7 °F (36.5 °C)-98.4 °F (36.9 °C)] 97.7 °F (36.5 °C)  Heart Rate:  [65-88] 78  Resp:  [16-22] 20  BP: (139-172)/(61-99) 149/90      Physical Exam:  General:  Alert, cooperative, appears in no acute distress  Respiratory: Clear to auscultation.  Normal respiratory effort and rate.  Cardiovascular: S1S2 Regular rate and rhythm. No murmur, rub or gallop.   Gastrointestinal: soft, non tender. Bowel sounds present.   Extremities: OLSEN x4. No pretibial pitting edema. Adequate musculoskeletal strength.   Neuro: AAO x3 CN II-XII grossly intact        Cardiographics       ECG: SR    Echocardiogram:     Interpretation Summary         Left ventricular ejection fraction appears to be 61 - 65%.    Left ventricular diastolic function is consistent with (grade I) impaired relaxation.    The left atrial cavity is mildly dilated.    The right atrial cavity is mildly  dilated.    Estimated right ventricular systolic pressure from tricuspid regurgitation is normal (<35 mmHg).     Lab Review   Results from last 7 days   Lab Units 01/14/25  0540 01/13/25  1152 01/13/25  0905 01/13/25  0648 01/12/25  1551   CK TOTAL U/L  --   --   --  92 159   HSTROP T ng/L 29* 31* 31* 35*  --      Results from last 7 days   Lab Units 01/14/25  0540   MAGNESIUM mg/dL 1.9     Results from last 7 days   Lab Units 01/14/25  0540   SODIUM mmol/L 144   POTASSIUM mmol/L 4.6   BUN mg/dL 20   CREATININE mg/dL 0.94   CALCIUM mg/dL 9.1     @[id number]@  Results from last 7 days   Lab Units 01/14/25  0540 01/13/25  0649 01/12/25  1551   WBC 10*3/mm3 7.00 7.70 8.05   HEMOGLOBIN g/dL 10.6* 10.7* 11.6*   HEMATOCRIT % 32.3*  "32.5* 37.5   PLATELETS 10*3/mm3 170 179 187         Interpretation Summary         Left ventricular ejection fraction appears to be 61 - 65%.    Left ventricular diastolic function is consistent with (grade I) impaired relaxation.    The left atrial cavity is mildly dilated.    The right atrial cavity is mildly  dilated.    Estimated right ventricular systolic pressure from tricuspid regurgitation is normal (<35 mmHg).         Assessment:  Atypical chest pain  ERMIAS  hypertension      Plan:  -Chf: Echo revealed ef 61-65 %, lv function consistent with grade 1 impaired relaxation. Lac is mildly dilated. Rac is mildly dilated. Euvolemic on exam.   -chf   -blood pressure slightly elevated. HR stable. Monitor over night.  She declined stress test today.        )1/14/2025  AC Garnett/Transcription:   \"Dictated utilizing Dragon dictation\".     "

## 2025-01-14 NOTE — SIGNIFICANT NOTE
01/14/25 1516   OTHER   Discipline occupational therapist   Rehab Time/Intention   Session Not Performed other (see comments)  (Patient ESTEVAN for X-ray at OT attempt for evaluation. Then pt with respiratory at f/u in pm. OT will follow up 1/15/2025.)   Therapy Assessment/Plan (PT)   Criteria for Skilled Interventions Met (PT) yes   Recommendation   OT - Next Appointment 01/15/25

## 2025-01-14 NOTE — PROGRESS NOTES
[facility]  INTERNAL MEDICINE  ÓSCAR CADET MD  [address] [place] [address]  Phone [phone] Fax [phone]  E-mail:  [email or web address]      INTERNAL MEDICINE DAILY PROGRESS NOTE  Óscar Cadet M.D.  2025            Patient Identification:  Name: Sasha Barrett  Age: 84 y.o.  Sex: female  :  1940  MRN: [id number]         Primary Care Physician: Óscar Cadet MD  LENGTH OF STAY 1 DAYS    Consults       Date and Time Order Name Status Description    2025 11:57 PM Inpatient Nephrology Consult Completed     2025 11:57 PM Inpatient Neurosurgery Consult Completed     2025 11:57 PM Inpatient Neurology Consult Stroke Completed     2025 11:57 PM Inpatient Orthopedic Surgery Consult Completed     2025 11:57 PM Inpatient Cardiology Consult Completed                Chief Complaint:  Recurrent falls with generalized weakness and severe right shoulder pain     History of Present Illness:     Subjective  Interval History: Patient is a 84 y.o.female who presented to the [facility] emergency room by private vehicle on 2025 at 1538 for evaluation of recurrent falls with generalized weakness and severe right shoulder pain.  Mrs. Barrett is a delightful 84-year-old female who I had the pleasure of taking care of for many years in my office.  She and her  of many years lives independently in a home here in the [place] area but do have several children who live nearby and help them with their medical care and other day today needs.  Patient reports that she was in her usual state of health until approximately 3 days ago when she began to fall due to lack of balance and generalized weakness of her lower extremities when trying to bear weight.  At one point, she fell from standing into the floor and struck her head in the back occipital region very hard.  She was unable to get herself out of the floor after  "this fall and was actually laying in the floor for approximately 7 to 8 hours before her  was able to contact two paramedic friends who came and helped get her up and onto the couch.  Since that time, she has been unable to really walk by bearing weight on her lower extremities and has had to use a walker to get around any at all in the house.  Her  has tried to help take care of her, but he is quite ill himself, and really has not been able to provide all the care that she needs to perform her activities of daily living.  Finally, earlier today, patient did call me and reported the above events and at my request has come to the [facility] emergency room for further evaluation of her symptoms.  Patient is complaining of worsening of right shoulder pain which now is quite excruciating and prevents her from using the right arm to do any tasks.  Patient does report she is right-handed.  Patient also has had chronic bilateral foot pain.  She has followed with Dr. Concepcion in the past for this.  In addition, she has seen Dr. Pichardo in the past for the shoulder pain.  Patient also has had increasingly more severe bouts of urinary incontinence since the falls and her  has had a great deal of difficulty keeping her dry.  Patient denies fever, chills, cough, shortness of breath, vomiting, or diarrhea.  She does tell me that she has some mild intermittent chest pain with activity but it does not radiate into her neck or arms and may just represent the pleurisy that she has been diagnosed with in the past.  Patient does admit that since her fall, she has not been able to take her medications regularly and her  has\" messed up her system for management of her meds.\"  She has not been eating or drinking as much as usual but has been taking her diuretic therapy.  When seen in the ER, patient indicates that she had not had her morning medications even though I was seeing her at 7:30 at night.  " Patient was last hospitalized here at [facility] between 10/20/2024 and 10/22/2024 for worsening edema, progressive chronic stage III kidney disease, malaise and fatigue progressive, dyspnea, accelerated hypertension, some ataxia, blood pressure instability, and severe bilateral lower extremity edema.  At that time it was felt that the patient did have adult failure to thrive and dietitian found that she had evidence of severe malnutrition.  Patient insisted on discharge back to home after that visit and did have home health on the case for a few weeks afterwards.  Patient is followed by a nurse practitioner from [facility] in their palliative care program for management of her severe anxiety and intractable pain.  She does take up to 8 Norco 10 tablets/day and up to 3 lorazepam 0.5 mg tablets per day.  In addition she takes Fioricet 2 tablets every 6 hours as needed for severe headaches.  Patient does have multiple other medical comorbidities that complicate her medical condition.  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     Patient was seen and evaluated in the [facility] emergency room by Dr. Everardo Patino, who was the  ER attending physician on-call at the time of her arrival.  She was seen by Dr. Patino on 1/12/2025 at 1538.  The ER HPI was consistent with the story which I given above.  Review of systems in the ER was unremarkable except for the issues which have been discussed above in the HPI.  Physical exam in the emergency room showed temperature of 97.7, pulse 77, respirations 18, blood pressure 136/71, room air oxygen 98%.  Physical exam showed patient who is in no acute distress and nontoxic-appearing.  Her breath sounds were equal when lungs were clear to auscultation.  Her heart showed regular rate and rhythm with intact distal pulses, and GI was soft nontender nondistended no guarding and normal bowel sounds.  Patient had a GCS of 15, moves all extremities no focal deficits but was not walked by the ER staff.  Psychiatrically she was calm and cooperative.  Labs that were collected in the ER included a glucose which was slightly up at 114, BUN was elevated at 26 and creatinine that was elevated at 1.38.  Her sodium was normal at 144 and her potassium was slightly low at 3.0 with a CO2 of 29.2.  A single liver enzyme the AST was elevated at 35.  Patient's estimated GFR was 37.4 and her anion gap was 12.8.  Magnesium level was normal at 1.9.  White blood cell count was normal at 8.05.  Hemoglobin was slightly low at 11.6 and platelets were 187,000.  Patient CK was 859.  Her UA was clear yellow normal specific gravity of 1.017 and otherwise unremarked couple.  Patient did have a CT scan of head done in the emergency room which showed chronic encephalomalacia in the left occipital lobe with evidence of an old infarct that is unchanged.  There was also degenerative disc disease in the cervical spine at C5-6 with a small central posterior disc protrusion that contacts the anterior cervical cord with mild narrowing of the central canal.  This was not evident on a previous CT scan from 10/4/2021.  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  X-ray of the shoulder shows that the humeral head is high riding and there is moderate acromioclavicular glenohumeral joint osteoarthritis.  Bones are noted to be osteopenic but there is no clear-cut evidence of fracture.  Medications given in the ER included a small 500 cc bolus of normal saline with fluid of normal saline at 75 cc/h, potassium replacement was given, case was discussed with me by the ER attending and above positive findings were reviewed.  Due to her persistent generalized weakness and inability to walk along with the acute kidney injury probably due to volume depletion, it was felt that we did need to admit the patient for further diagnostic evaluation and workup of these issues along with treatment.  Patient was admitted to my service on a telemetry floor with final ER diagnoses of generalized weakness, recurrent falls, hypokalemia, acute renal insufficiency.  Patient was waiting for transport to the floor when I left the ER after seeing the patient there.     1/12/2025.  I personally saw the patient for the first time during this hospitalization while she still remained in the emergency room in room #14.  Patient was resting quietly on the emergency room stretcher and immediately recognized me upon entry into the room.  She was quite tearful at times during my interview with her and seemed very upset with her  because he has not been able to meet all of her needs recently.  She really did not want to bother her kids and as a result had not called them until today.  Since her  was unable to drive her to the emergency room, she did call her kids to make them aware of her situation and they were the ones who brought her in to the emergency room for this evaluation.  I wore full PPE for the exam as indicated including an N95 mask for needed, goggles, white lab coat, and gloves when touching patient.  I  performed thorough hand hygiene before and after the patient visit.  I confirmed the physical exam from the emergency room.  Patient was able to raise both legs off the bed and did have some mild passive resistance but legs muscles did feel quite weak.  Patient reported she was unable to bear weight on her legs or stand when I ask her to get out of bed.  Deformity of right foot is stable.  Patient's speech is a little more garbled than usual.  She also seems to be having trouble recalling specific details which is an unusual finding for this patient who usually is very sharp and focused with her thought processes.  Patient is concerned that she might of had another stroke like the 1 she had several years ago and does not want to consider further evaluation.  I did discuss my plans with her and we will get consultations that will include renal to evaluate her volume status and acute kidney injury, neurology for evaluation of mental status changes, possible closed head injury, history of old CVA, neurosurgery to evaluate the CT changes and cervical spine relating them to difficulty walking, cardiology requested by patient because of intermittent chest pain she has been experiencing, orthopedics for evaluation of the right shoulder deformity which is an ongoing problem and has been seen by Dr. Pichardo in the past.  Patient is agreeable to this workup.  Patient is continued on her other medications as before.  For workup we will consider doing MRIs once neurology and neurosurgery to see her and declare exactly which types of MRIs they feel would be most appropriate.  I did order additional x-rays to include thoracic and lumbar spine films.  EKG is to be done in AM.  Additional labs are ordered and will be evaluated for possible sources of the weakness.  Patient otherwise is continued on her same regular medical therapy at this point.  Patient was medically stable when I left the room and was on her way to the floor by  transport a short time later.    2025. Patient was seen again today at the time of my rounds on 6 S. in room 610.  Patient was sitting up in the chair at the time of my visit and was eating her lunch.  Patient's nurse was present at bedside and we did review her case with the patient and discussed the specialty visits that have been done up to this point.  I also discussed the case today with Lila HYLTON, and found that patient is expressing a strong desire to go home if at all possible at the end of the hospitalization.  She seems to be very fearful of skilled nursing facilities due to the fact that her mother-in-law  at such a facility.  I wore full PPE as indicated for the exam including an N95 mask when necessary, goggles, white lab coat, and gloves when touching patient.  I performed thorough hand hygiene before and after the patient visit.  Patient's labs today show the following: Creatinine kinase has come down from 159-92, troponin is slightly elevated at 31 with a proBNP of 1069.  Patient's CMP shows that her sodium is stable at 144.  Potassium has been corrected up to 3.6.  Chloride is 108.  Her creatinine has improved from 1.38 down to 1.05 and with that her BUN is also improved from 26 down to 19.  Patient's estimated GFR has increased from 37.4 up to 51.9.  Her ionized calcium is normal at 1.26, her magnesium is stable at 1.9, and her phosphorus was slightly low at 2.1 and is being corrected at the time of my visit.  Patient's total protein was slightly low at 5.5 and albumin was slightly low at 2.9.  Hemoglobin A1c was normal at 5.60.  PTH was slightly up at 80.8.  TSH was normal at 1.57.  Iron level was normal at 57 with iron sat of 32 transferrin of 119 and TIBC of 177.  Vitamin B12 was normal at 299 and folate was normal at 7.24.  Patient's lipid profile was also essentially normal.  HDL was a tiny bit low at 36.  Lactate level was normal at 1.1.  Lipase level was normal at 18.  And vitamin  D 25-hydroxy level was normal at 70.2.  Patient's white blood cell count was 7.7.  Patient's hemoglobin was 10.7 with hematocrit of 32.5.  Patient had an RPR that was nonreactive.  Patient's urine chemistries were relatively unremarkable except her protein creatinine ratio was up at 734 patient's respiratory viral panel was totally normal for all pathogens tested including COVID-19, influenza A and B, and RSV.  Patient's urine drug screen was positive as expected for barbiturates, benzodiazepines, and opiates.  X-ray reports show that her shoulder has osteopenia which limits evaluation for fractures.  There is moderate before meals and glenohumeral joint arthritis and the humeral head is very high riding.  X-ray of the patient's thoracic spine shows no acute fractures or laxity identified.  There is degenerative changes in the spine further evaluation with MRI may be indicated.  Evaluation of patient's lumbar spine shows no acute fracture or laxity there are degenerative changes throughout the spine and MRI could be considered for further evaluation here also.  Ultrasound of patient's thyroid was done but report seems to be pending at present time.  EKG shows normal sinus rhythm heart rate 69 left ventricular hypertrophy anterior ST changes due to LVH.  Patient did have an echocardiogram showing ejection fraction of 61 to 65%.  Left ventricular diastolic function is consistent with grade 1 impaired relaxation.  Left atrium is mildly dilated right atrium is mildly dilated.  Right ventricular systolic pressure from tricuspid regurg is normal.  Specialty notes of the doctors who saw the patient are summarized as below: Cardiology did see the patient and feels that she probably does need a stress test based on her intermittent chest pains.  Chest pains appear to be very atypical however.  Troponin is elevated.  Echocardiogram unremarkable.  [facility] is continuing to follow for discharge planning.  Home health referral to  [facility] health has been placed at patient's request.  Physical therapy did evaluate the patient today patient performed bed mobility exercises with standby assist, required minimum assist for transfer and was able to ambulate just 15 feet using rolling walker requiring minimum assist.  Patient was quite unsteady with her gait and did walk at a very slow pace.  Strength, activity tolerance and balance deficits all noted.  PT anticipated possible skilled nursing facility discharge but patient does not wish to go there.  PT does not feel patient is safe for discharge to home at this point.  Renal did see patient in consultation.  They have discontinued IV fluid at this point and decrease torsemide to 50 mg daily.  They are checking urine chemistries, orthostatic blood pressures and bladder scan for completeness.  Neurosurgery did see patient in consultation.  They are planning to follow-up with MRI of brain, cervical spine, and lumbar spine with and without contrast.  Case was discussed with Dr. Vasquez.  Speech therapy did see patient and found that she did well with swallow study and had no aspiration of thin or solids.  Patient does take pills 1 at a time to avoid choking.  Recommended soft diet with chopped meats thin liquids.  Angelica from the pharmacy did discuss the case with me and we have corrected her Norco and her clonidine to match patient stated at home dosing.  Orthopedics Dr. Morin did see the patient.  He does feel the shoulder issues are chronic for the patient and recommended continuing Voltaren gel which has worked well for her in the past.  He also suggested ice as needed and follow-up with Dr. Pichardo in clinic.  Neurology did see patient in consultation.  They agreed with the neurosurgery detailed evaluation with MRI of cervical thoracic and lumbar spine.  They did order thyroid ultrasound, vitamin B12 level, RPR, cryoglobulins, SPEP, heavy metals, and copper.  Occupational Therapy did see  the patient but she was off the unit at the time of their visit and they will follow-up on 114/2025.  Dietitian did see the patient and recommended boost twice daily and monitoring of p.o. intake.  They did not feel calorie intake was appropriate at that time since she is going to be n.p.o. for MRIs overnight tonight.  Patient did complain to me of new pain in the right hip which she noted after rolling side-to-side for her spine films earlier today.  We are going to complete an x-ray of the right hip for completeness sake to be sure there were no specific injuries to the hip at the time of her fall.  Otherwise patient does appear to be medically stable at present time.  BP has been fairly well-controlled today though adjustment of medication may be necessary.  We will continue to follow patient closely.     Review of Systems:               A comprehensive 14 point review of systems was negative except for:  Constitution:  positive for anorexia, fatigue, and malaise  Eyes:  positive for blurriness and dryness  Cardiovascular: positive for  chest pressure / pain, at rest, lower extremity edema, and palpitations  Gastrointestinal: positive for  early satiety, heartburn, nausea, and vomiting  Hematologic / Lymphatic: positive for  fatigue  Musculoskeletal: positive for  back pain, joint pain, muscle pain, muscle weakness, and neck pain  Neurological: positive for  coordination abnormal, difficulty walking, confusion, dizziness, headaches, vertigo, and weakness  Behavioral/Psych: positive for  anxiety and depression  Endocrine: positive for  diabetes:  dry mouth and hot flashes         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 Yazmin Canseco    CARDIAC ELECTROPHYSIOLOGY PROCEDURE N/A 5/27/2022    Procedure: Loop insertion;  Surgeon: Jeffrey Argueta MD;  Location:  [facility];  Service: Cardiovascular;  Laterality: N/A;  biotronik    CARDIAC ELECTROPHYSIOLOGY PROCEDURE N/A 7/13/2022    Procedure: Loop recorder removal;  Surgeon: Jeffrey Argueta MD;  Location:  [facility];  Service: Cardiovascular;  Laterality: N/A;    CARPAL TUNNEL RELEASE Right 05/25/2011    Dr. Caleb Bueno    CARPAL TUNNEL RELEASE Left 04/26/2011    Dr. Caleb Bueno    CATARACT EXTRACTION Left 11/29/2010    Dr. Eusebio Downs    CATARACT EXTRACTION Right 11/17/2010    Dr. Eusebio Downs    COLONOSCOPY N/A 12/06/2002    Sigmoid diverticulosis-Dr. Brandon Batista    COLONOSCOPY N/A 12/12/2013    Tortuous colon, diverticulosis in the sigmoid colon and descending colon, stool in the rectum, sigmoid colon, descending colon, splenic flexure, transverse colon and hepatic flexure-Dr. Irma Brambila    DEQUERVAIN RELEASE Left 9/23/2020    Procedure: DEQUERVAIN RELEASE LEFT HAND;  Surgeon: Caleb Bueno MD;  [place]  [facility];  Service: Plastics;  Laterality: Left;    ENDOSCOPY N/A 09/13/2007    Normal-Dr. Pavel Quarles    ENDOSCOPY AND COLONOSCOPY N/A 09/19/2005    1 cm hiatal hernia, mild Schatzki's ring, sigmoid diverticulosis-Dr. Yoel Farley    GANGLION CYST EXCISION Left 12/18/2012    Release of A1 pulley flexor sheath, left fourth finger, excision of ganglion cyst 0.5 cm diameter, A2  pulley flexor sheath, left fourth finger-Dr. Caleb Bueno    HEEL SPUR EXCISION Left 1968    INGUINAL HERNIA REPAIR Right 1971    at 5 months pregnant     INJECTION OF MEDICATION Left 9/23/2020    Procedure: KENOLOG INJECTION TO LEFT THUMB;  Surgeon: Caleb Bueno MD;  Location: [facility] OR [facility] [place];  Service: Plastics;  Laterality: Left;    MASTECTOMY Right 08/05/2008    Right breast mastectomy and excision of left chest wall lesion-Dr. Yoel Farley    MASTECTOMY RADICAL Left 04/29/2003    Left modified radical mastectomy-Dr. Yazmin Canseco    PARATHYROIDECTOMY Right 05/04/2004    Excision of parathyroid adenoma (right superior)-Dr. Yoel Farley    REPLACEMENT TOTAL KNEE Right 04/09/2012    Dr. Lamonte Childress    SINUS SURGERY  1984    THYROIDECTOMY, PARTIAL Left 05/04/2004    Dr. Yoel Farley    TOTAL ABDOMINAL HYSTERECTOMY Bilateral 1973    Dr. Mahan    TRIGGER FINGER RELEASE Left 9/23/2020    Procedure: RELEASE LEFT FOURTH TRIGGER FINGER;  Surgeon: Caleb Bueno MD;  Location: [facility] OR [facility] [place];  Service: Plastics;  Laterality: Left;    UPPER GASTROINTESTINAL ENDOSCOPY N/A 02/18/2009    LA Grade A reflux esophagitis, normal stomach, normal 2nd part of the duodenum-Dr. Yoel Farley    WRIST GANGLION EXCISION Left 9/23/2020    Procedure: EXCISION GANGLION CYST LEFT WRIST;  Surgeon: Caleb Bueno MD;  Location: [facility] OR [facility] [place];  Service: Plastics;  Laterality: Left;     Allergies   Allergen Reactions    Bee Venom Shortness Of Breath and Rash    Morphine Anaphylaxis and Nausea And Vomiting    Sulfa Antibiotics Anaphylaxis and Hives     Or sulfa fillers in meds  Broke out in internal and external hives      Tree Extract Shortness Of Breath and Rash    Ambien [Zolpidem] Hallucinations    Anastrozole Other (See Comments)     Can't remember what the reaction was     Covid-19 (Mrna) Vaccine Swelling     Facial swelling, blood clots    Eliquis [Apixaban] Headache     Headaches, nausea  and itching.     Hydralazine Myalgia     Patient reports leg cramps, joint pain and increased fatigue    Norvasc [Amlodipine] Other (See Comments)     KIDNEYS SHUT DOWN    Nsaids Other (See Comments)     KIDNEY FAILURE    Penicillins Unknown (See Comments)     Severe reaction as a child  Tolerated ceftriaxone during 04/2017 admission    Grass Rash       Family History   Problem Relation Age of Onset    Brain cancer Brother     Alcohol abuse Mother     No Known Problems Father     No Known Problems Sister     Maljaziel Hyperthermia Neg Hx        Social History     Socioeconomic History    Marital status:      Spouse name: Joey    Number of children: 6    Highest education level: Some college, no degree   Tobacco Use    Smoking status: Never     Passive exposure: Never    Smokeless tobacco: Never   Vaping Use    Vaping status: Never Used   Substance and Sexual Activity    Alcohol use: Yes     Comment: 3 drinks yearly    Drug use: Never    Sexual activity: Defer     Birth control/protection: Surgical       PMH, FH, SH and ROS completed with Admission History and Physical and updated in EPIC system.        Objective     Scheduled Meds:cetirizine, 10 mg, Oral, Daily  eplerenone, 50 mg, Oral, Q12H  famotidine, 40 mg, Oral, Daily  fluticasone, 1 spray, Each Nare, BID  ipratropium, 500 mcg, Nebulization, 4x Daily - RT  multivitamin with minerals, 1 tablet, Oral, Daily  mupirocin, 1 application , Topical, Q24H  NIFEdipine XL, 30 mg, Oral, Q24H  pantoprazole, 40 mg, Oral, Q AM  potassium chloride, 20 mEq, Oral, Daily  predniSONE, 10 mg, Oral, Daily  rivaroxaban, 20 mg, Oral, Daily  rosuvastatin, 5 mg, Oral, QAM  sodium chloride, 3 mL, Intravenous, Q12H  torsemide, 50 mg, Oral, Daily  triamcinolone, 1 Application, Topical, BID  cyanocobalamin, 1,000 mcg, Oral, Daily  vitamin D, 50,000 Units, Oral, Weekly      Continuous Infusions:     Vital signs in last 24 hours:  Temp:  [98 °F (36.7 °C)-98.2 °F (36.8 °C)] 98.2 °F (36.8  "°C)  Heart Rate:  [65-81] 65  Resp:  [16-18] 18  BP: (139-184)/(61-94) 172/70    Intake/Output:    Intake/Output Summary (Last 24 hours) at 1/14/2025 0256  Last data filed at 1/13/2025 2000  Gross per 24 hour   Intake 120 ml   Output 900 ml   Net -780 ml       Exam:  /70 (BP Location: Right arm, Patient Position: Lying)   Pulse 65   Temp 98.2 °F (36.8 °C) (Oral)   Resp 18   Ht 152.4 cm (60\")   Wt 65.8 kg (145 lb)   SpO2 95%   BMI 28.32 kg/m²     Constitutional:  Alert, cooperative,  Sitting up in chair at time of my visit  moderatedistress, AAOx3, resting comfortably, having some difficulty eating whole foods.   Head:      Normocephalic, without obvious abnormality, atraumatic   Eyes:     PERRLA, conjunctiva/corneas clear, no icterus, no conjunctival                                     pallor, EOM's intact, both eyes      ENT and Mouth: Lips, tongue, gums normal; oral mucosa pink and moist   Neck:     Supple, symmetrical, trachea midline, no JVD  Respiratory:     Clear to auscultation bilaterally, respirations unlabored  Cardiovascular:  Regular rate and rhythm, S1 and S2 normal, no murmur,      no  Rub or gallop.  Pulses normal.    Gastrointestinal:   BS present x 4 Soft, non-tender, bowel sounds active,      no masses, no hepatosplenomegaly                                                     :       No hernia.  Normal exam for sex.         Musculoskeletal: Extremities normal, atraumatic, no cyanosis or edema     No arthropathy.  No deformity.  Gait normal                                                 Skin:   Skin is warm and dry,  no rashes, swelling or palpable lesions   Neurologic:  CN -XII intact, motor strength grossly intact, sensation grossly intact to light touch, no focal reflex deficits noted   Somewhat confused at times about specific details.   Psychiatric:     Alert,oriented X3, no delusions, psychoses, depression or anxiety    Heme/Lymph/Imun:   No bruises, petechiae.  Lymph nodes " normal in size/configuration.  Anxiety and stress levels high.       Data Review:  Lab Results   Component Value Date    CALCIUM 8.9 01/13/2025    PHOS 2.1 (L) 01/13/2025     Results from last 7 days   Lab Units 01/13/25  0649 01/13/25  0648 01/12/25  1551   AST (SGOT) U/L  --  28 35*   ALT (SGPT) U/L  --  22 27   MAGNESIUM mg/dL  --  1.9 1.9   SODIUM mmol/L  --  144 144   POTASSIUM mmol/L  --  3.6 3.0*   CHLORIDE mmol/L  --  108* 102   CO2 mmol/L  --  27.3 29.2*   BUN mg/dL  --  19 26*   CREATININE mg/dL  --  1.05* 1.38*   GLUCOSE mg/dL  --  97 114*   CALCIUM mg/dL  --  8.9 9.3   WBC 10*3/mm3 7.70  --  8.05   HEMOGLOBIN g/dL 10.7*  --  11.6*   PLATELETS 10*3/mm3 179  --  187     Lab Results   Component Value Date    CKTOTAL 92 01/13/2025    CKMB 1.69 04/06/2017    TROPONINT 31 (H) 01/13/2025     Estimated Creatinine Clearance: 32.5 mL/min (A) (by C-G formula based on SCr of 1.05 mg/dL (H)).  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    Stage 2 chronic kidney disease    Malaise and fatigue progressive    Dizziness    Dyspnea on exertion    Accelerated hypertension    Ataxia    Generalized muscle weakness    Volume depletion    Cervical stenosis of spine    Recurrent falls while walking    Closed head injury with concussion    Hypokalemia    Urinary retention self-caths (Don)    Hyperlipidemia LDL goal <70    Urge incontinence of urine    Chronic tension-type headache, intractable    Cerebral atherosclerosis    Vitamin D deficiency    Moderate persistent asthmatic bronchitis without complication    Elevated PTH level    H/O Cerebral vascular accident in left occipital area    Acute joint pain    COPD with asthma    Adult failure to thrive syndrome    Acute intractable headache  intensified since Covid-19 infection 10/2/2021    Bilateral lower extremity edema, multifactorial = unrestricted salt intake, inadequate Lasix dosing, chronic lung disease, and obesity.  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  Fall from standing with closed head injury 3 days ago denying loss of consciousness, but unable to walk or bear weight on lower extremities since event with generalized muscle weakness and dizziness.  Exact cause of patient's symptoms not clear at this point.  Neurology and neurosurgery asked to evaluate.  Plan to follow-up plain films and CT scans with MRI of head and entire spine if felt to be appropriate by neurology and / or neurosurgery.  Comprehensive labs are ordered.  PT OT and speech therapy consults are requested.  Patient may require short stay in rehab because of the profound weakness and poor balance and will have CCP see her in preparation for this possible eventuality.  Further scans planned by neurosurgery tomorrow of entire spine.  Patient n.p.o. tonight in preparation for those studies.  No specific neurologic findings are discovered by neurology or neurosurgery at present time.  Changes on brain CT felt to probably be chronic in nature.     2.  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  Renal asked to see patient in consultation for their input on her condition.  She is followed regularly by Dr. Bond in his clinic.  I have also ask nephrology to help with management of patient's diuretics and her blood pressure.  Blood pressure readings appear to be quite labile since arrival here in the hospital.  Patient reports that she is using variable amounts of clonidine up to 0.3 every morning, 0 point  2 in the afternoon and 0.2 at bedtime.  Patient is admittedly a bit confused about the dosing on her medications at present time.  Renal is studying patient's electrolytes.  Patient started back on her clonidine 0.3 every morning and point 2 in the afternoon and at bedtime as needed.     3.  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  Neurosurgery and neurology consults have been placed.  Will obtain additional films including thoracic and lumbar spine films.  MRIs to be ordered of head and entire spine if felt to be indicated by neurology and neurosurgery after review of the patient's case tomorrow.  In addition to the ataxia and walking difficulties, patient also reports an increase in the level and intensity of her urinary incontinence.  She has had trouble in the past with urinary retention and needed to do in and out caths.     4.  Accelerated hypertension with great variability in levels possibly related to abnormal volume status and patient..  As above, we have asked nephrology to help with this issue.  I suspect it may be volume related and it is unclear if patient is actually drinking adequate amounts of fluid at home.  We have given her a small bolus of normal saline here 500 cc and I am going to continue her overnight on half-normal saline at 75 cc/h.  Further input on fluid replacement for this patient would be appreciated.  Also need input on level of diuretics.  Balance of blood pressure may require additional medications.  Renal following as well as cardiology.     5.  Dyspnea on exertion with mild intermittent chest pain.  EKG and troponin are ordered for tomorrow a.m.  Patient does not seem to be having any sustained chest pain at present time.  We did ask patient's regular cardiology group Dr. Soto to take a look at the patient and make recommendations on any additional workup that they  might feel necessary.  Patient did request the cardiology evaluation.     6.  Electrolyte abnormality of hypokalemia noted on admission in ER.  Correction given in ER and patient placed on electrolyte replacement protocol low level renal dosing.  Will continue to monitor and make further adjustments in electrolytes as needed.     7.  Increased urinary incontinence and patient with long history of urinary retention requiring self-catheterization as taught to her by Dr. Fazal Ochoa's office in urology.  Uncertain at this point if this issue is related in part to the closed head injury and the cervical spine stenosis that seems to be new.  Will ask neurology and neurosurgery for their input on that issue.  May need to consider urology consult also.  Patient incontinent of urine     8.  Hyperlipidemia.  Fasting lipid profile ordered for tomorrow a.m.  Will adjust meds as necessary..  Lipid profile very stable.     9.  Chronic tension type headaches that are intractable and do require Fioricet for management.  This medication is continued.  Headaches ongoing and has Fioricet for treatment of pain.     10.  Chronic pain management on \[facility]\"" palliative care program.  Patient at present receiving up to 8 Norco 10 tablets/day along with Ativan 0.5 x 3/day.  These medications are continued at the time of patient's admission due to her palliative care status.     11.  Cerebral atherosclerosis with history of left occipital CVA in the past and now with new neurologic changes.  As above, neurology and neurosurgery requested to see the patient to determine need for further workup and evaluation of the symptoms.     12.  History of elevated PTH.  Ionized calcium level and PTH have been ordered to recheck on the situation.     13.  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  Plan to order ultrasound of thyroid to further evaluate this situation and determine need for any biopsies of nodules that might be  necessary.     14.  Vitamin D deficiency.  Plan to check level of vitamin D while here in the hospital.     15.  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  Patient has had recurrent bouts of COPD exacerbations and now takes 10 mg of prednisone on a daily basis for prevention of additional COPD exacerbations.  Patient also using regular nebulized ipratropium and albuterol as needed.       Plan for disposition:Where: home and SNF and When:  3 to 4 days    Copied text in this note has been reviewed by me and is accurate as of 01/13/2025  Much of this dictation is completed using Dragon voice activated software.    Óscar Cadet MD  1/13/2025  1230 EST

## 2025-01-15 ENCOUNTER — HOME HEALTH ADMISSION (OUTPATIENT)
Dept: HOME HEALTH SERVICES | Facility: HOME HEALTHCARE | Age: 85
End: 2025-01-15
Payer: MEDICARE

## 2025-01-15 ENCOUNTER — READMISSION MANAGEMENT (OUTPATIENT)
Dept: CALL CENTER | Facility: HOSPITAL | Age: 85
End: 2025-01-15
Payer: MEDICARE

## 2025-01-15 ENCOUNTER — DOCUMENTATION (OUTPATIENT)
Dept: HOME HEALTH SERVICES | Facility: HOME HEALTHCARE | Age: 85
End: 2025-01-15
Payer: MEDICARE

## 2025-01-15 VITALS
HEIGHT: 60 IN | SYSTOLIC BLOOD PRESSURE: 148 MMHG | DIASTOLIC BLOOD PRESSURE: 79 MMHG | OXYGEN SATURATION: 97 % | BODY MASS INDEX: 25.97 KG/M2 | TEMPERATURE: 98.1 F | RESPIRATION RATE: 20 BRPM | WEIGHT: 132.28 LBS | HEART RATE: 85 BPM

## 2025-01-15 PROBLEM — E44.0 MODERATE MALNUTRITION: Status: ACTIVE | Noted: 2025-01-15

## 2025-01-15 PROBLEM — M51.26 LUMBAR HERNIATED DISC: Status: ACTIVE | Noted: 2025-01-15

## 2025-01-15 PROBLEM — I50.32 DIASTOLIC DYSFUNCTION WITH CHRONIC HEART FAILURE: Status: ACTIVE | Noted: 2025-01-15

## 2025-01-15 PROBLEM — I50.32 CHRONIC HEART FAILURE WITH PRESERVED EJECTION FRACTION (HFPEF): Status: ACTIVE | Noted: 2025-01-15

## 2025-01-15 PROBLEM — M48.062 SPINAL STENOSIS OF LUMBAR REGION WITH NEUROGENIC CLAUDICATION: Status: ACTIVE | Noted: 2025-01-15

## 2025-01-15 LAB
ALBUMIN SERPL ELPH-MCNC: 2.5 G/DL (ref 2.9–4.4)
ALBUMIN SERPL-MCNC: 2.9 G/DL (ref 3.5–5.2)
ALBUMIN/GLOB SERPL: 1 G/DL
ALBUMIN/GLOB SERPL: 1 {RATIO} (ref 0.7–1.7)
ALP SERPL-CCNC: 91 U/L (ref 39–117)
ALPHA1 GLOB SERPL ELPH-MCNC: 0.3 G/DL (ref 0–0.4)
ALPHA2 GLOB SERPL ELPH-MCNC: 0.8 G/DL (ref 0.4–1)
ALT SERPL W P-5'-P-CCNC: 21 U/L (ref 1–33)
ANION GAP SERPL CALCULATED.3IONS-SCNC: 9 MMOL/L (ref 5–15)
AST SERPL-CCNC: 15 U/L (ref 1–32)
B-GLOBULIN SERPL ELPH-MCNC: 0.9 G/DL (ref 0.7–1.3)
BASOPHILS # BLD AUTO: 0.05 10*3/MM3 (ref 0–0.2)
BASOPHILS NFR BLD AUTO: 0.6 % (ref 0–1.5)
BILIRUB SERPL-MCNC: 0.3 MG/DL (ref 0–1.2)
BUN SERPL-MCNC: 22 MG/DL (ref 8–23)
BUN/CREAT SERPL: 20.2 (ref 7–25)
CALCIUM SPEC-SCNC: 9.6 MG/DL (ref 8.6–10.5)
CHLORIDE SERPL-SCNC: 103 MMOL/L (ref 98–107)
CO2 SERPL-SCNC: 27 MMOL/L (ref 22–29)
CREAT SERPL-MCNC: 1.09 MG/DL (ref 0.57–1)
DEPRECATED RDW RBC AUTO: 44.8 FL (ref 37–54)
EGFRCR SERPLBLD CKD-EPI 2021: 49.6 ML/MIN/1.73
EOSINOPHIL # BLD AUTO: 0.15 10*3/MM3 (ref 0–0.4)
EOSINOPHIL NFR BLD AUTO: 1.9 % (ref 0.3–6.2)
ERYTHROCYTE [DISTWIDTH] IN BLOOD BY AUTOMATED COUNT: 13 % (ref 12.3–15.4)
GAMMA GLOB SERPL ELPH-MCNC: 0.6 G/DL (ref 0.4–1.8)
GLOBULIN SER CALC-MCNC: 2.6 G/DL (ref 2.2–3.9)
GLOBULIN UR ELPH-MCNC: 3 GM/DL
GLUCOSE SERPL-MCNC: 108 MG/DL (ref 65–99)
HCT VFR BLD AUTO: 36.9 % (ref 34–46.6)
HGB BLD-MCNC: 11.5 G/DL (ref 12–15.9)
IMM GRANULOCYTES # BLD AUTO: 0.03 10*3/MM3 (ref 0–0.05)
IMM GRANULOCYTES NFR BLD AUTO: 0.4 % (ref 0–0.5)
LABORATORY COMMENT REPORT: ABNORMAL
LYMPHOCYTES # BLD AUTO: 2.59 10*3/MM3 (ref 0.7–3.1)
LYMPHOCYTES NFR BLD AUTO: 32.1 % (ref 19.6–45.3)
M PROTEIN SERPL ELPH-MCNC: ABNORMAL G/DL
MAGNESIUM SERPL-MCNC: 2 MG/DL (ref 1.6–2.4)
MCH RBC QN AUTO: 29.6 PG (ref 26.6–33)
MCHC RBC AUTO-ENTMCNC: 31.2 G/DL (ref 31.5–35.7)
MCV RBC AUTO: 95.1 FL (ref 79–97)
MONOCYTES # BLD AUTO: 0.82 10*3/MM3 (ref 0.1–0.9)
MONOCYTES NFR BLD AUTO: 10.1 % (ref 5–12)
NEUTROPHILS NFR BLD AUTO: 4.44 10*3/MM3 (ref 1.7–7)
NEUTROPHILS NFR BLD AUTO: 54.9 % (ref 42.7–76)
NRBC BLD AUTO-RTO: 0 /100 WBC (ref 0–0.2)
PHOSPHATE SERPL-MCNC: 2.7 MG/DL (ref 2.5–4.5)
PLATELET # BLD AUTO: 188 10*3/MM3 (ref 140–450)
PMV BLD AUTO: 10.1 FL (ref 6–12)
POTASSIUM SERPL-SCNC: 4.2 MMOL/L (ref 3.5–5.2)
PROT PATTERN SERPL ELPH-IMP: ABNORMAL
PROT SERPL-MCNC: 5.1 G/DL (ref 6–8.5)
PROT SERPL-MCNC: 5.9 G/DL (ref 6–8.5)
RBC # BLD AUTO: 3.88 10*6/MM3 (ref 3.77–5.28)
SODIUM SERPL-SCNC: 139 MMOL/L (ref 136–145)
WBC NRBC COR # BLD AUTO: 8.08 10*3/MM3 (ref 3.4–10.8)

## 2025-01-15 PROCEDURE — 97166 OT EVAL MOD COMPLEX 45 MIN: CPT

## 2025-01-15 PROCEDURE — 99231 SBSQ HOSP IP/OBS SF/LOW 25: CPT | Performed by: INTERNAL MEDICINE

## 2025-01-15 PROCEDURE — 94799 UNLISTED PULMONARY SVC/PX: CPT

## 2025-01-15 PROCEDURE — 25010000002 CYANOCOBALAMIN PER 1000 MCG: Performed by: STUDENT IN AN ORGANIZED HEALTH CARE EDUCATION/TRAINING PROGRAM

## 2025-01-15 PROCEDURE — 83735 ASSAY OF MAGNESIUM: CPT | Performed by: INTERNAL MEDICINE

## 2025-01-15 PROCEDURE — 63710000001 PREDNISONE PER 5 MG: Performed by: INTERNAL MEDICINE

## 2025-01-15 PROCEDURE — 85025 COMPLETE CBC W/AUTO DIFF WBC: CPT | Performed by: INTERNAL MEDICINE

## 2025-01-15 PROCEDURE — 99232 SBSQ HOSP IP/OBS MODERATE 35: CPT | Performed by: NURSE PRACTITIONER

## 2025-01-15 PROCEDURE — 94664 DEMO&/EVAL PT USE INHALER: CPT

## 2025-01-15 PROCEDURE — 80053 COMPREHEN METABOLIC PANEL: CPT | Performed by: INTERNAL MEDICINE

## 2025-01-15 PROCEDURE — 84100 ASSAY OF PHOSPHORUS: CPT | Performed by: INTERNAL MEDICINE

## 2025-01-15 PROCEDURE — 97530 THERAPEUTIC ACTIVITIES: CPT

## 2025-01-15 PROCEDURE — 94761 N-INVAS EAR/PLS OXIMETRY MLT: CPT

## 2025-01-15 RX ORDER — NIFEDIPINE 60 MG/1
60 TABLET, EXTENDED RELEASE ORAL
Status: DISCONTINUED | OUTPATIENT
Start: 2025-01-16 | End: 2025-01-15

## 2025-01-15 RX ORDER — CYANOCOBALAMIN 1000 UG/ML
1000 INJECTION, SOLUTION INTRAMUSCULAR; SUBCUTANEOUS ONCE
Status: COMPLETED | OUTPATIENT
Start: 2025-01-15 | End: 2025-01-15

## 2025-01-15 RX ORDER — TORSEMIDE 10 MG/1
50 TABLET ORAL DAILY
Start: 2025-01-15

## 2025-01-15 RX ORDER — TRIAMCINOLONE ACETONIDE 1 MG/G
1 CREAM TOPICAL 2 TIMES DAILY
Start: 2025-01-15

## 2025-01-15 RX ORDER — NIFEDIPINE 30 MG/1
30 TABLET, EXTENDED RELEASE ORAL
Status: DISCONTINUED | OUTPATIENT
Start: 2025-01-16 | End: 2025-01-15 | Stop reason: HOSPADM

## 2025-01-15 RX ORDER — PREDNISONE 10 MG/1
10 TABLET ORAL DAILY
Start: 2025-01-15

## 2025-01-15 RX ADMIN — DICLOFENAC SODIUM 4 G: 10 GEL TOPICAL at 09:11

## 2025-01-15 RX ADMIN — PREDNISONE 10 MG: 10 TABLET ORAL at 09:04

## 2025-01-15 RX ADMIN — HYDROCODONE BITARTRATE AND ACETAMINOPHEN 2 TABLET: 10; 325 TABLET ORAL at 11:52

## 2025-01-15 RX ADMIN — FAMOTIDINE 40 MG: 20 TABLET, FILM COATED ORAL at 09:03

## 2025-01-15 RX ADMIN — PANTOPRAZOLE SODIUM 40 MG: 40 TABLET, DELAYED RELEASE ORAL at 06:44

## 2025-01-15 RX ADMIN — EPLERENONE 50 MG: 25 TABLET, FILM COATED ORAL at 09:04

## 2025-01-15 RX ADMIN — TRIAMCINOLONE ACETONIDE 1 APPLICATION: 1 CREAM TOPICAL at 09:10

## 2025-01-15 RX ADMIN — Medication 1 TABLET: at 09:03

## 2025-01-15 RX ADMIN — CLONIDINE HYDROCHLORIDE 0.2 MG: 0.1 TABLET ORAL at 02:12

## 2025-01-15 RX ADMIN — RIVAROXABAN 20 MG: 20 TABLET, FILM COATED ORAL at 09:04

## 2025-01-15 RX ADMIN — IPRATROPIUM BROMIDE 0.5 MG: 0.5 SOLUTION RESPIRATORY (INHALATION) at 11:09

## 2025-01-15 RX ADMIN — CLONIDINE HYDROCHLORIDE 0.1 MG: 0.1 TABLET ORAL at 04:05

## 2025-01-15 RX ADMIN — MUPIROCIN 1 APPLICATION: 20 OINTMENT TOPICAL at 09:09

## 2025-01-15 RX ADMIN — Medication 3 ML: at 09:06

## 2025-01-15 RX ADMIN — CYANOCOBALAMIN 1000 MCG: 1000 INJECTION, SOLUTION INTRAMUSCULAR; SUBCUTANEOUS at 13:59

## 2025-01-15 RX ADMIN — TORSEMIDE 50 MG: 20 TABLET ORAL at 11:52

## 2025-01-15 RX ADMIN — HYDROCODONE BITARTRATE AND ACETAMINOPHEN 2 TABLET: 10; 325 TABLET ORAL at 15:18

## 2025-01-15 RX ADMIN — CYANOCOBALAMIN TAB 500 MCG 1000 MCG: 500 TAB at 11:52

## 2025-01-15 RX ADMIN — ROSUVASTATIN CALCIUM 5 MG: 5 TABLET, FILM COATED ORAL at 09:04

## 2025-01-15 RX ADMIN — IPRATROPIUM BROMIDE 0.5 MG: 0.5 SOLUTION RESPIRATORY (INHALATION) at 14:59

## 2025-01-15 RX ADMIN — NIFEDIPINE 30 MG: 30 TABLET, FILM COATED, EXTENDED RELEASE ORAL at 09:04

## 2025-01-15 RX ADMIN — CETIRIZINE HYDROCHLORIDE 10 MG: 10 TABLET, FILM COATED ORAL at 09:04

## 2025-01-15 RX ADMIN — HYDROCODONE BITARTRATE AND ACETAMINOPHEN 2 TABLET: 10; 325 TABLET ORAL at 04:05

## 2025-01-15 RX ADMIN — IPRATROPIUM BROMIDE 0.5 MG: 0.5 SOLUTION RESPIRATORY (INHALATION) at 07:11

## 2025-01-15 NOTE — PROGRESS NOTES
DOS: 1/15/2025  NAME: Sasha Barrett   : 1940  PCP: Óscar Cadet MD    Chief Complaint   Patient presents with    Weakness - Generalized    Fall         Subjective: Pt seen in follow up, however the problem is new to me.  Patient sitting up in her recliner.  She states she had a lot of pain in her back and became severely claustrophobic during her MRIs yesterday but was able to complete them.  She became tearful when talking about her  being at home and wanting to go home.  No family at bedside.    Objective:  Vital signs:   Vitals:    01/15/25 1115 01/15/25 1151 01/15/25 1347 01/15/25 1459   BP:  133/79 148/79    BP Location:  Right arm Right arm    Patient Position:   Sitting    Pulse: 85 85  85   Resp:   18 20   Temp:   98.1 °F (36.7 °C)    TempSrc:   Oral    SpO2:   99% 97%   Weight:       Height:           Current Facility-Administered Medications:     acetaminophen (TYLENOL) tablet 650 mg, 650 mg, Oral, Q4H PRN, sÓcar Cadet MD    albuterol (PROVENTIL) nebulizer solution 0.083% 2.5 mg/3mL, 2.5 mg, Nebulization, Q6H PRN, Óscar Cadet MD    butalbital-acetaminophen-caffeine (FIORICET, ESGIC) -40 MG per tablet 2 tablet, 2 tablet, Oral, Q4H PRN, Óscar Cadet MD    Calcium Replacement - Follow Nurse / BPA Driven Protocol, , Not Applicable, PRN, Óscar Cadet MD    cetirizine (zyrTEC) tablet 10 mg, 10 mg, Oral, Daily, Óscar Cadet MD, 10 mg at 01/15/25 0904    cloNIDine (CATAPRES) tablet 0.1 mg, 0.1 mg, Oral, Q6H PRN, Óscar Cadet MD, 0.1 mg at 01/15/25 0405    cloNIDine (CATAPRES) tablet 0.2 mg, 0.2 mg, Oral, Q6H PRN, Óscar Cadet MD, 0.2 mg at 01/15/25 0212    Diclofenac Sodium (VOLTAREN) 1 % gel 4 g, 4 g, Topical, 4x Daily PRN, Óscar Cadet MD, 4 g at 01/15/25 0911    diphenhydrAMINE (BENADRYL) tablet 50 mg, 50 mg, Oral, Q4H PRN, Óscar Cadet MD    EPINEPHrine (ANAPHYLAXIS) 1 mg/ml injection kit, 0.3 mg, Intramuscular, Q1H PRN, Óscar Cadet,  MD    eplerenone (INSPRA) tablet 50 mg, 50 mg, Oral, Q12H, Óscar Cadet MD, 50 mg at 01/15/25 0904    famotidine (PEPCID) tablet 40 mg, 40 mg, Oral, Daily, Óscar Cadet MD, 40 mg at 01/15/25 0903    fluticasone (FLONASE) 50 MCG/ACT nasal spray 1 spray, 1 spray, Each Nare, BID, Óscar Cadet MD, 1 spray at 01/14/25 2048    HYDROcodone-acetaminophen (NORCO)  MG per tablet 1 tablet, 1 tablet, Oral, Q4H PRN, Óscar Cadet MD, 1 tablet at 01/13/25 0221    HYDROcodone-acetaminophen (NORCO)  MG per tablet 2 tablet, 2 tablet, Oral, Q4H PRN, Óscar Cadet MD, 2 tablet at 01/15/25 1518    hydrOXYzine (ATARAX) tablet 10 mg, 10 mg, Oral, TID PRN, Óscar Cadet MD, 10 mg at 01/14/25 0953    ipratropium (ATROVENT) nebulizer solution 0.5 mg, 500 mcg, Nebulization, 4x Daily - RT, Óscar Cadet MD, 0.5 mg at 01/15/25 1459    LORazepam (ATIVAN) tablet 0.5 mg, 0.5 mg, Oral, Q8H PRN, Óscar Cadet MD, 0.5 mg at 01/14/25 2048    Magnesium Low Dose Replacement - Follow Nurse / BPA Driven Protocol, , Not Applicable, PRN, Óscar Cadet MD    melatonin tablet 5 mg, 5 mg, Oral, Nightly PRN, Óscar Cadet MD, 5 mg at 01/13/25 2101    multivitamin with minerals 1 tablet, 1 tablet, Oral, Daily, Óscar Cadet MD, 1 tablet at 01/15/25 0903    mupirocin (BACTROBAN) 2 % ointment 1 Application, 1 application , Topical, Q24H, Óscar Cadet MD, 1 Application at 01/15/25 0909    [START ON 1/16/2025] NIFEdipine XL (PROCARDIA XL) 24 hr tablet 30 mg, 30 mg, Oral, Q24H, Aelxa Espinoza MD    nitroglycerin (NITROSTAT) SL tablet 0.4 mg, 0.4 mg, Sublingual, Q5 Min PRN, Óscar Cadet MD    ondansetron ODT (ZOFRAN-ODT) disintegrating tablet 8 mg, 8 mg, Oral, Q6H PRN, Óscar Cadet MD    pantoprazole (PROTONIX) EC tablet 40 mg, 40 mg, Oral, Q AM, Óscar Cadet MD, 40 mg at 01/15/25 0644    Phosphorus Replacement - Follow Nurse / BPA Driven Protocol, , Not Applicable, PRN, Helio  Óscar OKEEFE MD    polyethylene glycol (MIRALAX) packet 17 g, 17 g, Oral, Daily PRN, Óscar Cadet MD    Polyvinyl Alcohol-Povidone PF (ARTIFICIAL TEARS) 1.4-0.6 % ophthalmic solution 1 drop, 1 drop, Both Eyes, 4x Daily PRN, Óscar Cadet MD    Potassium Replacement - Follow Nurse / BPA Driven Protocol, , Not Applicable, PRN, Óscar Cadet MD    predniSONE (DELTASONE) tablet 10 mg, 10 mg, Oral, Daily, Óscar Cadet MD, 10 mg at 01/15/25 0904    promethazine-codeine (PHENERGAN with CODEINE) 6.25-10 MG/5ML syrup 5 mL, 5 mL, Oral, 4x Daily PRN, Óscar Cadet MD    rivaroxaban (XARELTO) tablet 20 mg, 20 mg, Oral, Daily, Óscar Cadet MD, 20 mg at 01/15/25 0904    rosuvastatin (CRESTOR) tablet 5 mg, 5 mg, Oral, QAM, Óscar Cadet MD, 5 mg at 01/15/25 0904    sodium chloride 0.9 % flush 3 mL, 3 mL, Intravenous, Q12H, Óscar Cadet MD, 3 mL at 01/15/25 0906    sodium chloride 0.9 % flush 3-10 mL, 3-10 mL, Intravenous, PRN, Óscar Cadet MD    sodium chloride 0.9 % infusion 40 mL, 40 mL, Intravenous, PRN, Óscar Cadet MD    torsemide (DEMADEX) tablet 50 mg, 50 mg, Oral, Daily, Óscar Cadet MD, 50 mg at 01/15/25 1152    triamcinolone (KENALOG) 0.1 % cream 1 Application, 1 Application, Topical, BID, Óscar Cadet MD, 1 Application at 01/15/25 0910    vitamin B-12 (CYANOCOBALAMIN) tablet 1,000 mcg, 1,000 mcg, Oral, Daily, Óscar Cadet MD, 1,000 mcg at 01/15/25 1152    vitamin D (ERGOCALCIFEROL) capsule 50,000 Units, 50,000 Units, Oral, Weekly, Óscar Cadet MD, 50,000 Units at 01/14/25 6644    Current Outpatient Medications:     acetaminophen (TYLENOL) 325 MG tablet, Take 2 tablets by mouth Every 4 (Four) Hours As Needed for Mild Pain ., Disp: , Rfl:     albuterol (PROAIR HFA) 108 (90 Base) MCG/ACT inhaler, 4 puffs Every 4 (Four) Hours As Needed for Wheezing or Shortness of Air., Disp: , Rfl:     butalbital-acetaminophen-caffeine (FIORICET, ESGIC) -40 MG per  tablet, Take 2 tablets by mouth Every 4 (Four) Hours As Needed for Headache., Disp: 240 tablet, Rfl: 0    cetirizine (zyrTEC) 10 MG tablet, Take 1 tablet by mouth Daily. Alt with singulair, Disp: , Rfl:     Cholecalciferol (VITAMIN D3) 1.25 MG (57586 UT) capsule, Take 1 capsule by mouth 1 (One) Time Per Week. TUESDAYS, Disp: , Rfl:     cloNIDine (CATAPRES) 0.2 MG tablet, Take 1 tablet by mouth Every 6 (Six) Hours. Dose titrated for BP control by patient, Disp: , Rfl:     cloNIDine (CATAPRES) 0.3 MG tablet, Take 1 tablet by mouth Every 6 (Six) Hours. Dose titrated by patient for BP control, Disp: , Rfl:     Diclofenac Sodium (VOLTAREN) 1 % gel gel, Apply 4 g topically to the appropriate area as directed 4 (Four) Times a Day As Needed (Apply to knees or other joints when pain is severe and flares)., Disp: 100 g, Rfl: 5    diphenhydrAMINE (BENADRYL) 50 MG tablet, Take 1 tablet by mouth every 4 (four) hours as needed for itching or allergies., Disp: 180 tablet, Rfl: 0    eplerenone (INSPRA) 50 MG tablet, Take 1 tablet by mouth Daily., Disp: , Rfl:     fluticasone (FLONASE) 50 MCG/ACT nasal spray, 1 spray by Each Nare route 2 (Two) Times a Day., Disp: , Rfl:     HYDROcodone-acetaminophen (NORCO)  MG per tablet, Take 1-2 tablets by mouth Every 6 (Six) Hours As Needed for Moderate Pain., Disp: , Rfl:     hydrOXYzine (ATARAX) 10 MG tablet, Take 1 tablet by mouth 3 (Three) Times a Day As Needed for Itching., Disp: , Rfl:     ipratropium (ATROVENT) 0.02 % nebulizer solution, Take 2.5 mL by nebulization 4 (Four) Times a Day., Disp: , Rfl:     lidocaine (Lidoderm) 5 %, Place 1 patch on the skin as directed by provider Daily As Needed for Mild Pain. Remove & Discard patch within 12 hours or as directed by MD, Disp: 5 patch, Rfl: 0    LORazepam (ATIVAN) 0.5 MG tablet, Take 1 tablet by mouth Every 8 (Eight) Hours As Needed for Anxiety., Disp: , Rfl:     multivitamin with minerals tablet tablet, Take 1 tablet by mouth Daily.,  Disp: , Rfl:     mupirocin (BACTROBAN) 2 % ointment, Apply 1 Application topically to the appropriate area as directed Every 12 (Twelve) Hours., Disp: , Rfl:     Niacin (VITAMIN B-3 PO), Take  by mouth., Disp: , Rfl:     NIFEdipine XL (ADALAT CC) 30 MG 24 hr tablet, Take 1 tablet by mouth Daily., Disp: 90 tablet, Rfl: 1    omeprazole (priLOSEC) 40 MG capsule, Take 1 capsule by mouth Daily As Needed., Disp: , Rfl:     ondansetron ODT (ZOFRAN-ODT) 8 MG disintegrating tablet, Take 1 tablet by mouth every 6 (six) hours as needed for nausea or vomiting., Disp: 360 tablet, Rfl: 1    Petrolatum 42 % ointment, Apply 1 Application topically to the appropriate area as directed Every 12 (Twelve) Hours. Use on irritated skin from bug bites, Disp: 100 g, Rfl: 5    polyethylene glycol (MIRALAX) 17 g packet, Take 17 g by mouth Daily As Needed., Disp: , Rfl:     potassium chloride (KAYCIEL) 20 mEq/15 mL solution, Take 15 mL by mouth Daily., Disp: 90 mL, Rfl: 5    predniSONE (DELTASONE) 10 MG tablet, Take 1 tablet by mouth Daily., Disp: , Rfl:     promethazine-codeine (PHENERGAN with CODEINE) 6.25-10 MG/5ML syrup, Take 5 mL by mouth 4 (Four) Times a Day As Needed for cough., Disp: 360 mL, Rfl: 0    rosuvastatin (CRESTOR) 5 MG tablet, Take 1 tablet by mouth Every Morning., Disp: , Rfl:     torsemide (DEMADEX) 10 MG tablet, Take 5 tablets by mouth Daily., Disp: , Rfl:     triamcinolone (KENALOG) 0.1 % cream, Apply 1 Application topically to the appropriate area as directed 2 (Two) Times a Day., Disp: , Rfl:     vitamin B-12 (VITAMIN B-12) 1000 MCG tablet, Take 1 tablet by mouth Daily., Disp: 90 tablet, Rfl: 3    Xarelto 20 MG tablet, Take 1 tablet by mouth Daily., Disp: , Rfl:     carboxymethylcellulose (REFRESH PLUS) 0.5 % solution, Administer 1 drop to both eyes 2 (Two) Times a Day As Needed for Dry Eyes., Disp: , Rfl:     EPINEPHrine (EPIPEN) 0.3 MG/0.3ML solution auto-injector injection, INJECT THE CONTENTS OF ONE PEN AS NEEDED.  MAY REPEAT ONE TIME., Disp: , Rfl:     levocetirizine (XYZAL) 5 MG tablet, Take 1 tablet by mouth Every Morning., Disp: , Rfl:     naloxone (NARCAN) 4 MG/0.1ML nasal spray, Administer 1 spray into the nostril(s) as directed by provider As Needed for Opioid Reversal. Call 911. Don't prime. [place] in 1 nostril for overdose. Repeat in 2-3 minutes in other nostril if no or minimal breathing/responsiveness., Disp: , Rfl:     PRN meds    acetaminophen    albuterol    butalbital-acetaminophen-caffeine    Calcium Replacement - Follow Nurse / BPA Driven Protocol    cloNIDine    cloNIDine    Diclofenac Sodium    diphenhydrAMINE    EPINEPHrine (Anaphylaxis)    HYDROcodone-acetaminophen    HYDROcodone-acetaminophen    hydrOXYzine    LORazepam    Magnesium Low Dose Replacement - Follow Nurse / BPA Driven Protocol    melatonin    nitroglycerin    ondansetron ODT    Phosphorus Replacement - Follow Nurse / BPA Driven Protocol    polyethylene glycol    Polyvinyl Alcohol-Povidone PF    Potassium Replacement - Follow Nurse / BPA Driven Protocol    promethazine-codeine    sodium chloride    sodium chloride    No current facility-administered medications on file prior to encounter.     Current Outpatient Medications on File Prior to Encounter   Medication Sig    acetaminophen (TYLENOL) 325 MG tablet Take 2 tablets by mouth Every 4 (Four) Hours As Needed for Mild Pain .    albuterol (PROAIR HFA) 108 (90 Base) MCG/ACT inhaler 4 puffs Every 4 (Four) Hours As Needed for Wheezing or Shortness of Air.    butalbital-acetaminophen-caffeine (FIORICET, ESGIC) -40 MG per tablet Take 2 tablets by mouth Every 4 (Four) Hours As Needed for Headache.    cetirizine (zyrTEC) 10 MG tablet Take 1 tablet by mouth Daily. Alt with singulair    Cholecalciferol (VITAMIN D3) 1.25 MG (78576 UT) capsule Take 1 capsule by mouth 1 (One) Time Per Week. TUESDAYS    cloNIDine (CATAPRES) 0.2 MG tablet Take 1 tablet by mouth Every 6 (Six) Hours. Dose titrated for BP  control by patient    cloNIDine (CATAPRES) 0.3 MG tablet Take 1 tablet by mouth Every 6 (Six) Hours. Dose titrated by patient for BP control    Diclofenac Sodium (VOLTAREN) 1 % gel gel Apply 4 g topically to the appropriate area as directed 4 (Four) Times a Day As Needed (Apply to knees or other joints when pain is severe and flares).    diphenhydrAMINE (BENADRYL) 50 MG tablet Take 1 tablet by mouth every 4 (four) hours as needed for itching or allergies.    eplerenone (INSPRA) 50 MG tablet Take 1 tablet by mouth Daily.    fluticasone (FLONASE) 50 MCG/ACT nasal spray 1 spray by Each Nare route 2 (Two) Times a Day.    HYDROcodone-acetaminophen (NORCO)  MG per tablet Take 1-2 tablets by mouth Every 6 (Six) Hours As Needed for Moderate Pain.    hydrOXYzine (ATARAX) 10 MG tablet Take 1 tablet by mouth 3 (Three) Times a Day As Needed for Itching.    ipratropium (ATROVENT) 0.02 % nebulizer solution Take 2.5 mL by nebulization 4 (Four) Times a Day.    lidocaine (Lidoderm) 5 % Place 1 patch on the skin as directed by provider Daily As Needed for Mild Pain. Remove & Discard patch within 12 hours or as directed by MD    LORazepam (ATIVAN) 0.5 MG tablet Take 1 tablet by mouth Every 8 (Eight) Hours As Needed for Anxiety.    multivitamin with minerals tablet tablet Take 1 tablet by mouth Daily.    mupirocin (BACTROBAN) 2 % ointment Apply 1 Application topically to the appropriate area as directed Every 12 (Twelve) Hours.    Niacin (VITAMIN B-3 PO) Take  by mouth.    NIFEdipine XL (ADALAT CC) 30 MG 24 hr tablet Take 1 tablet by mouth Daily.    omeprazole (priLOSEC) 40 MG capsule Take 1 capsule by mouth Daily As Needed.    ondansetron ODT (ZOFRAN-ODT) 8 MG disintegrating tablet Take 1 tablet by mouth every 6 (six) hours as needed for nausea or vomiting.    Petrolatum 42 % ointment Apply 1 Application topically to the appropriate area as directed Every 12 (Twelve) Hours. Use on irritated skin from bug bites    polyethylene  glycol (MIRALAX) 17 g packet Take 17 g by mouth Daily As Needed.    potassium chloride (KAYCIEL) 20 mEq/15 mL solution Take 15 mL by mouth Daily.    promethazine-codeine (PHENERGAN with CODEINE) 6.25-10 MG/5ML syrup Take 5 mL by mouth 4 (Four) Times a Day As Needed for cough.    rosuvastatin (CRESTOR) 5 MG tablet Take 1 tablet by mouth Every Morning.    vitamin B-12 (VITAMIN B-12) 1000 MCG tablet Take 1 tablet by mouth Daily.    Xarelto 20 MG tablet Take 1 tablet by mouth Daily.    [DISCONTINUED] hydrOXYzine (ATARAX) 50 MG tablet Take 1 tablet by mouth Every 6 (Six) Hours As Needed for Itching or Allergies.    [DISCONTINUED] potassium chloride (KLOR-CON) 20 MEQ packet Take 20 mEq by mouth Daily.    [DISCONTINUED] predniSONE (DELTASONE) 10 MG tablet Take 1 tablet by mouth Every Other Day. (Patient taking differently: Take 2 tablets by mouth Daily.)    [DISCONTINUED] torsemide (DEMADEX) 100 MG tablet Take 1 tablet by mouth Daily. (Patient taking differently: Take 1 tablet by mouth Daily As Needed.)    [DISCONTINUED] triamcinolone (KENALOG) 0.1 % cream Apply 1 Application topically to the appropriate area as directed 2 (Two) Times a Day.    carboxymethylcellulose (REFRESH PLUS) 0.5 % solution Administer 1 drop to both eyes 2 (Two) Times a Day As Needed for Dry Eyes.    EPINEPHrine (EPIPEN) 0.3 MG/0.3ML solution auto-injector injection INJECT THE CONTENTS OF ONE PEN AS NEEDED. MAY REPEAT ONE TIME.    levocetirizine (XYZAL) 5 MG tablet Take 1 tablet by mouth Every Morning.    naloxone (NARCAN) 4 MG/0.1ML nasal spray Administer 1 spray into the nostril(s) as directed by provider As Needed for Opioid Reversal. Call 911. Don't prime. [place] in 1 nostril for overdose. Repeat in 2-3 minutes in other nostril if no or minimal breathing/responsiveness.     General appearance: Elderly female, NAD, alert and cooperative  HEENT: Normocephalic, atraumatic    Neurological:   MS: oriented to person and place, normal  "attention/concentration, language intact, no neglect  CN: visual fields full, EOMI, facial sensation equal, no facial droop, shoulder shrug equal, tongue midline  Motor: 5/5 in upper extremities, 4/5 in lower extremities more distally, right foot eversion noted- pt reports chronic finding from prior ankle fracture  Sensory: Diminished vibratory sensation of both legs  Coordination: Normal finger to nose test  Gait and station: deferred   Rapid alternating movements: normal finger to thumb tap    Laboratory results:  Lab Results   Component Value Date    TSH 1.570 01/13/2025     Lab Results   Component Value Date    SXHBRTJC38 299 01/13/2025    [id number] 290 01/13/2025     Lab Results   Component Value Date    HGBA1C 5.60 01/13/2025     Lab Results   Component Value Date    GLUCOSE 108 (H) 01/15/2025    BUN 22 01/15/2025    CREATININE 1.09 (H) 01/15/2025    EGFRIFNONA 52 (L) 02/02/2021    EGFRIFAFRI 63 02/02/2021    BCR 20.2 01/15/2025    K 4.2 01/15/2025    CO2 27.0 01/15/2025    CALCIUM 9.6 01/15/2025    PROTENTOTREF 6.8 02/02/2021    ALBUMIN 2.9 (L) 01/15/2025    LABIL2 1.8 02/02/2021    LABIL2 1.4 02/02/2021    AST 15 01/15/2025    ALT 21 01/15/2025     Lab Results   Component Value Date    WBC 8.08 01/15/2025    HGB 11.5 (L) 01/15/2025    HCT 36.9 01/15/2025    MCV 95.1 01/15/2025     01/15/2025     Brief Urine Lab Results  (Last result in the past 365 days)        Color   Clarity   Blood   Leuk Est   Nitrite   Protein   CREAT   Urine HCG        01/13/25 0631             127.5               No results found for: \"ACANTHNAEG\", \"AFBCX\", \"BPERTUSSISCX\", \"BLOODCX\"  No results found for: \"BCIDPCR\", \"CXREFLEX\", \"CSFCX\", \"CULTURETIS\"  No results found for: \"CULTURES\", \"HSVCX\", \"URCX\"  No results found for: \"EYECULTURE\", \"GCCX\", \"HSVCULTURE\", \"LABHSV\"  No results found for: \"LEGIONELLA\", \"MRSACX\", \"MUMPSCX\", \"MYCOPLASCX\"  No results found for: \"NOCARDIACX\", \"STOOLCX\"  No results found for: \"THROATCX\", " "\"UNSTIMCULT\", \"URINECX\", \"CULTURE\", \"VZVCULTUR\"  No results found for: \"VIRALCULTU\", \"WOUNDCX\"  Pain Management Panel  More data exists         Latest Ref Rng & Units 1/13/2025 10/20/2024   Pain Management Panel   Creatinine, Urine mg/dL 127.5  -   Amphetamine, Urine Qual Negative Negative  -   Barbiturates Screen, Urine Negative Positive  Positive    Benzodiazepine Screen, Urine Negative Positive  Negative    Buprenorphine, Screen, Urine Negative Negative  -   Cocaine Screen, Urine Negative Negative  Negative    Fentanyl, Urine Negative Negative  Negative    Methadone Screen , Urine Negative Negative  Negative    Methamphetamine, Ur Negative Negative  -      Details                   Review and interpretation of imaging:  US Thyroid    Result Date: 1/15/2025  1. A stable 1 cm TI-RADS 3 nodule and subcentimeter colloid cysts do not require follow-up per TI-RADS. 2. Increased color Doppler flow within the relatively homogenous thyroid parenchyma could be secondary to color Doppler settings but recommend correlation with thyroid function tests.    This report was finalized on 1/15/2025 9:55 AM by Dr. Anthony Green M.D on Workstation: [id number]      MRI Lumbar Spine With & Without Contrast    Result Date: 1/15/2025  Electronically signed by Wicho Chester M.D. on 01-15-25 at 0111    MRI Cervical Spine With & Without Contrast    Result Date: 1/15/2025  Electronically signed by Wicho Chester M.D. on 01-15-25 at 0055    MRI Brain With & Without Contrast    Result Date: 1/15/2025  Electronically signed by Domonique Barrett M.D. on 01-15-25 at 0007    XR Hip With or Without Pelvis 2 - 3 View Right    Result Date: 1/14/2025  Medial type osteoarthritis without evidence for fracture or acute abnormality.  This report was finalized on 1/14/2025 6:08 PM by Yoel Jones M.D on Workstation: [id number]      XR Spine Thoracic 2 View    Result Date: 1/13/2025   No acute fracture or laxity is identified. Degenerative changes in " the spine. If further imaging evaluation is indicated, MRI could be considered.    This report was finalized on 1/13/2025 9:30 AM by Dr. Umesh Harrison M.D on Workstation: [id number]      XR Spine Lumbar Complete With Flex & Ext    Result Date: 1/13/2025   No acute fracture or laxity is identified. Degenerative changes in the spine. If further imaging evaluation is indicated, MRI could be considered.    This report was finalized on 1/13/2025 9:30 AM by Dr. Umesh Harrison M.D on Workstation: [id number]      CT Head Without Contrast    Result Date: 1/12/2025  . 1. Mild chronic small vessel ischemic white matter change. Chronic encephalomalacia posteromedial left occipital lobe consistent with an old infarction, and this is without change. No evidence for acute intracranial abnormality. 2. 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 At C5-C6, there is a small central posterior disc protrusion that contacts the anterior cervical cord with mild narrowing of the central canal. This was not evident on the prior exam 10/04/2021. There is degenerative disc disease with disc space narrowing greatest at C6-C7. Right greater than left facet arthritis. No evidence for acute osseous abnormality in the cervical spine. 3. 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   Radiation dose reduction techniques were utilized, including automated exposure control and exposure modulation based on body size.    This report was finalized on 1/12/2025 5:58 PM by Yoel Jones M.D on Workstation: [id number]      CT Cervical Spine Without Contrast    Result Date: 1/12/2025  . 1. Mild chronic small vessel ischemic white matter change. Chronic encephalomalacia posteromedial left occipital lobe consistent with an old infarction, and this is without change. No evidence for acute intracranial abnormality. 2. 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  At C5-C6, there is a small central posterior disc protrusion that contacts the anterior cervical cord with mild narrowing of the central canal. This was not evident on the prior exam 10/04/2021. There is degenerative disc disease with disc space narrowing greatest at C6-C7. Right greater than left facet arthritis. No evidence for acute osseous abnormality in the cervical spine. 3. 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   Radiation dose reduction techniques were utilized, including automated exposure control and exposure modulation based on body size.    This report was finalized on 1/12/2025 5:58 PM by Yoel Jones M.D on Workstation: [id number]      XR Shoulder 2+ View Right    Result Date: 1/12/2025  Osteopenia limits evaluation for fracture. There is moderate AC and glenohumeral joint arthritis and the humeral head is high riding. No demonstrated fracture.  This report was finalized on 1/12/2025 5:08 PM by Yoel Jones M.D on Workstation: [id number]     Results for orders placed during the hospital encounter of 01/12/25    Adult Transthoracic Echo Complete W/ Cont if Necessary Per Protocol    Interpretation Summary    Left ventricular ejection fraction appears to be 61 - 65%.    Left ventricular diastolic function is consistent with (grade I) impaired relaxation.    The left atrial cavity is mildly dilated.    The right atrial cavity is mildly  dilated.    Estimated right ventricular systolic pressure from tricuspid regurgitation is normal (<35 mmHg).      Impression/Assessment:  This is a 84-year-old female with past medical history of breast cancer status post bilateral mastectomies and chemotherapy, COPD, hypertension, hyperlipidemia, DVT/PE on Xarelto, stroke, arthritis, chronic mobility issues suffering from frequent falls in the outpatient setting who presented to the hospital 1/12/2025 with complaints of  generalized weakness.  Our service was consulted for this reason. Since admission she has had total spinal imaging and MRI brain imaging that essentially revealed a severe canal stenosis at L2-3, 4-5. Neurosurgery was consulted and recommended PT and rehab evaluation with no surgical intervention at this time.  We also sent neuropathy labs including B12, RPR, cryoglobulins, SPEP/IPE, heavy metals, and copper.  Her B12 level came back low normal at 299.  She was started on oral replacement.  Her RPR came back nonreactive.  The other studies are still pending.    Diagnosis:  Generalized weakness  Mechanical fall  Low B12  History of breast cancer status postchemotherapy and bilateral mastectomies  Chronic right foot fracture    Plan:  -Reviewed neurosurgery's notes about her lumbar spinal stenosis and they have recommended nonsurgical intervention at this time with PT and rehab.  The RN states that the patient has refused and will be discharged home.  She was open to home health therapy.  -B12 replacement was started.  Recommend this be rechecked in the next 6 to 8 weeks.  If it is still below 500 would recommend IM replacement at that time.  Several  treatable causes for neuropathy studies are still pending.  She can follow-up with our outpatient service to discuss these results.  Dr. Villar has sent a message to our office to arrange follow-up with Dr. Benny Ochoa for potential EMG/NCS.  We will sign off and see again per request.    Case discussed with patient, RN, and Dr. Villar, and he agrees with plan above.     AC Leonardo

## 2025-01-15 NOTE — PROGRESS NOTES
[facility]  Cardiology Progress Note    Patient Identification:  Name: Sasha Barrett  Age: 84 y.o.  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 Yazmin Canseco    CARDIAC ELECTROPHYSIOLOGY PROCEDURE N/A 2022    Procedure: Loop insertion;  Surgeon: Jeffrey Arugeta MD;  Location:  [facility];  Service: Cardiovascular;  Laterality: N/A;  biotronik    CARDIAC ELECTROPHYSIOLOGY PROCEDURE N/A 2022    Procedure: Loop recorder removal;  Surgeon: Jeffrey Argueta MD;  Location:  [facility];  Service: Cardiovascular;  Laterality: N/A;    CARPAL TUNNEL RELEASE Right 2011    Dr. aCleb Bueno    CARPAL TUNNEL RELEASE Left 2011    Dr. Caleb Bueno    CATARACT EXTRACTION Left 2010    Dr. Eusebio Downs    CATARACT EXTRACTION Right 2010    Dr. Eusebio Downs    COLONOSCOPY N/A 2002    Sigmoid diverticulosis-Dr. Brandon Batista    COLONOSCOPY N/A 2013    Tortuous colon, diverticulosis in the  sigmoid colon and descending colon, stool in the rectum, sigmoid colon, descending colon, splenic flexure, transverse colon and hepatic flexure-Dr. Irma Brambila    DEQUERVAIN RELEASE Left 9/23/2020    Procedure: DEQUERVAIN RELEASE LEFT HAND;  Surgeon: Caleb Bueno MD;  [place] [facility] [place] [facility] [place]  Service: Plastics;  Laterality: Left;    ENDOSCOPY N/A 09/13/2007    Normal-Dr. Pavel Quarles    ENDOSCOPY AND COLONOSCOPY N/A 09/19/2005    1 cm hiatal hernia, mild Schatzki's ring, sigmoid diverticulosis-Dr. Yoel Farley    GANGLION CYST EXCISION Left 12/18/2012    Release of A1 pulley flexor sheath, left fourth finger, excision of ganglion cyst 0.5 cm diameter, A2 pulley flexor sheath, left fourth finger-Dr. Caleb Bueno    HEEL SPUR EXCISION Left 1968    INGUINAL HERNIA REPAIR Right 1971    at 5 months pregnant     INJECTION OF MEDICATION Left 9/23/2020    Procedure: KENOLOG INJECTION TO LEFT THUMB;  Surgeon: Caleb Bueno MD;  [place] [facility] [place] [facility] [place]  Service: Plastics;  Laterality: Left;    MASTECTOMY Right 08/05/2008    Right breast mastectomy and excision of left chest wall lesion-Dr. Yoel Farley    MASTECTOMY RADICAL Left 04/29/2003    Left modified radical mastectomy-Dr. Yazmin Canseco    PARATHYROIDECTOMY Right 05/04/2004    Excision of parathyroid adenoma (right superior)-Dr. Yoel Farley    REPLACEMENT TOTAL KNEE Right 04/09/2012    Dr. Lamonte Childress    SINUS SURGERY  1984    THYROIDECTOMY, PARTIAL Left 05/04/2004    Dr. Yoel Farley    TOTAL ABDOMINAL HYSTERECTOMY Bilateral 1973    Dr. Mahan    TRIGGER FINGER RELEASE Left 9/23/2020    Procedure: RELEASE LEFT FOURTH TRIGGER FINGER;  Surgeon: Caleb Bueno MD;  [place] [facility] [place] [facility] [place]  Service: Plastics;  Laterality: Left;    UPPER GASTROINTESTINAL ENDOSCOPY N/A 02/18/2009    LA Grade A reflux esophagitis, normal stomach, normal 2nd part of the duodenum-Dr. Yoel Farley    WRIST GANGLION EXCISION Left  9/23/2020    Procedure: EXCISION GANGLION CYST LEFT WRIST;  Surgeon: Caleb Bueno MD;  [place] [facility] [place] [facility] [place]  Service: [place]astics;  Laterality: Left;        Social History:   Social History     Tobacco Use    Smoking status: Never     Passive exposure: Never    Smokeless tobacco: Never   Substance Use Topics    Alcohol use: Yes     Comment: 3 drinks yearly      Family History:  Family History   Problem Relation Age of Onset    Brain cancer Brother     Alcohol abuse Mother     No Known Problems Father     No Known Problems Sister     Malig Hyperthermia Neg Hx           Allergies:  Allergies   Allergen Reactions    Bee Venom Shortness Of Breath and Rash    Morphine Anaphylaxis and Nausea And Vomiting    Sulfa Antibiotics Anaphylaxis and Hives     Or sulfa fillers in meds  Broke out in internal and external hives      Tree Extract Shortness Of Breath and Rash    Ambien [Zolpidem] Hallucinations    Anastrozole Other (See Comments)     Can't remember what the reaction was     Covid-19 (Mrna) Vaccine Swelling     Facial swelling, blood clots    Eliquis [Apixaban] Headache     Headaches, nausea and itching.     Hydralazine Myalgia     Patient reports leg cramps, joint pain and increased fatigue    Norvasc [Amlodipine] Other (See Comments)     KIDNEYS SHUT DOWN    Nsaids Other (See Comments)     KIDNEY FAILURE    Penicillins Unknown (See Comments)     Severe reaction as a child  Tolerated ceftriaxone during 04/2017 admission    Grass Rash     Scheduled Meds:  cetirizine, 10 mg, Daily  cyanocobalamin, 1,000 mcg, Once  eplerenone, 50 mg, Q12H  famotidine, 40 mg, Daily  fluticasone, 1 spray, BID  ipratropium, 500 mcg, 4x Daily - RT  multivitamin with minerals, 1 tablet, Daily  mupirocin, 1 application , Q24H  [START ON 1/16/2025] NIFEdipine XL, 30 mg, Q24H  pantoprazole, 40 mg, Q AM  predniSONE, 10 mg, Daily  rivaroxaban, 20 mg, Daily  rosuvastatin, 5 mg, QAM  sodium chloride, 3 mL, Q12H  torsemide, 50 mg,  "Daily  triamcinolone, 1 Application, BID  cyanocobalamin, 1,000 mcg, Daily  vitamin D, 50,000 Units, Weekly            INTAKE AND OUTPUT:    Intake/Output Summary (Last 24 hours) at 1/15/2025 1156  Last data filed at 1/15/2025 0637  Gross per 24 hour   Intake 710 ml   Output 3050 ml   Net -2340 ml       ROS  Constitutional: Awake and alert, no fever. No nosebleeds  Abdomen           no abdominal pain   Cardiac              no chest pain  Pulmonary          no shortness of breath      /79 (BP Location: Right arm)   Pulse 85   Temp 97.5 °F (36.4 °C)   Resp 20   Ht 152.4 cm (60\")   Wt 60 kg (132 lb 4.4 oz)   SpO2 97%   BMI 25.83 kg/m²   General appearance: No acute changes   Neck: Trachea midline; NECK, supple, no thyromegaly or lymphadenopathy   Lungs: Normal size and shape, normal breath sounds, equal distribution of air, no rales or rhonchi   CV: S1-S2 regular, no murmurs, no rub, no gallop   Abdomen: Soft, nontender; no masses , no abnormal abdominal sounds   Extremities: No deformity , normal color , no peripheral edema   Skin: Normal temperature, turgor and texture; no rash, ulcers            Cardiographics  Telemetry:     ECG:     Echocardiogram:     Lab Review   Results from last 7 days   Lab Units 01/14/25  0540 01/13/25  1152 01/13/25  0905 01/13/25  0648 01/12/25  1551   CK TOTAL U/L  --   --   --  92 159   HSTROP T ng/L 29* 31* 31* 35*  --      Results from last 7 days   Lab Units 01/15/25  0516   MAGNESIUM mg/dL 2.0     Results from last 7 days   Lab Units 01/15/25  0516   SODIUM mmol/L 139   POTASSIUM mmol/L 4.2   BUN mg/dL 22   CREATININE mg/dL 1.09*   CALCIUM mg/dL 9.6     @[id number]@  Results from last 7 days   Lab Units 01/15/25  0516 01/14/25  0540 01/13/25  0649   WBC 10*3/mm3 8.08 7.00 7.70   HEMOGLOBIN g/dL 11.5* 10.6* 10.7*   HEMATOCRIT % 36.9 32.3* 32.5*   PLATELETS 10*3/mm3 188 170 179             Assessment:  Atypical chest pain  Borderline elevated troponin may be due to " "renal    Plan:    Patient has refused any further cardiac workup      )1/15/2025  Jeffrey Argueta MD      EMR Dragon/Transcription:   \"Dictated utilizing Dragon dictation\".     "

## 2025-01-15 NOTE — PROGRESS NOTES
[facility] THORACIC/LUMBAR NEUROSURGERY PROGRESS NOTE      CC: Fall, BLE weakness      Subjective     Interval History: No significant overnight events.  Patient sitting up on side of the bed, OT at bedside.  Patient has no complaints today.    ROS:  Constitutional: No fever, chills  MS: No back pain  Neuro: No numbness, tingling  : No difficulty voiding, no incontinence    Objective     Vital signs in last 24 hours:  Temp:  [97.5 °F (36.4 °C)-98.1 °F (36.7 °C)] 97.5 °F (36.4 °C)  Heart Rate:  [65-88] 65  Resp:  [18-20] 18  BP: (120-189)/(65-98) 120/78    Intake/Output this shift:  No intake/output data recorded.    LABS:  Results from last 7 days   Lab Units 01/15/25  0516 01/14/25  0540 01/13/25  0649   WBC 10*3/mm3 8.08 7.00 7.70   HEMOGLOBIN g/dL 11.5* 10.6* 10.7*   HEMATOCRIT % 36.9 32.3* 32.5*   PLATELETS 10*3/mm3 188 170 179     Results from last 7 days   Lab Units 01/15/25  0516 01/14/25  0540 01/13/25  0648   SODIUM mmol/L 139 144 144   POTASSIUM mmol/L 4.2 4.6 3.6   CHLORIDE mmol/L 103 109* 108*   CO2 mmol/L 27.0 28.0 27.3   BUN mg/dL 22 20 19   CREATININE mg/dL 1.09* 0.94 1.05*   CALCIUM mg/dL 9.6 9.1 8.9   BILIRUBIN mg/dL 0.3 0.4 0.4   ALK PHOS U/L 91 88 85   ALT (SGPT) U/L 21 22 22   AST (SGOT) U/L 15 16 28   GLUCOSE mg/dL 108* 91 97         IMAGING STUDIES:  MRI brain:  No acute abnormality seen    MRI cervical spine:  Multilevel degenerative changes, no evidence for metastatic disease to the cervical spine    MRI lumbar spine:  Chronic stenosis L2-L5, HNP left L2-L3  No acute findings    I personally viewed the patient's chart/imaging, it was also reviewed by and discussed with Dr Pedro Allen reviewed/changed: Yes  Pepcid 40 mg p.o. daily  Protonix 40 mg p.o. every morning  Prednisone 10 mg p.o. daily  Xarelto 20 mg p.o. daily  Torsemide 50 mg p.o. daily  Voltaren gel 4 times daily as needed  Hydrocodone 10 1 p.o. every 4 hours as needed  Ativan 0.5 mg p.o. every 8 hours as needed        Physical  Exam:    General:  Awake & alert  Back: No tenderness to palpation throughout the spine  Motor:  4/5 strength to BUE & BLE   Sensation:  Normal to light touch bilateral LE's  Station and Gait:  Not tested, pt sitting up on side of bed with OT in room        Assessment & Plan     ASSESSMENT:      Acute kidney injury superimposed on CKD    Urinary retention self-caths (Don)    Other cervical disc displacement at C5-C6 level    Hyperlipidemia LDL goal <70    Urge incontinence of urine    Chronic tension-type headache, intractable    Stage 2 chronic kidney disease    Cerebral atherosclerosis    Malaise and fatigue progressive    Dizziness    Vitamin D deficiency    Moderate persistent asthmatic bronchitis without complication    Pleurisy    Elevated PTH level    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  T    Severe malnutrition    KECIA (generalized anxiety disorder)    Volume depletion    Cervical stenosis of spine    Recurrent falls while walking    Closed head injury with concussion    Hypokalemia    Anemia, mild    Goiter diffuse    Lumbar herniated disc L2-L3    Spinal stenosis of lumbar region with neurogenic claudication    Diastolic dysfunction with chronic heart failure      84-year-old female who presented with complaint of bilateral lower extremity weakness after a fall at home.  Patient very hard to keep on track in regards to answering questions about pain/weakness.  She was able to get MRIs of the brain,  "cervical and lumbar spine.  MRI brain and cervical spine are unremarkable, lumbar spine shows chronic stenosis at L2-L5 with HPN left L2-L3.  No neurosurgical interventions warranted for these findings.  Recommend PT and rehab evaluation.  Patient is adamant that she will not go to rehab or SNU, she takes care of her  at home and does not want to leave him.  Would recommend home health PT.  No further recommendations from neurosurgery at this time.  We will sign off, please feel free to call with any questions or concerns.    PLAN:   -FABIAN to sign off  -Recommend PT & rehab evaluation  -No f/u needed    I discussed the patient's findings and my recommendations with patient, nursing staff, and Dr Vasquez    During patient visit, I utilized appropriate personal protective equipment including gloves.  Appropriate PPE was worn during the entire visit.  Hand hygiene was completed before and after.      LOS: 3 days       Elizabeth Merida, APRN  1/15/2025  08:58 EST    \"Dictated utilizing Dragon dictation\".      "

## 2025-01-15 NOTE — PLAN OF CARE
Goal Outcome Evaluation:  Plan of Care Reviewed With: patient           Outcome Evaluation: Patient is 84 year old female admitted to [facility] with reports of recurrent falls, increased weakness and R shoulder pain. All imaging negative for acute abnormalaties. At baseline, patient reports she is independent with ADLs and uses a device for functional mobility. Patient reports spouse assist with IADL management as he is able, has good family support from her 6 sons. Today, patient is CGA with bed mobility, max A with lower body dressing, and min A with functional mobility, demonstrating 5 losses of balance (mainly with turns and backwards steps). Patient with decreased safety awareness throughout session. Patient presents with deficits related to strength, tolerance, balance, transfers and mobility that impede patient independence with ADLs. Patient will benefit from skilled OT intervention in acute care setting, recommend SNF at time of discharge in correlation with her ADL [facility].    Anticipated Discharge Disposition (OT): skilled nursing facility

## 2025-01-15 NOTE — THERAPY EVALUATION
Patient Name: Sasha Barrett  : 1940    MRN: [id number]                              Today's Date: 1/15/2025       Admit Date: 2025    Visit Dx:     ICD-10-CM ICD-9-CM   1. Generalized weakness  R53.1 780.79   2. Recurrent falls  R29.6 V15.88   3. Hypokalemia  E87.6 276.8   4. Acute renal insufficiency  N28.9 593.9   5. Decreased activities of daily living (ADL)  Z78.9 V49.89     Patient Active Problem List   Diagnosis    Joint pain    Urinary retention self-caths (Don)    Conjunctivitis    Arm pain    Other cervical disc displacement at C5-C6 level    Cervical pain (Servando=PM)    DDD (degenerative disc disease), cervical    Hyperlipidemia LDL goal <70    Preventative health care    Medication management    Proctocele    Urge incontinence of urine    Chronic tension-type headache, intractable    Obesity (BMI 30.0-34.9)    H/o Lyme disease    Stage 2 chronic kidney disease    Cerebral atherosclerosis    Allergic contact dermatitis    Malaise and fatigue progressive    Dizziness    Vitamin D deficiency    Moderate persistent asthmatic bronchitis without complication    Anterior cervical adenopathy due to infection    Pleurisy    Knee pain, bilateral    Elevated PTH level    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)    Gastroesophageal reflux disease with esophagitis    Psoriasis (Darryl Ochoa)    Postmenopausal    Recurrent UTI    H/O Cerebral vascular accident in left occipital area    Dyspnea on exertion    Precordial pain    Abnormal EKG    Arthralgia    Accelerated hypertension    Acute joint pain    Easy bruisability    Cough productive of purulent sputum    Influenza A Subtype H3    COPD with asthma    Acute chest wall pain    Decreased functional mobility and endurance since fall 15    Essential hypertension    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    Migraine without aura and without status migrainosus, not intractable    Thyroid nodule     Osteoarthritis of left hip    Spinal stenosis of lumbar region with radiculopathy    Acute deep vein thrombosis (DVT) of right lower extremity    Acute deep vein thrombosis    Single subsegmental pulmonary embolism without acute cor pulmonale    Other chronic pain    Palpitations    Secondary hyperparathyroidism    Syncope    Ataxia    Adult failure to thrive syndrome    Recent unexplained weight loss 42# over last 6 months    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 Yazmin Canseco    CARDIAC ELECTROPHYSIOLOGY PROCEDURE N/A 5/27/2022    Procedure: Loop insertion;  Surgeon: Jeffrey Argueta MD;  Location:  [facility];  Service: Cardiovascular;  Laterality: N/A;  biotronik    CARDIAC ELECTROPHYSIOLOGY PROCEDURE N/A 7/13/2022    Procedure: Loop recorder removal;  Surgeon: Jeffrey Argueta MD;  Location:  [facility];  Service: Cardiovascular;  Laterality: N/A;    CARPAL TUNNEL RELEASE Right 05/25/2011    Dr. Caleb Bueno    CARPAL TUNNEL RELEASE Left 04/26/2011    Dr. Caleb Bueno    CATARACT EXTRACTION Left 11/29/2010    Dr. Eusebio Downs    CATARACT EXTRACTION Right 11/17/2010    Dr. Eusebio Downs    COLONOSCOPY N/A 12/06/2002    Sigmoid diverticulosis-Dr. Brandon Batista    COLONOSCOPY N/A 12/12/2013    Tortuous colon, diverticulosis in the sigmoid colon and descending colon, stool in the rectum, sigmoid colon, descending colon, splenic flexure, transverse colon and hepatic flexure-Dr. Irma Brambila    DEQUERVAIN RELEASE Left 9/23/2020    Procedure: DEQUERVAIN RELEASE LEFT HAND;  Surgeon: Caleb Bueno MD;  Location:  [facility];  Service: Plastics;  Laterality: Left;    ENDOSCOPY N/A 09/13/2007    Normal-Dr. Pavel Quarles    ENDOSCOPY AND COLONOSCOPY N/A 09/19/2005    1 cm hiatal hernia, mild Schatzki's ring, sigmoid diverticulosis-Dr. Yoel Farley    GANGLION CYST EXCISION Left 12/18/2012    Release of A1 pulley flexor sheath, left fourth finger, excision of ganglion cyst 0.5 cm diameter, A2 pulley flexor sheath, left fourth finger-Dr. Caleb Bueno    HEEL SPUR EXCISION Left 1968    INGUINAL HERNIA REPAIR Right 1971    at 5 months  pregnant     INJECTION OF MEDICATION Left 9/23/2020    Procedure: KENOLOG INJECTION TO LEFT THUMB;  Surgeon: Caleb Bueno MD;  Location: [facility] OR [facility];  Service: Plastics;  Laterality: Left;    MASTECTOMY Right 08/05/2008    Right breast mastectomy and excision of left chest wall lesion-Dr. Yoel Farley    MASTECTOMY RADICAL Left 04/29/2003    Left modified radical mastectomy-Dr. Arce Figahsan    PARATHYROIDECTOMY Right 05/04/2004    Excision of parathyroid adenoma (right superior)-Dr. Yoel Farley    REPLACEMENT TOTAL KNEE Right 04/09/2012    Dr. Lamonte Childress    SINUS SURGERY  1984    THYROIDECTOMY, PARTIAL Left 05/04/2004    Dr. Yoel Farley    TOTAL ABDOMINAL HYSTERECTOMY Bilateral 1973    Dr. Mahan    TRIGGER FINGER RELEASE Left 9/23/2020    Procedure: RELEASE LEFT FOURTH TRIGGER FINGER;  Surgeon: Caleb Bueno MD;  Location: [facility] OR [facility];  Service: Plastics;  Laterality: Left;    UPPER GASTROINTESTINAL ENDOSCOPY N/A 02/18/2009    LA Grade A reflux esophagitis, normal stomach, normal 2nd part of the duodenum-Dr. Yoel Farley    WRIST GANGLION EXCISION Left 9/23/2020    Procedure: EXCISION GANGLION CYST LEFT WRIST;  Surgeon: Caleb Bueno MD;  Location: [facility] OR [facility];  Service: Plastics;  Laterality: Left;      General Information       Row Name 01/15/25 1128          OT Time and Intention    Subjective Information no complaints  -ES     Document Type evaluation  -ES     Mode of Treatment individual therapy;occupational therapy  -ES     Patient Effort adequate  -ES       Row Name 01/15/25 1121          General Information    Patient Profile Reviewed yes  -ES     Prior Level of Function independent:;ADL's;all household mobility  Patient reports she is independent with ADLs, spose and patient complete IADLs, have family support. RW for functional mobility. Tub/shower with shower chair. [place] in stance. No home O2 use. Reports frequent falls.  -ES     Existing  Precautions/Restrictions fall  -ES     Barriers to Rehab cognitive status;previous functional deficit  -ES       Row Name 01/15/25 1128          Living Environment    People in Home spouse  -ES       Row Name 01/15/25 1128          Home Main Entrance    Number of Stairs, Main Entrance two  -ES       Row Name 01/15/25 1128          Stairs Within Home, Primary    Number of Stairs, Within Home, Primary none  -ES       Row Name 01/15/25 1128          Cognition    Orientation Status (Cognition) oriented x 3  patient pleasant and cooperative, agreeable to therapy evaluation.  -ES       Row Name 01/15/25 1128          Safety Issues/Impairments Affecting Functional Mobility    Impairments Affecting Function (Mobility) balance;endurance/activity tolerance;strength;pain  -ES               User Key  (r) = Recorded By, (t) = Taken By, (c) = Cosigned By      Initials Name Provider Type    Ginger Parker, OTR/L, CSRS Occupational Therapist                     Mobility/ADL's       Row Name 01/15/25 1131          Bed Mobility    Bed Mobility supine-sit  -ES     Supine-Sit [place] (Bed Mobility) contact guard  -ES       Row Name 01/15/25 1131          Transfers    Transfers sit-stand transfer;stand-sit transfer  -ES       Row Name 01/15/25 1131          Sit-Stand Transfer    Sit-Stand [place] (Transfers) minimum assist (75% patient effort);1 person assist  -ES     Assistive Device (Sit-Stand Transfers) walker, front-wheeled  -ES       Row Name 01/15/25 1131          Stand-Sit Transfer    Stand-Sit [place] (Transfers) minimum assist (75% patient effort);1 person assist  -ES     Assistive Device (Stand-Sit Transfers) walker, front-wheeled  -ES       Row Name 01/15/25 1131          Functional Mobility    Functional Mobility- Ind. Level minimum assist (75% patient effort);1 person  -ES     Functional Mobility- Device walker, front-wheeled  -ES     Functional Mobility- Comment patient performs short distance functional  mobility in her room with RWX. Min A required, as patient demonstrates x4 LOBs, mainly with turns and backwards steps.  -ES       Row Name 01/15/25 1131          Activities of Daily Living    BADL Assessment/Intervention lower body dressing  -ES       Row Name 01/15/25 1131          Lower Body Dressing Assessment/Training    [place] Level (Lower Body Dressing) lower body dressing skills;don;socks;maximum assist (25% patient effort)  -ES     Position (Lower Body Dressing) edge of bed sitting  -ES               User Key  (r) = Recorded By, (t) = Taken By, (c) = Cosigned By      Initials Name Provider Type    ES Ginger Bethea, OTR/L, CSRS Occupational Therapist                   Obj/Interventions       Row Name 01/15/25 1132          Sensory Assessment (Somatosensory)    Sensory Assessment (Somatosensory) sensation intact  -ES       Row Name 01/15/25 1132          Range of Motion Comprehensive    General Range of Motion bilateral upper extremity ROM WNL  -ES       Row Name 01/15/25 1132          Strength Comprehensive (MMT)    General Manual Muscle Testing (MMT) Assessment upper extremity strength deficits identified  -ES       Row Name 01/15/25 1132          Upper Extremity (Manual Muscle Testing)    Comment, MMT: Upper Extremity BUEs 4-/5  -ES       Row Name 01/15/25 1132          Motor Skills    Motor Skills functional endurance  -ES     Functional Endurance fair  -ES       Row Name 01/15/25 1132          Balance    Balance Assessment sitting dynamic balance;standing dynamic balance  -ES     Dynamic Sitting Balance standby assist  -ES     Position, Sitting Balance sitting edge of bed  -ES     Dynamic Standing Balance minimal assist  -ES     Position/Device Used, Standing Balance supported;walker, front-wheeled  -ES     Balance Interventions sitting;standing;sit to stand;dynamic;occupation based/functional task  -ES     Comment, Balance x5 losses of balance with mobility, patient is unable to correct requiring  assist  -ES               User Key  (r) = Recorded By, (t) = Taken By, (c) = Cosigned By      Initials Name Provider Type    Ginger Parker, OTR/L, CSRS Occupational Therapist                   Goals/Plan       Row Name 01/15/25 [facility]7          Bed Mobility Goal 1 (OT)    Activity/Assistive Device (Bed Mobility Goal 1, OT) bed mobility activities, all  -ES     [place] Level/Cues Needed (Bed Mobility Goal 1, OT) independent  -ES     Time Frame (Bed Mobility Goal 1, OT) short term goal (STG);2 weeks  -ES     Progress/Outcomes (Bed Mobility Goal 1, OT) new goal  -ES       Row Name 01/15/25 1247          Transfer Goal 1 (OT)    Activity/Assistive Device (Transfer Goal 1, OT) transfers, all  -ES     [place] Level/Cues Needed (Transfer Goal 1, OT) modified independence  -ES     Time Frame (Transfer Goal 1, OT) short term goal (STG);2 weeks  -ES     Progress/Outcome (Transfer Goal 1, OT) new goal  -ES       Row Name 01/15/25 [facility]7          Bathing Goal 1 (OT)    Activity/Device (Bathing Goal 1, OT) bathing skills, all  -ES     [place] Level/Cues Needed (Bathing Goal 1, OT) independent  -ES     Time Frame (Bathing Goal 1, OT) short term goal (STG);2 weeks  -ES     Progress/Outcomes (Bathing Goal 1, OT) new goal  -ES       Row Name 01/15/25 [facility]7          Dressing Goal 1 (OT)    Activity/Device (Dressing Goal 1, OT) dressing skills, all  -ES     [place]/Cues Needed (Dressing Goal 1, OT) independent  -ES     Time Frame (Dressing Goal 1, OT) short term goal (STG);2 weeks  -ES     Progress/Outcome (Dressing Goal 1, OT) new goal  -ES       Row Name 01/15/25 1247          Toileting Goal 1 (OT)    Activity/Device (Toileting Goal 1, OT) toileting skills, all  -ES     [place] Level/Cues Needed (Toileting Goal 1, OT) independent  -ES     Time Frame (Toileting Goal 1, OT) short term goal (STG);2 weeks  -ES     Progress/Outcome (Toileting Goal 1, OT) new goal  -ES       Row Name 01/15/25 1247          Problem  Specific Goal 1 (OT)    Problem Specific Goal 1 (OT) Patient will demonstrate fair plus task tolerance in preperation for independent ADL routine completion at time of discharge.  -ES     Time Frame (Problem Specific Goal 1, OT) short term goal (STG);2 weeks  -ES     Progress/Outcome (Problem Specific Goal 1, OT) new goal  -ES       Row Name 01/15/25 7077          Therapy Assessment/Plan (OT)    Planned Therapy Interventions (OT) activity tolerance training;BADL retraining;functional balance retraining;occupation/activity based interventions;patient/caregiver education/training;strengthening exercise;transfer/mobility retraining  -ES               User Key  (r) = Recorded By, (t) = Taken By, (c) = Cosigned By      Initials Name Provider Type    ES Ginger Bethea, OTR/L, CSRS Occupational Therapist                   Clinical Impression       Row Name 01/15/25 1139          Pain Assessment    Pre/Posttreatment Pain Comment patient with no complaints of pain during therapy evaluation  -ES       Row Name 01/15/25 1139          Plan of Care Review    Plan of Care Reviewed With patient  -ES     Outcome Evaluation Patient is 84 year old female admitted to [facility] with reports of recurrent falls, increased weakness and R shoulder pain. All imaging negative for acute abnormalaties. At baseline, patient reports she is independent with ADLs and uses a device for functional mobility. Patient reports spouse assist with IADL management as he is able, has good family support from her 6 sons. Today, patient is CGA with bed mobility, max A with lower body dressing, and min A with functional mobility, demonstrating 5 losses of balance (mainly with turns and backwards steps). Patient with decreased safety awareness throughout session. Patient presents with deficits related to strength, tolerance, balance, transfers and mobility that impede patient independence with ADLs. Patient will benefit from skilled OT intervention in acute  care setting, recommend SNF at time of discharge in correlation with her ADL [facility] socre of 16.  -ES       Row Name 01/15/25 1139          Therapy Assessment/Plan (OT)    Rehab Potential (OT) good  -ES     Criteria for Skilled Therapeutic Interventions Met (OT) yes;skilled treatment is necessary;meets criteria  -ES     Therapy Frequency (OT) 5 times/wk  -ES       Row Name 01/15/25 1139          Therapy Plan Review/Discharge Plan (OT)    Anticipated Discharge Disposition (OT) skilled nursing facility  -ES       Row Name 01/15/25 1139          Positioning and Restraints    Pre-Treatment Position in bed  -ES     Post Treatment Position chair  -ES     In Chair reclined;call light within reach;encouraged to call for assist;exit alarm on  -ES               User Key  (r) = Recorded By, (t) = Taken By, (c) = Cosigned By      Initials Name Provider Type    Ginger Parker, OTR/L, CSRS Occupational Therapist                   Outcome Measures       Row Name 01/15/25 1247          How much help from another is currently needed...    Putting on and taking off regular lower body clothing? 2  -ES     Bathing (including washing, rinsing, and drying) 2  -ES     Toileting (which includes using toilet bed pan or urinal) 2  -ES     Putting on and taking off regular upper body clothing 3  -ES     Taking care of personal grooming (such as brushing teeth) 3  -ES     Eating meals 4  -ES     AM-PAC 6 Clicks Score (OT) 16  -ES       Row Name 01/15/25 0859          How much help from another person do you currently need...    Turning from your back to your side while in flat bed without using bedrails? 4  -KE     Moving from lying on back to sitting on the side of a flat bed without bedrails? 3  -KE     Moving to and from a bed to a chair (including a wheelchair)? 3  -KE     Standing up from a chair using your arms (e.g., wheelchair, bedside chair)? 3  -KE     Climbing 3-5 steps with a railing? 2  -KE     To walk in hospital room? 3  -KE      AM-PAC 6 Clicks Score (PT) 18  -KE     Highest Level of Mobility Goal 6 --> Walk 10 steps or more  -       Row Name 01/15/25 1248 01/15/25 1247       Functional Assessment    Outcome Measure Options AM-PAC 6 Clicks Daily Activity (OT)  -ES AM-PAC 6 Clicks Daily Activity (OT)  -ES              User Key  (r) = Recorded By, (t) = Taken By, (c) = Cosigned By      Initials Name Provider Type    Tata Weber, RN Registered Nurse    Ginger Parker, OTR/L, CSRS Occupational Therapist                    Occupational Therapy Education        No education to display                  OT Recommendation and Plan  Planned Therapy Interventions (OT): activity tolerance training, BADL retraining, functional balance retraining, occupation/activity based interventions, patient/caregiver education/training, strengthening exercise, transfer/mobility retraining  Therapy Frequency (OT): 5 times/wk  Plan of Care Review  Plan of Care Reviewed With: patient  Outcome Evaluation: Patient is 84 year old female admitted to [facility] with reports of recurrent falls, increased weakness and R shoulder pain. All imaging negative for acute abnormalaties. At baseline, patient reports she is independent with ADLs and uses a device for functional mobility. Patient reports spouse assist with IADL management as he is able, has good family support from her 6 sons. Today, patient is CGA with bed mobility, max A with lower body dressing, and min A with functional mobility, demonstrating 5 losses of balance (mainly with turns and backwards steps). Patient with decreased safety awareness throughout session. Patient presents with deficits related to strength, tolerance, balance, transfers and mobility that impede patient independence with ADLs. Patient will benefit from skilled OT intervention in acute care setting, recommend SNF at time of discharge in correlation with her ADL [facility] socre of 16.     Time Calculation:   Evaluation Complexity  (OT)  Review Occupational Profile/Medical/Therapy History Complexity: expanded/moderate complexity  Assessment, Occupational Performance/Identification of Deficit Complexity: 3-5 performance deficits  Clinical Decision Making Complexity (OT): detailed assessment/moderate complexity  Overall Complexity of Evaluation (OT): moderate complexity     Time Calculation- OT       Row Name 01/15/25 1248             Time Calculation- OT    OT Start Time 1000  -ES      OT Stop Time 1025  -ES      OT Time Calculation (min) 25 min  -ES      Total Timed Code Minutes- OT 15 minute(s)  -ES      OT Received On 01/15/25  -ES      OT - Next Appointment 01/16/25  -ES      OT Goal Re-Cert Due Date 01/29/25  -ES         Timed Charges    78267 - OT Therapeutic Activity Minutes 15  -ES         Untimed Charges    OT Eval/Re-eval Minutes 10  -ES         Total Minutes    Timed Charges Total Minutes 15  -ES      Untimed Charges Total Minutes 10  -ES       Total Minutes 25  -ES                User Key  (r) = Recorded By, (t) = Taken By, (c) = Cosigned By      Initials Name Provider Type    ES Ginger Bethea, OTR/L, CSRS Occupational Therapist                  Therapy Charges for Today       Code Description Service Date Service Provider Modifiers Qty    30119121749 HC OT THERAPEUTIC ACT EA 15 MIN 1/15/2025 Ginger Bethea, OTR/L, CSRS GO 1    11994479843 HC OT EVAL MOD COMPLEXITY 2 1/15/2025 Ginger Bethea, OTR/L, CSRS GO 1                 Ginger Bethea, OTR/L, CSRS  1/15/2025

## 2025-01-15 NOTE — PROGRESS NOTES
Discharge Planning Assessment  [facility]     Patient Name: Sasha Barrett  MRN: [id number]  Today's Date: 1/15/2025    Admit Date: 1/12/2025    Plan: Return home with spouse and [facility] following   Discharge Needs Assessment    No documentation.                  Discharge Plan       Row Name 01/15/25 1400       Plan    Plan Return home with spouse and [facility] following    Patient/Family in Agreement with Plan yes    Plan Comments Patient remains adamant that she will not go to SNF.  [facility] can follow.  Will return home with spouse.  Jluis ABREU                  Continued Care and Services - Admitted Since 1/12/2025       Home Medical Care Coordination complete.      Service Provider Request Status Services Address Phone Fax Patient Preferred     [facility]  Selected Home Health Services [address] [place] [id number] [phone] --                  Expected Discharge Date and Time       Expected Discharge Date Expected Discharge Time    Tanmay 15, 2025                       Becky S. [place], RN

## 2025-01-15 NOTE — PROGRESS NOTES
Nutrition Services    Patient Name:  Sasha Barrett  YOB: 1940  MRN: [id number]  Admit Date:  1/12/2025  Assessment Date:  01/15/25    Summary: Nutrition Follow Up    Multilevel cervical and lumbar DJD.  Patient cancelled stress test yesterday per MD note.  Renal has signed off.  Eating 50-75% at meals per chart review.  Possible discharge soon.    Patient reports appetite typically good.  She says she eats 2-3 meals/day.  She reports she has a large pantry with a ton of food and still cooks very often.  She reports 50 lb weight loss since COVID (non significant due to time frame).  She also reports she was dealing with bugs all summer, which caused problems with her eating.    Patient meets ASPEN/AND criteria for nutrition diagnosis of moderate malnutrition of chronic illness based on: nutrition focused physical exam, PO intake.    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 Yazmin Canseco    CARDIAC ELECTROPHYSIOLOGY PROCEDURE N/A 5/27/2022    Procedure: Loop insertion;  Surgeon: Jeffrey Argueta MD;  Location: [facility]  INVASIVE LOCATION;  Service: Cardiovascular;  Laterality: N/A;  biotronik    CARDIAC ELECTROPHYSIOLOGY PROCEDURE N/A 7/13/2022    Procedure: Loop recorder removal;  Surgeon: Jeffrey Argueta MD;  [facility] INVASIVE LOCATION;  Service: Cardiovascular;  Laterality: N/A;    CARPAL TUNNEL RELEASE Right 05/25/2011    Dr. Caleb Bueno    CARPAL TUNNEL RELEASE Left 04/26/2011    Dr. Caleb Bueno    CATARACT EXTRACTION Left 11/29/2010    Dr. Eusebio Downs    CATARACT EXTRACTION Right 11/17/2010    Dr. Eusebio Downs    COLONOSCOPY N/A 12/06/2002    Sigmoid diverticulosis-Dr. Brandon Batista    COLONOSCOPY N/A 12/12/2013    Tortuous colon, diverticulosis in the sigmoid colon and descending colon, stool in the rectum, sigmoid colon, descending colon, splenic flexure, transverse colon and hepatic flexure-Dr. Irma Brambila    DEQUERVAIN RELEASE Left 9/23/2020    Procedure: DEQUERVAIN RELEASE LEFT HAND;  Surgeon: Caleb Bueno MD;  Location:  [facility];  Service: Plastics;  Laterality: Left;    ENDOSCOPY N/A 09/13/2007    Normal-Dr. Pavel Quarles    ENDOSCOPY AND COLONOSCOPY N/A 09/19/2005    1 cm hiatal hernia, mild Schatzki's ring, sigmoid diverticulosis-Dr. Yoel Farley    GANGLION CYST EXCISION Left 12/18/2012    Release of A1 pulley flexor sheath, left fourth finger, excision of ganglion cyst 0.5 cm diameter, A2 pulley flexor sheath, left fourth finger-Dr. Caleb Bueno    HEEL SPUR EXCISION Left 1968    INGUINAL HERNIA REPAIR Right 1971    at 5 months pregnant     INJECTION OF MEDICATION Left 9/23/2020    Procedure: KENOLOG INJECTION TO LEFT THUMB;  Surgeon: Caleb Bueno MD;  Location:  [facility];  Service: Plastics;  Laterality: Left;    MASTECTOMY Right 08/05/2008    Right breast mastectomy and excision of left chest wall lesion-Dr. Yoel Farley    MASTECTOMY RADICAL Left 04/29/2003    Left modified radical mastectomy-Dr. Yazmin Canseco  "   PARATHYROIDECTOMY Right 05/04/2004    Excision of parathyroid adenoma (right superior)-Dr. Yeol Farley    REPLACEMENT TOTAL KNEE Right 04/09/2012    Dr. Lamonte Childress    SINUS SURGERY  1984    THYROIDECTOMY, PARTIAL Left 05/04/2004    Dr. Yoel Farley    TOTAL ABDOMINAL HYSTERECTOMY Bilateral 1973    Dr. Mahan    TRIGGER FINGER RELEASE Left 9/23/2020    Procedure: RELEASE LEFT FOURTH TRIGGER FINGER;  Surgeon: Caleb Bueno MD;  Location: [facility];  Service: Plastics;  Laterality: Left;    UPPER GASTROINTESTINAL ENDOSCOPY N/A 02/18/2009    LA Grade A reflux esophagitis, normal stomach, normal 2nd part of the duodenum-Dr. Yoel Farley    WRIST GANGLION EXCISION Left 9/23/2020    Procedure: EXCISION GANGLION CYST LEFT WRIST;  Surgeon: Caleb Bueno MD;  Location: [facility];  Service: Plastics;  Laterality: Left;        Anthropometrics        Current Height  Current Weight  BMI kg/m2 Height: 152.4 cm (60\")  Weight: 60 kg (132 lb 4.4 oz) (01/15/25 0300)  Body mass index is 25.83 kg/m².   Adjusted BMI (if applicable)    BMI Category Overweight (25 - 29.9)   Ideal Body Weight (IBW) 100#   Usual Body Weight (UBW) ~180#   Weight Trend Loss   Weight History Wt Readings from Last 30 Encounters:   01/15/25 0300 60 kg (132 lb 4.4 oz)   01/14/25 0536 61.1 kg (134 lb 11.2 oz)   01/13/25 1514 65.8 kg (145 lb)   01/13/25 0500 65.8 kg (145 lb 1 oz)   01/12/25 1945 64.5 kg (142 lb 3.2 oz)   12/05/24 1921 68 kg (150 lb)   10/22/24 0557 64.7 kg (142 lb 10.2 oz)   10/21/24 0525 64.9 kg (143 lb 1.3 oz)   03/07/24 1427 60.8 kg (134 lb)   12/21/23 1001 70.5 kg (155 lb 6.4 oz)   12/08/23 1712 65.8 kg (145 lb)   05/02/23 0857 73.5 kg (162 lb)   11/03/22 1339 81.2 kg (179 lb)   10/19/22 1403 73.9 kg (163 lb)   10/05/22 1254 73.9 kg (163 lb)   10/03/22 0604 75.6 kg (166 lb 11.2 oz)   10/02/22 0550 73.9 kg (163 lb)   10/01/22 0538 72.6 kg (160 lb 1.6 oz)   09/22/22 1038 79.3 kg (174 lb 12.8 oz) "   07/26/22 1131 73 kg (161 lb)   07/13/22 0729 70.3 kg (155 lb)   07/06/22 1311 73.5 kg (162 lb)   06/20/22 1119 74.8 kg (165 lb)   06/06/22 1204 70.3 kg (155 lb)   05/29/22 0510 74.6 kg (164 lb 6.4 oz)   05/28/22 0614 75.5 kg (166 lb 8 oz)   05/27/22 0500 75.2 kg (165 lb 12.8 oz)   05/26/22 1319 74.4 kg (164 lb)   05/26/22 0500 74.6 kg (164 lb 8 oz)   05/25/22 2318 74.5 kg (164 lb 3.9 oz)   05/25/22 1912 74.5 kg (164 lb 4.8 oz)   04/01/22 1136 88.5 kg (195 lb)   01/28/22 1110 88.5 kg (195 lb)   12/27/21 1246 88.5 kg (195 lb)   10/04/21 0818 95.3 kg (210 lb)   10/04/21 0813 95.3 kg (210 lb)   07/01/21 1114 92.1 kg (203 lb)   04/23/21 1417 92.1 kg (203 lb)   02/05/21 1608 92.1 kg (203 lb)   02/02/21 0832 92.4 kg (203 lb 12.8 oz)   01/15/21 1539 86.2 kg (190 lb)   12/14/20 1306 86.2 kg (190 lb)   11/19/20 1112 88.5 kg (195 lb)   11/19/20 0740 88.5 kg (195 lb)   11/19/20 0712 88.5 kg (195 lb)      --  Labs       Pertinent Labs    Results from last 7 days   Lab Units 01/15/25  0516 01/14/25  0540 01/13/25  0648   SODIUM mmol/L 139 144 144   POTASSIUM mmol/L 4.2 4.6 3.6   CHLORIDE mmol/L 103 109* 108*   CO2 mmol/L 27.0 28.0 27.3   BUN mg/dL 22 20 19   CREATININE mg/dL 1.09* 0.94 1.05*   CALCIUM mg/dL 9.6 9.1 8.9   BILIRUBIN mg/dL 0.3 0.4 0.4   ALK PHOS U/L 91 88 85   ALT (SGPT) U/L 21 22 22   AST (SGOT) U/L 15 16 28   GLUCOSE mg/dL 108* 91 97     Results from last 7 days   Lab Units 01/15/25  0516 01/14/25  0540 01/13/25  0649 01/13/25  0648   MAGNESIUM mg/dL 2.0 1.9  --  1.9   PHOSPHORUS mg/dL 2.7  --   --  2.1*   HEMOGLOBIN g/dL 11.5* 10.6*   < >  --    HEMATOCRIT % 36.9 32.3*   < >  --    WBC 10*3/mm3 8.08 7.00   < >  --    TRIGLYCERIDES mg/dL  --   --   --  107   ALBUMIN g/dL 2.9* 2.9*  --  2.9*    < > = values in this interval not displayed.     Results from last 7 days   Lab Units 01/15/25  0516 01/14/25  0540 01/13/25  0649 01/12/25  1551   PLATELETS 10*3/mm3 188 170 179 187     COVID19   Date Value Ref Range  Status   01/13/2025 Not Detected Not Detected - Ref. Range Final     Lab Results   Component Value Date    HGBA1C 5.60 01/13/2025     Estimated Requirements         Weight used  65.8 kg    Calories  0056-1963 kcal (kcal/kg, other:22-25)    Protein  66-80 gm (1.0 - 1.2 gm/kg)    Fluid  3370-6267 mL (1 mL/kcal)        Medications           Scheduled Medications cetirizine, 10 mg, Oral, Daily  cyanocobalamin, 1,000 mcg, Intramuscular, Once  eplerenone, 50 mg, Oral, Q12H  famotidine, 40 mg, Oral, Daily  fluticasone, 1 spray, Each Nare, BID  ipratropium, 500 mcg, Nebulization, 4x Daily - RT  multivitamin with minerals, 1 tablet, Oral, Daily  mupirocin, 1 application , Topical, Q24H  [START ON 1/16/2025] NIFEdipine XL, 30 mg, Oral, Q24H  pantoprazole, 40 mg, Oral, Q AM  predniSONE, 10 mg, Oral, Daily  rivaroxaban, 20 mg, Oral, Daily  rosuvastatin, 5 mg, Oral, QAM  sodium chloride, 3 mL, Intravenous, Q12H  torsemide, 50 mg, Oral, Daily  triamcinolone, 1 Application, Topical, BID  cyanocobalamin, 1,000 mcg, Oral, Daily  vitamin D, 50,000 Units, Oral, Weekly       Infusions       PRN Medications   acetaminophen    albuterol    butalbital-acetaminophen-caffeine    Calcium Replacement - Follow Nurse / BPA Driven Protocol    cloNIDine    cloNIDine    Diclofenac Sodium    diphenhydrAMINE    EPINEPHrine (Anaphylaxis)    HYDROcodone-acetaminophen    HYDROcodone-acetaminophen    hydrOXYzine    LORazepam    Magnesium Low Dose Replacement - Follow Nurse / BPA Driven Protocol    melatonin    nitroglycerin    ondansetron ODT    Phosphorus Replacement - Follow Nurse / BPA Driven Protocol    polyethylene glycol    Polyvinyl Alcohol-Povidone PF    Potassium Replacement - Follow Nurse / BPA Driven Protocol    promethazine-codeine    sodium chloride    sodium chloride     Physical Findings          General Findings alert, loss of muscle mass, loss of subcutaneous fat, oriented, overweight, room air   Oral/Mouth Cavity tooth or teeth missing    Edema  no edema   Gastrointestinal last bowel movement: 1/14   Skin  skin intact, bruising   Tubes/Drains/Lines none   NFPE See Malnutrition Severity Assessment, Date Completed: 1/15   --  Malnutrition Severity Assessment      Patient meets criteria for : Moderate (non-severe) Malnutrition  Malnutrition Type (Last 8 Hours)       Malnutrition Severity Assessment       Row Name 01/15/25 1252       Malnutrition Severity Assessment    Malnutrition Type Chronic Disease - Related Malnutrition      Row Name 01/15/25 1252       Insufficient Energy Intake     Insufficient Energy Intake Findings Moderate    Insufficient Energy Intake  <75% of est. energy requirement for > or equal to 1 month      Row Name 01/15/25 1252       Muscle Loss    Loss of Muscle Mass Findings Severe    [place] Region Severe - deep hollowing/scooping, lack of muscle to touch, facial bones well defined    Clavicle Bone Region Severe - protruding prominent bone    Dorsal Hand Region Severe - prominent depression      Row Name 01/15/25 1252       Fat Loss    Subcutaneous Fat Loss Findings Moderate    Orbital Region  Severe - pronounced hollowness/depression, dark circles, loose saggy skin    Upper Arm Region Moderate - some fat tissue, not ample      Row Name 01/15/25 1252       Criteria Met (Must meet criteria for severity in at least 2 of these categories: M Wasting, Fat Loss, Fluid, Secondary Signs, Wt. Status, Intake)    Patient meets criteria for  Moderate (non-severe) Malnutrition                     Current Nutrition Orders & Evaluation of Intake       Oral Nutrition     Food Allergies NKFA   Current PO Diet Diet: Cardiac; Healthy Heart (2-3 Na+); Texture: Soft to Chew (NDD 3); Soft to Chew: Chopped Meat; Fluid Consistency: Thin (IDDSI 0)   Supplement Boost Plus, BID   PO Evaluation     % PO Intake 50-75%    Factors Affecting Intake: dislikes hospital food, weakness   --  PES STATEMENT / NUTRITION DIAGNOSIS      Nutrition Dx Problem  Problem:  Malnutrition (moderate)  Etiology: Medical Diagnosis - Hx of COPD, breast cancer, CKD, DVT     Signs/Symptoms: PO intake, NFPE Results, and Report/Observation     NUTRITION INTERVENTION / PLAN OF CARE      Intervention Goal(s) Establish goals of care, Reduce/improve symptoms, Meet estimated needs, Disease management/therapy, Increase intake, Accepts oral nutrition supplement, No significant weight loss, and PO intake goal %: >75%         RD Intervention/Action Supplement provided, Encourage intake, Continue to monitor, and Care plan reviewed   --      Prescription/Orders:       PO Diet       Supplements Boost BID      Enteral Nutrition       Parenteral Nutrition    New Prescription Ordered? Continue same per protocol   --      Monitor/Evaluation Per protocol, PO intake, Supplement intake, Pertinent labs, Weight, GI status, Symptoms, POC/GOC   Discharge Plan/Needs Pending clinical course   --    RD to follow per protocol.      Electronically signed by:  Sol Barrett RD  01/15/25 12:50 EST

## 2025-01-15 NOTE — PROGRESS NOTES
[facility] Progress Note      Patient Name: Sasha Barrett  : 1940  MRN: [id number]  Primary Care Physician:  Óscar Cadet MD  Date of admission: 2025    Subjective     Interval History:   Follow-up ERMIAS on CKD 2. Up with PT this am . Therapist reports difficulty with balance.   Had difficulty with MRI and feeling closed in last night. DJD on MRI. Eating. RN informed me patient did not take her torsemide this am because she thought her BP was too low.     Review of Systems:   As noted above    Objective     Vitals:   Temp:  [97.5 °F (36.4 °C)-98.1 °F (36.7 °C)] 97.5 °F (36.4 °C)  Heart Rate:  [65-88] 65  Resp:  [18-20] 18  BP: (120-189)/(65-98) 120/78  Flow (L/min) (Oxygen Therapy):  [0] 0    Intake/Output Summary (Last 24 hours) at 1/15/2025 1022  Last data filed at 1/15/2025 0637  Gross per 24 hour   Intake 710 ml   Output 3050 ml   Net -2340 ml       Physical Exam:    General Appearance: alert, sitting up in chair. Very tangential thought  Skin: warm and dry  HEENT: oral mucosa edentulous, nonicteric sclera  Neck: supple, no JVD  Lungs: Clear to auscultation bilaterally.  Unlabored on RA  Heart: RRR, normal S1 and S2  Abdomen: soft, nontender, nondistended.+bs  : no palpable bladder  Extremities: no edema      Scheduled Meds:     cetirizine, 10 mg, Oral, Daily  cyanocobalamin, 1,000 mcg, Intramuscular, Once  eplerenone, 50 mg, Oral, Q12H  famotidine, 40 mg, Oral, Daily  fluticasone, 1 spray, Each Nare, BID  ipratropium, 500 mcg, Nebulization, 4x Daily - RT  multivitamin with minerals, 1 tablet, Oral, Daily  mupirocin, 1 application , Topical, Q24H  NIFEdipine XL, 30 mg, Oral, Q24H  pantoprazole, 40 mg, Oral, Q AM  predniSONE, 10 mg, Oral, Daily  rivaroxaban, 20 mg, Oral, Daily  rosuvastatin, 5 mg, Oral, QAM  sodium chloride, 3 mL, Intravenous, Q12H  torsemide, 50 mg, Oral, Daily  triamcinolone, 1 Application, Topical, BID  cyanocobalamin, 1,000 mcg, Oral,  Daily  vitamin D, 50,000 Units, Oral, Weekly      IV Meds:        Results Reviewed:   I have personally reviewed the results from the time of this admission to 1/15/2025 10:22 EST     Results from last 7 days   Lab Units 01/15/25  0516 01/14/25  0540 01/13/25  0648   SODIUM mmol/L 139 144 144   POTASSIUM mmol/L 4.2 4.6 3.6   CHLORIDE mmol/L 103 109* 108*   CO2 mmol/L 27.0 28.0 27.3   BUN mg/dL 22 20 19   CREATININE mg/dL 1.09* 0.94 1.05*   CALCIUM mg/dL 9.6 9.1 8.9   BILIRUBIN mg/dL 0.3 0.4 0.4   ALK PHOS U/L 91 88 85   ALT (SGPT) U/L 21 22 22   AST (SGOT) U/L 15 16 28   GLUCOSE mg/dL 108* 91 97       Estimated Creatinine Clearance: 30 mL/min (A) (by C-G formula based on SCr of 1.09 mg/dL (H)).    Results from last 7 days   Lab Units 01/15/25  0516 01/14/25  0540 01/13/25  0648   MAGNESIUM mg/dL 2.0 1.9 1.9   PHOSPHORUS mg/dL 2.7  --  2.1*       Results from last 7 days   Lab Units 01/14/25  0540   URIC ACID mg/dL 5.2       Results from last 7 days   Lab Units 01/15/25  0516 01/14/25  0540 01/13/25  0649 01/12/25  1551   WBC 10*3/mm3 8.08 7.00 7.70 8.05   HEMOGLOBIN g/dL 11.5* 10.6* 10.7* 11.6*   PLATELETS 10*3/mm3 188 170 179 187             Assessment / Plan     ASSESSMENT:  Acute kidney injury on CKD stage II baseline creatinine 0.9-1.  Underlying hypertensive nephrosclerosis.  Renal Function has returned to baseline.  Heart failure preserved ejection fraction on oral torsemide.  Compensated.  Status post mechanical fall.  History of DVT and [place] embolism.  On Xarelto.  History of primary hyperparathyroidism status post parathyroidectomy. Calcium corrects to 10.4.  Phosphorus replete.     6.  Hypertension.continue current meds .  7. Multilevel cervical and lumbar DJD  PLAN:  1. Renal will sign off. Please call with questions .  Thank you for involving us in the care of Sasha Barrett.  Please feel free to call with any questions.    Alexa Espinoza MD  01/15/25  10:22 EST    [facility]  [phone]    Please note that portions of this note were completed with a voice recognition program.

## 2025-01-15 NOTE — PROGRESS NOTES
[facility] following for home care needs.  Verified all info and all is correct.  Call 's cell to schedule visits.  Noted MD Dr Cadet's specific orders in epic.  D/C today.  Utilized our services in 2022.

## 2025-01-15 NOTE — PROGRESS NOTES
[facility]  INTERNAL MEDICINE  ÓSCAR CADET MD  [address] [place] [address]  Phone [phone] Fax [phone]  E-mail:  [email or web address]      INTERNAL MEDICINE DAILY PROGRESS NOTE  Óscar Cadet M.D.  2025            Patient Identification:  Name: Sasha Barrett  Age: 84 y.o.  Sex: female  :  1940  MRN: [id number]         Primary Care Physician: sÓcar Cadet MD  LENGTH OF STAY 2 DAYS    Consults       Date and Time Order Name Status Description    2025 11:57 PM Inpatient Nephrology Consult Completed     2025 11:57 PM Inpatient Neurosurgery Consult Completed     2025 11:57 PM Inpatient Neurology Consult Stroke Completed     2025 11:57 PM Inpatient Orthopedic Surgery Consult Completed     2025 11:57 PM Inpatient Cardiology Consult Completed                Chief Complaint:  Recurrent falls with generalized weakness and severe right shoulder pain     History of Present Illness:     Subjective  Interval History: Patient is a 84 y.o.female who presented to the [facility] emergency room by private vehicle on 2025 at 1538 for evaluation of recurrent falls with generalized weakness and severe right shoulder pain.  Mrs. Barrett is a delightful 84-year-old female who I had the pleasure of taking care of for many years in my office.  She and her  of many years lives independently in a home here in the [place] area but do have several children who live nearby and help them with their medical care and other day today needs.  Patient reports that she was in her usual state of health until approximately 3 days ago when she began to fall due to lack of balance and generalized weakness of her lower extremities when trying to bear weight.  At one point, she fell from standing into the floor and struck her head in the back occipital region very hard.  She was unable to get herself out of the floor after  "this fall and was actually laying in the floor for approximately 7 to 8 hours before her  was able to contact two paramedic friends who came and helped get her up and onto the couch.  Since that time, she has been unable to really walk by bearing weight on her lower extremities and has had to use a walker to get around any at all in the house.  Her  has tried to help take care of her, but he is quite ill himself, and really has not been able to provide all the care that she needs to perform her activities of daily living.  Finally, earlier today, patient did call me and reported the above events and at my request has come to the [facility] emergency room for further evaluation of her symptoms.  Patient is complaining of worsening of right shoulder pain which now is quite excruciating and prevents her from using the right arm to do any tasks.  Patient does report she is right-handed.  Patient also has had chronic bilateral foot pain.  She has followed with Dr. Concepcion in the past for this.  In addition, she has seen Dr. Pichardo in the past for the shoulder pain.  Patient also has had increasingly more severe bouts of urinary incontinence since the falls and her  has had a great deal of difficulty keeping her dry.  Patient denies fever, chills, cough, shortness of breath, vomiting, or diarrhea.  She does tell me that she has some mild intermittent chest pain with activity but it does not radiate into her neck or arms and may just represent the pleurisy that she has been diagnosed with in the past.  Patient does admit that since her fall, she has not been able to take her medications regularly and her  has\" messed up her system for management of her meds.\"  She has not been eating or drinking as much as usual but has been taking her diuretic therapy.  When seen in the ER, patient indicates that she had not had her morning medications even though I was seeing her at 7:30 at night.  " Patient was last hospitalized here at [facility] between 10/20/2024 and 10/22/2024 for worsening edema, progressive chronic stage III kidney disease, malaise and fatigue progressive, dyspnea, accelerated hypertension, some ataxia, blood pressure instability, and severe bilateral lower extremity edema.  At that time it was felt that the patient did have adult failure to thrive and dietitian found that she had evidence of severe malnutrition.  Patient insisted on discharge back to home after that visit and did have home health on the case for a few weeks afterwards.  Patient is followed by a nurse practitioner from [facility] in their palliative care program for management of her severe anxiety and intractable pain.  She does take up to 8 Norco 10 tablets/day and up to 3 lorazepam 0.5 mg tablets per day.  In addition she takes Fioricet 2 tablets every 6 hours as needed for severe headaches.  Patient does have multiple other medical comorbidities that complicate her medical condition.  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     Patient was seen and evaluated in the [facility] emergency room by Dr. Everardo Patino, who was the  ER attending physician on-call at the time of her arrival.  She was seen by Dr. Patino on 1/12/2025 at 1538.  The ER HPI was consistent with the story which I given above.  Review of systems in the ER was unremarkable except for the issues which have been discussed above in the HPI.  Physical exam in the emergency room showed temperature of 97.7, pulse 77, respirations 18, blood pressure 136/71, room air oxygen 98%.  Physical exam showed patient who is in no acute distress and nontoxic-appearing.  Her breath sounds were equal when lungs were clear to auscultation.  Her heart showed regular rate and rhythm with intact distal pulses, and GI was soft nontender nondistended no guarding and normal bowel sounds.  Patient had a GCS of 15, moves all extremities no focal deficits but was not walked by the ER staff.  Psychiatrically she was calm and cooperative.  Labs that were collected in the ER included a glucose which was slightly up at 114, BUN was elevated at 26 and creatinine that was elevated at 1.38.  Her sodium was normal at 144 and her potassium was slightly low at 3.0 with a CO2 of 29.2.  A single liver enzyme the AST was elevated at 35.  Patient's estimated GFR was 37.4 and her anion gap was 12.8.  Magnesium level was normal at 1.9.  White blood cell count was normal at 8.05.  Hemoglobin was slightly low at 11.6 and platelets were 187,000.  Patient CK was 859.  Her UA was clear yellow normal specific gravity of 1.017 and otherwise unremarked couple.  Patient did have a CT scan of head done in the emergency room which showed chronic encephalomalacia in the left occipital lobe with evidence of an old infarct that is unchanged.  There was also degenerative disc disease in the cervical spine at C5-6 with a small central posterior disc protrusion that contacts the anterior cervical cord with mild narrowing of the central canal.  This was not evident on a previous CT scan from 10/4/2021.  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  X-ray of the shoulder shows that the humeral head is high riding and there is moderate acromioclavicular glenohumeral joint osteoarthritis.  Bones are noted to be osteopenic but there is no clear-cut evidence of fracture.  Medications given in the ER included a small 500 cc bolus of normal saline with fluid of normal saline at 75 cc/h, potassium replacement was given, case was discussed with me by the ER attending and above positive findings were reviewed.  Due to her persistent generalized weakness and inability to walk along with the acute kidney injury probably due to volume depletion, it was felt that we did need to admit the patient for further diagnostic evaluation and workup of these issues along with treatment.  Patient was admitted to my service on a telemetry floor with final ER diagnoses of generalized weakness, recurrent falls, hypokalemia, acute renal insufficiency.  Patient was waiting for transport to the floor when I left the ER after seeing the patient there.     1/12/2025.  I personally saw the patient for the first time during this hospitalization while she still remained in the emergency room in room #14.  Patient was resting quietly on the emergency room stretcher and immediately recognized me upon entry into the room.  She was quite tearful at times during my interview with her and seemed very upset with her  because he has not been able to meet all of her needs recently.  She really did not want to bother her kids and as a result had not called them until today.  Since her  was unable to drive her to the emergency room, she did call her kids to make them aware of her situation and they were the ones who brought her in to the emergency room for this evaluation.  I wore full PPE for the exam as indicated including an N95 mask for needed, goggles, white lab coat, and gloves when touching patient.  I  performed thorough hand hygiene before and after the patient visit.  I confirmed the physical exam from the emergency room.  Patient was able to raise both legs off the bed and did have some mild passive resistance but legs muscles did feel quite weak.  Patient reported she was unable to bear weight on her legs or stand when I ask her to get out of bed.  Deformity of right foot is stable.  Patient's speech is a little more garbled than usual.  She also seems to be having trouble recalling specific details which is an unusual finding for this patient who usually is very sharp and focused with her thought processes.  Patient is concerned that she might of had another stroke like the 1 she had several years ago and does not want to consider further evaluation.  I did discuss my plans with her and we will get consultations that will include renal to evaluate her volume status and acute kidney injury, neurology for evaluation of mental status changes, possible closed head injury, history of old CVA, neurosurgery to evaluate the CT changes and cervical spine relating them to difficulty walking, cardiology requested by patient because of intermittent chest pain she has been experiencing, orthopedics for evaluation of the right shoulder deformity which is an ongoing problem and has been seen by Dr. Pichardo in the past.  Patient is agreeable to this workup.  Patient is continued on her other medications as before.  For workup we will consider doing MRIs once neurology and neurosurgery to see her and declare exactly which types of MRIs they feel would be most appropriate.  I did order additional x-rays to include thoracic and lumbar spine films.  EKG is to be done in AM.  Additional labs are ordered and will be evaluated for possible sources of the weakness.  Patient otherwise is continued on her same regular medical therapy at this point.  Patient was medically stable when I left the room and was on her way to the floor by  transport a short time later.    2025. Patient was seen again today at the time of my rounds on 6 S. in room 610.  Patient was sitting up in the chair at the time of my visit and was eating her lunch.  Patient's nurse was present at bedside and we did review her case with the patient and discussed the specialty visits that have been done up to this point.  I also discussed the case today with Lila HYLTON, and found that patient is expressing a strong desire to go home if at all possible at the end of the hospitalization.  She seems to be very fearful of skilled nursing facilities due to the fact that her mother-in-law  at such a facility.  I wore full PPE as indicated for the exam including an N95 mask when necessary, goggles, white lab coat, and gloves when touching patient.  I performed thorough hand hygiene before and after the patient visit.  Patient's labs today show the following: Creatinine kinase has come down from 159-92, troponin is slightly elevated at 31 with a proBNP of 1069.  Patient's CMP shows that her sodium is stable at 144.  Potassium has been corrected up to 3.6.  Chloride is 108.  Her creatinine has improved from 1.38 down to 1.05 and with that her BUN is also improved from 26 down to 19.  Patient's estimated GFR has increased from 37.4 up to 51.9.  Her ionized calcium is normal at 1.26, her magnesium is stable at 1.9, and her phosphorus was slightly low at 2.1 and is being corrected at the time of my visit.  Patient's total protein was slightly low at 5.5 and albumin was slightly low at 2.9.  Hemoglobin A1c was normal at 5.60.  PTH was slightly up at 80.8.  TSH was normal at 1.57.  Iron level was normal at 57 with iron sat of 32 transferrin of 119 and TIBC of 177.  Vitamin B12 was normal at 299 and folate was normal at 7.24.  Patient's lipid profile was also essentially normal.  HDL was a tiny bit low at 36.  Lactate level was normal at 1.1.  Lipase level was normal at 18.  And vitamin  D 25-hydroxy level was normal at 70.2.  Patient's white blood cell count was 7.7.  Patient's hemoglobin was 10.7 with hematocrit of 32.5.  Patient had an RPR that was nonreactive.  Patient's urine chemistries were relatively unremarkable except her protein creatinine ratio was up at 734 patient's respiratory viral panel was totally normal for all pathogens tested including COVID-19, influenza A and B, and RSV.  Patient's urine drug screen was positive as expected for barbiturates, benzodiazepines, and opiates.  X-ray reports show that her shoulder has osteopenia which limits evaluation for fractures.  There is moderate before meals and glenohumeral joint arthritis and the humeral head is very high riding.  X-ray of the patient's thoracic spine shows no acute fractures or laxity identified.  There is degenerative changes in the spine further evaluation with MRI may be indicated.  Evaluation of patient's lumbar spine shows no acute fracture or laxity there are degenerative changes throughout the spine and MRI could be considered for further evaluation here also.  Ultrasound of patient's thyroid was done but report seems to be pending at present time.  EKG shows normal sinus rhythm heart rate 69 left ventricular hypertrophy anterior ST changes due to LVH.  Patient did have an echocardiogram showing ejection fraction of 61 to 65%.  Left ventricular diastolic function is consistent with grade 1 impaired relaxation.  Left atrium is mildly dilated right atrium is mildly dilated.  Right ventricular systolic pressure from tricuspid regurg is normal.  Specialty notes of the doctors who saw the patient are summarized as below: Cardiology did see the patient and feels that she probably does need a stress test based on her intermittent chest pains.  Chest pains appear to be very atypical however.  Troponin is elevated.  Echocardiogram unremarkable.  [facility] is continuing to follow for discharge planning.  Home health referral to  [facility] health has been placed at patient's request.  Physical therapy did evaluate the patient today patient performed bed mobility exercises with standby assist, required minimum assist for transfer and was able to ambulate just 15 feet using rolling walker requiring minimum assist.  Patient was quite unsteady with her gait and did walk at a very slow pace.  Strength, activity tolerance and balance deficits all noted.  PT anticipated possible skilled nursing facility discharge but patient does not wish to go there.  PT does not feel patient is safe for discharge to home at this point.  Renal did see patient in consultation.  They have discontinued IV fluid at this point and decrease torsemide to 50 mg daily.  They are checking urine chemistries, orthostatic blood pressures and bladder scan for completeness.  Neurosurgery did see patient in consultation.  They are planning to follow-up with MRI of brain, cervical spine, and lumbar spine with and without contrast.  Case was discussed with Dr. Vasquez.  Speech therapy did see patient and found that she did well with swallow study and had no aspiration of thin or solids.  Patient does take pills 1 at a time to avoid choking.  Recommended soft diet with chopped meats thin liquids.  Angelica from the pharmacy did discuss the case with me and we have corrected her Norco and her clonidine to match patient stated at home dosing.  Orthopedics Dr. Morin did see the patient.  He does feel the shoulder issues are chronic for the patient and recommended continuing Voltaren gel which has worked well for her in the past.  He also suggested ice as needed and follow-up with Dr. Pichardo in clinic.  Neurology did see patient in consultation.  They agreed with the neurosurgery detailed evaluation with MRI of cervical thoracic and lumbar spine.  They did order thyroid ultrasound, vitamin B12 level, RPR, cryoglobulins, SPEP, heavy metals, and copper.  Occupational Therapy did see  the patient but she was off the unit at the time of their visit and they will follow-up on 114/2025.  Dietitian did see the patient and recommended boost twice daily and monitoring of p.o. intake.  They did not feel calorie intake was appropriate at that time since she is going to be n.p.o. for MRIs overnight tonight.  Patient did complain to me of new pain in the right hip which she noted after rolling side-to-side for her spine films earlier today.  We are going to complete an x-ray of the right hip for completeness sake to be sure there were no specific injuries to the hip at the time of her fall.  Otherwise patient does appear to be medically stable at present time.  BP has been fairly well-controlled today though adjustment of medication may be necessary.  We will continue to follow patient closely.     1/14/2025.Patient is seen again today in her room on 6 S. #610.  Patient was resting quietly in bed at the time of my visit and was actually talking to her medical assistant when I entered the room.  I did have a lengthy discussion with the patient's nurse and she tells me that patient is insistent on trying to get home tomorrow if her MRIs are satisfactory in appearance.  Patient did cancel her stress test earlier this a.m. and actually called me through my answering service at 6:30 AM to let me know if she was doing so.  Patient tells me that her children are working out of plan to spend more time helping out with her and her  in the home and they are actually planning to hire extra outside help to do some cleaning and housework.  This is an effort to keep the patient and her  out of nursing homes and happier in their home environment.  I will full PPE for exam as indicated today including an N95 mask as needed, goggles, white lab coat, and gloves when touching patient.  I performed thorough hand hygiene before and after the patient visit.  Patient's labs today are relatively stable.  Troponin  remains within normal ranges at 29.  Chloride is still slightly high at 109.  Kidney function is significantly improved and now down to BUN of 20 and creatinine of 0.94 with estimated GFR now up to 59.2.  Patient's total protein remains low at 5.4 and albumin is low at 2.9.  Liver function test are within normal limits.  Patient does have a normal white count 7.0, hemoglobin is 10.6 and stable.  Platelet count is also stable at 170,000.  Patient did complete x-ray of right hip today which was unremarkable and showed no evidence of fracture after her recent fall.  Patient did have MRI of brain today which does show the old occipital stroke and evidence of ischemic small vessel disease but no other major abnormalities due to the trauma from her fall.  Cervical spine MRI shows no evidence of metastatic disease.  There are multilevel degenerative changes which are greatest at C5-6 but none of these are causing significant stenosis or nerve compression at present time.  MRI of lumbar spine does show no evidence of metastatic disease.  There are multilevel degenerative changes throughout the lumbar spine but these are most severe with an inferior disc extrusion at L2-3.  She does have severe canal stenosis at this level and neural foraminal stenosis from L2-3 through L4-5.  We will notify neurosurgery of these findings and see if they have any additional recommendations before patient is potentially discharged to home either late tomorrow or the next day.  Review of nursing notes show that patient did refuse her MRI overnight last night because of exhaustion from all the testing yesterday.  Otherwise she was stable.  Dr. Epsinoza did see patient in consultation.  She has recommended stopping potassium supplement and rechecking phosphorus in the a.m.  She continues to watch blood pressure closely and may consider increasing Procardia XL to 60 mg a day to better control blood pressure.  Calcium and phosphorus seems stable at  present time.  Neurosurgery also saw patient today and we are still awaiting MRI at the time of their visit.  They did feel there was some improvement in the strength of her lower extremities overnight last night.  Patient's MRI series was moved up to stat level by myself because of patient's ongoing issues with lower extremity weakness.  Patient is agreeable to having MRI done tonight if necessary and we are going to premedicate as needed to try to get the testing done.  Occupational Therapy did miss the patient will try again tomorrow.  Cardiology did see the patient and feel that she is relatively stable.  They do feel that she has some degree of diastolic dysfunction though her echo shows normal ejection fraction of 61 to 65% and she currently is euvolemic.  Blood pressure still slightly elevated.  Patient declining stress test.  At this point, will attempt to get results of MRIs, have neurosurgery review and make recommendations, and then consider discharge planning once again tomorrow.  Patient would like to go home later in the day tomorrow if possible or no later than Thursday.  It does sound like she has an adequate plan together for home discharge.     Review of Systems:               A comprehensive 14 point review of systems was negative except for:  Constitution:  positive for anorexia, fatigue, and malaise  Eyes:  positive for blurriness and dryness  Cardiovascular: positive for  chest pressure / pain, at rest, lower extremity edema, and palpitations  Gastrointestinal: positive for  early satiety, heartburn, nausea, and vomiting  Hematologic / Lymphatic: positive for  fatigue  Musculoskeletal: positive for  back pain, joint pain, muscle pain, muscle weakness, and neck pain  Neurological: positive for  coordination abnormal, difficulty walking, confusion, dizziness, headaches, vertigo, and weakness  Behavioral/Psych: positive for  anxiety and depression  Endocrine: positive for  diabetes:  dry mouth and  hot flashes         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 Yazmin Canseco    CARDIAC ELECTROPHYSIOLOGY PROCEDURE N/A 5/27/2022    Procedure: Loop insertion;  Surgeon: Jeffrey Argueta MD;  Location:  [facility];  Service: Cardiovascular;  Laterality: N/A;  biotronik    CARDIAC ELECTROPHYSIOLOGY PROCEDURE N/A 7/13/2022    Procedure: Loop recorder removal;  Surgeon: Jeffrey Argueta MD;  Location:  [facility];  Service: Cardiovascular;  Laterality: N/A;    CARPAL TUNNEL RELEASE Right 05/25/2011    Dr. Caleb Bueno    CARPAL TUNNEL RELEASE Left 04/26/2011    Dr. Caleb Bueno    CATARACT EXTRACTION Left 11/29/2010    Dr. Eusebio Downs    CATARACT EXTRACTION Right 11/17/2010    Dr. Eusebio Downs    COLONOSCOPY N/A 12/06/2002    Sigmoid diverticulosis-Dr. Brandon Batista    COLONOSCOPY N/A 12/12/2013    Tortuous colon, diverticulosis in the sigmoid colon and descending colon, stool in the rectum, sigmoid colon, descending colon, splenic flexure, transverse colon and hepatic flexure-Dr. Irma Brambila    DEQUERVAIN RELEASE Left 9/23/2020    Procedure: DEQUERVAIN RELEASE LEFT HAND;  Surgeon: Caleb Bueno MD;  Location:  [facility];  Service:  Plastics;  Laterality: Left;    ENDOSCOPY N/A 09/13/2007    Normal-Dr. Pavel Quarles    ENDOSCOPY AND COLONOSCOPY N/A 09/19/2005    1 cm hiatal hernia, mild Schatzki's ring, sigmoid diverticulosis-Dr. Yoel Farley    GANGLION CYST EXCISION Left 12/18/2012    Release of A1 pulley flexor sheath, left fourth finger, excision of ganglion cyst 0.5 cm diameter, A2 pulley flexor sheath, left fourth finger-Dr. Caleb Bueno    HEEL SPUR EXCISION Left 1968    INGUINAL HERNIA REPAIR Right 1971    at 5 months pregnant     INJECTION OF MEDICATION Left 9/23/2020    Procedure: KENOLOG INJECTION TO LEFT THUMB;  Surgeon: Caleb Bueno MD;  Location: [facility] [place];  Service: Plastics;  Laterality: Left;    MASTECTOMY Right 08/05/2008    Right breast mastectomy and excision of left chest wall lesion-Dr. Yoel Farley    MASTECTOMY RADICAL Left 04/29/2003    Left modified radical mastectomy-Dr. Yazmin Canseco    PARATHYROIDECTOMY Right 05/04/2004    Excision of parathyroid adenoma (right superior)-Dr. Yoel Farley    REPLACEMENT TOTAL KNEE Right 04/09/2012    Dr. Lamonte Childress    SINUS SURGERY  1984    THYROIDECTOMY, PARTIAL Left 05/04/2004    Dr. Yoel Farley    TOTAL ABDOMINAL HYSTERECTOMY Bilateral 1973    Dr. Mahan    TRIGGER FINGER RELEASE Left 9/23/2020    Procedure: RELEASE LEFT FOURTH TRIGGER FINGER;  Surgeon: Caleb Bueno MD;  Location: [facility] [place];  Service: Plastics;  Laterality: Left;    UPPER GASTROINTESTINAL ENDOSCOPY N/A 02/18/2009    LA Grade A reflux esophagitis, normal stomach, normal 2nd part of the duodenum-Dr. Yoel Farley    WRIST GANGLION EXCISION Left 9/23/2020    Procedure: EXCISION GANGLION CYST LEFT WRIST;  Surgeon: Caleb Bueno MD;  Location: [facility] [place];  Service: Plastics;  Laterality: Left;     Allergies   Allergen Reactions    Bee Venom Shortness Of Breath and Rash    Morphine Anaphylaxis and Nausea And Vomiting    Sulfa Antibiotics  Anaphylaxis and Hives     Or sulfa fillers in meds  Broke out in internal and external hives      Tree Extract Shortness Of Breath and Rash    Ambien [Zolpidem] Hallucinations    Anastrozole Other (See Comments)     Can't remember what the reaction was     Covid-19 (Mrna) Vaccine Swelling     Facial swelling, blood clots    Eliquis [Apixaban] Headache     Headaches, nausea and itching.     Hydralazine Myalgia     Patient reports leg cramps, joint pain and increased fatigue    Norvasc [Amlodipine] Other (See Comments)     KIDNEYS SHUT DOWN    Nsaids Other (See Comments)     KIDNEY FAILURE    Penicillins Unknown (See Comments)     Severe reaction as a child  Tolerated ceftriaxone during 04/2017 admission    Grass Rash       Family History   Problem Relation Age of Onset    Brain cancer Brother     Alcohol abuse Mother     No Known Problems Father     No Known Problems Sister     Malig Hyperthermia Neg Hx        Social History     Socioeconomic History    Marital status:      Spouse name: Joey    Number of children: 6    Highest education level: Some college, no degree   Tobacco Use    Smoking status: Never     Passive exposure: Never    Smokeless tobacco: Never   Vaping Use    Vaping status: Never Used   Substance and Sexual Activity    Alcohol use: Yes     Comment: 3 drinks yearly    Drug use: Never    Sexual activity: Defer     Birth control/protection: Surgical       PMH, FH, SH and ROS completed with Admission History and Physical and updated in EPIC system.        Objective     Scheduled Meds:cetirizine, 10 mg, Oral, Daily  eplerenone, 50 mg, Oral, Q12H  famotidine, 40 mg, Oral, Daily  fluticasone, 1 spray, Each Nare, BID  ipratropium, 500 mcg, Nebulization, 4x Daily - RT  multivitamin with minerals, 1 tablet, Oral, Daily  mupirocin, 1 application , Topical, Q24H  NIFEdipine XL, 30 mg, Oral, Q24H  pantoprazole, 40 mg, Oral, Q AM  predniSONE, 10 mg, Oral, Daily  rivaroxaban, 20 mg, Oral,  "Daily  rosuvastatin, 5 mg, Oral, QAM  sodium chloride, 3 mL, Intravenous, Q12H  torsemide, 50 mg, Oral, Daily  triamcinolone, 1 Application, Topical, BID  cyanocobalamin, 1,000 mcg, Oral, Daily  vitamin D, 50,000 Units, Oral, Weekly      Continuous Infusions:     Vital signs in last 24 hours:  Temp:  [97.5 °F (36.4 °C)-98.4 °F (36.9 °C)] 98.1 °F (36.7 °C)  Heart Rate:  [67-88] 79  Resp:  [20-22] 20  BP: (149-189)/(65-99) 172/86    Intake/Output:    Intake/Output Summary (Last 24 hours) at 1/15/2025 0201  Last data filed at 1/14/2025 1700  Gross per 24 hour   Intake 1600 ml   Output 2400 ml   Net -800 ml       Exam:  /86   Pulse 79   Temp 98.1 °F (36.7 °C)   Resp 20   Ht 152.4 cm (60\")   Wt 61.1 kg (134 lb 11.2 oz)   SpO2 100%   BMI 26.31 kg/m²     Constitutional:  Alert, cooperative,   Laying in bedat time of my visit  moderatedistress, AAOx3, resting comfortably, having some difficulty eating whole foods  but has been evaluated and cleared by speech therapy for soft foods and hold form with thin liquids.   Head:      Normocephalic, without obvious abnormality, atraumatic   Eyes:     PERRLA, conjunctiva/corneas clear, no icterus, no conjunctival                                     pallor, EOM's intact, both eyes      ENT and Mouth: Lips, tongue, gums normal; oral mucosa pink and moist   Neck:     Supple, symmetrical, trachea midline, no JVD  Respiratory:     Clear to auscultation bilaterally, respirations unlabored  Cardiovascular:  Regular rate and rhythm, S1 and S2 normal, no murmur,      no  Rub or gallop.  Pulses normal.    Gastrointestinal:   BS present x 4 Soft, non-tender, bowel sounds active,      no masses, no hepatosplenomegaly                                                     :       No hernia.  Normal exam for sex.         Musculoskeletal: Extremities normal, atraumatic, no cyanosis or edema     No arthropathy.  No deformity.  Gait normal                                                 " Skin:   Skin is warm and dry,  no rashes, swelling or palpable lesions   Neurologic:  CN -XII intact, motor strength grossly intact, sensation grossly intact to light touch, no focal reflex deficits noted   Somewhat confused at times about specific details.   Psychiatric:     Alert,oriented X3, no delusions, psychoses, depression or anxiety    Heme/Lymph/Imun:   No bruises, petechiae.  Lymph nodes normal in size/configuration.  Anxiety and stress levels high.       Data Review:  Lab Results   Component Value Date    CALCIUM 9.1 01/14/2025    PHOS 2.1 (L) 01/13/2025     Results from last 7 days   Lab Units 01/14/25  0540 01/13/25  0649 01/13/25  0648 01/12/25  1551   AST (SGOT) U/L 16  --  28 35*   ALT (SGPT) U/L 22  --  22 27   MAGNESIUM mg/dL 1.9  --  1.9 1.9   SODIUM mmol/L 144  --  144 144   POTASSIUM mmol/L 4.6  --  3.6 3.0*   CHLORIDE mmol/L 109*  --  108* 102   CO2 mmol/L 28.0  --  27.3 29.2*   BUN mg/dL 20  --  19 26*   CREATININE mg/dL 0.94  --  1.05* 1.38*   GLUCOSE mg/dL 91  --  97 114*   CALCIUM mg/dL 9.1  --  8.9 9.3   WBC 10*3/mm3 7.00 7.70  --  8.05   HEMOGLOBIN g/dL 10.6* 10.7*  --  11.6*   PLATELETS 10*3/mm3 170 179  --  187     Lab Results   Component Value Date    CKTOTAL 92 01/13/2025    CKMB 1.69 04/06/2017    TROPONINT 29 (H) 01/14/2025     Estimated Creatinine Clearance: 35.1 mL/min (by C-G formula based on SCr of 0.94 mg/dL).  WEIGHTS:     Wt Readings from Last 1 Encounters:   01/14/25 0536 61.1 kg (134 lb 11.2 oz)   01/13/25 1514 65.8 kg (145 lb)   01/13/25 0500 65.8 kg (145 lb 1 oz)   01/12/25 1945 64.5 kg (142 lb 3.2 oz)         Assessment:    Acute kidney injury superimposed on CKD    Other cervical disc displacement at C5-C6 level    Stage 2 chronic kidney disease    Malaise and fatigue progressive    Dizziness    Dyspnea on exertion    Accelerated hypertension    Ataxia    Generalized muscle weakness    Volume depletion    Cervical stenosis of spine    Recurrent falls while walking     Closed head injury with concussion    Hypokalemia    Lumbar herniated disc L2-L3    Spinal stenosis of lumbar region with neurogenic claudication    Urinary retention self-caths (Don)    Hyperlipidemia LDL goal <70    Urge incontinence of urine    Chronic tension-type headache, intractable    Cerebral atherosclerosis    Vitamin D deficiency    Moderate persistent asthmatic bronchitis without complication    Elevated PTH level    H/O Cerebral vascular accident in left occipital area    Acute joint pain    COPD with asthma    Adult failure to thrive syndrome    Acute intractable headache intensified since Covid-19 infection 10/2/2021    Bilateral lower extremity edema, multifactorial = unrestricted salt intake, inadequate Lasix dosing, chronic lung disease, and obesity.  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  Fall from standing with closed head injury 3 days ago denying loss of consciousness, but unable to walk or bear weight on lower extremities since event with generalized muscle weakness and dizziness.  Exact cause of patient's symptoms not clear at this point.  Neurology and neurosurgery asked to evaluate.  Plan to follow-up plain films and CT scans with MRI of head and entire spine if felt to be appropriate by neurology and / or neurosurgery.  Comprehensive labs are ordered.  PT OT and speech therapy consults are requested.  Patient may require short stay in rehab because of the profound weakness and poor balance and will have CCP see her in preparation for this possible eventuality.  Further scans planned by neurosurgery tomorrow of entire spine.  Patient n.p.o. tonight in preparation for those studies.  No specific neurologic findings are discovered by neurology or neurosurgery  at present time.  Changes on brain CT felt to probably be chronic in nature.  MRI per report back tonight and is unremarkable.     2.  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  Renal asked to see patient in consultation for their input on her condition.  She is followed regularly by Dr. Bond in his clinic.  I have also ask nephrology to help with management of patient's diuretics and her blood pressure.  Blood pressure readings appear to be quite labile since arrival here in the hospital.  Patient reports that she is using variable amounts of clonidine up to 0.3 every morning, 0 point 2 in the afternoon and 0.2 at bedtime.  Patient is admittedly a bit confused about the dosing on her medications at present time.  Renal is studying patient's electrolytes.  Patient started back on her clonidine 0.3 every morning and point 2 in the afternoon and at bedtime as needed.  Kidney disease significantly improved after hydration here in the hospital.     3.  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  Neurosurgery and neurology consults have been placed.  Will obtain additional films including thoracic and lumbar spine films.  MRIs to be ordered of head and entire spine if felt to be indicated by neurology and neurosurgery after review of the patient's case tomorrow.  In addition to the ataxia and walking difficulties, patient also reports an increase in the level and intensity of her urinary incontinence.  She has had trouble in the past with urinary retention and needed to do in and out caths.  No sequelae at this point from closed head injury.     4.  Accelerated hypertension with great variability in levels possibly related to abnormal volume status and patient..  As above, we have asked nephrology to help with this issue.  I suspect it may be volume  related and it is unclear if patient is actually drinking adequate amounts of fluid at home.  We have given her a small bolus of normal saline here 500 cc and I am going to continue her overnight on half-normal saline at 75 cc/h.  Further input on fluid replacement for this patient would be appreciated.  Also need input on level of diuretics.  Balance of blood pressure may require additional medications.  Renal following as well as cardiology.  Blood pressure medicines being adjusted by renal and cardiology.     5.  Dyspnea on exertion with mild intermittent chest pain.  EKG and troponin are ordered for tomorrow a.m.  Patient does not seem to be having any sustained chest pain at present time.  We did ask patient's regular cardiology group Dr. Soto to take a look at the patient and make recommendations on any additional workup that they might feel necessary.  Patient did request the cardiology evaluation.  Chest pain resolved and patient has declined stress test.  Echo shows some mild diastolic dysfunction     6.  Electrolyte abnormality of hypokalemia noted on admission in ER.  Correction given in ER and patient placed on electrolyte replacement protocol low level renal dosing.  Will continue to monitor and make further adjustments in electrolytes as needed.     7.  Increased urinary incontinence and patient with long history of urinary retention requiring self-catheterization as taught to her by Dr. Fazal Ochoa's office in urology.  Uncertain at this point if this issue is related in part to the closed head injury and the cervical spine stenosis that seems to be new.  Will ask neurology and neurosurgery for their input on that issue.  May need to consider urology consult also.  Patient incontinent of urine     8.  Hyperlipidemia.  Fasting lipid profile ordered for tomorrow a.m.  Will adjust meds as necessary..  Lipid profile very stable.     9.  Chronic tension type headaches that are intractable and do require  Fioricet for management.  This medication is continued.  Headaches ongoing and has Fioricet for treatment of pain.     10.  Chronic pain management on [facility] palliative care program.  Patient at present receiving up to 8 Norco 10 tablets/day along with Ativan 0.5 x 3/day.  These medications are continued at the time of patient's admission due to her palliative care status.     11.  Cerebral atherosclerosis with history of left occipital CVA in the past and now with new neurologic changes.  As above, neurology and neurosurgery requested to see the patient to determine need for further workup and evaluation of the symptoms.     12.  History of elevated PTH.  Ionized calcium level and PTH have been ordered to recheck on the situation.     13.  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  Plan to order ultrasound of thyroid to further evaluate this situation and determine need for any biopsies of nodules that might be necessary. radiology reading on thyroid ultrasound still pending     14.  Vitamin D deficiency.  Plan to check level of vitamin D while here in the hospital.     15.  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  Patient has had recurrent bouts of COPD exacerbations and now takes 10 mg of prednisone on a daily basis for prevention of additional COPD exacerbations.  Patient also using regular nebulized ipratropium and albuterol as needed.       Plan for disposition:Where: home and SNF and When:  1 to 2 days    Copied text in this note has been reviewed by me and is accurate as of 01/14/2025  Much of this dictation is completed using Dragon voice activated software.    Óscar Cadet MD  1/14/2025  1900 EST

## 2025-01-16 ENCOUNTER — HOME CARE VISIT (OUTPATIENT)
Dept: HOME HEALTH SERVICES | Facility: HOME HEALTHCARE | Age: 85
End: 2025-01-16
Payer: MEDICARE

## 2025-01-16 VITALS
RESPIRATION RATE: 20 BRPM | TEMPERATURE: 97.4 F | HEART RATE: 72 BPM | OXYGEN SATURATION: 96 % | DIASTOLIC BLOOD PRESSURE: 80 MMHG | SYSTOLIC BLOOD PRESSURE: 144 MMHG

## 2025-01-16 LAB
COPPER SERPL-MCNC: 97 UG/DL (ref 80–158)
IGA SERPL-MCNC: 404 MG/DL (ref 64–422)
IGG SERPL-MCNC: 618 MG/DL (ref 586–1602)
IGM SERPL-MCNC: 34 MG/DL (ref 26–217)
PROT PATTERN SERPL IFE-IMP: NORMAL

## 2025-01-16 PROCEDURE — G0299 HHS/HOSPICE OF RN EA 15 MIN: HCPCS

## 2025-01-16 NOTE — OUTREACH NOTE
Prep Survey      Flowsheet Row Responses   [facility] facility patient discharged from? [place]   Is LACE score < 7 ? No   Eligibility Readm Mgmt   Discharge diagnosis Acute kidney injury superimposed on CKD   Does the patient have one of the following disease processes/diagnoses(primary or secondary)? Other   Does the patient have Home health ordered? Yes   What is the Home health agency?  [facility]   Is there a DME ordered? No   Prep survey completed? Yes            Elli QUINTANA - Registered Nurse

## 2025-01-16 NOTE — PROGRESS NOTES
Case Management Discharge Note      Final Note: DC'd home 1/15 with spouse and [facility] following    Provided Post Acute Provider List?: Yes  Post Acute Provider List: Nursing Home, Home Health  Provided Post Acute Provider Quality & Resource List?: Yes  Delivered To: Patient  Method of Delivery: In person    Selected Continued Care - Discharged on 1/15/2025 Admission date: 1/12/2025 - Discharge disposition: Home-Health Care [facility] [place]          Home Medical Care Coordination complete.      Service Provider Services Address Phone Fax Patient Preferred     [facility] [address] [place] [id number] [phone] --                        Transportation Services  Private: Car    Final Discharge Disposition Code: 06 - home with home health care

## 2025-01-16 NOTE — DISCHARGE SUMMARY
[facility]  INTERNAL MEDICINE  ALEKSANDAR GALVAN MD  [address] [place] [address]  Phone [phone] Fax [phone]  E-mail:  [email or web address]    [facility] [facility]   DISCHARGE SUMMARY  ALEKSANDAR GALVAN MD      Date of Discharge:  1/15/2025    Discharge Diagnosis:         Recurrent falls while walking    Closed head injury with concussion    Hypokalemia    Lumbar herniated disc L2-L3    Spinal stenosis of lumbar region with neurogenic claudication    Diastolic dysfunction with chronic heart failure    Moderate malnutrition due to chronic disease    Acute kidney injury superimposed on CKD    Other cervical disc displacement at C5-C6 level    Stage 2 chronic kidney disease    Malaise and fatigue progressive    Dizziness    Dyspnea on exertion    Chronic heart failure with preserved ejection fraction (HFpEF)    Accelerated hypertension    Ataxia    Generalized muscle weakness    Volume depletion    Cervical stenosis of spine    Urinary retention self-caths (Don)    Hyperlipidemia LDL goal <70    Urge incontinence of urine    Chronic tension-type headache, intractable    Cerebral atherosclerosis    Vitamin D deficiency    Moderate persistent asthmatic bronchitis without complication    Elevated PTH level    H/O Cerebral vascular accident in left occipital area    Acute joint pain    COPD with asthma    Adult failure to thrive syndrome    Acute intractable headache intensified since Covid-19 infection 10/2/2021    Bilateral lower extremity edema, multifactorial = unrestricted salt intake, inadequate Lasix dosing, chronic lung disease, and obesity.      Anemia, mild    Goiter diffuse    Pleurisy    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) in remission    Psoriasis (Darryl Ochoa)    Postmenopausal    KECIA (generalized anxiety disorder)    Procedures Performed    1.  Creatinine kinase ranging from 159 on admission down to 92 prior to  discharge  2.  Troponin ranging from 31 on admission down to 29 prior to discharge  3.  proBNP 1069 which is normal for age.  4.  Sodium ranging from high of 144 down to 139 prior to discharge  5.  Potassium ranging from 3.0 on admission replaced up to 4.2 prior to discharge  6.  Chloride ranging from 102 on admission to 103 at time of discharge  7.  BUN ranging from 26 on admission down to 22 prior to discharge  8.  Creatinine ranging from 1.38 on admission down to her normal range at 1.09 prior to discharge  9.  Estimated GFR ranging from 37.4 on admission up to 49.6 prior to discharge  10.  Glucose ranging from 114 on admission to 108 prior to discharge  11.  Ionized calcium normal at 1.26  12.  Magnesium normal at 1.9  13.  Phosphorus ranging from 2.1 replaced up to 2.7 prior to discharge  14.  Total protein 5.9 with albumin 2.9 both low  15.  Liver function test within normal limits  16.  Hemoglobin A1c 5.60  17.  PTH slightly high at 80.8  18.  TSH normal at 1.57  19.  Iron panel essentially normal with iron at 57 transferrin slightly up at 119 TIBC slightly up at 177  20.  Vitamin B12 level normal at 299  21.  Folate level normal at 7.24  22.  Lipid panel showing cholesterol 113, HDL 36, LDL 57, triglycerides 107, and ratio 1.54  23.  Lactate level normal at 1.1  24.  Lipase level normal at 18  25.  Vitamin D level normal at 70.25  26.  Electrophoresis serum protein within normal limits  27.  White blood cell count ranging from 8.05 on admission up to 8.08 prior to discharge  28.  Hemoglobin ranging from 11.6 on admission down to 11.5 prior to discharge  29.  Platelet count normal at 188,000  30.  Sed rate normal at 29  31.  RPR unremarkable  32.  UA unremarkable  33.  Respiratory viral PCR test negative for COVID-19, influenza A, influenza B, RSV, and all other viral pathogens tested  34.  Urine tox screen negative except for expected positive for barbiturates, benzodiazepines, and opiates.  35.  Ultrasound  of thyroid shows stable 1 cm level 3 cysts that do not require follow-up   36.  EKG showing normal sinus rhythm probable left ventricular hypertrophy anterior ST elevation probably due to LVH no change from previous tracings  37.  Echocardiogram 2D with Doppler showing left ventricular ejection fraction 61 to 65%, diastolic function is consistent with grade 1 impaired relaxation, left atrium cavity is mildly dilated as is right atrial cavity.  Tricuspid regurgitation minimal.  38.  Patient rehydrated with bolus of normal saline in the one half normal saline replacement overnight.       Procedures  Imaging Results (All)       Procedure Component Value Units Date/Time    US Thyroid [[id number]] Collected: 01/15/25 0945     Updated: 01/15/25 0958    Narrative:      Thyroid sonogram.     HISTORY: Thyroid nodules on CT     COMPARISON: Thyroid ultrasound 3/1/2021 and 3/12/2019. CT cervical spine  1/12/2025     TECHNIQUE: Grayscale and color Doppler images of the thyroid were  performed.     FINDINGS:  The right lobe of the thyroid measures 3.2 x 1.6 x 2.3 cm. The thyroid  isthmus measures 0.6 cm. The left lobe of the thyroid measures 3.1 x 1.4  x 1.9 cm.     Relatively homogenous thyroid parenchyma. Increased color Doppler flow  within the thyroid parenchyma. No regional adenopathy is visualized by  size criteria.     Reference thyroid nodules:  *  Right upper 1 x 0.6 x 0.7 cm solid, isoechoic, near equally tall as  wide, ill-defined nodule without associated echogenic foci (3 points)  previously 1 x 0.8 x 0.7 cm in 2019  *  Additional bilateral subcentimeter colloid cysts (0 points)          Impression:      1. A stable 1 cm TI-RADS 3 nodule and subcentimeter colloid cysts do not  require follow-up per TI-RADS.  2. Increased color Doppler flow within the relatively homogenous thyroid  parenchyma could be secondary to color Doppler settings but recommend  correlation with thyroid function tests.           This report was  finalized on 1/15/2025 9:55 AM by Dr. Anthony Green M.D on Workstation: [id number]       MRI Lumbar Spine With & Without Contrast [[id number]] Collected: 01/15/25 0112     Updated: 01/15/25 0112    Narrative:        Patient: LEESA CHAPMAN  Time Out: 01:11  Exam(s): MRI L SPINE W WO Contrast     EXAM:    MR Lumbar Spine Without and With Intravenous Contrast    CLINICAL HISTORY:     ble weakness, ca hx.    TECHNIQUE:    Magnetic resonance images of the lumbar spine without and with   intravenous contrast in multiple planes.    COMPARISON:    No prior studies available.    FINDINGS:      Study is limited by significant patient motion on multiple series.        Vertebrae: Maintenance of height of the vertebral bodies.  No   subluxation.  Diffusely heterogeneous marrow signal intensity throughout   the lumbar spine with focal edema and enhancement centered at the L2-3   disc space.  No suspicious soft tissue mass.      Spinal cord: Conus medullaris is T12-L1 and is unremarkable.  Cauda   equina is unremarkable.  No intraspinal mass or collection.  Soft tissues: No paraspinal collection.     DISCS SPINAL CANAL NEURAL FORAMINA:    L1-L2: Disc space narrowing with desiccation.  Mild diffuse posterior   disc bulge.  Mild central canal and bilateral neural foraminal stenosis.    L2-L3: Endplate changes as described above.  Disc space narrowing.    Focal left posterior inferior disc extrusion up to 4.5 mm AP diameter   extending 12 mm inferiorly along the left posterior aspect of the L3   vertebral body.  Enhancement of the inferior aspect of the extruded disc   fragment.  Moderately severe bilateral facet and ligamentum flavum   hypertrophy.  Moderate severe central canal and left lateral recess   stenosis.  Moderate right and severe left neural foraminal stenosis.    L3-L4: Diffuse posterior disc bulge.  Severe bilateral facet and   ligamentum flavum hypertrophy.  Severe central canal stenosis.  Moderate   severe right is  severely left neural foraminal stenosis.    L4-L5: Diffuse posterior disc bulge.  Severe bilateral facet and   ligament flavum hypertrophy.  Moderate to severe central canal and   bilateral lateral recess stenosis.  Moderate severe bilateral neural   foraminal stenosis.    L5-S1: No significant disc bulge.  Moderate bilateral facet hypertrophy.    Minimal central canal stenosis.  Mild bilateral neural foraminal   stenosis.    IMPRESSION:       Limited by motion.  No definite evidence for metastatic disease.  Heterogeneous marrow   without a focal suspicious mass.  Multilevel degenerative changes throughout the lumbar spine, most severe   involving the inferior disc extrusion at L2-3.  Facet and ligamentum   flavum hypertrophy causing severe canal and neural foraminal stenosis   from L2-3 through L4-5 as described above.      Impression:          Electronically signed by Wicho Chester M.D. on 01-15-25 at 0111    MRI Cervical Spine With & Without Contrast [[id number]] Collected: 01/15/25 0056     Updated: 01/15/25 0056    Narrative:        Patient: LEESA CHAPMAN  Time Out: 00:55  Exam(s): MRI C SPINE W WO Contrast     EXAM:    MR Cervical Spine Without and With Intravenous Contrast    CLINICAL HISTORY:     ble weakness, ca hx.    TECHNIQUE:    Magnetic resonance images of the cervical spine without and with   intravenous contrast in multiple planes.    COMPARISON:  CT cervical spine 1-.    FINDINGS:    Vertebrae: Maintenance of height of the vertebral bodies.  No   subluxation.  Degenerative changes of the void with small intraosseous   cyst hypertrophic changes.  Marrow signal intensity is otherwise within   normal limits.  No evidence for fracture.  No evidence for suspicious   mass lesion or marrow replacement.  No abnormal enhancement.  Spinal cord: Degenerative related slight cord deformity at C5-6.  Caliber   and signal intensity of the cervical cord is otherwise within normal   limits.  No intraspinal  hematoma or collection.  No abnormal enhancement.  Soft tissues: Prevertebral soft tissues are unremarkable.     DISCS SPINAL CANAL NEURAL FORAMINA:    C2-C3:  Unremarkable.  No significant disc disease.  No stenosis.    C3-C4:  Unremarkable.  No significant disc disease.  No stenosis.    C4-C5: Right uncovertebral and facet hypertrophy.  No significant   central canal or left neural foraminal stenosis.  Mild right neural   foraminal stenosis.    C5-C6: Mild central posterior disc osteophyte abutting the ventral cord   with slight deformity eccentric to the right.  Bilateral uncovertebral   hypertrophy.  Mild central canal and bilateral neural foraminal stenosis,   left greater than right.    C6-C7: Small central posterior disc osteophyte and operative   hypertrophy.  Mild central canal stenosis.  No significant neural   foraminal stenosis.    C7-T1:  Unremarkable.  No significant disc disease.  No stenosis.    IMPRESSION:       No evidence for metastatic disease to the cervical spine.  Multilevel degenerative changes greatest at C5-6 as described above.    Impression:          Electronically signed by Wicho Chester M.D. on 01-15-25 at 0055    MRI Brain With & Without Contrast [[id number]] Collected: 01/15/25 0008     Updated: 01/15/25 0008    Narrative:        Patient: LEESA CHAPMAN  Time Out: 00:07  Exam(s): MRI HEAD W WO Contrast     EXAM:  MR Head Without and With Intravenous Contrast    CLINICAL HISTORY:  Reason for exam: ble weakness, ca hx.    TECHNIQUE:  Magnetic resonance images of the head brain without and with intravenous   contrast in multiple planes.    COMPARISON:  CT head 1 12 25    FINDINGS:  Brain:  Generalized parenchymal volume loss.  Pontine and   periventricular deep cerebral white matter foci of FLAIR signal   hyperintensity consistent with moderate chronic small vessel ischemic   change.  Small chronic cortical infarct left occipital lobe.  Proximal   intracranial flow voids appear normal.  No  mass-effect or midline shift.    No diffusion restriction to suggest acute cerebral ischemia.  No acute   intracranial hemorrhage or abnormal extra-axial fluid collection.  No   intracranial mass or abnormal enhancement.  Ventricles:  Unremarkable.  No hydrocephalus.  Bones joints:  Unremarkable.  No acute fracture.  Sinuses:  Mucosal thickening in the anterior ethmoids.  Mastoid air cells:  Unremarkable as visualized.  No mastoid effusion.  Orbits:  Lens replacements.    IMPRESSION:       No acute findings in the head brain.      Impression:          Electronically signed by Domonique Barrett M.D. on 01-15-25 at 0007    XR Hip With or Without Pelvis 2 - 3 View Right [[id number]] Collected: 01/14/25 1700     Updated: 01/14/25 1811    Narrative:      AP PELVIS AND AP/FROG LATERAL RIGHT HIP     HISTORY: Right hip pain. Fall 3 days ago     COMPARISON: Pelvis and left hip 12/08/2023, CT abdomen and pelvis  05/26/2022     FINDINGS: Without evidence for hip fracture or acute abnormality. Mild  medial migration of the femoral heads. Chronic calcifications and  suspected injection granulomas within the posterior lateral right  gluteal fat. Chronic degenerative changes pubic symphyseal joint.       Impression:      Medial type osteoarthritis without evidence for fracture or  acute abnormality.     This report was finalized on 1/14/2025 6:08 PM by Yoel Jones M.D  on Workstation: [id number]       XR Spine Thoracic 2 View [[id number]] Collected: 01/13/25 0926     Updated: 01/13/25 0933    Narrative:      XR SPINE THORACIC 2 VW-, XR SPINE LUMBAR COMPLETE W FLEX EXT-     INDICATIONS: Spinal stenosis.     TECHNIQUE: 7 views of the lumbar spine, 4 views of the thoracic spine     COMPARISON: CT from 12/5/2024, 5/26/2022     FINDINGS:     Vertebral body heights appear stable. No acute fracture is identified.  Multilevel endplate spurring, disc space narrowing, and facet  arthropathy are present. Mild anterolisthesis of L4 on L5 and  L5 on S1,  without significant change between flexion and extension. Alignment is  otherwise in range of normal. Aortic calcification is present.       Impression:         No acute fracture or laxity is identified. Degenerative changes in the  spine. If further imaging evaluation is indicated, MRI could be  considered.           This report was finalized on 1/13/2025 9:30 AM by Dr. Umesh Harrison M.D on Workstation: [id number]       XR Spine Lumbar Complete With Flex & Ext [[id number]] Collected: 01/13/25 0926     Updated: 01/13/25 0933    Narrative:      XR SPINE THORACIC 2 VW-, XR SPINE LUMBAR COMPLETE W FLEX EXT-     INDICATIONS: Spinal stenosis.     TECHNIQUE: 7 views of the lumbar spine, 4 views of the thoracic spine     COMPARISON: CT from 12/5/2024, 5/26/2022     FINDINGS:     Vertebral body heights appear stable. No acute fracture is identified.  Multilevel endplate spurring, disc space narrowing, and facet  arthropathy are present. Mild anterolisthesis of L4 on L5 and L5 on S1,  without significant change between flexion and extension. Alignment is  otherwise in range of normal. Aortic calcification is present.       Impression:         No acute fracture or laxity is identified. Degenerative changes in the  spine. If further imaging evaluation is indicated, MRI could be  considered.           This report was finalized on 1/13/2025 9:30 AM by Dr. Umesh Harirson M.D on Workstation: [id number]       CT Head Without Contrast [[id number]] Collected: 01/12/25 1723     Updated: 01/12/25 1801    Narrative:      CT HEAD WITHOUT CONTRAST, CT CERVICAL SPINE WITHOUT CONTRAST     HISTORY: Fall. Found on floor. Head injury. Neck pain.     TECHNIQUE: Head CT includes axial imaging from the base of skull to  vertex without IV contrast and this data was reconstructed in coronal  and sagittal planes. Cervical spine CT includes axial imaging from the  skull base to the upper thoracic spine and this data was  reconstructed  in coronal and sagittal planes. Radiation dose reduction techniques were  utilized, including automated exposure control and exposure modulation  based on body size.     COMPARISON: Head CT 5/28/2022, CT head and cervical spine 10/04/2021.     FINDINGS:  Head CT: Mild chronic small vessel ischemic white matter change. There  are no abnormal areas of increased attenuation intraaxially to suggest  hemorrhage. No extra-axial fluid collection is observed. Within the  posteromedial left occipital lobe there is a focal area of  encephalomalacia measuring 1.5 x 1.5 cm consistent with a chronic  infarction, and this is similar to the prior exam 04/21/2022.  Intracranial atherosclerotic calcifications are present. Bone windows  demonstrate no calvarial fracture. There is a 5 mm chronic dural based  calcification medial to the left sigmoid sinus and posterior to the left  mastoid air cells.     Cervical spine CT: Advanced chronic arthritic changes at the anterior  atlantoaxial articulation where there is joint space loss and prominent  spur formation, progressive when compared to exam 10/04/2021. Cervical  spine vertebral body heights appear within normal limits. The C1 ring  and the odontoid process are intact. There is multilevel degenerative  disc disease with disc space narrowing greatest at C6-C7, and this is  similar to the previous exam. Bilateral facet arthritis is present that  is greater on the right as compared to the left best demonstrated at  C3-C4, C4-C5, C5-C6. At C5-C6, there is a central posterior disc  protrusion that contacts the anterior cervical cord with mild narrowing  of the central canal. No evidence for fracture or acute abnormality.  There are carotid vascular calcifications.     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       Impression:      .   1. Mild chronic small vessel ischemic white matter change.  Chronic  encephalomalacia posteromedial left occipital lobe consistent with an  old infarction, and this is without change. No evidence for acute  intracranial abnormality.  2. 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 At C5-C6, there is a  small central posterior disc protrusion that contacts the anterior  cervical cord with mild narrowing of the central canal. This was not  evident on the prior exam 10/04/2021. There is degenerative disc disease  with disc space narrowing greatest at C6-C7. Right greater than left  facet arthritis. No evidence for acute osseous abnormality in the  cervical spine.  3. 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        Radiation dose reduction techniques were utilized, including automated  exposure control and exposure modulation based on body size.           This report was finalized on 1/12/2025 5:58 PM by Yoel Jones M.D  on Workstation: [id number]       CT Cervical Spine Without Contrast [[id number]] Collected: 01/12/25 1723     Updated: 01/12/25 1801    Narrative:      CT HEAD WITHOUT CONTRAST, CT CERVICAL SPINE WITHOUT CONTRAST     HISTORY: Fall. Found on floor. Head injury. Neck pain.     TECHNIQUE: Head CT includes axial imaging from the base of skull to  vertex without IV contrast and this data was reconstructed in coronal  and sagittal planes. Cervical spine CT includes axial imaging from the  skull base to the upper thoracic spine and this data was reconstructed  in coronal and sagittal planes. Radiation dose reduction techniques were  utilized, including automated exposure control and exposure modulation  based on body size.     COMPARISON: Head CT 5/28/2022, CT head and cervical spine 10/04/2021.     FINDINGS:  Head CT: Mild chronic small vessel ischemic white matter change. There  are no abnormal areas of increased attenuation intraaxially to suggest  hemorrhage. No  extra-axial fluid collection is observed. Within the  posteromedial left occipital lobe there is a focal area of  encephalomalacia measuring 1.5 x 1.5 cm consistent with a chronic  infarction, and this is similar to the prior exam 04/21/2022.  Intracranial atherosclerotic calcifications are present. Bone windows  demonstrate no calvarial fracture. There is a 5 mm chronic dural based  calcification medial to the left sigmoid sinus and posterior to the left  mastoid air cells.     Cervical spine CT: Advanced chronic arthritic changes at the anterior  atlantoaxial articulation where there is joint space loss and prominent  spur formation, progressive when compared to exam 10/04/2021. Cervical  spine vertebral body heights appear within normal limits. The C1 ring  and the odontoid process are intact. There is multilevel degenerative  disc disease with disc space narrowing greatest at C6-C7, and this is  similar to the previous exam. Bilateral facet arthritis is present that  is greater on the right as compared to the left best demonstrated at  C3-C4, C4-C5, C5-C6. At C5-C6, there is a central posterior disc  protrusion that contacts the anterior cervical cord with mild narrowing  of the central canal. No evidence for fracture or acute abnormality.  There are carotid vascular calcifications.     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       Impression:      .   1. Mild chronic small vessel ischemic white matter change. Chronic  encephalomalacia posteromedial left occipital lobe consistent with an  old infarction, and this is without change. No evidence for acute  intracranial abnormality.  2. 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 At C5-C6, there is a  small central posterior disc protrusion that contacts the anterior  cervical cord with mild narrowing of the central canal. This was not  evident on the prior exam 10/04/2021. There is  degenerative disc disease  with disc space narrowing greatest at C6-C7. Right greater than left  facet arthritis. No evidence for acute osseous abnormality in the  cervical spine.  3. 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        Radiation dose reduction techniques were utilized, including automated  exposure control and exposure modulation based on body size.           This report was finalized on 1/12/2025 5:58 PM by Yoel Jones M.D  on Workstation: [id number]       XR Shoulder 2+ View Right [[id number]] Collected: 01/12/25 1610     Updated: 01/12/25 1711    Narrative:      XR SHOULDER 2+ VW RIGHT-     HISTORY: Fall with shoulder pain.     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 The bones are  osteopenic. There is no evidence for fracture or acute osseous  abnormality.       Impression:      Osteopenia limits evaluation for fracture. There is moderate  AC and glenohumeral joint arthritis and the humeral head is high riding.  No demonstrated fracture.     This report was finalized on 1/12/2025 5:08 PM by Yoel Jones M.D  on Workstation: [id number]                 Treatment Team at Hospital  Treatment Team:   Consulting Physician: Be Pichardo MD  Consulting Physician: Rodrick Khan MD  Consulting Physician: Jeffrey Argueta MD  Consulting Physician: Eitan Vasquez MD  Consulting Physician: Eitan Morin MD  Admitting Provider: Óscar Cadet MD      Presenting Problem/History of Present Illness  Chief Complaint   Patient presents with    Weakness - Generalized    Fall     Hospital Course         Chief Complaint:  Recurrent falls with generalized weakness and severe right shoulder pain     History of Present Illness:     Subjective  Interval History: Patient is a 84 y.o.female who  presented to the [facility] emergency room by private vehicle on 1/12/2025 at 1538 for evaluation of recurrent falls with generalized weakness and severe right shoulder pain.  Mrs. Barrett is a delightful 84-year-old female who I had the pleasure of taking care of for many years in my office.  She and her  of many years lives independently in a home here in the [place] area but do have several children who live nearby and help them with their medical care and other day today needs.  Patient reports that she was in her usual state of health until approximately 3 days ago when she began to fall due to lack of balance and generalized weakness of her lower extremities when trying to bear weight.  At one point, she fell from standing into the floor and struck her head in the back occipital region very hard.  She was unable to get herself out of the floor after this fall and was actually laying in the floor for approximately 7 to 8 hours before her  was able to contact two paramedic friends who came and helped get her up and onto the couch.  Since that time, she has been unable to really walk by bearing weight on her lower extremities and has had to use a walker to get around any at all in the house.  Her  has tried to help take care of her, but he is quite ill himself, and really has not been able to provide all the care that she needs to perform her activities of daily living.  Finally, earlier today, patient did call me and reported the above events and at my request has come to the [facility] emergency room for further evaluation of her symptoms.  Patient is complaining of worsening of right shoulder pain which now is quite excruciating and prevents her from using the right arm to do any tasks.  Patient does report she is right-handed.  Patient also has had chronic bilateral foot pain.  She has followed with Dr. Concepcion in the past for this.  In addition, she has seen   "Kylee in the past for the shoulder pain.  Patient also has had increasingly more severe bouts of urinary incontinence since the falls and her  has had a great deal of difficulty keeping her dry.  Patient denies fever, chills, cough, shortness of breath, vomiting, or diarrhea.  She does tell me that she has some mild intermittent chest pain with activity but it does not radiate into her neck or arms and may just represent the pleurisy that she has been diagnosed with in the past.  Patient does admit that since her fall, she has not been able to take her medications regularly and her  has\" messed up her system for management of her meds.\"  She has not been eating or drinking as much as usual but has been taking her diuretic therapy.  When seen in the ER, patient indicates that she had not had her morning medications even though I was seeing her at 7:30 at night.  Patient was last hospitalized here at [facility] between 10/20/2024 and 10/22/2024 for worsening edema, progressive chronic stage III kidney disease, malaise and fatigue progressive, dyspnea, accelerated hypertension, some ataxia, blood pressure instability, and severe bilateral lower extremity edema.  At that time it was felt that the patient did have adult failure to thrive and dietitian found that she had evidence of severe malnutrition.  Patient insisted on discharge back to home after that visit and did have home health on the case for a few weeks afterwards.  Patient is followed by a nurse practitioner from [facility] in their palliative care program for management of her severe anxiety and intractable pain.  She does take up to 8 Norco 10 tablets/day and up to 3 lorazepam 0.5 mg tablets per day.  In addition she takes Fioricet 2 tablets every 6 hours as needed for severe headaches.  Patient does have multiple other medical comorbidities that complicate her medical condition.  These include most significantly: Chronic kidney " disease stage 2-3, malaise and progressive fatigue, dizziness, dyspnea on exertion, accelerated hypertension, persistent ataxia, cervical stenosis of spine, recurrent falls, hypokalemia, urinary incontinence with occasional bouts of retention requiring self-catheterization per Dr. 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     Patient was seen and evaluated in the [facility] emergency room by Dr. Everardo Patino, who was the ER attending physician on-call at the time of her arrival.  She was seen by Dr. Patino on 1/12/2025 at 1538.  The ER HPI was consistent with the story which I given above.  Review of systems in the ER was unremarkable except for the issues which have been discussed above in the HPI.  Physical exam in the emergency room showed temperature of 97.7, pulse 77, respirations 18, blood pressure 136/71, room air oxygen 98%.  Physical exam showed patient who is in no acute distress and nontoxic-appearing.  Her breath sounds were equal when lungs were clear to auscultation.  Her heart showed regular rate and rhythm with intact distal pulses, and GI was soft nontender nondistended no guarding and normal bowel sounds.  Patient had a GCS of 15, moves all extremities no focal deficits but was not walked by the ER staff.  Psychiatrically she was calm and cooperative.  Labs that were collected in the ER included a glucose which was slightly up at 114, BUN was elevated at 26 and creatinine that was elevated at 1.38.  Her sodium was normal at 144 and her  potassium was slightly low at 3.0 with a CO2 of 29.2.  A single liver enzyme the AST was elevated at 35.  Patient's estimated GFR was 37.4 and her anion gap was 12.8.  Magnesium level was normal at 1.9.  White blood cell count was normal at 8.05.  Hemoglobin was slightly low at 11.6 and platelets were 187,000.  Patient CK was 859.  Her UA was clear yellow normal specific gravity of 1.017 and otherwise unremarked couple.  Patient did have a CT scan of head done in the emergency room which showed chronic encephalomalacia in the left occipital lobe with evidence of an old infarct that is unchanged.  There was also degenerative disc disease in the cervical spine at C5-6 with a small central posterior disc protrusion that contacts the anterior cervical cord with mild narrowing of the central canal.  This was not evident on a previous CT scan from 10/4/2021.  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  X-ray of the shoulder shows that the humeral head is high riding and there is moderate acromioclavicular glenohumeral joint osteoarthritis.  Bones are noted to be osteopenic but there is no clear-cut evidence of fracture.  Medications given in the ER included a small 500 cc bolus of normal saline with fluid of normal saline at 75 cc/h, potassium replacement was given, case was discussed with me by the ER attending and above positive findings were reviewed.  Due to her persistent generalized weakness and inability to walk along with the acute kidney injury probably due to volume depletion, it was felt that we did need to admit the patient for further diagnostic evaluation and workup of these issues along with treatment.  Patient was admitted to my service on a telemetry floor with final ER diagnoses of generalized weakness, recurrent falls, hypokalemia, acute renal insufficiency.  Patient was waiting for transport to the floor when I left the ER after seeing  the patient there.     1/12/2025.  I personally saw the patient for the first time during this hospitalization while she still remained in the emergency room in room #14.  Patient was resting quietly on the emergency room stretcher and immediately recognized me upon entry into the room.  She was quite tearful at times during my interview with her and seemed very upset with her  because he has not been able to meet all of her needs recently.  She really did not want to bother her kids and as a result had not called them until today.  Since her  was unable to drive her to the emergency room, she did call her kids to make them aware of her situation and they were the ones who brought her in to the emergency room for this evaluation.  I wore full PPE for the exam as indicated including an N95 mask for needed, goggles, white lab coat, and gloves when touching patient.  I performed thorough hand hygiene before and after the patient visit.  I confirmed the physical exam from the emergency room.  Patient was able to raise both legs off the bed and did have some mild passive resistance but legs muscles did feel quite weak.  Patient reported she was unable to bear weight on her legs or stand when I ask her to get out of bed.  Deformity of right foot is stable.  Patient's speech is a little more garbled than usual.  She also seems to be having trouble recalling specific details which is an unusual finding for this patient who usually is very sharp and focused with her thought processes.  Patient is concerned that she might of had another stroke like the 1 she had several years ago and does not want to consider further evaluation.  I did discuss my plans with her and we will get consultations that will include renal to evaluate her volume status and acute kidney injury, neurology for evaluation of mental status changes, possible closed head injury, history of old CVA, neurosurgery to evaluate the CT changes and  cervical spine relating them to difficulty walking, cardiology requested by patient because of intermittent chest pain she has been experiencing, orthopedics for evaluation of the right shoulder deformity which is an ongoing problem and has been seen by Dr. Pichardo in the past.  Patient is agreeable to this workup.  Patient is continued on her other medications as before.  For workup we will consider doing MRIs once neurology and neurosurgery to see her and declare exactly which types of MRIs they feel would be most appropriate.  I did order additional x-rays to include thoracic and lumbar spine films.  EKG is to be done in AM.  Additional labs are ordered and will be evaluated for possible sources of the weakness.  Patient otherwise is continued on her same regular medical therapy at this point.  Patient was medically stable when I left the room and was on her way to the floor by transport a short time later.     2025. Patient was seen again today at the time of my rounds on 6 S. in room 610.  Patient was sitting up in the chair at the time of my visit and was eating her lunch.  Patient's nurse was present at bedside and we did review her case with the patient and discussed the specialty visits that have been done up to this point.  I also discussed the case today with Lila HYLTON, and found that patient is expressing a strong desire to go home if at all possible at the end of the hospitalization.  She seems to be very fearful of skilled nursing facilities due to the fact that her mother-in-law  at such a facility.  I wore full PPE as indicated for the exam including an N95 mask when necessary, goggles, white lab coat, and gloves when touching patient.  I performed thorough hand hygiene before and after the patient visit.  Patient's labs today show the following: Creatinine kinase has come down from 159-92, troponin is slightly elevated at 31 with a proBNP of 1069.  Patient's CMP shows that her sodium is  stable at 144.  Potassium has been corrected up to 3.6.  Chloride is 108.  Her creatinine has improved from 1.38 down to 1.05 and with that her BUN is also improved from 26 down to 19.  Patient's estimated GFR has increased from 37.4 up to 51.9.  Her ionized calcium is normal at 1.26, her magnesium is stable at 1.9, and her phosphorus was slightly low at 2.1 and is being corrected at the time of my visit.  Patient's total protein was slightly low at 5.5 and albumin was slightly low at 2.9.  Hemoglobin A1c was normal at 5.60.  PTH was slightly up at 80.8.  TSH was normal at 1.57.  Iron level was normal at 57 with iron sat of 32 transferrin of 119 and TIBC of 177.  Vitamin B12 was normal at 299 and folate was normal at 7.24.  Patient's lipid profile was also essentially normal.  HDL was a tiny bit low at 36.  Lactate level was normal at 1.1.  Lipase level was normal at 18.  And vitamin D 25-hydroxy level was normal at 70.2.  Patient's white blood cell count was 7.7.  Patient's hemoglobin was 10.7 with hematocrit of 32.5.  Patient had an RPR that was nonreactive.  Patient's urine chemistries were relatively unremarkable except her protein creatinine ratio was up at 734 patient's respiratory viral panel was totally normal for all pathogens tested including COVID-19, influenza A and B, and RSV.  Patient's urine drug screen was positive as expected for barbiturates, benzodiazepines, and opiates.  X-ray reports show that her shoulder has osteopenia which limits evaluation for fractures.  There is moderate before meals and glenohumeral joint arthritis and the humeral head is very high riding.  X-ray of the patient's thoracic spine shows no acute fractures or laxity identified.  There is degenerative changes in the spine further evaluation with MRI may be indicated.  Evaluation of patient's lumbar spine shows no acute fracture or laxity there are degenerative changes throughout the spine and MRI could be considered for  further evaluation here also.  Ultrasound of patient's thyroid was done but report seems to be pending at present time.  EKG shows normal sinus rhythm heart rate 69 left ventricular hypertrophy anterior ST changes due to LVH.  Patient did have an echocardiogram showing ejection fraction of 61 to 65%.  Left ventricular diastolic function is consistent with grade 1 impaired relaxation.  Left atrium is mildly dilated right atrium is mildly dilated.  Right ventricular systolic pressure from tricuspid regurg is normal.  Specialty notes of the doctors who saw the patient are summarized as below: Cardiology did see the patient and feels that she probably does need a stress test based on her intermittent chest pains.  Chest pains appear to be very atypical however.  Troponin is elevated.  Echocardiogram unremarkable.  [facility] is continuing to follow for discharge planning.  Home health referral to [facility] has been placed at patient's request.  Physical therapy did evaluate the patient today patient performed bed mobility exercises with standby assist, required minimum assist for transfer and was able to ambulate just 15 feet using rolling walker requiring minimum assist.  Patient was quite unsteady with her gait and did walk at a very slow pace.  Strength, activity tolerance and balance deficits all noted.  PT anticipated possible skilled nursing facility discharge but patient does not wish to go there.  PT does not feel patient is safe for discharge to home at this point.  Renal did see patient in consultation.  They have discontinued IV fluid at this point and decrease torsemide to 50 mg daily.  They are checking urine chemistries, orthostatic blood pressures and bladder scan for completeness.  Neurosurgery did see patient in consultation.  They are planning to follow-up with MRI of brain, cervical spine, and lumbar spine with and without contrast.  Case was discussed with Dr. Vasquez.  Speech therapy did see  patient and found that she did well with swallow study and had no aspiration of thin or solids.  Patient does take pills 1 at a time to avoid choking.  Recommended soft diet with chopped meats thin liquids.  Angelica from the pharmacy did discuss the case with me and we have corrected her Norco and her clonidine to match patient stated at home dosing.  Orthopedics Dr. Morin did see the patient.  He does feel the shoulder issues are chronic for the patient and recommended continuing Voltaren gel which has worked well for her in the past.  He also suggested ice as needed and follow-up with Dr. Pichardo in clinic.  Neurology did see patient in consultation.  They agreed with the neurosurgery detailed evaluation with MRI of cervical thoracic and lumbar spine.  They did order thyroid ultrasound, vitamin B12 level, RPR, cryoglobulins, SPEP, heavy metals, and copper.  Occupational Therapy did see the patient but she was off the unit at the time of their visit and they will follow-up on 114/2025.  Dietitian did see the patient and recommended boost twice daily and monitoring of p.o. intake.  They did not feel calorie intake was appropriate at that time since she is going to be n.p.o. for MRIs overnight tonight.  Patient did complain to me of new pain in the right hip which she noted after rolling side-to-side for her spine films earlier today.  We are going to complete an x-ray of the right hip for completeness sake to be sure there were no specific injuries to the hip at the time of her fall.  Otherwise patient does appear to be medically stable at present time.  BP has been fairly well-controlled today though adjustment of medication may be necessary.  We will continue to follow patient closely.      1/14/2025.Patient is seen again today in her room on 6 S. #610.  Patient was resting quietly in bed at the time of my visit and was actually talking to her medical assistant when I entered the room.  I did have a lengthy  discussion with the patient's nurse and she tells me that patient is insistent on trying to get home tomorrow if her MRIs are satisfactory in appearance.  Patient did cancel her stress test earlier this a.m. and actually called me through my answering service at 6:30 AM to let me know if she was doing so.  Patient tells me that her children are working out of plan to spend more time helping out with her and her  in the home and they are actually planning to hire extra outside help to do some cleaning and housework.  This is an effort to keep the patient and her  out of nursing homes and happier in their home environment.  I will full PPE for exam as indicated today including an N95 mask as needed, goggles, white lab coat, and gloves when touching patient.  I performed thorough hand hygiene before and after the patient visit.  Patient's labs today are relatively stable.  Troponin remains within normal ranges at 29.  Chloride is still slightly high at 109.  Kidney function is significantly improved and now down to BUN of 20 and creatinine of 0.94 with estimated GFR now up to 59.2.  Patient's total protein remains low at 5.4 and albumin is low at 2.9.  Liver function test are within normal limits.  Patient does have a normal white count 7.0, hemoglobin is 10.6 and stable.  Platelet count is also stable at 170,000.  Patient did complete x-ray of right hip today which was unremarkable and showed no evidence of fracture after her recent fall.  Patient did have MRI of brain today which does show the old occipital stroke and evidence of ischemic small vessel disease but no other major abnormalities due to the trauma from her fall.  Cervical spine MRI shows no evidence of metastatic disease.  There are multilevel degenerative changes which are greatest at C5-6 but none of these are causing significant stenosis or nerve compression at present time.  MRI of lumbar spine does show no evidence of metastatic  disease.  There are multilevel degenerative changes throughout the lumbar spine but these are most severe with an inferior disc extrusion at L2-3.  She does have severe canal stenosis at this level and neural foraminal stenosis from L2-3 through L4-5.  We will notify neurosurgery of these findings and see if they have any additional recommendations before patient is potentially discharged to home either late tomorrow or the next day.  Review of nursing notes show that patient did refuse her MRI overnight last night because of exhaustion from all the testing yesterday.  Otherwise she was stable.  Dr. Espinoza did see patient in consultation.  She has recommended stopping potassium supplement and rechecking phosphorus in the a.m.  She continues to watch blood pressure closely and may consider increasing Procardia XL to 60 mg a day to better control blood pressure.  Calcium and phosphorus seems stable at present time.  Neurosurgery also saw patient today and we are still awaiting MRI at the time of their visit.  They did feel there was some improvement in the strength of her lower extremities overnight last night.  Patient's MRI series was moved up to stat level by myself because of patient's ongoing issues with lower extremity weakness.  Patient is agreeable to having MRI done tonight if necessary and we are going to premedicate as needed to try to get the testing done.  Occupational Therapy did miss the patient will try again tomorrow.  Cardiology did see the patient and feel that she is relatively stable.  They do feel that she has some degree of diastolic dysfunction though her echo shows normal ejection fraction of 61 to 65% and she currently is euvolemic.  Blood pressure still slightly elevated.  Patient declining stress test.  At this point, will attempt to get results of MRIs, have neurosurgery review and make recommendations, and then consider discharge planning once again tomorrow.  Patient would like to go  home later in the day tomorrow if possible or no later than Thursday.  It does sound like she has an adequate plan together for home discharge.    1/15/2025.  Patient was seen again today on my rounds on 6 S. in room 610.  Patient was resting quietly in chair at bedside and recognized me immediately upon my entry into the room.  She was in the process of eating her lunch.  Patient does not seem to be in any distress and actually is very anxious for discharge to home.  We did go over her workup to this point with her and made plans for the future.  I will full PPE as indicated including an N95 mask as necessary, goggles, white lab coat, and gloves when touching patient.  I performed thorough hand hygiene before and after the patient visit.  Patient's labs today look quite stable.  Electrolytes were all stable and her creatinine is now down to a level of 1.09 which is baseline for the patient.  Her estimated GFR was 49.6.  She did have magnesium and phosphorus that were both normal.  Her protein was slightly low at 5.9 as well as her albumin.  White blood cell count today was 8.08 and hemoglobin was up to 11.5.  Platelet count was 188,000.  Patient did complete her MRIs of brain cervical spine and lumbar spine overnight last night.  MRI of the brain was unremarkable it did show the old occipital infarct that she had in the past with some diffuse atherosclerotic disease suggestive of hardening of the arteries.  Her cervical spine x-rays were relatively unremarkable she did have some nonspecific degenerative changes that really do not appear different from previous films.  Her lumbar spine films also are consistent with previous films.  She does have evidence of an L2-3 disc herniation which is causing some moderate to severe spinal stenosis.  This might be an area of possible treatment by pain management in the future if her symptoms persisted or worsened.  EKG was unremarkable and unchanged today.  Monitor strips are  "stable.  Patient was seen by several specialist prior to discharge to home today.  Neurosurgery did review her cervical and lumbar spine MRIs as well as the MRI of her head.  Findings were chronic and unremarkable for any new issues that needed intervention.  Neurosurgery did sign off on the patient at this point and indicates that no additional outpatient follow-up from their services needed at this time.  Dr. Espinoza from renal also saw patient in consultation she feels that renal functions now back to normal baseline levels.  She added no new medicines today and feels that blood pressure is adequately controlled.  Patient will follow-up on an outpatient basis with Dr. Bond.  Cardiology did see the patient and notes the patient has declined any further cardiac workup is felt that she is quite stable and ready for discharge.  Occupational Therapy did see the patient.  They did have some concerns as did the physical therapy on their previous evaluation.  Patient demonstrates 5 losses of balance with turns and backward steps.  Patient was also showing decreased safety awareness at times throughout the OT session.  Patient presents with deficits related to strength, tolerance, balance, transfers, and mobility that impede her independence with ADLs.  Both OT and PT did feel patient would benefit from skilled nursing unit placement to improve her skill levels.  I did discuss this at length with the patient and she has discussed it with her children.  She is adamantly opposed to being transferred to a skilled nursing unit because \"her mother-in-law  a terrible death and 1 in the past\".  She has worked with her 6 sons to develop a discharge plan as safe as possible to her home.  She lives in the home with her  and they do watch out for each other.  The sons live nearby and will be visiting on a daily basis from this point forward.  I also in the process of attempting to hire a  for the house who " will be there at least 3 days/week to help clean and prepare some food for the family.  Patient and her  are both agreeable to this.  Patient does report that her  had a fall earlier today at home and that neighbors do assist them and help get him back up and out of the floor.  Patient feels totally safe at home.  She does not feel it is safe and feels at risk for damage to her health status in a skilled nursing unit.  We did discuss this with [facility] and [facility] tells me that they have excepted her for follow-up on an outpatient basis.  In addition to the  being fired by her sons, patient also has home visits from [facility] who is a APRN with [facility] palliative care program.  She does carefully monitor patient's medications that include narcotics and lorazepam that she takes on a regular basis for her symptoms.  Patient's  is also on the palliative program and has medications given to him also by the APRN.  Patient does do video visits with me and we will plan a video visit within 10 days of discharge.  Patient was seen by nutrition on the last day of her stay and it is felt that she meets criteria for moderate malnutrition of chronic illness based on her exam.  She is on a cardiac diet with soft foods and thin liquids per recommendations of speech therapy.  AC Leonardo from neurology also saw her prior to her discharge.  They agree with nonsurgical treatment of the lumbar spinal stenosis.  I also note that patient has refused skilled nursing unit discharge and will be going home with home health therapy.  Vitamin B12 replacement has been started and they did recommend rechecking levels in 6 to 8 weeks and consider an IM replacement if her B12 remains low.  I did suggest she get follow-up with outpatient services if she wishes to discuss further treatments.  Dr. Benny Ochoa could evaluate patient further with [facility] which she has had in the past.  At this point neurology  agreed that she was fine for discharge to home with home health services.  I did review all the above findings with the patient.  She is still agreeable for discharge to home and in fact very insistent on that.  I offered her an ambulance for discharge and she says that her sons will be picking her up and taking her home.  At this point, it does appear patient has maximized benefit from hospitalization.  Causes for her fall that led to a closed head injury are primarily related to her deficits in balance, energy, and muscle strength.  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  Patient's goal is to spend as much time at home with her  before one of them passes away.  They would like to stay at home and not in the hospitals or nursing homes.  She is agreeable to home health interventions.  They will also be looking as mentioned above for a  and other services to supplement their home environment.  I did consult [facility] home health  to help them with this search.  The patient and I did discuss possibilities of finding housekeepers through local churches, [employer] facilities, and by word of mouth from friends.  Discharge summary and note is prepared.                 Vital Signs  Temp:  [97.5 °F (36.4 °C)-98.1 °F (36.7 °C)] 98.1 °F (36.7 °C)  Heart Rate:  [65-86] 85  Resp:  [18-20] 20  BP: (120-172)/(74-98) 148/79    Physical Exam at Discharge    Constitutional:             Alert, cooperative,   Up in chair at time of my visit  moderatedistress, AAOx3, resting comfortably, having some difficulty eating whole foods  but has been evaluated and cleared by speech therapy for soft foods and hold form with thin liquids.   Head:                          Normocephalic, without obvious abnormality, atraumatic   Eyes:                          PERJACLYN  conjunctiva/corneas clear, no icterus, no conjunctival                                     pallor, EOM's intact, both eyes      ENT and Mouth:         Lips, tongue, gums normal; oral mucosa pink and moist   Neck:                          Supple, symmetrical, trachea midline, no JVD  Respiratory:                 Clear to auscultation bilaterally, respirations unlabored  Cardiovascular:           Regular rate and rhythm, S1 and S2 normal, no murmur,                                       no  Rub or gallop.  Pulses normal.    Gastrointestinal:          BS present x 4 Soft, non-tender, bowel sounds active,                                       no masses, no hepatosplenomegaly                                                      :                             No hernia.  Normal exam for sex.         Musculoskeletal:        Extremities normal, atraumatic, no cyanosis or edema                                      No arthropathy.  No deformity.  Gait normal                                                 Skin:                           Skin is warm and dry,  no rashes, swelling or palpable lesions   Neurologic:                 CN -XII intact, motor strength grossly intact, sensation grossly intact to light touch, no focal reflex deficits noted   Somewhat confused at times about specific details.   Psychiatric:                 Alert,oriented X3, no delusions, psychoses, depression or anxiety    Heme/Lymph/Imun:   No bruises, petechiae.  Lymph nodes normal in size/configuration.  Anxiety and stress levels high.            Pertinent Test Results:    Results from last 7 days   Lab Units 01/15/25  0516 01/14/25  0540 01/13/25  0648   SODIUM mmol/L 139 144 144   POTASSIUM mmol/L 4.2 4.6 3.6   CHLORIDE mmol/L 103 109* 108*   CO2 mmol/L 27.0 28.0 27.3   BUN mg/dL 22 20 19   CREATININE mg/dL 1.09* 0.94 1.05*   GLUCOSE mg/dL 108* 91 97   CALCIUM mg/dL 9.6 9.1 8.9       Results from last 7 days   Lab Units 01/15/25  0516  01/14/25  0540 01/13/25  0649   WBC 10*3/mm3 8.08 7.00 7.70   HEMOGLOBIN g/dL 11.5* 10.6* 10.7*   HEMATOCRIT % 36.9 32.3* 32.5*   PLATELETS 10*3/mm3 188 170 179                   Attending Physician Final Assessment and Plan      1.  Fall from standing with closed head injury 3 days ago denying loss of consciousness, but unable to walk or bear weight on lower extremities since event with generalized muscle weakness and dizziness.  Exact cause of patient's symptoms not clear at this point.  Neurology and neurosurgery asked to evaluate.  Plan to follow-up plain films and CT scans with MRI of head and entire spine if felt to be appropriate by neurology and / or neurosurgery.  Comprehensive labs are ordered.  PT OT and speech therapy consults are requested.  Patient may require short stay in rehab because of the profound weakness and poor balance and will have CCP see her in preparation for this possible eventuality.  Further scans planned by neurosurgery tomorrow of entire spine.  Patient n.p.o. tonight in preparation for those studies.  No specific neurologic findings are discovered by neurology or neurosurgery at present time.  Changes on brain CT felt to probably be chronic in nature.  MRI per report back tonight and is unremarkable. to have walker and wheelchair to use and transport around the house.  She is having furniture rearranged to make her activities of daily living easier to perform.  She has had her shower rebuilt to make it handicapped friendly with a seat.  Working to make arrangements for family to visit on daily basis and also to have a  as needed to help with cleaning and cooking chores at times.  Continues with palliative home health services from [facility].     2.  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  Renal asked to see patient in consultation for their input on her condition.  She is followed regularly by Dr. Bond in his clinic.   I have also ask nephrology to help with management of patient's diuretics and her blood pressure.  Blood pressure readings appear to be quite labile since arrival here in the hospital.  Patient reports that she is using variable amounts of clonidine up to 0.3 every morning, 0 point 2 in the afternoon and 0.2 at bedtime.  Patient is admittedly a bit confused about the dosing on her medications at present time.  Renal is studying patient's electrolytes.  Patient started back on her clonidine 0.3 every morning and point 2 in the afternoon and at bedtime as needed.  Kidney disease significantly improved after hydration here in the hospital.     3.  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  Neurosurgery and neurology consults have been placed.  Will obtain additional films including thoracic and lumbar spine films.  MRIs to be ordered of head and entire spine if felt to be indicated by neurology and neurosurgery after review of the patient's case tomorrow.  In addition to the ataxia and walking difficulties, patient also reports an increase in the level and intensity of her urinary incontinence.  She has had trouble in the past with urinary retention and needed to do in and out caths.  No sequelae at this point from closed head injury.     4.  Accelerated hypertension with great variability in levels possibly related to abnormal volume status and patient..  As above, we have asked nephrology to help with this issue.  I suspect it may be volume related and it is unclear if patient is actually drinking adequate amounts of fluid at home.  We have given her a small bolus of normal saline here 500 cc and I am going to continue her overnight on half-normal saline at 75 cc/h.  Further input on fluid replacement for this patient would be appreciated.  Also need input on level of diuretics.  Balance of blood  pressure may require additional medications.  Renal following as well as cardiology.  Blood pressure medicines being adjusted by renal and cardiology.     5.  Dyspnea on exertion with mild intermittent chest pain.  EKG and troponin are ordered for tomorrow a.m.  Patient does not seem to be having any sustained chest pain at present time.  We did ask patient's regular cardiology group Dr. Soto to take a look at the patient and make recommendations on any additional workup that they might feel necessary.  Patient did request the cardiology evaluation.  Chest pain resolved and patient has declined stress test.  Echo shows some mild diastolic dysfunction.  Patient declined stress test  And further cardiac workup     6.  Electrolyte abnormality of hypokalemia noted on admission in ER.  Correction given in ER and patient placed on electrolyte replacement protocol low level renal dosing.  Will continue to monitor and make further adjustments in electrolytes as needed.  hypo-magnesium and hypokalemia have been corrected     7.  Increased urinary incontinence and patient with long history of urinary retention requiring self-catheterization as taught to her by Dr. Fazal Ochoa's office in urology.  Uncertain at this point if this issue is related in part to the closed head injury and the cervical spine stenosis that seems to be new.  Will ask neurology and neurosurgery for their input on that issue.  May need to consider urology consult also.  Patient incontinent of urine     8.  Hyperlipidemia.  Fasting lipid profile ordered for tomorrow a.m.  Will adjust meds as necessary..  Lipid profile very stable.     9.  Chronic tension type headaches that are intractable and do require Fioricet for management.  This medication is continued.  Headaches ongoing and has Fioricet for treatment of pain.     10.  Chronic pain management on [facility] palliative care program.  Patient at present receiving up to 8 Norco 10 tablets/day along with  Ativan 0.5 x 3/day.  These medications are continued at the time of patient's admission due to her palliative care status.     11.  Cerebral atherosclerosis with history of left occipital CVA in the past and now with new neurologic changes.  As above, neurology and neurosurgery requested to see the patient to determine need for further workup and evaluation of the symptoms.     12.  History of elevated PTH.  Ionized calcium level and PTH have been ordered to recheck on the situation.     13.  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  Plan to order ultrasound of thyroid to further evaluate this situation and determine need for any biopsies of nodules that might be necessary. radiology reading on thyroid ultrasound still pending     14.  Vitamin D deficiency.  Plan to check level of vitamin D while here in the hospital.     15.  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  Patient has had recurrent bouts of COPD exacerbations and now takes 10 mg of prednisone on a daily basis for prevention of additional COPD exacerbations.  Patient also using regular nebulized ipratropium and albuterol as needed.       Condition on Discharge:  stable    Discharge Disposition  Home-Health Care [facility]    Transport Plan   sons to transport home    Hospital Treatments discontinued at time of Discharge  IV, Telemetry, Miller Catheter, Deep Lines and PICC LInes    Discharge Medications     Discharge Medications        New Medications        Instructions Start Date   lidocaine 5 %  Commonly known as: Lidoderm   1 patch, Transdermal, Daily PRN, Remove & Discard patch within 12 hours or as directed by MD             Changes to Medications        Instructions Start Date   hydrOXYzine 10 MG tablet  Commonly known as: ATARAX  What changed: Another medication with the same name was removed. Continue taking this medication, and follow the directions you see here.    10 mg, 3 Times Daily PRN      mupirocin 2 % ointment  Commonly known as: BACTROBAN  What changed: additional instructions   1 Application, Topical, Every 12 Hours Scheduled      potassium chloride 20 mEq/15 mL solution  Commonly known as: KAYCIEL  What changed: Another medication with the same name was removed. Continue taking this medication, and follow the directions you see here.   20 mEq, Oral, Daily      predniSONE 10 MG tablet  Commonly known as: DELTASONE  What changed: when to take this   10 mg, Oral, Daily      torsemide 10 MG tablet  Commonly known as: DEMADEX  What changed:   medication strength  how much to take   50 mg, Oral, Daily             Continue These Medications        Instructions Start Date   acetaminophen 325 MG tablet  Commonly known as: TYLENOL   650 mg, Oral, Every 4 Hours PRN      butalbital-acetaminophen-caffeine -40 MG per tablet  Commonly known as: FIORICET, ESGIC   2 tablets, Oral, Every 4 Hours PRN      carboxymethylcellulose 0.5 % solution  Commonly known as: REFRESH PLUS   1 drop, 2 Times Daily PRN      cetirizine 10 MG tablet  Commonly known as: zyrTEC   10 mg, Daily      cloNIDine 0.2 MG tablet  Commonly known as: CATAPRES   0.2 mg, Oral, Every 6 Hours Scheduled, Dose titrated for BP control by patient      cloNIDine 0.3 MG tablet  Commonly known as: CATAPRES   0.3 mg, Oral, Every 6 Hours Scheduled, Dose titrated by patient for BP control      cyanocobalamin 1000 MCG tablet  Commonly known as: VITAMIN B-12   1,000 mcg, Oral, Daily      Diclofenac Sodium 1 % gel gel  Commonly known as: VOLTAREN   4 g, Topical, 4 Times Daily PRN      diphenhydrAMINE 50 MG tablet  Commonly known as: BENADRYL   50 mg, Oral, Every 4 Hours PRN      EPINEPHrine 0.3 MG/0.3ML solution auto-injector injection  Commonly known as: EPIPEN   INJECT THE CONTENTS OF ONE PEN AS NEEDED. MAY REPEAT ONE TIME.      eplerenone 50 MG tablet  Commonly known as: INSPRA   50 mg, Daily      fluticasone 50 MCG/ACT  nasal spray  Commonly known as: FLONASE   1 spray, Each Nare, 2 Times Daily      HYDROcodone-acetaminophen  MG per tablet  Commonly known as: NORCO   1-2 tablets, Every 6 Hours PRN      ipratropium 0.02 % nebulizer solution  Commonly known as: ATROVENT   500 mcg, 4 Times Daily - RT      levocetirizine 5 MG tablet  Commonly known as: XYZAL   5 mg, Oral, Every Morning      LORazepam 0.5 MG tablet  Commonly known as: ATIVAN   0.5 mg, Every 8 Hours PRN      multivitamin with minerals tablet tablet   1 tablet, Oral, Daily      naloxone 4 MG/0.1ML nasal spray  Commonly known as: NARCAN   1 spray, As Needed      NIFEdipine CC 30 MG 24 hr tablet  Commonly known as: ADALAT CC   30 mg, Oral, Every 24 Hours Scheduled      omeprazole 40 MG capsule  Commonly known as: priLOSEC   40 mg, Daily PRN      ondansetron ODT 8 MG disintegrating tablet  Commonly known as: ZOFRAN-ODT   8 mg, Oral, Every 6 Hours PRN      Petrolatum 42 % ointment   1 Application, Topical, Every 12 Hours Scheduled, Use on irritated skin from bug bites      polyethylene glycol 17 g packet  Commonly known as: MIRALAX   17 g, Daily PRN      ProAir  (90 Base) MCG/ACT inhaler  Generic drug: albuterol sulfate HFA   4 puffs, Every 4 Hours PRN      promethazine-codeine 6.25-10 MG/5ML syrup  Commonly known as: PHENERGAN with CODEINE   5 mL, Oral, 4 Times Daily PRN      rosuvastatin 5 MG tablet  Commonly known as: CRESTOR   5 mg, Every Morning      triamcinolone 0.1 % cream  Commonly known as: KENALOG   1 Application, Topical, 2 Times Daily      VITAMIN B-3 PO   Take  by mouth.      Vitamin D3 1.25 MG (74886 UT) capsule   50,000 Units, Weekly      Xarelto 20 MG tablet  Generic drug: rivaroxaban   20 mg, Daily               Home Medication List  Prior to Admission medications    Medication Sig Start Date End Date Taking? Authorizing Provider   acetaminophen (TYLENOL) 325 MG tablet Take 2 tablets by mouth Every 4 (Four) Hours As Needed for Mild Pain .  5/29/22  Yes Óscar Cadet MD   albuterol (PROAIR HFA) 108 (90 Base) MCG/ACT inhaler 4 puffs Every 4 (Four) Hours As Needed for Wheezing or Shortness of Air. 6/18/14  Yes Vadim Best MD   butalbital-acetaminophen-caffeine (FIORICET, ESGIC) -40 MG per tablet Take 2 tablets by mouth Every 4 (Four) Hours As Needed for Headache. 3/27/17  Yes Óscar Cadet MD   cetirizine (zyrTEC) 10 MG tablet Take 1 tablet by mouth Daily. Alt with singulair   Yes Vadim Best MD   Cholecalciferol (VITAMIN D3) 1.25 MG (68956 UT) capsule Take 1 capsule by mouth 1 (One) Time Per Week. TUESDAYS 5/11/20  Yes Vadim Best MD   cloNIDine (CATAPRES) 0.2 MG tablet Take 1 tablet by mouth Every 6 (Six) Hours. Dose titrated for BP control by patient 10/22/24  Yes Óscar Cadet MD   cloNIDine (CATAPRES) 0.3 MG tablet Take 1 tablet by mouth Every 6 (Six) Hours. Dose titrated by patient for BP control 10/22/24  Yes Óscar Cadet MD   Diclofenac Sodium (VOLTAREN) 1 % gel gel Apply 4 g topically to the appropriate area as directed 4 (Four) Times a Day As Needed (Apply to knees or other joints when pain is severe and flares). 10/22/24  Yes Óscar Cadet MD   diphenhydrAMINE (BENADRYL) 50 MG tablet Take 1 tablet by mouth every 4 (four) hours as needed for itching or allergies. 7/25/16  Yes Óscar Cadet MD   eplerenone (INSPRA) 50 MG tablet Take 1 tablet by mouth Daily.   Yes Vadim Best MD   fluticasone (FLONASE) 50 MCG/ACT nasal spray 1 spray by Each Nare route 2 (Two) Times a Day. 8/27/18  Yes Óscar Cadet MD   HYDROcodone-acetaminophen (NORCO)  MG per tablet Take 1-2 tablets by mouth Every 6 (Six) Hours As Needed for Moderate Pain. 3/3/22  Yes Vadim Best MD   hydrOXYzine (ATARAX) 10 MG tablet Take 1 tablet by mouth 3 (Three) Times a Day As Needed for Itching.   Yes Provider, MD Vadim   ipratropium (ATROVENT) 0.02 % nebulizer solution Take 2.5 mL by  nebulization 4 (Four) Times a Day.   Yes Vadim Best MD   lidocaine (Lidoderm) 5 % Place 1 patch on the skin as directed by provider Daily As Needed for Mild Pain. Remove & Discard patch within 12 hours or as directed by MD 12/5/24  Yes Dmitri Espino MD   LORazepam (ATIVAN) 0.5 MG tablet Take 1 tablet by mouth Every 8 (Eight) Hours As Needed for Anxiety.   Yes Vadim Best MD   multivitamin with minerals tablet tablet Take 1 tablet by mouth Daily. 5/30/22  Yes Óscar Cadet MD   mupirocin (BACTROBAN) 2 % ointment Apply 1 Application topically to the appropriate area as directed Every 12 (Twelve) Hours. 10/22/24  Yes Óscar Cadet MD   Niacin (VITAMIN B-3 PO) Take  by mouth.   Yes Vadim Best MD   NIFEdipine XL (ADALAT CC) 30 MG 24 hr tablet Take 1 tablet by mouth Daily. 5/30/22  Yes Óscar Cadet MD   omeprazole (priLOSEC) 40 MG capsule Take 1 capsule by mouth Daily As Needed. 2/23/21  Yes Vadim Best MD   ondansetron ODT (ZOFRAN-ODT) 8 MG disintegrating tablet Take 1 tablet by mouth every 6 (six) hours as needed for nausea or vomiting. 9/8/16  Yes Óscar Cadet MD   Petrolatum 42 % ointment Apply 1 Application topically to the appropriate area as directed Every 12 (Twelve) Hours. Use on irritated skin from bug bites 10/22/24  Yes Óscar Cadet MD   polyethylene glycol (MIRALAX) 17 g packet Take 17 g by mouth Daily As Needed. 6/6/22  Yes Vadim Best MD   potassium chloride (KAYCIEL) 20 mEq/15 mL solution Take 15 mL by mouth Daily. 10/22/24  Yes Óscar Cadet MD   predniSONE (DELTASONE) 10 MG tablet Take 1 tablet by mouth Daily. 1/15/25  Yes Óscar Cadet MD   promethazine-codeine (PHENERGAN with CODEINE) 6.25-10 MG/5ML syrup Take 5 mL by mouth 4 (Four) Times a Day As Needed for cough. 12/9/16  Yes Óscar Cadet MD   rosuvastatin (CRESTOR) 5 MG tablet Take 1 tablet by mouth Every Morning. 4/17/23  Yes Provider, MD Vadim    torsemide (DEMADEX) 10 MG tablet Take 5 tablets by mouth Daily. 1/15/25  Yes Óscar Cadet MD   triamcinolone (KENALOG) 0.1 % cream Apply 1 Application topically to the appropriate area as directed 2 (Two) Times a Day. 1/15/25  Yes Óscar Cadet MD   vitamin B-12 (VITAMIN B-12) 1000 MCG tablet Take 1 tablet by mouth Daily. 4/11/17  Yes Óscar Cadet MD   Xarelto 20 MG tablet Take 1 tablet by mouth Daily. 2/23/23  Yes Vadim Best MD   hydrOXYzine (ATARAX) 50 MG tablet Take 1 tablet by mouth Every 6 (Six) Hours As Needed for Itching or Allergies. 12/12/19 1/15/25 Yes Vadim Best MD   potassium chloride (KLOR-CON) 20 MEQ packet Take 20 mEq by mouth Daily.  1/15/25 Yes Vadim Best MD   predniSONE (DELTASONE) 10 MG tablet Take 1 tablet by mouth Every Other Day.  Patient taking differently: Take 2 tablets by mouth Daily. 5/30/22 1/15/25 Yes Óscar Cadet MD   torsemide (DEMADEX) 100 MG tablet Take 1 tablet by mouth Daily.  Patient taking differently: Take 1 tablet by mouth Daily As Needed. 10/22/24 1/15/25 Yes Óscar Cadet MD   triamcinolone (KENALOG) 0.1 % cream Apply 1 Application topically to the appropriate area as directed 2 (Two) Times a Day. 1/8/25 1/15/25 Yes Vadim Best MD   carboxymethylcellulose (REFRESH PLUS) 0.5 % solution Administer 1 drop to both eyes 2 (Two) Times a Day As Needed for Dry Eyes.    Vadim Best MD   EPINEPHrine (EPIPEN) 0.3 MG/0.3ML solution auto-injector injection INJECT THE CONTENTS OF ONE PEN AS NEEDED. MAY REPEAT ONE TIME. 3/29/21   Vadim Best MD   levocetirizine (XYZAL) 5 MG tablet Take 1 tablet by mouth Every Morning.    Vadim Best MD   naloxone (NARCAN) 4 MG/0.1ML nasal spray Administer 1 spray into the nostril(s) as directed by provider As Needed for Opioid Reversal. Call 911. Don't prime. [place] in 1 nostril for overdose. Repeat in 2-3 minutes in other nostril if no or minimal  breathing/responsiveness.    Provider, MD Vadim       Discharge Diet   Diet Orders (active) (From admission, onward)      None            Activity at Discharge  Activity Instructions       Activity as Tolerated      Gradually Increase Activity Until at Pre-Hospitalization Level              Follow-up Appointments  Future Appointments   Date Time Provider Department Center   3/12/2025  2:00 PM Gama Soto MD [facility]     Additional Instructions for the Follow-ups that You Need to Schedule       Ambulatory Referral to Home Health   As directed      Face to Face Visit Date: 1/15/2025   Follow-up provider for Plan of Care?: I will be treating the patient on an ongoing basis.  Please send me the Plan of Care for signature.   Follow-up provider: ALEKSANDAR GALVAN [[id number]]   Reason/Clinical Findings: Accelerated hypertensipn, Recurrent Falls, Trouble doing on ADL's   Describe mobility limitations that make leaving home difficult: Patient needs walker or wheelchair for mobility.  Needs assist of one to go out of house   Nursing/Therapeutic Services Requested: Skilled Nursing (Labs on Mondays x 3 weeks:  CFBC, CMP, Lipase, Magnesium, and Phosphorus) Physical Therapy Occupational Therapy Speech Therapy Medical / Social Work Dietician   Skilled nursing orders: Medication education (Has [facility]ICE AT HOME) Pain management CHF management COPD management [facility] Wound care dressing/changes Cardiopulmonary assessments   PT orders: Therapeutic exercise Gait Training Transfer training Strengthening Home safety assessment   Weight Bearing Status: As Tolerated   Occupational orders: Activities of daily living Energy conservation Strengthening Cognition Fine motor Home safety assessment   SLP orders: Dysphagia therapy (Chokes on large pills at times) Cognition   Social work orders: Community resources Long range planning   Dietician orders: Diet instruction Diet modification    Frequency: 1 Week 1        Discharge Follow-up with PCP   As directed       Currently Documented PCP:    Óscar Cadet MD    PCP Phone Number:    [phone]     Follow Up Details: Call [phone] to arrange video visit follow-up with Dr. Cadet in 7 to 10 days.        Discharge Follow-up with Specified Provider: Follow-up with Dr. Jose Molina   In allergy for any further issues with allergy to bug bites.   As directed      To: Follow-up with Dr. Jose Molina   In allergy for any further issues with allergy to bug bites.        Discharge Follow-up with Specified Provider: Follow-up with Dr. Bond in renal within 1 month.; 1 Month   As directed      To: Follow-up with Dr. Bond in renal within 1 month.   Follow Up: 1 Month        Discharge Follow-up with Specified Provider: Keep follow-up appointment with Dr. Soto as previously scheduled in cardiology.   As directed      To: Keep follow-up appointment with Dr. Soto as previously scheduled in cardiology.                Therapy Orders  Physical therapy, Occupational Therapy, and Speech Therapy to evaluate and treat.  Nutrition/Dieticien to follow weights and determine further nutritional needs.    Test Results Pending at Discharge  Pending Labs       Order Current Status    Copper, Serum In process    Cryoglobulin In process    Heavy Metals, Blood In process    Immunofixation, Serum In process            Óscar Cadet MD  1/15/2025  8706 EDT    Time: Discharge 50 min

## 2025-01-18 NOTE — HOME HEALTH
SOC Note:  FYI patient's small dog may bite. Dog made motions towards nurse while assessing patient that did not appear friendly. When nurse informed patient all that was done was they told nurse he would not bite and they have only had one person that the dog acted aggressively towards. Refused to put dog up.    Patient is moderately confused on the BIMs but alert to person, place and time. Vitals assessed to be WNL. Unable to assess lungs due to aggressive dog laying behind patient. patient is incontinent of bladder. Small scab on the bottom of foot that patient is calling a bunion. Pictures taken.    Labs on Mondays x 3 weeks: CFBC, CMP, Lipase, Magnesium, and Phosphorus , results to PCP starting 1/20    Home Health ordered for: disciplines declining SLP, New add on for OT, PT ordered. SN 1w1, 2w2, 1w2  Patient has palliative care    Reason for Hosp/Primary Dx/Co-morbidities: [facility] 1/12-1/15 for generalized weakness, fall, hypokalemia, acute renal insufficiency  Nonsurgical intervention for spinal stenosis  Patient reported to nurse that she stopped taking one of her medications that caused her kidney issues. Stopped taking because insurance would not cover. Currently taking everything she is supposed to be taking.  Reported she fractured her left foot. Reports left foot in a lot of pain. Fracture occurred about 2 yrs ago.  Patient also reporting seeing bugs and having bugs bite her. No bugs seen in home and spouse does not see these bugs. Patient is extremely concerned regarding this bugs in her home biting her. States they like her O positive blood best and feed off of her.    past medical history of breast cancer status post bilateral mastectomies and chemotherapy, COPD, hypertension, hyperlipidemia, DVT/PE on Xarelto, stroke, arthritis, chronic mobility issues suffering from frequent falls in the outpatient set    Focus of Care: SN needed for teaching and assessing for Acute kidney failure    Patient's goal(s):  "\"to get more strength in my legs\"    Current Functional status/mobility/DME: Using walker    HB status/Living Arrangements: Lives with spouse and small dog in a cluttered home    Skin Integrity/wound status: NA    Code Status: CPR    Fall Risk/Safety concerns: Fall sent her into the hospital. No falls since returning home    Educated on Emergency Plan, steps to take prior to going to the ER and when to Call [facility].    Medication issues/Concerns:     Additional Problems/Concerns: NA    SDOH Barriers (i.e. caregiver concerns, social isolation, transportation, food insecurity, environment, income etc.)/Need for MSW: NA"

## 2025-01-20 ENCOUNTER — READMISSION MANAGEMENT (OUTPATIENT)
Dept: CALL CENTER | Facility: HOSPITAL | Age: 85
End: 2025-01-20
Payer: MEDICARE

## 2025-01-20 ENCOUNTER — HOME CARE VISIT (OUTPATIENT)
Dept: HOME HEALTH SERVICES | Facility: HOME HEALTHCARE | Age: 85
End: 2025-01-20
Payer: MEDICARE

## 2025-01-20 ENCOUNTER — LAB REQUISITION (OUTPATIENT)
Dept: LAB | Facility: HOSPITAL | Age: 85
End: 2025-01-20
Payer: MEDICARE

## 2025-01-20 VITALS
OXYGEN SATURATION: 98 % | SYSTOLIC BLOOD PRESSURE: 138 MMHG | RESPIRATION RATE: 20 BRPM | DIASTOLIC BLOOD PRESSURE: 82 MMHG | HEART RATE: 82 BPM | TEMPERATURE: 98.2 F

## 2025-01-20 DIAGNOSIS — D64.9 ANEMIA, UNSPECIFIED: ICD-10-CM

## 2025-01-20 LAB
ALBUMIN SERPL-MCNC: 4.1 G/DL (ref 3.5–5.2)
ALBUMIN/GLOB SERPL: 1.4 G/DL
ALP SERPL-CCNC: 100 U/L (ref 39–117)
ALT SERPL W P-5'-P-CCNC: 16 U/L (ref 1–33)
ANION GAP SERPL CALCULATED.3IONS-SCNC: 15 MMOL/L (ref 5–15)
AST SERPL-CCNC: 12 U/L (ref 1–32)
BILIRUB SERPL-MCNC: 0.2 MG/DL (ref 0–1.2)
BUN SERPL-MCNC: 36 MG/DL (ref 8–23)
BUN/CREAT SERPL: 28.8 (ref 7–25)
CALCIUM SPEC-SCNC: 10 MG/DL (ref 8.6–10.5)
CHLORIDE SERPL-SCNC: 102 MMOL/L (ref 98–107)
CO2 SERPL-SCNC: 24 MMOL/L (ref 22–29)
CREAT SERPL-MCNC: 1.25 MG/DL (ref 0.57–1)
CRYOGLOB SER QL 1D COLD INC: NORMAL
DEPRECATED RDW RBC AUTO: 43.8 FL (ref 37–54)
EGFRCR SERPLBLD CKD-EPI 2021: 41.8 ML/MIN/1.73
ERYTHROCYTE [DISTWIDTH] IN BLOOD BY AUTOMATED COUNT: 12.9 % (ref 12.3–15.4)
GLOBULIN UR ELPH-MCNC: 2.9 GM/DL
GLUCOSE SERPL-MCNC: 153 MG/DL (ref 65–99)
HCT VFR BLD AUTO: 36.9 % (ref 34–46.6)
HGB BLD-MCNC: 12.2 G/DL (ref 12–15.9)
LIPASE SERPL-CCNC: 27 U/L (ref 13–60)
MAGNESIUM SERPL-MCNC: 2.2 MG/DL (ref 1.6–2.4)
MCH RBC QN AUTO: 30.6 PG (ref 26.6–33)
MCHC RBC AUTO-ENTMCNC: 33.1 G/DL (ref 31.5–35.7)
MCV RBC AUTO: 92.5 FL (ref 79–97)
PHOSPHATE SERPL-MCNC: 1.9 MG/DL (ref 2.5–4.5)
PLATELET # BLD AUTO: 304 10*3/MM3 (ref 140–450)
PMV BLD AUTO: 10.4 FL (ref 6–12)
POTASSIUM SERPL-SCNC: 4 MMOL/L (ref 3.5–5.2)
PROT SERPL-MCNC: 7 G/DL (ref 6–8.5)
RBC # BLD AUTO: 3.99 10*6/MM3 (ref 3.77–5.28)
SODIUM SERPL-SCNC: 141 MMOL/L (ref 136–145)
WBC NRBC COR # BLD AUTO: 9.34 10*3/MM3 (ref 3.4–10.8)

## 2025-01-20 PROCEDURE — 83690 ASSAY OF LIPASE: CPT | Performed by: INTERNAL MEDICINE

## 2025-01-20 PROCEDURE — 84100 ASSAY OF PHOSPHORUS: CPT | Performed by: INTERNAL MEDICINE

## 2025-01-20 PROCEDURE — G0300 HHS/HOSPICE OF LPN EA 15 MIN: HCPCS

## 2025-01-20 PROCEDURE — 83735 ASSAY OF MAGNESIUM: CPT | Performed by: INTERNAL MEDICINE

## 2025-01-20 PROCEDURE — 80053 COMPREHEN METABOLIC PANEL: CPT | Performed by: INTERNAL MEDICINE

## 2025-01-20 PROCEDURE — 85027 COMPLETE CBC AUTOMATED: CPT | Performed by: INTERNAL MEDICINE

## 2025-01-20 NOTE — OUTREACH NOTE
Medical Week 1 Survey      Flowsheet Row Responses   [facility] [place] patient discharged from? [place]   Does the patient have one of the following disease processes/diagnoses(primary or secondary)? Other   Week 1 attempt successful? Yes   Call start time 1235   Call end time 1241   Discharge diagnosis Recurrent falls while walking, Acute kidney injury superimposed on CKD   Person spoke with today (if not patient) and relationship Patient   Meds reviewed with patient/caregiver? Yes   Does the patient have all medications ordered at discharge? Yes   Is the patient taking all medications as directed (includes completed medication regime)? No  [Patient reports that she is going back to how she took her meds prior to hospitalization and will talk to PCP about meds.]   What is preventing the patient from taking all medications as directed? Desires to consult PCP first   Does the patient have a primary care provider?  Yes   Does the patient have an appointment with their PCP within 7 days of discharge? No   Comments regarding PCP Follow up with Óscar TAYLOR   Nursing Interventions Educated patient on importance of making appointment, Advised patient to make appointment   Has the patient kept scheduled appointments due by today? N/A   What is the Home health agency?  [facility]   Has home health visited the patient within 72 hours of discharge? Yes   Home health comments  for SN, OT, PT   Psychosocial issues? No   Did the patient receive a copy of their discharge instructions? Yes   Nursing interventions Reviewed instructions with patient   What is the patient's perception of their health status since discharge? Same   Is the patient/caregiver able to teach back signs and symptoms related to disease process for when to call PCP? Yes   Is the patient/caregiver able to teach back signs and symptoms related to disease process for when to call 911? Yes   Is the patient/caregiver able to teach back the hierarchy of who to  call/visit for symptoms/problems? PCP, Specialist, Home health nurse, Urgent Care, ED, 911 Yes   If the patient is a current smoker, are they able to teach back resources for cessation? Not a smoker   Week 1 call completed? Yes   Would this patient benefit from a Referral to Amb Social Work? No   Is the patient interested in additional calls from an ambulatory ? No   Call end time 1241            LEESA GIRARD - Registered Nurse

## 2025-01-20 NOTE — CASE COMMUNICATION
RECEIVED A CALL FROM FANG AT [facility] REPORTING A CRITICAL PHOSPHORUS LEVEL OF 1.9.  EMAILED DAYTON LO WHO OBTAINED THE LAB TODAY AND THE CLINICAL MANAGER.

## 2025-01-22 ENCOUNTER — HOME CARE VISIT (OUTPATIENT)
Dept: HOME HEALTH SERVICES | Facility: HOME HEALTHCARE | Age: 85
End: 2025-01-22
Payer: MEDICARE

## 2025-01-22 VITALS
SYSTOLIC BLOOD PRESSURE: 124 MMHG | OXYGEN SATURATION: 96 % | TEMPERATURE: 97.9 F | RESPIRATION RATE: 18 BRPM | HEART RATE: 80 BPM | DIASTOLIC BLOOD PRESSURE: 72 MMHG

## 2025-01-22 LAB
ARSENIC BLD-MCNC: <1 UG/L (ref 0–9)
LEAD BLDV-MCNC: <1 UG/DL (ref 0–3.4)
MERCURY BLD-MCNC: <1 UG/L (ref 0–14.9)

## 2025-01-22 PROCEDURE — G0151 HHCP-SERV OF PT,EA 15 MIN: HCPCS

## 2025-01-22 NOTE — HOME HEALTH
REASON FOR REFERRAL:  decreased ability to ambulate in and out of the home following recent hospital stay for multiple falls, right shoulder pain, acute kidney injury likely due to volume depletion resulting in functional decline and LE weakness.    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    Plan for next visit:  -transfer training  -gait training  -education on HEP with progressions as appropriate  -standing balance exercises

## 2025-01-24 ENCOUNTER — HOME CARE VISIT (OUTPATIENT)
Dept: HOME HEALTH SERVICES | Facility: HOME HEALTHCARE | Age: 85
End: 2025-01-24
Payer: MEDICARE

## 2025-01-24 PROBLEM — N17.9 ACUTE KIDNEY INJURY SUPERIMPOSED ON CKD: Status: RESOLVED | Noted: 2025-01-12 | Resolved: 2025-01-24

## 2025-01-24 PROBLEM — N18.9 ACUTE KIDNEY INJURY SUPERIMPOSED ON CKD: Status: RESOLVED | Noted: 2025-01-12 | Resolved: 2025-01-24

## 2025-01-27 ENCOUNTER — HOME CARE VISIT (OUTPATIENT)
Dept: HOME HEALTH SERVICES | Facility: HOME HEALTHCARE | Age: 85
End: 2025-01-27
Payer: MEDICARE

## 2025-01-27 VITALS
DIASTOLIC BLOOD PRESSURE: 68 MMHG | TEMPERATURE: 97.9 F | OXYGEN SATURATION: 96 % | RESPIRATION RATE: 17 BRPM | HEART RATE: 70 BPM | SYSTOLIC BLOOD PRESSURE: 130 MMHG

## 2025-01-27 PROCEDURE — G0151 HHCP-SERV OF PT,EA 15 MIN: HCPCS

## 2025-01-28 ENCOUNTER — HOME CARE VISIT (OUTPATIENT)
Dept: HOME HEALTH SERVICES | Facility: HOME HEALTHCARE | Age: 85
End: 2025-01-28
Payer: MEDICARE

## 2025-01-29 ENCOUNTER — READMISSION MANAGEMENT (OUTPATIENT)
Dept: CALL CENTER | Facility: HOSPITAL | Age: 85
End: 2025-01-29
Payer: MEDICARE

## 2025-01-29 NOTE — OUTREACH NOTE
Medical Week 1 Survey      Flowsheet Row Responses   [facility] patient discharged from? [place]   Does the patient have one of the following disease processes/diagnoses(primary or secondary)? Other   Call start time 1208   Call end time 1223   Discharge diagnosis Recurrent falls while walking, Acute kidney injury superimposed on CKD   Person spoke with today (if not patient) and relationship Patient   Meds reviewed with patient/caregiver? Yes   Is the patient having any side effects they believe may be caused by any medication additions or changes? No   Does the patient have all medications ordered at discharge? Yes   Is the patient taking all medications as directed (includes completed medication regime)? Yes   Does the patient have an appointment with their PCP within 7 days of discharge? Yes   Has the patient kept scheduled appointments due by today? Yes   Comments talked to pcp. telehealth   What is the Home health agency?  [facility]   Has home health visited the patient within 72 hours of discharge? Yes   Psychosocial issues? No   Did the patient receive a copy of their discharge instructions? Yes   Nursing interventions Reviewed instructions with patient   What is the patient's perception of their health status since discharge? Improving   Is the patient/caregiver able to teach back signs and symptoms related to disease process for when to call PCP? Yes   Is the patient/caregiver able to teach back signs and symptoms related to disease process for when to call 911? Yes   Is the patient/caregiver able to teach back the hierarchy of who to call/visit for symptoms/problems? PCP, Specialist, Home health nurse, Urgent Care, ED, 911 Yes   If the patient is a current smoker, are they able to teach back resources for cessation? Not a smoker   Would this patient benefit from a Referral to Amb Social Work? No   Is the patient interested in additional calls from an ambulatory ? No   Wrap up additional  comments pt very talkative. rambles. Stated she was just worried about her .stated she was seeing bugs ???   Call end time 1223            ABDI FOWLER - Registered Nurse

## 2025-01-29 NOTE — OUTREACH NOTE
Medical Week 3 Survey      Flowsheet Row Responses   [facility] patient discharged from? [place]   Does the patient have one of the following disease processes/diagnoses(primary or secondary)? Other   Week 3 attempt successful? Yes   Call start time 1208   Call end time 1223   Discharge diagnosis Recurrent falls while walking, Acute kidney injury superimposed on CKD   Person spoke with today (if not patient) and relationship Patient   Meds reviewed with patient/caregiver? Yes   Is the patient having any side effects they believe may be caused by any medication additions or changes? No   Does the patient have all medications ordered at discharge? Yes   Is the patient taking all medications as directed (includes completed medication regime)? Yes   Does the patient have an appointment with their PCP within 7 days of discharge? Yes   Has the patient kept scheduled appointments due by today? Yes   Comments talked to pcp. telehealth   What is the Home health agency?  [facility]   Has home health visited the patient within 72 hours of discharge? Yes   Psychosocial issues? No   Did the patient receive a copy of their discharge instructions? Yes   Nursing interventions Reviewed instructions with patient   What is the patient's perception of their health status since discharge? Improving   Is the patient/caregiver able to teach back signs and symptoms related to disease process for when to call PCP? Yes   Is the patient/caregiver able to teach back signs and symptoms related to disease process for when to call 911? Yes   Is the patient/caregiver able to teach back the hierarchy of who to call/visit for symptoms/problems? PCP, Specialist, Home health nurse, Urgent Care, ED, 911 Yes   If the patient is a current smoker, are they able to teach back resources for cessation? Not a smoker   Week 3 Call Completed? Yes   Graduated Yes   Is the patient interested in additional calls from an ambulatory ? No   Would this  patient benefit from a Referral to [facility] Social Work? No   Wrap up additional comments pt very talkative. rambles. Stated she was just worried about her .stated she was seeing bugs ???   Call end time 1223            ABDI FOWLER - Registered Nurse

## 2025-01-30 ENCOUNTER — HOME CARE VISIT (OUTPATIENT)
Dept: HOME HEALTH SERVICES | Facility: HOME HEALTHCARE | Age: 85
End: 2025-01-30
Payer: MEDICARE

## 2025-01-30 VITALS
HEART RATE: 82 BPM | SYSTOLIC BLOOD PRESSURE: 124 MMHG | TEMPERATURE: 98.6 F | DIASTOLIC BLOOD PRESSURE: 66 MMHG | RESPIRATION RATE: 17 BRPM | OXYGEN SATURATION: 96 %

## 2025-01-30 PROCEDURE — G0152 HHCP-SERV OF OT,EA 15 MIN: HCPCS

## 2025-01-30 PROCEDURE — G0151 HHCP-SERV OF PT,EA 15 MIN: HCPCS

## 2025-01-30 NOTE — Clinical Note
OT evaluation completed, plan for 2x/week for 2 weeks to address adl training, functional transfers, mobility, safety, UE ex

## 2025-01-31 ENCOUNTER — HOME CARE VISIT (OUTPATIENT)
Dept: HOME HEALTH SERVICES | Facility: HOME HEALTHCARE | Age: 85
End: 2025-01-31
Payer: MEDICARE

## 2025-02-01 NOTE — HOME HEALTH
Reason for Referral: Recent hospitalization due to multiple falls, right shoulder pain, ERMIAS    Medical History: Stage 3 CKD, malaise and fatigue, HTN, Ataxia, Hx CVA, COPD, Hx left breast cancer, psoriasis    Subjective: Patient reports having a hard day    Home Environment: Patient lives with , using walker    Medical Necessity: Patient requires skilled occupational therapy for remediation of deficits to improve safety in home and improve self care, functional transfers, mobility, strength, endurance, DME/adaptive equipment, energy conservation     Plan for Next Visit: Safety with adl tasks

## 2025-02-05 ENCOUNTER — HOME CARE VISIT (OUTPATIENT)
Dept: HOME HEALTH SERVICES | Facility: HOME HEALTHCARE | Age: 85
End: 2025-02-05
Payer: MEDICARE

## 2025-02-05 VITALS
HEART RATE: 66 BPM | SYSTOLIC BLOOD PRESSURE: 132 MMHG | DIASTOLIC BLOOD PRESSURE: 79 MMHG | OXYGEN SATURATION: 97 % | TEMPERATURE: 97.2 F | RESPIRATION RATE: 18 BRPM

## 2025-02-05 PROCEDURE — G0157 HHC PT ASSISTANT EA 15: HCPCS

## 2025-02-05 PROCEDURE — G0152 HHCP-SERV OF OT,EA 15 MIN: HCPCS

## 2025-02-06 ENCOUNTER — HOME CARE VISIT (OUTPATIENT)
Dept: HOME HEALTH SERVICES | Facility: HOME HEALTHCARE | Age: 85
End: 2025-02-06
Payer: MEDICARE

## 2025-02-06 VITALS
HEART RATE: 89 BPM | OXYGEN SATURATION: 97 % | DIASTOLIC BLOOD PRESSURE: 82 MMHG | TEMPERATURE: 98.9 F | RESPIRATION RATE: 20 BRPM | SYSTOLIC BLOOD PRESSURE: 128 MMHG

## 2025-02-06 PROCEDURE — G0300 HHS/HOSPICE OF LPN EA 15 MIN: HCPCS

## 2025-02-07 ENCOUNTER — HOME CARE VISIT (OUTPATIENT)
Dept: HOME HEALTH SERVICES | Facility: HOME HEALTHCARE | Age: 85
End: 2025-02-07
Payer: MEDICARE

## 2025-02-11 ENCOUNTER — HOME CARE VISIT (OUTPATIENT)
Dept: HOME HEALTH SERVICES | Facility: HOME HEALTHCARE | Age: 85
End: 2025-02-11
Payer: MEDICARE

## 2025-02-13 ENCOUNTER — HOME CARE VISIT (OUTPATIENT)
Dept: HOME HEALTH SERVICES | Facility: HOME HEALTHCARE | Age: 85
End: 2025-02-13
Payer: MEDICARE

## 2025-02-26 ENCOUNTER — APPOINTMENT (OUTPATIENT)
Dept: GENERAL RADIOLOGY | Facility: HOSPITAL | Age: 85
End: 2025-02-26
Payer: MEDICARE

## 2025-02-26 ENCOUNTER — HOSPITAL ENCOUNTER (OUTPATIENT)
Facility: HOSPITAL | Age: 85
Discharge: HOSPICE | End: 2025-03-03
Attending: INTERNAL MEDICINE | Admitting: INTERNAL MEDICINE
Payer: MEDICARE

## 2025-02-26 PROBLEM — R53.1 WEAKNESS: Status: RESOLVED | Noted: 2025-02-26 | Resolved: 2025-02-26

## 2025-02-26 PROBLEM — R53.1 WEAKNESS: Status: ACTIVE | Noted: 2025-02-26

## 2025-02-26 PROCEDURE — 63710000001 RIVAROXABAN 20 MG TABLET: Performed by: INTERNAL MEDICINE

## 2025-02-26 PROCEDURE — A9270 NON-COVERED ITEM OR SERVICE: HCPCS | Performed by: INTERNAL MEDICINE

## 2025-02-26 PROCEDURE — 63710000001 TRIAMCINOLONE 0.1 % CREAM 15 G TUBE: Performed by: INTERNAL MEDICINE

## 2025-02-26 PROCEDURE — 80053 COMPREHEN METABOLIC PANEL: CPT | Performed by: INTERNAL MEDICINE

## 2025-02-26 PROCEDURE — 93005 ELECTROCARDIOGRAM TRACING: CPT | Performed by: INTERNAL MEDICINE

## 2025-02-26 PROCEDURE — 73590 X-RAY EXAM OF LOWER LEG: CPT

## 2025-02-26 PROCEDURE — 82150 ASSAY OF AMYLASE: CPT | Performed by: INTERNAL MEDICINE

## 2025-02-26 PROCEDURE — 82330 ASSAY OF CALCIUM: CPT | Performed by: INTERNAL MEDICINE

## 2025-02-26 PROCEDURE — 73630 X-RAY EXAM OF FOOT: CPT

## 2025-02-26 PROCEDURE — G0378 HOSPITAL OBSERVATION PER HR: HCPCS

## 2025-02-26 PROCEDURE — 80061 LIPID PANEL: CPT | Performed by: INTERNAL MEDICINE

## 2025-02-26 PROCEDURE — 84443 ASSAY THYROID STIM HORMONE: CPT | Performed by: INTERNAL MEDICINE

## 2025-02-26 PROCEDURE — 63710000001 HYDROCODONE-ACETAMINOPHEN 10-325 MG TABLET: Performed by: INTERNAL MEDICINE

## 2025-02-26 PROCEDURE — 63710000001 MELATONIN 5 MG TABLET: Performed by: INTERNAL MEDICINE

## 2025-02-26 PROCEDURE — 93010 ELECTROCARDIOGRAM REPORT: CPT | Performed by: INTERNAL MEDICINE

## 2025-02-26 PROCEDURE — 83880 ASSAY OF NATRIURETIC PEPTIDE: CPT | Performed by: INTERNAL MEDICINE

## 2025-02-26 PROCEDURE — 73610 X-RAY EXAM OF ANKLE: CPT

## 2025-02-26 PROCEDURE — 63710000001 CLONIDINE 0.1 MG TABLET: Performed by: INTERNAL MEDICINE

## 2025-02-26 PROCEDURE — 85027 COMPLETE CBC AUTOMATED: CPT | Performed by: INTERNAL MEDICINE

## 2025-02-26 PROCEDURE — 82550 ASSAY OF CK (CPK): CPT | Performed by: INTERNAL MEDICINE

## 2025-02-26 PROCEDURE — 83735 ASSAY OF MAGNESIUM: CPT | Performed by: INTERNAL MEDICINE

## 2025-02-26 PROCEDURE — 84100 ASSAY OF PHOSPHORUS: CPT | Performed by: INTERNAL MEDICINE

## 2025-02-26 PROCEDURE — 73564 X-RAY EXAM KNEE 4 OR MORE: CPT

## 2025-02-26 PROCEDURE — 83690 ASSAY OF LIPASE: CPT | Performed by: INTERNAL MEDICINE

## 2025-02-26 RX ORDER — PANTOPRAZOLE SODIUM 40 MG/1
40 TABLET, DELAYED RELEASE ORAL
Status: DISCONTINUED | OUTPATIENT
Start: 2025-02-27 | End: 2025-03-03 | Stop reason: HOSPADM

## 2025-02-26 RX ORDER — NIFEDIPINE 30 MG/1
30 TABLET, EXTENDED RELEASE ORAL
Status: DISCONTINUED | OUTPATIENT
Start: 2025-02-27 | End: 2025-03-03 | Stop reason: HOSPADM

## 2025-02-26 RX ORDER — CLONIDINE HYDROCHLORIDE 0.1 MG/1
0.3 TABLET ORAL EVERY 6 HOURS SCHEDULED
Status: DISCONTINUED | OUTPATIENT
Start: 2025-02-27 | End: 2025-03-03 | Stop reason: HOSPADM

## 2025-02-26 RX ORDER — ACETAMINOPHEN 650 MG/1
650 SUPPOSITORY RECTAL EVERY 4 HOURS PRN
Status: DISCONTINUED | OUTPATIENT
Start: 2025-02-26 | End: 2025-03-03 | Stop reason: HOSPADM

## 2025-02-26 RX ORDER — CETIRIZINE HYDROCHLORIDE 10 MG/1
10 TABLET ORAL DAILY
Status: DISCONTINUED | OUTPATIENT
Start: 2025-02-27 | End: 2025-03-03 | Stop reason: HOSPADM

## 2025-02-26 RX ORDER — SODIUM CHLORIDE 0.9 % (FLUSH) 0.9 %
3 SYRINGE (ML) INJECTION EVERY 12 HOURS SCHEDULED
Status: DISCONTINUED | OUTPATIENT
Start: 2025-02-26 | End: 2025-02-27

## 2025-02-26 RX ORDER — LORAZEPAM 0.5 MG/1
0.5 TABLET ORAL EVERY 8 HOURS PRN
Status: DISCONTINUED | OUTPATIENT
Start: 2025-02-26 | End: 2025-03-03 | Stop reason: HOSPADM

## 2025-02-26 RX ORDER — SODIUM CHLORIDE 0.9 % (FLUSH) 0.9 %
3-10 SYRINGE (ML) INJECTION AS NEEDED
Status: DISCONTINUED | OUTPATIENT
Start: 2025-02-26 | End: 2025-03-03 | Stop reason: HOSPADM

## 2025-02-26 RX ORDER — MULTIPLE VITAMINS W/ MINERALS TAB 9MG-400MCG
1 TAB ORAL DAILY
Status: DISCONTINUED | OUTPATIENT
Start: 2025-02-27 | End: 2025-02-26 | Stop reason: SDUPTHER

## 2025-02-26 RX ORDER — SODIUM CHLORIDE 9 MG/ML
40 INJECTION, SOLUTION INTRAVENOUS AS NEEDED
Status: DISCONTINUED | OUTPATIENT
Start: 2025-02-26 | End: 2025-03-03 | Stop reason: HOSPADM

## 2025-02-26 RX ORDER — MULTIVITAMIN WITH IRON
1000 TABLET ORAL DAILY
Status: DISCONTINUED | OUTPATIENT
Start: 2025-02-27 | End: 2025-03-03 | Stop reason: HOSPADM

## 2025-02-26 RX ORDER — PREDNISONE 10 MG/1
10 TABLET ORAL DAILY
Status: DISCONTINUED | OUTPATIENT
Start: 2025-02-27 | End: 2025-03-03 | Stop reason: HOSPADM

## 2025-02-26 RX ORDER — DIPHENHYDRAMINE HCL 25 MG
50 TABLET ORAL EVERY 4 HOURS PRN
Status: DISCONTINUED | OUTPATIENT
Start: 2025-02-26 | End: 2025-03-03 | Stop reason: HOSPADM

## 2025-02-26 RX ORDER — FLUTICASONE PROPIONATE 50 MCG
1 SPRAY, SUSPENSION (ML) NASAL 2 TIMES DAILY
Status: DISCONTINUED | OUTPATIENT
Start: 2025-02-26 | End: 2025-03-03 | Stop reason: HOSPADM

## 2025-02-26 RX ORDER — ALBUTEROL SULFATE 0.83 MG/ML
2.5 SOLUTION RESPIRATORY (INHALATION)
Status: DISCONTINUED | OUTPATIENT
Start: 2025-02-26 | End: 2025-03-01

## 2025-02-26 RX ORDER — CODEINE PHOSPHATE AND GUAIFENESIN 10; 100 MG/5ML; MG/5ML
5 SOLUTION ORAL EVERY 4 HOURS PRN
Status: DISCONTINUED | OUTPATIENT
Start: 2025-02-26 | End: 2025-03-03 | Stop reason: HOSPADM

## 2025-02-26 RX ORDER — POLYETHYLENE GLYCOL 3350 17 G/17G
17 POWDER, FOR SOLUTION ORAL DAILY PRN
Status: DISCONTINUED | OUTPATIENT
Start: 2025-02-26 | End: 2025-03-03 | Stop reason: HOSPADM

## 2025-02-26 RX ORDER — ONDANSETRON 4 MG/1
8 TABLET, ORALLY DISINTEGRATING ORAL EVERY 6 HOURS PRN
Status: DISCONTINUED | OUTPATIENT
Start: 2025-02-26 | End: 2025-03-03 | Stop reason: HOSPADM

## 2025-02-26 RX ORDER — ROSUVASTATIN CALCIUM 10 MG/1
5 TABLET, COATED ORAL EVERY MORNING
Status: DISCONTINUED | OUTPATIENT
Start: 2025-02-27 | End: 2025-03-03 | Stop reason: HOSPADM

## 2025-02-26 RX ORDER — TRIAMCINOLONE ACETONIDE 1 MG/G
1 CREAM TOPICAL 2 TIMES DAILY
Status: DISCONTINUED | OUTPATIENT
Start: 2025-02-26 | End: 2025-03-03 | Stop reason: HOSPADM

## 2025-02-26 RX ORDER — BUTALBITAL, ACETAMINOPHEN AND CAFFEINE 50; 325; 40 MG/1; MG/1; MG/1
2 TABLET ORAL EVERY 4 HOURS PRN
Status: DISCONTINUED | OUTPATIENT
Start: 2025-02-26 | End: 2025-03-03 | Stop reason: HOSPADM

## 2025-02-26 RX ORDER — POTASSIUM CHLORIDE 20MEQ/15ML
20 LIQUID (ML) ORAL DAILY
Status: DISCONTINUED | OUTPATIENT
Start: 2025-02-27 | End: 2025-02-27 | Stop reason: CLARIF

## 2025-02-26 RX ORDER — HYDROCODONE BITARTRATE AND ACETAMINOPHEN 10; 325 MG/1; MG/1
2 TABLET ORAL EVERY 6 HOURS PRN
Status: DISCONTINUED | OUTPATIENT
Start: 2025-02-26 | End: 2025-03-03 | Stop reason: HOSPADM

## 2025-02-26 RX ORDER — ACETAMINOPHEN 325 MG/1
650 TABLET ORAL EVERY 4 HOURS PRN
Status: DISCONTINUED | OUTPATIENT
Start: 2025-02-26 | End: 2025-03-03 | Stop reason: HOSPADM

## 2025-02-26 RX ORDER — MULTIPLE VITAMINS W/ MINERALS TAB 9MG-400MCG
1 TAB ORAL DAILY
Status: DISCONTINUED | OUTPATIENT
Start: 2025-02-27 | End: 2025-03-03 | Stop reason: HOSPADM

## 2025-02-26 RX ORDER — TORSEMIDE 100 MG/1
50 TABLET ORAL DAILY
Status: DISCONTINUED | OUTPATIENT
Start: 2025-02-27 | End: 2025-03-03 | Stop reason: HOSPADM

## 2025-02-26 RX ORDER — HYDROCODONE BITARTRATE AND ACETAMINOPHEN 10; 325 MG/1; MG/1
2 TABLET ORAL EVERY 6 HOURS PRN
Status: DISCONTINUED | OUTPATIENT
Start: 2025-02-26 | End: 2025-02-26

## 2025-02-26 RX ORDER — LIDOCAINE 4 G/G
1 PATCH TOPICAL DAILY PRN
Status: DISCONTINUED | OUTPATIENT
Start: 2025-02-26 | End: 2025-03-03 | Stop reason: HOSPADM

## 2025-02-26 RX ORDER — ONDANSETRON 2 MG/ML
4 INJECTION INTRAMUSCULAR; INTRAVENOUS EVERY 6 HOURS PRN
Status: DISCONTINUED | OUTPATIENT
Start: 2025-02-26 | End: 2025-03-03 | Stop reason: HOSPADM

## 2025-02-26 RX ORDER — ACETAMINOPHEN 160 MG/5ML
650 SOLUTION ORAL EVERY 4 HOURS PRN
Status: DISCONTINUED | OUTPATIENT
Start: 2025-02-26 | End: 2025-03-03 | Stop reason: HOSPADM

## 2025-02-26 RX ORDER — EPLERENONE 25 MG/1
50 TABLET ORAL DAILY
Status: DISCONTINUED | OUTPATIENT
Start: 2025-02-27 | End: 2025-03-03 | Stop reason: HOSPADM

## 2025-02-26 RX ORDER — ERGOCALCIFEROL 1.25 MG/1
50000 CAPSULE, LIQUID FILLED ORAL
Status: DISCONTINUED | OUTPATIENT
Start: 2025-03-04 | End: 2025-03-03 | Stop reason: HOSPADM

## 2025-02-26 RX ORDER — HYDROXYZINE HYDROCHLORIDE 10 MG/1
10 TABLET, FILM COATED ORAL 3 TIMES DAILY PRN
Status: DISCONTINUED | OUTPATIENT
Start: 2025-02-26 | End: 2025-03-03 | Stop reason: HOSPADM

## 2025-02-26 RX ORDER — CARBOXYMETHYLCELLULOSE SODIUM 10 MG/ML
1 GEL OPHTHALMIC 3 TIMES DAILY PRN
Status: DISCONTINUED | OUTPATIENT
Start: 2025-02-26 | End: 2025-03-03 | Stop reason: HOSPADM

## 2025-02-26 RX ORDER — HYDROCODONE BITARTRATE AND ACETAMINOPHEN 10; 325 MG/1; MG/1
1 TABLET ORAL EVERY 6 HOURS PRN
Status: DISCONTINUED | OUTPATIENT
Start: 2025-02-26 | End: 2025-03-03 | Stop reason: HOSPADM

## 2025-02-26 RX ORDER — DESONIDE 0.5 MG/G
1 OINTMENT TOPICAL DAILY
Status: DISCONTINUED | OUTPATIENT
Start: 2025-02-27 | End: 2025-02-28 | Stop reason: CLARIF

## 2025-02-26 RX ADMIN — Medication 10 MG: at 22:35

## 2025-02-26 RX ADMIN — CLONIDINE HYDROCHLORIDE 0.3 MG: 0.1 TABLET ORAL at 23:42

## 2025-02-26 RX ADMIN — TRIAMCINOLONE ACETONIDE 1 APPLICATION: 1 CREAM TOPICAL at 22:44

## 2025-02-26 RX ADMIN — RIVAROXABAN 20 MG: 20 TABLET, FILM COATED ORAL at 22:35

## 2025-02-26 RX ADMIN — HYDROCODONE BITARTRATE AND ACETAMINOPHEN 1 TABLET: 10; 325 TABLET ORAL at 22:36

## 2025-02-27 LAB
ALBUMIN SERPL-MCNC: 3.5 G/DL (ref 3.5–5.2)
ALBUMIN SERPL-MCNC: 3.6 G/DL (ref 3.5–5.2)
ALBUMIN/GLOB SERPL: 1.5 G/DL
ALBUMIN/GLOB SERPL: 1.7 G/DL
ALP SERPL-CCNC: 80 U/L (ref 39–117)
ALP SERPL-CCNC: 85 U/L (ref 39–117)
ALT SERPL W P-5'-P-CCNC: 6 U/L (ref 1–33)
ALT SERPL W P-5'-P-CCNC: 9 U/L (ref 1–33)
AMYLASE SERPL-CCNC: 44 U/L (ref 28–100)
ANION GAP SERPL CALCULATED.3IONS-SCNC: 11 MMOL/L (ref 5–15)
ANION GAP SERPL CALCULATED.3IONS-SCNC: 12 MMOL/L (ref 5–15)
AST SERPL-CCNC: 11 U/L (ref 1–32)
AST SERPL-CCNC: 9 U/L (ref 1–32)
BILIRUB SERPL-MCNC: 0.4 MG/DL (ref 0–1.2)
BILIRUB SERPL-MCNC: 0.5 MG/DL (ref 0–1.2)
BUN SERPL-MCNC: 30 MG/DL (ref 8–23)
BUN SERPL-MCNC: 33 MG/DL (ref 8–23)
BUN/CREAT SERPL: 33.7 (ref 7–25)
BUN/CREAT SERPL: 35.5 (ref 7–25)
CA-I SERPL ISE-MCNC: 1.29 MMOL/L (ref 1.15–1.35)
CA-I SERPL ISE-MCNC: 1.32 MMOL/L (ref 1.15–1.35)
CALCIUM SPEC-SCNC: 9.3 MG/DL (ref 8.6–10.5)
CALCIUM SPEC-SCNC: 9.5 MG/DL (ref 8.6–10.5)
CHLORIDE SERPL-SCNC: 107 MMOL/L (ref 98–107)
CHLORIDE SERPL-SCNC: 107 MMOL/L (ref 98–107)
CHOLEST SERPL-MCNC: 128 MG/DL (ref 0–200)
CK SERPL-CCNC: 36 U/L (ref 20–180)
CO2 SERPL-SCNC: 25 MMOL/L (ref 22–29)
CO2 SERPL-SCNC: 28 MMOL/L (ref 22–29)
CREAT SERPL-MCNC: 0.89 MG/DL (ref 0.57–1)
CREAT SERPL-MCNC: 0.93 MG/DL (ref 0.57–1)
DEPRECATED RDW RBC AUTO: 44.5 FL (ref 37–54)
EGFRCR SERPLBLD CKD-EPI 2021: 59.6 ML/MIN/1.73
EGFRCR SERPLBLD CKD-EPI 2021: 62.8 ML/MIN/1.73
EOSINOPHIL # BLD MANUAL: 0.16 10*3/MM3 (ref 0–0.4)
EOSINOPHIL NFR BLD MANUAL: 2 % (ref 0.3–6.2)
ERYTHROCYTE [DISTWIDTH] IN BLOOD BY AUTOMATED COUNT: 12.9 % (ref 12.3–15.4)
GLOBULIN UR ELPH-MCNC: 2.1 GM/DL
GLOBULIN UR ELPH-MCNC: 2.4 GM/DL
GLUCOSE SERPL-MCNC: 112 MG/DL (ref 65–99)
GLUCOSE SERPL-MCNC: 89 MG/DL (ref 65–99)
HBA1C MFR BLD: 5.8 % (ref 4.8–5.6)
HCT VFR BLD AUTO: 32.8 % (ref 34–46.6)
HDLC SERPL-MCNC: 48 MG/DL (ref 40–60)
HGB BLD-MCNC: 10.8 G/DL (ref 12–15.9)
LDLC SERPL CALC-MCNC: 58 MG/DL (ref 0–100)
LDLC/HDLC SERPL: 1.16 {RATIO}
LIPASE SERPL-CCNC: 24 U/L (ref 13–60)
LYMPHOCYTES # BLD MANUAL: 1.09 10*3/MM3 (ref 0.7–3.1)
LYMPHOCYTES NFR BLD MANUAL: 5 % (ref 5–12)
MAGNESIUM SERPL-MCNC: 2 MG/DL (ref 1.6–2.4)
MAGNESIUM SERPL-MCNC: 2.1 MG/DL (ref 1.6–2.4)
MCH RBC QN AUTO: 30.8 PG (ref 26.6–33)
MCHC RBC AUTO-ENTMCNC: 32.9 G/DL (ref 31.5–35.7)
MCV RBC AUTO: 93.4 FL (ref 79–97)
MONOCYTES # BLD: 0.39 10*3/MM3 (ref 0.1–0.9)
NEUTROPHILS # BLD AUTO: 6.16 10*3/MM3 (ref 1.7–7)
NEUTROPHILS NFR BLD MANUAL: 79 % (ref 42.7–76)
NT-PROBNP SERPL-MCNC: 2590 PG/ML (ref 0–1800)
PHOSPHATE SERPL-MCNC: 2.2 MG/DL (ref 2.5–4.5)
PHOSPHATE SERPL-MCNC: 2.5 MG/DL (ref 2.5–4.5)
PLAT MORPH BLD: NORMAL
PLATELET # BLD AUTO: 176 10*3/MM3 (ref 140–450)
PMV BLD AUTO: 11.1 FL (ref 6–12)
POTASSIUM SERPL-SCNC: 3.6 MMOL/L (ref 3.5–5.2)
POTASSIUM SERPL-SCNC: 3.7 MMOL/L (ref 3.5–5.2)
POTASSIUM SERPL-SCNC: 3.8 MMOL/L (ref 3.5–5.2)
PROT SERPL-MCNC: 5.6 G/DL (ref 6–8.5)
PROT SERPL-MCNC: 6 G/DL (ref 6–8.5)
PTH-INTACT SERPL-MCNC: 60.8 PG/ML (ref 15–65)
QT INTERVAL: 402 MS
QTC INTERVAL: 438 MS
RBC # BLD AUTO: 3.51 10*6/MM3 (ref 3.77–5.28)
RBC MORPH BLD: NORMAL
SODIUM SERPL-SCNC: 144 MMOL/L (ref 136–145)
SODIUM SERPL-SCNC: 146 MMOL/L (ref 136–145)
TRIGL SERPL-MCNC: 121 MG/DL (ref 0–150)
TSH SERPL DL<=0.05 MIU/L-ACNC: 1.11 UIU/ML (ref 0.27–4.2)
URATE SERPL-MCNC: 5.9 MG/DL (ref 2.4–5.7)
VARIANT LYMPHS NFR BLD MANUAL: 14 % (ref 19.6–45.3)
VLDLC SERPL-MCNC: 22 MG/DL (ref 5–40)
WBC MORPH BLD: NORMAL
WBC NRBC COR # BLD AUTO: 7.8 10*3/MM3 (ref 3.4–10.8)

## 2025-02-27 PROCEDURE — 63710000001 RIVAROXABAN 20 MG TABLET: Performed by: INTERNAL MEDICINE

## 2025-02-27 PROCEDURE — 63710000001 MULTIVITAMIN WITH MINERALS TABLET: Performed by: INTERNAL MEDICINE

## 2025-02-27 PROCEDURE — 63710000001 POTASSIUM CHLORIDE 20 MEQ PACK: Performed by: INTERNAL MEDICINE

## 2025-02-27 PROCEDURE — 84100 ASSAY OF PHOSPHORUS: CPT | Performed by: INTERNAL MEDICINE

## 2025-02-27 PROCEDURE — 63710000001 MELATONIN 5 MG TABLET: Performed by: INTERNAL MEDICINE

## 2025-02-27 PROCEDURE — G0378 HOSPITAL OBSERVATION PER HR: HCPCS

## 2025-02-27 PROCEDURE — 84550 ASSAY OF BLOOD/URIC ACID: CPT | Performed by: INTERNAL MEDICINE

## 2025-02-27 PROCEDURE — 63710000001 VITAMIN B-12 500 MCG TABLET: Performed by: INTERNAL MEDICINE

## 2025-02-27 PROCEDURE — 94799 UNLISTED PULMONARY SVC/PX: CPT

## 2025-02-27 PROCEDURE — 63710000001 NIFEDIPINE XL 30 MG TABLET SUSTAINED-RELEASE 24 HOUR: Performed by: INTERNAL MEDICINE

## 2025-02-27 PROCEDURE — 63710000001 EPLERENONE 25 MG TABLET: Performed by: INTERNAL MEDICINE

## 2025-02-27 PROCEDURE — 63710000001 CETIRIZINE 10 MG TABLET: Performed by: INTERNAL MEDICINE

## 2025-02-27 PROCEDURE — 83970 ASSAY OF PARATHORMONE: CPT | Performed by: INTERNAL MEDICINE

## 2025-02-27 PROCEDURE — A9270 NON-COVERED ITEM OR SERVICE: HCPCS | Performed by: INTERNAL MEDICINE

## 2025-02-27 PROCEDURE — 97165 OT EVAL LOW COMPLEX 30 MIN: CPT

## 2025-02-27 PROCEDURE — 82330 ASSAY OF CALCIUM: CPT | Performed by: INTERNAL MEDICINE

## 2025-02-27 PROCEDURE — 63710000001: Performed by: INTERNAL MEDICINE

## 2025-02-27 PROCEDURE — 63710000001 FLUTICASONE 50 MCG/ACT SUSPENSION 16 G BOTTLE: Performed by: INTERNAL MEDICINE

## 2025-02-27 PROCEDURE — 83735 ASSAY OF MAGNESIUM: CPT | Performed by: INTERNAL MEDICINE

## 2025-02-27 PROCEDURE — 80053 COMPREHEN METABOLIC PANEL: CPT | Performed by: INTERNAL MEDICINE

## 2025-02-27 PROCEDURE — 63710000001 PANTOPRAZOLE 40 MG TABLET DELAYED-RELEASE: Performed by: INTERNAL MEDICINE

## 2025-02-27 PROCEDURE — 94761 N-INVAS EAR/PLS OXIMETRY MLT: CPT

## 2025-02-27 PROCEDURE — 97530 THERAPEUTIC ACTIVITIES: CPT

## 2025-02-27 PROCEDURE — 84132 ASSAY OF SERUM POTASSIUM: CPT | Performed by: INTERNAL MEDICINE

## 2025-02-27 PROCEDURE — 51798 US URINE CAPACITY MEASURE: CPT

## 2025-02-27 PROCEDURE — 63710000001 PREDNISONE PER 5 MG: Performed by: INTERNAL MEDICINE

## 2025-02-27 PROCEDURE — 63710000001 ROSUVASTATIN 10 MG TABLET: Performed by: INTERNAL MEDICINE

## 2025-02-27 PROCEDURE — 63710000001 CLONIDINE 0.1 MG TABLET: Performed by: INTERNAL MEDICINE

## 2025-02-27 PROCEDURE — 94664 DEMO&/EVAL PT USE INHALER: CPT

## 2025-02-27 PROCEDURE — 83036 HEMOGLOBIN GLYCOSYLATED A1C: CPT | Performed by: INTERNAL MEDICINE

## 2025-02-27 PROCEDURE — 63710000001 HYDROCODONE-ACETAMINOPHEN 10-325 MG TABLET: Performed by: INTERNAL MEDICINE

## 2025-02-27 PROCEDURE — 94640 AIRWAY INHALATION TREATMENT: CPT

## 2025-02-27 RX ORDER — POTASSIUM CHLORIDE 1.5 G/1.58G
20 POWDER, FOR SOLUTION ORAL DAILY
Status: DISCONTINUED | OUTPATIENT
Start: 2025-02-27 | End: 2025-03-03 | Stop reason: HOSPADM

## 2025-02-27 RX ORDER — POTASSIUM CHLORIDE 1.5 G/1.58G
40 POWDER, FOR SOLUTION ORAL EVERY 4 HOURS
Status: COMPLETED | OUTPATIENT
Start: 2025-02-27 | End: 2025-02-27

## 2025-02-27 RX ADMIN — PREDNISONE 10 MG: 10 TABLET ORAL at 10:02

## 2025-02-27 RX ADMIN — CLONIDINE HYDROCHLORIDE 0.3 MG: 0.1 TABLET ORAL at 18:50

## 2025-02-27 RX ADMIN — ALBUTEROL SULFATE 2.5 MG: 2.5 SOLUTION RESPIRATORY (INHALATION) at 08:02

## 2025-02-27 RX ADMIN — Medication 1 TABLET: at 10:02

## 2025-02-27 RX ADMIN — CLONIDINE HYDROCHLORIDE 0.3 MG: 0.1 TABLET ORAL at 12:42

## 2025-02-27 RX ADMIN — NIFEDIPINE 30 MG: 30 TABLET, EXTENDED RELEASE ORAL at 10:07

## 2025-02-27 RX ADMIN — ROSUVASTATIN CALCIUM 5 MG: 10 TABLET, FILM COATED ORAL at 10:02

## 2025-02-27 RX ADMIN — IPRATROPIUM BROMIDE 0.5 MG: 0.5 SOLUTION RESPIRATORY (INHALATION) at 20:12

## 2025-02-27 RX ADMIN — HYDROCODONE BITARTRATE AND ACETAMINOPHEN 1 TABLET: 10; 325 TABLET ORAL at 05:43

## 2025-02-27 RX ADMIN — Medication 1000 MCG: at 10:02

## 2025-02-27 RX ADMIN — Medication 2 PACKET: at 05:43

## 2025-02-27 RX ADMIN — TRIAMCINOLONE ACETONIDE 1 APPLICATION: 1 CREAM TOPICAL at 12:42

## 2025-02-27 RX ADMIN — HYDROCODONE BITARTRATE AND ACETAMINOPHEN 2 TABLET: 10; 325 TABLET ORAL at 22:17

## 2025-02-27 RX ADMIN — EPLERENONE 50 MG: 25 TABLET, FILM COATED ORAL at 10:07

## 2025-02-27 RX ADMIN — CETIRIZINE HYDROCHLORIDE 10 MG: 10 TABLET, FILM COATED ORAL at 10:01

## 2025-02-27 RX ADMIN — PANTOPRAZOLE SODIUM 40 MG: 40 TABLET, DELAYED RELEASE ORAL at 05:43

## 2025-02-27 RX ADMIN — CLONIDINE HYDROCHLORIDE 0.3 MG: 0.1 TABLET ORAL at 05:43

## 2025-02-27 RX ADMIN — ALBUTEROL SULFATE 2.5 MG: 2.5 SOLUTION RESPIRATORY (INHALATION) at 20:12

## 2025-02-27 RX ADMIN — POTASSIUM CHLORIDE 40 MEQ: 1.5 POWDER, FOR SOLUTION ORAL at 10:02

## 2025-02-27 RX ADMIN — POTASSIUM CHLORIDE 40 MEQ: 1.5 POWDER, FOR SOLUTION ORAL at 05:43

## 2025-02-27 RX ADMIN — HYDROCODONE BITARTRATE AND ACETAMINOPHEN 2 TABLET: 10; 325 TABLET ORAL at 13:33

## 2025-02-27 RX ADMIN — Medication 10 MG: at 21:08

## 2025-02-27 RX ADMIN — RIVAROXABAN 20 MG: 20 TABLET, FILM COATED ORAL at 18:50

## 2025-02-27 RX ADMIN — IPRATROPIUM BROMIDE 0.5 MG: 0.5 SOLUTION RESPIRATORY (INHALATION) at 08:03

## 2025-02-27 RX ADMIN — FLUTICASONE PROPIONATE 1 SPRAY: 50 SPRAY, METERED NASAL at 12:43

## 2025-02-27 RX ADMIN — POTASSIUM CHLORIDE 20 MEQ: 1.5 POWDER, FOR SOLUTION ORAL at 16:11

## 2025-02-27 RX ADMIN — CLONIDINE HYDROCHLORIDE 0.3 MG: 0.1 TABLET ORAL at 23:00

## 2025-02-27 NOTE — H&P
[facility]  INTERNAL MEDICINE  ÓSCAR CADET MD  [address] [place] [address]  Phone [phone] Fax [phone]  E-mail:  [email or web address]      INTERNAL MEDICINE HISTORY AND PHYSICAL EXAM  Óscar Cadet M.D.  2025            Patient Identification:  Name: Sasha Barrett  Age: 85 y.o.  Sex: female  :  1940  MRN: [id number]         Primary Care Physician: Óscar Cadet MD  LENGTH OF STAY 0 DAYS    Consults       Date and Time Order Name Status Description    2025  8:19 PM Inpatient Orthopedic Surgery Consult      2025  8:19 PM Inpatient Nephrology Consult      2025  8:19 PM Inpatient Cardiology Consult              Chief Complaint:    Adult failure to thrive, unexplained weight loss greater than 47 pounds over the last 6 months ataxia, lower extremity pain and radiculopathy, severe lumbar spinal stenosis, and generalized muscle weakness    History of Present Illness:  Subjective     Interval History: Patient is a 85 y.o.female who presented with adult failure to thrive,  recent unexplained weight loss of greater than 47 pounds over the last 6 months, ataxia, lower extremity pain and radiculopathy, and generalized muscle weakness.  Patient had originally accompanied to her  to the hospital and was trying to stay in  his room to help with his care, but nursing staff found that this patient had almost as many medical needs for care as did her .  Patient has been followed closely over the last few years on the [facility] palliative care service due to intractable pain and has required high doses of narcotic pain medication to keep her symptoms under control.  Her condition has gradually and steadily declined and over the last few weeks patient has been unable to get off of the sofa and ambulate in the house despite the best efforts [facility] after her most recent admission between  1/12/2025 and 1/15/2025 when patient was admitted for a lumbar herniated disc L2-L3 felt to be inoperable, severe spinal stenosis of lumbar region with neurogenic claudication, diastolic dysfunction with chronic heart failure, and recurrent falls while walking which had resulted in a closed head injury with concussion.  Patient's  who is even sicker than this patient, has been trying to cook meals for the 2 of them and do basic housekeeping chores such as washing the laundry and cleaning the house.  Unfortunately, worsening of his own condition has made it almost impossible for the patient's  to complete these tasks.  They do have 6 children who live in the area, but unfortunately only 1 of those children, their son Leighton  and his wife Helen, who patient's have designated as their health care surrogates,  are involved in their care.  Helen reports, that the Young's have been declining to allow the children to participate in their care.  Family worries that the living conditions in their home have deteriorated significantly and they have been unable to  successfully get Mrs. Barrett to take a bath or shower for  several weeks.   Mrs. Barrett has complained of bug bites which her allergist feels are coming from a [place] Lady Beatle that causes bites with severe allergic response.  Apparently there has been an outbreak of these beatles  in the county where the patient lives.  Patient has several other comorbidities that complicate her medical care.  These include most significantly: Stage II chronic kidney disease, malaise and fatigue, recurrent and persistent dizziness, dyspnea on exertion, accelerated hypertension, decreased mobility and endurance which have worsened despite recent efforts for PT and OT in the home, spinal stenosis of lumbar region with radiculopathy, ataxia, severe malnutrition, volume depletion, recurrent falls while walking, hypokalemia, diastolic dysfunction with chronic heart  failure, chronic heart failure with preserved ejection fraction (HFpEF), urinary retention requiring self caths that patient has had difficulty doing lately, cervical disc displacement, extensive degenerative disc disease, hyperlipidemia, proctocele, urge incontinence of urine, chronic tension headaches treated with Fioricet, cerebral atherosclerosis, vitamin D deficiency, moderate persistent asthmatic bronchitis, elevated PTH levels, gastroesophageal reflux disease with esophagitis, recurrent UTIs, history of cerebrovascular accident in left occipital area, acute and diffuse joint pain, easy bruisability, COPD with asthma, essential hypertension, history of acute DVT, 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  Patient is also believed to have some mild but progressive dementia that further complicates her course and makes it difficult for her to make decisions at times.  Patient was admitted to [address] [place] [address] at [facility] for evaluation of the above conditions and to consider the possibility of transition from [facility] palliative care services to full [facility] care.    2/26/2025.  I personally saw the patient for the first time during this hospitalization during my evening rounds on [address] [place] [address].  Patient was actually admitted to room P489 but was in x-ray undergoing extensive x-ray films at the time of my visit.  I examined the patient with the help of the x-ray techs while they are in the radiology department.  I wore full PPE as indicated including an N95 mask as appropriate, goggles, white lab coat, and gloves when touching patient.  I performed thorough hand hygiene before and after the patient visit.  Patient's main area of  "concern is the pain and discomfort in her lower extremities from the level of her bilateral knee replacements down.  Patient has an area of bruising on the right anterior shin and has marked deformity of the right ankle which turns outward when at rest on the x-ray table.  Patient also has extensive bruising of the left distal foot involving all toes and extending approximately correction up the foot.  She does not recall an injury that caused these symptoms.  Patient also has a bunion on the bottom of the right foot at the base of the great toe which has been pared down by podiatry in the past but continues to give her trouble and make it almost impossible for her to stand up without great pain.  Patient does have braces that have been prepared by orthopedics in the past for her legs but cannot get those braces on at this point because of the progressive deformity of her extremities.  Patient also noted to be a bit confused this evening and repeating several stories over and over.  She is upset about her overall condition and cries easily when discussion of her social issues is brought up.  Mrs. Barrett has been a patient in my office for many years and has been very reliant on my care over that period of time.  She repeatedly asked me \"not to give up on her\" despite the significant decline in her overall stability and condition.  I assured her that I will continue to be her physician and coordinate care.  Patient is also quite reliant on her [facility] palliative care nurse practitioner, Hedy.  It is not clear that patient has been taking her medications as regularly as she has in the past for treatment of her chronic pain condition with intractable pain, or her medications that are used to treat her chronic heart failure due to diastolic dysfunction as prescribed by Dr. Soto in cardiology.  More importantly, patient's weight loss signifies poor nutritional status which results in difficulty with healing of leg wounds " and inability to exercise on a regular basis.  Patient does indicate a willingness to meet with [facility] while here in the hospital and consider the possibility of transition from palliative services to full [facility] care.  She and her  still hope that with this service and help from the family, they can continue to stay in their home until the end of their lives.    Review of Systems:    A comprehensive 14 point review of systems was negative except for:  Constitution:  positive for fatigue, malaise, and weight loss  Eyes:  positive for blurriness, loss of vision, and dryness  Respiratory: positive for  cough, dry and shortness of air  Cardiovascular: positive for  irregular pulse, lower extremity edema, and palpitations  Gastrointestinal: positive for  bloating / distention, constipation, early satiety, and nausea  Musculoskeletal: positive for  back pain, joint pain, joint stiffness, joint swelling, muscle pain, muscle weakness, and neck pain  Neurological: positive for  coordination abnormal, difficulty walking, confusion, dizziness, headaches, light headedness, memory deficit, vertigo, and weakness  Behavioral/Psych: positive for  anxiety, appetite change  with weight loss, excessive irritability, sleep disturbance, and unstable mood    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 Yazmin Canseco    CARDIAC ELECTROPHYSIOLOGY PROCEDURE N/A 5/27/2022    Procedure: Loop insertion;  Surgeon: Jeffrey Argueta MD;  Location:  [facility];  Service: Cardiovascular;  Laterality: N/A;  biotronik    CARDIAC ELECTROPHYSIOLOGY PROCEDURE N/A 7/13/2022    Procedure: Loop recorder removal;  Surgeon: Jeffrey Argueta MD;  Location:  [facility];  Service: Cardiovascular;  Laterality: N/A;    CARPAL TUNNEL RELEASE Right 05/25/2011    Dr. Caleb Bueno    CARPAL TUNNEL RELEASE Left 04/26/2011    Dr. Caleb Bueno    CATARACT EXTRACTION Left 11/29/2010    Dr. Eusebio Downs    CATARACT EXTRACTION Right 11/17/2010    Dr. Eusebio Downs    COLONOSCOPY N/A 12/06/2002    Sigmoid diverticulosis-Dr. Brandon Batista    COLONOSCOPY N/A 12/12/2013    Tortuous colon, diverticulosis in the sigmoid colon and descending colon, stool in the rectum, sigmoid colon, descending colon, splenic flexure, transverse colon and hepatic flexure-Dr. Irma Brambila    DEQUERVAIN RELEASE Left 9/23/2020    Procedure: DEQUERVAIN RELEASE LEFT HAND;  Surgeon: Caleb Bueno MD;  Location:  [facility];  Service: Plastics;  Laterality: Left;    ENDOSCOPY N/A 09/13/2007    Normal-Dr. Pavel Quarles    ENDOSCOPY AND COLONOSCOPY N/A 09/19/2005    1 cm hiatal hernia, mild Schatzki's ring, sigmoid diverticulosis-Dr. Yoel Farley    GANGLION CYST EXCISION Left 12/18/2012    Release of A1 pulley flexor sheath, left fourth finger, excision of ganglion cyst 0.5 cm diameter, A2 pulley flexor sheath, left fourth finger-Dr. Caleb Bueno    HEEL SPUR EXCISION Left 1968    INGUINAL HERNIA REPAIR Right 1971    at 5 months pregnant     INJECTION OF MEDICATION Left 9/23/2020    Procedure: KENOLOG INJECTION TO LEFT THUMB;  Surgeon: Caleb Bueno MD;  Location:  [facility];  Service: Plastics;   Laterality: Left;    MASTECTOMY Right 08/05/2008    Right breast mastectomy and excision of left chest wall lesion-Dr. Yoel Farley    MASTECTOMY RADICAL Left 04/29/2003    Left modified radical mastectomy-Dr. Yazmin Canseco    PARATHYROIDECTOMY Right 05/04/2004    Excision of parathyroid adenoma (right superior)-Dr. Yoel Farley    REPLACEMENT TOTAL KNEE Right 04/09/2012    Dr. Lamonte Childress    SINUS SURGERY  1984    THYROIDECTOMY, PARTIAL Left 05/04/2004    Dr. Yoel Farley    TOTAL ABDOMINAL HYSTERECTOMY Bilateral 1973    Dr. Mahan    TRIGGER FINGER RELEASE Left 9/23/2020    Procedure: RELEASE LEFT FOURTH TRIGGER FINGER;  Surgeon: Caleb Bueno MD;  [place] [facility] [place] [facility]  Service: Plastics;  Laterality: Left;    UPPER GASTROINTESTINAL ENDOSCOPY N/A 02/18/2009    LA Grade A reflux esophagitis, normal stomach, normal 2nd part of the duodenum-Dr. Yoel Farley    WRIST GANGLION EXCISION Left 9/23/2020    Procedure: EXCISION GANGLION CYST LEFT WRIST;  Surgeon: Caleb Bueno MD;  [place] [facility] [place] [facility];  Service: Plastics;  Laterality: Left;     Allergies   Allergen Reactions    Bee Venom Shortness Of Breath and Rash    Morphine Anaphylaxis and Nausea And Vomiting    Sulfa Antibiotics Anaphylaxis and Hives     Or sulfa fillers in meds  Broke out in internal and external hives      Tree Extract Shortness Of Breath and Rash    Ambien [Zolpidem] Hallucinations    Anastrozole Other (See Comments)     Can't remember what the reaction was     Covid-19 (Mrna) Vaccine Swelling     Facial swelling, blood clots    Eliquis [Apixaban] Headache     Headaches, nausea and itching.     Hydralazine Myalgia     Patient reports leg cramps, joint pain and increased fatigue    Norvasc [Amlodipine] Other (See Comments)     KIDNEYS SHUT DOWN    Nsaids Other (See Comments)     KIDNEY FAILURE    Penicillins Unknown (See Comments)     Severe reaction as a child  Tolerated ceftriaxone during 04/2017  admission    Grass Rash       Family History   Problem Relation Age of Onset    Brain cancer Brother     Alcohol abuse Mother     No Known Problems Father     No Known Problems Sister     Malig Hyperthermia Neg Hx        Social History     Socioeconomic History    Marital status:      Spouse name: Joey    Number of children: 6    Highest education level: Some college, no degree   Tobacco Use    Smoking status: Never     Passive exposure: Never    Smokeless tobacco: Never   Vaping Use    Vaping status: Never Used   Substance and Sexual Activity    Alcohol use: Never     Comment: no drink in 30yrs    Drug use: Never    Sexual activity: Defer     Birth control/protection: Surgical       PMH, FH, SH and ROS completed with Admission History and Physical and updated in EPIC system.        Objective     Scheduled Meds:albuterol, 2.5 mg, Nebulization, Q6H - RT  cetirizine, 10 mg, Oral, Daily  cetirizine, 10 mg, Oral, Daily  cloNIDine, 0.3 mg, Oral, Q6H  desonide, 1 application , Topical, Daily  eplerenone, 50 mg, Oral, Daily  fluticasone, 1 spray, Each Nare, BID  ipratropium, 0.5 mg, Nebulization, 4x Daily - RT  melatonin, 10 mg, Oral, Nightly  multivitamin with minerals, 1 tablet, Oral, Daily  NIFEdipine XL, 30 mg, Oral, Q24H  pantoprazole, 40 mg, Oral, Q AM  potassium & sodium phosphates, 2 packet, Oral, Once  potassium chloride, 20 mEq, Oral, Daily  potassium chloride, 40 mEq, Oral, Q4H  predniSONE, 10 mg, Oral, Daily  rivaroxaban, 20 mg, Oral, Daily With Dinner  rosuvastatin, 5 mg, Oral, QAM  sodium chloride, 3 mL, Intravenous, Q12H  torsemide, 50 mg, Oral, Daily  triamcinolone, 1 Application, Topical, BID  cyanocobalamin, 1,000 mcg, Oral, Daily  [START ON 3/4/2025] vitamin D, 50,000 Units, Oral, Every Tuesday      Continuous Infusions:     Vital signs in last 24 hours:  Temp:  [97 °F (36.1 °C)-97.5 °F (36.4 °C)] 97 °F (36.1 °C)  Heart Rate:  [73-78] 78  Resp:  [16] 16  BP: (157-186)/(74-96)  170/74    Intake/Output:  No intake or output data in the 24 hours ending 02/27/25 0238    Exam:  /74 (BP Location: Left arm, Patient Position: Standing)   Pulse 78   Temp 97 °F (36.1 °C) (Oral)   Resp 16   Wt 59.9 kg (132 lb)   SpO2 99%   BMI 25.78 kg/m²     Constitutional: Alert, cooperative,  moderate distress due to pain in legs, AAOx3, resting comfortably   Head: Normocephalic, without obvious abnormality, atraumatic   Eyes: PERRLA, conjunctiva/corneas clear, no icterus, no conjunctival pallor, EOM's intact, both eyes, dry eyes   ENT and Mouth: Lips, tongue, gums normal; oral mucosa pink and dry   Neck: Supple, symmetrical, trachea midline, no JVD   Respiratory: Clear to auscultation bilaterally, respirations unlabored   Cardiovascular: Irregular rate and rhythm, S1 and S2 normal, no murmur, No rub or gallop. Pulses normal.   Gastrointestinal: BS present x4. Soft, non-tender, bowels sounds active, no masses no hepatosplenomegaly, cachectic appearing, muscle wasting   : No hernia. Normal exam for sex.   Neurologic: CN-XII intact, motor strength grossly intact, sensation grossly intact to light touch, no focal reflex deficits noted.Generalized weakness   Psychiatric: Alert, oriented X3, no delusions, psychoses, Does have:depression or anxiety (situational in nature)   Heme/Lymph/Imun: No bruises, petechiae. Lymph nodes normal in size / configuration.     Data Review:  Lab Results   Component Value Date    CALCIUM 9.3 02/26/2025    PHOS 2.2 (L) 02/26/2025     Results from last 7 days   Lab Units 02/26/25  2345   AST (SGOT) U/L 11   ALT (SGPT) U/L 9   MAGNESIUM mg/dL 2.0   SODIUM mmol/L 144   POTASSIUM mmol/L 3.6   CHLORIDE mmol/L 107   CO2 mmol/L 25.0   BUN mg/dL 33*   CREATININE mg/dL 0.93   GLUCOSE mg/dL 112*   CALCIUM mg/dL 9.3     Lab Results   Component Value Date    CKTOTAL 36 02/26/2025    CKMB 1.69 04/06/2017    TROPONINT 29 (H) 01/14/2025     Estimated Creatinine Clearance: 34.5 mL/min (by  C-G formula based on SCr of 0.93 mg/dL).  WEIGHTS:     Wt Readings from Last 1 Encounters:   02/26/25 2026 59.9 kg (132 lb)         Assessment:    Adult failure to thrive syndrome    Stage 2 chronic kidney disease    Malaise and fatigue progressive    Dizziness    Pain in both lower extremities    Dyspnea on exertion    Accelerated hypertension    Decreased mobility and endurance    Spinal stenosis of lumbar region with radiculopathy    Ataxia    Recent unexplained weight loss 42# over last 6 months    Generalized muscle weakness    Severe malnutrition    Volume depletion    Recurrent falls while walking    Hypokalemia    Diastolic dysfunction with chronic heart failure    Chronic heart failure with preserved ejection fraction (HFpEF)    Moderate malnutrition due to chronic disease    Urinary retention self-caths (Ochoa)    Other cervical disc displacement at C5-C6 level    DDD (degenerative disc disease), cervical    Hyperlipidemia LDL goal <70    Proctocele    Urge incontinence of urine    Chronic tension-type headache, intractable    Cerebral atherosclerosis    Vitamin D deficiency    Moderate persistent asthmatic bronchitis without complication    Elevated PTH level    Gastroesophageal reflux disease with esophagitis    Recurrent UTI    H/O Cerebral vascular accident in left occipital area    Acute joint pain    Easy bruisability    COPD with asthma    Essential hypertension    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  T    Cervical stenosis of spine    Anemia, mild    Goiter diffuse    Lumbar herniated  disc L2-L3    Spinal stenosis of lumbar region with neurogenic claudication    Pleurisy    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)    Psoriasis (Darrly Ochoa)    Postmenopausal    Venous incompetence of popliteal vein on right    KECIA (generalized anxiety disorder)      Attending Physician Assessment and Plan:    1.  Adult failure to thrive with significant malnutrition and weight loss unexplained and unintentional of 47 pounds over the last 6 months.  Causes of condition felt to be multifactorial but patient seems to be in an overall functional decline.  Has been confined to sitting in chair and laying on couch now for several weeks and attempts for physical therapy at home through [facility] after last hospitalization in January 2025 have been ineffective.  Patient's pain felt to probably be a significant part of this syndrome.  Patient currently enrolled in [facility] palliative care program for treatment of her intractable pain.    2.  Intractable pain related to severe spinal stenosis in both cervical and lumbar regions with radiculopathy and felt to be an operable in nature.  Patient currently taking hydrocodone up to 10 tablets/day provided through the palliative care program for her severe pain.  Patient also on gabapentin and Fioricet to help with other painful conditions.  Patient does express interest in transitioning from palliative care program to full [facility] services.    3.  Stage II chronic kidney disease with mild volume depletion due to poor p.o. intake.  Patient is followed by nephrology, Dr. Bond, and we will ask him to take a look at the situation and make recommendations on any additional treatment he thinks may be necessary in this patient's condition.    4.  Ataxia with dizziness, dyspnea on exertion, recurrent falls that have resulted in head trauma and contusions, and overall immobility.  PT and OT asked to evaluate patient.  Nursing staff reporting that it  takes assist of 2 at times to get patient from chair in room to bathroom.  Patient did have 1 fall even when nurses were present helping her while she was in her 's room.    5.  Chronic heart failure with preserved ejection fraction and known diastolic dysfunction.  This is followed on an outpatient basis by Dr. Gama Soto in cardiology.  Medications have been adjusted to try to help with the problem.  This has become somewhat of a moving target however because of the patient's rapid and steady functional decline.    6.  Urinary stress incontinence with history of urinary retention requiring In-N-Out self caths as taught to the patient by Dr. Fazal Ochoa her urologist.  Unfortunately, patient has been unable to perform this function in recent weeks and has been for the most part incontinent of urine.    7.  Diffuse osteoarthritis of spine and extremities with history of bilateral knee replacements.  Arthritis enhances patient's pain response and makes her mobility difficult to impossible.  Patient has become deconditioned and extremely weak as a result of these conditions.    8.  Hyperlipidemia.  Treated on an outpatient basis and monitored.  Due to patient's recent poor intake her lipid levels have been under good control.    9.  Chronic tension type headaches with intractable pain.  Controlled for many years with Fioricet that she now takes in combination with Norco provided through palliative care services.    10.  Moderate persistent asthmatic bronchitis with shortness of breath.  Patient does have nebulizer at home which she uses on occasion but also uses ProAir inhaler.  Has been followed for many years by allergy because of this condition.    11.  Gastroesophageal reflux disease with esophagitis made worse by poor nutritional habits.  Patient continued on PPI for now to prevent stress ulceration and worsening of the gastritis.    12.  Right ankle deformity with history of acute DVT in the right lower  extremity in the past.  No ongoing signs of DVT at present time.    13.  Complications of COVID-19 originally diagnosed 10/2/2022.  These have mainly been chronic neurologic changes with intensification of her intractable headache syndrome.  Has never achieved baseline since her COVID infection.    14.  Posterior tibial tendon dysfunction diagnosed by orthopedics in the past and certainly contributing somewhat to the patient's immobility.  Orthopedics consulted for input on any other suggestions they might have to improve patient's mobility.  Patient does have known venous incompetence of popliteal vein on the right.    15.  Generalized anxiety disorder.  This is an ongoing problem long-term for the patient.  No new meds or changes needed at present time.        Plan for disposition:Where: home, home health, and SNF and When:  3-4 days.  Considering transition from [facility] to full [facility] services.    Copied text in this note has been reviewed by me and is accurate as of 02/26/2025  Much of this dictation is completed using dragon voice activated software.      Óscar Cadet MD  2/26/2025 2000 EST

## 2025-02-27 NOTE — PLAN OF CARE
Goal Outcome Evaluation:  Plan of Care Reviewed With: patient           Outcome Evaluation: Pt admitted to [facility] for adult failure to thrive and bilateral foot/ankle pain. Per ortho note, pt has bilateral pes plano valgus and posterior tibial tendon dysfunction (R>L), recommend WBAT to RLE w/ CAM boot or lace up ankle brace for comfort. Pt lives w/ her spouse who is also currently admitted, per chart they have had difficulty caring for themselves at home. Pt was UIC in her husbands room upon arrival. Performing STS transfers and functional mobility w/ CGA + RW. Setup for LBD. Pt did fatigue quickly, was unsteady at times, and required frequent cueing for redirection and proper use of RW. OT will continue to follow to address strength, balance, and activity tolerance while inpatient. Recs pending progress.    Anticipated Discharge Disposition (OT):  (TBD, pending progress)

## 2025-02-27 NOTE — PLAN OF CARE
Goal Outcome Evaluation:  Plan of Care Reviewed With: patient, family           Outcome Evaluation: Pt oriented to self and place, intermittent confusion throughout the day. Up with assist x 1. Incontinent of bladder, unable to obtain UA that is ordered. Bladder scan was 62ml. Norco given x 1 for pain. Spent good portion of the day in recliner in 's room. Continue to monitor for comfort and safety.

## 2025-02-27 NOTE — CONSULTS
"  [facility] Consult Note      Patient Name: Sasha Barrett  : 1940  MRN: [id number]  Primary Care Physician:  Óscar Cadet MD  Referring Physician: Ócsar Cadet MD  Date of admission: 2025    Subjective     Reason for Consult: CKD 2    HPI:   Sasha Barrett is a 85 y.o. female with CKD 2 (baseline SCr 0.8-1.0), hx of L breast cancer, COPD, prior DVT/stroke, HTN, chronic pain, recent hospitalization ( -1/15 for ERMIAS and mechanical fall, SCr 1.25 upon discharge), incontinence requiring self catheterization in the past, who was admitted yesterday for further evaluation of deconditioning and global decline.  She recently has been spending time visiting her , who has been hospitalized since .     Since admission, SCr has been stable at baseline; Na slowly up to 146 today.    Complain of having pain in BLE; XRs negative for acute findings.    47-lb weight loss over past 6 months with progressively worsening weakness and decreased mobility  Denies N/V/D  Denies chest pain  Reports dizziness and COELLO  Has frequent incontinence, though no longer self-catheterizes at home; reports skin at genital area feels \"raw\"  Reports having a yeast infection currently  Reports being bitten by a beetle last July causing RLE pain    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, grade 2    COPD (chronic obstructive pulmonary disease)     Current use of long term anticoagulation     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 Yazmin Canseco    CARDIAC ELECTROPHYSIOLOGY PROCEDURE N/A 5/27/2022    Procedure: Loop insertion;  Surgeon: Jeffrey Argueta MD;  Location:  [facility];  Service: Cardiovascular;  Laterality: N/A;  biotronik    CARDIAC ELECTROPHYSIOLOGY PROCEDURE N/A 7/13/2022    Procedure: Loop recorder removal;  Surgeon: Jeffrey Argueta MD;  Location:  [facility];  Service: Cardiovascular;  Laterality: N/A;    CARPAL TUNNEL RELEASE Right 05/25/2011    Dr. Caleb Bueno    CARPAL TUNNEL RELEASE Left 04/26/2011    Dr. Caleb Bueno    CATARACT EXTRACTION Left 11/29/2010    Dr. Eusebio Downs    CATARACT EXTRACTION Right 11/17/2010    Dr. Eusebio Downs    COLONOSCOPY N/A 12/06/2002    Sigmoid diverticulosis-Dr. Brandon Batista    COLONOSCOPY N/A 12/12/2013    Tortuous colon, diverticulosis in the sigmoid colon and descending colon, stool in the rectum, sigmoid colon, descending colon, splenic flexure, transverse colon and hepatic flexure-Dr. Irma Brambila    DEQUERVAIN RELEASE Left 9/23/2020    Procedure: DEQUERVAIN RELEASE LEFT HAND;  Surgeon: Caleb Bueno MD;  [place]  [facility] [place] [facility] [place]  Service: Plastics;  Laterality: Left;    ENDOSCOPY N/A 09/13/2007    Normal-Dr. Pavel Quarles    ENDOSCOPY AND COLONOSCOPY N/A 09/19/2005    1 cm hiatal hernia, mild Schatzki's ring, sigmoid diverticulosis-Dr. Yoel Farley    GANGLION CYST EXCISION Left 12/18/2012    Release of A1 pulley flexor sheath, left fourth finger, excision of ganglion cyst 0.5 cm diameter, A2 pulley flexor sheath, left fourth finger-Dr. Caleb Bueno    HEEL SPUR EXCISION Left 1968    INGUINAL HERNIA REPAIR Right 1971    at 5 months pregnant     INJECTION OF  MEDICATION Left 9/23/2020    Procedure: KENOLOG INJECTION TO LEFT THUMB;  Surgeon: Caleb Bueno MD;  [facility] [place] [facility] [place];  Service: Plastics;  Laterality: Left;    MASTECTOMY Right 08/05/2008    Right breast mastectomy and excision of left chest wall lesion-Dr. Yoel Farley    MASTECTOMY RADICAL Left 04/29/2003    Left modified radical mastectomy-Dr. Yazmin Canseco    PARATHYROIDECTOMY Right 05/04/2004    Excision of parathyroid adenoma (right superior)-Dr. Yoel Farley    REPLACEMENT TOTAL KNEE Right 04/09/2012    Dr. Lamonte Childress    SINUS SURGERY  1984    THYROIDECTOMY, PARTIAL Left 05/04/2004    Dr. Yoel Farley    TOTAL ABDOMINAL HYSTERECTOMY Bilateral 1973    Dr. Mahan    TRIGGER FINGER RELEASE Left 9/23/2020    Procedure: RELEASE LEFT FOURTH TRIGGER FINGER;  Surgeon: Caleb Bueno MD;  Location: [facility] [place];  Service: Plastics;  Laterality: Left;    UPPER GASTROINTESTINAL ENDOSCOPY N/A 02/18/2009    LA Grade A reflux esophagitis, normal stomach, normal 2nd part of the duodenum-Dr. Yoel Farley    WRIST GANGLION EXCISION Left 9/23/2020    Procedure: EXCISION GANGLION CYST LEFT WRIST;  Surgeon: Caleb Bueno MD;  Location: [facility] [place];  Service: Plastics;  Laterality: Left;       Family History: family history includes Alcohol abuse in her mother; Brain cancer in her brother; No Known Problems in her father and sister.    Social History:  reports that she has never smoked. She has never been exposed to tobacco smoke. She has never used smokeless tobacco. She reports that she does not drink alcohol and does not use drugs.    Home Medications:  Prior to Admission medications    Medication Sig Start Date End Date Taking? Authorizing Provider   acetaminophen (TYLENOL) 325 MG tablet Take 2 tablets by mouth Every 4 (Four) Hours As Needed for Mild Pain .  Patient taking differently: Take 2 tablets by mouth Every 4 (Four) Hours As Needed for Mild Pain. 5/29/22  Yes  Óscar Cadet MD   albuterol (PROAIR HFA) 108 (90 Base) MCG/ACT inhaler 4 puffs Every 4 (Four) Hours As Needed for Wheezing or Shortness of Air. Indications: Asthma, Chronic Obstructive Lung Disease 6/18/14  Yes Vadim Best MD   butalbital-acetaminophen-caffeine (FIORICET, ESGIC) -40 MG per tablet Take 2 tablets by mouth Every 4 (Four) Hours As Needed for Headache. 3/27/17  Yes Óscar Cadet MD   carboxymethylcellulose (REFRESH PLUS) 0.5 % solution Administer 1 drop to both eyes 2 (Two) Times a Day As Needed for Dry Eyes. Indications: Excessive Cornea and Conjunctiva Dryness 12/21/23  Yes Vadim Best MD   cetirizine (zyrTEC) 10 MG tablet Take 1 tablet by mouth Daily. Alt with singulair  Indications: Insect Bites 3/25/19  Yes Vadim Best MD   Cholecalciferol (VITAMIN D3) 1.25 MG (27360 UT) capsule Take 1 capsule by mouth 1 (One) Time Per Week. TUESDAYS  Indications: Vitamin D Deficiency 5/11/20  Yes Vadim Best MD   cloNIDine (CATAPRES) 0.2 MG tablet Take 1 tablet by mouth Every 6 (Six) Hours. Dose titrated for BP control by patient 10/22/24  Yes Óscar Cadet MD   cloNIDine (CATAPRES) 0.3 MG tablet Take 1 tablet by mouth Every 6 (Six) Hours. Dose titrated by patient for BP control 10/22/24  Yes Óscar Cadet MD   desonide (DESOWEN) 0.05 % ointment Apply 1 Application topically to the appropriate area as directed Daily. Indications: facial rash 1/1/24  Yes Vadim Best MD   Diclofenac Sodium (VOLTAREN) 1 % gel gel Apply 4 g topically to the appropriate area as directed 4 (Four) Times a Day As Needed (Apply to knees or other joints when pain is severe and flares). 10/22/24  Yes Óscar Cadet MD   eplerenone (INSPRA) 50 MG tablet Take 1 tablet by mouth Daily. Indications: High Blood Pressure 12/21/23  Yes Vadim Best MD   fluticasone (FLONASE) 50 MCG/ACT nasal spray 1 spray by Each Nare route 2 (Two) Times a Day. 8/27/18  Yes Helio,  Óscar OKEFEE MD   HYDROcodone-acetaminophen (NORCO)  MG per tablet Take 1-2 tablets by mouth Every 6 (Six) Hours As Needed for Moderate Pain. Indications: Pain 3/3/22  Yes Vadim Best MD   hydrOXYzine (ATARAX) 10 MG tablet Take 1 tablet by mouth 3 (Three) Times a Day As Needed for Itching. Indications: itching 1/12/25  Yes Vadim Best MD   levocetirizine (XYZAL) 5 MG tablet Take 1 tablet by mouth Every Morning. Indications: allergies 1/12/25  Yes Vadim Best MD   multivitamin with minerals tablet tablet Take 1 tablet by mouth Daily. 5/30/22  Yes Óscar Cadet MD   NIFEdipine XL (ADALAT CC) 30 MG 24 hr tablet Take 1 tablet by mouth Daily. 5/30/22  Yes Óscar Cadet MD   omeprazole (priLOSEC) 40 MG capsule Take 1 capsule by mouth Daily As Needed (indigestion). Indications: Heartburn 2/23/21  Yes Vadim Best MD   polyethylene glycol (MIRALAX) 17 g packet Take 17 g by mouth Daily As Needed (constipation). Indications: Constipation 6/6/22  Yes Vadim Best MD   potassium chloride (KAYCIEL) 20 mEq/15 mL solution Take 15 mL by mouth Daily. 10/22/24  Yes Óscar Cadet MD   predniSONE (DELTASONE) 10 MG tablet Take 1 tablet by mouth Daily. 1/15/25  Yes Óscar Cadet MD   promethazine-codeine (PHENERGAN with CODEINE) 6.25-10 MG/5ML syrup Take 5 mL by mouth 4 (Four) Times a Day As Needed for cough. 12/9/16  Yes Óscar Cadet MD   rosuvastatin (CRESTOR) 5 MG tablet Take 1 tablet by mouth Every Morning. Indications: High Amount of Fats in the Blood 4/17/23  Yes Vadim Best MD   torsemide (DEMADEX) 10 MG tablet Take 5 tablets by mouth Daily. 1/15/25  Yes Óscar Cadet MD   triamcinolone (KENALOG) 0.1 % cream Apply 1 Application topically to the appropriate area as directed 2 (Two) Times a Day. 1/15/25  Yes Óscar Cadet MD   vitamin B-12 (VITAMIN B-12) 1000 MCG tablet Take 1 tablet by mouth Daily. 4/11/17  Yes Óscar Cadet MD Xarelto  20 MG tablet Take 1 tablet by mouth Daily. Indications: history of DVT/PE 2/23/23  Yes Vadim Best MD   diphenhydrAMINE (BENADRYL) 50 MG tablet Take 1 tablet by mouth every 4 (four) hours as needed for itching or allergies.  Patient not taking: Reported on 2/26/2025 7/25/16   Óscar Cadet MD   EPINEPHrine (EPIPEN) 0.3 MG/0.3ML solution auto-injector injection INJECT THE CONTENTS OF ONE PEN AS NEEDED FOR SEVERE ALLERGIC REACTION. MAY REPEAT ONE TIME. 3/29/21   Vadim Best MD   ipratropium (ATROVENT) 0.02 % nebulizer solution Take 2.5 mL by nebulization 4 (Four) Times a Day. Indications: Chronic Obstructive Lung Disease 3/25/19   Vadim Best MD   lidocaine (Lidoderm) 5 % Place 1 patch on the skin as directed by provider Daily As Needed for Mild Pain. Remove & Discard patch within 12 hours or as directed by MD 12/5/24   Dmitri Espino MD   LORazepam (ATIVAN) 0.5 MG tablet Take 1 tablet by mouth Every 8 (Eight) Hours As Needed for Anxiety. Indications: Feeling Anxious 12/21/23   Vadim Best MD   mupirocin (BACTROBAN) 2 % ointment Apply 1 Application topically to the appropriate area as directed Every 12 (Twelve) Hours.  Patient not taking: Reported on 1/16/2025 10/22/24   Óscar Cadet MD   naloxone (NARCAN) 4 MG/0.1ML nasal spray Administer 1 spray into the nostril(s) as directed by provider As Needed for Opioid Reversal. Call 911. Don't prime. [place] in 1 nostril for overdose. Repeat in 2-3 minutes in other nostril if no or minimal breathing/responsiveness.  Indications: Opioid Overdose 1/12/25   Vadim Best MD   Niacin (VITAMIN B-3 PO) Take 1 tablet by mouth Daily. Indications: unknown 1/12/25   Vadim Best MD   ondansetron ODT (ZOFRAN-ODT) 8 MG disintegrating tablet Take 1 tablet by mouth every 6 (six) hours as needed for nausea or vomiting. 9/8/16   Óscar Cadet MD   Petrolatum 42 % ointment Apply 1 Application topically to the appropriate  area as directed Every 12 (Twelve) Hours. Use on irritated skin from bug bites  Patient not taking: Reported on 1/16/2025 10/22/24   Óscar Cadet MD       Allergies:  Allergies   Allergen Reactions    Bee Venom Shortness Of Breath and Rash    Morphine Anaphylaxis and Nausea And Vomiting    Sulfa Antibiotics Anaphylaxis and Hives     Or sulfa fillers in meds  Broke out in internal and external hives      Tree Extract Shortness Of Breath and Rash    Ambien [Zolpidem] Hallucinations    Anastrozole Other (See Comments)     Can't remember what the reaction was     Covid-19 (Mrna) Vaccine Swelling     Facial swelling, blood clots    Eliquis [Apixaban] Headache     Headaches, nausea and itching.     Hydralazine Myalgia     Patient reports leg cramps, joint pain and increased fatigue    Norvasc [Amlodipine] Other (See Comments)     KIDNEYS SHUT DOWN    Nsaids Other (See Comments)     KIDNEY FAILURE    Penicillins Unknown (See Comments)     Severe reaction as a child  Tolerated ceftriaxone during 04/2017 admission    Grass Rash       Objective     Vitals:   Temp:  [96.7 °F (35.9 °C)-97.5 °F (36.4 °C)] 97.2 °F (36.2 °C)  Heart Rate:  [63-78] 72  Resp:  [16-18] 18  BP: (128-186)/(68-96) 128/68    Intake/Output Summary (Last 24 hours) at 2/27/2025 1147  Last data filed at 2/27/2025 0533  Gross per 24 hour   Intake 120 ml   Output --   Net 120 ml       Physical Exam:   Constitutional: Awake, chr ill, NAD, tearful at times, disorganized, tangential  HEENT: Sclera anicteric, no conjunctival injection  Neck: Supple, no JVD  Respiratory: Clear to auscultation bilaterally, nonlabored respiration on RA  Cardiovascular: RRR, no murmurs, no rubs, no carotid bruit  Gastrointestinal: BS +, soft, nontender and nondistended  : No palpable bladder  Musculoskeletal: Trace doughy BLE edema, no clubbing or cyanosis; + pes planus bilaterally; +bruises to L 2nd & 3rd toe  Psychiatric: Odd affect, cooperative  Neurologic: Oriented x3,  moving all extremities, disorganized speech, no asterixis  Skin: Warm and dry       Scheduled Meds:     albuterol, 2.5 mg, Nebulization, Q6H - RT  cetirizine, 10 mg, Oral, Daily  cetirizine, 10 mg, Oral, Daily  cloNIDine, 0.3 mg, Oral, Q6H  desonide, 1 application , Topical, Daily  eplerenone, 50 mg, Oral, Daily  fluticasone, 1 spray, Each Nare, BID  ipratropium, 0.5 mg, Nebulization, 4x Daily - RT  melatonin, 10 mg, Oral, Nightly  multivitamin with minerals, 1 tablet, Oral, Daily  NIFEdipine XL, 30 mg, Oral, Q24H  pantoprazole, 40 mg, Oral, Q AM  potassium chloride, 20 mEq, Oral, Daily  predniSONE, 10 mg, Oral, Daily  rivaroxaban, 20 mg, Oral, Daily With Dinner  rosuvastatin, 5 mg, Oral, QAM  sodium chloride, 3 mL, Intravenous, Q12H  [Held by provider] torsemide, 50 mg, Oral, Daily  triamcinolone, 1 Application, Topical, BID  cyanocobalamin, 1,000 mcg, Oral, Daily  [START ON 3/4/2025] vitamin D, 50,000 Units, Oral, Every Tuesday      IV Meds:        Results Reviewed:   I have personally reviewed the results from the time of this admission to 2/27/2025 11:47 EST     Lab Results   Component Value Date    GLUCOSE 89 02/27/2025    CALCIUM 9.5 02/27/2025     (H) 02/27/2025    K 3.7 02/27/2025    CO2 28.0 02/27/2025     02/27/2025    BUN 30 (H) 02/27/2025    CREATININE 0.89 02/27/2025    EGFRIFAFRI 63 02/02/2021    EGFRIFNONA 52 (L) 02/02/2021    BCR 33.7 (H) 02/27/2025    ANIONGAP 11.0 02/27/2025      Lab Results   Component Value Date    MG 2.1 02/27/2025    PHOS 2.2 (L) 02/26/2025    ALBUMIN 3.6 02/27/2025           Assessment / Plan       Adult failure to thrive syndrome    Urinary retention self-caths (Don)    Other cervical disc displacement at C5-C6 level    DDD (degenerative disc disease), cervical    Hyperlipidemia LDL goal <70    Proctocele    Urge incontinence of urine    Chronic tension-type headache, intractable    Stage 2 chronic kidney disease    Cerebral atherosclerosis    Malaise and  fatigue progressive    Dizziness    Vitamin D deficiency    Moderate persistent asthmatic bronchitis without complication    Pleurisy    Pain in both lower extremities    Elevated PTH level    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)    Gastroesophageal reflux disease with esophagitis    Psoriasis (Darryl Ochoa)    Postmenopausal    Recurrent UTI    H/O Cerebral vascular accident in left occipital area    Dyspnea on exertion    Accelerated hypertension    Acute joint pain    Easy bruisability    COPD with asthma    Decreased mobility and endurance    Essential hypertension    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  T    Severe malnutrition    KECIA (generalized anxiety disorder)    Volume depletion    Cervical stenosis of spine    Recurrent falls while walking    Hypokalemia    Anemia, mild    Goiter diffuse    Lumbar herniated disc L2-L3    Spinal stenosis of lumbar region with neurogenic claudication    Diastolic dysfunction with chronic heart failure    Chronic heart failure with preserved ejection fraction (HFpEF)    Moderate malnutrition due to chronic disease      ASSESSMENT:  CKD stage II, baseline SCr 0.8-1.0, secondary to hypertensive nephrosclerosis, prior ERMIAS's, and age-related nephron loss.  Today, SCr stable at baseline 0.89.  Electrolytes within acceptable range except mild  hypernatremia, probably due to dehydration  Decondition/failure to thrive, managed by primary team  HFpEF, echo on 1/13/2025 showed EF 61 to 65%, on torsemide 50 mg daily at home. No signs of exacerbation  HTN with CKD, BP initially elevated, improved today  Incontinence, no longer does self catheterization at home  Chronic pain related to severe spinal stenosis and diffuse osteoarthritis, managed by primary team  History of DVT/stroke, on Xarelto  History of primary hyperparathyroidism s/p parathyroidectomy    PLAN:  Hold torsemide today, but resume tomorrow if she is eating and drinking better and provided serum sodium level better  Check UA, Nikita, UPCR  Bladder scan every 6 hours  Surveillance labs  If renal function stable tomorrow, home anytime from renal view    Thank you for involving us in the care of Sasha Barrett.  Please feel free to call with any questions.    Gerry Yoo MD  02/27/25  11:47 EST    [facility]  [phone]      Please note that portions of this note were completed with a voice recognition program.

## 2025-02-27 NOTE — CONSULTS
Pt is alert and confused. I was unable to get clear answers to any questions. Spoke to Leighton [facility] and son on phone. Confirmed DNR and they have advance directives at home. Spoke to Dr Cadet and he will place DNR in [facility]. Plan is also to meet with Leighton and other family tomorrow around 1pm to discuss goals and plans. If any questions please let us know.    Thank you for the referral.    Pinky Kc RN  [facility]  [phone]

## 2025-02-27 NOTE — CONSULTS
Nutrition Services    Patient Name:  Sasha Barrett  YOB: 1940  MRN: [id number]  Admit Date:  2/26/2025    Assessment Date:  02/27/25    NUTRITION SCREENING      Reason for Encounter Malnutrition Severity Assessment   Diagnosis/Problem Hx: heart failure, GERD, COPD    Pt admitted to [facility] with adult FTT. Pt's  is also admitted on 4P receiving care. Pt has been followed by palliative services but is considering transition to full [facility] care. Pt was out of the room visiting her  at time of RD visit. Unable to conduct interview/NFPE.        PO Diet Diet: Regular/House; Texture: Regular (IDDSI 7); Fluid Consistency: Thin (IDDSI 0)   Supplements n/a   PO Intake % 50% x 1 meal       Medications MAR reviewed by RD   Labs  Listed below, reviewed   Physical Findings No documented edema    Room air   GI Function No documented BM this admission    Skin Status Wound L anterior foot       Height  Weight  BMI  Weight Trend        Weight: 60.4 kg (133 lb 2.5 oz) (02/27/25 0600)  Body mass index is 26.01 kg/m².  Loss, Amount/Timeframe: 6.3% wt loss x 1.5 months. Pt reports 47# wt loss x 6 months.        Nutrition Problem (PES) Inadequate oral intake related to suspected decreased appetite and decreased mobility/ability to care for herself as evidence by reported poor to fair PO intake.       Intervention/Plan Continue current diet. Addition of Boost Original BID (Provides 480 kcals, 20 g protein if consumed).    RD to follow up and complete NFPE when able.     RD to follow up per protocol.     Results from last 7 days   Lab Units 02/27/25  1156 02/27/25  0553 02/26/25  2345   SODIUM mmol/L  --  146* 144   POTASSIUM mmol/L 3.8 3.7 3.6   CHLORIDE mmol/L  --  107 107   CO2 mmol/L  --  28.0 25.0   BUN mg/dL  --  30* 33*   CREATININE mg/dL  --  0.89 0.93   CALCIUM mg/dL  --  9.5 9.3   BILIRUBIN mg/dL  --  0.5 0.4   ALK PHOS U/L  --  85 80   ALT (SGPT) U/L  --  6 9   AST (SGOT) U/L  --  9 11   GLUCOSE mg/dL   --  89 112*     Results from last 7 days   Lab Units 02/27/25  1156 02/27/25  0553 02/26/25  2345   MAGNESIUM mg/dL  --  2.1 2.0   PHOSPHORUS mg/dL 2.5  --  2.2*   HEMOGLOBIN g/dL  --   --  10.8*   HEMATOCRIT %  --   --  32.8*   TRIGLYCERIDES mg/dL  --   --  121     Lab Results   Component Value Date    HGBA1C 5.80 (H) 02/27/2025         Electronically signed by:  Serina Phipps RD  02/27/25 16:16 EST

## 2025-02-27 NOTE — CONSULTS
Visit with patient and her  for spiritual care consult. Prayer and supportive conversation with both Barb and Adriel.

## 2025-02-27 NOTE — SIGNIFICANT NOTE
02/27/25 1609   OTHER   Discipline physical therapist   Rehab Time/Intention   Session Not Performed   (pt visiting with spouse who is in [place], they were both sleeping, PT will follow up tomorrow)

## 2025-02-27 NOTE — CASE MANAGEMENT/SOCIAL WORK
Discharge Planning Assessment  [facility]     Patient Name: Sasha Barrett  MRN: [id number]  Today's Date: 2/27/2025    Admit Date: 2/26/2025    Plan: pending clinical course   Discharge Needs Assessment       Row Name 02/27/25 4570       Living Environment    People in Home spouse    Current Living Arrangements home    Primary Care Provided by self    Provides Primary Care For spouse    Family Caregiver if Needed child(sita), adult    Quality of Family Relationships unable to assess       Transition Planning    Transportation Anticipated health plan transportation       Discharge Needs Assessment    Readmission Within the Last 30 Days no previous admission in last 30 days    Equipment Currently Used at Home walker, rolling    Concerns to be Addressed discharge planning;basic needs                   Discharge Plan       Row Name 02/27/25 3300       Plan    Plan pending clinical course    Patient/Family in Agreement with Plan yes    Plan Comments Attempted to complete screen with pt bedside. Pt voiced concern for spouse (who is also admitted to same floor as pt) and states she just wants to go see him and be with him. Pt and spouse have been living togeher in their home. Per notes pt's house has not been taken care of and they haven't let family in to assist with house hold chores or with pt and spouse care. APS report has been made and CCP will follow up to see if case was accepted. The Web Tracking # for this report is: Web Id # [id number]. Pt has meeting with Hospice today for her spouse. At this time pt is unsure of d/c plan for her. CCP will continue to follow.                  Continued Care and Services - Admitted Since 2/26/2025    No active coordination exists for this encounter.       Selected Continued Care - Prior Encounters Includes continued care and service providers with selected services from prior encounters from 11/28/2024 to 2/27/2025      Discharged on 1/15/2025 Admission date: 1/12/2025 - Discharge  disposition[place] [facility] [place]       Service Provider Services Address Phone Fax Patient Preferred    [facility] [address] [place] [address] [address], [facility] [address] [phone] --                             Demographic Summary    No documentation.                  Functional Status    No documentation.                  Psychosocial    No documentation.                  Abuse/Neglect    No documentation.                  Legal    No documentation.                  Substance Abuse    No documentation.                  Patient Forms    No documentation.                     KARYN Schrader

## 2025-02-27 NOTE — CONSULTS
Orthopaedic Surgery  Consult Note  Morris Byers MD    [phone]    HPI:  Patient is a 85 y.o.female who presents with adult failure to thrive and bilateral foot and ankle pain. Right much greater than left where she has total collapse of her midfoot with prominent osteophyte about the medial aspect of the midfoot. She tells me that she has a boot at home however this has not helped her condition. Pain is improved with nonweightbearing at the ankle and worsened with weightbearing/walking. Denies pain at other joints during my exam.     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 Yazmin Canseco    CARDIAC ELECTROPHYSIOLOGY PROCEDURE N/A 5/27/2022    Procedure: Loop insertion;  Surgeon: Jeffrey Argueta MD;  Location:  [facility];  Service: Cardiovascular;  Laterality: N/A;  biotronik    CARDIAC ELECTROPHYSIOLOGY PROCEDURE N/A 7/13/2022    Procedure: Loop recorder removal;  Surgeon: Jeffrey Argueta MD;  Location:  [facility];  Service: Cardiovascular;  Laterality: N/A;    CARPAL TUNNEL RELEASE Right 05/25/2011    Dr. Caleb Bueno    CARPAL TUNNEL RELEASE Left 04/26/2011    Dr. Caleb Bueno    CATARACT EXTRACTION Left 11/29/2010      Eusebio Downs    CATARACT EXTRACTION Right 11/17/2010    Dr. Eusebio Downs    COLONOSCOPY N/A 12/06/2002    Sigmoid diverticulosis-Dr. Brandon Batista    COLONOSCOPY N/A 12/12/2013    Tortuous colon, diverticulosis in the sigmoid colon and descending colon, stool in the rectum, sigmoid colon, descending colon, splenic flexure, transverse colon and hepatic flexure-Dr. Irma Brambila    DEQUERVAIN RELEASE Left 9/23/2020    Procedure: DEQUERVAIN RELEASE LEFT HAND;  Surgeon: Caleb Bueno MD;  [place] [facility] [place];  Service: Plastics;  Laterality: Left;    ENDOSCOPY N/A 09/13/2007    Normal-Dr. Pavel Quarles    ENDOSCOPY AND COLONOSCOPY N/A 09/19/2005    1 cm hiatal hernia, mild Schatzki's ring, sigmoid diverticulosis-Dr. Yoel Farley    GANGLION CYST EXCISION Left 12/18/2012    Release of A1 pulley flexor sheath, left fourth finger, excision of ganglion cyst 0.5 cm diameter, A2 pulley flexor sheath, left fourth finger-Dr. Caleb Bueno    HEEL SPUR EXCISION Left 1968    INGUINAL HERNIA REPAIR Right 1971    at 5 months pregnant     INJECTION OF MEDICATION Left 9/23/2020    Procedure: KENOLOG INJECTION TO LEFT THUMB;  Surgeon: Caleb Bueno MD;  Location: [facility] [place];  Service: Plastics;  Laterality: Left;    MASTECTOMY Right 08/05/2008    Right breast mastectomy and excision of left chest wall lesion-Dr. Yoel Farley    MASTECTOMY RADICAL Left 04/29/2003    Left modified radical mastectomy-Dr. Yazmin Canseco    PARATHYROIDECTOMY Right 05/04/2004    Excision of parathyroid adenoma (right superior)-Dr. Yoel Farley    REPLACEMENT TOTAL KNEE Right 04/09/2012    Dr. Lamonte Childress    SINUS SURGERY  1984    THYROIDECTOMY, PARTIAL Left 05/04/2004    Dr. Yoel Farley    TOTAL ABDOMINAL HYSTERECTOMY Bilateral 1973    Dr. Mahan    TRIGGER FINGER RELEASE Left 9/23/2020    Procedure: RELEASE LEFT FOURTH TRIGGER FINGER;  Surgeon: Caleb Bueno MD;  [facility]: [facility];  Service: Plastics;  Laterality: Left;    UPPER GASTROINTESTINAL ENDOSCOPY N/A 02/18/2009    LA Grade A reflux esophagitis, normal stomach, normal 2nd part of the duodenum-Dr. Yoel Farley    WRIST GANGLION EXCISION Left 9/23/2020    Procedure: EXCISION GANGLION CYST LEFT WRIST;  Surgeon: Caleb Bueno MD;  [facility] [place];  Service: Plastics;  Laterality: Left;     Prior to Admission medications    Medication Sig Start Date End Date Taking? Authorizing Provider   Alcohol Swabs (Pharmacist Choice Alcohol) pads 1 each 2 (Two) Times a Day. 5/4/22   Connie Hardy MD   amLODIPine (NORVASC) 5 MG tablet Take 1 tablet by mouth Daily. 2/21/24   Vadim Best MD   atorvastatin (LIPITOR) 10 MG tablet TAKE 1 TABLET EVERY DAY 9/4/24   Connie Hardy MD   Cholecalciferol (VITAMIN D3) 5000 UNITS tablet Take 1 tablet by mouth Daily.    Vadim Best MD   DULoxetine (CYMBALTA) 60 MG capsule TAKE 1 CAPSULE EVERY DAY 1/15/24   Connie Hardy MD   EPINEPHrine (EPIPEN) 0.3 MG/0.3ML solution auto-injector injection 0.3 mL Daily As Needed. 2/12/16   Vadim Best MD   glucose blood (Pharmacist Choice Autocode) test strip 1 each by Other route 2 (Two) Times a Day. Use as instructed 12/6/23   Connie Hardy MD   Immune Globulin, Human, 40 GM/400ML solution 40 g Every 30 (Thirty) Days. 9/3/15   Vadim Best MD   metoprolol succinate XL (TOPROL-XL) 25 MG 24 hr tablet TAKE 1 TABLET EVERY DAY 9/30/24   Connie Hardy MD   olmesartan (BENICAR) 40 MG tablet Take 1 tablet by mouth Daily. 9/23/24   Connie Hardy MD   omeprazole (priLOSEC) 40 MG capsule TAKE 1 CAPSULE EVERY DAY 9/16/24   Connie Hardy MD   Pharmacist Choice Lancets misc Use 1 each 2 (Two) Times a Day. 12/6/23   Connie Hardy MD   Tirzepatide (Mounjaro) 15 MG/0.5ML solution pen-injector pen Inject 0.5 mL under the skin into the appropriate area as directed 1 (One) Time Per Week. 8/29/24   Connie Hardy MD   traMADol (ULTRAM) 50 MG tablet  TAKE ONE TABLET BY MOUTH EVERY 8 HOURS AS NEEDED FOR MODERATE PAIN 11/21/23   Connie Hardy MD   Xarelto 20 MG tablet Take 1 tablet by mouth Daily. 5/23/23   Scott Sanchez MD     Allergies   Allergen Reactions    Bee Venom Shortness Of Breath and Rash    Morphine Anaphylaxis and Nausea And Vomiting    Sulfa Antibiotics Anaphylaxis and Hives     Or sulfa fillers in meds  Broke out in internal and external hives      Tree Extract Shortness Of Breath and Rash    Ambien [Zolpidem] Hallucinations    Anastrozole Other (See Comments)     Can't remember what the reaction was     Covid-19 (Mrna) Vaccine Swelling     Facial swelling, blood clots    Eliquis [Apixaban] Headache     Headaches, nausea and itching.     Hydralazine Myalgia     Patient reports leg cramps, joint pain and increased fatigue    Norvasc [Amlodipine] Other (See Comments)     KIDNEYS SHUT DOWN    Nsaids Other (See Comments)     KIDNEY FAILURE    Penicillins Unknown (See Comments)     Severe reaction as a child  Tolerated ceftriaxone during 04/2017 admission    Grass Rash     Most Recent Immunizations   Administered Date(s) Administered    COVID-19 (PFIZER) Purple Cap Monovalent 03/16/2021    Fluzone High-Dose 65+YRS 09/13/2017    Influenza, Unspecified 10/01/2014    Pneumococcal Polysaccharide (PPSV23) 04/19/2013     Social History     Tobacco Use    Smoking status: Never     Passive exposure: Never    Smokeless tobacco: Never   Substance Use Topics    Alcohol use: Never     Comment: no drink in 30yrs      Social History     Substance and Sexual Activity   Drug Use Never       VITALS: /68 (BP Location: Left arm, Patient Position: Lying)   Pulse 63   Temp 96.7 °F (35.9 °C) (Oral)   Resp 16   Wt 60.4 kg (133 lb 2.5 oz)   SpO2 99%   BMI 26.01 kg/m²  Body mass index is 26.01 kg/m².    PHYSICAL EXAM:   CONSTITUTIONAL: No acute distress  LUNGS: Equal chest rise, no shortness of air  CARDIOVASCULAR: palpable peripheral pulses  SKIN: no skin lesions in  the area examined  LYMPH: no lymphadenopathy in the area examined  Musculoskeletal:   Right ankle   Prominent bony area at the medial aspect of the foot arch with callus formation overlying the area   Able to DF PF at the ankle   Sensation is intact distally   Foot is wwp       RADIOLOGY REVIEW:   Radiographs reviewed   XR Foot 3+ View Bilateral    Result Date: 2/27/2025   No acute abnormality.   This report was finalized on 2/27/2025 12:25 AM by Dr. Eitan Watts M.D on Workstation: [id number]      XR Knee 4+ View Bilateral    Result Date: 2/26/2025   No acute abnormality.   This report was finalized on 2/26/2025 10:01 PM by Dr. Eitan Watts M.D on Workstation: [id number]      XR Ankle 3+ View Bilateral    Result Date: 2/26/2025   No acute abnormality.   This report was finalized on 2/26/2025 10:00 PM by Dr. Eitan Watts M.D on Workstation: [id number]      XR Tibia Fibula 2 View Bilateral    Result Date: 2/26/2025   No acute abnormality.   This report was finalized on 2/26/2025 9:56 PM by Dr. Eitan Watts M.D on Workstation: [id number]       LABS:   Results for the past 24 hours:   Recent Results (from the past 24 hours)   ECG 12 Lead Other; Weakness, CHF history    Collection Time: 02/26/25  8:36 PM   Result Value Ref Range    QT Interval 402 ms    QTC Interval 438 ms   Amylase    Collection Time: 02/26/25 11:45 PM    Specimen: Blood   Result Value Ref Range    Amylase 44 28 - 100 U/L   BNP    Collection Time: 02/26/25 11:45 PM    Specimen: Blood   Result Value Ref Range    proBNP 2,590.0 (H) 0.0 - 1,800.0 pg/mL   Calcium, Ionized    Collection Time: 02/26/25 11:45 PM    Specimen: Blood   Result Value Ref Range    Ionized Calcium 1.29 1.15 - 1.35 mmol/L   CK    Collection Time: 02/26/25 11:45 PM    Specimen: Blood   Result Value Ref Range    Creatine Kinase 36 20 - 180 U/L   Comprehensive Metabolic Panel    Collection Time: 02/26/25 11:45 PM    Specimen: Blood   Result Value Ref Range     Glucose 112 (H) 65 - 99 mg/dL    BUN 33 (H) 8 - 23 mg/dL    Creatinine 0.93 0.57 - 1.00 mg/dL    Sodium 144 136 - 145 mmol/L    Potassium 3.6 3.5 - 5.2 mmol/L    Chloride 107 98 - 107 mmol/L    CO2 25.0 22.0 - 29.0 mmol/L    Calcium 9.3 8.6 - 10.5 mg/dL    Total Protein 5.6 (L) 6.0 - 8.5 g/dL    Albumin 3.5 3.5 - 5.2 g/dL    ALT (SGPT) 9 1 - 33 U/L    AST (SGOT) 11 1 - 32 U/L    Alkaline Phosphatase 80 39 - 117 U/L    Total Bilirubin 0.4 0.0 - 1.2 mg/dL    Globulin 2.1 gm/dL    A/G Ratio 1.7 g/dL    BUN/Creatinine Ratio 35.5 (H) 7.0 - 25.0    Anion Gap 12.0 5.0 - 15.0 mmol/L    eGFR 59.6 (L) >60.0 mL/min/1.73   Lipase    Collection Time: 02/26/25 11:45 PM    Specimen: Blood   Result Value Ref Range    Lipase 24 13 - 60 U/L   Lipid Panel    Collection Time: 02/26/25 11:45 PM    Specimen: Blood   Result Value Ref Range    Total Cholesterol 128 0 - 200 mg/dL    Triglycerides 121 0 - 150 mg/dL    HDL Cholesterol 48 40 - 60 mg/dL    LDL Cholesterol  58 0 - 100 mg/dL    VLDL Cholesterol 22 5 - 40 mg/dL    LDL/HDL Ratio 1.16    Magnesium    Collection Time: 02/26/25 11:45 PM    Specimen: Blood   Result Value Ref Range    Magnesium 2.0 1.6 - 2.4 mg/dL   Phosphorus    Collection Time: 02/26/25 11:45 PM    Specimen: Blood   Result Value Ref Range    Phosphorus 2.2 (L) 2.5 - 4.5 mg/dL   TSH Rfx On Abnormal To Free T4    Collection Time: 02/26/25 11:45 PM    Specimen: Blood   Result Value Ref Range    TSH 1.110 0.270 - 4.200 uIU/mL   CBC Auto Differential    Collection Time: 02/26/25 11:45 PM    Specimen: Blood   Result Value Ref Range    WBC 7.80 3.40 - 10.80 10*3/mm3    RBC 3.51 (L) 3.77 - 5.28 10*6/mm3    Hemoglobin 10.8 (L) 12.0 - 15.9 g/dL    Hematocrit 32.8 (L) 34.0 - 46.6 %    MCV 93.4 79.0 - 97.0 fL    MCH 30.8 26.6 - 33.0 pg    MCHC 32.9 31.5 - 35.7 g/dL    RDW 12.9 12.3 - 15.4 %    RDW-SD 44.5 37.0 - 54.0 fl    MPV 11.1 6.0 - 12.0 fL    Platelets 176 140 - 450 10*3/mm3   Manual Differential    Collection Time:  02/26/25 11:45 PM    Specimen: Blood   Result Value Ref Range    Neutrophil % 79.0 (H) 42.7 - 76.0 %    Lymphocyte % 14.0 (L) 19.6 - 45.3 %    Monocyte % 5.0 5.0 - 12.0 %    Eosinophil % 2.0 0.3 - 6.2 %    Neutrophils Absolute 6.16 1.70 - 7.00 10*3/mm3    Lymphocytes Absolute 1.09 0.70 - 3.10 10*3/mm3    Monocytes Absolute 0.39 0.10 - 0.90 10*3/mm3    Eosinophils Absolute 0.16 0.00 - 0.40 10*3/mm3    RBC Morphology Normal Normal    WBC Morphology Normal Normal    Platelet Morphology Normal Normal   Hemoglobin A1c    Collection Time: 02/27/25  1:17 AM    Specimen: Blood   Result Value Ref Range    Hemoglobin A1C 5.80 (H) 4.80 - 5.60 %       IMPRESSION:  Patient is a 85 y.o. female with bilateral pes plano valgus and posterior tibial tendon dysfunction right foot more painful than the left    PLAN:   Admited to: Óscar Cadet MD  [facility]   Recommend podiatry consult if able   Cam boot or lace up ankle brace although I do feel she would do better in a boot.   Discussed with her the diagnosis and treatment plan with conservative management   To follow up with podiatry   please call/chat  with any questions or concerns.    Morris Byers MD   [facility]   [phone] - [place] Office  [phone] - [place] Office

## 2025-02-27 NOTE — THERAPY EVALUATION
Patient Name: Sasha Barrett  : 1940    MRN: [id number]                              Today's Date: 2025       Admit Date: 2025    Visit Dx: No diagnosis found.  Patient Active Problem List   Diagnosis    Joint pain    Urinary retention self-caths (Don)    Conjunctivitis    Arm pain    Other cervical disc displacement at C5-C6 level    Cervical pain (Servando=PM)    DDD (degenerative disc disease), cervical    Hyperlipidemia LDL goal <70    Preventative health care    Medication management    Proctocele    Urge incontinence of urine    Chronic tension-type headache, intractable    Obesity (BMI 30.0-34.9)    H/o Lyme disease    Stage 2 chronic kidney disease    Cerebral atherosclerosis    Allergic contact dermatitis    Malaise and fatigue progressive    Dizziness    Vitamin D deficiency    Moderate persistent asthmatic bronchitis without complication    Anterior cervical adenopathy due to infection    Pleurisy    Pain in both lower extremities    Elevated PTH level    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)    Gastroesophageal reflux disease with esophagitis    Psoriasis (Darryl Ochoa)    Postmenopausal    Recurrent UTI    H/O Cerebral vascular accident in left occipital area    Dyspnea on exertion    Precordial pain    Abnormal EKG    Arthralgia    Accelerated hypertension    Acute joint pain    Easy bruisability    Cough productive of purulent sputum    Influenza A Subtype H3    COPD with asthma    Acute chest wall pain    Decreased mobility and endurance    Essential hypertension    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    Migraine without aura and without status migrainosus, not intractable    Thyroid nodule    Osteoarthritis of left hip    Spinal stenosis of lumbar region with radiculopathy    Acute deep vein thrombosis (DVT) of right lower extremity    Acute deep vein thrombosis    Single subsegmental pulmonary embolism without acute cor  pulmonale    Other chronic pain    Palpitations    Secondary hyperparathyroidism    Syncope    Ataxia    Adult failure to thrive syndrome    Recent unexplained weight loss 42# over last 6 months    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  T    Edema    Severe malnutrition    KECIA (generalized anxiety disorder)    Volume depletion    Cervical stenosis of spine    Recurrent falls while walking    Closed head injury with concussion    Hypokalemia    Anemia, mild    Goiter diffuse    Lumbar herniated disc L2-L3    Spinal stenosis of lumbar region with neurogenic claudication    Diastolic dysfunction with chronic heart failure    Chronic heart failure with preserved ejection fraction (HFpEF)    Moderate malnutrition due to chronic disease     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 Yazmin Canseco    CARDIAC ELECTROPHYSIOLOGY PROCEDURE N/A 5/27/2022    Procedure: Loop insertion;  Surgeon: Jeffrey Argueta MD;  Location: [facility] CATH [facility]ASIVE LOC[facility];  Service: Cardiovascular;  Laterality: N/A;  biotronik    CARDIAC ELECTROPHYSIOLOGY PROCEDURE N/A 7/13/2022    Procedure: Loop recorder removal;  Surgeon: Jeffrey Argueta MD;  Location:  [facility] CATH INVASIVE LOCATION;  Service: Cardiovascular;  Laterality: N/A;    CARPAL TUNNEL RELEASE Right 05/25/2011    Dr. Caleb Bueno    CARPAL TUNNEL RELEASE Left 04/26/2011    Dr. Caleb Bueno    CATARACT EXTRACTION Left 11/29/2010    Dr. Eusebio Downs    CATARACT EXTRACTION Right 11/17/2010    Dr. Eusebio Downs    COLONOSCOPY N/A 12/06/2002    Sigmoid diverticulosis-Dr. Brandon Batista    COLONOSCOPY N/A 12/12/2013    Tortuous colon, diverticulosis in the sigmoid colon and descending colon, stool in the rectum, sigmoid colon, descending colon, splenic flexure, transverse colon and hepatic flexure-Dr. Irma Brambila    DEQUERVAIN RELEASE Left 9/23/2020    Procedure: DEQUERVAIN RELEASE LEFT HAND;  Surgeon: Caleb Bueno MD;  Location: [facility] OR [facility];  Service: Plastics;  Laterality: Left;    ENDOSCOPY N/A 09/13/2007    Normal-Dr. Pavel Quarles    ENDOSCOPY AND COLONOSCOPY N/A 09/19/2005    1 cm hiatal hernia, mild Schatzki's ring, sigmoid diverticulosis-Dr. Yoel Farley    GANGLION CYST EXCISION Left 12/18/2012    Release of A1 pulley flexor sheath, left fourth finger, excision of ganglion cyst 0.5 cm diameter, A2 pulley flexor sheath, left fourth finger-Dr. Caleb Bueno    HEEL SPUR EXCISION Left 1968    INGUINAL HERNIA REPAIR Right 1971    at 5 months pregnant     INJECTION OF MEDICATION Left 9/23/2020    Procedure: KENOLOG INJECTION TO LEFT THUMB;  Surgeon: Caleb Bueno MD;  Location:  [facility];   Service: Plastics;  Laterality: Left;    MASTECTOMY Right 08/05/2008    Right breast mastectomy and excision of left chest wall lesion-Dr. Yoel Farley    MASTECTOMY RADICAL Left 04/29/2003    Left modified radical mastectomy-Dr. Yazmin Canseco    PARATHYROIDECTOMY Right 05/04/2004    Excision of parathyroid adenoma (right superior)-Dr. Yoel Farley    REPLACEMENT TOTAL KNEE Right 04/09/2012    Dr. Lamonte Childress    SINUS SURGERY  1984    THYROIDECTOMY, PARTIAL Left 05/04/2004    Dr. Yoel Farley    TOTAL ABDOMINAL HYSTERECTOMY Bilateral 1973    Dr. Mahan    TRIGGER FINGER RELEASE Left 9/23/2020    Procedure: RELEASE LEFT FOURTH TRIGGER FINGER;  Surgeon: Caleb Bueno MD;  Location: [facility] OR [facility];  Service: Plastics;  Laterality: Left;    UPPER GASTROINTESTINAL ENDOSCOPY N/A 02/18/2009    LA Grade A reflux esophagitis, normal stomach, normal 2nd part of the duodenum-Dr. Yoel Farley    WRIST GANGLION EXCISION Left 9/23/2020    Procedure: EXCISION GANGLION CYST LEFT WRIST;  Surgeon: Caleb Bueno MD;  Location: [facility] OR [facility];  Service: Plastics;  Laterality: Left;      General Information       Row Name 02/27/25 1522          OT Time and Intention    Subjective Information no complaints  -KG     Document Type evaluation  -KG     Mode of Treatment individual therapy;occupational therapy  -KG     Patient Effort good  -KG     Symptoms Noted During/After Treatment none  -KG       Row Name 02/27/25 1522          General Information    Patient Profile Reviewed yes  -KG     Prior Level of Function --  Per chart, pt and her spouse have had difficulty caring for themselves at home.  -KG     Existing Precautions/Restrictions fall  WBAT RLE -- CAM boot or ankle brace for comfort  -KG       Row Name 02/27/25 1522          Living Environment    People in Home spouse  -KG       Row Name 02/27/25 1522          Safety Issues/Impairments Affecting Functional Mobility    Safety Issues Affecting  Function (Mobility) ability to follow commands;impulsivity;insight into deficits/self-awareness;judgment;positioning of assistive device;problem-solving;sequencing abilities  -KG     Impairments Affecting Function (Mobility) balance;cognition;endurance/activity tolerance;strength  -KG     Cognitive Impairments, Mobility Safety/Performance attention;awareness, need for assistance;impulsivity;insight into deficits/self-awareness;judgment;problem-solving/reasoning;sequencing abilities  -KG               User Key  (r) = Recorded By, (t) = Taken By, (c) = Cosigned By      Initials Name Provider Type    KG Fito Walker OT Occupational Therapist                     Mobility/ADL's       Row Name 02/27/25 1523          Transfers    Transfers sit-stand transfer;stand-sit transfer  -KG       Row Name 02/27/25 1523          Sit-Stand Transfer    Sit-Stand [place] (Transfers) contact guard  -KG     Assistive Device (Sit-Stand Transfers) walker, front-wheeled  -KG       Row Name 02/27/25 1523          Stand-Sit Transfer    Stand-Sit [place] (Transfers) contact guard  -KG     Assistive Device (Stand-Sit Transfers) walker, front-wheeled  -KG       Row Name 02/27/25 1523          Functional Mobility    Functional Mobility- Ind. Level contact guard assist  from recliner to hallway, and back to recliner (simulating bathroom distance) slow, required frequent redirects and cueing for proper use of RW  -KG     Functional Mobility- Device walker, front-wheeled  -KG       Row Name 02/27/25 1523          Activities of Daily Living    BADL Assessment/Intervention lower body dressing  -KG       Row Name 02/27/25 1523          Mobility    Extremity Weight-bearing Status right lower extremity  -KG     Right Lower Extremity (Weight-bearing Status) weight-bearing as tolerated (WBAT)  CAM boot or ankle brace for comfort  -KG       Row Name 02/27/25 1523          Lower Body Dressing Assessment/Training    [place] Level (Lower Body  Dressing) don;socks;set up  -KG     Position (Lower Body Dressing) supported sitting  -KG               User Key  (r) = Recorded By, (t) = Taken By, (c) = Cosigned By      Initials Name Provider Type    VIOLETA Fito Walker OT Occupational Therapist                   Obj/Interventions       Row Name 02/27/25 1524          Sensory Assessment (Somatosensory)    Sensory Assessment (Somatosensory) sensation intact  -KG       Row Name 02/27/25 1524          Vision Assessment/Intervention    Visual Impairment/Limitations WNL  -KG       Row Name 02/27/25 1524          Range of Motion Comprehensive    General Range of Motion no range of motion deficits identified  -KG       Row Name 02/27/25 1524          Strength Comprehensive (MMT)    Comment, General Manual Muscle Testing (MMT) Assessment BUE strength 4-/5 grossly  -KG       Row Name 02/27/25 1524          Motor Skills    Motor Skills functional endurance  -KG     Functional Endurance fair  -KG       Row Name 02/27/25 1524          Balance    Balance Assessment sitting static balance;sitting dynamic balance;sit to stand dynamic balance;standing static balance;standing dynamic balance  -KG     Static Sitting Balance standby assist  -KG     Dynamic Sitting Balance standby assist  -KG     Position, Sitting Balance sitting in chair  -KG     Sit to Stand Dynamic Balance contact guard  -KG     Static Standing Balance contact guard  -KG     Dynamic Standing Balance contact guard  -KG     Position/Device Used, Standing Balance walker, front-wheeled  -KG     Balance Interventions sitting;standing;sit to stand;supported;dynamic;static;occupation based/functional task  -KG               User Key  (r) = Recorded By, (t) = Taken By, (c) = Cosigned By      Initials Name Provider Type    VIOLETA Fito Walker OT Occupational Therapist                   Goals/Plan       Row Name 02/27/25 1531          Bed Mobility Goal 1 (OT)    Activity/Assistive Device (Bed Mobility Goal 1, OT) sit to  supine;supine to sit  -KG     [place] Level/Cues Needed (Bed Mobility Goal 1, OT) standby assist  -KG     Time Frame (Bed Mobility Goal 1, OT) short term goal (STG);2 weeks  -KG     Progress/Outcomes (Bed Mobility Goal 1, OT) new goal  -KG       Row Name 02/27/25 1531          Transfer Goal 1 (OT)    Activity/Assistive Device (Transfer Goal 1, OT) sit-to-stand/stand-to-sit;bed-to-chair/chair-to-bed;toilet  -KG     [place] Level/Cues Needed (Transfer Goal 1, OT) standby assist  -KG     Time Frame (Transfer Goal 1, OT) short term goal (STG);2 weeks  -KG     Progress/Outcome (Transfer Goal 1, OT) new goal  -KG       Row Name 02/27/25 1531          Bathing Goal 1 (OT)    Activity/Device (Bathing Goal 1, OT) upper body bathing;lower body bathing  -KG     [place] Level/Cues Needed (Bathing Goal 1, OT) standby assist  -KG     Time Frame (Bathing Goal 1, OT) short term goal (STG);2 weeks  -KG     Progress/Outcomes (Bathing Goal 1, OT) new goal  -KG       Row Name 02/27/25 1531          Dressing Goal 1 (OT)    Activity/Device (Dressing Goal 1, OT) upper body dressing;lower body dressing  -KG     [place]/Cues Needed (Dressing Goal 1, OT) standby assist  -KG     Time Frame (Dressing Goal 1, OT) short term goal (STG);2 weeks  -KG     Progress/Outcome (Dressing Goal 1, OT) new goal  -KG       Row Name 02/27/25 1531          Toileting Goal 1 (OT)    Activity/Device (Toileting Goal 1, OT) adjust/manage clothing;perform perineal hygiene  -KG     [place] Level/Cues Needed (Toileting Goal 1, OT) standby assist  -KG     Time Frame (Toileting Goal 1, OT) short term goal (STG);2 weeks  -KG     Progress/Outcome (Toileting Goal 1, OT) new goal  -KG       Row Name 02/27/25 1531          Grooming Goal 1 (OT)    Activity/Device (Grooming Goal 1, OT) oral care;wash face, hands  -KG     [place] (Grooming Goal 1, OT) standby assist  -KG     Time Frame (Grooming Goal 1, OT) short term goal (STG);2 weeks  -KG      Progress/Outcome (Grooming Goal 1, OT) new goal  -KG       Row Name 02/27/25 1531          Therapy Assessment/Plan (OT)    Planned Therapy Interventions (OT) activity tolerance training;adaptive equipment training;BADL retraining;functional balance retraining;occupation/activity based interventions;patient/caregiver education/training;ROM/therapeutic exercise;transfer/mobility retraining;strengthening exercise  -KG               User Key  (r) = Recorded By, (t) = Taken By, (c) = Cosigned By      Initials Name Provider Type    KG Fito Walker, MANN Occupational Therapist                   Clinical Impression       Row Name 02/27/25 1525          Pain Assessment    Pretreatment Pain Rating 0/10 - no pain  -KG     Posttreatment Pain Rating 0/10 - no pain  -KG       Row Name 02/27/25 1525          Plan of Care Review    Plan of Care Reviewed With patient  -KG     Outcome Evaluation Pt admitted to [facility] for adult failure to thrive and bilateral foot/ankle pain. Per ortho note, pt has bilateral pes plano valgus and posterior tibial tendon dysfunction (R>L), recommend WBAT to RLE w/ CAM boot or lace up ankle brace for comfort. Pt lives w/ her spouse who is also currently admitted, per chart they have had difficulty caring for themselves at home. Pt was UIC in her husbands room upon arrival. Performing STS transfers and functional mobility w/ CGA + RW. Setup for LBD. Pt did fatigue quickly, was unsteady at times, and required frequent cueing for redirection and proper use of RW. OT will continue to follow to address strength, balance, and activity tolerance while inpatient. Recs pending progress.  -KG       Row Name 02/27/25 1527          Therapy Assessment/Plan (OT)    Rehab Potential (OT) fair  -KG     Criteria for Skilled Therapeutic Interventions Met (OT) skilled treatment is necessary  -KG     Therapy Frequency (OT) 3 times/wk  -KG       Row Name 02/27/25 1527          Therapy Plan Review/Discharge Plan (OT)     Anticipated Discharge Disposition (OT) --  TBD, pending progress  -KG       Row Name 02/27/25 1525          Vital Signs    Pre Patient Position Sitting  -KG     Intra Patient Position Standing  -KG     Post Patient Position Sitting  -KG       Row Name 02/27/25 1525          Positioning and Restraints    Pre-Treatment Position sitting in chair/recliner  -KG     Post Treatment Position chair  -KG     In Chair notified nsg;reclined;call light within reach;encouraged to call for assist;exit alarm on  -KG               User Key  (r) = Recorded By, (t) = Taken By, (c) = Cosigned By      Initials Name Provider Type    Fito Albright, OT Occupational Therapist                   Outcome Measures       Row Name 02/27/25 1532          How much help from another is currently needed...    Putting on and taking off regular lower body clothing? 4  -KG     Bathing (including washing, rinsing, and drying) 3  -KG     Toileting (which includes using toilet bed pan or urinal) 3  -KG     Putting on and taking off regular upper body clothing 3  -KG     Taking care of personal grooming (such as brushing teeth) 4  -KG     Eating meals 4  -KG     AM-PAC 6 Clicks Score (OT) 21  -KG       Row Name 02/27/25 1002          How much help from another person do you currently need...    Turning from your back to your side while in flat bed without using bedrails? 3  -LR     Moving from lying on back to sitting on the side of a flat bed without bedrails? 3  -LR     Moving to and from a bed to a chair (including a wheelchair)? 2  -LR     Standing up from a chair using your arms (e.g., wheelchair, bedside chair)? 3  -LR     Climbing 3-5 steps with a railing? 2  -LR     To walk in hospital room? 2  -LR     AM-PAC 6 Clicks Score (PT) 15  -LR       Row Name 02/27/25 1532          Modified [place] Scale    Modified [place] Scale 4 - Moderately severe disability.  Unable to walk without assistance, and unable to attend to own bodily needs without  assistance.  -KG       Row Name 02/27/25 1532          Functional Assessment    Outcome Measure Options AM-PAC 6 Clicks Daily Activity (OT);Modified Amelia  -KG               User Key  (r) = Recorded By, (t) = Taken By, (c) = Cosigned By      Initials Name Provider Type    Fito Albright OT Occupational Therapist    Joy Cates, RN Registered Nurse                    Occupational Therapy Education       Title: PT OT SLP Therapies (In Progress)       Topic: Occupational Therapy (Not Started)       Point: ADL training (Not Started)       Description:   Instruct learner(s) on proper safety adaptation and remediation techniques during self care or transfers.   Instruct in proper use of assistive devices.                  Learner Progress:  Not documented in this visit.              Point: Home exercise program (Not Started)       Description:   Instruct learner(s) on appropriate technique for monitoring, assisting and/or progressing therapeutic exercises/activities.                  Learner Progress:  Not documented in this visit.              Point: Precautions (Not Started)       Description:   Instruct learner(s) on prescribed precautions during self-care and functional transfers.                  Learner Progress:  Not documented in this visit.              Point: Body mechanics (Not Started)       Description:   Instruct learner(s) on proper positioning and spine alignment during self-care, functional mobility activities and/or exercises.                  Learner Progress:  Not documented in this visit.                                  OT Recommendation and Plan  Planned Therapy Interventions (OT): activity tolerance training, adaptive equipment training, BADL retraining, functional balance retraining, occupation/activity based interventions, patient/caregiver education/training, ROM/therapeutic exercise, transfer/mobility retraining, strengthening exercise  Therapy Frequency (OT): 3 times/wk  Plan of  Care Review  Plan of Care Reviewed With: patient  Outcome Evaluation: Pt admitted to [facility] for adult failure to thrive and bilateral foot/ankle pain. Per ortho note, pt has bilateral pes plano valgus and posterior tibial tendon dysfunction (R>L), recommend WBAT to RLE w/ CAM boot or lace up ankle brace for comfort. Pt lives w/ her spouse who is also currently admitted, per chart they have had difficulty caring for themselves at home. Pt was UIC in her husbands room upon arrival. Performing STS transfers and functional mobility w/ CGA + RW. Setup for LBD. Pt did fatigue quickly, was unsteady at times, and required frequent cueing for redirection and proper use of RW. OT will continue to follow to address strength, balance, and activity tolerance while inpatient. Recs pending progress.     Time Calculation:   Evaluation Complexity (OT)  Review Occupational Profile/Medical/Therapy History Complexity: brief/low complexity  Assessment, Occupational Performance/Identification of Deficit Complexity: 1-3 performance deficits  Clinical Decision Making Complexity (OT): problem focused assessment/low complexity  Overall Complexity of Evaluation (OT): low complexity     Time Calculation- OT       Row Name 02/27/25 1532             Time Calculation- OT    OT Start Time 1410  -KG      OT Stop Time 1432  -KG      OT Time Calculation (min) 22 min  -KG      Total Timed Code Minutes- OT 15 minute(s)  -KG      OT Non-Billable Time (min) 7 min  -KG      OT Received On 02/27/25  -KG      OT - Next Appointment 03/03/25  -KG      OT Goal Re-Cert Due Date 03/13/25  -KG         Timed Charges    93514 - OT Therapeutic Activity Minutes 15  -KG         Untimed Charges    OT Eval/Re-eval Minutes 7  -KG         Total Minutes    Timed Charges Total Minutes 15  -KG      Untimed Charges Total Minutes 7  -KG       Total Minutes 22  -KG                User Key  (r) = Recorded By, (t) = Taken By, (c) = Cosigned By      Initials Name Provider Type    KG  Fito Walker OT Occupational Therapist                  Therapy Charges for Today       Code Description Service Date Service Provider Modifiers Qty    [id number]  OT THERAPEUTIC ACT EA 15 MIN 2/27/2025 Fito Walker OT GO 1    [id number]  OT EVAL LOW COMPLEXITY 2 2/27/2025 Fito Walker OT GO 1                 Fito Walker OT  2/27/2025

## 2025-02-28 LAB
ALBUMIN SERPL-MCNC: 3.2 G/DL (ref 3.5–5.2)
ALBUMIN/GLOB SERPL: 1.5 G/DL
ALP SERPL-CCNC: 74 U/L (ref 39–117)
ALT SERPL W P-5'-P-CCNC: 6 U/L (ref 1–33)
ANION GAP SERPL CALCULATED.3IONS-SCNC: 7 MMOL/L (ref 5–15)
AST SERPL-CCNC: 8 U/L (ref 1–32)
BASOPHILS # BLD AUTO: 0.03 10*3/MM3 (ref 0–0.2)
BASOPHILS NFR BLD AUTO: 0.5 % (ref 0–1.5)
BILIRUB SERPL-MCNC: 0.3 MG/DL (ref 0–1.2)
BUN SERPL-MCNC: 21 MG/DL (ref 8–23)
BUN/CREAT SERPL: 28.8 (ref 7–25)
CALCIUM SPEC-SCNC: 9.4 MG/DL (ref 8.6–10.5)
CHLORIDE SERPL-SCNC: 111 MMOL/L (ref 98–107)
CO2 SERPL-SCNC: 26 MMOL/L (ref 22–29)
CREAT SERPL-MCNC: 0.73 MG/DL (ref 0.57–1)
DEPRECATED RDW RBC AUTO: 44.7 FL (ref 37–54)
EGFRCR SERPLBLD CKD-EPI 2021: 79.7 ML/MIN/1.73
EOSINOPHIL # BLD AUTO: 0.09 10*3/MM3 (ref 0–0.4)
EOSINOPHIL NFR BLD AUTO: 1.4 % (ref 0.3–6.2)
ERYTHROCYTE [DISTWIDTH] IN BLOOD BY AUTOMATED COUNT: 13.1 % (ref 12.3–15.4)
GLOBULIN UR ELPH-MCNC: 2.2 GM/DL
GLUCOSE SERPL-MCNC: 115 MG/DL (ref 65–99)
HCT VFR BLD AUTO: 31.5 % (ref 34–46.6)
HGB BLD-MCNC: 10.2 G/DL (ref 12–15.9)
IMM GRANULOCYTES # BLD AUTO: 0.01 10*3/MM3 (ref 0–0.05)
IMM GRANULOCYTES NFR BLD AUTO: 0.2 % (ref 0–0.5)
LYMPHOCYTES # BLD AUTO: 2.05 10*3/MM3 (ref 0.7–3.1)
LYMPHOCYTES NFR BLD AUTO: 32.6 % (ref 19.6–45.3)
MAGNESIUM SERPL-MCNC: 2.2 MG/DL (ref 1.6–2.4)
MCH RBC QN AUTO: 30.4 PG (ref 26.6–33)
MCHC RBC AUTO-ENTMCNC: 32.4 G/DL (ref 31.5–35.7)
MCV RBC AUTO: 93.8 FL (ref 79–97)
MONOCYTES # BLD AUTO: 0.56 10*3/MM3 (ref 0.1–0.9)
MONOCYTES NFR BLD AUTO: 8.9 % (ref 5–12)
NEUTROPHILS NFR BLD AUTO: 3.54 10*3/MM3 (ref 1.7–7)
NEUTROPHILS NFR BLD AUTO: 56.4 % (ref 42.7–76)
NRBC BLD AUTO-RTO: 0 /100 WBC (ref 0–0.2)
PLATELET # BLD AUTO: 165 10*3/MM3 (ref 140–450)
PMV BLD AUTO: 10.8 FL (ref 6–12)
POTASSIUM SERPL-SCNC: 4.5 MMOL/L (ref 3.5–5.2)
PROT SERPL-MCNC: 5.4 G/DL (ref 6–8.5)
RBC # BLD AUTO: 3.36 10*6/MM3 (ref 3.77–5.28)
SODIUM SERPL-SCNC: 144 MMOL/L (ref 136–145)
URATE SERPL-MCNC: 5.3 MG/DL (ref 2.4–5.7)
WBC NRBC COR # BLD AUTO: 6.28 10*3/MM3 (ref 3.4–10.8)

## 2025-02-28 PROCEDURE — A9270 NON-COVERED ITEM OR SERVICE: HCPCS | Performed by: INTERNAL MEDICINE

## 2025-02-28 PROCEDURE — 63710000001 NIFEDIPINE XL 30 MG TABLET SUSTAINED-RELEASE 24 HOUR: Performed by: INTERNAL MEDICINE

## 2025-02-28 PROCEDURE — 63710000001 CETIRIZINE 10 MG TABLET: Performed by: INTERNAL MEDICINE

## 2025-02-28 PROCEDURE — 80053 COMPREHEN METABOLIC PANEL: CPT | Performed by: INTERNAL MEDICINE

## 2025-02-28 PROCEDURE — 94664 DEMO&/EVAL PT USE INHALER: CPT

## 2025-02-28 PROCEDURE — 63710000001 POTASSIUM CHLORIDE 20 MEQ PACK: Performed by: INTERNAL MEDICINE

## 2025-02-28 PROCEDURE — 51798 US URINE CAPACITY MEASURE: CPT

## 2025-02-28 PROCEDURE — 94799 UNLISTED PULMONARY SVC/PX: CPT

## 2025-02-28 PROCEDURE — 63710000001 TRIAMCINOLONE 0.1 % CREAM 15 G TUBE: Performed by: INTERNAL MEDICINE

## 2025-02-28 PROCEDURE — 63710000001 ROSUVASTATIN 10 MG TABLET: Performed by: INTERNAL MEDICINE

## 2025-02-28 PROCEDURE — 63710000001 RIVAROXABAN 20 MG TABLET: Performed by: INTERNAL MEDICINE

## 2025-02-28 PROCEDURE — 63710000001 HYDROCODONE-ACETAMINOPHEN 10-325 MG TABLET: Performed by: INTERNAL MEDICINE

## 2025-02-28 PROCEDURE — 83735 ASSAY OF MAGNESIUM: CPT

## 2025-02-28 PROCEDURE — 63710000001 CLONIDINE 0.1 MG TABLET: Performed by: INTERNAL MEDICINE

## 2025-02-28 PROCEDURE — 63710000001 VITAMIN B-12 500 MCG TABLET: Performed by: INTERNAL MEDICINE

## 2025-02-28 PROCEDURE — 85025 COMPLETE CBC W/AUTO DIFF WBC: CPT | Performed by: INTERNAL MEDICINE

## 2025-02-28 PROCEDURE — G0378 HOSPITAL OBSERVATION PER HR: HCPCS

## 2025-02-28 PROCEDURE — 63710000001 PANTOPRAZOLE 40 MG TABLET DELAYED-RELEASE: Performed by: INTERNAL MEDICINE

## 2025-02-28 PROCEDURE — 84550 ASSAY OF BLOOD/URIC ACID: CPT

## 2025-02-28 PROCEDURE — 63710000001 MELATONIN 5 MG TABLET: Performed by: INTERNAL MEDICINE

## 2025-02-28 PROCEDURE — 97162 PT EVAL MOD COMPLEX 30 MIN: CPT

## 2025-02-28 PROCEDURE — 63710000001 PREDNISONE PER 5 MG: Performed by: INTERNAL MEDICINE

## 2025-02-28 PROCEDURE — 97110 THERAPEUTIC EXERCISES: CPT

## 2025-02-28 PROCEDURE — 63710000001 EPLERENONE 25 MG TABLET: Performed by: INTERNAL MEDICINE

## 2025-02-28 PROCEDURE — 63710000001 MULTIVITAMIN WITH MINERALS TABLET: Performed by: INTERNAL MEDICINE

## 2025-02-28 PROCEDURE — 94761 N-INVAS EAR/PLS OXIMETRY MLT: CPT

## 2025-02-28 RX ORDER — DESONIDE 0.5 MG/G
1 OINTMENT TOPICAL DAILY
Status: DISCONTINUED | OUTPATIENT
Start: 2025-02-28 | End: 2025-02-28

## 2025-02-28 RX ORDER — DIAPER,BRIEF,INFANT-TODD,DISP
1 EACH MISCELLANEOUS DAILY
Status: DISCONTINUED | OUTPATIENT
Start: 2025-02-28 | End: 2025-03-03 | Stop reason: HOSPADM

## 2025-02-28 RX ADMIN — CLONIDINE HYDROCHLORIDE 0.3 MG: 0.1 TABLET ORAL at 06:13

## 2025-02-28 RX ADMIN — EPLERENONE 50 MG: 25 TABLET, FILM COATED ORAL at 11:21

## 2025-02-28 RX ADMIN — HYDROCODONE BITARTRATE AND ACETAMINOPHEN 2 TABLET: 10; 325 TABLET ORAL at 11:27

## 2025-02-28 RX ADMIN — CLONIDINE HYDROCHLORIDE 0.3 MG: 0.1 TABLET ORAL at 18:03

## 2025-02-28 RX ADMIN — IPRATROPIUM BROMIDE 0.5 MG: 0.5 SOLUTION RESPIRATORY (INHALATION) at 21:03

## 2025-02-28 RX ADMIN — FLUTICASONE PROPIONATE 1 SPRAY: 50 SPRAY, METERED NASAL at 11:40

## 2025-02-28 RX ADMIN — Medication 1000 MCG: at 11:19

## 2025-02-28 RX ADMIN — CLONIDINE HYDROCHLORIDE 0.3 MG: 0.1 TABLET ORAL at 11:19

## 2025-02-28 RX ADMIN — POTASSIUM CHLORIDE 20 MEQ: 1.5 POWDER, FOR SOLUTION ORAL at 11:18

## 2025-02-28 RX ADMIN — ROSUVASTATIN CALCIUM 5 MG: 10 TABLET, FILM COATED ORAL at 11:20

## 2025-02-28 RX ADMIN — HYDROCODONE BITARTRATE AND ACETAMINOPHEN 2 TABLET: 10; 325 TABLET ORAL at 18:03

## 2025-02-28 RX ADMIN — Medication 10 MG: at 21:35

## 2025-02-28 RX ADMIN — TRIAMCINOLONE ACETONIDE 1 APPLICATION: 1 CREAM TOPICAL at 21:35

## 2025-02-28 RX ADMIN — RIVAROXABAN 20 MG: 20 TABLET, FILM COATED ORAL at 18:03

## 2025-02-28 RX ADMIN — PANTOPRAZOLE SODIUM 40 MG: 40 TABLET, DELAYED RELEASE ORAL at 06:13

## 2025-02-28 RX ADMIN — IPRATROPIUM BROMIDE 0.5 MG: 0.5 SOLUTION RESPIRATORY (INHALATION) at 15:15

## 2025-02-28 RX ADMIN — FLUTICASONE PROPIONATE 1 SPRAY: 50 SPRAY, METERED NASAL at 21:35

## 2025-02-28 RX ADMIN — PREDNISONE 10 MG: 10 TABLET ORAL at 11:20

## 2025-02-28 RX ADMIN — Medication 1 TABLET: at 11:41

## 2025-02-28 RX ADMIN — ALBUTEROL SULFATE 2.5 MG: 2.5 SOLUTION RESPIRATORY (INHALATION) at 21:02

## 2025-02-28 RX ADMIN — NIFEDIPINE 30 MG: 30 TABLET, EXTENDED RELEASE ORAL at 11:20

## 2025-02-28 RX ADMIN — CETIRIZINE HYDROCHLORIDE 10 MG: 10 TABLET, FILM COATED ORAL at 11:19

## 2025-02-28 NOTE — CASE MANAGEMENT/SOCIAL WORK
Continued Stay Note  [facility]     Patient Name: Sasha Barrett  MRN: [id number]  Today's Date: 2/28/2025    Admit Date: 2/26/2025    Plan: home with hospice and private pay care with [facility]   Discharge Plan       Row Name 02/28/25 8561       Plan    Plan home with hospice and private pay care with [facility]    Patient/Family in Agreement with Plan yes    Plan Comments Family meeting with Charisse [facility]ERSON nurse manager and hospice nurse Pinky along with sons Shania Sherman and their spouses. Both pt and spouse meet for hospice and plan to d/c home with hospice care. Discuss need for 24/7 care for pt and spouse for safety concerns. Family is agreeable to talk with Orly with [facility] [phone] to see what family can afford. Family also agreeable for a referral to [facility]/spoke with Annabel [phone] who will f/u with family to see what support she can offer. CCP to follow.                   Discharge Codes    No documentation.                 Expected Discharge Date and Time       Expected Discharge Date Expected Discharge Time    Mar 1, 2025               KARYN Schrader

## 2025-02-28 NOTE — CASE MANAGEMENT/SOCIAL WORK
Continued Stay Note  [facility]     Patient Name: Sasha Barrett  MRN: [id number]  Today's Date: 2/28/2025    Admit Date: 2/26/2025    Plan: Home; caregivers pending   Discharge Plan       Row Name 02/28/25 1508       Plan    Plan Home with Hospice; caregivers pending    Plan Comments Call from Orly with [facility] she reports she spoke with pt's son Leighton who will not commit to paying for anything more than 4 hours a day for pt and spouse/Raúl Miguel has a min of 6 hours for 2 people in the home at a rate of $40. At this time Orly was not able to schedule any home care for pt and spouse. VM left for [facility] [phone] to arrange Meals on Wheels for pt and spouse.      Row Name 02/28/25 8746       Plan    Plan home with hospice and private pay care with [facility]    Patient/Family in Agreement with Plan yes    Plan Comments Family meeting with Charisse EMERSON nurse manager and hospice nurse Pinky along with sons Shania Sherman and their spouses. Both pt and spouse meet for hospice and plan to d/c home with hospice care. Discuss need for 24/7 care for pt and spouse for safety concerns. Family is agreeable to talk with Orly with [facility] [phone] to see what family can afford. Family also agreeable for a referral to [facility]/spoke with Annabel [phone] who will f/u with family to see what support she can offer. CCP to follow.                   Discharge Codes    No documentation.                 Expected Discharge Date and Time       Expected Discharge Date Expected Discharge Time    Mar 1, 2025               KARYN Schrader

## 2025-02-28 NOTE — PROGRESS NOTES
[facility]  INTERNAL MEDICINE  ÓSCAR CADET MD  [address] [place] [address]  Phone [phone] Fax [phone]  E-mail:  [email or web address]      INTERNAL MEDICINE DAILY PROGRESS NOTE  Óscar Cadet M.D.  2025            Patient Identification:  Name: Sasha Barrett  Age: 85 y.o.  Sex: female  :  1940  MRN: [id number]         Primary Care Physician: Óscar Cadet MD  LENGTH OF STAY 1 DAYS    Consults       Date and Time Order Name Status Description    2025  8:19 PM Inpatient Orthopedic Surgery Consult Completed     2025  8:19 PM Inpatient Nephrology Consult Completed     2025  8:19 PM Inpatient Cardiology Consult              Chief Complaint:    Adult failure to thrive, unexplained weight loss greater than 47 pounds over the last 6 months ataxia, lower extremity pain and radiculopathy, severe lumbar spinal stenosis, and generalized muscle weakness     History of Present Illness:     Subjective  Interval History: Patient is a 85 y.o.female who presented with adult failure to thrive,  recent unexplained weight loss of greater than 47 pounds over the last 6 months, ataxia, lower extremity pain and radiculopathy, and generalized muscle weakness.  Patient had originally accompanied to her  to the hospital and was trying to stay in  his room to help with his care, but nursing staff found that this patient had almost as many medical needs for care as did her .  Patient has been followed closely over the last few years on the [facility] palliative care service due to intractable pain and has required high doses of narcotic pain medication to keep her symptoms under control.  Her condition has gradually and steadily declined and over the last few weeks patient has been unable to get off of the sofa and ambulate in the house despite the best efforts of  [facility] who followed her after her most recent  admission between 1/12/2025 and 1/15/2025 when patient was admitted for a lumbar herniated disc L2-L3 felt to be inoperable, severe spinal stenosis of lumbar region with neurogenic claudication, diastolic dysfunction with chronic heart failure, and recurrent falls while walking which had resulted in a closed head injury with concussion.  Patient's  who is even sicker than this patient, has been trying to cook meals for the 2 of them and do basic housekeeping chores such as washing the laundry and cleaning the house.  Unfortunately, worsening of his own condition has made it almost impossible for the patient's  to complete these tasks.  They do have 6 children who live in the area, but unfortunately only 1 of those children, their son Leighton  and his wife Helen, who patient's have designated as their health care surrogates,  are involved in their care.  Helen reports, that the Young's have been declining to allow the children to participate in their care.  Family worries that the living conditions in their home have deteriorated significantly and they have been unable to  successfully get Mrs. Barrett to take a bath or shower for  several weeks.   Mrs. Barrett has complained of bug bites which her allergist feels are coming from a [place] Lady Beatle that causes bites with severe allergic response.  Apparently there has been an outbreak of these beatles  in the county where the patient [place].  Patient has several other comorbidities that complicate her medical care.  These include most significantly: Stage II chronic kidney disease, malaise and fatigue, recurrent and persistent dizziness, dyspnea on exertion, accelerated hypertension, decreased mobility and endurance which have worsened despite recent efforts for PT and OT in the home, spinal stenosis of lumbar region with radiculopathy, ataxia, severe malnutrition, volume depletion, recurrent falls while walking, hypokalemia, diastolic dysfunction with  chronic heart failure, chronic heart failure with preserved ejection fraction (HFpEF), urinary retention requiring self caths that patient has had difficulty doing lately, cervical disc displacement, extensive degenerative disc disease, hyperlipidemia, proctocele, urge incontinence of urine, chronic tension headaches treated with Fioricet, cerebral atherosclerosis, vitamin D deficiency, moderate persistent asthmatic bronchitis, elevated PTH levels, gastroesophageal reflux disease with esophagitis, recurrent UTIs, history of cerebrovascular accident in left occipital area, acute and diffuse joint pain, easy bruisability, COPD with asthma, essential hypertension, history of acute DVT, 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  Patient is also believed to have some mild but progressive dementia that further complicates her course and makes it difficult for her to make decisions at times.  Patient was admitted to [address] [place] [address] at [facility] for evaluation of the above conditions and to consider the possibility of transition from [facility] palliative care services to full [facility] care.     2/26/2025.  I personally saw the patient for the first time during this hospitalization during my evening rounds on [address] [place] [address].  Patient was actually admitted to room P489 but was in x-ray undergoing extensive x-ray films at the time of my visit.  I examined the patient with the help of the x-ray techs while they are in the radiology department.  I wore full PPE as indicated including an N95 mask as appropriate, goggles, white lab coat, and gloves when touching patient.  I performed thorough hand hygiene before and after the patient visit.   "Patient's main area of concern is the pain and discomfort in her lower extremities from the level of her bilateral knee replacements down.  Patient has an area of bruising on the right anterior shin and has marked deformity of the right ankle which turns outward when at rest on the x-ray table.  Patient also has extensive bruising of the left distal foot involving all toes and extending approximately intermediate up the foot.  She does not recall an injury that caused these symptoms.  Patient also has a bunion on the bottom of the right foot at the base of the great toe which has been pared down by podiatry in the past but continues to give her trouble and make it almost impossible for her to stand up without great pain.  Patient does have braces that have been prepared by orthopedics in the past for her legs but cannot get those braces on at this point because of the progressive deformity of her extremities.  Patient also noted to be a bit confused this evening and repeating several stories over and over.  She is upset about her overall condition and cries easily when discussion of her social issues is brought up.  Mrs. Barrett has been a patient in my office for many years and has been very reliant on my care over that period of time.  She repeatedly asked me \"not to give up on her\" despite the significant decline in her overall stability and condition.  I assured her that I will continue to be her physician and coordinate care.  Patient is also quite reliant on her [facility] palliative care nurse practitioner, Hedy.  It is not clear that patient has been taking her medications as regularly as she has in the past for treatment of her chronic pain condition with intractable pain, or her medications that are used to treat her chronic heart failure due to diastolic dysfunction as prescribed by Dr. Soto in cardiology.  More importantly, patient's weight loss signifies poor nutritional status which results in difficulty with " healing of leg wounds and inability to exercise on a regular basis.  Patient does indicate a willingness to meet with [facility] while here in the hospital and consider the possibility of transition from palliative services to full [facility] care.  She and her  still hope that with this service and help from the family, they can continue to stay in their home until the end of their lives.    2/27/2025.  Patient seen again today at time of my rounds on [facility].  Patient was actually in a lounge chair beside her  in room which is adjacent to the room she is assigned to.  Nursing is aware of patient location.  Patient seems very comfortable and pleased being next to her .  We did have lillian discussions about plans for [facility] consultation tomorrow.  Both patient's expressed strong desire for discharge to home environment.  I did review the fact that Mr. Barrett has severe congestive heart failure with an ejection fraction of 14% which is a very poor prognosis.  Mrs. Barrett seems to understand this situation and wants to get her  home as soon as possible.  They do realize that on [facility] they will not be following up with regular specialists and they realized that the change from palliative to [facility] could entail a change in their nurse practitioner that they would very much prefer to continue having [facility] as their nurse practitioner after the transfer.  They are more open to the idea of having help from their sons who will need to visit and help frequently at the home.  They do wish to have Meals [employer] Wheels bring food into the house since Mr. Barrett may no longer be able to cook with his worsened condition.  I did contact patient's youngest son Leighton who is their healthcare surrogate and his wife Helen who is also designated as a healthcare surrogate.  They do wish for the patient's to both be no CPR per their wishes from many years ago.  They are agreeable to the progression to [facility]  and understand the above factors.  They also understand that family will be meeting to visit much more often to help [facility] with the care.  Patient does have a neighbor who is quite nearby that also is willing to help and is actually cleaning up the house some while they are in the hospital.  The patient's do have a dog and that dog is staying with that health care center gets at the present time.  I did let them know that I think Mr. Barrett's condition is reaching a critical level and that his life expectancy currently is a few days to a few weeks depending on how things go after transfer back to home.  Patients will need an ambulance for transfer to home.  Patient's have been very cooperative with medical staff today that have been clearly confused and nondecisional at times.  I will full PPE for exam as indicated including an N95 mask, goggles, white lab coat, and gloves when touching patient.  I performed thorough hand hygiene before and after the patient visit.  Patient did have labs today that showed sodium of 146 BUN of 50 creatinine of 0.89 estimated GFR of 62.8 normal magnesium at 2.1 and corrected phosphorus at 2.5 uric acid at 5.9 had a liver function test normal hemoglobin A1c 5.8 PTH 60.8 TSH normal 6.  Hemoglobin is 10.8 and white blood cell count is 7.8.  Several specialist saw patient today.  Orthopedics suggested a boot but patient is not very receptive to this and she has several boots at home and is unable to wear them.  Nephrology did see the patient and suggested holding torsemide today but resuming tomorrow if she eats breakfast and is drinking better.  She does need a bladder scan every 6 hours.  They are okay with discharge to home when arrangements are complete.  OT did see patient today.  They were able to get her to perform side-to-side step transfers and functional mobility with contact-guard assist and rolling walker.  Patient does fatigue quickly and was unsteady at times requiring  frequent cueing for redirection and proper use of her walker.  Discharge planning pending.  PT did see patient but she was sleeping at the time of their visit.  Dietitian did add boost twice daily.  Nursing reports that she did have some intermittent confusion but was up with assist x 1.  Incontinent of bladder and bladder scan showed only 62 mL.  Did give Norco for pain.  Spent good portion of today in recliner in 's room.  Pinky Kc from [facility] did see patient and contacted me.  I had changed their CODE STATUS to DNR in [facility].  [facility] plans to meet with Duane and any other family members that wish to discuss goals and plans tomorrow.  I did discuss with them my thoughts and feelings on the subject to today.  Patient is medically stable at present time.  CCP continuing to work on discharge planning.  Suspect family will support home with [facility] services at time of discharge.     Review of Systems:               A comprehensive 14 point review of systems was negative except for:  Constitution:  positive for fatigue, malaise, and weight loss  Eyes:  positive for blurriness, loss of vision, and dryness  Respiratory: positive for  cough, dry and shortness of air  Cardiovascular: positive for  irregular pulse, lower extremity edema, and palpitations  Gastrointestinal: positive for  bloating / distention, constipation, early satiety, and nausea  Musculoskeletal: positive for  back pain, joint pain, joint stiffness, joint swelling, muscle pain, muscle weakness, and neck pain  Neurological: positive for  coordination abnormal, difficulty walking, confusion, dizziness, headaches, light headedness, memory deficit, vertigo, and weakness  Behavioral/Psych: positive for  anxiety, appetite change  with weight loss, excessive irritability, sleep disturbance, and unstable mood       Past Medical History:   Diagnosis Date    Arthritis     Asthma     Breast cancer 2003    Left breast intraductal and infiltrating duct  carcinoma, grade 2    COPD (chronic obstructive pulmonary disease)     Current use of long term anticoagulation     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 Yazmin Canseco    CARDIAC ELECTROPHYSIOLOGY PROCEDURE N/A 5/27/2022    Procedure: Loop insertion;  Surgeon: Jeffrey Argueta MD;  Location:  [facility];  Service: Cardiovascular;  Laterality: N/A;  biotronik    CARDIAC ELECTROPHYSIOLOGY PROCEDURE N/A 7/13/2022    Procedure: Loop recorder removal;  Surgeon: Jeffrey Argueta MD;  Location:  [facility];  Service: Cardiovascular;  Laterality: N/A;    CARPAL TUNNEL RELEASE Right 05/25/2011    Dr. Caleb Bueno    CARPAL TUNNEL RELEASE Left 04/26/2011    Dr. Caleb Bueno    CATARACT EXTRACTION Left 11/29/2010    Dr. Eusebio Downs    CATARACT EXTRACTION Right 11/17/2010    Dr. Eusebio Downs    COLONOSCOPY N/A 12/06/2002    Sigmoid diverticulosis-Dr. Brandon Batista    COLONOSCOPY N/A 12/12/2013    Tortuous colon, diverticulosis in the sigmoid colon and descending colon, stool in the rectum, sigmoid colon, descending colon, splenic flexure, transverse colon and hepatic flexure-Dr. Irma Brambila    DEQUERVAIN RELEASE Left 9/23/2020    Procedure: DEQUERVAIN RELEASE LEFT HAND;  Surgeon: Caleb Bueno MD;  [place]  [facility] [place] [facility] [place];  Service: Plastics;  Laterality: Left;    ENDOSCOPY N/A 09/13/2007    Normal-Dr. Pavel Quarles    ENDOSCOPY AND COLONOSCOPY N/A 09/19/2005    1 cm hiatal hernia, mild  Schatzki's ring, sigmoid diverticulosis-Dr. Yoel Farley    GANGLION CYST EXCISION Left 12/18/2012    Release of A1 pulley flexor sheath, left fourth finger, excision of ganglion cyst 0.5 cm diameter, A2 pulley flexor sheath, left fourth finger-Dr. Caleb Bueno    HEEL SPUR EXCISION Left 1968    INGUINAL HERNIA REPAIR Right 1971    at 5 months pregnant     INJECTION OF MEDICATION Left 9/23/2020    Procedure: KENOLOG INJECTION TO LEFT THUMB;  Surgeon: Caleb Bueno MD;  [place] [facility] [place] [facility] [place]  Service: Plastics;  Laterality: Left;    MASTECTOMY Right 08/05/2008    Right breast mastectomy and excision of left chest wall lesion-Dr. Yoel Farley    MASTECTOMY RADICAL Left 04/29/2003    Left modified radical mastectomy-Dr. Yazmin Canseco    PARATHYROIDECTOMY Right 05/04/2004    Excision of parathyroid adenoma (right superior)-Dr. Yoel Farley    REPLACEMENT TOTAL KNEE Right 04/09/2012    Dr. Lamonte Childress    SINUS SURGERY  1984    THYROIDECTOMY, PARTIAL Left 05/04/2004    Dr. Yoel Farley    TOTAL ABDOMINAL HYSTERECTOMY Bilateral 1973    Dr. Mahan    TRIGGER FINGER RELEASE Left 9/23/2020    Procedure: RELEASE LEFT FOURTH TRIGGER FINGER;  Surgeon: Caleb Bueno MD;  [place] [facility] [place] [facility] [place]  Service: Plastics;  Laterality: Left;    UPPER GASTROINTESTINAL ENDOSCOPY N/A 02/18/2009    LA Grade A reflux esophagitis, normal stomach, normal 2nd part of the duodenum-Dr. Yoel Farley    WRIST GANGLION EXCISION Left 9/23/2020    Procedure: EXCISION GANGLION CYST LEFT WRIST;  Surgeon: Caleb Bueno MD;  [place] [facility] [place] [facility] [place]  Service: Plastics;  Laterality: Left;     Allergies   Allergen Reactions    Bee Venom Shortness Of Breath and Rash    Morphine Anaphylaxis and Nausea And Vomiting    Sulfa Antibiotics Anaphylaxis and Hives     Or sulfa fillers in meds  Broke out in internal and external hives      Tree Extract Shortness Of Breath and Rash    Ambien [Zolpidem]  Hallucinations    Anastrozole Other (See Comments)     Can't remember what the reaction was     Covid-19 (Mrna) Vaccine Swelling     Facial swelling, blood clots    Eliquis [Apixaban] Headache     Headaches, nausea and itching.     Hydralazine Myalgia     Patient reports leg cramps, joint pain and increased fatigue    Norvasc [Amlodipine] Other (See Comments)     KIDNEYS SHUT DOWN    Nsaids Other (See Comments)     KIDNEY FAILURE    Penicillins Unknown (See Comments)     Severe reaction as a child  Tolerated ceftriaxone during 04/2017 admission    Grass Rash       Family History   Problem Relation Age of Onset    Brain cancer Brother     Alcohol abuse Mother     No Known Problems Father     No Known Problems Sister     Malig Hyperthermia Neg Hx        Social History     Socioeconomic History    Marital status:      Spouse name: Joey    Number of children: 6    Highest education level: Some college, no degree   Tobacco Use    Smoking status: Never     Passive exposure: Never    Smokeless tobacco: Never   Vaping Use    Vaping status: Never Used   Substance and Sexual Activity    Alcohol use: Never     Comment: no drink in 30yrs    Drug use: Never    Sexual activity: Defer     Birth control/protection: Surgical       PMH, FH, SH and ROS completed with Admission History and Physical and updated in EPIC system.        Objective     Scheduled Meds:albuterol, 2.5 mg, Nebulization, Q6H - RT  cetirizine, 10 mg, Oral, Daily  cetirizine, 10 mg, Oral, Daily  cloNIDine, 0.3 mg, Oral, Q6H  desonide, 1 application , Topical, Daily  eplerenone, 50 mg, Oral, Daily  fluticasone, 1 spray, Each Nare, BID  ipratropium, 0.5 mg, Nebulization, 4x Daily - RT  melatonin, 10 mg, Oral, Nightly  multivitamin with minerals, 1 tablet, Oral, Daily  NIFEdipine XL, 30 mg, Oral, Q24H  pantoprazole, 40 mg, Oral, Q AM  potassium chloride, 20 mEq, Oral, Daily  predniSONE, 10 mg, Oral, Daily  rivaroxaban, 20 mg, Oral, Daily With  Dinner  rosuvastatin, 5 mg, Oral, QAM  sodium chloride, 3 mL, Intravenous, Q12H  [Held by provider] torsemide, 50 mg, Oral, Daily  triamcinolone, 1 Application, Topical, BID  cyanocobalamin, 1,000 mcg, Oral, Daily  [START ON 3/4/2025] vitamin D, 50,000 Units, Oral, Every Tuesday      Continuous Infusions:     Vital signs in last 24 hours:  Temp:  [96.7 °F (35.9 °C)-97.5 °F (36.4 °C)] 97.5 °F (36.4 °C)  Heart Rate:  [63-81] 70  Resp:  [16-18] 16  BP: (115-186)/(67-87) 115/67    Intake/Output:    Intake/Output Summary (Last 24 hours) at 2/27/2025 2059  Last data filed at 2/27/2025 0533  Gross per 24 hour   Intake 120 ml   Output --   Net 120 ml       Exam:  /67   Pulse 70   Temp 97.5 °F (36.4 °C) (Oral)   Resp 16   Wt 60.4 kg (133 lb 2.5 oz)   SpO2 97%   BMI 26.01 kg/m²     Constitutional:  Alert, cooperative, no distress, AAOx3, resting comfortably.  Resting comfortably in chair beside .  Weak and fatigued.   Head:      Normocephalic, without obvious abnormality, atraumatic   Eyes:     PERRLA, conjunctiva/corneas clear, no icterus, no conjunctival                                     pallor, EOM's intact, both eyes      ENT and Mouth: Lips, tongue, gums normal; oral mucosa pink and moist   Neck:     Supple, symmetrical, trachea midline, no JVD  Respiratory:     Clear to auscultation bilaterally, respirations unlabored  Cardiovascular:  Irregular rate and rhythm, S1 and S2 normal, no murmur,      no  Rub or gallop.  Pulses normal.    Gastrointestinal:   BS present x 4 Soft, non-tender, bowel sounds active,      no masses, no hepatosplenomegaly                                                     :       No hernia.  Normal exam for sex.         Musculoskeletal: Extremities normal, atraumatic, no cyanosis or edema     No arthropathy.  No deformity.  Gait normal                                                 Skin:   Skin is warm and dry,  no rashes, swelling or palpable lesions.  Excoriations on skin  of lower extremities.  Bruising of left toes.  Right ankle E version to at least 90 degrees   Neurologic:  CN -XII intact, motor strength grossly intact, sensation grossly intact to light touch, no focal reflex deficits noted    Psychiatric:     Alert,oriented X3, no delusions, psychoses, depression or anxiety    Heme/Lymph/Imun:   No bruises, petechiae.  Lymph nodes normal in size/configuration.   Anxious at times.  Tearful with discussions of [facility]       Data Review:  Lab Results   Component Value Date    CALCIUM 9.5 02/27/2025    PHOS 2.5 02/27/2025     Results from last 7 days   Lab Units 02/27/25  1156 02/27/25  0553 02/26/25  2345   AST (SGOT) U/L  --  9 11   ALT (SGPT) U/L  --  6 9   MAGNESIUM mg/dL  --  2.1 2.0   SODIUM mmol/L  --  146* 144   POTASSIUM mmol/L 3.8 3.7 3.6   CHLORIDE mmol/L  --  107 107   CO2 mmol/L  --  28.0 25.0   BUN mg/dL  --  30* 33*   CREATININE mg/dL  --  0.89 0.93   GLUCOSE mg/dL  --  89 112*   CALCIUM mg/dL  --  9.5 9.3   WBC 10*3/mm3  --   --  7.80   HEMOGLOBIN g/dL  --   --  10.8*   PLATELETS 10*3/mm3  --   --  176     Lab Results   Component Value Date    CKTOTAL 36 02/26/2025    CKMB 1.69 04/06/2017    TROPONINT 29 (H) 01/14/2025     Estimated Creatinine Clearance: 36.2 mL/min (by C-G formula based on SCr of 0.89 mg/dL).  WEIGHTS:     Wt Readings from Last 1 Encounters:   02/27/25 0600 60.4 kg (133 lb 2.5 oz)   02/26/25 2026 59.9 kg (132 lb)         Assessment:    Adult failure to thrive syndrome    Stage 2 chronic kidney disease    Malaise and fatigue progressive    Dizziness    Pain in both lower extremities    Dyspnea on exertion    Accelerated hypertension    Decreased mobility and endurance    Spinal stenosis of lumbar region with radiculopathy    Ataxia    Recent unexplained weight loss 42# over last 6 months    Generalized muscle weakness    Severe malnutrition    Volume depletion    Recurrent falls while walking    Hypokalemia    Diastolic dysfunction with chronic heart  failure    Chronic heart failure with preserved ejection fraction (HFpEF)    Moderate malnutrition due to chronic disease    Urinary retention self-caths (Don)    Other cervical disc displacement at C5-C6 level    DDD (degenerative disc disease), cervical    Hyperlipidemia LDL goal <70    Proctocele    Urge incontinence of urine    Chronic tension-type headache, intractable    Cerebral atherosclerosis    Vitamin D deficiency    Moderate persistent asthmatic bronchitis without complication    Elevated PTH level    Gastroesophageal reflux disease with esophagitis    Recurrent UTI    H/O Cerebral vascular accident in left occipital area    Acute joint pain    Easy bruisability    COPD with asthma    Essential hypertension    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  Adult failure to thrive with significant malnutrition and weight loss unexplained and unintentional of 47 pounds over the last 6 months.  Causes of condition felt to be multifactorial but patient seems to be in an overall functional decline.  Has been confined to sitting in chair and laying on couch now for several weeks and  attempts for physical therapy at home through [facility] after last hospitalization in January 2025 have been ineffective.  Patient's pain felt to probably be a significant part of this syndrome.  Patient currently enrolled in [facility] palliative care program for treatment of her intractable pain.   family conference and patient conference tomorrow to discuss with [facility] transition to full [facility] services.  I do feel patient has less than 6 months to live at present time in current condition.     2.  Intractable pain related to severe spinal stenosis in both cervical and lumbar regions with radiculopathy and felt to be an operable in nature.  Patient currently taking hydrocodone up to 10 tablets/day provided through the palliative care program for her severe pain.  Patient also on gabapentin and Fioricet to help with other painful conditions.  Patient does express interest in transitioning from palliative care program to full [facility] services.  Pain well-controlled at present time on regimen offered by [facility]     3.  Stage II chronic kidney disease with mild volume depletion due to poor p.o. intake.  Patient is followed by nephrology, Dr. Bond, and we will ask him to take a look at the situation and make recommendations on any additional treatment he thinks may be necessary in this patient's condition. kidney situation stable.     4.  Ataxia with dizziness, dyspnea on exertion, recurrent falls that have resulted in head trauma and contusions, and overall immobility.  PT and OT asked to evaluate patient.  Nursing staff reporting that it takes assist of 2 at times to get patient from chair in room to bathroom.  Patient did have 1 fall even when nurses were present helping her while she was in her 's room.  No real changes despite PT and OT assistance.     5.  Chronic heart failure with preserved ejection fraction and known diastolic dysfunction.  This is followed on an outpatient basis by   Gama Soto in cardiology.  Medications have been adjusted to try to help with the problem.  This has become somewhat of a moving target however because of the patient's rapid and steady functional decline.  Ongoing decline with congestive heart failure chronic in nature.     6.  Urinary stress incontinence with history of urinary retention requiring In-N-Out self caths as taught to the patient by Dr. Fazal Ochoa her urologist.  Unfortunately, patient has been unable to perform this function in recent weeks and has been for the most part incontinent of urine.  No need for In-N-Out cath today.    7.  Diffuse osteoarthritis of spine and extremities with history of bilateral knee replacements.  Arthritis enhances patient's pain response and makes her mobility difficult to impossible.  Patient has become deconditioned and extremely weak as a result of these conditions.  This condition unchanged today.     8.  Hyperlipidemia.  Treated on an outpatient basis and monitored.  Due to patient's recent poor intake her lipid levels have been under good control.     9.  Chronic tension type headaches with intractable pain.  Controlled for many years with Fioricet that she now takes in combination with Norco provided through palliative care services.     10.  Moderate persistent asthmatic bronchitis with shortness of breath.  Patient does have nebulizer at home which she uses on occasion but also uses ProAir inhaler.  Has been followed for many years by allergy because of this condition.     11.  Gastroesophageal reflux disease with esophagitis made worse by poor nutritional habits.  Patient continued on PPI for now to prevent stress ulceration and worsening of the gastritis.     12.  Right ankle deformity with history of acute DVT in the right lower extremity in the past.  No ongoing signs of DVT at present time.  Ortho suggest cam boot which patient declines.     13.  Complications of COVID-19 originally diagnosed 10/2/2022.   These have mainly been chronic neurologic changes with intensification of her intractable headache syndrome.  Has never achieved baseline since her COVID infection.     14.  Posterior tibial tendon dysfunction diagnosed by orthopedics in the past and certainly contributing somewhat to the patient's immobility.  Orthopedics consulted for input on any other suggestions they might have to improve patient's mobility.  Patient does have known venous incompetence of popliteal vein on the right.     15.  Generalized anxiety disorder.  This is an ongoing problem long-term for the patient.  No new meds or changes needed at present time.           Plan for disposition:Where: home, home health, and SNF and When:  2-3 days.  Considering transition from [facility] palliative to full [facility] services.     Copied text in this note has been reviewed by me and is accurate as of 02/27/2025  Much of this dictation is completed using dragon voice activated software.        Óscar Cadet MD  2/27/2025 2059 EST

## 2025-02-28 NOTE — CASE MANAGEMENT/SOCIAL WORK
Continued Stay Note  [facility]     Patient Name: Sasha Barrett  MRN: [id number]  Today's Date: 2/28/2025    Admit Date: 2/26/2025    Plan: pending   Discharge Plan       Row Name 02/28/25 0802       Plan    Plan pending    Plan Comments [facility]PS did not accept report made. Goals of care meeting today with family. CCP to follow.                   Discharge Codes    No documentation.                       KARYN Schrader

## 2025-02-28 NOTE — PLAN OF CARE
Problem: Adult Inpatient Plan of Care  Goal: Plan of Care Review  Outcome: Progressing  Flowsheets (Taken 2/28/2025 1750)  Progress: no change  Outcome Evaluation: patient has been in her spouses room all day. pt has complaint of pain x2 recieved norco 20 mg x2. tolerated well. pt had a fall with no incidence. family at the bedside. Hospice consulted, looking into getting pt home with hospice. will continue to keep comfortable  Plan of Care Reviewed With: patient     Problem: Adult Inpatient Plan of Care  Goal: Patient-Specific Goal (Individualized)  Outcome: Progressing  Flowsheets (Taken 2/28/2025 1755)  Patient/Family-Specific Goals (Include Timeframe): to keep comfortable  Individualized Care Needs: PRN and scheduled meds. brief

## 2025-02-28 NOTE — PROGRESS NOTES
I have reviewed patient's chart and discussed with care nurse.  Blood pressure is controlled with current medications and patient is euvolemic and breathing well on room air.  No active cardiac condition present at this point that needs inpatient management.  Patient will be follow-up in office after discharge.

## 2025-02-28 NOTE — THERAPY TREATMENT NOTE
Patient Name: Sasha Barrett  : 1940    MRN: [id number]                              Today's Date: 2025       Admit Date: 2025    Visit Dx: No diagnosis found.  Patient Active Problem List   Diagnosis    Joint pain    Urinary retention self-caths (Don)    Conjunctivitis    Arm pain    Other cervical disc displacement at C5-C6 level    Cervical pain (Servando=PM)    DDD (degenerative disc disease), cervical    Hyperlipidemia LDL goal <70    Preventative health care    Medication management    Proctocele    Urge incontinence of urine    Chronic tension-type headache, intractable    Obesity (BMI 30.0-34.9)    H/o Lyme disease    Stage 2 chronic kidney disease    Cerebral atherosclerosis    Allergic contact dermatitis    Malaise and fatigue progressive    Dizziness    Vitamin D deficiency    Moderate persistent asthmatic bronchitis without complication    Anterior cervical adenopathy due to infection    Pleurisy    Pain in both lower extremities    Elevated PTH level    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)    Gastroesophageal reflux disease with esophagitis    Psoriasis (Darryl Ochoa)    Postmenopausal    Recurrent UTI    H/O Cerebral vascular accident in left occipital area    Dyspnea on exertion    Precordial pain    Abnormal EKG    Arthralgia    Accelerated hypertension    Acute joint pain    Easy bruisability    Cough productive of purulent sputum    Influenza A Subtype H3    COPD with asthma    Acute chest wall pain    Decreased mobility and endurance    Essential hypertension    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    Migraine without aura and without status migrainosus, not intractable    Thyroid nodule    Osteoarthritis of left hip    Spinal stenosis of lumbar region with radiculopathy    Acute deep vein thrombosis (DVT) of right lower extremity    Acute deep vein thrombosis    Single subsegmental pulmonary embolism without acute cor  pulmonale    Other chronic pain    Palpitations    Secondary hyperparathyroidism    Syncope    Ataxia    Adult failure to thrive syndrome    Recent unexplained weight loss 42# over last 6 months    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  T    Edema    Severe malnutrition    KECIA (generalized anxiety disorder)    Volume depletion    Cervical stenosis of spine    Recurrent falls while walking    Closed head injury with concussion    Hypokalemia    Anemia, mild    Goiter diffuse    Lumbar herniated disc L2-L3    Spinal stenosis of lumbar region with neurogenic claudication    Diastolic dysfunction with chronic heart failure    Chronic heart failure with preserved ejection fraction (HFpEF)    Moderate malnutrition due to chronic disease     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 Yazmin Canseco    CARDIAC ELECTROPHYSIOLOGY PROCEDURE N/A 5/27/2022    Procedure: Loop insertion;  Surgeon: Jeffrey Argeuta MD;  [facility];  Service: Cardiovascular;  Laterality: N/A;  biotronik    CARDIAC ELECTROPHYSIOLOGY PROCEDURE N/A 7/13/2022    Procedure: Loop recorder removal;  Surgeon: Jeffrey Argueta MD;  Location:  [facility] CATH INVASIVE LOCATION;  Service: Cardiovascular;  Laterality: N/A;    CARPAL TUNNEL RELEASE Right 05/25/2011    Dr. Caleb Bueno    CARPAL TUNNEL RELEASE Left 04/26/2011    Dr. Caleb Bueno    CATARACT EXTRACTION Left 11/29/2010    Dr. Eusebio Downs    CATARACT EXTRACTION Right 11/17/2010    Dr. Eusebio Downs    COLONOSCOPY N/A 12/06/2002    Sigmoid diverticulosis-Dr. Brandon Batista    COLONOSCOPY N/A 12/12/2013    Tortuous colon, diverticulosis in the sigmoid colon and descending colon, stool in the rectum, sigmoid colon, descending colon, splenic flexure, transverse colon and hepatic flexure-Dr. Irma Brambila    DEQUERVAIN RELEASE Left 9/23/2020    Procedure: DEQUERVAIN RELEASE LEFT HAND;  Surgeon: Caleb Bueno MD;  [place] [facility] [place] [facility] [place]  Service: Plastics;  Laterality: Left;    ENDOSCOPY N/A 09/13/2007    Normal-Dr. Pavel Quarles    ENDOSCOPY AND COLONOSCOPY N/A 09/19/2005    1 cm hiatal hernia, mild Schatzki's ring, sigmoid diverticulosis-Dr. Yoel Farley    GANGLION CYST EXCISION Left 12/18/2012    Release of A1 pulley flexor sheath, left fourth finger, excision of ganglion cyst 0.5 cm diameter, A2 pulley flexor sheath, left fourth finger-Dr. Caleb Bueno    HEEL SPUR EXCISION Left 1968    INGUINAL HERNIA REPAIR Right 1971    at 5 months pregnant     INJECTION OF MEDICATION Left 9/23/2020    Procedure: KENOLOG INJECTION TO LEFT THUMB;  Surgeon: Caleb Bueno MD;  Location:  [facility];   Service: Plastics;  Laterality: Left;    MASTECTOMY Right 08/05/2008    Right breast mastectomy and excision of left chest wall lesion-Dr. Yoel Farley    MASTECTOMY RADICAL Left 04/29/2003    Left modified radical mastectomy-Dr. Yazmin Canseco    PARATHYROIDECTOMY Right 05/04/2004    Excision of parathyroid adenoma (right superior)-Dr. Yoel Farley    REPLACEMENT TOTAL KNEE Right 04/09/2012    Dr. Lamonte Childress    SINUS SURGERY  1984    THYROIDECTOMY, PARTIAL Left 05/04/2004    Dr. Yoel Farley    TOTAL ABDOMINAL HYSTERECTOMY Bilateral 1973    Dr. Mahan    TRIGGER FINGER RELEASE Left 9/23/2020    Procedure: RELEASE LEFT FOURTH TRIGGER FINGER;  Surgeon: Caleb Bueno MD;  [place] [facility] [place] [facility] [place]  Service: [place];  Laterality: Left;    UPPER GASTROINTESTINAL ENDOSCOPY N/A 02/18/2009    LA Grade A reflux esophagitis, normal stomach, normal 2nd part of the duodenum-Dr. Yoel Farley    WRIST GANGLION EXCISION Left 9/23/2020    Procedure: EXCISION GANGLION CYST LEFT WRIST;  Surgeon: Caleb Bueno MD;  [place] [facility] [place] [facility] [place]  Service: Plastics;  Laterality: Left;      General Information       Row Name 02/28/25 1032          Physical Therapy Time and Intention    Document Type evaluation  -AR     Mode of Treatment physical therapy  -AR       Row Name 02/28/25 1032          General Information    Patient Profile Reviewed yes  -AR     Prior Level of Function --  uses  RW, 1 recent fall, per chart doesn't let other family in home to assist w/ herself or spouse  -AR     Existing Precautions/Restrictions fall  CAM boot for comfort,  no CAM boot on arrival  -AR     Barriers to Rehab none identified  -AR       Row Name 02/28/25 1032          Living Environment    People in Home spouse  -AR       Row Name 02/28/25 1032          Home Main Entrance    Number of Stairs, Main Entrance two  -AR       Row Name 02/28/25 1032          Stairs Within Home, Primary    Number of Stairs, Within Home,  Primary none  -AR       Row Name 02/28/25 1032          Cognition    Orientation Status (Cognition) oriented to;person;place  -AR       Row Name 02/28/25 1032          Safety Issues/Impairments Affecting Functional Mobility    Safety Issues Affecting Function (Mobility) problem-solving;judgment;insight into deficits/self-awareness  -AR     Impairments Affecting Function (Mobility) balance;cognition;endurance/activity tolerance;strength  -AR               User Key  (r) = Recorded By, (t) = Taken By, (c) = Cosigned By      Initials Name Provider Type    AR Emily Silveira, PT Physical Therapist                   Mobility       Row Name 02/28/25 1034          Bed Mobility    Bed Mobility supine-sit  -AR     Supine-Sit [place] (Bed Mobility) not tested  -AR       Row Name 02/28/25 1034          Sit-Stand Transfer    Sit-Stand [place] (Transfers) contact guard  -AR     Assistive Device (Sit-Stand Transfers) walker, front-wheeled  -AR       Row Name 02/28/25 1034          Gait/Stairs (Locomotion)    [place] Level (Gait) contact guard  -AR     Assistive Device (Gait) walker, front-wheeled  -AR     Patient was able to Ambulate yes  -AR     Distance in Feet (Gait) 30  -AR     Deviations/Abnormal Patterns (Gait) festinating/shuffling;tim decreased  -AR     Bilateral Gait Deviations heel strike decreased;forward flexed posture  -AR       Row Name 02/28/25 1034          Mobility    Extremity Weight-bearing Status right lower extremity  -AR     Right Lower Extremity (Weight-bearing Status) weight-bearing as tolerated (WBAT)  CAM boot for comfort  -AR               User Key  (r) = Recorded By, (t) = Taken By, (c) = Cosigned By      Initials Name Provider Type    AR Emily Silveira, PT Physical Therapist                   Obj/Interventions       Row Name 02/28/25 1035          Range of Motion Comprehensive    Comment, General Range of Motion B LE WFL  -AR       Row Name 02/28/25 1035          Strength  Comprehensive (MMT)    Comment, General Manual Muscle Testing (MMT) Assessment B LE 5/6  -AR       Row Name 02/28/25 1035          Balance    Dynamic Standing Balance contact guard  -AR     Position/Device Used, Standing Balance walker, rolling  -AR               User Key  (r) = Recorded By, (t) = Taken By, (c) = Cosigned By      Initials Name Provider Type    AR Emily Silveira, PT Physical Therapist                   Goals/Plan       Row Name 02/28/25 1040          Bed Mobility Goal 1 (PT)    Activity/Assistive Device (Bed Mobility Goal 1, PT) bed mobility activities, all  -AR     [place] Level/Cues Needed (Bed Mobility Goal 1, PT) modified independence  -AR     Time Frame (Bed Mobility Goal 1, PT) 1 week  -AR       Row Name 02/28/25 1040          Transfer Goal 1 (PT)    Activity/Assistive Device (Transfer Goal 1, PT) sit-to-stand/stand-to-sit;bed-to-chair/chair-to-bed;walker, rolling  -AR     [place] Level/Cues Needed (Transfer Goal 1, PT) standby assist  -AR     Time Frame (Transfer Goal 1, PT) 1 week  -AR       Row Name 02/28/25 1040          Gait Training Goal 1 (PT)    Activity/Assistive Device (Gait Training Goal 1, PT) gait (walking locomotion);walker, rolling  -AR     [place] Level (Gait Training Goal 1, PT) standby assist  -AR     Distance (Gait Training Goal 1, PT) 150  -AR     Time Frame (Gait Training Goal 1, PT) 1 week  -AR       Row Name 02/28/25 1040          Therapy Assessment/Plan (PT)    Planned Therapy Interventions (PT) balance training;bed mobility training;gait training;home exercise program;transfer training;strengthening;stair training;patient/family education  -AR               User Key  (r) = Recorded By, (t) = Taken By, (c) = Cosigned By      Initials Name Provider Type    AR Emily Silveira, PT Physical Therapist                   Clinical Impression       Row Name 02/28/25 1035          Pain    Pretreatment Pain Rating 0/10 - no pain  -AR     Posttreatment Pain Rating  0/10 - no pain  -AR     Pre/Posttreatment Pain Comment denies pain during activity, even in R foot  -AR       Row Name 02/28/25 1035          Plan of Care Review    Plan of Care Reviewed With patient  -AR     Outcome Evaluation Pt admitted here with failure to thrive, has been with spouse since he was admitted the day before her.  Ortho recommended R CAM boot for comfort, WBAT; did not wear during PT today.  Pt presents with decreased insight into deficits, frequent cues for safety but able to stand w/ CGA using RW.   Ambulated 30' w/ CGA using RW, frequent cues for safety and small shuffling steps.  Standing dynamic activites w/ CGA using RW.  May benefit from DC to SNU or home with 24 hour assist depending on goals of care.  RN reports hospice meeting for both pt and spouse this afternoon.  -AR       Row Name 02/28/25 1035          Therapy Assessment/Plan (PT)    Rehab Potential (PT) fair  -AR     Criteria for Skilled Interventions Met (PT) yes  -AR     Therapy Frequency (PT) 3 times/wk  -AR       Row Name 02/28/25 1035          Vital Signs    O2 Delivery Pre Treatment room air  -AR       Row Name 02/28/25 1035          Positioning and Restraints    Pre-Treatment Position sitting in chair/recliner  in spouse's room, on chair alarm pad  -AR     Post Treatment Position chair  in spouse's room  -AR     In Chair notified nsg;reclined;sitting;call light within reach;encouraged to call for assist;exit alarm on;with nsg  -AR               User Key  (r) = Recorded By, (t) = Taken By, (c) = Cosigned By      Initials Name Provider Type    Emily Gomez, PT Physical Therapist                   Outcome Measures       Row Name 02/28/25 1040          How much help from another person do you currently need...    Turning from your back to your side while in flat bed without using bedrails? 4  -AR     Moving from lying on back to sitting on the side of a flat bed without bedrails? 3  -AR     Moving to and from a bed to a  chair (including a wheelchair)? 3  -AR     Standing up from a chair using your arms (e.g., wheelchair, bedside chair)? 3  -AR     Climbing 3-5 steps with a railing? 2  -AR     To walk in hospital room? 3  -AR     AM-PAC 6 Clicks Score (PT) 18  -AR     Highest Level of Mobility Goal 6 --> Walk 10 steps or more  -AR       Row Name 02/28/25 1040          Functional Assessment    Outcome Measure Options AM-PAC 6 Clicks Basic Mobility (PT)  -AR               User Key  (r) = Recorded By, (t) = Taken By, (c) = Cosigned By      Initials Name Provider Type    AR Emily Silveira, PT Physical Therapist                                 Physical Therapy Education       Title: PT OT SLP Therapies (In Progress)       Topic: Physical Therapy (In Progress)       Point: Mobility training (In Progress)       Learning Progress Summary            Patient Acceptance, E, NR by AR at 2/28/2025 1041                      Point: Home exercise program (In Progress)       Learning Progress Summary            Patient Acceptance, E, NR by AR at 2/28/2025 1041                      Point: Body mechanics (In Progress)       Learning Progress Summary            Patient Acceptance, E, NR by AR at 2/28/2025 1041                      Point: Precautions (In Progress)       Learning Progress Summary            Patient Acceptance, E, NR by AR at 2/28/2025 1041                                      User Key       Initials Effective Dates Name Provider Type Discipline    AR 06/16/21 -  Emily Silveira, PT Physical Therapist PT                  PT Recommendation and Plan  Planned Therapy Interventions (PT): balance training, bed mobility training, gait training, home exercise program, transfer training, strengthening, stair training, patient/family education  Outcome Evaluation: Pt admitted here with failure to thrive, has been with spouse since he was admitted the day before her.  Ortho recommended R CAM boot for comfort, WBAT; did not wear during PT today.   Pt presents with decreased insight into deficits, frequent cues for safety but able to stand w/ CGA using RW.   Ambulated 30' w/ CGA using RW, frequent cues for safety and small shuffling steps.  Standing dynamic activites w/ CGA using RW.  May benefit from DC to SNU or home with 24 hour assist depending on goals of care.  RN reports hospice meeting for both pt and spouse this afternoon.     Time Calculation:         PT Charges       Row Name 02/28/25 1032             Time Calculation    Start Time 0846  -AR      Stop Time 0912  -AR      Time Calculation (min) 26 min  -AR      PT Received On 02/28/25  -AR      PT - Next Appointment 03/03/25  -AR      PT Goal Re-Cert Due Date 03/07/25  -AR                User Key  (r) = Recorded By, (t) = Taken By, (c) = Cosigned By      Initials Name Provider Type    Emily Gomez PT Physical Therapist                  Therapy Charges for Today       Code Description Service Date Service Provider Modifiers Qty    51764368442 HC PT EVAL MOD COMPLEXITY 3 2/28/2025 Emily Silveira, PT GP 1    39791794319 HC PT THER PROC EA 15 MIN 2/28/2025 Emily Silveira, PT GP 1            PT G-Codes  Outcome Measure Options: AM-PAC 6 Clicks Basic Mobility (PT)  AM-PAC 6 Clicks Score (PT): 18  AM-PAC 6 Clicks Score (OT): 21  Modified [place] Scale: 4 - Moderately severe disability.  Unable to walk without assistance, and unable to attend to own bodily needs without assistance.  PT Discharge Summary  Anticipated Discharge Disposition (PT): home with 24/7 care, skilled nursing facility    Emily Silveira PT  2/28/2025

## 2025-02-28 NOTE — PLAN OF CARE
Goal Outcome Evaluation:           Progress: no change  Outcome Evaluation: Patient slept in husbands room throughout the night.  Intermittent confusion noted. Patient required Norco 5/325 x1 for pain relief.  Patient refused bladder scans this shift.  Family meeting tomorrow at 1pm for hospice consult.  Nursing will continue plan of care.

## 2025-02-28 NOTE — PLAN OF CARE
Goal Outcome Evaluation:  Plan of Care Reviewed With: patient           Outcome Evaluation: Pt admitted here with failure to thrive, has been with spouse since he was admitted the day before her.  Ortho recommended R CAM boot for comfort, WBAT; did not wear during PT today.  Pt presents with decreased insight into deficits, frequent cues for safety but able to stand w/ CGA using RW.   Ambulated 30' w/ CGA using RW, frequent cues for safety and small shuffling steps.  Standing dynamic activites w/ CGA using RW.  May benefit from DC to SNU or home with 24 hour assist depending on goals of care.  RN reports hospice meeting for both pt and spouse this afternoon.    Anticipated Discharge Disposition (PT): home with 24/7 care, skilled nursing facility

## 2025-02-28 NOTE — CONSULTS
Met with pt and family and provided explanation of services. Plan is home with hospice at discharge. Family is looking to hire home care givers. [facility] to follow up for discharge date.    Thank you for the referral.    Pinky Kc RN  [facility]  [phone]

## 2025-03-01 LAB
ALBUMIN SERPL-MCNC: 2.8 G/DL (ref 3.5–5.2)
ALBUMIN/GLOB SERPL: 1 G/DL
ALP SERPL-CCNC: 76 U/L (ref 39–117)
ALT SERPL W P-5'-P-CCNC: 7 U/L (ref 1–33)
ANION GAP SERPL CALCULATED.3IONS-SCNC: 7.6 MMOL/L (ref 5–15)
AST SERPL-CCNC: 10 U/L (ref 1–32)
BASOPHILS # BLD AUTO: 0.03 10*3/MM3 (ref 0–0.2)
BASOPHILS NFR BLD AUTO: 0.4 % (ref 0–1.5)
BILIRUB SERPL-MCNC: 0.3 MG/DL (ref 0–1.2)
BUN SERPL-MCNC: 19 MG/DL (ref 8–23)
BUN/CREAT SERPL: 22.6 (ref 7–25)
CALCIUM SPEC-SCNC: 9.4 MG/DL (ref 8.6–10.5)
CHLORIDE SERPL-SCNC: 110 MMOL/L (ref 98–107)
CO2 SERPL-SCNC: 25.4 MMOL/L (ref 22–29)
CREAT SERPL-MCNC: 0.84 MG/DL (ref 0.57–1)
DEPRECATED RDW RBC AUTO: 45.6 FL (ref 37–54)
EGFRCR SERPLBLD CKD-EPI 2021: 67.4 ML/MIN/1.73
EOSINOPHIL # BLD AUTO: 0.11 10*3/MM3 (ref 0–0.4)
EOSINOPHIL NFR BLD AUTO: 1.4 % (ref 0.3–6.2)
ERYTHROCYTE [DISTWIDTH] IN BLOOD BY AUTOMATED COUNT: 12.9 % (ref 12.3–15.4)
GLOBULIN UR ELPH-MCNC: 2.8 GM/DL
GLUCOSE SERPL-MCNC: 118 MG/DL (ref 65–99)
HCT VFR BLD AUTO: 32.7 % (ref 34–46.6)
HGB BLD-MCNC: 10.4 G/DL (ref 12–15.9)
IMM GRANULOCYTES # BLD AUTO: 0.04 10*3/MM3 (ref 0–0.05)
IMM GRANULOCYTES NFR BLD AUTO: 0.5 % (ref 0–0.5)
LYMPHOCYTES # BLD AUTO: 2.2 10*3/MM3 (ref 0.7–3.1)
LYMPHOCYTES NFR BLD AUTO: 27.3 % (ref 19.6–45.3)
MCH RBC QN AUTO: 30.7 PG (ref 26.6–33)
MCHC RBC AUTO-ENTMCNC: 31.8 G/DL (ref 31.5–35.7)
MCV RBC AUTO: 96.5 FL (ref 79–97)
MONOCYTES # BLD AUTO: 0.71 10*3/MM3 (ref 0.1–0.9)
MONOCYTES NFR BLD AUTO: 8.8 % (ref 5–12)
NEUTROPHILS NFR BLD AUTO: 4.98 10*3/MM3 (ref 1.7–7)
NEUTROPHILS NFR BLD AUTO: 61.6 % (ref 42.7–76)
NRBC BLD AUTO-RTO: 0 /100 WBC (ref 0–0.2)
PLATELET # BLD AUTO: 171 10*3/MM3 (ref 140–450)
PMV BLD AUTO: 10.7 FL (ref 6–12)
POTASSIUM SERPL-SCNC: 4.3 MMOL/L (ref 3.5–5.2)
PROT SERPL-MCNC: 5.6 G/DL (ref 6–8.5)
RBC # BLD AUTO: 3.39 10*6/MM3 (ref 3.77–5.28)
SODIUM SERPL-SCNC: 143 MMOL/L (ref 136–145)
WBC NRBC COR # BLD AUTO: 8.07 10*3/MM3 (ref 3.4–10.8)

## 2025-03-01 PROCEDURE — A9270 NON-COVERED ITEM OR SERVICE: HCPCS | Performed by: INTERNAL MEDICINE

## 2025-03-01 PROCEDURE — 63710000001 VITAMIN B-12 500 MCG TABLET: Performed by: INTERNAL MEDICINE

## 2025-03-01 PROCEDURE — 63710000001 POTASSIUM CHLORIDE 20 MEQ PACK: Performed by: INTERNAL MEDICINE

## 2025-03-01 PROCEDURE — 94664 DEMO&/EVAL PT USE INHALER: CPT

## 2025-03-01 PROCEDURE — 63710000001 MELATONIN 5 MG TABLET: Performed by: INTERNAL MEDICINE

## 2025-03-01 PROCEDURE — 63710000001 ROSUVASTATIN 10 MG TABLET: Performed by: INTERNAL MEDICINE

## 2025-03-01 PROCEDURE — G0378 HOSPITAL OBSERVATION PER HR: HCPCS

## 2025-03-01 PROCEDURE — 63710000001 CLONIDINE 0.1 MG TABLET: Performed by: INTERNAL MEDICINE

## 2025-03-01 PROCEDURE — 63710000001 PANTOPRAZOLE 40 MG TABLET DELAYED-RELEASE: Performed by: INTERNAL MEDICINE

## 2025-03-01 PROCEDURE — 63710000001 PREDNISONE PER 5 MG: Performed by: INTERNAL MEDICINE

## 2025-03-01 PROCEDURE — 63710000001 RIVAROXABAN 20 MG TABLET: Performed by: INTERNAL MEDICINE

## 2025-03-01 PROCEDURE — 63710000001 BUTALBITAL-ACETAMINOPHEN-CAFFEINE 50-325-40 MG TABLET: Performed by: INTERNAL MEDICINE

## 2025-03-01 PROCEDURE — 63710000001 MULTIVITAMIN WITH MINERALS TABLET: Performed by: INTERNAL MEDICINE

## 2025-03-01 PROCEDURE — 63710000001 HYDROCODONE-ACETAMINOPHEN 10-325 MG TABLET: Performed by: INTERNAL MEDICINE

## 2025-03-01 PROCEDURE — 80053 COMPREHEN METABOLIC PANEL: CPT | Performed by: INTERNAL MEDICINE

## 2025-03-01 PROCEDURE — 63710000001 EPLERENONE 25 MG TABLET: Performed by: INTERNAL MEDICINE

## 2025-03-01 PROCEDURE — 94799 UNLISTED PULMONARY SVC/PX: CPT

## 2025-03-01 PROCEDURE — 63710000001 NIFEDIPINE XL 30 MG TABLET SUSTAINED-RELEASE 24 HOUR: Performed by: INTERNAL MEDICINE

## 2025-03-01 PROCEDURE — 63710000001 CETIRIZINE 10 MG TABLET: Performed by: INTERNAL MEDICINE

## 2025-03-01 PROCEDURE — 63710000001 HYDROCORTISONE 1 % OINTMENT 28 G TUBE: Performed by: INTERNAL MEDICINE

## 2025-03-01 PROCEDURE — 85025 COMPLETE CBC W/AUTO DIFF WBC: CPT | Performed by: INTERNAL MEDICINE

## 2025-03-01 RX ORDER — IPRATROPIUM BROMIDE AND ALBUTEROL SULFATE 2.5; .5 MG/3ML; MG/3ML
3 SOLUTION RESPIRATORY (INHALATION)
Status: DISCONTINUED | OUTPATIENT
Start: 2025-03-01 | End: 2025-03-03 | Stop reason: HOSPADM

## 2025-03-01 RX ADMIN — IPRATROPIUM BROMIDE AND ALBUTEROL SULFATE 3 ML: .5; 3 SOLUTION RESPIRATORY (INHALATION) at 23:21

## 2025-03-01 RX ADMIN — Medication 10 MG: at 22:24

## 2025-03-01 RX ADMIN — CETIRIZINE HYDROCHLORIDE 10 MG: 10 TABLET, FILM COATED ORAL at 10:27

## 2025-03-01 RX ADMIN — NIFEDIPINE 30 MG: 30 TABLET, EXTENDED RELEASE ORAL at 10:28

## 2025-03-01 RX ADMIN — CLONIDINE HYDROCHLORIDE 0.3 MG: 0.1 TABLET ORAL at 06:29

## 2025-03-01 RX ADMIN — TRIAMCINOLONE ACETONIDE 1 APPLICATION: 1 CREAM TOPICAL at 22:24

## 2025-03-01 RX ADMIN — PREDNISONE 10 MG: 10 TABLET ORAL at 10:27

## 2025-03-01 RX ADMIN — FLUTICASONE PROPIONATE 1 SPRAY: 50 SPRAY, METERED NASAL at 22:24

## 2025-03-01 RX ADMIN — FLUTICASONE PROPIONATE 1 SPRAY: 50 SPRAY, METERED NASAL at 10:28

## 2025-03-01 RX ADMIN — IPRATROPIUM BROMIDE AND ALBUTEROL SULFATE 3 ML: .5; 3 SOLUTION RESPIRATORY (INHALATION) at 16:56

## 2025-03-01 RX ADMIN — IPRATROPIUM BROMIDE AND ALBUTEROL SULFATE 3 ML: .5; 3 SOLUTION RESPIRATORY (INHALATION) at 08:58

## 2025-03-01 RX ADMIN — EPLERENONE 50 MG: 25 TABLET, FILM COATED ORAL at 10:28

## 2025-03-01 RX ADMIN — CLONIDINE HYDROCHLORIDE 0.3 MG: 0.1 TABLET ORAL at 13:34

## 2025-03-01 RX ADMIN — HYDROCODONE BITARTRATE AND ACETAMINOPHEN 2 TABLET: 10; 325 TABLET ORAL at 06:29

## 2025-03-01 RX ADMIN — RIVAROXABAN 20 MG: 20 TABLET, FILM COATED ORAL at 18:22

## 2025-03-01 RX ADMIN — Medication 1 TABLET: at 10:27

## 2025-03-01 RX ADMIN — CLONIDINE HYDROCHLORIDE 0.3 MG: 0.1 TABLET ORAL at 22:23

## 2025-03-01 RX ADMIN — BUTALBITAL, ACETAMINOPHEN, AND CAFFEINE 2 TABLET: 50; 325; 40 TABLET ORAL at 00:40

## 2025-03-01 RX ADMIN — CLONIDINE HYDROCHLORIDE 0.3 MG: 0.1 TABLET ORAL at 00:32

## 2025-03-01 RX ADMIN — IPRATROPIUM BROMIDE AND ALBUTEROL SULFATE 3 ML: .5; 3 SOLUTION RESPIRATORY (INHALATION) at 12:18

## 2025-03-01 RX ADMIN — POTASSIUM CHLORIDE 20 MEQ: 1.5 POWDER, FOR SOLUTION ORAL at 10:28

## 2025-03-01 RX ADMIN — HYDROCODONE BITARTRATE AND ACETAMINOPHEN 2 TABLET: 10; 325 TABLET ORAL at 13:34

## 2025-03-01 RX ADMIN — Medication 1000 MCG: at 10:27

## 2025-03-01 RX ADMIN — HYDROCODONE BITARTRATE AND ACETAMINOPHEN 2 TABLET: 10; 325 TABLET ORAL at 22:24

## 2025-03-01 RX ADMIN — PANTOPRAZOLE SODIUM 40 MG: 40 TABLET, DELAYED RELEASE ORAL at 06:34

## 2025-03-01 RX ADMIN — ROSUVASTATIN CALCIUM 5 MG: 10 TABLET, FILM COATED ORAL at 06:34

## 2025-03-01 RX ADMIN — HYDROCORTISONE 1 APPLICATION: 1 OINTMENT TOPICAL at 10:29

## 2025-03-01 NOTE — PLAN OF CARE
Goal Outcome Evaluation:                                  Pt stayed in husbands room all night, did not sleep. She complained of a headache 1 dose of fioricet given however pt still complained of pain in her back so 20 mg of hydrocodone given- pt was able to finally get some rest this morning. Pt incontinent of urine changed multiple times.

## 2025-03-01 NOTE — PROGRESS NOTES
Nephrology    Chart review only  Renal function stable; hypernatremia better with torsemide on hold  Will reassess in person tomorrow and decide regarding resumption of torsemide or not    --Gerry Yoo MD

## 2025-03-01 NOTE — PROGRESS NOTES
Nephrology    Chart reviewed; patient likely transferring to comfort-base care herself   in nearby room receiving comfort-base care  SCr less than 1 mg/dL, and other electrolytes fine  Will sign off    --Gerry Yoo MD

## 2025-03-01 NOTE — PROGRESS NOTES
LOS: 3 days   Patient Care Team:  Nicolas Ugarte MD as PCP - General  Nicolas Ugarte MD as PCP - Family Medicine    Chief Complaint: No chief complaint on file.        Subjective    Today the patient is awake, alert, and oriented.  Apparently she is spending most of her time in a recliner in her 's room.  The nurse notes that she spent the night there last night again.  She has a variety of complaints of pain and discomfort.  Most recently she was having headache and required Fioricet and hydrocodone for her to get relief and then some sleep.  Today she seem to be relatively stable and in no distress.  Apparently she is eating and drinking relatively well.  She is very weak and debilitated in general but is not having any specific complaint at this time.  She seems to be tolerating her medications well.  Her tach O2 sats are well-maintained on room air and her vital signs are stable.    Interval History:     Patient Complaints: Some fatigue and weakness and pain in her right foot that she notes  Patient Denies: She denies shortness of breath or chest pain, no abdominal pain or nausea.  History taken from: patient chart RN and is hard to keep her on task and answering questions directly because she tends to be very tangential in her thinking and responses    Review of Systems:    All systems were reviewed and negative except for:  Constitution:  positive for fatigue, malaise, and See HPI  Musculoskeletal: positive for  her right foot discomfort and deformity.  and See HPI    Objective      Vital Signs  Temp:  [98 °F (36.7 °C)-99.2 °F (37.3 °C)] 98.9 °F (37.2 °C)  Pulse:  [] 109  Resp:  [18] 18  BP: (111-154)/(55-97) 154/97    I/O'S  Intake & Output (last 7 days)         02/22 0701 02/23 0700 02/23 0701 02/24 0700 02/24 0701 02/25 0700 02/25 0701 02/26 0700 02/26 0701 02/27 0700 02/27 0701 02/28 0700 02/28 0701 03/01 0700 03/01 0701  03/02 0700    P.O.     120 120 980 0    Total  Intake(mL/kg)     120 (2) 120 (2) 980 (16.2) 0 (0)    Net     +120 +120 +980 0                Urine Unmeasured Occurrence      3 x 5 x 2 x    Stool Unmeasured Occurrence      1 x 1 x             Physical Exam                  EENT-no JVD or thyromegaly                  Lungs-bilateral breath sounds and clear                    Heart-regular rate and rhythm with no murmur                   Abdomen-bowel sounds positive, soft, no masses rebound or guarding                  Extremities-her right lower extremity is deformed and she has an outward  Rotation to her Right foot.  She did not have any significant clubbing, cyanosis, or edema     Results Review:     I reviewed the patient's new clinical results.  I reviewed the patient's other test results and agree with the interpretation                     Results from last 7 days   Lab Units 03/01/25  0459 02/28/25  0501 02/27/25  1156 02/27/25  0553 02/26/25  2345   SODIUM mmol/L 143 144  --  146* 144   POTASSIUM mmol/L 4.3 4.5 3.8 3.7 3.6   CHLORIDE mmol/L 110* 111*  --  107 107   CO2 mmol/L 25.4 26.0  --  28.0 25.0   BUN mg/dL 19 21  --  30* 33*   CREATININE mg/dL 0.84 0.73  --  0.89 0.93   GLUCOSE mg/dL 118* 115*  --  89 112*   CALCIUM mg/dL 9.4 9.4  --  9.5 9.3     Results from last 7 days   Lab Units 02/26/25  2345   CK TOTAL U/L 36     Results from last 7 days   Lab Units 03/01/25  0459 02/28/25  0501 02/26/25  2345   WBC 10*3/mm3 8.07 6.28 7.80   HEMOGLOBIN g/dL 10.4* 10.2* 10.8*   HEMATOCRIT % 32.7* 31.5* 32.8*   PLATELETS 10*3/mm3 171 165 176         Results from last 7 days   Lab Units 02/28/25  0501 02/27/25  0553 02/26/25  2345   MAGNESIUM mg/dL 2.2 2.1 2.0     Results from last 7 days   Lab Units 02/26/25  2345   CHOLESTEROL mg/dL 128   TRIGLYCERIDES mg/dL 121   HDL CHOL mg/dL 48   LDL CHOL mg/dL 58     Results from last 7 days   Lab Units 02/26/25  2345   PROBNP pg/mL 2,590.0*           Lab Results   Component Value Date    HGBA1C 5.80 (H) 02/27/2025     "HGBA1C 5.60 01/13/2025    HGBA1C 5.30 10/20/2024    HGBA1C 6.10 (H) 05/25/2022   No results found for: \"POCGLU\"    eGFR   Date Value Ref Range Status   03/01/2025 67.4 >60.0 mL/min/1.73 Final   02/28/2025 79.7 >60.0 mL/min/1.73 Final   02/27/2025 62.8 >60.0 mL/min/1.73 Final   02/26/2025 59.6 (L) >60.0 mL/min/1.73 Final           Medication Review:   Scheduled Meds:cetirizine, 10 mg, Oral, Daily  cetirizine, 10 mg, Oral, Daily  cloNIDine, 0.3 mg, Oral, Q6H  eplerenone, 50 mg, Oral, Daily  fluticasone, 1 spray, Each Nare, BID  hydrocortisone, 1 application , Topical, Daily  ipratropium-albuterol, 3 mL, Nebulization, 4x Daily - RT  melatonin, 10 mg, Oral, Nightly  multivitamin with minerals, 1 tablet, Oral, Daily  NIFEdipine XL, 30 mg, Oral, Q24H  pantoprazole, 40 mg, Oral, Q AM  potassium chloride, 20 mEq, Oral, Daily  predniSONE, 10 mg, Oral, Daily  rivaroxaban, 20 mg, Oral, Daily With Dinner  rosuvastatin, 5 mg, Oral, QAM  [Held by provider] torsemide, 50 mg, Oral, Daily  triamcinolone, 1 Application, Topical, BID  cyanocobalamin, 1,000 mcg, Oral, Daily  [START ON 3/4/2025] vitamin D, 50,000 Units, Oral, Every Tuesday      Continuous Infusions:   PRN Meds:.  acetaminophen **OR** acetaminophen **OR** acetaminophen    acetaminophen    butalbital-acetaminophen-caffeine    Calcium Replacement - Follow Nurse / BPA Driven Protocol    carboxymethylcellulose sod    Diclofenac Sodium    diphenhydrAMINE    guaiFENesin-codeine    HYDROcodone-acetaminophen **OR** HYDROcodone-acetaminophen    hydrOXYzine    Lidocaine    LORazepam    Magnesium Standard Dose Replacement - Follow Nurse / BPA Driven Protocol    ondansetron    ondansetron ODT    Phosphorus Replacement - Follow Nurse / BPA Driven Protocol    polyethylene glycol    Potassium Replacement - Follow Nurse / BPA Driven Protocol    sodium chloride    sodium chloride                Adult failure to thrive syndrome    Acute deep vein thrombosis (DVT) of right lower " extremity    COPD with asthma    Urinary retention self-caths (Don)    Other cervical disc displacement at C5-C6 level    DDD (degenerative disc disease), cervical    Hyperlipidemia LDL goal <70    Proctocele    Urge incontinence of urine    Chronic tension-type headache, intractable    Stage 2 chronic kidney disease    Cerebral atherosclerosis    Malaise and fatigue progressive    Dizziness    Vitamin D deficiency    Moderate persistent asthmatic bronchitis without complication    Pleurisy    Pain in both lower extremities    Elevated PTH level    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)    Gastroesophageal reflux disease with esophagitis    Psoriasis (Darryl Ochoa)    Postmenopausal    Recurrent UTI    H/O Cerebral vascular accident in left occipital area    Dyspnea on exertion    Accelerated hypertension    Acute joint pain    Easy bruisability    Decreased mobility and endurance    Essential hypertension    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  T    Severe malnutrition    KECIA (generalized anxiety disorder)    Volume depletion    Cervical stenosis of spine    Recurrent falls while walking    Hypokalemia    Anemia, mild    Goiter diffuse    Lumbar herniated disc L2-L3    Spinal stenosis of lumbar region with neurogenic claudication    Diastolic dysfunction with chronic heart failure    Chronic heart failure with preserved ejection fraction  (HFpEF)    Moderate malnutrition due to chronic disease      #1 Failure to Thrive-she does look malnourished and apparently she has had a 47 pound weight loss in the last 6 months.  Most likely just a general decline related to her age and multiple medical problems.  She is currently on hospice and palliative care related to her chronic pain.  I suspect her narcotic use has something to do with her weight loss as well.    2.  Intractable pain-related to her spinal stenosis in both cervical and lumbar regions.  She uses hydrocodone frequently throughout the day.  Along with gabapentin and Fioricet.  Hopefully she will benefit from hospice being involved in managing her pain since she is currently on Norco 10 mg q. 6 hours.    3.  Ataxia and recurrent falls-she has significant issues with ataxia and her right foot likely contributes to her falls.  She obviously should be using a walker at all times    4.  CHF-she follows up with cardiology as an outpatient    5.  Urinary incontinence-apparently she has very poor bladder control and has had it for quite some time.    6.  Hyperlipidemia-well-controlled on medications    7.  Reflux/esophagitis-controlled with PPI    8.  Chronic headaches-she takes Fioricet primarily for this.  It was bothering her last night but she does not have any complaints at this point    9.  Anxiety-she has chronic anxiety but has not been evaluated by psychiatry because she is declined evaluation.        Plan for disposition:Where: home and hospice and When:  On Monday    Nicolas Ugarte MD  03/01/25  14:57 EST          Time:

## 2025-03-01 NOTE — PROGRESS NOTES
[facility]  INTERNAL MEDICINE  ÓSCAR CADET MD  [address] [place] [address]  Phone [phone] Fax [phone]  E-mail:  [email or web address]      INTERNAL MEDICINE DAILY PROGRESS NOTE  Óscar Cadet M.D.  2025            Patient Identification:  Name: Sasha Barrett  Age: 85 y.o.  Sex: female  :  1940  MRN: [id number]         Primary Care Physician: Óscar Cadet MD  LENGTH OF STAY 2 DAYS    Consults       Date and Time Order Name Status Description    2025  8:19 PM Inpatient Orthopedic Surgery Consult Completed     2025  8:19 PM Inpatient Nephrology Consult Completed     2025  8:19 PM Inpatient Cardiology Consult              Chief Complaint:    Adult failure to thrive, unexplained weight loss greater than 47 pounds over the last 6 months ataxia, lower extremity pain and radiculopathy, severe lumbar spinal stenosis, and generalized muscle weakness     History of Present Illness:     Subjective  Interval History: Patient is a 85 y.o.female who presented with adult failure to thrive,  recent unexplained weight loss of greater than 47 pounds over the last 6 months, ataxia, lower extremity pain and radiculopathy, and generalized muscle weakness.  Patient had originally accompanied to her  to the hospital and was trying to stay in  his room to help with his care, but nursing staff found that this patient had almost as many medical needs for care as did her .  Patient has been followed closely over the last few years on [facility] due to intractable pain and has required high doses of narcotic pain medication to keep her symptoms under control.  Her condition has gradually and steadily declined and over the last few weeks patient has been unable to get off of the sofa and ambulate in the house despite the best efforts of  [facility] who followed her after her most recent  admission between 1/12/2025 and 1/15/2025 when patient was admitted for a lumbar herniated disc L2-L3 felt to be inoperable, severe spinal stenosis of lumbar region with neurogenic claudication, diastolic dysfunction with chronic heart failure, and recurrent falls while walking which had resulted in a closed head injury with concussion.  Patient's  who is even sicker than this patient, has been trying to cook meals for the 2 of them and do basic housekeeping chores such as washing the laundry and cleaning the house.  Unfortunately, worsening of his own condition has made it almost impossible for the patient's  to complete these tasks.  They do have 6 children who live in the area, but unfortunately only 1 of those children, their son Leighton  and his wife Helen, who patient's have designated as their health care surrogates,  are involved in their care.  Helen reports, that the Young's have been declining to allow the children to participate in their care.  Family worries that the living conditions in their home have deteriorated significantly and they have been unable to  successfully get Mrs. Barrett to take a bath or shower for  several weeks.   Mrs. Barrett has complained of bug bites which her allergist feels are coming from a Bengali Lady Beatle that causes bites with severe allergic response.  Apparently there has been an outbreak of these beatles  in the county where the patient lives.  Patient has several other comorbidities that complicate her medical care.  These include most significantly: Stage II chronic kidney disease, malaise and fatigue, recurrent and persistent dizziness, dyspnea on exertion, accelerated hypertension, decreased mobility and endurance which have worsened despite recent efforts for PT and OT in the home, spinal stenosis of lumbar region with radiculopathy, ataxia, severe malnutrition, volume depletion, recurrent falls while walking, hypokalemia, diastolic dysfunction with  chronic heart failure, chronic heart failure with preserved ejection fraction (HFpEF), urinary retention requiring self caths that patient has had difficulty doing lately, cervical disc displacement, extensive degenerative disc disease, hyperlipidemia, proctocele, urge incontinence of urine, chronic tension headaches treated with Fioricet, cerebral atherosclerosis, vitamin D deficiency, moderate persistent asthmatic bronchitis, elevated PTH levels, gastroesophageal reflux disease with esophagitis, recurrent UTIs, history of cerebrovascular accident in left occipital area, acute and diffuse joint pain, easy bruisability, COPD with asthma, essential hypertension, history of acute DVT, 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  Patient is also believed to have some mild but progressive dementia that further complicates her course and makes it difficult for her to make decisions at times.  Patient was admitted to [address] [address] at [facility] for evaluation of the above conditions and to consider the possibility of transition from [facility] palliative care services to full [facility] care.     2/26/2025.  I personally saw the patient for the first time during this hospitalization during my evening rounds on [address] [place] [address].  Patient was actually admitted to room P489 but was in x-ray undergoing extensive x-ray films at the time of my visit.  I examined the patient with the help of the x-ray techs while they are in the radiology department.  I wore full PPE as indicated including an N95 mask as appropriate, goggles, white lab coat, and gloves when touching patient.  I performed thorough hand hygiene before and after the patient visit.   "Patient's main area of concern is the pain and discomfort in her lower extremities from the level of her bilateral knee replacements down.  Patient has an area of bruising on the right anterior shin and has marked deformity of the right ankle which turns outward when at rest on the x-ray table.  Patient also has extensive bruising of the left distal foot involving all toes and extending approximately nursing home up the foot.  She does not recall an injury that caused these symptoms.  Patient also has a bunion on the bottom of the right foot at the base of the great toe which has been pared down by podiatry in the past but continues to give her trouble and make it almost impossible for her to stand up without great pain.  Patient does have braces that have been prepared by orthopedics in the past for her legs but cannot get those braces on at this point because of the progressive deformity of her extremities.  Patient also noted to be a bit confused this evening and repeating several stories over and over.  She is upset about her overall condition and cries easily when discussion of her social issues is brought up.  Mrs. Barrett has been a patient in my office for many years and has been very reliant on my care over that period of time.  She repeatedly asked me \"not to give up on her\" despite the significant decline in her overall stability and condition.  I assured her that I will continue to be her physician and coordinate care.  Patient is also quite reliant on her [facility] palliative care nurse practitioner, Hedy.  It is not clear that patient has been taking her medications as regularly as she has in the past for treatment of her chronic pain condition with intractable pain, or her medications that are used to treat her chronic heart failure due to diastolic dysfunction as prescribed by Dr. Soto in cardiology.  More importantly, patient's weight loss signifies poor nutritional status which results in difficulty with " healing of leg wounds and inability to exercise on a regular basis.  Patient does indicate a willingness to meet with [facility] while here in the hospital and consider the possibility of transition from palliative services to full [facility] care.  She and her  still hope that with this service and help from the family, they can continue to stay in their home until the end of their lives.    2/27/2025.  Patient seen again today at time of my rounds on [facility].  Patient was actually in a lounge chair beside her  in room which is adjacent to the room she is assigned to.  Nursing is aware of patient location.  Patient seems very comfortable and pleased being next to her .  We did have lillian discussions about plans for [facility] consultation tomorrow.  Both patient's expressed strong desire for discharge to home environment.  I did review the fact that Mr. Barrett has severe congestive heart failure with an ejection fraction of 14% which is a very poor prognosis.  Mrs. Barrett seems to understand this situation and wants to get her  home as soon as possible.  They do realize that on [facility] they will not be following up with regular specialists and they realized that the change from palliative to [facility] could entail a change in their nurse practitioner that they would very much prefer to continue having [facility] as their nurse practitioner after the transfer.  They are more open to the idea of having help from their sons who will need to visit and help frequently at the home.  They do wish to have Meals [employer] Wheels bring food into the house since Mr. Barrett may no longer be able to cook with his worsened condition.  I did contact patient's youngest son Leighton who is their healthcare surrogate and his wife Helen who is also designated as a healthcare surrogate.  They do wish for the patient's to both be no CPR per their wishes from many years ago.  They are agreeable to the progression to [facility]  and understand the above factors.  They also understand that family will be meeting to visit much more often to help [facility] with the care.  Patient does have a neighbor who is quite nearby that also is willing to help and is actually cleaning up the house some while they are in the hospital.  The patient's do have a dog and that dog is staying with that health care center gets at the present time.  I did let them know that I think Mr. Barrett's condition is reaching a critical level and that his life expectancy currently is a few days to a few weeks depending on how things go after transfer back to home.  Patients will need an ambulance for transfer to home.  Patient's have been very cooperative with medical staff today that have been clearly confused and nondecisional at times.  I will full PPE for exam as indicated including an N95 mask, goggles, white lab coat, and gloves when touching patient.  I performed thorough hand hygiene before and after the patient visit.  Patient did have labs today that showed sodium of 146 BUN of 50 creatinine of 0.89 estimated GFR of 62.8 normal magnesium at 2.1 and corrected phosphorus at 2.5 uric acid at 5.9 had a liver function test normal hemoglobin A1c 5.8 PTH 60.8 TSH normal 6.  Hemoglobin is 10.8 and white blood cell count is 7.8.  Several specialist saw patient today.  Orthopedics suggested a boot but patient is not very receptive to this and she has several boots at home and is unable to wear them.  Nephrology did see the patient and suggested holding torsemide today but resuming tomorrow if she eats breakfast and is drinking better.  She does need a bladder scan every 6 hours.  They are okay with discharge to home when arrangements are complete.  OT did see patient today.  They were able to get her to perform side-to-side step transfers and functional mobility with contact-guard assist and rolling walker.  Patient does fatigue quickly and was unsteady at times requiring  frequent cueing for redirection and proper use of her walker.  Discharge planning pending.  PT did see patient but she was sleeping at the time of their visit.  Dietitian did add boost twice daily.  Nursing reports that she did have some intermittent confusion but was up with assist x 1.  Incontinent of bladder and bladder scan showed only 62 mL.  Did give Norco for pain.  Spent good portion of today in recliner in 's room.  Pinky Kc from [facility] did see patient and contacted me.  I had changed their CODE STATUS to DNR in [facility].  [facility] plans to meet with Duane and any other family members that wish to discuss goals and plans tomorrow.  I did discuss with them my thoughts and feelings on the subject to today.  Patient is medically stable at present time.  [facility] continuing to work on discharge planning.  Suspect family will support home with [facility] services at time of discharge.    2/28/2025.  Patient is seen again today during my rounds on [address] [place] [address].  She is spending most of her time in a recliner beside her 's bed.   has transition to full [facility] care at this point and is declining rather rapidly here in the hospital.  Patient and family had long meeting today regarding transition to [facility] service for both patient and her .  The 2 patients are hoping that he can survive long enough for a moved to home where he would like to die peacefully in his own bed.  Patient herself is supportive of this idea.  We did discuss plans for the patient once  is gone and she plans to move in with one of her children at that point and continue [facility] services there.  Both patients would also be eligible for inpatient [facility] unit once they are on full [facility] services.  Family is preparing home for the discharge.  Apparently home needs thorough cleaning this weekend and family is working on that at present time.  Family does understand that they need to try to provide 24-hour care  "for the parents in the home.  My office did give them a list of care providers and son Leighton will be attempting to make arrangements with his care providers over the weekend.  I did discuss the case at length with the head nurse from [address] [place] [address], with the patient's nurse, and with the palliative care nurse who saw the patient earlier today.  Everyone has same goals and plans in mind.  An Adult Protective Services report was filed but apparently agency declined to take it at present time since [facility] is now involved with the case.  Patient has multiple questions while I am present.  At one point she states that she would like to go home a day ahead of her  but then changes her opinion and says that she would like to stay at the hospital until he takes his last breath in case he dies for the transfer can occur.  I did promise her I would update the family tomorrow and discuss situation with them further.  She does want to let them know that she has \"important papers at home\" that need to be attended to including some bills that need to be paid.  They did recently have a natural gas tank delivered for home heating.  They also plan to rely on a neighbor who has been very helpful and who they have paid to help with chores at home.  Patient reports no distress at the present time.  [facility]al is continuing to follow.  Cardiology did see patient today and is now signed off.  Patient's sodium has stabilized.  White count remains stable at 8.07 hemoglobin 10.4.  No changes in patient's overall clinical status today.  Dr. Nicolas Ugarte will be covering for me over the weekend.         Review of Systems:               A comprehensive 14 point review of systems was negative except for:  Constitution:  positive for fatigue, malaise, and weight loss  Eyes:  positive for blurriness, loss of vision, and dryness  Respiratory: positive for  cough, dry and shortness of air  Cardiovascular: positive for  irregular pulse, lower " extremity edema, and palpitations  Gastrointestinal: positive for  bloating / distention, constipation, early satiety, and nausea  Musculoskeletal: positive for  back pain, joint pain, joint stiffness, joint swelling, muscle pain, muscle weakness, and neck pain  Neurological: positive for  coordination abnormal, difficulty walking, confusion, dizziness, headaches, light headedness, memory deficit, vertigo, and weakness  Behavioral/Psych: positive for  anxiety, appetite change  with weight loss, excessive irritability, sleep disturbance, and unstable mood       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 Yazmin Canseco    CARDIAC ELECTROPHYSIOLOGY PROCEDURE N/A 5/27/2022    Procedure: Loop insertion;  Surgeon: Jeffrey Argueta MD;  Location:  [facility];  Service: Cardiovascular;  Laterality: N/A;  biotronik    CARDIAC ELECTROPHYSIOLOGY PROCEDURE N/A 7/13/2022    Procedure: Loop recorder removal;  Surgeon: Jeffrey Argueta MD;  Location:  [facility];  Service: Cardiovascular;  Laterality: N/A;    CARPAL TUNNEL RELEASE Right 05/25/2011    Dr. Caleb Bueno    CARPAL TUNNEL RELEASE Left 04/26/2011    Dr. Caleb Bueno    CATARACT EXTRACTION Left 11/29/2010      Eusebio Downs    CATARACT EXTRACTION Right 11/17/2010    Dr. Eusebio Downs    COLONOSCOPY N/A 12/06/2002    Sigmoid diverticulosis-Dr. Brandon Batista    COLONOSCOPY N/A 12/12/2013    Tortuous colon, diverticulosis in the sigmoid colon and descending colon, stool in the rectum, sigmoid colon, descending colon, splenic flexure, transverse colon and hepatic flexure-Dr. Irma Brambila    DEQUERVAIN RELEASE Left 9/23/2020    Procedure: DEQUERVAIN RELEASE LEFT HAND;  Surgeon: Caleb Bueno MD;  [place] [facility] [place] [facility]  Service: Plastics;  Laterality: Left;    ENDOSCOPY N/A 09/13/2007    Normal-Dr. Pavel Quarles    ENDOSCOPY AND COLONOSCOPY N/A 09/19/2005    1 cm hiatal hernia, mild Schatzki's ring, sigmoid diverticulosis-Dr. Yoel Farley    GANGLION CYST EXCISION Left 12/18/2012    Release of A1 pulley flexor sheath, left fourth finger, excision of ganglion cyst 0.5 cm diameter, A2 pulley flexor sheath, left fourth finger-Dr. Caleb Bueno    HEEL SPUR EXCISION Left 1968    INGUINAL HERNIA REPAIR Right 1971    at 5 months pregnant     INJECTION OF MEDICATION Left 9/23/2020    Procedure: KENOLOG INJECTION TO LEFT THUMB;  Surgeon: Caleb Bueno MD;  [place] [facility] [place] [facility] [place]  Service: Plastics;  Laterality: Left;    MASTECTOMY Right 08/05/2008    Right breast mastectomy and excision of left chest wall lesion-Dr. Yoel Farley    MASTECTOMY RADICAL Left 04/29/2003    Left modified radical mastectomy-Dr. Yazmin Canseco    PARATHYROIDECTOMY Right 05/04/2004    Excision of parathyroid adenoma (right superior)-Dr. Yoel Farley    REPLACEMENT TOTAL KNEE Right 04/09/2012    Dr. Lamonte Childress    SINUS SURGERY  1984    THYROIDECTOMY, PARTIAL Left 05/04/2004    Dr. Yoel Farley    TOTAL ABDOMINAL HYSTERECTOMY Bilateral 1973    Dr. Mahan    TRIGGER FINGER RELEASE Left 9/23/2020    Procedure: RELEASE LEFT FOURTH TRIGGER FINGER;  Surgeon: Caleb Bueno MD;  [facility] [facility];  Service: Plastics;  Laterality: Left;    UPPER GASTROINTESTINAL ENDOSCOPY N/A 02/18/2009    LA Grade A reflux esophagitis, normal stomach, normal 2nd part of the duodenum-Dr. Yoel Farley    WRIST GANGLION EXCISION Left 9/23/2020    Procedure: EXCISION GANGLION CYST LEFT WRIST;  Surgeon: Caleb Bueno MD;  [place] [facility] [place] [facility] [place]  Service: Plastics;  Laterality: Left;     Allergies   Allergen Reactions    Bee Venom Shortness Of Breath and Rash    Morphine Anaphylaxis and Nausea And Vomiting    Sulfa Antibiotics Anaphylaxis and Hives     Or sulfa fillers in meds  Broke out in internal and external hives      Tree Extract Shortness Of Breath and Rash    Ambien [Zolpidem] Hallucinations    Anastrozole Other (See Comments)     Can't remember what the reaction was     Covid-19 (Mrna) Vaccine Swelling     Facial swelling, blood clots    Eliquis [Apixaban] Headache     Headaches, nausea and itching.     Hydralazine Myalgia     Patient reports leg cramps, joint pain and increased fatigue    Norvasc [Amlodipine] Other (See Comments)     KIDNEYS SHUT DOWN    Nsaids Other (See Comments)     KIDNEY FAILURE    Penicillins Unknown (See Comments)     Severe reaction as a child  Tolerated ceftriaxone during 04/2017 admission    Grass Rash       Family History   Problem Relation Age of Onset    Brain cancer Brother     Alcohol abuse Mother     No Known Problems Father     No Known Problems Sister     Malig Hyperthermia Neg Hx        Social History     Socioeconomic History    Marital status:      Spouse name: Joey    Number of children: 6    Highest education level: Some college, no degree   Tobacco Use    Smoking status: Never     Passive exposure: Never    Smokeless tobacco: Never   Vaping Use    Vaping status: Never Used   Substance and Sexual Activity    Alcohol use: Never     Comment: no drink in 30yrs    Drug use: Never    Sexual activity: Defer     Birth control/protection: Surgical       PMH, FH,  SH and ROS completed with Admission History and Physical and updated in EPIC system.        Objective     Scheduled Meds:albuterol, 2.5 mg, Nebulization, Q6H - RT  cetirizine, 10 mg, Oral, Daily  cetirizine, 10 mg, Oral, Daily  cloNIDine, 0.3 mg, Oral, Q6H  eplerenone, 50 mg, Oral, Daily  fluticasone, 1 spray, Each Nare, BID  hydrocortisone, 1 application , Topical, Daily  ipratropium, 0.5 mg, Nebulization, 4x Daily - RT  melatonin, 10 mg, Oral, Nightly  multivitamin with minerals, 1 tablet, Oral, Daily  NIFEdipine XL, 30 mg, Oral, Q24H  pantoprazole, 40 mg, Oral, Q AM  potassium chloride, 20 mEq, Oral, Daily  predniSONE, 10 mg, Oral, Daily  rivaroxaban, 20 mg, Oral, Daily With Dinner  rosuvastatin, 5 mg, Oral, QAM  [Held by provider] torsemide, 50 mg, Oral, Daily  triamcinolone, 1 Application, Topical, BID  cyanocobalamin, 1,000 mcg, Oral, Daily  [START ON 3/4/2025] vitamin D, 50,000 Units, Oral, Every Tuesday      Continuous Infusions:     Vital signs in last 24 hours:  Temp:  [97.1 °F (36.2 °C)-97.2 °F (36.2 °C)] 97.2 °F (36.2 °C)  Heart Rate:  [56-84] 56  Resp:  [16-18] 16  BP: (116-159)/(67-81) 139/70    Intake/Output:    Intake/Output Summary (Last 24 hours) at 3/1/2025 0616  Last data filed at 3/1/2025 0120  Gross per 24 hour   Intake 980 ml   Output --   Net 980 ml       Exam:  /70 (BP Location: Right arm, Patient Position: Sitting)   Pulse 56   Temp 97.2 °F (36.2 °C) (Oral)   Resp 16   Wt 60.4 kg (133 lb 2.5 oz)   SpO2 99%   BMI 26.01 kg/m²     Constitutional:  Alert, cooperative, no distress, AAOx3, resting comfortably.  Resting comfortably in chair beside .  Weak and fatigued. still extremely talkative and confused at times.   Head:      Normocephalic, without obvious abnormality, atraumatic   Eyes:     PERRLA, conjunctiva/corneas clear, no icterus, no conjunctival                                     pallor, EOM's intact, both eyes      ENT and Mouth: Lips, tongue, gums normal; oral  mucosa pink and moist   Neck:     Supple, symmetrical, trachea midline, no JVD  Respiratory:     Clear to auscultation bilaterally, respirations unlabored  Cardiovascular:  Irregular rate and rhythm, S1 and S2 normal, no murmur,      no  Rub or gallop.  Pulses normal.    Gastrointestinal:   BS present x 4 Soft, non-tender, bowel sounds active,      no masses, no hepatosplenomegaly                                                     :       No hernia.  Normal exam for sex,   Incontinent of urine and wearing briefs.         Musculoskeletal: Extremities normal, atraumatic, no cyanosis or edema     No arthropathy.  No deformity.  Gait normal                                                 Skin:   Skin is warm and dry,  no rashes, swelling or palpable lesions.  Excoriations on skin of lower extremities.  Bruising of left toes.  Right ankle E version to at least 90 degrees   Neurologic:  CN -XII intact, motor strength grossly intact, sensation grossly intact to light touch, no focal reflex deficits noted    Psychiatric:     Alert,oriented X3, no delusions, psychoses, depression or anxiety    Heme/Lymph/Imun:   No bruises, petechiae.  Lymph nodes normal in size/configuration.   Anxious at times.  Tearful with discussions of [facility]       Data Review:  Lab Results   Component Value Date    CALCIUM 9.4 03/01/2025    PHOS 2.5 02/27/2025     Results from last 7 days   Lab Units 03/01/25  0459 02/28/25  0501 02/27/25  1156 02/27/25  0553 02/26/25  2345   AST (SGOT) U/L 10 8  --  9 11   ALT (SGPT) U/L 7 6  --  6 9   MAGNESIUM mg/dL  --  2.2  --  2.1 2.0   SODIUM mmol/L 143 144  --  146* 144   POTASSIUM mmol/L 4.3 4.5 3.8 3.7 3.6   CHLORIDE mmol/L 110* 111*  --  107 107   CO2 mmol/L 25.4 26.0  --  28.0 25.0   BUN mg/dL 19 21  --  30* 33*   CREATININE mg/dL 0.84 0.73  --  0.89 0.93   GLUCOSE mg/dL 118* 115*  --  89 112*   CALCIUM mg/dL 9.4 9.4  --  9.5 9.3   WBC 10*3/mm3 8.07 6.28  --   --  7.80   HEMOGLOBIN g/dL 10.4* 10.2*   --   --  10.8*   PLATELETS 10*3/mm3 171 165  --   --  176     Lab Results   Component Value Date    CKTOTAL 36 02/26/2025    CKMB 1.69 04/06/2017    TROPONINT 29 (H) 01/14/2025     Estimated Creatinine Clearance: 38.4 mL/min (by C-G formula based on SCr of 0.84 mg/dL).  WEIGHTS:     Wt Readings from Last 1 Encounters:   02/27/25 0600 60.4 kg (133 lb 2.5 oz)   02/26/25 2026 59.9 kg (132 lb)         Assessment:    Adult failure to thrive syndrome    Stage 2 chronic kidney disease    Malaise and fatigue progressive    Dizziness    Pain in both lower extremities    Dyspnea on exertion    Accelerated hypertension    Decreased mobility and endurance    Spinal stenosis of lumbar region with radiculopathy    Ataxia    Recent unexplained weight loss 42# over last 6 months    Generalized muscle weakness    Severe malnutrition    Volume depletion    Recurrent falls while walking    Hypokalemia    Diastolic dysfunction with chronic heart failure    Chronic heart failure with preserved ejection fraction (HFpEF)    Moderate malnutrition due to chronic disease    Urinary retention self-caths (Ochoa)    Other cervical disc displacement at C5-C6 level    DDD (degenerative disc disease), cervical    Hyperlipidemia LDL goal <70    Proctocele    Urge incontinence of urine    Chronic tension-type headache, intractable    Cerebral atherosclerosis    Vitamin D deficiency    Moderate persistent asthmatic bronchitis without complication    Elevated PTH level    Gastroesophageal reflux disease with esophagitis    Recurrent UTI    H/O Cerebral vascular accident in left occipital area    Acute joint pain    Easy bruisability    COPD with asthma    Essential hypertension    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  Adult failure to thrive with significant malnutrition and weight loss unexplained and unintentional of 47 pounds over the last 6 months.  Causes of condition felt to be multifactorial but patient seems to be in an overall functional decline.  Has been confined to sitting in chair and laying on couch now for several weeks and attempts for physical therapy at home through [facility] after last hospitalization in January 2025 have been ineffective.  Patient's pain felt to probably be a significant part of this syndrome.  Patient currently enrolled in [facility] palliative care program for treatment of her intractable pain.   family conference and patient conference tomorrow to discuss with [facility] transition to full [facility] services.  I do feel patient has less than 6 months to live at present time in current condition. dietitian continues to work with patient.     2.  Intractable pain related to severe spinal stenosis in both cervical and lumbar regions with radiculopathy and felt to be an operable in nature.  Patient currently taking hydrocodone up to 10 tablets/day provided through the palliative care program for her severe pain.  Patient also on gabapentin and Fioricet to help with other painful conditions.  Patient does express interest in transitioning from palliative care program to full [facility]  services.  Pain well-controlled at present time on regimen offered by [facility].   Patient taking Norco 10 two tabs Q6 and still complains of occasional pain     3.  Stage II chronic kidney disease with mild volume depletion due to poor p.o. intake.  Patient is followed by nephrology, Dr. Bond, and we will ask him to take a look at the situation and make recommendations on any additional treatment he thinks may be necessary in this patient's condition. kidney situation stable. nephrology still following but kidney numbers seems stable     4.  Ataxia with dizziness, dyspnea on exertion, recurrent falls that have resulted in head trauma and contusions, and overall immobility.  PT and OT asked to evaluate patient.  Nursing staff reporting that it takes assist of 2 at times to get patient from chair in room to bathroom.  Patient did have 1 fall even when nurses were present helping her while she was in her 's room.  No real changes despite PT and OT assistance.     5.  Chronic heart failure with preserved ejection fraction and known diastolic dysfunction.  This is followed on an outpatient basis by Dr. Gama Soto in cardiology.  Medications have been adjusted to try to help with the problem.  This has become somewhat of a moving target however because of the patient's rapid and steady functional decline.  Ongoing decline with congestive heart failure chronic in nature.     6.  Urinary stress incontinence with history of urinary retention requiring In-N-Out self caths as taught to the patient by Dr. Fazal Ochoa her urologist.  Unfortunately, patient has been unable to perform this function in recent weeks and has been for the most part incontinent of urine.  No need for In-N-Out cath today. patient incontinent of urine at present time    7.  Diffuse osteoarthritis of spine and extremities with history of bilateral knee replacements.  Arthritis enhances patient's pain response and makes her mobility difficult to  impossible.  Patient has become deconditioned and extremely weak as a result of these conditions.  This condition unchanged today.     8.  Hyperlipidemia.  Treated on an outpatient basis and monitored.  Due to patient's recent poor intake her lipid levels have been under good control.     9.  Chronic tension type headaches with intractable pain.  Controlled for many years with Fioricet that she now takes in combination with Norco provided through palliative care services.     10.  Moderate persistent asthmatic bronchitis with shortness of breath.  Patient does have nebulizer at home which she uses on occasion but also uses ProAir inhaler.  Has been followed for many years by allergy because of this condition.     11.  Gastroesophageal reflux disease with esophagitis made worse by poor nutritional habits.  Patient continued on PPI for now to prevent stress ulceration and worsening of the gastritis.     12.  Right ankle deformity with history of acute DVT in the right lower extremity in the past.  No ongoing signs of DVT at present time.  Ortho suggest cam boot which patient declines.     13.  Complications of COVID-19 originally diagnosed 10/2/2022.  These have mainly been chronic neurologic changes with intensification of her intractable headache syndrome.  Has never achieved baseline since her COVID infection.     14.  Posterior tibial tendon dysfunction diagnosed by orthopedics in the past and certainly contributing somewhat to the patient's immobility.  Orthopedics consulted for input on any other suggestions they might have to improve patient's mobility.  Patient does have known venous incompetence of popliteal vein on the right.     15.  Generalized anxiety disorder.  This is an ongoing problem long-term for the patient.  No new meds or changes needed at present time. patient declined psychiatric evaluation and also would not want to see neurologist at this point.   she thinks things are stable for her but very  unstable for her .           Plan for disposition:Where: home,[facility] and When:  2-3 days.  Transitioning from [facility] palliative to full [facility] services.     Copied text in this note has been reviewed by me and is accurate as of 02/28/2025  Much of this dictation is completed using dragon voice activated software.        Óscar Cadet MD  2/28/2025 1945 EST

## 2025-03-01 NOTE — PLAN OF CARE
AAOX 2-3. Very forgetful. RA. Up in chair most of the shift. In husbands room 487 all shift. Bath given. Buttocks blanchable. Nephrology signed out.     Goal Outcome Evaluation:  Plan of Care Reviewed With: patient, family        Progress: no change       Problem: Adult Inpatient Plan of Care  Goal: Plan of Care Review  Outcome: Progressing  Flowsheets (Taken 3/1/2025 1858)  Progress: no change  Plan of Care Reviewed With:   patient   family  Goal: Patient-Specific Goal (Individualized)  Outcome: Progressing  Goal: Absence of Hospital-Acquired Illness or Injury  Outcome: Progressing  Intervention: Identify and Manage Fall Risk  Recent Flowsheet Documentation  Taken 3/1/2025 1822 by Nimco Espana, RN  Safety Promotion/Fall Prevention:   safety round/check completed   room organization consistent   nonskid shoes/slippers when out of bed   lighting adjusted   clutter free environment maintained   assistive device/personal items within reach   activity supervised  Taken 3/1/2025 1650 by Nimco Espana, RN  Safety Promotion/Fall Prevention:   safety round/check completed   room organization consistent   nonskid shoes/slippers when out of bed   lighting adjusted   clutter free environment maintained   assistive device/personal items within reach   activity supervised  Taken 3/1/2025 1425 by Nimco Espana, RN  Safety Promotion/Fall Prevention:   safety round/check completed   room organization consistent   nonskid shoes/slippers when out of bed   lighting adjusted   clutter free environment maintained   assistive device/personal items within reach   activity supervised  Taken 3/1/2025 1200 by Nimco Espana, RN  Safety Promotion/Fall Prevention:   safety round/check completed   room organization consistent   nonskid shoes/slippers when out of bed   lighting adjusted   clutter free environment maintained   assistive device/personal items within reach   activity supervised  Taken 3/1/2025 1027 by Nimco Espana,  RN  Safety Promotion/Fall Prevention:   safety round/check completed   room organization consistent   nonskid shoes/slippers when out of bed   lighting adjusted   clutter free environment maintained   assistive device/personal items within reach   activity supervised  Taken 3/1/2025 0800 by Nimco Espana RN  Safety Promotion/Fall Prevention:   room organization consistent   safety round/check completed   nonskid shoes/slippers when out of bed   lighting adjusted   clutter free environment maintained   assistive device/personal items within reach   activity supervised  Intervention: Prevent Skin Injury  Recent Flowsheet Documentation  Taken 3/1/2025 1822 by Nimco Espana RN  Body Position: legs elevated  Taken 3/1/2025 1650 by Nimco Espana RN  Body Position:   heels elevated   legs elevated  Taken 3/1/2025 1515 by Nimco Espana RN  Body Position:   heels elevated   legs elevated  Taken 3/1/2025 1200 by Nimco Espana RN  Body Position:   position changed independently   weight shifting   heels elevated  Taken 3/1/2025 1027 by Nimco Espana RN  Body Position:   heels elevated   weight shifting   legs elevated  Skin Protection:   incontinence pads utilized   protective footwear used   transparent dressing maintained  Taken 3/1/2025 0800 by Nimco Espana RN  Body Position: legs elevated  Intervention: Prevent and Manage VTE (Venous Thromboembolism) Risk  Recent Flowsheet Documentation  Taken 3/1/2025 1027 by Nimco Espana RN  VTE Prevention/Management:   bilateral   SCDs (sequential compression devices) off   patient refused intervention  Intervention: Prevent Infection  Recent Flowsheet Documentation  Taken 3/1/2025 1822 by Nimco Espana RN  Infection Prevention:   single patient room provided   rest/sleep promoted   personal protective equipment utilized   hand hygiene promoted  Taken 3/1/2025 1650 by Nimco Espana RN  Infection Prevention:   single patient room provided    rest/sleep promoted   personal protective equipment utilized   hand hygiene promoted  Taken 3/1/2025 1425 by Nimco Espana RN  Infection Prevention:   single patient room provided   rest/sleep promoted   personal protective equipment utilized   hand hygiene promoted  Taken 3/1/2025 1200 by Nimco Espana RN  Infection Prevention:   single patient room provided   rest/sleep promoted   personal protective equipment utilized   hand hygiene promoted  Taken 3/1/2025 1027 by Nimco Espana RN  Infection Prevention:   single patient room provided   rest/sleep promoted   personal protective equipment utilized   hand hygiene promoted  Taken 3/1/2025 0800 by Nimco Espana RN  Infection Prevention:   single patient room provided   personal protective equipment utilized   rest/sleep promoted   hand hygiene promoted  Goal: Optimal Comfort and Wellbeing  Outcome: Progressing  Intervention: Provide Person-Centered Care  Recent Flowsheet Documentation  Taken 3/1/2025 1027 by Nimco Espana RN  Trust Relationship/Rapport:   care explained   choices provided   emotional support provided   empathic listening provided   questions answered   questions encouraged   reassurance provided  Goal: Readiness for Transition of Care  Outcome: Progressing     Problem: Fall Injury Risk  Goal: Absence of Fall and Fall-Related Injury  Outcome: Progressing  Intervention: Identify and Manage Contributors  Recent Flowsheet Documentation  Taken 3/1/2025 1822 by Nimco Espana RN  Medication Review/Management: medications reviewed  Taken 3/1/2025 1650 by Nimco Espana RN  Medication Review/Management: medications reviewed  Taken 3/1/2025 1425 by Nimco Espana RN  Medication Review/Management: medications reviewed  Taken 3/1/2025 1200 by Nimco Espana RN  Medication Review/Management: medications reviewed  Taken 3/1/2025 1027 by Nimco Espana RN  Medication Review/Management: medications reviewed  Taken 3/1/2025 0800 by  Nimco Espana RN  Medication Review/Management: medications reviewed  Intervention: Promote Injury-Free Environment  Recent Flowsheet Documentation  Taken 3/1/2025 1822 by Nimco Espana RN  Safety Promotion/Fall Prevention:   safety round/check completed   room organization consistent   nonskid shoes/slippers when out of bed   lighting adjusted   clutter free environment maintained   assistive device/personal items within reach   activity supervised  Taken 3/1/2025 1650 by Nimco Espana RN  Safety Promotion/Fall Prevention:   safety round/check completed   room organization consistent   nonskid shoes/slippers when out of bed   lighting adjusted   clutter free environment maintained   assistive device/personal items within reach   activity supervised  Taken 3/1/2025 1425 by Nimco Espana RN  Safety Promotion/Fall Prevention:   safety round/check completed   room organization consistent   nonskid shoes/slippers when out of bed   lighting adjusted   clutter free environment maintained   assistive device/personal items within reach   activity supervised  Taken 3/1/2025 1200 by Nimco Espana RN  Safety Promotion/Fall Prevention:   safety round/check completed   room organization consistent   nonskid shoes/slippers when out of bed   lighting adjusted   clutter free environment maintained   assistive device/personal items within reach   activity supervised  Taken 3/1/2025 1027 by Nimco Espana RN  Safety Promotion/Fall Prevention:   safety round/check completed   room organization consistent   nonskid shoes/slippers when out of bed   lighting adjusted   clutter free environment maintained   assistive device/personal items within reach   activity supervised  Taken 3/1/2025 0800 by Nimco Espana RN  Safety Promotion/Fall Prevention:   room organization consistent   safety round/check completed   nonskid shoes/slippers when out of bed   lighting adjusted   clutter free environment maintained    assistive device/personal items within reach   activity supervised     Problem: Skin Injury Risk Increased  Goal: Skin Health and Integrity  Outcome: Progressing  Intervention: Optimize Skin Protection  Recent Flowsheet Documentation  Taken 3/1/2025 1822 by Nimco Espana RN  Activity Management: up in chair  Taken 3/1/2025 1650 by Nimco Espana RN  Activity Management: up in chair  Taken 3/1/2025 1515 by Nimco Espana RN  Activity Management: up in chair  Taken 3/1/2025 1425 by Nimco Espana RN  Activity Management:   activity encouraged   ambulated to bathroom  Taken 3/1/2025 1027 by Nimco Espnaa RN  Activity Management: up in chair  Pressure Reduction Techniques:   frequent weight shift encouraged   heels elevated off bed   pressure points protected   weight shift assistance provided  Pressure Reduction Devices: (up in chair) other (see comments)  Skin Protection:   incontinence pads utilized   protective footwear used   transparent dressing maintained  Taken 3/1/2025 0800 by Nimco Espana RN  Activity Management: up in chair  Head of Bed (HOB) Positioning: HOB lowered  Taken 3/1/2025 0744 by Nimco Espana RN  Activity Management: back to bed  Taken 3/1/2025 0735 by Nimco Espana RN  Activity Management: ambulated to bathroom     Problem: Comorbidity Management  Goal: Maintenance of COPD Symptom Control  Outcome: Progressing  Intervention: Maintain COPD (Chronic Obstructive Pulmonary Disease) Symptom Control  Recent Flowsheet Documentation  Taken 3/1/2025 1822 by Nimco Espana RN  Medication Review/Management: medications reviewed  Taken 3/1/2025 1650 by Nimco Espana RN  Medication Review/Management: medications reviewed  Taken 3/1/2025 1425 by Nimco Espana RN  Medication Review/Management: medications reviewed  Taken 3/1/2025 1200 by Nimco Espana RN  Medication Review/Management: medications reviewed  Taken 3/1/2025 1027 by Nimco Espana RN  Medication  Review/Management: medications reviewed  Taken 3/1/2025 0800 by Nimco Espana RN  Medication Review/Management: medications reviewed  Goal: Blood Pressure in Desired Range  Outcome: Progressing  Intervention: Maintain Blood Pressure Management  Recent Flowsheet Documentation  Taken 3/1/2025 1822 by Nimco Espana RN  Medication Review/Management: medications reviewed  Taken 3/1/2025 1650 by Nimco Espana RN  Medication Review/Management: medications reviewed  Taken 3/1/2025 1425 by Nimco Espana RN  Medication Review/Management: medications reviewed  Taken 3/1/2025 1200 by Nimco Espana RN  Medication Review/Management: medications reviewed  Taken 3/1/2025 1027 by Nimco Espana RN  Medication Review/Management: medications reviewed  Taken 3/1/2025 0800 by Nimco Espana RN  Medication Review/Management: medications reviewed     Problem: Violence Risk or Actual  Goal: Anger and Impulse Control  Outcome: Progressing  Intervention: Minimize Safety Risk  Recent Flowsheet Documentation  Taken 3/1/2025 1822 by Nimco Espana RN  Enhanced Safety Measures:   chair alarm set   room near unit station   family to remain at bedside  Taken 3/1/2025 1650 by Nimco Espana RN  Enhanced Safety Measures:   chair alarm set   family to remain at bedside   room near unit station  Taken 3/1/2025 1425 by Nimco Espana RN  Enhanced Safety Measures:   family to remain at bedside   room near unit station   chair alarm set  Taken 3/1/2025 1200 by Nimco Espana RN  Enhanced Safety Measures:   chair alarm set   family to remain at bedside   room near unit station  Taken 3/1/2025 1027 by Nimco Espana RN  Enhanced Safety Measures:   chair alarm set   family to remain at bedside   room near unit station  Taken 3/1/2025 0800 by Nimco Espana RN  Enhanced Safety Measures:   chair alarm set   room near unit station  Intervention: Promote Self-Control  Recent Flowsheet Documentation  Taken 3/1/2025  1027 by Nimco Espana, RN  Supportive Measures:   active listening utilized   goal-setting facilitated   mindfulness techniques promoted  Environmental Support: (patient staying in room with )   calm environment promoted   rooming-in facilitated   other (see comments)

## 2025-03-02 LAB
ALBUMIN SERPL-MCNC: 3.2 G/DL (ref 3.5–5.2)
ALBUMIN/GLOB SERPL: 1.5 G/DL
ALP SERPL-CCNC: 75 U/L (ref 39–117)
ALT SERPL W P-5'-P-CCNC: 8 U/L (ref 1–33)
ANION GAP SERPL CALCULATED.3IONS-SCNC: 9 MMOL/L (ref 5–15)
AST SERPL-CCNC: 9 U/L (ref 1–32)
BASOPHILS # BLD AUTO: 0.03 10*3/MM3 (ref 0–0.2)
BASOPHILS NFR BLD AUTO: 0.4 % (ref 0–1.5)
BILIRUB SERPL-MCNC: 0.2 MG/DL (ref 0–1.2)
BUN SERPL-MCNC: 20 MG/DL (ref 8–23)
BUN/CREAT SERPL: 22.7 (ref 7–25)
CALCIUM SPEC-SCNC: 9.5 MG/DL (ref 8.6–10.5)
CHLORIDE SERPL-SCNC: 110 MMOL/L (ref 98–107)
CO2 SERPL-SCNC: 24 MMOL/L (ref 22–29)
CREAT SERPL-MCNC: 0.88 MG/DL (ref 0.57–1)
DEPRECATED RDW RBC AUTO: 44.8 FL (ref 37–54)
EGFRCR SERPLBLD CKD-EPI 2021: 63.7 ML/MIN/1.73
EOSINOPHIL # BLD AUTO: 0.08 10*3/MM3 (ref 0–0.4)
EOSINOPHIL NFR BLD AUTO: 1 % (ref 0.3–6.2)
ERYTHROCYTE [DISTWIDTH] IN BLOOD BY AUTOMATED COUNT: 12.9 % (ref 12.3–15.4)
GLOBULIN UR ELPH-MCNC: 2.2 GM/DL
GLUCOSE SERPL-MCNC: 106 MG/DL (ref 65–99)
HCT VFR BLD AUTO: 32.3 % (ref 34–46.6)
HGB BLD-MCNC: 10.6 G/DL (ref 12–15.9)
IMM GRANULOCYTES # BLD AUTO: 0.02 10*3/MM3 (ref 0–0.05)
IMM GRANULOCYTES NFR BLD AUTO: 0.3 % (ref 0–0.5)
LYMPHOCYTES # BLD AUTO: 2.22 10*3/MM3 (ref 0.7–3.1)
LYMPHOCYTES NFR BLD AUTO: 27.8 % (ref 19.6–45.3)
MCH RBC QN AUTO: 31 PG (ref 26.6–33)
MCHC RBC AUTO-ENTMCNC: 32.8 G/DL (ref 31.5–35.7)
MCV RBC AUTO: 94.4 FL (ref 79–97)
MONOCYTES # BLD AUTO: 0.71 10*3/MM3 (ref 0.1–0.9)
MONOCYTES NFR BLD AUTO: 8.9 % (ref 5–12)
NEUTROPHILS NFR BLD AUTO: 4.92 10*3/MM3 (ref 1.7–7)
NEUTROPHILS NFR BLD AUTO: 61.6 % (ref 42.7–76)
NRBC BLD AUTO-RTO: 0 /100 WBC (ref 0–0.2)
PLATELET # BLD AUTO: 180 10*3/MM3 (ref 140–450)
PMV BLD AUTO: 10.6 FL (ref 6–12)
POTASSIUM SERPL-SCNC: 4.1 MMOL/L (ref 3.5–5.2)
PROT SERPL-MCNC: 5.4 G/DL (ref 6–8.5)
RBC # BLD AUTO: 3.42 10*6/MM3 (ref 3.77–5.28)
SODIUM SERPL-SCNC: 143 MMOL/L (ref 136–145)
WBC NRBC COR # BLD AUTO: 7.98 10*3/MM3 (ref 3.4–10.8)

## 2025-03-02 PROCEDURE — 63710000001 CETIRIZINE 10 MG TABLET: Performed by: INTERNAL MEDICINE

## 2025-03-02 PROCEDURE — A9270 NON-COVERED ITEM OR SERVICE: HCPCS | Performed by: INTERNAL MEDICINE

## 2025-03-02 PROCEDURE — 94664 DEMO&/EVAL PT USE INHALER: CPT

## 2025-03-02 PROCEDURE — 63710000001 VITAMIN B-12 500 MCG TABLET: Performed by: INTERNAL MEDICINE

## 2025-03-02 PROCEDURE — 63710000001 MULTIVITAMIN WITH MINERALS TABLET: Performed by: INTERNAL MEDICINE

## 2025-03-02 PROCEDURE — 63710000001 EPLERENONE 25 MG TABLET: Performed by: INTERNAL MEDICINE

## 2025-03-02 PROCEDURE — 94799 UNLISTED PULMONARY SVC/PX: CPT

## 2025-03-02 PROCEDURE — 85025 COMPLETE CBC W/AUTO DIFF WBC: CPT | Performed by: INTERNAL MEDICINE

## 2025-03-02 PROCEDURE — 63710000001 PREDNISONE PER 5 MG: Performed by: INTERNAL MEDICINE

## 2025-03-02 PROCEDURE — 63710000001 CLONIDINE 0.1 MG TABLET: Performed by: INTERNAL MEDICINE

## 2025-03-02 PROCEDURE — 63710000001 ROSUVASTATIN 10 MG TABLET: Performed by: INTERNAL MEDICINE

## 2025-03-02 PROCEDURE — 63710000001 POTASSIUM CHLORIDE 20 MEQ PACK: Performed by: INTERNAL MEDICINE

## 2025-03-02 PROCEDURE — 63710000001 LORAZEPAM 0.5 MG TABLET: Performed by: INTERNAL MEDICINE

## 2025-03-02 PROCEDURE — 63710000001 POLYETHYLENE GLYCOL 17 G PACK: Performed by: INTERNAL MEDICINE

## 2025-03-02 PROCEDURE — 63710000001 PANTOPRAZOLE 40 MG TABLET DELAYED-RELEASE: Performed by: INTERNAL MEDICINE

## 2025-03-02 PROCEDURE — G0378 HOSPITAL OBSERVATION PER HR: HCPCS

## 2025-03-02 PROCEDURE — 80053 COMPREHEN METABOLIC PANEL: CPT | Performed by: INTERNAL MEDICINE

## 2025-03-02 PROCEDURE — 63710000001 MELATONIN 5 MG TABLET: Performed by: INTERNAL MEDICINE

## 2025-03-02 PROCEDURE — 63710000001 HYDROCODONE-ACETAMINOPHEN 10-325 MG TABLET: Performed by: INTERNAL MEDICINE

## 2025-03-02 PROCEDURE — 63710000001 NIFEDIPINE XL 30 MG TABLET SUSTAINED-RELEASE 24 HOUR: Performed by: INTERNAL MEDICINE

## 2025-03-02 RX ADMIN — EPLERENONE 50 MG: 25 TABLET, FILM COATED ORAL at 10:05

## 2025-03-02 RX ADMIN — Medication 1 TABLET: at 10:06

## 2025-03-02 RX ADMIN — IPRATROPIUM BROMIDE AND ALBUTEROL SULFATE 3 ML: .5; 3 SOLUTION RESPIRATORY (INHALATION) at 15:31

## 2025-03-02 RX ADMIN — ROSUVASTATIN CALCIUM 5 MG: 10 TABLET, FILM COATED ORAL at 05:13

## 2025-03-02 RX ADMIN — TRIAMCINOLONE ACETONIDE 1 APPLICATION: 1 CREAM TOPICAL at 20:55

## 2025-03-02 RX ADMIN — CLONIDINE HYDROCHLORIDE 0.3 MG: 0.1 TABLET ORAL at 05:13

## 2025-03-02 RX ADMIN — FLUTICASONE PROPIONATE 1 SPRAY: 50 SPRAY, METERED NASAL at 20:52

## 2025-03-02 RX ADMIN — Medication 10 MG: at 20:52

## 2025-03-02 RX ADMIN — NIFEDIPINE 30 MG: 30 TABLET, EXTENDED RELEASE ORAL at 10:04

## 2025-03-02 RX ADMIN — TRIAMCINOLONE ACETONIDE 1 APPLICATION: 1 CREAM TOPICAL at 10:10

## 2025-03-02 RX ADMIN — LORAZEPAM 0.5 MG: 0.5 TABLET ORAL at 20:51

## 2025-03-02 RX ADMIN — IPRATROPIUM BROMIDE AND ALBUTEROL SULFATE 3 ML: .5; 3 SOLUTION RESPIRATORY (INHALATION) at 22:04

## 2025-03-02 RX ADMIN — POTASSIUM CHLORIDE 20 MEQ: 1.5 POWDER, FOR SOLUTION ORAL at 10:08

## 2025-03-02 RX ADMIN — HYDROCODONE BITARTRATE AND ACETAMINOPHEN 2 TABLET: 10; 325 TABLET ORAL at 10:06

## 2025-03-02 RX ADMIN — CETIRIZINE HYDROCHLORIDE 10 MG: 10 TABLET, FILM COATED ORAL at 10:06

## 2025-03-02 RX ADMIN — CLONIDINE HYDROCHLORIDE 0.3 MG: 0.1 TABLET ORAL at 12:00

## 2025-03-02 RX ADMIN — PANTOPRAZOLE SODIUM 40 MG: 40 TABLET, DELAYED RELEASE ORAL at 05:13

## 2025-03-02 RX ADMIN — HYDROCODONE BITARTRATE AND ACETAMINOPHEN 2 TABLET: 10; 325 TABLET ORAL at 05:13

## 2025-03-02 RX ADMIN — HYDROCODONE BITARTRATE AND ACETAMINOPHEN 2 TABLET: 10; 325 TABLET ORAL at 19:04

## 2025-03-02 RX ADMIN — IPRATROPIUM BROMIDE AND ALBUTEROL SULFATE 3 ML: .5; 3 SOLUTION RESPIRATORY (INHALATION) at 10:52

## 2025-03-02 RX ADMIN — FLUTICASONE PROPIONATE 1 SPRAY: 50 SPRAY, METERED NASAL at 10:09

## 2025-03-02 RX ADMIN — Medication 1000 MCG: at 10:05

## 2025-03-02 RX ADMIN — PREDNISONE 10 MG: 10 TABLET ORAL at 10:05

## 2025-03-02 RX ADMIN — POLYETHYLENE GLYCOL 3350 17 G: 17 POWDER, FOR SOLUTION ORAL at 20:51

## 2025-03-02 NOTE — PROGRESS NOTES
LOS: 3 days   Patient Care Team:  Nicolas Ugarte MD as PCP - General  Nicolas Ugarte MD as PCP - Family Medicine    Chief Complaint: No chief complaint on file.        Subjective    Today the patient was found sleeping quietly in her recliner in her 's room.  She was clearly in no acute distress.  She woke easily, had no specific complaints, and seems to be relatively stable at this point.    Interval History:     Patient Complaints: Fatigue and tiredness seems to be her primary concern  Patient Denies: She denies shortness of breath or chest pain  History taken from: patient chart    Review of Systems:    All systems were reviewed and negative except for:  Constitution:  positive for fatigue, malaise, and she also notes some recent insomnia.  Musculoskeletal: positive for  joint pain, joint stiffness, primarily in her right lower extremity which is a number of chronic changes and has been an issue for her for some time, and See HPI    Objective      Vital Signs  Temp:  [98 °F (36.7 °C)-99.2 °F (37.3 °C)] 98.9 °F (37.2 °C)  Pulse:  [] 109  Resp:  [18] 18  BP: (111-154)/(55-97) 154/97    I/O'S  Intake & Output (last 7 days)         02/23 0701  02/24 0700 02/24 0701  02/25 0700 02/25 0701  02/26 0700 02/26 0701  02/27 0700 02/27 0701  02/28 0700 02/28 0701  03/01 0700 03/01 0701  03/02 0700 03/02 0701  03/03 0700    P.O.    120 120 980 600     Total Intake(mL/kg)    120 (2) 120 (2) 980 (16.2) 600 (9.9)     Net    +120 +120 +980 +600                 Urine Unmeasured Occurrence     3 x 5 x 6 x     Stool Unmeasured Occurrence     1 x 1 x              Physical Exam:   General Appearance: alert, pleasant, appears stated age, hard of hearing, interactive, fatigued, cooperative, pale, and looks chronically ill and debilitated.  Lungs: clear to auscultation, respirations regular, respirations even, and respirations unlabored  Heart: regular rhythm & normal rate  Abdomen: normal bowel sounds, soft  "non-tender, no guarding, and no rebound tenderness  Extremities: no edema, no cyanosis, no redness, and she has chronic changes in her right lower extremity at her foot and ankle.     Results Review:     I reviewed the patient's new clinical results.  I reviewed the patient's other test results and agree with the interpretation                     Results from last 7 days   Lab Units 03/02/25  0538 03/01/25  0459 02/28/25  0501 02/27/25  1156 02/27/25  0553 02/26/25  2345   SODIUM mmol/L 143 143 144  --  146* 144   POTASSIUM mmol/L 4.1 4.3 4.5 3.8 3.7 3.6   CHLORIDE mmol/L 110* 110* 111*  --  107 107   CO2 mmol/L 24.0 25.4 26.0  --  28.0 25.0   BUN mg/dL 20 19 21  --  30* 33*   CREATININE mg/dL 0.88 0.84 0.73  --  0.89 0.93   GLUCOSE mg/dL 106* 118* 115*  --  89 112*   CALCIUM mg/dL 9.5 9.4 9.4  --  9.5 9.3     Results from last 7 days   Lab Units 02/26/25  2345   CK TOTAL U/L 36     Results from last 7 days   Lab Units 03/02/25  0538 03/01/25  0459 02/28/25  0501 02/26/25  2345   WBC 10*3/mm3 7.98 8.07 6.28 7.80   HEMOGLOBIN g/dL 10.6* 10.4* 10.2* 10.8*   HEMATOCRIT % 32.3* 32.7* 31.5* 32.8*   PLATELETS 10*3/mm3 180 171 165 176         Results from last 7 days   Lab Units 02/28/25  0501 02/27/25  0553 02/26/25  2345   MAGNESIUM mg/dL 2.2 2.1 2.0     Results from last 7 days   Lab Units 02/26/25  2345   CHOLESTEROL mg/dL 128   TRIGLYCERIDES mg/dL 121   HDL CHOL mg/dL 48   LDL CHOL mg/dL 58     Results from last 7 days   Lab Units 02/26/25  2345   PROBNP pg/mL 2,590.0*           Lab Results   Component Value Date    HGBA1C 5.80 (H) 02/27/2025    HGBA1C 5.60 01/13/2025    HGBA1C 5.30 10/20/2024    HGBA1C 6.10 (H) 05/25/2022   No results found for: \"POCGLU\"    eGFR   Date Value Ref Range Status   03/02/2025 63.7 >60.0 mL/min/1.73 Final   03/01/2025 67.4 >60.0 mL/min/1.73 Final   02/28/2025 79.7 >60.0 mL/min/1.73 Final   02/27/2025 62.8 >60.0 mL/min/1.73 Final   02/26/2025 59.6 (L) >60.0 mL/min/1.73 Final       "     Medication Review:   Scheduled Meds:cetirizine, 10 mg, Oral, Daily  cetirizine, 10 mg, Oral, Daily  cloNIDine, 0.3 mg, Oral, Q6H  eplerenone, 50 mg, Oral, Daily  fluticasone, 1 spray, Each Nare, BID  hydrocortisone, 1 application , Topical, Daily  ipratropium-albuterol, 3 mL, Nebulization, 4x Daily - RT  melatonin, 10 mg, Oral, Nightly  multivitamin with minerals, 1 tablet, Oral, Daily  NIFEdipine XL, 30 mg, Oral, Q24H  pantoprazole, 40 mg, Oral, Q AM  potassium chloride, 20 mEq, Oral, Daily  predniSONE, 10 mg, Oral, Daily  rivaroxaban, 20 mg, Oral, Daily With Dinner  rosuvastatin, 5 mg, Oral, QAM  [Held by provider] torsemide, 50 mg, Oral, Daily  triamcinolone, 1 Application, Topical, BID  cyanocobalamin, 1,000 mcg, Oral, Daily  [START ON 3/4/2025] vitamin D, 50,000 Units, Oral, Every Tuesday      Continuous Infusions:   PRN Meds:.  acetaminophen **OR** acetaminophen **OR** acetaminophen    acetaminophen    butalbital-acetaminophen-caffeine    Calcium Replacement - Follow Nurse / BPA Driven Protocol    carboxymethylcellulose sod    Diclofenac Sodium    diphenhydrAMINE    guaiFENesin-codeine    HYDROcodone-acetaminophen **OR** HYDROcodone-acetaminophen    hydrOXYzine    Lidocaine    LORazepam    Magnesium Standard Dose Replacement - Follow Nurse / BPA Driven Protocol    ondansetron    ondansetron ODT    Phosphorus Replacement - Follow Nurse / BPA Driven Protocol    polyethylene glycol    Potassium Replacement - Follow Nurse / BPA Driven Protocol    sodium chloride    sodium chloride                Adult failure to thrive syndrome    Acute deep vein thrombosis (DVT) of right lower extremity    COPD with asthma    Urinary retention self-caths (Don)    Other cervical disc displacement at C5-C6 level    DDD (degenerative disc disease), cervical    Hyperlipidemia LDL goal <70    Proctocele    Urge incontinence of urine    Chronic tension-type headache, intractable    Stage 2 chronic kidney disease    Cerebral  atherosclerosis    Malaise and fatigue progressive    Dizziness    Vitamin D deficiency    Moderate persistent asthmatic bronchitis without complication    Pleurisy    Pain in both lower extremities    Elevated PTH level    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)    Gastroesophageal reflux disease with esophagitis    Psoriasis (Darryl Ochoa)    Postmenopausal    Recurrent UTI    H/O Cerebral vascular accident in left occipital area    Dyspnea on exertion    Accelerated hypertension    Acute joint pain    Easy bruisability    Decreased mobility and endurance    Essential hypertension    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  T    Severe malnutrition    KECIA (generalized anxiety disorder)    Volume depletion    Cervical stenosis of spine    Recurrent falls while walking    Hypokalemia    Anemia, mild    Goiter diffuse    Lumbar herniated disc L2-L3    Spinal stenosis of lumbar region with neurogenic claudication    Diastolic dysfunction with chronic heart failure    Chronic heart failure with preserved ejection fraction (HFpEF)    Moderate malnutrition due to chronic disease      #1 Failure to Thrive--she looks chronically ill and malnourished.  Apparently she is also not sleeping well.  Hospice and palliative care seem to be good options for her at this point.    2.  Intractable pain-related to her spinal stenosis both cervical and lumbar regions.   She seems to be relatively comfortable today.    3.  Ataxia/falls-she has significant issues with ataxia likely related to her right foot abnormalities.  She should be using a walker at all times    4.  Urinary incontinence-she has very limited bladder control    5.  CHF-follows up with cardiology as an outpatient    6.  Hyperlipidemia-controlled on medications    7.  Reflux/esophagitis-controlled with PPI    8.  Chronic headaches-no complaints today but uses Fioricet as needed    9.  Anxiety-chronic and seems to be relatively stable today      Plan for disposition:Where: home and hospice and When:  tomorrow    Nicolas Ugarte MD  03/02/25  14:40 EST          Time:

## 2025-03-02 NOTE — PLAN OF CARE
Goal Outcome Evaluation:  Plan of Care Reviewed With: patient        Progress: no change  Outcome Evaluation: Pt in  room in the chair all night. Episodes of incontinence through the night. Was able to ambulate to the bathroom once with x1 assist with the walker. Pt requested pain medication twice.

## 2025-03-02 NOTE — PLAN OF CARE
Goal Outcome Evaluation:  Plan of Care Reviewed With: patient        Progress: no change  Outcome Evaluation: Pt alert, cooperative with staff. Agitated and complaining about staff from previous shift. Order request for Diclofenac cream for pt's right shoulder. PRN Hydrocodone 20mg administered x1 for pain. Pt is occupying 's room all day. Up in recliner. Chair alarm in place. Inc b&bl. Nsg to cont with plan of care.

## 2025-03-03 VITALS
DIASTOLIC BLOOD PRESSURE: 61 MMHG | SYSTOLIC BLOOD PRESSURE: 134 MMHG | OXYGEN SATURATION: 96 % | HEART RATE: 72 BPM | TEMPERATURE: 98.1 F | BODY MASS INDEX: 26.01 KG/M2 | RESPIRATION RATE: 16 BRPM | WEIGHT: 133.16 LBS

## 2025-03-03 LAB
ALBUMIN SERPL-MCNC: 3.2 G/DL (ref 3.5–5.2)
ALBUMIN/GLOB SERPL: 1.4 G/DL
ALP SERPL-CCNC: 77 U/L (ref 39–117)
ALT SERPL W P-5'-P-CCNC: 6 U/L (ref 1–33)
ANION GAP SERPL CALCULATED.3IONS-SCNC: 11.6 MMOL/L (ref 5–15)
AST SERPL-CCNC: 10 U/L (ref 1–32)
BASOPHILS # BLD AUTO: 0.04 10*3/MM3 (ref 0–0.2)
BASOPHILS NFR BLD AUTO: 0.6 % (ref 0–1.5)
BILIRUB SERPL-MCNC: <0.2 MG/DL (ref 0–1.2)
BUN SERPL-MCNC: 25 MG/DL (ref 8–23)
BUN/CREAT SERPL: 29.1 (ref 7–25)
CALCIUM SPEC-SCNC: 9.2 MG/DL (ref 8.6–10.5)
CHLORIDE SERPL-SCNC: 109 MMOL/L (ref 98–107)
CO2 SERPL-SCNC: 21.4 MMOL/L (ref 22–29)
CREAT SERPL-MCNC: 0.86 MG/DL (ref 0.57–1)
DEPRECATED RDW RBC AUTO: 46.5 FL (ref 37–54)
EGFRCR SERPLBLD CKD-EPI 2021: 65.5 ML/MIN/1.73
EOSINOPHIL # BLD AUTO: 0.11 10*3/MM3 (ref 0–0.4)
EOSINOPHIL NFR BLD AUTO: 1.5 % (ref 0.3–6.2)
ERYTHROCYTE [DISTWIDTH] IN BLOOD BY AUTOMATED COUNT: 13.2 % (ref 12.3–15.4)
GLOBULIN UR ELPH-MCNC: 2.3 GM/DL
GLUCOSE SERPL-MCNC: 92 MG/DL (ref 65–99)
HCT VFR BLD AUTO: 32.3 % (ref 34–46.6)
HGB BLD-MCNC: 10.3 G/DL (ref 12–15.9)
IMM GRANULOCYTES # BLD AUTO: 0.02 10*3/MM3 (ref 0–0.05)
IMM GRANULOCYTES NFR BLD AUTO: 0.3 % (ref 0–0.5)
LYMPHOCYTES # BLD AUTO: 2.11 10*3/MM3 (ref 0.7–3.1)
LYMPHOCYTES NFR BLD AUTO: 29 % (ref 19.6–45.3)
MCH RBC QN AUTO: 30.9 PG (ref 26.6–33)
MCHC RBC AUTO-ENTMCNC: 31.9 G/DL (ref 31.5–35.7)
MCV RBC AUTO: 97 FL (ref 79–97)
MONOCYTES # BLD AUTO: 0.67 10*3/MM3 (ref 0.1–0.9)
MONOCYTES NFR BLD AUTO: 9.2 % (ref 5–12)
NEUTROPHILS NFR BLD AUTO: 4.32 10*3/MM3 (ref 1.7–7)
NEUTROPHILS NFR BLD AUTO: 59.4 % (ref 42.7–76)
NRBC BLD AUTO-RTO: 0 /100 WBC (ref 0–0.2)
PLATELET # BLD AUTO: 177 10*3/MM3 (ref 140–450)
PMV BLD AUTO: 10.3 FL (ref 6–12)
POTASSIUM SERPL-SCNC: 4.7 MMOL/L (ref 3.5–5.2)
PROT SERPL-MCNC: 5.5 G/DL (ref 6–8.5)
RBC # BLD AUTO: 3.33 10*6/MM3 (ref 3.77–5.28)
SODIUM SERPL-SCNC: 142 MMOL/L (ref 136–145)
WBC NRBC COR # BLD AUTO: 7.27 10*3/MM3 (ref 3.4–10.8)

## 2025-03-03 PROCEDURE — G0378 HOSPITAL OBSERVATION PER HR: HCPCS

## 2025-03-03 PROCEDURE — 85025 COMPLETE CBC W/AUTO DIFF WBC: CPT | Performed by: INTERNAL MEDICINE

## 2025-03-03 PROCEDURE — A9270 NON-COVERED ITEM OR SERVICE: HCPCS | Performed by: INTERNAL MEDICINE

## 2025-03-03 PROCEDURE — 80053 COMPREHEN METABOLIC PANEL: CPT | Performed by: INTERNAL MEDICINE

## 2025-03-03 PROCEDURE — 63710000001 PREDNISONE PER 5 MG: Performed by: INTERNAL MEDICINE

## 2025-03-03 PROCEDURE — 94799 UNLISTED PULMONARY SVC/PX: CPT

## 2025-03-03 PROCEDURE — 63710000001 VITAMIN B-12 500 MCG TABLET: Performed by: INTERNAL MEDICINE

## 2025-03-03 PROCEDURE — 51798 US URINE CAPACITY MEASURE: CPT

## 2025-03-03 PROCEDURE — 63710000001 CLONIDINE 0.1 MG TABLET: Performed by: INTERNAL MEDICINE

## 2025-03-03 PROCEDURE — 63710000001 PANTOPRAZOLE 40 MG TABLET DELAYED-RELEASE: Performed by: INTERNAL MEDICINE

## 2025-03-03 PROCEDURE — 63710000001 ROSUVASTATIN 10 MG TABLET: Performed by: INTERNAL MEDICINE

## 2025-03-03 PROCEDURE — 63710000001 HYDROCODONE-ACETAMINOPHEN 10-325 MG TABLET: Performed by: INTERNAL MEDICINE

## 2025-03-03 PROCEDURE — 63710000001 EPLERENONE 25 MG TABLET: Performed by: INTERNAL MEDICINE

## 2025-03-03 PROCEDURE — 63710000001 POTASSIUM CHLORIDE 20 MEQ PACK: Performed by: INTERNAL MEDICINE

## 2025-03-03 PROCEDURE — 63710000001 NIFEDIPINE XL 30 MG TABLET SUSTAINED-RELEASE 24 HOUR: Performed by: INTERNAL MEDICINE

## 2025-03-03 PROCEDURE — 94664 DEMO&/EVAL PT USE INHALER: CPT

## 2025-03-03 PROCEDURE — 63710000001 MULTIVITAMIN WITH MINERALS TABLET: Performed by: INTERNAL MEDICINE

## 2025-03-03 PROCEDURE — 63710000001 CETIRIZINE 10 MG TABLET: Performed by: INTERNAL MEDICINE

## 2025-03-03 RX ORDER — ACETAMINOPHEN 325 MG/1
650 TABLET ORAL EVERY 4 HOURS PRN
Start: 2025-03-03

## 2025-03-03 RX ADMIN — NIFEDIPINE 30 MG: 30 TABLET, EXTENDED RELEASE ORAL at 09:11

## 2025-03-03 RX ADMIN — CETIRIZINE HYDROCHLORIDE 10 MG: 10 TABLET, FILM COATED ORAL at 09:12

## 2025-03-03 RX ADMIN — ROSUVASTATIN CALCIUM 5 MG: 10 TABLET, FILM COATED ORAL at 06:08

## 2025-03-03 RX ADMIN — IPRATROPIUM BROMIDE AND ALBUTEROL SULFATE 3 ML: .5; 3 SOLUTION RESPIRATORY (INHALATION) at 08:06

## 2025-03-03 RX ADMIN — FLUTICASONE PROPIONATE 1 SPRAY: 50 SPRAY, METERED NASAL at 09:11

## 2025-03-03 RX ADMIN — PREDNISONE 10 MG: 10 TABLET ORAL at 09:12

## 2025-03-03 RX ADMIN — IPRATROPIUM BROMIDE AND ALBUTEROL SULFATE 3 ML: .5; 3 SOLUTION RESPIRATORY (INHALATION) at 15:34

## 2025-03-03 RX ADMIN — POTASSIUM CHLORIDE 20 MEQ: 1.5 POWDER, FOR SOLUTION ORAL at 09:12

## 2025-03-03 RX ADMIN — Medication 1000 MCG: at 09:11

## 2025-03-03 RX ADMIN — HYDROCODONE BITARTRATE AND ACETAMINOPHEN 2 TABLET: 10; 325 TABLET ORAL at 10:23

## 2025-03-03 RX ADMIN — EPLERENONE 50 MG: 25 TABLET, FILM COATED ORAL at 09:11

## 2025-03-03 RX ADMIN — TRIAMCINOLONE ACETONIDE 1 APPLICATION: 1 CREAM TOPICAL at 10:25

## 2025-03-03 RX ADMIN — CLONIDINE HYDROCHLORIDE 0.3 MG: 0.1 TABLET ORAL at 06:24

## 2025-03-03 RX ADMIN — PANTOPRAZOLE SODIUM 40 MG: 40 TABLET, DELAYED RELEASE ORAL at 06:08

## 2025-03-03 RX ADMIN — Medication 1 TABLET: at 09:11

## 2025-03-03 RX ADMIN — HYDROCORTISONE 1 APPLICATION: 1 OINTMENT TOPICAL at 10:25

## 2025-03-03 RX ADMIN — IPRATROPIUM BROMIDE AND ALBUTEROL SULFATE 3 ML: .5; 3 SOLUTION RESPIRATORY (INHALATION) at 11:00

## 2025-03-03 NOTE — PROGRESS NOTES
Discharge Planning Assessment  [facility]     Patient Name: Sasha aBrrett  MRN: [id number]  Today's Date: 3/3/2025    Admit Date: 2/26/2025    Plan: Home with Hospice; caregivers pending   Discharge Needs Assessment    No documentation.                  Discharge Plan       Row Name 03/03/25 1206       Plan    Final Note home with [facility] today and family to transport. The famly is setting up 24/7 care for the patient and spouse. BAN mittal Rn, CCP.      Row Name 03/03/25 1204       Plan    Plan Comments Spoke with Joey/son, patient and patient's spouse at bedside and reviewed the transportation for home today. The family states they will transport Mrs. Barrett/Mom by private auto and their father will need wheel chair transportation only set up for today.  Contacted [facility] and they are coming today at 15:30 to meet with the family and assist with the discharge. Meds to beds is marked in EPIC. CCP will continue to follow for any other needs. MAURICE Mittal RN, CCP.                  Continued Care and Services - Admitted Since 2/26/2025       Home Medical Care Coordination complete.      Service Provider Request Status Services Address Phone Fax Patient Preferred    [facility]  Selected Home Hospice [address], [facility] [address] [phone] --                  Selected Continued Care - Prior Encounters Includes continued care and service providers with selected services from prior encounters from 11/28/2024 to 3/3/2025      Discharged on 1/15/2025 Admission date: 1/12/2025 - Discharge disposition: Home-Health Care Svc      Home Medical Care       Service Provider Services Address Phone Fax Patient Preferred     [facility] [address] [place] [address] [address] [address] [phone] --                          Expected Discharge Date and Time       Expected Discharge Date Expected Discharge Time    Mar 3, 2025             Demographic Summary    No documentation.                  Functional Status    No documentation.                  Psychosocial    No documentation.                  Abuse/Neglect    No documentation.                  Legal    No documentation.                  Substance Abuse    No documentation.                  Patient Forms    No documentation.                     Fadumo Mittal RN

## 2025-03-03 NOTE — SIGNIFICANT NOTE
03/03/25 1150   OTHER   Discipline physical therapist   Rehab Time/Intention   Session Not Performed other (see comments)  (Per RN/chart pt has now been made comfort measures with plan to return home with Hospice and caregivers. PT will sign off at this time. Thank you.)   Therapy Assessment/Plan (PT)   Criteria for Skilled Interventions Met (PT) no

## 2025-03-03 NOTE — PLAN OF CARE
Goal Outcome Evaluation:  Plan of Care Reviewed With: patient        Progress: no change  Outcome Evaluation: Pt cooperative with care.  Complained of anxiety and 0.5mg ativan given x1.  Pt in recliner in 's room overnight.  Asst x1 with walker to bathroom.  Will continue with plan of care.

## 2025-03-03 NOTE — PLAN OF CARE
Goal Outcome Evaluation:              Outcome Evaluation: Pt discharged home with son and  in private car. Plan for hospice care at home and private caregivers.

## 2025-03-03 NOTE — PROGRESS NOTES
Case Management Discharge Note      Final Note: home with [facility] today and family to transport. The famly is setting up 24/7 care for the patient and spouse. BAN bang, Rn, CCP.         Selected Continued Care - Admitted Since 2/26/2025       Destination    No services have been selected for the patient.                Durable Medical Equipment    No services have been selected for the patient.                Dialysis/Infusion    No services have been selected for the patient.                Home Medical Care Coordination complete.      Service Provider Services Address Phone Fax Patient Preferred    [facility] [address], [facility] [address] [phone] --              Therapy    No services have been selected for the patient.                Community Resources    No services have been selected for the patient.                Community & DME    No services have been selected for the patient.                    Selected Continued Care - Prior Encounters Includes continued care and service providers with selected services from prior encounters from 11/28/2024 to 3/3/2025      Discharged on 1/15/2025 Admission date: 1/12/2025 - Discharge disposition: Home-Health Care Svc      Home Medical Care       Service Provider Services Address Phone Fax Patient Preferred     [facility] [address] [facility] [address] [address] [phone] --                          Transportation Services  Private: Car

## 2025-03-03 NOTE — NURSING NOTE
hospitals RN met with patient, her 2 adult sons, Dr. Cadet and [facility] Karmen Mittal. Confirmed the plan is for patient to discharge home today with hospice support. Dr. Cadet has stated he will remain as patient's attending physician and hospitals will do pain and symptom management. Consents were signed by pt's son/POA, Leighton. Patient declined the need for any DME or medication prior to discharge. Leighton plans to transport patient home by private vehicle. He was given a hospitals admission packet and instructed to call hospitals when patient arrives home so the admission RN may be sent to complete the admission. He verbalized understanding. Update given to Tata ABREU.     Thank you for this referral. Please call Customer Support at [phone] with any needs.     Lila Herring RN, BSN  Referral & Admissions Coordinator  [facility]

## 2025-03-03 NOTE — DISCHARGE SUMMARY
[facility]  INTERNAL MEDICINE  ALEKSANDAR GALVAN MD  [address] [place] [address]  Phone [phone] Fax [phone]  E-mail:  [email or web address]    [facility]   DISCHARGE SUMMARY  ALEKSANDAR GALVAN MD      Date of Discharge:  3/3/2025    Discharge Diagnosis:     Adult failure to thrive syndrome    Stage 2 chronic kidney disease    Malaise and fatigue progressive    Dizziness    Pain in both lower extremities    Dyspnea on exertion    Accelerated hypertension    Decreased mobility and endurance    Spinal stenosis of lumbar region with radiculopathy    Ataxia    Recent unexplained weight loss 42# over last 6 months    Generalized muscle weakness    Severe malnutrition    Volume depletion    Recurrent falls while walking    Hypokalemia    Diastolic dysfunction with chronic heart failure    Chronic heart failure with preserved ejection fraction (HFpEF)    Moderate malnutrition due to chronic disease    Urinary retention self-caths (Don)    Other cervical disc displacement at C5-C6 level    DDD (degenerative disc disease), cervical    Hyperlipidemia LDL goal <70    Proctocele    Urge incontinence of urine    Chronic tension-type headache, intractable    Cerebral atherosclerosis    Vitamin D deficiency    Moderate persistent asthmatic bronchitis without complication    Elevated PTH level    Gastroesophageal reflux disease with esophagitis    Recurrent UTI    H/O Cerebral vascular accident in left occipital area    Acute joint pain    Easy bruisability    COPD with asthma    Essential hypertension    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  Creatinine kinase on admission 36  2.  proBNP 2590  3.  Sodium ranging from 144 on admission to max of 146 and then down to 142 prior to discharge  4.  Chloride ranging from 107 on admission to a max of 111 and down to 109 prior to discharge  5.  BUN ranging from 33 on admission down to a low at 19 and back up to 25 prior to discharge  6.  Creatinine ranging from 0.93 on admission down to 0.86 prior to discharge  7.  Estimated GFR 59.6 on admission up to 65.5 prior to discharge  8.  Glucose ranging from 112 on admission to the high of 118 and back down to 92 prior to discharge  9.  Ionized calcium levels normal ranging from 1.29 up to 1.32  10.  Decreased levels of total protein and albumin at 5.5 and 3.2 respectively at time of discharge  11.  Liver function test normal  12.  Uric acid level elevated at 5.9 on admission down to 5.3 prior to discharge  13.  Hemoglobin A1c slightly elevated at 5.80  14.  PTH normal at 60.8  15.  TSH normal at 1.11  16.  Lipid profile showing cholesterol 128, HDL 48, LDL 58, triglycerides 121, and LDL HDL ratio 1.16  17.  Amylase 44 with lipase 24  18.  CBC showing white blood cell count normal at 7.8, hemoglobin slightly low at time of discharge 10.3 and platelet count 177, 000  19.  EKG showing normal sinus rhythm heart rate 72 with PACs, abnormal R wave progression with early transition.       Procedures  Imaging Results (All)       Procedure Component Value Units Date/Time    XR  Foot 3+ View Bilateral [[id number]] Collected: 02/27/25 0022     Updated: 02/27/25 0028    Narrative:         XR FOOT 3+ VW BILATERAL-          HISTORY:   Bilateral foot pain      TECHNIQUE: 3 views of the left foot; 3 views of the right foot.     FINDINGS:     Left: Osteopenia, pes planus, no acute fracture or dislocation, no  radiopaque foreign body.     Right: Osteopenia, pes planus, no acute fracture or dislocation, no  radiopaque foreign body.       Impression:         No acute abnormality.        This report was finalized on 2/27/2025 12:25 AM by Dr. Eitan Watts M.D on Workstation: [id number]       XR Knee 4+ View Bilateral [[id number]] Collected: 02/26/25 2200     Updated: 02/26/25 2205    Narrative:         XR KNEE 4+ VW BILATERAL-          HISTORY:   knee pain and difficulty walking      TECHNIQUE: 3 views of the right knee; 3 views of the left knee.     FINDINGS:     Right: Prior total knee arthroplasty, no evidence of acute fracture or  dislocation, ORIF device loosening or failure.     Left: Prior total knee arthroplasty, no evidence of acute fracture or  dislocation, ORIF device loosening or failure.       Impression:         No acute abnormality.        This report was finalized on 2/26/2025 10:01 PM by Dr. Eitan Watts M.D on Workstation: [id number]       XR Ankle 3+ View Bilateral [[id number]] Collected: 02/26/25 2158     Updated: 02/26/25 2203    Narrative:         XR ANKLE 3+ VW BILATERAL-          HISTORY:   Evaluate ankle pain and difficulty walking      TECHNIQUE: 3 views of the right ankle; 3 views of the left ankle.     FINDINGS:     Right: Negative for acute fracture, dislocation, or radiopaque foreign  body. Ankle mortise shows a widened medial tibiotalar distance, but  without associated soft tissue swelling, suspect chronic deformity.     Left: Negative for acute fracture, dislocation, or radiopaque foreign  body.       Impression:         No acute abnormality.        This  report was finalized on 2/26/2025 10:00 PM by Dr. Eitan Watts M.D on Workstation: [id number]       XR Tibia Fibula 2 View Bilateral [[id number]] Collected: 02/26/25 2154     Updated: 02/26/25 2159    Narrative:         XR TIBIA FIBULA 2 VW BILATERAL-          HISTORY:   Pain in lower extremities      TECHNIQUE: AP and lateral views of the right tibia and fibula; AP and  lateral views of the left tibia and fibula.     FINDINGS:     Right: Prior total knee arthroplasty, no acute fracture or dislocation,  no evidence of arthroplasty device loosening or failure.     Left: Prior total knee arthroplasty, no acute fracture or dislocation,  no evidence of arthroplasty device loosening or failure.       Impression:         No acute abnormality.        This report was finalized on 2/26/2025 9:56 PM by Dr. Eitan Watts M.D on Workstation: [id number]                 Treatment Team at Hospital  Treatment Team:   Attending Provider: Óscar Cadet MD  Consulting Physician: Nicolas Ugarte MD  Admitting Provider: Óscar Cadet MD      Presenting Problem/History of Present Illness  Hospital Course    Chief Complaint:    Adult failure to thrive, unexplained weight loss greater than 47 pounds over the last 6 months ataxia, lower extremity pain and radiculopathy, severe lumbar spinal stenosis, and generalized muscle weakness     History of Present Illness:     Subjective  Interval History: Patient is a 85 y.o.female who presented with adult failure to thrive,  recent unexplained weight loss of greater than 47 pounds over the last 6 months, ataxia, lower extremity pain and radiculopathy, and generalized muscle weakness.  Patient had originally accompanied to her  to the hospital and was trying to stay in  his room to help with his care, but nursing staff found that this patient had almost as many medical needs for care as did her .  Patient has been followed closely over the last few years on the  [facility] palliative care service due to intractable pain and has required high doses of narcotic pain medication to keep her symptoms under control.  Her condition has gradually and steadily declined and over the last few weeks patient has been unable to get off of the sofa and ambulate in the house despite the best efforts of  [facility] who followed her after her most recent admission between 1/12/2025 and 1/15/2025 when patient was admitted for a lumbar herniated disc L2-L3 felt to be inoperable, severe spinal stenosis of lumbar region with neurogenic claudication, diastolic dysfunction with chronic heart failure, and recurrent falls while walking which had resulted in a closed head injury with concussion.  Patient's  who is even sicker than this patient, has been trying to cook meals for the 2 of them and do basic housekeeping chores such as washing the laundry and cleaning the house.  Unfortunately, worsening of his own condition has made it almost impossible for the patient's  to complete these tasks.  They do have 6 children who live in the area, but unfortunately only 1 of those children, their son Leighton  and his wife Helen, who patient's have designated as their health care surrogates,  are involved in their care.  Helen reports, that the Young's have been declining to allow the children to participate in their care.  Family worries that the living conditions in their home have deteriorated significantly and they have been unable to  successfully get Mrs. Barrett to take a bath or shower for  several weeks.   Mrs. Barrett has complained of bug bites which her allergist feels are coming from a [place] Lady Beatle that causes bites with severe allergic response.  Apparently there has been an outbreak of these beatles  in the county where the patient [place].  Patient has several other comorbidities that complicate her medical care.  These include most significantly: Stage II chronic  kidney disease, malaise and fatigue, recurrent and persistent dizziness, dyspnea on exertion, accelerated hypertension, decreased mobility and endurance which have worsened despite recent efforts for PT and OT in the home, spinal stenosis of lumbar region with radiculopathy, ataxia, severe malnutrition, volume depletion, recurrent falls while walking, hypokalemia, diastolic dysfunction with chronic heart failure, chronic heart failure with preserved ejection fraction (HFpEF), urinary retention requiring self caths that patient has had difficulty doing lately, cervical disc displacement, extensive degenerative disc disease, hyperlipidemia, proctocele, urge incontinence of urine, chronic tension headaches treated with Fioricet, cerebral atherosclerosis, vitamin D deficiency, moderate persistent asthmatic bronchitis, elevated PTH levels, gastroesophageal reflux disease with esophagitis, recurrent UTIs, history of cerebrovascular accident in left occipital area, acute and diffuse joint pain, easy bruisability, COPD with asthma, essential hypertension, history of acute DVT, 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  Patient is also believed to have some mild but progressive dementia that further complicates her course and makes it difficult for her to make decisions at times.  Patient was admitted to [address] [place] [address] at [facility] for evaluation of the above conditions and to consider the possibility of transition from [facility] palliative care services to full [facility] care.     2/26/2025.  I personally saw the patient for the first time during this hospitalization during my evening rounds on [address] [place] [address].  Patient was actually admitted to  "room P489 but was in x-ray undergoing extensive x-ray films at the time of my visit.  I examined the patient with the help of the x-ray techs while they are in the radiology department.  I wore full PPE as indicated including an N95 mask as appropriate, goggles, white lab coat, and gloves when touching patient.  I performed thorough hand hygiene before and after the patient visit.  Patient's main area of concern is the pain and discomfort in her lower extremities from the level of her bilateral knee replacements down.  Patient has an area of bruising on the right anterior shin and has marked deformity of the right ankle which turns outward when at rest on the x-ray table.  Patient also has extensive bruising of the left distal foot involving all toes and extending approximately penitentiary up the foot.  She does not recall an injury that caused these symptoms.  Patient also has a bunion on the bottom of the right foot at the base of the great toe which has been pared down by podiatry in the past but continues to give her trouble and make it almost impossible for her to stand up without great pain.  Patient does have braces that have been prepared by orthopedics in the past for her legs but cannot get those braces on at this point because of the progressive deformity of her extremities.  Patient also noted to be a bit confused this evening and repeating several stories over and over.  She is upset about her overall condition and cries easily when discussion of her social issues is brought up.  Mrs. Barrett has been a patient in my office for many years and has been very reliant on my care over that period of time.  She repeatedly asked me \"not to give up on her\" despite the significant decline in her overall stability and condition.  I assured her that I will continue to be her physician and coordinate care.  Patient is also quite reliant on her [facility] palliative care nurse practitioner, Hedy.  It is not clear that " patient has been taking her medications as regularly as she has in the past for treatment of her chronic pain condition with intractable pain, or her medications that are used to treat her chronic heart failure due to diastolic dysfunction as prescribed by Dr. Soto in cardiology.  More importantly, patient's weight loss signifies poor nutritional status which results in difficulty with healing of leg wounds and inability to exercise on a regular basis.  Patient does indicate a willingness to meet with [facility] while here in the hospital and consider the possibility of transition from palliative services to full [facility] care.  She and her  still hope that with this service and help from the family, they can continue to stay in their home until the end of their lives.     2/27/2025.  Patient seen again today at time of my rounds on [address].  Patient was actually in a lounge chair beside her  in room which is adjacent to the room she is assigned to.  Nursing is aware of patient location.  Patient seems very comfortable and pleased being next to her .  We did have lillian discussions about plans for [facility] consultation tomorrow.  Both patient's expressed strong desire for discharge to home environment.  I did review the fact that Mr. Barrett has severe congestive heart failure with an ejection fraction of 14% which is a very poor prognosis.  Mrs. Barrett seems to understand this situation and wants to get her  home as soon as possible.  They do realize that on [facility] they will not be following up with regular specialists and they realized that the change from palliative to [facility] could entail a change in their nurse practitioner that they would very much prefer to continue having [facility] as their nurse practitioner after the transfer.  They are more open to the idea of having help from their sons who will need to visit and help frequently at the home.  They do wish to have [employer] Wheels  bring food into the house since Mr. Barrett may no longer be able to cook with his worsened condition.  I did contact patient's youngest son Leighton who is their healthcare surrogate and his wife Helen who is also designated as a healthcare surrogate.  They do wish for the patient's to both be no CPR per their wishes from many years ago.  They are agreeable to the progression to [facility] and understand the above factors.  They also understand that family will be meeting to visit much more often to help [facility] with the care.  Patient does have a neighbor who is quite nearby that also is willing to help and is actually cleaning up the house some while they are in the hospital.  The patient's do have a dog and that dog is staying with that [facility] care center gets at the present time.  I did let them know that I think Mr. Barrett's condition is reaching a critical level and that his life expectancy currently is a few days to a few weeks depending on how things go after transfer back to home.  Patients will need an ambulance for transfer to home.  Patient's have been very cooperative with medical staff today that have been clearly confused and nondecisional at times.  I will full PPE for exam as indicated including an N95 mask, goggles, white lab coat, and gloves when touching patient.  I performed thorough hand hygiene before and after the patient visit.  Patient did have labs today that showed sodium of 146 BUN of 50 creatinine of 0.89 estimated GFR of 62.8 normal magnesium at 2.1 and corrected phosphorus at 2.5 uric acid at 5.9 had a liver function test normal hemoglobin A1c 5.8 PTH 60.8 TSH normal 6.  Hemoglobin is 10.8 and white blood cell count is 7.8.  Several specialist saw patient today.  Orthopedics suggested a boot but patient is not very receptive to this and she has several boots at home and is unable to wear them.  Nephrology did see the patient and suggested holding torsemide today but resuming tomorrow if  she eats breakfast and is drinking better.  She does need a bladder scan every 6 hours.  They are okay with discharge to home when arrangements are complete.  OT did see patient today.  They were able to get her to perform side-to-side step transfers and functional mobility with contact-guard assist and rolling walker.  Patient does fatigue quickly and was unsteady at times requiring frequent cueing for redirection and proper use of her walker.  Discharge planning pending.  PT did see patient but she was sleeping at the time of their visit.  Dietitian did add boost twice daily.  Nursing reports that she did have some intermittent confusion but was up with assist x 1.  Incontinent of bladder and bladder scan showed only 62 mL.  Did give Norco for pain.  Spent good portion of today in recliner in 's room.  Pinky Kc from [facility] did see patient and contacted me.  I had changed their CODE STATUS to DNR in [facility].  [facility] plans to meet with Duane and any other family members that wish to discuss goals and plans tomorrow.  I did discuss with them my thoughts and feelings on the subject to today.  Patient is medically stable at present time.  [facility] continuing to work on discharge planning.  Suspect family will support home with [facility] services at time of discharge.     2/28/2025.  Patient is seen again today during my rounds on [address] [place] [address].  She is spending most of her time in a recliner beside her 's bed.   has transition to full [facility] care at this point and is declining rather rapidly here in the hospital.  Patient and family had long meeting today regarding transition to [facility] service for both patient and her .  The 2 patients are hoping that he can survive long enough for a moved to home where he would like to die peacefully in his own bed.  Patient herself is supportive of this idea.  We did discuss plans for the patient once  is gone and she plans to move in with one of  "her children at that point and continue [facility] services there.  Both patients would also be eligible for inpatient [facility] unit once they are on full [facility] services.  Family is preparing home for the discharge.  Apparently home needs thorough cleaning this weekend and family is working on that at present time.  Family does understand that they need to try to provide 24-hour care for the parents in the home.  My office did give them a list of care providers and son Leighton will be attempting to make arrangements with his care providers over the weekend.  I did discuss the case at length with the head nurse from [address] [place] [address], with the patient's nurse, and with the palliative care nurse who saw the patient earlier today.  Everyone has same goals and plans in mind.  An Adult Protective Services report was filed but apparently agency declined to take it at present time since [facility] [place] [facility] is now involved with the case.  Patient has multiple questions while I am present.  At one point she states that she would like to go home a day ahead of her  but then changes her opinion and says that she would like to stay at the hospital until he takes his last breath in case he dies for the transfer can occur.  I did promise her I would update the family tomorrow and discuss situation with them further.  She does want to let them know that she has \"important papers at home\" that need to be attended to including some bills that need to be paid.  They did recently have a natural gas tank delivered for home heating.  They also plan to rely on a neighbor who has been very helpful and who they have paid to help with chores at home.  Patient reports no distress at the present time.  [facility] is continuing to follow.  Cardiology did see patient today and is now signed off.  Patient's sodium has stabilized.  White count remains stable at 8.07 hemoglobin 10.4.  No changes in patient's overall clinical status today.  Dr. Nicolas Ugarte " will be covering for me over the weekend.    3/3/2025.  Patient is seen again today on [address] during my rounds on that floor in preparation for discharge to home.  Patient's 3 sons were all present and do plan to transport their mother to home themselves.  Patient is transitioning to full [facility] service from her previous palliative care designation at this time.  Patient's youngest son Leighton is medical surrogate for both his mother and father and seems very organized and ready for this transition.  He will coordinate care at home.  Family does plan to spend quite a bit of time with the patient over the next few weeks as her  continues to deteriorate with his congestive heart failure and is also on [facility] services.  They do have numbers of caretakers to call for additional assistance and there is a neighbor across the street who they will hire as needed to also help take care of their mother and father.  [facility] will be doing an intake evaluation here today in the hospital and completed in the home environment.  Patient's nurse at home will be Aida and her [facility] attending will be the APRN who has previously followed them on palliative care whose name is Hedy.  I will be acting as her medical attending after the time of discharge.  Family and patient are very agreeable to the set up.  Patient herself is very anxious for the discharge to home to occur.  She has several things she needs to help her sons take care of in the home environment as they prepare to move that they are later in the day.  I wore full PPE for exam as indicated including an N95 mask as appropriate, goggles, white lab coat, and gloves when touching patient.  I performed thorough hand hygiene before and after the patient visit.  All family members seem ready for the discharge to home.  /CCP at [facility] has cleared all needed items with the family and is providing a list to [facility].  Patient herself has been  eating better since being in the hospital.  It is important to present food regularly to her at least 3-4 times a day and she is encouraged to use supplemental snacks as necessary to provide good nutrition and help deal with her 47 pound weight loss.  Patient weakness has improved somewhat and she is able to get up with assist of 1 to the bathroom but she still has difficulty changing her briefs, cooking, and performing activities of daily living.  Orthopedics did see her and has recommended that whenever she is up for extended periods of time she will need to wear a brace on her foot.  Medicines have not substantially changed during the course of the hospitalization but we may want to work with the patient on weaning some of these medicines off especially long-term care medication since she will be on [facility] service and her prognosis is 6 months or less at this point.  Labs on the last day of patient's stay are relatively stable with electrolytes except not significantly changed.  BUN is up slightly at 25 and creatinine is 0.86.  Patient does have low total protein of 5.5 and low albumin at 3.3.  White blood cell count is normal at 7.27, hemoglobin is 10.3, and platelet count is 177,000.  At this point it is felt patient has maximized benefit from hospitalization and is ready for the discharge to home.           Vital Signs  Temp:  [97 °F (36.1 °C)-98 °F (36.7 °C)] 98 °F (36.7 °C)  Heart Rate:  [64-76] 76  Resp:  [16-20] 16  BP: (107-136)/(42-72) 107/48    Physical Exam at Discharge      Constitutional:             Alert, cooperative, no distress, AAOx3, resting comfortably.  Resting comfortably in chair beside .  Weak and fatigued. still extremely talkative and confused at times.   Head:                          Normocephalic, without obvious abnormality, atraumatic   Eyes:                          PERRLA, conjunctiva/corneas clear, no icterus, no conjunctival                                     pallor,  EOM's intact, both eyes      ENT and Mouth:         Lips, tongue, gums normal; oral mucosa pink and moist   Neck:                          Supple, symmetrical, trachea midline, no JVD  Respiratory:                 Clear to auscultation bilaterally, respirations unlabored  Cardiovascular:           Irregular rate and rhythm, S1 and S2 normal, no murmur,                                       no  Rub or gallop.  Pulses normal.    Gastrointestinal:          BS present x 4 Soft, non-tender, bowel sounds active,                                       no masses, no hepatosplenomegaly                                                      :                             No hernia.  Normal exam for sex,   Incontinent of urine and wearing briefs.         Musculoskeletal:        Extremities normal, atraumatic, no cyanosis or edema                                      No arthropathy.  No deformity.  Gait normal                                                 Skin:                           Skin is warm and dry,  no rashes, swelling or palpable lesions.  Excoriations on skin of lower extremities.  Bruising of left toes.  Right ankle E version to at least 90 degrees   Neurologic:                 CN -XII intact, motor strength grossly intact, sensation grossly intact to light touch, no focal reflex deficits noted    Psychiatric:                 Alert,oriented X3, no delusions, psychoses, depression or anxiety    Heme/Lymph/Imun:   No bruises, petechiae.  Lymph nodes normal in size/configuration.   Anxious at times.  Tearful with discussions of [facility]       Pertinent Test Results:    Results from last 7 days   Lab Units 03/02/25  0538 03/01/25  0459 02/28/25  0501   SODIUM mmol/L 143 143 144   POTASSIUM mmol/L 4.1 4.3 4.5   CHLORIDE mmol/L 110* 110* 111*   CO2 mmol/L 24.0 25.4 26.0   BUN mg/dL 20 19 21   CREATININE mg/dL 0.88 0.84 0.73   GLUCOSE mg/dL 106* 118* 115*   CALCIUM mg/dL 9.5 9.4 9.4       Results from last 7 days   Lab  Units 03/02/25  0538 03/01/25  0459 02/28/25  0501   WBC 10*3/mm3 7.98 8.07 6.28   HEMOGLOBIN g/dL 10.6* 10.4* 10.2*   HEMATOCRIT % 32.3* 32.7* 31.5*   PLATELETS 10*3/mm3 180 171 165       Attending Physician Final Assessment and Plan    1.  Adult failure to thrive with significant malnutrition and weight loss unexplained and unintentional of 47 pounds over the last 6 months.  Causes of condition felt to be multifactorial but patient seems to be in an overall functional decline.  Has been confined to sitting in chair and laying on couch now for several weeks and attempts for physical therapy at home through [facility] after last hospitalization in January 2025 have been ineffective.  Patient's pain felt to probably be a significant part of this syndrome.  Patient currently enrolled in [facility] palliative care program for treatment of her intractable pain.   family conference and patient conference tomorrow to discuss with [facility] transition to full [facility] services.  I do feel patient has less than 6 months to live at present time in current condition. dietitian continues to work with patient.     2.  Intractable pain related to severe spinal stenosis in both cervical and lumbar regions with radiculopathy and felt to be an operable in nature.  Patient currently taking hydrocodone up to 10 tablets/day provided through the palliative care program for her severe pain.  Patient also on gabapentin and Fioricet to help with other painful conditions.  Patient does express interest in transitioning from palliative care program to full [facility] services.  Pain well-controlled at present time on regimen offered by [facility].   Patient taking Norco 10 two tabs Q6 and still complains of occasional pain     3.  Stage II chronic kidney disease with mild volume depletion due to poor p.o. intake.  Patient is followed by nephrology, Dr. Bond, and we will ask him to take a look at the situation and make recommendations on  any additional treatment he thinks may be necessary in this patient's condition. kidney situation stable. nephrology still following but kidney numbers seems stable     4.  Ataxia with dizziness, dyspnea on exertion, recurrent falls that have resulted in head trauma and contusions, and overall immobility.  PT and OT asked to evaluate patient.  Nursing staff reporting that it takes assist of 2 at times to get patient from chair in room to bathroom.  Patient did have 1 fall even when nurses were present helping her while she was in her 's room.  No real changes despite PT and OT assistance.     5.  Chronic heart failure with preserved ejection fraction and known diastolic dysfunction.  This is followed on an outpatient basis by Dr. Gama Soto in cardiology.  Medications have been adjusted to try to help with the problem.  This has become somewhat of a moving target however because of the patient's rapid and steady functional decline.  Ongoing decline with congestive heart failure chronic in nature.     6.  Urinary stress incontinence with history of urinary retention requiring In-N-Out self caths as taught to the patient by Dr. Fazal Ochoa her urologist.  Unfortunately, patient has been unable to perform this function in recent weeks and has been for the most part incontinent of urine.  No need for In-N-Out cath today. patient incontinent of urine at present time     7.  Diffuse osteoarthritis of spine and extremities with history of bilateral knee replacements.  Arthritis enhances patient's pain response and makes her mobility difficult to impossible.  Patient has become deconditioned and extremely weak as a result of these conditions.  This condition unchanged today.     8.  Hyperlipidemia.  Treated on an outpatient basis and monitored.  Due to patient's recent poor intake her lipid levels have been under good control.     9.  Chronic tension type headaches with intractable pain.  Controlled for many years  with Fioricet that she now takes in combination with Norco provided through palliative care services.     10.  Moderate persistent asthmatic bronchitis with shortness of breath.  Patient does have nebulizer at home which she uses on occasion but also uses ProAir inhaler.  Has been followed for many years by allergy because of this condition.     11.  Gastroesophageal reflux disease with esophagitis made worse by poor nutritional habits.  Patient continued on PPI for now to prevent stress ulceration and worsening of the gastritis.     12.  Right ankle deformity with history of acute DVT in the right lower extremity in the past.  No ongoing signs of DVT at present time.  Ortho suggest cam boot which patient declines.     13.  Complications of COVID-19 originally diagnosed 10/2/2022.  These have mainly been chronic neurologic changes with intensification of her intractable headache syndrome.  Has never achieved baseline since her COVID infection.     14.  Posterior tibial tendon dysfunction diagnosed by orthopedics in the past and certainly contributing somewhat to the patient's immobility.  Orthopedics consulted for input on any other suggestions they might have to improve patient's mobility.  Patient does have known venous incompetence of popliteal vein on the right.     15.  Generalized anxiety disorder.  This is an ongoing problem long-term for the patient.  No new meds or changes needed at present time. patient declined psychiatric evaluation and also would not want to see neurologist at this point.   she thinks things are stable for her but very unstable for her .      Condition on Discharge:   stable with pain control    Discharge Disposition discharge to home with [facility] transition from palliative care.  Prognosis less than 6 months.    Transport Plan    Family to transport home    Hospital Treatments discontinued at time of Discharge  IV, Telemetry, Miller Catheter, Deep Lines and PICC  LInes    Discharge Medications     Discharge Medications        ASK your doctor about these medications        Instructions Start Date   acetaminophen 325 MG tablet  Commonly known as: TYLENOL   650 mg, Oral, Every 4 Hours PRN      butalbital-acetaminophen-caffeine -40 MG per tablet  Commonly known as: FIORICET, ESGIC   2 tablets, Oral, Every 4 Hours PRN      carboxymethylcellulose 0.5 % solution  Commonly known as: REFRESH PLUS   1 drop, 2 Times Daily PRN      cetirizine 10 MG tablet  Commonly known as: zyrTEC   1 tablet, Daily      cloNIDine 0.2 MG tablet  Commonly known as: CATAPRES   0.2 mg, Oral, Every 6 Hours Scheduled, Dose titrated for BP control by patient      cloNIDine 0.3 MG tablet  Commonly known as: CATAPRES   0.3 mg, Oral, Every 6 Hours Scheduled, Dose titrated by patient for BP control      cyanocobalamin 1000 MCG tablet  Commonly known as: VITAMIN B-12   1,000 mcg, Oral, Daily      desonide 0.05 % ointment  Commonly known as: DESOWEN   Daily      Diclofenac Sodium 1 % gel gel  Commonly known as: VOLTAREN   4 g, Topical, 4 Times Daily PRN      diphenhydrAMINE 50 MG tablet  Commonly known as: BENADRYL   50 mg, Oral, Every 4 Hours PRN      EPINEPHrine 0.3 MG/0.3ML solution auto-injector injection  Commonly known as: EPIPEN   INJECT THE CONTENTS OF ONE PEN AS NEEDED FOR SEVERE ALLERGIC REACTION. MAY REPEAT ONE TIME.      eplerenone 50 MG tablet  Commonly known as: INSPRA   1 tablet, Daily      fluticasone 50 MCG/ACT nasal spray  Commonly known as: FLONASE   1 spray, Each Nare, 2 Times Daily      HYDROcodone-acetaminophen  MG per tablet  Commonly known as: NORCO   1-2 tablets, Every 6 Hours PRN      hydrOXYzine 10 MG tablet  Commonly known as: ATARAX   1 tablet, 3 Times Daily PRN      ipratropium 0.02 % nebulizer solution  Commonly known as: ATROVENT   2.5 mL, Nebulization, 4 Times Daily - RT      levocetirizine 5 MG tablet  Commonly known as: XYZAL   1 tablet, Every Morning      lidocaine 5  %  Commonly known as: Lidoderm   1 patch, Transdermal, Daily PRN, Remove & Discard patch within 12 hours or as directed by MD      LORazepam 0.5 MG tablet  Commonly known as: ATIVAN   1 tablet, Oral, Every 8 Hours PRN      multivitamin with minerals tablet tablet   1 tablet, Oral, Daily      mupirocin 2 % ointment  Commonly known as: BACTROBAN   1 Application, Topical, Every 12 Hours Scheduled      naloxone 4 MG/0.1ML nasal spray  Commonly known as: NARCAN   1 spray, Nasal, As Needed, Call 911. Don't prime. [place] in 1 nostril for overdose. Repeat in 2-3 minutes in other nostril if no or minimal breathing/responsiveness.      NIFEdipine CC 30 MG 24 hr tablet  Commonly known as: ADALAT CC   30 mg, Oral, Every 24 Hours Scheduled      omeprazole 40 MG capsule  Commonly known as: priLOSEC   1 capsule, Daily PRN      ondansetron ODT 8 MG disintegrating tablet  Commonly known as: ZOFRAN-ODT   8 mg, Oral, Every 6 Hours PRN      Petrolatum 42 % ointment   1 Application, Topical, Every 12 Hours Scheduled, Use on irritated skin from bug bites      polyethylene glycol 17 g packet  Commonly known as: MIRALAX   17 g, Daily PRN      potassium chloride 20 mEq/15 mL solution  Commonly known as: KAYCIEL   20 mEq, Oral, Daily      predniSONE 10 MG tablet  Commonly known as: DELTASONE   10 mg, Oral, Daily      ProAir  (90 Base) MCG/ACT inhaler  Generic drug: albuterol sulfate HFA   4 puffs, Every 4 Hours PRN      promethazine-codeine 6.25-10 MG/5ML syrup  Commonly known as: PHENERGAN with CODEINE   5 mL, Oral, 4 Times Daily PRN      rosuvastatin 5 MG tablet  Commonly known as: CRESTOR   1 tablet, Every Morning      torsemide 10 MG tablet  Commonly known as: DEMADEX   50 mg, Oral, Daily      triamcinolone 0.1 % cream  Commonly known as: KENALOG   1 Application, Topical, 2 Times Daily      VITAMIN B-3 PO   1 tablet, Oral, Daily      Vitamin D3 1.25 MG (79655 UT) capsule   1 capsule, Weekly      Xarelto 20 MG tablet  Generic drug:  rivaroxaban   1 tablet, Daily               Home Medication List  Prior to Admission medications    Medication Sig Start Date End Date Taking? Authorizing Provider   acetaminophen (TYLENOL) 325 MG tablet Take 2 tablets by mouth Every 4 (Four) Hours As Needed for Mild Pain .  Patient taking differently: Take 2 tablets by mouth Every 4 (Four) Hours As Needed for Mild Pain. 5/29/22  Yes Óscar Cadet MD   albuterol (PROAIR HFA) 108 (90 Base) MCG/ACT inhaler 4 puffs Every 4 (Four) Hours As Needed for Wheezing or Shortness of Air. Indications: Asthma, Chronic Obstructive Lung Disease 6/18/14  Yes Vadim Best MD   butalbital-acetaminophen-caffeine (FIORICET, ESGIC) -40 MG per tablet Take 2 tablets by mouth Every 4 (Four) Hours As Needed for Headache. 3/27/17  Yes Óscar Cadet MD   carboxymethylcellulose (REFRESH PLUS) 0.5 % solution Administer 1 drop to both eyes 2 (Two) Times a Day As Needed for Dry Eyes. Indications: Excessive Cornea and Conjunctiva Dryness 12/21/23  Yes Vadim Best MD   cetirizine (zyrTEC) 10 MG tablet Take 1 tablet by mouth Daily. Alt with singulair  Indications: Insect Bites 3/25/19  Yes Vadim Best MD   Cholecalciferol (VITAMIN D3) 1.25 MG (72015 UT) capsule Take 1 capsule by mouth 1 (One) Time Per Week. TUESDAYS  Indications: Vitamin D Deficiency 5/11/20  Yes Vadim Best MD   cloNIDine (CATAPRES) 0.2 MG tablet Take 1 tablet by mouth Every 6 (Six) Hours. Dose titrated for BP control by patient 10/22/24  Yes Óscar Cadet MD   cloNIDine (CATAPRES) 0.3 MG tablet Take 1 tablet by mouth Every 6 (Six) Hours. Dose titrated by patient for BP control 10/22/24  Yes Óscar Cadet MD   desonide (DESOWEN) 0.05 % ointment Apply 1 Application topically to the appropriate area as directed Daily. Indications: facial rash 1/1/24  Yes Vadim Best MD   Diclofenac Sodium (VOLTAREN) 1 % gel gel Apply 4 g topically to the appropriate area as  directed 4 (Four) Times a Day As Needed (Apply to knees or other joints when pain is severe and flares). 10/22/24  Yes Óscar Cadet MD   eplerenone (INSPRA) 50 MG tablet Take 1 tablet by mouth Daily. Indications: High Blood Pressure 12/21/23  Yes Vadim Best MD   fluticasone (FLONASE) 50 MCG/ACT nasal spray 1 spray by Each Nare route 2 (Two) Times a Day. 8/27/18  Yes Óscar Cadet MD   HYDROcodone-acetaminophen (NORCO)  MG per tablet Take 1-2 tablets by mouth Every 6 (Six) Hours As Needed for Moderate Pain. Indications: Pain 3/3/22  Yes Vadim Best MD   hydrOXYzine (ATARAX) 10 MG tablet Take 1 tablet by mouth 3 (Three) Times a Day As Needed for Itching. Indications: itching 1/12/25  Yes Vadim Best MD   levocetirizine (XYZAL) 5 MG tablet Take 1 tablet by mouth Every Morning. Indications: allergies 1/12/25  Yes Vadim Best MD   multivitamin with minerals tablet tablet Take 1 tablet by mouth Daily. 5/30/22  Yes Óscar Cadet MD   NIFEdipine XL (ADALAT CC) 30 MG 24 hr tablet Take 1 tablet by mouth Daily. 5/30/22  Yes Óscar Cadet MD   omeprazole (priLOSEC) 40 MG capsule Take 1 capsule by mouth Daily As Needed (indigestion). Indications: Heartburn 2/23/21  Yes Vadim Best MD   polyethylene glycol (MIRALAX) 17 g packet Take 17 g by mouth Daily As Needed (constipation). Indications: Constipation 6/6/22  Yes Vadim Best MD   potassium chloride (KAYCIEL) 20 mEq/15 mL solution Take 15 mL by mouth Daily. 10/22/24  Yes Óscar Cadet MD   predniSONE (DELTASONE) 10 MG tablet Take 1 tablet by mouth Daily. 1/15/25  Yes Óscar Cadet MD   promethazine-codeine (PHENERGAN with CODEINE) 6.25-10 MG/5ML syrup Take 5 mL by mouth 4 (Four) Times a Day As Needed for cough. 12/9/16  Yes Óscar Cadet MD   rosuvastatin (CRESTOR) 5 MG tablet Take 1 tablet by mouth Every Morning. Indications: High Amount of Fats in the Blood 4/17/23  Yes Provider,  MD Vadim   torsemide (DEMADEX) 10 MG tablet Take 5 tablets by mouth Daily. 1/15/25  Yes Óscar Cadet MD   triamcinolone (KENALOG) 0.1 % cream Apply 1 Application topically to the appropriate area as directed 2 (Two) Times a Day. 1/15/25  Yes Óscar Cadet MD   vitamin B-12 (VITAMIN B-12) 1000 MCG tablet Take 1 tablet by mouth Daily. 4/11/17  Yes Óscar Cadet MD   Xarelto 20 MG tablet Take 1 tablet by mouth Daily. Indications: history of DVT/PE 2/23/23  Yes Vadim Best MD   diphenhydrAMINE (BENADRYL) 50 MG tablet Take 1 tablet by mouth every 4 (four) hours as needed for itching or allergies.  Patient not taking: Reported on 2/26/2025 7/25/16   Óscar Cadet MD   EPINEPHrine (EPIPEN) 0.3 MG/0.3ML solution auto-injector injection INJECT THE CONTENTS OF ONE PEN AS NEEDED FOR SEVERE ALLERGIC REACTION. MAY REPEAT ONE TIME. 3/29/21   Vadim Best MD   ipratropium (ATROVENT) 0.02 % nebulizer solution Take 2.5 mL by nebulization 4 (Four) Times a Day. Indications: Chronic Obstructive Lung Disease 3/25/19   Vadim Best MD   lidocaine (Lidoderm) 5 % Place 1 patch on the skin as directed by provider Daily As Needed for Mild Pain. Remove & Discard patch within 12 hours or as directed by MD 12/5/24   Dmitri Espino MD   LORazepam (ATIVAN) 0.5 MG tablet Take 1 tablet by mouth Every 8 (Eight) Hours As Needed for Anxiety. Indications: Feeling Anxious 12/21/23   Vadim Best MD   mupirocin (BACTROBAN) 2 % ointment Apply 1 Application topically to the appropriate area as directed Every 12 (Twelve) Hours.  Patient not taking: Reported on 1/16/2025 10/22/24   Óscar Cadet MD   naloxone (NARCAN) 4 MG/0.1ML nasal spray Administer 1 spray into the nostril(s) as directed by provider As Needed for Opioid Reversal. Call 911. Don't prime. [place] in 1 nostril for overdose. Repeat in 2-3 minutes in other nostril if no or minimal breathing/responsiveness.  Indications: Opioid  Overdose 1/12/25   Vadim Best MD   Niacin (VITAMIN B-3 PO) Take 1 tablet by mouth Daily. Indications: unknown 1/12/25   Vadim Best MD   ondansetron ODT (ZOFRAN-ODT) 8 MG disintegrating tablet Take 1 tablet by mouth every 6 (six) hours as needed for nausea or vomiting. 9/8/16   Óscar Cadet MD   Petrolatum 42 % ointment Apply 1 Application topically to the appropriate area as directed Every 12 (Twelve) Hours. Use on irritated skin from bug bites  Patient not taking: Reported on 1/16/2025 10/22/24   Óscar Cadet MD       Discharge Diet   Diet Orders (active) (From admission, onward)       Start     Ordered    02/27/25 1800  Dietary Nutrition Supplements Other (see comment); Boost Original  Daily With Breakfast & Dinner       02/27/25 1624    02/26/25 2000  Diet: Regular/House; Texture: Regular (IDDSI 7); Fluid Consistency: Thin (IDDSI 0)  Diet Effective Now         02/26/25 2018                    Activity at Discharge  Up as tolerated with assistance      Follow-up Appointments  Future Appointments   Date Time Provider Department Center   3/12/2025  2:00 PM Gama Soto MD [facility]   5/8/2025  9:00 AM Benny Ochoa MD [facility]       [facility] to follow in home environment with Dr. Cadet as Medical attending and AC Knott as [facility] attending for Pain and Suffering      Test Results Pending at Discharge  None    Óscar Cadet MD  3/3/2025  1315 EST    Time: Discharge 60 min

## 2025-03-04 NOTE — PROGRESS NOTES
Case Management Discharge Note      Final Note: Home with [facility] today and family to transport. The family is setting up 24/7 care for the patient. MAURICE Mittal Rn, CCP         Selected Continued Care - Discharged on 3/3/2025 Admission date: 2/26/2025 - Discharge disposition: Hospice/Home      Destination    No services have been selected for the patient.                Durable Medical Equipment    No services have been selected for the patient.                Dialysis/Infusion    No services have been selected for the patient.                Home Medical Care Coordination complete.      Service Provider Services Address Phone Fax Patient Preferred    [facility] [address] [id number] [phone] --              Therapy    No services have been selected for the patient.                Community Resources    No services have been selected for the patient.                Community & DME    No services have been selected for the patient.                    Selected Continued Care - Prior Encounters Includes continued care and service providers with selected services from prior encounters from 11/28/2024 to 3/3/2025      Discharged on 1/15/2025 Admission date: 1/12/2025 - Discharge disposition: Home-Health Care Svc      Home Medical Care       Service Provider Services Address Phone Fax Patient Preferred    [facility] [address] [address] [phone] --                          Transportation Services  Private: Car    Final Discharge Disposition Code: 50 - home with hospice

## 2025-03-04 NOTE — CASE MANAGEMENT/SOCIAL WORK
Continued Stay Note  [facility]     Patient Name: Sasha Barrett  MRN: [id number]  Today's Date: 3/4/2025    Admit Date: 2/26/2025    Plan: Home with Hospice; caregivers pending   Discharge Plan       Row Name 03/04/25 0930       Plan    Plan Comments CCP checked APS report. At this time, the report submitted DOES NOT meet acceptance criteria for further assessment and will NOT be assigned to a /staff.      Row Name 03/04/25 0857       Plan    Final Discharge Disposition Code 50 - home with hospice    Final Note Home with [facility] today and family to transport. The family is setting up 24/7 care for the patient. MAURICE Mittal, Rn, CCP                   Discharge Codes    No documentation.                 Expected Discharge Date and Time       Expected Discharge Date Expected Discharge Time    Mar 3, 2025

## 2025-04-25 NOTE — PROGRESS NOTES
RM:________    Referral Provider: No ref. provider found Óscar Cadet MD    NEW PATIENT/ CONSULT  PREVIOUS CARDIOLOGIST: ______________________________    CARDIAC TESTING: __________________________________________________    : 1940   AGE: 85 y.o.    2025  REASON FOR VISIT/  CC:      WT: ____________ BP: __________L __________R/ HR_______________    ALLERGIES:  Bee venom, Morphine, Sulfa antibiotics, Tree extract, Ambien [zolpidem], Anastrozole, Covid-19 (mrna) vaccine, Eliquis [apixaban], Hydralazine, Norvasc [amlodipine], Nsaids, Penicillins, and Grass  SMOKING HISTORY  Social History     Tobacco Use    Smoking status: Never     Passive exposure: Never    Smokeless tobacco: Never   Vaping Use    Vaping status: Never Used   Substance Use Topics    Alcohol use: Never     Comment: no drink in 30yrs    Drug use: Never          H/O: MI_____   STROKE________   GOUT_____   ANEMIA______     CAROTID________ HIV____ CAD_______ HYPERCHOL _____    H/O: CHF _____   RF____ DM___ HTN_______PVD____THYROID DISEASE_______    PMH: GI ____   HEPATITIS ___ KIDNEY DISEASE ___ LUNG DISEASE _______     SLEEP APNEA ____ BLOOD CLOTS ____ DVT ____ VEIN STRIPPING ___________      STOP BANG _________ (CARDIO ONCOLOGY ONLY)    CANCER _________________________________ CHEMO/ RADIATION__________

## 2025-05-02 ENCOUNTER — OFFICE VISIT (OUTPATIENT)
Dept: CARDIOLOGY | Age: 85
End: 2025-05-02
Payer: MEDICARE

## 2025-05-02 VITALS
WEIGHT: 132.4 LBS | OXYGEN SATURATION: 87 % | HEIGHT: 60 IN | HEART RATE: 62 BPM | SYSTOLIC BLOOD PRESSURE: 142 MMHG | DIASTOLIC BLOOD PRESSURE: 78 MMHG | BODY MASS INDEX: 26 KG/M2

## 2025-05-02 DIAGNOSIS — R60.9 CHRONIC EDEMA: ICD-10-CM

## 2025-05-02 DIAGNOSIS — I26.93 SINGLE SUBSEGMENTAL PULMONARY EMBOLISM WITHOUT ACUTE COR PULMONALE: ICD-10-CM

## 2025-05-02 DIAGNOSIS — N18.2 STAGE 2 CHRONIC KIDNEY DISEASE: ICD-10-CM

## 2025-05-02 DIAGNOSIS — I82.4Z1 ACUTE DEEP VEIN THROMBOSIS (DVT) OF DISTAL VEIN OF RIGHT LOWER EXTREMITY: ICD-10-CM

## 2025-05-02 DIAGNOSIS — I50.32 CHRONIC HEART FAILURE WITH PRESERVED EJECTION FRACTION (HFPEF): Primary | ICD-10-CM

## 2025-05-02 DIAGNOSIS — R29.6 RECURRENT FALLS WHILE WALKING: ICD-10-CM

## 2025-05-02 DIAGNOSIS — I10 ESSENTIAL HYPERTENSION: ICD-10-CM

## 2025-05-02 PROBLEM — R00.2 PALPITATIONS: Status: RESOLVED | Noted: 2020-11-11 | Resolved: 2025-05-02

## 2025-05-02 PROBLEM — M62.81 GENERALIZED MUSCLE WEAKNESS: Status: RESOLVED | Noted: 2022-09-30 | Resolved: 2025-05-02

## 2025-05-02 PROBLEM — R29.90 POST-COVID-19 SYNDROME MANIFESTING AS CHRONIC NEUROLOGIC SYMPTOMS: Status: RESOLVED | Noted: 2022-05-25 | Resolved: 2025-05-02

## 2025-05-02 PROBLEM — E86.9 VOLUME DEPLETION: Status: RESOLVED | Noted: 2025-01-12 | Resolved: 2025-05-02

## 2025-05-02 PROBLEM — R63.5 WEIGHT GAIN WITH EDEMA: Status: RESOLVED | Noted: 2022-09-30 | Resolved: 2025-05-02

## 2025-05-02 PROBLEM — R60.0 BILATERAL LOWER EXTREMITY EDEMA: Status: RESOLVED | Noted: 2022-10-01 | Resolved: 2025-05-02

## 2025-05-02 PROBLEM — R07.89 ACUTE CHEST WALL PAIN: Status: RESOLVED | Noted: 2017-12-26 | Resolved: 2025-05-02

## 2025-05-02 PROBLEM — S06.0XAA CLOSED HEAD INJURY WITH CONCUSSION: Status: RESOLVED | Noted: 2025-01-12 | Resolved: 2025-05-02

## 2025-05-02 PROBLEM — I82.409 ACUTE DEEP VEIN THROMBOSIS: Status: RESOLVED | Noted: 2019-10-29 | Resolved: 2025-05-02

## 2025-05-02 PROBLEM — R62.7 ADULT FAILURE TO THRIVE SYNDROME: Status: RESOLVED | Noted: 2022-05-25 | Resolved: 2025-05-02

## 2025-05-02 PROBLEM — R05.8 COUGH PRODUCTIVE OF PURULENT SPUTUM: Status: RESOLVED | Noted: 2017-12-26 | Resolved: 2025-05-02

## 2025-05-02 PROBLEM — U09.9 POST-COVID-19 SYNDROME MANIFESTING AS CHRONIC NEUROLOGIC SYMPTOMS: Status: RESOLVED | Noted: 2022-05-25 | Resolved: 2025-05-02

## 2025-05-02 PROBLEM — J10.1 INFLUENZA A: Status: RESOLVED | Noted: 2017-12-26 | Resolved: 2025-05-02

## 2025-05-02 PROBLEM — E87.6 HYPOKALEMIA: Status: RESOLVED | Noted: 2025-01-12 | Resolved: 2025-05-02

## 2025-05-02 PROBLEM — G89.11 ACUTE SHOULDER PAIN DUE TO TRAUMA, RIGHT: Status: RESOLVED | Noted: 2022-09-30 | Resolved: 2025-05-02

## 2025-05-02 PROBLEM — R94.31 ABNORMAL EKG: Status: RESOLVED | Noted: 2017-02-02 | Resolved: 2025-05-02

## 2025-05-02 PROBLEM — R63.4 RECENT UNEXPLAINED WEIGHT LOSS: Status: RESOLVED | Noted: 2022-05-25 | Resolved: 2025-05-02

## 2025-05-02 PROBLEM — E87.70 VOLUME OVERLOAD: Status: RESOLVED | Noted: 2022-09-30 | Resolved: 2025-05-02

## 2025-05-02 PROBLEM — R06.09 DYSPNEA ON EXERTION: Status: RESOLVED | Noted: 2017-01-19 | Resolved: 2025-05-02

## 2025-05-02 PROBLEM — G89.29 OTHER CHRONIC PAIN: Status: RESOLVED | Noted: 2020-01-22 | Resolved: 2025-05-02

## 2025-05-02 PROBLEM — R55 SYNCOPE: Status: RESOLVED | Noted: 2022-05-25 | Resolved: 2025-05-02

## 2025-05-02 PROBLEM — R07.2 PRECORDIAL PAIN: Status: RESOLVED | Noted: 2017-02-02 | Resolved: 2025-05-02

## 2025-05-02 PROBLEM — E44.0 MODERATE MALNUTRITION: Status: RESOLVED | Noted: 2025-01-15 | Resolved: 2025-05-02

## 2025-05-02 PROBLEM — M25.511 ACUTE SHOULDER PAIN DUE TO TRAUMA, RIGHT: Status: RESOLVED | Noted: 2022-09-30 | Resolved: 2025-05-02

## 2025-05-02 PROBLEM — E43 SEVERE MALNUTRITION: Status: RESOLVED | Noted: 2024-10-22 | Resolved: 2025-05-02

## 2025-05-02 PROBLEM — M25.50 ACUTE JOINT PAIN: Status: RESOLVED | Noted: 2017-04-06 | Resolved: 2025-05-02

## 2025-05-02 RX ORDER — TORSEMIDE 100 MG/1
50 TABLET ORAL DAILY
COMMUNITY
Start: 2025-02-03

## 2025-05-02 NOTE — PROGRESS NOTES
Date of Office Visit: 25  Encounter Provider: Gama Soto MD  Place of Service: [facility] CARDIOLOGY  Patient Name: Sasha Barrett  :1940    Chief Complaint   Patient presents with    Establish Care   :     HPI:     Ms. Barrett is 85 y.o. and presents today to establish care. I have reviewed prior notes and there are no changes except for any new updates described below. I have also reviewed any information entered into the medical record by the patient or by ancillary staff.     She has a history of chronic HFpEF, HTN, CKD2, VTE, and numerous other issues. She has a history of morbid obesity but has lost a tremendous amount of weight over the last few years. She has severe chronic lower extremity edema and her legs are wrapped. She uses a walker to ambulate.     She has a history of syncope and had a loop recorder placed in May 2022. However, she says she did not give consent for that and it was removed in 2022.     She feels that her cardiac status is stable. She denies chest pain or SOA at rest. She denies syncope or palps. She does fall easily. She bruises easily but denies overt bleeding.  She had an echo in 2025 that showed normal LVSF, normal diastolic function for age, normal valvular function, and normal RVSP.     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 Yazmin Canseco    CARDIAC ELECTROPHYSIOLOGY PROCEDURE N/A 5/27/2022    Procedure: Loop insertion;  Surgeon: Jeffrey Argueta MD;  [facility];  Service: Cardiovascular;  Laterality: N/A;  biotronik    CARDIAC ELECTROPHYSIOLOGY PROCEDURE N/A 7/13/2022    Procedure: Loop recorder removal;  Surgeon: Jeffrey Argueta MD;  Location:  [facility] CATH INVASIVE LOCATION;  Service: Cardiovascular;  Laterality: N/A;    CARPAL TUNNEL RELEASE Right 05/25/2011    Dr. Caleb Bueno    CARPAL TUNNEL RELEASE Left 04/26/2011    Dr. Caleb Bueno    CATARACT EXTRACTION Left 11/29/2010    Dr. Eusebio Downs    CATARACT EXTRACTION Right 11/17/2010    Dr. Eusebio Downs    COLONOSCOPY N/A 12/06/2002    Sigmoid diverticulosis-Dr. Brandon Batista    COLONOSCOPY N/A 12/12/2013    Tortuous colon, diverticulosis in the sigmoid colon and descending colon, stool in the rectum, sigmoid colon, descending colon, splenic flexure, transverse colon and hepatic flexure-Dr. Irma Brambila    DEQUERVAIN RELEASE Left 9/23/2020    Procedure: DEQUERVAIN RELEASE LEFT HAND;  Surgeon: Caleb Bueno MD;  [place] [facility] [place];  Service: Plastics;  Laterality: Left;    ENDOSCOPY N/A 09/13/2007    Normal-Dr. Pavel Quarles    ENDOSCOPY AND COLONOSCOPY N/A 09/19/2005    1 cm hiatal hernia, mild Schatzki's ring, sigmoid diverticulosis-Dr. Yoel Farley    GANGLION CYST EXCISION Left 12/18/2012    Release of A1 pulley flexor sheath, left fourth finger, excision of ganglion cyst 0.5 cm diameter, A2 pulley flexor sheath, left fourth finger-Dr. Caleb Bueno    HEEL SPUR EXCISION Left 1968    INGUINAL HERNIA REPAIR Right 1971    at 5 months pregnant     INJECTION OF MEDICATION Left 9/23/2020    Procedure: KENOLOG INJECTION TO LEFT THUMB;  Surgeon: Caleb Bueno MD;  [facility];  Service: Plastics;  Laterality: Left;    MASTECTOMY Right 08/05/2008    Right breast mastectomy and excision of left chest wall lesion-Dr. Yoel Farley    MASTECTOMY RADICAL Left 04/29/2003    Left modified radical mastectomy-Dr. Yazmin Canseco    PARATHYROIDECTOMY Right 05/04/2004    Excision of parathyroid adenoma (right superior)-Dr. Yoel Farley    REPLACEMENT TOTAL KNEE Right 04/09/2012    Dr. Lamonte Childress    SINUS SURGERY  1984    THYROIDECTOMY, PARTIAL Left 05/04/2004    Dr. Yoel Farley    TOTAL ABDOMINAL HYSTERECTOMY Bilateral 1973    Dr. Mahan    TRIGGER FINGER RELEASE Left 9/23/2020    Procedure: RELEASE LEFT FOURTH TRIGGER FINGER;  Surgeon: Caleb Bueno MD;  [place] [facility] [place] [facility] [place]  Service: Plastics;  Laterality: Left;    UPPER GASTROINTESTINAL ENDOSCOPY N/A 02/18/2009    LA Grade A reflux esophagitis, normal stomach, normal 2nd part of the duodenum-Dr. Yoel Farley    WRIST GANGLION EXCISION Left 9/23/2020    Procedure: EXCISION GANGLION CYST LEFT WRIST;  Surgeon: Caleb Bueno MD;  [place] [facility] [place] [facility] [place];  Service: Plastics;  Laterality: Left;       Social History     Socioeconomic History    Marital status:      Spouse name: Joey    Number of children: 6    Highest education level: Some college, no degree   Tobacco Use    Smoking status: Never     Passive exposure: Never    Smokeless tobacco: Never   Vaping Use    Vaping status: Never Used   Substance and Sexual Activity    Alcohol use: Never     Comment: no drink in 30yrs    Drug use: Never    Sexual activity: Defer     Birth control/protection: Surgical       Family History   Problem Relation Age of Onset    Brain cancer Brother     Alcohol abuse Mother     No Known Problems Father     No Known Problems Sister     Malig Hyperthermia Neg Hx        Review of Systems   Constitutional: Positive for weight loss.   Cardiovascular:  Positive for leg swelling.   Psychiatric/Behavioral:  The patient is  nervous/anxious.        Allergies   Allergen Reactions    Bee Venom Shortness Of Breath and Rash    Morphine Anaphylaxis and Nausea And Vomiting    Sulfa Antibiotics Anaphylaxis and Hives     Or sulfa fillers in meds  Broke out in internal and external hives      Tree Extract Shortness Of Breath and Rash    Ambien [Zolpidem] Hallucinations    Anastrozole Other (See Comments)     Can't remember what the reaction was     Covid-19 (Mrna) Vaccine Swelling     Facial swelling, blood clots    Eliquis [Apixaban] Headache     Headaches, nausea and itching.     Hydralazine Myalgia     Patient reports leg cramps, joint pain and increased fatigue    Norvasc [Amlodipine] Other (See Comments)     KIDNEYS SHUT DOWN    Nsaids Other (See Comments)     KIDNEY FAILURE    Penicillins Unknown (See Comments)     Severe reaction as a child  Tolerated ceftriaxone during 04/2017 admission    Grass Rash         Current Outpatient Medications:     torsemide (DEMADEX) 100 MG tablet, Take 0.5 tablets by mouth Daily., Disp: , Rfl:     acetaminophen (TYLENOL) 325 MG tablet, Take 2 tablets by mouth Every 4 (Four) Hours As Needed for Mild Pain ., Disp: , Rfl:     acetaminophen (TYLENOL) 325 MG tablet, Take 2 tablets by mouth Every 4 (Four) Hours As Needed for Mild Pain. Indications: Pain, Disp: , Rfl:     albuterol (PROAIR HFA) 108 (90 Base) MCG/ACT inhaler, 4 puffs Every 4 (Four) Hours As Needed for Wheezing or Shortness of Air. Indications: Asthma, Chronic Obstructive Lung Disease, Disp: , Rfl:     butalbital-acetaminophen-caffeine (FIORICET, ESGIC) -40 MG per tablet, Take 2 tablets by mouth Every 4 (Four) Hours As Needed for Headache., Disp: 240 tablet, Rfl: 0    carboxymethylcellulose (REFRESH PLUS) 0.5 % solution, Administer 1 drop to both eyes 2 (Two) Times a Day As Needed for Dry Eyes. Indications: Excessive Cornea and Conjunctiva Dryness, Disp: , Rfl:     cetirizine (zyrTEC) 10 MG tablet, Take 1 tablet by mouth Daily. Alt with  singulair  Indications: Insect Bites, Disp: , Rfl:     Cholecalciferol (VITAMIN D3) 1.25 MG (63312 UT) capsule, Take 1 capsule by mouth 1 (One) Time Per Week. TUESDAYS  Indications: Vitamin D Deficiency, Disp: , Rfl:     cloNIDine (CATAPRES) 0.2 MG tablet, Take 1 tablet by mouth Every 6 (Six) Hours. Dose titrated for BP control by patient, Disp: , Rfl:     cloNIDine (CATAPRES) 0.3 MG tablet, Take 1 tablet by mouth Every 6 (Six) Hours. Dose titrated by patient for BP control, Disp: , Rfl:     desonide (DESOWEN) 0.05 % ointment, Apply 1 Application topically to the appropriate area as directed Daily. Indications: facial rash, Disp: , Rfl:     Diclofenac Sodium (VOLTAREN) 1 % gel gel, Apply 4 g topically to the appropriate area as directed 4 (Four) Times a Day As Needed (Apply to knees or other joints when pain is severe and flares)., Disp: 100 g, Rfl: 5    EPINEPHrine (EPIPEN) 0.3 MG/0.3ML solution auto-injector injection, INJECT THE CONTENTS OF ONE PEN AS NEEDED FOR SEVERE ALLERGIC REACTION. MAY REPEAT ONE TIME., Disp: , Rfl:     eplerenone (INSPRA) 50 MG tablet, Take 1 tablet by mouth Daily. Indications: High Blood Pressure, Disp: , Rfl:     fluticasone (FLONASE) 50 MCG/ACT nasal spray, 1 spray by Each Nare route 2 (Two) Times a Day., Disp: , Rfl:     HYDROcodone-acetaminophen (NORCO)  MG per tablet, Take 1-2 tablets by mouth Every 6 (Six) Hours As Needed for Moderate Pain. Indications: Pain, Disp: , Rfl:     hydrOXYzine (ATARAX) 10 MG tablet, Take 1 tablet by mouth 3 (Three) Times a Day As Needed for Itching. Indications: itching, Disp: , Rfl:     ipratropium (ATROVENT) 0.02 % nebulizer solution, Take 2.5 mL by nebulization 4 (Four) Times a Day. Indications: Chronic Obstructive Lung Disease, Disp: , Rfl:     levocetirizine (XYZAL) 5 MG tablet, Take 1 tablet by mouth Every Morning. Indications: allergies, Disp: , Rfl:     lidocaine (Lidoderm) 5 %, Place 1 patch on the skin as directed by provider Daily As  Needed for Mild Pain. Remove & Discard patch within 12 hours or as directed by MD, Disp: 5 patch, Rfl: 0    LORazepam (ATIVAN) 0.5 MG tablet, Take 1 tablet by mouth Every 8 (Eight) Hours As Needed for Anxiety. Indications: Feeling Anxious, Disp: , Rfl:     multivitamin with minerals tablet tablet, Take 1 tablet by mouth Daily., Disp: , Rfl:     mupirocin (BACTROBAN) 2 % ointment, Apply 1 Application topically to the appropriate area as directed Every 12 (Twelve) Hours., Disp: , Rfl:     naloxone (NARCAN) 4 MG/0.1ML nasal spray, Administer 1 spray into the nostril(s) as directed by provider As Needed for Opioid Reversal. Call 911. Don't prime. [place] in 1 nostril for overdose. Repeat in 2-3 minutes in other nostril if no or minimal breathing/responsiveness.  Indications: Opioid Overdose, Disp: , Rfl:     Niacin (VITAMIN B-3 PO), Take 1 tablet by mouth Daily. Indications: unknown, Disp: , Rfl:     NIFEdipine XL (ADALAT CC) 30 MG 24 hr tablet, Take 1 tablet by mouth Daily., Disp: 90 tablet, Rfl: 1    omeprazole (priLOSEC) 40 MG capsule, Take 1 capsule by mouth Daily As Needed (indigestion). Indications: Heartburn, Disp: , Rfl:     ondansetron ODT (ZOFRAN-ODT) 8 MG disintegrating tablet, Take 1 tablet by mouth every 6 (six) hours as needed for nausea or vomiting., Disp: 360 tablet, Rfl: 1    Petrolatum 42 % ointment, Apply 1 Application topically to the appropriate area as directed Every 12 (Twelve) Hours. Use on irritated skin from bug bites, Disp: 100 g, Rfl: 5    polyethylene glycol (MIRALAX) 17 g packet, Take 17 g by mouth Daily As Needed (constipation). Indications: Constipation, Disp: , Rfl:     potassium chloride (KAYCIEL) 20 mEq/15 mL solution, Take 15 mL by mouth Daily., Disp: 90 mL, Rfl: 5    predniSONE (DELTASONE) 10 MG tablet, Take 1 tablet by mouth Daily., Disp: , Rfl:     promethazine-codeine (PHENERGAN with CODEINE) 6.25-10 MG/5ML syrup, Take 5 mL by mouth 4 (Four) Times a Day As Needed for cough., Disp:  "360 mL, Rfl: 0    rosuvastatin (CRESTOR) 5 MG tablet, Take 1 tablet by mouth Every Morning. Indications: High Amount of Fats in the Blood, Disp: , Rfl:     triamcinolone (KENALOG) 0.1 % cream, Apply 1 Application topically to the appropriate area as directed 2 (Two) Times a Day., Disp: , Rfl:     vitamin B-12 (VITAMIN B-12) 1000 MCG tablet, Take 1 tablet by mouth Daily., Disp: 90 tablet, Rfl: 3    Xarelto 20 MG tablet, Take 1 tablet by mouth Daily. Indications: history of DVT/PE, Disp: , Rfl:       Objective:     Vitals:    05/02/25 1049   BP: 142/78   BP Location: Right arm   Pulse: 62   SpO2: (!) 87%   Weight: 60.1 kg (132 lb 6.4 oz)   Height: 152.4 cm (60\")     Body mass index is 25.86 kg/m².    Vitals reviewed.   Constitutional:       Appearance: Well-developed.   Eyes:      Conjunctiva/sclera: Conjunctivae normal.      Pupils: Pupils are equal, round, and reactive to light.   HENT:      Head: Normocephalic.      Nose: Nose normal.   Neck:      Vascular: No JVD.      Lymphadenopathy: No cervical adenopathy.   Pulmonary:      Effort: Pulmonary effort is normal.      Breath sounds: Normal breath sounds.   Cardiovascular:      Normal rate. Occasional ectopic beats. Regular rhythm.      Murmurs: There is no murmur.      Comments: Marked lower extremity edema and stasis changes, legs and feet are wrapped  Abdominal:      Palpations: Abdomen is soft.      Tenderness: There is no abdominal tenderness.   Musculoskeletal: Normal range of motion.      Cervical back: Normal range of motion. Skin:     Findings: Bruising and ecchymosis present.   Neurological:      General: No focal deficit present.      Mental Status: Oriented to person, place, and time.      Cranial Nerves: No cranial nerve deficit.   Psychiatric:         Mood and Affect: Affect is tearful.         Procedures EKG --   I have personally reviewed EKG on 2/26/2025 and my interpretation of the tracing is as follows: NSR, APC, o/w normal, no change      "   Assessment:       Diagnosis Plan   1. Chronic heart failure with preserved ejection fraction (HFpEF)        2. Essential hypertension        3. Stage 2 chronic kidney disease        4. Chronic edema        5. Acute deep vein thrombosis (DVT) of distal vein of right lower extremity        6. Single subsegmental pulmonary embolism without acute cor pulmonale        7. Recurrent falls while walking               Plan:       Mrs Barrett has a history of HFpEF and labile HTN. She has minimal renal impairment, less than would be expected for age. She has severe chronic multifactorial lower extremity edema. She has a history of VTE and chronic falls. She has limited mobility and has lost a lot of weight over the last few years.    She adjusts her torsemide and clonidine doses depending on her weight and blood pressure. She has been on a very stable regimen for years and Dr Cadet and Dr Bond have known her for decades. I do not feel that any additional cardiac testing is indicated at this time nor do I feel she needs any medication changes with the exception of possibly switching from rivaroxaban to apixaban so that the lower dose can be used. I feel she's at high risk of bleeding on full dose rivaroxaban. I will defer this to Dr Cadet.     Sincerely,       Gama Soto MD

## 2025-05-02 NOTE — LETTER
May 2, 2025     Óscar Cadet MD  [address] [facility] [address]    Patient: Sasha Barrett   YOB: 1940   Date of Visit: 2025     Dear Óscar Cadet MD:       Thank you for referring Sasha Barrett to me for evaluation. Below are the relevant portions of my assessment and plan of care.    If you have questions, please do not hesitate to call me. I look forward to following Sasha along with you.         Sincerely,        Gama Soto MD        CC: MD Charles Black Jamie D, MD  [phone]  Sign when Signing Visit  Date of Office Visit: 25  Encounter Provider: Gama Soto MD  Place of Service: [facility] CARDIOLOGY  Patient Name: Sasha Barrett  :1940    Chief Complaint   Patient presents with   • Establish Care   :     HPI:     Ms. Barrett is 85 y.o. and presents today to establish care. I have reviewed prior notes and there are no changes except for any new updates described below. I have also reviewed any information entered into the medical record by the patient or by ancillary staff.     She has a history of chronic HFpEF, HTN, CKD2, VTE, and numerous other issues. She has a history of morbid obesity but has lost a tremendous amount of weight over the last few years. She has severe chronic lower extremity edema and her legs are wrapped. She uses a walker to ambulate.     She has a history of syncope and had a loop recorder placed in May 2022. However, she says she did not give consent for that and it was removed in 2022.     She feels that her cardiac status is stable. She denies chest pain or SOA at rest. She denies syncope or palps. She does fall easily. She bruises easily but denies overt bleeding.  She had an echo in 2025 that showed normal LVSF, normal diastolic function for age, normal valvular function, and normal RVSP.     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 Yazmin Canseco   • CARDIAC ELECTROPHYSIOLOGY PROCEDURE N/A 5/27/2022    Procedure: Loop insertion;  Surgeon: Jeffrey Argueta MD;  Location:  [facility] CATH [facility]ASIVE [facility];  Service: Cardiovascular;  Laterality: N/A;  biotronik   • CARDIAC ELECTROPHYSIOLOGY PROCEDURE N/A 7/13/2022    Procedure: Loop recorder removal;  Surgeon: Jeffrey Argueta MD;  Location:  [facility]ATH [facility]VAS[facility];  Service: Cardiovascular;  Laterality: N/A;   • CARPAL TUNNEL RELEASE Right 05/25/2011    Dr. Caleb Bueno   • CARPAL TUNNEL RELEASE Left 04/26/2011    Dr. Caleb Bueno   • CATARACT EXTRACTION Left 11/29/2010    Dr. Eusebio Downs   • CATARACT EXTRACTION Right 11/17/2010    Dr. Eusebio Downs   • COLONOSCOPY N/A 12/06/2002    Sigmoid diverticulosis-Dr. Brandon Batista   • COLONOSCOPY N/A 12/12/2013    Tortuous colon, diverticulosis in the sigmoid colon and descending colon, stool in the rectum, sigmoid colon, descending colon, splenic flexure, transverse colon and hepatic flexure-Dr. Irma Brambila   • DEQUERVAIN RELEASE Left 9/23/2020    Procedure: DEQUERVAIN RELEASE LEFT HAND;  Surgeon: Caleb Bueno MD;  Location:  [facility] OR [facility];  Service: Plastics;  Laterality: Left;   • ENDOSCOPY N/A 09/13/2007    Normal-Dr. Zhao  Willam   • ENDOSCOPY AND COLONOSCOPY N/A 09/19/2005    1 cm hiatal hernia, mild Schatzki's ring, sigmoid diverticulosis-Dr. Yoel Farley   • GANGLION CYST EXCISION Left 12/18/2012    Release of A1 pulley flexor sheath, left fourth finger, excision of ganglion cyst 0.5 cm diameter, A2 pulley flexor sheath, left fourth finger-Dr. Caleb Bueno   • HEEL SPUR EXCISION Left 1968   • INGUINAL HERNIA REPAIR Right 1971    at 5 months pregnant    • INJECTION OF MEDICATION Left 9/23/2020    Procedure: KENOLOG INJECTION TO LEFT THUMB;  Surgeon: Caleb Bueno MD;  Location: [facility] OR [facility];  Service: Plastics;  Laterality: Left;   • MASTECTOMY Right 08/05/2008    Right breast mastectomy and excision of left chest wall lesion-Dr. Yoel Farley   • MASTECTOMY RADICAL Left 04/29/2003    Left modified radical mastectomy-Dr. Yazmin Canseco   • PARATHYROIDECTOMY Right 05/04/2004    Excision of parathyroid adenoma (right superior)-Dr. Yoel Farley   • REPLACEMENT TOTAL KNEE Right 04/09/2012    Dr. Lamonte Childress   • SINUS SURGERY  1984   • THYROIDECTOMY, PARTIAL Left 05/04/2004    Dr. Yoel Farley   • TOTAL ABDOMINAL HYSTERECTOMY Bilateral 1973    Dr. Mahan   • TRIGGER FINGER RELEASE Left 9/23/2020    Procedure: RELEASE LEFT FOURTH TRIGGER FINGER;  Surgeon: Caleb Bueno MD;  Location: [facility];  Service: Plastics;  Laterality: Left;   • UPPER GASTROINTESTINAL ENDOSCOPY N/A 02/18/2009    LA Grade A reflux esophagitis, normal stomach, normal 2nd part of the duodenum-Dr. Yoel Farley   • WRIST GANGLION EXCISION Left 9/23/2020    Procedure: EXCISION GANGLION CYST LEFT WRIST;  Surgeon: Caleb Bueno MD;  Location: [facility];  Service: Plastics;  Laterality: Left;       Social History     Socioeconomic History   • Marital status:      Spouse name: Joey   • Number of children: 6   • Highest education level: Some college, no degree   Tobacco Use   • Smoking status: Never      Passive exposure: Never   • Smokeless tobacco: Never   Vaping Use   • Vaping status: Never Used   Substance and Sexual Activity   • Alcohol use: Never     Comment: no drink in 30yrs   • Drug use: Never   • Sexual activity: Defer     Birth control/protection: Surgical       Family History   Problem Relation Age of Onset   • Brain cancer Brother    • Alcohol abuse Mother    • No Known Problems Father    • No Known Problems Sister    • Malig Hyperthermia Neg Hx        Review of Systems   Constitutional: Positive for weight loss.   Cardiovascular:  Positive for leg swelling.   Psychiatric/Behavioral:  The patient is nervous/anxious.        Allergies   Allergen Reactions   • Bee Venom Shortness Of Breath and Rash   • Morphine Anaphylaxis and Nausea And Vomiting   • Sulfa Antibiotics Anaphylaxis and Hives     Or sulfa fillers in meds  Broke out in internal and external hives     • Tree Extract Shortness Of Breath and Rash   • Ambien [Zolpidem] Hallucinations   • Anastrozole Other (See Comments)     Can't remember what the reaction was    • Covid-19 (Mrna) Vaccine Swelling     Facial swelling, blood clots   • Eliquis [Apixaban] Headache     Headaches, nausea and itching.    • Hydralazine Myalgia     Patient reports leg cramps, joint pain and increased fatigue   • Norvasc [Amlodipine] Other (See Comments)     KIDNEYS SHUT DOWN   • Nsaids Other (See Comments)     KIDNEY FAILURE   • Penicillins Unknown (See Comments)     Severe reaction as a child  Tolerated ceftriaxone during 04/2017 admission   • Grass Rash         Current Outpatient Medications:   •  torsemide (DEMADEX) 100 MG tablet, Take 0.5 tablets by mouth Daily., Disp: , Rfl:   •  acetaminophen (TYLENOL) 325 MG tablet, Take 2 tablets by mouth Every 4 (Four) Hours As Needed for Mild Pain ., Disp: , Rfl:   •  acetaminophen (TYLENOL) 325 MG tablet, Take 2 tablets by mouth Every 4 (Four) Hours As Needed for Mild Pain. Indications: Pain, Disp: , Rfl:   •  albuterol  (PROAIR HFA) 108 (90 Base) MCG/ACT inhaler, 4 puffs Every 4 (Four) Hours As Needed for Wheezing or Shortness of Air. Indications: Asthma, Chronic Obstructive Lung Disease, Disp: , Rfl:   •  butalbital-acetaminophen-caffeine (FIORICET, ESGIC) -40 MG per tablet, Take 2 tablets by mouth Every 4 (Four) Hours As Needed for Headache., Disp: 240 tablet, Rfl: 0  •  carboxymethylcellulose (REFRESH PLUS) 0.5 % solution, Administer 1 drop to both eyes 2 (Two) Times a Day As Needed for Dry Eyes. Indications: Excessive Cornea and Conjunctiva Dryness, Disp: , Rfl:   •  cetirizine (zyrTEC) 10 MG tablet, Take 1 tablet by mouth Daily. Alt with singulair  Indications: Insect Bites, Disp: , Rfl:   •  Cholecalciferol (VITAMIN D3) 1.25 MG (05433 UT) capsule, Take 1 capsule by mouth 1 (One) Time Per Week. TUESDAYS  Indications: Vitamin D Deficiency, Disp: , Rfl:   •  cloNIDine (CATAPRES) 0.2 MG tablet, Take 1 tablet by mouth Every 6 (Six) Hours. Dose titrated for BP control by patient, Disp: , Rfl:   •  cloNIDine (CATAPRES) 0.3 MG tablet, Take 1 tablet by mouth Every 6 (Six) Hours. Dose titrated by patient for BP control, Disp: , Rfl:   •  desonide (DESOWEN) 0.05 % ointment, Apply 1 Application topically to the appropriate area as directed Daily. Indications: facial rash, Disp: , Rfl:   •  Diclofenac Sodium (VOLTAREN) 1 % gel gel, Apply 4 g topically to the appropriate area as directed 4 (Four) Times a Day As Needed (Apply to knees or other joints when pain is severe and flares)., Disp: 100 g, Rfl: 5  •  EPINEPHrine (EPIPEN) 0.3 MG/0.3ML solution auto-injector injection, INJECT THE CONTENTS OF ONE PEN AS NEEDED FOR SEVERE ALLERGIC REACTION. MAY REPEAT ONE TIME., Disp: , Rfl:   •  eplerenone (INSPRA) 50 MG tablet, Take 1 tablet by mouth Daily. Indications: High Blood Pressure, Disp: , Rfl:   •  fluticasone (FLONASE) 50 MCG/ACT nasal spray, 1 spray by Each Nare route 2 (Two) Times a Day., Disp: , Rfl:   •  HYDROcodone-acetaminophen  (NORCO)  MG per tablet, Take 1-2 tablets by mouth Every 6 (Six) Hours As Needed for Moderate Pain. Indications: Pain, Disp: , Rfl:   •  hydrOXYzine (ATARAX) 10 MG tablet, Take 1 tablet by mouth 3 (Three) Times a Day As Needed for Itching. Indications: itching, Disp: , Rfl:   •  ipratropium (ATROVENT) 0.02 % nebulizer solution, Take 2.5 mL by nebulization 4 (Four) Times a Day. Indications: Chronic Obstructive Lung Disease, Disp: , Rfl:   •  levocetirizine (XYZAL) 5 MG tablet, Take 1 tablet by mouth Every Morning. Indications: allergies, Disp: , Rfl:   •  lidocaine (Lidoderm) 5 %, Place 1 patch on the skin as directed by provider Daily As Needed for Mild Pain. Remove & Discard patch within 12 hours or as directed by MD, Disp: 5 patch, Rfl: 0  •  LORazepam (ATIVAN) 0.5 MG tablet, Take 1 tablet by mouth Every 8 (Eight) Hours As Needed for Anxiety. Indications: Feeling Anxious, Disp: , Rfl:   •  multivitamin with minerals tablet tablet, Take 1 tablet by mouth Daily., Disp: , Rfl:   •  mupirocin (BACTROBAN) 2 % ointment, Apply 1 Application topically to the appropriate area as directed Every 12 (Twelve) Hours., Disp: , Rfl:   •  naloxone (NARCAN) 4 MG/0.1ML nasal spray, Administer 1 spray into the nostril(s) as directed by provider As Needed for Opioid Reversal. Call 911. Don't prime. [place] in 1 nostril for overdose. Repeat in 2-3 minutes in other nostril if no or minimal breathing/responsiveness.  Indications: Opioid Overdose, Disp: , Rfl:   •  Niacin (VITAMIN B-3 PO), Take 1 tablet by mouth Daily. Indications: unknown, Disp: , Rfl:   •  NIFEdipine XL (ADALAT CC) 30 MG 24 hr tablet, Take 1 tablet by mouth Daily., Disp: 90 tablet, Rfl: 1  •  omeprazole (priLOSEC) 40 MG capsule, Take 1 capsule by mouth Daily As Needed (indigestion). Indications: Heartburn, Disp: , Rfl:   •  ondansetron ODT (ZOFRAN-ODT) 8 MG disintegrating tablet, Take 1 tablet by mouth every 6 (six) hours as needed for nausea or vomiting., Disp: 360  "tablet, Rfl: 1  •  Petrolatum 42 % ointment, Apply 1 Application topically to the appropriate area as directed Every 12 (Twelve) Hours. Use on irritated skin from bug bites, Disp: 100 g, Rfl: 5  •  polyethylene glycol (MIRALAX) 17 g packet, Take 17 g by mouth Daily As Needed (constipation). Indications: Constipation, Disp: , Rfl:   •  potassium chloride (KAYCIEL) 20 mEq/15 mL solution, Take 15 mL by mouth Daily., Disp: 90 mL, Rfl: 5  •  predniSONE (DELTASONE) 10 MG tablet, Take 1 tablet by mouth Daily., Disp: , Rfl:   •  promethazine-codeine (PHENERGAN with CODEINE) 6.25-10 MG/5ML syrup, Take 5 mL by mouth 4 (Four) Times a Day As Needed for cough., Disp: 360 mL, Rfl: 0  •  rosuvastatin (CRESTOR) 5 MG tablet, Take 1 tablet by mouth Every Morning. Indications: High Amount of Fats in the Blood, Disp: , Rfl:   •  triamcinolone (KENALOG) 0.1 % cream, Apply 1 Application topically to the appropriate area as directed 2 (Two) Times a Day., Disp: , Rfl:   •  vitamin B-12 (VITAMIN B-12) 1000 MCG tablet, Take 1 tablet by mouth Daily., Disp: 90 tablet, Rfl: 3  •  Xarelto 20 MG tablet, Take 1 tablet by mouth Daily. Indications: history of DVT/PE, Disp: , Rfl:       Objective:     Vitals:    05/02/25 1049   BP: 142/78   BP Location: Right arm   Pulse: 62   SpO2: (!) 87%   Weight: 60.1 kg (132 lb 6.4 oz)   Height: 152.4 cm (60\")     Body mass index is 25.86 kg/m².    Vitals reviewed.   Constitutional:       Appearance: Well-developed.   Eyes:      Conjunctiva/sclera: Conjunctivae normal.      Pupils: Pupils are equal, round, and reactive to light.   HENT:      Head: Normocephalic.      Nose: Nose normal.   Neck:      Vascular: No JVD.      Lymphadenopathy: No cervical adenopathy.   Pulmonary:      Effort: Pulmonary effort is normal.      Breath sounds: Normal breath sounds.   Cardiovascular:      Normal rate. Occasional ectopic beats. Regular rhythm.      Murmurs: There is no murmur.      Comments: Marked lower extremity edema and " stasis changes, legs and feet are wrapped  Abdominal:      Palpations: Abdomen is soft.      Tenderness: There is no abdominal tenderness.   Musculoskeletal: Normal range of motion.      Cervical back: Normal range of motion. Skin:     Findings: Bruising and ecchymosis present.   Neurological:      General: No focal deficit present.      Mental Status: Oriented to person, place, and time.      Cranial Nerves: No cranial nerve deficit.   Psychiatric:         Mood and Affect: Affect is tearful.         Procedures EKG --   I have personally reviewed EKG on 2/26/2025 and my interpretation of the tracing is as follows: NSR, APC, o/w normal, no change        Assessment:       Diagnosis Plan   1. Chronic heart failure with preserved ejection fraction (HFpEF)        2. Essential hypertension        3. Stage 2 chronic kidney disease        4. Chronic edema        5. Acute deep vein thrombosis (DVT) of distal vein of right lower extremity        6. Single subsegmental pulmonary embolism without acute cor pulmonale        7. Recurrent falls while walking               Plan:       Mrs Barrett has a history of HFpEF and labile HTN. She has minimal renal impairment, less than would be expected for age. She has severe chronic multifactorial lower extremity edema. She has a history of VTE and chronic falls. She has limited mobility and has lost a lot of weight over the last few years.    She adjusts her torsemide and clonidine doses depending on her weight and blood pressure. She has been on a very stable regimen for years and Dr Cadet and Dr Bond have known her for decades. I do not feel that any additional cardiac testing is indicated at this time nor do I feel she needs any medication changes with the exception of possibly switching from rivaroxaban to apixaban so that the lower dose can be used. I feel she's at high risk of bleeding on full dose rivaroxaban. I will defer this to Dr Cadet.     Sincerely,       Gama Soto,  MD

## 2025-07-23 ENCOUNTER — HOSPITAL ENCOUNTER (EMERGENCY)
Facility: HOSPITAL | Age: 85
Discharge: HOME | End: 2025-07-23
Attending: EMERGENCY MEDICINE
Payer: MEDICARE

## 2025-07-23 ENCOUNTER — APPOINTMENT (OUTPATIENT)
Dept: CT IMAGING | Facility: HOSPITAL | Age: 85
End: 2025-07-23
Payer: MEDICARE

## 2025-07-23 VITALS
OXYGEN SATURATION: 99 % | HEART RATE: 79 BPM | DIASTOLIC BLOOD PRESSURE: 87 MMHG | RESPIRATION RATE: 16 BRPM | BODY MASS INDEX: 23 KG/M2 | HEIGHT: 62 IN | TEMPERATURE: 98 F | WEIGHT: 125 LBS | SYSTOLIC BLOOD PRESSURE: 167 MMHG

## 2025-07-23 DIAGNOSIS — S09.90XA CLOSED HEAD INJURY, INITIAL ENCOUNTER: ICD-10-CM
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
## 2025-07-23 DIAGNOSIS — S81.811A LACERATION OF RIGHT LOWER LEG, INITIAL ENCOUNTER: Primary | ICD-10-CM

## 2025-07-23 PROCEDURE — 70450 CT HEAD/BRAIN W/O DYE: CPT

## 2025-07-23 PROCEDURE — 99284 EMERGENCY DEPT VISIT MOD MDM: CPT

## 2025-07-23 PROCEDURE — 25010000002 LIDOCAINE 1% - EPINEPHRINE 1:100000 1 %-1:100000 SOLUTION: Performed by: EMERGENCY MEDICINE

## 2025-07-23 PROCEDURE — 72125 CT NECK SPINE W/O DYE: CPT

## 2025-07-23 RX ORDER — LIDOCAINE HYDROCHLORIDE AND EPINEPHRINE 10; 10 MG/ML; UG/ML
10 INJECTION, SOLUTION INFILTRATION; PERINEURAL ONCE
Status: COMPLETED | OUTPATIENT
Start: 2025-07-23 | End: 2025-07-23

## 2025-07-23 RX ADMIN — LIDOCAINE HYDROCHLORIDE AND EPINEPHRINE 10 ML: 10; 10 INJECTION, SOLUTION INFILTRATION; PERINEURAL at 06:21

## 2025-07-23 NOTE — ED PROVIDER NOTES
MD ATTESTATION NOTE    SHARED VISIT: This visit was performed by BOTH a physician and an APC. The substantive portion of the medical decision making was performed by this attesting physician who made or approved the management plan and takes responsibility for patient management. All studies documented in the APC note (if performed) were independently interpreted by me.    The RAGINI and I have discussed this patient's history, physical exam, MDM, and treatment plan.  I have reviewed the documentation and personally had a face to face interaction with the patient. The attached note describes my personal findings.      Sasha Barrett is a 85 y.o. female who presents to the ED c/o acute wound to posterior ankle.  Patient states that she poked herself with her walker while using her walker.  States that she fell and struck her head but did not pass consciousness.    On exam:  GENERAL: not distressed  HENT: nares patent  EYES: no scleral icterus  CV: regular rhythm, regular rate  RESPIRATORY: normal effort  ABDOMEN: soft  MUSCULOSKELETAL: no deformity  NEURO: alert, moves all extremities, follows commands  SKIN: warm, dry, laceration to posterior right ankle neurovascular intact distally    Labs  No results found for this or any previous visit (from the past 24 hours).    Radiology  No Radiology Exams Resulted Within Past 24 Hours    Medications given in the ED:  Medications   lidocaine 1% - EPINEPHrine 1:[id number] (XYLOCAINE W/EPI) 1 %-1:[id number] injection 10 mL (10 mL Injection Given by Other 7/23/25 0621)       Orders placed during this visit:  Orders Placed This Encounter   Procedures    Laceration Repair    CT Head Without Contrast    CT Cervical Spine Without Contrast    Undress and Gown       Medical Decision Making:  ED Course as of 07/23/25 0657   Wed Jul 23, 2025   0588 I discussed the case with Dr. Ortega and he agrees to evaluate the patient at the bedside.    [CC]      ED Course User Index  [CC] Neida Estes  PADMINI, FERNANDO       Clinical Scores:                                   Differential diagnosis:  Laceration, puncture wound, tendon injury, nerve injury    Diagnosis  Final diagnoses:   Laceration of right lower leg, initial encounter   Closed head injury, initial encounter   Fall, initial encounter          Eitan Ortega MD  07/23/25 0636

## 2025-07-23 NOTE — DISCHARGE INSTRUCTIONS
Rest, ice, elevate to reduce swelling.  SUTURES TO BE REMOVED IN 10-14 DAYS BY YOUR PRIMARY CARE PROVIDER OR URGENT CARE.  Keep dressing in place and keep area clean and dry for 24-48 hours (face wounds can be left uncovered). After this time, remove dressing, wash with antibacterial soap and water 2-3 times per day and apply vaseline or aquaphor..   Keep covered with gauze or bandaid.(face wounds can be left uncovered).  Follow up with primary care for further management and to have your blood pressure rechecked.   Return to ER for excessive pain, swelling, numbness/tingling, fever, chills, weakness, redness, red streaking, drainage, bleeding, decreased sensation, or other worsening/concerning symptoms.     You have been seen for a head injury.  Your imaging in the emergency department was negative for any acute intracranial pathology.   Because you are on an anticoagulant, there is a small risk of a delayed intracranial bleed for up to 1 week from the time of injury.  Please have family members keep a close eye on you over the next several days.  Please make sure you have close follow-up with your primary care physician.  Please return to the emergency department immediately for headache, visual disturbance, confusion, weakness, nausea or vomiting, or any other concerning symptom.

## 2025-07-23 NOTE — ED PROVIDER NOTES
EMERGENCY DEPARTMENT ENCOUNTER  Room Number:  07/07  PCP: Óscar Cadet MD  Independent Historians: Historian: Patient      HPI:  Chief Complaint: had concerns including Wound Check (Pt c/o wound to right lower leg. Pt unsure how she got it).     A complete HPI/ROS/PMH/PSH/SH/FH are unobtainable due to: EM_Unobtainable History : None    Chronic or social conditions impacting patient care (Social Determinants of Health): None      Context: Sasha Barrett is a 85 y.o. female with a medical history of CKD, hyperlipidemia, CVA, DVT, and COPD presents emergency department today with a laceration to her right posterior ankle area.  Patient says she was walking with her walker to get a drink of water and hit her walker on her leg causing her to fall.   She says she did hit her head but denies LOC.  She also reports soreness to her neck.  She denies any other injuries associated with this.  She denies any pain.  She says has not been bleeding.      Review of prior external notes (non-ED) -and- Review of prior external test results outside of this encounter:   I reviewed labs from 3/3/2025, hemoglobin 10.3, creatinine 0.86      PAST MEDICAL HISTORY  Active Ambulatory Problems     Diagnosis Date Noted    Urinary retention self-caths (Don) 07/25/2016    Arm pain 07/25/2016    Other cervical disc displacement at C5-C6 level 07/25/2016    Cervical pain (Servando=PM) 07/25/2016    DDD (degenerative disc disease), cervical 07/25/2016    Hyperlipidemia LDL goal <70 07/25/2016    Proctocele 06/29/2015    Urge incontinence of urine 06/29/2015    Chronic tension-type headache, intractable 11/11/2016    H/o Lyme disease 11/11/2016    Stage 2 chronic kidney disease 11/11/2016    Cerebral atherosclerosis 11/11/2016    Allergic contact dermatitis 11/11/2016    Vitamin D deficiency 11/11/2016    Moderate persistent asthmatic bronchitis without complication 11/11/2016    Elevated PTH level 11/11/2016    Malignant neoplasm of left breast  infiltrating ductal (Don) 11/11/2016    Gastroesophageal reflux disease with esophagitis 11/11/2016    Psoriasis (Darryl Ochoa) 11/11/2016    Postmenopausal 11/11/2016    Recurrent UTI 11/11/2016    H/O Cerebral vascular accident in left occipital area 11/11/2016    Arthralgia 04/06/2017    Easy bruisability 04/06/2017    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    Decreased mobility and endurance 12/26/2017    Essential hypertension 08/22/2018    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/25/2018    Migraine without aura and without status migrainosus, not intractable 11/21/2018    Thyroid nodule 03/24/2019    Osteoarthritis of left hip 08/23/2019    Spinal stenosis of lumbar region with radiculopathy 08/23/2019    Acute deep vein thrombosis (DVT) of right lower extremity 10/29/2019    Single subsegmental pulmonary embolism without acute cor pulmonale 10/31/2019    Secondary hyperparathyroidism 02/02/2021    Ataxia 05/25/2022    Acute intractable headache intensified since Covid-19 infection 10/2/2021 05/25/2022    History of loop recorder 06/29/2022    Pes planus 09/22/2022    Posterior tibial tendon dysfunction 09/22/2022    Blunt trauma of right lower leg 09/30/2022    Venous incompetence of popliteal vein on right 10/01/2022    Chronic edema 10/20/2024    KECIA (generalized anxiety disorder) 01/12/2025    Cervical stenosis of spine 01/12/2025    Recurrent falls while walking 01/12/2025    Anemia, mild 01/12/2025    Goiter diffuse 01/12/2025    Lumbar herniated disc L2-L3 01/15/2025    Spinal stenosis of lumbar region with neurogenic claudication 01/15/2025    Chronic heart failure with preserved ejection fraction (HFpEF) 01/15/2025     Resolved Ambulatory Problems     Diagnosis Date Noted    Joint pain 07/25/2016    Sinusitis 07/25/2016    Conjunctivitis 07/25/2016    Preventative health care 09/08/2016    Medication management 09/08/2016    Obesity (BMI 30.0-34.9) 11/11/2016    Malaise and  fatigue progressive 11/11/2016    Dizziness 11/11/2016    Anterior cervical adenopathy due to infection 11/11/2016    Pleurisy 11/11/2016    Pain in both lower extremities 11/11/2016    Dyspnea on exertion 01/19/2017    Precordial pain 02/02/2017    Abnormal EKG 02/02/2017    Accelerated hypertension 04/06/2017    Acute joint pain 04/06/2017    Cough productive of purulent sputum 12/26/2017    Influenza A Subtype H3 12/26/2017    Acute chest wall pain 12/26/2017    Acute pain of left shoulder due to trauma 12/26/2017    Fall from standing with head traumna 08/23/2018    Acute deep vein thrombosis 10/29/2019    Other chronic pain 01/22/2020    Palpitations 11/11/2020    Syncope 05/25/2022    Adult failure to thrive syndrome 05/25/2022    Recent unexplained weight loss 42# over last 6 months 05/25/2022    Blood pressure instability 05/25/2022    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 Yazmin Canseco    CARDIAC ELECTROPHYSIOLOGY PROCEDURE N/A 5/27/2022    Procedure: Loop insertion;  Surgeon: Jeffrey Argueta MD;  Location:  [facility];  Service: Cardiovascular;  Laterality: N/A;  biotronik    CARDIAC ELECTROPHYSIOLOGY PROCEDURE N/A 7/13/2022    Procedure: Loop recorder removal;  Surgeon: Jeffrey Argueta MD;  Location:  [facility];  Service: Cardiovascular;  Laterality: N/A;    CARPAL TUNNEL RELEASE Right 05/25/2011    Dr. Caleb Bueno    CARPAL TUNNEL RELEASE Left 04/26/2011    Dr. Caleb Bueno    CATARACT EXTRACTION Left 11/29/2010    Dr. Eusebio Downs    CATARACT EXTRACTION Right 11/17/2010    Dr. Eusebio Downs    COLONOSCOPY N/A 12/06/2002    Sigmoid diverticulosis-Dr. Brandon Batista    COLONOSCOPY N/A 12/12/2013    Tortuous colon, diverticulosis in the sigmoid colon and descending colon, stool in the rectum, sigmoid colon, descending colon, splenic flexure, transverse colon and hepatic flexure-Dr. Irma Brambila    DEQUERVAIN RELEASE Left 9/23/2020    Procedure: DEQUERVAIN RELEASE LEFT HAND;  Surgeon: Caleb Bueno MD;  [place]  [facility] [place] [facility] [place]  Service: [place]astics;  Laterality: Left;    ENDOSCOPY N/A 09/13/2007    Normal-Dr. Pavel Quarles    ENDOSCOPY AND COLONOSCOPY N/A 09/19/2005    1 cm hiatal hernia, mild Schatzki's ring, sigmoid diverticulosis-Dr. Yoel Farley    GANGLION CYST EXCISION Left 12/18/2012    Release of A1 pulley flexor sheath, left fourth finger, excision of ganglion cyst 0.5 cm diameter, A2 pulley flexor sheath, left fourth finger-Dr. Caleb Bueno    HEEL SPUR EXCISION Left 1968    INGUINAL HERNIA REPAIR  Right 1971    at 5 months pregnant     INJECTION OF MEDICATION Left 9/23/2020    Procedure: KENOLOG INJECTION TO LEFT THUMB;  Surgeon: Caleb Bueno MD;  Location: [facility] [place] [facility] [place];  Service: Plastics;  Laterality: Left;    MASTECTOMY Right 08/05/2008    Right breast mastectomy and excision of left chest wall lesion-Dr. Yoel Farley    MASTECTOMY RADICAL Left 04/29/2003    Left modified radical mastectomy-Dr. Yazmin Canseco    PARATHYROIDECTOMY Right 05/04/2004    Excision of parathyroid adenoma (right superior)-Dr. Yoel Farley    REPLACEMENT TOTAL KNEE Right 04/09/2012    Dr. Lamonte Childress    SINUS SURGERY  1984    THYROIDECTOMY, PARTIAL Left 05/04/2004    Dr. Yoel Farley    TOTAL ABDOMINAL HYSTERECTOMY Bilateral 1973    Dr. Mahan    TRIGGER FINGER RELEASE Left 9/23/2020    Procedure: RELEASE LEFT FOURTH TRIGGER FINGER;  Surgeon: Caleb Bueno MD;  Location: [facility] [place];  Service: Plastics;  Laterality: Left;    UPPER GASTROINTESTINAL ENDOSCOPY N/A 02/18/2009    LA Grade A reflux esophagitis, normal stomach, normal 2nd part of the duodenum-Dr. Yoel Farley    WRIST GANGLION EXCISION Left 9/23/2020    Procedure: EXCISION GANGLION CYST LEFT WRIST;  Surgeon: Caleb Bueno MD;  Location: [facility] [place];  Service: Plastics;  Laterality: Left;         FAMILY HISTORY  Family History   Problem Relation Age of Onset    Brain cancer Brother     Alcohol abuse Mother     No Known Problems Father     No Known Problems Sister     Malig Hyperthermia Neg Hx          SOCIAL HISTORY  Social History     Socioeconomic History    Marital status:      Spouse name: Joey    Number of children: 6    Highest education level: Some college, no degree   Tobacco Use    Smoking status: Never     Passive exposure: Never    Smokeless tobacco: Never   Vaping Use    Vaping status: Never Used   Substance and Sexual Activity    Alcohol use: Never     Comment: no drink in 30yrs    Drug use: Never     Sexual activity: Defer     Birth control/protection: Surgical         ALLERGIES  Bee venom, Morphine, Sulfa antibiotics, Tree extract, Ambien [zolpidem], Anastrozole, Covid-19 (mrna) vaccine, Eliquis [apixaban], Hydralazine, Norvasc [amlodipine], Nsaids, Penicillins, and Grass      REVIEW OF SYSTEMS  Included in HPI  All systems reviewed and negative except for those discussed in HPI.      PHYSICAL EXAM    I have reviewed the triage vital signs and nursing notes.    ED Triage Vitals [07/23/25 0512]   Temp Heart Rate Resp BP SpO2   98 °F (36.7 °C) 74 16 174/74 98 %      Temp src Heart Rate Source Patient Position BP Location FiO2 (%)   Oral -- -- -- --       Physical Exam  Constitutional:       General: She is not in acute distress.     Appearance: Normal appearance.   HENT:      Head: Normocephalic and atraumatic.      Nose: Nose normal.      Mouth/Throat:      Mouth: Mucous membranes are moist.   Eyes:      Extraocular Movements: Extraocular movements intact.      Pupils: Pupils are equal, round, and reactive to light.   Cardiovascular:      Rate and Rhythm: Normal rate and regular rhythm.      Pulses: Normal pulses.      Heart sounds: Normal heart sounds.   Pulmonary:      Effort: Pulmonary effort is normal. No respiratory distress.      Breath sounds: Normal breath sounds.   Abdominal:      General: Abdomen is flat. There is no distension.      Palpations: Abdomen is soft.      Tenderness: There is no abdominal tenderness.   Musculoskeletal:         General: Normal range of motion.      Cervical back: Normal range of motion and neck supple.      Comments: No C, T, or L-spine tenderness.  Spontaneously moving all extremities, sensory and motor grossly intact.  Chronic deformity to the right ankle.  There is a V-shaped laceration to the posterior right ankle just posterior to the medial malleolus and just laterally to the Achilles tendon.  Achilles is intact.  Range of motion is normal.  Can move the toes without  difficulty.  2+ DP pulse.  Light touch grossly intact distally.       Skin:     General: Skin is warm and dry.      Capillary Refill: Capillary refill takes less than 2 seconds.   Neurological:      General: No focal deficit present.      Mental Status: She is alert and oriented to person, place, and time.   Psychiatric:         Mood and Affect: Mood normal.         Behavior: Behavior normal.       MEDICATIONS GIVEN IN ER  Medications   lidocaine 1% - EPINEPHrine 1:455428 (XYLOCAINE W/EPI) 1 %-1:954888 injection 10 mL (has no administration in time range)         OUTPATIENT MEDICATION MANAGEMENT:  Current Facility-Administered Medications Ordered in Epic   Medication Dose Route Frequency Provider Last Rate Last Admin    lidocaine 1% - EPINEPHrine 1:594484 (XYLOCAINE W/EPI) 1 %-1:223900 injection 10 mL  10 mL Injection Once Eitan Ortega MD         Current Outpatient Medications Ordered in Epic   Medication Sig Dispense Refill    acetaminophen (TYLENOL) 325 MG tablet Take 2 tablets by mouth Every 4 (Four) Hours As Needed for Mild Pain .      acetaminophen (TYLENOL) 325 MG tablet Take 2 tablets by mouth Every 4 (Four) Hours As Needed for Mild Pain. Indications: Pain      albuterol (PROAIR HFA) 108 (90 Base) MCG/ACT inhaler 4 puffs Every 4 (Four) Hours As Needed for Wheezing or Shortness of Air. Indications: Asthma, Chronic Obstructive Lung Disease      butalbital-acetaminophen-caffeine (FIORICET, ESGIC) -40 MG per tablet Take 2 tablets by mouth Every 4 (Four) Hours As Needed for Headache. 240 tablet 0    carboxymethylcellulose (REFRESH PLUS) 0.5 % solution Administer 1 drop to both eyes 2 (Two) Times a Day As Needed for Dry Eyes. Indications: Excessive Cornea and Conjunctiva Dryness      cetirizine (zyrTEC) 10 MG tablet Take 1 tablet by mouth Daily. Alt with singulair  Indications: Insect Bites      Cholecalciferol (VITAMIN D3) 1.25 MG (74146 UT) capsule Take 1 capsule by mouth 1 (One) Time Per Week.  TUESDAYS  Indications: Vitamin D Deficiency      cloNIDine (CATAPRES) 0.2 MG tablet Take 1 tablet by mouth Every 6 (Six) Hours. Dose titrated for BP control by patient      cloNIDine (CATAPRES) 0.3 MG tablet Take 1 tablet by mouth Every 6 (Six) Hours. Dose titrated by patient for BP control      desonide (DESOWEN) 0.05 % ointment Apply 1 Application topically to the appropriate area as directed Daily. Indications: facial rash      Diclofenac Sodium (VOLTAREN) 1 % gel gel Apply 4 g topically to the appropriate area as directed 4 (Four) Times a Day As Needed (Apply to knees or other joints when pain is severe and flares). 100 g 5    EPINEPHrine (EPIPEN) 0.3 MG/0.3ML solution auto-injector injection INJECT THE CONTENTS OF ONE PEN AS NEEDED FOR SEVERE ALLERGIC REACTION. MAY REPEAT ONE TIME.      eplerenone (INSPRA) 50 MG tablet Take 1 tablet by mouth Daily. Indications: High Blood Pressure      fluticasone (FLONASE) 50 MCG/ACT nasal spray 1 spray by Each Nare route 2 (Two) Times a Day.      HYDROcodone-acetaminophen (NORCO)  MG per tablet Take 1-2 tablets by mouth Every 6 (Six) Hours As Needed for Moderate Pain. Indications: Pain      hydrOXYzine (ATARAX) 10 MG tablet Take 1 tablet by mouth 3 (Three) Times a Day As Needed for Itching. Indications: itching      ipratropium (ATROVENT) 0.02 % nebulizer solution Take 2.5 mL by nebulization 4 (Four) Times a Day. Indications: Chronic Obstructive Lung Disease      levocetirizine (XYZAL) 5 MG tablet Take 1 tablet by mouth Every Morning. Indications: allergies      lidocaine (Lidoderm) 5 % Place 1 patch on the skin as directed by provider Daily As Needed for Mild Pain. Remove & Discard patch within 12 hours or as directed by MD 5 patch 0    LORazepam (ATIVAN) 0.5 MG tablet Take 1 tablet by mouth Every 8 (Eight) Hours As Needed for Anxiety. Indications: Feeling Anxious      multivitamin with minerals tablet tablet Take 1 tablet by mouth Daily.      mupirocin (BACTROBAN) 2 %  ointment Apply 1 Application topically to the appropriate area as directed Every 12 (Twelve) Hours.      naloxone (NARCAN) 4 MG/0.1ML nasal spray Administer 1 spray into the nostril(s) as directed by provider As Needed for Opioid Reversal. Call 911. Don't prime. [place] in 1 nostril for overdose. Repeat in 2-3 minutes in other nostril if no or minimal breathing/responsiveness.  Indications: Opioid Overdose      Niacin (VITAMIN B-3 PO) Take 1 tablet by mouth Daily. Indications: unknown      NIFEdipine XL (ADALAT CC) 30 MG 24 hr tablet Take 1 tablet by mouth Daily. 90 tablet 1    omeprazole (priLOSEC) 40 MG capsule Take 1 capsule by mouth Daily As Needed (indigestion). Indications: Heartburn      ondansetron ODT (ZOFRAN-ODT) 8 MG disintegrating tablet Take 1 tablet by mouth every 6 (six) hours as needed for nausea or vomiting. 360 tablet 1    Petrolatum 42 % ointment Apply 1 Application topically to the appropriate area as directed Every 12 (Twelve) Hours. Use on irritated skin from bug bites 100 g 5    polyethylene glycol (MIRALAX) 17 g packet Take 17 g by mouth Daily As Needed (constipation). Indications: Constipation      potassium chloride (KAYCIEL) 20 mEq/15 mL solution Take 15 mL by mouth Daily. 90 mL 5    predniSONE (DELTASONE) 10 MG tablet Take 1 tablet by mouth Daily.      promethazine-codeine (PHENERGAN with CODEINE) 6.25-10 MG/5ML syrup Take 5 mL by mouth 4 (Four) Times a Day As Needed for cough. 360 mL 0    rosuvastatin (CRESTOR) 5 MG tablet Take 1 tablet by mouth Every Morning. Indications: High Amount of Fats in the Blood      torsemide (DEMADEX) 100 MG tablet Take 0.5 tablets by mouth Daily.      triamcinolone (KENALOG) 0.1 % cream Apply 1 Application topically to the appropriate area as directed 2 (Two) Times a Day.      vitamin B-12 (VITAMIN B-12) 1000 MCG tablet Take 1 tablet by mouth Daily. 90 tablet 3    Xarelto 20 MG tablet Take 1 tablet by mouth Daily. Indications: history of DVT/PE            PROCEDURES  Laceration Repair    Date/Time: 7/23/2025 5:52 AM    Performed by: Neida Estes PA-C  Authorized by: Eitan Ortega MD    Consent:     Consent obtained:  Verbal    Consent given by:  Patient    Risks, benefits, and alternatives were discussed: yes      Risks discussed:  Infection, need for additional repair, nerve damage, poor wound healing, poor cosmetic result, pain, retained foreign body, tendon damage and vascular damage    Alternatives discussed:  No treatment  Anesthesia:     Anesthesia method:  Local infiltration    Local anesthetic:  Lidocaine 1% WITH epi  Laceration details:     Location:  Leg    Leg location:  L lower leg    Length (cm):  10  Exploration:     Hemostasis achieved with:  Direct pressure    Wound exploration: wound explored through full range of motion and entire depth of wound visualized      Wound extent: no areolar tissue violation noted, no fascia violation noted, no foreign bodies/material noted, no muscle damage noted, no nerve damage noted, no tendon damage noted, no underlying fracture noted and no vascular damage noted      Contaminated: no    Treatment:     Area cleansed with:  Saline    Amount of cleaning:  Standard    Irrigation solution:  Sterile saline    Irrigation method:  Tap  Skin repair:     Repair method:  Sutures    Suture size:  4-0    Suture material:  Nylon    Suture technique:  Horizontal mattress    Number of sutures:  9  Approximation:     Approximation:  Close  Repair type:     Repair type:  Simple  Post-procedure details:     Dressing:  Open (no dressing)    Procedure completion:  Tolerated          PROGRESS, DATA ANALYSIS, CONSULTS, AND MEDICAL DECISION MAKING  ORDERS PLACED DURING THIS VISIT:  Orders Placed This Encounter   Procedures    Laceration Repair    CT Head Without Contrast    CT Cervical Spine Without Contrast    Undress and Gown       All labs have been independently interpreted by me.  All radiology studies have  been reviewed by me. All EKG's have been independently viewed and interpreted by me.  Discussion below represents my analysis of pertinent findings related to patient's condition, differential diagnosis, treatment plan and final disposition.    Differential diagnosis includes but is not limited to:   Laceration, foreign body, tendon injury, vascular injury    ED Course:  ED Course as of 07/23/25 0554   Wed Jul 23, 2025   0533 I discussed the case with Dr. Ortega and he agrees to evaluate the patient at the bedside.    [CC]      ED Course User Index  [CC] Neida Estes PA-C           AS OF 05:54 EDT VITALS:    BP - 174/74  HR - 74  TEMP - 98 °F (36.7 °C) (Oral)  O2 SATS - 98%    Clinical Scores:                  MDM:  She reports a mechanical fall, striking her head but without LOC.  Her mentation is alert and oriented and is at baseline exam.  CT of the head and C-spine without contrast shows no acute changes, fracture, or dislocation.  There are chronic changes noted, she denies any significant discomfort, denies any neurological deficits.      COMPLEXITY OF CARE  Admission was considered but after careful review of the patient's presentation, physical examination, diagnostic results, and response to treatment the patient may be safely discharged with outpatient follow-up.      DIAGNOSIS  Final diagnoses:   None         DISPOSITION  ED Disposition       None               Please note that portions of this document were completed with a voice recognition program.    Note Disclaimer: At [facility], we believe that sharing information builds trust and better relationships. You are receiving this note because you recently visited [facility]. It is possible you will see health information before a provider has talked with you about it. This kind of information can be easy to misunderstand. To help you fully understand what it means for your health, we urge you to discuss this note with your  provider.     Martinez Diaz, AC  07/23/25 0806       Martinez Diaz, AC  07/23/25 0822

## 2025-07-23 NOTE — LETTER
July 23, 2025     Óscar Cadet MD  [address] [facility] [address]        Dear Óscar Cadet:    Your patient, Sasha Barrett, was recently seen and treated in our emergency department. Attached to this letter is a summary of that visit.    If you have any questions or concerns, please don't hesitate to call.    Patient: Sasha Barrett   YOB: 1940   Date of Visit: 7/23/2025             Sincerely,        AC Rosales    CC: No Recipients

## (undated) DEVICE — TABL TOP MULTILOCK DISP

## (undated) DEVICE — PK ORTHO MINOR TOWER 40

## (undated) DEVICE — STRAP WRST MULTILOCK TABL DISP

## (undated) DEVICE — LOU LOOP RECORDER PACK: Brand: MEDLINE INDUSTRIES, INC.

## (undated) DEVICE — ARM SLING: Brand: DEROYAL

## (undated) DEVICE — BNDG ELAS MATRX  2IN 5YD LF STRL

## (undated) DEVICE — SKIN PREP TRAY W/CHG: Brand: MEDLINE INDUSTRIES, INC.

## (undated) DEVICE — 3M™ STERI-STRIP™ REINFORCED ADHESIVE SKIN CLOSURES, R1547, 1/2 IN X 4 IN (12 MM X 100 MM), 6 STRIPS/ENVELOPE: Brand: 3M™ STERI-STRIP™

## (undated) DEVICE — GLV SURG BIOGEL LTX PF 7

## (undated) DEVICE — DISPOSABLE TOURNIQUET CUFF SINGLE BLADDER, SINGLE PORT AND QUICK CONNECT CONNECTOR: Brand: COLOR CUFF